# Patient Record
Sex: MALE | Employment: UNEMPLOYED | URBAN - METROPOLITAN AREA
[De-identification: names, ages, dates, MRNs, and addresses within clinical notes are randomized per-mention and may not be internally consistent; named-entity substitution may affect disease eponyms.]

---

## 2017-07-27 ENCOUNTER — APPOINTMENT (OUTPATIENT)
Dept: GENERAL RADIOLOGY | Age: 57
DRG: 004 | End: 2017-07-27
Attending: EMERGENCY MEDICINE
Payer: MEDICARE

## 2017-07-27 ENCOUNTER — HOSPITAL ENCOUNTER (INPATIENT)
Age: 57
LOS: 57 days | Discharge: NURSING, REHAB OR LONG-TERM CARE | DRG: 004 | End: 2017-09-22
Attending: EMERGENCY MEDICINE | Admitting: INTERNAL MEDICINE
Payer: MEDICARE

## 2017-07-27 ENCOUNTER — APPOINTMENT (OUTPATIENT)
Dept: CT IMAGING | Age: 57
DRG: 004 | End: 2017-07-27
Attending: EMERGENCY MEDICINE
Payer: MEDICARE

## 2017-07-27 DIAGNOSIS — R79.89 ELEVATED LFTS: ICD-10-CM

## 2017-07-27 DIAGNOSIS — E87.20 ACIDOSIS: ICD-10-CM

## 2017-07-27 DIAGNOSIS — D64.9 ANEMIA, UNSPECIFIED TYPE: ICD-10-CM

## 2017-07-27 DIAGNOSIS — I46.9 CARDIAC ARREST (HCC): Primary | ICD-10-CM

## 2017-07-27 DIAGNOSIS — N18.6 ESRD NEEDING DIALYSIS (HCC): ICD-10-CM

## 2017-07-27 DIAGNOSIS — R77.8 ELEVATED TROPONIN: ICD-10-CM

## 2017-07-27 DIAGNOSIS — Z99.2 ESRD NEEDING DIALYSIS (HCC): ICD-10-CM

## 2017-07-27 DIAGNOSIS — K52.9 COLITIS: ICD-10-CM

## 2017-07-27 PROBLEM — I95.9 HYPOTENSION: Status: ACTIVE | Noted: 2017-07-27

## 2017-07-27 PROBLEM — G93.1 ANOXIC BRAIN DAMAGE (HCC): Status: ACTIVE | Noted: 2017-07-27

## 2017-07-27 PROBLEM — A41.9 SEPSIS (HCC): Status: ACTIVE | Noted: 2017-07-27

## 2017-07-27 PROBLEM — K92.2 ACUTE GI BLEEDING: Status: ACTIVE | Noted: 2017-07-27

## 2017-07-27 PROBLEM — J96.90 RESPIRATORY FAILURE (HCC): Status: ACTIVE | Noted: 2017-07-27

## 2017-07-27 LAB
ALBUMIN SERPL BCP-MCNC: 2.3 G/DL (ref 3.4–5)
ALBUMIN SERPL BCP-MCNC: 2.4 G/DL (ref 3.4–5)
ALBUMIN/GLOB SERPL: 0.7 {RATIO} (ref 0.8–1.7)
ALBUMIN/GLOB SERPL: 0.7 {RATIO} (ref 0.8–1.7)
ALP SERPL-CCNC: 132 U/L (ref 45–117)
ALP SERPL-CCNC: 146 U/L (ref 45–117)
ALT SERPL-CCNC: 214 U/L (ref 16–61)
ALT SERPL-CCNC: 234 U/L (ref 16–61)
AMORPH CRY URNS QL MICRO: ABNORMAL
AMPHET UR QL SCN: NEGATIVE
ANION GAP BLD CALC-SCNC: 13 MMOL/L (ref 3–18)
ANION GAP BLD CALC-SCNC: 17 MMOL/L (ref 10–20)
ANION GAP BLD CALC-SCNC: 18 MMOL/L (ref 3–18)
APPEARANCE UR: ABNORMAL
APTT PPP: 43.3 SEC (ref 23–36.4)
ARTERIAL PATENCY WRIST A: ABNORMAL
ARTERIAL PATENCY WRIST A: YES
ARTERIAL PATENCY WRIST A: YES
AST SERPL W P-5'-P-CCNC: 297 U/L (ref 15–37)
AST SERPL W P-5'-P-CCNC: 405 U/L (ref 15–37)
BACTERIA SPEC CULT: NORMAL
BACTERIA URNS QL MICRO: ABNORMAL /HPF
BARBITURATES UR QL SCN: NEGATIVE
BASE DEFICIT BLD-SCNC: 12 MMOL/L
BASE DEFICIT BLD-SCNC: 5 MMOL/L
BASE DEFICIT BLD-SCNC: 5 MMOL/L
BASOPHILS # BLD AUTO: 0 K/UL (ref 0–0.06)
BASOPHILS # BLD AUTO: 0 K/UL (ref 0–0.06)
BASOPHILS # BLD: 0 % (ref 0–3)
BASOPHILS # BLD: 0 % (ref 0–3)
BDY SITE: ABNORMAL
BENZODIAZ UR QL: NEGATIVE
BILIRUB SERPL-MCNC: 3.9 MG/DL (ref 0.2–1)
BILIRUB SERPL-MCNC: 4.1 MG/DL (ref 0.2–1)
BILIRUB UR QL: ABNORMAL
BODY TEMPERATURE: 97.6
BUN BLD-MCNC: 40 MG/DL (ref 7–18)
BUN SERPL-MCNC: 42 MG/DL (ref 7–18)
BUN SERPL-MCNC: 45 MG/DL (ref 7–18)
BUN/CREAT SERPL: 7 (ref 12–20)
BUN/CREAT SERPL: 7 (ref 12–20)
CA-I BLD-MCNC: 0.86 MMOL/L (ref 1.12–1.32)
CALCIUM SERPL-MCNC: 6.2 MG/DL (ref 8.5–10.1)
CALCIUM SERPL-MCNC: 7 MG/DL (ref 8.5–10.1)
CANNABINOIDS UR QL SCN: NEGATIVE
CHLORIDE BLD-SCNC: 108 MMOL/L (ref 100–108)
CHLORIDE SERPL-SCNC: 106 MMOL/L (ref 100–108)
CHLORIDE SERPL-SCNC: 109 MMOL/L (ref 100–108)
CK MB CFR SERPL CALC: 1 % (ref 0–4)
CK MB CFR SERPL CALC: 1.1 % (ref 0–4)
CK MB SERPL-MCNC: 15.3 NG/ML (ref 5–25)
CK MB SERPL-MCNC: 48.7 NG/ML (ref 5–25)
CK SERPL-CCNC: 1421 U/L (ref 39–308)
CK SERPL-CCNC: 4740 U/L (ref 39–308)
CO2 BLD-SCNC: 15 MMOL/L (ref 19–24)
CO2 SERPL-SCNC: 15 MMOL/L (ref 21–32)
CO2 SERPL-SCNC: 19 MMOL/L (ref 21–32)
COCAINE UR QL SCN: NEGATIVE
COLOR UR: ABNORMAL
CREAT SERPL-MCNC: 5.72 MG/DL (ref 0.6–1.3)
CREAT SERPL-MCNC: 6.2 MG/DL (ref 0.6–1.3)
CREAT UR-MCNC: 5.6 MG/DL (ref 0.6–1.3)
DIFFERENTIAL METHOD BLD: ABNORMAL
DIFFERENTIAL METHOD BLD: ABNORMAL
EOSINOPHIL # BLD: 0 K/UL (ref 0–0.4)
EOSINOPHIL # BLD: 0 K/UL (ref 0–0.4)
EOSINOPHIL NFR BLD: 0 % (ref 0–5)
EOSINOPHIL NFR BLD: 0 % (ref 0–5)
EPITH CASTS URNS QL MICRO: ABNORMAL /LPF (ref 0–5)
ERYTHROCYTE [DISTWIDTH] IN BLOOD BY AUTOMATED COUNT: 14.9 % (ref 11.6–14.5)
ERYTHROCYTE [DISTWIDTH] IN BLOOD BY AUTOMATED COUNT: 15.4 % (ref 11.6–14.5)
GAS FLOW.O2 O2 DELIVERY SYS: ABNORMAL L/MIN
GAS FLOW.O2 SETTING OXYMISER: 12 BPM
GAS FLOW.O2 SETTING OXYMISER: 12 BPM
GAS FLOW.O2 SETTING OXYMISER: 20 L/M
GLOBULIN SER CALC-MCNC: 3.5 G/DL (ref 2–4)
GLOBULIN SER CALC-MCNC: 3.5 G/DL (ref 2–4)
GLUCOSE BLD STRIP.AUTO-MCNC: 159 MG/DL (ref 74–106)
GLUCOSE BLD STRIP.AUTO-MCNC: 251 MG/DL (ref 70–110)
GLUCOSE SERPL-MCNC: 163 MG/DL (ref 74–99)
GLUCOSE SERPL-MCNC: 189 MG/DL (ref 74–99)
GLUCOSE UR STRIP.AUTO-MCNC: 100 MG/DL
HCO3 BLD-SCNC: 15.5 MMOL/L (ref 22–26)
HCO3 BLD-SCNC: 17.7 MMOL/L (ref 22–26)
HCO3 BLD-SCNC: 21 MMOL/L (ref 22–26)
HCT VFR BLD AUTO: 23.6 % (ref 36–48)
HCT VFR BLD AUTO: 26.9 % (ref 36–48)
HCT VFR BLD CALC: 18 % (ref 36–49)
HDSCOM,HDSCOM: ABNORMAL
HGB BLD-MCNC: 6.1 G/DL (ref 12–16)
HGB BLD-MCNC: 8.1 G/DL (ref 13–16)
HGB BLD-MCNC: 9.4 G/DL (ref 13–16)
HGB UR QL STRIP: ABNORMAL
INR PPP: 2 (ref 0.8–1.2)
INSPIRATION.DURATION SETTING TIME VENT: 1 SEC
INSPIRATION.DURATION SETTING TIME VENT: 1 SEC
KETONES UR QL STRIP.AUTO: ABNORMAL MG/DL
LACTATE BLD-SCNC: 11.3 MMOL/L (ref 0.4–2)
LACTATE BLD-SCNC: 3.7 MMOL/L (ref 0.4–2)
LEUKOCYTE ESTERASE UR QL STRIP.AUTO: ABNORMAL
LYMPHOCYTES # BLD AUTO: 13 % (ref 20–51)
LYMPHOCYTES # BLD AUTO: 4 % (ref 20–51)
LYMPHOCYTES # BLD: 0.4 K/UL (ref 0.8–3.5)
LYMPHOCYTES # BLD: 1.5 K/UL (ref 0.8–3.5)
MAGNESIUM SERPL-MCNC: 2.1 MG/DL (ref 1.6–2.6)
MAGNESIUM SERPL-MCNC: 2.2 MG/DL (ref 1.6–2.6)
MCH RBC QN AUTO: 30.2 PG (ref 24–34)
MCH RBC QN AUTO: 30.2 PG (ref 24–34)
MCHC RBC AUTO-ENTMCNC: 34.3 G/DL (ref 31–37)
MCHC RBC AUTO-ENTMCNC: 34.9 G/DL (ref 31–37)
MCV RBC AUTO: 86.5 FL (ref 74–97)
MCV RBC AUTO: 88.1 FL (ref 74–97)
METHADONE UR QL: NEGATIVE
MONOCYTES # BLD: 0.5 K/UL (ref 0–1)
MONOCYTES # BLD: 0.9 K/UL (ref 0–1)
MONOCYTES NFR BLD AUTO: 4 % (ref 2–9)
MONOCYTES NFR BLD AUTO: 8 % (ref 2–9)
NEUTS BAND NFR BLD MANUAL: 20 % (ref 0–5)
NEUTS BAND NFR BLD MANUAL: 26 % (ref 0–5)
NEUTS SEG # BLD: 9.5 K/UL (ref 1.8–8)
NEUTS SEG # BLD: 9.7 K/UL (ref 1.8–8)
NEUTS SEG NFR BLD AUTO: 62 % (ref 42–75)
NEUTS SEG NFR BLD AUTO: 63 % (ref 42–75)
NITRITE UR QL STRIP.AUTO: NEGATIVE
O2/TOTAL GAS SETTING VFR VENT: 100 %
OPIATES UR QL: POSITIVE
PCO2 BLD: 24.4 MMHG (ref 35–45)
PCO2 BLD: 38 MMHG (ref 35–45)
PCO2 BLD: 43.9 MMHG (ref 35–45)
PCP UR QL: NEGATIVE
PEEP RESPIRATORY: 5 CMH2O
PEEP RESPIRATORY: 5 CMH2O
PH BLD: 7.16 [PH] (ref 7.35–7.45)
PH BLD: 7.35 [PH] (ref 7.35–7.45)
PH BLD: 7.47 [PH] (ref 7.35–7.45)
PH UR STRIP: 5.5 [PH] (ref 5–8)
PLATELET # BLD AUTO: 199 K/UL (ref 135–420)
PLATELET # BLD AUTO: 244 K/UL (ref 135–420)
PLATELET COMMENTS,PCOM: ABNORMAL
PLATELET COMMENTS,PCOM: ABNORMAL
PMV BLD AUTO: 9.4 FL (ref 9.2–11.8)
PMV BLD AUTO: 9.5 FL (ref 9.2–11.8)
PO2 BLD: 169 MMHG (ref 80–100)
PO2 BLD: 404 MMHG (ref 80–100)
PO2 BLD: 415 MMHG (ref 80–100)
POTASSIUM BLD-SCNC: 2.7 MMOL/L (ref 3.5–5.5)
POTASSIUM SERPL-SCNC: 3 MMOL/L (ref 3.5–5.5)
POTASSIUM SERPL-SCNC: 3.9 MMOL/L (ref 3.5–5.5)
PROT SERPL-MCNC: 5.8 G/DL (ref 6.4–8.2)
PROT SERPL-MCNC: 5.9 G/DL (ref 6.4–8.2)
PROT UR STRIP-MCNC: 300 MG/DL
PROTHROMBIN TIME: 21.4 SEC (ref 11.5–15.2)
RBC # BLD AUTO: 2.68 M/UL (ref 4.7–5.5)
RBC # BLD AUTO: 3.11 M/UL (ref 4.7–5.5)
RBC #/AREA URNS HPF: ABNORMAL /HPF (ref 0–5)
RBC MORPH BLD: ABNORMAL
SAO2 % BLD: 100 % (ref 92–97)
SAO2 % BLD: 100 % (ref 92–97)
SAO2 % BLD: 99 % (ref 92–97)
SERVICE CMNT-IMP: ABNORMAL
SERVICE CMNT-IMP: NORMAL
SODIUM BLD-SCNC: 135 MMOL/L (ref 136–145)
SODIUM SERPL-SCNC: 139 MMOL/L (ref 136–145)
SODIUM SERPL-SCNC: 141 MMOL/L (ref 136–145)
SP GR UR REFRACTOMETRY: >1.03 (ref 1–1.03)
SPECIMEN TYPE: ABNORMAL
SPERM URNS QL MICRO: ABNORMAL
TOTAL RESP. RATE, ITRR: 12
TOTAL RESP. RATE, ITRR: 25
TOTAL RESP. RATE, ITRR: 33
TROPONIN I BLD-MCNC: 0.04 NG/ML (ref 0–0.08)
TROPONIN I SERPL-MCNC: 0.08 NG/ML (ref 0–0.04)
TROPONIN I SERPL-MCNC: 1.07 NG/ML (ref 0–0.04)
UROBILINOGEN UR QL STRIP.AUTO: 1 EU/DL (ref 0.2–1)
VENTILATION MODE VENT: ABNORMAL
VENTILATION MODE VENT: ABNORMAL
VOLUME CONTROL PLUS IVLCP: YES
VOLUME CONTROL PLUS IVLCP: YES
VT SETTING VENT: 400 ML
VT SETTING VENT: 400 ML
WBC # BLD AUTO: 11 K/UL (ref 4.6–13.2)
WBC # BLD AUTO: 11.5 K/UL (ref 4.6–13.2)
WBC URNS QL MICRO: ABNORMAL /HPF (ref 0–4)

## 2017-07-27 PROCEDURE — 36430 TRANSFUSION BLD/BLD COMPNT: CPT

## 2017-07-27 PROCEDURE — 84484 ASSAY OF TROPONIN QUANT: CPT | Performed by: EMERGENCY MEDICINE

## 2017-07-27 PROCEDURE — 86920 COMPATIBILITY TEST SPIN: CPT | Performed by: EMERGENCY MEDICINE

## 2017-07-27 PROCEDURE — 82803 BLOOD GASES ANY COMBINATION: CPT

## 2017-07-27 PROCEDURE — 87186 SC STD MICRODIL/AGAR DIL: CPT | Performed by: EMERGENCY MEDICINE

## 2017-07-27 PROCEDURE — 77030016570 HC BLNKT BAIR HGGR 3M -B

## 2017-07-27 PROCEDURE — 87077 CULTURE AEROBIC IDENTIFY: CPT | Performed by: EMERGENCY MEDICINE

## 2017-07-27 PROCEDURE — 77030013169 SET IV BLD ICUM -A

## 2017-07-27 PROCEDURE — 5A1955Z RESPIRATORY VENTILATION, GREATER THAN 96 CONSECUTIVE HOURS: ICD-10-PCS | Performed by: EMERGENCY MEDICINE

## 2017-07-27 PROCEDURE — 77030011943

## 2017-07-27 PROCEDURE — 87045 FECES CULTURE AEROBIC BACT: CPT | Performed by: EMERGENCY MEDICINE

## 2017-07-27 PROCEDURE — 70450 CT HEAD/BRAIN W/O DYE: CPT

## 2017-07-27 PROCEDURE — C1751 CATH, INF, PER/CENT/MIDLINE: HCPCS

## 2017-07-27 PROCEDURE — 83735 ASSAY OF MAGNESIUM: CPT | Performed by: EMERGENCY MEDICINE

## 2017-07-27 PROCEDURE — 96366 THER/PROPH/DIAG IV INF ADDON: CPT

## 2017-07-27 PROCEDURE — 96361 HYDRATE IV INFUSION ADD-ON: CPT

## 2017-07-27 PROCEDURE — 77030021678 HC GLIDESCP STAT DISP VERT -B

## 2017-07-27 PROCEDURE — 77030033263 HC DRSG MEPILEX 16-48IN BORD MOLN -B

## 2017-07-27 PROCEDURE — 74011250636 HC RX REV CODE- 250/636: Performed by: INTERNAL MEDICINE

## 2017-07-27 PROCEDURE — 75810000137 HC PLCMT CENT VENOUS CATH

## 2017-07-27 PROCEDURE — 87493 C DIFF AMPLIFIED PROBE: CPT | Performed by: EMERGENCY MEDICINE

## 2017-07-27 PROCEDURE — 80053 COMPREHEN METABOLIC PANEL: CPT | Performed by: EMERGENCY MEDICINE

## 2017-07-27 PROCEDURE — 93005 ELECTROCARDIOGRAM TRACING: CPT

## 2017-07-27 PROCEDURE — 77030008768 HC TU NG VYGC -A

## 2017-07-27 PROCEDURE — 96367 TX/PROPH/DG ADDL SEQ IV INF: CPT

## 2017-07-27 PROCEDURE — 81001 URINALYSIS AUTO W/SCOPE: CPT | Performed by: EMERGENCY MEDICINE

## 2017-07-27 PROCEDURE — 85730 THROMBOPLASTIN TIME PARTIAL: CPT | Performed by: EMERGENCY MEDICINE

## 2017-07-27 PROCEDURE — 74011000250 HC RX REV CODE- 250: Performed by: INTERNAL MEDICINE

## 2017-07-27 PROCEDURE — P9016 RBC LEUKOCYTES REDUCED: HCPCS | Performed by: EMERGENCY MEDICINE

## 2017-07-27 PROCEDURE — 99285 EMERGENCY DEPT VISIT HI MDM: CPT

## 2017-07-27 PROCEDURE — 65610000006 HC RM INTENSIVE CARE

## 2017-07-27 PROCEDURE — 87040 BLOOD CULTURE FOR BACTERIA: CPT | Performed by: EMERGENCY MEDICINE

## 2017-07-27 PROCEDURE — 95816 EEG AWAKE AND DROWSY: CPT | Performed by: INTERNAL MEDICINE

## 2017-07-27 PROCEDURE — 77030020177 HC ELECTRD DEFIB PD PHIL -B

## 2017-07-27 PROCEDURE — 31500 INSERT EMERGENCY AIRWAY: CPT

## 2017-07-27 PROCEDURE — 85025 COMPLETE CBC W/AUTO DIFF WBC: CPT | Performed by: EMERGENCY MEDICINE

## 2017-07-27 PROCEDURE — 74011250636 HC RX REV CODE- 250/636: Performed by: EMERGENCY MEDICINE

## 2017-07-27 PROCEDURE — 02HV33Z INSERTION OF INFUSION DEVICE INTO SUPERIOR VENA CAVA, PERCUTANEOUS APPROACH: ICD-10-PCS | Performed by: EMERGENCY MEDICINE

## 2017-07-27 PROCEDURE — 74176 CT ABD & PELVIS W/O CONTRAST: CPT

## 2017-07-27 PROCEDURE — 77030020454 HC TU ET CUF HI/LO COVD -B

## 2017-07-27 PROCEDURE — 80047 BASIC METABLC PNL IONIZED CA: CPT

## 2017-07-27 PROCEDURE — 94762 N-INVAS EAR/PLS OXIMTRY CONT: CPT

## 2017-07-27 PROCEDURE — 77030018719 HC DRSG PTCH ANTIMIC J&J -A

## 2017-07-27 PROCEDURE — 82550 ASSAY OF CK (CPK): CPT | Performed by: EMERGENCY MEDICINE

## 2017-07-27 PROCEDURE — 83605 ASSAY OF LACTIC ACID: CPT

## 2017-07-27 PROCEDURE — 96365 THER/PROPH/DIAG IV INF INIT: CPT

## 2017-07-27 PROCEDURE — 82962 GLUCOSE BLOOD TEST: CPT

## 2017-07-27 PROCEDURE — 86900 BLOOD TYPING SEROLOGIC ABO: CPT | Performed by: EMERGENCY MEDICINE

## 2017-07-27 PROCEDURE — 71010 XR CHEST PORT: CPT

## 2017-07-27 PROCEDURE — 0BH17EZ INSERTION OF ENDOTRACHEAL AIRWAY INTO TRACHEA, VIA NATURAL OR ARTIFICIAL OPENING: ICD-10-PCS | Performed by: EMERGENCY MEDICINE

## 2017-07-27 PROCEDURE — 36600 WITHDRAWAL OF ARTERIAL BLOOD: CPT

## 2017-07-27 PROCEDURE — 94002 VENT MGMT INPAT INIT DAY: CPT

## 2017-07-27 PROCEDURE — 85610 PROTHROMBIN TIME: CPT | Performed by: EMERGENCY MEDICINE

## 2017-07-27 PROCEDURE — 80307 DRUG TEST PRSMV CHEM ANLYZR: CPT | Performed by: EMERGENCY MEDICINE

## 2017-07-27 PROCEDURE — 74011000250 HC RX REV CODE- 250: Performed by: EMERGENCY MEDICINE

## 2017-07-27 PROCEDURE — 74011000258 HC RX REV CODE- 258: Performed by: EMERGENCY MEDICINE

## 2017-07-27 PROCEDURE — 96375 TX/PRO/DX INJ NEW DRUG ADDON: CPT

## 2017-07-27 RX ORDER — SODIUM BICARBONATE 1 MEQ/ML
50 SYRINGE (ML) INTRAVENOUS
Status: COMPLETED | OUTPATIENT
Start: 2017-07-27 | End: 2017-07-27

## 2017-07-27 RX ORDER — ROCURONIUM BROMIDE 10 MG/ML
0.6 INJECTION, SOLUTION INTRAVENOUS
Status: COMPLETED | OUTPATIENT
Start: 2017-07-27 | End: 2017-07-27

## 2017-07-27 RX ORDER — METRONIDAZOLE 500 MG/100ML
500 INJECTION, SOLUTION INTRAVENOUS EVERY 8 HOURS
Status: DISCONTINUED | OUTPATIENT
Start: 2017-07-27 | End: 2017-07-28

## 2017-07-27 RX ORDER — ETOMIDATE 2 MG/ML
20 INJECTION INTRAVENOUS
Status: COMPLETED | OUTPATIENT
Start: 2017-07-27 | End: 2017-07-27

## 2017-07-27 RX ORDER — FENTANYL CITRATE 50 UG/ML
50 INJECTION, SOLUTION INTRAMUSCULAR; INTRAVENOUS
Status: DISCONTINUED | OUTPATIENT
Start: 2017-07-27 | End: 2017-08-06

## 2017-07-27 RX ORDER — NALOXONE HYDROCHLORIDE 0.4 MG/ML
0.4 INJECTION, SOLUTION INTRAMUSCULAR; INTRAVENOUS; SUBCUTANEOUS AS NEEDED
Status: DISCONTINUED | OUTPATIENT
Start: 2017-07-27 | End: 2017-09-22 | Stop reason: HOSPADM

## 2017-07-27 RX ORDER — HEPARIN SODIUM 5000 [USP'U]/ML
5000 INJECTION, SOLUTION INTRAVENOUS; SUBCUTANEOUS EVERY 8 HOURS
Status: DISCONTINUED | OUTPATIENT
Start: 2017-07-27 | End: 2017-07-28

## 2017-07-27 RX ORDER — NOREPINEPHRINE BITARTRATE/D5W 8 MG/250ML
2-30 PLASTIC BAG, INJECTION (ML) INTRAVENOUS
Status: DISCONTINUED | OUTPATIENT
Start: 2017-07-27 | End: 2017-07-30

## 2017-07-27 RX ORDER — NALOXONE HYDROCHLORIDE 0.4 MG/ML
0.4 INJECTION, SOLUTION INTRAMUSCULAR; INTRAVENOUS; SUBCUTANEOUS ONCE
Status: COMPLETED | OUTPATIENT
Start: 2017-07-27 | End: 2017-07-27

## 2017-07-27 RX ORDER — LORAZEPAM 2 MG/ML
1 INJECTION INTRAMUSCULAR
Status: DISCONTINUED | OUTPATIENT
Start: 2017-07-27 | End: 2017-08-06

## 2017-07-27 RX ORDER — MAGNESIUM SULFATE HEPTAHYDRATE 40 MG/ML
2 INJECTION, SOLUTION INTRAVENOUS
Status: COMPLETED | OUTPATIENT
Start: 2017-07-27 | End: 2017-07-27

## 2017-07-27 RX ORDER — HYDROCORTISONE SODIUM SUCCINATE 100 MG/2ML
100 INJECTION, POWDER, FOR SOLUTION INTRAMUSCULAR; INTRAVENOUS ONCE
Status: COMPLETED | OUTPATIENT
Start: 2017-07-27 | End: 2017-07-27

## 2017-07-27 RX ORDER — SODIUM CHLORIDE 9 MG/ML
250 INJECTION, SOLUTION INTRAVENOUS AS NEEDED
Status: DISCONTINUED | OUTPATIENT
Start: 2017-07-27 | End: 2017-07-31

## 2017-07-27 RX ORDER — LEVOFLOXACIN 5 MG/ML
750 INJECTION, SOLUTION INTRAVENOUS EVERY 24 HOURS
Status: DISCONTINUED | OUTPATIENT
Start: 2017-07-27 | End: 2017-07-27

## 2017-07-27 RX ORDER — DEXTROSE 50 % IN WATER (D50W) INTRAVENOUS SYRINGE
25
Status: COMPLETED | OUTPATIENT
Start: 2017-07-27 | End: 2017-07-27

## 2017-07-27 RX ORDER — NOREPINEPHRINE BITARTRATE 1 MG/ML
INJECTION, SOLUTION INTRAVENOUS
Status: DISPENSED
Start: 2017-07-27 | End: 2017-07-28

## 2017-07-27 RX ORDER — POTASSIUM CHLORIDE 7.45 MG/ML
10 INJECTION INTRAVENOUS
Status: COMPLETED | OUTPATIENT
Start: 2017-07-27 | End: 2017-08-04

## 2017-07-27 RX ORDER — LEVOFLOXACIN 5 MG/ML
500 INJECTION, SOLUTION INTRAVENOUS
Status: DISCONTINUED | OUTPATIENT
Start: 2017-07-29 | End: 2017-07-28

## 2017-07-27 RX ADMIN — HEPARIN SODIUM 5000 UNITS: 5000 INJECTION, SOLUTION INTRAVENOUS; SUBCUTANEOUS at 22:41

## 2017-07-27 RX ADMIN — SODIUM BICARBONATE 50 MEQ: 84 INJECTION INTRAVENOUS at 16:45

## 2017-07-27 RX ADMIN — Medication 13 MCG/MIN: at 22:43

## 2017-07-27 RX ADMIN — POTASSIUM CHLORIDE 10 MEQ: 10 INJECTION, SOLUTION INTRAVENOUS at 21:18

## 2017-07-27 RX ADMIN — POTASSIUM CHLORIDE 10 MEQ: 10 INJECTION, SOLUTION INTRAVENOUS at 23:40

## 2017-07-27 RX ADMIN — Medication 1 ML/KG/HR: at 22:46

## 2017-07-27 RX ADMIN — POTASSIUM CHLORIDE 10 MEQ: 10 INJECTION, SOLUTION INTRAVENOUS at 22:43

## 2017-07-27 RX ADMIN — LEVOFLOXACIN 750 MG: 5 INJECTION, SOLUTION INTRAVENOUS at 18:00

## 2017-07-27 RX ADMIN — MAGNESIUM SULFATE HEPTAHYDRATE 2 G: 40 INJECTION, SOLUTION INTRAVENOUS at 17:25

## 2017-07-27 RX ADMIN — NALOXONE HYDROCHLORIDE 0.4 MG: 0.4 INJECTION, SOLUTION INTRAMUSCULAR; INTRAVENOUS; SUBCUTANEOUS at 16:27

## 2017-07-27 RX ADMIN — ROCURONIUM BROMIDE 38.4 MG: 10 INJECTION INTRAVENOUS at 18:52

## 2017-07-27 RX ADMIN — SODIUM CHLORIDE 1000 ML: 900 INJECTION, SOLUTION INTRAVENOUS at 16:45

## 2017-07-27 RX ADMIN — ETOMIDATE 20 MG: 40 INJECTION, SOLUTION INTRAVENOUS at 18:52

## 2017-07-27 RX ADMIN — PIPERACILLIN SODIUM,TAZOBACTAM SODIUM 4.5 G: 4; .5 INJECTION, POWDER, FOR SOLUTION INTRAVENOUS at 18:00

## 2017-07-27 RX ADMIN — METRONIDAZOLE 500 MG: 500 INJECTION, SOLUTION INTRAVENOUS at 23:41

## 2017-07-27 RX ADMIN — DEXTROSE MONOHYDRATE 25 G: 25 INJECTION, SOLUTION INTRAVENOUS at 16:24

## 2017-07-27 RX ADMIN — SODIUM CHLORIDE 1000 ML: 900 INJECTION, SOLUTION INTRAVENOUS at 16:32

## 2017-07-27 RX ADMIN — Medication 6 MCG/MIN: at 19:01

## 2017-07-27 RX ADMIN — SODIUM BICARBONATE 50 MEQ: 84 INJECTION INTRAVENOUS at 20:45

## 2017-07-27 RX ADMIN — NALOXONE HYDROCHLORIDE 0.4 MG: 0.4 INJECTION, SOLUTION INTRAMUSCULAR; INTRAVENOUS; SUBCUTANEOUS at 16:24

## 2017-07-27 RX ADMIN — HYDROCORTISONE SODIUM SUCCINATE 100 MG: 100 INJECTION, POWDER, FOR SOLUTION INTRAMUSCULAR; INTRAVENOUS at 17:25

## 2017-07-27 NOTE — ED TRIAGE NOTES
[facility] for post arrest, call for sick person, upon loading in to amb, pt started agonal respiration, lost pulse, cpr started in amb, 2 amps d 50, 1 epi, bg in low 30s, arrives with IO left tibia, [facility].  ROSC at 1547

## 2017-07-27 NOTE — ED NOTES
Dr. Jefferson Osler at bedside for intubation, 7.0 ET tube, positive color change, 25 at the gumline, breath sounds bilaterally confirmed by Dr. Jefferson Osler

## 2017-07-27 NOTE — ED NOTES
Family brought to bedside, niece and daughter, updated on plan of care, verbalized understanding, assisted daughter back to quiet room, niece remains at bedside at this time, safety intact, will continue to monitor

## 2017-07-27 NOTE — CONSULTS
Consult Note  Consult requested by: Dr. Isaura Mercado is a 55 y.o. male [address] who is being seen on consult for ESRD, s/p cardiac arrest   Chief Complaint   Patient presents with    Cardiac arrest     Impression & Plan:   IMPRESSION:   ESRD, not sure where goes for chronic HD, base on chart his last HD was yesterday in ER   Access left arm AVG  Severe metabolic acidosis, elevated lactate  Anemia, received BT yesterday   S/p cardiac arrest  Altered mental status, doubt he can protect his airway   Hypotension  Sespsis, suspect GI pathology , colitis vs. Gall bladder pathology   PLAN:   Need to stabilize hemodynamics, and will need intubation. No urgent indication for dialysis, appears on dry side, agree with iv hydration per sepsis protocol. Okay to give IV contrast if needed as will plan to dialysed within nex 24hrs if his hemodynamic acceptable. Antibiotics per primary. Adjust all meds per ESRD status. Thank you very much for allowing me to participate in the care of this patient. We will continue to monitor with you and make recommendations as needed.     Discussed with Dr. Khadra Holt, Dr. Mika Becerra      Admission diagnosis: cardiac arrest   Patient has poor mental status, unable to provide any history , history was obtained by chart review and discussion with primary team    HPI:  50y M with PMH DM, HTN, ESRD, was brought to ER by EMS. Patient was found down at home with no pulse. He is hypotensive, non responisve in ER, his lab shows elevated lacate, severe metabolic acidosis . Nephrology service consulted for ESRD management. I briefly reviewed chart, he was in [place] [address] ER, for abdominal pain and anemia. He received BT, HD and discharged to home. His CT abdomen in ER shows, diffuse colitis and gall stone. During my evaluation in ER, he is non repsonsive, eyes are icteric, hypotensive, has very foul smelling liquid stool.      Past Medical History:   Diagnosis Date    Anemia     Arthritis     Chronic pain     Back     Diabetes mellitus type II     Hypertension     IBS (irritable bowel syndrome)     Lactose intolerance     TB (tuberculosis)     TB test positive      Past Surgical History:   Procedure Laterality Date    ENDOSCOPY, COLON, DIAGNOSTIC      HX AMPUTATION      Toe due to injury       Social History     Social History    Marital status: SINGLE     Spouse name: N/A    Number of children: N/A    Years of education: N/A     Occupational History    Not on file. Social History Main Topics    Smoking status: Current Every Day Smoker    Smokeless tobacco: Not on file    Alcohol use Yes    Drug use: Not on file    Sexual activity: Not on file     Other Topics Concern    Not on file     Social History Narrative    No narrative on file       No family history on file. Allergies no known allergies     Home Medications:     None       Current Facility-Administered Medications   Medication Dose Route Frequency    piperacillin-tazobactam (ZOSYN) 4.5 g in 0.9% sodium chloride (MBP/ADV) 100 mL MBP  4.5 g IntraVENous Q6H    levoFLOXacin (LEVAQUIN) 750 mg in D5W IVPB  750 mg IntraVENous Q24H    magnesium sulfate 2 g/50 ml IVPB (premix or compounded)  2 g IntraVENous NOW    vancomycin (VANCOCIN) 1,250 mg in 0.9% sodium chloride 250 mL IVPB  1,250 mg IntraVENous ONCE    VANCOMYCIN INFORMATION NOTE   Other CONTINUOUS     No current outpatient prescriptions on file.        Review of Systems:     Not able to provide   Data Review:    Labs: Results:       Chemistry Recent Labs      07/27/17   1626   GLU  163*   NA  139   K  3.9   CL  106   CO2  15*   BUN  45*   CREA  6.20*   CA  7.0*   AGAP  18   BUCR  7*   AP  146*   TP  5.9*   ALB  2.4*   GLOB  3.5   AGRAT  0.7*      CBC w/Diff Recent Labs      07/27/17   1626   WBC  11.5   RBC  2.68*   HGB  8.1*   HCT  23.6*   PLT  244   GRANS  63   LYMPH  13*   EOS  0      Coagulation Recent Labs      07/27/17   1626   PTP 21.4*   INR  2.0*   APTT  43.3*       Iron/Ferritin No results for input(s): IRON in the last 72 hours. No lab exists for component: TIBCCALC   BNP No results for input(s): BNPP in the last 72 hours. Cardiac Enzymes Recent Labs      07/27/17   1626   CPK  1421*   CKND1  1.1      Liver Enzymes Recent Labs      07/27/17   1626   TP  5.9*   ALB  2.4*   AP  146*   SGOT  297*      Thyroid Studies No results found for: T4, T3U, TSH, TSHEXT      EKG: sinus .     Physical Assessment:     Visit Vitals    BP (!) 85/38    Pulse 91    Temp 96.5 °F (35.8 °C)    Resp 24    Wt 64 kg (141 lb)    SpO2 99%     Weight change:   No intake or output data in the 24 hours ending 07/27/17 1819  Physical Exam:   General: appears dry    HEENT sclera icteric,   CVS: S1S2 heard,  no rub  RS: + air entry b/l, poor inspiratory effort   Abd: Soft, Non tender,  Neuro: poor response to verbal commend,   Extrm:no  edema, no cyanosis, clubbing   Skin: no visible  Rash  Musculoskeletal: No gross joints or bone deformities   Access: left arm AVG   Procedures/imaging: see electronic medical records for all procedures, Xrays and details which were not copied into this note but were reviewed prior to creation of Shane Smyth MD  July 27, 2017  [facility]  Office [phone]

## 2017-07-27 NOTE — Clinical Note
Status[de-identified] Inpatient [101] Type of Bed: Intensive Care [6] Inpatient Hospitalization Certified Necessary for the Following Reasons: 3. Patient receiving treatment that can only be provided in an inpatient setting (further clarification in H&P documentation) Admitting Diagnosis: Cardiac arrest ([facility] [address] [place]culus.Bidding. 5. ICD-9-CM] Admitting Diagnosis: Acidosis [276. 2. ICD-9-CM] Admitting Diagnosis: Respiratory failure ([facility] [address] [id number] Admitting Diagnosis: Anemia [053625] Admitting Diagnosis: ESRD needing dialysis [facility] [[id number]] Admitting Physician: Ivonne Masterson [de-identified] Attending Physician: Ivonne Masterson [de-identified] Estimated Length of Stay: 2 Midnights Discharge Plan[de-identified] Home with Office Follow-up

## 2017-07-27 NOTE — ED PROVIDER NOTES
HPI Comments: 4:18 PM Whitney Mayes is a 55 y.o. male with a history of ESRD on HD, HTN, diabetes, anemia, and IBS presents to ED via [facility] for the evaluation of cardiac arrest. PTA, EMS received a phone call for a \"sick person\". Upon EMS arrival pt was lying face down. In route to ED EMS started CPR secondary to an absent pulse. Pt received one epi, 2 amps d 50 in ambulance. Pt arrived with an IO left tibia, melody airway in place. EMS states that he is a dialysis patient and they are unsure of when his last treatment was. No other history able to be obtained. The history is provided by the EMS personnel. The history is limited by the condition of the patient. Past Medical History:   Diagnosis Date    Anemia     Arthritis     Chronic pain     Back     Diabetes mellitus type II     Hypertension     IBS (irritable bowel syndrome)     Lactose intolerance     TB (tuberculosis)     TB test positive       Past Surgical History:   Procedure Laterality Date    ENDOSCOPY, COLON, DIAGNOSTIC      HX AMPUTATION      Toe due to injury         No family history on file. Social History     Social History    Marital status: SINGLE     Spouse name: N/A    Number of children: N/A    Years of education: N/A     Occupational History    Not on file. Social History Main Topics    Smoking status: Current Every Day Smoker    Smokeless tobacco: Not on file    Alcohol use Yes    Drug use: Not on file    Sexual activity: Not on file     Other Topics Concern    Not on file     Social History Narrative    No narrative on file         ALLERGIES: Review of patient's allergies indicates no known allergies.     Review of Systems   Unable to perform ROS: Patient unresponsive       Vitals:    07/27/17 1925 07/27/17 1930 07/27/17 1935 07/27/17 1940   BP: 90/56 97/55 111/60 108/57   Pulse: 87 89 90 93   Resp: (!) 0 12 11 26   Temp:       SpO2: 100% 100% 100%    Weight:                Physical Exam   Eyes: Conjunctivae are normal.   Pupils 1mm and nonreactive bilaterally   Neck: Neck supple. No JVD present. No tracheal deviation present. No thyromegaly present. Cardiovascular:   tachycardic   Pulmonary/Chest:   Tachypneic. Breath sounds clear bilaterally   Abdominal: Soft. Bowel sounds are normal. He exhibits no distension. Musculoskeletal: He exhibits no edema or deformity. Lymphadenopathy:     He has no cervical adenopathy. Neurological: GCS eye subscore is 4. GCS verbal subscore is 1. GCS motor subscore is 4. Skin: No rash noted. Nursing note and vitals reviewed. MDM  Number of Diagnoses or Management Options  Acidosis:   Cardiac arrest [facility]):   Colitis:   Elevated LFTs:   Elevated troponin:   ESRD needing dialysis [facility]):   Diagnosis management comments: Debby Barksdale is a 55 y.o. male presenting post cardiac arrest. ROSC obtained PTA. Pt with melody airway, protecting airway on arrival, melody removed and placed on NC. Pt hypothermic and tachycardic, sepsis protocol started as was work up for cardiac etiology. Family updated on arrival. No other history able to be obtained at this time. Iv fluids and abx ordered. Bear hugger placed. CT A/P wo contrast obtained at [facility] yesterday:   Diffuse colonic wall thickening suggesting colitis either infectious or inflammatory  Gastroesophageal reflux  Small amount of ascites and anasarca  Gallbladder wall thickening with a 5 mm stone in the neck of the gallbladder. If there is clinical concern for acute cholecystitis, then I would recommend a right upper quadrant ultrasound.   Mild urinary bladder wall thickening suggesting underdistention however correlate with urinalysis    Critical Care  Total time providing critical care:  minutes    ED Course       Intubation  Date/Time: 7/27/2017 8:01 PM  Performed by: Alanna Marcano  Authorized by: Kandy MTZ     Consent:     Consent obtained:  Emergent situation  Pre-procedure details:     Patient status: Altered mental status  Procedure details:     Preoxygenation:  Nonrebreather mask    CPR in progress: no      Intubation method:  Oral    Oral intubation technique:  Video-assisted    Laryngoscope blade: Mac 3    Tube size (mm):  7.0    Tube type:  Cuffed    Number of attempts:  1    Cricoid pressure: yes      Tube visualized through cords: yes    Placement assessment:     Tube secured with:  ETT cardenas    Breath sounds:  Equal and absent over the epigastrium    Placement verification: chest rise, CXR verification, equal breath sounds and ETCO2 detector    Post-procedure details:     Patient tolerance of procedure: Tolerated well, no immediate complications  Central Line  Date/Time: 7/27/2017 8:02 PM  Performed by: Page Woodard  Authorized by: Page Woodard     Consent:     Consent obtained:  Emergent situation  Pre-procedure details:     Hand hygiene: Hand hygiene performed prior to insertion      Sterile barrier technique: All elements of maximal sterile technique followed      Skin preparation:  2% chlorhexidine    Skin preparation agent: Skin preparation agent completely dried prior to procedure    Anesthesia (see MAR for exact dosages): Anesthesia method:  None  Procedure details:     Location:  R internal jugular    Patient position:  Trendelenburg    Procedural supplies:  Triple lumen    Landmarks identified: yes      Ultrasound guidance: yes      Sterile ultrasound techniques: Sterile gel and sterile probe covers were used      Number of attempts:  1    Successful placement: yes    Post-procedure details:     Post-procedure:  Dressing applied and line sutured    Assessment:  Blood return through all ports, no pneumothorax on x-ray and free fluid flow    Patient tolerance of procedure:   Tolerated well, no immediate complications        Vitals:  Patient Vitals for the past 12 hrs:   Temp Pulse Resp BP SpO2   07/27/17 1940 - 93 26 108/57 -   07/27/17 1935 - 90 11 111/60 100 %   07/27/17 1930 - 89 12 97/55 100 %   07/27/17 1925 - 87 (!) 0 90/56 100 %   07/27/17 1920 - 83 (!) 6 (!) 78/52 100 %   07/27/17 1915 - 75 12 (!) 60/41 -   07/27/17 1910 - 61 (!) 7 (!) 48/33 100 %   07/27/17 1909 - (!) 59 12 - 100 %   07/27/17 1900 - 72 18 (!) 50/36 -   07/27/17 1856 - 76 20 (!) 54/34 -   07/27/17 1835 - 91 (!) 31 100/57 100 %   07/27/17 1830 - 90 28 98/50 100 %   07/27/17 1825 - 88 (!) 31 (!) 87/74 100 %   07/27/17 1820 - 92 23 93/55 100 %   07/27/17 1715 - 91 24 (!) 85/38 99 %   07/27/17 1712 - 89 (!) 33 (!) 81/54 98 %   07/27/17 1645 - 92 26 92/52 100 %   07/27/17 1643 96.5 °F (35.8 °C) - - - -   07/27/17 1638 - - - - 99 %   07/27/17 1631 - (!) 102 23 (!) 87/49 96 %   07/27/17 1630 - 100 26 (!) 88/45 97 %   07/27/17 1628 - (!) 103 23 (!) 87/49 97 %   07/27/17 1627 - (!) 103 22 - 99 %   07/27/17 1624 - (!) 102 25 - 96 %   Patient is 96% O2 on RA, indicating adequate oxygenation. EKG interpretation by ED Physician:    16:25- Sinus tachycardia, 103 bpm, lateral t-wave inversion, no STEMI   17:21- NSR, 92 bpm, QTc 722, no STEMI     X-Ray, CT or other radiology findings or impressions:  Ct Head Wo Cont    Result Date: 7/27/2017    CT scan of head: without contrast: HISTORY:[ Acute mental status change. History of diabetes, hypertension. TECHNIQUE: Contiguous 5 mm thick axial sections of brain have been obtained without intravenous contrast. [2-D coronal and sagittal images reformatted.] The study has been performed utilizing appropriate radiation dose reduction technique according to specification of the scanner, with modification of MAA/KV and appropriate collimation adjusted to patient's body habitus. COMPARISON STUDY: [None] ------------------------------------- FINDINGS: Intracranial hemorrhage :[None.] Intracranial mass lesion:[ None.] Midline shift: [None.] Cerebral cortical atrophy:[ None].  Cerebral central atrophy:[ None.] Chronic microvascular ischemic changes/aging changes in white matter:[Mild chronic microvascular ischemic changes in periventricular white matter. Acute cortical infarction:[ None.] Old cerebral infarction: [No definable infarction in cerebral cortex in either side. Basal ganglia structures of both sides:[ In right thalamus there are 3 lacunar infarctions. In left thalamus there are 2 lacunar infarctions. The lacunar infarctions are hypodense and most likely to be old or at least subacute. Brainstem: In the left side of upper manny there is ill-defined hypodensity, indicating lacunar infarctions of undetermined age. Acute lacunar infarction in brainstem not excluded. In the right side of mid manny there is hypodense old appearing lacunar infarction. Cerebellum: Mild atrophy changes in cerebellum. Calvarium and base of skull:[ No fracture or focal lesion]. In visualized portions of both orbits:[ No diagnostic finding.] In visualized portions of paranasal sinuses:[ No diagnostic finding.] In visualized base of l skull:[ No diagnostic finding.] IMPRESSION:    [No evidence of intracranial hemorrhages. Multiple lacunar infarctions in bilateral thalami, likely to be old or subacute. In left side of upper manny of brainstem there is lacunar infarction of undetermined age. Acute lacunar infarction in this area is not excluded. Follow-up evaluation with MRI of brain suggested. Xr Chest Port    Result Date: 7/27/2017  Portable Chest 1928 hours CPT CODE:41211 HISTORY:  Witnessed cardiac arrest during EMS transport,   intubation, central line. COMPARISON: Chest x-ray July 27, 2017 at 1648 hours. FINDINGS: Endotracheal tube has been positioned with the tip 6 cm proximal to the bree. Right IJ approach central catheter terminates at the midsuperior vena cava. Interval development of marked opacity from the right hilum to the hemidiaphragm with obscuration of the hemidiaphragm. The left lung is clear. The left costophrenic angle is sharp. Pulmonary vascularity is normal. There is no pneumothorax. . IMPRESSION: Support tubes in expected position. No pneumothorax. Marked interval progression of opacity in the right lower lobe likely due to segmental atelectasis    Xr Chest Port    Result Date: 7/27/2017  Portable chest x-ray, supine AP view, time 1648 hours on 7/27/2017: INDICATION: Cardiac arrest. COMPARISON STUDY: None. FINDINGS: There is moderate cardiomegaly. Pulmonary vascularity is not cephalized. Right hemidiaphragm is mild to moderately asymmetrically elevated. Probable minimal streaky atelectasis at right lung base. Rest of lungs is are clear. Visualized bony thorax appear to be grossly intact. There is no obvious pneumothorax. But small anterior pneumothorax may not be visualized on supine view IMPRESSION: Moderate cardiomegaly without CHF. Probable minimal streaky atelectasis at right lung base. Otherwise clear lungs bilaterally. Progress notes, Consult notes or additional Procedure notes:   Pt persistently hypotensive, repeat POC labs show dec in Hgb of 2 in few hrs been in ED, otherwise unchanged so concern this is true drop, did require transfusion last night so emergent blood ordered. 2LNS bolus and BP still low, abdominal exam unchanged, soft, not distended, some mucus per rectum but no melena or gross blood. Repeat ct ordered. cxr post intubation with some congestion worse in right base, will continue pulmonary toilet and monitor closely, will need dialysis in the next 12-24 hrs. Will consult nephrology and proceed with admission. I have spoken with Dr Myesha Mcgraw, ICU about patient. He has evalauted pt at bedside. Agrees with ICU admission. I have spoken with Dr Anton Cheema, nephrology about patient. Agrees to follow along in consult  I have spoken with Dr Rosemary Phipps , hospitalist, who agrees with admission. I have spoken with YASMEEN Miguel for [facility] about patient. Agrees to see pt in consult. Disposition:   Admitted    Diagnosis:   1. Cardiac arrest ([facility] [address]    2. Acidosis    3.  ESRD needing dialysis (HCC)    4. Elevated troponin    5. Elevated LFTs    6. Colitis    7. Anemia, unspecified type            Scribe Attestation      Anna Marie Gonzalez) Shoshana acting as a scribe for and in the presence of Harriet Espinoza MD      July 27, 2017 at 8:22 PM       Provider Attestation:      I personally performed the services described in the documentation, reviewed the documentation, as recorded by the scribe in my presence, and it accurately and completely records my words and actions.      Janel Kessler      Signed by: Genoveva Blake, [address] [place] [address], July 27, 2017 at 8:22 PM

## 2017-07-28 ENCOUNTER — APPOINTMENT (OUTPATIENT)
Dept: ULTRASOUND IMAGING | Age: 57
DRG: 004 | End: 2017-07-28
Attending: INTERNAL MEDICINE
Payer: MEDICARE

## 2017-07-28 ENCOUNTER — APPOINTMENT (OUTPATIENT)
Dept: GENERAL RADIOLOGY | Age: 57
DRG: 004 | End: 2017-07-28
Attending: PHYSICIAN ASSISTANT
Payer: MEDICARE

## 2017-07-28 ENCOUNTER — APPOINTMENT (OUTPATIENT)
Dept: GENERAL RADIOLOGY | Age: 57
DRG: 004 | End: 2017-07-28
Attending: INTERNAL MEDICINE
Payer: MEDICARE

## 2017-07-28 LAB
ABO + RH BLD: NORMAL
ALBUMIN SERPL BCP-MCNC: 2.3 G/DL (ref 3.4–5)
ALBUMIN/GLOB SERPL: 0.7 {RATIO} (ref 0.8–1.7)
ALP SERPL-CCNC: 121 U/L (ref 45–117)
ALT SERPL-CCNC: 262 U/L (ref 16–61)
ANION GAP BLD CALC-SCNC: 14 MMOL/L (ref 3–18)
APTT PPP: 138.6 SEC (ref 23–36.4)
APTT PPP: 53.9 SEC (ref 23–36.4)
ARTERIAL PATENCY WRIST A: ABNORMAL
ARTERIAL PATENCY WRIST A: YES
ARTERIAL PATENCY WRIST A: YES
AST SERPL W P-5'-P-CCNC: 501 U/L (ref 15–37)
ATRIAL RATE: 103 BPM
ATRIAL RATE: 92 BPM
BASE DEFICIT BLD-SCNC: 5 MMOL/L
BASE DEFICIT BLD-SCNC: 6 MMOL/L
BASE EXCESS BLD CALC-SCNC: 14 MMOL/L
BASOPHILS # BLD AUTO: 0 K/UL (ref 0–0.06)
BASOPHILS # BLD AUTO: 0 K/UL (ref 0–0.06)
BASOPHILS # BLD: 0 % (ref 0–3)
BASOPHILS # BLD: 0 % (ref 0–3)
BDY SITE: ABNORMAL
BILIRUB SERPL-MCNC: 4.9 MG/DL (ref 0.2–1)
BLD PROD TYP BPU: NORMAL
BLD PROD TYP BPU: NORMAL
BLOOD GROUP ANTIBODIES SERPL: NORMAL
BODY TEMPERATURE: 98.4
BPU ID: NORMAL
BPU ID: NORMAL
BUN SERPL-MCNC: 44 MG/DL (ref 7–18)
BUN/CREAT SERPL: 8 (ref 12–20)
CA-I SERPL-SCNC: 0.81 MMOL/L (ref 1.12–1.32)
CALCIUM SERPL-MCNC: 6.3 MG/DL (ref 8.5–10.1)
CALCIUM SERPL-MCNC: 6.8 MG/DL (ref 8.5–10.1)
CALCULATED P AXIS, ECG09: 54 DEGREES
CALCULATED P AXIS, ECG09: 58 DEGREES
CALCULATED R AXIS, ECG10: 60 DEGREES
CALCULATED R AXIS, ECG10: 64 DEGREES
CALCULATED T AXIS, ECG11: 78 DEGREES
CALCULATED T AXIS, ECG11: 95 DEGREES
CHLORIDE SERPL-SCNC: 108 MMOL/L (ref 100–108)
CK MB CFR SERPL CALC: 0.8 % (ref 0–4)
CK MB CFR SERPL CALC: 0.8 % (ref 0–4)
CK MB CFR SERPL CALC: 0.9 % (ref 0–4)
CK MB SERPL-MCNC: 61.4 NG/ML (ref 5–25)
CK MB SERPL-MCNC: 71.3 NG/ML (ref 5–25)
CK MB SERPL-MCNC: 80.9 NG/ML (ref 5–25)
CK SERPL-CCNC: 7413 U/L (ref 39–308)
CK SERPL-CCNC: 8892 U/L (ref 39–308)
CK SERPL-CCNC: 9426 U/L (ref 39–308)
CO2 SERPL-SCNC: 17 MMOL/L (ref 21–32)
CREAT SERPL-MCNC: 5.71 MG/DL (ref 0.6–1.3)
CROSSMATCH RESULT,%XM: NORMAL
CROSSMATCH RESULT,%XM: NORMAL
DIAGNOSIS, 93000: NORMAL
DIAGNOSIS, 93000: NORMAL
DIFFERENTIAL METHOD BLD: ABNORMAL
DIFFERENTIAL METHOD BLD: ABNORMAL
EOSINOPHIL # BLD: 0 K/UL (ref 0–0.4)
EOSINOPHIL # BLD: 0 K/UL (ref 0–0.4)
EOSINOPHIL NFR BLD: 0 % (ref 0–5)
EOSINOPHIL NFR BLD: 0 % (ref 0–5)
ERYTHROCYTE [DISTWIDTH] IN BLOOD BY AUTOMATED COUNT: 15.2 % (ref 11.6–14.5)
ERYTHROCYTE [DISTWIDTH] IN BLOOD BY AUTOMATED COUNT: 15.5 % (ref 11.6–14.5)
FERRITIN SERPL-MCNC: 4933 NG/ML (ref 8–388)
GAS FLOW.O2 O2 DELIVERY SYS: ABNORMAL L/MIN
GAS FLOW.O2 SETTING OXYMISER: 12 BPM
GAS FLOW.O2 SETTING OXYMISER: 12 BPM
GLOBULIN SER CALC-MCNC: 3.5 G/DL (ref 2–4)
GLUCOSE BLD STRIP.AUTO-MCNC: 67 MG/DL (ref 70–110)
GLUCOSE BLD STRIP.AUTO-MCNC: 70 MG/DL (ref 70–110)
GLUCOSE BLD STRIP.AUTO-MCNC: 83 MG/DL (ref 70–110)
GLUCOSE SERPL-MCNC: 135 MG/DL (ref 74–99)
HCO3 BLD-SCNC: 17.5 MMOL/L (ref 22–26)
HCO3 BLD-SCNC: 18.8 MMOL/L (ref 22–26)
HCO3 BLD-SCNC: 9.9 MMOL/L (ref 22–26)
HCT VFR BLD AUTO: 27 % (ref 36–48)
HCT VFR BLD AUTO: 27.1 % (ref 36–48)
HGB BLD-MCNC: 9.6 G/DL (ref 13–16)
HGB BLD-MCNC: 9.8 G/DL (ref 13–16)
INSPIRATION.DURATION SETTING TIME VENT: 1 SEC
INSPIRATION.DURATION SETTING TIME VENT: 1 SEC
IRON SATN MFR SERPL: 15 %
IRON SERPL-MCNC: 20 UG/DL (ref 50–175)
LACTATE SERPL-SCNC: 3 MMOL/L (ref 0.4–2)
LACTATE SERPL-SCNC: 3.1 MMOL/L (ref 0.4–2)
LACTATE SERPL-SCNC: 3.4 MMOL/L (ref 0.4–2)
LYMPHOCYTES # BLD AUTO: 4 % (ref 20–51)
LYMPHOCYTES # BLD AUTO: 7 % (ref 20–51)
LYMPHOCYTES # BLD: 0.7 K/UL (ref 0.8–3.5)
LYMPHOCYTES # BLD: 1 K/UL (ref 0.8–3.5)
MAGNESIUM SERPL-MCNC: 2.1 MG/DL (ref 1.6–2.6)
MCH RBC QN AUTO: 29.8 PG (ref 24–34)
MCH RBC QN AUTO: 30.3 PG (ref 24–34)
MCHC RBC AUTO-ENTMCNC: 35.4 G/DL (ref 31–37)
MCHC RBC AUTO-ENTMCNC: 36.3 G/DL (ref 31–37)
MCV RBC AUTO: 83.6 FL (ref 74–97)
MCV RBC AUTO: 84.2 FL (ref 74–97)
METAMYELOCYTES NFR BLD MANUAL: 1 %
MONOCYTES # BLD: 0.3 K/UL (ref 0–1)
MONOCYTES # BLD: 0.7 K/UL (ref 0–1)
MONOCYTES NFR BLD AUTO: 2 % (ref 2–9)
MONOCYTES NFR BLD AUTO: 4 % (ref 2–9)
NEUTS BAND NFR BLD MANUAL: 13 % (ref 0–5)
NEUTS BAND NFR BLD MANUAL: 20 % (ref 0–5)
NEUTS SEG # BLD: 13.3 K/UL (ref 1.8–8)
NEUTS SEG # BLD: 15.4 K/UL (ref 1.8–8)
NEUTS SEG NFR BLD AUTO: 71 % (ref 42–75)
NEUTS SEG NFR BLD AUTO: 78 % (ref 42–75)
O2/TOTAL GAS SETTING VFR VENT: 30 %
O2/TOTAL GAS SETTING VFR VENT: 50 %
P-R INTERVAL, ECG05: 156 MS
P-R INTERVAL, ECG05: 208 MS
PCO2 BLD: 17.3 MMHG (ref 35–45)
PCO2 BLD: 22.6 MMHG (ref 35–45)
PCO2 BLD: 25.7 MMHG (ref 35–45)
PEEP RESPIRATORY: 5 CMH2O
PEEP RESPIRATORY: 5 CMH2O
PH BLD: 7.37 [PH] (ref 7.35–7.45)
PH BLD: 7.47 [PH] (ref 7.35–7.45)
PH BLD: 7.5 [PH] (ref 7.35–7.45)
PHOSPHATE SERPL-MCNC: 3.7 MG/DL (ref 2.5–4.9)
PLATELET # BLD AUTO: 136 K/UL (ref 135–420)
PLATELET # BLD AUTO: 175 K/UL (ref 135–420)
PLATELET COMMENTS,PCOM: ABNORMAL
PLATELET COMMENTS,PCOM: ABNORMAL
PMV BLD AUTO: 9.1 FL (ref 9.2–11.8)
PMV BLD AUTO: 9.9 FL (ref 9.2–11.8)
PO2 BLD: 113 MMHG (ref 80–100)
PO2 BLD: 176 MMHG (ref 80–100)
PO2 BLD: 74 MMHG (ref 80–100)
POTASSIUM SERPL-SCNC: 3.3 MMOL/L (ref 3.5–5.5)
PROT SERPL-MCNC: 5.8 G/DL (ref 6.4–8.2)
PTH-INTACT SERPL-MCNC: 1285.9 PG/ML (ref 14–72)
Q-T INTERVAL, ECG07: 408 MS
Q-T INTERVAL, ECG07: 584 MS
QRS DURATION, ECG06: 92 MS
QRS DURATION, ECG06: 94 MS
QTC CALCULATION (BEZET), ECG08: 534 MS
QTC CALCULATION (BEZET), ECG08: 722 MS
RBC # BLD AUTO: 3.22 M/UL (ref 4.7–5.5)
RBC # BLD AUTO: 3.23 M/UL (ref 4.7–5.5)
RBC MORPH BLD: ABNORMAL
RBC MORPH BLD: ABNORMAL
SAO2 % BLD: 100 % (ref 92–97)
SAO2 % BLD: 95 % (ref 92–97)
SAO2 % BLD: 99 % (ref 92–97)
SERVICE CMNT-IMP: ABNORMAL
SODIUM SERPL-SCNC: 139 MMOL/L (ref 136–145)
SPECIMEN EXP DATE BLD: NORMAL
SPECIMEN TYPE: ABNORMAL
STATUS OF UNIT,%ST: NORMAL
STATUS OF UNIT,%ST: NORMAL
TIBC SERPL-MCNC: 131 UG/DL (ref 250–450)
TOTAL RESP. RATE, ITRR: 24
TOTAL RESP. RATE, ITRR: 27
TROPONIN I SERPL-MCNC: 115 NG/ML (ref 0–0.04)
TROPONIN I SERPL-MCNC: 2.52 NG/ML (ref 0–0.04)
TROPONIN I SERPL-MCNC: 30.8 NG/ML (ref 0–0.04)
UNIT DIVISION, %UDIV: 0
UNIT DIVISION, %UDIV: 0
VANCOMYCIN SERPL-MCNC: 21.9 UG/ML (ref 5–40)
VENTILATION MODE VENT: ABNORMAL
VENTILATION MODE VENT: ABNORMAL
VENTRICULAR RATE, ECG03: 103 BPM
VENTRICULAR RATE, ECG03: 92 BPM
VOLUME CONTROL PLUS IVLCP: YES
VOLUME CONTROL PLUS IVLCP: YES
VT SETTING VENT: 400 ML
VT SETTING VENT: 400 ML
WBC # BLD AUTO: 14.6 K/UL (ref 4.6–13.2)
WBC # BLD AUTO: 16.9 K/UL (ref 4.6–13.2)
WBC MORPH BLD: ABNORMAL

## 2017-07-28 PROCEDURE — 83970 ASSAY OF PARATHORMONE: CPT | Performed by: INTERNAL MEDICINE

## 2017-07-28 PROCEDURE — 82803 BLOOD GASES ANY COMBINATION: CPT

## 2017-07-28 PROCEDURE — 93306 TTE W/DOPPLER COMPLETE: CPT

## 2017-07-28 PROCEDURE — 84100 ASSAY OF PHOSPHORUS: CPT | Performed by: INTERNAL MEDICINE

## 2017-07-28 PROCEDURE — 80202 ASSAY OF VANCOMYCIN: CPT | Performed by: EMERGENCY MEDICINE

## 2017-07-28 PROCEDURE — 82550 ASSAY OF CK (CPK): CPT | Performed by: INTERNAL MEDICINE

## 2017-07-28 PROCEDURE — 76705 ECHO EXAM OF ABDOMEN: CPT

## 2017-07-28 PROCEDURE — 74011000250 HC RX REV CODE- 250: Performed by: EMERGENCY MEDICINE

## 2017-07-28 PROCEDURE — 36600 WITHDRAWAL OF ARTERIAL BLOOD: CPT

## 2017-07-28 PROCEDURE — 82330 ASSAY OF CALCIUM: CPT | Performed by: PHYSICIAN ASSISTANT

## 2017-07-28 PROCEDURE — 74011250636 HC RX REV CODE- 250/636: Performed by: INTERNAL MEDICINE

## 2017-07-28 PROCEDURE — 83605 ASSAY OF LACTIC ACID: CPT | Performed by: INTERNAL MEDICINE

## 2017-07-28 PROCEDURE — 94003 VENT MGMT INPAT SUBQ DAY: CPT

## 2017-07-28 PROCEDURE — 74011000250 HC RX REV CODE- 250: Performed by: PHYSICIAN ASSISTANT

## 2017-07-28 PROCEDURE — 83540 ASSAY OF IRON: CPT | Performed by: INTERNAL MEDICINE

## 2017-07-28 PROCEDURE — 77030020186 HC BOOT HL PROTCT SAGE -B

## 2017-07-28 PROCEDURE — C9113 INJ PANTOPRAZOLE SODIUM, VIA: HCPCS | Performed by: PHYSICIAN ASSISTANT

## 2017-07-28 PROCEDURE — 74011250637 HC RX REV CODE- 250/637: Performed by: PHYSICIAN ASSISTANT

## 2017-07-28 PROCEDURE — 86706 HEP B SURFACE ANTIBODY: CPT | Performed by: INTERNAL MEDICINE

## 2017-07-28 PROCEDURE — 87340 HEPATITIS B SURFACE AG IA: CPT | Performed by: INTERNAL MEDICINE

## 2017-07-28 PROCEDURE — 80053 COMPREHEN METABOLIC PANEL: CPT | Performed by: INTERNAL MEDICINE

## 2017-07-28 PROCEDURE — 74011250636 HC RX REV CODE- 250/636: Performed by: PHYSICIAN ASSISTANT

## 2017-07-28 PROCEDURE — 85730 THROMBOPLASTIN TIME PARTIAL: CPT | Performed by: INTERNAL MEDICINE

## 2017-07-28 PROCEDURE — 85025 COMPLETE CBC W/AUTO DIFF WBC: CPT | Performed by: INTERNAL MEDICINE

## 2017-07-28 PROCEDURE — 30233N1 TRANSFUSION OF NONAUTOLOGOUS RED BLOOD CELLS INTO PERIPHERAL VEIN, PERCUTANEOUS APPROACH: ICD-10-PCS | Performed by: INTERNAL MEDICINE

## 2017-07-28 PROCEDURE — 82728 ASSAY OF FERRITIN: CPT | Performed by: INTERNAL MEDICINE

## 2017-07-28 PROCEDURE — 83605 ASSAY OF LACTIC ACID: CPT | Performed by: PHYSICIAN ASSISTANT

## 2017-07-28 PROCEDURE — 83735 ASSAY OF MAGNESIUM: CPT | Performed by: INTERNAL MEDICINE

## 2017-07-28 PROCEDURE — 74011250636 HC RX REV CODE- 250/636: Performed by: EMERGENCY MEDICINE

## 2017-07-28 PROCEDURE — 82962 GLUCOSE BLOOD TEST: CPT

## 2017-07-28 PROCEDURE — 36592 COLLECT BLOOD FROM PICC: CPT

## 2017-07-28 PROCEDURE — 65610000006 HC RM INTENSIVE CARE

## 2017-07-28 PROCEDURE — 71010 XR CHEST PORT: CPT

## 2017-07-28 PROCEDURE — 74011000258 HC RX REV CODE- 258: Performed by: PHYSICIAN ASSISTANT

## 2017-07-28 PROCEDURE — 93005 ELECTROCARDIOGRAM TRACING: CPT

## 2017-07-28 PROCEDURE — 74011000250 HC RX REV CODE- 250: Performed by: INTERNAL MEDICINE

## 2017-07-28 PROCEDURE — 85730 THROMBOPLASTIN TIME PARTIAL: CPT | Performed by: PHYSICIAN ASSISTANT

## 2017-07-28 PROCEDURE — 74011000258 HC RX REV CODE- 258: Performed by: EMERGENCY MEDICINE

## 2017-07-28 RX ORDER — INSULIN LISPRO 100 [IU]/ML
INJECTION, SOLUTION INTRAVENOUS; SUBCUTANEOUS EVERY 6 HOURS
Status: DISCONTINUED | OUTPATIENT
Start: 2017-07-28 | End: 2017-08-02

## 2017-07-28 RX ORDER — DEXTROSE 50 % IN WATER (D50W) INTRAVENOUS SYRINGE
25-50 AS NEEDED
Status: DISCONTINUED | OUTPATIENT
Start: 2017-07-28 | End: 2017-09-22 | Stop reason: HOSPADM

## 2017-07-28 RX ORDER — POTASSIUM CHLORIDE 7.45 MG/ML
10 INJECTION INTRAVENOUS
Status: COMPLETED | OUTPATIENT
Start: 2017-07-28 | End: 2017-08-04

## 2017-07-28 RX ORDER — DEXTROSE MONOHYDRATE 50 MG/ML
30 INJECTION, SOLUTION INTRAVENOUS CONTINUOUS
Status: DISCONTINUED | OUTPATIENT
Start: 2017-07-28 | End: 2017-07-31

## 2017-07-28 RX ORDER — ALBUMIN HUMAN 250 G/1000ML
12.5 SOLUTION INTRAVENOUS
Status: COMPLETED | OUTPATIENT
Start: 2017-07-28 | End: 2017-08-04

## 2017-07-28 RX ORDER — HEPARIN SODIUM 10000 [USP'U]/100ML
12-25 INJECTION, SOLUTION INTRAVENOUS
Status: DISCONTINUED | OUTPATIENT
Start: 2017-07-28 | End: 2017-07-30

## 2017-07-28 RX ORDER — MAGNESIUM SULFATE 100 %
4 CRYSTALS MISCELLANEOUS AS NEEDED
Status: DISCONTINUED | OUTPATIENT
Start: 2017-07-28 | End: 2017-09-22 | Stop reason: HOSPADM

## 2017-07-28 RX ORDER — FAMOTIDINE 10 MG/ML
20 INJECTION INTRAVENOUS EVERY 24 HOURS
Status: DISCONTINUED | OUTPATIENT
Start: 2017-07-29 | End: 2017-08-01

## 2017-07-28 RX ORDER — MIDAZOLAM IN 0.9 % SOD.CHLORID 1 MG/ML
4 PLASTIC BAG, INJECTION (ML) INTRAVENOUS
Status: DISCONTINUED | OUTPATIENT
Start: 2017-07-29 | End: 2017-07-30

## 2017-07-28 RX ORDER — SODIUM CHLORIDE 9 MG/ML
100 INJECTION, SOLUTION INTRAVENOUS
Status: DISCONTINUED | OUTPATIENT
Start: 2017-07-28 | End: 2017-09-22 | Stop reason: HOSPADM

## 2017-07-28 RX ORDER — FAMOTIDINE 10 MG/ML
20 INJECTION INTRAVENOUS EVERY 24 HOURS
Status: DISCONTINUED | OUTPATIENT
Start: 2017-07-28 | End: 2017-07-28

## 2017-07-28 RX ORDER — CHLORHEXIDINE GLUCONATE 1.2 MG/ML
10 RINSE ORAL ONCE
Status: COMPLETED | OUTPATIENT
Start: 2017-07-28 | End: 2017-07-28

## 2017-07-28 RX ORDER — CHLORHEXIDINE GLUCONATE 1.2 MG/ML
10 RINSE ORAL EVERY 12 HOURS
Status: DISCONTINUED | OUTPATIENT
Start: 2017-07-28 | End: 2017-08-06

## 2017-07-28 RX ADMIN — HEPARIN SODIUM AND DEXTROSE 10 UNITS/KG/HR: 10000; 5 INJECTION INTRAVENOUS at 23:00

## 2017-07-28 RX ADMIN — DEXTROSE MONOHYDRATE 25 G: 25 INJECTION, SOLUTION INTRAVENOUS at 17:46

## 2017-07-28 RX ADMIN — METRONIDAZOLE 500 MG: 500 INJECTION, SOLUTION INTRAVENOUS at 06:27

## 2017-07-28 RX ADMIN — CHLORHEXIDINE GLUCONATE 10 ML: 1.2 RINSE ORAL at 08:06

## 2017-07-28 RX ADMIN — SODIUM CHLORIDE 40 MG: 9 INJECTION INTRAMUSCULAR; INTRAVENOUS; SUBCUTANEOUS at 08:07

## 2017-07-28 RX ADMIN — PIPERACILLIN SODIUM AND TAZOBACTAM SODIUM 2.25 G: 2; .25 INJECTION, POWDER, LYOPHILIZED, FOR SOLUTION INTRAVENOUS at 07:43

## 2017-07-28 RX ADMIN — HEPARIN SODIUM 5000 UNITS: 5000 INJECTION, SOLUTION INTRAVENOUS; SUBCUTANEOUS at 05:27

## 2017-07-28 RX ADMIN — Medication 4 MCG/MIN: at 05:31

## 2017-07-28 RX ADMIN — DEXTROSE MONOHYDRATE 30 ML/HR: 5 INJECTION, SOLUTION INTRAVENOUS at 13:00

## 2017-07-28 RX ADMIN — Medication 4 MCG/MIN: at 09:50

## 2017-07-28 RX ADMIN — CALCIUM GLUCONATE 2 G: 94 INJECTION, SOLUTION INTRAVENOUS at 15:20

## 2017-07-28 RX ADMIN — Medication 4 MCG/MIN: at 20:03

## 2017-07-28 RX ADMIN — Medication 4 MG/HR: at 23:51

## 2017-07-28 RX ADMIN — POTASSIUM CHLORIDE 10 MEQ: 10 INJECTION, SOLUTION INTRAVENOUS at 06:27

## 2017-07-28 RX ADMIN — POTASSIUM CHLORIDE 10 MEQ: 10 INJECTION, SOLUTION INTRAVENOUS at 00:48

## 2017-07-28 RX ADMIN — HEPARIN SODIUM AND DEXTROSE 12 UNITS/KG/HR: 10000; 5 INJECTION INTRAVENOUS at 15:57

## 2017-07-28 RX ADMIN — PIPERACILLIN SODIUM AND TAZOBACTAM SODIUM 2.25 G: 2; .25 INJECTION, POWDER, LYOPHILIZED, FOR SOLUTION INTRAVENOUS at 20:03

## 2017-07-28 RX ADMIN — DEXTROSE MONOHYDRATE 25 G: 25 INJECTION, SOLUTION INTRAVENOUS at 11:59

## 2017-07-28 RX ADMIN — CHLORHEXIDINE GLUCONATE 10 ML: 1.2 RINSE ORAL at 21:39

## 2017-07-28 RX ADMIN — POTASSIUM CHLORIDE 10 MEQ: 10 INJECTION, SOLUTION INTRAVENOUS at 05:27

## 2017-07-28 NOTE — CONSULTS
[email or web address]  [phone]    Impression:   1. Anemia- H/H 9.6/27.2 s/p 2 units PRBC  2. Colitis- stool for cdiff pending  3. Poss acute cholecystitis  4. Abn LFTS-    5. Elevated bili- 4.9      Plan:     1. Cont NPO      Chief Complaint: anemia abn LFTS      HPI:  Debby Barksdale is a 55 y.o. male who is being seen on consult for anemia and elevated LFTs. He has ESRD and is noncompliant with dialysis. Pt to ER at [facility] yesterday with c/o abdominal pain. Pt was found anemic with fluid overload. He had dialysis and was transfused 2 units of PRBCs. He was later d/c and was found at home unresponsive by family. EMS performed CPR and was intubated in the ER. He is presently still intubated on levophed. PMH:   Past Medical History:   Diagnosis Date    Anemia     Arthritis     Chronic pain     Back     Diabetes mellitus type II     Hypertension     IBS (irritable bowel syndrome)     Lactose intolerance     TB (tuberculosis)     TB test positive       PSH:   Past Surgical History:   Procedure Laterality Date    ENDOSCOPY, COLON, DIAGNOSTIC      HX AMPUTATION      Toe due to injury       Social HX:   Social History     Social History    Marital status: SINGLE     Spouse name: N/A    Number of children: N/A    Years of education: N/A     Occupational History    Not on file. Social History Main Topics    Smoking status: Current Every Day Smoker    Smokeless tobacco: Not on file    Alcohol use Yes    Drug use: Not on file    Sexual activity: Not on file     Other Topics Concern    Not on file     Social History Narrative       FHX:   History reviewed. No pertinent family history. Allergy:   Allergies no known allergies    Home Medications:     No prescriptions prior to admission. Review of Systems:     A fourteen point review of systems was obtained, and was negative except per HPI.     Visit Vitals    BP 91/58    Pulse 97    Temp 99.2 °F (37.3 °C)    Resp 24    Ht 6' 3\" (1.905 m)  Comment: per chart history    Wt 71 kg (156 lb 8.4 oz)    SpO2 100%    BMI 19.56 kg/m2       Physical Assessment:     constitutional: appearance: well developed, well nourished, in no acute distress. skin: no rashes, jaundice  eyes: inspection: normal conjunctivae and lids; no scleral icterus pupils: equal, round, reactive to light; extraocular movements intact  ENMT: mouth: mucous membranes moist, no thrush  neck:  no masses or tenderness; normal range of motion  respiratory: breath sounds clear, no wheeze/rales/rhonchi  cardiovascular: normal rate, regular rhythm without murmur  abdominal: soft, bowel sounds present, non-tender to palpation without rebound or guarding; no appreciable mass; no appreciable hepatosplenomegaly; no appreciable fluid wave  extremities: no clubbing, cyanosis, edema  neurologic:  Cranial nerves II-XII grossly intact; no asterixis   psychiatric:  oriented to time, space and person. Basic Metabolic Profile   Recent Labs      07/28/17   0410   NA  139   K  3.3*   CL  108   CO2  17*   BUN  44*   GLU  135*   CA  6.3*   MG  2.1   PHOS  3.7         CBC w/Diff    Recent Labs      07/28/17 0410   WBC  14.6*   RBC  3.22*   HGB  9.6*   HCT  27.1*   MCV  84.2   MCH  29.8   MCHC  35.4   RDW  15.2*   PLT  175    Recent Labs      07/28/17   0410   GRANS  78*   LYMPH  7*   EOS  0        Hepatic Function   Recent Labs      07/28/17 0410   ALB  2.3*   TP  5.8*   TBILI  4.9*   SGOT  501*   AP  121*          Humza Duran, ANP-C  [facility]  [email or web address]

## 2017-07-28 NOTE — CDMP QUERY
\"Anemia\" documented in [address], ED and H&P. Please specify type(s):    =>Anemia of chronic disease  =>Anemia of CKD  =>Acute blood loss anemia  =>Other Explanation of clinical findings  =>Unable to Determine (no explanation of clinical findings)    The medical record reflects the following:  Risk:  --PMH ESRD  --admitted after cardiac arrest with sepsis, respiratory failure, anoxic brain damage, pneumonia, UTI, acidosis, acute cholecystitis, colotis, GI Bleed    Clinical Indicators:  --7/27/2017 16:26: HGB: 8.1 (L)  --7/27/2017 20:24: HGB: 9.4 (L)  --7/28/2017 04:10: HGB: 9.6 (L)    Treatment:   --receiving serial CBC  --H&P: was at [facility] yesterday with anemia recived 2 units of Prbc     Please clarify and document your clinical opinion in the progress notes and discharge summary including the definitive and/or presumptive diagnosis, (suspected or probable), related to the above clinical findings. Please include clinical findings supporting your diagnosis. If you DECLINE this query or would like to communicate with [facility], please utilize the \"Floqq message box\" at the TOP of the Progress Note on the right.       Thank you,  Grace Moon [facility], [address] [place] [address]

## 2017-07-28 NOTE — CONSULTS
[facility]  Pulmonary, Critical Care, and Sleep Medicine    Name: Ashok Momin MRN: [id number]  : 1960 Hospital: [facility]  Date: 2017       PCCM Initial Patient Consult                                              Consult requesting physician: Juan Guillaume MD    Reason for Consult: S/p cardiac arrest    I have reviewed the flowsheet and notes. Events, vitals, medications and notes from last 24 hours reviewed. Care plan discussed with staff    Subjective:  2017     IMPRESSION:  VDRF - Repeat ABG  S/p Cardiac arrest - F/u 3 sets of cardiac enzymes  Colitis - Stool C Diff sent. Start Flagyl  Poss Acute cholecystitis - Repeat CT result is not diagnostic On emp abx  Abn LFT's - Monitor  Lactic Acidosis - Pt is on Bicarb drip, monitor  Poss Pneumonia - Cont Zosyn, Levaquin, Vanco. F/u cultures  Poss UTI - Cont Zosyn  ESRD - HD per Renal  Anemia - S/p PRBC transfusion. Trend Hb  Hypotension - on Levophed. Titrate to keep MAP >65mmHg    Code status: Full    OTHER:  Glycemic Control. Glucose stabilizer per ICU protocol when on insulin drip. Maintain blood glucose 140-180. Replace electrolytes per ICU electrolyte replacement protocol  Ventilator bundle & Sedation protocol followed. Daily morning sedation holiday, assessment for readiness for SBT & weaning from ventilator; and then re-titrate if required. Aim to keep peak plateau pressure 43-36YT H2O in ARDS patient. Celena tube to suction at 20-30 cm H2O, Maintain Bogue tube with 5-10ml air every 4 hours. Chlorhexidine mouth washes and routine oral care every 4 hours. Stress ulcer and DVT prophylaxis. HOB >=30 degree elevation all the time. HOB >=30 degree elevation all the time. Aggressive pulmonary toileting. Incentive spirometry when appropriate. Aspiration precautions. Sepsis bundle and protocol followed. Follow serial lactic acid when >2, frequent BMP check, fluid resuscitation. Draw cultures before antibiotic. Follow cultures. Antibiotic choice. Administer antibiotic within 1 hr ICU admission and 3 hours of ED arrival. Deescalate antibiotic when appropriate. Vasopressor when appropriate with MAP goal >65 mmHg. Central Line bundle followed, remove when not needed. Large bore IV line or CVP when appropriate. Skin & Wound care. PT/OT eval and treat. OOB/IS when appropriate. Further recommendations will be based on the patient's response to recommended treatment and results of the investigation ordered. Quality Care: Stress ulcer prophylaxis, DVT prophylaxis, HOB elevated, Infection control all reviewed and addressed. Events and notes from last 24 hours reviewed. Care plan discussed with nursing. See my orders for detail. CC TIME: >35 min   Further management depending on test results and work up as outlined above. History of Present Illness:    Maria D Hebert has been seen and evaluated in ICU/ER. Patient is a 55 y.o. male with PMHx significant for ESRD on HD, HTN, diabetes, anemia, and IBS presents to ED via [facility] for the evaluation of cardiac arrest. PTA, EMS received a phone call for a \"sick person\". Upon EMS arrival pt was lying face down. In route to ED EMS started CPR secondary to an absent pulse. Pt received one epi, 2 amps d 50 in ambulance. Pt arrived with an IO left tibia, melody airway in place. On my assessment, pt is not answering any questions.     The patient is critically ill and can not provide additional history    Medication Reviewed    Allergies no known allergies   Past Medical History:   Diagnosis Date    Anemia     Arthritis     Chronic pain     Back     Diabetes mellitus type II     Hypertension     IBS (irritable bowel syndrome)     Lactose intolerance     TB (tuberculosis)     TB test positive      Past Surgical History:   Procedure Laterality Date    ENDOSCOPY, COLON, DIAGNOSTIC      HX AMPUTATION      Toe due to injury      Social History   Substance Use Topics  Smoking status: Current Every Day Smoker    Smokeless tobacco: Not on file    Alcohol use Yes      History reviewed. No pertinent family history.    Prior to Admission medications    Not on File     Current Facility-Administered Medications   Medication Dose Route Frequency    VANCOMYCIN INFORMATION NOTE   Other CONTINUOUS    sodium chloride 0.9 % bolus infusion 1,000 mL  1,000 mL IntraVENous ONCE    vancomycin (VANCOCIN) 1,500 mg in 0.9% sodium chloride 500 mL IVPB  1,500 mg IntraVENous ONCE    [START ON 7/29/2017] levoFLOXacin (LEVAQUIN) 500 mg in D5W IVPB  500 mg IntraVENous Q48H    [START ON 7/28/2017] piperacillin-tazobactam (ZOSYN) 2.25 g in 0.9% sodium chloride (MBP/ADV) 50 mL MBP  2.25 g IntraVENous Q12H    Piperacillin-tazobactam (ZOSYN) 0.75 gm Supplemental Dosing by Pharmacy   Other Rx Dosing/Monitoring    [START ON 7/28/2017] Vancomycin random level due 7/28/17 at 0400  1 Each Other ONCE    NOREPINephrine (LEVOPHED) 8,000 mcg in dextrose 5% 250 mL infusion  2-30 mcg/min IntraVENous TITRATE    NOREPINephrine (LEVOPHED) 1 mg/mL injection        sodium bicarbonate (8.4%) 150 mEq in dextrose 5% 1,000 mL infusion  0.5-1.5 mL/kg/hr IntraVENous CONTINUOUS    heparin (porcine) injection 5,000 Units  5,000 Units SubCUTAneous Q8H    potassium chloride 10 mEq in 100 ml IVPB  10 mEq IntraVENous Q1H     Central Venous Catheter:  Triple Lumen 07/27/17 Right Internal jugular (Active)     Venous Access Device:  Venous Access Device 07/27/17 Upper chest (subclavicular area, right (Active)     Peripheral Intravenous Line:  Peripheral IV 07/27/17 Right Other(comment) (Active)       Peripheral IV 07/27/17 Right Antecubital (Active)   Site Assessment Clean, dry, & intact 7/27/2017  4:26 PM   Phlebitis Assessment 0 7/27/2017  4:26 PM   Infiltration Assessment 0 7/27/2017  4:26 PM   Dressing Status Clean, dry, & intact 7/27/2017  4:26 PM       Peripheral IV 07/27/17 Right Antecubital (Active) Drain(s):  Nasogastric Tube 17 (Active)     Airway:  Airway - Continuous Aspiration of Subglottic Secretions (MISBAH) Tube 17 Oral (Active)   Insertion Depth (cm) 25 cm 2017  7:09 PM   Line Trung Lips 2017  7:09 PM   Side Secured Right 2017  7:09 PM       Objective:  Vital Signs:    Visit Vitals    /69    Pulse (!) 102    Temp 96.5 °F (35.8 °C)    Resp 24    Wt 64 kg (141 lb)    SpO2 100%      O2 Device: Nasal airway, Nasal cannula  O2 Flow Rate (L/min): 10 l/min  Temp (24hrs), Av.5 °F (35.8 °C), Min:96.5 °F (35.8 °C), Max:96.5 °F (35.8 °C)      Intake/Output:   Last shift:       1901 -  0700  In: 250   Out: -       Intake/Output Summary (Last 24 hours) at 174  Last data filed at 17 1916   Gross per 24 hour   Intake              250 ml   Output                0 ml   Net              250 ml       Ventilator Settings:  Mode Rate Tidal Volume Pressure FiO2 PEEP  Assist control, VC+   400 ml    100 % 5 cm H20    Peak airway pressure: 21 cm H2O   Plateau pressure:    Tidal volume:   Minute ventilation: 4.29 l/min  SPO2     ARDS network Guidelines: Lung protective strategy and Plateau pressure goals less than or equal to 30      Physical Exam:   General/Neurology: Sedated  Head:   Normocephalic, without obvious abnormality  Eye:   no scleral icterus, no pallor  Oral:   Mucus membranes moist  Neck:   Supple, No lymphadenopathy  Lung:   B/l decreased air entry. Basal rales present  Heart:   S1 S2 present. Abdomen/: Soft.      Extremities:  pedal edema +nt    Data:      Recent Results (from the past 24 hour(s))   CBC WITH AUTOMATED DIFF    Collection Time: 17  4:26 PM   Result Value Ref Range    WBC 11.5 4.6 - 13.2 K/uL    RBC 2.68 (L) 4.70 - 5.50 M/uL    HGB 8.1 (L) 13.0 - 16.0 g/dL    HCT 23.6 (L) 36.0 - 48.0 %    MCV 88.1 74.0 - 97.0 FL    MCH 30.2 24.0 - 34.0 PG    MCHC 34.3 31.0 - 37.0 g/dL    RDW 15.4 (H) 11.6 - 14.5 %    PLATELET 419 313 - 420 K/uL    MPV 9.5 9.2 - 11.8 FL    NEUTROPHILS 63 42 - 75 %    BAND NEUTROPHILS 20 (H) 0 - 5 %    LYMPHOCYTES 13 (L) 20 - 51 %    MONOCYTES 4 2 - 9 %    EOSINOPHILS 0 0 - 5 %    BASOPHILS 0 0 - 3 %    ABS. NEUTROPHILS 9.5 (H) 1.8 - 8.0 K/UL    ABS. LYMPHOCYTES 1.5 0.8 - 3.5 K/UL    ABS. MONOCYTES 0.5 0 - 1.0 K/UL    ABS. EOSINOPHILS 0.0 0.0 - 0.4 K/UL    ABS. BASOPHILS 0.0 0.0 - 0.06 K/UL    DF MANUAL      PLATELET COMMENTS ADEQUATE PLATELETS      RBC COMMENTS ANISOCYTOSIS  1+       METABOLIC PANEL, COMPREHENSIVE    Collection Time: 07/27/17  4:26 PM   Result Value Ref Range    Sodium 139 136 - 145 mmol/L    Potassium 3.9 3.5 - 5.5 mmol/L    Chloride 106 100 - 108 mmol/L    CO2 15 (L) 21 - 32 mmol/L    Anion gap 18 3.0 - 18 mmol/L    Glucose 163 (H) 74 - 99 mg/dL    BUN 45 (H) 7.0 - 18 MG/DL    Creatinine 6.20 (H) 0.6 - 1.3 MG/DL    BUN/Creatinine ratio 7 (L) 12 - 20      GFR est AA 11 (L) >60 ml/min/1.73m2    GFR est non-AA 9 (L) >60 ml/min/1.73m2    Calcium 7.0 (L) 8.5 - 10.1 MG/DL    Bilirubin, total 3.9 (H) 0.2 - 1.0 MG/DL    ALT (SGPT) 214 (H) 16 - 61 U/L    AST (SGOT) 297 (H) 15 - 37 U/L    Alk.  phosphatase 146 (H) 45 - 117 U/L    Protein, total 5.9 (L) 6.4 - 8.2 g/dL    Albumin 2.4 (L) 3.4 - 5.0 g/dL    Globulin 3.5 2.0 - 4.0 g/dL    A-G Ratio 0.7 (L) 0.8 - 1.7     CARDIAC PANEL,(CK, CKMB & TROPONIN)    Collection Time: 07/27/17  4:26 PM   Result Value Ref Range    CK 1421 (H) 39 - 308 U/L    CK - MB 15.3 (H) <3.6 ng/ml    CK-MB Index 1.1 0.0 - 4.0 %    Troponin-I, Qt. 0.08 (H) 0.0 - 0.045 NG/ML   MAGNESIUM    Collection Time: 07/27/17  4:26 PM   Result Value Ref Range    Magnesium 2.1 1.6 - 2.6 mg/dL   PROTHROMBIN TIME + INR    Collection Time: 07/27/17  4:26 PM   Result Value Ref Range    Prothrombin time 21.4 (H) 11.5 - 15.2 sec    INR 2.0 (H) 0.8 - 1.2     PTT    Collection Time: 07/27/17  4:26 PM   Result Value Ref Range    aPTT 43.3 (H) 23.0 - 36.4 SEC   POC TROPONIN-I    Collection Time: 07/27/17 4:26 PM   Result Value Ref Range    Troponin-I (POC) 0.04 0.00 - 0.08 ng/mL   POC LACTIC ACID    Collection Time: 07/27/17  4:32 PM   Result Value Ref Range    Lactic Acid (POC) 11.3 (HH) 0.4 - 2.0 mmol/L   DRUG SCREEN, URINE    Collection Time: 07/27/17  4:35 PM   Result Value Ref Range    BENZODIAZEPINE NEGATIVE  NEG      BARBITURATES NEGATIVE  NEG      THC (TH-CANNABINOL) NEGATIVE  NEG      OPIATES POSITIVE (A) NEG      PCP(PHENCYCLIDINE) NEGATIVE  NEG      COCAINE NEGATIVE  NEG      AMPHETAMINES NEGATIVE  NEG      METHADONE NEGATIVE  NEG      HDSCOM (NOTE)    URINALYSIS W/ RFLX MICROSCOPIC    Collection Time: 07/27/17  4:35 PM   Result Value Ref Range    Color DARK YELLOW      Appearance TURBID      Specific gravity >1.030 (H) 1.005 - 1.030    pH (UA) 5.5 5.0 - 8.0      Protein 300 (A) NEG mg/dL    Glucose 100 (A) NEG mg/dL    Ketone TRACE (A) NEG mg/dL    Bilirubin MODERATE (A) NEG      Blood TRACE (A) NEG      Urobilinogen 1.0 0.2 - 1.0 EU/dL    Nitrites NEGATIVE  NEG      Leukocyte Esterase TRACE (A) NEG     URINE MICROSCOPIC ONLY    Collection Time: 07/27/17  4:35 PM   Result Value Ref Range    WBC 4 to 10 0 - 4 /hpf    RBC 4 to 10 0 - 5 /hpf    Epithelial cells 2+ 0 - 5 /lpf    Bacteria 1+ (A) NEG /hpf    Amorphous Crystals 4+ (A) NEG    Spermatozoa 3+    GLUCOSE, POC    Collection Time: 07/27/17  4:50 PM   Result Value Ref Range    Glucose (POC) 251 (H) 70 - 110 mg/dL   EMERGENT RELEASE OF UNCROSSMATCHED RED CELLS    Collection Time: 07/27/17  5:15 PM   Result Value Ref Range    Crossmatch Expiration 07/30/2017     ABO/Rh(D) A POSITIVE     Antibody screen NEG     Unit number [id number]     Blood component type RC LR AS1     Unit division 00     Status of unit ISSUED     Crossmatch result Compatible     Unit number [id number]     Blood component type RC LR AS1     Unit division 00     Status of unit ISSUED     Crossmatch result Compatible    EKG, 12 LEAD, SUBSEQUENT    Collection Time: 07/27/17  5:19 PM   Result Value Ref Range    Ventricular Rate 92 BPM    Atrial Rate 92 BPM    P-R Interval 208 ms    QRS Duration 92 ms    Q-T Interval 584 ms    QTC Calculation (Bezet) 722 ms    Calculated P Axis 54 degrees    Calculated R Axis 60 degrees    Calculated T Axis 78 degrees    Diagnosis       Normal sinus rhythm  Nonspecific T wave abnormality  Prolonged QT  Abnormal ECG  No previous ECGs available     POC CHEM8    Collection Time: 07/27/17  6:16 PM   Result Value Ref Range    CO2, POC 15 (L) 19 - 24 MMOL/L    Glucose,  (H) 74 - 106 MG/DL    BUN, POC 40 (H) 7 - 18 MG/DL    Creatinine, POC 5.6 (H) 0.6 - 1.3 MG/DL    GFRAA, POC 13 (L) >60 ml/min/1.73m2    GFRNA, POC 11 (L) >60 ml/min/1.73m2    Sodium,  (L) 136 - 145 MMOL/L    Potassium, POC 2.7 (LL) 3.5 - 5.5 MMOL/L    Calcium, ionized (POC) 0.86 (L) 1.12 - 1.32 MMOL/L    Chloride,  100 - 108 MMOL/L    Anion gap, POC 17 10 - 20      Hematocrit, POC 18 (L) 36 - 49 %    Hemoglobin, POC 6.1 (L) 12 - 16 G/DL   POC G3    Collection Time: 07/27/17  6:21 PM   Result Value Ref Range    Device: Non rebreather      Flow rate (POC) 20 L/M    FIO2 (POC) 100 %    pH (POC) 7.468 (H) 7.35 - 7.45      pCO2 (POC) 24.4 (L) 35.0 - 45.0 MMHG    pO2 (POC) 404 (H) 80 - 100 MMHG    HCO3 (POC) 17.7 (L) 22 - 26 MMOL/L    sO2 (POC) 100 (H) 92 - 97 %    Base deficit (POC) 5 mmol/L    Allens test (POC) YES      Total resp. rate 33      Site RIGHT RADIAL      Specimen type (POC) ARTERIAL      Performed by Jamil Sawyer DIFFICILE/EPI PCR    Collection Time: 07/27/17  6:30 PM   Result Value Ref Range    Special Requests: NO SPECIAL REQUESTS      Culture result:        Toxigenic C. difficile NEGATIVE                         C. difficile target DNA sequences are not detected.    POC G3    Collection Time: 07/27/17  7:30 PM   Result Value Ref Range    Device: VENT      FIO2 (POC) 100 %    pH (POC) 7.155 (LL) 7.35 - 7.45      pCO2 (POC) 43.9 35.0 - 45.0 MMHG    pO2 (POC) 169 (H) 80 - 100 MMHG    HCO3 (POC) 15.5 (L) 22 - 26 MMOL/L    sO2 (POC) 99 (H) 92 - 97 %    Base deficit (POC) 12 mmol/L    Mode ASSIST CONTROL      Tidal volume 400 ml    Set Rate 12 bpm    PEEP/CPAP (POC) 5.0 cmH2O    Allens test (POC) YES      Inspiratory Time 1.0 sec    Total resp. rate 12      Site RIGHT RADIAL      Specimen type (POC) ARTERIAL      Performed by Ryan Gutting     Volume control plus YES     CARDIAC PANEL,(CK, CKMB & TROPONIN)    Collection Time: 07/27/17  8:24 PM   Result Value Ref Range    CK 4740 (H) 39 - 308 U/L    CK - MB 48.7 (H) <3.6 ng/ml    CK-MB Index 1.0 0.0 - 4.0 %    Troponin-I, Qt. 1.07 (H) 0.0 - 0.045 NG/ML   CBC WITH AUTOMATED DIFF    Collection Time: 07/27/17  8:24 PM   Result Value Ref Range    WBC 11.0 4.6 - 13.2 K/uL    RBC 3.11 (L) 4.70 - 5.50 M/uL    HGB 9.4 (L) 13.0 - 16.0 g/dL    HCT 26.9 (L) 36.0 - 48.0 %    MCV 86.5 74.0 - 97.0 FL    MCH 30.2 24.0 - 34.0 PG    MCHC 34.9 31.0 - 37.0 g/dL    RDW 14.9 (H) 11.6 - 14.5 %    PLATELET 037 104 - 485 K/uL    MPV 9.4 9.2 - 11.8 FL    NEUTROPHILS 62 42 - 75 %    BAND NEUTROPHILS 26 (H) 0 - 5 %    LYMPHOCYTES 4 (L) 20 - 51 %    MONOCYTES 8 2 - 9 %    EOSINOPHILS 0 0 - 5 %    BASOPHILS 0 0 - 3 %    ABS. NEUTROPHILS 9.7 (H) 1.8 - 8.0 K/UL    ABS. LYMPHOCYTES 0.4 (L) 0.8 - 3.5 K/UL    ABS. MONOCYTES 0.9 0 - 1.0 K/UL    ABS. EOSINOPHILS 0.0 0.0 - 0.4 K/UL    ABS.  BASOPHILS 0.0 0.0 - 0.06 K/UL    DF MANUAL      PLATELET COMMENTS ADEQUATE PLATELETS      RBC COMMENTS ANISOCYTOSIS  1+        RBC COMMENTS SOFÍA CELLS  1+        RBC COMMENTS POLYCHROMASIA  1+       METABOLIC PANEL, COMPREHENSIVE    Collection Time: 07/27/17  8:24 PM   Result Value Ref Range    Sodium 141 136 - 145 mmol/L    Potassium 3.0 (L) 3.5 - 5.5 mmol/L    Chloride 109 (H) 100 - 108 mmol/L    CO2 19 (L) 21 - 32 mmol/L    Anion gap 13 3.0 - 18 mmol/L    Glucose 189 (H) 74 - 99 mg/dL    BUN 42 (H) 7.0 - 18 MG/DL    Creatinine 5.72 (H) 0.6 - 1.3 MG/DL    BUN/Creatinine ratio 7 (L) 12 - 20      GFR est AA 13 (L) >60 ml/min/1.73m2    GFR est non-AA 10 (L) >60 ml/min/1.73m2    Calcium 6.2 (L) 8.5 - 10.1 MG/DL    Bilirubin, total 4.1 (H) 0.2 - 1.0 MG/DL    ALT (SGPT) 234 (H) 16 - 61 U/L    AST (SGOT) 405 (H) 15 - 37 U/L    Alk. phosphatase 132 (H) 45 - 117 U/L    Protein, total 5.8 (L) 6.4 - 8.2 g/dL    Albumin 2.3 (L) 3.4 - 5.0 g/dL    Globulin 3.5 2.0 - 4.0 g/dL    A-G Ratio 0.7 (L) 0.8 - 1.7     MAGNESIUM    Collection Time: 07/27/17  8:24 PM   Result Value Ref Range    Magnesium 2.2 1.6 - 2.6 mg/dL   POC LACTIC ACID    Collection Time: 07/27/17  9:10 PM   Result Value Ref Range    Lactic Acid (POC) 3.7 (HH) 0.4 - 2.0 mmol/L         Chemistry Recent Labs      07/27/17 2024 07/27/17   1626   GLU  189*  163*   NA  141  139   K  3.0*  3.9   CL  109*  106   CO2  19*  15*   BUN  42*  45*   CREA  5.72*  6.20*   CA  6.2*  7.0*   MG  2.2  2.1   AGAP  13  18   BUCR  7*  7*   AP  132*  146*   TP  5.8*  5.9*   ALB  2.3*  2.4*   GLOB  3.5  3.5   AGRAT  0.7*  0.7*       Lactic Acid No results found for: LAC  No results for input(s): LAC in the last 72 hours. Liver Enzymes Protein, total   Date Value Ref Range Status   07/27/2017 5.8 (L) 6.4 - 8.2 g/dL Final     Albumin   Date Value Ref Range Status   07/27/2017 2.3 (L) 3.4 - 5.0 g/dL Final     Globulin   Date Value Ref Range Status   07/27/2017 3.5 2.0 - 4.0 g/dL Final     A-G Ratio   Date Value Ref Range Status   07/27/2017 0.7 (L) 0.8 - 1.7   Final     AST (SGOT)   Date Value Ref Range Status   07/27/2017 405 (H) 15 - 37 U/L Final     Alk.  phosphatase   Date Value Ref Range Status   07/27/2017 132 (H) 45 - 117 U/L Final     Recent Labs      07/27/17 2024 07/27/17   1626   TP  5.8*  5.9*   ALB  2.3*  2.4*   GLOB  3.5  3.5   AGRAT  0.7*  0.7*   SGOT  405*  297*   AP  132*  146*       CBC w/Diff Recent Labs      07/27/17 2024 07/27/17   1626   WBC  11.0  11.5   RBC  3.11*  2.68*   HGB  9.4*  8.1*   HCT  26.9*  23.6*   PLT  199  244   GRANS 62  63   LYMPH  4*  13*   EOS  0  0       Cardiac Enzymes Lab Results   Component Value Date/Time    CPK 4740 (H) 07/27/2017 08:24 PM    CPK 1421 (H) 07/27/2017 04:26 PM    CKMB 48.7 (H) 07/27/2017 08:24 PM    CKMB 15.3 (H) 07/27/2017 04:26 PM    CKND1 1.0 07/27/2017 08:24 PM    CKND1 1.1 07/27/2017 04:26 PM    TROIQ 1.07 (H) 07/27/2017 08:24 PM    TROIQ 0.08 (H) 07/27/2017 04:26 PM    TNIPOC 0.04 07/27/2017 04:26 PM       BNP No results found for: BNP, BNPP, XBNPT    Coagulation Recent Labs      07/27/17   1626   PTP  21.4*   INR  2.0*   APTT  43.3*        ABG  Recent Labs      07/27/17   1930  07/27/17   1821   PHI  7.155*  7.468*   PCO2I  43.9  24.4*   PO2I  169*  404*   HCO3I  15.5*  17.7*   FIO2I  100  100       Urinalysis  Lab Results   Component Value Date/Time    Color DARK YELLOW 07/27/2017 04:35 PM    Appearance TURBID 07/27/2017 04:35 PM    Specific gravity >1.030 07/27/2017 04:35 PM    pH (UA) 5.5 07/27/2017 04:35 PM    Protein 300 07/27/2017 04:35 PM    Glucose 100 07/27/2017 04:35 PM    Ketone TRACE 07/27/2017 04:35 PM    Bilirubin MODERATE 07/27/2017 04:35 PM    Urobilinogen 1.0 07/27/2017 04:35 PM    Nitrites NEGATIVE  07/27/2017 04:35 PM    Leukocyte Esterase TRACE 07/27/2017 04:35 PM    Epithelial cells 2+ 07/27/2017 04:35 PM    Bacteria 1+ 07/27/2017 04:35 PM    WBC 4 to 10 07/27/2017 04:35 PM    RBC 4 to 10 07/27/2017 04:35 PM       Micro     Recent Labs      07/27/17   1830   CULT  Toxigenic C. difficile NEGATIVE                         C. difficile target DNA sequences are not detected. Recent Labs      07/27/17 1830   CULT  Toxigenic C. difficile NEGATIVE                         C. difficile target DNA sequences are not detected.      CT (Most Recent)    Results from Hospital Encounter encounter on 07/27/17   CT ABD PELV WO CONT   Narrative CT ABDOMEN AND PELVIS WITHOUT CONTRAST    CPT CODE: 89630    INDICATION: Patient found unresponsive, PEA, return of spontaneous circulation  after resuscitation, dialysis patient, ams, hypotension. COMPARISON: None. TECHNIQUE: No intravenous contrast was utilized for this helical thin section CT  of the abdomen and pelvis. All CT scans at this facility are performed using dose optimization technique as  appropriate to a performed exam, to include automated exposure control,  adjustment of the mA and/or kV according to patient's size (including  appropriate matching for site-specific examinations), or use of iterative  reconstruction technique. Coronal and sagittal reformations obtained. Evaluation  of the solid organs, bowel wall, and vascular structures are therefore limited. CT ABDOMEN FINDINGS:     Moderate to large dependent right pleural effusion. Trace left pleural effusion. Cardiomegaly. The included portions of the chest wall and the abdominal wall demonstrate mild  haziness suggestive of mild anasarca. .    The liver and spleen are normal in size and density. The biliary tree is not  dilated. The gallbladder is not overly distended. There is circumferential thickening of  the wall of the gallbladder at about 4 mm. There is surrounding fluid. There is possibly a tiny amount of fluid in the paracolic gutters. .  The kidneys appear to be normal in size and morphology. Tiny amount contrast is  seen within the intrarenal collecting structures. No calculi are identified in the kidney or ureter. Adrenals and Pancreas  are of normal CT appearance. The large and small bowel seem unremarkable. And esophagogastric tube terminates  in a fluid filled stomach. CT PELVIS FINDINGS:     Possible tiny amount of free fluid  The rectum is distended with fluid  The pelvic viscera are unremarkable. Stanton catheter present within the urinary bladder. Tiny amount of contrast  within the collapsed urinary bladder. Review of osseous structures throughout on bone window settings shows no  significant osseous pathology.     IMPRESSION:     This exam is markedly limited by the lack of enteric and IV contrast.  Small to moderate right pleural effusion. Tiny left pleural effusion. Small amount of ascites. Mild anasarca. Gallbladder wall thickening in the setting of anasarca and ascites can be  problematic to differentiate between cholecystitis and due to ascites. If  clinically indicated ultrasound of the right upper quadrant or hepatobiliary  scanning could be obtained. Report provided to the emergency department at 2220 hrs. XR (Most Recent). CXR reviewed by me and compared with previous CXR   Results from Hospital Encounter encounter on 07/27/17   XR CHEST PORT   Narrative Portable Chest 1928 hours    CPT CODE:69193    HISTORY:  Witnessed cardiac arrest during EMS transport,   intubation, central line. COMPARISON: Chest x-ray July 27, 2017 at 1648 hours. FINDINGS:     Endotracheal tube has been positioned with the tip 6 cm proximal to the bree. Right IJ approach central catheter terminates at the midsuperior vena cava. Interval development of marked opacity from the right hilum to the hemidiaphragm  with obscuration of the hemidiaphragm. The left lung is clear. The left  costophrenic angle is sharp. Pulmonary vascularity is normal. There is no  pneumothorax. .    IMPRESSION:    Support tubes in expected position. No pneumothorax. Marked interval progression of opacity in the right lower lobe likely due to  segmental atelectasis          High complexity decision making was performed during the evaluation of this patient at high risk for decompensation with multiple organ involvement     Above mentioned total time spent on reviewing the case/medical record/data/notes/EMR/patient examination/documentation/coordinating care with nurse/consultants, exclusive of procedures with complex decision making performed and > 50% time spent in face to face evaluation.     Brenda Russo MD  7/27/2017

## 2017-07-28 NOTE — PROGRESS NOTES
NUTRITION    Nursing Referral: [facility]     RECOMMENDATIONS / PLAN:     - Once medically appropriate, recommend starting tube feeding of Nepro at 20 mL/hr and advance as tolerated by 10 mL q 6 hours to goal rate of 55 mL/hr with 50 mL q 6 hour water flushes.   - Continue RD inpatient monitoring and evaluation. Goal Regimen: Nepro at 55 mL/hr + 50 mL q 6 hour water flushes to provide: 2376 kcal, 107 gm protein, 127 gm fat, 220 gm CHO, 21 gm fiber, 957 mL free water, 1157 mL total water, 100% RDIs     NUTRITION INTERVENTIONS & DIAGNOSIS:     [] Enteral nutrition support: recommended   [x] Vitamin/mineral supplementation: KCl   [x] Coordination of care: interdisciplinary rounds     Nutrition Diagnosis: Inadequate oral intake related to inability to tolerate po as evidenced by pt NPO. ASSESSMENT:     S/p cardiac arrest and intubated 7/27, NGT clamped. Abdominal ultrasound today to rule out cholecystitis. Will wait to feed. Average po intake adequate to meet patients estimated nutritional needs:   [] Yes     [x] No   [] Unable to determine at this time    Diet: DIET NPO      Food Allergies: NKFA  Current Appetite:   [] Good     [] Fair     [] Poor     [x] Other: NPO  Appetite/meal intake prior to admission:   [] Good     [] Fair     [] Poor     [x] Other: unknown   Feeding Limitations:  [] Swallowing difficulty    [] Chewing difficulty    [x] Other: intubated   Current Meal Intake: No data found.     Anuric   BM: 7/27   Skin Integrity: WDL  Edema: none   Pertinent Medications: Reviewed: levophed     Recent Labs      07/28/17   0410  07/27/17   2024  07/27/17   1626   NA  139  141  139   K  3.3*  3.0*  3.9   CL  108  109*  106   CO2  17*  19*  15*   GLU  135*  189*  163*   BUN  44*  42*  45*   CREA  5.71*  5.72*  6.20*   CA  6.3*  6.2*  7.0*   MG  2.1  2.2  2.1   PHOS  3.7   --    --    ALB  2.3*  2.3*  2.4*   SGOT  501*  405*  297*   ALT  262*  234*  214*       Intake/Output Summary (Last 24 hours) at 07/28/17 9709  Last data filed at 07/28/17 0700   Gross per 24 hour   Intake           1668.7 ml   Output                5 ml   Net           1663.7 ml       Anthropometrics:  Ht Readings from Last 1 Encounters:   07/28/17 6' 3\" (1.905 m)     Last 3 Recorded Weights in this Encounter    07/27/17 1630 07/27/17 2230   Weight: 64 kg (141 lb) 71 kg (156 lb 8.4 oz)     Body mass index is 19.56 kg/(m^2). Borderline Underweight     Weight History:   Weight Metrics 7/27/2017   Weight 156 lb 8.4 oz   BMI 19.56 kg/m2        Admitting Diagnosis: Cardiac arrest (HCC)  Acidosis  Respiratory failure (HCC)  Anemia  ESRD needing dialysis ([facility]  Cardiac arrest ([facility]  Acute GI bleeding  Sepsis ([facility]  Hypotension  Anoxic brain damage (HCC)  ESRD (end stage renal disease) ([facility]  Colitis  Pertinent PMHx: DM, HTN, IBS, ESRD    Education Needs:        [x] None identified  [] Identified - Not appropriate at this time  []  Identified and addressed - refer to education log  Learning Limitations:   [] None identified  [x] Identified: intubated     Cultural, Anglican & ethnic food preferences:  [x] None identified    [] Identified and addressed     ESTIMATED NUTRITION NEEDS:     Calories: 4823-4181 kcal ([id number]) based on  [x] Actual BW 71 kg     [] IBW   Protein:  gm (1.2-2 gm/kg) based on  [x] Actual BW      [] IBW   Fluid: 1000 mL + UOP     MONITORING & EVALUATION:     Nutrition Goal(s):   1. Nutritional needs will be met through adequate oral intake or nutrition support within the next 7 days.   Outcome:  [] Met/Ongoing    []  Not Met    [x] New/Initial Goal     Monitoring:   [] Diet tolerance   [] Meal intake   [] Supplement intake   [x] GI symptoms/ability to tolerate po diet   [x] Respiratory status   [x] Plan of care      Previous Recommendations (for follow-up assessments only):     []   Implemented       []   Not Implemented (RD to address)     [] No Recommendation Made     Discharge Planning: pending ability to tolerate po and plan of care  [x] Participated in care planning, discharge planning, & interdisciplinary rounds as appropriate      Uday Shoulder, [address] [place] [address], [address] [place]    Pager: [phone]

## 2017-07-28 NOTE — PROGRESS NOTES
[facility]  ICU Progress Note      Name: Jada Napier   : 1960   MRN: [id number]   Date: 2017 11:56 AM     [x]I have reviewed the flowsheet and previous days notes. Events overnight reviewed and discussed with nursing staff. Vital signs and records reviewed. Subjective:  Remains on low dose levophed. Afebrile. Remains on vent- not following commands, but will track with voice. (+) cough, gag, responds readily to pain. Scant tracheal secretions. Anuric, HD M, W, F.     [x]The patient is unable to give any meaningful history or review of systems because the patient is:  [x]Intubated []Sedated   [x]Unresponsive      [x]The patient is critically ill on      [x]Mechanical ventilation []Pressors   []BiPAP []            ROS:Review of systems not obtained due to patient factors.     Medication Review:  · Pressors - levophed  · Sedation - none  · Antibiotics - Zosyn  · Pain - PRN Fentanyl   · GI/ DVT - Pepcid/Heparin gtt  · Others (other gtts)    Safety Bundles: VAP Bundle/ CAUTI/ Severe Sepsis Protocol/ Electrolyte Replacement Protocol    Vital Signs:    Visit Vitals    BP 91/58    Pulse (!) 102    Temp 99.2 °F (37.3 °C)    Resp 27    Ht 6' 3\" (1.905 m)  Comment: per chart history    Wt 71 kg (156 lb 8.4 oz)    SpO2 100%    BMI 19.56 kg/m2       O2 Device: Ventilator   O2 Flow Rate (L/min): 10 l/min   Temp (24hrs), Av.3 °F (36.8 °C), Min:96.5 °F (35.8 °C), Max:99.6 °F (37.6 °C)       Intake/Output:   Last shift:         Last 3 shifts:  1901 -  0700  In: 1668.7 [I.V.:1418.7]  Out: 5 [Urine:5]    Intake/Output Summary (Last 24 hours) at 17 1156  Last data filed at 17 0800   Gross per 24 hour   Intake           1668.7 ml   Output                5 ml   Net           1663.7 ml       Ventilator Settings:  Ventilator  Mode: Assist control, VC+  Respiratory Rate  Back-Up Rate: 12  Insp Time (sec): 1 sec  I:E Ratio: 1:1.0  Ventilator Volumes  Vt Set (ml): 400 ml  Vt Exhaled (Machine Breath) (ml): 486 ml  Vt Spont (ml): 100 ml  Ve Observed (l/min): 11.5 l/min  Ventilator Pressures  PIP Observed (cm H2O): 11 cm H2O  Plateau Pressure (cm H2O): 10 cm H2O  MAP (cm H2O): 8  PEEP/VENT (cm H2O): 5 cm H20  Auto PEEP Observed (cm H2O): 0 cm H2O    Physical Exam:    General: Intubated/sedated; Pt will track with voice, withdraws to pain. Does not follow commands. Opens eyes spontaneously    HEENT:  Pupils reactive, but sluggish. Anicteric sclerae; pink palpebral conjunctivae; mucosa moist  Resp:  Symmetrical chest expansion on vent with coarse breath sounds; No wheezing, rales, or rhonchi noted. CV:  S1, S2 present; regular rate and rhythm  GI:  Abdomen soft, grimaces when palpated; (+) active bowel sounds  Extremities:  +2 pulses on all extremities; no edema/ cyanosis/ clubbing noted, left fistula with strong thrill   Skin:  Warm; no rashes/ lesions noted  Neurologic:  Non-focal  Devices:  Right IJ CVL, NGT, PIV, ETT.        DATA:     Current Facility-Administered Medications   Medication Dose Route Frequency    chlorhexidine (PERIDEX) 0.12 % mouthwash 10 mL  10 mL Oral Q12H    famotidine (PF) (PEPCID) injection 20 mg  20 mg IntraVENous Q24H    insulin lispro (HUMALOG) injection   SubCUTAneous Q6H    glucose chewable tablet 16 g  4 Tab Oral PRN    glucagon (GLUCAGEN) injection 1 mg  1 mg IntraMUSCular PRN    dextrose (D50W) injection syrg 12.5-25 g  25-50 mL IntraVENous PRN    VANCOMYCIN INFORMATION NOTE   Other CONTINUOUS    0.9% sodium chloride infusion 250 mL  250 mL IntraVENous PRN    piperacillin-tazobactam (ZOSYN) 2.25 g in 0.9% sodium chloride (MBP/ADV) 50 mL MBP  2.25 g IntraVENous Q12H    Piperacillin-tazobactam (ZOSYN) 0.75 gm Supplemental Dosing by Pharmacy   Other Rx Dosing/Monitoring    NOREPINephrine (LEVOPHED) 8,000 mcg in dextrose 5% 250 mL infusion  2-30 mcg/min IntraVENous TITRATE    naloxone (NARCAN) injection 0.4 mg  0.4 mg IntraVENous PRN    heparin (porcine) injection 5,000 Units  5,000 Units SubCUTAneous Q8H    LORazepam (ATIVAN) injection 1 mg  1 mg IntraVENous Q4H PRN    fentaNYL citrate (PF) injection 50 mcg  50 mcg IntraVENous Q2H PRN         Labs: Results:       Chemistry Recent Labs      07/28/17   0410  07/27/17 2024 07/27/17   1626   GLU  135*  189*  163*   NA  139  141  139   K  3.3*  3.0*  3.9   CL  108  109*  106   CO2  17*  19*  15*   BUN  44*  42*  45*   CREA  5.71*  5.72*  6.20*   CA  6.3*  6.2*  7.0*   AGAP  14  13  18   BUCR  8*  7*  7*   AP  121*  132*  146*   TP  5.8*  5.8*  5.9*   ALB  2.3*  2.3*  2.4*   GLOB  3.5  3.5  3.5   AGRAT  0.7*  0.7*  0.7*      CBC w/Diff Recent Labs      07/28/17 0410  07/27/17 2024 07/27/17   1626   WBC  14.6*  11.0  11.5   RBC  3.22*  3.11*  2.68*   HGB  9.6*  9.4*  8.1*   HCT  27.1*  26.9*  23.6*   PLT  175  199  244   GRANS  78*  62  63   LYMPH  7*  4*  13*   EOS  0  0  0      Coagulation Recent Labs      07/27/17   1626   PTP  21.4*   INR  2.0*   APTT  43.3*       Liver Enzymes Recent Labs      07/28/17 0410   TP  5.8*   ALB  2.3*   AP  121*   SGOT  501*      ABG Lab Results   Component Value Date/Time    PHI 7.472 (H) 07/28/2017 10:16 AM    PCO2I 25.7 (L) 07/28/2017 10:16 AM    PO2I 113 (H) 07/28/2017 10:16 AM    HCO3I 18.8 (L) 07/28/2017 10:16 AM    FIO2I 30 07/28/2017 10:16 AM      Microbiology Recent Labs      07/27/17   1830  07/27/17   1648  07/27/17   1633   CULT  NO AEROMONAS,SALMONELLA,SHIGELLA,E. COLI 0:157 OR CAMPYLOBACTER ISOLATED TO DATE  Toxigenic C. difficile NEGATIVE                         C. difficile target DNA sequences are not detected.   CULTURE IN PROGRESS,FURTHER UPDATES TO FOLLOW  CULTURE IN PROGRESS,FURTHER UPDATES TO FOLLOW          Telemetry: [x]Sinus []A-flutter []Paced    []A-fib []Multiple PVCs                  Imaging:  [x]I have personally reviewed the patients radiographs  []Radiographs reviewed with radiologist   []No change from prior, tubes and lines in adequate position  []Improved   [x]Worsening     CT ABD/PELVIS 07/27/17: This exam is markedly limited by the lack of enteric and IV contrast.  Small to moderate right pleural effusion. Tiny left pleural effusion. Small amount of ascites. Mild anasarca. Gallbladder wall thickening in the setting of anasarca and ascites can be  problematic to differentiate between cholecystitis and due to ascites. CT HEAD 07/27/17: No evidence of intracranial hemorrhages.   Multiple lacunar infarctions in bilateral thalami, likely to be old or subacute.   In left side of upper manny of brainstem there is lacunar infarction of  undetermined age. Acute lacunar infarction in this area is not excluded. CXR 07/28/17: Right lower lobe consolidation probably represents a combination of pleural  effusion, atelectasis and/or pneumonia, slightly improved since yesterday's  Exam. Lines and tubes, as above. Stable cardiomegaly. IMPRESSION:   · S/p PEA Cardiac Arrest on unknown etiology- one round epi unclear when ROSC was achieved- pt found down at home agonal breathing, pulseless in ambulance- MI vs. ARF leading to CA   · Acute Respiratory Failure- secondary to above vs. PNA. · Acute Encephalopathy- metabolic vs. anoxic vs. CVA vs. sepsis   · Hypotension- dehydration/fluid deficit vs. Septic shock   · Septic Shock- leukocytosis, hypotension, tachycardia- PNA (RLL consolidation on CXR) vs. UTI vs. abdominal origin (colitis vs. Acute cholecystitis- on CT ABD)   · NSTEMI- elevated troponin with evidence   · Elevated CK- found down, cardiac vs. rhabdomyolysis   · Anemia of Chronic Disease   · ESRD- HD M,W,F  · Colitis- stool for cdiff pending  · Poss acute cholecystitis  · Elevated LFTs- shock liver vs. Infection  · Tobacco Abuse       PLAN:   · Resp - Vent support. VAP Bundle. Daily ABG and CXR. Aspiration precautions. HOB > 30 degrees. · ID - afebrile, leukocytosis. Continue Zosyn for empiric treatment. BC (+) GNR.  Follow-up culture results and abdominal imaging. Repeat BC in 24 hrs. · CVS - Wean Levophed as tolerated to maintain MAP > 65. Continue heparin gtt for NSTEMI. Echo-pending results. Avoid QT prolonging medications. Repeat EKG-pending. Cardiology Following. · Heme/onc -  H/H stable s/p 1 unit PRBCs. Daily CBC. · Metabolic - Monitor and replace lytes as needed. D/C bicarb gtt. Daily BMP. · Renal - HD recommendations per Nephrology-scheduled for tomorrow. Vit D and HPTH- pending results. · Endocrine -  SSI per protocol. BS q 6 while NPO. · Neuro/ Pain/ Sedation - PRN sedation with JOSE ALEJANDRO goal 1. Avoid sedation is possible to assess mental status. EEG-pending results. · GI - NPO. Trend LFTs. U/S Abd-pending. GI Following.    · Prophylaxis - DVT(SCDs), GI(Pepcid)  · Discussed in interdisciplinary rounds        The patient is: [] acutely ill Risk of deterioration: [] moderate    [x] critically ill  [] high     [x]See my orders for details    My assessment/plan was discussed with:  [x]nursing []PT/OT    []respiratory therapy [x]Dr. Maeve Roque   []family []     Jose Cordero PA-C

## 2017-07-28 NOTE — CONSULTS
[facility] - Consult Note    Consultation request by Javi Leach MD for advice/opinion related to evaluating Cardiac arrest ([facility]  Acidosis  Respiratory failure ([facility]  Anemia  ESRD needing dialysis [facility])  Cardiac arrest ([facility]  Acute GI bleeding  Sepsis ([facility]  Hypotension  Anoxic brain damage ([facility]  ESRD (end stage renal disease) ([facility]  Colitis    Date of  Admission: 7/27/2017  4:23 PM   Primary Care Physician:  Deny Garcia MD     Assessment:     Patient Active Problem List   Diagnosis Code    Anemia D64.9    Acidosis E87.2    Cardiac arrest ([facility] I46.9    Respiratory failure ([facility] J96.90    ESRD needing dialysis ([facility] N18.6, Z99.2    Anoxic brain damage ([facility] G93.1    Colitis K52.9    Hypotension I95.9    Acute GI bleeding K92.2    ESRD (end stage renal disease) ([facility] N18.6    Sepsis ([facility] A41.9     -PEA arrest, s/p CPR with one round of epi, 2 amps of d50. Unclear how long until ROSC but pt had ROSC prior to arrival.. Found down at home, became agonal and pulseless in ambulance.  -Anemia, s/p 2 units PRBCs at [facility] 7/26/17  -ESRD, noncompliant with dialysis, dialyzed at [facility] 7/26/17  -Sepsis picture, started on IV abx  -Hypotension, on levophed  -Hypokalemia  -Abd pain initial complaint at [place] [id number], CT abdomen consistent with fluid overload from missed dialysis. -Elevated LFTs  -Tobacco use disorder     Plan:     -Stat echo ordered. Will review  -Troponin rising along with CPKs, given down time and CPR, may be elevated 2/2 to such but will start heparin gtt without bolus and order stat EKG and echocardiogram. CT head yesterday negative for acute hemorrhage.   -Continue to wean levo as hemodynamics tolerate.  -Further recs pending results.   -Continue to replete electrolytes. History of Present Illness:      This is a 55 y.o. male admitted for Cardiac arrest ([facility]  Acidosis  Respiratory failure ([facility]  Anemia  ESRD needing dialysis ([facility]  Cardiac arrest ([facility]  Acute GI bleeding  Sepsis ([facility]  Hypotension  Anoxic brain damage (HCC)  ESRD (end stage renal disease) ([facility]  Colitis. Patient is a 52yo AA male with ESRD on HD, known to be non-compliant with dialysis. Seen at [facility] 7/26/17, presented with c/o abdominal pains, found to be anemic and volume overloaded as he had missed dialysis. He was dialyzed and transfused 2 units of PRBCs. Pt was discharged home. Family found patient down and unresponsive. EMS was called. Pt became agonal and pulseless in ambulance. CPR was started. Pt was given 1 epi, 2 amps of D50, had IO to left tibia place and melody airway in place. Pt had ROSC prior to arrival. He was intubated in the ED. Pt is currently still intubated. Not on any sedation. On levophed for BP support. Cardiac risk factors: S/p cardiac arrest        Review of Symptoms:  Unable to assess d/t current mental status - intubated. Past Medical History:     Past Medical History:   Diagnosis Date    Anemia     Arthritis     Chronic pain     Back     Diabetes mellitus type II     Hypertension     IBS (irritable bowel syndrome)     Lactose intolerance     TB (tuberculosis)     TB test positive         Social History:     Social History     Social History    Marital status: SINGLE     Spouse name: N/A    Number of children: N/A    Years of education: N/A     Social History Main Topics    Smoking status: Current Every Day Smoker    Smokeless tobacco: None    Alcohol use Yes    Drug use: None    Sexual activity: Not Asked     Other Topics Concern    None     Social History Narrative        Family History:   History reviewed. No pertinent family history. Medications:    Allergies no known allergies     Current Facility-Administered Medications   Medication Dose Route Frequency    chlorhexidine (PERIDEX) 0.12 % mouthwash 10 mL  10 mL Oral Q12H    famotidine (PF) (PEPCID) injection 20 mg  20 mg IntraVENous Q24H    insulin lispro (HUMALOG) injection   SubCUTAneous Q6H    glucose chewable tablet 16 g  4 Tab Oral PRN    glucagon (GLUCAGEN) injection 1 mg  1 mg IntraMUSCular PRN    dextrose (D50W) injection syrg 12.5-25 g  25-50 mL IntraVENous PRN    VANCOMYCIN INFORMATION NOTE   Other CONTINUOUS    0.9% sodium chloride infusion 250 mL  250 mL IntraVENous PRN    piperacillin-tazobactam (ZOSYN) 2.25 g in 0.9% sodium chloride (MBP/ADV) 50 mL MBP  2.25 g IntraVENous Q12H    Piperacillin-tazobactam (ZOSYN) 0.75 gm Supplemental Dosing by Pharmacy   Other Rx Dosing/Monitoring    NOREPINephrine (LEVOPHED) 8,000 mcg in dextrose 5% 250 mL infusion  2-30 mcg/min IntraVENous TITRATE    naloxone (NARCAN) injection 0.4 mg  0.4 mg IntraVENous PRN    heparin (porcine) injection 5,000 Units  5,000 Units SubCUTAneous Q8H    LORazepam (ATIVAN) injection 1 mg  1 mg IntraVENous Q4H PRN    fentaNYL citrate (PF) injection 50 mcg  50 mcg IntraVENous Q2H PRN         Physical Exam:     Visit Vitals    /52 (BP 1 Location: Right arm, BP Patient Position: Supine;Lying right side)    Pulse (!) 105    Temp 99.2 °F (37.3 °C)    Resp 21    Ht 6' 3\" (1.905 m)    Wt 71 kg (156 lb 8.4 oz)    SpO2 100%    BMI 19.56 kg/m2     BP Readings from Last 3 Encounters:   07/28/17 102/52     Pulse Readings from Last 3 Encounters:   07/28/17 (!) 105     Wt Readings from Last 3 Encounters:   07/27/17 71 kg (156 lb 8.4 oz)       General: awake, intubated, not following commands  Neck:  Supple, no jvd, et tube in place  Lungs:  Clear to auscultation bilaterally on ventilator  Heart:  Tachy, reg rhythm  Abdomen: soft  Extremities: no edema, atraumatic  Skin: warm and dry  Neuro: unable to assess  Psych: unable to assess     Data Review:     Recent Labs      07/28/17 0410  07/27/17 2024 07/27/17   1626   WBC  14.6*  11.0  11.5   HGB  9.6*  9.4*  8.1*   HCT  27.1*  26.9*  23.6*   PLT  175  199  244     Recent Labs      07/28/17   0410  07/27/17 2024 07/27/17   1626   NA  139  141  139   K  3.3* 3. 0*  3.9   CL  108  109*  106   CO2  17*  19*  15*   GLU  135*  189*  163*   BUN  44*  42*  45*   CREA  5.71*  5.72*  6.20*   CA  6.3*  6.2*  7.0*   MG  2.1  2.2  2.1   PHOS  3.7   --    --    ALB  2.3*  2.3*  2.4*   SGOT  501*  405*  297*   ALT  262*  234*  214*   INR   --    --   2.0*       Results for orders placed or performed during the hospital encounter of 07/27/17   EKG, 12 LEAD, INITIAL   Result Value Ref Range    Ventricular Rate 103 BPM    Atrial Rate 103 BPM    P-R Interval 156 ms    QRS Duration 94 ms    Q-T Interval 408 ms    QTC Calculation (Bezet) 534 ms    Calculated P Axis 58 degrees    Calculated R Axis 64 degrees    Calculated T Axis 95 degrees    Diagnosis       Sinus tachycardia  T wave abnormality, consider lateral ischemia  Prolonged QT  Abnormal ECG  No previous ECGs available  Confirmed by Mariana Washington MD ([id number]),  Tarah Thompson ([id number]) on   7/28/2017 9:40:25 AM         All Cardiac Markers in the last 24 hours:    Lab Results   Component Value Date/Time    CPK 8892 (H) 07/28/2017 04:10 AM    CPK 4740 (H) 07/27/2017 08:24 PM    CPK 1421 (H) 07/27/2017 04:26 PM    CKMB 71.3 (H) 07/28/2017 04:10 AM    CKMB 48.7 (H) 07/27/2017 08:24 PM    CKMB 15.3 (H) 07/27/2017 04:26 PM    CKND1 0.8 07/28/2017 04:10 AM    CKND1 1.0 07/27/2017 08:24 PM    CKND1 1.1 07/27/2017 04:26 PM    TROIQ 2.52 (HH) 07/28/2017 04:10 AM    TROIQ 1.07 (H) 07/27/2017 08:24 PM    TROIQ 0.08 (H) 07/27/2017 04:26 PM    TNIPOC 0.04 07/27/2017 04:26 PM       Last Lipid:  No results found for: CHOL, CHOLX, CHLST, CHOLV, HDL, LDL, LDLC, DLDLP, TGLX, TRIGL, TRIGP, CHHD, CHHDX    Signed By: YASMEEN Lamas     July 28, 2017

## 2017-07-28 NOTE — PROGRESS NOTES
[facility] Hospitalist Group  Progress Note    Patient: Lucas Zaman Age: 55 y.o. : 1960 MR#: [id number] SSN: [id number]  Date/Time: 2017     Subjective:     Can not be taken due to patient's factors - orally intubated     Assessment/Plan:   Found unresponsive , brought to ED via EMS , went in to [facility] , CPR/ACLS protocol with ROSC     1. Acute CP arrest   - On Pressor and vent supported   - Vent management per pulmonary   - Try to wean off pressors     2 ESRD   - S/b Renal , HD planned     3 Acute GI bleed - history   - monitor H/h   - patient on Heparin for high Trop /CPK - S/p Cardiac arrest and CPR     4 Anemia - as above   - GI following     5 GNR bacteremia /Sepsis - hypotension  , - on Zosyn     6 hypokalemia and Hypocalcemia - replace lytes - defer to ICU , Renal     7 Colitis - follow C diff       8  Elevated LFT - ?  Shock liver           Case discussed with:  []Patient  []Family  []Nursing  []Case Management  DVT Prophylaxis:  []Lovenox  []Hep SQ  []SCDs  []Coumadin   []On Heparin gtt    Patient Active Problem List   Diagnosis Code    Anemia D64.9    Acidosis E87.2    Cardiac arrest ([facility] [address] I46.9    Respiratory failure ([address] J96.90    ESRD needing dialysis ([facility] [address] N18.6, Z99.2    Anoxic brain damage (HCC) G93.1    Colitis K52.9    Hypotension I95.9    Acute GI bleeding K92.2    ESRD (end stage renal disease) ([facility] [address] N18.6    Sepsis ([facility] [address] A41.9       Objective:   VS:   Visit Vitals    BP 91/58    Pulse (!) 103    Temp 99.2 °F (37.3 °C)    Resp 22    Ht 6' 3\" (1.905 m)  Comment: per chart history    Wt 71 kg (156 lb 8.4 oz)    SpO2 100%    BMI 19.56 kg/m2      Tmax/24hrs: Temp (24hrs), Av.7 °F (37.1 °C), Min:97.6 °F (36.4 °C), Max:99.6 °F (37.6 °C)  IOBRIEF  Intake/Output Summary (Last 24 hours) at 17 1754  Last data filed at 17 0800   Gross per 24 hour   Intake           1668.7 ml   Output                5 ml   Net           1663.7 ml General: orally intubated , opens eyes ? Following commands   HEENT:  NC, Atraumatic. PERRLA, anicteric sclerae. Pulmonary:  CTA Bilaterally. No Wheezing/Rhonchi/Rales. Cardiovascular: Regular rate and Rhythm. GI:  Soft, Non distended, Non tender. + Bowel sounds. Extremities:  No edema, cyanosis, clubbing. No calf tenderness. Psych:  not examined .   Neurologic: full exam difficult at this stage , Pupils - RRR ,         Medications:   Current Facility-Administered Medications   Medication Dose Route Frequency    chlorhexidine (PERIDEX) 0.12 % mouthwash 10 mL  10 mL Oral Q12H    insulin lispro (HUMALOG) injection   SubCUTAneous Q6H    glucose chewable tablet 16 g  4 Tab Oral PRN    glucagon (GLUCAGEN) injection 1 mg  1 mg IntraMUSCular PRN    dextrose (D50W) injection syrg 12.5-25 g  25-50 mL IntraVENous PRN    dextrose 5% infusion  30 mL/hr IntraVENous CONTINUOUS    heparin 25,000 units in D5W 250 ml infusion  12-25 Units/kg/hr IntraVENous TITRATE    [START ON 7/29/2017] famotidine (PF) (PEPCID) injection 20 mg  20 mg IntraVENous Q24H    0.9% sodium chloride infusion  100 mL/hr IntraVENous DIALYSIS PRN    albumin human 25% (BUMINATE) solution 12.5 g  12.5 g IntraVENous DIALYSIS PRN    VANCOMYCIN INFORMATION NOTE   Other CONTINUOUS    0.9% sodium chloride infusion 250 mL  250 mL IntraVENous PRN    piperacillin-tazobactam (ZOSYN) 2.25 g in 0.9% sodium chloride (MBP/ADV) 50 mL MBP  2.25 g IntraVENous Q12H    Piperacillin-tazobactam (ZOSYN) 0.75 gm Supplemental Dosing by Pharmacy   Other Rx Dosing/Monitoring    NOREPINephrine (LEVOPHED) 8,000 mcg in dextrose 5% 250 mL infusion  2-30 mcg/min IntraVENous TITRATE    naloxone (NARCAN) injection 0.4 mg  0.4 mg IntraVENous PRN    LORazepam (ATIVAN) injection 1 mg  1 mg IntraVENous Q4H PRN    fentaNYL citrate (PF) injection 50 mcg  50 mcg IntraVENous Q2H PRN       Labs:    Recent Results (from the past 24 hour(s))   POC CHEM8    Collection Time: 07/27/17  6:16 PM   Result Value Ref Range    CO2, POC 15 (L) 19 - 24 MMOL/L    Glucose,  (H) 74 - 106 MG/DL    BUN, POC 40 (H) 7 - 18 MG/DL    Creatinine, POC 5.6 (H) 0.6 - 1.3 MG/DL    GFRAA, POC 13 (L) >60 ml/min/1.73m2    GFRNA, POC 11 (L) >60 ml/min/1.73m2    Sodium,  (L) 136 - 145 MMOL/L    Potassium, POC 2.7 (LL) 3.5 - 5.5 MMOL/L    Calcium, ionized (POC) 0.86 (L) 1.12 - 1.32 MMOL/L    Chloride,  100 - 108 MMOL/L    Anion gap, POC 17 10 - 20      Hematocrit, POC 18 (L) 36 - 49 %    Hemoglobin, POC 6.1 (L) 12 - 16 G/DL   POC G3    Collection Time: 07/27/17  6:21 PM   Result Value Ref Range    Device: Non rebreather      Flow rate (POC) 20 L/M    FIO2 (POC) 100 %    pH (POC) 7.468 (H) 7.35 - 7.45      pCO2 (POC) 24.4 (L) 35.0 - 45.0 MMHG    pO2 (POC) 404 (H) 80 - 100 MMHG    HCO3 (POC) 17.7 (L) 22 - 26 MMOL/L    sO2 (POC) 100 (H) 92 - 97 %    Base deficit (POC) 5 mmol/L    Allens test (POC) YES      Total resp. rate 33      Site RIGHT RADIAL      Specimen type (POC) ARTERIAL      Performed by Toshia Denise    CULTURE, STOOL    Collection Time: 07/27/17  6:30 PM   Result Value Ref Range    Special Requests: NO SPECIAL REQUESTS      Culture result:        NO AEROMONAS,SALMONELLA,SHIGELLA,E. COLI 0:157 OR CAMPYLOBACTER ISOLATED TO DATE   C. DIFFICILE/EPI PCR    Collection Time: 07/27/17  6:30 PM   Result Value Ref Range    Special Requests: NO SPECIAL REQUESTS      Culture result:        Toxigenic C. difficile NEGATIVE                         C. difficile target DNA sequences are not detected.    POC G3    Collection Time: 07/27/17  7:30 PM   Result Value Ref Range    Device: VENT      FIO2 (POC) 100 %    pH (POC) 7.155 (LL) 7.35 - 7.45      pCO2 (POC) 43.9 35.0 - 45.0 MMHG    pO2 (POC) 169 (H) 80 - 100 MMHG    HCO3 (POC) 15.5 (L) 22 - 26 MMOL/L    sO2 (POC) 99 (H) 92 - 97 %    Base deficit (POC) 12 mmol/L    Mode ASSIST CONTROL      Tidal volume 400 ml    Set Rate 12 bpm    PEEP/CPAP (POC) 5.0 cmH2O    Allens test (POC) YES      Inspiratory Time 1.0 sec    Total resp. rate 12      Site RIGHT RADIAL      Specimen type (POC) ARTERIAL      Performed by Tedra Kid     Volume control plus YES     CARDIAC PANEL,(CK, CKMB & TROPONIN)    Collection Time: 07/27/17  8:24 PM   Result Value Ref Range    CK 4740 (H) 39 - 308 U/L    CK - MB 48.7 (H) <3.6 ng/ml    CK-MB Index 1.0 0.0 - 4.0 %    Troponin-I, Qt. 1.07 (H) 0.0 - 0.045 NG/ML   CBC WITH AUTOMATED DIFF    Collection Time: 07/27/17  8:24 PM   Result Value Ref Range    WBC 11.0 4.6 - 13.2 K/uL    RBC 3.11 (L) 4.70 - 5.50 M/uL    HGB 9.4 (L) 13.0 - 16.0 g/dL    HCT 26.9 (L) 36.0 - 48.0 %    MCV 86.5 74.0 - 97.0 FL    MCH 30.2 24.0 - 34.0 PG    MCHC 34.9 31.0 - 37.0 g/dL    RDW 14.9 (H) 11.6 - 14.5 %    PLATELET 390 361 - 094 K/uL    MPV 9.4 9.2 - 11.8 FL    NEUTROPHILS 62 42 - 75 %    BAND NEUTROPHILS 26 (H) 0 - 5 %    LYMPHOCYTES 4 (L) 20 - 51 %    MONOCYTES 8 2 - 9 %    EOSINOPHILS 0 0 - 5 %    BASOPHILS 0 0 - 3 %    ABS. NEUTROPHILS 9.7 (H) 1.8 - 8.0 K/UL    ABS. LYMPHOCYTES 0.4 (L) 0.8 - 3.5 K/UL    ABS. MONOCYTES 0.9 0 - 1.0 K/UL    ABS. EOSINOPHILS 0.0 0.0 - 0.4 K/UL    ABS.  BASOPHILS 0.0 0.0 - 0.06 K/UL    DF MANUAL      PLATELET COMMENTS ADEQUATE PLATELETS      RBC COMMENTS ANISOCYTOSIS  1+        RBC COMMENTS SOFÍA CELLS  1+        RBC COMMENTS POLYCHROMASIA  1+       METABOLIC PANEL, COMPREHENSIVE    Collection Time: 07/27/17  8:24 PM   Result Value Ref Range    Sodium 141 136 - 145 mmol/L    Potassium 3.0 (L) 3.5 - 5.5 mmol/L    Chloride 109 (H) 100 - 108 mmol/L    CO2 19 (L) 21 - 32 mmol/L    Anion gap 13 3.0 - 18 mmol/L    Glucose 189 (H) 74 - 99 mg/dL    BUN 42 (H) 7.0 - 18 MG/DL    Creatinine 5.72 (H) 0.6 - 1.3 MG/DL    BUN/Creatinine ratio 7 (L) 12 - 20      GFR est AA 13 (L) >60 ml/min/1.73m2    GFR est non-AA 10 (L) >60 ml/min/1.73m2    Calcium 6.2 (L) 8.5 - 10.1 MG/DL    Bilirubin, total 4.1 (H) 0.2 - 1.0 MG/DL    ALT (SGPT) 234 (H) 16 - 61 U/L    AST (SGOT) 405 (H) 15 - 37 U/L    Alk. phosphatase 132 (H) 45 - 117 U/L    Protein, total 5.8 (L) 6.4 - 8.2 g/dL    Albumin 2.3 (L) 3.4 - 5.0 g/dL    Globulin 3.5 2.0 - 4.0 g/dL    A-G Ratio 0.7 (L) 0.8 - 1.7     MAGNESIUM    Collection Time: 07/27/17  8:24 PM   Result Value Ref Range    Magnesium 2.2 1.6 - 2.6 mg/dL   POC LACTIC ACID    Collection Time: 07/27/17  9:10 PM   Result Value Ref Range    Lactic Acid (POC) 3.7 (HH) 0.4 - 2.0 mmol/L   POC G3    Collection Time: 07/27/17 11:38 PM   Result Value Ref Range    Device: VENT      FIO2 (POC) 100 %    pH (POC) 7.348 (L) 7.35 - 7.45      pCO2 (POC) 38.0 35.0 - 45.0 MMHG    pO2 (POC) 415 (H) 80 - 100 MMHG    HCO3 (POC) 21.0 (L) 22 - 26 MMOL/L    sO2 (POC) 100 (H) 92 - 97 %    Base deficit (POC) 5 mmol/L    Mode ASSIST CONTROL      Tidal volume 400 ml    Set Rate 12 bpm    PEEP/CPAP (POC) 5 cmH2O    Allens test (POC) N/A      Inspiratory Time 1 sec    Total resp. rate 25      Site RIGHT BRACHIAL      Patient temp. 97.6      Specimen type (POC) ARTERIAL      Performed by Lizz Carter     Volume control plus YES     VANCOMYCIN, RANDOM    Collection Time: 07/28/17  4:10 AM   Result Value Ref Range    Vancomycin, random 21.9 5.0 - 40.0 UG/ML   CBC WITH AUTOMATED DIFF    Collection Time: 07/28/17  4:10 AM   Result Value Ref Range    WBC 14.6 (H) 4.6 - 13.2 K/uL    RBC 3.22 (L) 4.70 - 5.50 M/uL    HGB 9.6 (L) 13.0 - 16.0 g/dL    HCT 27.1 (L) 36.0 - 48.0 %    MCV 84.2 74.0 - 97.0 FL    MCH 29.8 24.0 - 34.0 PG    MCHC 35.4 31.0 - 37.0 g/dL    RDW 15.2 (H) 11.6 - 14.5 %    PLATELET 039 070 - 151 K/uL    MPV 9.1 (L) 9.2 - 11.8 FL    NEUTROPHILS 78 (H) 42 - 75 %    BAND NEUTROPHILS 13 (H) 0 - 5 %    LYMPHOCYTES 7 (L) 20 - 51 %    MONOCYTES 2 2 - 9 %    EOSINOPHILS 0 0 - 5 %    BASOPHILS 0 0 - 3 %    ABS. NEUTROPHILS 13.3 (H) 1.8 - 8.0 K/UL    ABS. LYMPHOCYTES 1.0 0.8 - 3.5 K/UL    ABS. MONOCYTES 0.3 0 - 1.0 K/UL    ABS. EOSINOPHILS 0.0 0.0 - 0.4 K/UL    ABS.  BASOPHILS 0.0 0.0 - 0.06 K/UL    DF MANUAL      PLATELET COMMENTS ADEQUATE PLATELETS      RBC COMMENTS ANISOCYTOSIS  1+        WBC COMMENTS LARGE PLATELETS     METABOLIC PANEL, COMPREHENSIVE    Collection Time: 07/28/17  4:10 AM   Result Value Ref Range    Sodium 139 136 - 145 mmol/L    Potassium 3.3 (L) 3.5 - 5.5 mmol/L    Chloride 108 100 - 108 mmol/L    CO2 17 (L) 21 - 32 mmol/L    Anion gap 14 3.0 - 18 mmol/L    Glucose 135 (H) 74 - 99 mg/dL    BUN 44 (H) 7.0 - 18 MG/DL    Creatinine 5.71 (H) 0.6 - 1.3 MG/DL    BUN/Creatinine ratio 8 (L) 12 - 20      GFR est AA 13 (L) >60 ml/min/1.73m2    GFR est non-AA 10 (L) >60 ml/min/1.73m2    Calcium 6.3 (L) 8.5 - 10.1 MG/DL    Bilirubin, total 4.9 (H) 0.2 - 1.0 MG/DL    ALT (SGPT) 262 (H) 16 - 61 U/L    AST (SGOT) 501 (H) 15 - 37 U/L    Alk.  phosphatase 121 (H) 45 - 117 U/L    Protein, total 5.8 (L) 6.4 - 8.2 g/dL    Albumin 2.3 (L) 3.4 - 5.0 g/dL    Globulin 3.5 2.0 - 4.0 g/dL    A-G Ratio 0.7 (L) 0.8 - 1.7     MAGNESIUM    Collection Time: 07/28/17  4:10 AM   Result Value Ref Range    Magnesium 2.1 1.6 - 2.6 mg/dL   PHOSPHORUS    Collection Time: 07/28/17  4:10 AM   Result Value Ref Range    Phosphorus 3.7 2.5 - 4.9 MG/DL   LACTIC ACID    Collection Time: 07/28/17  4:10 AM   Result Value Ref Range    Lactic acid 3.4 (HH) 0.4 - 2.0 MMOL/L   CARDIAC PANEL,(CK, CKMB & TROPONIN)    Collection Time: 07/28/17  4:10 AM   Result Value Ref Range    CK 8892 (H) 39 - 308 U/L    CK - MB 71.3 (H) <3.6 ng/ml    CK-MB Index 0.8 0.0 - 4.0 %    Troponin-I, Qt. 2.52 (HH) 0.0 - 0.045 NG/ML   POC G3    Collection Time: 07/28/17  4:48 AM   Result Value Ref Range    Device: VENT      FIO2 (POC) 50 %    pH (POC) 7.497 (H) 7.35 - 7.45      pCO2 (POC) 22.6 (L) 35.0 - 45.0 MMHG    pO2 (POC) 176 (H) 80 - 100 MMHG    HCO3 (POC) 17.5 (L) 22 - 26 MMOL/L    sO2 (POC) 100 (H) 92 - 97 %    Base deficit (POC) 6 mmol/L    Mode ASSIST CONTROL      Tidal volume 400 ml    Set Rate 12 bpm    PEEP/CPAP (POC) 5 cmH2O Allens test (POC) N/A      Inspiratory Time 1 sec    Total resp. rate 27      Site RIGHT BRACHIAL      Patient temp. 98.4      Specimen type (POC) ARTERIAL      Performed by Lizz Carter     Volume control plus YES     CALCIUM, IONIZED    Collection Time: 07/28/17  9:05 AM   Result Value Ref Range    Ionized Calcium 0.81 (L) 1.12 - 1.32 MMOL/L   POC G3    Collection Time: 07/28/17 10:16 AM   Result Value Ref Range    Device: VENT      FIO2 (POC) 30 %    pH (POC) 7.472 (H) 7.35 - 7.45      pCO2 (POC) 25.7 (L) 35.0 - 45.0 MMHG    pO2 (POC) 113 (H) 80 - 100 MMHG    HCO3 (POC) 18.8 (L) 22 - 26 MMOL/L    sO2 (POC) 99 (H) 92 - 97 %    Base deficit (POC) 5 mmol/L    Mode ASSIST CONTROL      Tidal volume 400 ml    Set Rate 12 bpm    PEEP/CPAP (POC) 5 cmH2O    Allens test (POC) YES      Inspiratory Time 1 sec    Total resp.  rate 24      Site RIGHT RADIAL      Specimen type (POC) ARTERIAL      Performed by Jaida Vicente     Volume control plus YES     CARDIAC PANEL,(CK, CKMB & TROPONIN)    Collection Time: 07/28/17 10:40 AM   Result Value Ref Range    CK 9426 (H) 39 - 308 U/L    CK - MB 80.9 (H) <3.6 ng/ml    CK-MB Index 0.9 0.0 - 4.0 %    Troponin-I, Qt. 30.80 (HH) 0.0 - 0.045 NG/ML   LACTIC ACID    Collection Time: 07/28/17 10:40 AM   Result Value Ref Range    Lactic acid 3.1 (HH) 0.4 - 2.0 MMOL/L   GLUCOSE, POC    Collection Time: 07/28/17 11:53 AM   Result Value Ref Range    Glucose (POC) 67 (L) 70 - 110 mg/dL   CBC WITH AUTOMATED DIFF    Collection Time: 07/28/17  1:50 PM   Result Value Ref Range    WBC 16.9 (H) 4.6 - 13.2 K/uL    RBC 3.23 (L) 4.70 - 5.50 M/uL    HGB 9.8 (L) 13.0 - 16.0 g/dL    HCT 27.0 (L) 36.0 - 48.0 %    MCV 83.6 74.0 - 97.0 FL    MCH 30.3 24.0 - 34.0 PG    MCHC 36.3 31.0 - 37.0 g/dL    RDW 15.5 (H) 11.6 - 14.5 %    PLATELET 420 083 - 664 K/uL    MPV 9.9 9.2 - 11.8 FL    NEUTROPHILS 71 42 - 75 %    BAND NEUTROPHILS 20 (H) 0 - 5 %    LYMPHOCYTES 4 (L) 20 - 51 %    MONOCYTES 4 2 - 9 %    EOSINOPHILS 0 0 - 5 %    BASOPHILS 0 0 - 3 %    METAMYELOCYTES 1 (H) 0 %    ABS. NEUTROPHILS 15.4 (H) 1.8 - 8.0 K/UL    ABS. LYMPHOCYTES 0.7 (L) 0.8 - 3.5 K/UL    ABS. MONOCYTES 0.7 0 - 1.0 K/UL    ABS. EOSINOPHILS 0.0 0.0 - 0.4 K/UL    ABS.  BASOPHILS 0.0 0.0 - 0.06 K/UL    DF MANUAL      PLATELET COMMENTS ADEQUATE PLATELETS      RBC COMMENTS ANISOCYTOSIS  1+       PTT    Collection Time: 07/28/17  1:50 PM   Result Value Ref Range    aPTT 53.9 (H) 23.0 - 36.4 SEC   GLUCOSE, POC    Collection Time: 07/28/17  3:19 PM   Result Value Ref Range    Glucose (POC) 83 70 - 110 mg/dL   GLUCOSE, POC    Collection Time: 07/28/17  5:26 PM   Result Value Ref Range    Glucose (POC) 70 70 - 110 mg/dL       Signed By: Kevan Mac MD     July 28, 2017

## 2017-07-28 NOTE — H&P
History & Physical    Patient: Jose Stewart MRN: [id number]  CSN: [id number]    YOB: 1960  Age: 55 y.o. Sex: male      DOA: 2017    Chief Complaint:   Chief Complaint   Patient presents with    Cardiac arrest          HPI:     Jose Stewart is a 55 y.o.  male who presents to ER via EMS found unresponsive via EMS went into [facility] CPR/ACLS protocol ROSC   Dialysis patient apparently was at [facility] yesterday with anemia recived 2 units of Prbc sent home for dialysis found unresponsive by family and called in currently at time of evaluation on Levophen at 20 mcg and intubated with minimal responsse on no sedation     Past Medical History:   Diagnosis Date    Anemia     Arthritis     Chronic pain     Back     Diabetes mellitus type II     Hypertension     IBS (irritable bowel syndrome)     Lactose intolerance     TB (tuberculosis)     TB test positive       Past Surgical History:   Procedure Laterality Date    ENDOSCOPY, COLON, DIAGNOSTIC      HX AMPUTATION      Toe due to injury       History reviewed. No pertinent family history.     Social History     Social History    Marital status: SINGLE     Spouse name: N/A    Number of children: N/A    Years of education: N/A     Social History Main Topics    Smoking status: Current Every Day Smoker    Smokeless tobacco: None    Alcohol use Yes    Drug use: None    Sexual activity: Not Asked     Other Topics Concern    None     Social History Narrative       Prior to Admission medications    Not on File       Allergies no known allergies      Review of Systems 12 point ROS unattainable to to toxic encephalopahty       Physical Exam:     Physical Exam:  Visit Vitals    /64    Pulse 100    Temp 96.5 °F (35.8 °C)    Resp 25    Wt 64 kg (141 lb)    SpO2 100%    O2 Flow Rate (L/min): 10 l/min O2 Device: Nasal airway, Nasal cannula    Temp (24hrs), Av.5 °F (35.8 °C), Min:96.5 °F (35.8 °C), Max:96.5 °F (35.8 °C) 07/27 1901 - 07/28 0700  In: 250   Out: -         General:   intubated not responsive to painful stimuli on no sedation no tracking noted               Head: Normocephalic, without obvious abnormality, atraumatic. Eyes:  Conjunctivae/corneas clear. PERRL,no tracking    Nose: Nares normal. No drainage or sinus tenderness. Neck: Supple, symmetrical, trachea midline, no adenopathy, thyroid: no enlargement, no carotid bruit and no JVD. Lungs:   Clear to auscultation bilaterally. Course rhonchi    Heart:  Regular rate and rhythm, S1, S2 normal.tachycardic soft dayne 2/6     Abdomen: Soft, non-tender. Bowel sounds normal.    Extremities: Extremities normal, atraumatic, no cyanosis or edema. Pulses: 2+ and symmetric all extremities.    Skin:  No rashes or lesions   Neurologic:  non responsive to painful stimili  Positive babinski  No tracking   Negative grp/not following any commands        Labs Reviewed:    BMP:   Lab Results   Component Value Date/Time     07/27/2017 08:24 PM    K 3.0 (L) 07/27/2017 08:24 PM     (H) 07/27/2017 08:24 PM    CO2 19 (L) 07/27/2017 08:24 PM    AGAP 13 07/27/2017 08:24 PM     (H) 07/27/2017 08:24 PM    BUN 42 (H) 07/27/2017 08:24 PM    CREA 5.72 (H) 07/27/2017 08:24 PM    GFRAA 13 (L) 07/27/2017 08:24 PM    GFRNA 10 (L) 07/27/2017 08:24 PM     CMP:   Lab Results   Component Value Date/Time     07/27/2017 08:24 PM    K 3.0 (L) 07/27/2017 08:24 PM     (H) 07/27/2017 08:24 PM    CO2 19 (L) 07/27/2017 08:24 PM    AGAP 13 07/27/2017 08:24 PM     (H) 07/27/2017 08:24 PM    BUN 42 (H) 07/27/2017 08:24 PM    CREA 5.72 (H) 07/27/2017 08:24 PM    GFRAA 13 (L) 07/27/2017 08:24 PM    GFRNA 10 (L) 07/27/2017 08:24 PM    CA 6.2 (L) 07/27/2017 08:24 PM    MG 2.1 07/27/2017 04:26 PM    ALB 2.3 (L) 07/27/2017 08:24 PM    TP 5.8 (L) 07/27/2017 08:24 PM    GLOB 3.5 07/27/2017 08:24 PM    AGRAT 0.7 (L) 07/27/2017 08:24 PM    SGOT 405 (H) 07/27/2017 08:24 PM     (H) 07/27/2017 08:24 PM     CBC:   Lab Results   Component Value Date/Time    WBC 11.0 07/27/2017 08:24 PM    HGB 9.4 (L) 07/27/2017 08:24 PM    HCT 26.9 (L) 07/27/2017 08:24 PM     07/27/2017 08:24 PM     All Cardiac Markers in the last 24 hours:   Lab Results   Component Value Date/Time    CPK PENDING 07/27/2017 08:24 PM    CPK 1421 (H) 07/27/2017 04:26 PM    CKMB 48.7 (H) 07/27/2017 08:24 PM    CKMB 15.3 (H) 07/27/2017 04:26 PM    CKND1 PENDING 07/27/2017 08:24 PM    CKND1 1.1 07/27/2017 04:26 PM    TROIQ 1.07 (H) 07/27/2017 08:24 PM    TROIQ 0.08 (H) 07/27/2017 04:26 PM    TNIPOC 0.04 07/27/2017 04:26 PM     Recent Glucose Results:   Lab Results   Component Value Date/Time     (H) 07/27/2017 08:24 PM     (H) 07/27/2017 04:26 PM     CXR, labs  and EKG    Procedures/imaging: see electronic medical records for all procedures/Xrays and details which were not copied into this note but were reviewed prior to creation of Plan      Assessment/Plan     Principal Problem:    Sepsis ([facility] [address] (7/27/2017) protocol     Active Problems:    Anemia ()  recived  1unit of RBC in ER   Pending repeat     Acidosis (7/27/2017)  Start BIpap drip       Cardiac arrest ([address] (7/27/2017)  Troponin elevation cardiology on Team       Respiratory failure ([address] (7/27/2017)  Critical care     ESRD needing dialysis ([address] (7/27/2017)      Anoxic brain damage ([address] (7/27/2017)  Ordered EEG       Colitis (7/27/2017)      Hypotension (7/27/2017)      Acute GI bleeding (7/27/2017)        ESRD (end stage renal disease) ([address] (7/27/2017)  Potassium replacement   Dialysis needed          DVT/GI Prophylaxis: Hep SQ    Discussed with daughter t at bedside about hospital admission and my plan care, who understood and agree with my plan care.     Elzbieta Price MD  7/27/2017 9:16 PM

## 2017-07-28 NOTE — INTERDISCIPLINARY ROUNDS
CRITICAL CARE INTERDISCIPLINARY ROUNDS      Patient Information:    Name:   Price Joe    Age:   55 y.o.     Admission Date:   7/27/2017    Readmit Risk Assessment Information:      Readmit Risk Tool Support Systems: Family member(s), Relationship with Primary Physician Group: Seen at least one time within past 12 months    Surgery Date:      Day of Stay:     Expected Discharge Date:        Attending Provider:   Ivy Morales MD    Surgeon:        Consultant:       Primary Care Provider:   Zayra Tubbs MD    Problem List:     Patient Active Problem List   Diagnosis Code    Anemia D64.9    Acidosis E87.2    Cardiac arrest ([address] I46.9    Respiratory failure ([address] J96.90    ESRD needing dialysis ([facility] [address] N18.6, Z99.2    Anoxic brain damage (HCC) G93.1    Colitis K52.9    Hypotension I95.9    Acute GI bleeding K92.2    ESRD (end stage renal disease) ([facility] [address] N18.6    Sepsis ([address] A41.9       Principal Problem:  Sepsis ([address]    Procedure:       During rounds the following quality care indicators and evidence based practices were addressed :     DVT Prophylaxis, Pressure Injury Prevention, Pain Management, Sepsis resuscitation and management, Nutritional Status, Critical Care Interventions Airways, Drains and Lines and IHI Bundles: Central Line Bundle Followed , Stanton Bundle Followed and Vent Bundle Followed, Vent Day 1           Acute MI/PCI:   Not applicable    Heart Failure:    Central Line Bundle Followed , Stanton Bundle Followed and Vent Bundle Followed, Vent Day 1    Cardiac Surgery:  Not applicable    SCIP Measures for other Surgeries:   Not applicable    Pneumonia:    Appropriate Antibiotic Selection (ICU versus Non-ICU)    Stroke:  Patient's Personal Risk Factors for Stroke are:   hypertension, family history, hyperlipidemia or diabetes mellitus    NIH Stroke Score  Total: 41 (07/27/17 1700)    Transfer Level of Care:  Not Ready for Transfer    The patient will require the following interventions based on the Readmission Risk Assessment:  Pharmacy evaluation and teaching, Care Management involvement for home health follow up for:  mobility and assistance with ADL's and Spiritual Care evaluation      Discharge Management:  Home and Palliative Care    Anticipated Discharge Date:  3      Interdisciplinary team rounds were held  with the following team membersCare Management, Diabetes Treatment Specialist, Nursing, Nutrition, Pastoral Care, Pharmacy, Physician and Clinical Coordinator and the  patient and healthcare POA (documentation required). Plan of care discussed. See clinical pathway and/or care plan for interventions and desired outcomes. Ultrasound of abdomen rule out cholecystitis. Trend troponin, wean ventilator as tolerated. Repeat ABG. Discontinue mancini.

## 2017-07-28 NOTE — PROGRESS NOTES
BS 70, 1 amp D50 given IVP as ordered. D5W at 30ml/hr infusing. Remains NPO on vent. Will continue to monitor.

## 2017-07-28 NOTE — ED NOTES
Bedside report received from [facility], pt intubated at this time, no family at bedside.

## 2017-07-28 NOTE — PROGRESS NOTES
Bedside EEG completed, patient tolerated it well. NG tube to low intermittant suction draining small amt of brownish-green fluid. Hypoactive BS noted. Patient to have abdominal US.

## 2017-07-28 NOTE — PROGRESS NOTES
Heparin gtt initiated at 12 unit/kg/hr. Baseline PTT drawn before initiation of gtt (see labs flowsheet) as ordered. Next PTT at 2200. Will continue to monitor.

## 2017-07-28 NOTE — PROGRESS NOTES
NG tube to low intermittant suction as ordered. Serial Lactic Acid and Troponin levels ordered q6hrs. Will continue to monitor.

## 2017-07-28 NOTE — ROUTINE PROCESS
7/27/17 2210 Pt arrived via stretcher. Transferred into bed, monitor applied, pt assessed, bath done. Levophed actively being weaned down. Pt unresponsive on vent.

## 2017-07-28 NOTE — DIABETES MGMT
Glycemic Control: pt discussed in interdisciplinary rounds. RN reports pt was hypoglycemic upon admission. Pt is currently NPO. Glucose monitoring ordered every 6 hours. Continue inpatient monitoring and intervention.      Travis Leon RD, CDE

## 2017-07-28 NOTE — PROGRESS NOTES
BS 67, 1 amp D50 given as ordered. Troponin level 30.8, MELISSA Belcher on unit notified, orders given to get CBC, PTT, PT/INR then start a Heparin gtt per protocol. Eunice ARANA updated on BS and Troponin levels, orders given to start D5% at 30ml/hr. Stat EKG done as ordered.

## 2017-07-28 NOTE — PROGRESS NOTES
attended the interdisciplinary rounds for [facility], who is a 55 y. o.,male. Patients Primary Language is: [place]. According to the patients EMR Restoration Affiliation is: [place].     The reason the Patient came to the hospital is:   Patient Active Problem List    Diagnosis Date Noted    Acidosis 07/27/2017    Cardiac arrest ([facility] 07/27/2017    Respiratory failure ([facility] 07/27/2017    ESRD needing dialysis ([facility] 07/27/2017    Anoxic brain damage ([facility] 07/27/2017    Colitis 07/27/2017    Hypotension 07/27/2017    Acute GI bleeding 07/27/2017    ESRD (end stage renal disease) ([facility] 07/27/2017    Sepsis ([facility] 07/27/2017    Anemia         Plan:  Chaplains will continue to follow and will provide pastoral care on an as needed/requested basis.  recommends bedside caregivers page  on duty if patient shows signs of acute spiritual or emotional distress.     [facility] Certified [address] [place] [address]   [phone]

## 2017-07-28 NOTE — PROGRESS NOTES
Patient opens eyes to voice, no follow commands but localizes to pain, no sedation infusing. On vent, no SBT today. Levophed gtt infusing, titrating to keep SBP > 65, dc'd Bicarb gtt as ordered. Stanton patent, patient anuric. NGT clamped. Will continue to monitor.

## 2017-07-28 NOTE — PROGRESS NOTES
RENAL DAILY PROGRESS NOTE    Subjective:   Admitted postcode, seen for ESRD and HD    Complaint: remains intubated on vasopressors.      Current Facility-Administered Medications   Medication Dose Route Frequency    chlorhexidine (PERIDEX) 0.12 % mouthwash 10 mL  10 mL Oral Q12H    insulin lispro (HUMALOG) injection   SubCUTAneous Q6H    glucose chewable tablet 16 g  4 Tab Oral PRN    glucagon (GLUCAGEN) injection 1 mg  1 mg IntraMUSCular PRN    dextrose (D50W) injection syrg 12.5-25 g  25-50 mL IntraVENous PRN    dextrose 5% infusion  30 mL/hr IntraVENous CONTINUOUS    heparin 25,000 units in D5W 250 ml infusion  12-25 Units/kg/hr IntraVENous TITRATE    [START ON 7/29/2017] famotidine (PF) (PEPCID) injection 20 mg  20 mg IntraVENous Q24H    VANCOMYCIN INFORMATION NOTE   Other CONTINUOUS    0.9% sodium chloride infusion 250 mL  250 mL IntraVENous PRN    piperacillin-tazobactam (ZOSYN) 2.25 g in 0.9% sodium chloride (MBP/ADV) 50 mL MBP  2.25 g IntraVENous Q12H    Piperacillin-tazobactam (ZOSYN) 0.75 gm Supplemental Dosing by Pharmacy   Other Rx Dosing/Monitoring    NOREPINephrine (LEVOPHED) 8,000 mcg in dextrose 5% 250 mL infusion  2-30 mcg/min IntraVENous TITRATE    naloxone (NARCAN) injection 0.4 mg  0.4 mg IntraVENous PRN    LORazepam (ATIVAN) injection 1 mg  1 mg IntraVENous Q4H PRN    fentaNYL citrate (PF) injection 50 mcg  50 mcg IntraVENous Q2H PRN           Objective:   Patient Vitals for the past 24 hrs:   Temp Pulse Resp BP SpO2   07/28/17 1216 - 97 24 - 100 %   07/28/17 1130 - (!) 102 27 91/58 100 %   07/28/17 1115 - (!) 105 28 107/60 100 %   07/28/17 1100 99.2 °F (37.3 °C) (!) 105 21 102/52 100 %   07/28/17 1045 - (!) 106 26 117/62 100 %   07/28/17 1030 - 100 22 105/61 100 %   07/28/17 1015 - 100 24 92/55 100 %   07/28/17 1000 - (!) 101 22 (!) 89/55 100 %   07/28/17 0945 - (!) 103 27 115/57 100 %   07/28/17 0930 - (!) 102 27 113/58 100 %   07/28/17 0915 - (!) 101 28 111/58 100 % 07/28/17 0900 - (!) 102 27 105/56 100 %   07/28/17 0845 - (!) 102 26 108/56 100 %   07/28/17 0830 - 100 26 105/53 100 %   07/28/17 0815 - (!) 102 25 108/58 100 %   07/28/17 0814 - (!) 102 28 - 100 %   07/28/17 0813 - (!) 102 26 - 100 %   07/28/17 0812 - (!) 104 26 - 100 %   07/28/17 0811 - (!) 103 27 - 100 %   07/28/17 0810 - (!) 102 27 - 100 %   07/28/17 0800 99.6 °F (37.6 °C) (!) 104 26 113/59 99 %   07/28/17 0745 - (!) 102 26 105/61 100 %   07/28/17 0730 - (!) 104 28 112/62 100 %   07/28/17 0715 - (!) 102 29 111/61 100 %   07/28/17 0700 - (!) 104 30 118/59 100 %   07/28/17 0645 - (!) 101 29 107/63 100 %   07/28/17 0630 - (!) 103 28 116/59 100 %   07/28/17 0615 - (!) 103 29 110/63 100 %   07/28/17 0600 - (!) 103 28 112/64 100 %   07/28/17 0545 - (!) 102 28 107/66 100 %   07/28/17 0515 - (!) 102 28 110/63 100 %   07/28/17 0500 - (!) 102 27 117/68 100 %   07/28/17 0445 - (!) 103 28 135/71 94 %   07/28/17 0430 - (!) 101 28 117/70 -   07/28/17 0425 - (!) 103 28 - 100 %   07/28/17 0415 - 99 29 107/63 100 %   07/28/17 0400 98.4 °F (36.9 °C) 99 28 113/68 100 %   07/28/17 0345 - 98 27 108/62 -   07/28/17 0330 - 93 25 (!) 85/57 -   07/28/17 0315 - 100 28 96/69 100 %   07/28/17 0300 - 94 30 90/62 -   07/28/17 0245 - 94 29 97/66 100 %   07/28/17 0230 - 93 30 92/64 100 %   07/28/17 0215 - 92 29 95/67 100 %   07/28/17 0200 - 93 29 96/63 100 %   07/28/17 0145 - 94 (!) 31 102/65 100 %   07/28/17 0130 - 91 28 91/64 100 %   07/28/17 0115 - 92 29 100/68 100 %   07/28/17 0100 - 94 30 116/75 -   07/28/17 0045 - 93 28 111/71 -   07/28/17 0030 - 92 28 108/69 -   07/28/17 0015 - 93 27 109/71 -   07/28/17 0000 - 92 27 108/72 -   07/27/17 2345 - 94 26 116/69 -   07/27/17 2330 - 97 23 140/82 -   07/27/17 2315 - 95 23 135/76 -   07/27/17 2310 - 97 24 - 100 %   07/27/17 2300 - 96 24 127/73 -   07/27/17 2245 - 96 23 122/70 -   07/27/17 2230 97.6 °F (36.4 °C) (!) 101 23 156/74 -   07/27/17 2215 - 97 24 - -   07/27/17 2130 - (!) 102 24 142/69 -   07/27/17 2115 - (!) 103 23 149/70 -   07/27/17 2100 - 100 25 142/64 -   07/27/17 2050 - 100 24 138/64 -   07/27/17 2045 - 98 24 130/68 -   07/27/17 2040 - 96 24 108/57 -   07/27/17 2030 - 95 29 100/55 -   07/27/17 2015 - 97 24 118/55 -   07/27/17 2005 - 99 24 139/64 100 %   07/27/17 1940 - 93 26 108/57 -   07/27/17 1935 - 90 11 111/60 100 %   07/27/17 1930 - 89 12 97/55 100 %   07/27/17 1925 - 87 (!) 0 90/56 100 %   07/27/17 1920 - 83 (!) 6 (!) 78/52 100 %   07/27/17 1915 - 75 12 (!) 60/41 -   07/27/17 1910 - 61 (!) 7 (!) 48/33 100 %   07/27/17 1909 - (!) 59 12 - 100 %   07/27/17 1900 - 72 18 (!) 50/36 -   07/27/17 1856 - 76 20 (!) 54/34 -   07/27/17 1835 - 91 (!) 31 100/57 100 %   07/27/17 1830 - 90 28 98/50 100 %   07/27/17 1825 - 88 (!) 31 (!) 87/74 100 %   07/27/17 1820 - 92 23 93/55 100 %   07/27/17 1715 - 91 24 (!) 85/38 99 %   07/27/17 1712 - 89 (!) 33 (!) 81/54 98 %   07/27/17 1645 - 92 26 92/52 100 %   07/27/17 1643 96.5 °F (35.8 °C) - - - -   07/27/17 1638 - - - - 99 %   07/27/17 1631 - (!) 102 23 (!) 87/49 96 %   07/27/17 1630 - 100 26 (!) 88/45 97 %   07/27/17 1628 - (!) 103 23 (!) 87/49 97 %   07/27/17 1627 - (!) 103 22 - 99 %   07/27/17 1624 - (!) 102 25 - 96 %        Weight change:      07/26 1901 - 07/28 0700  In: 1668.7 [I.V.:1418.7]  Out: 5 [Urine:5]    Intake/Output Summary (Last 24 hours) at 07/28/17 1350  Last data filed at 07/28/17 0800   Gross per 24 hour   Intake           1668.7 ml   Output                5 ml   Net           1663.7 ml     Physical Exam: intubated, afebrile    HEENT: non icteric, ET/NGT in place  Neck: no JVD  Cardiovascular: regular no rub  C/L: equal vent breath sounds  Abdomen: soft no BS  Ext: no edema Left arm AVF + bruit    Data Review:     LABS:   Hematology: Recent Labs      07/28/17   0410  07/27/17 2024 07/27/17   1626   WBC  14.6*  11.0  11.5   HGB  9.6*  9.4*  8.1*   HCT  27.1*  26.9*  23.6*   pl 175K  Chemistry: Recent Labs      07/28/17 0410 07/27/17 2024 07/27/17   1626   BUN  44*  42*  45*   CREA  5.71*  5.72*  6.20*   CA  6.3*  6.2*  7.0*   ALB  2.3*  2.3*  2.4*   K  3.3*  3.0*  3.9   NA  139  141  139   CL  108  109*  106   CO2  17*  19*  15*   PHOS  3.7   --    --    GLU  135*  189*  163*    Vanco 21.9  Mag 2.1  Ion yeison 0.81  IMAGES: CXR right lower lobe consolidation    CULTURE: Blood C/S GNR      IMPRESSION AND PLAN:   ESRD sched HD tomorrow. Spoke with daughter Darlene Hughes, pt was missing HD for about 2 months was d/c from the [facility]. Last HD on Wed at [facility]  Anemia from CKD check fe stores, ALONDRA with HD  Hypotension post code/sepsis cont with supportive care defer to ICU team.  GNR bacteremia on Zosyn  Hypokalemia replacement done.   Hypocalcemia eval for Vit D def and 2nd HPTH, use yeison 3 Randal Us MD  7/28/2017

## 2017-07-28 NOTE — CDMP QUERY
H&P states \"Respiratory failure\". Critical care consult states \"VDRF\"  Please specify:    =>Acute hypoxic respiratory failure  or  =>Acute hypercapnic respiratory failure   =>Other Explanation of clinical findings  =>Unable to Determine (no explanation of clinical findings)    The medical record reflects the following:  Risk:  --admitted after cardiac arrest with sepsis, respiratory failure, anoxic brain damage, pneumonia, UTI, acidosis, acute cholecystitis, colotis, GI Bleed    Clinical Indicators:  --7/27/2017 18:21  pH (POC): 7.468 (H)  pCO2 (POC): 24.4 (L)  pO2 (POC): 404 (H)  HCO3 (POC): 17.7 (L)  sO2 (POC): 100 (H)  Base deficit (POC): 5  FIO2 (POC): 100  Specimen type (POC): ARTERIAL  Flow rate (POC): 20  Site: RIGHT RADIAL  Device[de-identified] Non rebreather  Total resp. rate: 33    --7/27/2017 19:30  pH (POC): 7.155 (LL)  pCO2 (POC): 43.9  pO2 (POC): 169 (H)  HCO3 (POC): 15.5 (L)  sO2 (POC): 99 (H)  Base deficit (POC): 12  FIO2 (POC): 100  Specimen type (POC): ARTERIAL  Set Rate: 12  Site: RIGHT RADIAL  Device[de-identified] VENT  Total resp. rate: 12  Mode: ASSIST CONTROL  Tidal volume: 400  Volume control plus: YES  PEEP/CPAP (POC): 5.0    --7/27/2017 23:38  pH (POC): 7.348 (L)  pCO2 (POC): 38.0  pO2 (POC): 415 (H)  HCO3 (POC): 21.0 (L)  sO2 (POC): 100 (H)  Base deficit (POC): 5  FIO2 (POC): 100  Patient temp.: 97.6  Specimen type (POC): ARTERIAL  Set Rate: 12  Site: RIGHT BRACHIAL  Device[de-identified] VENT  Total resp. rate: 25  Mode: ASSIST CONTROL  Tidal volume: 400  Volume control plus: YES  PEEP/CPAP (POC): 5    --7/28/2017 04:48  pH (POC): 7.497 (H)  pCO2 (POC): 22.6 (L)  pO2 (POC): 176 (H)  HCO3 (POC): 17.5 (L)  sO2 (POC): 100 (H)  Base deficit (POC): 6  FIO2 (POC): 50  Patient temp.: 98.4  Specimen type (POC): ARTERIAL  Set Rate: 12  Site: RIGHT BRACHIAL  Device[de-identified] VENT  Total resp.  rate: 27  Mode: ASSIST CONTROL  Tidal volume: 400  Volume control plus: YES  PEEP/CPAP (POC): 5    --7/28/2017 10:16  pH (POC): 7.472 (H)  pCO2 (POC): 25.7 (L)  pO2 (POC): 113 (H)  HCO3 (POC): 18.8 (L)  sO2 (POC): 99 (H)  Base deficit (POC): 5  FIO2 (POC): 30  Specimen type (POC): ARTERIAL  Set Rate: 12  Site: RIGHT RADIAL  Device[de-identified] VENT  Total resp. rate: 24  Mode: ASSIST CONTROL  Tidal volume: 400  Volume control plus: YES  PEEP/CPAP (POC): 5    Treatment:   --intubated with vent support    Please clarify and document your clinical opinion in the progress notes and discharge summary including the definitive and/or presumptive diagnosis, (suspected or probable), related to the above clinical findings. Please include clinical findings supporting your diagnosis. If you DECLINE this query or would like to communicate with [employer], please utilize the \"[employer] message box\" at the TOP of the Progress Note on the right.       Thank you,  Elizabeth [facility], [address] [place] [address]

## 2017-07-28 NOTE — PROGRESS NOTES
Patient intubated on vasopressors at the time of SW visit. No family at bedside. Per chart, patient was seen 7/26 at [facility] where he received emergent HD. Patient was discharged home and found unresponsive. Patient evaluated for cardiac arrest.  Currently no family at bedside. Will follow up at a later time to confirm outpatient care and discuss disposition as his needs indicate.

## 2017-07-28 NOTE — CDMP QUERY
\"Hypotension\" documented in H&P. Please clarify if this patient is being treated/managed for:    =>Septic shock  =>Cardiogenic shock  =>Other Explanation of clinical findings  =>Unable to Determine (no explanation of clinical findings)    The medical record reflects the following:  Risk:  --admitted after cardiac arrest with sepsis, respiratory failure, anoxic brain damage, pneumonia, UTI, acidosis, acute cholecystitis, colotis, GI Bleed    Clinical Indicators:  --    Treatment:   --receiving Levophed gtt, Zosyn IV    Please clarify and document your clinical opinion in the progress notes and discharge summary including the definitive and/or presumptive diagnosis, (suspected or probable), related to the above clinical findings. Please include clinical findings supporting your diagnosis. If you DECLINE this query or would like to communicate with [employer], please utilize the \"[employer] message box\" at the TOP of the Progress Note on the right.       Thank you,  Sharrell Halsted [facility], [address] [place] [address]

## 2017-07-28 NOTE — ED NOTES
Pt's daughter Ramon Saunders [phone], pt's niece Phylicia Bustos [phone], pt's  Kalina Arboleda. [phone].

## 2017-07-29 ENCOUNTER — APPOINTMENT (OUTPATIENT)
Dept: GENERAL RADIOLOGY | Age: 57
DRG: 004 | End: 2017-07-29
Attending: PHYSICIAN ASSISTANT
Payer: MEDICARE

## 2017-07-29 LAB
ALBUMIN SERPL BCP-MCNC: 2 G/DL (ref 3.4–5)
ALBUMIN/GLOB SERPL: 0.6 {RATIO} (ref 0.8–1.7)
ALP SERPL-CCNC: 114 U/L (ref 45–117)
ALT SERPL-CCNC: 241 U/L (ref 16–61)
ANION GAP BLD CALC-SCNC: 14 MMOL/L (ref 3–18)
APTT PPP: 63.1 SEC (ref 23–36.4)
APTT PPP: 72.2 SEC (ref 23–36.4)
APTT PPP: 92.5 SEC (ref 23–36.4)
ARTERIAL PATENCY WRIST A: YES
AST SERPL W P-5'-P-CCNC: 401 U/L (ref 15–37)
ATRIAL RATE: 100 BPM
BACTERIA SPEC CULT: NORMAL
BASE DEFICIT BLD-SCNC: 5 MMOL/L
BASOPHILS # BLD AUTO: 0 K/UL (ref 0–0.06)
BASOPHILS # BLD: 0 % (ref 0–3)
BDY SITE: ABNORMAL
BILIRUB SERPL-MCNC: 4.7 MG/DL (ref 0.2–1)
BODY TEMPERATURE: 37
BUN SERPL-MCNC: 56 MG/DL (ref 7–18)
BUN/CREAT SERPL: 8 (ref 12–20)
CA-I SERPL-SCNC: 0.86 MMOL/L (ref 1.12–1.32)
CALCIUM SERPL-MCNC: 6.4 MG/DL (ref 8.5–10.1)
CALCULATED P AXIS, ECG09: 50 DEGREES
CALCULATED R AXIS, ECG10: 55 DEGREES
CALCULATED T AXIS, ECG11: 74 DEGREES
CHLORIDE SERPL-SCNC: 107 MMOL/L (ref 100–108)
CO2 SERPL-SCNC: 19 MMOL/L (ref 21–32)
CREAT SERPL-MCNC: 7.1 MG/DL (ref 0.6–1.3)
DIAGNOSIS, 93000: NORMAL
DIFFERENTIAL METHOD BLD: ABNORMAL
EOSINOPHIL # BLD: 0 K/UL (ref 0–0.4)
EOSINOPHIL NFR BLD: 0 % (ref 0–5)
ERYTHROCYTE [DISTWIDTH] IN BLOOD BY AUTOMATED COUNT: 15 % (ref 11.6–14.5)
EST. AVERAGE GLUCOSE BLD GHB EST-MCNC: 108 MG/DL
GAS FLOW.O2 O2 DELIVERY SYS: ABNORMAL L/MIN
GAS FLOW.O2 SETTING OXYMISER: 12 BPM
GLOBULIN SER CALC-MCNC: 3.5 G/DL (ref 2–4)
GLUCOSE BLD STRIP.AUTO-MCNC: 150 MG/DL (ref 70–110)
GLUCOSE BLD STRIP.AUTO-MCNC: 67 MG/DL (ref 70–110)
GLUCOSE BLD STRIP.AUTO-MCNC: 92 MG/DL (ref 70–110)
GLUCOSE BLD STRIP.AUTO-MCNC: 95 MG/DL (ref 70–110)
GLUCOSE SERPL-MCNC: 121 MG/DL (ref 74–99)
HBA1C MFR BLD: 5.4 % (ref 4.2–5.6)
HBV SURFACE AG SER QL: <0.1 INDEX
HBV SURFACE AG SER QL: NEGATIVE
HCO3 BLD-SCNC: 18.9 MMOL/L (ref 22–26)
HCT VFR BLD AUTO: 24.1 % (ref 36–48)
HGB BLD-MCNC: 8.7 G/DL (ref 13–16)
LACTATE SERPL-SCNC: 1.7 MMOL/L (ref 0.4–2)
LACTATE SERPL-SCNC: 2.1 MMOL/L (ref 0.4–2)
LACTATE SERPL-SCNC: 2.5 MMOL/L (ref 0.4–2)
LYMPHOCYTES # BLD AUTO: 3 % (ref 20–51)
LYMPHOCYTES # BLD: 0.5 K/UL (ref 0.8–3.5)
MCH RBC QN AUTO: 29.7 PG (ref 24–34)
MCHC RBC AUTO-ENTMCNC: 36.1 G/DL (ref 31–37)
MCV RBC AUTO: 82.3 FL (ref 74–97)
METAMYELOCYTES NFR BLD MANUAL: 1 %
MONOCYTES # BLD: 0.5 K/UL (ref 0–1)
MONOCYTES NFR BLD AUTO: 3 % (ref 2–9)
NEUTS BAND NFR BLD MANUAL: 17 % (ref 0–5)
NEUTS SEG # BLD: 14.3 K/UL (ref 1.8–8)
NEUTS SEG NFR BLD AUTO: 76 % (ref 42–75)
NRBC BLD-RTO: 1 PER 100 WBC
O2/TOTAL GAS SETTING VFR VENT: 25 %
P-R INTERVAL, ECG05: 184 MS
PCO2 BLD: 27.6 MMHG (ref 35–45)
PEEP RESPIRATORY: 5 CMH2O
PH BLD: 7.45 [PH] (ref 7.35–7.45)
PLATELET # BLD AUTO: 80 K/UL (ref 135–420)
PLATELET COMMENTS,PCOM: ABNORMAL
PMV BLD AUTO: 9.8 FL (ref 9.2–11.8)
PO2 BLD: 103 MMHG (ref 80–100)
POTASSIUM SERPL-SCNC: 3.6 MMOL/L (ref 3.5–5.5)
PROT SERPL-MCNC: 5.5 G/DL (ref 6.4–8.2)
Q-T INTERVAL, ECG07: 406 MS
QRS DURATION, ECG06: 86 MS
QTC CALCULATION (BEZET), ECG08: 523 MS
RBC # BLD AUTO: 2.93 M/UL (ref 4.7–5.5)
RBC MORPH BLD: ABNORMAL
SAO2 % BLD: 98 % (ref 92–97)
SERVICE CMNT-IMP: ABNORMAL
SERVICE CMNT-IMP: NORMAL
SODIUM SERPL-SCNC: 140 MMOL/L (ref 136–145)
SPECIMEN TYPE: ABNORMAL
TOTAL RESP. RATE, ITRR: 22
VANCOMYCIN SERPL-MCNC: 18.1 UG/ML (ref 5–40)
VENTILATION MODE VENT: ABNORMAL
VENTRICULAR RATE, ECG03: 100 BPM
VOLUME CONTROL PLUS IVLCP: YES
VT SETTING VENT: 400 ML
WBC # BLD AUTO: 15.4 K/UL (ref 4.6–13.2)

## 2017-07-29 PROCEDURE — 83605 ASSAY OF LACTIC ACID: CPT | Performed by: PHYSICIAN ASSISTANT

## 2017-07-29 PROCEDURE — 74011000258 HC RX REV CODE- 258: Performed by: EMERGENCY MEDICINE

## 2017-07-29 PROCEDURE — 94003 VENT MGMT INPAT SUBQ DAY: CPT

## 2017-07-29 PROCEDURE — 80053 COMPREHEN METABOLIC PANEL: CPT | Performed by: PHYSICIAN ASSISTANT

## 2017-07-29 PROCEDURE — 74011000258 HC RX REV CODE- 258: Performed by: INTERNAL MEDICINE

## 2017-07-29 PROCEDURE — 83036 HEMOGLOBIN GLYCOSYLATED A1C: CPT | Performed by: PHYSICIAN ASSISTANT

## 2017-07-29 PROCEDURE — 65610000006 HC RM INTENSIVE CARE

## 2017-07-29 PROCEDURE — 82330 ASSAY OF CALCIUM: CPT | Performed by: PHYSICIAN ASSISTANT

## 2017-07-29 PROCEDURE — 74011250636 HC RX REV CODE- 250/636: Performed by: INTERNAL MEDICINE

## 2017-07-29 PROCEDURE — 85730 THROMBOPLASTIN TIME PARTIAL: CPT | Performed by: PHYSICIAN ASSISTANT

## 2017-07-29 PROCEDURE — 36600 WITHDRAWAL OF ARTERIAL BLOOD: CPT

## 2017-07-29 PROCEDURE — 74011000250 HC RX REV CODE- 250: Performed by: EMERGENCY MEDICINE

## 2017-07-29 PROCEDURE — 77030027138 HC INCENT SPIROMETER -A

## 2017-07-29 PROCEDURE — 5A1D60Z PERFORMANCE OF URINARY FILTRATION, MULTIPLE: ICD-10-PCS | Performed by: INTERNAL MEDICINE

## 2017-07-29 PROCEDURE — 74011250636 HC RX REV CODE- 250/636: Performed by: EMERGENCY MEDICINE

## 2017-07-29 PROCEDURE — 85025 COMPLETE CBC W/AUTO DIFF WBC: CPT | Performed by: PHYSICIAN ASSISTANT

## 2017-07-29 PROCEDURE — 36592 COLLECT BLOOD FROM PICC: CPT

## 2017-07-29 PROCEDURE — 71010 XR CHEST PORT: CPT

## 2017-07-29 PROCEDURE — 74011250637 HC RX REV CODE- 250/637: Performed by: PHYSICIAN ASSISTANT

## 2017-07-29 PROCEDURE — 90935 HEMODIALYSIS ONE EVALUATION: CPT

## 2017-07-29 PROCEDURE — 74011250636 HC RX REV CODE- 250/636: Performed by: PHYSICIAN ASSISTANT

## 2017-07-29 PROCEDURE — 74011000250 HC RX REV CODE- 250: Performed by: PHYSICIAN ASSISTANT

## 2017-07-29 PROCEDURE — 77030011256 HC DRSG MEPILEX <16IN NO BORD MOLN -A

## 2017-07-29 PROCEDURE — 82803 BLOOD GASES ANY COMBINATION: CPT

## 2017-07-29 PROCEDURE — 80202 ASSAY OF VANCOMYCIN: CPT | Performed by: INTERNAL MEDICINE

## 2017-07-29 RX ORDER — DOXERCALCIFEROL 4 UG/2ML
2 INJECTION INTRAVENOUS
Status: DISCONTINUED | OUTPATIENT
Start: 2017-07-29 | End: 2017-07-30

## 2017-07-29 RX ORDER — ALBUMIN HUMAN 250 G/1000ML
SOLUTION INTRAVENOUS
Status: DISCONTINUED
Start: 2017-07-29 | End: 2017-07-29

## 2017-07-29 RX ORDER — HEPARIN SODIUM 1000 [USP'U]/ML
3000 INJECTION, SOLUTION INTRAVENOUS; SUBCUTANEOUS ONCE
Status: COMPLETED | OUTPATIENT
Start: 2017-07-29 | End: 2017-07-29

## 2017-07-29 RX ADMIN — CHLORHEXIDINE GLUCONATE 10 ML: 1.2 RINSE ORAL at 08:51

## 2017-07-29 RX ADMIN — PIPERACILLIN AND TAZOBACTAM 0.75 G: 2; .25 INJECTION, POWDER, FOR SOLUTION INTRAVENOUS at 15:30

## 2017-07-29 RX ADMIN — FAMOTIDINE 20 MG: 10 INJECTION, SOLUTION INTRAVENOUS at 08:51

## 2017-07-29 RX ADMIN — PIPERACILLIN SODIUM AND TAZOBACTAM SODIUM 2.25 G: 2; .25 INJECTION, POWDER, LYOPHILIZED, FOR SOLUTION INTRAVENOUS at 19:00

## 2017-07-29 RX ADMIN — Medication 4 MG/HR: at 18:19

## 2017-07-29 RX ADMIN — HEPARIN SODIUM 3000 UNITS: 1000 INJECTION, SOLUTION INTRAVENOUS; SUBCUTANEOUS at 12:02

## 2017-07-29 RX ADMIN — HEPARIN SODIUM AND DEXTROSE 910 UNITS/HR: 10000; 5 INJECTION INTRAVENOUS at 18:20

## 2017-07-29 RX ADMIN — PIPERACILLIN SODIUM AND TAZOBACTAM SODIUM 2.25 G: 2; .25 INJECTION, POWDER, LYOPHILIZED, FOR SOLUTION INTRAVENOUS at 06:18

## 2017-07-29 RX ADMIN — DOXERCALCIFEROL 2 MCG: 4 INJECTION, SOLUTION INTRAVENOUS at 11:38

## 2017-07-29 RX ADMIN — CHLORHEXIDINE GLUCONATE 10 ML: 1.2 RINSE ORAL at 21:00

## 2017-07-29 RX ADMIN — ERYTHROPOIETIN 10000 UNITS: 10000 INJECTION, SOLUTION INTRAVENOUS; SUBCUTANEOUS at 12:22

## 2017-07-29 RX ADMIN — DEXTROSE MONOHYDRATE 25 G: 25 INJECTION, SOLUTION INTRAVENOUS at 00:01

## 2017-07-29 RX ADMIN — Medication 3 MCG/MIN: at 04:00

## 2017-07-29 RX ADMIN — VANCOMYCIN HYDROCHLORIDE 750 MG: 10 INJECTION, POWDER, LYOPHILIZED, FOR SOLUTION INTRAVENOUS at 13:22

## 2017-07-29 NOTE — PROGRESS NOTES
[place]  [facility], [address]     [facility], [facility]  [email or web address]         Impression/Plan:  1. ESRD  GI bleed  Seizure   Abnormal LFT- suspect ischemic hepatitis; Should improve if overall CV status improves  Respiratory failure  Plan:  Agree w/ supportive Rx only at this time. Having large gastric drainage, non bloody. Will consider NGT feedings tomorrow. Chief Complaint:   unresponsive    Subjective:    Pt had multiple seizures last nite. Remains on the vent, unresponsive.   No gross bleeding , Hgb stable       Eyes: conjunctiva normal, EOM normal   Neck: ROM normal, supple and trachea normal   Cardiovascular: heart normal, intact distal pulses, normal rate and regular rhythm   Pulmonary/Chest Wall: breath sounds normal and effort normal   Abdominal: appearance normal, bowel sounds normal and soft, non-acute, non-tender                Visit Vitals    /67    Pulse 78    Temp 98.3 °F (36.8 °C) (Oral)    Resp 24    Ht 6' 3\" (1.905 m)  Comment: per chart history    Wt 72.6 kg (160 lb)    SpO2 100%    BMI 20 kg/m2           Intake/Output Summary (Last 24 hours) at 07/29/17 1152  Last data filed at 07/29/17 0700   Gross per 24 hour   Intake           999.08 ml   Output             1150 ml   Net          -150.92 ml       CBC w/Diff    Lab Results   Component Value Date/Time    WBC 15.4 (H) 07/29/2017 05:10 AM    RBC 2.93 (L) 07/29/2017 05:10 AM    HGB 8.7 (L) 07/29/2017 05:10 AM    HCT 24.1 (L) 07/29/2017 05:10 AM    MCV 82.3 07/29/2017 05:10 AM    MCH 29.7 07/29/2017 05:10 AM    MCHC 36.1 07/29/2017 05:10 AM    RDW 15.0 (H) 07/29/2017 05:10 AM    PLT 80 (L) 07/29/2017 05:10 AM    Lab Results   Component Value Date/Time    GRANS 76 (H) 07/29/2017 05:10 AM    LYMPH 3 (L) 07/29/2017 05:10 AM    EOS 0 07/29/2017 05:10 AM    BANDS 17 (H) 07/29/2017 05:10 AM    BASOS 0 07/29/2017 05:10 AM    METAS 1 (H) 07/29/2017 05:10 AM      Basic Metabolic Profile   Recent Labs      07/29/17   0510   07/28/17   0410   NA  140   --   139   K  3.6   --   3.3*   CL  107   --   108   CO2  19*   --   17*   BUN  56*   --   44*   CA  6.4*   < >  6.3*   MG   --    --   2.1   PHOS   --    --   3.7    < > = values in this interval not displayed. Hepatic Function    Lab Results   Component Value Date/Time    ALB 2.0 (L) 07/29/2017 05:10 AM    TP 5.5 (L) 07/29/2017 05:10 AM     07/29/2017 05:10 AM    Lab Results   Component Value Date/Time    SGOT 401 (H) 07/29/2017 05:10 AM                    Trinity Daily MD, MD  [facility] 1946, [address] [facility]  [email or web address]

## 2017-07-29 NOTE — PROGRESS NOTES
Found patient having a seizure, generalized, rhythmic jerking and fluttering of eyes, and eye deviation to the right downward, for 30 seconds. Dr. Morin Peoa notified and wants to be called if it happens again. Will order a gtt at that time. Will continue to monitor.

## 2017-07-29 NOTE — PROGRESS NOTES
Hemodialysis Rounding Note      Patient: Aquilino Young               Sex: male          DOA: 7/27/2017  4:23 PM        YOB: 1960      Age:  55 y.o.        LOS:  LOS: 2 days     Subjective:     Aquilino Young is a 55 y.o.  who presents with Cardiac arrest ([facility] [address]  Acidosis  Respiratory failure ([address]  Anemia  ESRD needing dialysis ([facility] [address]  Cardiac arrest ([facility] [address]  Acute GI bleeding  Sepsis ([facility] [address]  Hypotension  Anoxic brain damage (HCC)  ESRD (end stage renal disease) ([facility] [address]  Colitis. The patient is dialyzing utilizing the following method:Intermittent Hemodialysis        Complaints remains intubated. Events of last 24 H noted.     Current Facility-Administered Medications   Medication Dose Route Frequency    vancomycin (VANCOCIN) 750 mg in 0.9% sodium chloride 250 mL IVPB  750 mg IntraVENous ONCE    albumin human 25% (BUMINATE) 25 % solution        chlorhexidine (PERIDEX) 0.12 % mouthwash 10 mL  10 mL Oral Q12H    insulin lispro (HUMALOG) injection   SubCUTAneous Q6H    glucose chewable tablet 16 g  4 Tab Oral PRN    glucagon (GLUCAGEN) injection 1 mg  1 mg IntraMUSCular PRN    dextrose (D50W) injection syrg 12.5-25 g  25-50 mL IntraVENous PRN    dextrose 5% infusion  30 mL/hr IntraVENous CONTINUOUS    heparin 25,000 units in D5W 250 ml infusion  12-25 Units/kg/hr IntraVENous TITRATE    famotidine (PF) (PEPCID) injection 20 mg  20 mg IntraVENous Q24H    0.9% sodium chloride infusion  100 mL/hr IntraVENous DIALYSIS PRN    albumin human 25% (BUMINATE) solution 12.5 g  12.5 g IntraVENous DIALYSIS PRN    midazolam in normal saline (VERSED) 1 mg/mL infusion  4 mg/hr IntraVENous TITRATE    VANCOMYCIN INFORMATION NOTE   Other CONTINUOUS    0.9% sodium chloride infusion 250 mL  250 mL IntraVENous PRN    piperacillin-tazobactam (ZOSYN) 2.25 g in 0.9% sodium chloride (MBP/ADV) 50 mL MBP  2.25 g IntraVENous Q12H    Piperacillin-tazobactam (ZOSYN) 0.75 gm Supplemental Dosing by Pharmacy Other Rx Dosing/Monitoring    NOREPINephrine (LEVOPHED) 8,000 mcg in dextrose 5% 250 mL infusion  2-30 mcg/min IntraVENous TITRATE    naloxone (NARCAN) injection 0.4 mg  0.4 mg IntraVENous PRN    LORazepam (ATIVAN) injection 1 mg  1 mg IntraVENous Q4H PRN    fentaNYL citrate (PF) injection 50 mcg  50 mcg IntraVENous Q2H PRN           1901 -  0700  In: 2667.8 [I.V.:2417.8]  Out: 1155 [Urine:5]      Objective:     Blood pressure 119/65, pulse 83, temperature 98.3 °F (36.8 °C), temperature source Oral, resp. rate 22, height 6' 3\" (1.905 m), weight 72.6 kg (160 lb), SpO2 100 %.   Temp (24hrs), Av.1 °F (37.3 °C), Min:98 °F (36.7 °C), Max:99.7 °F (37.6 °C)      Blood Pressure: BP: 119/65  Pulse: Pulse (Heart Rate): 83  Temp:  Temp: 98.3 °F (36.8 °C)    Artificial Kidney Dialyzer/Set Up Inspection: Revaclear   hours     Heparin Bolus    Blood flow rate Blood Flow Rate (ml/min): 30 ml/min   Dialysate rate     Arterial Access Pressure Arterial Access Pressure (mmHg): -80  Venous Return Pressure Venous Return Pressure (mmHg): 160  Ultrafiltration Rate Goal/Amount of Fluid to Remove (mL): 0 mL  Fluid Removal Fluid Removed (mL): 76  Net Fluid Removal        PHYSICAL EXAM:  Intubated, sedated  HEENT:  Non icteric, ET/NGT in place  NECK:  No JVD  CHEST AND LUNGS:  Equal vent bs  CVS:  Regular no rub  ABDOMEN:  Soft , hypoactive bs  EXT:  No edema, Left arm avf + bruit    DATA REVIEW:    Labs: Results:       Chemistry Recent Labs      17   0510  17   1045  17   0410  17   GLU  121*   --   135*  189*   NA  140   --   139  141   K  3.6   --   3.3*  3.0*   CL  107   --   108  109*   CO2  19*   --   17*  19*   BUN  56*   --   44*  42*   CREA  7.10*   --   5.71*  5.72*   CA  6.4*  6.8*  6.3*  6.2*   AGAP  14   --   14  13   BUCR  8*   --   8*  7*   AP  114   --   121*  132*   TP  5.5*   --   5.8*  5.8*   ALB  2.0*   --   2.3*  2.3*   GLOB  3.5   --   3.5  3.5   AGRAT  0.6*   --   0.7* 0.7*      CBC w/Diff Recent Labs      07/29/17   0510  07/28/17   1350  07/28/17   0410   WBC  15.4*  16.9*  14.6*   RBC  2.93*  3.23*  3.22*   HGB  8.7*  9.8*  9.6*   HCT  24.1*  27.0*  27.1*   PLT  80*  136  175   GRANS  76*  71  78*   LYMPH  3*  4*  7*   EOS  0  0  0      Coagulation Recent Labs      07/29/17   0510  07/28/17   2145   07/27/17   1626   PTP   --    --    --   21.4*   INR   --    --    --   2.0*   APTT  92.5*  138.6*   < >  43.3*    < > = values in this interval not displayed. Iron/Ferritin Recent Labs      07/28/17   1045   IRON  20*      BNP No results for input(s): BNPP in the last 72 hours. Cardiac Enzymes Recent Labs      07/28/17 2145 07/28/17   1040   CPK  7413*  9426*   CKND1  0.8  0.9      Liver Enzymes Recent Labs      07/29/17   0510   TP  5.5*   ALB  2.0*   AP  114   SGOT  401*      Thyroid Studies No results found for: T4, T3U, TSH, TSHEXT     IPTH 1285  Vanco 18  Images:  XR Results (maximum last 3): Results from [place] encounter on 07/27/17   XR CHEST PORT   Narrative AP portable chest.    CPT code: 59855. Indications: Intubated. Single frontal view at 0601 hours has several recent prior comparison. Findings:    ET tube 4 cm above bree. NG tube is present, coursing as expected; the tip is  not included on this study. Right IJ central line unremarkable. Cardiomegaly as  before. Moderate decrease in right base atelectasis with minimal residual. No  pleural fluid or pneumothorax. No acute bony findings. IMPRESSION[de-identified]    1.  ET tube satisfactory. Improved aeration, as above. XR CHEST PORT   Narrative Portable chest    History: Ventilator    Comparison: 7/27/2017    Findings:      -Endotracheal tube tip is 5.5 cm from the bree.  -Enteric tube is subdiaphragmatic with sidehole just past the gastroesophageal  junction, tip in the proximal body of the stomach. -Right internal jugular CVL tip is at the mid SVC.     The cardiomediastinal silhouette is enlarged, which is unchanged. The pulmonary  vasculature is unremarkable. Calcified atherosclerotic plaque is present in the  aorta. Hazy opacity is present at the right lung base with slightly better  aeration than 7/27/2017. The left lung is clear. No pneumothorax. No acute  osseous abnormality. Impression:    Right lower lobe consolidation probably represents a combination of pleural  effusion, atelectasis and/or pneumonia, slightly improved since yesterday's  exam.  Lines and tubes, as above. Stable cardiomegaly. Thank you for this referral.      XR CHEST PORT   Narrative Portable Chest 1928 hours    CPT CODE:16426    HISTORY:  Witnessed cardiac arrest during EMS transport,   intubation, central line. COMPARISON: Chest x-ray July 27, 2017 at 1648 hours. FINDINGS:     Endotracheal tube has been positioned with the tip 6 cm proximal to the bree. Right IJ approach central catheter terminates at the midsuperior vena cava. Interval development of marked opacity from the right hilum to the hemidiaphragm  with obscuration of the hemidiaphragm. The left lung is clear. The left  costophrenic angle is sharp. Pulmonary vascularity is normal. There is no  pneumothorax. .    IMPRESSION:    Support tubes in expected position. No pneumothorax. Marked interval progression of opacity in the right lower lobe likely due to  segmental atelectasis        CT Results (maximum last 3): Results from [place] encounter on 07/27/17   CT ABD PELV WO CONT   Narrative CT ABDOMEN AND PELVIS WITHOUT CONTRAST    CPT CODE: 04726    INDICATION: Patient found unresponsive, PEA, return of spontaneous circulation  after resuscitation, dialysis patient, ams, hypotension. COMPARISON: None. TECHNIQUE: No intravenous contrast was utilized for this helical thin section CT  of the abdomen and pelvis.      All CT scans at this facility are performed using dose optimization technique as  appropriate to a performed exam, to include automated exposure control,  adjustment of the mA and/or kV according to patient's size (including  appropriate matching for site-specific examinations), or use of iterative  reconstruction technique. Coronal and sagittal reformations obtained. Evaluation  of the solid organs, bowel wall, and vascular structures are therefore limited. CT ABDOMEN FINDINGS:     Moderate to large dependent right pleural effusion. Trace left pleural effusion. Cardiomegaly. The included portions of the chest wall and the abdominal wall demonstrate mild  haziness suggestive of mild anasarca. .    The liver and spleen are normal in size and density. The biliary tree is not  dilated. The gallbladder is not overly distended. There is circumferential thickening of  the wall of the gallbladder at about 4 mm. There is surrounding fluid. There is possibly a tiny amount of fluid in the paracolic gutters. .  The kidneys appear to be normal in size and morphology. Tiny amount contrast is  seen within the intrarenal collecting structures. No calculi are identified in the kidney or ureter. Adrenals and Pancreas  are of normal CT appearance. The large and small bowel seem unremarkable. And esophagogastric tube terminates  in a fluid filled stomach. CT PELVIS FINDINGS:     Possible tiny amount of free fluid  The rectum is distended with fluid  The pelvic viscera are unremarkable. Stanton catheter present within the urinary bladder. Tiny amount of contrast  within the collapsed urinary bladder. Review of osseous structures throughout on bone window settings shows no  significant osseous pathology. IMPRESSION:     This exam is markedly limited by the lack of enteric and IV contrast.  Small to moderate right pleural effusion. Tiny left pleural effusion. Small amount of ascites. Mild anasarca.   Gallbladder wall thickening in the setting of anasarca and ascites can be  problematic to differentiate between cholecystitis and due to ascites. If  clinically indicated ultrasound of the right upper quadrant or hepatobiliary  scanning could be obtained. Report provided to the emergency department at 2220 hrs. CT HEAD WO CONT   Narrative  CT scan of head: without contrast:        HISTORY:[    Acute mental status change. History of diabetes, hypertension. TECHNIQUE:    Contiguous 5 mm thick axial sections of brain have been obtained without  intravenous contrast.      [2-D coronal and sagittal images reformatted.]     The study has been performed utilizing appropriate radiation dose reduction  technique according to specification of the scanner, with modification of MAA/KV  and appropriate collimation adjusted to patient's body habitus. COMPARISON STUDY: [None]      -------------------------------------      FINDINGS:        Intracranial hemorrhage :[None.]    Intracranial mass lesion:[ None.]    Midline shift: [None.]    Cerebral cortical atrophy:[ None]. Cerebral central atrophy:[ None.]    Chronic microvascular ischemic changes/aging changes in white matter:[Mild  chronic microvascular ischemic changes in periventricular white matter. Acute cortical infarction:[ None.]    Old cerebral infarction: [No definable infarction in cerebral cortex in either  side. Basal ganglia structures of both sides:[    In right thalamus there are 3 lacunar infarctions. In left thalamus there are 2  lacunar infarctions. The lacunar infarctions are hypodense and most likely to be  old or at least subacute. Brainstem: In the left side of upper manny there is ill-defined hypodensity,  indicating lacunar infarctions of undetermined age. Acute lacunar infarction in  brainstem not excluded. In the right side of mid manny there is hypodense old appearing lacunar  infarction. Cerebellum: Mild atrophy changes in cerebellum. Calvarium and base of skull:[ No  fracture or focal lesion].     In visualized portions of both orbits:[ No diagnostic finding.]    In visualized portions of paranasal sinuses:[ No diagnostic finding.]    In visualized base of l skull:[ No diagnostic finding.]      IMPRESSION:      [No evidence of intracranial hemorrhages. Multiple lacunar infarctions in bilateral thalami, likely to be old or subacute. In left side of upper manny of brainstem there is lacunar infarction of  undetermined age. Acute lacunar infarction in this area is not excluded. Follow-up evaluation with MRI of brain suggested. CULTURE:   )  Recent Labs      07/27/17   1830  07/27/17   1648  07/27/17   1633   CULT  NO AEROMONAS,SALMONELLA,SHIGELLA,E. COLI 0:157 OR CAMPYLOBACTER ISOLATED TO DATE  Toxigenic C. difficile NEGATIVE                         C. difficile target DNA sequences are not detected. AEROBIC AND ANAEROBIC BOTTLES GRAM NEGATIVE RODS*  AEROBIC AND ANAEROBIC BOTTLES GRAM NEGATIVE RODS*     Recent Labs      07/27/17   1830  07/27/17   1648  07/27/17   1633   CULT  NO AEROMONAS,SALMONELLA,SHIGELLA,E. COLI 0:157 OR CAMPYLOBACTER ISOLATED TO DATE  Toxigenic C. difficile NEGATIVE                         C. difficile target DNA sequences are not detected. AEROBIC AND ANAEROBIC BOTTLES GRAM NEGATIVE RODS*  AEROBIC AND ANAEROBIC BOTTLES GRAM NEGATIVE RODS*       Assessment/Plan:     End Stage Renal Disease:  Patient is tolerating dialysis treatment well. .  Additionally the patient has experienced normal dialysis treatment during dialysis. Dry weight   same. At 10:36 AM on 7/29/2017, I saw and examined patient during hemodialysis treatment. The patient was receiving hemodialysis for treatment of end stage renal disease. I have also reviewed vital signs, intake and output, lab results and recent events, and agreed with today's dialysis order.       Anemia:  Start Epo    Renal Metabolic Bone Disease:  Start hectorol                      Hypotension: BP better wean off vasopressors    Access: Fistula adequate monitoring/no changes     Bacteremia antibiotics per ICU team trend vanco levels    SZ doubt uremic, defer management to ICU, cont HD 3x a week      Alex Bran MD  7/29/2017

## 2017-07-29 NOTE — DIALYSIS
ACUTE HEMODIALYSIS FLOW SHEET    HEMODIALYSIS ORDERS: Physician: Kevin Ascencio   Dialyzer:  Revaclear    Duration:  3 hr  BFR: 300 DFR: 600   Dialysate:  Temp   K+ 3   Ca+ 3   Na 140 Bicarb 35   Weight:   78.4kg.    Bed Scale [x]     Unable to Obtain []        Dry weight/UF Goal:  0 mL   Access  AVG  Needle Gauge 15 N/A   Heparin []  Bolus      Units    [] Hourly       Units    [x]None  He is currently on a heparin drip   Catheter locking solution Heparin   Pre BP:  113/66 Pulse:  83 Temperature:  98.3  Respirations:  22 Tx: NS   250    ml/Bolus  Other        [x] N/A   Labs: Pre       Post:        [x] N/A    Additional Orders(medications, blood products, hypotension management):       [x] N/A     [x] Time Out/Safety Check  [x] [facility] Consent Verified (telephone consent giver by brother, Kesha Sutton, primary nurse is second verification, BR)     CATHETER ACCESS: [x]N/A   []Right   []Left   []IJ     []Fem   [] First use X-ray verified     []Tunnel                [] Non Tunneled   []No S/S infection  []Redness  []Drainage []Cultured []Swelling []Pain   []Medical Aseptic Prep Utilized   []Dressing Changed  [] Biopatch  Date:    []Clotted   []Patent   Flows: []Good  []Poor  []Reversed   If access problem,  notified: []Yes    [x]N/A  Date:           GRAFT/FISTULA ACCESS:  []N/A     []Right     [x]Left     [x]UE     []LE   [x]AVG   []AVF        []Buttonhole    [x]Medical Aseptic Prep Utilized   [x]No S/S infection  []Redness  []Drainage []Cultured []Swelling []Pain    Bruit:   [x] Strong    [] Weak       Thrill :   [x] Strong    [] Weak       Needle Gauge: 15   Length: 1   If access problem,  notified: []Yes     [x]N/A  Date:        Please describe access if present and not used:       GENERAL ASSESSMENT:    LUNGS:  Rate  20 SaO2 100%   [] N/A    [] Clear  [] Coarse  [] Crackles  [] Wheezing        [x] Diminished     Location : []RLL   []LLL    [x]RUL  [x]   Cough: []Productive  []Dry  [x]N/A   Respirations: [x]Easy  []Labored   Therapy:  []RA  []NC  l/min    Mask: []NRB []Venti       O2%                  [x]Ventilator  [x]Intubated  [] Trach  [] BiPaP   CARDIAC: [x]Regular      [] Irregular   [] Pericardial Rub  [] JVD        []  Monitored  [] Bedside  [x] Remotely monitored [] N/A  Rhythm:    EDEMA: [] None  [x]Generalized  [] Pitting [] 1    [] 2    [] 3    [] 4                 [] Facial  [] Pedal  []  UE  [] LE   SKIN:   [x] Warm  [] Hot     [x] Cold Hands   [x] Dry     [] Pale   [] Diaphoretic                  [] Flushed  [] Jaundiced  [] Cyanotic  [] Rash  [] Weeping   LOC:    [] Alert      []Oriented:    [] Person     [] Place  []Time               [] Confused  [] Lethargic  [] Medicated  [x] Non-responsive     GI / ABDOMEN:  [x] Flat    [] Distended    [x] Soft    [] Firm   []  Obese                             [] Diarrhea  [x] Bowel Sounds  [] Nausea  [] Vomiting       / URINE ASSESSMENT:[] Voiding   [] Oliguria  [] Anuria   []  Stanton     [x] Incontinent (towel between legs)   []  Incontinent Brief      []  Bathroom Privileges     PAIN: [x] 0 []1  []2   []3   []4   []5   []6   []7   []8   []9   []10            Scale 0-10  Action/Follow Up:    MOBILITY:  [] Amb    [] Amb/Assist    [x] Bed    [] Wheelchair  [] Stretcher      All Vitals and Treatment Details on Attached [address]:   [address]   [] 1st Time Acute  [] Stat  [x] Routine  [] Urgent     [] Acute Room  []  Bedside  [x] ICU/CCU  [] ER   Isolation Precautions:  [x] Dialysis   [] Airborne   [] Contact    [] Reverse   Special Considerations:         [] Blood Consent Verified [x]N/A     ALLERGIES:  nka   Code Status:  [x] Full Code  [] DNR  [] Other           HBsAg ONLY: Date Drawn 07/28/17       [x]Negative []Positive []Unknown   HBsAb: 07/28/17   [x] Susceptible   [] [id number] []Not Drawn  [] Drawn     Current Labs:    Date of Labs: Today [x]     Results for Suki Benavidez (MRN [id number]) as of 7/29/2017 10:30   Ref. Range 7/29/2017 05:10   WBC Latest Ref Range: 4.6 - 13.2 K/uL 15.4 (H)   NRBC Latest Ref Range: 0  WBC 1.0 (H)   RBC Latest Ref Range: 4.70 - 5.50 M/uL 2.93 (L)   HGB Latest Ref Range: 13.0 - 16.0 g/dL 8.7 (L)   HCT Latest Ref Range: 36.0 - 48.0 % 24.1 (L)   MCV Latest Ref Range: 74.0 - 97.0 FL 82.3   MCH Latest Ref Range: 24.0 - 34.0 PG 29.7   MCHC Latest Ref Range: 31.0 - 37.0 g/dL 36.1   RDW Latest Ref Range: 11.6 - 14.5 % 15.0 (H)   PLATELET Latest Ref Range: 135 - 420 K/uL 80 (L)   MPV Latest Ref Range: 9.2 - 11.8 FL 9.8   NEUTROPHILS Latest Ref Range: 42 - 75 % 76 (H)   BAND NEUTROPHILS Latest Ref Range: 0 - 5 % 17 (H)   LYMPHOCYTES Latest Ref Range: 20 - 51 % 3 (L)   MONOCYTES Latest Ref Range: 2 - 9 % 3   EOSINOPHILS Latest Ref Range: 0 - 5 % 0   BASOPHILS Latest Ref Range: 0 - 3 % 0   METAMYELOCYTES Latest Ref Range: 0 % 1 (H)   DF Latest Units:   MANUAL   ABS. NEUTROPHILS Latest Ref Range: 1.8 - 8.0 K/UL 14.3 (H)   ABS. LYMPHOCYTES Latest Ref Range: 0.8 - 3.5 K/UL 0.5 (L)   ABS. MONOCYTES Latest Ref Range: 0 - 1.0 K/UL 0.5   ABS. EOSINOPHILS Latest Ref Range: 0.0 - 0.4 K/UL 0.0   ABS.  BASOPHILS Latest Ref Range: 0.0 - 0.06 K/UL 0.0                                                                                                                                 DIET:  [] Renal    [] Other     [x] NPO     []  Diabetic      PRIMARY NURSE REPORT: First initial/Last name/Title   Luis Baez RN    Pre Dialysis:   6583     EDUCATION:    [x] Patient [] Other         Knowledge Basis: []None []Minimal [x] Substantial   Barriers to learning  []N/A   [] Access Care     [] S&S of infection     [x] Fluid Management     []K+     [x]Procedural    []Albumin     [] Medications     [] Tx Options     [] Transplant     [] Diet     [] Other   Teaching Tools:  [x] Explain  [] Demo  [] Handouts [] Video  Patient response:   [] Verbalized understanding  [] Teach back  [] Return demonstration [] Requires follow up Inappropriate due to            [address] Before each treatment:     Machine Number:                   [facility]                                  [] Unit Machine # 4  with centralized RO                                  [] Portable Machine #1/RO serial # [id number]                                  [x] Portable Machine #2/RO serial # [id number]                                  [] Portable Machine #3/RO serial # [id number]                                                                                                       [facility]                                  [] Portable Machine #11/RO serial # [id number]                                   [] Portable Machine #12/RO serial # [id number]                                  [] Portable Machine #13/RO serial #  [id number]      Alarm Test:  Pass time 2905   Other:         [x] RO/Machine Log Complete      Temp  36.5   [x]Extracorporeal Circuit Tested for integrity   Dialysate: Conductivity: Meter 14 HD Machine    14.2             TCD: 14.0 Dialyzer Lot #   [id number]      Blood Tubing Lot #     [id number]  Saline Lot # [id number] [id number]     CHLORINE TESTING-Before each treatment and every 4 hours    Total Chlorine: [x] less than 0.1 ppm  Time:  4 Hr/2nd Check Time:    (if greater than 0.1 ppm from Primary then every 30 minutes from Secondary)     TREATMENT INITIATION  with Dialysis Precautions:   [x] All Connections Secured                 [x] Saline Line Double Clamped   [x] Venous Parameters Set                  [x] Arterial Parameters Set    [x] Prime Given 250  ml                       [x]Air Foam Detector Engaged      Treatment Initiation Note:  [id number], got report from primary nurse at bedside. @6783 Castanares at bedside; I am in the bathroom tightening the connection to the faucet. The faucet continues to leak and I tighten it more and clean up the water on the floor.   @9456 machine is done rinsing, testing, and the faucet is still leaking--maintenance paged. @1005, maintenance arrives and said that it is much better after he re-tightens it. @1013--HD treatment started in left AVG UE. @ 1055 Dr. Melburn Cooks at bedside. Medication Dose Volume Route Initials Dialyzer Cleared: [x] Good [] Fair  [] Poor    Blood processed: 53.0  L  UF Removed 0 mL Post Wt: 78.2   kg   POst BP: 137/70   Pulse:  85 Respirations: 22Temperature: 98.3      NaCl 0.9%      250 mL prim  250 mL rinse    500 mL    IV          Post Tx Vascular Access: AVF/AVG:   N/A  Minutes pressure held to site:  Art:  10  Oscar:  10   Hectorol 2 mcg 2 mL        Catheter: Locking solution: Heparin 1:1000 Art. mL  Oscar.  mL     Epogen 51426 Units 1 mL        Post Assessment:                                    Skin:  [x] Warm  [x] Dry [] Diaphoretic    [] Flushed  [] Pale [] Cyanotic   [facility] Signatures Title Initials  Time Lungs: [] Clear    [] Course  [x] Crackles  [] Wheezing [] Diminished        Cardiac: [] Regular   [] Irregular   [x] Monitor  [] N/A  Rhythm:           Edema:  [] None    [x] General     [] Facial   [] Pedal    [] UE    [] LE       Pain: [x]0  []1  []2   []3  []4   []5   []6   []7   []8   []9   []10         Post Treatment Note: HD treatment complete. Patient is still unresponsive and VS are stable.      POST TREATMENT PRIMARY NURSE HANDOFF REPORT:     First initial/Last name/Title     Laura Velazco RN  Post Dialysis:Time: 1774     Abbreviations: AVG-arterial venous graft, AVF-arterial venous fistula, IJ-Internal Jugular, Subcl-Subclavian, Fem-Femoral, Tx-treatment, AP/HR-apical heart rate, DFR-dialysate flow rate, BFR-blood flow rate, AP-arterial pressure, -venous pressure, UF-ultrafiltrate, TMP-transmembrane pressure, Oscar-Venous, Art-Arterial, RO-Reverse Osmosis

## 2017-07-29 NOTE — PROGRESS NOTES
Dialysis completed. Hemofiltration done, no fluid pulled off. Patient tolerated it well. Vitals remained stable.

## 2017-07-29 NOTE — PROGRESS NOTES
[facility]  -  Progress Note      Patient: Ashok Momin MRN: [id number]  SSN: [id number]    YOB: 1960  Age: 55 y.o. Sex: male      Admit Date: 7/27/2017    Assessment:     -- PEA arrest, s/p CPR with one round of epi, 2 amps of d50. Unclear how long until ROSC but pt had ROSC prior to arrival.. Found down at home, became agonal and pulseless in ambulance. -- acute MI. Troponin level > 100, suspect has significant CAD, no heparin infusion  -- Ischemic cardiomyopathy. EF 35-40% on echo with RWMAs  -Anemia, s/p 2 units PRBCs at [facility] 7/26/17  -ESRD, noncompliant with dialysis, dialyzed at [facility] 7/26/17  -Sepsis picture, started on IV abx  -Hypotension, on levophed  -Hypokalemia  -Abd pain initial complaint at [facility], CT abdomen consistent with fluid overload from missed dialysis. -Elevated LFTs and now thrombocytopnenia  -Tobacco use disorder    Plan:     -- continue supportive care  -- continue heparin infusion unless HIT suspected  -- no ASA with declining plt  -- no statin with elevated LFTs    Would consider cardiac cath in future in patient makes significant clinical improvement    Subjective:      Intubated and sedated    Objective:      Patient Vitals for the past 8 hrs:   Temp Pulse Resp BP SpO2   07/29/17 1311 - 85 22 137/70 99 %   07/29/17 1300 - 83 21 136/70 99 %   07/29/17 1254 98.3 °F (36.8 °C) - - - -   07/29/17 1245 - 81 22 127/69 100 %   07/29/17 1230 - 81 23 125/70 100 %   07/29/17 1215 - 82 24 127/68 100 %   07/29/17 1200 - 81 23 126/67 99 %   07/29/17 1145 - 80 24 124/67 100 %   07/29/17 1130 - 78 24 119/67 100 %   07/29/17 1115 - 83 23 122/67 100 %   07/29/17 1113 - 84 23 - 100 %   07/29/17 1100 - 85 23 123/69 100 %   07/29/17 1045 - 86 23 120/63 99 %   07/29/17 1030 - 83 22 114/67 100 %   07/29/17 1015 - 83 23 119/65 100 %   07/29/17 1010 98.3 °F (36.8 °C) 84 20 116/64 100 %   07/29/17 1000 - 85 23 117/68 100 %   07/29/17 0845 - 82 21 103/60 100 %   07/29/17 0830 - 85 20 113/61 100 %   07/29/17 0815 - 85 22 111/63 100 %   07/29/17 0804 - 84 22 - 93 %   07/29/17 0800 98 °F (36.7 °C) 84 20 101/59 94 %   07/29/17 0745 - 81 21 107/61 99 %   07/29/17 0730 - 80 22 105/61 99 %   07/29/17 0715 - 84 23 117/68 99 %   07/29/17 0700 - 82 24 101/58 98 %   07/29/17 0645 - 82 21 100/57 99 %   07/29/17 0630 - 80 23 100/58 99 %   07/29/17 0615 - 76 23 96/57 99 %         Patient Vitals for the past 96 hrs:   Weight   07/29/17 0615 72.6 kg (160 lb)   07/27/17 2230 71 kg (156 lb 8.4 oz)   07/27/17 1630 64 kg (141 lb)         Intake/Output Summary (Last 24 hours) at 07/29/17 1405  Last data filed at 07/29/17 1300   Gross per 24 hour   Intake           999.08 ml   Output             1650 ml   Net          -650.92 ml       Physical Exam:  General:  Intubated, sedated  Neck:  no JVD  Lungs:  diminished breath sounds R base, L base  Heart:  regular rate and rhythm  Abdomen:  abdomen is soft without significant tenderness, masses, organomegaly or guarding  Extremities:  extremities normal, atraumatic, no cyanosis or edema    Data Review:     Labs: Results:       Chemistry Recent Labs      07/29/17   0510  07/28/17   1045  07/28/17   0410  07/27/17 2024  07/27/17   1626   GLU  121*   --   135*  189*  163*   NA  140   --   139  141  139   K  3.6   --   3.3*  3.0*  3.9   CL  107   --   108  109*  106   CO2  19*   --   17*  19*  15*   BUN  56*   --   44*  42*  45*   CREA  7.10*   --   5.71*  5.72*  6.20*   CA  6.4*  6.8*  6.3*  6.2*  7.0*   MG   --    --   2.1  2.2  2.1   PHOS   --    --   3.7   --    --    AGAP  14   --   14  13  18   BUCR  8*   --   8*  7*  7*   AP  114   --   121*  132*  146*   TP  5.5*   --   5.8*  5.8*  5.9*   ALB  2.0*   --   2.3*  2.3*  2.4*   GLOB  3.5   --   3.5  3.5  3.5   AGRAT  0.6*   --   0.7*  0.7*  0.7*      CBC w/Diff Recent Labs      07/29/17   0510  07/28/17   1350  07/28/17   0410   WBC  15.4*  16.9*  14.6*   RBC  2.93*  3.23*  3.22*   HGB  8.7*  9.8*  9.6* HCT  24.1*  27.0*  27.1*   PLT  80*  136  175   GRANS  76*  71  78*   LYMPH  3*  4*  7*   EOS  0  0  0      Cardiac Enzymes Lab Results   Component Value Date/Time    CPK 7413 (H) 07/28/2017 09:45 PM    CKMB 61.4 (H) 07/28/2017 09:45 PM    CKND1 0.8 07/28/2017 09:45 PM    TROIQ 115.00 (HH) 07/28/2017 09:45 PM      Coagulation Recent Labs      07/29/17   1030  07/29/17   0510   07/27/17   1626   PTP   --    --    --   21.4*   INR   --    --    --   2.0*   APTT  63.1*  92.5*   < >  43.3*    < > = values in this interval not displayed.        Lipid Panel No results found for: CHOL, CHOLPOCT, CHOLX, CHLST, CHOLV, 518989, HDL, LDL, LDLC, DLDLP, 451890, VLDLC, VLDL, TGLX, TRIGL, TRIGP, TGLPOCT, CHHD, CHHDX   BNP No results found for: BNP, BNPP, XBNPT   Liver Enzymes Recent Labs      07/29/17   0510   TP  5.5*   ALB  2.0*   AP  114   SGOT  401*      Digoxin    Thyroid Studies No results found for: T4, T3U, TSH, TSHEXT

## 2017-07-29 NOTE — PROGRESS NOTES
7/28/17 2330 Pt with seizure lasting approx 30 sec. Noted to have generalized jerking movements with eyes deviating to right. MD notified and versed gtt initiated per order. 7/29/17 0000 Blood sugar 67, 25ml dextrose given per protocol, recheck after 15 min was 150.

## 2017-07-29 NOTE — PROGRESS NOTES
Subjective:  Symptoms:  Stable. Diet:  NPO. Patient intubated and sedated on vent  He had 2 episodes of seizure-like activity last night for 20 min each  He was started on versed drip with no further seizures noted thus far  He had multiple lab abnormalities noted as well      Temp:  [96.5 °F (35.8 °C)-99.7 °F (37.6 °C)]   Pulse (Heart Rate):  []   BP: ()/(33-82)   Resp Rate:  [0-33]   O2 Sat (%):  [78 %-100 %]   Weight:  [64 kg (141 lb)-72.6 kg (160 lb)]      07/27 1901 - 07/29 0700  In: 2667.8 [I.V.:2417.8]  Out: 1155 [Urine:5]      Objective:  General Appearance:  Ill-appearing and in no acute distress. Vital signs: (most recent): Blood pressure 120/63, pulse 86, temperature 98.3 °F (36.8 °C), temperature source Oral, resp. rate 23, height 6' 3\" (1.905 m), weight 72.6 kg (160 lb), SpO2 99 %. Output: No urine output. Lungs:  Normal respiratory rate and normal effort. Breath sounds clear to auscultation. No wheezes or rhonchi. Heart: Normal rate. Regular rhythm. (Distant HS)  Neurological: (Sedated on vent). Skin:  Dry and cool. Abdomen: Abdomen is soft. Hypoactive bowel sounds. There is no abdominal tenderness.        Recent Results (from the past 24 hour(s))   GLUCOSE, POC    Collection Time: 07/28/17 11:53 AM   Result Value Ref Range    Glucose (POC) 67 (L) 70 - 110 mg/dL   EKG, 12 LEAD, SUBSEQUENT    Collection Time: 07/28/17 12:24 PM   Result Value Ref Range    Ventricular Rate 100 BPM    Atrial Rate 100 BPM    P-R Interval 184 ms    QRS Duration 86 ms    Q-T Interval 406 ms    QTC Calculation (Bezet) 523 ms    Calculated P Axis 50 degrees    Calculated R Axis 55 degrees    Calculated T Axis 74 degrees    Diagnosis       Normal sinus rhythm  Nonspecific T wave abnormality  Prolonged QT  Abnormal ECG  When compared with ECG of 27-JUL-2017 17:19,  Non-specific change in ST segment in Anterior leads  Nonspecific T wave abnormality now evident in Anterior leads  QT has shortened     CBC WITH AUTOMATED DIFF    Collection Time: 07/28/17  1:50 PM   Result Value Ref Range    WBC 16.9 (H) 4.6 - 13.2 K/uL    RBC 3.23 (L) 4.70 - 5.50 M/uL    HGB 9.8 (L) 13.0 - 16.0 g/dL    HCT 27.0 (L) 36.0 - 48.0 %    MCV 83.6 74.0 - 97.0 FL    MCH 30.3 24.0 - 34.0 PG    MCHC 36.3 31.0 - 37.0 g/dL    RDW 15.5 (H) 11.6 - 14.5 %    PLATELET 454 996 - 184 K/uL    MPV 9.9 9.2 - 11.8 FL    NEUTROPHILS 71 42 - 75 %    BAND NEUTROPHILS 20 (H) 0 - 5 %    LYMPHOCYTES 4 (L) 20 - 51 %    MONOCYTES 4 2 - 9 %    EOSINOPHILS 0 0 - 5 %    BASOPHILS 0 0 - 3 %    METAMYELOCYTES 1 (H) 0 %    ABS. NEUTROPHILS 15.4 (H) 1.8 - 8.0 K/UL    ABS. LYMPHOCYTES 0.7 (L) 0.8 - 3.5 K/UL    ABS. MONOCYTES 0.7 0 - 1.0 K/UL    ABS. EOSINOPHILS 0.0 0.0 - 0.4 K/UL    ABS.  BASOPHILS 0.0 0.0 - 0.06 K/UL    DF MANUAL      PLATELET COMMENTS ADEQUATE PLATELETS      RBC COMMENTS ANISOCYTOSIS  1+       PTT    Collection Time: 07/28/17  1:50 PM   Result Value Ref Range    aPTT 53.9 (H) 23.0 - 36.4 SEC   GLUCOSE, POC    Collection Time: 07/28/17  3:19 PM   Result Value Ref Range    Glucose (POC) 83 70 - 110 mg/dL   GLUCOSE, POC    Collection Time: 07/28/17  5:26 PM   Result Value Ref Range    Glucose (POC) 70 70 - 110 mg/dL   PTT    Collection Time: 07/28/17  9:45 PM   Result Value Ref Range    aPTT 138.6 (H) 23.0 - 36.4 SEC   LACTIC ACID    Collection Time: 07/28/17  9:45 PM   Result Value Ref Range    Lactic acid 3.0 (HH) 0.4 - 2.0 MMOL/L   CARDIAC PANEL,(CK, CKMB & TROPONIN)    Collection Time: 07/28/17  9:45 PM   Result Value Ref Range    CK 7413 (H) 39 - 308 U/L    CK - MB 61.4 (H) <3.6 ng/ml    CK-MB Index 0.8 0.0 - 4.0 %    Troponin-I, Qt. 115.00 (HH) 0.0 - 0.045 NG/ML   GLUCOSE, POC    Collection Time: 07/28/17 11:58 PM   Result Value Ref Range    Glucose (POC) 67 (L) 70 - 110 mg/dL   GLUCOSE, POC    Collection Time: 07/29/17 12:15 AM   Result Value Ref Range    Glucose (POC) 150 (H) 70 - 110 mg/dL   POC G3    Collection Time: 07/29/17 4:35 AM   Result Value Ref Range    Device: VENT      FIO2 (POC) 25 %    pH (POC) 7.445 7.35 - 7.45      pCO2 (POC) 27.6 (L) 35.0 - 45.0 MMHG    pO2 (POC) 103 (H) 80 - 100 MMHG    HCO3 (POC) 18.9 (L) 22 - 26 MMOL/L    sO2 (POC) 98 (H) 92 - 97 %    Base deficit (POC) 5 mmol/L    Mode ASSIST CONTROL      Tidal volume 400 ml    Set Rate 12 bpm    PEEP/CPAP (POC) 5 cmH2O    Allens test (POC) YES      Total resp. rate 22      Site RIGHT RADIAL      Patient temp. 37.0      Specimen type (POC) ARTERIAL      Performed by [employer]     Volume control plus YES     LACTIC ACID    Collection Time: 07/29/17  5:10 AM   Result Value Ref Range    Lactic acid 2.5 (HH) 0.4 - 2.0 MMOL/L   CALCIUM, IONIZED    Collection Time: 07/29/17  5:10 AM   Result Value Ref Range    Ionized Calcium 0.86 (L) 1.12 - 1.32 MMOL/L   CBC WITH AUTOMATED DIFF    Collection Time: 07/29/17  5:10 AM   Result Value Ref Range    WBC 15.4 (H) 4.6 - 13.2 K/uL    RBC 2.93 (L) 4.70 - 5.50 M/uL    HGB 8.7 (L) 13.0 - 16.0 g/dL    HCT 24.1 (L) 36.0 - 48.0 %    MCV 82.3 74.0 - 97.0 FL    MCH 29.7 24.0 - 34.0 PG    MCHC 36.1 31.0 - 37.0 g/dL    RDW 15.0 (H) 11.6 - 14.5 %    PLATELET 80 (L) 748 - 420 K/uL    MPV 9.8 9.2 - 11.8 FL    NEUTROPHILS 76 (H) 42 - 75 %    BAND NEUTROPHILS 17 (H) 0 - 5 %    LYMPHOCYTES 3 (L) 20 - 51 %    MONOCYTES 3 2 - 9 %    EOSINOPHILS 0 0 - 5 %    BASOPHILS 0 0 - 3 %    METAMYELOCYTES 1 (H) 0 %    NRBC 1.0 (H) 0  WBC    ABS. NEUTROPHILS 14.3 (H) 1.8 - 8.0 K/UL    ABS. LYMPHOCYTES 0.5 (L) 0.8 - 3.5 K/UL    ABS. MONOCYTES 0.5 0 - 1.0 K/UL    ABS. EOSINOPHILS 0.0 0.0 - 0.4 K/UL    ABS.  BASOPHILS 0.0 0.0 - 0.06 K/UL    DF MANUAL      PLATELET COMMENTS DECREASED PLATELETS      RBC COMMENTS ANISOCYTOSIS  1+       METABOLIC PANEL, COMPREHENSIVE    Collection Time: 07/29/17  5:10 AM   Result Value Ref Range    Sodium 140 136 - 145 mmol/L    Potassium 3.6 3.5 - 5.5 mmol/L    Chloride 107 100 - 108 mmol/L    CO2 19 (L) 21 - 32 mmol/L Anion gap 14 3.0 - 18 mmol/L    Glucose 121 (H) 74 - 99 mg/dL    BUN 56 (H) 7.0 - 18 MG/DL    Creatinine 7.10 (H) 0.6 - 1.3 MG/DL    BUN/Creatinine ratio 8 (L) 12 - 20      GFR est AA 10 (L) >60 ml/min/1.73m2    GFR est non-AA 8 (L) >60 ml/min/1.73m2    Calcium 6.4 (L) 8.5 - 10.1 MG/DL    Bilirubin, total 4.7 (H) 0.2 - 1.0 MG/DL    ALT (SGPT) 241 (H) 16 - 61 U/L    AST (SGOT) 401 (H) 15 - 37 U/L    Alk. phosphatase 114 45 - 117 U/L    Protein, total 5.5 (L) 6.4 - 8.2 g/dL    Albumin 2.0 (L) 3.4 - 5.0 g/dL    Globulin 3.5 2.0 - 4.0 g/dL    A-G Ratio 0.6 (L) 0.8 - 1.7     HEMOGLOBIN A1C WITH EAG    Collection Time: 07/29/17  5:10 AM   Result Value Ref Range    Hemoglobin A1c 5.4 4.2 - 5.6 %    Est. average glucose 108 mg/dL   VANCOMYCIN, RANDOM    Collection Time: 07/29/17  5:10 AM   Result Value Ref Range    Vancomycin, random 18.1 5.0 - 40.0 UG/ML   PTT    Collection Time: 07/29/17  5:10 AM   Result Value Ref Range    aPTT 92.5 (H) 23.0 - 36.4 SEC   PTT    Collection Time: 07/29/17 10:30 AM   Result Value Ref Range    aPTT 63.1 (H) 23.0 - 36.4 SEC         Principal Problem:    Sepsis ([facility] (7/27/2017)    Active Problems:    Anemia ()      Acidosis (7/27/2017)      Cardiac arrest ([facility] (7/27/2017)      Respiratory failure ([facility] (7/27/2017)      ESRD needing dialysis ([facility] (7/27/2017)      Anoxic brain damage (HCC) (7/27/2017)      Colitis (7/27/2017)      Hypotension (7/27/2017)      Acute GI bleeding (7/27/2017)      ESRD (end stage renal disease) ([facility] (7/27/2017)          Assessment:    Condition: In critical condition. Unchanged. Patient on vent after cardiac arrest but hemodynamics improving at this time  new seizures of unclear etiology -?due to anoxic brain injury? Possible uremia (but seems doubtful)? ?Possible reaction to medication? ESRD but tolerating HD    1. Pulm - stable on vent with good gas exchange and minimal o2 needs   -no vent weaning at this time    2.  CV - stable hemodynamics as noted after arrest as; elevated troponins as expected   -monitor HR and BP - use pressors if warranted    3. Renal - ESRD on HD and tolerating session this am   -defer to nephrology    4. GI - NPO at this time; question of possible acute cholecystitis though data not overwhelming indicative;    LFT's elevated likely due to shock liver but decreasing slowly   -GI service following   -follow labs    5. ID - on abx's for possible GI source infection; though no clear foci ID'ed, blood cx's are positive for GNR which would support probable GI source   -follow cx's   -adjust abx's depending on cx results   -consider further studies if status worsens on current therapies    6.  Neuro - seizures of unclear etiology   -follow clinical status closely   -broaden seizure coverage if another recurs (add keppra)   -ir repeat seizure recurs, then neurology consult    Total ICU care time: 40 minutes

## 2017-07-30 ENCOUNTER — APPOINTMENT (OUTPATIENT)
Dept: GENERAL RADIOLOGY | Age: 57
DRG: 004 | End: 2017-07-30
Attending: PHYSICIAN ASSISTANT
Payer: MEDICARE

## 2017-07-30 LAB
ALBUMIN SERPL BCP-MCNC: 1.9 G/DL (ref 3.4–5)
ALBUMIN/GLOB SERPL: 0.5 {RATIO} (ref 0.8–1.7)
ALP SERPL-CCNC: 136 U/L (ref 45–117)
ALT SERPL-CCNC: 193 U/L (ref 16–61)
ANION GAP BLD CALC-SCNC: 11 MMOL/L (ref 3–18)
APTT PPP: 113.7 SEC (ref 23–36.4)
APTT PPP: 52.2 SEC (ref 23–36.4)
ARTERIAL PATENCY WRIST A: YES
AST SERPL W P-5'-P-CCNC: 237 U/L (ref 15–37)
BASE EXCESS BLD CALC-SCNC: 1 MMOL/L
BASOPHILS # BLD AUTO: 0 K/UL (ref 0–0.06)
BASOPHILS # BLD: 0 % (ref 0–3)
BDY SITE: ABNORMAL
BILIRUB SERPL-MCNC: 4.6 MG/DL (ref 0.2–1)
BODY TEMPERATURE: 37
BUN SERPL-MCNC: 32 MG/DL (ref 7–18)
BUN/CREAT SERPL: 7 (ref 12–20)
CA-I SERPL-SCNC: 1.03 MMOL/L (ref 1.12–1.32)
CALCIUM SERPL-MCNC: 7.6 MG/DL (ref 8.5–10.1)
CHLORIDE SERPL-SCNC: 104 MMOL/L (ref 100–108)
CO2 SERPL-SCNC: 25 MMOL/L (ref 21–32)
CREAT SERPL-MCNC: 4.61 MG/DL (ref 0.6–1.3)
DIFFERENTIAL METHOD BLD: ABNORMAL
EOSINOPHIL # BLD: 0 K/UL (ref 0–0.4)
EOSINOPHIL NFR BLD: 0 % (ref 0–5)
ERYTHROCYTE [DISTWIDTH] IN BLOOD BY AUTOMATED COUNT: 15.4 % (ref 11.6–14.5)
GAS FLOW.O2 O2 DELIVERY SYS: ABNORMAL L/MIN
GAS FLOW.O2 SETTING OXYMISER: 12 BPM
GLOBULIN SER CALC-MCNC: 3.5 G/DL (ref 2–4)
GLUCOSE BLD STRIP.AUTO-MCNC: 101 MG/DL (ref 70–110)
GLUCOSE BLD STRIP.AUTO-MCNC: 88 MG/DL (ref 70–110)
GLUCOSE BLD STRIP.AUTO-MCNC: 91 MG/DL (ref 70–110)
GLUCOSE BLD STRIP.AUTO-MCNC: 97 MG/DL (ref 70–110)
GLUCOSE SERPL-MCNC: 77 MG/DL (ref 74–99)
HCO3 BLD-SCNC: 25.5 MMOL/L (ref 22–26)
HCT VFR BLD AUTO: 24.3 % (ref 36–48)
HGB BLD-MCNC: 8.8 G/DL (ref 13–16)
LACTATE SERPL-SCNC: 1.9 MMOL/L (ref 0.4–2)
LYMPHOCYTES # BLD AUTO: 4 % (ref 20–51)
LYMPHOCYTES # BLD: 0.7 K/UL (ref 0.8–3.5)
MCH RBC QN AUTO: 30.6 PG (ref 24–34)
MCHC RBC AUTO-ENTMCNC: 36.2 G/DL (ref 31–37)
MCV RBC AUTO: 84.4 FL (ref 74–97)
METAMYELOCYTES NFR BLD MANUAL: 1 %
MONOCYTES # BLD: 0.7 K/UL (ref 0–1)
MONOCYTES NFR BLD AUTO: 4 % (ref 2–9)
MYELOCYTES NFR BLD MANUAL: 1 %
NEUTS BAND NFR BLD MANUAL: 22 % (ref 0–5)
NEUTS SEG # BLD: 16.7 K/UL (ref 1.8–8)
NEUTS SEG NFR BLD AUTO: 68 % (ref 42–75)
O2/TOTAL GAS SETTING VFR VENT: 25 %
PCO2 BLD: 37.6 MMHG (ref 35–45)
PEEP RESPIRATORY: 5 CMH2O
PH BLD: 7.44 [PH] (ref 7.35–7.45)
PLATELET # BLD AUTO: 61 K/UL (ref 135–420)
PLATELET COMMENTS,PCOM: ABNORMAL
PMV BLD AUTO: 12 FL (ref 9.2–11.8)
PO2 BLD: 111 MMHG (ref 80–100)
POTASSIUM SERPL-SCNC: 3.2 MMOL/L (ref 3.5–5.5)
PROT SERPL-MCNC: 5.4 G/DL (ref 6.4–8.2)
RBC # BLD AUTO: 2.88 M/UL (ref 4.7–5.5)
RBC MORPH BLD: ABNORMAL
SAO2 % BLD: 99 % (ref 92–97)
SERVICE CMNT-IMP: ABNORMAL
SODIUM SERPL-SCNC: 140 MMOL/L (ref 136–145)
SPECIMEN TYPE: ABNORMAL
TOTAL RESP. RATE, ITRR: 16
VENTILATION MODE VENT: ABNORMAL
VOLUME CONTROL PLUS IVLCP: YES
VT SETTING VENT: 400 ML
WBC # BLD AUTO: 18.5 K/UL (ref 4.6–13.2)

## 2017-07-30 PROCEDURE — 85730 THROMBOPLASTIN TIME PARTIAL: CPT | Performed by: PHYSICIAN ASSISTANT

## 2017-07-30 PROCEDURE — 74011250636 HC RX REV CODE- 250/636: Performed by: PSYCHIATRY & NEUROLOGY

## 2017-07-30 PROCEDURE — 74011250637 HC RX REV CODE- 250/637: Performed by: PHYSICIAN ASSISTANT

## 2017-07-30 PROCEDURE — 80053 COMPREHEN METABOLIC PANEL: CPT | Performed by: PHYSICIAN ASSISTANT

## 2017-07-30 PROCEDURE — 74011250636 HC RX REV CODE- 250/636: Performed by: INTERNAL MEDICINE

## 2017-07-30 PROCEDURE — 83605 ASSAY OF LACTIC ACID: CPT | Performed by: PHYSICIAN ASSISTANT

## 2017-07-30 PROCEDURE — 36600 WITHDRAWAL OF ARTERIAL BLOOD: CPT

## 2017-07-30 PROCEDURE — 36592 COLLECT BLOOD FROM PICC: CPT

## 2017-07-30 PROCEDURE — 82330 ASSAY OF CALCIUM: CPT | Performed by: PHYSICIAN ASSISTANT

## 2017-07-30 PROCEDURE — 82803 BLOOD GASES ANY COMBINATION: CPT

## 2017-07-30 PROCEDURE — 74011250636 HC RX REV CODE- 250/636: Performed by: EMERGENCY MEDICINE

## 2017-07-30 PROCEDURE — 82962 GLUCOSE BLOOD TEST: CPT

## 2017-07-30 PROCEDURE — 74011000258 HC RX REV CODE- 258: Performed by: INTERNAL MEDICINE

## 2017-07-30 PROCEDURE — 74011000250 HC RX REV CODE- 250: Performed by: PHYSICIAN ASSISTANT

## 2017-07-30 PROCEDURE — 85025 COMPLETE CBC W/AUTO DIFF WBC: CPT | Performed by: PHYSICIAN ASSISTANT

## 2017-07-30 PROCEDURE — 94003 VENT MGMT INPAT SUBQ DAY: CPT

## 2017-07-30 PROCEDURE — 65610000006 HC RM INTENSIVE CARE

## 2017-07-30 PROCEDURE — 71010 XR CHEST PORT: CPT

## 2017-07-30 PROCEDURE — 74011000250 HC RX REV CODE- 250: Performed by: INTERNAL MEDICINE

## 2017-07-30 PROCEDURE — 74011000258 HC RX REV CODE- 258: Performed by: EMERGENCY MEDICINE

## 2017-07-30 PROCEDURE — 74011250636 HC RX REV CODE- 250/636: Performed by: PHYSICIAN ASSISTANT

## 2017-07-30 RX ORDER — HYDROCORTISONE SODIUM SUCCINATE 100 MG/2ML
50 INJECTION, POWDER, FOR SOLUTION INTRAMUSCULAR; INTRAVENOUS EVERY 8 HOURS
Status: DISCONTINUED | OUTPATIENT
Start: 2017-07-30 | End: 2017-08-02

## 2017-07-30 RX ORDER — LEVETIRACETAM 5 MG/ML
500 INJECTION INTRAVASCULAR EVERY 12 HOURS
Status: DISCONTINUED | OUTPATIENT
Start: 2017-07-30 | End: 2017-08-08

## 2017-07-30 RX ORDER — DOXERCALCIFEROL 4 UG/2ML
2 INJECTION INTRAVENOUS
Status: DISCONTINUED | OUTPATIENT
Start: 2017-07-31 | End: 2017-08-03

## 2017-07-30 RX ADMIN — FAMOTIDINE 20 MG: 10 INJECTION, SOLUTION INTRAVENOUS at 09:46

## 2017-07-30 RX ADMIN — DEXTROSE MONOHYDRATE 30 ML/HR: 5 INJECTION, SOLUTION INTRAVENOUS at 01:00

## 2017-07-30 RX ADMIN — HYDROCORTISONE SODIUM SUCCINATE 50 MG: 100 INJECTION, POWDER, FOR SOLUTION INTRAMUSCULAR; INTRAVENOUS at 15:03

## 2017-07-30 RX ADMIN — LEVETIRACETAM 500 MG: 5 INJECTION INTRAVENOUS at 11:53

## 2017-07-30 RX ADMIN — LIDOCAINE HYDROCHLORIDE: 10 INJECTION, SOLUTION EPIDURAL; INFILTRATION; INTRACAUDAL; PERINEURAL at 08:00

## 2017-07-30 RX ADMIN — CHLORHEXIDINE GLUCONATE 10 ML: 1.2 RINSE ORAL at 20:44

## 2017-07-30 RX ADMIN — CHLORHEXIDINE GLUCONATE 10 ML: 1.2 RINSE ORAL at 09:46

## 2017-07-30 RX ADMIN — PIPERACILLIN SODIUM AND TAZOBACTAM SODIUM 2.25 G: 2; .25 INJECTION, POWDER, LYOPHILIZED, FOR SOLUTION INTRAVENOUS at 06:57

## 2017-07-30 RX ADMIN — HYDROCORTISONE SODIUM SUCCINATE 50 MG: 100 INJECTION, POWDER, FOR SOLUTION INTRAMUSCULAR; INTRAVENOUS at 22:23

## 2017-07-30 RX ADMIN — PIPERACILLIN SODIUM AND TAZOBACTAM SODIUM 2.25 G: 2; .25 INJECTION, POWDER, LYOPHILIZED, FOR SOLUTION INTRAVENOUS at 20:43

## 2017-07-30 RX ADMIN — LEVETIRACETAM 500 MG: 5 INJECTION INTRAVENOUS at 22:23

## 2017-07-30 RX ADMIN — LIDOCAINE HYDROCHLORIDE: 10 INJECTION, SOLUTION EPIDURAL; INFILTRATION; INTRACAUDAL; PERINEURAL at 06:58

## 2017-07-30 NOTE — PROGRESS NOTES
NUTRITION    Nursing Referral: [facility]     RECOMMENDATIONS / PLAN:     - Plan to modify tube feeding order: Start tube feeding of Nepro at 20 mL/hr and advance as tolerated by 10 mL q 6 hours to goal rate of 55 mL/hr with 100 mL q 6 hour water flushes.   - Continue RD inpatient monitoring and evaluation. Goal Regimen: Nepro at 55 mL/hr + 100 mL q 6 hour water flushes to provide: 2376 kcal, 107 gm protein, 127 gm fat, 220 gm CHO, 21 gm fiber, 957 mL free water, 1357 mL total water, 100% RDIs     NUTRITION INTERVENTIONS & DIAGNOSIS:     [x] Enteral nutrition support: plan to start   [x] Vitamin/mineral supplementation: KCl   [x] IV fluids: D5 at 30 mL/hr (36 gm dextrose, 122 kcal)  [x] Coordination of care: tube feeding discussed with Dr Yazmin Campos. Discussed with [facility] [place] regarding tube feed order modified; plans to pass report to RN     Nutrition Diagnosis: Inadequate oral intake related to inability to tolerate po as evidenced by pt NPO. ASSESSMENT:     730: Pt remain intubated. GI following; plan to start tube feeds today. Noted order placed; discussed modifying tube feed order and add water flushes with Dr Yazmin Campos. RN unavailable; discussed with [facility] [place] regarding modifying tube feed order    7/28: S/p cardiac arrest and intubated 7/27, NGT clamped. Abdominal ultrasound today to rule out cholecystitis. Will wait to feed.      Average po intake adequate to meet patients estimated nutritional needs:   [] Yes     [x] No   [] Unable to determine at this time  EN infusion adequate to meet patients estimated nutritional needs:   [] Yes     [] No   [x] Unable to determine at this time    Tube Feeding: plan to start  Water Flushes: plan to start   Residuals:  Not applicable    Diet: DIET NPO  DIET TUBE FEEDING      Food Allergies: NKFA  Current Appetite:   [] Good     [] Fair     [] Poor     [x] Other: NPO  Appetite/meal intake prior to admission:   [] Good     [] Fair     [] Poor     [x] Other: unknown Feeding Limitations:  [] Swallowing difficulty    [] Chewing difficulty    [x] Other: intubated   Current Meal Intake: No data found. Anuric   BM: 7/30   Skin Integrity: WDL  Edema: none   Pertinent Medications: Reviewed: levophed     Recent Labs      07/30/17   0040  07/29/17   0510  07/28/17   1045  07/28/17   0410  07/27/17   2024  07/27/17   1626   NA  140  140   --   139  141  139   K  3.2*  3.6   --   3.3*  3.0*  3.9   CL  104  107   --   108  109*  106   CO2  25  19*   --   17*  19*  15*   GLU  77  121*   --   135*  189*  163*   BUN  32*  56*   --   44*  42*  45*   CREA  4.61*  7.10*   --   5.71*  5.72*  6.20*   CA  7.6*  6.4*  6.8*  6.3*  6.2*  7.0*   MG   --    --    --   2.1  2.2  2.1   PHOS   --    --    --   3.7   --    --    ALB  1.9*  2.0*   --   2.3*  2.3*  2.4*   SGOT  237*  401*   --   501*  405*  297*   ALT  193*  241*   --   262*  234*  214*       Intake/Output Summary (Last 24 hours) at 07/30/17 1054  Last data filed at 07/30/17 0700   Gross per 24 hour   Intake          1130.05 ml   Output              550 ml   Net           580.05 ml       Anthropometrics:  Ht Readings from Last 1 Encounters:   07/28/17 6' 3\" (1.905 m)     Last 3 Recorded Weights in this Encounter    07/27/17 2230 07/29/17 0615 07/30/17 0558   Weight: 71 kg (156 lb 8.4 oz) 72.6 kg (160 lb) 75.6 kg (166 lb 10.7 oz)     Body mass index is 20.83 kg/(m^2).       Borderline Underweight     Weight History:   Weight Metrics 7/30/2017   Weight 166 lb 10.7 oz   BMI 20.83 kg/m2        Admitting Diagnosis: Cardiac arrest (HCC)  Acidosis  Respiratory failure (HCC)  Anemia  ESRD needing dialysis ([facility]  Cardiac arrest ([address]  Acute GI bleeding  Sepsis ([facility]  Hypotension  Anoxic brain damage (HCC)  ESRD (end stage renal disease) ([facility]  Colitis  Pertinent PMHx: DM, HTN, IBS, ESRD    Education Needs:        [x] None identified  [] Identified - Not appropriate at this time  []  Identified and addressed - refer to education log  Learning Limitations:   [] None identified  [x] Identified: intubated     Cultural, Anglican & ethnic food preferences:  [x] None identified    [] Identified and addressed     ESTIMATED NUTRITION NEEDS:     Calories: 8375-0635 kcal ([id number]) based on  [x] Actual BW 71 kg     [] IBW   Protein:  gm (1.2-2 gm/kg) based on  [x] Actual BW      [] IBW   Fluid: 1000 mL + UOP     MONITORING & EVALUATION:     Nutrition Goal(s):   1. Nutritional needs will be met through adequate oral intake or nutrition support within the next 7 days.   Outcome:  [] Met/Ongoing    [x]  Not Met    [] New/Initial Goal     Monitoring:   [x] EN tolerance    [x] EN infusion   [] Diet tolerance   [] Meal intake   [] Supplement intake   [x] GI symptoms/ability to tolerate po diet   [x] Respiratory status   [x] Plan of care      Previous Recommendations (for follow-up assessments only):     []   Implemented       []   Not Implemented (RD to address)     [] No Recommendation Made     Discharge Planning: pending ability to tolerate po and plan of care  [x] Participated in care planning, discharge planning, & interdisciplinary rounds as appropriate      Charli Muller, [address] [place] [address]  Pager: [phone]

## 2017-07-30 NOTE — PROGRESS NOTES
[facility]  ICU Progress Note      Name: Lee Ann Ricketts   : 1960   MRN: [id number]   Date: 2017      [x]I have reviewed the flowsheet and previous days notes. Events overnight reviewed and discussed with nursing staff. Vital signs and records reviewed. Subjective:   Remains poorly responsive on Versed at 4 mg /hr  Off pressors  Afebrile. Remains on vent- A/c 16, FiO2- 25% not following commands, but will track with voice. (+) cough, gag, responds readily to pain per nursing when sedation lightened. Scant tracheal secretions. Anuric, HD M, W, F.   Blood cultures with gram negative rods- E. Coli  WBC increased , platelets decreased. [x]The patient is unable to give any meaningful history or review of systems because the patient is:  [x]Intubated []Sedated   [x]Unresponsive      [x]The patient is critically ill on      [x]Mechanical ventilation []Pressors   []BiPAP []            ROS:Review of systems not obtained due to patient factors.     Medication Review:  · Pressors - levophed  · Sedation - none  · Antibiotics - Zosyn  · Pain - PRN Fentanyl   · GI/ DVT - Pepcid/Heparin gtt  · Others (other gtts)    Safety Bundles: VAP Bundle/ CAUTI/ Severe Sepsis Protocol/ Electrolyte Replacement Protocol    Vital Signs:    Visit Vitals    /69 (BP 1 Location: Right arm)    Pulse 72    Temp 97.7 °F (36.5 °C)    Resp 12    Ht 6' 3\" (1.905 m)  Comment: per chart history    Wt 75.6 kg (166 lb 10.7 oz)    SpO2 99%    BMI 20.83 kg/m2       O2 Device: Ventilator   O2 Flow Rate (L/min): 10 l/min   Temp (24hrs), Av.9 °F (36.6 °C), Min:97.4 °F (36.3 °C), Max:98.8 °F (37.1 °C)       Intake/Output:   Last shift:         Last 3 shifts: 1901 -  07  In: 1836.5 [I.V.:1836.5]  Out: 600     Intake/Output Summary (Last 24 hours) at 17 1023  Last data filed at 17 0700   Gross per 24 hour   Intake          1130.05 ml   Output              550 ml   Net 580.05 ml       Ventilator Settings:  Ventilator  Mode: Assist control  Respiratory Rate  Back-Up Rate: 12  Insp Time (sec): 1 sec  I:E Ratio: 1:1.0  Ventilator Volumes  Vt Set (ml): 400 ml  Vt Exhaled (Machine Breath) (ml): 383 ml  Vt Spont (ml): 100 ml  Ve Observed (l/min): 7.06 l/min  Ventilator Pressures  PIP Observed (cm H2O): 17 cm H2O  Plateau Pressure (cm H2O): 14 cm H2O  MAP (cm H2O): 8.5  PEEP/VENT (cm H2O): 5 cm H20  Auto PEEP Observed (cm H2O): 0 cm H2O    Physical Exam:    General: Intubated/sedated; not responsive   HEENT:  Pupils reactive, but sluggish. Anicteric sclerae; pink palpebral conjunctivae; mucosa moist  Resp:  Symmetrical chest expansion on vent with coarse breath sounds; No wheezing, rales, or rhonchi noted. CV:  S1, S2 present; regular rate and rhythm  GI:  Abdomen soft, grimaces when palpated; (+) active bowel sounds  Extremities:  +2 pulses on all extremities; no edema/ cyanosis/ clubbing noted, left fistula with strong thrill   Skin:  Warm; no rashes/ lesions noted  Neurologic:  Non-focal  Devices:  Right IJ CVL, NGT, PIV, ETT.        DATA:     Current Facility-Administered Medications   Medication Dose Route Frequency    midazolam (VERSED) 100 mg in 0.9% sodium chloride 100 mL infusion  1-3 mg/hr IntraVENous TITRATE    hydrocortisone Sod Succ (PF) (SOLU-CORTEF) injection 50 mg  50 mg IntraVENous Q8H    doxercalciferol (HECTOROL) 4 mcg/2 mL injection 2 mcg  2 mcg IntraVENous DIALYSIS TUE, THU & SAT    epoetin benjamin (EPOGEN;PROCRIT) injection 10,000 Units  10,000 Units IntraVENous DIALYSIS TUE, THU & SAT    chlorhexidine (PERIDEX) 0.12 % mouthwash 10 mL  10 mL Oral Q12H    insulin lispro (HUMALOG) injection   SubCUTAneous Q6H    glucose chewable tablet 16 g  4 Tab Oral PRN    glucagon (GLUCAGEN) injection 1 mg  1 mg IntraMUSCular PRN    dextrose (D50W) injection syrg 12.5-25 g  25-50 mL IntraVENous PRN    dextrose 5% infusion  30 mL/hr IntraVENous CONTINUOUS    heparin 25,000 units in D5W 250 ml infusion  12-25 Units/kg/hr IntraVENous TITRATE    famotidine (PF) (PEPCID) injection 20 mg  20 mg IntraVENous Q24H    0.9% sodium chloride infusion  100 mL/hr IntraVENous DIALYSIS PRN    albumin human 25% (BUMINATE) solution 12.5 g  12.5 g IntraVENous DIALYSIS PRN    VANCOMYCIN INFORMATION NOTE   Other CONTINUOUS    0.9% sodium chloride infusion 250 mL  250 mL IntraVENous PRN    piperacillin-tazobactam (ZOSYN) 2.25 g in 0.9% sodium chloride (MBP/ADV) 50 mL MBP  2.25 g IntraVENous Q12H    Piperacillin-tazobactam (ZOSYN) 0.75 gm Supplemental Dosing by Pharmacy   Other Rx Dosing/Monitoring    naloxone (NARCAN) injection 0.4 mg  0.4 mg IntraVENous PRN    LORazepam (ATIVAN) injection 1 mg  1 mg IntraVENous Q4H PRN    fentaNYL citrate (PF) injection 50 mcg  50 mcg IntraVENous Q2H PRN         Labs: Results:       Chemistry Recent Labs      07/30/17   0040  07/29/17   0510  07/28/17   1045  07/28/17   0410   GLU  77  121*   --   135*   NA  140  140   --   139   K  3.2*  3.6   --   3.3*   CL  104  107   --   108   CO2  25  19*   --   17*   BUN  32*  56*   --   44*   CREA  4.61*  7.10*   --   5.71*   CA  7.6*  6.4*  6.8*  6.3*   AGAP  11  14   --   14   BUCR  7*  8*   --   8*   AP  136*  114   --   121*   TP  5.4*  5.5*   --   5.8*   ALB  1.9*  2.0*   --   2.3*   GLOB  3.5  3.5   --   3.5   AGRAT  0.5*  0.6*   --   0.7*      CBC w/Diff Recent Labs      07/30/17   0040  07/29/17   0510  07/28/17   1350   WBC  18.5*  15.4*  16.9*   RBC  2.88*  2.93*  3.23*   HGB  8.8*  8.7*  9.8*   HCT  24.3*  24.1*  27.0*   PLT  61*  80*  136   GRANS  68  76*  71   LYMPH  4*  3*  4*   EOS  0  0  0      Coagulation Recent Labs      07/30/17   0830  07/30/17   0040   07/27/17   1626   PTP   --    --    --   21.4*   INR   --    --    --   2.0*   APTT  52.2*  113.7*   < >  43.3*    < > = values in this interval not displayed.        Liver Enzymes Recent Labs      07/30/17   0040   TP  5.4*   ALB  1.9*   AP  136* SGOT  237*      ABG Lab Results   Component Value Date/Time    PHI 7.440 07/30/2017 04:51 AM    PCO2I 37.6 07/30/2017 04:51 AM    PO2I 111 (H) 07/30/2017 04:51 AM    HCO3I 25.5 07/30/2017 04:51 AM    FIO2I 25 07/30/2017 04:51 AM      Microbiology Recent Labs      07/27/17   1830  07/27/17   1648  07/27/17   1633   CULT  NO AEROMONAS,SALMONELLA,SHIGELLA,E. COLI 0:157 OR CAMPYLOBACTER ISOLATED  Toxigenic C. difficile NEGATIVE                         C. difficile target DNA sequences are not detected. AEROBIC AND ANAEROBIC BOTTLES GRAM NEGATIVE RODS*  AEROBIC AND ANAEROBIC BOTTLES ESCHERICHIA COLI*  CULTURE IN [address] [facility] UPDATES TO FOLLOW          Telemetry: [x]Sinus []A-flutter []Paced    []A-fib []Multiple PVCs                  Imaging:  [x]I have personally reviewed the patients radiographs  CXR Results  (Last 48 hours)               07/30/17 0555  XR CHEST PORT Final result    Narrative:  Portable chest x-ray, semierect AP view, time 0513 hours on 7/13/2017:               INDICATION:       Respiratory failure. The patient has been on ventilation. Right basilar   infiltrates or atelectasis. History of hypertension, diabetes, and anemia. COMPARISON STUDY: Chest x-ray on 7/20/2017, 7/29/2017. FINDINGS:       The lower end of ET tube is 4.8 cm above bree. NG tube extends into mid stomach or beyond. Stable position of right IJ central venous catheter. No evidence of pneumothorax or pneumomediastinum. In right lower lung field there are increased mild to moderate heterogeneous   opacities, indicating diffuse pneumonic infiltrates, and central atelectatic   changes, as compared to study on 7/29/2017. Right hemidiaphragm is mildly asymmetrically elevated. Right upper lung and enter left lung remains clear. There is mild cardiomegaly. No evidence of cardiac decompensation.                IMPRESSION:       In right lower lung phase there are increased and now mild to moderate   heterogeneous infiltrates, and/or atelectatic changes. Lower end of ET tube is 4.8 cm above bree.       07/29/17 0635  XR CHEST PORT Final result    Narrative:  AP portable chest.       CPT code: 98199. Indications: Intubated. Single frontal view at 0601 hours has several recent prior comparison. Findings:       ET tube 4 cm above bree. NG tube is present, coursing as expected; the tip is   not included on this study. Right IJ central line unremarkable. Cardiomegaly as   before. Moderate decrease in right base atelectasis with minimal residual. No   pleural fluid or pneumothorax. No acute bony findings. IMPRESSION[de-identified]       1.  ET tube satisfactory. Improved aeration, as above. CT ABD/PELVIS 07/27/17: This exam is markedly limited by the lack of enteric and IV contrast.  Small to moderate right pleural effusion. Tiny left pleural effusion. Small amount of ascites. Mild anasarca. Gallbladder wall thickening in the setting of anasarca and ascites can be  problematic to differentiate between cholecystitis and due to ascites. CT HEAD 07/27/17: No evidence of intracranial hemorrhages.   Multiple lacunar infarctions in bilateral thalami, likely to be old or subacute.   In left side of upper manny of brainstem there is lacunar infarction of  undetermined age. Acute lacunar infarction in this area is not excluded. CXR 07/28/17: Right lower lobe consolidation probably represents a combination of pleural  effusion, atelectasis and/or pneumonia, slightly improved since yesterday's  Exam. Lines and tubes, as above. Stable cardiomegaly.     IMPRESSION:   · S/p PEA Cardiac Arrest on unknown etiology- one round epi unclear when ROSC was achieved- pt found down at home agonal breathing, pulseless in ambulance- MI vs. ARF leading to CA   · Acute Respiratory Failure- secondary to above  · Pleural effusion, anasarca, ascites secondary to fluid retention- ERSD, non compliant to dialysis  · Acute Encephalopathy- metabolic vs. anoxic ? postictal  · Hypotension- dehydration/fluid deficit vs. Septic shock Adrenal insufficiency  · Septic Shock- leukocytosis, hypotension, tachycardia-Acute cholecystitis- on CT ABD) and blood cultures with E. Coli  · Poss acute cholecystitis with E.Coli bacteremia  · Elevated LFTs- shock liver vs. Infection  · NSTEMI- elevated troponin with evidence   · Elevated CK- found down, cardiac vs. rhabdomyolysis   · Anemia of Chronic Disease   · ESRD- HD M,W,F  · Tobacco Abuse       PLAN:   · Resp - Vent support. VAP Bundle. Daily ABG and CXR. Aspiration precautions. HOB > 30 degrees. · ID - afebrile, leukocytosis. Continue Zosyn for  treatment. ofBC (+) GNR. E. Coli   · CVS - maintain MAP > 65. Continue heparin gtt for NSTEMI. Avoid QT prolonging medications. Cardiology Following. · Heme/onc -  H/H stable s/p 1 unit- monitor on heparin  · Metabolic - Monitor and replace lytes as needed. · Renal - HD recommendations per Nephrology  · Endocrine -  SSI per protocol. BS q 6 while NPO and on steroids. . Will resume hydrocortisone for adrenal insufficiency ( was on daily hydrocortisone as out pt)   · Neuro/ Pain/ Sedation - PRN sedation with RASS goal -2. Avoid sedation is possible to assess mental status. EEG-pending results. Will consult neurology. ? Need for antiepileptics, assess for anoxic insult post arrest  · GI -will start NephroGI Following. · Prophylaxis - DVT(SCDs), GI(Pepcid)  · Discussed with brother at bedside        The patient is: [] acutely ill Risk of deterioration: [] moderate    [x] critically ill  [] high     [x]See my orders for details    My assessment/plan was discussed with:  [x]nursing []PT/OT    [x]respiratory therapy    [x]family- face to face -35 minutes;  Updates on condition, discussion on goals of care, prognosis.    []     Total of 40 min critical care time spent at bedside during the course of care providing evaluation,management and care decisions and ordering appropriate treatment related to critical care problems exclusive of procedures. The reason for providing this level of medical care for this critically ill patient was due a critical illness that impaired one or more vital organ systems such that there was a high probability of imminent or life threatening deterioration in the patients condition. This care involved high complexity decision making to assess, manipulate, and support vital system functions, to treat this degree vital organ system failure and to prevent further life threatening deterioration of the patients condition.       Jasmin Parker MD

## 2017-07-30 NOTE — ROUTINE PROCESS
Bedside shift change report given to Irish Hood (oncoming nurse) by Delmi Velarde (offgoing nurse). Report included the following information SBAR.

## 2017-07-30 NOTE — PROGRESS NOTES
[facility] DAILY PROGRESS NOTE    Subjective:   Admitted postcode, seen for ESRD and HD    Complaint: remains intubated off vasopressors.  Brother at bedside tolerated HD yesterday Feeding to be started today    Current Facility-Administered Medications   Medication Dose Route Frequency    midazolam (VERSED) 100 mg in 0.9% sodium chloride 100 mL infusion  1-3 mg/hr IntraVENous TITRATE    hydrocortisone Sod Succ (PF) (SOLU-CORTEF) injection 50 mg  50 mg IntraVENous Q8H    doxercalciferol (HECTOROL) 4 mcg/2 mL injection 2 mcg  2 mcg IntraVENous DIALYSIS TUE, THU & SAT    epoetin benjamin (EPOGEN;PROCRIT) injection 10,000 Units  10,000 Units IntraVENous DIALYSIS TUE, THU & SAT    chlorhexidine (PERIDEX) 0.12 % mouthwash 10 mL  10 mL Oral Q12H    insulin lispro (HUMALOG) injection   SubCUTAneous Q6H    glucose chewable tablet 16 g  4 Tab Oral PRN    glucagon (GLUCAGEN) injection 1 mg  1 mg IntraMUSCular PRN    dextrose (D50W) injection syrg 12.5-25 g  25-50 mL IntraVENous PRN    dextrose 5% infusion  30 mL/hr IntraVENous CONTINUOUS    heparin 25,000 units in D5W 250 ml infusion  12-25 Units/kg/hr IntraVENous TITRATE    famotidine (PF) (PEPCID) injection 20 mg  20 mg IntraVENous Q24H    0.9% sodium chloride infusion  100 mL/hr IntraVENous DIALYSIS PRN    albumin human 25% (BUMINATE) solution 12.5 g  12.5 g IntraVENous DIALYSIS PRN    VANCOMYCIN INFORMATION NOTE   Other CONTINUOUS    0.9% sodium chloride infusion 250 mL  250 mL IntraVENous PRN    piperacillin-tazobactam (ZOSYN) 2.25 g in 0.9% sodium chloride (MBP/ADV) 50 mL MBP  2.25 g IntraVENous Q12H    Piperacillin-tazobactam (ZOSYN) 0.75 gm Supplemental Dosing by Pharmacy   Other Rx Dosing/Monitoring    naloxone (NARCAN) injection 0.4 mg  0.4 mg IntraVENous PRN    LORazepam (ATIVAN) injection 1 mg  1 mg IntraVENous Q4H PRN    fentaNYL citrate (PF) injection 50 mcg  50 mcg IntraVENous Q2H PRN           Objective:     Patient Vitals for the past 24 hrs: Temp Pulse Resp BP SpO2   07/30/17 0803 - 72 12 - 99 %   07/30/17 0800 97.7 °F (36.5 °C) 74 17 108/69 99 %   07/30/17 0730 - 72 15 111/71 98 %   07/30/17 0700 - 71 17 105/64 94 %   07/30/17 0600 - 73 17 110/63 96 %   07/30/17 0500 - 73 17 108/65 99 %   07/30/17 0432 - 72 17 - 98 %   07/30/17 0400 97.5 °F (36.4 °C) 74 17 106/65 99 %   07/30/17 0300 - 74 17 110/64 99 %   07/30/17 0200 - 75 17 106/68 99 %   07/30/17 0133 - 75 18 - 98 %   07/30/17 0100 - 77 15 117/72 99 %   07/30/17 0000 97.4 °F (36.3 °C) 76 17 122/73 98 %   07/29/17 2300 - 82 18 113/78 96 %   07/29/17 2200 - 83 19 111/62 99 %   07/29/17 2100 - 83 19 110/56 98 %   07/29/17 2050 - 81 20 - 98 %   07/29/17 2000 97.8 °F (36.6 °C) 86 20 121/67 99 %   07/29/17 1900 - 84 22 118/66 97 %   07/29/17 1845 - 83 22 119/67 100 %   07/29/17 1830 - 79 20 111/63 100 %   07/29/17 1815 - 82 24 108/59 96 %   07/29/17 1800 - 83 23 126/68 100 %   07/29/17 1745 - 85 22 121/69 100 %   07/29/17 1730 - 85 21 126/69 100 %   07/29/17 1715 - 83 23 118/70 100 %   07/29/17 1700 - 83 22 110/64 99 %   07/29/17 1645 - 86 22 123/67 94 %   07/29/17 1630 - 81 22 119/73 98 %   07/29/17 1615 - 80 21 123/69 98 %   07/29/17 1610 - 84 20 - 98 %   07/29/17 1600 - 82 21 123/72 98 %   07/29/17 1545 - 84 21 127/68 98 %   07/29/17 1530 - 83 21 126/69 98 %   07/29/17 1515 - 82 20 132/69 91 %   07/29/17 1500 98.8 °F (37.1 °C) 79 20 107/67 98 %   07/29/17 1445 - 83 21 124/72 99 %   07/29/17 1430 - 82 21 126/70 99 %   07/29/17 1415 - 81 20 121/67 99 %   07/29/17 1400 - 83 22 123/63 100 %   07/29/17 1345 - 81 22 121/63 100 %   07/29/17 1330 - 81 23 127/67 99 %   07/29/17 1315 - 83 23 135/79 100 %   07/29/17 1311 - 85 22 137/70 99 %   07/29/17 1300 - 83 21 136/70 99 %   07/29/17 1254 98.3 °F (36.8 °C) - - - -   07/29/17 1245 - 81 22 127/69 100 %   07/29/17 1230 - 81 23 125/70 100 %   07/29/17 1215 - 82 24 127/68 100 %   07/29/17 1200 - 81 23 126/67 99 %   07/29/17 1145 - 80 24 124/67 100 %   07/29/17 1130 - 78 24 119/67 100 %   07/29/17 1115 - 83 23 122/67 100 %   07/29/17 1113 - 84 23 - 100 %   07/29/17 1100 - 85 23 123/69 100 %   07/29/17 1045 - 86 23 120/63 99 %   07/29/17 1030 - 83 22 114/67 100 %        Weight change: 3.024 kg (6 lb 10.7 oz)     07/28 1901 - 07/30 0700  In: 1836.5 [I.V.:1836.5]  Out: 600     Intake/Output Summary (Last 24 hours) at 07/30/17 1016  Last data filed at 07/30/17 0700   Gross per 24 hour   Intake          1130.05 ml   Output              550 ml   Net           580.05 ml     Physical Exam: intubated, afebrile    HEENT: jaundiced, ET/NGT in place  Neck: no JVD  Cardiovascular: regular no rub  C/L: equal vent breath sounds  Abdomen: soft, + BS  Ext: + 1-2 edema Left arm AVF + bruit    Data Review:     LABS:   Hematology:   Recent Labs      07/30/17   0040  07/29/17   0510  07/28/17   1350  07/28/17   0410  07/27/17 2024 07/27/17   1626   WBC  18.5*  15.4*  16.9*  14.6*  11.0  11.5   HGB  8.8*  8.7*  9.8*  9.6*  9.4*  8.1*   HCT  24.3*  24.1*  27.0*  27.1*  26.9*  23.6*   pl 61K  Chemistry:   Recent Labs      07/30/17   0040  07/29/17   0510  07/28/17   1045  07/28/17   0410  07/27/17 2024 07/27/17   1626   BUN  32*  56*   --   44*  42*  45*   CREA  4.61*  7.10*   --   5.71*  5.72*  6.20*   CA  7.6*  6.4*  6.8*  6.3*  6.2*  7.0*   ALB  1.9*  2.0*   --   2.3*  2.3*  2.4*   K  3.2*  3.6   --   3.3*  3.0*  3.9   NA  140  140   --   139  141  139   CL  104  107   --   108  109*  106   CO2  25  19*   --   17*  19*  15*   PHOS   --    --    --   3.7   --    --    GLU  77  121*   --   135*  189*  163*    Vanco 18.1  Mag 2.1  Ion yeison 0.81  Fe 20, sat 15% maurilio 4993  Lactic 1.9  IPTH 1285    IMAGES: CXR right lower lobe consolidation    CULTURE: Blood C/S GNR      IMPRESSION AND PLAN:   ESRD sched HD tomorrow. Discussed with brother.  Will attempt some UF if BP allows  Anemia from CKD low fe stores, ALONDRA with HD  Hypotension post code/sepsis cont with supportive care defer to ICU team.  Olivia Mccall bacteremia on Zosyn/Vanco  Hypokalemia replace IV today  Hypocalcemia from 2nd HPTH cont hectorol and yeison 3 bath with HD           Chad Potter MD  7/30/2017

## 2017-07-30 NOTE — PROGRESS NOTES
[place]  [address] [facility], [address]     [facility]  [email or web address]         Impression/Plan:  1.  GI bleeding- stable  Respiratory failure  ESRD  Non compliance   Plan:  Start enteral alimentation      Chief Complaint: non verbal      Subjective:    Remains on vent. No active bleeding.  Now has bowel sounds, and no gastric residual  Hgb is stable       Eyes: conjunctiva normal, EOM normal   Neck: ROM normal, supple and trachea normal   Cardiovascular: heart normal, intact distal pulses, normal rate and regular rhythm   Pulmonary/Chest Wall: breath sounds normal and effort normal   Abdominal: appearance normal, bowel sounds normal and soft, non-acute, non-tender                Visit Vitals    /69 (BP 1 Location: Right arm)    Pulse 72    Temp 97.7 °F (36.5 °C)    Resp 12    Ht 6' 3\" (1.905 m)  Comment: per chart history    Wt 75.6 kg (166 lb 10.7 oz)    SpO2 99%    BMI 20.83 kg/m2           Intake/Output Summary (Last 24 hours) at 07/30/17 1004  Last data filed at 07/30/17 0700   Gross per 24 hour   Intake          1130.05 ml   Output              550 ml   Net           580.05 ml       CBC w/Diff    Lab Results   Component Value Date/Time    WBC 18.5 (H) 07/30/2017 12:40 AM    RBC 2.88 (L) 07/30/2017 12:40 AM    HGB 8.8 (L) 07/30/2017 12:40 AM    HCT 24.3 (L) 07/30/2017 12:40 AM    MCV 84.4 07/30/2017 12:40 AM    MCH 30.6 07/30/2017 12:40 AM    MCHC 36.2 07/30/2017 12:40 AM    RDW 15.4 (H) 07/30/2017 12:40 AM    PLT 61 (L) 07/30/2017 12:40 AM    Lab Results   Component Value Date/Time    GRANS 68 07/30/2017 12:40 AM    LYMPH 4 (L) 07/30/2017 12:40 AM    EOS 0 07/30/2017 12:40 AM    BANDS 22 (H) 07/30/2017 12:40 AM    BASOS 0 07/30/2017 12:40 AM    MYELO 1 (H) 07/30/2017 12:40 AM    METAS 1 (H) 07/30/2017 12:40 AM      Basic Metabolic Profile   Recent Labs      07/30/17   0040   07/28/17 0410   NA  140   < >  139   K  3.2*   < >  3.3*   CL  104   < >  108   CO2  25   < >  17*   BUN  32*   < >  44*   CA  7.6*   < >  6.3*   MG   --    --   2.1   PHOS   --    --   3.7    < > = values in this interval not displayed. Hepatic Function    Lab Results   Component Value Date/Time    ALB 1.9 (L) 07/30/2017 12:40 AM    TP 5.4 (L) 07/30/2017 12:40 AM     (H) 07/30/2017 12:40 AM    Lab Results   Component Value Date/Time    SGOT 237 (H) 07/30/2017 12:40 AM                    Tammy Menezes MD, MD  [facility], [address] [place] [address]  [email or web address]

## 2017-07-30 NOTE — PROGRESS NOTES
Patient currently intubated with 7.0 tube marked 25 at the lip. Tolerating settings without complications. Pulled 24 CCs of secretions from MISBAH, suctioned large yellow thick secretions from ET tube. Patient had cough with suction. Will continue to monitor.

## 2017-07-30 NOTE — PROGRESS NOTES
[facility]  -  Progress Note      Patient: Flores Ingram MRN: [id number]  SSN: [id number]    YOB: 1960  Age: 55 y.o. Sex: male      Admit Date: 7/27/2017    Assessment:     -- PEA arrest, s/p CPR with one round of epi, 2 amps of d50. Unclear how long until ROSC but pt had ROSC prior to arrival.. Found down at home, became agonal and pulseless in ambulance. Probable anoxic brain injury  -- acute MI. Troponin level > 100, suspect has significant CAD, no heparin infusion  -- Ischemic cardiomyopathy. EF 35-40% on echo with RWMAs  -Anemia, s/p 2 units PRBCs at \[facility]\"" 7/26/17  -ESRD, noncompliant with dialysis, dialyzed at \[facility]\"" 7/26/17  -Sepsis. Improving, now off vasopressors  -Elevated LFTs and now with worsening thrombocytopnenia  -Tobacco use disorder    Plan:     -- consider stopping heparin due to worsening thrombocytopenia, will defer to critical care  -- due to lack of significant neurological improvement, no plans for cardiac cath at this time    Will be available if his condition improves to assess his coronaries. Subjective:      Intubated, unresponsive    Objective:      Patient Vitals for the past 8 hrs:   Temp Pulse Resp BP SpO2   07/30/17 0803 - 72 12 - 99 %   07/30/17 0800 97.7 °F (36.5 °C) 74 17 108/69 99 %   07/30/17 0730 - 72 15 111/71 98 %   07/30/17 0700 - 71 17 105/64 94 %   07/30/17 0600 - 73 17 110/63 96 %   07/30/17 0500 - 73 17 108/65 99 %   07/30/17 0432 - 72 17 - 98 %   07/30/17 0400 97.5 °F (36.4 °C) 74 17 106/65 99 %         Patient Vitals for the past 96 hrs:   Weight   07/30/17 0558 75.6 kg (166 lb 10.7 oz)   07/29/17 0615 72.6 kg (160 lb)   07/27/17 2230 71 kg (156 lb 8.4 oz)   07/27/17 1630 64 kg (141 lb)         Intake/Output Summary (Last 24 hours) at 07/30/17 1105  Last data filed at 07/30/17 0700   Gross per 24 hour   Intake          1130.05 ml   Output              550 ml   Net           580.05 ml       Physical Exam:  General: intubated  Neck:  no carotid bruit, no JVD  Lungs:  diminished breath sounds R base otherwise cta  Heart:  regular rate and rhythm, S1, S2 normal, no murmur, click, rub or gallop  Abdomen:  abdomen is soft without significant tenderness, masses, organomegaly or guarding  Extremities:  extremities normal, atraumatic, no cyanosis or edema    Data Review:     Labs: Results:       Chemistry Recent Labs      07/30/17   0040  07/29/17   0510  07/28/17   1045  07/28/17   0410  07/27/17 2024  07/27/17   1626   GLU  77  121*   --   135*  189*  163*   NA  140  140   --   139  141  139   K  3.2*  3.6   --   3.3*  3.0*  3.9   CL  104  107   --   108  109*  106   CO2  25  19*   --   17*  19*  15*   BUN  32*  56*   --   44*  42*  45*   CREA  4.61*  7.10*   --   5.71*  5.72*  6.20*   CA  7.6*  6.4*  6.8*  6.3*  6.2*  7.0*   MG   --    --    --   2.1  2.2  2.1   PHOS   --    --    --   3.7   --    --    AGAP  11  14   --   14  13  18   BUCR  7*  8*   --   8*  7*  7*   AP  136*  114   --   121*  132*  146*   TP  5.4*  5.5*   --   5.8*  5.8*  5.9*   ALB  1.9*  2.0*   --   2.3*  2.3*  2.4*   GLOB  3.5  3.5   --   3.5  3.5  3.5   AGRAT  0.5*  0.6*   --   0.7*  0.7*  0.7*      CBC w/Diff Recent Labs      07/30/17   0040  07/29/17   0510  07/28/17   1350   WBC  18.5*  15.4*  16.9*   RBC  2.88*  2.93*  3.23*   HGB  8.8*  8.7*  9.8*   HCT  24.3*  24.1*  27.0*   PLT  61*  80*  136   GRANS  68  76*  71   LYMPH  4*  3*  4*   EOS  0  0  0      Cardiac Enzymes No results found for: CPK, CKMMB, CKMB, RCK3, CKMBT, CKNDX, CKND1, WILFRIDO, TROPT, TROIQ, JOELLEN, TROPT, TNIPOC, BNP, BNPP   Coagulation Recent Labs      07/30/17   0830  07/30/17   0040   07/27/17   1626   PTP   --    --    --   21.4*   INR   --    --    --   2.0*   APTT  52.2*  113.7*   < >  43.3*    < > = values in this interval not displayed.        Lipid Panel No results found for: CHOL, CHOLPOCT, CHOLX, CHLST, CHOLV, 550990, HDL, LDL, LDLC, DLDLP, 018656, VLDLC, VLDL, TGLX, TRIGL, TRIGP, TGLPOCT, CHHD, CHHDX   BNP No results found for: BNP, BNPP, XBNPT   Liver Enzymes Recent Labs      07/30/17   0040   TP  5.4*   ALB  1.9*   AP  136*   SGOT  237*      Digoxin    Thyroid Studies No results found for: T4, T3U, TSH, TSHEXT

## 2017-07-30 NOTE — PROGRESS NOTES
[facility]  Progress Note    Patient: Marlen Graver Age: 55 y.o. : 1960 MR#: [id number] SSN: [id number]  Date/Time: 2017     Subjective:     Can not be obtained due to patient's factors     Assessment/Plan:   Found unresponsive , brought to ED via EMS , went in to PEA , CPR/ACLS protocol with ROSC     1. Acute CP arrest   - On Vent support   -  On Peridex sedation   - off heparin drip   - Cardiology note reviewed - no plan for cath     2 ESRD   - S/b Renal , HD planned for to [facility]     3 Acute GI bleed - history   -- no clear evidence of bleeding     4 Anemia - as above   - continue to monitor H/H   - Low stable H/h so far     5 GNR bacteremia /Sepsis - hypotension  , - on Zosyn / vanco     6 hypokalemia and Hypocalcemia - replace lytes - defer to ICU , Renal     7 Colitis -   - Culture stool negative so far , C diff negative       8  Elevated LFT - ?  Shock liver   - mild improved LFT         Case discussed with:  []Patient  []Family  [x]Nursing  []Case Management  DVT Prophylaxis:  []Lovenox  []Hep SQ  []SCDs  []Coumadin   []On Heparin gtt    Patient Active Problem List   Diagnosis Code    Anemia D64.9    Acidosis E87.2    Cardiac arrest ([facility] I46.9    Respiratory failure ([address] J96.90    ESRD needing dialysis ([facility] N18.6, Z99.2    Anoxic brain damage (HCC) G93.1    Colitis K52.9    Hypotension I95.9    Acute GI bleeding K92.2    ESRD (end stage renal disease) ([facility] N18.6    Sepsis ([facility] A41.9       Objective:   VS:   Visit Vitals    /59 (BP 1 Location: Right arm)    Pulse 69    Temp 97.6 °F (36.4 °C)    Resp 15    Ht 6' 3\" (1.905 m)  Comment: per chart history    Wt 75.6 kg (166 lb 10.7 oz)    SpO2 95%    BMI 20.83 kg/m2      Tmax/24hrs: Temp (24hrs), Av.9 °F (36.6 °C), Min:97.4 °F (36.3 °C), Max:98.8 °F (37.1 °C)  IOBRIEF    Intake/Output Summary (Last 24 hours) at 17 1204  Last data filed at 17 0700   Gross per 24 hour Intake          1130.05 ml   Output              550 ml   Net           580.05 ml       General:  Orally intubated / on vent   HEENT:  NC, Atraumatic. PERRLA, anicteric sclerae. Pulmonary:  CTA Bilaterally. No Wheezing/Rhonchi/Rales. Cardiovascular: Regular rate and Rhythm. GI:  Soft, Non distended, Non tender. + Bowel sounds. Extremities:  No edema, cyanosis, clubbing. No calf tenderness. Psych: Not examined   Neurologic: Grossly - Motor and Sensory functions are intact .          Medications:   Current Facility-Administered Medications   Medication Dose Route Frequency    midazolam (VERSED) 100 mg in 0.9% sodium chloride 100 mL infusion  1-3 mg/hr IntraVENous TITRATE    hydrocortisone Sod Succ (PF) (SOLU-CORTEF) injection 50 mg  50 mg IntraVENous Q8H    [START ON 7/31/2017] doxercalciferol (HECTOROL) 4 mcg/2 mL injection 2 mcg  2 mcg IntraVENous Q MON, WED & FRI    [START ON 7/31/2017] epoetin benjamin (EPOGEN;PROCRIT) injection 10,000 Units  10,000 Units IntraVENous Q MON, WED & FRI    levETIRAcetam (KEPPRA) 500 mg in 100 ml IVPB  500 mg IntraVENous Q12H    chlorhexidine (PERIDEX) 0.12 % mouthwash 10 mL  10 mL Oral Q12H    insulin lispro (HUMALOG) injection   SubCUTAneous Q6H    glucose chewable tablet 16 g  4 Tab Oral PRN    glucagon (GLUCAGEN) injection 1 mg  1 mg IntraMUSCular PRN    dextrose (D50W) injection syrg 12.5-25 g  25-50 mL IntraVENous PRN    dextrose 5% infusion  30 mL/hr IntraVENous CONTINUOUS    famotidine (PF) (PEPCID) injection 20 mg  20 mg IntraVENous Q24H    0.9% sodium chloride infusion  100 mL/hr IntraVENous DIALYSIS PRN    albumin human 25% (BUMINATE) solution 12.5 g  12.5 g IntraVENous DIALYSIS PRN    VANCOMYCIN INFORMATION NOTE   Other CONTINUOUS    0.9% sodium chloride infusion 250 mL  250 mL IntraVENous PRN    piperacillin-tazobactam (ZOSYN) 2.25 g in 0.9% sodium chloride (MBP/ADV) 50 mL MBP  2.25 g IntraVENous Q12H    Piperacillin-tazobactam (ZOSYN) 0.75 gm Supplemental Dosing by Pharmacy   Other Rx Dosing/Monitoring    naloxone [facility]) injection 0.4 mg  0.4 mg IntraVENous PRN    LORazepam (ATIVAN) injection 1 mg  1 mg IntraVENous Q4H PRN    fentaNYL citrate (PF) injection 50 mcg  50 mcg IntraVENous Q2H PRN       Labs:    Recent Results (from the past 24 hour(s))   LACTIC ACID    Collection Time: 07/29/17  5:00 PM   Result Value Ref Range    Lactic acid 2.1 (HH) 0.4 - 2.0 MMOL/L   PTT    Collection Time: 07/29/17  5:00 PM   Result Value Ref Range    aPTT 72.2 (H) 23.0 - 36.4 SEC   GLUCOSE, POC    Collection Time: 07/29/17  5:04 PM   Result Value Ref Range    Glucose (POC) 92 70 - 110 mg/dL   CALCIUM, IONIZED    Collection Time: 07/30/17 12:40 AM   Result Value Ref Range    Ionized Calcium 1.03 (L) 1.12 - 1.32 MMOL/L   CBC WITH AUTOMATED DIFF    Collection Time: 07/30/17 12:40 AM   Result Value Ref Range    WBC 18.5 (H) 4.6 - 13.2 K/uL    RBC 2.88 (L) 4.70 - 5.50 M/uL    HGB 8.8 (L) 13.0 - 16.0 g/dL    HCT 24.3 (L) 36.0 - 48.0 %    MCV 84.4 74.0 - 97.0 FL    MCH 30.6 24.0 - 34.0 PG    MCHC 36.2 31.0 - 37.0 g/dL    RDW 15.4 (H) 11.6 - 14.5 %    PLATELET 61 (L) 813 - 420 K/uL    MPV 12.0 (H) 9.2 - 11.8 FL    NEUTROPHILS 68 42 - 75 %    BAND NEUTROPHILS 22 (H) 0 - 5 %    LYMPHOCYTES 4 (L) 20 - 51 %    MONOCYTES 4 2 - 9 %    EOSINOPHILS 0 0 - 5 %    BASOPHILS 0 0 - 3 %    METAMYELOCYTES 1 (H) 0 %    MYELOCYTES 1 (H) 0 %    ABS. NEUTROPHILS 16.7 (H) 1.8 - 8.0 K/UL    ABS. LYMPHOCYTES 0.7 (L) 0.8 - 3.5 K/UL    ABS. MONOCYTES 0.7 0 - 1.0 K/UL    ABS. EOSINOPHILS 0.0 0.0 - 0.4 K/UL    ABS.  BASOPHILS 0.0 0.0 - 0.06 K/UL    DF MANUAL      PLATELET COMMENTS DECREASED PLATELETS      RBC COMMENTS ANISOCYTOSIS  1+        RBC COMMENTS POLYCHROMASIA  1+        RBC COMMENTS TARGET CELLS  1+       METABOLIC PANEL, COMPREHENSIVE    Collection Time: 07/30/17 12:40 AM   Result Value Ref Range    Sodium 140 136 - 145 mmol/L    Potassium 3.2 (L) 3.5 - 5.5 mmol/L    Chloride 104 100 - 108 mmol/L    CO2 25 21 - 32 mmol/L    Anion gap 11 3.0 - 18 mmol/L    Glucose 77 74 - 99 mg/dL    BUN 32 (H) 7.0 - 18 MG/DL    Creatinine 4.61 (H) 0.6 - 1.3 MG/DL    BUN/Creatinine ratio 7 (L) 12 - 20      GFR est AA 16 (L) >60 ml/min/1.73m2    GFR est non-AA 13 (L) >60 ml/min/1.73m2    Calcium 7.6 (L) 8.5 - 10.1 MG/DL    Bilirubin, total 4.6 (H) 0.2 - 1.0 MG/DL    ALT (SGPT) 193 (H) 16 - 61 U/L    AST (SGOT) 237 (H) 15 - 37 U/L    Alk. phosphatase 136 (H) 45 - 117 U/L    Protein, total 5.4 (L) 6.4 - 8.2 g/dL    Albumin 1.9 (L) 3.4 - 5.0 g/dL    Globulin 3.5 2.0 - 4.0 g/dL    A-G Ratio 0.5 (L) 0.8 - 1.7     PTT    Collection Time: 07/30/17 12:40 AM   Result Value Ref Range    aPTT 113.7 (H) 23.0 - 36.4 SEC   GLUCOSE, POC    Collection Time: 07/30/17  1:00 AM   Result Value Ref Range    Glucose (POC) 88 70 - 110 mg/dL   LACTIC ACID    Collection Time: 07/30/17  1:35 AM   Result Value Ref Range    Lactic acid 1.9 0.4 - 2.0 MMOL/L   POC G3    Collection Time: 07/30/17  4:51 AM   Result Value Ref Range    Device: VENT      FIO2 (POC) 25 %    pH (POC) 7.440 7.35 - 7.45      pCO2 (POC) 37.6 35.0 - 45.0 MMHG    pO2 (POC) 111 (H) 80 - 100 MMHG    HCO3 (POC) 25.5 22 - 26 MMOL/L    sO2 (POC) 99 (H) 92 - 97 %    Base excess (POC) 1 mmol/L    Mode ASSIST CONTROL      Tidal volume 400 ml    Set Rate 12 bpm    PEEP/CPAP (POC) 5 cmH2O    Allens test (POC) YES      Total resp. rate 16      Site RIGHT RADIAL      Patient temp.  37.0      Specimen type (POC) ARTERIAL      Performed by Rufina Reddy     Volume control plus YES     GLUCOSE, POC    Collection Time: 07/30/17  7:13 AM   Result Value Ref Range    Glucose (POC) 97 70 - 110 mg/dL   PTT    Collection Time: 07/30/17  8:30 AM   Result Value Ref Range    aPTT 52.2 (H) 23.0 - 36.4 SEC   GLUCOSE, POC    Collection Time: 07/30/17 12:00 PM   Result Value Ref Range    Glucose (POC) 91 70 - 110 mg/dL       Signed By: Shantanu Stephens MD     July 30, 2017

## 2017-07-31 ENCOUNTER — APPOINTMENT (OUTPATIENT)
Dept: GENERAL RADIOLOGY | Age: 57
DRG: 004 | End: 2017-07-31
Attending: PHYSICIAN ASSISTANT
Payer: MEDICARE

## 2017-07-31 LAB
ALBUMIN SERPL BCP-MCNC: 1.7 G/DL (ref 3.4–5)
ALBUMIN/GLOB SERPL: 0.4 {RATIO} (ref 0.8–1.7)
ALP SERPL-CCNC: 161 U/L (ref 45–117)
ALT SERPL-CCNC: 136 U/L (ref 16–61)
ANION GAP BLD CALC-SCNC: 14 MMOL/L (ref 3–18)
APTT PPP: 35.4 SEC (ref 23–36.4)
ARTERIAL PATENCY WRIST A: YES
AST SERPL W P-5'-P-CCNC: 88 U/L (ref 15–37)
BACTERIA SPEC CULT: ABNORMAL
BASE DEFICIT BLD-SCNC: 4 MMOL/L
BASOPHILS # BLD AUTO: 0 K/UL (ref 0–0.06)
BASOPHILS # BLD: 0 % (ref 0–3)
BDY SITE: ABNORMAL
BILIRUB SERPL-MCNC: 3.4 MG/DL (ref 0.2–1)
BODY TEMPERATURE: 37
BUN SERPL-MCNC: 50 MG/DL (ref 7–18)
BUN/CREAT SERPL: 9 (ref 12–20)
CA-I SERPL-SCNC: 1.04 MMOL/L (ref 1.12–1.32)
CALCIUM SERPL-MCNC: 7.8 MG/DL (ref 8.5–10.1)
CHLORIDE SERPL-SCNC: 102 MMOL/L (ref 100–108)
CK SERPL-CCNC: 291 U/L (ref 39–308)
CO2 SERPL-SCNC: 21 MMOL/L (ref 21–32)
CREAT SERPL-MCNC: 5.81 MG/DL (ref 0.6–1.3)
DIFFERENTIAL METHOD BLD: ABNORMAL
EOSINOPHIL # BLD: 0 K/UL (ref 0–0.4)
EOSINOPHIL NFR BLD: 0 % (ref 0–5)
ERYTHROCYTE [DISTWIDTH] IN BLOOD BY AUTOMATED COUNT: 15.4 % (ref 11.6–14.5)
GAS FLOW.O2 O2 DELIVERY SYS: ABNORMAL L/MIN
GAS FLOW.O2 SETTING OXYMISER: 12 BPM
GLOBULIN SER CALC-MCNC: 4 G/DL (ref 2–4)
GLUCOSE BLD STRIP.AUTO-MCNC: 167 MG/DL (ref 70–110)
GLUCOSE BLD STRIP.AUTO-MCNC: 171 MG/DL (ref 70–110)
GLUCOSE BLD STRIP.AUTO-MCNC: 176 MG/DL (ref 70–110)
GLUCOSE BLD STRIP.AUTO-MCNC: 237 MG/DL (ref 70–110)
GLUCOSE BLD STRIP.AUTO-MCNC: 322 MG/DL (ref 70–110)
GLUCOSE SERPL-MCNC: 165 MG/DL (ref 74–99)
GRAM STN SPEC: ABNORMAL
HBV SURFACE AB SER QL IA: POSITIVE
HBV SURFACE AB SER QL IA: POSITIVE
HBV SURFACE AB SERPL IA-ACNC: 857.64 MIU/ML
HBV SURFACE AB SERPL IA-ACNC: 873.26 MIU/ML
HCO3 BLD-SCNC: 21.3 MMOL/L (ref 22–26)
HCT VFR BLD AUTO: 30.7 % (ref 36–48)
HEP BS AB COMMENT,HBSAC: NORMAL
HEP BS AB COMMENT,HBSAC: NORMAL
HGB BLD-MCNC: 11 G/DL (ref 13–16)
LACTATE SERPL-SCNC: 1.2 MMOL/L (ref 0.4–2)
LYMPHOCYTES # BLD AUTO: 2 % (ref 20–51)
LYMPHOCYTES # BLD: 0.5 K/UL (ref 0.8–3.5)
MCH RBC QN AUTO: 30.4 PG (ref 24–34)
MCHC RBC AUTO-ENTMCNC: 35.8 G/DL (ref 31–37)
MCV RBC AUTO: 84.8 FL (ref 74–97)
MONOCYTES # BLD: 1.4 K/UL (ref 0–1)
MONOCYTES NFR BLD AUTO: 6 % (ref 2–9)
NEUTS BAND NFR BLD MANUAL: 11 % (ref 0–5)
NEUTS SEG # BLD: 21 K/UL (ref 1.8–8)
NEUTS SEG NFR BLD AUTO: 81 % (ref 42–75)
NRBC BLD-RTO: 1 PER 100 WBC
O2/TOTAL GAS SETTING VFR VENT: 25 %
PCO2 BLD: 37.9 MMHG (ref 35–45)
PEEP RESPIRATORY: 5 CMH2O
PH BLD: 7.36 [PH] (ref 7.35–7.45)
PHOSPHATE SERPL-MCNC: 4.6 MG/DL (ref 2.5–4.9)
PLATELET # BLD AUTO: 46 K/UL (ref 135–420)
PLATELET COMMENTS,PCOM: ABNORMAL
PO2 BLD: 101 MMHG (ref 80–100)
POTASSIUM SERPL-SCNC: 3.1 MMOL/L (ref 3.5–5.5)
PROT SERPL-MCNC: 5.7 G/DL (ref 6.4–8.2)
RBC # BLD AUTO: 3.62 M/UL (ref 4.7–5.5)
RBC MORPH BLD: ABNORMAL
SAO2 % BLD: 98 % (ref 92–97)
SERVICE CMNT-IMP: ABNORMAL
SODIUM SERPL-SCNC: 137 MMOL/L (ref 136–145)
SPECIMEN TYPE: ABNORMAL
TOTAL RESP. RATE, ITRR: 15
VANCOMYCIN SERPL-MCNC: 16.4 UG/ML (ref 5–40)
VENTILATION MODE VENT: ABNORMAL
VOLUME CONTROL PLUS IVLCP: YES
VT SETTING VENT: 400 ML
WBC # BLD AUTO: 22.9 K/UL (ref 4.6–13.2)

## 2017-07-31 PROCEDURE — 36600 WITHDRAWAL OF ARTERIAL BLOOD: CPT

## 2017-07-31 PROCEDURE — 65610000006 HC RM INTENSIVE CARE

## 2017-07-31 PROCEDURE — 86706 HEP B SURFACE ANTIBODY: CPT | Performed by: INTERNAL MEDICINE

## 2017-07-31 PROCEDURE — 71010 XR CHEST PORT: CPT

## 2017-07-31 PROCEDURE — 74011250636 HC RX REV CODE- 250/636: Performed by: INTERNAL MEDICINE

## 2017-07-31 PROCEDURE — 74011636637 HC RX REV CODE- 636/637: Performed by: PHYSICIAN ASSISTANT

## 2017-07-31 PROCEDURE — 74011250637 HC RX REV CODE- 250/637: Performed by: PHYSICIAN ASSISTANT

## 2017-07-31 PROCEDURE — 74011250636 HC RX REV CODE- 250/636: Performed by: PSYCHIATRY & NEUROLOGY

## 2017-07-31 PROCEDURE — 85730 THROMBOPLASTIN TIME PARTIAL: CPT | Performed by: PHYSICIAN ASSISTANT

## 2017-07-31 PROCEDURE — 90935 HEMODIALYSIS ONE EVALUATION: CPT

## 2017-07-31 PROCEDURE — 74011000250 HC RX REV CODE- 250: Performed by: PHYSICIAN ASSISTANT

## 2017-07-31 PROCEDURE — 86022 PLATELET ANTIBODIES: CPT | Performed by: INTERNAL MEDICINE

## 2017-07-31 PROCEDURE — P9047 ALBUMIN (HUMAN), 25%, 50ML: HCPCS | Performed by: INTERNAL MEDICINE

## 2017-07-31 PROCEDURE — 85025 COMPLETE CBC W/AUTO DIFF WBC: CPT | Performed by: PHYSICIAN ASSISTANT

## 2017-07-31 PROCEDURE — 94003 VENT MGMT INPAT SUBQ DAY: CPT

## 2017-07-31 PROCEDURE — 80202 ASSAY OF VANCOMYCIN: CPT | Performed by: INTERNAL MEDICINE

## 2017-07-31 PROCEDURE — 84100 ASSAY OF PHOSPHORUS: CPT | Performed by: INTERNAL MEDICINE

## 2017-07-31 PROCEDURE — 82962 GLUCOSE BLOOD TEST: CPT

## 2017-07-31 PROCEDURE — 74011000258 HC RX REV CODE- 258: Performed by: EMERGENCY MEDICINE

## 2017-07-31 PROCEDURE — 82542 COL CHROMOTOGRAPHY QUAL/QUAN: CPT | Performed by: INTERNAL MEDICINE

## 2017-07-31 PROCEDURE — 82803 BLOOD GASES ANY COMBINATION: CPT

## 2017-07-31 PROCEDURE — 74011250636 HC RX REV CODE- 250/636: Performed by: EMERGENCY MEDICINE

## 2017-07-31 PROCEDURE — 36592 COLLECT BLOOD FROM PICC: CPT

## 2017-07-31 PROCEDURE — 82330 ASSAY OF CALCIUM: CPT | Performed by: PHYSICIAN ASSISTANT

## 2017-07-31 PROCEDURE — 82550 ASSAY OF CK (CPK): CPT | Performed by: INTERNAL MEDICINE

## 2017-07-31 PROCEDURE — 74011000258 HC RX REV CODE- 258: Performed by: INTERNAL MEDICINE

## 2017-07-31 PROCEDURE — 95822 EEG COMA OR SLEEP ONLY: CPT | Performed by: PSYCHIATRY & NEUROLOGY

## 2017-07-31 PROCEDURE — 80053 COMPREHEN METABOLIC PANEL: CPT | Performed by: PHYSICIAN ASSISTANT

## 2017-07-31 PROCEDURE — 83605 ASSAY OF LACTIC ACID: CPT | Performed by: PHYSICIAN ASSISTANT

## 2017-07-31 RX ADMIN — ERYTHROPOIETIN 10000 UNITS: 10000 INJECTION, SOLUTION INTRAVENOUS; SUBCUTANEOUS at 12:23

## 2017-07-31 RX ADMIN — CHLORHEXIDINE GLUCONATE 10 ML: 1.2 RINSE ORAL at 20:57

## 2017-07-31 RX ADMIN — LEVETIRACETAM 500 MG: 5 INJECTION INTRAVENOUS at 22:44

## 2017-07-31 RX ADMIN — ALBUMIN (HUMAN) 12.5 G: 0.25 INJECTION, SOLUTION INTRAVENOUS at 11:27

## 2017-07-31 RX ADMIN — INSULIN LISPRO 2 UNITS: 100 INJECTION, SOLUTION INTRAVENOUS; SUBCUTANEOUS at 06:00

## 2017-07-31 RX ADMIN — VANCOMYCIN HYDROCHLORIDE 650 MG: 10 INJECTION, POWDER, LYOPHILIZED, FOR SOLUTION INTRAVENOUS at 16:30

## 2017-07-31 RX ADMIN — PIPERACILLIN SODIUM AND TAZOBACTAM SODIUM 2.25 G: 2; .25 INJECTION, POWDER, LYOPHILIZED, FOR SOLUTION INTRAVENOUS at 06:15

## 2017-07-31 RX ADMIN — HYDROCORTISONE SODIUM SUCCINATE 50 MG: 100 INJECTION, POWDER, FOR SOLUTION INTRAMUSCULAR; INTRAVENOUS at 06:15

## 2017-07-31 RX ADMIN — INSULIN LISPRO 2 UNITS: 100 INJECTION, SOLUTION INTRAVENOUS; SUBCUTANEOUS at 12:00

## 2017-07-31 RX ADMIN — FENTANYL CITRATE 50 MCG: 50 INJECTION INTRAMUSCULAR; INTRAVENOUS at 15:35

## 2017-07-31 RX ADMIN — INSULIN LISPRO 4 UNITS: 100 INJECTION, SOLUTION INTRAVENOUS; SUBCUTANEOUS at 18:06

## 2017-07-31 RX ADMIN — INSULIN LISPRO 8 UNITS: 100 INJECTION, SOLUTION INTRAVENOUS; SUBCUTANEOUS at 23:14

## 2017-07-31 RX ADMIN — HYDROCORTISONE SODIUM SUCCINATE 50 MG: 100 INJECTION, POWDER, FOR SOLUTION INTRAMUSCULAR; INTRAVENOUS at 21:44

## 2017-07-31 RX ADMIN — HYDROCORTISONE SODIUM SUCCINATE 50 MG: 100 INJECTION, POWDER, FOR SOLUTION INTRAMUSCULAR; INTRAVENOUS at 14:40

## 2017-07-31 RX ADMIN — CHLORHEXIDINE GLUCONATE 10 ML: 1.2 RINSE ORAL at 09:07

## 2017-07-31 RX ADMIN — LEVETIRACETAM 500 MG: 5 INJECTION INTRAVENOUS at 11:59

## 2017-07-31 RX ADMIN — INSULIN LISPRO 2 UNITS: 100 INJECTION, SOLUTION INTRAVENOUS; SUBCUTANEOUS at 00:46

## 2017-07-31 RX ADMIN — FAMOTIDINE 20 MG: 10 INJECTION, SOLUTION INTRAVENOUS at 09:07

## 2017-07-31 RX ADMIN — MEROPENEM 500 MG: 500 INJECTION, POWDER, FOR SOLUTION INTRAVENOUS at 18:06

## 2017-07-31 RX ADMIN — DOXERCALCIFEROL 2 MCG: 2 INJECTION, SOLUTION INTRAVENOUS at 12:23

## 2017-07-31 NOTE — ROUTINE PROCESS
Bedside and Verbal shift change report given to Graham Ocampo RN (oncoming nurse) by Giuliana Mesa RN  (offgoing nurse). Report included the following information SBAR, Kardex, Accordion, Recent Results and Med Rec Status.

## 2017-07-31 NOTE — PROGRESS NOTES
NUTRITION    Nursing Referral: [facility]     RECOMMENDATIONS / PLAN:     - Continue to advance tube feeding of Nepro as tolerated by 10 mL q 6 hours to goal rate of 55 mL/hr with 100 mL q 6 hour water flushes.   - Discontinue IV fluid per MD.   - Continue RD inpatient monitoring and evaluation. Goal Regimen: Nepro at 55 mL/hr + 100 mL q 6 hour water flushes to provide: 2376 kcal, 107 gm protein, 127 gm fat, 220 gm CHO, 21 gm fiber, 957 mL free water, 1357 mL total water, 100% RDIs     NUTRITION INTERVENTIONS & DIAGNOSIS:     [x] Enteral nutrition support: advance   [x] IV fluids: D5 at 30 mL/hr (36 gm dextrose, 122 kcal per day)- MD to discontinue   [x] Coordination of care: interdisciplinary rounds     Nutrition Diagnosis: Inadequate oral intake related to inability to tolerate po as evidenced by pt NPO. ASSESSMENT:     7/31: Tolerating advancement of tube feeding. HD today. BG trending up, MD to stop D5.   7/30: Pt remain intubated. GI following; plan to start tube feeds today. Noted order placed; discussed modifying tube feed order and add water flushes with Dr Jonas Jacobo. RN unavailable; discussed with Nursing Prue regarding modifying tube feed order  7/28: S/p cardiac arrest and intubated 7/27, NGT clamped. Abdominal ultrasound today to rule out cholecystitis. Will wait to feed. EN infusion adequate to meet patients estimated nutritional needs:   [] Yes     [x] No   [] Unable to determine at this time    Tube Feeding: Nepro at 30 mL/hr via NGT  Water Flushes: 100 mL q 6 hours  Residuals: 0 mL    Diet: DIET NPO  DIET TUBE FEEDING      Food Allergies: NKFA  Current Appetite:   [] Good     [] Fair     [] Poor     [x] Other: NPO  Appetite/meal intake prior to admission:   [] Good     [] Fair     [] Poor     [x] Other: unknown   Feeding Limitations:  [] Swallowing difficulty    [] Chewing difficulty    [x] Other: intubated   Current Meal Intake: No data found.     Anuric   BM: 7/30   Skin Integrity: WDL  Edema: none   Pertinent Medications: Reviewed    Recent Labs      07/31/17   0408  07/30/17   0040  07/29/17   0510   NA  137  140  140   K  3.1*  3.2*  3.6   CL  102  104  107   CO2  21  25  19*   GLU  165*  77  121*   BUN  50*  32*  56*   CREA  5.81*  4.61*  7.10*   CA  7.8*  7.6*  6.4*   ALB  1.7*  1.9*  2.0*   SGOT  88*  237*  401*   ALT  136*  193*  241*       Intake/Output Summary (Last 24 hours) at 07/31/17 0951  Last data filed at 07/31/17 0809   Gross per 24 hour   Intake           1673.3 ml   Output                0 ml   Net           1673.3 ml       Anthropometrics:  Ht Readings from Last 1 Encounters:   07/28/17 6' 3\" (1.905 m)     Last 3 Recorded Weights in this Encounter    07/29/17 0615 07/30/17 0558 07/31/17 0814   Weight: 72.6 kg (160 lb) 75.6 kg (166 lb 10.7 oz) 75.6 kg (166 lb 10.7 oz)     Body mass index is 20.83 kg/(m^2). Borderline Underweight     Weight History:   Weight Metrics 7/31/2017   Weight 166 lb 10.7 oz   BMI 20.83 kg/m2        Admitting Diagnosis: Cardiac arrest (HCC)  Acidosis  Respiratory failure (HCC)  Anemia  ESRD needing dialysis ([facility]  Cardiac arrest ([facility] [address] [facility]  Acute GI bleeding  Sepsis ([facility] [address]  Hypotension  Anoxic brain damage (HCC)  ESRD (end stage renal disease) ([facility] [address]  Colitis  Pertinent PMHx: DM, HTN, IBS, ESRD    Education Needs:        [x] None identified  [] Identified - Not appropriate at this time  []  Identified and addressed - refer to education log  Learning Limitations:   [] None identified  [x] Identified: intubated     Cultural, Baptism & ethnic food preferences:  [x] None identified    [] Identified and addressed     ESTIMATED NUTRITION NEEDS:     Calories: 5870-5465 kcal ([id number]) based on  [x] Actual BW 71 kg     [] IBW   Protein:  gm (1.2-2 gm/kg) based on  [x] Actual BW      [] IBW   Fluid: 1000 mL + UOP     MONITORING & EVALUATION:     Nutrition Goal(s):   1.  Nutritional needs will be met through adequate oral intake or nutrition support within the next 7 days.   Outcome:  [] Met/Ongoing    [x]  Not Met/Progressing    [] New/Initial Goal     Monitoring:   [x] EN tolerance    [x] EN infusion   [] Diet tolerance   [] Meal intake   [] Supplement intake   [x] GI symptoms/ability to tolerate po diet   [x] Respiratory status   [x] Plan of care      Previous Recommendations (for follow-up assessments only):     [x]   Implemented       []   Not Implemented (RD to address)     [] No Recommendation Made     Discharge Planning: pending ability to tolerate po and plan of care  [x] Participated in care planning, discharge planning, & interdisciplinary rounds as appropriate       Dheeraj Peguero, [address]   Pager: [phone]

## 2017-07-31 NOTE — PROGRESS NOTES
Hemodialysis Rounding Note      Patient: Meka Leung               Sex: male          DOA: 7/27/2017  4:23 PM        YOB: 1960      Age:  55 y.o.        LOS:  LOS: 4 days     Subjective:     Meka Leung is a 55 y.o.  who presents with Cardiac arrest ([facility] [address]  Acidosis  Respiratory failure ([address]  Anemia  ESRD needing dialysis ([facility] [address]  Cardiac arrest ([facility] [address]  Acute GI bleeding  Sepsis ([facility] [address]  Hypotension  Anoxic brain damage (HCC)  ESRD (end stage renal disease) ([facility] [address]  Colitis. The patient is dialyzing utilizing the following method:Intermittent Hemodialysis        Complaints: remains intubated.  Tolerating NGT feeding    Current Facility-Administered Medications   Medication Dose Route Frequency    meropenem (MERREM) 500 mg in 0.9% sodium chloride (MBP/ADV) 50 mL MBP  0.5 g IntraVENous Q24H    vancomycin (VANCOCIN) 800 mg in 0.9% sodium chloride 250 mL IVPB  800 mg IntraVENous DIALYSIS ONCE    midazolam (VERSED) 100 mg in 0.9% sodium chloride 100 mL infusion  1-3 mg/hr IntraVENous TITRATE    hydrocortisone Sod Succ (PF) (SOLU-CORTEF) injection 50 mg  50 mg IntraVENous Q8H    doxercalciferol (HECTOROL) 4 mcg/2 mL injection 2 mcg  2 mcg IntraVENous Q MON, WED & FRI    epoetin benjamin (EPOGEN;PROCRIT) injection 10,000 Units  10,000 Units IntraVENous Q MON, WED & FRI    levETIRAcetam (KEPPRA) 500 mg in 100 ml IVPB  500 mg IntraVENous Q12H    chlorhexidine (PERIDEX) 0.12 % mouthwash 10 mL  10 mL Oral Q12H    insulin lispro (HUMALOG) injection   SubCUTAneous Q6H    glucose chewable tablet 16 g  4 Tab Oral PRN    glucagon (GLUCAGEN) injection 1 mg  1 mg IntraMUSCular PRN    dextrose (D50W) injection syrg 12.5-25 g  25-50 mL IntraVENous PRN    dextrose 5% infusion  30 mL/hr IntraVENous CONTINUOUS    famotidine (PF) (PEPCID) injection 20 mg  20 mg IntraVENous Q24H    0.9% sodium chloride infusion  100 mL/hr IntraVENous DIALYSIS PRN    VANCOMYCIN INFORMATION NOTE   Other CONTINUOUS    naloxone [facility]) injection 0.4 mg  0.4 mg IntraVENous PRN    LORazepam (ATIVAN) injection 1 mg  1 mg IntraVENous Q4H PRN    fentaNYL citrate (PF) injection 50 mcg  50 mcg IntraVENous Q2H PRN       701 - 1900  In: 134.5 [I.V.:64.5]  Out: -    190 -  0700  In: 2194.4 [I.V.:1704.4]  Out: 50       Objective:     Blood pressure 103/69, pulse 80, temperature 97.5 °F (36.4 °C), resp. rate 20, height 6' 3\" (1.905 m), weight 75.6 kg (166 lb 10.7 oz), SpO2 98 %.   Temp (24hrs), Av.5 °F (36.4 °C), Min:97.3 °F (36.3 °C), Max:97.8 °F (36.6 °C)      Blood Pressure: BP: 103/69  Pulse: Pulse (Heart Rate): 80  Temp:  Temp: 97.5 °F (36.4 °C)    Artificial Kidney Dialyzer/Set Up Inspection: Revaclear   hours Duration of Treatment (hours): 3 hours   Heparin Bolus    Blood flow rate Blood Flow Rate (ml/min): 300 ml/min   Dialysate rate     Arterial Access Pressure Arterial Access Pressure (mmHg): -120  Venous Return Pressure Venous Return Pressure (mmHg): 170  Ultrafiltration Rate Goal/Amount of Fluid to Remove (mL): 1000 mL  Fluid Removal Fluid Removed (mL): 492  Net Fluid Removal NET Fluid Removed (mL): 500 ml      PHYSICAL EXAM:  Intubated, sedated  HEENT:  Non icteric, ET/NGT in place  NECK:  No JVD  CHEST AND LUNGS:  Equal vent bs  CVS:  Regular no rub  ABDOMEN:  Soft , hypoactive bs  EXT:  + 1 LE edema, Left arm avf + bruit    DATA REVIEW:    Labs: Results:       Chemistry Recent Labs      17   0408  17   0040  17   0510   GLU  165*  77  121*   NA  137  140  140   K  3.1*  3.2*  3.6   CL  102  104  107   CO2  21  25  19*   BUN  50*  32*  56*   CREA  5.81*  4.61*  7.10*   CA  7.8*  7.6*  6.4*   AGAP  14  11  14   BUCR  9*  7*  8*   AP  161*  136*  114   TP  5.7*  5.4*  5.5*   ALB  1.7*  1.9*  2.0*   GLOB  4.0  3.5  3.5   AGRAT  0.4*  0.5*  0.6*      CBC w/Diff Recent Labs      17   0408  17   0040  17   0510   WBC  22.9*  18.5*  15.4*   RBC  3.62*  2.88*  2.93*   HGB 11.0*  8.8*  8.7*   HCT  30.7*  24.3*  24.1*   PLT  46*  61*  80*   GRANS  81*  68  76*   LYMPH  2*  4*  3*   EOS  0  0  0      Coagulation Recent Labs      07/31/17   0408  07/30/17   0830   APTT  35.4  52.2*       Iron/Ferritin No results for input(s): IRON in the last 72 hours. No lab exists for component: TIBCCALC   BNP No results for input(s): BNPP in the last 72 hours. Cardiac Enzymes Recent Labs      07/31/17   1000  07/28/17   2145   CPK  291  7413*   CKND1   --   0.8      Liver Enzymes Recent Labs      07/31/17   0408   TP  5.7*   ALB  1.7*   AP  161*   SGOT  88*      Thyroid Studies No results found for: T4, T3U, TSH, TSHEXT, TSHEXT     IPTH 1285  Vanco 16.4          CULTURE:   )  No results for input(s): SDES, CULT in the last 72 hours. No results for input(s): CULT in the last 72 hours. Assessment/Plan:     End Stage Renal Disease:  Patient is tolerating dialysis treatment well. .  Additionally the patient has experienced normal dialysis treatment during dialysis. Dry weight   attempt 1 kg    At 1:00 PM on 7/31/2017, I saw and examined patient during hemodialysis treatment. The patient was receiving hemodialysis for treatment of end stage renal disease. I have also reviewed vital signs, intake and output, lab results and recent events, and agreed with today's dialysis order.       Anemia:  Cont Epo with HD    Renal Metabolic Bone Disease:  Cont hectorol                      Hypotension: BP better wean off vasopressors    Access: Fistula adequate monitoring/no changes     Bacteremia antibiotics per ICU team trend vanco levels    SZ doubt uremic, defer management to ICU, cont HD 3x a week      Rah Alfredo MD  7/31/2017

## 2017-07-31 NOTE — INTERDISCIPLINARY ROUNDS
CRITICAL CARE INTERDISCIPLINARY ROUNDS      Patient Information:    Name:   Ruel Caba    Age:   55 y.o.     Admission Date:   7/27/2017    Readmit Risk Assessment Information:      Readmit Risk Tool Support Systems: Family member(s), Relationship with Primary Physician Group: Seen at least one time within past 12 months    Surgery Date:      Day of Stay:     Expected Discharge Date:        Attending Provider:   Keiko Kingston MD    Surgeon:        Consultant:       Primary Care Provider:   Lauren Pierce MD    Problem List:     Patient Active Problem List   Diagnosis Code    Anemia D64.9    Acidosis E87.2    Cardiac arrest ([address] I46.9    Respiratory failure ([address] J96.90    ESRD needing dialysis ([facility] N18.6, Z99.2    Anoxic brain damage (HCC) G93.1    Colitis K52.9    Hypotension I95.9    Acute GI bleeding K92.2    ESRD (end stage renal disease) ([facility] N18.6    Sepsis ([address] A41.9       Principal Problem:  Sepsis ([address]    Procedure:       During rounds the following quality care indicators and evidence based practices were addressed :     DVT Prophylaxis, Pressure Injury Prevention, Pain Management, Sepsis resuscitation and management, Nutritional Status, Critical Care Interventions Airways, Drains and Lines and IHI Bundles: Central Line Bundle Followed  and Vent Bundle Followed, Vent Day 4           Acute MI/PCI:   Not applicable    Heart Failure:    Central Line Bundle Followed  and Vent Bundle Followed, Vent Day 4    Cardiac Surgery:  Not applicable    SCIP Measures for other Surgeries:   Not applicable    Pneumonia:    Appropriate Antibiotic Selection (ICU versus Non-ICU)    Stroke:  Patient's Personal Risk Factors for Stroke are:   hypertension, family history, hyperlipidemia or diabetes mellitus    NIH Stroke Score  Total: 41 (07/27/17 1700)    Transfer Level of Care:  Not Ready for Transfer    The patient will require the following interventions based on the Readmission Risk Assessment: Pharmacy evaluation and teaching, Care Management involvement for home health follow up for:  mobility and assistance with ADL's and Spiritual Care evaluation      Discharge Management:  Palliative Care    Anticipated Discharge Date:  3      Interdisciplinary team rounds were held  with the following team membersCare Management, Diabetes Treatment Specialist, Nursing, Nutrition, Pastoral Care, Pharmacy, Physician and Clinical Coordinator and the  patient, child(rebecca) and healthcare POA (documentation required). Plan of care discussed. See clinical pathway and/or care plan for interventions and desired outcomes. D5 discontinued and central line discontinued. EEG and neuro today.

## 2017-07-31 NOTE — PROGRESS NOTES
[facility]  ICU Progress Note      Name: Ryann Quintanilla   : 1960   MRN: [id number]   Date: 2017      [x]I have reviewed the flowsheet and previous days notes. Events overnight reviewed and discussed with nursing staff. Vital signs and records reviewed. Subjective:   Remains poorly responsive on Versed at 4 mg /hr  Off pressors  Afebrile. Remains on vent- A/c 16, FiO2- 25% not following commands, but will track with voice. (+) cough, gag, responds readily to pain per nursing when sedation lightened. Scant tracheal secretions. Anuric, HD M, W, F.   Blood cultures with gram negative rods- E. Coli  WBC increased , platelets decreased. [x]The patient is unable to give any meaningful history or review of systems because the patient is:  [x]Intubated []Sedated   [x]Unresponsive      [x]The patient is critically ill on      [x]Mechanical ventilation []Pressors   []BiPAP []            ROS:Review of systems not obtained due to patient factors.   Safety Bundles: VAP Bundle/ CAUTI/ Severe Sepsis Protocol/ Electrolyte Replacement Protocol    Vital Signs:    Visit Vitals    /69    Pulse 80    Temp 97.5 °F (36.4 °C)    Resp 20    Ht 6' 3\" (1.905 m)  Comment: per chart history    Wt 75.6 kg (166 lb 10.7 oz)    SpO2 98%    BMI 20.83 kg/m2       O2 Device: Endotracheal tube   O2 Flow Rate (L/min): 10 l/min   Temp (24hrs), Av.5 °F (36.4 °C), Min:97.3 °F (36.3 °C), Max:97.8 °F (36.6 °C)       Intake/Output:   Last shift:      701 - 1900  In: 134.5 [I.V.:64.5]  Out: -   Last 3 shifts: 1901 - 700  In: 2194.4 [I.V.:1704.4]  Out: 50     Intake/Output Summary (Last 24 hours) at 17 1332  Last data filed at 17 0900   Gross per 24 hour   Intake           1773.3 ml   Output                0 ml   Net           1773.3 ml       Ventilator Settings:  Ventilator  Mode: Assist control  Respiratory Rate  Resp Rate Observed: 17  Back-Up Rate: 12  Insp Time (sec): 1 sec  I:E Ratio: 1:1.0  Ventilator Volumes  Vt Set (ml): 400 ml  Vt Exhaled (Machine Breath) (ml): 390 ml  Vt Spont (ml): 412 ml  Ve Observed (l/min): 6.5 l/min  Ventilator Pressures  Pressure Support (cm H2O): 7 cm H2O  PIP Observed (cm H2O): 12 cm H2O  Plateau Pressure (cm H2O): 14 cm H2O  MAP (cm H2O): 8.3  PEEP/VENT (cm H2O): 5 cm H20  Auto PEEP Observed (cm H2O): 0 cm H2O    Physical Exam:    General: Intubateed; not responsive   HEENT:  Pupils reactive, but sluggish 3mm b/l. Anicteric sclerae;   Resp:  Symmetrical chest expansion on vent with coarse breath sounds; No wheezing, rales, or rhonchi noted. CV:  S1, S2 present; regular rate and rhythm  GI:  Abdomen soft, grimaces when palpated; (+) active bowel sounds  Extremities:  +2 pulses on all extremities; no edema/ cyanosis/ clubbing noted, left fistula with strong thrill   Skin:  Warm; no rashes/ lesions noted  Neurologic:  W/d to  pain b/l upper, + corneal, -cough/gag, no response to loud voice. Does not fc  Devices:  Right IJ CVL, NGT, PIV, ETT.        DATA:     Current Facility-Administered Medications   Medication Dose Route Frequency    meropenem (MERREM) 500 mg in 0.9% sodium chloride (MBP/ADV) 50 mL MBP  0.5 g IntraVENous Q24H    vancomycin (VANCOCIN) 800 mg in 0.9% sodium chloride 250 mL IVPB  800 mg IntraVENous DIALYSIS ONCE    midazolam (VERSED) 100 mg in 0.9% sodium chloride 100 mL infusion  1-3 mg/hr IntraVENous TITRATE    hydrocortisone Sod Succ (PF) (SOLU-CORTEF) injection 50 mg  50 mg IntraVENous Q8H    doxercalciferol (HECTOROL) 4 mcg/2 mL injection 2 mcg  2 mcg IntraVENous Q MON, WED & FRI    epoetin benjamin (EPOGEN;PROCRIT) injection 10,000 Units  10,000 Units IntraVENous Q MON, WED & FRI    levETIRAcetam (KEPPRA) 500 mg in 100 ml IVPB  500 mg IntraVENous Q12H    chlorhexidine (PERIDEX) 0.12 % mouthwash 10 mL  10 mL Oral Q12H    insulin lispro (HUMALOG) injection   SubCUTAneous Q6H    glucose chewable tablet 16 g  4 Tab Oral PRN    glucagon (GLUCAGEN) injection 1 mg  1 mg IntraMUSCular PRN    dextrose (D50W) injection syrg 12.5-25 g  25-50 mL IntraVENous PRN    dextrose 5% infusion  30 mL/hr IntraVENous CONTINUOUS    famotidine (PF) (PEPCID) injection 20 mg  20 mg IntraVENous Q24H    0.9% sodium chloride infusion  100 mL/hr IntraVENous DIALYSIS PRN    VANCOMYCIN INFORMATION NOTE   Other CONTINUOUS    naloxone (NARCAN) injection 0.4 mg  0.4 mg IntraVENous PRN    LORazepam (ATIVAN) injection 1 mg  1 mg IntraVENous Q4H PRN    fentaNYL citrate (PF) injection 50 mcg  50 mcg IntraVENous Q2H PRN         Labs: Results:       Chemistry Recent Labs      07/31/17   0408 07/30/17   0040  07/29/17   0510   GLU  165*  77  121*   NA  137  140  140   K  3.1*  3.2*  3.6   CL  102  104  107   CO2  21  25  19*   BUN  50*  32*  56*   CREA  5.81*  4.61*  7.10*   CA  7.8*  7.6*  6.4*   AGAP  14  11  14   BUCR  9*  7*  8*   AP  161*  136*  114   TP  5.7*  5.4*  5.5*   ALB  1.7*  1.9*  2.0*   GLOB  4.0  3.5  3.5   AGRAT  0.4*  0.5*  0.6*      CBC w/Diff Recent Labs      07/31/17 0408 07/30/17   0040  07/29/17   0510   WBC  22.9*  18.5*  15.4*   RBC  3.62*  2.88*  2.93*   HGB  11.0*  8.8*  8.7*   HCT  30.7*  24.3*  24.1*   PLT  46*  61*  80*   GRANS  81*  68  76*   LYMPH  2*  4*  3*   EOS  0  0  0      Coagulation Recent Labs      07/31/17 0408  07/30/17   0830   APTT  35.4  52.2*       Liver Enzymes Recent Labs      07/31/17 0408   TP  5.7*   ALB  1.7*   AP  161*   SGOT  88*      ABG Lab Results   Component Value Date/Time    PHI 7.358 07/31/2017 05:01 AM    PCO2I 37.9 07/31/2017 05:01 AM    PO2I 101 (H) 07/31/2017 05:01 AM    HCO3I 21.3 (L) 07/31/2017 05:01 AM    FIO2I 25 07/31/2017 05:01 AM      Microbiology No results for input(s): CULT in the last 72 hours.        Telemetry: [x]Sinus []A-flutter []Paced    []A-fib []Multiple PVCs                  Imaging:  [x]I have personally reviewed the patients radiographs  CXR Results  (Last 48 hours)               07/31/17 0551  XR CHEST PORT Final result    Narrative:  Portable chest x-ray, semierect AP view, time 0455 hours on 7/31/2017:               INDICATION:       Respiratory failure. The patient has been on ventilation. Right basilar   pneumonia/atelectasis. History of hypertension and diabetes. COMPARISON STUDY: Portable chest x-ray on 7/30/2017, 7/29/2017. FINDINGS:               The lower end of ET tube is 4.1 cm above bree. NG tube extends into upper   stomach at the junction of fundus and body of stomach. Stable position of right   IJ central venous catheter. In right lower lung there is increased opacity with volume loss, indicating   dense pneumonia and atelectatic changes. Left lung is clear. There is mild to   moderate cardiomegaly. Pulmonary vascularity is minimally engorged but not   clearly cephalized. No evidence of pneumothorax. IMPRESSION:       Lower end of ET tube is 4.1 cm above bree. Increased dense opacity in right lower lung field, indicating pneumonia, and   central atelectatic changes in right middle lobe and possibly lower lobe. Right   basilar pleural effusion not excluded. Redemonstration of mild-to-moderate cardiomegaly. Only minimal pulmonary   vascular prominence. No evidence of pulmonary edema. 07/30/17 0555  XR CHEST PORT Final result    Narrative:  Portable chest x-ray, semierect AP view, time 0513 hours on 7/13/2017:               INDICATION:       Respiratory failure. The patient has been on ventilation. Right basilar   infiltrates or atelectasis. History of hypertension, diabetes, and anemia. COMPARISON STUDY: Chest x-ray on 7/20/2017, 7/29/2017. FINDINGS:       The lower end of ET tube is 4.8 cm above bree. NG tube extends into mid stomach or beyond. Stable position of right IJ central venous catheter.        No evidence of pneumothorax or pneumomediastinum. In right lower lung field there are increased mild to moderate heterogeneous   opacities, indicating diffuse pneumonic infiltrates, and central atelectatic   changes, as compared to study on 7/29/2017. Right hemidiaphragm is mildly asymmetrically elevated. Right upper lung and enter left lung remains clear. There is mild cardiomegaly. No evidence of cardiac decompensation. IMPRESSION:       In right lower lung phase there are increased and now mild to moderate   heterogeneous infiltrates, and/or atelectatic changes. Lower end of ET tube is 4.8 cm above bree. CT ABD/PELVIS 07/27/17: This exam is markedly limited by the lack of enteric and IV contrast.  Small to moderate right pleural effusion. Tiny left pleural effusion. Small amount of ascites. Mild anasarca. Gallbladder wall thickening in the setting of anasarca and ascites can be  problematic to differentiate between cholecystitis and due to ascites. CT HEAD 07/27/17: No evidence of intracranial hemorrhages.   Multiple lacunar infarctions in bilateral thalami, likely to be old or subacute.   In left side of upper manny of brainstem there is lacunar infarction of  undetermined age. Acute lacunar infarction in this area is not excluded. CXR 07/28/17: Right lower lobe consolidation probably represents a combination of pleural  effusion, atelectasis and/or pneumonia, slightly improved since yesterday's  Exam. Lines and tubes, as above. Stable cardiomegaly.     IMPRESSION:   · S/p PEA Cardiac Arrest on unknown etiology- one round epi unclear when ROSC was achieved- pt found down at home agonal breathing, pulseless in ambulance- MI vs. ARF leading to CA   · Acute Respiratory Failure- secondary to above  · Pleural effusion, anasarca, ascites secondary to fluid retention- ERSD, non compliant to dialysis  · Acute Encephalopathy- metabolic vs. anoxic ? postictal  · Hypotension- dehydration/fluid deficit vs. Septic shock Adrenal insufficiency  · Septic Shock- leukocytosis, hypotension, tachycardia-Acute cholecystitis- on CT ABD) and blood cultures with E. Coli  · Poss acute cholecystitis with E.Coli bacteremia  · Elevated LFTs- shock liver vs. Infection  · NSTEMI- elevated troponin with evidence   · Elevated CK- found down, cardiac vs. rhabdomyolysis   · Anemia of Chronic Disease   · ESRD- HD M,W,F  · Tobacco Abuse       PLAN:   · Resp - Vent support. VAP Bundle. Daily ABG and CXR. Aspiration precautions. HOB > 30 degrees. · ID - afebrile, leukocytosis. Changed Zosyn to Meropenem given thrombocytopenia for  Treatment of BC (+) GNR. E. Coli   · CVS - maintain MAP > 65. D/shelton  heparin gtt2/2 thr.penia . . for NSTEMI. Avoid QT prolonging medications. Cardiology Following. · Heme/onc -  H/H stable s/p 1 unit- monitor off heparin. HIT and SUZETTE pending  · Metabolic - Monitor and replace lytes as needed. · Renal - HD recommendations per Nephrology. D/c D5W  · Endocrine -  SSI per protocol. BS q 6 while NPO and on steroids. . Will resume hydrocortisone for adrenal insufficiency ( was on daily hydrocortisone as out pt)   · Neuro/ Pain/ Sedation - Off sedation on 7/31 Am  Avoid sedation to assess mental status. ·   EEG- negative  Consulted neurology: \"encephalopathy which is likely multifactorial and secondary to some degree of hypoxic-ischemic encephalopathy and some degree of parainfectious and toxic/metabolic encephalopathy. \"  Cont Keppra    · GI -will start NephroGI Following. · Prophylaxis - DVT(SCDs), GI(Pepcid)  · Discussed with brother at bedside        The patient is: [] acutely ill Risk of deterioration: [] moderate    [x] critically ill  [x] high     [x]See my orders for details    My assessment/plan was discussed with:  [x]nursing []PT/OT    [x]respiratory therapy    [x]family- face to face -35 minutes;  Updates on condition, discussion on goals of care, prognosis.    []     Total of 65  min critical care time spent at bedside during the course of care providing evaluation,management and care decisions and ordering appropriate treatment related to critical care problems exclusive of procedures. The reason for providing this level of medical care for this critically ill patient was due a critical illness that impaired one or more vital organ systems such that there was a high probability of imminent or life threatening deterioration in the patients condition. This care involved high complexity decision making to assess, manipulate, and support vital system functions, to treat this degree vital organ system failure and to prevent further life threatening deterioration of the patients condition.       Markel Goldberg MD, [facility]  Critical Care Medicine  ICU Attending Physician   1:32 PM      Richa Childers MD

## 2017-07-31 NOTE — PROGRESS NOTES
[facility] Hospitalist Group  Progress Note    Patient: Zen Arias Age: 55 y.o. : 1960 MR#: [id number] SSN: [id number]  Date/Time: 2017     Subjective:     Can not be obtained due to patient's factors     Assessment/Plan:   Found unresponsive , brought to ED via EMS , went in to PEA , CPR/ACLS protocol with ROSC     1.  CP arrest   - on Vent   - management per ICU   - Continue other management     2 ESRD   - S/b Renal , HD planned for to rudy     3 Acute GI bleed - history   -- no clear evidence of bleeding     4 Anemia - as above   - continue to monitor H/H   - Low stable H/h so far     5 GNR bacteremia /Sepsis - hypotension  , - on Zosyn / vanco     6 hypokalemia and Hypocalcemia - replace lytes - defer to ICU , Renal     7 Colitis -   - Culture stool negative so far , C diff negative       8  Elevated LFT - ?  Shock liver   - mild improved LFT         Case discussed with:  []Patient  []Family  [x]Nursing  []Case Management  DVT Prophylaxis:  []Lovenox  []Hep SQ  []SCDs  []Coumadin   []On Heparin gtt    Patient Active Problem List   Diagnosis Code    Anemia D64.9    Acidosis E87.2    Cardiac arrest ([address] I46.9    Respiratory failure ([address] J96.90    ESRD needing dialysis ([facility] N18.6, Z99.2    Anoxic brain damage (HCC) G93.1    Colitis K52.9    Hypotension I95.9    Acute GI bleeding K92.2    ESRD (end stage renal disease) ([facility] N18.6    Sepsis ([facility] A41.9       Objective:   VS:   Visit Vitals    /69    Pulse 80    Temp 97.5 °F (36.4 °C)    Resp 20    Ht 6' 3\" (1.905 m)  Comment: per chart history    Wt 75.6 kg (166 lb 10.7 oz)    SpO2 98%    BMI 20.83 kg/m2      Tmax/24hrs: Temp (24hrs), Av.5 °F (36.4 °C), Min:97.3 °F (36.3 °C), Max:97.8 °F (36.6 °C)  IOBRIEF    Intake/Output Summary (Last 24 hours) at 17 1345  Last data filed at 17 0900   Gross per 24 hour   Intake           1773.3 ml   Output                0 ml   Net           1773.3 ml       General:  Orally intubated / on vent   HEENT:  NC, Atraumatic. PERRLA, anicteric sclerae. Pulmonary:  CTA Bilaterally. No Wheezing/Rhonchi/Rales. Cardiovascular: Regular rate and Rhythm. GI:  Soft, Non distended, Non tender. + Bowel sounds. Extremities:  No edema, cyanosis, clubbing. No calf tenderness. Psych: Not examined   Neurologic: Grossly - Motor and Sensory functions are intact .          Medications:   Current Facility-Administered Medications   Medication Dose Route Frequency    meropenem (MERREM) 500 mg in 0.9% sodium chloride (MBP/ADV) 50 mL MBP  0.5 g IntraVENous Q24H    vancomycin (VANCOCIN) 800 mg in 0.9% sodium chloride 250 mL IVPB  800 mg IntraVENous DIALYSIS ONCE    midazolam (VERSED) 100 mg in 0.9% sodium chloride 100 mL infusion  1-3 mg/hr IntraVENous TITRATE    hydrocortisone Sod Succ (PF) (SOLU-CORTEF) injection 50 mg  50 mg IntraVENous Q8H    doxercalciferol (HECTOROL) 4 mcg/2 mL injection 2 mcg  2 mcg IntraVENous Q MON, WED & FRI    epoetin benjamin (EPOGEN;PROCRIT) injection 10,000 Units  10,000 Units IntraVENous Q MON, WED & FRI    levETIRAcetam (KEPPRA) 500 mg in 100 ml IVPB  500 mg IntraVENous Q12H    chlorhexidine (PERIDEX) 0.12 % mouthwash 10 mL  10 mL Oral Q12H    insulin lispro (HUMALOG) injection   SubCUTAneous Q6H    glucose chewable tablet 16 g  4 Tab Oral PRN    glucagon (GLUCAGEN) injection 1 mg  1 mg IntraMUSCular PRN    dextrose (D50W) injection syrg 12.5-25 g  25-50 mL IntraVENous PRN    dextrose 5% infusion  30 mL/hr IntraVENous CONTINUOUS    famotidine (PF) (PEPCID) injection 20 mg  20 mg IntraVENous Q24H    0.9% sodium chloride infusion  100 mL/hr IntraVENous DIALYSIS PRN    VANCOMYCIN INFORMATION NOTE   Other CONTINUOUS    naloxone (NARCAN) injection 0.4 mg  0.4 mg IntraVENous PRN    LORazepam (ATIVAN) injection 1 mg  1 mg IntraVENous Q4H PRN    fentaNYL citrate (PF) injection 50 mcg  50 mcg IntraVENous Q2H PRN       Labs:    Recent Results (from the past 24 hour(s))   GLUCOSE, POC    Collection Time: 07/30/17  5:12 PM   Result Value Ref Range    Glucose (POC) 101 70 - 110 mg/dL   GLUCOSE, POC    Collection Time: 07/31/17 12:45 AM   Result Value Ref Range    Glucose (POC) 167 (H) 70 - 110 mg/dL   CALCIUM, IONIZED    Collection Time: 07/31/17  4:08 AM   Result Value Ref Range    Ionized Calcium 1.04 (L) 1.12 - 1.32 MMOL/L   CBC WITH AUTOMATED DIFF    Collection Time: 07/31/17  4:08 AM   Result Value Ref Range    WBC 22.9 (H) 4.6 - 13.2 K/uL    RBC 3.62 (L) 4.70 - 5.50 M/uL    HGB 11.0 (L) 13.0 - 16.0 g/dL    HCT 30.7 (L) 36.0 - 48.0 %    MCV 84.8 74.0 - 97.0 FL    MCH 30.4 24.0 - 34.0 PG    MCHC 35.8 31.0 - 37.0 g/dL    RDW 15.4 (H) 11.6 - 14.5 %    PLATELET 46 (L) 354 - 420 K/uL    NEUTROPHILS 81 (H) 42 - 75 %    BAND NEUTROPHILS 11 (H) 0 - 5 %    LYMPHOCYTES 2 (L) 20 - 51 %    MONOCYTES 6 2 - 9 %    EOSINOPHILS 0 0 - 5 %    BASOPHILS 0 0 - 3 %    NRBC 1.0 (H) 0  WBC    ABS. NEUTROPHILS 21.0 (H) 1.8 - 8.0 K/UL    ABS. LYMPHOCYTES 0.5 (L) 0.8 - 3.5 K/UL    ABS. MONOCYTES 1.4 (H) 0 - 1.0 K/UL    ABS. EOSINOPHILS 0.0 0.0 - 0.4 K/UL    ABS. BASOPHILS 0.0 0.0 - 0.06 K/UL    DF MANUAL      PLATELET COMMENTS DECREASED PLATELETS      RBC COMMENTS ANISOCYTOSIS  1+       METABOLIC PANEL, COMPREHENSIVE    Collection Time: 07/31/17  4:08 AM   Result Value Ref Range    Sodium 137 136 - 145 mmol/L    Potassium 3.1 (L) 3.5 - 5.5 mmol/L    Chloride 102 100 - 108 mmol/L    CO2 21 21 - 32 mmol/L    Anion gap 14 3.0 - 18 mmol/L    Glucose 165 (H) 74 - 99 mg/dL    BUN 50 (H) 7.0 - 18 MG/DL    Creatinine 5.81 (H) 0.6 - 1.3 MG/DL    BUN/Creatinine ratio 9 (L) 12 - 20      GFR est AA 12 (L) >60 ml/min/1.73m2    GFR est non-AA 10 (L) >60 ml/min/1.73m2    Calcium 7.8 (L) 8.5 - 10.1 MG/DL    Bilirubin, total 3.4 (H) 0.2 - 1.0 MG/DL    ALT (SGPT) 136 (H) 16 - 61 U/L    AST (SGOT) 88 (H) 15 - 37 U/L    Alk.  phosphatase 161 (H) 45 - 117 U/L    Protein, total 5.7 (L) 6.4 - 8.2 g/dL    Albumin 1.7 (L) 3.4 - 5.0 g/dL    Globulin 4.0 2.0 - 4.0 g/dL    A-G Ratio 0.4 (L) 0.8 - 1.7     LACTIC ACID    Collection Time: 07/31/17  4:08 AM   Result Value Ref Range    Lactic acid 1.2 0.4 - 2.0 MMOL/L   PTT    Collection Time: 07/31/17  4:08 AM   Result Value Ref Range    aPTT 35.4 23.0 - 36.4 SEC   POC G3    Collection Time: 07/31/17  5:01 AM   Result Value Ref Range    Device: VENT      FIO2 (POC) 25 %    pH (POC) 7.358 7.35 - 7.45      pCO2 (POC) 37.9 35.0 - 45.0 MMHG    pO2 (POC) 101 (H) 80 - 100 MMHG    HCO3 (POC) 21.3 (L) 22 - 26 MMOL/L    sO2 (POC) 98 (H) 92 - 97 %    Base deficit (POC) 4 mmol/L    Mode ASSIST CONTROL      Tidal volume 400 ml    Set Rate 12 bpm    PEEP/CPAP (POC) 5 cmH2O    Allens test (POC) YES      Total resp. rate 15      Site RIGHT RADIAL      Patient temp. 37.0      Specimen type (POC) ARTERIAL      Performed by Rufina Reddy     Volume control plus YES     GLUCOSE, POC    Collection Time: 07/31/17  6:00 AM   Result Value Ref Range    Glucose (POC) 176 (H) 70 - 110 mg/dL   VANCOMYCIN, RANDOM    Collection Time: 07/31/17  9:20 AM   Result Value Ref Range    Vancomycin, random 16.4 5.0 - 40.0 UG/ML   CK    Collection Time: 07/31/17 10:00 AM   Result Value Ref Range     39 - 308 U/L   PHOSPHORUS    Collection Time: 07/31/17 10:00 AM   Result Value Ref Range    Phosphorus 4.6 2.5 - 4.9 MG/DL   HEP B SURFACE AB    Collection Time: 07/31/17 10:00 AM   Result Value Ref Range    Hepatitis B surface Ab 857.64 >10.0 mIU/mL    Hep B surface Ab Interp. POSITIVE POS      Hep B surface Ab comment        Samples with a  value of 10 mIU/mL or greater are considered positive (protective immunity) in accordance with the CDC guidelines.    GLUCOSE, POC    Collection Time: 07/31/17 11:56 AM   Result Value Ref Range    Glucose (POC) 171 (H) 70 - 110 mg/dL       Signed By: Shantanu Stephens MD     July 31, 2017

## 2017-07-31 NOTE — ROUTINE PROCESS
Bedside shift change report given to Aram Pritchett RN (oncoming nurse) by Lorenza Monahan RN (offgoing nurse). Report included the following information SBAR, Kardex, Procedure Summary, Intake/Output, MAR, Recent Results, Med Rec Status and Cardiac Rhythm NSR.

## 2017-07-31 NOTE — PROGRESS NOTES
Neurology Progress Note    Patient ID:  Holger Butler  [id number]  55 y.o.  1960    Subjective:      Patient with continued depressed mental status. He also is now probably septic. Versed was turned off about 2 hours ago. EEG is running now. No reported seizures over night.      Current Facility-Administered Medications   Medication Dose Route Frequency    midazolam (VERSED) 100 mg in 0.9% sodium chloride 100 mL infusion  1-3 mg/hr IntraVENous TITRATE    hydrocortisone Sod Succ (PF) (SOLU-CORTEF) injection 50 mg  50 mg IntraVENous Q8H    doxercalciferol (HECTOROL) 4 mcg/2 mL injection 2 mcg  2 mcg IntraVENous Q MON, WED & FRI    epoetin benjamin (EPOGEN;PROCRIT) injection 10,000 Units  10,000 Units IntraVENous Q MON, WED & FRI    levETIRAcetam (KEPPRA) 500 mg in 100 ml IVPB  500 mg IntraVENous Q12H    chlorhexidine (PERIDEX) 0.12 % mouthwash 10 mL  10 mL Oral Q12H    insulin lispro (HUMALOG) injection   SubCUTAneous Q6H    glucose chewable tablet 16 g  4 Tab Oral PRN    glucagon (GLUCAGEN) injection 1 mg  1 mg IntraMUSCular PRN    dextrose (D50W) injection syrg 12.5-25 g  25-50 mL IntraVENous PRN    dextrose 5% infusion  30 mL/hr IntraVENous CONTINUOUS    famotidine (PF) (PEPCID) injection 20 mg  20 mg IntraVENous Q24H    0.9% sodium chloride infusion  100 mL/hr IntraVENous DIALYSIS PRN    albumin human 25% (BUMINATE) solution 12.5 g  12.5 g IntraVENous DIALYSIS PRN    VANCOMYCIN INFORMATION NOTE   Other CONTINUOUS    0.9% sodium chloride infusion 250 mL  250 mL IntraVENous PRN    piperacillin-tazobactam (ZOSYN) 2.25 g in 0.9% sodium chloride (MBP/ADV) 50 mL MBP  2.25 g IntraVENous Q12H    Piperacillin-tazobactam (ZOSYN) 0.75 gm Supplemental Dosing by Pharmacy   Other Rx Dosing/Monitoring    naloxone (NARCAN) injection 0.4 mg  0.4 mg IntraVENous PRN    LORazepam (ATIVAN) injection 1 mg  1 mg IntraVENous Q4H PRN    fentaNYL citrate (PF) injection 50 mcg  50 mcg IntraVENous Q2H PRN Objective: Active hospital medications were reviewed    Lab results and neuroradiology studies from the last 24 hours were reviewed. Prior to Admission medications    Not on File     Patient Vitals for the past 8 hrs:   BP Temp Pulse Resp SpO2 Weight   07/31/17 0814 - - - - - 75.6 kg (166 lb 10.7 oz)   07/31/17 0809 - - 78 17 95 % -   07/31/17 0800 104/67 97.4 °F (36.3 °C) 76 17 95 % -   07/31/17 0730 104/68 - 70 19 100 % -   07/31/17 0700 98/63 - 71 18 99 % -   07/31/17 0630 102/64 - 70 19 - -   07/31/17 0600 102/64 - 74 19 100 % -   07/31/17 0530 98/59 - 73 16 100 % -   07/31/17 0500 100/60 - 74 16 99 % -   07/31/17 0448 - - 75 16 98 % -   07/31/17 0430 91/59 - 73 17 100 % -   07/31/17 0400 111/65 97.6 °F (36.4 °C) 75 17 100 % -   07/31/17 0330 107/68 - 71 18 100 % -   07/31/17 0300 109/68 - 70 18 100 % -   07/31/17 0230 111/66 - 72 18 100 % -   07/31/17 0200 111/70 - 71 16 100 % -   07/31/17 0130 108/68 - 71 18 100 % -   [id number] 1901 - 07/31 0700  In: 2164.4 [I.V.:1674.4]  Out: 50   07/29 1901 - 07/31 0700  In: 2164.4 [I.V.:1674.4]  Out: 50 RESULTRCNT(24h)Principal Problem:    Sepsis ([facility] (7/27/2017)    Active Problems:    Anemia ()      Acidosis (7/27/2017)      Cardiac arrest ([facility] (7/27/2017)      Respiratory failure ([facility] (7/27/2017)      ESRD needing dialysis ([facility] (7/27/2017)      Anoxic brain damage ([facility] (7/27/2017)      Colitis (7/27/2017)      Hypotension (7/27/2017)      Acute GI bleeding (7/27/2017)      ESRD (end stage renal disease) ([facility] (7/27/2017)        Additional comments:I reviewed the patient's new clinical lab test results. General Exam  No acute distress, mucous membranes normal color and hydration status        Neurologic Exam    Mental status:  Unresponsive to voice. Occasional spontaneous movements noted. Some grimacing to digital pain  No abnormal movements  Cranial nerves: PERRL, Eyes conjugate    Motor: moving upper extremities purposefully.      Results for Jovanni Gaytan (MRN [id number]) as of 7/31/2017 09:24   Ref. Range 7/31/2017 04:08   WBC Latest Ref Range: 4.6 - 13.2 K/uL 22.9 (H)   NRBC Latest Ref Range: 0  WBC 1.0 (H)   RBC Latest Ref Range: 4.70 - 5.50 M/uL 3.62 (L)   HGB Latest Ref Range: 13.0 - 16.0 g/dL 11.0 (L)   HCT Latest Ref Range: 36.0 - 48.0 % 30.7 (L)   MCV Latest Ref Range: 74.0 - 97.0 FL 84.8   MCH Latest Ref Range: 24.0 - 34.0 PG 30.4   MCHC Latest Ref Range: 31.0 - 37.0 g/dL 35.8   RDW Latest Ref Range: 11.6 - 14.5 % 15.4 (H)     Results for Jovanni Gaytan (MRN [id number]) as of 7/31/2017 09:24   Ref. Range 7/31/2017 04:08   Potassium Latest Ref Range: 3.5 - 5.5 mmol/L 3.1 (L)   Chloride Latest Ref Range: 100 - 108 mmol/L 102   CO2 Latest Ref Range: 21 - 32 mmol/L 21   Anion gap Latest Ref Range: 3.0 - 18 mmol/L 14   Glucose Latest Ref Range: 74 - 99 mg/dL 165 (H)   BUN Latest Ref Range: 7.0 - 18 MG/DL 50 (H)   Creatinine Latest Ref Range: 0.6 - 1.3 MG/DL 5.81 (H)   BUN/Creatinine ratio Latest Ref Range: 12 - 20   9 (L)   Calcium Latest Ref Range: 8.5 - 10.1 MG/DL 7.8 (L)   GFR est non-AA Latest Ref Range: >60 ml/min/1.73m2 10 (L)   GFR est AA Latest Ref Range: >60 ml/min/1.73m2 12 (L)   Bilirubin, total Latest Ref Range: 0.2 - 1.0 MG/DL 3.4 (H)   Protein, total Latest Ref Range: 6.4 - 8.2 g/dL 5.7 (L)   Albumin Latest Ref Range: 3.4 - 5.0 g/dL 1.7 (L)   Globulin Latest Ref Range: 2.0 - 4.0 g/dL 4.0         Assessment:     Jonel Lees is a 55 y.o. who was admitted with unresponsiveness and out of hospital cardiac arrest with unknown pulseless time. Current exam is consistent with encephalopathy which is likely multifactorial and secondary to some degree of hypoxic-ischemic encephalopathy and some degree of parainfectious and toxic/metabolic encephalopathy. At present it is very difficult to prognosticate neurologic recovery with current sepsis. No seizures noted on EEG        Plan:   1. Continue Keppra as currently dosed  2.  Continue supportive care. Jaimie Curtis M.D.   Clinical Neurophysiology  Neuromuscular Specialist    Signed:  7/31/2017  9:20 AM  9:20 AM

## 2017-07-31 NOTE — DIALYSIS
ACUTE HEMODIALYSIS FLOW SHEET    HEMODIALYSIS ORDERS: Physician: Yvan Billy     Dialyzer: Revaclear         Duration: 3 hr  BFR: 300   DFR: 600   Dialysate:  K+   3    Ca+  3   Weight:    kg    Bed Scale []     Unable to Obtain []      UF Goal:  1000       Heparin []  Bolus      Units    [] Hourly       Units    [x]None   Pre BP:   110/67    Pulse:     87        Temperature:   97.4  Respirations: 19  Tx: NS           ml/Bolus  Other        [x] N/A   Labs: Pre        Post:        [x] N/A   Additional Orders:           [x] Time Out/Safety Check  [x] [facility] Consent Verified     CATHETER ACCESS: [x]N/A   []Right   []Left   []IJ     []Fem   [] First use X-ray verified     []Tunnel                [] Non Tunneled   []No S/S infection  []Redness  []Drainage []Cultured []Swelling []Pain   []Medical Aseptic Prep Utilized   []Dressing Changed  [] Biopatch  Date:       []Clotted   []Patent   Flows: []Good  []Poor  []Reversed   If access problem,  notified: []Yes    []N/A  Date:           GRAFT/FISTULA ACCESS:  []N/A     []Right     [x]Left     [x]UE     []LE   [x]AVG   []AVF        []Buttonhole    [x]Medical Aseptic Prep Utilized   [x]No S/S infection  []Redness  []Drainage []Cultured []Swelling []Pain    Bruit:   [x] Strong    [] Weak       Thrill :   [x] Strong    [] Weak       Needle Gauge: 15   Length: 1     If access problem,  notified: []Yes     [x]N/A  Date:        Please describe access if present and not used:       GENERAL ASSESSMENT:    LUNGS: Sat%           [] Clear  [x] Coarse  [] Crackles  [] Wheezing        [] Diminished     Location : []RLL   []LLL    [x]RUL  [x]   Cough: []Productive  []Dry  [x]N/A   Respirations:  [x]Easy  []Labored   Therapy:  []RA  []NC         l/min    Mask: []NRB []Venti       O2%                  [x]Ventilator  []Intubated  []Trach   CARDIAC: [x]Regular      [] Irregular   [] Pericardial Rub  [] JVD        [x]  Monitored  [] N/A  Rhythm:         EDEMA: [x] None []Generalized  [] Pitting [] 1    [] 2    [] 3    [] 4                 [] Facial  [] Pedal  []  UE  [] LE   SKIN:   [x] Warm  [] Hot     [] Cold   [x] Dry     [] Pale   [] Diaphoretic                  [] Flushed  [] Jaundiced  [] Cyanotic  [] Rash  [] Weeping   LOC:    [] Alert      []Oriented:    [] Person     [] Place  []Time               [] Confused  [x] Lethargic  [] Medicated  [] Non-responsive     GI / ABDOMEN:  [] Flat    [] Distended    [x] Soft    [] Firm   []  Obese                             [] Diarrhea  [x] Bowel Sounds  [] Nausea  [] Vomiting       / URINE ASSESSMENT:[] Voiding   [] Oliguria  [x] Anuria   []  Stanton     [] Incontinent    []  Incontinent Brief      []  Bathroom Privileges     PAIN: [x] 0 []1  []2   []3   []4   []5   []6   []7   []8   []9   []10            Scale 0-10  Action/Follow Up:         MOBILITY:  [] Amb    [] Amb/Assist    [x] Bed    [] Wheelchair  [] Stretcher      All Vitals and Treatment Details on Attached [facility] [place]          Room # 308     [] 1st Time Acute  [] Stat  [x] Routine  [] Urgent     [] Acute Room  []  Bedside  [x] ICU/CCU  [] ER   Isolation Precautions:  [x] Dialysis   [] Airborne   [] Contact    [] Reverse   Special Considerations:         [] Blood Consent Verified [x]N/A     ALLERGIES:   [x] NKA          Code Status:  [x] Full Code  [] DNR  [] Other           HBsAg ONLY: Date Drawn 7/28/17               [x]Negative []Positive []Unknown   HBsAb: Date    7/28/17        [] Susceptible   [x] [id number] []Not Drawn      Current Labs:    Date of Labs: Today [x]     Results for Bobyb Chris (MRN [id number]) as of 7/31/2017 10:47   Ref.  Range 7/31/2017 04:08   WBC Latest Ref Range: 4.6 - 13.2 K/uL 22.9 (H)   NRBC Latest Ref Range: 0  WBC 1.0 (H)   RBC Latest Ref Range: 4.70 - 5.50 M/uL 3.62 (L)   HGB Latest Ref Range: 13.0 - 16.0 g/dL 11.0 (L)   HCT Latest Ref Range: 36.0 - 48.0 % 30.7 (L)   MCV Latest Ref Range: 74.0 - 97.0 FL 84.8   MCH Latest Ref Range: 24.0 - 34.0 PG 30.4   MCHC Latest Ref Range: 31.0 - 37.0 g/dL 35.8   RDW Latest Ref Range: 11.6 - 14.5 % 15.4 (H)   PLATELET Latest Ref Range: 135 - 420 K/uL 46 (L)   NEUTROPHILS Latest Ref Range: 42 - 75 % 81 (H)   BAND NEUTROPHILS Latest Ref Range: 0 - 5 % 11 (H)   LYMPHOCYTES Latest Ref Range: 20 - 51 % 2 (L)   MONOCYTES Latest Ref Range: 2 - 9 % 6   EOSINOPHILS Latest Ref Range: 0 - 5 % 0   BASOPHILS Latest Ref Range: 0 - 3 % 0   DF Latest Units:   MANUAL   ABS. NEUTROPHILS Latest Ref Range: 1.8 - 8.0 K/UL 21.0 (H)   ABS. LYMPHOCYTES Latest Ref Range: 0.8 - 3.5 K/UL 0.5 (L)   ABS. MONOCYTES Latest Ref Range: 0 - 1.0 K/UL 1.4 (H)   ABS. EOSINOPHILS Latest Ref Range: 0.0 - 0.4 K/UL 0.0   ABS. BASOPHILS Latest Ref Range: 0.0 - 0.06 K/UL 0.0     Results for Orest Marker (MRN [id number]) as of 7/31/2017 10:47   Ref. Range 7/31/2017 04:08   Sodium Latest Ref Range: 136 - 145 mmol/L 137   Potassium Latest Ref Range: 3.5 - 5.5 mmol/L 3.1 (L)   Chloride Latest Ref Range: 100 - 108 mmol/L 102   CO2 Latest Ref Range: 21 - 32 mmol/L 21   Anion gap Latest Ref Range: 3.0 - 18 mmol/L 14   Glucose Latest Ref Range: 74 - 99 mg/dL 165 (H)   BUN Latest Ref Range: 7.0 - 18 MG/DL 50 (H)   Creatinine Latest Ref Range: 0.6 - 1.3 MG/DL 5.81 (H)   BUN/Creatinine ratio Latest Ref Range: 12 - 20   9 (L)   Calcium Latest Ref Range: 8.5 - 10.1 MG/DL 7.8 (L)   GFR est non-AA Latest Ref Range: >60 ml/min/1.73m2 10 (L)   GFR est AA Latest Ref Range: >60 ml/min/1.73m2 12 (L)   Bilirubin, total Latest Ref Range: 0.2 - 1.0 MG/DL 3.4 (H)   Protein, total Latest Ref Range: 6.4 - 8.2 g/dL 5.7 (L)   Albumin Latest Ref Range: 3.4 - 5.0 g/dL 1.7 (L)   Globulin Latest Ref Range: 2.0 - 4.0 g/dL 4.0   A-G Ratio Latest Ref Range: 0.8 - 1.7   0.4 (L)   ALT (SGPT) Latest Ref Range: 16 - 61 U/L 136 (H)   AST Latest Ref Range: 15 - 37 U/L 88 (H)   Alk.  phosphatase Latest Ref Range: 45 - 117 U/L 161 (H)   Lactic acid Latest Ref Range: 0.4 - 2.0 MMOL/L 1.2                                                                                                                                   DIET:  [] Renal    [x] Other     [x] NPO     []  Diabetic      PRIMARY NURSE REPORT: First initial/Last name/Title      Pre Le Malave RN     Time: 1000       EDUCATION:    [x] Patient [] Other         Knowledge Basis: [x]None []Minimal []Substantial   [] Access Care     [] S&S of infection     [] Fluid Management     []K+     [x]Procedural    []Albumin     [] Medications     [] Tx Options     [] Transplant     [] Diet     [] Other   Teaching Tools:  [] Explain  [] Demo  [] Handouts [] Video   Inappropriate due to    Pt minimally responsive, verbalized procedures as performed        RO/HEMODIALYSIS MACHINE SAFETY CHECKS  Before each treatment:     Machine Number:                   [facility]                                  [] Unit Machine #  with centralized RO                                  [] Portable Machine #1/RO serial # [id number]                                  [x] Portable Machine #2/RO serial # [id number]                                  [] Portable Machine #3/RO serial # [id number]                                                                                                       [address] [place] [address]                                  [] Portable Machine #11/RO serial # [id number]                                   [] Portable Machine #12/RO serial # [id number]                                  [] Portable Machine #13/RO serial #  [id number]      Alarm Test: [x]Pass           Other:         [x] RO/Machine Log Complete      Temp    36            [x]Extracorporeal Circuit Tested for integrity   Dialysate: pH  7.4 Conductivity: Meter   14.2     HD Machine   14.2                  TCD: 13.9  Dialyzer Lot # [id number]                             Blood Tubing Lot # [id number]                Saline Lot #  [id number]     CHLORINE TESTING-Before each treatment and every 4 hours    Total Chlorine: [x] less than 0.1 ppm  Time: 1000 4 Hr Check Time:    (if greater than 0.1 ppm from Primary then every 30 minutes from Secondary)     TREATMENT INITIATION  with Dialysis Precautions:   [x] All Connections Secured                 [x] Saline Line Double Clamped   [x] Venous Parameters Set                  [x] Arterial Parameters Set    [x] Prime Given  ml  250             [x]Air Foam Detector Engaged      Treatment Initiation Note: Arrived to unit with pt lethargic and minimally responsive, ventilated, in soft wrist restraints x2, on tube feeds. VSS and in no acute distress. Treatment initiated via LUE AVG without complications. Will continue to monitor throughout treatment. Medication Dose Volume Route Initials Dialyzer Cleared: [x] Good [] Fair  [] Poor    Blood processed: 54   L  UF Removed 1000    Ml    Post Wt:     kg  POst BP:  102/64        Pulse: 81      Respirations: 18  Temperature: 97.5     Albumin 12.5g  50ml IV SN Post Tx Vascular Access: AVF/AVG: Bleeding stopped Art    5  min. Oscar.    5  Min   N/A     Epogen 51697 units 1 ml IV SN Catheter: Locking solution: Heparin 1:1000 Art. Oscar. N/A   Hectorol 2 mcg 1 ml  IV SN Post Assessment:                                    Skin:  [x] Warm  [x] Dry [] Diaphoretic     [] Flushed  [] Pale [] Cyanotic   [facility] Signatures Title Initials  Time Lungs: [] Clear    [x] Course  [] Crackles  [] Wheezing   Manuelita Luis Manuel RN SN  Cardiac: [x] Regular   [] Irregular   [] Monitor  [] N/A  Rhythm:           Edema:  [x] None    [] General     [] Facial   [] Pedal    [] UE    [] LE       Pain: [x]0  []1  []2   []3  []4   []5   []6   []7   []8   []9   []10         Post Treatment Note: Pt completed treatment with intermittent episodes of hypotension. Albumin administered and UF rate varied. Dr. Felicia Alex aware. Otherwise no complications. All blood returned via AVG. Hemostasis achieved appropriately.  VSS and in no acute distress at handoff.          POST TREATMENT PRIMARY NURSE HANDOFF REPORT:     First initial/Last name/Title         Post Dialysis:  John Ledbetter RN     Time:  4387         Abbreviations: AVG-arterial venous graft, AVF-arterial venous fistula, IJ-Internal Jugular, Subcl-Subclavian, Fem-Femoral, Tx-treatment, AP/HR-apical heart rate, DFR-dialysate flow rate, BFR-blood flow rate, AP-arterial pressure, -venous pressure, UF-ultrafiltrate, TMP-transmembrane pressure, Oscar-Venous, Art-Arterial, RO-Reverse Osmosis

## 2017-07-31 NOTE — PROGRESS NOTES
[email or web address]  [phone]       Impression  1. Multiorgan failure  2.anemia  3. Elevated LFTs  4.GI bleed  5. ESRD-noncompliant outpatient     Plan:  1.cont tube feeding  2.moniter H/H transfuse as indicated  3. Elevated LFTS probably suspect ischemic hepatitis  4. Stool for cdiff negative  5. Cont supportive care      Chief Complaint: anemia, elevated LFTS, GI bleed    Subjective: Pt unresponsive, uneventful overnight.  No hematochezia, melena noted         Eyes: conjunctiva normal,    ENT: Mucous membranes moist, no lesion or thrush   Cardiovascular:  normal rate and regular rhythm, no murmur   Pulmonary/Chest Wall: breath sounds normal. On ventilator   Abdominal:  soft, non-acute, non-tender, no appreciable mass or hepatosplenomegaly, no appreciable ascites       Visit Vitals    /66    Pulse 79    Temp 97.4 °F (36.3 °C)    Resp 19    Ht 6' 3\" (1.905 m)  Comment: per chart history    Wt 75.6 kg (166 lb 10.7 oz)    SpO2 98%    BMI 20.83 kg/m2           Intake/Output Summary (Last 24 hours) at 07/31/17 0944  Last data filed at 07/31/17 0809   Gross per 24 hour   Intake           1673.3 ml   Output                0 ml   Net           1673.3 ml       CBC w/Diff    Lab Results   Component Value Date/Time    WBC 22.9 (H) 07/31/2017 04:08 AM    RBC 3.62 (L) 07/31/2017 04:08 AM    HGB 11.0 (L) 07/31/2017 04:08 AM    HCT 30.7 (L) 07/31/2017 04:08 AM    MCV 84.8 07/31/2017 04:08 AM    MCH 30.4 07/31/2017 04:08 AM    MCHC 35.8 07/31/2017 04:08 AM    RDW 15.4 (H) 07/31/2017 04:08 AM    PLT 46 (L) 07/31/2017 04:08 AM    Lab Results   Component Value Date/Time    GRANS 81 (H) 07/31/2017 04:08 AM    LYMPH 2 (L) 07/31/2017 04:08 AM    EOS 0 07/31/2017 04:08 AM    BANDS 11 (H) 07/31/2017 04:08 AM    BASOS 0 07/31/2017 04:08 AM    MYELO 1 (H) 07/30/2017 12:40 AM    METAS 1 (H) 07/30/2017 12:40 AM      Basic Metabolic Profile   Recent Labs      07/31/17   0408   NA  137   K  3.1*   CL  102   CO2  21   BUN  50* CA  7.8*        Hepatic Function    Lab Results   Component Value Date/Time    ALB 1.7 (L) 07/31/2017 04:08 AM    TP 5.7 (L) 07/31/2017 04:08 AM     (H) 07/31/2017 04:08 AM    Lab Results   Component Value Date/Time    SGOT 88 (H) 07/31/2017 04:08 AM          KARLY LagosC  [facility], [facility]  [email or web address]

## 2017-07-31 NOTE — PROCEDURES
[place]    Name:  Donte Barlow  MR#:  [id number]  :  1960  Account #:  [de-identified]  Date of Adm:  2017  Date of Service:  2017      REFERRING PHYSICIAN: Dr. Rosalind Weiner. READING PHYSICIAN: Ricco Garnica. Fan Santiago MD.    INDICATIONS: This is a 60-year-old male with history of cardiac arrest  status post intubation with reported seizure activity on 2017,  lasting 30 seconds. The patient had EEG several hours after  Sedation stopped. Medications not otherwise listed. Patient was noted to  be intubated, grimacing to painful stimuli. He was not reported to be  following commands during the recording. EEG examination was performed as an inpatient utilizing both  referential and differential montages, as well as [employer] 10/20  electrode placement system on an 18-channel EEG instrument. The  patient demonstrates a low voltage mixed frequency background  activity. At times theta activity can be seen in the frontal central  regions, which is secondary to a drug effect. Other times up to a 5-6 Hz  theta activity can be seen posteriorly. Underlying 2-3 Hz delta activity  is also noted, there are no other focal lateralized or abnormal  paroxysmal discharges noted on single channel EKG monitoring,  Occasional ectopy is noted. Photic stimulation not performed. ELECTROENCEPHALOGRAM INTERPRETATION: This is an  abnormal recording due to the marked suppression background  activity. Findings are consistent with encephalopathy. There is no  evidence of epileptiform, epileptogenic activity.         MD CEDRIC Grant / John.Shalini  D:  2017   13:31  T:  2017   15:37  Job #:  [id number]

## 2017-07-31 NOTE — DIABETES MGMT
Glycemic Control: Pt discussed in interdisciplinary rounds. No history of diabetes noted, pt was hypoglycemic upon admission. Blood glucose now trending up. Pt is receiving Solu-cortef, 50 mg every 8 hours. Tube feeds started. D5W at 30 mL/hr discontinued today. Continue inpatient monitoring and intervention.      Binta Diallo RD, CDE

## 2017-07-31 NOTE — PROGRESS NOTES
07/31/17 0809   Weaning Parameters   Spontaneous Breathing Trial Complete (SBT started.)   Resp Rate Observed 17   Ve 5.5      RSBI 56

## 2017-08-01 ENCOUNTER — APPOINTMENT (OUTPATIENT)
Dept: GENERAL RADIOLOGY | Age: 57
DRG: 004 | End: 2017-08-01
Attending: PHYSICIAN ASSISTANT
Payer: MEDICARE

## 2017-08-01 LAB
ALBUMIN SERPL BCP-MCNC: 1.9 G/DL (ref 3.4–5)
ALBUMIN/GLOB SERPL: 0.5 {RATIO} (ref 0.8–1.7)
ALP SERPL-CCNC: 186 U/L (ref 45–117)
ALT SERPL-CCNC: 101 U/L (ref 16–61)
ANION GAP BLD CALC-SCNC: 14 MMOL/L (ref 3–18)
APTT PPP: 21.4 SEC (ref 23–36.4)
APTT PPP: 32 SEC (ref 23–36.4)
ARTERIAL PATENCY WRIST A: YES
AST SERPL W P-5'-P-CCNC: 44 U/L (ref 15–37)
BASE EXCESS BLD CALC-SCNC: 0 MMOL/L
BASOPHILS # BLD AUTO: 0 K/UL (ref 0–0.06)
BASOPHILS # BLD: 0 % (ref 0–3)
BDY SITE: ABNORMAL
BILIRUB SERPL-MCNC: 2 MG/DL (ref 0.2–1)
BUN SERPL-MCNC: 49 MG/DL (ref 7–18)
BUN/CREAT SERPL: 10 (ref 12–20)
CA-I SERPL-SCNC: 1.04 MMOL/L (ref 1.12–1.32)
CALCIUM SERPL-MCNC: 8.2 MG/DL (ref 8.5–10.1)
CHLORIDE SERPL-SCNC: 102 MMOL/L (ref 100–108)
CO2 SERPL-SCNC: 20 MMOL/L (ref 21–32)
CREAT SERPL-MCNC: 4.98 MG/DL (ref 0.6–1.3)
DIFFERENTIAL METHOD BLD: ABNORMAL
EOSINOPHIL # BLD: 0 K/UL (ref 0–0.4)
EOSINOPHIL NFR BLD: 0 % (ref 0–5)
ERYTHROCYTE [DISTWIDTH] IN BLOOD BY AUTOMATED COUNT: 15.4 % (ref 11.6–14.5)
GAS FLOW.O2 O2 DELIVERY SYS: ABNORMAL L/MIN
GAS FLOW.O2 SETTING OXYMISER: 12 BPM
GLOBULIN SER CALC-MCNC: 4.1 G/DL (ref 2–4)
GLUCOSE BLD STRIP.AUTO-MCNC: 263 MG/DL (ref 70–110)
GLUCOSE BLD STRIP.AUTO-MCNC: 326 MG/DL (ref 70–110)
GLUCOSE BLD STRIP.AUTO-MCNC: 332 MG/DL (ref 70–110)
GLUCOSE BLD STRIP.AUTO-MCNC: 359 MG/DL (ref 70–110)
GLUCOSE SERPL-MCNC: 335 MG/DL (ref 74–99)
HCO3 BLD-SCNC: 24 MMOL/L (ref 22–26)
HCT VFR BLD AUTO: 31 % (ref 36–48)
HGB BLD-MCNC: 11.1 G/DL (ref 13–16)
INSPIRATION.DURATION SETTING TIME VENT: 0.9 SEC
LACTATE SERPL-SCNC: 1.1 MMOL/L (ref 0.4–2)
LACTATE SERPL-SCNC: 2.1 MMOL/L (ref 0.4–2)
LACTATE SERPL-SCNC: 2.2 MMOL/L (ref 0.4–2)
LYMPHOCYTES # BLD AUTO: 3 % (ref 20–51)
LYMPHOCYTES # BLD: 0.9 K/UL (ref 0.8–3.5)
MAGNESIUM SERPL-MCNC: 2.4 MG/DL (ref 1.6–2.6)
MCH RBC QN AUTO: 30.4 PG (ref 24–34)
MCHC RBC AUTO-ENTMCNC: 35.8 G/DL (ref 31–37)
MCV RBC AUTO: 84.9 FL (ref 74–97)
METAMYELOCYTES NFR BLD MANUAL: 1 %
MONOCYTES # BLD: 2.9 K/UL (ref 0–1)
MONOCYTES NFR BLD AUTO: 10 % (ref 2–9)
NEUTS BAND NFR BLD MANUAL: 6 % (ref 0–5)
NEUTS SEG # BLD: 24.7 K/UL (ref 1.8–8)
NEUTS SEG NFR BLD AUTO: 80 % (ref 42–75)
NRBC BLD-RTO: 2 PER 100 WBC
O2/TOTAL GAS SETTING VFR VENT: 0.25 %
PCO2 BLD: 34.3 MMHG (ref 35–45)
PEEP RESPIRATORY: 5 CMH2O
PH BLD: 7.45 [PH] (ref 7.35–7.45)
PLATELET # BLD AUTO: 57 K/UL (ref 135–420)
PLATELET COMMENTS,PCOM: ABNORMAL
PO2 BLD: 68 MMHG (ref 80–100)
POTASSIUM SERPL-SCNC: 3.3 MMOL/L (ref 3.5–5.5)
PROT SERPL-MCNC: 6 G/DL (ref 6.4–8.2)
RBC # BLD AUTO: 3.65 M/UL (ref 4.7–5.5)
RBC MORPH BLD: ABNORMAL
SAO2 % BLD: 94 % (ref 92–97)
SERVICE CMNT-IMP: ABNORMAL
SODIUM SERPL-SCNC: 136 MMOL/L (ref 136–145)
SPECIMEN TYPE: ABNORMAL
TOTAL RESP. RATE, ITRR: 23
VENTILATION MODE VENT: ABNORMAL
VOLUME CONTROL PLUS IVLCP: YES
VT SETTING VENT: 400 ML
WBC # BLD AUTO: 28.7 K/UL (ref 4.6–13.2)

## 2017-08-01 PROCEDURE — 83735 ASSAY OF MAGNESIUM: CPT | Performed by: INTERNAL MEDICINE

## 2017-08-01 PROCEDURE — 83605 ASSAY OF LACTIC ACID: CPT | Performed by: PHYSICIAN ASSISTANT

## 2017-08-01 PROCEDURE — 85025 COMPLETE CBC W/AUTO DIFF WBC: CPT | Performed by: PHYSICIAN ASSISTANT

## 2017-08-01 PROCEDURE — 65610000006 HC RM INTENSIVE CARE

## 2017-08-01 PROCEDURE — 71010 XR CHEST PORT: CPT

## 2017-08-01 PROCEDURE — 74011000258 HC RX REV CODE- 258: Performed by: INTERNAL MEDICINE

## 2017-08-01 PROCEDURE — 85730 THROMBOPLASTIN TIME PARTIAL: CPT | Performed by: PHYSICIAN ASSISTANT

## 2017-08-01 PROCEDURE — 36600 WITHDRAWAL OF ARTERIAL BLOOD: CPT

## 2017-08-01 PROCEDURE — 74011250636 HC RX REV CODE- 250/636: Performed by: PSYCHIATRY & NEUROLOGY

## 2017-08-01 PROCEDURE — 94003 VENT MGMT INPAT SUBQ DAY: CPT

## 2017-08-01 PROCEDURE — 82803 BLOOD GASES ANY COMBINATION: CPT

## 2017-08-01 PROCEDURE — 82962 GLUCOSE BLOOD TEST: CPT

## 2017-08-01 PROCEDURE — 87040 BLOOD CULTURE FOR BACTERIA: CPT | Performed by: INTERNAL MEDICINE

## 2017-08-01 PROCEDURE — 74011636637 HC RX REV CODE- 636/637: Performed by: INTERNAL MEDICINE

## 2017-08-01 PROCEDURE — 36415 COLL VENOUS BLD VENIPUNCTURE: CPT | Performed by: PHYSICIAN ASSISTANT

## 2017-08-01 PROCEDURE — 80053 COMPREHEN METABOLIC PANEL: CPT | Performed by: PHYSICIAN ASSISTANT

## 2017-08-01 PROCEDURE — 74011250636 HC RX REV CODE- 250/636: Performed by: INTERNAL MEDICINE

## 2017-08-01 PROCEDURE — 74011250637 HC RX REV CODE- 250/637: Performed by: INTERNAL MEDICINE

## 2017-08-01 PROCEDURE — 87070 CULTURE OTHR SPECIMN AEROBIC: CPT | Performed by: INTERNAL MEDICINE

## 2017-08-01 PROCEDURE — 82330 ASSAY OF CALCIUM: CPT | Performed by: PHYSICIAN ASSISTANT

## 2017-08-01 PROCEDURE — 74011000250 HC RX REV CODE- 250: Performed by: PHYSICIAN ASSISTANT

## 2017-08-01 PROCEDURE — 74011250637 HC RX REV CODE- 250/637: Performed by: PHYSICIAN ASSISTANT

## 2017-08-01 RX ORDER — POTASSIUM CHLORIDE 1.5 G/1.77G
40 POWDER, FOR SOLUTION ORAL
Status: COMPLETED | OUTPATIENT
Start: 2017-08-01 | End: 2017-08-01

## 2017-08-01 RX ORDER — PANTOPRAZOLE SODIUM 40 MG/10ML
40 INJECTION, POWDER, LYOPHILIZED, FOR SOLUTION INTRAVENOUS DAILY
Status: DISCONTINUED | OUTPATIENT
Start: 2017-08-02 | End: 2017-08-08

## 2017-08-01 RX ORDER — PANTOPRAZOLE SODIUM 40 MG/10ML
40 INJECTION, POWDER, LYOPHILIZED, FOR SOLUTION INTRAVENOUS DAILY
Status: DISCONTINUED | OUTPATIENT
Start: 2017-08-01 | End: 2017-08-01

## 2017-08-01 RX ORDER — INSULIN GLARGINE 100 [IU]/ML
10 INJECTION, SOLUTION SUBCUTANEOUS DAILY
Status: DISCONTINUED | OUTPATIENT
Start: 2017-08-01 | End: 2017-08-02

## 2017-08-01 RX ADMIN — POTASSIUM CHLORIDE 40 MEQ: 1.5 POWDER, FOR SOLUTION ORAL at 14:27

## 2017-08-01 RX ADMIN — HYDROCORTISONE SODIUM SUCCINATE 50 MG: 100 INJECTION, POWDER, FOR SOLUTION INTRAMUSCULAR; INTRAVENOUS at 22:24

## 2017-08-01 RX ADMIN — INSULIN LISPRO 12 UNITS: 100 INJECTION, SOLUTION INTRAVENOUS; SUBCUTANEOUS at 17:43

## 2017-08-01 RX ADMIN — INSULIN LISPRO 12 UNITS: 100 INJECTION, SOLUTION INTRAVENOUS; SUBCUTANEOUS at 05:37

## 2017-08-01 RX ADMIN — HYDROCORTISONE SODIUM SUCCINATE 50 MG: 100 INJECTION, POWDER, FOR SOLUTION INTRAMUSCULAR; INTRAVENOUS at 14:27

## 2017-08-01 RX ADMIN — MEROPENEM 500 MG: 500 INJECTION, POWDER, FOR SOLUTION INTRAVENOUS at 17:40

## 2017-08-01 RX ADMIN — LEVETIRACETAM 500 MG: 5 INJECTION INTRAVENOUS at 22:25

## 2017-08-01 RX ADMIN — CHLORHEXIDINE GLUCONATE 10 ML: 1.2 RINSE ORAL at 09:37

## 2017-08-01 RX ADMIN — LEVETIRACETAM 500 MG: 5 INJECTION INTRAVENOUS at 11:51

## 2017-08-01 RX ADMIN — INSULIN LISPRO 15 UNITS: 100 INJECTION, SOLUTION INTRAVENOUS; SUBCUTANEOUS at 11:50

## 2017-08-01 RX ADMIN — HYDROCORTISONE SODIUM SUCCINATE 50 MG: 100 INJECTION, POWDER, FOR SOLUTION INTRAMUSCULAR; INTRAVENOUS at 05:15

## 2017-08-01 RX ADMIN — CHLORHEXIDINE GLUCONATE 10 ML: 1.2 RINSE ORAL at 22:24

## 2017-08-01 RX ADMIN — FAMOTIDINE 20 MG: 10 INJECTION, SOLUTION INTRAVENOUS at 09:37

## 2017-08-01 RX ADMIN — INSULIN GLARGINE 10 UNITS: 100 INJECTION, SOLUTION SUBCUTANEOUS at 11:50

## 2017-08-01 NOTE — PROGRESS NOTES
[facility]  ICU Progress Note      Name: Marlen Olvera   : 1960   MRN: [id number]   Date: 2017      [x]I have reviewed the flowsheet and previous days notes. Events overnight reviewed and discussed with nursing staff. Vital signs and records reviewed. Subjective:   56 y/o male with hx significant for ESRD on HD, HTN, diabetes, anemia, and IBS, presented to the ED with cardiac arrest.     Pt remains on ventilatory support; intermittent gag; some secretions thru ETT. Withdraws to painful stimulation to b/l UEs; no response on b/l LEs. No sedation. Hemodynamically stable. Episode of fever early this morning with recorded temp of 100.9. Dialyzed yesterday, . Tolerating enteral nutrition thru NGT. E coli and Clostridium perfringens bacteremia. WBC trending upwards. [x]The patient is unable to give any meaningful history or review of systems because the patient is:  [x]Intubated []Sedated   [x]Unresponsive      [x]The patient is critically ill on      [x]Mechanical ventilation []Pressors   []BiPAP []            ROS:Review of systems not obtained due to patient factors.        Safety Bundles: VAP Bundle/ CAUTI/ Severe Sepsis Protocol    Vital Signs:    Visit Vitals    /63    Pulse 86    Temp 99.2 °F (37.3 °C)    Resp 22    Ht 6' 3\" (1.905 m)  Comment: per chart history    Wt 73.3 kg (161 lb 9.6 oz)    SpO2 99%    BMI 20.2 kg/m2       O2 Device: Endotracheal tube, Ventilator   O2 Flow Rate (L/min): 10 l/min   Temp (24hrs), Av.2 °F (37.3 °C), Min:98.3 °F (36.8 °C), Max:100.9 °F (38.3 °C)       Intake/Output:   Last shift:      701 - 1900  In: 615 [I.V.:100]  Out: -   Last 3 shifts: 1901 -  0700  In: 3838.8 [I.V.:1578.8]  Out: 2200 [Drains:1200]    Intake/Output Summary (Last 24 hours) at 17 1529  Last data filed at 17 1400   Gross per 24 hour   Intake             2195 ml   Output             1200 ml   Net 995 ml       Ventilator Settings:  Ventilator  Mode: Assist control, VC+  Respiratory Rate  Resp Rate Observed: 17  Back-Up Rate: 12  Insp Time (sec): 1 sec  I:E Ratio: 1:1.0  Ventilator Volumes  Vt Set (ml): 400 ml  Vt Exhaled (Machine Breath) (ml): 443 ml  Vt Spont (ml): 412 ml  Ve Observed (l/min): 7.23 l/min  Ventilator Pressures  Pressure Support (cm H2O): 7 cm H2O  PIP Observed (cm H2O): 17 cm H2O  Plateau Pressure (cm H2O): 15 cm H2O  MAP (cm H2O): 9.6  PEEP/VENT (cm H2O): 5 cm H20  Auto PEEP Observed (cm H2O): 0 cm H2O      Physical Exam:    General: Intubated; withdraws to painful stimulation  HEENT:  Pupils reactive, but sluggish 3mm b/l. Anicteric sclerae; neck supple  Resp:  Symmetrical chest expansion, no accessory muscle use; no wheezing, rales, or rhonchi   CV:  S1, S2 present; regular rate and rhythm  GI:  Abdomen soft, (+) active bowel sounds  Extremities:  +2 pulses on all extremities; no edema/ cyanosis/ clubbing noted, left arm fistula with strong thrill   Skin:  Warm; no rashes/ lesions noted  Neurologic:  W/d to Pain on b/l upper extremities, + corneal, intermittent gag.    Devices:    7/27 - NGT, ETT with michele tube      DATA:     Current Facility-Administered Medications   Medication Dose Route Frequency    [START ON 8/2/2017] Vancomycin random level  1 Each Other Rx Dosing/Monitoring    insulin glargine (LANTUS) injection 10 Units  10 Units SubCUTAneous DAILY    [START ON 8/2/2017] pantoprazole (PROTONIX) injection 40 mg  40 mg IntraVENous DAILY    meropenem (MERREM) 500 mg in 0.9% sodium chloride (MBP/ADV) 50 mL MBP  0.5 g IntraVENous Q24H    midazolam (VERSED) 100 mg in 0.9% sodium chloride 100 mL infusion  1-3 mg/hr IntraVENous TITRATE    hydrocortisone Sod Succ (PF) (SOLU-CORTEF) injection 50 mg  50 mg IntraVENous Q8H    doxercalciferol (HECTOROL) 4 mcg/2 mL injection 2 mcg  2 mcg IntraVENous Q MON, WED & FRI    epoetin benjamin (EPOGEN;PROCRIT) injection 10,000 Units  10,000 Units IntraVENous Q MON, WED & FRI    levETIRAcetam (KEPPRA) 500 mg in 100 ml IVPB  500 mg IntraVENous Q12H    chlorhexidine (PERIDEX) 0.12 % mouthwash 10 mL  10 mL Oral Q12H    insulin lispro (HUMALOG) injection   SubCUTAneous Q6H    glucose chewable tablet 16 g  4 Tab Oral PRN    glucagon (GLUCAGEN) injection 1 mg  1 mg IntraMUSCular PRN    dextrose (D50W) injection syrg 12.5-25 g  25-50 mL IntraVENous PRN    0.9% sodium chloride infusion  100 mL/hr IntraVENous DIALYSIS PRN    VANCOMYCIN INFORMATION NOTE   Other CONTINUOUS    naloxone (NARCAN) injection 0.4 mg  0.4 mg IntraVENous PRN    LORazepam (ATIVAN) injection 1 mg  1 mg IntraVENous Q4H PRN    fentaNYL citrate (PF) injection 50 mcg  50 mcg IntraVENous Q2H PRN         Labs: Results:       Chemistry Recent Labs      08/01/17 0424 07/31/17   0408  07/30/17   0040   GLU  335*  165*  77   NA  136  137  140   K  3.3*  3.1*  3.2*   CL  102  102  104   CO2  20*  21  25   BUN  49*  50*  32*   CREA  4.98*  5.81*  4.61*   CA  8.2*  7.8*  7.6*   AGAP  14  14  11   BUCR  10*  9*  7*   AP  186*  161*  136*   TP  6.0*  5.7*  5.4*   ALB  1.9*  1.7*  1.9*   GLOB  4.1*  4.0  3.5   AGRAT  0.5*  0.4*  0.5*      CBC w/Diff Recent Labs      08/01/17 0424 07/31/17   0408  07/30/17   0040   WBC  28.7*  22.9*  18.5*   RBC  3.65*  3.62*  2.88*   HGB  11.1*  11.0*  8.8*   HCT  31.0*  30.7*  24.3*   PLT  57*  46*  61*   GRANS  80*  81*  68   LYMPH  3*  2*  4*   EOS  0  0  0      Coagulation Recent Labs      08/01/17   1328  08/01/17 0424   APTT  21.4*  32.0       Liver Enzymes Recent Labs      08/01/17 0424   TP  6.0*   ALB  1.9*   AP  186*   SGOT  44*      ABG Lab Results   Component Value Date/Time    PHI 7.453 (H) 08/01/2017 04:11 AM    PCO2I 34.3 (L) 08/01/2017 04:11 AM    PO2I 68 (L) 08/01/2017 04:11 AM    HCO3I 24.0 08/01/2017 04:11 AM    FIO2I 0.25 08/01/2017 04:11 AM      Microbiology No results for input(s): CULT in the last 72 hours.        Telemetry: [x]Sinus []A-flutter []Paced    []A-fib []Multiple PVCs                  Imaging:  [x]I have personally reviewed the patients radiographs  CXR Results  (Last 48 hours)               08/01/17 0618  XR CHEST PORT Final result    Narrative:  Portable Chest 0454 hours       CPT CODE:96345       HISTORY:    End-stage renal disease, status post cardiopulmonary arrest, intubated   gram-negative kecia bacteremia, colitis,   eval ETT. COMPARISON: Chest x-ray July 31 through July 27, 2017. FINDINGS:        Endotracheal tube terminates 4.5 cm proximal to the bree. The esophagogastric   tube terminates in the stomach. The left lung is clear with a sharp costophrenic   angle. Persistent airspace disease in the right lower lobe not significantly   changed from the day prior. Pulmonary vascularity is normal. The heart is normal   in size. Heddie Laurel IMPRESSION:       Persistent right lower lobe airspace disease of pneumonia or segmental   atelectasis. 07/31/17 0551  XR CHEST PORT Final result    Narrative:  Portable chest x-ray, semierect AP view, time 0455 hours on 7/31/2017:               INDICATION:       Respiratory failure. The patient has been on ventilation. Right basilar   pneumonia/atelectasis. History of hypertension and diabetes. COMPARISON STUDY: Portable chest x-ray on 7/30/2017, 7/29/2017. FINDINGS:               The lower end of ET tube is 4.1 cm above bree. NG tube extends into upper   stomach at the junction of fundus and body of stomach. Stable position of right   IJ central venous catheter. In right lower lung there is increased opacity with volume loss, indicating   dense pneumonia and atelectatic changes. Left lung is clear. There is mild to   moderate cardiomegaly. Pulmonary vascularity is minimally engorged but not   clearly cephalized. No evidence of pneumothorax. IMPRESSION:       Lower end of ET tube is 4.1 cm above bree.        Increased dense opacity in right lower lung field, indicating pneumonia, and   central atelectatic changes in right middle lobe and possibly lower lobe. Right   basilar pleural effusion not excluded. Redemonstration of mild-to-moderate cardiomegaly. Only minimal pulmonary   vascular prominence. No evidence of pulmonary edema. CT ABD/PELVIS 07/27/17: This exam is markedly limited by the lack of enteric and IV contrast.  Small to moderate right pleural effusion. Tiny left pleural effusion. Small amount of ascites. Mild anasarca. Gallbladder wall thickening in the setting of anasarca and ascites can be  problematic to differentiate between cholecystitis and due to ascites. CT HEAD 07/27/17: No evidence of intracranial hemorrhages.   Multiple lacunar infarctions in bilateral thalami, likely to be old or subacute.   In left side of upper manny of brainstem there is lacunar infarction of  undetermined age. Acute lacunar infarction in this area is not excluded. IMPRESSION:   · Recurrent fever, leukocytosis with E coli bacteremia  · S/p PEA Cardiac Arrest on unknown etiology- one round epi unclear as to downtime; MI vs. ARF leading to arrest  · Acute hypoxic respiratory failure, RLL airspace disease, ? atelectasis vs pneumonia  · Small right pleural effusion, anasarca, ascites secondary to fluid retention- ERSD, non compliant to dialysis  · Acute Encephalopathy- metabolic vs. Anoxic in setting of underlying CVA  · Septic Shock - resolved   · Suspicion for acute cholecystitis by Ct abd  · Anemia of Chronic Disease   · ESRD- HD M,W,F  · Tobacco Abuse   · Thrombocytopenia       PLAN:   · Resp -VAP Bundle. Daily ABG and CXR. Aspiration precautions. HOB > 30 degrees. Continue ventilatory support, titrate for FiO2 > 90%    · ID -trend WBC, temperature curve - repeat BC today x 2. E coli bacteremia - sensitive to meropenem (was previously on zosyn however changed to merrem due to thrombocytopenia).  ID consult · CVS - hemodynamically stable, off pressors  · Heme/onc -  Follow HIT panel. Monitor for active bleeding  · Metabolic - trend electrolytes and replace per nephrology   · Renal - HD recommendations per Nephrology  · Endocrine - frequent glycemic checks. Wean stress dose steroid with goal to place back on PO steroid   · Neuro/ Pain/ Sedation - prn fentanyl and ativan as needed. Minimize sedative medications. EEG showing marked suppression background activity - continue keppra  · GI - continue enteral nutrition thru NGT.  Monitor for residuals  · Prophylaxis - DVT(SCDs), GI(Pepcid)  · Discussed in interdisciplinary rounds            Jose Parrish PA-C

## 2017-08-01 NOTE — PROGRESS NOTES
[email or web address]  [phone]       Impression  1. Multiorgan failure  2. Abnormal LFTs- suspect ischemic hepatits/shock liver (trending down)  3. Anemia  4. GI bleed  5. ESRD  6. S/p Code  4. NGT feeding    Plan:  1. Cont to trend for LFTs if worsens can do additional workup. (HIDA, MRCP, in addition to other causes of hepatitis)  2. Cont monitor H/H transfuse as indicated  3. Continue supportive care, may need peg later on        Chief Complaint: anemia, elevated LFTs, GI bleed    Subjective:  Pt unresponsive, nonverbal, intubated.  No hematochezia, melena noted        Eyes: conjunctiva normal, EOM normal   ENT: Mucous membranes moist, no lesion or thrush   Cardiovascular:  normal rate and regular rhythm, no murmur   Pulmonary/Chest Wall: breath sounds normal and effort normal   Abdominal:  soft, non-acute, non-tender, no appreciable mass or hepatosplenomegaly, no appreciable ascites       Visit Vitals    /54    Pulse 88    Temp 100 °F (37.8 °C)    Resp 20    Ht 6' 3\" (1.905 m)  Comment: per chart history    Wt 73.3 kg (161 lb 9.6 oz)    SpO2 98%    BMI 20.2 kg/m2           Intake/Output Summary (Last 24 hours) at 08/01/17 1025  Last data filed at 08/01/17 0600   Gross per 24 hour   Intake             2025 ml   Output             2200 ml   Net             -175 ml       CBC w/Diff    Lab Results   Component Value Date/Time    WBC 28.7 (H) 08/01/2017 04:24 AM    RBC 3.65 (L) 08/01/2017 04:24 AM    HGB 11.1 (L) 08/01/2017 04:24 AM    HCT 31.0 (L) 08/01/2017 04:24 AM    MCV 84.9 08/01/2017 04:24 AM    MCH 30.4 08/01/2017 04:24 AM    MCHC 35.8 08/01/2017 04:24 AM    RDW 15.4 (H) 08/01/2017 04:24 AM    PLT 57 (L) 08/01/2017 04:24 AM    Lab Results   Component Value Date/Time    GRANS 80 (H) 08/01/2017 04:24 AM    LYMPH 3 (L) 08/01/2017 04:24 AM    EOS 0 08/01/2017 04:24 AM    BANDS 6 (H) 08/01/2017 04:24 AM    BASOS 0 08/01/2017 04:24 AM    MYELO 1 (H) 07/30/2017 12:40 AM    METAS 1 (H) 08/01/2017 04:24 AM      Basic Metabolic Profile   Recent Labs      08/01/17   0424  07/31/17   1000   NA  136   --    K  3.3*   --    CL  102   --    CO2  20*   --    BUN  49*   --    CA  8.2*   --    PHOS   --   4.6        Hepatic Function    Lab Results   Component Value Date/Time    ALB 1.9 (L) 08/01/2017 04:24 AM    TP 6.0 (L) 08/01/2017 04:24 AM     (H) 08/01/2017 04:24 AM    Lab Results   Component Value Date/Time    SGOT 44 (H) 08/01/2017 04:24 AM          Anna Burton, KARLYC  [facility], [address]  [email or web address] [employer]        [email or web address]  [phone]       Impression  1. Multiorgan failure  2.anemia  3. Elevated LFTs  4.GI bleed  5. ESRD-noncompliant outpatient     Plan:  1.cont tube feeding  2.moniter H/H transfuse as indicated  3. Elevated LFTS probably suspect ischemic hepatitis  4. Stool for cdiff negative  5. Cont supportive care      Chief Complaint: anemia, elevated LFTS, GI bleed    Subjective: Pt unresponsive, uneventful overnight.  No hematochezia, melena noted         Eyes: conjunctiva normal,    ENT: Mucous membranes moist, no lesion or thrush   Cardiovascular:  normal rate and regular rhythm, no murmur   Pulmonary/Chest Wall: breath sounds normal. On ventilator   Abdominal:  soft, non-acute, non-tender, no appreciable mass or hepatosplenomegaly, no appreciable ascites       Visit Vitals    /54    Pulse 88    Temp 100 °F (37.8 °C)    Resp 20    Ht 6' 3\" (1.905 m)  Comment: per chart history    Wt 73.3 kg (161 lb 9.6 oz)    SpO2 98%    BMI 20.2 kg/m2           Intake/Output Summary (Last 24 hours) at 08/01/17 1026  Last data filed at 08/01/17 0600   Gross per 24 hour   Intake             2025 ml   Output             2200 ml   Net             -175 ml       CBC w/Diff    Lab Results   Component Value Date/Time    WBC 28.7 (H) 08/01/2017 04:24 AM    RBC 3.65 (L) 08/01/2017 04:24 AM    HGB 11.1 (L) 08/01/2017 04:24 AM    HCT 31.0 (L) 08/01/2017 04:24 AM    MCV 84.9 08/01/2017 04:24 AM    MCH 30.4 08/01/2017 04:24 AM    MCHC 35.8 08/01/2017 04:24 AM    RDW 15.4 (H) 08/01/2017 04:24 AM    PLT 57 (L) 08/01/2017 04:24 AM    Lab Results   Component Value Date/Time    GRANS 80 (H) 08/01/2017 04:24 AM    LYMPH 3 (L) 08/01/2017 04:24 AM    EOS 0 08/01/2017 04:24 AM    BANDS 6 (H) 08/01/2017 04:24 AM    BASOS 0 08/01/2017 04:24 AM    MYELO 1 (H) 07/30/2017 12:40 AM    METAS 1 (H) 08/01/2017 04:24 AM      Basic Metabolic Profile   Recent Labs      08/01/17   0424  07/31/17   1000   NA  136   --    K  3.3*   --    CL  102   --    CO2  20*   --    BUN  49*   --    CA  8.2*   --    PHOS   --   4.6        Hepatic Function    Lab Results   Component Value Date/Time    ALB 1.9 (L) 08/01/2017 04:24 AM    TP 6.0 (L) 08/01/2017 04:24 AM     (H) 08/01/2017 04:24 AM    Lab Results   Component Value Date/Time    SGOT 44 (H) 08/01/2017 04:24 AM          KARLY MooreC  [facility] [address], [facility]  [email or web address]

## 2017-08-01 NOTE — PROGRESS NOTES
Care Management Interventions  PCP Verified by CM: Yes (Dr. Clayton Peng)  Transition of Care Consult (CM Consult): Discharge Planning, Other (TBD course of hospitalization. Will need outpatient dialysis)  Current Support Network: Other, Family Lives Nearby (Lives with a roommate. Has family nearby who can assist as needed)  Plan discussed with Pt/Family/Caregiver: Yes    Patient remains intubated. There is no family at bedside at this time. Per Provider Note, patient's brother has been in the room at times and spoken with MD.  JEANNETTE placed contact number on white board in patient's room. Per chart: Patient has a daughter Liz Garcias [phone], niece Mica Burgess [phone], son Shruthi Angeles [phone]. Patient has a wife \"Carley\" listed on white board in the room. Per Ricardo, that is her mother and she and the patient are . Patient without MPOA, however patient's daughter is acting as decision maker. She is also NOK with her brother, Shruthi Angeles. Per patient's daughter, the patient's physical address is [address] Daughter states he lives with a roommate, but roommate is not a caregiver. Patient's brother, kids, and niece live nearby and can assist as needed. Daughter stated the patient was going to outpatient dialysis at [facility], [address], [address]. However, \"they stopped picking him up a few months ago because he was so non-compliant\". Patient is not registered with an outpatient clinic at this time.

## 2017-08-01 NOTE — PROGRESS NOTES
RENAL DAILY PROGRESS NOTE    Subjective:   Admitted postcode, seen for ESRD and HD    Complaint: remains intubated off vasopressors.  Tolerating feeding, still with diarrhea    Current Facility-Administered Medications   Medication Dose Route Frequency    [START ON 8/2/2017] Vancomycin random level  1 Each Other Rx Dosing/Monitoring    insulin glargine (LANTUS) injection 10 Units  10 Units SubCUTAneous DAILY    [START ON 8/2/2017] pantoprazole (PROTONIX) injection 40 mg  40 mg IntraVENous DAILY    meropenem (MERREM) 500 mg in 0.9% sodium chloride (MBP/ADV) 50 mL MBP  0.5 g IntraVENous Q24H    midazolam (VERSED) 100 mg in 0.9% sodium chloride 100 mL infusion  1-3 mg/hr IntraVENous TITRATE    hydrocortisone Sod Succ (PF) (SOLU-CORTEF) injection 50 mg  50 mg IntraVENous Q8H    doxercalciferol (HECTOROL) 4 mcg/2 mL injection 2 mcg  2 mcg IntraVENous Q MON, WED & FRI    epoetin benjamin (EPOGEN;PROCRIT) injection 10,000 Units  10,000 Units IntraVENous Q MON, WED & FRI    levETIRAcetam (KEPPRA) 500 mg in 100 ml IVPB  500 mg IntraVENous Q12H    chlorhexidine (PERIDEX) 0.12 % mouthwash 10 mL  10 mL Oral Q12H    insulin lispro (HUMALOG) injection   SubCUTAneous Q6H    glucose chewable tablet 16 g  4 Tab Oral PRN    glucagon (GLUCAGEN) injection 1 mg  1 mg IntraMUSCular PRN    dextrose (D50W) injection syrg 12.5-25 g  25-50 mL IntraVENous PRN    0.9% sodium chloride infusion  100 mL/hr IntraVENous DIALYSIS PRN    VANCOMYCIN INFORMATION NOTE   Other CONTINUOUS    naloxone (NARCAN) injection 0.4 mg  0.4 mg IntraVENous PRN    LORazepam (ATIVAN) injection 1 mg  1 mg IntraVENous Q4H PRN    fentaNYL citrate (PF) injection 50 mcg  50 mcg IntraVENous Q2H PRN           Objective:     Patient Vitals for the past 24 hrs:   Temp Pulse Resp BP SpO2   08/01/17 1245 - 78 18 - 100 %   08/01/17 1200 99.2 °F (37.3 °C) 84 25 159/67 100 %   08/01/17 1100 - 87 21 111/56 98 %   08/01/17 1000 - 91 20 112/55 95 %   08/01/17 0900 - 88 20 120/54 98 %   08/01/17 0826 - 94 13 - 100 %   08/01/17 0800 100 °F (37.8 °C) 86 19 104/52 98 %   08/01/17 0700 - 90 18 109/53 99 %   08/01/17 0600 - 88 17 104/51 97 %   08/01/17 0500 - 97 22 125/65 98 %   08/01/17 0430 - 96 22 - 97 %   08/01/17 0402 - 96 18 - 94 %   08/01/17 0400 (!) 100.9 °F (38.3 °C) 94 18 112/58 95 %   08/01/17 0330 - 94 22 - 91 %   08/01/17 0300 - 87 22 109/59 97 %   08/01/17 0230 - 90 21 - 94 %   08/01/17 0200 - 90 22 109/56 93 %   08/01/17 0130 - 89 21 - 98 %   08/01/17 0100 - 87 21 109/58 96 %   08/01/17 0030 - 91 21 - 91 %   08/01/17 0018 - 89 20 - 99 %   08/01/17 0000 - 91 18 98/56 99 %   07/31/17 2340 98.5 °F (36.9 °C) - - - -   07/31/17 2330 - 89 19 - 98 %   07/31/17 2300 - 90 18 111/60 99 %   07/31/17 2200 - 83 18 103/57 94 %   07/31/17 2100 - 89 18 114/60 100 %   07/31/17 2011 - 88 28 - 99 %   07/31/17 2000 - 88 19 101/59 99 %   07/31/17 1917 98.5 °F (36.9 °C) - - - -   07/31/17 1900 - 87 17 100/56 97 %   07/31/17 1800 - 81 17 101/56 99 %   07/31/17 1700 - 76 18 100/58 99 %   07/31/17 1609 - 75 17 - 100 %   07/31/17 1600 98.3 °F (36.8 °C) 76 16 99/61 100 %   07/31/17 1500 - 83 18 115/72 99 %   07/31/17 1400 - 80 17 111/67 96 %   07/31/17 1345 - 81 18 102/64 95 %        Weight change:      07/30 1901 - 08/01 0700  In: 3783.8 [I.V.:1578.8]  Out: 2200 [Drains:1200]    Intake/Output Summary (Last 24 hours) at 08/01/17 1342  Last data filed at 08/01/17 1200   Gross per 24 hour   Intake             1845 ml   Output             1200 ml   Net              645 ml     Physical Exam: intubated, afebrile    HEENT: jaundiced, ET/NGT in place  Neck: no JVD  Cardiovascular: regular no rub  C/L: equal vent breath sounds  Abdomen: soft, + BS  Ext: + 1-2 edema Left arm AVF + bruit    Data Review:     LABS:   Hematology:   Recent Labs      08/01/17   0424  07/31/17   0408  07/30/17   0040   WBC  28.7*  22.9*  18.5*   HGB  11.1*  11.0*  8.8*   HCT  31.0*  30.7*  24.3*   pl 57K  Chemistry:   Recent Labs      08/01/17   0424  07/31/17   1000  07/31/17   0408  07/30/17   0040   BUN  49*   --   50*  32*   CREA  4.98*   --   5.81*  4.61*   CA  8.2*   --   7.8*  7.6*   ALB  1.9*   --   1.7*  1.9*   K  3.3*   --   3.1*  3.2*   NA  136   --   137  140   CL  102   --   102  104   CO2  20*   --   21  25   PHOS   --   4.6   --    --    GLU  335*   --   165*  77    Vanco 16.4  Mag 2.1  Ion yeison 0.81  Fe 20, sat 15% maurilio 4993  Lactic 1.9  IPTH 1285    IMAGES: CXR right lower lobe consolidation    CULTURE: Blood C/S + Ecoli and Clostridium perfringes      IMPRESSION AND PLAN:   ESRD sched HD tomorrow.   Will attempt some UF if BP allows  Anemia from CKD low fe stores, ALONDRA with HD  Hypotension post code/sepsis cont with supportive care defer to ICU team.  E. coli bacteremia on Zosyn/Vanco defer to ID, vinay Vanco  Hypokalemia replace po K3 bath  Hypocalcemia from 2nd HPTH cont hectorol and yeison 3 bath with HD           Jazlyn Cheatham MD  8/1/2017

## 2017-08-01 NOTE — PROGRESS NOTES
Neurology Progress Note    Patient ID:  Henry Aguirre  [id number]  55 y.o.  1960    Subjective:      Patient with continued depressed mental status. He has been off sedation. No reported seizures. He also is now probably septic. Current Facility-Administered Medications   Medication Dose Route Frequency    [START ON 8/2/2017] Vancomycin random level  1 Each Other Rx Dosing/Monitoring    meropenem (MERREM) 500 mg in 0.9% sodium chloride (MBP/ADV) 50 mL MBP  0.5 g IntraVENous Q24H    midazolam (VERSED) 100 mg in 0.9% sodium chloride 100 mL infusion  1-3 mg/hr IntraVENous TITRATE    hydrocortisone Sod Succ (PF) (SOLU-CORTEF) injection 50 mg  50 mg IntraVENous Q8H    doxercalciferol (HECTOROL) 4 mcg/2 mL injection 2 mcg  2 mcg IntraVENous Q MON, WED & FRI    epoetin benjamin (EPOGEN;PROCRIT) injection 10,000 Units  10,000 Units IntraVENous Q MON, WED & FRI    levETIRAcetam (KEPPRA) 500 mg in 100 ml IVPB  500 mg IntraVENous Q12H    chlorhexidine (PERIDEX) 0.12 % mouthwash 10 mL  10 mL Oral Q12H    insulin lispro (HUMALOG) injection   SubCUTAneous Q6H    glucose chewable tablet 16 g  4 Tab Oral PRN    glucagon (GLUCAGEN) injection 1 mg  1 mg IntraMUSCular PRN    dextrose (D50W) injection syrg 12.5-25 g  25-50 mL IntraVENous PRN    famotidine (PF) (PEPCID) injection 20 mg  20 mg IntraVENous Q24H    0.9% sodium chloride infusion  100 mL/hr IntraVENous DIALYSIS PRN    VANCOMYCIN INFORMATION NOTE   Other CONTINUOUS    naloxone (NARCAN) injection 0.4 mg  0.4 mg IntraVENous PRN    LORazepam (ATIVAN) injection 1 mg  1 mg IntraVENous Q4H PRN    fentaNYL citrate (PF) injection 50 mcg  50 mcg IntraVENous Q2H PRN          Objective: Active hospital medications were reviewed    Lab results and neuroradiology studies from the last 24 hours were reviewed.       Prior to Admission medications    Not on File     Patient Vitals for the past 8 hrs:   BP Temp Pulse Resp SpO2   08/01/17 0826 - - 94 13 100 %   08/01/17 0800 104/52 - 86 19 98 %   08/01/17 0700 109/53 - 90 18 99 %   08/01/17 0600 104/51 - 88 17 97 %   08/01/17 0500 125/65 - 97 22 98 %   08/01/17 0430 - - 96 22 97 %   08/01/17 0402 - - 96 18 94 %   08/01/17 0400 112/58 (!) 100.9 °F (38.3 °C) 94 18 95 %   08/01/17 0330 - - 94 22 91 %   08/01/17 0300 109/59 - 87 22 97 %   08/01/17 0230 - - 90 21 94 %   08/01/17 0200 109/56 - 90 22 93 %   08/01/17 0130 - - 89 21 98 %   CNUWHZWRRXUS89/30 1901 - 08/01 0700  In: 3783.8 [I.V.:1578.8]  Out: 2200 [Drains:1200]  07/30 1901 - 08/01 0700  In: 3783.8 [I.V.:1578.8]  Out: 2200 [Drains:1200]RESULTRCNT(24h)Principal Problem:    Sepsis ([facility] (7/27/2017)    Active Problems:    Anemia ()      Acidosis (7/27/2017)      Cardiac arrest ([facility] [address] (7/27/2017)      Respiratory failure ([facility] [address] (7/27/2017)      ESRD needing dialysis ([facility] [address] (7/27/2017)      Anoxic brain damage (HCC) (7/27/2017)      Colitis (7/27/2017)      Hypotension (7/27/2017)      Acute GI bleeding (7/27/2017)      ESRD (end stage renal disease) ([facility] [address] (7/27/2017)        Additional comments:I reviewed the patient's new clinical lab test results. General Exam  No acute distress, mucous membranes normal color and hydration status        Neurologic Exam    Mental status:  Unresponsive to voice. Occasional spontaneous movements noted. Some coughing with central pain,   Some grimacing to digital pain  No abnormal movements  Cranial nerves: OD: 3mm-min reactive, OS: 2mm-min reactive, Eyes conjugate    Motor: moving upper extremities purposefully. DTRs: 1 throughout    Results for Orest Marker (MRN [id number]) as of 7/31/2017 09:24   Ref.  Range 7/31/2017 04:08   WBC Latest Ref Range: 4.6 - 13.2 K/uL 22.9 (H)   NRBC Latest Ref Range: 0  WBC 1.0 (H)   RBC Latest Ref Range: 4.70 - 5.50 M/uL 3.62 (L)   HGB Latest Ref Range: 13.0 - 16.0 g/dL 11.0 (L)   HCT Latest Ref Range: 36.0 - 48.0 % 30.7 (L)   MCV Latest Ref Range: 74.0 - 97.0 FL 84.8   MCH Latest Ref Range: 24.0 - 34.0 PG 30.4   MCHC Latest Ref Range: 31.0 - 37.0 g/dL 35.8   RDW Latest Ref Range: 11.6 - 14.5 % 15.4 (H)     Results for Giacomo Ugalde (MRN [id number]) as of 7/31/2017 09:24   Ref. Range 7/31/2017 04:08   Potassium Latest Ref Range: 3.5 - 5.5 mmol/L 3.1 (L)   Chloride Latest Ref Range: 100 - 108 mmol/L 102   CO2 Latest Ref Range: 21 - 32 mmol/L 21   Anion gap Latest Ref Range: 3.0 - 18 mmol/L 14   Glucose Latest Ref Range: 74 - 99 mg/dL 165 (H)   BUN Latest Ref Range: 7.0 - 18 MG/DL 50 (H)   Creatinine Latest Ref Range: 0.6 - 1.3 MG/DL 5.81 (H)   BUN/Creatinine ratio Latest Ref Range: 12 - 20   9 (L)   Calcium Latest Ref Range: 8.5 - 10.1 MG/DL 7.8 (L)   GFR est non-AA Latest Ref Range: >60 ml/min/1.73m2 10 (L)   GFR est AA Latest Ref Range: >60 ml/min/1.73m2 12 (L)   Bilirubin, total Latest Ref Range: 0.2 - 1.0 MG/DL 3.4 (H)   Protein, total Latest Ref Range: 6.4 - 8.2 g/dL 5.7 (L)   Albumin Latest Ref Range: 3.4 - 5.0 g/dL 1.7 (L)   Globulin Latest Ref Range: 2.0 - 4.0 g/dL 4.0         Assessment:     Lee Ann Ricketts is a 55 y.o. who was admitted with unresponsiveness and out of hospital cardiac arrest with unknown pulseless time. Current exam is consistent with encephalopathy which is likely multifactorial and secondary to some degree of hypoxic-ischemic encephalopathy and some degree of parainfectious and toxic/metabolic encephalopathy. At present it is very difficult to prognosticate neurologic recovery with current sepsis. No seizures noted on EEG from yesterday        Plan:   1. Continue Keppra as currently dosed  2. Continue supportive care. Mago Mata. Laura Coronel M.D.   Clinical Neurophysiology  Neuromuscular Specialist    Signed:  8/1/2017  9:20 AM  9:20 AM

## 2017-08-01 NOTE — PROCEDURES
[place]    Name:  Amanda Duke  MR#:  [id number]  :  1960  Account #:  [de-identified]  Date of Adm:  2017  Date of Service:  2017      REFERRING PHYSICIAN: Maggie Antunez MD    EEG #Delaney Topete     CLINICAL: This is an apparently comatose EEG on this 59-year-old  man who presented to the emergency room after being found  unresponsive. He had a pulseless arrest.    MEDICATIONS: None were indicated. ELECTROENCEPHALOGRAM REPORT: The predominant  background consists of 30 to 60 microvolts, 5 to 6 Hz waves which  spread significantly into the central, temporal and somewhat into the  frontal head regions. Bifrontal 2 to 3 microvolts, 16 to 20 Hz activity  appears. No change in recording is identified with external stimuli. Step-flash photic stimulation causes no change in the record. IMPRESSION: Abnormal electroencephalogram due to the presence  of what might be an alpha  coma. No epileptic activity was  appreciated.         Giacomo Gaspar MD    MG / JAMES  D:  2017   17:05  T:  2017   21:36  Job #:  [id number]

## 2017-08-01 NOTE — PROGRESS NOTES
[facility] Hospitalist Group  Progress Note    Patient: Christen Dakin Age: 55 y.o. : 1960 MR#: [id number] SSN: [id number]  Date/Time: 2017     Subjective:     Can not be obtained due to patient's factors     Assessment/Plan:   Found unresponsive , brought to ED via EMS , went in to [facility] , CPR/ACLS protocol with ROSC     1.  CP arrest   - continued decreased mentation   - S/P EEG - Abnormal electroencephalogram due to the presence  of what might be an alpha  coma. No epileptic activity was  Appreciated. - continue supportive measures   - on keppra for seizure     2 ESRD   - for HD   - follow with renal       3 Acute GI bleed - history   -- no clear evidence of bleeding     4 Anemia - as above   - continue to monitor H/H   - Low stable H/h so far     5 GNR bacteremia /Sepsis - leucocytosis   - on Merrem , Off Zosyn due to thrombocytopenia . Continue vanco   - rpt blood cultures   - Ecoli and CLOSTRIDIUM PERFRINGENS    6 hypokalemia and Hypocalcemia - replace lytes - defer to ICU , Renal     7 Colitis -   - Culture stool negative so far , C diff negative       8  Elevated LFT - ?  Shock liver   - some  improved LFT         Case discussed with:  []Patient  []Family  [x]Nursing  []Case Management  DVT Prophylaxis:  []Lovenox  []Hep SQ  []SCDs  []Coumadin   []On Heparin gtt    Patient Active Problem List   Diagnosis Code    Anemia D64.9    Acidosis E87.2    Cardiac arrest ([address] I46.9    Respiratory failure ([address] J96.90    ESRD needing dialysis ([address] N18.6, Z99.2    Anoxic brain damage (HCC) G93.1    Colitis K52.9    Hypotension I95.9    Acute GI bleeding K92.2    ESRD (end stage renal disease) ([address] N18.6    Sepsis ([address] A41.9       Objective:   VS:   Visit Vitals    /67    Pulse 84    Temp 99.2 °F (37.3 °C)    Resp 25    Ht 6' 3\" (1.905 m)  Comment: per chart history    Wt 73.3 kg (161 lb 9.6 oz)    SpO2 100%    BMI 20.2 kg/m2      Tmax/24hrs: Temp (24hrs), Av °F (37.2 °C), Min:97.5 °F (36.4 °C), Max:100.9 °F (38.3 °C)  IOBRIEF    Intake/Output Summary (Last 24 hours) at 17 1248  Last data filed at 17 1200   Gross per 24 hour   Intake             1885 ml   Output             2200 ml   Net             -315 ml       General:  Orally intubated / on vent   HEENT:  NC, Atraumatic. PERRLA, anicteric sclerae. Pulmonary:  CTA Bilaterally. No Wheezing/Rhonchi/Rales. Cardiovascular: Regular rate and Rhythm. GI:  Soft, Non distended, Non tender. + Bowel sounds. Extremities:  No edema, cyanosis, clubbing. No calf tenderness. Psych: Not examined   Neurologic: Grossly - Motor and Sensory functions are intact .          Medications:   Current Facility-Administered Medications   Medication Dose Route Frequency    [START ON 2017] Vancomycin random level  1 Each Other Rx Dosing/Monitoring    insulin glargine (LANTUS) injection 10 Units  10 Units SubCUTAneous DAILY    [START ON 2017] pantoprazole (PROTONIX) injection 40 mg  40 mg IntraVENous DAILY    meropenem (MERREM) 500 mg in 0.9% sodium chloride (MBP/ADV) 50 mL MBP  0.5 g IntraVENous Q24H    midazolam (VERSED) 100 mg in 0.9% sodium chloride 100 mL infusion  1-3 mg/hr IntraVENous TITRATE    hydrocortisone Sod Succ (PF) (SOLU-CORTEF) injection 50 mg  50 mg IntraVENous Q8H    doxercalciferol (HECTOROL) 4 mcg/2 mL injection 2 mcg  2 mcg IntraVENous Q MON, WED & FRI    epoetin benjamin (EPOGEN;PROCRIT) injection 10,000 Units  10,000 Units IntraVENous Q MON, WED & FRI    levETIRAcetam (KEPPRA) 500 mg in 100 ml IVPB  500 mg IntraVENous Q12H    chlorhexidine (PERIDEX) 0.12 % mouthwash 10 mL  10 mL Oral Q12H    insulin lispro (HUMALOG) injection   SubCUTAneous Q6H    glucose chewable tablet 16 g  4 Tab Oral PRN    glucagon (GLUCAGEN) injection 1 mg  1 mg IntraMUSCular PRN    dextrose (D50W) injection syrg 12.5-25 g  25-50 mL IntraVENous PRN    0.9% sodium chloride infusion  100 mL/hr IntraVENous DIALYSIS PRN    VANCOMYCIN INFORMATION NOTE   Other CONTINUOUS    naloxone (NARCAN) injection 0.4 mg  0.4 mg IntraVENous PRN    LORazepam (ATIVAN) injection 1 mg  1 mg IntraVENous Q4H PRN    fentaNYL citrate (PF) injection 50 mcg  50 mcg IntraVENous Q2H PRN       Labs:    Recent Results (from the past 24 hour(s))   GLUCOSE, POC    Collection Time: 07/31/17  6:02 PM   Result Value Ref Range    Glucose (POC) 237 (H) 70 - 110 mg/dL   GLUCOSE, POC    Collection Time: 07/31/17 11:13 PM   Result Value Ref Range    Glucose (POC) 322 (H) 70 - 110 mg/dL   POC G3    Collection Time: 08/01/17  4:11 AM   Result Value Ref Range    Device: VENT      FIO2 (POC) 0.25 %    pH (POC) 7.453 (H) 7.35 - 7.45      pCO2 (POC) 34.3 (L) 35.0 - 45.0 MMHG    pO2 (POC) 68 (L) 80 - 100 MMHG    HCO3 (POC) 24.0 22 - 26 MMOL/L    sO2 (POC) 94 92 - 97 %    Base excess (POC) 0 mmol/L    Mode ASSIST CONTROL      Tidal volume 400 ml    Set Rate 12 bpm    PEEP/CPAP (POC) 5.0 cmH2O    Allens test (POC) YES      Inspiratory Time 0.9 sec    Total resp. rate 23      Site RIGHT RADIAL      Specimen type (POC) ARTERIAL      Performed by Sultana Cunha     Volume control plus YES     CALCIUM, IONIZED    Collection Time: 08/01/17  4:24 AM   Result Value Ref Range    Ionized Calcium 1.04 (L) 1.12 - 1.32 MMOL/L   CBC WITH AUTOMATED DIFF    Collection Time: 08/01/17  4:24 AM   Result Value Ref Range    WBC 28.7 (H) 4.6 - 13.2 K/uL    RBC 3.65 (L) 4.70 - 5.50 M/uL    HGB 11.1 (L) 13.0 - 16.0 g/dL    HCT 31.0 (L) 36.0 - 48.0 %    MCV 84.9 74.0 - 97.0 FL    MCH 30.4 24.0 - 34.0 PG    MCHC 35.8 31.0 - 37.0 g/dL    RDW 15.4 (H) 11.6 - 14.5 %    PLATELET 57 (L) 228 - 420 K/uL    NEUTROPHILS 80 (H) 42 - 75 %    BAND NEUTROPHILS 6 (H) 0 - 5 %    LYMPHOCYTES 3 (L) 20 - 51 %    MONOCYTES 10 (H) 2 - 9 %    EOSINOPHILS 0 0 - 5 %    BASOPHILS 0 0 - 3 %    METAMYELOCYTES 1 (H) 0 %    NRBC 2.0 (H) 0  WBC    ABS. NEUTROPHILS 24.7 (H) 1.8 - 8.0 K/UL    ABS.  LYMPHOCYTES 0.9 0.8 - 3.5 K/UL    ABS. MONOCYTES 2.9 (H) 0 - 1.0 K/UL    ABS. EOSINOPHILS 0.0 0.0 - 0.4 K/UL    ABS. BASOPHILS 0.0 0.0 - 0.06 K/UL    DF MANUAL      PLATELET COMMENTS DECREASED PLATELETS      RBC COMMENTS ANISOCYTOSIS  1+        RBC COMMENTS TARGET CELLS  1+        RBC COMMENTS OVALOCYTES  1+        RBC COMMENTS POLYCHROMASIA  1+       METABOLIC PANEL, COMPREHENSIVE    Collection Time: 08/01/17  4:24 AM   Result Value Ref Range    Sodium 136 136 - 145 mmol/L    Potassium 3.3 (L) 3.5 - 5.5 mmol/L    Chloride 102 100 - 108 mmol/L    CO2 20 (L) 21 - 32 mmol/L    Anion gap 14 3.0 - 18 mmol/L    Glucose 335 (H) 74 - 99 mg/dL    BUN 49 (H) 7.0 - 18 MG/DL    Creatinine 4.98 (H) 0.6 - 1.3 MG/DL    BUN/Creatinine ratio 10 (L) 12 - 20      GFR est AA 15 (L) >60 ml/min/1.73m2    GFR est non-AA 12 (L) >60 ml/min/1.73m2    Calcium 8.2 (L) 8.5 - 10.1 MG/DL    Bilirubin, total 2.0 (H) 0.2 - 1.0 MG/DL    ALT (SGPT) 101 (H) 16 - 61 U/L    AST (SGOT) 44 (H) 15 - 37 U/L    Alk.  phosphatase 186 (H) 45 - 117 U/L    Protein, total 6.0 (L) 6.4 - 8.2 g/dL    Albumin 1.9 (L) 3.4 - 5.0 g/dL    Globulin 4.1 (H) 2.0 - 4.0 g/dL    A-G Ratio 0.5 (L) 0.8 - 1.7     LACTIC ACID    Collection Time: 08/01/17  4:24 AM   Result Value Ref Range    Lactic acid 1.1 0.4 - 2.0 MMOL/L   PTT    Collection Time: 08/01/17  4:24 AM   Result Value Ref Range    aPTT 32.0 23.0 - 36.4 SEC   GLUCOSE, POC    Collection Time: 08/01/17  5:22 AM   Result Value Ref Range    Glucose (POC) 332 (H) 70 - 110 mg/dL   GLUCOSE, POC    Collection Time: 08/01/17 11:50 AM   Result Value Ref Range    Glucose (POC) 359 (H) 70 - 110 mg/dL       Signed By: Skye Young MD     August 1, 2017

## 2017-08-01 NOTE — PROGRESS NOTES
attended the interdisciplinary rounds for [facility], who is a 55 y. o.,male. Patients Primary Language is: [place]. According to the patients EMR Latter day Affiliation is: Karena Blair.     The reason the Patient came to the hospital is:   Patient Active Problem List    Diagnosis Date Noted    Acidosis 07/27/2017    Cardiac arrest ([facility] [address] 07/27/2017    Respiratory failure ([facility] [address] 07/27/2017    ESRD needing dialysis ([facility] [address] 07/27/2017    Anoxic brain damage ([facility] [address] 07/27/2017    Colitis 07/27/2017    Hypotension 07/27/2017    Acute GI bleeding 07/27/2017    ESRD (end stage renal disease) ([facility] 07/27/2017    Sepsis ([facility] [address] 07/27/2017    Anemia         Plan:  Chaplains will continue to follow and will provide pastoral care on an as needed/requested basis.  recommends bedside caregivers page  on duty if patient shows signs of acute spiritual or emotional distress.     [address]  Board Certified [facility]   [phone]

## 2017-08-01 NOTE — DIABETES MGMT
GLYCEMIC CONTROL PLAN OF CARE    Assessment/Recommendations:  Pt discussed in interdisciplinary rounds. Blood glucose has trended up, likely related to steroids. Lantus insulin added today, monitor need to adjust if as steroids tapered or discontinued   Pt is tolerating tube feeds  Continue inpatient monitoring and intervention      Most recent blood glucose values:  7/31/2017 06:00 7/31/2017 11:56 7/31/2017 18:02 7/31/2017 23:13 8/1/2017 05:22   176 (H) 171 (H) 237 (H) 322 (H) 332 (H)     Current A1C of 5.4% is equivalent to average blood glucose of 108 mg/dl over the past 2-3 months.     Current hospital diabetes medications:   Correctional Lispro insulin every 6 hours (very resistant scale)  Lantus insulin 10 units daily (first dose today)    Previous day's insulin requirements:   18 units of Lispro insulin     Home diabetes medications: none    Diet:  NPO  Tube Feeding: Nepro at 55 mL/hr via NGT    Judi Carson RD, CDE

## 2017-08-01 NOTE — INTERDISCIPLINARY ROUNDS
CRITICAL CARE INTERDISCIPLINARY ROUNDS      Patient Information:    Name:   Shirlene Winkler    Age:   55 y.o.     Admission Date:   7/27/2017    Readmit Risk Assessment Information:      Readmit Risk Tool Support Systems: Family member(s), Relationship with Primary Physician Group: Seen at least one time within past 12 months    Surgery Date:      Day of Stay:     Expected Discharge Date:        Attending Provider:   Anh Neely MD    Surgeon:        Consultant:       Primary Care Provider:   Jessica Sanchez MD    Problem List:     Patient Active Problem List   Diagnosis Code    Anemia D64.9    Acidosis E87.2    Cardiac arrest ([address] I46.9    Respiratory failure ([address] J96.90    ESRD needing dialysis ([address] N18.6, Z99.2    Anoxic brain damage ([address] G93.1    Colitis K52.9    Hypotension I95.9    Acute GI bleeding K92.2    ESRD (end stage renal disease) ([address] N18.6    Sepsis ([address] A41.9       Principal Problem:  Sepsis ([address]    Procedure:       During rounds the following quality care indicators and evidence based practices were addressed :     DVT Prophylaxis, Pressure Injury Prevention, Pain Management, Sepsis resuscitation and management, Nutritional Status, Critical Care Interventions Airways, Drains and Lines and IHI Bundles: Vent Bundle Followed, Vent Day 5           Acute MI/PCI:   Not applicable    Heart Failure:    Not applicable    Cardiac Surgery:  Not applicable    SCIP Measures for other Surgeries:   Not applicable    Pneumonia:    Appropriate Antibiotic Selection (ICU versus Non-ICU)    Stroke:  Patient's Personal Risk Factors for Stroke are:   hypertension, family history, hyperlipidemia or diabetes mellitus    NIH Stroke Score  Total: 41 (07/27/17 1700)    Transfer Level of Care:  Progressing to Transfer    The patient will require the following interventions based on the Readmission Risk Assessment:  Pharmacy evaluation and teaching, Care Management involvement for home health follow up for:  mobility and assistance with ADL's and Spiritual Care evaluation      Discharge Management:  Home and Palliative Care    Anticipated Discharge Date:  3      Interdisciplinary team rounds were held  with the following team membersCare Management, Diabetes Treatment Specialist, Nursing, Nutrition, Pastoral Care, Pharmacy, Physician and Clinical Coordinator and the  patient. Plan of care discussed. See clinical pathway and/or care plan for interventions and desired outcomes. Doctor to speak with family. Restraints discontinued. Tolerating tube feeds stop pepcid. WBC elevated.

## 2017-08-01 NOTE — CONSULTS
Infectious Disease Consultation Note    Requested by: Dr. Diamond Phan    Reason: sepsis, e.coli/clostridium pefringens bacteremia    Current abx Prior abx   Meropenem since 7/31, vancomycin since 7/27 Levofloxacin 7/27  Pip/tazo 7/27-7/31     Lines:       Assessment :  55year old man with h/o type 2 DM (hgb a1c 5.4 on 7/29/17) ,ESRD admitted to [facility] on 7/27/17 s/p cardiorespiratory arrest, hypotension, bacteremia, elevated LFTs. Highly complex clinical picture. Presentation c/w sepsis (POA) due to e.coli, clostridium perfringens bloodstream infection. Most likely source of bacteremia is intra abdominal infection/inflammation. Elevated LFTs, thickened gallbladder - concerning for undiagnosed cholecystitis    Patient could have some other undiagnosed abdominal inflammation such as ischemic colitis which could have contributed to polymicrobial bacteremia    Recommendations:    1. Continue meropenem, vancomycin for now - will de escalate if repeat blood cultures negative  2. Recommend HIDA scan if aggressive management warranted - cholecystostomy tube if hida positive  3. Need to address goals of care with family to determine extent of work up    Advance Care planning: full code, patient wasnt able to name a surrogate decision maker or provide an advance care plan    Thank you for consultation request. Above plan was discussed in details with ICU team. Please call me if any further questions or concerns. Will continue to participate in the care of this patient. HPI:    55year old man with h/o type 2 DM (hgb a1c 5.4 on 7/29/17) ,ESRD admitted to [facility] on 7/27/17. Pt was initially seent in ER at [facility] on 7/27/17 with c/o abdominal pain. Pt was found anemic with fluid overload. He had dialysis and was transfused 2 units of PRBCs. He was later d/shelton and was found at home unresponsive by family. EMS performed CPR and was intubated in the ER. He is presently still intubated on levophed.  Patient was started on broad spectrum antibiotics. His blood cultures on admission revealed e.coli, clostridium perfringens. LFTS were elevated. GI was consulted. It was felt that patient has ischemic hepatopathy. Of note, ct scan abdomen and usg revealed thickened gall bladder with sludge. no biliary ductal dilatation. Patient also had seizures - neurology was consulted. Patient has been off pressors now. He had temp of 100.9 overnight. I have been consulted for further recommendations. Patient remains intubated. Detailed ros not feasible.            Past Medical History:   Diagnosis Date    Anemia     Arthritis     Chronic pain     Back     Diabetes mellitus type II     Hypertension     IBS (irritable bowel syndrome)     Lactose intolerance     TB (tuberculosis)     TB test positive       Past Surgical History:   Procedure Laterality Date    ENDOSCOPY, COLON, DIAGNOSTIC      HX AMPUTATION      Toe due to injury       There are no discharge medications for this patient.       Current Facility-Administered Medications   Medication Dose Route Frequency    [START ON 8/2/2017] Vancomycin random level  1 Each Other Rx Dosing/Monitoring    insulin glargine (LANTUS) injection 10 Units  10 Units SubCUTAneous DAILY    [START ON 8/2/2017] pantoprazole (PROTONIX) injection 40 mg  40 mg IntraVENous DAILY    meropenem (MERREM) 500 mg in 0.9% sodium chloride (MBP/ADV) 50 mL MBP  0.5 g IntraVENous Q24H    midazolam (VERSED) 100 mg in 0.9% sodium chloride 100 mL infusion  1-3 mg/hr IntraVENous TITRATE    hydrocortisone Sod Succ (PF) (SOLU-CORTEF) injection 50 mg  50 mg IntraVENous Q8H    doxercalciferol (HECTOROL) 4 mcg/2 mL injection 2 mcg  2 mcg IntraVENous Q MON, WED & FRI    epoetin benjamin (EPOGEN;PROCRIT) injection 10,000 Units  10,000 Units IntraVENous Q MON, WED & FRI    levETIRAcetam (KEPPRA) 500 mg in 100 ml IVPB  500 mg IntraVENous Q12H    chlorhexidine (PERIDEX) 0.12 % mouthwash 10 mL  10 mL Oral Q12H    insulin lispro (HUMALOG) injection   SubCUTAneous Q6H    glucose chewable tablet 16 g  4 Tab Oral PRN    glucagon (GLUCAGEN) injection 1 mg  1 mg IntraMUSCular PRN    dextrose (D50W) injection syrg 12.5-25 g  25-50 mL IntraVENous PRN    0.9% sodium chloride infusion  100 mL/hr IntraVENous DIALYSIS PRN    VANCOMYCIN INFORMATION NOTE   Other CONTINUOUS    naloxone (NARCAN) injection 0.4 mg  0.4 mg IntraVENous PRN    LORazepam (ATIVAN) injection 1 mg  1 mg IntraVENous Q4H PRN    fentaNYL citrate (PF) injection 50 mcg  50 mcg IntraVENous Q2H PRN       Allergies: Review of patient's allergies indicates no known allergies. History reviewed. No pertinent family history. Social History     Social History    Marital status: SINGLE     Spouse name: N/A    Number of children: N/A    Years of education: N/A     Occupational History    Not on file. Social History Main Topics    Smoking status: Current Every Day Smoker    Smokeless tobacco: Not on file    Alcohol use Yes    Drug use: Not on file    Sexual activity: Not on file     Other Topics Concern    Not on file     Social History Narrative     History   Smoking Status    Current Every Day Smoker   Smokeless Tobacco    Not on file        Temp (24hrs), Av.4 °F (37.4 °C), Min:98.5 °F (36.9 °C), Max:100.9 °F (38.3 °C)    Visit Vitals    /59    Pulse 85    Temp 99.2 °F (37.3 °C)    Resp 20    Ht 6' 3\" (1.905 m)  Comment: per chart history    Wt 73.3 kg (161 lb 9.6 oz)    SpO2 97%    BMI 20.2 kg/m2       ROS: Patient remains intubated. Detailed ros not feasible. Physical Exam:    General: Well developed, well nourished male laying on the bed, intubated, in no acute distress. General:   awake alert and oriented   HEENT:  Normocephalic, atraumatic, PERRL, EOMI,present scleral icterus no conjunctival hemmohage;  nasal and oral mucous are moist and without evidence of lesions. Intubated. Neck supple, no bruits.    Lymph Nodes:   no cervical, axillary or inguinal adenopathy   Lungs:   non-labored, bilaterally clear to auscultation- no crackles wheezes rales or rhonchi   Heart:  RRR, s1 and s2; no  rubs or gallops, no edema, + pedal pulses   Abdomen:  soft, non-distended, active bowel sounds, no hepatomegaly, no splenomegaly. Non-tender   Genitourinary:  deferred   Extremities:   no clubbing, cyanosis; no joint effusions or swelling; muscle mass appropriate for age   Neurologic:  No gross focal sensory abnormalities; 5/5 muscle strength to upper and lower extremities. Speech appropriate. Cranial nerves intact                        Skin:  No rash or ulcers   Back:  no spinal or paraspinal muscle tenderness or rigidity, no CVA tenderness     Psychiatric:  No suicidal or homicidal ideations, appropriate mood and affect         Labs: Results:   Chemistry Recent Labs      08/01/17 0424 07/31/17   0408  07/30/17   0040   GLU  335*  165*  77   NA  136  137  140   K  3.3*  3.1*  3.2*   CL  102  102  104   CO2  20*  21  25   BUN  49*  50*  32*   CREA  4.98*  5.81*  4.61*   CA  8.2*  7.8*  7.6*   AGAP  14  14  11   BUCR  10*  9*  7*   AP  186*  161*  136*   TP  6.0*  5.7*  5.4*   ALB  1.9*  1.7*  1.9*   GLOB  4.1*  4.0  3.5   AGRAT  0.5*  0.4*  0.5*      CBC w/Diff Recent Labs      08/01/17 0424 07/31/17 0408 07/30/17   0040   WBC  28.7*  22.9*  18.5*   RBC  3.65*  3.62*  2.88*   HGB  11.1*  11.0*  8.8*   HCT  31.0*  30.7*  24.3*   PLT  57*  46*  61*   GRANS  80*  81*  68   LYMPH  3*  2*  4*   EOS  0  0  0      Microbiology No results for input(s): CULT in the last 72 hours.        RADIOLOGY:    All available imaging studies/reports in [facility] for this admission were reviewed    Dr. Jaren Harrell, Infectious Disease Specialist  [phone]  August 1, 2017  4:28 PM

## 2017-08-01 NOTE — PROGRESS NOTES
NUTRITION    Nursing Referral: [facility]     RECOMMENDATIONS / PLAN:     - Continue tube feeding of Nepro at goal rate of 55 mL/hr with 100 mL q 6 hour water flushes.   - Continue RD inpatient monitoring and evaluation. Goal Regimen: Nepro at 55 mL/hr + 100 mL q 6 hour water flushes to provide: 2376 kcal, 107 gm protein, 127 gm fat, 220 gm CHO, 21 gm fiber, 957 mL free water, 1357 mL total water, 100% RDIs     NUTRITION INTERVENTIONS & DIAGNOSIS:     [x] Enteral nutrition support: continue   [x] Coordination of care: interdisciplinary rounds     Nutrition Diagnosis: Inadequate oral intake related to inability to tolerate po as evidenced by pt NPO. ASSESSMENT:     8/1: Tolerating feeding at goal. 1 L removed during HD 7/31. Hyperglycemia noted, advance to very insulin resistant SSI and basal insulin started. 7/31: Tolerating advancement of tube feeding. HD today. BG trending up, MD to stop D5.    7/30: Pt remain intubated. GI following; plan to start tube feeds today. Noted order placed; discussed modifying tube feed order and add water flushes with Dr Melvin Skiff. RN unavailable; discussed with Nursing Dougherty regarding modifying tube feed order  7/28: S/p cardiac arrest and intubated 7/27, NGT clamped. Abdominal ultrasound today to rule out cholecystitis. Will wait to feed. EN infusion adequate to meet patients estimated nutritional needs:   [x] Yes     [] No   [] Unable to determine at this time    Tube Feeding: Nepro at 55 mL/hr via NGT  Water Flushes: 100 mL q 6 hours  Residuals: 0 mL    Diet: DIET NPO  DIET TUBE FEEDING      Food Allergies: NKFA  Current Appetite:   [] Good     [] Fair     [] Poor     [x] Other: NPO  Appetite/meal intake prior to admission:   [] Good     [] Fair     [] Poor     [x] Other: unknown   Feeding Limitations:  [] Swallowing difficulty    [] Chewing difficulty    [x] Other: intubated   Current Meal Intake: No data found.     Anuric   BM: 7/31; FMS  Skin Integrity: WDL  Edema: 1+ non-pitting UEs  Pertinent Medications: Reviewed: steroid     Recent Labs      08/01/17   0424  07/31/17   1000  07/31/17   0408  07/30/17   0040   NA  136   --   137  140   K  3.3*   --   3.1*  3.2*   CL  102   --   102  104   CO2  20*   --   21  25   GLU  335*   --   165*  77   BUN  49*   --   50*  32*   CREA  4.98*   --   5.81*  4.61*   CA  8.2*   --   7.8*  7.6*   PHOS   --   4.6   --    --    ALB  1.9*   --   1.7*  1.9*   SGOT  44*   --   88*  237*   ALT  101*   --   136*  193*       Intake/Output Summary (Last 24 hours) at 08/01/17 1023  Last data filed at 08/01/17 0600   Gross per 24 hour   Intake             2025 ml   Output             2200 ml   Net             -175 ml       Anthropometrics:  Ht Readings from Last 1 Encounters:   07/28/17 6' 3\" (1.905 m)     Last 3 Recorded Weights in this Encounter    07/30/17 0558 07/31/17 0814 07/31/17 2144   Weight: 75.6 kg (166 lb 10.7 oz) 75.6 kg (166 lb 10.7 oz) 73.3 kg (161 lb 9.6 oz)     Body mass index is 20.2 kg/(m^2).       Borderline Underweight     Weight History:   Weight Metrics 7/31/2017   Weight 161 lb 9.6 oz   BMI 20.2 kg/m2        Admitting Diagnosis: Cardiac arrest ([facility]  Acidosis  Respiratory failure (HCC)  Anemia  ESRD needing dialysis ([facility]  Cardiac arrest ([facility]  Acute GI bleeding  Sepsis ([facility]  Hypotension  Anoxic brain damage (HCC)  ESRD (end stage renal disease) ([facility]  Colitis  Pertinent PMHx: DM, HTN, IBS, ESRD    Education Needs:        [x] None identified  [] Identified - Not appropriate at this time  []  Identified and addressed - refer to education log  Learning Limitations:   [] None identified  [x] Identified: intubated     Cultural, Jainism & ethnic food preferences:  [x] None identified    [] Identified and addressed     ESTIMATED NUTRITION NEEDS:     Calories: 9517-7052 kcal ([id number]) based on  [x] Actual BW 71 kg     [] IBW   Protein:  gm (1.2-2 gm/kg) based on  [x] Actual BW      [] IBW   Fluid: 1000 mL + UOP MONITORING & EVALUATION:     Nutrition Goal(s):   1. Nutritional needs will be met through adequate oral intake or nutrition support within the next 7 days.   Outcome:  [x] Met/Ongoing    []  Not Met/Progressing    [] New/Initial Goal     Monitoring:   [x] EN tolerance    [x] EN infusion   [] Diet tolerance   [] Meal intake   [] Supplement intake   [x] GI symptoms/ability to tolerate po diet   [x] Respiratory status   [x] Plan of care      Previous Recommendations (for follow-up assessments only):     [x]   Implemented       []   Not Implemented (RD to address)     [] No Recommendation Made     Discharge Planning: pending ability to tolerate po and plan of care  [x] Participated in care planning, discharge planning, & interdisciplinary rounds as appropriate       [facility], [address], [address] [place] [address]   Pager: [phone]

## 2017-08-01 NOTE — ROUTINE PROCESS
Bedside and Verbal shift change report given to Cyndra Lombard, RN (oncoming nurse) by Bernardo Newton RN   (offgoing nurse). Report included the following information SBAR, Kardex, Intake/Output, MAR, Accordion, Recent Results and Med Rec Status.

## 2017-08-01 NOTE — ROUTINE PROCESS
Bedside and Verbal shift change report given to Alberta Price RN (oncoming nurse) by Isela Kaur RN (offgoing nurse). Report included the following information SBAR, Kardex, MAR and Recent Results. SITUATION:    Code Status: Full Code   Reason for Admission: Cardiac arrest ([facility]   Acidosis   Respiratory failure (HCC)   Anemia   ESRD needing dialysis ([facility]   Cardiac arrest ([facility]   Acute GI bleeding   [facility]   Hypotension   Anoxic brain damage (HCC)   ESRD (end stage renal disease) ([facility]  [address] day: 5   Problem List:       Hospital Problems  Date Reviewed: 7/27/2017          Codes Class Noted POA    Acidosis ICD-10-CM: E87.2  ICD-9-CM: 276.2  7/27/2017 Unknown        Cardiac arrest ([facility] ICD-10-CM: I46.9  ICD-9-CM: 427.5  7/27/2017 Unknown        Respiratory failure ([facility] ICD-10-CM: J96.90  ICD-9-CM: 518.81  7/27/2017 Unknown        ESRD needing dialysis ([facility] ICD-10-CM: N18.6, Z99.2  ICD-9-CM: 585.6  7/27/2017 Unknown        Anoxic brain damage ([facility] ICD-10-CM: G93.1  ICD-9-CM: 348.1  7/27/2017 Unknown        Colitis ICD-10-CM: K52.9  ICD-9-CM: 558.9  7/27/2017 Unknown        Hypotension ICD-10-CM: I95.9  ICD-9-CM: 458.9  7/27/2017 Unknown        Acute GI bleeding ICD-10-CM: K92.2  ICD-9-CM: 578.9  7/27/2017 Unknown        ESRD (end stage renal disease) ([facility] ICD-10-CM: N18.6  ICD-9-CM: 585.6  7/27/2017 Unknown        * (Principal)Sepsis ([facility] ICD-10-CM: A41.9  ICD-9-CM: 038.9, 995.91  7/27/2017 Unknown        Anemia ICD-10-CM: D64.9  ICD-9-CM: 600. 9  Unknown Unknown              BACKGROUND:    Past Medical History:   Past Medical History:   Diagnosis Date    Anemia     Arthritis     Chronic pain     Back     Diabetes mellitus type II     Hypertension     IBS (irritable bowel syndrome)     Lactose intolerance     TB (tuberculosis)     TB test positive         Patient taking anticoagulants: No     ASSESSMENT:    Changes in Assessment Throughout Shift: ID consulted. Blood cultures x 2 and sputum culture obtained. Lantus 10 units SQ daily ordered for persistent hyperglycemia. Potassium repletion given per nephrologist's orders. Plan is to repeat hemodialysis treatment tomorrow. 500 ml stool drained via FMS.      Patient has Central Line: No     Patient has Stanton Cath: No      Last Vitals:     Vitals:    08/01/17 1633 08/01/17 1700 08/01/17 1800 08/01/17 1900   BP:  105/56 123/66 120/58   Pulse: 84 80 87 82   Resp: 20 19 19 21   Temp:       TempSrc:       SpO2: 97% 97% 98% 99%   Weight:       Height:            IV and DRAINS (will only show if present)   Peripheral IV 08/01/17 Right Hand-Site Assessment: Clean, dry, & intact  Airway - Continuous Aspiration of Subglottic Secretions (MISBAH) Tube 07/27/17 Oral-Site Assessment: Clean, dry, & intact  Nasogastric Tube 07/27/17-Site Assessment: Clean, dry, & intact  [REMOVED] Venous Access Device 07/27/17 Upper chest (subclavicular area, right-Site Assessment: Clean, dry, & intact  [REMOVED] Peripheral IV 07/27/17 Right Antecubital-Site Assessment: Clean, dry, & intact  [REMOVED] Triple Lumen 07/27/17 Right Internal jugular-Site Assessment: Clean, dry, & intact  Peripheral IV 07/27/17 Right Other(comment)-Site Assessment: Clean, dry, & intact  [REMOVED] Peripheral IV 07/27/17 Right Antecubital-Site Assessment: Swelling     WOUND (if present)   Wound Type:  Scattered skin tears on RUE   Dressing present Dressing Present : Yes, Intact, not due to be changed   Wound Concerns/Notes:  none     PAIN    Pain Assessment    Pain Intensity 1: 0 (08/01/17 0400)              Patient Stated Pain Goal: Unable to verbalize/indicatate pain  o Interventions for Pain:  none  o Intervention effective: N/A  o Time of last intervention: N/A  o Reassessment Completed: yes      Last 3 Weights:  Last 3 Recorded Weights in this Encounter    07/30/17 0558 07/31/17 0814 07/31/17 4058   Weight: 75.6 kg (166 lb 10.7 oz) 75.6 kg (166 lb 10.7 oz) 73.3 kg (161 lb 9.6 oz)     Weight change:      INTAKE/OUPUT    Current Shift:      Last three shifts: 07/31 0701 - 08/01 1900  In: 0578 [I.V.:580]  Out: 2700 [Drains:1700]     LAB RESULTS     Recent Labs      08/01/17   0424  07/31/17   0408  07/30/17   0040   WBC  28.7*  22.9*  18.5*   HGB  11.1*  11.0*  8.8*   HCT  31.0*  30.7*  24.3*   PLT  57*  46*  61*        Recent Labs      08/01/17   1400  08/01/17   0424  07/31/17   0408  07/30/17   0040   NA   --   136  137  140   K   --   3.3*  3.1*  3.2*   GLU   --   335*  165*  77   BUN   --   49*  50*  32*   CREA   --   4.98*  5.81*  4.61*   CA   --   8.2*  7.8*  7.6*   MG  2.4   --    --    --        RECOMMENDATIONS AND DISCHARGE PLANNING     1. Pending tests/procedures/ Plan of Care or Other Needs: Repeat hemodialysis treatment tomorrow, 08-02-17; Monitor neuro status; Aspiration precautions     2. Discharge plan for patient and Needs/Barriers: TBD    3. Estimated Discharge Date: TBD; Posted on Whiteboard in Patients Room: Yes      4. The patient's care plan was reviewed with the oncoming nurse. \"HEALS\" SAFETY CHECK      Fall Risk    Total Score: 2    Safety Measures: Safety Measures: Bed/Chair-Wheels locked, Bed in low position, Call light within reach, Emergency bedside equipment, Fall prevention (comment), Nurse at bedside, Side rails X 3    A safety check occurred in the patient's room between off going nurse and oncoming nurse listed above.     The safety check included the below items  Area Items   H  High Alert Medications - Verify all high alert medication drips (heparin, PCA, etc.)   E  Equipment - Suction is set up for ALL patients (with yanker)  - Red plugs utilized for all equipment (IV pumps, etc.)  - WOWs wiped down at end of shift.  - Room stocked with oxygen, suction, and other unit-specific supplies   A  Alarms - Bed alarm is set for fall risk patients  - Ensure chair alarm is in place and activated if patient is up in a chair   L  Lines - Check IV for any infiltration  - Stanton bag is empty if patient has a Stanton   - Tubing and IV bags are labeled   S  Safety   - Room is clean, patient is clean, and equipment is clean. - Hallways are clear from equipment besides carts. - Fall bracelet on for fall risk patients  - Ensure room is clear and free of clutter  - Suction is set up for ALL patients (with yanker)  - Hallways are clear from equipment besides carts.    - Isolation precautions followed, supplies available outside room, sign posted     Krista Suazo RN

## 2017-08-02 ENCOUNTER — APPOINTMENT (OUTPATIENT)
Dept: NUCLEAR MEDICINE | Age: 57
DRG: 004 | End: 2017-08-02
Attending: PHYSICIAN ASSISTANT
Payer: MEDICARE

## 2017-08-02 ENCOUNTER — APPOINTMENT (OUTPATIENT)
Dept: GENERAL RADIOLOGY | Age: 57
DRG: 004 | End: 2017-08-02
Attending: PHYSICIAN ASSISTANT
Payer: MEDICARE

## 2017-08-02 LAB
25(OH)D3 SERPL-MCNC: 7.5 NG/ML (ref 30–100)
ALBUMIN SERPL BCP-MCNC: 1.8 G/DL (ref 3.4–5)
ALBUMIN/GLOB SERPL: 0.5 {RATIO} (ref 0.8–1.7)
ALP SERPL-CCNC: 158 U/L (ref 45–117)
ALT SERPL-CCNC: 67 U/L (ref 16–61)
ANION GAP BLD CALC-SCNC: 12 MMOL/L (ref 3–18)
ARTERIAL PATENCY WRIST A: YES
AST SERPL W P-5'-P-CCNC: 15 U/L (ref 15–37)
BASE EXCESS BLD CALC-SCNC: 0 MMOL/L
BASOPHILS # BLD AUTO: 0 K/UL (ref 0–0.06)
BASOPHILS # BLD: 0 % (ref 0–3)
BDY SITE: ABNORMAL
BILIRUB SERPL-MCNC: 1.2 MG/DL (ref 0.2–1)
BODY TEMPERATURE: 98.6
BUN SERPL-MCNC: 77 MG/DL (ref 7–18)
BUN/CREAT SERPL: 12 (ref 12–20)
CA-I SERPL-SCNC: 1.11 MMOL/L (ref 1.12–1.32)
CALCIUM SERPL-MCNC: 7.8 MG/DL (ref 8.5–10.1)
CHLORIDE SERPL-SCNC: 104 MMOL/L (ref 100–108)
CO2 SERPL-SCNC: 24 MMOL/L (ref 21–32)
CREAT SERPL-MCNC: 6.36 MG/DL (ref 0.6–1.3)
DIFFERENTIAL METHOD BLD: ABNORMAL
EOSINOPHIL # BLD: 0 K/UL (ref 0–0.4)
EOSINOPHIL NFR BLD: 0 % (ref 0–5)
ERYTHROCYTE [DISTWIDTH] IN BLOOD BY AUTOMATED COUNT: 15.8 % (ref 11.6–14.5)
GAS FLOW.O2 O2 DELIVERY SYS: ABNORMAL L/MIN
GAS FLOW.O2 SETTING OXYMISER: 12 BPM
GLOBULIN SER CALC-MCNC: 3.6 G/DL (ref 2–4)
GLUCOSE BLD STRIP.AUTO-MCNC: 205 MG/DL (ref 70–110)
GLUCOSE BLD STRIP.AUTO-MCNC: 243 MG/DL (ref 70–110)
GLUCOSE BLD STRIP.AUTO-MCNC: 305 MG/DL (ref 70–110)
GLUCOSE BLD STRIP.AUTO-MCNC: 316 MG/DL (ref 70–110)
GLUCOSE SERPL-MCNC: 341 MG/DL (ref 74–99)
HCO3 BLD-SCNC: 24.6 MMOL/L (ref 22–26)
HCT VFR BLD AUTO: 29.2 % (ref 36–48)
HGB BLD-MCNC: 10.3 G/DL (ref 13–16)
INSPIRATION.DURATION SETTING TIME VENT: 1 SEC
LACTATE SERPL-SCNC: 0.8 MMOL/L (ref 0.4–2)
LACTATE SERPL-SCNC: 1.9 MMOL/L (ref 0.4–2)
LYMPHOCYTES # BLD AUTO: 6 % (ref 20–51)
LYMPHOCYTES # BLD: 2.2 K/UL (ref 0.8–3.5)
MCH RBC QN AUTO: 30.4 PG (ref 24–34)
MCHC RBC AUTO-ENTMCNC: 35.3 G/DL (ref 31–37)
MCV RBC AUTO: 86.1 FL (ref 74–97)
METAMYELOCYTES NFR BLD MANUAL: 1 %
MONOCYTES # BLD: 1.8 K/UL (ref 0–1)
MONOCYTES NFR BLD AUTO: 5 % (ref 2–9)
NEUTS BAND NFR BLD MANUAL: 8 % (ref 0–5)
NEUTS SEG # BLD: 31.7 K/UL (ref 1.8–8)
NEUTS SEG NFR BLD AUTO: 80 % (ref 42–75)
NRBC BLD-RTO: 1 PER 100 WBC
O2/TOTAL GAS SETTING VFR VENT: 28 %
PCO2 BLD: 38 MMHG (ref 35–45)
PEEP RESPIRATORY: 5 CMH2O
PH BLD: 7.42 [PH] (ref 7.35–7.45)
PLATELET # BLD AUTO: 54 K/UL (ref 135–420)
PLATELET COMMENTS,PCOM: ABNORMAL
PO2 BLD: 102 MMHG (ref 80–100)
POTASSIUM SERPL-SCNC: 2.8 MMOL/L (ref 3.5–5.5)
PROT SERPL-MCNC: 5.4 G/DL (ref 6.4–8.2)
RBC # BLD AUTO: 3.39 M/UL (ref 4.7–5.5)
RBC MORPH BLD: ABNORMAL
SAO2 % BLD: 98 % (ref 92–97)
SERVICE CMNT-IMP: ABNORMAL
SODIUM SERPL-SCNC: 140 MMOL/L (ref 136–145)
SPECIMEN TYPE: ABNORMAL
SRA 100IU/ML UFH SER-ACNC: 1 % (ref 0–20)
SRA UFH SER-IMP: NORMAL
TOTAL RESP. RATE, ITRR: 19
UNFRAC HEPARIN LOW DOSE: 1 % (ref 0–20)
VANCOMYCIN SERPL-MCNC: 19.8 UG/ML (ref 5–40)
VENTILATION MODE VENT: ABNORMAL
VOLUME CONTROL PLUS IVLCP: YES
VT SETTING VENT: 400 ML
WBC # BLD AUTO: 36 K/UL (ref 4.6–13.2)

## 2017-08-02 PROCEDURE — 74011250636 HC RX REV CODE- 250/636: Performed by: INTERNAL MEDICINE

## 2017-08-02 PROCEDURE — 74011000258 HC RX REV CODE- 258: Performed by: INTERNAL MEDICINE

## 2017-08-02 PROCEDURE — 82306 VITAMIN D 25 HYDROXY: CPT | Performed by: INTERNAL MEDICINE

## 2017-08-02 PROCEDURE — 74011250636 HC RX REV CODE- 250/636: Performed by: PSYCHIATRY & NEUROLOGY

## 2017-08-02 PROCEDURE — 90935 HEMODIALYSIS ONE EVALUATION: CPT

## 2017-08-02 PROCEDURE — 65610000006 HC RM INTENSIVE CARE

## 2017-08-02 PROCEDURE — 36600 WITHDRAWAL OF ARTERIAL BLOOD: CPT

## 2017-08-02 PROCEDURE — 83605 ASSAY OF LACTIC ACID: CPT | Performed by: PHYSICIAN ASSISTANT

## 2017-08-02 PROCEDURE — 94003 VENT MGMT INPAT SUBQ DAY: CPT

## 2017-08-02 PROCEDURE — 80053 COMPREHEN METABOLIC PANEL: CPT | Performed by: PHYSICIAN ASSISTANT

## 2017-08-02 PROCEDURE — 71010 XR CHEST PORT: CPT

## 2017-08-02 PROCEDURE — 82803 BLOOD GASES ANY COMBINATION: CPT

## 2017-08-02 PROCEDURE — C9113 INJ PANTOPRAZOLE SODIUM, VIA: HCPCS | Performed by: INTERNAL MEDICINE

## 2017-08-02 PROCEDURE — 74011250637 HC RX REV CODE- 250/637: Performed by: PHYSICIAN ASSISTANT

## 2017-08-02 PROCEDURE — 74011250637 HC RX REV CODE- 250/637: Performed by: INTERNAL MEDICINE

## 2017-08-02 PROCEDURE — 82330 ASSAY OF CALCIUM: CPT | Performed by: PHYSICIAN ASSISTANT

## 2017-08-02 PROCEDURE — 85025 COMPLETE CBC W/AUTO DIFF WBC: CPT | Performed by: PHYSICIAN ASSISTANT

## 2017-08-02 PROCEDURE — 74011636637 HC RX REV CODE- 636/637: Performed by: PHYSICIAN ASSISTANT

## 2017-08-02 PROCEDURE — 80202 ASSAY OF VANCOMYCIN: CPT | Performed by: INTERNAL MEDICINE

## 2017-08-02 PROCEDURE — 74011636637 HC RX REV CODE- 636/637: Performed by: INTERNAL MEDICINE

## 2017-08-02 PROCEDURE — A9537 TC99M MEBROFENIN: HCPCS

## 2017-08-02 PROCEDURE — 36415 COLL VENOUS BLD VENIPUNCTURE: CPT | Performed by: PHYSICIAN ASSISTANT

## 2017-08-02 PROCEDURE — 74011250636 HC RX REV CODE- 250/636: Performed by: PHYSICIAN ASSISTANT

## 2017-08-02 PROCEDURE — 82962 GLUCOSE BLOOD TEST: CPT

## 2017-08-02 RX ORDER — HYDROCORTISONE SODIUM SUCCINATE 100 MG/2ML
25 INJECTION, POWDER, FOR SOLUTION INTRAMUSCULAR; INTRAVENOUS EVERY 12 HOURS
Status: DISCONTINUED | OUTPATIENT
Start: 2017-08-02 | End: 2017-08-03

## 2017-08-02 RX ORDER — INSULIN GLARGINE 100 [IU]/ML
10 INJECTION, SOLUTION SUBCUTANEOUS
Status: DISCONTINUED | OUTPATIENT
Start: 2017-08-02 | End: 2017-08-03

## 2017-08-02 RX ORDER — SODIUM CHLORIDE 9 MG/ML
50 INJECTION, SOLUTION INTRAVENOUS CONTINUOUS
Status: DISCONTINUED | OUTPATIENT
Start: 2017-08-02 | End: 2017-08-06

## 2017-08-02 RX ORDER — INSULIN GLARGINE 100 [IU]/ML
15 INJECTION, SOLUTION SUBCUTANEOUS DAILY
Status: DISCONTINUED | OUTPATIENT
Start: 2017-08-02 | End: 2017-08-06

## 2017-08-02 RX ORDER — POTASSIUM CHLORIDE 1.5 G/1.77G
20 POWDER, FOR SOLUTION ORAL 2 TIMES DAILY
Status: DISCONTINUED | OUTPATIENT
Start: 2017-08-02 | End: 2017-08-04

## 2017-08-02 RX ADMIN — POTASSIUM CHLORIDE 20 MEQ: 1.5 POWDER, FOR SOLUTION ORAL at 11:17

## 2017-08-02 RX ADMIN — LEVETIRACETAM 500 MG: 5 INJECTION INTRAVENOUS at 11:17

## 2017-08-02 RX ADMIN — SODIUM CHLORIDE 100 ML/HR: 900 INJECTION, SOLUTION INTRAVENOUS at 19:25

## 2017-08-02 RX ADMIN — SODIUM CHLORIDE 50 ML/HR: 900 INJECTION, SOLUTION INTRAVENOUS at 19:26

## 2017-08-02 RX ADMIN — INSULIN LISPRO 12 UNITS: 100 INJECTION, SOLUTION INTRAVENOUS; SUBCUTANEOUS at 17:23

## 2017-08-02 RX ADMIN — DOXERCALCIFEROL 2 MCG: 2 INJECTION, SOLUTION INTRAVENOUS at 08:09

## 2017-08-02 RX ADMIN — PANTOPRAZOLE SODIUM 40 MG: 40 INJECTION, POWDER, FOR SOLUTION INTRAVENOUS at 09:00

## 2017-08-02 RX ADMIN — PIPERACILLIN SODIUM AND TAZOBACTAM SODIUM 2.25 G: 2; .25 INJECTION, POWDER, LYOPHILIZED, FOR SOLUTION INTRAVENOUS at 14:06

## 2017-08-02 RX ADMIN — INSULIN LISPRO 9 UNITS: 100 INJECTION, SOLUTION INTRAVENOUS; SUBCUTANEOUS at 00:47

## 2017-08-02 RX ADMIN — HYDROCORTISONE SODIUM SUCCINATE 25 MG: 100 INJECTION, POWDER, FOR SOLUTION INTRAMUSCULAR; INTRAVENOUS at 21:00

## 2017-08-02 RX ADMIN — INSULIN LISPRO 12 UNITS: 100 INJECTION, SOLUTION INTRAVENOUS; SUBCUTANEOUS at 06:13

## 2017-08-02 RX ADMIN — PIPERACILLIN SODIUM AND TAZOBACTAM SODIUM 2.25 G: 2; .25 INJECTION, POWDER, LYOPHILIZED, FOR SOLUTION INTRAVENOUS at 21:00

## 2017-08-02 RX ADMIN — INSULIN GLARGINE 15 UNITS: 100 INJECTION, SOLUTION SUBCUTANEOUS at 09:20

## 2017-08-02 RX ADMIN — HYDROCORTISONE SODIUM SUCCINATE 50 MG: 100 INJECTION, POWDER, FOR SOLUTION INTRAMUSCULAR; INTRAVENOUS at 09:18

## 2017-08-02 RX ADMIN — FENTANYL CITRATE 50 MCG: 50 INJECTION INTRAMUSCULAR; INTRAVENOUS at 13:25

## 2017-08-02 RX ADMIN — ERYTHROPOIETIN 10000 UNITS: 10000 INJECTION, SOLUTION INTRAVENOUS; SUBCUTANEOUS at 08:10

## 2017-08-02 RX ADMIN — FENTANYL CITRATE 50 MCG: 50 INJECTION INTRAMUSCULAR; INTRAVENOUS at 21:00

## 2017-08-02 RX ADMIN — VANCOMYCIN HYDROCHLORIDE 600 MG: 10 INJECTION, POWDER, LYOPHILIZED, FOR SOLUTION INTRAVENOUS at 08:17

## 2017-08-02 RX ADMIN — SINCALIDE 1.47 MCG: 5 INJECTION, POWDER, LYOPHILIZED, FOR SOLUTION INTRAVENOUS at 19:24

## 2017-08-02 RX ADMIN — INSULIN LISPRO 6 UNITS: 100 INJECTION, SOLUTION INTRAVENOUS; SUBCUTANEOUS at 11:17

## 2017-08-02 RX ADMIN — CHLORHEXIDINE GLUCONATE 10 ML: 1.2 RINSE ORAL at 21:00

## 2017-08-02 RX ADMIN — CHLORHEXIDINE GLUCONATE 10 ML: 1.2 RINSE ORAL at 09:13

## 2017-08-02 NOTE — DIABETES MGMT
GLYCEMIC CONTROL PLAN OF CARE    Assessment/Recommendations:  Blood glucose remains elevated, Lantus insulin increased to 15 units daily  Pt continue to receive Solu-cortef, 50 mg every 8 hours likely contributing to elevated glucose  Continue to adjust insulin as needed      Most recent blood glucose values:  8/1/2017 17:42 8/1/2017 23:49 8/2/2017 06:06 8/2/2017 11:13   326 (H) 263 (H) 305 (H) 243 (H)     Current A1C of 5.4% is equivalent to average blood glucose of 108 mg/dl over the past 2-3 months.     Current hospital diabetes medications:   Correctional Lispro insulin every 6 hours (very resistant scale)  Lantus insulin 15 units daily (first dose today)     Previous day's insulin requirements:   10 units of Lantus insulin   39 units of Lispro insulin      Home diabetes medications: none     Diet:  NPO  Tube Feeding: Nepro at 55 mL/hr via NGT    Education:  ____Refer to Diabetes Education Record             __x__Education not indicated at this time      Anh Jenkins RD, CDE

## 2017-08-02 NOTE — PROGRESS NOTES
PCCM Follow up    Spoke to daughter on phone Margarette Sykes - [phone]) to discuss current condition, prognosis, treatment plans and goals of care. Patient not able to participate due to mentation/critical condition. Participants: provider - myself                        Family members - daughter Nannette Fagan                          Discussion encompassing acute change in condition and need for intervention   Therapeutic options, response discussed. Answered all questions and concerns to the best of my ability. Discussed code status, comfort measure options. Following recommendations:  · Per daughter, pt has never discussed goals of care with her before. In event pt goes into cardiac arrest, daughter wishes for full resuscitation. Code status confirmed - FULL CODE. · Updated daughter regarding mild improvement in neuro status and current interventions including further testing for possible acute cholecystitis. · Continue current management      Family updated on care plans. Orders placed. >50% of time in counseling / coordination?  Yes

## 2017-08-02 NOTE — PROGRESS NOTES
Infectious Disease Progress Note    Requested by: Dr. Camacho Gallardo    Reason: sepsis, e.coli/clostridium pefringens bacteremia    Current abx Prior abx   Meropenem since 7/31, vancomycin since 7/27 Levofloxacin 7/27  Pip/tazo 7/27-7/31     Lines:       Assessment :  55year old man with h/o type 2 DM (hgb a1c 5.4 on 7/29/17) ,ESRD admitted to [facility] on 7/27/17 s/p cardiorespiratory arrest, hypotension, bacteremia, elevated LFTs. Highly complex clinical picture. Presentation c/w sepsis (POA) due to e.coli, clostridium perfringens bloodstream infection (positive blood culture 7/27, negative blood culture 8/1) . Most likely source of bacteremia is intra abdominal infection/inflammation. Elevated LFTs, thickened gallbladder - concerning for undiagnosed cholecystitis    Patient could have some other undiagnosed abdominal inflammation such as ischemic colitis which could have contributed to polymicrobial bacteremia    Increasing wbc - likely steroids related    Clinically better. Off pressors. More alert    Recommendations:    1. discontinue meropenem, vancomycin. Start pip/tazo  2. Recommend HIDA scan  - cholecystostomy tube if hida positive  3. Need to address goals of care with family to determine extent of work up    Advance Care planning: full code, patient wasnt able to name a surrogate decision maker or provide an advance care plan    Above plan was discussed in details with ICU team. Please call me if any further questions or concerns. Will continue to participate in the care of this patient. subjective:      Patient remains intubated.  Detailed ros not feasible.        Home medications:   list reviewed        Current Facility-Administered Medications   Medication Dose Route Frequency    insulin glargine (LANTUS) injection 15 Units  15 Units SubCUTAneous DAILY    [START ON 8/4/2017] Vancomycin random level  1 Each Other Rx Dosing/Monitoring    pantoprazole (PROTONIX) injection 40 mg  40 mg IntraVENous DAILY    meropenem (MERREM) 500 mg in 0.9% sodium chloride (MBP/ADV) 50 mL MBP  0.5 g IntraVENous Q24H    midazolam (VERSED) 100 mg in 0.9% sodium chloride 100 mL infusion  1-3 mg/hr IntraVENous TITRATE    hydrocortisone Sod Succ (PF) (SOLU-CORTEF) injection 50 mg  50 mg IntraVENous Q8H    doxercalciferol (HECTOROL) 4 mcg/2 mL injection 2 mcg  2 mcg IntraVENous Q MON, WED & FRI    epoetin benjamin (EPOGEN;PROCRIT) injection 10,000 Units  10,000 Units IntraVENous Q MON, WED & FRI    levETIRAcetam (KEPPRA) 500 mg in 100 ml IVPB  500 mg IntraVENous Q12H    chlorhexidine (PERIDEX) 0.12 % mouthwash 10 mL  10 mL Oral Q12H    insulin lispro (HUMALOG) injection   SubCUTAneous Q6H    glucose chewable tablet 16 g  4 Tab Oral PRN    glucagon (GLUCAGEN) injection 1 mg  1 mg IntraMUSCular PRN    dextrose (D50W) injection syrg 12.5-25 g  25-50 mL IntraVENous PRN    0.9% sodium chloride infusion  100 mL/hr IntraVENous DIALYSIS PRN    VANCOMYCIN INFORMATION NOTE   Other CONTINUOUS    naloxone (NARCAN) injection 0.4 mg  0.4 mg IntraVENous PRN    LORazepam (ATIVAN) injection 1 mg  1 mg IntraVENous Q4H PRN    fentaNYL citrate (PF) injection 50 mcg  50 mcg IntraVENous Q2H PRN       Allergies: Review of patient's allergies indicates no known allergies. Temp (24hrs), Av.9 °F (37.2 °C), Min:98.3 °F (36.8 °C), Max:99.5 °F (37.5 °C)    Visit Vitals    /59    Pulse 75    Temp 98.7 °F (37.1 °C)    Resp 22    Ht 6' 3\" (1.905 m)  Comment: per chart history    Wt 73.3 kg (161 lb 9.6 oz)    SpO2 100%    BMI 20.2 kg/m2       ROS: Patient remains intubated. Detailed ros not feasible. Physical Exam:    General: Well developed, well nourished male laying on the bed, intubated, in no acute distress. General:   awake alert and oriented   HEENT:  Normocephalic, atraumatic, PERRL, EOMI,present scleral icterus no conjunctival hemmohage;  nasal and oral mucous are moist and without evidence of lesions. Intubated.  Neck supple, no bruits. Lymph Nodes:   no cervical, axillary or inguinal adenopathy   Lungs:   non-labored, bilaterally clear to auscultation- no crackles wheezes rales or rhonchi   Heart:  RRR, s1 and s2; no  rubs or gallops, no edema, + pedal pulses   Abdomen:  soft, non-distended, active bowel sounds, no hepatomegaly, no splenomegaly. Non-tender   Genitourinary:  deferred   Extremities:   no clubbing, cyanosis; no joint effusions or swelling; muscle mass appropriate for age   Neurologic:  No gross focal sensory abnormalities; 5/5 muscle strength to upper and lower extremities. Speech appropriate.  Cranial nerves intact                        Skin:  No rash or ulcers   Back:  no spinal or paraspinal muscle tenderness or rigidity, no CVA tenderness     Psychiatric:  No suicidal or homicidal ideations, appropriate mood and affect         Labs: Results:   Chemistry Recent Labs      08/02/17   0610  08/01/17   0424  07/31/17   0408   GLU  341*  335*  165*   NA  140  136  137   K  2.8*  3.3*  3.1*   CL  104  102  102   CO2  24  20*  21   BUN  77*  49*  50*   CREA  6.36*  4.98*  5.81*   CA  7.8*  8.2*  7.8*   AGAP  12  14  14   BUCR  12  10*  9*   AP  158*  186*  161*   TP  5.4*  6.0*  5.7*   ALB  1.8*  1.9*  1.7*   GLOB  3.6  4.1*  4.0   AGRAT  0.5*  0.5*  0.4*      CBC w/Diff Recent Labs      08/02/17   0610  08/01/17   0424  07/31/17   0408   WBC  36.0*  28.7*  22.9*   RBC  3.39*  3.65*  3.62*   HGB  10.3*  11.1*  11.0*   HCT  29.2*  31.0*  30.7*   PLT  54*  57*  46*   GRANS  80*  80*  81*   LYMPH  6*  3*  2*   EOS  0  0  0      Microbiology Recent Labs      08/01/17   1400  08/01/17   1328  08/01/17   1215   CULT  NO GROWTH AFTER 18 HOURS  NO GROWTH AFTER 18 HOURS  PENDING          RADIOLOGY:    All available imaging studies/reports in [facility] care for this admission were reviewed    Dr. Leia Smith, Infectious Disease Specialist  [phone]  August 2, 2017  4:28 PM

## 2017-08-02 NOTE — PROGRESS NOTES
[facility]  ICU Progress Note      Name: Shirlene Winkler   : 1960   MRN: [id number]   Date: 2017      [x]I have reviewed the flowsheet and previous days notes. Events overnight reviewed and discussed with nursing staff. Vital signs and records reviewed. Subjective:   56 y/o male with hx significant for ESRD on HD, HTN, diabetes, anemia, and IBS, presented to the ED with cardiac arrest.     Pt remains on ventilatory support; intermittent gag; few thin secretions thru ETT. Pt is more awake today on no sedation. Able to open eyes and track on verbal stimulation; able to follow simple commands such as squeezing with hands and moving toes. S/p dialysis this am with 1L fluid taken off. Remains hemodyamically stable. Afebrile overnight however WBC still trending upwards. E coli and Clostridium perfringens bacteremia. [x]The patient is unable to give any meaningful history or review of systems because the patient is:  [x]Intubated []Sedated   [x]Unresponsive      [x]The patient is critically ill on      [x]Mechanical ventilation []Pressors   []BiPAP []            ROS:Review of systems not obtained due to patient factors.        Safety Bundles: VAP Bundle/ CAUTI/ Severe Sepsis Protocol    Vital Signs:    Visit Vitals    /59    Pulse 90    Temp 98.5 °F (36.9 °C)    Resp 13    Ht 6' 3\" (1.905 m)  Comment: per chart history    Wt 73.3 kg (161 lb 9.6 oz)    SpO2 100%    BMI 20.2 kg/m2       O2 Device: Endotracheal tube, Ventilator   O2 Flow Rate (L/min): 10 l/min   Temp (24hrs), Av.7 °F (37.1 °C), Min:98.3 °F (36.8 °C), Max:99.5 °F (37.5 °C)       Intake/Output:   Last shift:       07 -  1900  In: 265 [I.V.:100]  Out: 1000   Last 3 shifts: 1901 -  0700  In: 6982 [I.V.:250]  Out: 1300 [Drains:1300]    Intake/Output Summary (Last 24 hours) at 17 1221  Last data filed at 17 1200   Gross per 24 hour   Intake             1735 ml Output             2100 ml   Net             -365 ml       Ventilator Settings:  Ventilator  Mode: Assist control, VC+  Respiratory Rate  Resp Rate Observed: 17  Back-Up Rate: 12  Insp Time (sec): 1 sec  I:E Ratio: 1:1.8  Ventilator Volumes  Vt Set (ml): 400 ml  Vt Exhaled (Machine Breath) (ml): 397 ml  Vt Spont (ml): 412 ml  Ve Observed (l/min): 9.6 l/min  Ventilator Pressures  Pressure Support (cm H2O): 7 cm H2O  PIP Observed (cm H2O): 14 cm H2O  Plateau Pressure (cm H2O): 13 cm H2O  MAP (cm H2O): 9.6  PEEP/VENT (cm H2O): 5 cm H20  Auto PEEP Observed (cm H2O): 0 cm H2O      Physical Exam:    General: Intubated; awakens to verbal stimulation  HEENT:  Pupils reactive, Anicteric sclerae; neck supple; no lymphadenopathy  Resp:  Symmetrical chest expansion, no accessory muscle use; no wheezing, rales; intermittent rhonchi b/l  CV:  S1, S2 present; regular rate and rhythm  GI:  Abdomen soft, (+) active bowel sounds  Extremities:  +2 pulses on all extremities; no edema/ cyanosis/ clubbing noted, left arm fistula with strong thrill   Skin:  Warm; no rashes/ lesions noted  Neurologic:  Awakens to name calling, able to track; able to follow simple commands  Devices:    7/27 - NGT, ETT with michele tube      DATA:     Current Facility-Administered Medications   Medication Dose Route Frequency    insulin glargine (LANTUS) injection 15 Units  15 Units SubCUTAneous DAILY    [START ON 8/4/2017] Vancomycin random level  1 Each Other Rx Dosing/Monitoring    potassium chloride (KLOR-CON) packet 20 mEq  20 mEq Per NG tube BID    pantoprazole (PROTONIX) injection 40 mg  40 mg IntraVENous DAILY    meropenem (MERREM) 500 mg in 0.9% sodium chloride (MBP/ADV) 50 mL MBP  0.5 g IntraVENous Q24H    midazolam (VERSED) 100 mg in 0.9% sodium chloride 100 mL infusion  1-3 mg/hr IntraVENous TITRATE    hydrocortisone Sod Succ (PF) (SOLU-CORTEF) injection 50 mg  50 mg IntraVENous Q8H    doxercalciferol (HECTOROL) 4 mcg/2 mL injection 2 mcg 2 mcg IntraVENous Q MON, WED & FRI    epoetin benjamin (EPOGEN;PROCRIT) injection 10,000 Units  10,000 Units IntraVENous Q MON, WED & FRI    levETIRAcetam (KEPPRA) 500 mg in 100 ml IVPB  500 mg IntraVENous Q12H    chlorhexidine (PERIDEX) 0.12 % mouthwash 10 mL  10 mL Oral Q12H    insulin lispro (HUMALOG) injection   SubCUTAneous Q6H    glucose chewable tablet 16 g  4 Tab Oral PRN    glucagon (GLUCAGEN) injection 1 mg  1 mg IntraMUSCular PRN    dextrose (D50W) injection syrg 12.5-25 g  25-50 mL IntraVENous PRN    0.9% sodium chloride infusion  100 mL/hr IntraVENous DIALYSIS PRN    VANCOMYCIN INFORMATION NOTE   Other CONTINUOUS    naloxone (NARCAN) injection 0.4 mg  0.4 mg IntraVENous PRN    LORazepam (ATIVAN) injection 1 mg  1 mg IntraVENous Q4H PRN    fentaNYL citrate (PF) injection 50 mcg  50 mcg IntraVENous Q2H PRN         Labs: Results:       Chemistry Recent Labs      08/02/17   0610  08/01/17   0424 07/31/17   0408   GLU  341*  335*  165*   NA  140  136  137   K  2.8*  3.3*  3.1*   CL  104  102  102   CO2  24  20*  21   BUN  77*  49*  50*   CREA  6.36*  4.98*  5.81*   CA  7.8*  8.2*  7.8*   AGAP  12  14  14   BUCR  12  10*  9*   AP  158*  186*  161*   TP  5.4*  6.0*  5.7*   ALB  1.8*  1.9*  1.7*   GLOB  3.6  4.1*  4.0   AGRAT  0.5*  0.5*  0.4*      CBC w/Diff Recent Labs      08/02/17   0610  08/01/17   0424 07/31/17   0408   WBC  36.0*  28.7*  22.9*   RBC  3.39*  3.65*  3.62*   HGB  10.3*  11.1*  11.0*   HCT  29.2*  31.0*  30.7*   PLT  54*  57*  46*   GRANS  80*  80*  81*   LYMPH  6*  3*  2*   EOS  0  0  0      Coagulation Recent Labs      08/01/17   1328  08/01/17 0424   APTT  21.4*  32.0       Liver Enzymes Recent Labs      08/02/17   0610   TP  5.4*   ALB  1.8*   AP  158*   SGOT  15      ABG Lab Results   Component Value Date/Time    PHI 7.419 08/02/2017 04:55 AM    PCO2I 38.0 08/02/2017 04:55 AM    PO2I 102 (H) 08/02/2017 04:55 AM    HCO3I 24.6 08/02/2017 04:55 AM    FIO2I 28 08/02/2017 04:55 AM      Microbiology Recent Labs      08/01/17   1400  08/01/17   1328  08/01/17   1215   CULT  NO GROWTH AFTER 18 HOURS  NO GROWTH AFTER 18 HOURS  NO GROWTH AFTER 19 HOURS          Telemetry: [x]Sinus []A-flutter []Paced    []A-fib []Multiple PVCs                  Imaging:  [x]I have personally reviewed the patients radiographs  CXR Results  (Last 48 hours)               08/02/17 0608  XR CHEST PORT Final result    Narrative:  Portable chest x-ray, semierect AP view, time 0450 hours on 8/2/2017:               INDICATION:       Respiratory failure. The patient has been on ventilation. Atelectatic changes/infiltrates at right lower lung field. COMPARISON STUDY: Chest x-ray on 8/1/2017, 7/31/2017. FINDINGS:       The lower end of ET tube is 4.3 cm above bree. NG tube extends into upper stomach as before. Cardiac silhouette normal. Pulmonary vascularity is not engorged. Left lung is clear. Previously demonstrated infiltrates, and/or atelectatic changes in right lower   lung field appears to be further decreased. Currently there are residual mild   DGN mottled opacity in right lower lung field. Right CP angle is minimally   blunted. No evidence of pneumothorax or pleural effusion. IMPRESSION:       Lower end of ET tube is 4.3 cm above bree. Further decrease of infiltrates, and central atelectasis in right lower lung   field mild interstitial opacity at this time as described. 08/01/17 0618  XR CHEST PORT Final result    Narrative:  Portable Chest 0454 hours       CPT CODE:27163       HISTORY:    End-stage renal disease, status post cardiopulmonary arrest, intubated   gram-negative kecia bacteremia, colitis,   eval ETT. COMPARISON: Chest x-ray July 31 through July 27, 2017. FINDINGS:        Endotracheal tube terminates 4.5 cm proximal to the bree. The esophagogastric   tube terminates in the stomach.  The left lung is clear with a sharp costophrenic   angle. Persistent airspace disease in the right lower lobe not significantly   changed from the day prior. Pulmonary vascularity is normal. The heart is normal   in size. Shobha Gutierrez IMPRESSION:       Persistent right lower lobe airspace disease of pneumonia or segmental   atelectasis. CT ABD/PELVIS 07/27/17: This exam is markedly limited by the lack of enteric and IV contrast.  Small to moderate right pleural effusion. Tiny left pleural effusion. Small amount of ascites. Mild anasarca. Gallbladder wall thickening in the setting of anasarca and ascites can be  problematic to differentiate between cholecystitis and due to ascites. CT HEAD 07/27/17: No evidence of intracranial hemorrhages.   Multiple lacunar infarctions in bilateral thalami, likely to be old or subacute.   In left side of upper manny of brainstem there is lacunar infarction of  undetermined age. Acute lacunar infarction in this area is not excluded. IMPRESSION:   · Recurrent fever, leukocytosis with E coli bacteremia  · S/p PEA Cardiac Arrest on unknown etiology- one round epi unclear as to downtime; MI vs. ARF leading to arrest  · Acute hypoxic respiratory failure, RLL airspace disease, ? atelectasis vs pneumonia  · Small right pleural effusion, anasarca, ascites secondary to fluid retention- ERSD, non compliant to dialysis  · Acute Encephalopathy- metabolic vs. Anoxic in setting of underlying CVA  · Septic Shock - resolved   · Suspicion for acute cholecystitis by Ct abd  · Anemia of Chronic Disease   · ESRD- HD M,W,F  · Tobacco Abuse   · Thrombocytopenia       PLAN:   · Resp -VAP Bundle. Daily ABG and CXR. Aspiration precautions. HOB > 30 degrees. Continue ventilatory support, titrate for FiO2 > 90%    · ID - will obtain HIDA scan today and if positive, will consult IR for possible percutaneous cholecystostomy tube. Continue trend of WBC, temperature curve- follow blood c/s).  Continue vancomycin, meropenem per ID recommendations    · CVS - hemodynamically stable, off pressors  · Heme/onc -  Follow HIT panel. Monitor for active bleeding  · Metabolic - trend electrolytes and replace per nephrology   · Renal - HD recommendations per Nephrology  · Endocrine - frequent glycemic checks. Wean stress dose steroid with goal to place back on PO steroid   · Neuro/ Pain/ Sedation - prn fentanyl and ativan as needed. Minimize sedative medications. EEG showing marked suppression background activity - continue keppra  · GI - continue enteral nutrition thru NGT.  Monitor for residuals  · Prophylaxis - DVT(SCDs), GI(Pepcid)  · Discussed in interdisciplinary rounds          Mallika Galvez PA-C

## 2017-08-02 NOTE — PROGRESS NOTES
RENAL DAILY PROGRESS NOTE    Subjective:   Admitted postcode, seen for ESRD and HD    Complaint: tolerated HD earlier removed 1 Kg UF opens eyes when called tolerating feeding    Current Facility-Administered Medications   Medication Dose Route Frequency    insulin glargine (LANTUS) injection 15 Units  15 Units SubCUTAneous DAILY    [START ON 8/4/2017] Vancomycin random level  1 Each Other Rx Dosing/Monitoring    pantoprazole (PROTONIX) injection 40 mg  40 mg IntraVENous DAILY    meropenem (MERREM) 500 mg in 0.9% sodium chloride (MBP/ADV) 50 mL MBP  0.5 g IntraVENous Q24H    midazolam (VERSED) 100 mg in 0.9% sodium chloride 100 mL infusion  1-3 mg/hr IntraVENous TITRATE    hydrocortisone Sod Succ (PF) (SOLU-CORTEF) injection 50 mg  50 mg IntraVENous Q8H    doxercalciferol (HECTOROL) 4 mcg/2 mL injection 2 mcg  2 mcg IntraVENous Q MON, WED & FRI    epoetin benjamin (EPOGEN;PROCRIT) injection 10,000 Units  10,000 Units IntraVENous Q MON, WED & FRI    levETIRAcetam (KEPPRA) 500 mg in 100 ml IVPB  500 mg IntraVENous Q12H    chlorhexidine (PERIDEX) 0.12 % mouthwash 10 mL  10 mL Oral Q12H    insulin lispro (HUMALOG) injection   SubCUTAneous Q6H    glucose chewable tablet 16 g  4 Tab Oral PRN    glucagon (GLUCAGEN) injection 1 mg  1 mg IntraMUSCular PRN    dextrose (D50W) injection syrg 12.5-25 g  25-50 mL IntraVENous PRN    0.9% sodium chloride infusion  100 mL/hr IntraVENous DIALYSIS PRN    VANCOMYCIN INFORMATION NOTE   Other CONTINUOUS    naloxone (NARCAN) injection 0.4 mg  0.4 mg IntraVENous PRN    LORazepam (ATIVAN) injection 1 mg  1 mg IntraVENous Q4H PRN    fentaNYL citrate (PF) injection 50 mcg  50 mcg IntraVENous Q2H PRN           Objective:     Patient Vitals for the past 24 hrs:   Temp Pulse Resp BP SpO2   08/02/17 0900 - 82 20 120/53 98 %   08/02/17 0845 - 75 22 124/59 100 %   08/02/17 0830 - 75 22 110/58 100 %   08/02/17 0816 - 80 23 - 100 %   08/02/17 0815 - 79 23 121/59 100 %   08/02/17 0800 - 80 21 112/55 100 %   08/02/17 0745 - 81 20 109/56 100 %   08/02/17 0730 - 82 21 103/59 100 %   08/02/17 0715 - 84 20 106/53 98 %   08/02/17 0700 - 85 20 92/56 99 %   08/02/17 0645 - 82 22 100/52 100 %   08/02/17 0630 - 80 21 112/63 100 %   08/02/17 0615 - 83 22 131/62 100 %   08/02/17 0600 - 87 23 145/65 100 %   08/02/17 0545 98.7 °F (37.1 °C) 80 19 120/60 100 %   08/02/17 0500 - 88 21 140/59 100 %   08/02/17 0400 98.7 °F (37.1 °C) 84 19 125/55 100 %   08/02/17 0352 - 76 20 - 100 %   08/02/17 0300 - 77 19 106/54 100 %   08/02/17 0230 - 76 18 - 99 %   08/02/17 0200 - 83 17 123/54 100 %   08/02/17 0130 - 79 18 - 99 %   08/02/17 0100 - 82 21 130/61 99 %   08/02/17 0030 - 80 21 - 100 %   08/02/17 0000 98.3 °F (36.8 °C) 80 20 121/58 99 %   08/01/17 2340 - 80 19 - 99 %   08/01/17 2330 - 79 19 - 99 %   08/01/17 2300 - 80 19 119/56 99 %   08/01/17 2200 - 79 19 117/61 99 %   08/01/17 2100 - 81 22 124/56 100 %   08/01/17 2007 98.8 °F (37.1 °C) - - - -   08/01/17 2000 - 77 20 119/60 100 %   08/01/17 1940 - 76 19 - 99 %   08/01/17 1900 - 82 21 120/58 99 %   08/01/17 1800 - 87 19 123/66 98 %   08/01/17 1700 - 80 19 105/56 97 %   08/01/17 1633 - 84 20 - 97 %   08/01/17 1600 99.5 °F (37.5 °C) 85 20 122/59 97 %   08/01/17 1500 - 86 22 122/63 99 %   08/01/17 1400 - 83 22 116/52 92 %   08/01/17 1300 - 83 23 97/50 98 %   08/01/17 1245 - 78 18 - 100 %   08/01/17 1200 99.2 °F (37.3 °C) 84 25 159/67 100 %   08/01/17 1100 - 87 21 111/56 98 %   08/01/17 1000 - 91 20 112/55 95 %        Weight change: -2.3 kg (-5 lb 1.1 oz)     07/31 1901 - 08/02 0700  In: 2835 [I.V.:250]  Out: 1300 [Drains:1300]    Intake/Output Summary (Last 24 hours) at 08/02/17 0930  Last data filed at 08/02/17 0845   Gross per 24 hour   Intake             1835 ml   Output             2100 ml   Net             -265 ml     Physical Exam: intubated, afebrile    HEENT:  ET/NGT in place  Neck: no JVD  Cardiovascular: regular no rub  C/L: equal vent breath sounds  Abdomen: soft, + BS  Ext: + 1 edema Left arm AVF + bruit    Data Review:     LABS:   Hematology:   Recent Labs      08/02/17   0610  08/01/17   0424  07/31/17   0408   WBC  36.0*  28.7*  22.9*   HGB  10.3*  11.1*  11.0*   HCT  29.2*  31.0*  30.7*   pl 57K  Chemistry:   Recent Labs      08/02/17   0610  08/01/17   0424  07/31/17   1000  07/31/17   0408   BUN  77*  49*   --   50*   CREA  6.36*  4.98*   --   5.81*   CA  7.8*  8.2*   --   7.8*   ALB  1.8*  1.9*   --   1.7*   K  2.8*  3.3*   --   3.1*   NA  140  136   --   137   CL  104  102   --   102   CO2  24  20*   --   21   PHOS   --    --   4.6   --    GLU  341*  335*   --   165*    Vanco 19.8  Mag 2.1  Ion yeison 0.81  Fe 20, sat 15% maurilio 4993  Lactic 0.8  IPTH 1285    IMAGES: CXR right lower lobe consolidation    CULTURE: Blood C/S + Ecoli and Clostridium perfringes      IMPRESSION AND PLAN:   ESRD sched HD MWF   Anemia from CKD low fe stores but high ferritin,Hgb stable cont ALONDRA with HD  Hypotension resolved  E. coli bacteremia on Zosyn/Vanco defer to ID, trend Vanco  Hypokalemia replace per NGT cont with K3 bath on HD  Hypocalcemia from 2nd HPTH cont hectorol and yeison 3 bath with HD, check Vit D 25 OH and Marva Silvestre MD  8/2/2017

## 2017-08-02 NOTE — ROUTINE PROCESS
Bedside and Verbal shift change report given to Laz Gallardo RN (oncoming nurse) by Kael Hathaway RN (offgoing nurse). Report included the following information SBAR, Kardex, ED Summary, Procedure Summary, Intake/Output, MAR, Accordion, Recent Results, Med Rec Status and Cardiac Rhythm NSR.

## 2017-08-02 NOTE — PROGRESS NOTES
Neurology Progress Note    Patient ID:  Karlos Grimaldo  [id number]  55 y.o.  1960    Subjective:      Patient is more awake today and following commands. He is still intubated and unable to verbalize    Current Facility-Administered Medications   Medication Dose Route Frequency    insulin glargine (LANTUS) injection 15 Units  15 Units SubCUTAneous DAILY    [START ON 8/4/2017] Vancomycin random level  1 Each Other Rx Dosing/Monitoring    potassium chloride (KLOR-CON) packet 20 mEq  20 mEq Per NG tube BID    pantoprazole (PROTONIX) injection 40 mg  40 mg IntraVENous DAILY    meropenem (MERREM) 500 mg in 0.9% sodium chloride (MBP/ADV) 50 mL MBP  0.5 g IntraVENous Q24H    midazolam (VERSED) 100 mg in 0.9% sodium chloride 100 mL infusion  1-3 mg/hr IntraVENous TITRATE    hydrocortisone Sod Succ (PF) (SOLU-CORTEF) injection 50 mg  50 mg IntraVENous Q8H    doxercalciferol (HECTOROL) 4 mcg/2 mL injection 2 mcg  2 mcg IntraVENous Q MON, WED & FRI    epoetin benjamin (EPOGEN;PROCRIT) injection 10,000 Units  10,000 Units IntraVENous Q MON, WED & FRI    levETIRAcetam (KEPPRA) 500 mg in 100 ml IVPB  500 mg IntraVENous Q12H    chlorhexidine (PERIDEX) 0.12 % mouthwash 10 mL  10 mL Oral Q12H    insulin lispro (HUMALOG) injection   SubCUTAneous Q6H    glucose chewable tablet 16 g  4 Tab Oral PRN    glucagon (GLUCAGEN) injection 1 mg  1 mg IntraMUSCular PRN    dextrose (D50W) injection syrg 12.5-25 g  25-50 mL IntraVENous PRN    0.9% sodium chloride infusion  100 mL/hr IntraVENous DIALYSIS PRN    VANCOMYCIN INFORMATION NOTE   Other CONTINUOUS    naloxone (NARCAN) injection 0.4 mg  0.4 mg IntraVENous PRN    LORazepam (ATIVAN) injection 1 mg  1 mg IntraVENous Q4H PRN    fentaNYL citrate (PF) injection 50 mcg  50 mcg IntraVENous Q2H PRN          Objective: Active hospital medications were reviewed    Lab results and neuroradiology studies from the last 24 hours were reviewed.       Prior to Admission medications Not on File     Patient Vitals for the past 8 hrs:   BP Temp Pulse Resp SpO2 Weight   08/02/17 1000 119/67 - 81 18 99 % -   08/02/17 0915 118/50 98.4 °F (36.9 °C) 80 18 97 % -   08/02/17 0900 120/53 - 82 20 98 % -   08/02/17 0851 120/56 98.8 °F (37.1 °C) 74 21 100 % -   08/02/17 0845 124/59 - 75 22 100 % -   08/02/17 0830 110/58 - 75 22 100 % -   08/02/17 0816 - - 80 23 100 % -   08/02/17 0815 121/59 - 79 23 100 % -   08/02/17 0800 112/55 - 80 21 100 % -   08/02/17 0745 109/56 - 81 20 100 % -   08/02/17 0730 103/59 - 82 21 100 % -   08/02/17 0715 106/53 - 84 20 98 % -   08/02/17 0700 92/56 - 85 20 99 % -   08/02/17 0645 100/52 - 82 22 100 % -   08/02/17 0630 112/63 - 80 21 100 % -   08/02/17 0615 131/62 - 83 22 100 % -   08/02/17 0600 145/65 - 87 23 100 % 73.3 kg (161 lb 9.6 oz)   08/02/17 0545 120/60 98.7 °F (37.1 °C) 80 19 100 % -   08/02/17 0500 140/59 - 88 21 100 % -   08/02/17 0400 125/55 98.7 °F (37.1 °C) 84 19 100 % -   08/02/17 0352 - - 76 20 100 % -   08/02/17 0300 106/54 - 77 19 100 % -   FPQFLUSEGEVP55/31 1901 - 08/02 0700  In: 2835 [I.V.:250]  Out: 1300 [Drains:1300]  07/31 1901 - 08/02 0700  In: 5164 [I.V.:250]  Out: 1300 [Drains:1300]RESULTRCNT(24h)Principal Problem:    Sepsis ([address] (7/27/2017)    Active Problems:    Anemia ()      Acidosis (7/27/2017)      Cardiac arrest ([facility] [address] (7/27/2017)      Respiratory failure ([facility] [address] (7/27/2017)      ESRD needing dialysis ([facility] [address] (7/27/2017)      Anoxic brain damage ([facility]) (7/27/2017)      Colitis (7/27/2017)      Hypotension (7/27/2017)      Acute GI bleeding (7/27/2017)      [facility] (7/27/2017)        Additional comments:I reviewed the patient's new clinical lab test results.      General Exam  No acute distress, mucous membranes normal color and hydration status        Neurologic Exam    Mental status:  Eyes are open and he is able to make eye contact  Was able to consistently follow commands: \"blink eyes three times\", give thumbs up on right, move left toes etc.    Cranial nerves: OD: 3mm-min reactive, OS: 2mm-min reactive, Eyes conjugate    Motor: moving upper extremities purposefully. Results for Gypsy Ricardo (MRN [id number]) as of 7/31/2017 09:24   Ref. Range 7/31/2017 04:08   WBC Latest Ref Range: 4.6 - 13.2 K/uL 22.9 (H)   NRBC Latest Ref Range: 0  WBC 1.0 (H)   RBC Latest Ref Range: 4.70 - 5.50 M/uL 3.62 (L)   HGB Latest Ref Range: 13.0 - 16.0 g/dL 11.0 (L)   HCT Latest Ref Range: 36.0 - 48.0 % 30.7 (L)   MCV Latest Ref Range: 74.0 - 97.0 FL 84.8   MCH Latest Ref Range: 24.0 - 34.0 PG 30.4   MCHC Latest Ref Range: 31.0 - 37.0 g/dL 35.8   RDW Latest Ref Range: 11.6 - 14.5 % 15.4 (H)     Results for Gypsy Ricardo (MRN [id number]) as of 7/31/2017 09:24   Ref. Range 7/31/2017 04:08   Potassium Latest Ref Range: 3.5 - 5.5 mmol/L 3.1 (L)   Chloride Latest Ref Range: 100 - 108 mmol/L 102   CO2 Latest Ref Range: 21 - 32 mmol/L 21   Anion gap Latest Ref Range: 3.0 - 18 mmol/L 14   Glucose Latest Ref Range: 74 - 99 mg/dL 165 (H)   BUN Latest Ref Range: 7.0 - 18 MG/DL 50 (H)   Creatinine Latest Ref Range: 0.6 - 1.3 MG/DL 5.81 (H)   BUN/Creatinine ratio Latest Ref Range: 12 - 20   9 (L)   Calcium Latest Ref Range: 8.5 - 10.1 MG/DL 7.8 (L)   GFR est non-AA Latest Ref Range: >60 ml/min/1.73m2 10 (L)   GFR est AA Latest Ref Range: >60 ml/min/1.73m2 12 (L)   Bilirubin, total Latest Ref Range: 0.2 - 1.0 MG/DL 3.4 (H)   Protein, total Latest Ref Range: 6.4 - 8.2 g/dL 5.7 (L)   Albumin Latest Ref Range: 3.4 - 5.0 g/dL 1.7 (L)   Globulin Latest Ref Range: 2.0 - 4.0 g/dL 4.0         Assessment:     Justice Hopkins is a 55 y.o. who was admitted with unresponsiveness and out of hospital cardiac arrest with unknown pulseless time. He is clearly improved today. Encepahlopathy from uremia and septic process appears to be improving. Plan:   1. Continue Keppra as currently dosed  2. Continue supportive care. Yolanda Cortes. Tona Pool M.D.   Clinical Neurophysiology  Neuromuscular Specialist    Signed:  8/2/2017  9:20 AM  9:20 AM

## 2017-08-02 NOTE — PROGRESS NOTES
[facility] Hospitalist Group  Progress Note    Patient: Ryann Quintanilla Age: 55 y.o. : 1960 MR#: [id number] SSN: [id number]  Date/Time: 2017     Subjective:     Can not be obtained due to patient's factors     Assessment/Plan:   Found unresponsive , brought to ED via EMS , went in to [facility] , CPR/ACLS protocol with ROSC     1.  CP arrest   - Pulmonary note reviewed   - patient continues to be full code   - continued decreased mentation   - S/P EEG - Abnormal electroencephalogram due to the presence  of what might be an alpha  coma. No epileptic activity was  Appreciated. - continue supportive measures   - on keppra for seizure     2 ESRD   - for HD   - follow with renal       3 Acute GI bleed - history   -- no clear evidence of bleeding     4 Anemia - as above   - continue to monitor H/H   - Low stable H/h so far     5 GNR bacteremia /Sepsis - leucocytosis   - on Merrem , Off Zosyn due to thrombocytopenia . Continue vanco   - rpt blood cultures   - Ecoli and CLOSTRIDIUM PERFRINGENS    6 hypokalemia and Hypocalcemia - replace lytes - defer to ICU , Renal     7 Colitis -   - Culture stool negative so far , C diff negative       8  Elevated LFT - ?  Shock liver   - some  improved LFT         Case discussed with:  []Patient  []Family  [x]Nursing  []Case Management  DVT Prophylaxis:  []Lovenox  []Hep SQ  []SCDs  []Coumadin   []On Heparin gtt    Patient Active Problem List   Diagnosis Code    Anemia D64.9    Acidosis E87.2    Cardiac arrest ([address] I46.9    Respiratory failure ([address] J96.90    ESRD needing dialysis ([address] N18.6, Z99.2    Anoxic brain damage ([address] G93.1    Colitis K52.9    Hypotension I95.9    Acute GI bleeding K92.2    ESRD (end stage renal disease) ([address] N18.6    Sepsis ([address] A41.9       Objective:   VS:   Visit Vitals    /62 (BP 1 Location: Right arm, BP Patient Position: At rest)    Pulse 85    Temp 98.8 °F (37.1 °C)    Resp 18    Ht 6' 3\" (1.905 m)  Comment: per chart history    Wt 73.3 kg (161 lb 9.6 oz)    SpO2 98%    BMI 20.2 kg/m2      Tmax/24hrs: Temp (24hrs), Av.6 °F (37 °C), Min:98.3 °F (36.8 °C), Max:98.8 °F (37.1 °C)  IOBRIEF    Intake/Output Summary (Last 24 hours) at 17 1704  Last data filed at 17 1406   Gross per 24 hour   Intake             1480 ml   Output             2100 ml   Net             -620 ml       General:  Orally intubated / on vent   HEENT:  NC, Atraumatic. PERRLA, anicteric sclerae. Pulmonary:  CTA Bilaterally. No Wheezing/Rhonchi/Rales. Cardiovascular: Regular rate and Rhythm. GI:  Soft, Non distended, Non tender. + Bowel sounds. Extremities:  No edema, cyanosis, clubbing. No calf tenderness. Psych: Not examined   Neurologic: Grossly - Motor and Sensory functions are intact .          Medications:   Current Facility-Administered Medications   Medication Dose Route Frequency    insulin glargine (LANTUS) injection 15 Units  15 Units SubCUTAneous DAILY    potassium chloride (KLOR-CON) packet 20 mEq  20 mEq Per NG tube BID    piperacillin-tazobactam (ZOSYN) 2.25 g in 0.9% sodium chloride (MBP/ADV) 50 mL MBP  2.25 g IntraVENous Q8H    hydrocortisone Sod Succ (PF) (SOLU-CORTEF) injection 25 mg  25 mg IntraVENous Q12H    pantoprazole (PROTONIX) injection 40 mg  40 mg IntraVENous DAILY    midazolam (VERSED) 100 mg in 0.9% sodium chloride 100 mL infusion  1-3 mg/hr IntraVENous TITRATE    doxercalciferol (HECTOROL) 4 mcg/2 mL injection 2 mcg  2 mcg IntraVENous Q MON, WED & FRI    epoetin benjamin (EPOGEN;PROCRIT) injection 10,000 Units  10,000 Units IntraVENous Q MON, WED & FRI    levETIRAcetam (KEPPRA) 500 mg in 100 ml IVPB  500 mg IntraVENous Q12H    chlorhexidine (PERIDEX) 0.12 % mouthwash 10 mL  10 mL Oral Q12H    insulin lispro (HUMALOG) injection   SubCUTAneous Q6H    glucose chewable tablet 16 g  4 Tab Oral PRN    glucagon (GLUCAGEN) injection 1 mg  1 mg IntraMUSCular PRN    dextrose (D50W) injection syrg 12.5-25 g  25-50 mL IntraVENous PRN    0.9% sodium chloride infusion  100 mL/hr IntraVENous DIALYSIS PRN    naloxone (NARCAN) injection 0.4 mg  0.4 mg IntraVENous PRN    LORazepam (ATIVAN) injection 1 mg  1 mg IntraVENous Q4H PRN    fentaNYL citrate (PF) injection 50 mcg  50 mcg IntraVENous Q2H PRN       Labs:    Recent Results (from the past 24 hour(s))   GLUCOSE, POC    Collection Time: 08/01/17  5:42 PM   Result Value Ref Range    Glucose (POC) 326 (H) 70 - 110 mg/dL   LACTIC ACID    Collection Time: 08/01/17  7:10 PM   Result Value Ref Range    Lactic acid 2.2 (HH) 0.4 - 2.0 MMOL/L   GLUCOSE, POC    Collection Time: 08/01/17 11:49 PM   Result Value Ref Range    Glucose (POC) 263 (H) 70 - 110 mg/dL   POC G3    Collection Time: 08/02/17  4:55 AM   Result Value Ref Range    Device: VENT      FIO2 (POC) 28 %    pH (POC) 7.419 7.35 - 7.45      pCO2 (POC) 38.0 35.0 - 45.0 MMHG    pO2 (POC) 102 (H) 80 - 100 MMHG    HCO3 (POC) 24.6 22 - 26 MMOL/L    sO2 (POC) 98 (H) 92 - 97 %    Base excess (POC) 0 mmol/L    Mode ASSIST CONTROL      Tidal volume 400 ml    Set Rate 12 bpm    PEEP/CPAP (POC) 5 cmH2O    Allens test (POC) YES      Inspiratory Time 1 sec    Total resp. rate 19      Site LEFT RADIAL      Patient temp.  98.6      Specimen type (POC) ARTERIAL      Performed by Daniel eLos     Volume control plus YES     GLUCOSE, POC    Collection Time: 08/02/17  6:06 AM   Result Value Ref Range    Glucose (POC) 305 (H) 70 - 110 mg/dL   CALCIUM, IONIZED    Collection Time: 08/02/17  6:10 AM   Result Value Ref Range    Ionized Calcium 1.11 (L) 1.12 - 1.32 MMOL/L   CBC WITH AUTOMATED DIFF    Collection Time: 08/02/17  6:10 AM   Result Value Ref Range    WBC 36.0 (H) 4.6 - 13.2 K/uL    RBC 3.39 (L) 4.70 - 5.50 M/uL    HGB 10.3 (L) 13.0 - 16.0 g/dL    HCT 29.2 (L) 36.0 - 48.0 %    MCV 86.1 74.0 - 97.0 FL    MCH 30.4 24.0 - 34.0 PG    MCHC 35.3 31.0 - 37.0 g/dL    RDW 15.8 (H) 11.6 - 14.5 %    PLATELET 54 (L) 135 - 420 K/uL    NEUTROPHILS 80 (H) 42 - 75 %    BAND NEUTROPHILS 8 (H) 0 - 5 %    LYMPHOCYTES 6 (L) 20 - 51 %    MONOCYTES 5 2 - 9 %    EOSINOPHILS 0 0 - 5 %    BASOPHILS 0 0 - 3 %    METAMYELOCYTES 1 (H) 0 %    NRBC 1.0 (H) 0  WBC    ABS. NEUTROPHILS 31.7 (H) 1.8 - 8.0 K/UL    ABS. LYMPHOCYTES 2.2 0.8 - 3.5 K/UL    ABS. MONOCYTES 1.8 (H) 0 - 1.0 K/UL    ABS. EOSINOPHILS 0.0 0.0 - 0.4 K/UL    ABS. BASOPHILS 0.0 0.0 - 0.06 K/UL    DF MANUAL      PLATELET COMMENTS DECREASED PLATELETS      RBC COMMENTS ANISOCYTOSIS  1+        RBC COMMENTS TARGET CELLS  1+        RBC COMMENTS FEW OVALOCYTES    METABOLIC PANEL, COMPREHENSIVE    Collection Time: 08/02/17  6:10 AM   Result Value Ref Range    Sodium 140 136 - 145 mmol/L    Potassium 2.8 (LL) 3.5 - 5.5 mmol/L    Chloride 104 100 - 108 mmol/L    CO2 24 21 - 32 mmol/L    Anion gap 12 3.0 - 18 mmol/L    Glucose 341 (H) 74 - 99 mg/dL    BUN 77 (H) 7.0 - 18 MG/DL    Creatinine 6.36 (H) 0.6 - 1.3 MG/DL    BUN/Creatinine ratio 12 12 - 20      GFR est AA 11 (L) >60 ml/min/1.73m2    GFR est non-AA 9 (L) >60 ml/min/1.73m2    Calcium 7.8 (L) 8.5 - 10.1 MG/DL    Bilirubin, total 1.2 (H) 0.2 - 1.0 MG/DL    ALT (SGPT) 67 (H) 16 - 61 U/L    AST (SGOT) 15 15 - 37 U/L    Alk.  phosphatase 158 (H) 45 - 117 U/L    Protein, total 5.4 (L) 6.4 - 8.2 g/dL    Albumin 1.8 (L) 3.4 - 5.0 g/dL    Globulin 3.6 2.0 - 4.0 g/dL    A-G Ratio 0.5 (L) 0.8 - 1.7     LACTIC ACID    Collection Time: 08/02/17  6:10 AM   Result Value Ref Range    Lactic acid 0.8 0.4 - 2.0 MMOL/L   VANCOMYCIN, RANDOM    Collection Time: 08/02/17  6:10 AM   Result Value Ref Range    Vancomycin, random 19.8 5.0 - 40.0 UG/ML   VITAMIN D, 25 HYDROXY    Collection Time: 08/02/17  6:10 AM   Result Value Ref Range    Vitamin D 25-Hydroxy 7.5 (L) 30 - 100 ng/mL   GLUCOSE, POC    Collection Time: 08/02/17 11:13 AM   Result Value Ref Range    Glucose (POC) 243 (H) 70 - 110 mg/dL       Signed By: Aries Diamond MD     August 2, 2017

## 2017-08-02 NOTE — PROGRESS NOTES
0800 Assumed care of patient. Pt currently on dialysis. 0900 Pt finished dialysis. Bedside report received from [facility] dialysis nurse. 1L fluid removed. 0920 Bedside shift change report given to [address] [place] [address] (oncoming nurse) by Carley Womack RN   (offgoing nurse). Report included the following information SBAR, Kardex and MAR.

## 2017-08-02 NOTE — DIALYSIS
ACUTE HEMODIALYSIS FLOW SHEET    HEMODIALYSIS ORDERS: Physician: Viki Christian.      Dialyzer:  Revaclear        Duration: 3 hr  BFR: 300   DFR: 600   Dialysate:  Temp 36-37 K+   3    Ca+  3.0 Na 140 Bicarb 35   Weight:  73.3 kg    Bed Scale []     Unable to Obtain []      Dry weight/UF Goal: 1000ml Access AVF  Needle Gauge 15    Heparin []  Bolus      Units    [] Hourly       Units    [x]None     Catheter locking solution NA   Pre BP:   140/59    Pulse:     88     Temperature:   98.7  Respirations: 21  Tx: NS       ml/Bolus  Other        [x] N/A   Labs: Pre        Post:        [x] N/A   Additional Orders(medications, blood products, hypotension management):       [x] N/A     [x] Time Out/Safety Check  [x] [facility] Consent Verified     CATHETER ACCESS: [x]N/A   []Right   []Left   []IJ     []Fem   [] First use X-ray verified     []Tunnel                [] Non Tunneled   []No S/S infection  []Redness  []Drainage []Cultured []Swelling []Pain   []Medical Aseptic Prep Utilized   []Dressing Changed  [] Biopatch  Date:       []Clotted   []Patent   Flows: []Good  []Poor  []Reversed   If access problem,  notified: []Yes    []N/A  Date:           GRAFT/FISTULA ACCESS:  []N/A     []Right     [x]Left     [x]UE     []LE   []AVG   [x]AVF        []Buttonhole    [x]Medical Aseptic Prep Utilized   [x]No S/S infection  []Redness  []Drainage []Cultured []Swelling []Pain    Bruit:   [x] Strong    [] Weak       Thrill :   [x] Strong    [] Weak       Needle Gauge: 15   Length: 1in   If access problem,  notified: []Yes     [x]N/A  Date:        Please describe access if present and not used:       GENERAL ASSESSMENT:    LUNGS:  Rate 21 SaO2%        [] N/A    [] Clear  [x] Coarse  [] Crackles  [] Wheezing        [] Diminished     Location : []RLL   []LLL    []RUL  []   Cough: []Productive  []Dry  [x]N/A   Respirations:  [x]Easy  []Labored   Therapy:  []RA  []NC  l/min    Mask: []NRB []Venti       O2% [x]Ventilator  [x]Intubated  [] Trach  [] BiPaP   CARDIAC: [x]Regular      [] Irregular   [] Pericardial Rub  [] JVD        [x]  Monitored  [x] Bedside  [] Remotely monitored [] N/A  Rhythm:    EDEMA: [] None  [x]Generalized  [] Pitting [] 1    [] 2    [] 3    [] 4                 [] Facial  [] Pedal  []  UE  [] LE   SKIN:   [x] Warm  [] Hot     [] Cold   [] Dry     [] Pale   [] Diaphoretic                  [] Flushed  [] Jaundiced  [] Cyanotic  [] Rash  [] Weeping   LOC:    [] Alert      []Oriented:    [] Person     [] Place  []Time               [] Confused  [] Lethargic  [] Medicated  [] Non-responsive     GI / ABDOMEN:  [] Flat    [] Distended    [x] Soft    [] Firm   []  Obese                             [] Diarrhea  [x] Bowel Sounds  [] Nausea  [] Vomiting       / URINE ASSESSMENT:[] Voiding   [] Oliguria  [x] Anuria   []  Stanotn     [] Incontinent    []  Incontinent Brief      []  Bathroom Privileges     PAIN: [x] 0 []1  []2   []3   []4   []5   []6   []7   []8   []9   []10            Scale 0-10  Action/Follow Up: Non-verbal indicators of pain are absent   MOBILITY:  [] Amb    [] Amb/Assist    [x] Bed    [] Wheelchair  [] Stretcher      All Vitals and Treatment Details on Attached [address]: [facility]          Room # 308     [] 1st Time Acute  [] Stat  [x] Routine  [] Urgent     [] Acute Room  []  Bedside  [x] ICU/CCU  [] ER   Isolation Precautions:  [x] Dialysis   [] Airborne   [] Contact    [] Reverse   Special Considerations:         [] Blood Consent Verified [x]N/A     ALLERGIES:   [x] NKA          Code Status:  [x] Full Code  [] DNR  [] Other           HBsAg ONLY: Date Drawn 07/28/2017         [x]Negative []Positive []Unknown   HBsAb: Date     [] Susceptible   [x] [id number] []Not Drawn  [] Drawn     Current Labs:    Date of Labs: Today [x]        Cut and paste current labs here. Results for Jordan Beauchamp (MRN [id number]) as of 8/2/2017 07:19   Ref.  Range 8/2/2017 06:10   Ionized Calcium Latest Ref Range: 1.12 - 1.32 MMOL/L 1.11 (L)   WBC Latest Ref Range: 4.6 - 13.2 K/uL 36.0 (H)   RBC Latest Ref Range: 4.70 - 5.50 M/uL 3.39 (L)   HGB Latest Ref Range: 13.0 - 16.0 g/dL 10.3 (L)   HCT Latest Ref Range: 36.0 - 48.0 % 29.2 (L)   MCV Latest Ref Range: 74.0 - 97.0 FL 86.1   MCH Latest Ref Range: 24.0 - 34.0 PG 30.4   MCHC Latest Ref Range: 31.0 - 37.0 g/dL 35.3   RDW Latest Ref Range: 11.6 - 14.5 % 15.8 (H)   PLATELET Latest Ref Range: 135 - 420 K/uL 54 (L)   NEUTROPHILS Latest Units: % PENDING   LYMPHOCYTES Latest Units: % PENDING   MONOCYTES Latest Units: % PENDING   EOSINOPHILS Latest Units: % PENDING   BASOPHILS Latest Units: % PENDING   DF Unknown PENDING   ABS. NEUTROPHILS Latest Units: K/UL PENDING   ABS. LYMPHOCYTES Latest Units: K/UL PENDING   ABS. MONOCYTES Latest Units: K/UL PENDING   ABS. EOSINOPHILS Latest Units: K/UL PENDING   ABS. BASOPHILS Latest Units: K/UL PENDING   Sodium Latest Ref Range: 136 - 145 mmol/L 140   Potassium Latest Ref Range: 3.5 - 5.5 mmol/L 2.8 (LL)   Chloride Latest Ref Range: 100 - 108 mmol/L 104   CO2 Latest Ref Range: 21 - 32 mmol/L 24   Anion gap Latest Ref Range: 3.0 - 18 mmol/L 12   Glucose Latest Ref Range: 74 - 99 mg/dL 341 (H)   BUN Latest Ref Range: 7.0 - 18 MG/DL 77 (H)   Creatinine Latest Ref Range: 0.6 - 1.3 MG/DL 6.36 (H)   BUN/Creatinine ratio Latest Ref Range: 12 - 20   12   Calcium Latest Ref Range: 8.5 - 10.1 MG/DL 7.8 (L)   GFR est non-AA Latest Ref Range: >60 ml/min/1.73m2 9 (L)   GFR est AA Latest Ref Range: >60 ml/min/1.73m2 11 (L)   Bilirubin, total Latest Ref Range: 0.2 - 1.0 MG/DL 1.2 (H)   Protein, total Latest Ref Range: 6.4 - 8.2 g/dL 5.4 (L)   Albumin Latest Ref Range: 3.4 - 5.0 g/dL 1.8 (L)   Globulin Latest Ref Range: 2.0 - 4.0 g/dL 3.6   A-G Ratio Latest Ref Range: 0.8 - 1.7   0.5 (L)   ALT (SGPT) Latest Ref Range: 16 - 61 U/L 67 (H)   AST Latest Ref Range: 15 - 37 U/L 15   Alk.  phosphatase Latest Ref Range: 45 - 117 U/L 158 (H) Lactic acid Latest Ref Range: 0.4 - 2.0 MMOL/L 0.8   Vancomycin, random Latest Ref Range: 5.0 - 40.0 UG/ML 19.8                                                                                                                                   DIET:  [] Renal    [] Other     [x] NPO     []  Diabetic      PRIMARY NURSE REPORT: First initial/Last name/Title      Pre Dialysis: JIM Durham RN    Time: 0530      EDUCATION:    [] Patient [] Other         Knowledge Basis: []None []Minimal [] Substantial   Barriers to learning  []N/A   [] Access Care     [] S&S of infection     [] Fluid Management     []K+     []Procedural    []Albumin     [] Medications     [] Tx Options     [] Transplant     [] Diet     [] Other   Teaching Tools:  [] Explain  [] Demo  [] Handouts [] Video  Patient response:   [] Verbalized understanding  [] Teach back  [] Return demonstration [] Requires follow up   Inappropriate due to    Pt sedated and on ventilator        RO/HEMODIALYSIS MACHINE SAFETY CHECKS  Before each treatment:     Machine Number:                   [facility]                                   [] Unit Machine #  with centralized RO                                  [] Portable Machine #1/RO serial # [id number]                                  [x] Portable Machine #2/RO serial # [id number]                                  [] Portable Machine #3/RO serial # [id number]                                                                                                       [facility]                                  [] Portable Machine #11/RO serial # [id number]                                   [] Portable Machine #12/RO serial # [id number]                                  [] Portable Machine #13/RO serial #  [id number]      Alarm Test:  Pass time 0536         Other:         [x] RO/Machine Log Complete      Temp    36.0            [x]Extracorporeal Circuit Tested for integrity   Dialysate: pH  7.4 Conductivity: Meter   14.0     HD Machine   14.3                  TCD: 14.0  Dialyzer Lot # [id number]            Blood Tubing Lot # [id number]          Saline Lot #  [id number]     CHLORINE TESTING-Before each treatment and every 4 hours    Total Chlorine: [x] less than 0.1 ppm  Time: 0540 4 Hr/2nd Check Time: NA   (if greater than 0.1 ppm from Primary then every 30 minutes from Secondary)     TREATMENT INITIATION  with Dialysis Precautions:   [x] All Connections Secured                 [x] Saline Line Double Clamped   [x] Venous Parameters Set                  [x] Arterial Parameters Set    [x] Prime Given  250 ml                        [x]Air Foam Detector Engaged      Treatment Initiation Note: Arrived to pts room in ICU. Pt sedated and on ventilator,  non-verbal indicators of pain are absent. Nurse JIM Winkler RN at bedside and received report on pt. Pt has LUE AVF with +bruit/thrill, cannulated x2 without complication. Tx initiated and pt tolerating well.  0714 Potassium came back at 2.8. Dr. Leti Robison notified and stated for pt to remain on 3k bath. Medication Dose Volume Route Initials Dialyzer Cleared: [] Good [x] Fair  [] Poor    Blood processed:  48.5 L  UF Removed  1000 Ml    Post Wt: 72.3    kg  POst BP:   120/56       Pulse: 74      Respirations: 21  Temperature: 98.8     Hectorol 2mcg 1ml IV AP Post Tx Vascular Access: AVF/AVG: Bleeding stopped Art 5 min. Oscar. 5 Min        Epogen 45879g 1ml IV AP Catheter: Locking solution: Heparin 1:1000 Art. Oscar.    N/A                                 Post Assessment:                                    Skin:  [x] Warm  [] Dry [] Diaphoretic    [] Flushed  [] Pale [] Cyanotic   [facility] Signatures Title Initials  Time Lungs: [] Clear    [x] Course  [] Crackles  [] Wheezing [] Diminished   Jeff Corbett RN AP  Cardiac: [x] Regular   [] Irregular   [x] Monitor  [] N/A  Rhythm:           Edema:  [] None    [x] General     [] Facial   [] Pedal    [] UE    [] LE       Pain: [x]0  []1  []2   []3  []4 []5   []6   []7   []8   []9   []10         Post Treatment Note: HD tx complete, patient tolerated procedure well, 1000ml net UF removed     POST TREATMENT PRIMARY NURSE HANDOFF REPORT:     First initial/Last name/Title         Post Dialysis: JESIKA Dee RN Time:  8320     Abbreviations: AVG-arterial venous graft, AVF-arterial venous fistula, IJ-Internal Jugular, Subcl-Subclavian, Fem-Femoral, Tx-treatment, AP/HR-apical heart rate, DFR-dialysate flow rate, BFR-blood flow rate, AP-arterial pressure, -venous pressure, UF-ultrafiltrate, TMP-transmembrane pressure, Oscar-Venous, Art-Arterial, RO-Reverse Osmosis

## 2017-08-02 NOTE — PROGRESS NOTES
On dialysis this morning. Does not follow commands. No SBT done. 08/02/17 0816   Weaning Parameters   Spontaneous Breathing Trial Complete No (Comments)   Will continue to monitor and wean per protocol.

## 2017-08-03 ENCOUNTER — APPOINTMENT (OUTPATIENT)
Dept: GENERAL RADIOLOGY | Age: 57
DRG: 004 | End: 2017-08-03
Attending: PHYSICIAN ASSISTANT
Payer: MEDICARE

## 2017-08-03 LAB
ADMINISTERED INITIALS, ADMINIT: NORMAL
ALBUMIN SERPL BCP-MCNC: 1.6 G/DL (ref 3.4–5)
ALBUMIN/GLOB SERPL: 0.5 {RATIO} (ref 0.8–1.7)
ALP SERPL-CCNC: 144 U/L (ref 45–117)
ALT SERPL-CCNC: 55 U/L (ref 16–61)
ANION GAP BLD CALC-SCNC: 8 MMOL/L (ref 3–18)
ARTERIAL PATENCY WRIST A: YES
AST SERPL W P-5'-P-CCNC: 17 U/L (ref 15–37)
BACTERIA SPEC CULT: NORMAL
BACTERIA SPEC CULT: NORMAL
BASE EXCESS BLD CALC-SCNC: 1 MMOL/L
BASOPHILS # BLD AUTO: 0 K/UL (ref 0–0.06)
BASOPHILS # BLD: 0 % (ref 0–3)
BDY SITE: NORMAL
BILIRUB SERPL-MCNC: 1.2 MG/DL (ref 0.2–1)
BODY TEMPERATURE: 98.6
BUN SERPL-MCNC: 54 MG/DL (ref 7–18)
BUN/CREAT SERPL: 12 (ref 12–20)
CA-I SERPL-SCNC: 0.98 MMOL/L (ref 1.12–1.32)
CALCIUM SERPL-MCNC: 6.7 MG/DL (ref 8.5–10.1)
CHLORIDE SERPL-SCNC: 111 MMOL/L (ref 100–108)
CO2 SERPL-SCNC: 24 MMOL/L (ref 21–32)
CREAT SERPL-MCNC: 4.67 MG/DL (ref 0.6–1.3)
D50 ADMINISTERED, D50ADM: 0 ML
D50 ORDER, D50ORD: 0 ML
DIFFERENTIAL METHOD BLD: ABNORMAL
EOSINOPHIL # BLD: 0 K/UL (ref 0–0.4)
EOSINOPHIL NFR BLD: 0 % (ref 0–5)
ERYTHROCYTE [DISTWIDTH] IN BLOOD BY AUTOMATED COUNT: 15.8 % (ref 11.6–14.5)
GAS FLOW.O2 O2 DELIVERY SYS: NORMAL L/MIN
GAS FLOW.O2 SETTING OXYMISER: 12 BPM
GLOBULIN SER CALC-MCNC: 3.4 G/DL (ref 2–4)
GLUCOSE BLD STRIP.AUTO-MCNC: 116 MG/DL (ref 70–110)
GLUCOSE BLD STRIP.AUTO-MCNC: 126 MG/DL (ref 70–110)
GLUCOSE BLD STRIP.AUTO-MCNC: 136 MG/DL (ref 70–110)
GLUCOSE BLD STRIP.AUTO-MCNC: 143 MG/DL (ref 70–110)
GLUCOSE BLD STRIP.AUTO-MCNC: 143 MG/DL (ref 70–110)
GLUCOSE BLD STRIP.AUTO-MCNC: 183 MG/DL (ref 70–110)
GLUCOSE BLD STRIP.AUTO-MCNC: 187 MG/DL (ref 70–110)
GLUCOSE BLD STRIP.AUTO-MCNC: 187 MG/DL (ref 70–110)
GLUCOSE BLD STRIP.AUTO-MCNC: 201 MG/DL (ref 70–110)
GLUCOSE BLD STRIP.AUTO-MCNC: 214 MG/DL (ref 70–110)
GLUCOSE BLD STRIP.AUTO-MCNC: 217 MG/DL (ref 70–110)
GLUCOSE BLD STRIP.AUTO-MCNC: 219 MG/DL (ref 70–110)
GLUCOSE SERPL-MCNC: 203 MG/DL (ref 74–99)
GLUCOSE, GLC: 136 MG/DL
GLUCOSE, GLC: 143 MG/DL
GLUCOSE, GLC: 143 MG/DL
GLUCOSE, GLC: 183 MG/DL
GLUCOSE, GLC: 187 MG/DL
GLUCOSE, GLC: 205 MG/DL
GLUCOSE, GLC: 214 MG/DL
GLUCOSE, GLC: 217 MG/DL
GLUCOSE, GLC: 219 MG/DL
GRAM STN SPEC: NORMAL
GRAM STN SPEC: NORMAL
HCO3 BLD-SCNC: 25.8 MMOL/L (ref 22–26)
HCT VFR BLD AUTO: 29 % (ref 36–48)
HEPARIN AGGREGATION,XHEAGT: NEGATIVE
HGB BLD-MCNC: 9.6 G/DL (ref 13–16)
HIGH TARGET, HITG: 180 MG/DL
INSPIRATION.DURATION SETTING TIME VENT: 1 SEC
INSULIN ADMINSTERED, INSADM: 2.9 UNITS/HOUR
INSULIN ADMINSTERED, INSADM: 4.3 UNITS/HOUR
INSULIN ADMINSTERED, INSADM: 4.6 UNITS/HOUR
INSULIN ADMINSTERED, INSADM: 4.7 UNITS/HOUR
INSULIN ADMINSTERED, INSADM: 5.8 UNITS/HOUR
INSULIN ADMINSTERED, INSADM: 6.4 UNITS/HOUR
INSULIN ADMINSTERED, INSADM: 7.6 UNITS/HOUR
INSULIN ADMINSTERED, INSADM: 7.7 UNITS/HOUR
INSULIN ADMINSTERED, INSADM: 8.6 UNITS/HOUR
INSULIN ORDER, INSORD: 2.9 UNITS/HOUR
INSULIN ORDER, INSORD: 4.3 UNITS/HOUR
INSULIN ORDER, INSORD: 4.6 UNITS/HOUR
INSULIN ORDER, INSORD: 4.7 UNITS/HOUR
INSULIN ORDER, INSORD: 5.8 UNITS/HOUR
INSULIN ORDER, INSORD: 6.4 UNITS/HOUR
INSULIN ORDER, INSORD: 7.6 UNITS/HOUR
INSULIN ORDER, INSORD: 7.7 UNITS/HOUR
INSULIN ORDER, INSORD: 8.6 UNITS/HOUR
LOW TARGET, LOT: 140 MG/DL
LYMPHOCYTES # BLD AUTO: 14 % (ref 20–51)
LYMPHOCYTES # BLD: 4.9 K/UL (ref 0.8–3.5)
MCH RBC QN AUTO: 30.1 PG (ref 24–34)
MCHC RBC AUTO-ENTMCNC: 33.1 G/DL (ref 31–37)
MCV RBC AUTO: 90.9 FL (ref 74–97)
MINUTES UNTIL NEXT BG, NBG: 60 MIN
MONOCYTES # BLD: 2.4 K/UL (ref 0–1)
MONOCYTES NFR BLD AUTO: 7 % (ref 2–9)
MULTIPLIER, MUL: 0.02
MULTIPLIER, MUL: 0.03
MULTIPLIER, MUL: 0.04
MULTIPLIER, MUL: 0.05
MULTIPLIER, MUL: 0.06
MULTIPLIER, MUL: 0.07
MULTIPLIER, MUL: 0.07
NEUTS SEG # BLD: 27.6 K/UL (ref 1.8–8)
NEUTS SEG NFR BLD AUTO: 79 % (ref 42–75)
O2/TOTAL GAS SETTING VFR VENT: 28 %
ORDER INITIALS, ORDINIT: NORMAL
PCO2 BLD: 39.5 MMHG (ref 35–45)
PEEP RESPIRATORY: 5 CMH2O
PH BLD: 7.42 [PH] (ref 7.35–7.45)
PHOSPHATE SERPL-MCNC: 2.2 MG/DL (ref 2.5–4.9)
PLATELET # BLD AUTO: 62 K/UL (ref 135–420)
PLATELET COMMENTS,PCOM: ABNORMAL
PLATELET FACTOR 4,XPF4T: NEGATIVE
PO2 BLD: 90 MMHG (ref 80–100)
POTASSIUM SERPL-SCNC: 2.8 MMOL/L (ref 3.5–5.5)
PROT SERPL-MCNC: 5 G/DL (ref 6.4–8.2)
RBC # BLD AUTO: 3.19 M/UL (ref 4.7–5.5)
RBC MORPH BLD: ABNORMAL
SAO2 % BLD: 97 % (ref 92–97)
SERVICE CMNT-IMP: NORMAL
SODIUM SERPL-SCNC: 143 MMOL/L (ref 136–145)
SPECIMEN TYPE: NORMAL
TOTAL RESP. RATE, ITRR: 20
TSH SERPL DL<=0.05 MIU/L-ACNC: 7.52 UIU/ML (ref 0.36–3.74)
VENTILATION MODE VENT: NORMAL
VOLUME CONTROL PLUS IVLCP: YES
VT SETTING VENT: 400 ML
WBC # BLD AUTO: 34.9 K/UL (ref 4.6–13.2)

## 2017-08-03 PROCEDURE — 74011250636 HC RX REV CODE- 250/636: Performed by: PSYCHIATRY & NEUROLOGY

## 2017-08-03 PROCEDURE — 71010 XR CHEST PORT: CPT

## 2017-08-03 PROCEDURE — 74011636637 HC RX REV CODE- 636/637: Performed by: PHYSICIAN ASSISTANT

## 2017-08-03 PROCEDURE — 74011250636 HC RX REV CODE- 250/636: Performed by: INTERNAL MEDICINE

## 2017-08-03 PROCEDURE — 84100 ASSAY OF PHOSPHORUS: CPT | Performed by: PHYSICIAN ASSISTANT

## 2017-08-03 PROCEDURE — 87641 MR-STAPH DNA AMP PROBE: CPT | Performed by: INTERNAL MEDICINE

## 2017-08-03 PROCEDURE — 74011000258 HC RX REV CODE- 258: Performed by: PHYSICIAN ASSISTANT

## 2017-08-03 PROCEDURE — 82330 ASSAY OF CALCIUM: CPT | Performed by: PHYSICIAN ASSISTANT

## 2017-08-03 PROCEDURE — 94003 VENT MGMT INPAT SUBQ DAY: CPT

## 2017-08-03 PROCEDURE — 77030020291 HC FLEXSEAL FMS BMS -C

## 2017-08-03 PROCEDURE — 80053 COMPREHEN METABOLIC PANEL: CPT | Performed by: PHYSICIAN ASSISTANT

## 2017-08-03 PROCEDURE — 74011000258 HC RX REV CODE- 258: Performed by: INTERNAL MEDICINE

## 2017-08-03 PROCEDURE — 65610000006 HC RM INTENSIVE CARE

## 2017-08-03 PROCEDURE — 74011250637 HC RX REV CODE- 250/637: Performed by: PHYSICIAN ASSISTANT

## 2017-08-03 PROCEDURE — 82962 GLUCOSE BLOOD TEST: CPT

## 2017-08-03 PROCEDURE — 36600 WITHDRAWAL OF ARTERIAL BLOOD: CPT

## 2017-08-03 PROCEDURE — 74011250636 HC RX REV CODE- 250/636: Performed by: PHYSICIAN ASSISTANT

## 2017-08-03 PROCEDURE — C9113 INJ PANTOPRAZOLE SODIUM, VIA: HCPCS | Performed by: INTERNAL MEDICINE

## 2017-08-03 PROCEDURE — 74011250637 HC RX REV CODE- 250/637: Performed by: INTERNAL MEDICINE

## 2017-08-03 PROCEDURE — 36415 COLL VENOUS BLD VENIPUNCTURE: CPT | Performed by: PHYSICIAN ASSISTANT

## 2017-08-03 PROCEDURE — 84443 ASSAY THYROID STIM HORMONE: CPT | Performed by: PHYSICIAN ASSISTANT

## 2017-08-03 PROCEDURE — 82803 BLOOD GASES ANY COMBINATION: CPT

## 2017-08-03 PROCEDURE — 85025 COMPLETE CBC W/AUTO DIFF WBC: CPT | Performed by: PHYSICIAN ASSISTANT

## 2017-08-03 RX ORDER — INSULIN LISPRO 100 [IU]/ML
INJECTION, SOLUTION INTRAVENOUS; SUBCUTANEOUS EVERY 6 HOURS
Status: DISCONTINUED | OUTPATIENT
Start: 2017-08-03 | End: 2017-08-07

## 2017-08-03 RX ORDER — HYDROCORTISONE 10 MG/1
10 TABLET ORAL 2 TIMES DAILY WITH MEALS
Status: DISCONTINUED | OUTPATIENT
Start: 2017-08-03 | End: 2017-08-18

## 2017-08-03 RX ORDER — DOXERCALCIFEROL 4 UG/2ML
3 INJECTION INTRAVENOUS
Status: DISCONTINUED | OUTPATIENT
Start: 2017-08-04 | End: 2017-08-04

## 2017-08-03 RX ORDER — POTASSIUM CHLORIDE 1.5 G/1.77G
40 POWDER, FOR SOLUTION ORAL
Status: DISCONTINUED | OUTPATIENT
Start: 2017-08-03 | End: 2017-08-03

## 2017-08-03 RX ORDER — POTASSIUM CHLORIDE 1.5 G/1.77G
40 POWDER, FOR SOLUTION ORAL
Status: COMPLETED | OUTPATIENT
Start: 2017-08-03 | End: 2017-08-03

## 2017-08-03 RX ADMIN — CHLORHEXIDINE GLUCONATE 10 ML: 1.2 RINSE ORAL at 22:56

## 2017-08-03 RX ADMIN — HYDROCORTISONE SODIUM SUCCINATE 25 MG: 100 INJECTION, POWDER, FOR SOLUTION INTRAMUSCULAR; INTRAVENOUS at 08:27

## 2017-08-03 RX ADMIN — LEVETIRACETAM 500 MG: 5 INJECTION INTRAVENOUS at 11:19

## 2017-08-03 RX ADMIN — DIBASIC SODIUM PHOSPHATE, MONOBASIC POTASSIUM PHOSPHATE AND MONOBASIC SODIUM PHOSPHATE 1 TABLET: 852; 155; 130 TABLET ORAL at 18:02

## 2017-08-03 RX ADMIN — HYDROCORTISONE 10 MG: 10 TABLET ORAL at 18:02

## 2017-08-03 RX ADMIN — SODIUM CHLORIDE 2.9 UNITS/HR: 900 INJECTION, SOLUTION INTRAVENOUS at 00:00

## 2017-08-03 RX ADMIN — PIPERACILLIN SODIUM AND TAZOBACTAM SODIUM 2.25 G: 2; .25 INJECTION, POWDER, LYOPHILIZED, FOR SOLUTION INTRAVENOUS at 05:00

## 2017-08-03 RX ADMIN — FENTANYL CITRATE 50 MCG: 50 INJECTION INTRAMUSCULAR; INTRAVENOUS at 00:30

## 2017-08-03 RX ADMIN — INSULIN LISPRO 3 UNITS: 100 INJECTION, SOLUTION INTRAVENOUS; SUBCUTANEOUS at 18:02

## 2017-08-03 RX ADMIN — INSULIN GLARGINE 10 UNITS: 100 INJECTION, SOLUTION SUBCUTANEOUS at 00:00

## 2017-08-03 RX ADMIN — PIPERACILLIN AND TAZOBACTAM 2.25 G: 2; .25 INJECTION, POWDER, LYOPHILIZED, FOR SOLUTION INTRAVENOUS; PARENTERAL at 18:01

## 2017-08-03 RX ADMIN — PANTOPRAZOLE SODIUM 40 MG: 40 INJECTION, POWDER, FOR SOLUTION INTRAVENOUS at 08:27

## 2017-08-03 RX ADMIN — LEVETIRACETAM 500 MG: 5 INJECTION INTRAVENOUS at 22:43

## 2017-08-03 RX ADMIN — CHLORHEXIDINE GLUCONATE 10 ML: 1.2 RINSE ORAL at 08:27

## 2017-08-03 RX ADMIN — INSULIN GLARGINE 15 UNITS: 100 INJECTION, SOLUTION SUBCUTANEOUS at 08:27

## 2017-08-03 RX ADMIN — DIBASIC SODIUM PHOSPHATE, MONOBASIC POTASSIUM PHOSPHATE AND MONOBASIC SODIUM PHOSPHATE 1 TABLET: 852; 155; 130 TABLET ORAL at 12:05

## 2017-08-03 RX ADMIN — POTASSIUM CHLORIDE 40 MEQ: 1.5 POWDER, FOR SOLUTION ORAL at 08:26

## 2017-08-03 RX ADMIN — POTASSIUM CHLORIDE 20 MEQ: 1.5 POWDER, FOR SOLUTION ORAL at 18:02

## 2017-08-03 RX ADMIN — LEVETIRACETAM 500 MG: 5 INJECTION INTRAVENOUS at 00:00

## 2017-08-03 NOTE — PROGRESS NOTES
[facility] Hospitalist Group  Progress Note    Patient: Wilmar Ordaz Age: 55 y.o. : 1960 MR#: [id number] SSN: [id number]  Date/Time: 8/3/2017     Subjective:     Can not be obtained due to patient's factors     Assessment/Plan:   Found unresponsive , brought to ED via EMS , went in to [facility] , CPR/ACLS protocol with ROSC     1.  CP arrest   - Pulmonary note reviewed   - patient continues to be full code   - continued decreased mentation   - S/P EEG - Abnormal electroencephalogram due to the presence  of what might be an alpha  coma. No epileptic activity was  Appreciated. - continue supportive measures   - on keppra for seizure     2 ESRD   - for HD   - follow with renal       3 Acute GI bleed - history   -- no clear evidence of bleeding     4 Anemia - as above   - continue to monitor H/H   - Low stable H/h so far     5 GNR bacteremia /Sepsis - leucocytosis   - suspected cholecystitis , but CT does not show , per GI to do conservative treatments , LFT improving   - on Merrem , Off Zosyn due to thrombocytopenia . Continue vanco   - rpt blood cultures   - Ecoli and CLOSTRIDIUM PERFRINGENS    6 hypokalemia and Hypocalcemia - replace lytes - defer to ICU , Renal     7 Colitis -   - Culture stool negative so far , C diff negative       8  Elevated LFT - ?  Shock liver   - some  improved LFT         Case discussed with:  []Patient  []Family  [x]Nursing  []Case Management  DVT Prophylaxis:  []Lovenox  []Hep SQ  []SCDs  []Coumadin   []On Heparin gtt    Patient Active Problem List   Diagnosis Code    Anemia D64.9    Acidosis E87.2    Cardiac arrest ([address] I46.9    Respiratory failure ([address] J96.90    ESRD needing dialysis ([address] N18.6, Z99.2    Anoxic brain damage ([address] G93.1    Colitis K52.9    Hypotension I95.9    Acute GI bleeding K92.2    ESRD (end stage renal disease) ([address] N18.6    Sepsis ([address] A41.9       Objective:   VS:   Visit Vitals    /62    Pulse 79    Temp 98.8 °F (37.1 °C)    Resp 14    Ht 6' 3\" (1.905 m)  Comment: per chart history    Wt 73.3 kg (161 lb 9.6 oz)    SpO2 100%    BMI 20.2 kg/m2      Tmax/24hrs: Temp (24hrs), Av.4 °F (36.9 °C), Min:97.7 °F (36.5 °C), Max:98.8 °F (37.1 °C)  IOBRIEF    Intake/Output Summary (Last 24 hours) at 17 1623  Last data filed at 17 1200   Gross per 24 hour   Intake          1769.29 ml   Output              450 ml   Net          1319.29 ml       General:  Orally intubated / on vent   HEENT:  NC, Atraumatic. PERRLA, anicteric sclerae. Pulmonary:  CTA Bilaterally. No Wheezing/Rhonchi/Rales. Cardiovascular: Regular rate and Rhythm. GI:  Soft, Non distended, Non tender. + Bowel sounds. Extremities:  No edema, cyanosis, clubbing. No calf tenderness. Psych: Not examined   Neurologic: Grossly - Motor and Sensory functions are intact .          Medications:   Current Facility-Administered Medications   Medication Dose Route Frequency    piperacillin-tazobactam (ZOSYN) 2.25 g in 0.9% sodium chloride (MBP/ADV) 50 mL MBP  2.25 g IntraVENous Q12H    Piperacillin-tazobactam (ZOSYN) 0.75 gm Supplemental Dosing by Pharmacy   Other Rx Dosing/Monitoring    phosphorus (K PHOS NEUTRAL) 250 mg tablet 1 Tab  1 Tab Oral BID    [START ON 2017] doxercalciferol (HECTOROL) 4 mcg/2 mL injection 3 mcg  3 mcg IntraVENous Q MON, WED & FRI    hydrocortisone (CORTEF) tablet 10 mg  10 mg Oral BID WITH MEALS    insulin lispro (HUMALOG) injection   SubCUTAneous Q6H    insulin glargine (LANTUS) injection 15 Units  15 Units SubCUTAneous DAILY    potassium chloride (KLOR-CON) packet 20 mEq  20 mEq Per NG tube BID    0.9% sodium chloride infusion  50 mL/hr IntraVENous CONTINUOUS    pantoprazole (PROTONIX) injection 40 mg  40 mg IntraVENous DAILY    epoetin benjamin (EPOGEN;PROCRIT) injection 10,000 Units  10,000 Units IntraVENous Q MON, WED & FRI    levETIRAcetam (KEPPRA) 500 mg in 100 ml IVPB  500 mg IntraVENous Q12H    chlorhexidine (PERIDEX) 0.12 % mouthwash 10 mL  10 mL Oral Q12H    glucose chewable tablet 16 g  4 Tab Oral PRN    glucagon (GLUCAGEN) injection 1 mg  1 mg IntraMUSCular PRN    dextrose (D50W) injection syrg 12.5-25 g  25-50 mL IntraVENous PRN    0.9% sodium chloride infusion  100 mL/hr IntraVENous DIALYSIS PRN    naloxone (NARCAN) injection 0.4 mg  0.4 mg IntraVENous PRN    LORazepam (ATIVAN) injection 1 mg  1 mg IntraVENous Q4H PRN    fentaNYL citrate (PF) injection 50 mcg  50 mcg IntraVENous Q2H PRN       Labs:    Recent Results (from the past 24 hour(s))   LACTIC ACID    Collection Time: 08/02/17  4:56 PM   Result Value Ref Range    Lactic acid 1.9 0.4 - 2.0 MMOL/L   GLUCOSE, POC    Collection Time: 08/02/17  5:22 PM   Result Value Ref Range    Glucose (POC) 316 (H) 70 - 110 mg/dL   GLUCOSE, POC    Collection Time: 08/02/17 11:49 PM   Result Value Ref Range    Glucose (POC) 205 (H) 70 - 110 mg/dL   GLUCOSTABILIZER    Collection Time: 08/03/17 12:03 AM   Result Value Ref Range    Glucose 205 mg/dL    Insulin order 2.9 units/hour    Insulin adminstered 2.9 units/hour    Multiplier 0.020     Low target 140 mg/dL    High target 180 mg/dL    D50 order 0.0 ml    D50 administered 0.00 ml    Minutes until next BG 60 min    Order initials cf     Administered initials cf    GLUCOSE, POC    Collection Time: 08/03/17 12:58 AM   Result Value Ref Range    Glucose (POC) 217 (H) 70 - 110 mg/dL   GLUCOSTABILIZER    Collection Time: 08/03/17 12:59 AM   Result Value Ref Range    Glucose 217 mg/dL    Insulin order 4.7 units/hour    Insulin adminstered 4.7 units/hour    Multiplier 0.030     Low target 140 mg/dL    High target 180 mg/dL    D50 order 0.0 ml    D50 administered 0.00 ml    Minutes until next BG 60 min    Order initials cf     Administered initials cf    CALCIUM, IONIZED    Collection Time: 08/03/17  1:30 AM   Result Value Ref Range    Ionized Calcium 0.98 (L) 1.12 - 1.32 MMOL/L   CBC WITH AUTOMATED DIFF    Collection Time: 08/03/17  1:30 AM   Result Value Ref Range    WBC 34.9 (H) 4.6 - 13.2 K/uL    RBC 3.19 (L) 4.70 - 5.50 M/uL    HGB 9.6 (L) 13.0 - 16.0 g/dL    HCT 29.0 (L) 36.0 - 48.0 %    MCV 90.9 74.0 - 97.0 FL    MCH 30.1 24.0 - 34.0 PG    MCHC 33.1 31.0 - 37.0 g/dL    RDW 15.8 (H) 11.6 - 14.5 %    PLATELET 62 (L) 002 - 420 K/uL    NEUTROPHILS 79 (H) 42 - 75 %    LYMPHOCYTES 14 (L) 20 - 51 %    MONOCYTES 7 2 - 9 %    EOSINOPHILS 0 0 - 5 %    BASOPHILS 0 0 - 3 %    ABS. NEUTROPHILS 27.6 (H) 1.8 - 8.0 K/UL    ABS. LYMPHOCYTES 4.9 (H) 0.8 - 3.5 K/UL    ABS. MONOCYTES 2.4 (H) 0 - 1.0 K/UL    ABS. EOSINOPHILS 0.0 0.0 - 0.4 K/UL    ABS. BASOPHILS 0.0 0.0 - 0.06 K/UL    DF AUTOMATED      PLATELET COMMENTS DECREASED PLATELETS      RBC COMMENTS ANISOCYTOSIS  2+       METABOLIC PANEL, COMPREHENSIVE    Collection Time: 08/03/17  1:30 AM   Result Value Ref Range    Sodium 143 136 - 145 mmol/L    Potassium 2.8 (LL) 3.5 - 5.5 mmol/L    Chloride 111 (H) 100 - 108 mmol/L    CO2 24 21 - 32 mmol/L    Anion gap 8 3.0 - 18 mmol/L    Glucose 203 (H) 74 - 99 mg/dL    BUN 54 (H) 7.0 - 18 MG/DL    Creatinine 4.67 (H) 0.6 - 1.3 MG/DL    BUN/Creatinine ratio 12 12 - 20      GFR est AA 16 (L) >60 ml/min/1.73m2    GFR est non-AA 13 (L) >60 ml/min/1.73m2    Calcium 6.7 (L) 8.5 - 10.1 MG/DL    Bilirubin, total 1.2 (H) 0.2 - 1.0 MG/DL    ALT (SGPT) 55 16 - 61 U/L    AST (SGOT) 17 15 - 37 U/L    Alk.  phosphatase 144 (H) 45 - 117 U/L    Protein, total 5.0 (L) 6.4 - 8.2 g/dL    Albumin 1.6 (L) 3.4 - 5.0 g/dL    Globulin 3.4 2.0 - 4.0 g/dL    A-G Ratio 0.5 (L) 0.8 - 1.7     PHOSPHORUS    Collection Time: 08/03/17  1:30 AM   Result Value Ref Range    Phosphorus 2.2 (L) 2.5 - 4.9 MG/DL   GLUCOSE, POC    Collection Time: 08/03/17  1:55 AM   Result Value Ref Range    Glucose (POC) 219 (H) 70 - 110 mg/dL   GLUCOSTABILIZER    Collection Time: 08/03/17  1:57 AM   Result Value Ref Range    Glucose 219 mg/dL    Insulin order 6.4 units/hour    Insulin adminstered 6.4 units/hour    Multiplier 0.040     Low target 140 mg/dL    High target 180 mg/dL    D50 order 0.0 ml    D50 administered 0.00 ml    Minutes until next BG 60 min    Order initials cf     Administered initials cf    GLUCOSE, POC    Collection Time: 08/03/17  2:47 AM   Result Value Ref Range    Glucose (POC) 214 (H) 70 - 110 mg/dL   GLUCOSTABILIZER    Collection Time: 08/03/17  2:56 AM   Result Value Ref Range    Glucose 214 mg/dL    Insulin order 7.7 units/hour    Insulin adminstered 7.7 units/hour    Multiplier 0.050     Low target 140 mg/dL    High target 180 mg/dL    D50 order 0.0 ml    D50 administered 0.00 ml    Minutes until next BG 60 min    Order initials cf     Administered initials cf    GLUCOSE, POC    Collection Time: 08/03/17  3:58 AM   Result Value Ref Range    Glucose (POC) 187 (H) 70 - 110 mg/dL   GLUCOSTABILIZER    Collection Time: 08/03/17  3:59 AM   Result Value Ref Range    Glucose 187 mg/dL    Insulin order 7.6 units/hour    Insulin adminstered 7.6 units/hour    Multiplier 0.060     Low target 140 mg/dL    High target 180 mg/dL    D50 order 0.0 ml    D50 administered 0.00 ml    Minutes until next BG 60 min    Order initials cf     Administered initials cf    POC G3    Collection Time: 08/03/17  4:03 AM   Result Value Ref Range    Device: VENT      FIO2 (POC) 28 %    pH (POC) 7.423 7.35 - 7.45      pCO2 (POC) 39.5 35.0 - 45.0 MMHG    pO2 (POC) 90 80 - 100 MMHG    HCO3 (POC) 25.8 22 - 26 MMOL/L    sO2 (POC) 97 92 - 97 %    Base excess (POC) 1 mmol/L    Mode ASSIST CONTROL      Tidal volume 400 ml    Set Rate 12 bpm    PEEP/CPAP (POC) 5 cmH2O    Allens test (POC) YES      Inspiratory Time 1 sec    Total resp. rate 20      Site RIGHT RADIAL      Patient temp.  98.6      Specimen type (POC) ARTERIAL      Performed by Flory Levine     Volume control plus YES     GLUCOSE, POC    Collection Time: 08/03/17  4:32 AM   Result Value Ref Range    Glucose (POC) 201 (H) 70 - 110 mg/dL   GLUCOSE, POC Collection Time: 08/03/17  4:57 AM   Result Value Ref Range    Glucose (POC) 183 (H) 70 - 110 mg/dL   GLUCOSTABILIZER    Collection Time: 08/03/17  4:58 AM   Result Value Ref Range    Glucose 183 mg/dL    Insulin order 8.6 units/hour    Insulin adminstered 8.6 units/hour    Multiplier 0.070     Low target 140 mg/dL    High target 180 mg/dL    D50 order 0.0 ml    D50 administered 0.00 ml    Minutes until next BG 60 min    Order initials cf     Administered initials cf    GLUCOSE, POC    Collection Time: 08/03/17  5:55 AM   Result Value Ref Range    Glucose (POC) 143 (H) 70 - 110 mg/dL   GLUCOSTABILIZER    Collection Time: 08/03/17  5:55 AM   Result Value Ref Range    Glucose 143 mg/dL    Insulin order 5.8 units/hour    Insulin adminstered 5.8 units/hour    Multiplier 0.070     Low target 140 mg/dL    High target 180 mg/dL    D50 order 0.0 ml    D50 administered 0.00 ml    Minutes until next BG 60 min    Order initials cf     Administered initials cf    GLUCOSE, POC    Collection Time: 08/03/17  6:56 AM   Result Value Ref Range    Glucose (POC) 136 (H) 70 - 110 mg/dL   GLUCOSTABILIZER    Collection Time: 08/03/17  6:57 AM   Result Value Ref Range    Glucose 136 mg/dL    Insulin order 4.3 units/hour    Insulin adminstered 4.3 units/hour    Multiplier 0.056     Low target 140 mg/dL    High target 180 mg/dL    D50 order 0.0 ml    D50 administered 0.00 ml    Minutes until next BG 60 min    Order initials cf     Administered initials cf    GLUCOSE, POC    Collection Time: 08/03/17  8:10 AM   Result Value Ref Range    Glucose (POC) 143 (H) 70 - 110 mg/dL   GLUCOSTABILIZER    Collection Time: 08/03/17  8:11 AM   Result Value Ref Range    Glucose 143 mg/dL    Insulin order 4.6 units/hour    Insulin adminstered 4.6 units/hour    Multiplier 0.056     Low target 140 mg/dL    High target 180 mg/dL    D50 order 0.0 ml    D50 administered 0.00 ml    Minutes until next BG 60 min    Order initials bb     Administered initials bb GLUCOSE, POC    Collection Time: 08/03/17 11:11 AM   Result Value Ref Range    Glucose (POC) 116 (H) 70 - 110 mg/dL   TSH 3RD GENERATION    Collection Time: 08/03/17  1:01 PM   Result Value Ref Range    TSH 7.52 (H) 0.36 - 3.74 uIU/mL   MRSA SCREEN - PCR (NASAL)    Collection Time: 08/03/17  1:35 PM   Result Value Ref Range    Special Requests: NO SPECIAL REQUESTS      Culture result:        MRSA target DNA is not detected (presumptive not colonized with MRSA)       Signed By: Kevan Mac MD     August 3, 2017

## 2017-08-03 NOTE — PROGRESS NOTES
[facility]  ICU Attending Physician  Note      Name: Clif Bettencourt   : 1960   MRN: [id number]   Date: 2017 10:45 PM       I personally examined and evaluated the patient at 9pm       Subjective:      [x]The patient is critically ill on      [x]Mechanical ventilation []Pressors   []BiPAP []                   Safety Bundles: VAP Bundle/ CAUTI/ Severe Sepsis Protocol/ Electrolyte Replacement Protocol    Intake/Output:     Intake/Output Summary (Last 24 hours) at 17 2245  Last data filed at 17 2200   Gross per 24 hour   Intake             1330 ml   Output             2000 ml   Net             -670 ml       Ventilator Settings:  Ventilator  Mode: Assist control, VC+  Respiratory Rate  Resp Rate Observed: 17  Back-Up Rate: 12  Insp Time (sec): 1 sec  I:E Ratio: 1:4  Ventilator Volumes  Vt Set (ml): 400 ml  Vt Exhaled (Machine Breath) (ml): 439 ml  Vt Spont (ml): 412 ml  Ve Observed (l/min): 6.72 l/min  Ventilator Pressures  Pressure Support (cm H2O): 7 cm H2O  PIP Observed (cm H2O): 17 cm H2O  Plateau Pressure (cm H2O): 12 cm H2O  MAP (cm H2O): 8.6  PEEP/VENT (cm H2O): 5 cm H20  Auto PEEP Observed (cm H2O): 0 cm H2O          IMPRESSION:   · Recurrent fever, leukocytosis with E coli bacteremia  · S/p PEA Cardiac Arrest on unknown etiology- one round epi unclear as to downtime; MI vs. ARF leading to arrest  · Acute hypoxic respiratory failure, RLL airspace disease, ? atelectasis vs pneumonia  · Small right pleural effusion, anasarca, ascites secondary to fluid retention- ERSD, non compliant to dialysis  · Acute Encephalopathy- metabolic vs. Anoxic in setting of underlying CVA  · Septic Shock - resolved   · Suspicion for acute cholecystitis by Ct abd  · Anemia of Chronic Disease   · ESRD- HD M,W,F  · Tobacco Abuse   · Thrombocytopenia         PLAN:   · Resp -VAP Bundle. Aspiration precautions. HOB > 30 degrees.  Continue ventilatory support, titrate for FiO2 > 90%      · ID  - E coli bacteremia - sensitive to meropenem (was previously on zosyn however changed to merrem due to thrombocytopenia). ID consult      · CVS - hemodynamically stable    · Heme/onc -  Follow HIT panel.      · Renal - HD recommendations per Nephrology  · Endocrine - Uncontrolled Bl Glucose level- add Lantus PM 10 Units and start titrating Insulin gtt   · Neuro/ Pain/ Sedation - prn fentanyl and ativan as needed. Minimize sedative medications.  EEG showing marked suppression background activity - continue keppra  · GI -HIDA scan negative   · Prophylaxis - DVT(SCDs), GI(Pepcid)                   The patient is: [] acutely ill Risk of deterioration: [] moderate    [x] critically ill  [x] high     []See my orders for details    My assessment/plan was discussed with:  []nursing []PT/OT    []respiratory therapy []Dr. Raul Robert []     [x]Total critical care time exclusive of procedures  and other provider 42   minutes    Mandy Walker MD, [address]  ICU Attending Physician   Pager: [phone]

## 2017-08-03 NOTE — PROGRESS NOTES
[facility]  ICU Progress Note      Name: Ruel Caba   : 1960   MRN: [id number]   Date: 8/3/2017      [x]I have reviewed the flowsheet and previous days notes. Events overnight reviewed and discussed with nursing staff. Vital signs and records reviewed. Subjective:   56 y/o male with hx significant for ESRD on HD, HTN, diabetes, anemia, and IBS, presented to the ED with cardiac arrest.     Pt remains on ventilatory support; minimal secretions thru ETT; + gag/ cough  Pt easily awakened on verbal stimulation; able to follow simple commands. Hemodialysis MWF. Remains hemodyamically stable. Afebrile overnight; WBC gradually decreasing. E coli and Clostridium perfringens bacteremia. HIDA scan normal.       [x]The patient is unable to give any meaningful history or review of systems because the patient is:  [x]Intubated []Sedated   []Unresponsive      [x]The patient is critically ill on      [x]Mechanical ventilation []Pressors   []BiPAP []            ROS:Review of systems not obtained due to patient factors.        Safety Bundles: VAP Bundle/ CAUTI/ Severe Sepsis Protocol    Vital Signs:    Visit Vitals    /55    Pulse 85    Temp 98.5 °F (36.9 °C)    Resp 21    Ht 6' 3\" (1.905 m)  Comment: per chart history    Wt 73.3 kg (161 lb 9.6 oz)    SpO2 97%    BMI 20.2 kg/m2       O2 Device: Endotracheal tube, Ventilator   O2 Flow Rate (L/min): 10 l/min   Temp (24hrs), Av.4 °F (36.9 °C), Min:97.7 °F (36.5 °C), Max:98.8 °F (37.1 °C)       Intake/Output:   Last shift:      701 - 1900  In: 155   Out: -   Last 3 shifts: 1901 -  07  In: 2528.7 [I.V.:493.7]  Out:  [Drains:1050]    Intake/Output Summary (Last 24 hours) at 17 1155  Last data filed at 17 0800   Gross per 24 hour   Intake           1513.7 ml   Output              450 ml   Net           1063.7 ml       Ventilator Settings:  Ventilator  Mode: Assist control, VC+  Respiratory Rate  Resp Rate Observed: 24  Back-Up Rate: 12  Insp Time (sec): 1 sec  I:E Ratio: 1;4  Ventilator Volumes  Vt Set (ml): 400 ml  Vt Exhaled (Machine Breath) (ml): 375 ml  Vt Spont (ml): 412 ml  Ve Observed (l/min): 8.79 l/min  Ventilator Pressures  Pressure Support (cm H2O): 7 cm H2O  PIP Observed (cm H2O): 20 cm H2O  Plateau Pressure (cm H2O): 17 cm H2O  MAP (cm H2O): 10  PEEP/VENT (cm H2O): 5 cm H20  Auto PEEP Observed (cm H2O): 0 cm H2O      Physical Exam:    General: Intubated; awakens to verbal stimulation  HEENT:  Pupils reactive, Anicteric sclerae; neck supple; no lymphadenopathy  Resp:  Symmetrical chest expansion, no accessory muscle use; no wheezing; intermittent rhonchi b/l  CV:  S1, S2 present; regular rate and rhythm  GI:  Abdomen soft, (+) active bowel sounds  Extremities:  +2 pulses on all extremities; no edema/ cyanosis/ clubbing noted, left arm fistula with strong thrill   Skin:  Warm; no rashes/ lesions noted  Neurologic:  Awakens to name calling, able to track; able to follow simple commands  Devices:    7/27 - NGT, ETT with michele tube      DATA:     Current Facility-Administered Medications   Medication Dose Route Frequency    piperacillin-tazobactam (ZOSYN) 2.25 g in 0.9% sodium chloride (MBP/ADV) 50 mL MBP  2.25 g IntraVENous Q12H    Piperacillin-tazobactam (ZOSYN) 0.75 gm Supplemental Dosing by Pharmacy   Other Rx Dosing/Monitoring    phosphorus (K PHOS NEUTRAL) 250 mg tablet 1 Tab  1 Tab Oral BID    [START ON 8/4/2017] doxercalciferol (HECTOROL) 4 mcg/2 mL injection 3 mcg  3 mcg IntraVENous Q MON, WED & FRI    hydrocortisone (CORTEF) tablet 10 mg  10 mg Oral BID WITH MEALS    insulin glargine (LANTUS) injection 15 Units  15 Units SubCUTAneous DAILY    potassium chloride (KLOR-CON) packet 20 mEq  20 mEq Per NG tube BID    0.9% sodium chloride infusion  50 mL/hr IntraVENous CONTINUOUS    insulin glargine (LANTUS) injection 10 Units  10 Units SubCUTAneous QHS    pantoprazole (PROTONIX) injection 40 mg  40 mg IntraVENous DAILY    epoetin benjamin (EPOGEN;PROCRIT) injection 10,000 Units  10,000 Units IntraVENous Q MON, WED & FRI    levETIRAcetam (KEPPRA) 500 mg in 100 ml IVPB  500 mg IntraVENous Q12H    chlorhexidine (PERIDEX) 0.12 % mouthwash 10 mL  10 mL Oral Q12H    glucose chewable tablet 16 g  4 Tab Oral PRN    glucagon (GLUCAGEN) injection 1 mg  1 mg IntraMUSCular PRN    dextrose (D50W) injection syrg 12.5-25 g  25-50 mL IntraVENous PRN    0.9% sodium chloride infusion  100 mL/hr IntraVENous DIALYSIS PRN    naloxone (NARCAN) injection 0.4 mg  0.4 mg IntraVENous PRN    LORazepam (ATIVAN) injection 1 mg  1 mg IntraVENous Q4H PRN    fentaNYL citrate (PF) injection 50 mcg  50 mcg IntraVENous Q2H PRN         Labs: Results:       Chemistry Recent Labs      08/03/17   0130 08/02/17   0610  08/01/17   0424   GLU  203*  341*  335*   NA  143  140  136   K  2.8*  2.8*  3.3*   CL  111*  104  102   CO2  24  24  20*   BUN  54*  77*  49*   CREA  4.67*  6.36*  4.98*   CA  6.7*  7.8*  8.2*   AGAP  8  12  14   BUCR  12  12  10*   AP  144*  158*  186*   TP  5.0*  5.4*  6.0*   ALB  1.6*  1.8*  1.9*   GLOB  3.4  3.6  4.1*   AGRAT  0.5*  0.5*  0.5*      CBC w/Diff Recent Labs      08/03/17   0130 08/02/17   0610  08/01/17   0424   WBC  34.9*  36.0*  28.7*   RBC  3.19*  3.39*  3.65*   HGB  9.6*  10.3*  11.1*   HCT  29.0*  29.2*  31.0*   PLT  62*  54*  57*   GRANS  79*  80*  80*   LYMPH  14*  6*  3*   EOS  0  0  0      Coagulation Recent Labs      08/01/17   1328  08/01/17   0424   APTT  21.4*  32.0       Liver Enzymes Recent Labs      08/03/17   0130   TP  5.0*   ALB  1.6*   AP  144*   SGOT  17      ABG Lab Results   Component Value Date/Time    PHI 7.423 08/03/2017 04:03 AM    PCO2I 39.5 08/03/2017 04:03 AM    PO2I 90 08/03/2017 04:03 AM    HCO3I 25.8 08/03/2017 04:03 AM    FIO2I 28 08/03/2017 04:03 AM      Microbiology Recent Labs      08/01/17   1400  08/01/17   1328  08/01/17 [address] 2 DAYS  NO GROWTH 2 DAYS  NO GROWTH 2 DAYS          Telemetry: [x]Sinus []A-flutter []Paced    []A-fib []Multiple PVCs                  Imaging:  [x]I have personally reviewed the patients radiographs  CXR Results  (Last 48 hours)               08/03/17 0612  XR CHEST PORT Final result    Narrative:  Description:  Portable semierect chest radiograph, single view       Clinical Indication:  Evaluate ET tube position       Comparison: August 2, 2017. Findings:  ET tube tip projects 4.5 cm above the bree. No focal airspace   opacity or effusion. Cardiac and mediastinal silhouette within normal limits. No   pneumothorax. No subdiaphragmatic free air. Impression:  ET tube appears well-positioned. No acute radiographic   cardiopulmonary findings. 08/02/17 0608  XR CHEST PORT Final result    Narrative:  Portable chest x-ray, semierect AP view, time 0450 hours on 8/2/2017:               INDICATION:       Respiratory failure. The patient has been on ventilation. Atelectatic changes/infiltrates at right lower lung field. COMPARISON STUDY: Chest x-ray on 8/1/2017, 7/31/2017. FINDINGS:       The lower end of ET tube is 4.3 cm above bree. NG tube extends into upper stomach as before. Cardiac silhouette normal. Pulmonary vascularity is not engorged. Left lung is clear. Previously demonstrated infiltrates, and/or atelectatic changes in right lower   lung field appears to be further decreased. Currently there are residual mild   DGN mottled opacity in right lower lung field. Right CP angle is minimally   blunted. No evidence of pneumothorax or pleural effusion. IMPRESSION:       Lower end of ET tube is 4.3 cm above bree. Further decrease of infiltrates, and central atelectasis in right lower lung   field mild interstitial opacity at this time as described.            CT ABD/PELVIS 07/27/17: This exam is markedly limited by the lack of enteric and IV contrast. Small to moderate right pleural effusion. Tiny left pleural effusion. Small amount of ascites. Mild anasarca. Gallbladder wall thickening in the setting of anasarca and ascites can be problematic to differentiate between cholecystitis and due to ascites. CT HEAD 07/27/17: No evidence of intracranial hemorrhages.  Multiple lacunar infarctions in bilateral thalami, likely to be old or subacute.  In left side of upper manny of brainstem there is lacunar infarction of undetermined age. Acute lacunar infarction in this area is not excluded. IMPRESSION:   · Fever-resolved, leukocytosis-improving, elevation may be related to stress dose steroid  · E coli bacteremia - on zosyn  · S/p PEA Cardiac Arrest on unknown etiology- one round epi unclear as to downtime; MI vs. ARF leading to arrest  · Acute hypoxic respiratory failure, RLL airspace disease, ? atelectasis vs pneumonia - serial CXR with improvement, no active infiltrates today  · Acute Encephalopathy- metabolic vs. Anoxic in setting of underlying CVA - mildly improving  · Septic Shock - resolved   · Suspicion for acute cholecystitis by Ct abd - HIDA scan normal  · Anemia of Chronic Disease   · ESRD- HD M,W,F  · Tobacco Abuse   · Thrombocytopenia   · Hyperglycemia in setting of steroid use (latest Hgb A1c 5.4)      PLAN:   · Resp -VAP Bundle. Daily ABG and CXR. Aspiration precautions. HOB > 30 degrees. Tolerating SBT - will keep on PS for now and will reassess for extubation   · ID - HIDA normal. Leukocytosis improving. Abx per ID - meropenem changed to zosyn   · CVS - hemodynamically stable. Follow closely  · Heme- HIT panel negative; no active bleeding from oropharyngeal area. Monitor for active bleeding  · Metabolic - trend electrolytes and replace per nephrology   · Renal - HD recommendations per Nephrology  · Endocrine - frequent glycemic checks.  D/c IV steroids - switch to cortef 10 mg PO BID (previous dose from outpatient). D/c insulin gtt - glycemic control with sliding scale and long-acting insulin. Monitor closely for hypoglycemia   · Neuro/ Pain/ Sedation - prn fentanyl and ativan as needed. Minimize sedative medications. EEG showing marked suppression background activity - continue keppra  · GI - NPO while on SBT. If not extubated, resume enteral nutrition thru NGT.  Monitor for residuals  · Prophylaxis - DVT(SCDs), GI(Pepcid)  · Discussed in interdisciplinary rounds          Justina Guerra PA-C

## 2017-08-03 NOTE — PROGRESS NOTES
Neurology Progress Note    Patient ID:  Shirlene Winkler  [id number]  55 y.o.  1960    Subjective:      Patient is more awake today and following commands. He is still intubated and unable to verbalize but he undergoing a weaning trial.    Current Facility-Administered Medications   Medication Dose Route Frequency    piperacillin-tazobactam (ZOSYN) 2.25 g in 0.9% sodium chloride (MBP/ADV) 50 mL MBP  2.25 g IntraVENous Q12H    Piperacillin-tazobactam (ZOSYN) 0.75 gm Supplemental Dosing by Pharmacy   Other Rx Dosing/Monitoring    insulin glargine (LANTUS) injection 15 Units  15 Units SubCUTAneous DAILY    potassium chloride (KLOR-CON) packet 20 mEq  20 mEq Per NG tube BID    hydrocortisone Sod Succ (PF) (SOLU-CORTEF) injection 25 mg  25 mg IntraVENous Q12H    0.9% sodium chloride infusion  50 mL/hr IntraVENous CONTINUOUS    insulin glargine (LANTUS) injection 10 Units  10 Units SubCUTAneous QHS    pantoprazole (PROTONIX) injection 40 mg  40 mg IntraVENous DAILY    doxercalciferol (HECTOROL) 4 mcg/2 mL injection 2 mcg  2 mcg IntraVENous Q MON, WED & FRI    epoetin benjamin (EPOGEN;PROCRIT) injection 10,000 Units  10,000 Units IntraVENous Q MON, WED & FRI    levETIRAcetam (KEPPRA) 500 mg in 100 ml IVPB  500 mg IntraVENous Q12H    chlorhexidine (PERIDEX) 0.12 % mouthwash 10 mL  10 mL Oral Q12H    glucose chewable tablet 16 g  4 Tab Oral PRN    glucagon (GLUCAGEN) injection 1 mg  1 mg IntraMUSCular PRN    dextrose (D50W) injection syrg 12.5-25 g  25-50 mL IntraVENous PRN    0.9% sodium chloride infusion  100 mL/hr IntraVENous DIALYSIS PRN    naloxone (NARCAN) injection 0.4 mg  0.4 mg IntraVENous PRN    LORazepam (ATIVAN) injection 1 mg  1 mg IntraVENous Q4H PRN    fentaNYL citrate (PF) injection 50 mcg  50 mcg IntraVENous Q2H PRN          Objective: Active hospital medications were reviewed    Lab results and neuroradiology studies from the last 24 hours were reviewed.       Prior to Admission medications Not on File     Patient Vitals for the past 8 hrs:   BP Temp Pulse Resp SpO2   08/03/17 0813 - - 78 25 99 %   08/03/17 0800 112/56 98.5 °F (36.9 °C) 77 25 91 %   08/03/17 0700 110/56 - 75 19 -   08/03/17 0600 117/59 - 76 20 -   08/03/17 0500 113/59 - 81 20 -   08/03/17 0400 116/56 98.4 °F (36.9 °C) 85 19 -   08/03/17 0343 - - 79 18 100 %   08/03/17 0300 118/63 - 78 20 98 %   08/03/17 0200 117/62 - 81 9 93 %   YRRCADCQOMMK06/01 1901 - 08/03 0700  In: 2522.9 [I.V.:487.9]  Out: 2050 [Drains:1050]  08/01 1901 - 08/03 0700  In: 2522.9 [I.V.:487.9]  Out: 2050 [Drains:1050]RESULTRCNT(24h)Principal Problem:    Sepsis ([facility] [address] (7/27/2017)    Active Problems:    Anemia ()      Acidosis (7/27/2017)      Cardiac arrest ([address] (7/27/2017)      Respiratory failure ([address] (7/27/2017)      ESRD needing dialysis ([facility] [address] (7/27/2017)      Anoxic brain damage ([facility]) (7/27/2017)      Colitis (7/27/2017)      Hypotension (7/27/2017)      Acute GI bleeding (7/27/2017)      ESRD (end stage renal disease) ([facility] [address] (7/27/2017)        Additional comments:I reviewed the patient's new clinical lab test results. General Exam  No acute distress, mucous membranes normal color and hydration status        Neurologic Exam    Mental status:  Eyes are open and he is able to make eye contact  Was able to intermittently follow commands: \"blink eyes three times\",but would not give thumbs up on right or left. Cranial nerves: OD: 3mm-min reactive, OS: 2mm-min reactive, Eyes conjugate    Motor: moving upper extremities purposefully according to nursing on left, less on right. Did not move on command for me today  Uppers in wrist restraints. Results for Issa Dumont (MRN [id number]) as of 7/31/2017 09:24   Ref.  Range 7/31/2017 04:08   WBC Latest Ref Range: 4.6 - 13.2 K/uL 22.9 (H)   NRBC Latest Ref Range: 0  WBC 1.0 (H)   RBC Latest Ref Range: 4.70 - 5.50 M/uL 3.62 (L)   HGB Latest Ref Range: 13.0 - 16.0 g/dL 11.0 (L)   HCT Latest Ref Range: 36.0 - 48.0 % 30.7 (L)   MCV Latest Ref Range: 74.0 - 97.0 FL 84.8   MCH Latest Ref Range: 24.0 - 34.0 PG 30.4   MCHC Latest Ref Range: 31.0 - 37.0 g/dL 35.8   RDW Latest Ref Range: 11.6 - 14.5 % 15.4 (H)     Results for Brandon Rincon (MRN [id number]) as of 7/31/2017 09:24   Ref. Range 7/31/2017 04:08   Potassium Latest Ref Range: 3.5 - 5.5 mmol/L 3.1 (L)   Chloride Latest Ref Range: 100 - 108 mmol/L 102   CO2 Latest Ref Range: 21 - 32 mmol/L 21   Anion gap Latest Ref Range: 3.0 - 18 mmol/L 14   Glucose Latest Ref Range: 74 - 99 mg/dL 165 (H)   BUN Latest Ref Range: 7.0 - 18 MG/DL 50 (H)   Creatinine Latest Ref Range: 0.6 - 1.3 MG/DL 5.81 (H)   BUN/Creatinine ratio Latest Ref Range: 12 - 20   9 (L)   Calcium Latest Ref Range: 8.5 - 10.1 MG/DL 7.8 (L)   GFR est non-AA Latest Ref Range: >60 ml/min/1.73m2 10 (L)   GFR est AA Latest Ref Range: >60 ml/min/1.73m2 12 (L)   Bilirubin, total Latest Ref Range: 0.2 - 1.0 MG/DL 3.4 (H)   Protein, total Latest Ref Range: 6.4 - 8.2 g/dL 5.7 (L)   Albumin Latest Ref Range: 3.4 - 5.0 g/dL 1.7 (L)   Globulin Latest Ref Range: 2.0 - 4.0 g/dL 4.0         Assessment:     Sharee Clark is a 55 y.o. who was admitted with unresponsiveness and out of hospital cardiac arrest with unknown pulseless time. He is clearly improved today. Encepahlopathy from uremia and septic process appears to be improving. He is undergoing breathing trial. No seizures on a modest dose of Keppra. Plan:   1. Continue Keppra as currently dosed  2. Continue supportive care. Sylvia Pinon. Kevin Cramer M.D.   Clinical Neurophysiology  Neuromuscular Specialist    Signed:  8/3/2017  9:20 AM  9:20 AM

## 2017-08-03 NOTE — PROGRESS NOTES
SBT initiated. PS-7, FIO2-28%, PEEP-5.     08/03/17 0813   Weaning Parameters   Spontaneous Breathing Trial Complete Yes   Resp Rate Observed 24   Ve 10.3      RSBI 59   WIll continue to monitor respiratory status.

## 2017-08-03 NOTE — ROUTINE PROCESS
Bedside shift change report given to Chandler Houston RN (oncoming nurse) by Archie Wei RN (offgoing nurse). Report included the following information SBAR, Kardex, ED Summary, Procedure Summary, Intake/Output, MAR, Recent Results, Med Rec Status and Cardiac Rhythm NSR.

## 2017-08-03 NOTE — INTERDISCIPLINARY ROUNDS
CRITICAL CARE INTERDISCIPLINARY ROUNDS      Patient Information:    Name:   Marjorie Alves    Age:   55 y.o.     Admission Date:   7/27/2017    Readmit Risk Assessment Information:      Readmit Risk Tool Support Systems: Family member(s), Relationship with Primary Physician Group: Seen at least one time within past 12 months    Surgery Date:      Day of Stay:     Expected Discharge Date:        Attending Provider:   Juanita Moore MD    Surgeon:        Consultant:       Primary Care Provider:   Shade Kocher, MD    Problem List:     Patient Active Problem List   Diagnosis Code    Anemia D64.9    Acidosis E87.2    Cardiac arrest ([address] I46.9    Respiratory failure ([address] J96.90    ESRD needing dialysis ([facility] [address] N18.6, Z99.2    Anoxic brain damage ([address] G93.1    Colitis K52.9    Hypotension I95.9    Acute GI bleeding K92.2    ESRD (end stage renal disease) ([facility] [address] N18.6    Sepsis ([address] A41.9       Principal Problem:  Sepsis ([facility] [address]    Procedure:       During rounds the following quality care indicators and evidence based practices were addressed :     DVT Prophylaxis, Pressure Injury Prevention, Pain Management, Sepsis resuscitation and management, Nutritional Status, Critical Care Interventions Airways, Drains and Lines and IHI Bundles: Vent Bundle Followed, Vent Day 6           Acute MI/PCI:   Not applicable    Heart Failure:    Not applicable    Cardiac Surgery:  Not applicable    SCIP Measures for other Surgeries:   Not applicable    Pneumonia:    Appropriate Antibiotic Selection (ICU versus Non-ICU)    Stroke:  Patient's Personal Risk Factors for Stroke are:   hypertension, family history or hyperlipidemia    NIH Stroke Score  Total: 41 (07/27/17 1700)    Transfer Level of Care:  Not Ready for Transfer    The patient will require the following interventions based on the Readmission Risk Assessment:  Pharmacy evaluation and teaching, Care Management involvement for home health follow up for:  mobility and assistance with ADL's and Spiritual Care evaluation      Discharge Management:  Home    Anticipated Discharge Date:  3      Interdisciplinary team rounds were held  with the following team membersCare Management, Diabetes Treatment Specialist, Nursing, Nutrition, Pastoral Care, Pharmacy, Physician and Clinical Coordinator and the  patient and healthcare POA (documentation required). Plan of care discussed. See clinical pathway and/or care plan for interventions and desired outcomes. Insulin drip discontinued and placed on breathing trial, possible extubation today. Replace electrolytes.

## 2017-08-03 NOTE — PROGRESS NOTES
[email or web address]  [phone]       Impression  1. Multiorgan failure  2. Abnormal LFTs- suspect ischemic hepatits/shock liver (trending down)  3. Anemia  4. GI bleed  5. ESRD  6. S/p Code  4. NGT feeding    Plan:  1. Cont to trend for LFTs. Hepatobiliary scan with gallbladder ejection fraction calculation done 8/2/17. No cystic duct obstructionor findings of cholecystitis. Normal gallbladder ejection fraction  2. Cont monitor H/H transfuse as indicated  3. Continue supportive care, may need peg later on        Chief Complaint: anemia, elevated LFTs, GI bleed    Subjective:  Pt unresponsive, nonverbal, intubated.  No hematochezia, melena noted        Eyes: conjunctiva normal, EOM normal   ENT: Mucous membranes moist, no lesion or thrush   Cardiovascular:  normal rate and regular rhythm, no murmur   Pulmonary/Chest Wall: breath sounds normal and effort normal   Abdominal:  soft, non-acute, non-tender, no appreciable mass or hepatosplenomegaly, no appreciable ascites       Visit Vitals    /55    Pulse 85    Temp 98.5 °F (36.9 °C)    Resp 21    Ht 6' 3\" (1.905 m)  Comment: per chart history    Wt 73.3 kg (161 lb 9.6 oz)    SpO2 97%    BMI 20.2 kg/m2           Intake/Output Summary (Last 24 hours) at 08/03/17 1200  Last data filed at 08/03/17 0800   Gross per 24 hour   Intake           1513.7 ml   Output              450 ml   Net           1063.7 ml       CBC w/Diff    Lab Results   Component Value Date/Time    WBC 34.9 (H) 08/03/2017 01:30 AM    RBC 3.19 (L) 08/03/2017 01:30 AM    HGB 9.6 (L) 08/03/2017 01:30 AM    HCT 29.0 (L) 08/03/2017 01:30 AM    MCV 90.9 08/03/2017 01:30 AM    MCH 30.1 08/03/2017 01:30 AM    MCHC 33.1 08/03/2017 01:30 AM    RDW 15.8 (H) 08/03/2017 01:30 AM    PLT 62 (L) 08/03/2017 01:30 AM    Lab Results   Component Value Date/Time    GRANS 79 (H) 08/03/2017 01:30 AM    LYMPH 14 (L) 08/03/2017 01:30 AM    EOS 0 08/03/2017 01:30 AM    BANDS 8 (H) 08/02/2017 06:10 AM    BASOS 0 08/03/2017 01:30 AM    MYELO 1 (H) 07/30/2017 12:40 AM    METAS 1 (H) 08/02/2017 06:10 AM      Basic Metabolic Profile   Recent Labs      08/03/17   0130   08/01/17   1400   NA  143   < >   --    K  2.8*   < >   --    CL  111*   < >   --    CO2  24   < >   --    BUN  54*   < >   --    CA  6.7*   < >   --    MG   --    --   2.4   PHOS  2.2*   --    --     < > = values in this interval not displayed. Hepatic Function    Lab Results   Component Value Date/Time    ALB 1.6 (L) 08/03/2017 01:30 AM    TP 5.0 (L) 08/03/2017 01:30 AM     (H) 08/03/2017 01:30 AM    Lab Results   Component Value Date/Time    SGOT 17 08/03/2017 01:30 AM          Lucas Downs, ANP-C  [facility] Alexlisa Bell 1946, [facility]  [email or web address]        [email or web address]  [phone]       Impression  1. Multiorgan failure  2.anemia  3. Elevated LFTs  4.GI bleed  5. ESRD-noncompliant outpatient     Plan:  1.cont tube feeding  2.moniter H/H transfuse as indicated  3. Elevated LFTS probably suspect ischemic hepatitis  4. Stool for cdiff negative  5. Cont supportive care      Chief Complaint: anemia, elevated LFTS, GI bleed    Subjective: Pt unresponsive, uneventful overnight.  No hematochezia, melena noted         Eyes: conjunctiva normal,    ENT: Mucous membranes moist, no lesion or thrush   Cardiovascular:  normal rate and regular rhythm, no murmur   Pulmonary/Chest Wall: breath sounds normal. On ventilator   Abdominal:  soft, non-acute, non-tender, no appreciable mass or hepatosplenomegaly, no appreciable ascites       Visit Vitals    /55    Pulse 85    Temp 98.5 °F (36.9 °C)    Resp 21    Ht 6' 3\" (1.905 m)  Comment: per chart history    Wt 73.3 kg (161 lb 9.6 oz)    SpO2 97%    BMI 20.2 kg/m2           Intake/Output Summary (Last 24 hours) at 08/03/17 1200  Last data filed at 08/03/17 0800   Gross per 24 hour   Intake           1513.7 ml   Output              450 ml   Net           1063.7 ml CBC w/Diff    Lab Results   Component Value Date/Time    WBC 34.9 (H) 08/03/2017 01:30 AM    RBC 3.19 (L) 08/03/2017 01:30 AM    HGB 9.6 (L) 08/03/2017 01:30 AM    HCT 29.0 (L) 08/03/2017 01:30 AM    MCV 90.9 08/03/2017 01:30 AM    MCH 30.1 08/03/2017 01:30 AM    MCHC 33.1 08/03/2017 01:30 AM    RDW 15.8 (H) 08/03/2017 01:30 AM    PLT 62 (L) 08/03/2017 01:30 AM    Lab Results   Component Value Date/Time    GRANS 79 (H) 08/03/2017 01:30 AM    LYMPH 14 (L) 08/03/2017 01:30 AM    EOS 0 08/03/2017 01:30 AM    BANDS 8 (H) 08/02/2017 06:10 AM    BASOS 0 08/03/2017 01:30 AM    MYELO 1 (H) 07/30/2017 12:40 AM    METAS 1 (H) 08/02/2017 06:10 AM      Basic Metabolic Profile   Recent Labs      08/03/17   0130   08/01/17   1400   NA  143   < >   --    K  2.8*   < >   --    CL  111*   < >   --    CO2  24   < >   --    BUN  54*   < >   --    CA  6.7*   < >   --    MG   --    --   2.4   PHOS  2.2*   --    --     < > = values in this interval not displayed. Hepatic Function    Lab Results   Component Value Date/Time    ALB 1.6 (L) 08/03/2017 01:30 AM    TP 5.0 (L) 08/03/2017 01:30 AM     (H) 08/03/2017 01:30 AM    Lab Results   Component Value Date/Time    SGOT 17 08/03/2017 01:30 AM          Aron Nicolas ANP-C  [facility]  [email or web address]

## 2017-08-03 NOTE — DIABETES MGMT
GLYCEMIC CONTROL PLAN OF CARE    Assessment/Recommendations:  Pt discussed in interdisciplinary rounds. Pt placed on insulin drip overnight due to elevated glucose. Insulin drip discontinued this morning. Blood glucose currently within ICU targets. Pt has Lantus insulin ordered twice daily. Noted steroids being tapered and possibly discontinued. Monitor glucose and need to discontinue evening Lantus dose if blood glucose trends down.      Most recent blood glucose values:  8/3/2017 04:32 8/3/2017 04:57 8/3/2017 05:55 8/3/2017 06:56 8/3/2017 08:10   201 (H) 183 (H) 143 (H) 136 (H) 143 (H)     Current A1C of 5.4% is equivalent to average blood glucose of 108 mg/dl over the past 2-3 months.      Current hospital diabetes medications:   Correctional Lispro insulin every 6 hours (very resistant scale)  Lantus insulin 15 units daily and 10 units at night      Previous day's insulin requirements:   25 units of Lantus insulin   39 units of Lispro insulin   48 units of Regular insulin via insulin drip (overnight)      Home diabetes medications: none      Diet:  NPO  Tube Feeding: Nepro at 55 mL/hr via NGT    Education:  ____Refer to Diabetes Education Record             _x__Education not indicated at this time      Atul Leal RD, CDE

## 2017-08-03 NOTE — PROGRESS NOTES
RENAL DAILY PROGRESS NOTE    Subjective:   Admitted postcode, seen for ESRD and HD    Complaint: SBT in progress, planning for extubation, pt opens eyes, diarrhea persist    Current Facility-Administered Medications   Medication Dose Route Frequency    piperacillin-tazobactam (ZOSYN) 2.25 g in 0.9% sodium chloride (MBP/ADV) 50 mL MBP  2.25 g IntraVENous Q12H    Piperacillin-tazobactam (ZOSYN) 0.75 gm Supplemental Dosing by Pharmacy   Other Rx Dosing/Monitoring    insulin glargine (LANTUS) injection 15 Units  15 Units SubCUTAneous DAILY    potassium chloride (KLOR-CON) packet 20 mEq  20 mEq Per NG tube BID    hydrocortisone Sod Succ (PF) (SOLU-CORTEF) injection 25 mg  25 mg IntraVENous Q12H    0.9% sodium chloride infusion  50 mL/hr IntraVENous CONTINUOUS    insulin glargine (LANTUS) injection 10 Units  10 Units SubCUTAneous QHS    pantoprazole (PROTONIX) injection 40 mg  40 mg IntraVENous DAILY    doxercalciferol (HECTOROL) 4 mcg/2 mL injection 2 mcg  2 mcg IntraVENous Q MON, WED & FRI    epoetin benjamin (EPOGEN;PROCRIT) injection 10,000 Units  10,000 Units IntraVENous Q MON, WED & FRI    levETIRAcetam (KEPPRA) 500 mg in 100 ml IVPB  500 mg IntraVENous Q12H    chlorhexidine (PERIDEX) 0.12 % mouthwash 10 mL  10 mL Oral Q12H    glucose chewable tablet 16 g  4 Tab Oral PRN    glucagon (GLUCAGEN) injection 1 mg  1 mg IntraMUSCular PRN    dextrose (D50W) injection syrg 12.5-25 g  25-50 mL IntraVENous PRN    0.9% sodium chloride infusion  100 mL/hr IntraVENous DIALYSIS PRN    naloxone (NARCAN) injection 0.4 mg  0.4 mg IntraVENous PRN    LORazepam (ATIVAN) injection 1 mg  1 mg IntraVENous Q4H PRN    fentaNYL citrate (PF) injection 50 mcg  50 mcg IntraVENous Q2H PRN           Objective:     Patient Vitals for the past 24 hrs:   Temp Pulse Resp BP SpO2   08/03/17 1106 - 85 21 103/55 -   08/03/17 1100 - 83 20 94/50 97 %   08/03/17 1000 - 75 20 117/60 90 %   08/03/17 0813 - 78 25 - 99 %   08/03/17 0800 98.5 °F (36.9 °C) 77 25 112/56 91 %   08/03/17 0700 - 75 19 110/56 -   08/03/17 0600 - 76 20 117/59 -   08/03/17 0500 - 81 20 113/59 -   08/03/17 0400 98.4 °F (36.9 °C) 85 19 116/56 -   08/03/17 0343 - 79 18 - 100 %   08/03/17 0300 - 78 20 118/63 98 %   08/03/17 0200 - 81 9 117/62 93 %   08/03/17 0100 - 77 17 99/55 100 %   08/03/17 0000 98.6 °F (37 °C) 76 19 117/64 100 %   08/02/17 2335 - 75 19 - 100 %   08/02/17 2300 - 72 17 108/58 100 %   08/02/17 2200 - 74 17 109/59 100 %   08/02/17 2100 - 74 14 102/57 99 %   08/02/17 2036 - 80 18 - 99 %   08/02/17 2000 97.7 °F (36.5 °C) 77 18 110/58 100 %   08/02/17 1700 - 82 17 108/58 99 %   08/02/17 1600 98.8 °F (37.1 °C) 85 18 110/62 98 %   08/02/17 1523 - - 12 - -   08/02/17 1500 - 88 24 129/60 91 %   08/02/17 1400 - 86 20 125/60 100 %   08/02/17 1300 - 86 21 115/58 99 %   08/02/17 1200 98.5 °F (36.9 °C) 91 26 121/58 97 %   08/02/17 1135 - 90 13 - 100 %        Weight change:      08/01 1901 - 08/03 0700  In: 2528.7 [I.V.:493.7]  Out: 2050 [Drains:1050]    Intake/Output Summary (Last 24 hours) at 08/03/17 1119  Last data filed at 08/03/17 0800   Gross per 24 hour   Intake           1513.7 ml   Output              450 ml   Net           1063.7 ml     Physical Exam: intubated, afebrile opens eyes    HEENT:  ET/NGT in place  Neck: no JVD  Cardiovascular: regular no rub  C/L: equal vent breath sounds  Abdomen: soft, + BS  Ext: trace edema, Left arm AVF + bruit    Data Review:     LABS:   Hematology:   Recent Labs      08/03/17 0130 08/02/17 0610 08/01/17 0424   WBC  34.9*  36.0*  28.7*   HGB  9.6*  10.3*  11.1*   HCT  29.0*  29.2*  31.0*   pl 62K  Chemistry:   Recent Labs      08/03/17 0130 08/02/17 0610 08/01/17 0424   BUN  54*  77*  49*   CREA  4.67*  6.36*  4.98*   CA  6.7*  7.8*  8.2*   ALB  1.6*  1.8*  1.9*   K  2.8*  2.8*  3.3*   NA  143  140  136   CL  111*  104  102   CO2  24  24  20*   PHOS  2.2*   --    --    GLU  203*  341*  335*    Vit D 25 OH 7.5  Vanco 19.8  Mag 2.1  Ion yeison 0.81  Fe 20, sat 15% maurilio 4993  Lactic 0.8  IPTH 1285    IMAGES: CXR right lower lobe consolidation    CULTURE: Blood C/S + Ecoli and Clostridium perfringes      IMPRESSION AND PLAN:   ESRD sched HD MWF   Anemia from CKD low fe stores but high ferritin,Hgb stable cont ALONDRA with HD  E. coli bacteremia on Zosyn/Vanco defer to ID, trend Vanco  Hypokalemia replace per NGT cont with K3 bath on HD  Hypocalcemia from 2nd HPTH and low Vit D, inc hectorol and yeison 3 bath with HD, low phos, replace po           Bairon Allen MD  8/3/2017

## 2017-08-03 NOTE — PROGRESS NOTES
Infectious Disease Progress Note    Requested by: Dr. Camacho Gallardo    Reason: sepsis, e.coli/clostridium pefringens bacteremia    Current abx Prior abx   Pip/tazo since 8/2 Levofloxacin 7/27  Pip/tazo 7/27-7/31  Meropenem  7/31-8/2, vancomycin  7/27-8/2     Lines:       Assessment :  55year old man with h/o type 2 DM (hgb a1c 5.4 on 7/29/17) ,ESRD admitted to [facility] on 7/27/17 s/p cardiorespiratory arrest, hypotension, bacteremia, elevated LFTs. Highly complex clinical picture. Presentation c/w sepsis (POA) due to e.coli, clostridium perfringens bloodstream infection (positive blood culture 7/27, negative blood culture 8/1) . Most likely source of bacteremia is intra abdominal infection/inflammation. Elevated LFTs, thickened gallbladder - no evidence of cholecystitis on HIDA scan 8/2/17    Patient could have some other undiagnosed abdominal inflammation such as ischemic colitis which could have contributed to polymicrobial bacteremia    Increasing wbc - likely steroids related    Clinically better. Off pressors. More alert    Recommendations:    1. cont pip/tazo  2. F/u clinically    Advance Care planning: full code, patient wasnt able to name a surrogate decision maker or provide an advance care plan    Please call me if any further questions or concerns. Will continue to participate in the care of this patient. subjective:      Patient remains intubated.  Detailed ros not feasible.        Home medications:   list reviewed        Current Facility-Administered Medications   Medication Dose Route Frequency    piperacillin-tazobactam (ZOSYN) 2.25 g in 0.9% sodium chloride (MBP/ADV) 50 mL MBP  2.25 g IntraVENous Q12H    insulin glargine (LANTUS) injection 15 Units  15 Units SubCUTAneous DAILY    potassium chloride (KLOR-CON) packet 20 mEq  20 mEq Per NG tube BID    hydrocortisone Sod Succ (PF) (SOLU-CORTEF) injection 25 mg  25 mg IntraVENous Q12H    0.9% sodium chloride infusion  50 mL/hr IntraVENous CONTINUOUS  insulin glargine (LANTUS) injection 10 Units  10 Units SubCUTAneous QHS    pantoprazole (PROTONIX) injection 40 mg  40 mg IntraVENous DAILY    doxercalciferol (HECTOROL) 4 mcg/2 mL injection 2 mcg  2 mcg IntraVENous Q MON, WED & FRI    epoetin benjamni (EPOGEN;PROCRIT) injection 10,000 Units  10,000 Units IntraVENous Q MON, WED & FRI    levETIRAcetam (KEPPRA) 500 mg in 100 ml IVPB  500 mg IntraVENous Q12H    chlorhexidine (PERIDEX) 0.12 % mouthwash 10 mL  10 mL Oral Q12H    glucose chewable tablet 16 g  4 Tab Oral PRN    glucagon (GLUCAGEN) injection 1 mg  1 mg IntraMUSCular PRN    dextrose (D50W) injection syrg 12.5-25 g  25-50 mL IntraVENous PRN    0.9% sodium chloride infusion  100 mL/hr IntraVENous DIALYSIS PRN    naloxone (NARCAN) injection 0.4 mg  0.4 mg IntraVENous PRN    LORazepam (ATIVAN) injection 1 mg  1 mg IntraVENous Q4H PRN    fentaNYL citrate (PF) injection 50 mcg  50 mcg IntraVENous Q2H PRN       Allergies: Review of patient's allergies indicates no known allergies. Temp (24hrs), Av.4 °F (36.9 °C), Min:97.7 °F (36.5 °C), Max:98.8 °F (37.1 °C)    Visit Vitals    /56    Pulse 78    Temp 98.5 °F (36.9 °C)    Resp 25    Ht 6' 3\" (1.905 m)  Comment: per chart history    Wt 73.3 kg (161 lb 9.6 oz)    SpO2 99%    BMI 20.2 kg/m2       ROS: Patient remains intubated. Detailed ros not feasible. Physical Exam:    General: Well developed, well nourished male laying on the bed, intubated, in no acute distress. General:   awake alert and oriented   HEENT:  Normocephalic, atraumatic, PERRL, EOMI,present scleral icterus no conjunctival hemmohage;  nasal and oral mucous are moist and without evidence of lesions. Intubated. Neck supple, no bruits.    Lymph Nodes:   no cervical, axillary or inguinal adenopathy   Lungs:   non-labored, bilaterally clear to auscultation- no crackles wheezes rales or rhonchi   Heart:  RRR, s1 and s2; no  rubs or gallops, no edema, + pedal pulses Abdomen:  soft, non-distended, active bowel sounds, no hepatomegaly, no splenomegaly. Non-tender   Genitourinary:  deferred   Extremities:   no clubbing, cyanosis; no joint effusions or swelling; muscle mass appropriate for age   Neurologic:  No gross focal sensory abnormalities; 5/5 muscle strength to upper and lower extremities. Speech appropriate.  Cranial nerves intact                        Skin:  No rash or ulcers   Back:  no spinal or paraspinal muscle tenderness or rigidity, no CVA tenderness     Psychiatric:  No suicidal or homicidal ideations, appropriate mood and affect         Labs: Results:   Chemistry Recent Labs      08/03/17   0130 08/02/17   0610  08/01/17   0424   GLU  203*  341*  335*   NA  143  140  136   K  2.8*  2.8*  3.3*   CL  111*  104  102   CO2  24  24  20*   BUN  54*  77*  49*   CREA  4.67*  6.36*  4.98*   CA  6.7*  7.8*  8.2*   AGAP  8  12  14   BUCR  12  12  10*   AP  144*  158*  186*   TP  5.0*  5.4*  6.0*   ALB  1.6*  1.8*  1.9*   GLOB  3.4  3.6  4.1*   AGRAT  0.5*  0.5*  0.5*      CBC w/Diff Recent Labs      08/03/17 0130 08/02/17   0610  08/01/17   0424   WBC  34.9*  36.0*  28.7*   RBC  3.19*  3.39*  3.65*   HGB  9.6*  10.3*  11.1*   HCT  29.0*  29.2*  31.0*   PLT  62*  54*  57*   GRANS  79*  80*  80*   LYMPH  14*  6*  3*   EOS  0  0  0      Microbiology Recent Labs      08/01/17   1400  08/01/17   1328  08/01/17   1215   CULT  NO GROWTH 2 DAYS  NO GROWTH 2 DAYS  NO GROWTH AFTER 19 HOURS          RADIOLOGY:    All available imaging studies/reports in [facility] for this admission were reviewed    Dr. Janice Quarles, Infectious Disease Specialist  [phone]  August 3, 2017  4:28 PM

## 2017-08-03 NOTE — PROGRESS NOTES
NUTRITION    [facility]     RECOMMENDATIONS / PLAN:     - Modify tube feeding regimen due to persistent hypokalemia, will change to formula higher in potassium (1399 mg increased to 3931 mg K per day). - Change tube feeding to Glucerna 1.5 at goal rate of 65 mL/hr and continue 100 mL q 6 hour water flushes.   - Continue RD inpatient monitoring and evaluation. Goal Regimen: Glucerna 1.5 at 65 mL/hr + 100 mL q 6 hour water flushes to provide: 2340 kcal, 129 gm protein, 117 gm fat, 208 gm CHO, 25 gm fiber, 1184 mL free water, 1584 mL total water, 100% RDIs     NUTRITION INTERVENTIONS & DIAGNOSIS:     [x] Enteral nutrition support: revise regimen   [x] Vitamin/mineral supplementation: K Phos Neutral, 20 mEq KCl x 2, 40 mEq KCl  [x] Coordination of care: interdisciplinary rounds, discussed with PA     Nutrition Diagnosis: Inadequate oral intake related to inability to tolerate po as evidenced by pt NPO. Altered nutrition related labs related to renal failure on dialysis, inadequate enteral provision of potassium as evidenced by hypokalemia, potassium 2.8 mmol/L. ASSESSMENT:     8/3: K remains low despite continued replacement. Will change to higher potassium formula per discussion with PA.   8/1: Tolerating feeding at goal. 1 L removed during HD 7/31. Hyperglycemia noted, advance to very insulin resistant SSI and basal insulin started. 7/31: Tolerating advancement of tube feeding. HD today. BG trending up, MD to stop D5.    7/30: Pt remain intubated. GI following; plan to start tube feeds today. Noted order placed; discussed modifying tube feed order and add water flushes with Dr Kevin Barbour. RN unavailable; discussed with Nursing [place] regarding modifying tube feed order  7/28: S/p cardiac arrest and intubated 7/27, NGT clamped. Abdominal ultrasound today to rule out cholecystitis. Will wait to feed.      EN infusion adequate to meet patients estimated nutritional needs:   [x] Yes     [] No   [] Unable to determine at this time    Tube Feeding: Nepro at 55 mL/hr via NGT (modify regimen)   Water Flushes: 100 mL q 6 hours  Residuals: 0 mL    Diet: DIET NPO  DIET TUBE FEEDING      Food Allergies: NKFA  Current Appetite:   [] Good     [] Fair     [] Poor     [x] Other: NPO  Appetite/meal intake prior to admission:   [] Good     [] Fair     [] Poor     [x] Other: unknown   Feeding Limitations:  [] Swallowing difficulty    [] Chewing difficulty    [x] Other: intubated   Current Meal Intake: No data found. Anuric   BM: 8/3; FMS  Skin Integrity: WDL  Edema: 1+ non-pitting UEs  Pertinent Medications: Reviewed: steroid     Recent Labs      08/03/17   0130  08/02/17   0610  08/01/17   1400  08/01/17   0424   NA  143  140   --   136   K  2.8*  2.8*   --   3.3*   CL  111*  104   --   102   CO2  24  24   --   20*   GLU  203*  341*   --   335*   BUN  54*  77*   --   49*   CREA  4.67*  6.36*   --   4.98*   CA  6.7*  7.8*   --   8.2*   MG   --    --   2.4   --    PHOS  2.2*   --    --    --    ALB  1.6*  1.8*   --   1.9*   SGOT  17  15   --   44*   ALT  55  67*   --   101*       Intake/Output Summary (Last 24 hours) at 08/03/17 1449  Last data filed at 08/03/17 1200   Gross per 24 hour   Intake          1769.29 ml   Output              450 ml   Net          1319.29 ml       Anthropometrics:  Ht Readings from Last 1 Encounters:   07/28/17 6' 3\" (1.905 m)     Last 3 Recorded Weights in this Encounter    07/31/17 0814 07/31/17 2144 08/02/17 0600   Weight: 75.6 kg (166 lb 10.7 oz) 73.3 kg (161 lb 9.6 oz) 73.3 kg (161 lb 9.6 oz)     Body mass index is 20.2 kg/(m^2).       Borderline Underweight     Weight History:   Weight Metrics 8/2/2017   Weight 161 lb 9.6 oz   BMI 20.2 kg/m2        Admitting Diagnosis: Cardiac arrest ([facility] [address]  Acidosis  Respiratory failure (HCC)  Anemia  ESRD needing dialysis ([facility] [address]  Cardiac arrest ([facility] [address]  Acute GI bleeding  Sepsis ([facility] [address]  Hypotension  Anoxic brain damage (HCC)  ESRD (end stage renal disease) ([facility] [address]  Colitis  Pertinent PMHx: DM, HTN, IBS, ESRD    Education Needs:        [x] None identified  [] Identified - Not appropriate at this time  []  Identified and addressed - refer to education log  Learning Limitations:   [] None identified  [x] Identified: intubated     Cultural, Muslim & ethnic food preferences:  [x] None identified    [] Identified and addressed     ESTIMATED NUTRITION NEEDS:     Calories: 5657-2089 kcal ([id number]) based on  [x] Actual BW 71 kg     [] IBW   Protein:  gm (1.2-2 gm/kg) based on  [x] Actual BW      [] IBW   Fluid: 1000 mL + UOP     MONITORING & EVALUATION:     Nutrition Goal(s):   1. Nutritional needs will be met through adequate oral intake or nutrition support within the next 7 days.   Outcome:  [x] Met/Ongoing    []  Not Met/Progressing    [] New/Initial Goal     Monitoring:   [x] EN tolerance    [x] EN infusion   [] Diet tolerance   [] Meal intake   [] Supplement intake   [x] GI symptoms/ability to tolerate po diet   [x] Respiratory status   [x] Plan of care      Previous Recommendations (for follow-up assessments only):     [x]   Implemented       []   Not Implemented (RD to address)     [] No Recommendation Made     Discharge Planning: pending ability to tolerate po and plan of care  [x] Participated in care planning, discharge planning, & interdisciplinary rounds as appropriate       Angela Nolen, [address], [address] [place] [address]   Pager: [phone]

## 2017-08-04 ENCOUNTER — APPOINTMENT (OUTPATIENT)
Dept: GENERAL RADIOLOGY | Age: 57
DRG: 004 | End: 2017-08-04
Attending: PHYSICIAN ASSISTANT
Payer: MEDICARE

## 2017-08-04 LAB
ALBUMIN SERPL BCP-MCNC: 2 G/DL (ref 3.4–5)
ALBUMIN/GLOB SERPL: 0.5 {RATIO} (ref 0.8–1.7)
ALP SERPL-CCNC: 158 U/L (ref 45–117)
ALT SERPL-CCNC: 51 U/L (ref 16–61)
ANION GAP BLD CALC-SCNC: 14 MMOL/L (ref 3–18)
ARTERIAL PATENCY WRIST A: ABNORMAL
ARTERIAL PATENCY WRIST A: YES
AST SERPL W P-5'-P-CCNC: 13 U/L (ref 15–37)
BASE DEFICIT BLD-SCNC: 2 MMOL/L
BASE EXCESS BLD CALC-SCNC: 1 MMOL/L
BASOPHILS # BLD AUTO: 0 K/UL (ref 0–0.06)
BASOPHILS # BLD: 0 % (ref 0–3)
BDY SITE: ABNORMAL
BDY SITE: ABNORMAL
BILIRUB SERPL-MCNC: 1 MG/DL (ref 0.2–1)
BUN SERPL-MCNC: 82 MG/DL (ref 7–18)
BUN/CREAT SERPL: 12 (ref 12–20)
CA-I SERPL-SCNC: 1.11 MMOL/L (ref 1.12–1.32)
CALCIUM SERPL-MCNC: 8.2 MG/DL (ref 8.5–10.1)
CHLORIDE SERPL-SCNC: 107 MMOL/L (ref 100–108)
CO2 SERPL-SCNC: 24 MMOL/L (ref 21–32)
CREAT SERPL-MCNC: 7.05 MG/DL (ref 0.6–1.3)
DIFFERENTIAL METHOD BLD: ABNORMAL
EOSINOPHIL # BLD: 0 K/UL (ref 0–0.4)
EOSINOPHIL NFR BLD: 0 % (ref 0–5)
ERYTHROCYTE [DISTWIDTH] IN BLOOD BY AUTOMATED COUNT: 16 % (ref 11.6–14.5)
GAS FLOW.O2 O2 DELIVERY SYS: ABNORMAL L/MIN
GAS FLOW.O2 O2 DELIVERY SYS: ABNORMAL L/MIN
GAS FLOW.O2 SETTING OXYMISER: 12 BPM
GLOBULIN SER CALC-MCNC: 4.1 G/DL (ref 2–4)
GLUCOSE BLD STRIP.AUTO-MCNC: 124 MG/DL (ref 70–110)
GLUCOSE BLD STRIP.AUTO-MCNC: 135 MG/DL (ref 70–110)
GLUCOSE BLD STRIP.AUTO-MCNC: 196 MG/DL (ref 70–110)
GLUCOSE BLD STRIP.AUTO-MCNC: 220 MG/DL (ref 70–110)
GLUCOSE SERPL-MCNC: 166 MG/DL (ref 74–99)
HCO3 BLD-SCNC: 22.4 MMOL/L (ref 22–26)
HCO3 BLD-SCNC: 24.7 MMOL/L (ref 22–26)
HCT VFR BLD AUTO: 33.1 % (ref 36–48)
HGB BLD-MCNC: 11.2 G/DL (ref 13–16)
LYMPHOCYTES # BLD AUTO: 10 % (ref 20–51)
LYMPHOCYTES # BLD: 3.1 K/UL (ref 0.8–3.5)
MCH RBC QN AUTO: 30.2 PG (ref 24–34)
MCHC RBC AUTO-ENTMCNC: 33.8 G/DL (ref 31–37)
MCV RBC AUTO: 89.2 FL (ref 74–97)
MONOCYTES # BLD: 2.2 K/UL (ref 0–1)
MONOCYTES NFR BLD AUTO: 7 % (ref 2–9)
NEUTS BAND NFR BLD MANUAL: 13 % (ref 0–5)
NEUTS SEG # BLD: 25.9 K/UL (ref 1.8–8)
NEUTS SEG NFR BLD AUTO: 70 % (ref 42–75)
NRBC BLD-RTO: 2 PER 100 WBC
O2/TOTAL GAS SETTING VFR VENT: 28 %
O2/TOTAL GAS SETTING VFR VENT: 28 %
PCO2 BLD: 33.7 MMHG (ref 35–45)
PCO2 BLD: 35.5 MMHG (ref 35–45)
PEEP RESPIRATORY: 5 CMH2O
PEEP RESPIRATORY: 5 CMH2O
PH BLD: 7.43 [PH] (ref 7.35–7.45)
PH BLD: 7.45 [PH] (ref 7.35–7.45)
PLATELET # BLD AUTO: 120 K/UL (ref 135–420)
PLATELET COMMENTS,PCOM: ABNORMAL
PMV BLD AUTO: 13.1 FL (ref 9.2–11.8)
PO2 BLD: 104 MMHG (ref 80–100)
PO2 BLD: 70 MMHG (ref 80–100)
POTASSIUM SERPL-SCNC: 3 MMOL/L (ref 3.5–5.5)
PRESSURE SUPPORT SETTING VENT: 7 CMH2O
PROT SERPL-MCNC: 6.1 G/DL (ref 6.4–8.2)
RBC # BLD AUTO: 3.71 M/UL (ref 4.7–5.5)
RBC MORPH BLD: ABNORMAL
SAO2 % BLD: 94 % (ref 92–97)
SAO2 % BLD: 98 % (ref 92–97)
SERVICE CMNT-IMP: ABNORMAL
SERVICE CMNT-IMP: ABNORMAL
SODIUM SERPL-SCNC: 145 MMOL/L (ref 136–145)
SPECIMEN TYPE: ABNORMAL
SPECIMEN TYPE: ABNORMAL
T4 FREE SERPL-MCNC: 0.8 NG/DL (ref 0.7–1.5)
TOTAL RESP. RATE, ITRR: 15
VENTILATION MODE VENT: ABNORMAL
VENTILATION MODE VENT: ABNORMAL
VOLUME CONTROL PLUS IVLCP: YES
VT SETTING VENT: 400 ML
WBC # BLD AUTO: 31.2 K/UL (ref 4.6–13.2)

## 2017-08-04 PROCEDURE — 74011250637 HC RX REV CODE- 250/637: Performed by: INTERNAL MEDICINE

## 2017-08-04 PROCEDURE — 74011250636 HC RX REV CODE- 250/636: Performed by: PSYCHIATRY & NEUROLOGY

## 2017-08-04 PROCEDURE — 84439 ASSAY OF FREE THYROXINE: CPT | Performed by: PHYSICIAN ASSISTANT

## 2017-08-04 PROCEDURE — 74011250636 HC RX REV CODE- 250/636: Performed by: INTERNAL MEDICINE

## 2017-08-04 PROCEDURE — 94003 VENT MGMT INPAT SUBQ DAY: CPT

## 2017-08-04 PROCEDURE — 85025 COMPLETE CBC W/AUTO DIFF WBC: CPT | Performed by: PHYSICIAN ASSISTANT

## 2017-08-04 PROCEDURE — 74011636637 HC RX REV CODE- 636/637: Performed by: PHYSICIAN ASSISTANT

## 2017-08-04 PROCEDURE — 94669 MECHANICAL CHEST WALL OSCILL: CPT

## 2017-08-04 PROCEDURE — C9113 INJ PANTOPRAZOLE SODIUM, VIA: HCPCS | Performed by: INTERNAL MEDICINE

## 2017-08-04 PROCEDURE — 82962 GLUCOSE BLOOD TEST: CPT

## 2017-08-04 PROCEDURE — 74011250636 HC RX REV CODE- 250/636

## 2017-08-04 PROCEDURE — 71010 XR CHEST PORT: CPT

## 2017-08-04 PROCEDURE — 74011000258 HC RX REV CODE- 258: Performed by: INTERNAL MEDICINE

## 2017-08-04 PROCEDURE — 80053 COMPREHEN METABOLIC PANEL: CPT | Performed by: PHYSICIAN ASSISTANT

## 2017-08-04 PROCEDURE — 82803 BLOOD GASES ANY COMBINATION: CPT

## 2017-08-04 PROCEDURE — 74011000250 HC RX REV CODE- 250: Performed by: PHYSICIAN ASSISTANT

## 2017-08-04 PROCEDURE — 74011250636 HC RX REV CODE- 250/636: Performed by: PHYSICIAN ASSISTANT

## 2017-08-04 PROCEDURE — 82330 ASSAY OF CALCIUM: CPT | Performed by: PHYSICIAN ASSISTANT

## 2017-08-04 PROCEDURE — 74011250637 HC RX REV CODE- 250/637: Performed by: PHYSICIAN ASSISTANT

## 2017-08-04 PROCEDURE — 77030027138 HC INCENT SPIROMETER -A

## 2017-08-04 PROCEDURE — 90935 HEMODIALYSIS ONE EVALUATION: CPT

## 2017-08-04 PROCEDURE — 36415 COLL VENOUS BLD VENIPUNCTURE: CPT | Performed by: PHYSICIAN ASSISTANT

## 2017-08-04 PROCEDURE — 36600 WITHDRAWAL OF ARTERIAL BLOOD: CPT

## 2017-08-04 PROCEDURE — 65610000006 HC RM INTENSIVE CARE

## 2017-08-04 PROCEDURE — 74011000258 HC RX REV CODE- 258: Performed by: PHYSICIAN ASSISTANT

## 2017-08-04 PROCEDURE — P9047 ALBUMIN (HUMAN), 25%, 50ML: HCPCS

## 2017-08-04 PROCEDURE — 77030033263 HC DRSG MEPILEX 16-48IN BORD MOLN -B

## 2017-08-04 RX ORDER — DOXERCALCIFEROL 4 UG/2ML
3 INJECTION INTRAVENOUS
Status: DISCONTINUED | OUTPATIENT
Start: 2017-08-07 | End: 2017-08-07

## 2017-08-04 RX ORDER — ALBUMIN HUMAN 250 G/1000ML
SOLUTION INTRAVENOUS
Status: COMPLETED
Start: 2017-08-04 | End: 2017-08-04

## 2017-08-04 RX ADMIN — POTASSIUM CHLORIDE: 2 INJECTION, SOLUTION, CONCENTRATE INTRAVENOUS at 21:56

## 2017-08-04 RX ADMIN — ALBUMIN (HUMAN) 12.5 G: 0.25 INJECTION, SOLUTION INTRAVENOUS at 16:34

## 2017-08-04 RX ADMIN — INSULIN LISPRO 3 UNITS: 100 INJECTION, SOLUTION INTRAVENOUS; SUBCUTANEOUS at 06:37

## 2017-08-04 RX ADMIN — LEVETIRACETAM 500 MG: 5 INJECTION INTRAVENOUS at 23:16

## 2017-08-04 RX ADMIN — INSULIN LISPRO 6 UNITS: 100 INJECTION, SOLUTION INTRAVENOUS; SUBCUTANEOUS at 11:49

## 2017-08-04 RX ADMIN — PANTOPRAZOLE SODIUM 40 MG: 40 INJECTION, POWDER, FOR SOLUTION INTRAVENOUS at 09:37

## 2017-08-04 RX ADMIN — HYDROCORTISONE 10 MG: 10 TABLET ORAL at 09:38

## 2017-08-04 RX ADMIN — PIPERACILLIN AND TAZOBACTAM 2.25 G: 2; .25 INJECTION, POWDER, LYOPHILIZED, FOR SOLUTION INTRAVENOUS; PARENTERAL at 18:42

## 2017-08-04 RX ADMIN — POTASSIUM CHLORIDE 20 MEQ: 1.5 POWDER, FOR SOLUTION ORAL at 09:37

## 2017-08-04 RX ADMIN — PIPERACILLIN AND TAZOBACTAM 2.25 G: 2; .25 INJECTION, POWDER, LYOPHILIZED, FOR SOLUTION INTRAVENOUS; PARENTERAL at 04:20

## 2017-08-04 RX ADMIN — INSULIN GLARGINE 15 UNITS: 100 INJECTION, SOLUTION SUBCUTANEOUS at 09:37

## 2017-08-04 RX ADMIN — CHLORHEXIDINE GLUCONATE 10 ML: 1.2 RINSE ORAL at 21:56

## 2017-08-04 RX ADMIN — LEVETIRACETAM 500 MG: 5 INJECTION INTRAVENOUS at 11:42

## 2017-08-04 RX ADMIN — DIBASIC SODIUM PHOSPHATE, MONOBASIC POTASSIUM PHOSPHATE AND MONOBASIC SODIUM PHOSPHATE 1 TABLET: 852; 155; 130 TABLET ORAL at 09:37

## 2017-08-04 RX ADMIN — CHLORHEXIDINE GLUCONATE 10 ML: 1.2 RINSE ORAL at 09:38

## 2017-08-04 RX ADMIN — POTASSIUM CHLORIDE: 2 INJECTION, SOLUTION, CONCENTRATE INTRAVENOUS at 23:11

## 2017-08-04 NOTE — PROGRESS NOTES
[email or web address]  [phone]       Impression  1. Multiorgan failure  2. Abnormal LFTs- suspect ischemic hepatits/shock liver (trending down)  3. Anemia  4. GI bleed  5. ESRD  6. S/p Code  4. NGT feeding    Plan:  1. Cont to trend for LFTs. Hepatobiliary scan with gallbladder ejection fraction calculation done 8/2/17. No cystic duct obstructionor findings of cholecystitis. Normal gallbladder ejection fraction  2. Cont monitor H/H transfuse as indicated  3. Continue supportive care, may need peg later on if unable to be extubated        Chief Complaint: anemia, elevated LFTs, GI bleed    Subjective:  Pt more responsive able to follow commands, nonverbal, intubated. No hematochezia, melena noted.  Pt denies abdominal pain        Eyes: conjunctiva normal, EOM normal   ENT: Mucous membranes moist, no lesion or thrush   Cardiovascular:  normal rate and regular rhythm, no murmur   Pulmonary/Chest Wall: breath sounds normal and effort normal   Abdominal:  soft, non-acute, non-tender, no appreciable mass or hepatosplenomegaly, no appreciable ascites       Visit Vitals    /59    Pulse 81    Temp 99.2 °F (37.3 °C)    Resp 15    Ht 6' 3\" (1.905 m)  Comment: per chart history    Wt 77.5 kg (170 lb 12.8 oz)    SpO2 100%    BMI 21.35 kg/m2           Intake/Output Summary (Last 24 hours) at 08/04/17 0852  Last data filed at 08/04/17 0600   Gross per 24 hour   Intake           920.59 ml   Output             1650 ml   Net          -729.41 ml       CBC w/Diff    Lab Results   Component Value Date/Time    WBC 31.2 (H) 08/04/2017 02:05 AM    RBC 3.71 (L) 08/04/2017 02:05 AM    HGB 11.2 (L) 08/04/2017 02:05 AM    HCT 33.1 (L) 08/04/2017 02:05 AM    MCV 89.2 08/04/2017 02:05 AM    MCH 30.2 08/04/2017 02:05 AM    MCHC 33.8 08/04/2017 02:05 AM    RDW 16.0 (H) 08/04/2017 02:05 AM     (L) 08/04/2017 02:05 AM    Lab Results   Component Value Date/Time    GRANS 70 08/04/2017 02:05 AM    LYMPH 10 (L) 08/04/2017 02:05 AM EOS 0 08/04/2017 02:05 AM    BANDS 13 (H) 08/04/2017 02:05 AM    BASOS 0 08/04/2017 02:05 AM    MYELO 1 (H) 07/30/2017 12:40 AM    METAS 1 (H) 08/02/2017 06:10 AM      Basic Metabolic Profile   Recent Labs      08/04/17   0205  08/03/17   0130   08/01/17   1400   NA  145  143   < >   --    K  3.0*  2.8*   < >   --    CL  107  111*   < >   --    CO2  24  24   < >   --    BUN  82*  54*   < >   --    CA  8.2*  6.7*   < >   --    MG   --    --    --   2.4   PHOS   --   2.2*   --    --     < > = values in this interval not displayed. Hepatic Function    Lab Results   Component Value Date/Time    ALB 2.0 (L) 08/04/2017 02:05 AM    TP 6.1 (L) 08/04/2017 02:05 AM     (H) 08/04/2017 02:05 AM    Lab Results   Component Value Date/Time    SGOT 13 (L) 08/04/2017 02:05 AM          Mg Torres, ANP-C  [facility] 1946, [facility]  [email or web address]        [email or web address]  [phone]       Impression  1. Multiorgan failure  2.anemia  3. Elevated LFTs  4.GI bleed  5. ESRD-noncompliant outpatient     Plan:  1.cont tube feeding  2.moniter H/H transfuse as indicated  3. Elevated LFTS probably suspect ischemic hepatitis  4. Stool for cdiff negative  5. Cont supportive care      Chief Complaint: anemia, elevated LFTS, GI bleed    Subjective: Pt unresponsive, uneventful overnight.  No hematochezia, melena noted         Eyes: conjunctiva normal,    ENT: Mucous membranes moist, no lesion or thrush   Cardiovascular:  normal rate and regular rhythm, no murmur   Pulmonary/Chest Wall: breath sounds normal. On ventilator   Abdominal:  soft, non-acute, non-tender, no appreciable mass or hepatosplenomegaly, no appreciable ascites       Visit Vitals    /59    Pulse 81    Temp 99.2 °F (37.3 °C)    Resp 15    Ht 6' 3\" (1.905 m)  Comment: per chart history    Wt 77.5 kg (170 lb 12.8 oz)    SpO2 100%    BMI 21.35 kg/m2           Intake/Output Summary (Last 24 hours) at 08/04/17 3492  Last data filed at 08/04/17 0600   Gross per 24 hour   Intake           920.59 ml   Output             1650 ml   Net          -729.41 ml       CBC w/Diff    Lab Results   Component Value Date/Time    WBC 31.2 (H) 08/04/2017 02:05 AM    RBC 3.71 (L) 08/04/2017 02:05 AM    HGB 11.2 (L) 08/04/2017 02:05 AM    HCT 33.1 (L) 08/04/2017 02:05 AM    MCV 89.2 08/04/2017 02:05 AM    MCH 30.2 08/04/2017 02:05 AM    MCHC 33.8 08/04/2017 02:05 AM    RDW 16.0 (H) 08/04/2017 02:05 AM     (L) 08/04/2017 02:05 AM    Lab Results   Component Value Date/Time    GRANS 70 08/04/2017 02:05 AM    LYMPH 10 (L) 08/04/2017 02:05 AM    EOS 0 08/04/2017 02:05 AM    BANDS 13 (H) 08/04/2017 02:05 AM    BASOS 0 08/04/2017 02:05 AM    MYELO 1 (H) 07/30/2017 12:40 AM    METAS 1 (H) 08/02/2017 06:10 AM      Basic Metabolic Profile   Recent Labs      08/04/17   0205  08/03/17   0130   08/01/17   1400   NA  145  143   < >   --    K  3.0*  2.8*   < >   --    CL  107  111*   < >   --    CO2  24  24   < >   --    BUN  82*  54*   < >   --    CA  8.2*  6.7*   < >   --    MG   --    --    --   2.4   PHOS   --   2.2*   --    --     < > = values in this interval not displayed. Hepatic Function    Lab Results   Component Value Date/Time    ALB 2.0 (L) 08/04/2017 02:05 AM    TP 6.1 (L) 08/04/2017 02:05 AM     (H) 08/04/2017 02:05 AM    Lab Results   Component Value Date/Time    SGOT 13 (L) 08/04/2017 02:05 AM          Dedrick Adam, ANP-C  [facility], [address]  [email or web address]

## 2017-08-04 NOTE — DIALYSIS
ACUTE HEMODIALYSIS FLOW SHEET    HEMODIALYSIS ORDERS: Physician: Bárbara Police:  Donnaar    Duration:   hr  BFR: 300  DFR: 600   Dialysate:  Temp 36.5  K+  3  Ca+ 3  Na 140 Bicarb 35   Weight:      Bed Scale [x]     Unable to Obtain []        Dry weight/UF Goal:  1000 mL   Access AVG  Needle Gauge N/A   Heparin []  Bolus      Units    [] Hourly       Units    []None     Catheter locking solution Heparin   Pre BP:  132/58 Pulse:  Temperature:    Respirations:  Tx: NS   250    ml/Bolus  Other        [] N/A   Labs: Pre       Post:        [x] N/A    Additional Orders(medications, blood products, hypotension management):       [x] N/A     [x] Time Out/Safety Check  [x] [facility] Consent Verified     CATHETER ACCESS: [x]N/A   []Right   []Left   []IJ     []Fem   [] First use X-ray verified     []Tunnel                [] Non Tunneled   []No S/S infection  []Redness  []Drainage []Cultured []Swelling []Pain   []Medical Aseptic Prep Utilized   []Dressing Changed  [] Biopatch  Date:    []Clotted   []Patent   Flows: []Good  []Poor  []Reversed   If access problem,  notified: []Yes    [x]N/A  Date:           GRAFT/FISTULA ACCESS:  []N/A     []Right     [x]Left     [x]UE     []LE   [x]AVG   []AVF        []Buttonhole    []Medical Aseptic Prep Utilized   [x]No S/S infection  []Redness  []Drainage []Cultured []Swelling []Pain    Bruit:   [x] Strong    [] Weak       Thrill :   [x] Strong    [] Weak       Needle Gauge:  15 Length: 1   If access problem,  notified: [x]Yes     []N/A  Date:        Please describe access if present and not used:       GENERAL ASSESSMENT:    LUNGS:  Rate  SaO2 %   [] N/A    [] Clear  [x] Coarse  [x] Crackles  [] Wheezing        [] Diminished     Location : []RLL   []LLL    [x]RUL  [x]   Cough: [x]Productive  []Dry  []N/A   Respirations:  []Easy  []Labored   Therapy:  []RA  [x]NC 4 l/min    Mask: []NRB []Venti       O2%                  []Ventilator  []Intubated  [] Trach  [] BiPaP   CARDIAC: []Regular      [] Irregular   [] Pericardial Rub  [] JVD        [x]  Monitored  [] Bedside  [] Remotely monitored [] N/A  Rhythm:    EDEMA: [] None  [x]Generalized  [] Pitting [] 1    [] 2    [] 3    [] 4                 [] Facial  [] Pedal  [x]  UE  [] LE   SKIN:   [] Warm  [] Hot     [x] Cold   [x] Dry     [] Pale   [] Diaphoretic                  [] Flushed  [] Jaundiced  [] Cyanotic  [] Rash  [] Weeping   LOC:    [x] Alert      []Oriented:    [] Person     [] Place  []Time               [] Confused  [] Lethargic  [] Medicated  [] Non-responsive     GI / ABDOMEN:  [x] Flat    [] Distended    [] Soft    [x] Firm   []  Obese                             [] Diarrhea  [x] Bowel Sounds  [] Nausea  [] Vomiting       / URINE ASSESSMENT:[] Voiding   [] Oliguria  [x] Anuria   []  Stanton     [] Incontinent    []  Incontinent Brief      []  Bathroom Privileges     PAIN: [x] 0 []1  []2   []3   []4   []5   []6   []7   []8   []9   []10            Scale 0-10  Action/Follow Up:    MOBILITY:  [] Amb    [] Amb/Assist    [x] Bed    [] Wheelchair  [] Stretcher      All Vitals and Treatment Details on Attached [address]:  [address]   [] 1st Time Acute  [] Stat  [x] Routine  [] Urgent     [] Acute Room  []  Bedside  [x] ICU/CCU  [] ER   Isolation Precautions:  [x] Dialysis   [] Airborne   [] Contact    [] Reverse   Special Considerations:         [] Blood Consent Verified []N/A     ALLERGIES:  NKA   Code Status:  [x] Full Code  [] DNR  [] Other           HBsAg ONLY: Date Drawn  07/31/17      [x]Negative []Positive []Unknown   HBsAb:    [] Susceptible   [x] Zlhcby50 []Not Drawn  [] Drawn     Current Labs:    Date of Labs: Today [x]     Results for Amanda Duke (MRN [id number]) as of 8/4/2017 18:10   Ref.  Range 8/4/2017 02:05   WBC Latest Ref Range: 4.6 - 13.2 K/uL 31.2 (H)   NRBC Latest Ref Range: 0  WBC 2.0 (H)   RBC Latest Ref Range: 4.70 - 5.50 M/uL 3.71 (L)   HGB Latest Ref Range: 13.0 - 16.0 g/dL 11.2 (L)   HCT Latest Ref Range: 36.0 - 48.0 % 33.1 (L)   MCV Latest Ref Range: 74.0 - 97.0 FL 89.2   MCH Latest Ref Range: 24.0 - 34.0 PG 30.2   MCHC Latest Ref Range: 31.0 - 37.0 g/dL 33.8   RDW Latest Ref Range: 11.6 - 14.5 % 16.0 (H)   PLATELET Latest Ref Range: 135 - 420 K/uL 120 (L)   MPV Latest Ref Range: 9.2 - 11.8 FL 13.1 (H)   NEUTROPHILS Latest Ref Range: 42 - 75 % 70   BAND NEUTROPHILS Latest Ref Range: 0 - 5 % 13 (H)   LYMPHOCYTES Latest Ref Range: 20 - 51 % 10 (L)   MONOCYTES Latest Ref Range: 2 - 9 % 7   EOSINOPHILS Latest Ref Range: 0 - 5 % 0   BASOPHILS Latest Ref Range: 0 - 3 % 0   DF Latest Units:   AUTOMATED   ABS. NEUTROPHILS Latest Ref Range: 1.8 - 8.0 K/UL 25.9 (H)   ABS. LYMPHOCYTES Latest Ref Range: 0.8 - 3.5 K/UL 3.1   ABS. MONOCYTES Latest Ref Range: 0 - 1.0 K/UL 2.2 (H)   ABS. EOSINOPHILS Latest Ref Range: 0.0 - 0.4 K/UL 0.0   ABS.  BASOPHILS Latest Ref Range: 0.0 - 0.06 K/UL 0.0                                                                                                                                 DIET:  [] Renal    [] Other     [x] NPO     []  Diabetic      PRIMARY NURSE REPORT: First initial/Last name/Title      Pre Dialysis:   Taina Hameed RN @ 9513     EDUCATION:    [x] Patient [] Other         Knowledge Basis: []None []Minimal [] Substantial   Barriers to learning  []N/A   [] Access Care     [] S&S of infection     [] Fluid Management     []K+     [x]Procedural    [x]Albumin     [] Medications     [] Tx Options     [] Transplant     [] Diet     [] Other   Teaching Tools:  [x] Explain  [] Demo  [] Handouts [] Video  Patient response:   [x] Verbalized understanding  [] Teach back  [] Return demonstration [] Requires follow up   Inappropriate due to            [address] Before each treatment:     Machine Number:                   [facility]                                  [] Unit Machine # 4  with centralized RO [] Portable Machine #1/RO serial # [id number]                                  [x] Portable Machine #2/RO serial # [id number]                                  [] Portable Machine #3/RO serial # [id number]                                                                                                       [facility]                                  [] Portable Machine #11/RO serial # [id number]                                   [] Portable Machine #12/RO serial # [id number]                                  [] Portable Machine #13/RO serial #  [id number]      Alarm Test:  Pass time  3850 Other:         [x] RO/Machine Log Complete      Temp     [x]Extracorporeal Circuit Tested for integrity   Dialysate: Conductivity: Meter 14 HD Machine   14.1              TCD:  14.0  Dialyzer Lot #  [id number]       Blood Tubing Lot #     [id number]  Saline Lot # [id number]     CHLORINE TESTING-Before each treatment and every 4 hours    Total Chlorine: [x] less than 0.1 ppm  Time:  4 Hr/2nd Check Time:    (if greater than 0.1 ppm from Primary then every 30 minutes from Secondary)     TREATMENT INITIATION  with Dialysis Precautions:   [x] All Connections Secured                 [x] Saline Line Double Clamped   [x] Venous Parameters Set                  [x] Arterial Parameters Set    [x] Prime Given 250  ml                       [x]Air Foam Detector Engaged      Treatment Initiation Note: HD treatment started in left AVG. Arterial pressures are increasing. lines have clotted. Medication Dose Volume Route Initials Dialyzer Cleared: [x] Good [] Fair  [] Poor    Blood processed:  L  UF Removed 262 mL Post Wt:    kg   POst BP:  138/64    Pulse:  74 Respirations: 16 Temperature: 98.3    NaCl 0.9%      250 mL prim  250 mL rinse 500    mL    IV          Post Tx Vascular Access: AVF/AVG:   N/A  Minutes pressure held to site:  Art:  10  Oscar:  10             Catheter: Locking solution: Heparin 1:1000 Art.  mL  Oscar.  mL Post Assessment:                                    Skin:  [] Warm  [x] Dry [] Diaphoretic    [] Flushed  [] Pale [] Cyanotic   [facility] Signatures Title Initials  Time Lungs: [] Clear    [x] Course  [x] Crackles  [] Wheezing [] Diminished        Cardiac: [] Regular   [] Irregular   [x] Monitor  [] N/A  Rhythm:           Edema:  [] None    [x] General     [x] Facial   [] Pedal    [x] UE    [] LE       Pain: [x]0  []1  []2   []3  []4   []5   []6   []7   []8   []9   []10         Post Treatment Note: HD treatment discontinued per JESIKA Salmeron's orders, \"let patient rest and schedule for tomorrow. \"     POST TREATMENT PRIMARY NURSE HANDOFF REPORT:     First initial/Last name/Title         Post Dialysis:Time: Corrie Larsen, RN  7695     Abbreviations: AVG-arterial venous graft, AVF-arterial venous fistula, IJ-Internal Jugular, Subcl-Subclavian, Fem-Femoral, Tx-treatment, AP/HR-apical heart rate, DFR-dialysate flow rate, BFR-blood flow rate, AP-arterial pressure, -venous pressure, UF-ultrafiltrate, TMP-transmembrane pressure, Oscar-Venous, Art-Arterial, RO-Reverse Osmosis

## 2017-08-04 NOTE — PROGRESS NOTES
HOLD EPOGEN dose today for hemoglobin value = 11.2 g/dL -- per [facility] Epogen Dosing Guidelines.      Signed: Ayde Herrera [facility]  Date: 8/4/2017  Time: 7:22 AM

## 2017-08-04 NOTE — PROGRESS NOTES
attended the interdisciplinary rounds for [facility], who is a 55 y. o.,male. Patients Primary Language is: [place]. According to the patients EMR [facility] Affiliation is: [facility].     The reason the Patient came to the hospital is:   Patient Active Problem List    Diagnosis Date Noted    Acidosis 07/27/2017    Cardiac arrest ([facility] 07/27/2017    Respiratory failure ([facility] 07/27/2017    ESRD needing dialysis ([facility] 07/27/2017    Anoxic brain damage ([facility] 07/27/2017    Colitis 07/27/2017    Hypotension 07/27/2017    Acute GI bleeding 07/27/2017    ESRD (end stage renal disease) ([facility] 07/27/2017    Sepsis ([facility] 07/27/2017    Anemia       Plan:  Chaplains will continue to follow and will provide pastoral care on an as needed/requested basis.  recommends bedside caregivers page  on duty if patient shows signs of acute spiritual or emotional distress.     [address]  Board Certified [address] [place] [address]   [phone]

## 2017-08-04 NOTE — PROGRESS NOTES
[facility] Hospitalist Group  Progress Note    Patient: Marlen Graver Age: 55 y.o. : 1960 MR#: [id number] SSN: [id number]  Date/Time: 2017     Subjective:     Can not be obtained due to patient's factors     Assessment/Plan:   Found unresponsive , brought to ED via EMS , went in to [facility] , CPR/ACLS protocol with ROSC   Encephalopathy ? Toxic , / sepsis related , improving , more awake , remains intubated ,     1.  CP arrest   - possible extubation to day   - patient continues to be full code   - continued decreased mentation   - S/P EEG - Abnormal electroencephalogram due to the presence  of what might be an alpha  coma. No epileptic activity was  Appreciated. - continue supportive measures   - on keppra for seizure     2 ESRD   - for HD   - follow with renal       3 Acute GI bleed - history   -- no clear evidence of bleeding     4 Anemia - as above   - continue to monitor H/H   - Low stable H/h so far     5 GNR bacteremia /Sepsis - leucocytosis   - suspected cholecystitis , but CT does not show , per GI to do conservative treatments , LFT improving     6 hypokalemia and Hypocalcemia - replace lytes - defer to ICU , Renal     7 Colitis -   - Culture stool negative so far , C diff negative       8  Elevated LFT - ?  Shock liver   - some  improved LFT         Case discussed with:  []Patient  []Family  [x]Nursing  []Case Management  DVT Prophylaxis:  []Lovenox  []Hep SQ  []SCDs  []Coumadin   []On Heparin gtt    Patient Active Problem List   Diagnosis Code    Anemia D64.9    Acidosis E87.2    Cardiac arrest ([address] I46.9    Respiratory failure ([address] J96.90    ESRD needing dialysis ([address] N18.6, Z99.2    Anoxic brain damage (HCC) G93.1    Colitis K52.9    Hypotension I95.9    Acute GI bleeding K92.2    ESRD (end stage renal disease) ([address] N18.6    Sepsis ([address] A41.9       Objective:   VS:   Visit Vitals    /60    Pulse 75    Temp 99.2 °F (37.3 °C)    Resp 17    Ht 6' 3\" (1.905 m)  Comment: per chart history    Wt 77.5 kg (170 lb 12.8 oz)    SpO2 100%    BMI 21.35 kg/m2      Tmax/24hrs: Temp (24hrs), Av.5 °F (36.9 °C), Min:97.6 °F (36.4 °C), Max:99.2 °F (37.3 °C)  IOBRIEF    Intake/Output Summary (Last 24 hours) at 17 1153  Last data filed at 17 0600   Gross per 24 hour   Intake           820.59 ml   Output             1650 ml   Net          -829.41 ml       General:  Orally intubated / on vent   HEENT:  NC, Atraumatic. PERRLA, anicteric sclerae. Pulmonary:  CTA Bilaterally. No Wheezing/Rhonchi/Rales. Cardiovascular: Regular rate and Rhythm. GI:  Soft, Non distended, Non tender. + Bowel sounds. Extremities:  No edema, cyanosis, clubbing. No calf tenderness. Psych: Not examined   Neurologic: Grossly - Motor and Sensory functions are intact .          Medications:   Current Facility-Administered Medications   Medication Dose Route Frequency    piperacillin-tazobactam (ZOSYN) 2.25 g in 0.9% sodium chloride (MBP/ADV) 50 mL MBP  2.25 g IntraVENous Q12H    Piperacillin-tazobactam (ZOSYN) 0.75 gm Supplemental Dosing by Pharmacy   Other Rx Dosing/Monitoring    phosphorus (K PHOS NEUTRAL) 250 mg tablet 1 Tab  1 Tab Oral BID    doxercalciferol (HECTOROL) 4 mcg/2 mL injection 3 mcg  3 mcg IntraVENous Q MON, WED & FRI    hydrocortisone (CORTEF) tablet 10 mg  10 mg Oral BID WITH MEALS    insulin lispro (HUMALOG) injection   SubCUTAneous Q6H    insulin glargine (LANTUS) injection 15 Units  15 Units SubCUTAneous DAILY    potassium chloride (KLOR-CON) packet 20 mEq  20 mEq Per NG tube BID    0.9% sodium chloride infusion  50 mL/hr IntraVENous CONTINUOUS    pantoprazole (PROTONIX) injection 40 mg  40 mg IntraVENous DAILY    epoetin benjamin (EPOGEN;PROCRIT) injection 10,000 Units  10,000 Units IntraVENous Q MON, WED & FRI    levETIRAcetam (KEPPRA) 500 mg in 100 ml IVPB  500 mg IntraVENous Q12H    chlorhexidine (PERIDEX) 0.12 % mouthwash 10 mL  10 mL Oral Q12H    glucose chewable tablet 16 g  4 Tab Oral PRN    glucagon (GLUCAGEN) injection 1 mg  1 mg IntraMUSCular PRN    dextrose (D50W) injection syrg 12.5-25 g  25-50 mL IntraVENous PRN    0.9% sodium chloride infusion  100 mL/hr IntraVENous DIALYSIS PRN    naloxone (NARCAN) injection 0.4 mg  0.4 mg IntraVENous PRN    LORazepam (ATIVAN) injection 1 mg  1 mg IntraVENous Q4H PRN    fentaNYL citrate (PF) injection 50 mcg  50 mcg IntraVENous Q2H PRN       Labs:    Recent Results (from the past 24 hour(s))   TSH 3RD GENERATION    Collection Time: 08/03/17  1:01 PM   Result Value Ref Range    TSH 7.52 (H) 0.36 - 3.74 uIU/mL   MRSA SCREEN - PCR (NASAL)    Collection Time: 08/03/17  1:35 PM   Result Value Ref Range    Special Requests: NO SPECIAL REQUESTS      Culture result:        MRSA target DNA is not detected (presumptive not colonized with MRSA)   GLUCOSE, POC    Collection Time: 08/03/17  5:31 PM   Result Value Ref Range    Glucose (POC) 187 (H) 70 - 110 mg/dL   GLUCOSE, POC    Collection Time: 08/03/17 11:06 PM   Result Value Ref Range    Glucose (POC) 126 (H) 70 - 110 mg/dL   CALCIUM, IONIZED    Collection Time: 08/04/17  2:05 AM   Result Value Ref Range    Ionized Calcium 1.11 (L) 1.12 - 1.32 MMOL/L   CBC WITH AUTOMATED DIFF    Collection Time: 08/04/17  2:05 AM   Result Value Ref Range    WBC 31.2 (H) 4.6 - 13.2 K/uL    RBC 3.71 (L) 4.70 - 5.50 M/uL    HGB 11.2 (L) 13.0 - 16.0 g/dL    HCT 33.1 (L) 36.0 - 48.0 %    MCV 89.2 74.0 - 97.0 FL    MCH 30.2 24.0 - 34.0 PG    MCHC 33.8 31.0 - 37.0 g/dL    RDW 16.0 (H) 11.6 - 14.5 %    PLATELET 046 (L) 558 - 420 K/uL    MPV 13.1 (H) 9.2 - 11.8 FL    NEUTROPHILS 70 42 - 75 %    BAND NEUTROPHILS 13 (H) 0 - 5 %    LYMPHOCYTES 10 (L) 20 - 51 %    MONOCYTES 7 2 - 9 %    EOSINOPHILS 0 0 - 5 %    BASOPHILS 0 0 - 3 %    NRBC 2.0 (H) 0  WBC    ABS. NEUTROPHILS 25.9 (H) 1.8 - 8.0 K/UL    ABS. LYMPHOCYTES 3.1 0.8 - 3.5 K/UL    ABS. MONOCYTES 2.2 (H) 0 - 1.0 K/UL    ABS. EOSINOPHILS 0.0 0.0 - 0.4 K/UL    ABS. BASOPHILS 0.0 0.0 - 0.06 K/UL    DF AUTOMATED      PLATELET COMMENTS Platelet Estimate, Decreased      RBC COMMENTS ANISOCYTOSIS  1+       METABOLIC PANEL, COMPREHENSIVE    Collection Time: 08/04/17  2:05 AM   Result Value Ref Range    Sodium 145 136 - 145 mmol/L    Potassium 3.0 (L) 3.5 - 5.5 mmol/L    Chloride 107 100 - 108 mmol/L    CO2 24 21 - 32 mmol/L    Anion gap 14 3.0 - 18 mmol/L    Glucose 166 (H) 74 - 99 mg/dL    BUN 82 (H) 7.0 - 18 MG/DL    Creatinine 7.05 (H) 0.6 - 1.3 MG/DL    BUN/Creatinine ratio 12 12 - 20      GFR est AA 10 (L) >60 ml/min/1.73m2    GFR est non-AA 8 (L) >60 ml/min/1.73m2    Calcium 8.2 (L) 8.5 - 10.1 MG/DL    Bilirubin, total 1.0 0.2 - 1.0 MG/DL    ALT (SGPT) 51 16 - 61 U/L    AST (SGOT) 13 (L) 15 - 37 U/L    Alk. phosphatase 158 (H) 45 - 117 U/L    Protein, total 6.1 (L) 6.4 - 8.2 g/dL    Albumin 2.0 (L) 3.4 - 5.0 g/dL    Globulin 4.1 (H) 2.0 - 4.0 g/dL    A-G Ratio 0.5 (L) 0.8 - 1.7     T4, FREE    Collection Time: 08/04/17  2:05 AM   Result Value Ref Range    T4, Free 0.8 0.7 - 1.5 NG/DL   POC G3    Collection Time: 08/04/17  5:27 AM   Result Value Ref Range    Device: VENT      FIO2 (POC) 28 %    pH (POC) 7.451 (H) 7.35 - 7.45      pCO2 (POC) 35.5 35.0 - 45.0 MMHG    pO2 (POC) 104 (H) 80 - 100 MMHG    HCO3 (POC) 24.7 22 - 26 MMOL/L    sO2 (POC) 98 (H) 92 - 97 %    Base excess (POC) 1 mmol/L    Mode ASSIST CONTROL      Tidal volume 400 ml    Set Rate 12 bpm    PEEP/CPAP (POC) 5 cmH2O    Allens test (POC) N/A      Total resp.  rate 15      Site RIGHT RADIAL      Specimen type (POC) ARTERIAL      Performed by Keiko Player     Volume control plus YES     GLUCOSE, POC    Collection Time: 08/04/17  5:57 AM   Result Value Ref Range    Glucose (POC) 196 (H) 70 - 110 mg/dL   POC G3    Collection Time: 08/04/17 10:42 AM   Result Value Ref Range    Device: VENT      FIO2 (POC) 28 %    pH (POC) 7.430 7.35 - 7.45      pCO2 (POC) 33.7 (L) 35.0 - 45.0 MMHG pO2 (POC) 70 (L) 80 - 100 MMHG    HCO3 (POC) 22.4 22 - 26 MMOL/L    sO2 (POC) 94 92 - 97 %    Base deficit (POC) 2 mmol/L    Mode CPAP/SPON      PEEP/CPAP (POC) 5 cmH2O    Pressure support 7 cmH2O    Allens test (POC) YES      Site RIGHT RADIAL      Specimen type (POC) ARTERIAL      Performed by MELLISSA Siddiqi    Collection Time: 08/04/17 11:47 AM   Result Value Ref Range    Glucose (POC) 220 (H) 70 - 110 mg/dL       Signed By: Cheyenne Ruelas MD     August 4, 2017

## 2017-08-04 NOTE — PROGRESS NOTES
Pt was extubated this morning. Once patient is able to participate in assessment, arrangements will need to be made for outpatient dialysis. Patient was with [facility], but stopped going per daughter back in June. Patient may benefit from a treatment options meetings with [facility] treatment coordinator. Can be arranged as needed.

## 2017-08-04 NOTE — INTERDISCIPLINARY ROUNDS
CRITICAL CARE INTERDISCIPLINARY ROUNDS      Patient Information:    Name:   Clif Bettencourt    Age:   55 y.o.     Admission Date:   7/27/2017    Readmit Risk Assessment Information:      Readmit Risk Tool Support Systems: Family member(s), Relationship with Primary Physician Group: Seen at least one time within past 12 months    Surgery Date:      Day of Stay:     Expected Discharge Date:        Attending Provider:   Beata Diaz MD    Surgeon:        Consultant:       Primary Care Provider:   Desiree Coley MD    Problem List:     Patient Active Problem List   Diagnosis Code    Anemia D64.9    Acidosis E87.2    Cardiac arrest ([address] I46.9    Respiratory failure ([address] J96.90    ESRD needing dialysis ([address] N18.6, Z99.2    Anoxic brain damage ([address] G93.1    Colitis K52.9    Hypotension I95.9    Acute GI bleeding K92.2    ESRD (end stage renal disease) ([address] N18.6    Sepsis ([address] A41.9       Principal Problem:  Sepsis ([address]    Procedure:       During rounds the following quality care indicators and evidence based practices were addressed :     DVT Prophylaxis, Pain Management, Sepsis resuscitation and management, Nutritional Status, Critical Care Interventions Airways, Drains and Pressors and IHI Bundles: Vent Bundle Followed, Vent Day 8           Acute MI/PCI:   Not applicable    Heart Failure:    Not applicable    Cardiac Surgery:  Not applicable    SCIP Measures for other Surgeries:   Not applicable    Pneumonia:    Appropriate Antibiotic Selection (ICU versus Non-ICU)    Stroke:  Patient's Personal Risk Factors for Stroke are:   hypertension, family history, hyperlipidemia or diabetes mellitus    NIH Stroke Score  Total: 41 (07/27/17 1700)    Transfer Level of Care:  Not Ready for Transfer    The patient will require the following interventions based on the Readmission Risk Assessment:  Pharmacy evaluation and teaching, Care Management involvement for home health follow up for:  mobility and assistance with ADL's and Spiritual Care evaluation      Discharge Management:  Home    Anticipated Discharge Date:  3      Interdisciplinary team rounds were held  with the following team membersCare Management, Diabetes Treatment Specialist, Nursing, Nutrition, Pastoral Care, Pharmacy, Physician and Clinical Coordinator and the  patient and healthcare POA (documentation required). Plan of care discussed. See clinical pathway and/or care plan for interventions and desired outcomes. Hiatal scan negative. Dialysis today and breathing trial today.

## 2017-08-04 NOTE — DIABETES MGMT
GLYCEMIC CONTROL PLAN OF CARE    Assessment/Recommendations:  Pt discussed in interdisciplinary rounds. Pt has been extubated. Blood glucose slightly elevated, continue basal and correctional insulin coverage  Steroids being tapered. Continue inpatient monitoring and intervention     Most recent blood glucose values  8/3/2017 11:11 8/3/2017 17:31 8/3/2017 23:06 8/4/2017 05:57 8/4/2017 11:47   116 (H) 187 (H) 126 (H) 196 (H) 220 (H)     Current A1C of 5.4% is equivalent to average blood glucose of 108 mg/dl over the past 2-3 months.     Current hospital diabetes medications:   Correctional Lispro insulin every 6 hours (very resistant scale)  Lantus insulin 15 units daily    Previous day's insulin requirements:   15 units of Lantus insulin   3 units of Lispro insulin     Home diabetes medications:  None    Diet:  NPO    Education:  ____Refer to Diabetes Education Record             __x__Education not indicated at this time      Srikanth Deshpande RD, CDE

## 2017-08-04 NOTE — PROGRESS NOTES
Infectious Disease Progress Note    Requested by: Dr. Heber Kayser    Reason: sepsis, e.coli/clostridium pefringens bacteremia    Current abx Prior abx   Pip/tazo since 8/2 Levofloxacin 7/27  Pip/tazo 7/27-7/31  Meropenem  7/31-8/2, vancomycin  7/27-8/2     Lines:       Assessment :  55year old man with h/o type 2 DM (hgb a1c 5.4 on 7/29/17) ,ESRD admitted to [facility] on 7/27/17 s/p cardiorespiratory arrest, hypotension, bacteremia, elevated LFTs. Highly complex clinical picture. Presentation c/w sepsis (POA) due to e.coli, clostridium perfringens bloodstream infection (positive blood culture 7/27, negative blood culture 8/1) . Most likely source of bacteremia is intra abdominal infection/inflammation. Elevated LFTs, thickened gallbladder - no evidence of cholecystitis on HIDA scan 8/2/17    Patient could have some other undiagnosed abdominal inflammation such as ischemic colitis which could have contributed to polymicrobial bacteremia    Increasing wbc - likely steroids related    Clinically better. Off pressors. More alert    Recommendations:    1. cont pip/tazo  2. Switch to po ciprofloxacin and metronidazole till 8/8 when ready to take po meds    Advance Care planning: full code, patient wasnt able to name a surrogate decision maker or provide an advance care plan    Please call me if any further questions or concerns. Will continue to participate in the care of this patient.     subjective:    Denies abdominal pain, nausea, vomiting, cp,sob, chills.         Home medications:   list reviewed        Current Facility-Administered Medications   Medication Dose Route Frequency    piperacillin-tazobactam (ZOSYN) 2.25 g in 0.9% sodium chloride (MBP/ADV) 50 mL MBP  2.25 g IntraVENous Q12H    Piperacillin-tazobactam (ZOSYN) 0.75 gm Supplemental Dosing by Pharmacy   Other Rx Dosing/Monitoring    phosphorus (K PHOS NEUTRAL) 250 mg tablet 1 Tab  1 Tab Oral BID    doxercalciferol (HECTOROL) 4 mcg/2 mL injection 3 mcg  3 mcg IntraVENous Q MON, WED & FRI    hydrocortisone (CORTEF) tablet 10 mg  10 mg Oral BID WITH MEALS    insulin lispro (HUMALOG) injection   SubCUTAneous Q6H    insulin glargine (LANTUS) injection 15 Units  15 Units SubCUTAneous DAILY    potassium chloride (KLOR-CON) packet 20 mEq  20 mEq Per NG tube BID    0.9% sodium chloride infusion  50 mL/hr IntraVENous CONTINUOUS    pantoprazole (PROTONIX) injection 40 mg  40 mg IntraVENous DAILY    epoetin benjamin (EPOGEN;PROCRIT) injection 10,000 Units  10,000 Units IntraVENous Q MON, WED & FRI    levETIRAcetam (KEPPRA) 500 mg in 100 ml IVPB  500 mg IntraVENous Q12H    chlorhexidine (PERIDEX) 0.12 % mouthwash 10 mL  10 mL Oral Q12H    glucose chewable tablet 16 g  4 Tab Oral PRN    glucagon (GLUCAGEN) injection 1 mg  1 mg IntraMUSCular PRN    dextrose (D50W) injection syrg 12.5-25 g  25-50 mL IntraVENous PRN    0.9% sodium chloride infusion  100 mL/hr IntraVENous DIALYSIS PRN    naloxone (NARCAN) injection 0.4 mg  0.4 mg IntraVENous PRN    LORazepam (ATIVAN) injection 1 mg  1 mg IntraVENous Q4H PRN    fentaNYL citrate (PF) injection 50 mcg  50 mcg IntraVENous Q2H PRN       Allergies: Review of patient's allergies indicates no known allergies. Temp (24hrs), Av.3 °F (36.8 °C), Min:97.6 °F (36.4 °C), Max:99.2 °F (37.3 °C)    Visit Vitals    /60    Pulse 75    Temp 97.9 °F (36.6 °C)    Resp 17    Ht 6' 3\" (1.905 m)  Comment: per chart history    Wt 77.5 kg (170 lb 12.8 oz)    SpO2 100%    BMI 21.35 kg/m2       ROS: limited ROS obtained since patient not very communicative. Physical Exam:    General: Well developed, well nourished male laying on the bed,opens eyes, in no acute distress. General:   awake alert and oriented   HEENT:  Normocephalic, atraumatic, PERRL, EOMI,present scleral icterus no conjunctival hemmohage;  nasal and oral mucous are moist and without evidence of lesions. Neck supple, no bruits.    Lymph Nodes:   no cervical, axillary or inguinal adenopathy   Lungs:   non-labored, bilaterally clear to auscultation- no crackles wheezes rales or rhonchi   Heart:  RRR, s1 and s2; no  rubs or gallops, no edema, + pedal pulses   Abdomen:  soft, non-distended, active bowel sounds, no hepatomegaly, no splenomegaly. Non-tender   Genitourinary:  deferred   Extremities:   no clubbing, cyanosis; no joint effusions or swelling; muscle mass appropriate for age   Neurologic:  No gross focal sensory abnormalities; 5/5 muscle strength to upper and lower extremities. Speech appropriate.  Cranial nerves intact                        Skin:  No rash or ulcers   Back:  no spinal or paraspinal muscle tenderness or rigidity, no CVA tenderness     Psychiatric:  No suicidal or homicidal ideations, appropriate mood and affect         Labs: Results:   Chemistry Recent Labs      08/04/17   0205  08/03/17   0130  08/02/17   0610   GLU  166*  203*  341*   NA  145  143  140   K  3.0*  2.8*  2.8*   CL  107  111*  104   CO2  24  24  24   BUN  82*  54*  77*   CREA  7.05*  4.67*  6.36*   CA  8.2*  6.7*  7.8*   AGAP  14  8  12   BUCR  12  12  12   AP  158*  144*  158*   TP  6.1*  5.0*  5.4*   ALB  2.0*  1.6*  1.8*   GLOB  4.1*  3.4  3.6   AGRAT  0.5*  0.5*  0.5*      CBC w/Diff Recent Labs      08/04/17   0205  08/03/17   0130  08/02/17   0610   WBC  31.2*  34.9*  36.0*   RBC  3.71*  3.19*  3.39*   HGB  11.2*  9.6*  10.3*   HCT  33.1*  29.0*  29.2*   PLT  120*  62*  54*   GRANS  70  79*  80*   LYMPH  10*  14*  6*   EOS  0  0  0      Microbiology Recent Labs      08/03/17   1335  08/01/17   1400  08/01/17   1328   CULT  MRSA target DNA is not detected (presumptive not colonized with MRSA)  NO GROWTH 3 DAYS  NO GROWTH 3 DAYS          RADIOLOGY:    All available imaging studies/reports in [facility] care for this admission were reviewed    Dr. Han French, Infectious Disease Specialist  [phone]  August 4, 2017  4:28 PM

## 2017-08-04 NOTE — PROGRESS NOTES
Neurology Progress Note    Patient ID:  Silva Breen  [id number]  55 y.o.  1960    Subjective:      Patient is more awake today and following commands well. He is still intubated and unable to verbalize but he undergoing a weaning trial.    Current Facility-Administered Medications   Medication Dose Route Frequency    piperacillin-tazobactam (ZOSYN) 2.25 g in 0.9% sodium chloride (MBP/ADV) 50 mL MBP  2.25 g IntraVENous Q12H    Piperacillin-tazobactam (ZOSYN) 0.75 gm Supplemental Dosing by Pharmacy   Other Rx Dosing/Monitoring    phosphorus (K PHOS NEUTRAL) 250 mg tablet 1 Tab  1 Tab Oral BID    doxercalciferol (HECTOROL) 4 mcg/2 mL injection 3 mcg  3 mcg IntraVENous Q MON, WED & FRI    hydrocortisone (CORTEF) tablet 10 mg  10 mg Oral BID WITH MEALS    insulin lispro (HUMALOG) injection   SubCUTAneous Q6H    insulin glargine (LANTUS) injection 15 Units  15 Units SubCUTAneous DAILY    potassium chloride (KLOR-CON) packet 20 mEq  20 mEq Per NG tube BID    0.9% sodium chloride infusion  50 mL/hr IntraVENous CONTINUOUS    pantoprazole (PROTONIX) injection 40 mg  40 mg IntraVENous DAILY    epoetin benjamin (EPOGEN;PROCRIT) injection 10,000 Units  10,000 Units IntraVENous Q MON, WED & FRI    levETIRAcetam (KEPPRA) 500 mg in 100 ml IVPB  500 mg IntraVENous Q12H    chlorhexidine (PERIDEX) 0.12 % mouthwash 10 mL  10 mL Oral Q12H    glucose chewable tablet 16 g  4 Tab Oral PRN    glucagon (GLUCAGEN) injection 1 mg  1 mg IntraMUSCular PRN    dextrose (D50W) injection syrg 12.5-25 g  25-50 mL IntraVENous PRN    0.9% sodium chloride infusion  100 mL/hr IntraVENous DIALYSIS PRN    naloxone (NARCAN) injection 0.4 mg  0.4 mg IntraVENous PRN    LORazepam (ATIVAN) injection 1 mg  1 mg IntraVENous Q4H PRN    fentaNYL citrate (PF) injection 50 mcg  50 mcg IntraVENous Q2H PRN          Objective:      Active hospital medications were reviewed    Lab results and neuroradiology studies from the last 24 hours were reviewed. Prior to Admission medications    Not on File     Patient Vitals for the past 8 hrs:   BP Temp Pulse Resp SpO2   08/04/17 0920 - - 75 17 100 %   08/04/17 0842 - - 81 15 100 %   08/04/17 0800 119/60 - 75 18 100 %   08/04/17 0700 99/50 - 74 16 100 %   08/04/17 0600 108/59 - 80 16 96 %   08/04/17 0500 - - 91 22 98 %   08/04/17 0430 - - 93 23 96 %   08/04/17 0400 108/67 99.2 °F (37.3 °C) 92 18 99 %   08/04/17 0300 119/72 - 91 22 99 %   08/04/17 0200 112/67 - 91 21 98 %   [id number] 1901 - 08/04 0700  In: 2384.3 [I.V.:399.3]  Out: 1700 [Drains:1700]  08/02 1901 - 08/04 0700  In: 2384.3 [I.V.:399.3]  Out: 1700 [Drains:1700]RESULTRCNT(24h)Principal Problem:    Sepsis ([facility] (7/27/2017)    Active Problems:    Anemia ()      Acidosis (7/27/2017)      Cardiac arrest ([facility] [address] (7/27/2017)      Respiratory failure ([address] (7/27/2017)      ESRD needing dialysis ([facility] (7/27/2017)      Anoxic brain damage (HCC) (7/27/2017)      Colitis (7/27/2017)      Hypotension (7/27/2017)      Acute GI bleeding (7/27/2017)      ESRD (end stage renal disease) ([facility] (7/27/2017)        Additional comments:I reviewed the patient's new clinical lab test results. General Exam  No acute distress, mucous membranes normal color and hydration status        Neurologic Exam    Mental status:  Eyes are open and he is able to make eye contact  Was able to consistently follow commands: \"blink eyes three times\",  Eyes conjugate  Motor:  He is diffusely weak at about 4/5 throughout          Assessment:     Sharee Clark is a 55 y.o. who was admitted with unresponsiveness and out of hospital cardiac arrest with unknown pulseless time. He is clearly improved today. Encepahlopathy from uremia and septic process appears to be improving. He is still requiring hemodialysis. He is undergoing breathing trial. No seizures on a modest dose of Keppra. Hopefully he can be extubated today. Plan:   1. Continue Keppra as currently dosed  2.  Continue supportive care. Jaimie Curtis M.D.   Clinical Neurophysiology  Neuromuscular Specialist    Signed:  8/4/2017  9:20 AM  9:20 AM

## 2017-08-04 NOTE — PROGRESS NOTES
RENAL DAILY PROGRESS NOTE      IMPRESSION:   ESRD, due for HD today   Anemia of CKD, on epo during HD  Access left arm graft   Respiratory failure, intubated  Hypokalemia  Hypocalcemia    PLAN:    Plan for HD today with 3K , 3 calcium bath, UF 1L as tolerated  Adjust all meds per ESRD status. At 4:57 PM on 2017, I saw and examined patient during hemodialysis treatment. The patient was receiving hemodialysis for treatment of  renal failure. I have also reviewed vital signs, intake and output, lab results and recent events, and agreed with today's dialysis order. BP running low, discussed with ICU team, no central line at present. Decrease UF target, albumin prn. HD rounding    Blood pressure 98/46, pulse 73, temperature 98.3 °F (36.8 °C), temperature source Oral, resp. rate 22, height 6' 3\" (1.905 m), weight 77.5 kg (170 lb 12.8 oz), SpO2 94 %. Temp (24hrs), Av.3 °F (36.8 °C), Min:97.6 °F (36.4 °C), Max:99.2 °F (37.3 °C)      Blood Pressure: BP: 98/46  Pulse: Pulse (Heart Rate): 73  Temp:  Temp: 98.3 °F (36.8 °C)    Artificial Kidney Dialyzer/Set Up Inspection: Revaclear   hours Duration of Treatment (hours): 3 hours   Heparin Bolus    Blood flow rate Blood Flow Rate (ml/min): 275 ml/min (Dr. Neal Lanier at bedside--order to try UF removal of 1000ml)   Dialysate rate     Arterial Access Pressure Arterial Access Pressure (mmHg): -210  Venous Return Pressure Venous Return Pressure (mmHg): 130  Ultrafiltration Rate Goal/Amount of Fluid to Remove (mL): 1000 mL  Fluid Removal Fluid Removed (mL): 262  Net Fluid Removal NET Fluid Removed (mL): 1000 ml              Subjective:     50y M with ESRD, s/p cardiac arrest   Complaint:    Overnight events noted  Remain intubated, stable hemodynamics.       Current Facility-Administered Medications   Medication Dose Route Frequency    albumin human 25% (BUMINATE) 25 % solution        piperacillin-tazobactam (ZOSYN) 2.25 g in 0.9% sodium chloride (MBP/ADV) 50 mL MBP  2.25 g IntraVENous Q12H    Piperacillin-tazobactam (ZOSYN) 0.75 gm Supplemental Dosing by Pharmacy   Other Rx Dosing/Monitoring    phosphorus (K PHOS NEUTRAL) 250 mg tablet 1 Tab  1 Tab Oral BID    doxercalciferol (HECTOROL) 4 mcg/2 mL injection 3 mcg  3 mcg IntraVENous Q MON, WED & FRI    hydrocortisone (CORTEF) tablet 10 mg  10 mg Oral BID WITH MEALS    insulin lispro (HUMALOG) injection   SubCUTAneous Q6H    insulin glargine (LANTUS) injection 15 Units  15 Units SubCUTAneous DAILY    potassium chloride (KLOR-CON) packet 20 mEq  20 mEq Per NG tube BID    0.9% sodium chloride infusion  50 mL/hr IntraVENous CONTINUOUS    pantoprazole (PROTONIX) injection 40 mg  40 mg IntraVENous DAILY    epoetin benjamin (EPOGEN;PROCRIT) injection 10,000 Units  10,000 Units IntraVENous Q MON, WED & FRI    levETIRAcetam (KEPPRA) 500 mg in 100 ml IVPB  500 mg IntraVENous Q12H    chlorhexidine (PERIDEX) 0.12 % mouthwash 10 mL  10 mL Oral Q12H    glucose chewable tablet 16 g  4 Tab Oral PRN    glucagon (GLUCAGEN) injection 1 mg  1 mg IntraMUSCular PRN    dextrose (D50W) injection syrg 12.5-25 g  25-50 mL IntraVENous PRN    0.9% sodium chloride infusion  100 mL/hr IntraVENous DIALYSIS PRN    naloxone (NARCAN) injection 0.4 mg  0.4 mg IntraVENous PRN    LORazepam (ATIVAN) injection 1 mg  1 mg IntraVENous Q4H PRN    fentaNYL citrate (PF) injection 50 mcg  50 mcg IntraVENous Q2H PRN       Review of Symptoms: intubated   Objective:   Patient Vitals for the past 24 hrs:   Temp Pulse Resp BP SpO2   08/04/17 1400 - 72 16 139/70 95 %   08/04/17 1300 - 72 17 132/70 99 %   08/04/17 1200 - 74 19 118/58 99 %   08/04/17 1100 - 75 17 119/58 98 %   08/04/17 1000 - 78 18 117/63 97 %   08/04/17 0920 - 75 17 - 100 %   08/04/17 0900 - 78 (!) 0 117/59 100 %   08/04/17 0842 - 81 15 - 100 %   08/04/17 0800 97.9 °F (36.6 °C) 75 18 119/60 100 %   08/04/17 0700 - 74 16 99/50 100 %   08/04/17 0600 - 80 16 108/59 96 %   08/04/17 0500 - 91 22 - 98 %   08/04/17 0430 - 93 23 - 96 %   08/04/17 0400 99.2 °F (37.3 °C) 92 18 108/67 99 %   08/04/17 0300 - 91 22 119/72 99 %   08/04/17 0200 - 91 21 112/67 98 %   08/04/17 0100 - 91 21 108/59 -   08/04/17 0017 - 93 16 - 100 %   08/04/17 0000 98.2 °F (36.8 °C) 95 20 116/62 95 %   08/03/17 2300 - 92 21 107/61 93 %   08/03/17 2200 - 87 18 107/57 96 %   08/03/17 2100 - 78 17 125/62 99 %   08/03/17 2016 - 75 17 - 99 %   08/03/17 2000 97.6 °F (36.4 °C) 76 17 119/61 99 %   08/03/17 1900 - 78 18 112/58 100 %   08/03/17 1800 - 81 19 114/61 97 %   08/03/17 1700 - 84 20 106/62 100 %   08/03/17 1600 98.7 °F (37.1 °C) 79 22 107/53 100 %   08/03/17 1552 - 79 14 - 100 %        Weight change:      08/02 1901 - 08/04 0700  In: 2384.3 [I.V.:399.3]  Out: 1700 [Drains:1700]    Intake/Output Summary (Last 24 hours) at 08/04/17 1509  Last data filed at 08/04/17 1130   Gross per 24 hour   Intake              615 ml   Output             1670 ml   Net            -1055 ml     Physical Exam:   General: intubated   HEENT sclera anicteric,  CVS: S1S2 heard,  no rub  RS: + air entry b/l,   Abd: Soft, Non tender  Neuro intubated   Extrm: no edema, no cyanosis, clubbing   Access; left arm graft         Data Review:     LABS:   Hematology: Recent Labs      08/04/17   0205  08/03/17   0130  08/02/17   0610   WBC  31.2*  34.9*  36.0*   HGB  11.2*  9.6*  10.3*   HCT  33.1*  29.0*  29.2*     Chemistry: Recent Labs      08/04/17   0205  08/03/17   0130  08/02/17   0610   BUN  82*  54*  77*   CREA  7.05*  4.67*  6.36*   CA  8.2*  6.7*  7.8*   ALB  2.0*  1.6*  1.8*   K  3.0*  2.8*  2.8*   NA  145  143  140   CL  107  111*  104   CO2  24  24  24   PHOS   --   2.2*   --    GLU  166*  203*  341*              Procedures/imaging: see electronic medical records for all procedures, Xrays and details which were not copied into this note but were reviewed prior to creation of Plan          Assessment & Plan:     As above           Emma December, MD  8/4/2017  3:09 PM

## 2017-08-05 ENCOUNTER — APPOINTMENT (OUTPATIENT)
Dept: GENERAL RADIOLOGY | Age: 57
DRG: 004 | End: 2017-08-05
Attending: PHYSICIAN ASSISTANT
Payer: MEDICARE

## 2017-08-05 LAB
ANION GAP BLD CALC-SCNC: 12 MMOL/L (ref 3–18)
BASOPHILS # BLD AUTO: 0 K/UL (ref 0–0.06)
BASOPHILS # BLD: 0 % (ref 0–3)
BUN SERPL-MCNC: 79 MG/DL (ref 7–18)
BUN/CREAT SERPL: 11 (ref 12–20)
CA-I SERPL-SCNC: 1.09 MMOL/L (ref 1.12–1.32)
CALCIUM SERPL-MCNC: 7.7 MG/DL (ref 8.5–10.1)
CHLORIDE SERPL-SCNC: 109 MMOL/L (ref 100–108)
CO2 SERPL-SCNC: 24 MMOL/L (ref 21–32)
CREAT SERPL-MCNC: 7.06 MG/DL (ref 0.6–1.3)
DIFFERENTIAL METHOD BLD: ABNORMAL
EOSINOPHIL # BLD: 0.3 K/UL (ref 0–0.4)
EOSINOPHIL NFR BLD: 1 % (ref 0–5)
ERYTHROCYTE [DISTWIDTH] IN BLOOD BY AUTOMATED COUNT: 16.2 % (ref 11.6–14.5)
GLUCOSE BLD STRIP.AUTO-MCNC: 124 MG/DL (ref 70–110)
GLUCOSE BLD STRIP.AUTO-MCNC: 50 MG/DL (ref 70–110)
GLUCOSE BLD STRIP.AUTO-MCNC: 63 MG/DL (ref 70–110)
GLUCOSE BLD STRIP.AUTO-MCNC: 80 MG/DL (ref 70–110)
GLUCOSE BLD STRIP.AUTO-MCNC: 83 MG/DL (ref 70–110)
GLUCOSE BLD STRIP.AUTO-MCNC: 83 MG/DL (ref 70–110)
GLUCOSE SERPL-MCNC: 114 MG/DL (ref 74–99)
HCT VFR BLD AUTO: 30.8 % (ref 36–48)
HGB BLD-MCNC: 10.3 G/DL (ref 13–16)
LYMPHOCYTES # BLD AUTO: 8 % (ref 20–51)
LYMPHOCYTES # BLD: 2 K/UL (ref 0.8–3.5)
MAGNESIUM SERPL-MCNC: 2.7 MG/DL (ref 1.6–2.6)
MCH RBC QN AUTO: 30.1 PG (ref 24–34)
MCHC RBC AUTO-ENTMCNC: 33.4 G/DL (ref 31–37)
MCV RBC AUTO: 90.1 FL (ref 74–97)
MONOCYTES # BLD: 1.8 K/UL (ref 0–1)
MONOCYTES NFR BLD AUTO: 7 % (ref 2–9)
NEUTS BAND NFR BLD MANUAL: 1 % (ref 0–5)
NEUTS SEG # BLD: 21 K/UL (ref 1.8–8)
NEUTS SEG NFR BLD AUTO: 83 % (ref 42–75)
NRBC BLD-RTO: 2 PER 100 WBC
PHOSPHATE SERPL-MCNC: 4.1 MG/DL (ref 2.5–4.9)
PLATELET # BLD AUTO: 134 K/UL (ref 135–420)
PLATELET COMMENTS,PCOM: ABNORMAL
PMV BLD AUTO: 12.3 FL (ref 9.2–11.8)
POTASSIUM SERPL-SCNC: 3.6 MMOL/L (ref 3.5–5.5)
RBC # BLD AUTO: 3.42 M/UL (ref 4.7–5.5)
RBC MORPH BLD: ABNORMAL
RBC MORPH BLD: ABNORMAL
SODIUM SERPL-SCNC: 145 MMOL/L (ref 136–145)
WBC # BLD AUTO: 25.1 K/UL (ref 4.6–13.2)

## 2017-08-05 PROCEDURE — 77030011256 HC DRSG MEPILEX <16IN NO BORD MOLN -A

## 2017-08-05 PROCEDURE — 84100 ASSAY OF PHOSPHORUS: CPT | Performed by: INTERNAL MEDICINE

## 2017-08-05 PROCEDURE — 74011000258 HC RX REV CODE- 258: Performed by: INTERNAL MEDICINE

## 2017-08-05 PROCEDURE — 74011250636 HC RX REV CODE- 250/636: Performed by: INTERNAL MEDICINE

## 2017-08-05 PROCEDURE — 92610 EVALUATE SWALLOWING FUNCTION: CPT

## 2017-08-05 PROCEDURE — 94669 MECHANICAL CHEST WALL OSCILL: CPT

## 2017-08-05 PROCEDURE — 74011636637 HC RX REV CODE- 636/637: Performed by: PHYSICIAN ASSISTANT

## 2017-08-05 PROCEDURE — 65610000006 HC RM INTENSIVE CARE

## 2017-08-05 PROCEDURE — 82330 ASSAY OF CALCIUM: CPT | Performed by: PHYSICIAN ASSISTANT

## 2017-08-05 PROCEDURE — 83735 ASSAY OF MAGNESIUM: CPT | Performed by: INTERNAL MEDICINE

## 2017-08-05 PROCEDURE — 90935 HEMODIALYSIS ONE EVALUATION: CPT

## 2017-08-05 PROCEDURE — 74011250637 HC RX REV CODE- 250/637: Performed by: PHYSICIAN ASSISTANT

## 2017-08-05 PROCEDURE — C9113 INJ PANTOPRAZOLE SODIUM, VIA: HCPCS | Performed by: INTERNAL MEDICINE

## 2017-08-05 PROCEDURE — 36415 COLL VENOUS BLD VENIPUNCTURE: CPT | Performed by: PHYSICIAN ASSISTANT

## 2017-08-05 PROCEDURE — 74011000250 HC RX REV CODE- 250: Performed by: PHYSICIAN ASSISTANT

## 2017-08-05 PROCEDURE — 82962 GLUCOSE BLOOD TEST: CPT

## 2017-08-05 PROCEDURE — 74011250636 HC RX REV CODE- 250/636: Performed by: PSYCHIATRY & NEUROLOGY

## 2017-08-05 PROCEDURE — 85025 COMPLETE CBC W/AUTO DIFF WBC: CPT | Performed by: PHYSICIAN ASSISTANT

## 2017-08-05 PROCEDURE — 80048 BASIC METABOLIC PNL TOTAL CA: CPT | Performed by: INTERNAL MEDICINE

## 2017-08-05 RX ORDER — DEXTROSE MONOHYDRATE 50 MG/ML
30 INJECTION, SOLUTION INTRAVENOUS CONTINUOUS
Status: DISCONTINUED | OUTPATIENT
Start: 2017-08-05 | End: 2017-08-06

## 2017-08-05 RX ORDER — DOXERCALCIFEROL 4 UG/2ML
3 INJECTION INTRAVENOUS ONCE
Status: COMPLETED | OUTPATIENT
Start: 2017-08-05 | End: 2017-08-05

## 2017-08-05 RX ADMIN — PANTOPRAZOLE SODIUM 40 MG: 40 INJECTION, POWDER, FOR SOLUTION INTRAVENOUS at 10:11

## 2017-08-05 RX ADMIN — LEVETIRACETAM 500 MG: 5 INJECTION INTRAVENOUS at 10:11

## 2017-08-05 RX ADMIN — LEVETIRACETAM 500 MG: 5 INJECTION INTRAVENOUS at 22:47

## 2017-08-05 RX ADMIN — PIPERACILLIN AND TAZOBACTAM 2.25 G: 2; .25 INJECTION, POWDER, FOR SOLUTION INTRAVENOUS at 21:30

## 2017-08-05 RX ADMIN — DEXTROSE MONOHYDRATE 25 G: 25 INJECTION, SOLUTION INTRAVENOUS at 17:42

## 2017-08-05 RX ADMIN — INSULIN GLARGINE 15 UNITS: 100 INJECTION, SOLUTION SUBCUTANEOUS at 10:25

## 2017-08-05 RX ADMIN — ERYTHROPOIETIN 10000 UNITS: 10000 INJECTION, SOLUTION INTRAVENOUS; SUBCUTANEOUS at 17:55

## 2017-08-05 RX ADMIN — PIPERACILLIN AND TAZOBACTAM 2.25 G: 2; .25 INJECTION, POWDER, FOR SOLUTION INTRAVENOUS at 14:16

## 2017-08-05 RX ADMIN — DEXTROSE MONOHYDRATE 30 ML/HR: 5 INJECTION, SOLUTION INTRAVENOUS at 21:32

## 2017-08-05 RX ADMIN — CHLORHEXIDINE GLUCONATE 10 ML: 1.2 RINSE ORAL at 10:11

## 2017-08-05 RX ADMIN — DOXERCALCIFEROL 3 MCG: 2 INJECTION, SOLUTION INTRAVENOUS at 17:55

## 2017-08-05 RX ADMIN — CHLORHEXIDINE GLUCONATE 10 ML: 1.2 RINSE ORAL at 21:30

## 2017-08-05 RX ADMIN — PIPERACILLIN AND TAZOBACTAM 2.25 G: 2; .25 INJECTION, POWDER, LYOPHILIZED, FOR SOLUTION INTRAVENOUS; PARENTERAL at 04:54

## 2017-08-05 NOTE — PROGRESS NOTES
RENAL DAILY PROGRESS NOTE      IMPRESSION:   ESRD, did not finished HD yesterday because of low blood pressure and line clot,   Anemia of CKD, on epo during HD  Access left arm graft   Respiratory failure, intubated  Hypokalemia  Hypocalcemia    PLAN:    Plan for HD today as did not received full HD yesterday   His blood pressure is better hopefully , will tolerate better today , plan for UF 1L. Adjust all meds per ESRD status. Subjective:     50y M with ESRD, s/p cardiac arrest   Complaint:    Overnight events noted  Extubated yesterday   Did not finished full treatment as BP was low and access clotted later treatment.    Awake this morning, responds to simple commend  Denies for any chest pain, short of breath     Current Facility-Administered Medications   Medication Dose Route Frequency    piperacillin-tazobactam (ZOSYN) 2.25 g in 0.9% sodium chloride (MBP/ADV) 50 mL MBP  2.25 g IntraVENous Q8H    [START ON 8/7/2017] doxercalciferol (HECTOROL) 4 mcg/2 mL injection 3 mcg  3 mcg IntraVENous Q MON, WED & FRI    [START ON 8/7/2017] epoetin benjamin (EPOGEN;PROCRIT) injection 10,000 Units  10,000 Units IntraVENous Q MON, WED & FRI    Piperacillin-tazobactam (ZOSYN) 0.75 gm Supplemental Dosing by Pharmacy   Other Rx Dosing/Monitoring    hydrocortisone (CORTEF) tablet 10 mg  10 mg Oral BID WITH MEALS    insulin lispro (HUMALOG) injection   SubCUTAneous Q6H    insulin glargine (LANTUS) injection 15 Units  15 Units SubCUTAneous DAILY    0.9% sodium chloride infusion  50 mL/hr IntraVENous CONTINUOUS    pantoprazole (PROTONIX) injection 40 mg  40 mg IntraVENous DAILY    levETIRAcetam (KEPPRA) 500 mg in 100 ml IVPB  500 mg IntraVENous Q12H    chlorhexidine (PERIDEX) 0.12 % mouthwash 10 mL  10 mL Oral Q12H    glucose chewable tablet 16 g  4 Tab Oral PRN    glucagon (GLUCAGEN) injection 1 mg  1 mg IntraMUSCular PRN    dextrose (D50W) injection syrg 12.5-25 g  25-50 mL IntraVENous PRN    0.9% sodium chloride infusion  100 mL/hr IntraVENous DIALYSIS PRN    naloxone (NARCAN) injection 0.4 mg  0.4 mg IntraVENous PRN    LORazepam (ATIVAN) injection 1 mg  1 mg IntraVENous Q4H PRN    fentaNYL citrate (PF) injection 50 mcg  50 mcg IntraVENous Q2H PRN       Review of Symptoms: as above   Objective:     Patient Vitals for the past 24 hrs:   Temp Pulse Resp BP SpO2   08/05/17 0815 - - - - 100 %   08/05/17 0800 97.5 °F (36.4 °C) - - - -   08/05/17 0700 - 69 12 143/73 100 %   08/05/17 0600 97.6 °F (36.4 °C) 64 14 134/65 100 %   08/05/17 0500 - 66 14 144/67 100 %   08/05/17 0400 97.2 °F (36.2 °C) 63 16 123/61 100 %   08/05/17 0300 - 64 17 133/62 100 %   08/05/17 0200 97.7 °F (36.5 °C) 66 13 124/70 100 %   08/05/17 0100 - 67 13 - 100 %   08/05/17 0000 97.4 °F (36.3 °C) 70 15 142/71 99 %   08/04/17 2300 - 67 17 123/66 99 %   08/04/17 2200 97.6 °F (36.4 °C) 71 17 141/69 99 %   08/04/17 2100 - 71 17 119/61 99 %   08/04/17 2000 98.2 °F (36.8 °C) 77 15 125/70 96 %   08/04/17 1900 - 77 18 124/70 99 %   08/04/17 1800 - 75 17 123/60 94 %   08/04/17 1730 - 74 16 131/62 99 %   08/04/17 1715 98.3 °F (36.8 °C) 74 16 138/64 98 %   08/04/17 1700 - 76 18 138/54 98 %   08/04/17 1645 - 72 18 123/53 97 %   08/04/17 1634 - 73 22 98/46 94 %   08/04/17 1630 - 74 18 90/44 90 %   08/04/17 1615 - 74 19 113/47 91 %   08/04/17 1600 98.3 °F (36.8 °C) 72 19 140/63 94 %   08/04/17 1500 - 75 17 129/63 94 %   08/04/17 1400 - 72 16 139/70 95 %   08/04/17 1300 - 72 17 132/70 99 %   08/04/17 1200 98.3 °F (36.8 °C) 74 19 118/58 99 %   08/04/17 1100 - 75 17 119/58 98 %        Weight change: -14.874 kg (-32 lb 12.7 oz)     08/03 1901 - 08/05 0700  In: 8409.9 [I.V.:8174.9]  Out: 2132 [Drains:2100]    Intake/Output Summary (Last 24 hours) at 08/05/17 1032  Last data filed at 08/05/17 0454   Gross per 24 hour   Intake          8174.86 ml   Output             1232 ml   Net          6942.86 ml     Physical Exam:   General:    HEENT sclera anicteric,  CVS: S1S2 heard,  no rub  RS: + air entry b/l,   Abd: Soft, Non tender  Neuro awake   Extrm: no edema, no cyanosis, clubbing   Access; left arm graft         Data Review:     LABS:   Hematology:   Recent Labs      08/05/17 0104 08/04/17 0205 08/03/17 0130   WBC  25.1*  31.2*  34.9*   HGB  10.3*  11.2*  9.6*   HCT  30.8*  33.1*  29.0*     Chemistry:   Recent Labs      08/05/17 0104 08/04/17 0205 08/03/17 0130   BUN  79*  82*  54*   CREA  7.06*  7.05*  4.67*   CA  7.7*  8.2*  6.7*   ALB   --   2.0*  1.6*   K  3.6  3.0*  2.8*   NA  145  145  143   CL  109*  107  111*   CO2  24  24  24   PHOS  4.1   --   2.2*   GLU  114*  166*  203*              Procedures/imaging: see electronic medical records for all procedures, Xrays and details which were not copied into this note but were reviewed prior to creation of Plan          Assessment & Plan:     As above           Jimmy Spurling, MD  8/5/2017  3:09 PM

## 2017-08-05 NOTE — PROGRESS NOTES
Respiratory Care Assessment for Bronchial hygiene or Lung Expansion Therapy  Patient  Edmar Gallegos     55 y.o.   male     8/4/2017  8:22 PM  Patient Active Problem List   Diagnosis Code    Anemia D64.9    Acidosis E87.2    Cardiac arrest ([facility] I46.9    Respiratory failure ([facility] J96.90    ESRD needing dialysis ([facility] N18.6, Z99.2    Anoxic brain damage ([facility] G93.1    Colitis K52.9    Hypotension I95.9    Acute GI bleeding K92.2    ESRD (end stage renal disease) ([facility] N18.6    Sepsis ([facility] A41.9       ABG:  Date:8/4/2017  Lab Results   Component Value Date/Time    PHI 7.430 08/04/2017 10:42 AM    PCO2I 33.7 (L) 08/04/2017 10:42 AM    PO2I 70 (L) 08/04/2017 10:42 AM    HCO3I 22.4 08/04/2017 10:42 AM    FIO2I 28 08/04/2017 10:42 AM       Chest X-ray:  Date:8/4/2017    Results from Hospital Encounter encounter on 07/27/17   XR CHEST PORT   Narrative Chest One View portable 0550 hours    CPT CODE: 94140    HISTORY: Multiorgan failure, abnormal liver function tests, GI bleed, end-stage  renal disease, status post code/cardiopulmonary arrest.    COMPARISON: Chest x-ray August 3 digit July 31, 2017. FINDINGS:     One view obtained. Endotracheal tube terminates 6 cm proximal to the bree. The  esophagogastric tube terminates in the proximal stomach. The cardiac and  mediastinal silhouette is normal. The lungs are clear. Pulmonary vascularity is  normal. The costophrenic angles are sharply defined. No bony abnormalities are  seen. Impression IMPRESSION:    Support tubes in expected position.   .        Lab Test:  Date:8/4/2017  WBC:   Lab Results   Component Value Date/Time    WBC 31.2 08/04/2017 02:05 AM   HGB: Lab Results   Component Value Date/Time    HGB 11.2 08/04/2017 02:05 AM    PLTS: Lab Results   Component Value Date/Time    PLATELET 348 70/33/2051 02:05 AM       SaO2%/flow:   SpO2 Readings from Last 1 Encounters:   08/04/17 99%       Vital Signs:   Patient Vitals for the past 8 hrs:   Temp Pulse Resp BP SpO2   08/04/17 1900 - 77 18 124/70 99 %   08/04/17 1800 - 75 17 123/60 94 %   08/04/17 1730 - 74 16 131/62 99 %   08/04/17 1715 98.3 °F (36.8 °C) 74 16 138/64 98 %   08/04/17 1700 - 76 18 138/54 98 %   08/04/17 1645 - 72 18 123/53 97 %   08/04/17 1634 - 73 22 98/46 94 %   08/04/17 1630 - 74 18 90/44 90 %   08/04/17 1615 - 74 19 113/47 91 %   08/04/17 1600 98.3 °F (36.8 °C) 72 19 140/63 94 %   08/04/17 1500 - 75 17 129/63 94 %   08/04/17 1400 - 72 16 139/70 95 %   08/04/17 1300 - 72 17 132/70 99 %         RA/O2 flow/device:N/C 2LPM       First Inital Assesment:     [x]Yes []No   Reevaluation/Reassessment:    []Yes []No     CHART REVIEW   Points 0 X 1 X 2 X 3 X 4 X Points   Pulmonary History Smoking History (1) none  Recent Smoking History <1 PPD  Recent Smoking History >1 PPD  Pulmonary Disease or Impairment  Severe Pulmonary Disease  2   Surgical History No Surgery  General Surgery  Lower Abdominal  Thoracic or Upper Abdominal  Thoracic & Pulmonary Disease  0   CXR Clear or not indicated  Chronic changes or CXR Pending  Infiltrate, atelectasis or pleural effusions  Infiltrates in more than 1lobe  Infiltrates +atelectasis  +/or pleural effusions  2     PATIENT ASSESSMENT    0 X 1 X 2 X 3 X 4 X Points   Respiratory Pattern Regular pattern RR 12-20  Increased RR 21-27   Mild Dyspnea at rest, irregular pattern RR 28-32  Moderate Dyspnea at rest, Use of accessory muscles, RR 33-36  Severe Dyspnea, Use of accessory muscles RR >36  0   Mental Status Alert Oriented cooperative  Confused, Follows commands  Lethargic, Does not follow commands  Obtunded  Unresponsive  0   Breath Sounds Clear  Decreased Unilaterally  Decreased Bilaterally  Mild Scattered wheezing or Crackles in bases  Severe Wheezing or rhonchi  2   Cough Strong dry NPC  Strong Productive  Weak NPC  Weak productive or weak with rhonchi  No cough or may require suctioning  3   Level of Activity Ambulatory  Ambulatory with assistance  Temporarily Non-ambulatory  Non-Ambulatory, able to position self  Unable to position self, confined to bed  4   Total Points/Score:   13     Specific Intervention Chart(BHP)    Bronchial Hygiene/Secretion Clearance:    []EZPAP []Rotation bed with vibration    []CPT with percussor [x]CPT via vest   []Oscillastiang positive pressure expiratory device      Lung Expansion:    []Incentive Spirometer w/RT visits []Incentive Spirometer w/nursing    []EZPAP      *Suctioning:    [x]Nasal Tracheal []Tracheal     *suctioning will be ordered and done PRN with an associated frequency such as QID/PRN based on score       Other:    Care Plan   Level # Score Modality Frequency Comment   Level 1 >17        Level 2 14-17 CPT with vest QID   Suction PRN   Level 3 10-13          Level 4 1-9              BRONCHIAL HYGIENE SCORING AND FREQUENCY GUIDELINES   Frequency Indications/Findings Level #   Q4 ATC Copious secretions, SOB, unable to sleep 1   QID & PRN at night Moderate amounts of secretions 2   TID or Q6 wa Small amounts of secretions and poor cough: recent history of secretions 3   BID or Q8 wa Unable to deep breathe and cough effectively 4        Comments:  CPT with Vest QID and suction prn    Respiratory Therapist: Rilla Severs, RT

## 2017-08-05 NOTE — PROGRESS NOTES
Problem: Dysphagia (Adult)  Goal: *Acute Goals and Plan of Care (Insert Text)  Dysphagia Present: yes    Recommendations:  Diet: NPO, consider short term alternate means nutrition/ hydration  Meds: NPO  Aspiration Precautions  Other: MBS Monday, August 7th    Patient will:  1. Tolerate PO trials with 0 s/s overt distress in 4/5 trials  2. Utilize compensatory swallow strategies/maneuvers (decrease bite/sip, size/rate, alt. liq/sol) with min cues in 4/5 trials  3. Perform oral-motor/laryngeal exercises to increase oropharyngeal swallow function with min cues  4. Complete an objective swallow study (i.e., MBSS) to assess swallow integrity, r/o aspiration, and determine of safest LRD, min A   Outcome: Progressing Towards Goal  SPEECH LANGUAGE PATHOLOGY BEDSIDE SWALLOW EVALUATION     Patient: Jose Winn55 y.o. male)  Date: 8/5/2017  Primary Diagnosis: Cardiac arrest ([address]  Acidosis  Respiratory failure ([address]  Anemia  ESRD needing dialysis ([address]  Cardiac arrest ([address]  Acute GI bleeding  Sepsis ([address]  Hypotension  Anoxic brain damage ([facility])  ESRD (end stage renal disease) ([address]  Colitis        Precautions: ASPIRATION, NPO         ASSESSMENT :  Based on the objective data described below, the patient presents with moderate oral, severe pharyngeal dysphagia s/p extubation, recent PEA. Pt demo right labial droop with decreased ROM/ coordination/ strength, able to stick out tongue midline, decreased ROM/ strength. Pt demo mildly delayed initiation of oral manipulation, slow A-P transit, suspect some degree of premature spillage into pharynx, mildly delayed swallow response with weak laryngeal elevation, suspect pharyngeal residue, and overt s/sx aspiration following all trials. Pt demo immediate cough following thin via tsp, delayed cough following nectar via tsp and puree via half tsp. At current, pt not safe for initiation of po feeds. MBS ordered for Monday. ST will continue to follow.      Patient will benefit from skilled intervention to address the above impairments. Patients rehabilitation potential is considered to be Fair  Factors which may influence rehabilitation potential include:   [ ]            None noted  [X]            Mental ability/status  [X]            Medical condition  [ ]            Home/family situation and support systems  [X]            Safety awareness  [ ]            Pain tolerance/management  [ ]            Other:        PLAN :  Recommendations and Planned Interventions:  NPO, may want to consider short term alternate means nutrition/ hydration  Frequency/Duration: Patient will be followed by speech-language pathology 1-2 times per day/4-7 days per week to address goals. Discharge Recommendations: [address] and To Be Determined       SUBJECTIVE:   Patient stated I am augustus hungry. So what am I going to eat tonight?       OBJECTIVE:       Past Medical History:   Diagnosis Date    Anemia      Arthritis      Chronic pain       Back     Diabetes mellitus type II      Hypertension      IBS (irritable bowel syndrome)      Lactose intolerance      TB (tuberculosis)       TB test positive     Past Surgical History:   Procedure Laterality Date    ENDOSCOPY, COLON, DIAGNOSTIC        HX AMPUTATION         Toe due to injury     Prior Level of Function/Home Situation: unknown  Home Situation  Home Environment: Independent living  # Steps to Enter: 2  One/Two Story Residence: One story  Living Alone: Yes  Support Systems: Family member(s)  Patient Expects to be Discharged to[de-identified] Private residence  Current DME Used/Available at Home: Other (comment) ([facility])  Diet prior to admission: unknown  Current Diet:  NPO   Cognitive and Communication Status:  Neurologic State: Alert  Orientation Level: Oriented to person  Cognition: Follows commands  Perception: Appears intact  Perseveration: No perseveration noted  Safety/Judgement: Decreased insight into deficits  Oral Assessment:  Oral Assessment  Labial: Right droop; Decreased rate  Dentition: Edentulous  Oral Hygiene: thick stingy scretions  Lingual: Decreased rate;Decreased strength  Velum: No impairment  Mandible: No impairment  Gag Reflex: Absent  P.O. Trials:  Patient Position: HOB 50*  Vocal quality prior to P.O.: Low volume;Hoarse  Consistency Presented: Thin liquid;Puree;Nectar thick liquid; Ice chips  How Presented: Self-fed/presented;Spoon     Bolus Acceptance: No impairment  Bolus Formation/Control: Impaired  Type of Impairment: Incomplete;Piecemeal;Mastication;Lip closure;Delayed;Spillage  Propulsion: Delayed (# of seconds); Discoordination  Oral Residue: 10-50% of bolus; Lingual  Initiation of Swallow: Delayed (# of seconds)  Laryngeal Elevation: Decreased;Weak  Aspiration Signs/Symptoms: Change vocal quality;Weak cough;Delayed cough/throat clear  Pharyngeal Phase Characteristics: Altered vocal quality; Audible swallow;Easily fatigued ; Suspected pharyngeal residue;Poor endurance;Multiple swallows  Effective Modifications: Alternate liquids/solids; Spoon;Small sips and bites  Cues for Modifications: Moderate        Oral Phase Severity: Moderate  Pharyngeal Phase Severity : Severe     GCODESwallowing:  Swallow Current Status CN= 100%   Swallow Goal Status CJ= 20-39%   Swallow D/C Status CJ= 20-39%     The severity rating is based on the following outcomes:  SIOBHAN Noms Swallow Level 1              Clinical Judgement     PAIN:  Start of Eval: 0  End of Eval: 0      After treatment:   [ ]            Patient left in no apparent distress sitting up in chair  [X]            Patient left in no apparent distress in bed  [X]            Call bell left within reach  [X]            Nursing notified  [ ]            Family present  [ ]            Caregiver present  [ ]            Bed alarm activated      COMMUNICATION/EDUCATION:   [X]            Aspiration precautions; swallow safety; compensatory techniques.   [X]            Patient/family have participated as able in goal setting and plan of care. [ ]            Patient/family agree to work toward stated goals and plan of care. [ ]            Patient understands intent and goals of therapy; neutral about participation. [ ]            Patient unable to participate in goal setting/plan of care; educ ongoing with interdisciplinary staff  [ ]             Posted safety precautions in patient's room.      Thank you for this referral.  Raine Phelps MS, CCC/SLP        Time Calculation: 28 mins

## 2017-08-05 NOTE — PROGRESS NOTES
Speech Pathology Note      Noted new order for swallow eval, chart reviewed. Pt extubated yesterday morning, currently NPO with tube feeds. On-call SLP will be in later this date to complete b/s swallow eval.  Thank you.     Nadege Barrientos MS, CCC/SLP  Speech- Language Pathologist

## 2017-08-05 NOTE — DIALYSIS
ACUTE HEMODIALYSIS FLOW SHEET    HEMODIALYSIS ORDERS: Physician: Cecily Stanton     Dialyzer: Revaclear         Duration: 3 hr  BFR: 300   DFR: 600   Dialysate:  K+   3    Ca+  3   Weight:    kg    Bed Scale []     Unable to Obtain []      UF Goal:  1000       Heparin [x]  Bolus   2000   Units    [] Hourly       Units    []None   Pre BP:   123/66    Pulse:     77        Temperature:   98  Respirations: 17  Tx: NS           ml/Bolus  Other        [x] N/A   Labs: Pre        Post:        [x] N/A   Additional Orders:           [x] Time Out/Safety Check  [x] [facility] Consent Verified     CATHETER ACCESS: [x]N/A   []Right   []Left   []IJ     []Fem   [] First use X-ray verified     []Tunnel                [] Non Tunneled   []No S/S infection  []Redness  []Drainage []Cultured []Swelling []Pain   []Medical Aseptic Prep Utilized   []Dressing Changed  [] Biopatch  Date:       []Clotted   []Patent   Flows: []Good  []Poor  []Reversed   If access problem,  notified: []Yes    []N/A  Date:           GRAFT/FISTULA ACCESS:  []N/A     []Right     [x]Left     [x]UE     []LE   [x]AVG   []AVF        []Buttonhole    [x]Medical Aseptic Prep Utilized   []No S/S infection  []Redness  []Drainage []Cultured []Swelling []Pain    Bruit:   [x] Strong    [] Weak       Thrill :   [x] Strong    [] Weak       Needle Gauge:15    Length: 1     If access problem,  notified: []Yes     [x]N/A  Date:        Please describe access if present and not used:       GENERAL ASSESSMENT:    LUNGS: Sat%           [] Clear  [x] Coarse  [] Crackles  [] Wheezing        [] Diminished     Location : [x]RLL   [x]LLL    []RUL  []   Cough: [x]Productive  []Dry  []N/A   Respirations:  [x]Easy  []Labored   Therapy:  []RA  [x]NC    2     l/min    Mask: []NRB []Venti       O2%                  []Ventilator  []Intubated  []Trach   CARDIAC: [x]Regular      [] Irregular   [] Pericardial Rub  [] JVD        [x]  Monitored  [] N/A  Rhythm:         EDEMA: [x] None []Generalized  [] Pitting [] 1    [] 2    [] 3    [] 4                 [] Facial  [] Pedal  []  UE  [] LE   SKIN:   [x] Warm  [] Hot     [] Cold   [x] Dry     [] Pale   [] Diaphoretic                  [] Flushed  [] Jaundiced  [] Cyanotic  [] Rash  [] Weeping   LOC:    [x] Alert      [x]Oriented:    [x] Person     [] Place  []Time               [x] Confused  [] Lethargic  [] Medicated  [] Non-responsive     GI / ABDOMEN:  [x] Flat    [] Distended    [x] Soft    [] Firm   []  Obese                             [x] Diarrhea  [x] Bowel Sounds  [] Nausea  [] Vomiting       / URINE ASSESSMENT:[] Voiding   [] Oliguria  [x] Anuria   []  Stanton     [] Incontinent    []  Incontinent Brief      []  Bathroom Privileges     PAIN: [x] 0 []1  []2   []3   []4   []5   []6   []7   []8   []9   []10            Scale 0-10  Action/Follow Up:         MOBILITY:  [] Amb    [] Amb/Assist    [x] Bed    [] Wheelchair  [] Stretcher      All Vitals and Treatment Details on Attached [address]: [facility]          Room # 308     [] 1st Time Acute  [] Stat  [x] Routine  [] Urgent     [] Acute Room  []  Bedside  [x] ICU/CCU  [] ER   Isolation Precautions:  [x] Dialysis   [] Airborne   [] Contact    [] Reverse   Special Considerations:         [] Blood Consent Verified [x]N/A     ALLERGIES:   [x] NKA          Code Status:  [x] Full Code  [] DNR  [] Other           HBsAg ONLY: Date Drawn 7/28/17               [x]Negative []Positive []Unknown   HBsAb: Date 7/28/17           [] Susceptible   [x] [id number] []Not Drawn      Current Labs:    Date of Labs: Today [x]     Results for Glen Patterson (MRN [id number]) as of 8/5/2017 16:30   Ref.  Range 8/5/2017 01:04   WBC Latest Ref Range: 4.6 - 13.2 K/uL 25.1 (H)   NRBC Latest Ref Range: 0  WBC 2.0 (H)   RBC Latest Ref Range: 4.70 - 5.50 M/uL 3.42 (L)   HGB Latest Ref Range: 13.0 - 16.0 g/dL 10.3 (L)   HCT Latest Ref Range: 36.0 - 48.0 % 30.8 (L)   MCV Latest Ref Range: 74.0 - 97.0 FL 90.1   MCH Latest Ref Range: 24.0 - 34.0 PG 30.1   MCHC Latest Ref Range: 31.0 - 37.0 g/dL 33.4   RDW Latest Ref Range: 11.6 - 14.5 % 16.2 (H)   PLATELET Latest Ref Range: 135 - 420 K/uL 134 (L)   MPV Latest Ref Range: 9.2 - 11.8 FL 12.3 (H)   NEUTROPHILS Latest Ref Range: 42 - 75 % 83 (H)   BAND NEUTROPHILS Latest Ref Range: 0 - 5 % 1   LYMPHOCYTES Latest Ref Range: 20 - 51 % 8 (L)   MONOCYTES Latest Ref Range: 2 - 9 % 7   EOSINOPHILS Latest Ref Range: 0 - 5 % 1   BASOPHILS Latest Ref Range: 0 - 3 % 0   DF Latest Units:   AUTOMATED   ABS. NEUTROPHILS Latest Ref Range: 1.8 - 8.0 K/UL 21.0 (H)   ABS. LYMPHOCYTES Latest Ref Range: 0.8 - 3.5 K/UL 2.0   ABS. MONOCYTES Latest Ref Range: 0 - 1.0 K/UL 1.8 (H)   ABS. EOSINOPHILS Latest Ref Range: 0.0 - 0.4 K/UL 0.3   ABS. BASOPHILS Latest Ref Range: 0.0 - 0.06 K/UL 0.0        Results for Amanda Duke (MRN [id number]) as of 8/5/2017 16:30   Ref.  Range 8/5/2017 01:04   Sodium Latest Ref Range: 136 - 145 mmol/L 145   Potassium Latest Ref Range: 3.5 - 5.5 mmol/L 3.6   Chloride Latest Ref Range: 100 - 108 mmol/L 109 (H)   CO2 Latest Ref Range: 21 - 32 mmol/L 24   Anion gap Latest Ref Range: 3.0 - 18 mmol/L 12   Glucose Latest Ref Range: 74 - 99 mg/dL 114 (H)   BUN Latest Ref Range: 7.0 - 18 MG/DL 79 (H)   Creatinine Latest Ref Range: 0.6 - 1.3 MG/DL 7.06 (H)   BUN/Creatinine ratio Latest Ref Range: 12 - 20   11 (L)   Calcium Latest Ref Range: 8.5 - 10.1 MG/DL 7.7 (L)   Phosphorus Latest Ref Range: 2.5 - 4.9 MG/DL 4.1   Magnesium Latest Ref Range: 1.6 - 2.6 mg/dL 2.7 (H)   GFR est non-AA Latest Ref Range: >60 ml/min/1.73m2 8 (L)   GFR est AA Latest Ref Range: >60 ml/min/1.73m2 10 (L)                                                                                                                                DIET:  [] Renal    [] Other     [x] NPO     []  Diabetic      PRIMARY NURSE REPORT: First initial/Last name/Title      Pre Dialysis:Carmen Mayen RN Time: 4497       EDUCATION:    [x] Patient [] Other         Knowledge Basis: []None [x]Minimal []Substantial   [] Access Care     [] S&S of infection     [] Fluid Management     []K+     [x]Procedural    []Albumin     [] Medications     [] Tx Options     [] Transplant     [] Diet     [] Other   Teaching Tools:  [x] Explain  [] Demo  [] Handouts [] Video   Inappropriate due to            [address] Before each treatment:     Machine Number:                   [facility]                                   [] Unit Machine #  with centralized RO                                  [] Portable Machine #1/RO serial # [id number]                                  [x] Portable Machine #2/RO serial # [id number]                                  [] Portable Machine #3/RO serial # [id number]                                                                                                       [address] [facility]                                  [] Portable Machine #11/RO serial # [id number]                                   [] Portable Machine #12/RO serial # [id number]                                  [] Portable Machine #13/RO serial #  [id number]      Alarm Test: [x]Pass           Other:         [x] RO/Machine Log Complete      Temp    36.5            [x]Extracorporeal Circuit Tested for integrity   Dialysate: pH  7.4 Conductivity: Meter   14.2     HD Machine   14.2                  TCD: 13.9  Dialyzer Lot # [id number]                             Blood Tubing Lot #[id number]                Saline Lot #  [id number]     CHLORINE TESTING-Before each treatment and every 4 hours    Total Chlorine: [x] less than 0.1 ppm  Time: 1555 4 Hr Check Time:    (if greater than 0.1 ppm from Primary then every 30 minutes from Secondary)     TREATMENT INITIATION  with Dialysis Precautions:   [x] All Connections Secured                 [x] Saline Line Double Clamped   [x] Venous Parameters Set                  [x] Arterial Parameters Set    [x] Prime Given  ml  250             [x]Air Foam Detector Engaged      Treatment Initiation Note: Pt sitting in bed, alert and oriented to self; confused on time, place, and situation. On 2L O2 via NC, rectal tube in place with watery stools, VSS, in no acute distress. Denies pain. Pt clotted lines last treatment, Called Dr. Susan Vidal and received verbal order for 2000 units heparin loading dose if ICU MD agreed. ICU MD Shakira verbalized agreement that heparin was appropriate. Also clarified with Dr. Susan Vidal whether or not pt would need epogen and hectorol this treatment; Dr. Susan Vidal ordered these meds for this treatment. Treatment initiated via LUE AVG without complication. Will monitor throughout treatment. 17:24- Pt's glucose 63. Pt asymptomatic. Primary team addressing. 17:43- Pt given D50 for hypoglycemia. 18:04- Blood sugar up to 124. Medication Dose Volume Route Initials Dialyzer Cleared: [x] Good [] Fair  [] Poor    Blood processed: 54   L  UF Removed 1000    Ml    Post Wt:     kg  POst BP:   147/74       Pulse: 72      Respirations: 17  Temperature: 98     epogen  44816 units 1 ml IV SN Post Tx Vascular Access: AVF/AVG: Bleeding stopped Art 5     min. Oscar. 5     Min   N/A     hectorol 3 mcg 1.5 ml IV SN Catheter: Locking solution: Heparin 1:1000 Art. Oscar. N/A                                 Post Assessment:                                    Skin:  [x] Warm  [x] Dry [] Diaphoretic     [] Flushed  [] Pale [] Cyanotic   [facility] Signatures Title Initials  Time Lungs: [] Clear    [x] Course  [] Crackles  [] Wheezing   Rufus Conde RN SN  Cardiac: [] Regular   [x] Irregular   [] Monitor  [] N/A  Rhythm:           Edema:  [x] None    [] General     [] Facial   [] Pedal    [] UE    [] LE       Pain: []0  []1  []2   []3  []4   []5   []6   []7   []8   []9   []10         Post Treatment Note: Pt tolerated treatment well. All blood returned via AVG.  VSS and in no acute distress at handoff.           POST TREATMENT PRIMARY NURSE HANDOFF REPORT:     First initial/Last name/Title         Post Dialysis:  Barron Escobar RN     Time: 7775          Abbreviations: AVG-arterial venous graft, AVF-arterial venous fistula, IJ-Internal Jugular, Subcl-Subclavian, Fem-Femoral, Tx-treatment, AP/HR-apical heart rate, DFR-dialysate flow rate, BFR-blood flow rate, AP-arterial pressure, -venous pressure, UF-ultrafiltrate, TMP-transmembrane pressure, Oscar-Venous, Art-Arterial, RO-Reverse Osmosis

## 2017-08-05 NOTE — ROUTINE PROCESS
Report given to on coming shift, with review of Kardex, MAR and events over night. Pt stable at time of transfer.

## 2017-08-05 NOTE — PROGRESS NOTES
Neurology Progress Note    Patient ID:  Flores Ingram  [id number]  55 y.o.  1960    Subjective:      Extubated yesterday and did well overnight. He is difficult to understand due to dysarthria. Current Facility-Administered Medications   Medication Dose Route Frequency    [START ON 8/7/2017] doxercalciferol (HECTOROL) 4 mcg/2 mL injection 3 mcg  3 mcg IntraVENous Q MON, WED & FRI    [START ON 8/7/2017] epoetin benjamin (EPOGEN;PROCRIT) injection 10,000 Units  10,000 Units IntraVENous Q MON, WED & FRI    piperacillin-tazobactam (ZOSYN) 2.25 g in 0.9% sodium chloride (MBP/ADV) 50 mL MBP  2.25 g IntraVENous Q12H    Piperacillin-tazobactam (ZOSYN) 0.75 gm Supplemental Dosing by Pharmacy   Other Rx Dosing/Monitoring    hydrocortisone (CORTEF) tablet 10 mg  10 mg Oral BID WITH MEALS    insulin lispro (HUMALOG) injection   SubCUTAneous Q6H    insulin glargine (LANTUS) injection 15 Units  15 Units SubCUTAneous DAILY    0.9% sodium chloride infusion  50 mL/hr IntraVENous CONTINUOUS    pantoprazole (PROTONIX) injection 40 mg  40 mg IntraVENous DAILY    levETIRAcetam (KEPPRA) 500 mg in 100 ml IVPB  500 mg IntraVENous Q12H    chlorhexidine (PERIDEX) 0.12 % mouthwash 10 mL  10 mL Oral Q12H    glucose chewable tablet 16 g  4 Tab Oral PRN    glucagon (GLUCAGEN) injection 1 mg  1 mg IntraMUSCular PRN    dextrose (D50W) injection syrg 12.5-25 g  25-50 mL IntraVENous PRN    0.9% sodium chloride infusion  100 mL/hr IntraVENous DIALYSIS PRN    naloxone (NARCAN) injection 0.4 mg  0.4 mg IntraVENous PRN    LORazepam (ATIVAN) injection 1 mg  1 mg IntraVENous Q4H PRN    fentaNYL citrate (PF) injection 50 mcg  50 mcg IntraVENous Q2H PRN          Objective: Active hospital medications were reviewed    Lab results and neuroradiology studies from the last 24 hours were reviewed.       Prior to Admission medications    Not on File     Patient Vitals for the past 8 hrs:   BP Temp Pulse Resp SpO2   08/05/17 0815 - - - - 100 %   08/05/17 0800 - 97.5 °F (36.4 °C) - - -   08/05/17 0700 143/73 - 69 12 100 %   08/05/17 0600 134/65 97.6 °F (36.4 °C) 64 14 100 %   08/05/17 0500 144/67 - 66 14 100 %   08/05/17 0400 123/61 97.2 °F (36.2 °C) 63 16 100 %   08/05/17 0300 133/62 - 64 17 100 %   08/05/17 0200 124/70 97.7 °F (36.5 °C) 66 13 100 %   [id number] 1901 - 08/05 0700  In: 8409.9 [I.V.:8174.9]  Out: 2132 [Drains:2100]  08/03 1901 - 08/05 0700  In: 8409.9 [I.V.:8174.9]  Out: 2132 [Drains:2100]RESULTRCNT(24h)Principal Problem:    Sepsis ([address] (7/27/2017)    Active Problems:    Anemia ()      Acidosis (7/27/2017)      Cardiac arrest ([address] (7/27/2017)      Respiratory failure ([address] (7/27/2017)      ESRD needing dialysis ([address] (7/27/2017)      Anoxic brain damage ([address] (7/27/2017)      Colitis (7/27/2017)      Hypotension (7/27/2017)      Acute GI bleeding (7/27/2017)      ESRD (end stage renal disease) ([address] (7/27/2017)        Additional comments:I reviewed the patient's new clinical lab test results. General Exam  No acute distress, mucous membranes normal color and hydration status        Neurologic Exam    Mental status:  Awake and follows commands. He some spontaneous speech and was able to extend his right arm when I introduced myself  He is able to speak in 3-4 word phrases but has some significant dysarthria  Eyes conjugate  Motor:  Right lower facial weakness noted  Neck flexors: 4+/5  Deltoids: 4+/5, biceps: 4/5, triceps: 4-/5, He has bilateral wrist drop and hand intrinsic weakness  Lower extremities are 4-/5 distally    DTRs: 3 in upper extremities    Assessment:     Sharee Clark is a 55 y.o. who was admitted with unresponsiveness and out of hospital cardiac arrest with unknown pulseless time. He is now extubated and following commands well but has some significant motor deficits which appear central in etiology. I suspect he has sustained some cortical injury/ischemic strokes from his cardiac arrest.       Plan:   1. Continue Keppra as currently dosed  2. Continue supportive care and aggressive PT      Catrachita Del Cid. Verona Borjas M.D.   Clinical Neurophysiology  Neuromuscular Specialist    Signed:  8/5/2017  9:20 AM  9:20 AM

## 2017-08-05 NOTE — PROGRESS NOTES
[facility] Hospitalist Group  Progress Note    Patient: Jada Napier Age: 55 y.o. : 1960 MR#: [id number] SSN: [id number]  Date/Time: 2017     Subjective:     Pt wants coffee. Assessment/Plan:   Found unresponsive , brought to ED via EMS , went in to PEA , CPR/ACLS protocol with ROSC   Encephalopathy ? Toxic , / sepsis related , improving , more awake extubated. -  S/p PEA arrest. Cardiology was on case with plans to cath once stable from his other issues, currently are no longer on case and want to be re consulted if his condition improves. Heparin infusion was dc'd b/c of development of ?HIT, see below.    -Respiratory arrest in setting of above. was intubated, extubated yesterday. - patient continues to be full code     -Acute metabolic/anoxic encephalopathy:continued decreased mentation, per neuro notes today, suspected cortical injury/ischemic stroke from cardiac arrest.   S/P EEG - Abnormal electroencephalogram due to the presence  of what might be an alpha  coma. No epileptic activity was  Appreciated. - continue supportive measures    - on keppra for seizure       -ESRD    - for HD    - follow with renal       -  Sepsis - leucocytosis / () bottles grew E.coli and C.perferinges. Initially thought to be secondary to cholecystitis but w/u thus far including HIDA scan negative. Per ID possibly due to abdominal source. Repeat cultures negative. Per ID recs on pip/tazo to be switched to po cipro and metro until  when able to take PO meds. -Hypokalemia  - replace lytes -and follow. - Elevated LFT - ? Shock liver. LFT now wnl.     -Thrombocytopenia: ? HIT vs secondary to sepsis. Serotonin release assay negative. Count trending up. Cont to monitor. Avoid heparin products.         Case discussed with:  [x]Patient  []Family  []Nursing  []Case Management  DVT Prophylaxis:  []Lovenox  []Hep SQ  [x]SCDs  []Coumadin   []On Heparin gtt    Patient Active Problem List   Diagnosis Code    Anemia D64.9    Acidosis E87.2    Cardiac arrest ([facility] I46.9    Respiratory failure ([facility] J96.90    ESRD needing dialysis ([facility] N18.6, Z99.2    Anoxic brain damage (HCC) G93.1    Colitis K52.9    Hypotension I95.9    Acute GI bleeding K92.2    ESRD (end stage renal disease) ([facility]) N18.6    Sepsis ([facility]) A41.9       Objective:   VS:   Visit Vitals    /84    Pulse 70    Temp 97.5 °F (36.4 °C)    Resp 14    Ht 6' 3\" (1.905 m)  Comment: per chart history    Wt 62.6 kg (138 lb 0.1 oz)    SpO2 100%    BMI 17.25 kg/m2      Tmax/24hrs: Temp (24hrs), Av.7 °F (36.5 °C), Min:97.2 °F (36.2 °C), Max:98.3 °F (36.8 °C)  IOBRIEF    Intake/Output Summary (Last 24 hours) at 17 1412  Last data filed at 17 0454   Gross per 24 hour   Intake          8174.86 ml   Output             1212 ml   Net          6962.86 ml       General:  Alert awake in nad  HEENT:  NC, Atraumatic.anicteric sclerae. Pulmonary:  CTA Bilaterally. No Wheezing/Rhonchi/Rales. Cardiovascular: Regular rate and Rhythm. GI:  Soft, Non distended, Non tender. + Bowel sounds. Extremities:  No edema, cyanosis, clubbing.    Psych: Not examined            Medications:   Current Facility-Administered Medications   Medication Dose Route Frequency    piperacillin-tazobactam (ZOSYN) 2.25 g in 0.9% sodium chloride (MBP/ADV) 50 mL MBP  2.25 g IntraVENous Q8H    [START ON 2017] doxercalciferol (HECTOROL) 4 mcg/2 mL injection 3 mcg  3 mcg IntraVENous Q MON, WED & FRI    [START ON 2017] epoetin benjamin (EPOGEN;PROCRIT) injection 10,000 Units  10,000 Units IntraVENous Q MON, WED & FRI    Piperacillin-tazobactam (ZOSYN) 0.75 gm Supplemental Dosing by Pharmacy   Other Rx Dosing/Monitoring    hydrocortisone (CORTEF) tablet 10 mg  10 mg Oral BID WITH MEALS    insulin lispro (HUMALOG) injection   SubCUTAneous Q6H    insulin glargine (LANTUS) injection 15 Units  15 Units SubCUTAneous DAILY    0.9% sodium chloride infusion  50 mL/hr IntraVENous CONTINUOUS    pantoprazole (PROTONIX) injection 40 mg  40 mg IntraVENous DAILY    levETIRAcetam (KEPPRA) 500 mg in 100 ml IVPB  500 mg IntraVENous Q12H    chlorhexidine (PERIDEX) 0.12 % mouthwash 10 mL  10 mL Oral Q12H    glucose chewable tablet 16 g  4 Tab Oral PRN    glucagon (GLUCAGEN) injection 1 mg  1 mg IntraMUSCular PRN    dextrose (D50W) injection syrg 12.5-25 g  25-50 mL IntraVENous PRN    0.9% sodium chloride infusion  100 mL/hr IntraVENous DIALYSIS PRN    naloxone (NARCAN) injection 0.4 mg  0.4 mg IntraVENous PRN    LORazepam (ATIVAN) injection 1 mg  1 mg IntraVENous Q4H PRN    fentaNYL citrate (PF) injection 50 mcg  50 mcg IntraVENous Q2H PRN       Labs:    Recent Results (from the past 24 hour(s))   GLUCOSE, POC    Collection Time: 08/04/17  6:38 PM   Result Value Ref Range    Glucose (POC) 135 (H) 70 - 110 mg/dL   GLUCOSE, POC    Collection Time: 08/04/17 11:41 PM   Result Value Ref Range    Glucose (POC) 124 (H) 70 - 110 mg/dL   CALCIUM, IONIZED    Collection Time: 08/05/17  1:04 AM   Result Value Ref Range    Ionized Calcium 1.09 (L) 1.12 - 1.32 MMOL/L   CBC WITH AUTOMATED DIFF    Collection Time: 08/05/17  1:04 AM   Result Value Ref Range    WBC 25.1 (H) 4.6 - 13.2 K/uL    RBC 3.42 (L) 4.70 - 5.50 M/uL    HGB 10.3 (L) 13.0 - 16.0 g/dL    HCT 30.8 (L) 36.0 - 48.0 %    MCV 90.1 74.0 - 97.0 FL    MCH 30.1 24.0 - 34.0 PG    MCHC 33.4 31.0 - 37.0 g/dL    RDW 16.2 (H) 11.6 - 14.5 %    PLATELET 901 (L) 457 - 420 K/uL    MPV 12.3 (H) 9.2 - 11.8 FL    NEUTROPHILS 83 (H) 42 - 75 %    BAND NEUTROPHILS 1 0 - 5 %    LYMPHOCYTES 8 (L) 20 - 51 %    MONOCYTES 7 2 - 9 %    EOSINOPHILS 1 0 - 5 %    BASOPHILS 0 0 - 3 %    NRBC 2.0 (H) 0  WBC    ABS. NEUTROPHILS 21.0 (H) 1.8 - 8.0 K/UL    ABS. LYMPHOCYTES 2.0 0.8 - 3.5 K/UL    ABS. MONOCYTES 1.8 (H) 0 - 1.0 K/UL    ABS. EOSINOPHILS 0.3 0.0 - 0.4 K/UL    ABS.  BASOPHILS 0.0 0.0 - 0.06 K/UL    DF AUTOMATED PLATELET COMMENTS ADEQUATE PLATELETS      RBC COMMENTS ANISOCYTOSIS  1+        RBC COMMENTS POLYCHROMASIA  1+       METABOLIC PANEL, BASIC    Collection Time: 08/05/17  1:04 AM   Result Value Ref Range    Sodium 145 136 - 145 mmol/L    Potassium 3.6 3.5 - 5.5 mmol/L    Chloride 109 (H) 100 - 108 mmol/L    CO2 24 21 - 32 mmol/L    Anion gap 12 3.0 - 18 mmol/L    Glucose 114 (H) 74 - 99 mg/dL    BUN 79 (H) 7.0 - 18 MG/DL    Creatinine 7.06 (H) 0.6 - 1.3 MG/DL    BUN/Creatinine ratio 11 (L) 12 - 20      GFR est AA 10 (L) >60 ml/min/1.73m2    GFR est non-AA 8 (L) >60 ml/min/1.73m2    Calcium 7.7 (L) 8.5 - 10.1 MG/DL   PHOSPHORUS    Collection Time: 08/05/17  1:04 AM   Result Value Ref Range    Phosphorus 4.1 2.5 - 4.9 MG/DL   MAGNESIUM    Collection Time: 08/05/17  1:04 AM   Result Value Ref Range    Magnesium 2.7 (H) 1.6 - 2.6 mg/dL   GLUCOSE, POC    Collection Time: 08/05/17  6:43 AM   Result Value Ref Range    Glucose (POC) 83 70 - 110 mg/dL   GLUCOSE, POC    Collection Time: 08/05/17 11:22 AM   Result Value Ref Range    Glucose (POC) 83 70 - 110 mg/dL       Signed By: Carolina Goncalves MD     August 5, 2017

## 2017-08-06 ENCOUNTER — APPOINTMENT (OUTPATIENT)
Dept: GENERAL RADIOLOGY | Age: 57
DRG: 004 | End: 2017-08-06
Attending: PHYSICIAN ASSISTANT
Payer: MEDICARE

## 2017-08-06 LAB
ANION GAP BLD CALC-SCNC: 7 MMOL/L (ref 3–18)
BASOPHILS # BLD AUTO: 0 K/UL (ref 0–0.1)
BASOPHILS # BLD: 0 % (ref 0–2)
BUN SERPL-MCNC: 42 MG/DL (ref 7–18)
BUN/CREAT SERPL: 8 (ref 12–20)
CA-I SERPL-SCNC: 1.06 MMOL/L (ref 1.12–1.32)
CALCIUM SERPL-MCNC: 7.4 MG/DL (ref 8.5–10.1)
CHLORIDE SERPL-SCNC: 106 MMOL/L (ref 100–108)
CO2 SERPL-SCNC: 29 MMOL/L (ref 21–32)
CREAT SERPL-MCNC: 5 MG/DL (ref 0.6–1.3)
DIFFERENTIAL METHOD BLD: ABNORMAL
EOSINOPHIL # BLD: 0.1 K/UL (ref 0–0.4)
EOSINOPHIL NFR BLD: 1 % (ref 0–5)
ERYTHROCYTE [DISTWIDTH] IN BLOOD BY AUTOMATED COUNT: 16.1 % (ref 11.6–14.5)
GLUCOSE BLD STRIP.AUTO-MCNC: 100 MG/DL (ref 70–110)
GLUCOSE BLD STRIP.AUTO-MCNC: 102 MG/DL (ref 70–110)
GLUCOSE BLD STRIP.AUTO-MCNC: 104 MG/DL (ref 70–110)
GLUCOSE BLD STRIP.AUTO-MCNC: 106 MG/DL (ref 70–110)
GLUCOSE BLD STRIP.AUTO-MCNC: 115 MG/DL (ref 70–110)
GLUCOSE BLD STRIP.AUTO-MCNC: 136 MG/DL (ref 70–110)
GLUCOSE BLD STRIP.AUTO-MCNC: 221 MG/DL (ref 70–110)
GLUCOSE BLD STRIP.AUTO-MCNC: 268 MG/DL (ref 70–110)
GLUCOSE SERPL-MCNC: 106 MG/DL (ref 74–99)
HCT VFR BLD AUTO: 28.3 % (ref 36–48)
HGB BLD-MCNC: 9.6 G/DL (ref 13–16)
LYMPHOCYTES # BLD AUTO: 12 % (ref 21–52)
LYMPHOCYTES # BLD: 1.9 K/UL (ref 0.9–3.6)
MAGNESIUM SERPL-MCNC: 2.1 MG/DL (ref 1.6–2.6)
MCH RBC QN AUTO: 30.6 PG (ref 24–34)
MCHC RBC AUTO-ENTMCNC: 33.9 G/DL (ref 31–37)
MCV RBC AUTO: 90.1 FL (ref 74–97)
MONOCYTES # BLD: 1.2 K/UL (ref 0.05–1.2)
MONOCYTES NFR BLD AUTO: 7 % (ref 3–10)
NEUTS SEG # BLD: 12.7 K/UL (ref 1.8–8)
NEUTS SEG NFR BLD AUTO: 80 % (ref 40–73)
PHOSPHATE SERPL-MCNC: 2.9 MG/DL (ref 2.5–4.9)
PLATELET # BLD AUTO: 144 K/UL (ref 135–420)
PMV BLD AUTO: 11.6 FL (ref 9.2–11.8)
POTASSIUM SERPL-SCNC: 3.1 MMOL/L (ref 3.5–5.5)
RBC # BLD AUTO: 3.14 M/UL (ref 4.7–5.5)
SODIUM SERPL-SCNC: 142 MMOL/L (ref 136–145)
WBC # BLD AUTO: 15.9 K/UL (ref 4.6–13.2)

## 2017-08-06 PROCEDURE — 74011250636 HC RX REV CODE- 250/636: Performed by: PSYCHIATRY & NEUROLOGY

## 2017-08-06 PROCEDURE — 94669 MECHANICAL CHEST WALL OSCILL: CPT

## 2017-08-06 PROCEDURE — 85025 COMPLETE CBC W/AUTO DIFF WBC: CPT | Performed by: PHYSICIAN ASSISTANT

## 2017-08-06 PROCEDURE — 65270000029 HC RM PRIVATE

## 2017-08-06 PROCEDURE — 74011000250 HC RX REV CODE- 250

## 2017-08-06 PROCEDURE — 74011636637 HC RX REV CODE- 636/637: Performed by: PHYSICIAN ASSISTANT

## 2017-08-06 PROCEDURE — 80048 BASIC METABOLIC PNL TOTAL CA: CPT | Performed by: PHYSICIAN ASSISTANT

## 2017-08-06 PROCEDURE — C9113 INJ PANTOPRAZOLE SODIUM, VIA: HCPCS | Performed by: INTERNAL MEDICINE

## 2017-08-06 PROCEDURE — 36415 COLL VENOUS BLD VENIPUNCTURE: CPT | Performed by: PHYSICIAN ASSISTANT

## 2017-08-06 PROCEDURE — 74011000258 HC RX REV CODE- 258: Performed by: INTERNAL MEDICINE

## 2017-08-06 PROCEDURE — 74011250637 HC RX REV CODE- 250/637: Performed by: PHYSICIAN ASSISTANT

## 2017-08-06 PROCEDURE — 82330 ASSAY OF CALCIUM: CPT | Performed by: PHYSICIAN ASSISTANT

## 2017-08-06 PROCEDURE — 77030011256 HC DRSG MEPILEX <16IN NO BORD MOLN -A

## 2017-08-06 PROCEDURE — 82962 GLUCOSE BLOOD TEST: CPT

## 2017-08-06 PROCEDURE — 74011250637 HC RX REV CODE- 250/637: Performed by: INTERNAL MEDICINE

## 2017-08-06 PROCEDURE — 84100 ASSAY OF PHOSPHORUS: CPT | Performed by: PHYSICIAN ASSISTANT

## 2017-08-06 PROCEDURE — 74011250636 HC RX REV CODE- 250/636: Performed by: INTERNAL MEDICINE

## 2017-08-06 PROCEDURE — 83735 ASSAY OF MAGNESIUM: CPT | Performed by: PHYSICIAN ASSISTANT

## 2017-08-06 RX ORDER — POTASSIUM CHLORIDE 20 MEQ/1
20 TABLET, EXTENDED RELEASE ORAL
Status: COMPLETED | OUTPATIENT
Start: 2017-08-06 | End: 2017-08-06

## 2017-08-06 RX ORDER — DEXTROSE 50 % IN WATER (D50W) INTRAVENOUS SYRINGE
Status: COMPLETED
Start: 2017-08-06 | End: 2017-08-06

## 2017-08-06 RX ORDER — DEXTROSE MONOHYDRATE AND SODIUM CHLORIDE 5; .45 G/100ML; G/100ML
100 INJECTION, SOLUTION INTRAVENOUS CONTINUOUS
Status: DISCONTINUED | OUTPATIENT
Start: 2017-08-06 | End: 2017-08-07

## 2017-08-06 RX ADMIN — POTASSIUM CHLORIDE 20 MEQ: 1500 TABLET, EXTENDED RELEASE ORAL at 12:37

## 2017-08-06 RX ADMIN — HYDROCORTISONE 10 MG: 10 TABLET ORAL at 09:26

## 2017-08-06 RX ADMIN — INSULIN LISPRO 9 UNITS: 100 INJECTION, SOLUTION INTRAVENOUS; SUBCUTANEOUS at 17:15

## 2017-08-06 RX ADMIN — DEXTROSE MONOHYDRATE AND SODIUM CHLORIDE 100 ML/HR: 5; .45 INJECTION, SOLUTION INTRAVENOUS at 09:21

## 2017-08-06 RX ADMIN — DEXTROSE MONOHYDRATE 25 G: 25 INJECTION, SOLUTION INTRAVENOUS at 00:05

## 2017-08-06 RX ADMIN — DEXTROSE MONOHYDRATE AND SODIUM CHLORIDE 100 ML/HR: 5; .45 INJECTION, SOLUTION INTRAVENOUS at 18:22

## 2017-08-06 RX ADMIN — HYDROCORTISONE 10 MG: 10 TABLET ORAL at 17:15

## 2017-08-06 RX ADMIN — PANTOPRAZOLE SODIUM 40 MG: 40 INJECTION, POWDER, FOR SOLUTION INTRAVENOUS at 09:26

## 2017-08-06 RX ADMIN — PIPERACILLIN AND TAZOBACTAM 2.25 G: 2; .25 INJECTION, POWDER, FOR SOLUTION INTRAVENOUS at 12:37

## 2017-08-06 RX ADMIN — LEVETIRACETAM 500 MG: 5 INJECTION INTRAVENOUS at 22:32

## 2017-08-06 RX ADMIN — LEVETIRACETAM 500 MG: 5 INJECTION INTRAVENOUS at 10:14

## 2017-08-06 RX ADMIN — PIPERACILLIN AND TAZOBACTAM 2.25 G: 2; .25 INJECTION, POWDER, FOR SOLUTION INTRAVENOUS at 22:32

## 2017-08-06 RX ADMIN — PIPERACILLIN AND TAZOBACTAM 2.25 G: 2; .25 INJECTION, POWDER, FOR SOLUTION INTRAVENOUS at 05:39

## 2017-08-06 NOTE — PROGRESS NOTES
[facility]  ICU Progress Note      Name: Whitney Mayes   : 1960   MRN: [id number]   Date: 2017      [x]I have reviewed the flowsheet and previous days notes. Events overnight reviewed and discussed with nursing staff. Vital signs and records reviewed. Subjective:   54 y/o male with hx significant for ESRD on HD, HTN, diabetes, anemia, and IBS, presented to the ED with cardiac arrest.       [x]The patient is unable to give any meaningful history or review of systems because the patient is:  []Intubated []Sedated   []Unresponsive      []The patient is critically ill on      []Mechanical ventilation []Pressors   []BiPAP []            ROS:Review of systems not obtained due to patient factors.        Safety Bundles: VAP Bundle/ CAUTI/ Severe Sepsis Protocol    Vital Signs:    Visit Vitals    /76    Pulse 74    Temp 97.2 °F (36.2 °C)    Resp 15    Ht 6' 3\" (1.905 m)  Comment: per chart history    Wt 62.6 kg (138 lb 0.1 oz)    SpO2 100%    BMI 17.25 kg/m2       O2 Device: Nasal cannula   O2 Flow Rate (L/min): 1 l/min   Temp (24hrs), Av.6 °F (36.4 °C), Min:97.2 °F (36.2 °C), Max:98 °F (36.7 °C)       Intake/Output:   Last shift:         Last 3 shifts:  0701 -  1900  In: 8174.9 [I.V.:8174.9]  Out: 2232 [Drains:1200]    Intake/Output Summary (Last 24 hours) at 17 2141  Last data filed at 17 1900   Gross per 24 hour   Intake              150 ml   Output             1200 ml   Net            -1050 ml       Ventilator Settings:  Ventilator  Mode: Spontaneous  Respiratory Rate  Resp Rate Observed: 15  Back-Up Rate: 12  Insp Time (sec): 1 sec  Insp Flow (l/min): 44 l/min  I:E Ratio: 1;4  Ventilator Volumes  Vt Set (ml): 400 ml  Vt Exhaled (Machine Breath) (ml): 398 ml  Vt Spont (ml): 395 ml  Ve Observed (l/min): 6.44 l/min  Ventilator Pressures  Pressure Support (cm H2O): 7 cm H2O  PIP Observed (cm H2O): 19 cm H2O  Plateau Pressure (cm H2O): 14 cm H2O  MAP (cm H2O): 8.2  PEEP/VENT (cm H2O): 5 cm H20  Auto PEEP Observed (cm H2O): 0 cm H2O      Physical Exam:      HEENT:  Pupils reactive, Anicteric sclerae; neck supple; no lymphadenopathy  Resp:  Symmetrical chest expansion, no accessory muscle use;  intermittent rhonchi b/l  CV:  S1, S2 present; regular rate and rhythm  GI:  Abdomen soft, (+) active bowel sounds  Extremities:  +2 pulses on all extremities; no edema/ cyanosis/ clubbing noted, left arm fistula with strong thrill   Skin:  Warm; no rashes/ lesions noted  Neurologic:  Awake, oriented to self, not to place.  Follows commands but with lag time Devices:    7/27 - NGT, ETT with michele tube      DATA:     Current Facility-Administered Medications   Medication Dose Route Frequency    piperacillin-tazobactam (ZOSYN) 2.25 g in 0.9% sodium chloride (MBP/ADV) 50 mL MBP  2.25 g IntraVENous Q8H    piperacillin-tazobactam (ZOSYN) 0.75 g in 0.9% sodium chloride 50 mL IVPB  0.75 g IntraVENous ONCE    dextrose 5% infusion  30 mL/hr IntraVENous CONTINUOUS    [START ON 8/7/2017] doxercalciferol (HECTOROL) 4 mcg/2 mL injection 3 mcg  3 mcg IntraVENous Q MON, WED & FRI    [START ON 8/7/2017] epoetin benjamin (EPOGEN;PROCRIT) injection 10,000 Units  10,000 Units IntraVENous Q MON, WED & FRI    Piperacillin-tazobactam (ZOSYN) 0.75 gm Supplemental Dosing by Pharmacy   Other Rx Dosing/Monitoring    hydrocortisone (CORTEF) tablet 10 mg  10 mg Oral BID WITH MEALS    insulin lispro (HUMALOG) injection   SubCUTAneous Q6H    insulin glargine (LANTUS) injection 15 Units  15 Units SubCUTAneous DAILY    0.9% sodium chloride infusion  50 mL/hr IntraVENous CONTINUOUS    pantoprazole (PROTONIX) injection 40 mg  40 mg IntraVENous DAILY    levETIRAcetam (KEPPRA) 500 mg in 100 ml IVPB  500 mg IntraVENous Q12H    chlorhexidine (PERIDEX) 0.12 % mouthwash 10 mL  10 mL Oral Q12H    glucose chewable tablet 16 g  4 Tab Oral PRN    glucagon (GLUCAGEN) injection 1 mg  1 mg IntraMUSCular PRN    dextrose (D50W) injection syrg 12.5-25 g  25-50 mL IntraVENous PRN    0.9% sodium chloride infusion  100 mL/hr IntraVENous DIALYSIS PRN    naloxone (NARCAN) injection 0.4 mg  0.4 mg IntraVENous PRN    LORazepam (ATIVAN) injection 1 mg  1 mg IntraVENous Q4H PRN    fentaNYL citrate (PF) injection 50 mcg  50 mcg IntraVENous Q2H PRN         Labs: Results:       Chemistry Recent Labs      08/05/17 0104 08/04/17 0205 08/03/17   0130   GLU  114*  166*  203*   NA  145  145  143   K  3.6  3.0*  2.8*   CL  109*  107  111*   CO2  24  24  24   BUN  79*  82*  54*   CREA  7.06*  7.05*  4.67*   CA  7.7*  8.2*  6.7*   AGAP  12  14  8   BUCR  11*  12  12   AP   --   158*  144*   TP   --   6.1*  5.0*   ALB   --   2.0*  1.6*   GLOB   --   4.1*  3.4   AGRAT   --   0.5*  0.5*      CBC w/Diff Recent Labs      08/05/17 0104 08/04/17 0205 08/03/17   0130   WBC  25.1*  31.2*  34.9*   RBC  3.42*  3.71*  3.19*   HGB  10.3*  11.2*  9.6*   HCT  30.8*  33.1*  29.0*   PLT  134*  120*  62*   GRANS  83*  70  79*   LYMPH  8*  10*  14*   EOS  1  0  0      Coagulation No results for input(s): PTP, INR, APTT in the last 72 hours. No lab exists for component: INREXT, INREXT    Liver Enzymes Recent Labs      08/04/17   0205   TP  6.1*   ALB  2.0*   AP  158*   SGOT  13*      ABG No results found for: PH, PHI, PCO2, PCO2I, PO2, PO2I, HCO3, HCO3I, FIO2, FIO2I   Microbiology Recent Labs      08/03/17   1335   CULT  MRSA target DNA is not detected (presumptive not colonized with MRSA)          Telemetry: [x]Sinus []A-flutter []Paced    []A-fib []Multiple PVCs                  Imaging:  [x]I have personally reviewed the patients radiographs  CXR Results  (Last 48 hours)               08/04/17 0615  XR CHEST PORT Final result    Impression:  IMPRESSION:       Support tubes in expected position.    .       Narrative:  Chest One View portable 0550 hours       CPT CODE: 31510       HISTORY: Multiorgan failure, abnormal liver function tests, GI bleed, end-stage   renal disease, status post code/cardiopulmonary arrest.       COMPARISON: Chest x-ray August 3 digit July 31, 2017. FINDINGS:        One view obtained. Endotracheal tube terminates 6 cm proximal to the bree. The   esophagogastric tube terminates in the proximal stomach. The cardiac and   mediastinal silhouette is normal. The lungs are clear. Pulmonary vascularity is   normal. The costophrenic angles are sharply defined. No bony abnormalities are   seen. CT ABD/PELVIS 07/27/17: This exam is markedly limited by the lack of enteric and IV contrast. Small to moderate right pleural effusion. Tiny left pleural effusion. Small amount of ascites. Mild anasarca. Gallbladder wall thickening in the setting of anasarca and ascites can be problematic to differentiate between cholecystitis and due to ascites. CT HEAD 07/27/17: No evidence of intracranial hemorrhages.  Multiple lacunar infarctions in bilateral thalami, likely to be old or subacute.  In left side of upper manny of brainstem there is lacunar infarction of undetermined age. Acute lacunar infarction in this area is not excluded. IMPRESSION:   · Fever-resolved, leukocytosis-improving, elevation may be related to stress dose steroid  · E coli bacteremia - on zosyn  · S/p PEA Cardiac Arrest on unknown etiology- one round epi unclear as to downtime; MI vs. ARF leading to arrest  · Acute hypoxic respiratory failure, RLL airspace disease, ? atelectasis vs pneumonia - serial CXR with improvement, no active infiltrates today  · Acute Encephalopathy- metabolic vs. Anoxic in setting of underlying CVA - mildly improving  · Septic Shock - resolved   · Suspicion for acute cholecystitis by Ct abd - HIDA scan normal  · Anemia of Chronic Disease   · ESRD- HD M,W,F  · Tobacco Abuse   · Thrombocytopenia   · Hyperglycemia in setting of steroid use (latest Hgb A1c 5.4)      PLAN:   · Resp - Extubated today after SBT on 8/4  ·  Aspiration precautions. HOB > 30 degrees. · Bronchial hygiene protocol    · ID - HIDA normal. Leukocytosis improving. Abx per ID - zosyn     · CVS - Hemodynamically stable. Follow closely    · Heme- HIT panel negative; no active bleeding from oropharyngeal area. Monitor for active bleeding    · Renal - HD recommendations per Nephrology    · Endocrine - frequent glycemic checks. · Cont cortef 10 mg PO BID (previous dose from outpatient). · Neuro/ Pain/ Sedation - Encephalopathic likely 2/2 Anoxic brain injury. Will need f/u    ·   GI - NPO while on SBT. If not extubated, resume enteral nutrition thru NGT.  Monitor for residuals    · Prophylaxis - DVT(SCDs), GI(Pepcid)  · Discussed in interdisciplinary rounds            Kris Malagon MD

## 2017-08-06 NOTE — PROGRESS NOTES
[facility]  ICU Progress Note      Name: Zen Arias   : 1960   MRN: [id number]   Date: 2017      [x]I have reviewed the flowsheet and previous days notes. Events overnight reviewed and discussed with nursing staff. Vital signs and records reviewed. Subjective:   54 y/o male with hx significant for ESRD on HD, HTN, diabetes, anemia, and IBS, presented to the ED with cardiac arrest.       Patient doing well this am and requesting a meal.  Failed swallow study so will remain NPO pending barium swallow today. Hypoglycemic overnight following lantus yesterday (now d/[place])    ROS:Review of systems not obtained due to patient factors.        Safety Bundles: VAP Bundle/ CAUTI/ Severe Sepsis Protocol    Vital Signs:    Visit Vitals    /73 (BP 1 Location: Right arm, BP Patient Position: Head of bed elevated (Comment degrees))    Pulse 73    Temp 97.8 °F (36.6 °C)    Resp 16    Ht 6' 3\" (1.905 m)  Comment: per chart history    Wt 62 kg (136 lb 11 oz)    SpO2 98%    BMI 17.08 kg/m2       O2 Device: Room air   O2 Flow Rate (L/min): 1 l/min   Temp (24hrs), Av.7 °F (36.5 °C), Min:97.2 °F (36.2 °C), Max:98.2 °F (36.8 °C)       Intake/Output:   Last shift:         Last 3 shifts:  1901 -  0700  In: 8748.9 [I.V.:8748.9]  Out: 1200 [Drains:200]    Intake/Output Summary (Last 24 hours) at 17 0908  Last data filed at 17 0539   Gross per 24 hour   Intake              574 ml   Output             1000 ml   Net             -426 ml       Ventilator Settings:  Ventilator  Mode: Spontaneous  Respiratory Rate  Resp Rate Observed: 15  Back-Up Rate: 12  Insp Time (sec): 1 sec  Insp Flow (l/min): 44 l/min  I:E Ratio: 1;4  Ventilator Volumes  Vt Set (ml): 400 ml  Vt Exhaled (Machine Breath) (ml): 398 ml  Vt Spont (ml): 395 ml  Ve Observed (l/min): 6.44 l/min  Ventilator Pressures  Pressure Support (cm H2O): 7 cm H2O  PIP Observed (cm H2O): 19 cm H2O  Plateau Pressure (cm H2O): 14 cm H2O  MAP (cm H2O): 8.2  PEEP/VENT (cm H2O): 5 cm H20  Auto PEEP Observed (cm H2O): 0 cm H2O      Physical Exam:      HEENT:  Pupils reactive, Anicteric sclerae; neck supple; no lymphadenopathy  Resp:  Symmetrical chest expansion, no accessory muscle use;  intermittent rhonchi b/l  CV:  S1, S2 present; regular rate and rhythm  GI:  Abdomen soft, (+) active bowel sounds  Extremities:  +2 pulses on all extremities; no edema/ cyanosis/ clubbing noted, left arm fistula with strong thrill   Skin:  Warm; no rashes/ lesions noted  Neurologic:  Awake, oriented to self, not to place.  Follows commands but with lag time Devices:    7/27 - NGT, ETT with michele tube      DATA:     Current Facility-Administered Medications   Medication Dose Route Frequency    piperacillin-tazobactam (ZOSYN) 2.25 g in 0.9% sodium chloride (MBP/ADV) 50 mL MBP  2.25 g IntraVENous Q8H    dextrose 5% infusion  30 mL/hr IntraVENous CONTINUOUS    [START ON 8/7/2017] doxercalciferol (HECTOROL) 4 mcg/2 mL injection 3 mcg  3 mcg IntraVENous Q MON, WED & FRI    [START ON 8/7/2017] epoetin benjamin (EPOGEN;PROCRIT) injection 10,000 Units  10,000 Units IntraVENous Q MON, WED & FRI    Piperacillin-tazobactam (ZOSYN) 0.75 gm Supplemental Dosing by Pharmacy   Other Rx Dosing/Monitoring    hydrocortisone (CORTEF) tablet 10 mg  10 mg Oral BID WITH MEALS    insulin lispro (HUMALOG) injection   SubCUTAneous Q6H    0.9% sodium chloride infusion  50 mL/hr IntraVENous CONTINUOUS    pantoprazole (PROTONIX) injection 40 mg  40 mg IntraVENous DAILY    levETIRAcetam (KEPPRA) 500 mg in 100 ml IVPB  500 mg IntraVENous Q12H    chlorhexidine (PERIDEX) 0.12 % mouthwash 10 mL  10 mL Oral Q12H    glucose chewable tablet 16 g  4 Tab Oral PRN    glucagon (GLUCAGEN) injection 1 mg  1 mg IntraMUSCular PRN    dextrose (D50W) injection syrg 12.5-25 g  25-50 mL IntraVENous PRN    0.9% sodium chloride infusion  100 mL/hr IntraVENous DIALYSIS PRN    naloxone [facility]) injection 0.4 mg  0.4 mg IntraVENous PRN    LORazepam (ATIVAN) injection 1 mg  1 mg IntraVENous Q4H PRN    fentaNYL citrate (PF) injection 50 mcg  50 mcg IntraVENous Q2H PRN         Labs: Results:       Chemistry Recent Labs      08/06/17 0152 08/05/17   0104 08/04/17   0205   GLU  106*  114*  166*   NA  142  145  145   K  3.1*  3.6  3.0*   CL  106  109*  107   CO2  29  24  24   BUN  42*  79*  82*   CREA  5.00*  7.06*  7.05*   CA  7.4*  7.7*  8.2*   AGAP  7  12  14   BUCR  8*  11*  12   AP   --    --   158*   TP   --    --   6.1*   ALB   --    --   2.0*   GLOB   --    --   4.1*   AGRAT   --    --   0.5*      CBC w/Diff Recent Labs      08/06/17 0152 08/05/17 0104 08/04/17   0205   WBC  15.9*  25.1*  31.2*   RBC  3.14*  3.42*  3.71*   HGB  9.6*  10.3*  11.2*   HCT  28.3*  30.8*  33.1*   PLT  144  134*  120*   GRANS  80*  83*  70   LYMPH  12*  8*  10*   EOS  1  1  0      Coagulation No results for input(s): PTP, INR, APTT in the last 72 hours. No lab exists for component: INREXT, INREXT    Liver Enzymes Recent Labs      08/04/17   0205   TP  6.1*   ALB  2.0*   AP  158*   SGOT  13*      ABG No results found for: PH, PHI, PCO2, PCO2I, PO2, PO2I, HCO3, HCO3I, FIO2, FIO2I   Microbiology Recent Labs      08/03/17   1335   CULT  MRSA target DNA is not detected (presumptive not colonized with MRSA)          Telemetry: [x]Sinus []A-flutter []Paced    []A-fib []Multiple PVCs                  Imaging:  [x]I have personally reviewed the patients radiographs  CXR Results  (Last 48 hours)    None        CT ABD/PELVIS 07/27/17: This exam is markedly limited by the lack of enteric and IV contrast. Small to moderate right pleural effusion. Tiny left pleural effusion. Small amount of ascites. Mild anasarca. Gallbladder wall thickening in the setting of anasarca and ascites can be problematic to differentiate between cholecystitis and due to ascites.      CT HEAD 07/27/17: No evidence of intracranial hemorrhages.  Multiple lacunar infarctions in bilateral thalami, likely to be old or subacute.  In left side of upper manny of brainstem there is lacunar infarction of undetermined age. Acute lacunar infarction in this area is not excluded. IMPRESSION:   · Fever-resolved, leukocytosis-improving, elevation may be related to stress dose steroid  · E coli bacteremia - on zosyn  · S/p PEA Cardiac Arrest on unknown etiology- one round epi unclear as to downtime; MI vs. ARF leading to arrest  · Acute hypoxic respiratory failure, RLL airspace disease, ? atelectasis vs pneumonia - serial CXR with improvement, no active infiltrates today  · Acute Encephalopathy- metabolic vs. Anoxic in setting of underlying CVA - mildly improving  · Septic Shock - resolved   · Suspicion for acute cholecystitis by Ct abd - HIDA scan normal  · Anemia of Chronic Disease   · ESRD- HD M,W,F  · Tobacco Abuse   · Thrombocytopenia   · Hyperglycemia in setting of steroid use (latest Hgb A1c 5.4)      PLAN:   · Resp - Extubated today after SBT on 8/4  ·  Aspiration precautions. HOB > 30 degrees. · Bronchial hygiene protocol    · ID - HIDA normal. Leukocytosis improving. Abx per ID - zosyn     · CVS - Hemodynamically stable. Follow closely    · Heme- HIT panel negative; no active bleeding from oropharyngeal area. Monitor for active bleeding    · Renal - HD recommendations per Nephrology    · Endocrine - frequent glycemic checks. Stop lantus and change maintenance to d5 1/2 NS at 100 ml/hr  · Cont cortef 10 mg PO BID (previous dose from outpatient). · Neuro/ Pain/ Sedation - Encephalopathic likely 2/2 Anoxic brain injury. Will need f/u    ·   GI - NPO while on SBT. If not extubated, resume enteral nutrition thru NGT. Monitor for residuals    · Prophylaxis - DVT(SCDs), GI(Pepcid)  · Stable for transfer to floor.             Chet Walker MD

## 2017-08-06 NOTE — PROGRESS NOTES
RENAL DAILY PROGRESS NOTE      IMPRESSION:   ESRD, MWF schedule  Anemia of CKD, on epo during HD  Access left arm graft   Respiratory failure, s/p extubation, improving   Hypokalemia  Hypocalcemia    PLAN:    Tolerated HD well , gave heparin during HD yesterday   Ordered potassium supplement today   No indication for HD , plan for HD tomorrow per his routine schedule  Adjust all meds per ESRD status. Subjective:     50y M with ESRD, s/p cardiac arrest   Complaint:    Overnight events noted  Remain in ICU,   Hemodynamic stable.    Denies for any chest pain, short of breath     Current Facility-Administered Medications   Medication Dose Route Frequency    dextrose 5 % - 0.45% NaCl infusion  100 mL/hr IntraVENous CONTINUOUS    piperacillin-tazobactam (ZOSYN) 2.25 g in 0.9% sodium chloride (MBP/ADV) 50 mL MBP  2.25 g IntraVENous Q8H    [START ON 8/7/2017] doxercalciferol (HECTOROL) 4 mcg/2 mL injection 3 mcg  3 mcg IntraVENous Q MON, WED & FRI    [START ON 8/7/2017] epoetin benjamin (EPOGEN;PROCRIT) injection 10,000 Units  10,000 Units IntraVENous Q MON, WED & FRI    Piperacillin-tazobactam (ZOSYN) 0.75 gm Supplemental Dosing by Pharmacy   Other Rx Dosing/Monitoring    hydrocortisone (CORTEF) tablet 10 mg  10 mg Oral BID WITH MEALS    insulin lispro (HUMALOG) injection   SubCUTAneous Q6H    pantoprazole (PROTONIX) injection 40 mg  40 mg IntraVENous DAILY    levETIRAcetam (KEPPRA) 500 mg in 100 ml IVPB  500 mg IntraVENous Q12H    glucose chewable tablet 16 g  4 Tab Oral PRN    glucagon (GLUCAGEN) injection 1 mg  1 mg IntraMUSCular PRN    dextrose (D50W) injection syrg 12.5-25 g  25-50 mL IntraVENous PRN    0.9% sodium chloride infusion  100 mL/hr IntraVENous DIALYSIS PRN    naloxone (NARCAN) injection 0.4 mg  0.4 mg IntraVENous PRN       Review of Symptoms: as above   Objective:     Patient Vitals for the past 24 hrs:   Temp Pulse Resp BP SpO2   08/06/17 1100 - 75 14 133/62 99 % 08/06/17 1000 - 77 18 141/72 99 %   08/06/17 0900 - 74 12 142/84 98 %   08/06/17 0819 - - - - 98 %   08/06/17 0800 97.9 °F (36.6 °C) 76 14 157/71 100 %   08/06/17 0700 - 73 16 151/73 99 %   08/06/17 0600 97.8 °F (36.6 °C) 74 18 148/68 99 %   08/06/17 0500 - 73 15 - 100 %   08/06/17 0400 98 °F (36.7 °C) 74 14 143/67 100 %   08/06/17 0300 - 77 17 147/70 100 %   08/06/17 0200 97.2 °F (36.2 °C) 79 15 150/70 100 %   08/06/17 0100 - 80 16 147/62 100 %   08/06/17 0000 98.2 °F (36.8 °C) 80 17 161/76 100 %   08/05/17 2300 - 82 16 156/69 98 %   08/05/17 2200 97.8 °F (36.6 °C) 77 16 154/71 99 %   08/05/17 2100 - 74 15 145/76 100 %   08/05/17 2000 97.2 °F (36.2 °C) 72 16 143/65 99 %   08/05/17 1915 - 73 18 (!) 137/118 100 %   08/05/17 1900 98 °F (36.7 °C) 72 17 147/74 100 %   08/05/17 1845 - 71 14 146/76 100 %   08/05/17 1830 - 72 15 143/77 100 %   08/05/17 1815 - 74 15 139/72 100 %   08/05/17 1800 - 73 16 133/72 100 %   08/05/17 1745 - 73 15 144/66 100 %   08/05/17 1730 - 75 15 128/72 100 %   08/05/17 1715 - 75 17 146/62 100 %   08/05/17 1700 - 76 15 102/55 98 %   08/05/17 1645 - 77 17 114/64 100 %   08/05/17 1630 - 76 16 105/61 100 %   08/05/17 1615 - 74 17 112/61 100 %   08/05/17 1600 98 °F (36.7 °C) 71 18 134/70 100 %   08/05/17 1530 - 75 16 123/66 100 %   08/05/17 1500 - 71 16 132/64 100 %   08/05/17 1400 - 70 15 129/81 99 %   08/05/17 1300 - 70 14 144/84 100 %   08/05/17 1200 97.5 °F (36.4 °C) 69 12 133/69 -   08/05/17 1150 97.5 °F (36.4 °C) - - - -        Weight change: -0.6 kg (-1 lb 5.2 oz)     08/04 1901 - 08/06 0700  In: 19984.5 [I.V.:15336.5]  Out: 1200 [Drains:200]    Intake/Output Summary (Last 24 hours) at 08/06/17 1133  Last data filed at 08/06/17 1100   Gross per 24 hour   Intake          2246.17 ml   Output             1000 ml   Net          1246.17 ml     Physical Exam:   General:    HEENT sclera anicteric,  CVS: S1S2 heard,  no rub  RS: + air entry b/l,   Abd: Soft, Non tender  Neuro awake   Extrm: no edema, no cyanosis, clubbing   Access; left arm graft         Data Review:     LABS:   Hematology:   Recent Labs      08/06/17   0152  08/05/17   0104  08/04/17   0205   WBC  15.9*  25.1*  31.2*   HGB  9.6*  10.3*  11.2*   HCT  28.3*  30.8*  33.1*     Chemistry:   Recent Labs      08/06/17 0152 08/05/17   0104 08/04/17   0205   BUN  42*  79*  82*   CREA  5.00*  7.06*  7.05*   CA  7.4*  7.7*  8.2*   ALB   --    --   2.0*   K  3.1*  3.6  3.0*   NA  142  145  145   CL  106  109*  107   CO2  29  24  24   PHOS  2.9  4.1   --    GLU  106*  114*  166*              Procedures/imaging: see electronic medical records for all procedures, Xrays and details which were not copied into this note but were reviewed prior to creation of Plan          Assessment & Plan:     As above           Amor Sharma MD  8/6/2017  3:09 PM

## 2017-08-06 NOTE — PROGRESS NOTES
[facility]  ICU Progress Note      Name: Whitney Mayes   : 1960   MRN: [id number]   Date: 2017      [x]I have reviewed the flowsheet and previous days notes. Events overnight reviewed and discussed with nursing staff. Vital signs and records reviewed. Subjective:   56 y/o male with hx significant for ESRD on HD, HTN, diabetes, anemia, and IBS, presented to the ED with cardiac arrest.     Pt remains on ventilatory support; minimal secretions thru ETT; + gag/ cough  Pt easily awakened on verbal stimulation; able to follow simple commands. Hemodialysis MWF. Remains hemodyamically stable. Afebrile overnight; WBC gradually decreasing. E coli and Clostridium perfringens bacteremia. HIDA scan normal.       [x]The patient is unable to give any meaningful history or review of systems because the patient is:  [x]Intubated []Sedated   []Unresponsive      [x]The patient is critically ill on      [x]Mechanical ventilation []Pressors   []BiPAP []            ROS:Review of systems not obtained due to patient factors.        Safety Bundles: VAP Bundle/ CAUTI/ Severe Sepsis Protocol    Vital Signs:    Visit Vitals    /76    Pulse 74    Temp 97.2 °F (36.2 °C)    Resp 15    Ht 6' 3\" (1.905 m)  Comment: per chart history    Wt 62.6 kg (138 lb 0.1 oz)    SpO2 100%    BMI 17.25 kg/m2       O2 Device: Nasal cannula   O2 Flow Rate (L/min): 1 l/min   Temp (24hrs), Av.6 °F (36.4 °C), Min:97.2 °F (36.2 °C), Max:98 °F (36.7 °C)       Intake/Output:   Last shift:         Last 3 shifts:  0701 -  1900  In: 8174.9 [I.V.:8174.9]  Out: 9320 [Drains:1200]    Intake/Output Summary (Last 24 hours) at 17  Last data filed at 17 190   Gross per 24 hour   Intake              150 ml   Output             1200 ml   Net            -1050 ml       Ventilator Settings:  Ventilator  Mode: Spontaneous  Respiratory Rate  Resp Rate Observed: 15  Back-Up Rate: 12  Insp Time (sec): 1 sec  Insp Flow (l/min): 44 l/min  I:E Ratio: 1;4  Ventilator Volumes  Vt Set (ml): 400 ml  Vt Exhaled (Machine Breath) (ml): 398 ml  Vt Spont (ml): 395 ml  Ve Observed (l/min): 6.44 l/min  Ventilator Pressures  Pressure Support (cm H2O): 7 cm H2O  PIP Observed (cm H2O): 19 cm H2O  Plateau Pressure (cm H2O): 14 cm H2O  MAP (cm H2O): 8.2  PEEP/VENT (cm H2O): 5 cm H20  Auto PEEP Observed (cm H2O): 0 cm H2O      Physical Exam:    General: Intubated; awakens to verbal stimulation  HEENT:  Pupils reactive, Anicteric sclerae; neck supple; no lymphadenopathy  Resp:  Symmetrical chest expansion, no accessory muscle use; no wheezing; intermittent rhonchi b/l  CV:  S1, S2 present; regular rate and rhythm  GI:  Abdomen soft, (+) active bowel sounds  Extremities:  +2 pulses on all extremities; no edema/ cyanosis/ clubbing noted, left arm fistula with strong thrill   Skin:  Warm; no rashes/ lesions noted  Neurologic:  Awakens to name calling, able to track; able to follow simple commands  Devices:    7/27 - NGT, ETT with michele tube      DATA:     Current Facility-Administered Medications   Medication Dose Route Frequency    piperacillin-tazobactam (ZOSYN) 2.25 g in 0.9% sodium chloride (MBP/ADV) 50 mL MBP  2.25 g IntraVENous Q8H    piperacillin-tazobactam (ZOSYN) 0.75 g in 0.9% sodium chloride 50 mL IVPB  0.75 g IntraVENous ONCE    dextrose 5% infusion  30 mL/hr IntraVENous CONTINUOUS    [START ON 8/7/2017] doxercalciferol (HECTOROL) 4 mcg/2 mL injection 3 mcg  3 mcg IntraVENous Q MON, WED & FRI    [START ON 8/7/2017] epoetin benjamin (EPOGEN;PROCRIT) injection 10,000 Units  10,000 Units IntraVENous Q MON, WED & FRI    Piperacillin-tazobactam (ZOSYN) 0.75 gm Supplemental Dosing by Pharmacy   Other Rx Dosing/Monitoring    hydrocortisone (CORTEF) tablet 10 mg  10 mg Oral BID WITH MEALS    insulin lispro (HUMALOG) injection   SubCUTAneous Q6H    insulin glargine (LANTUS) injection 15 Units  15 Units SubCUTAneous DAILY  0.9% sodium chloride infusion  50 mL/hr IntraVENous CONTINUOUS    pantoprazole (PROTONIX) injection 40 mg  40 mg IntraVENous DAILY    levETIRAcetam (KEPPRA) 500 mg in 100 ml IVPB  500 mg IntraVENous Q12H    chlorhexidine (PERIDEX) 0.12 % mouthwash 10 mL  10 mL Oral Q12H    glucose chewable tablet 16 g  4 Tab Oral PRN    glucagon (GLUCAGEN) injection 1 mg  1 mg IntraMUSCular PRN    dextrose (D50W) injection syrg 12.5-25 g  25-50 mL IntraVENous PRN    0.9% sodium chloride infusion  100 mL/hr IntraVENous DIALYSIS PRN    naloxone (NARCAN) injection 0.4 mg  0.4 mg IntraVENous PRN    LORazepam (ATIVAN) injection 1 mg  1 mg IntraVENous Q4H PRN    fentaNYL citrate (PF) injection 50 mcg  50 mcg IntraVENous Q2H PRN         Labs: Results:       Chemistry Recent Labs      08/05/17 0104 08/04/17 0205 08/03/17   0130   GLU  114*  166*  203*   NA  145  145  143   K  3.6  3.0*  2.8*   CL  109*  107  111*   CO2  24  24  24   BUN  79*  82*  54*   CREA  7.06*  7.05*  4.67*   CA  7.7*  8.2*  6.7*   AGAP  12  14  8   BUCR  11*  12  12   AP   --   158*  144*   TP   --   6.1*  5.0*   ALB   --   2.0*  1.6*   GLOB   --   4.1*  3.4   AGRAT   --   0.5*  0.5*      CBC w/Diff Recent Labs      08/05/17 0104 08/04/17 0205 08/03/17   0130   WBC  25.1*  31.2*  34.9*   RBC  3.42*  3.71*  3.19*   HGB  10.3*  11.2*  9.6*   HCT  30.8*  33.1*  29.0*   PLT  134*  120*  62*   GRANS  83*  70  79*   LYMPH  8*  10*  14*   EOS  1  0  0      Coagulation No results for input(s): PTP, INR, APTT in the last 72 hours.     No lab exists for component: INREXT, INREXT    Liver Enzymes Recent Labs      08/04/17 0205   TP  6.1*   ALB  2.0*   AP  158*   SGOT  13*      ABG No results found for: PH, PHI, PCO2, PCO2I, PO2, PO2I, HCO3, HCO3I, FIO2, FIO2I   Microbiology Recent Labs      08/03/17   1335   CULT  MRSA target DNA is not detected (presumptive not colonized with MRSA)          Telemetry: [x]Sinus []A-flutter []Paced    []A-fib []Multiple PVCs                  Imaging:  [x]I have personally reviewed the patients radiographs  CXR Results  (Last 48 hours)               08/04/17 0615  XR CHEST PORT Final result    Impression:  IMPRESSION:       Support tubes in expected position. .       Narrative:  Chest One View portable 0550 hours       CPT CODE: 96337       HISTORY: Multiorgan failure, abnormal liver function tests, GI bleed, end-stage   renal disease, status post code/cardiopulmonary arrest.       COMPARISON: Chest x-ray August 3 digit July 31, 2017. FINDINGS:        One view obtained. Endotracheal tube terminates 6 cm proximal to the bree. The   esophagogastric tube terminates in the proximal stomach. The cardiac and   mediastinal silhouette is normal. The lungs are clear. Pulmonary vascularity is   normal. The costophrenic angles are sharply defined. No bony abnormalities are   seen. CT ABD/PELVIS 07/27/17: This exam is markedly limited by the lack of enteric and IV contrast. Small to moderate right pleural effusion. Tiny left pleural effusion. Small amount of ascites. Mild anasarca. Gallbladder wall thickening in the setting of anasarca and ascites can be problematic to differentiate between cholecystitis and due to ascites. CT HEAD 07/27/17: No evidence of intracranial hemorrhages.  Multiple lacunar infarctions in bilateral thalami, likely to be old or subacute.  In left side of upper manny of brainstem there is lacunar infarction of undetermined age. Acute lacunar infarction in this area is not excluded. IMPRESSION:   · Fever-resolved, leukocytosis-improving, elevation may be related to stress dose steroid  · E coli bacteremia - on zosyn  · S/p PEA Cardiac Arrest on unknown etiology- one round epi unclear as to downtime; MI vs. ARF leading to arrest  · Acute hypoxic respiratory failure, RLL airspace disease, ? atelectasis vs pneumonia - serial CXR with improvement, no active infiltrates today  · Acute Encephalopathy- metabolic vs. Anoxic in setting of underlying CVA - mildly improving  · Septic Shock - resolved   · Suspicion for acute cholecystitis by Ct abd - HIDA scan normal  · Anemia of Chronic Disease   · ESRD- HD M,W,F  · Tobacco Abuse   · Thrombocytopenia   · Hyperglycemia in setting of steroid use (latest Hgb A1c 5.4)      PLAN:   · Resp - Extubated today after SBT  ·  Aspiration precautions. HOB > 30 degrees. · ID - HIDA normal. Leukocytosis improving. Abx per ID - zosyn     · CVS - hemodynamically stable. Follow closely    · Heme- HIT panel negative; no active bleeding from oropharyngeal area. Monitor for active bleeding    · Renal - HD recommendations per Nephrology    · Endocrine - frequent glycemic checks. D/c IV steroids - switch to cortef 10 mg PO BID (previous dose from outpatient). D/c insulin gtt - glycemic control with sliding scale and long-acting insulin. Monitor closely for hypoglycemia     · Neuro/ Pain/ Sedation - prn fentanyl and ativan as needed. Minimize sedative medications. EEG showing marked suppression background activity - continue keppra    · GI - NPO while on SBT. If not extubated, resume enteral nutrition thru NGT.  Monitor for residuals    · Prophylaxis - DVT(SCDs), GI(Pepcid)  · Discussed in interdisciplinary rounds          Evelyn Quiñones MD

## 2017-08-06 NOTE — PROGRESS NOTES
[facility]  Progress Note    Patient: Frank Marie Age: 55 y.o. : 1960 MR#: [id number] SSN: [id number]  Date/Time: 2017     Subjective:     Pt reports he's cold. Not oriented to place or time. Assessment/Plan:   Found unresponsive , brought to ED via EMS , went in to PEA , CPR/ACLS protocol with ROSC   Encephalopathy ? Toxic , / sepsis related , improving , more awake extubated. -  S/p PEA arrest. Cardiology was on case with plans to cath once stable from his other issues, currently are no longer on case and want to be re consulted if his condition improves. Heparin infusion was dc'd b/c of development of ?HIT, see below. Pt was just extubated yesterday, aim to call cardiolgy back on case for re-evaluation of cause for PEA once pt's stability ensured.    -Respiratory arrest in setting of above. was intubated, extubated . - patient continues to be full code     -Acute metabolic/anoxic encephalopathy:continued decreased mentation, per neuro notes, suspected cortical injury/ischemic stroke from cardiac arrest.   S/P EEG - Abnormal electroencephalogram due to the presence  of what might be an alpha  coma. No epileptic activity was  Appreciated. - continue supportive measures    - on keppra for seizure       -ESRD    - HD    - follow with renal       -  Sepsis - leucocytosis / () bottles grew E.coli and C.perferinges. Initially thought to be secondary to cholecystitis but w/u thus far including HIDA scan negative. Per ID possibly due to abdominal source. Repeat cultures negative. Per ID recs on pip/tazo to be switched to po cipro and metro until  when able to take PO meds. -Hypokalemia  - replace lytes -and follow. - Elevated LFT - ? Shock liver. LFT now wnl.     -Thrombocytopenia: ? HIT vs secondary to sepsis. Serotonin release assay negative. Count trending up. Cont to monitor. Avoid heparin products.         Case discussed with:  [x]Patient  []Family  []Nursing  []Case Management  DVT Prophylaxis:  []Lovenox  []Hep SQ  [x]SCDs  []Coumadin   []On Heparin gtt    Patient Active Problem List   Diagnosis Code    Anemia D64.9    Acidosis E87.2    Cardiac arrest ([facility] I46.9    Respiratory failure ([facility] J96.90    ESRD needing dialysis ([facility] N18.6, Z99.2    Anoxic brain damage ([facility] G93.1    Colitis K52.9    Hypotension I95.9    Acute GI bleeding K92.2    ESRD (end stage renal disease) ([facility] N18.6    Sepsis ([facility] A41.9       Objective:   VS:   Visit Vitals    /65    Pulse 74    Temp 98 °F (36.7 °C)    Resp 12    Ht 6' 3\" (1.905 m)  Comment: per chart history    Wt 62 kg (136 lb 11 oz)    SpO2 99%    BMI 17.08 kg/m2      Tmax/24hrs: Temp (24hrs), Av.8 °F (36.6 °C), Min:97.2 °F (36.2 °C), Max:98.2 °F (36.8 °C)  IOBRIEF    Intake/Output Summary (Last 24 hours) at 17 1557  Last data filed at 17 1100   Gross per 24 hour   Intake          2246.17 ml   Output             1000 ml   Net          1246.17 ml       General:  Alert awake in nad  HEENT:  NC, Atraumatic.anicteric sclerae. Pulmonary:  CTA Bilaterally. No Wheezing/Rhonchi/Rales. Cardiovascular: Regular rate and Rhythm. GI:  Soft, Non distended, Non tender. + Bowel sounds. Extremities:  No edema, cyanosis, clubbing.    Psych: Not examined            Medications:   Current Facility-Administered Medications   Medication Dose Route Frequency    dextrose 5 % - 0.45% NaCl infusion  100 mL/hr IntraVENous CONTINUOUS    piperacillin-tazobactam (ZOSYN) 2.25 g in 0.9% sodium chloride (MBP/ADV) 50 mL MBP  2.25 g IntraVENous Q8H    [START ON 2017] doxercalciferol (HECTOROL) 4 mcg/2 mL injection 3 mcg  3 mcg IntraVENous Q MON, WED & FRI    [START ON 2017] epoetin benjamin (EPOGEN;PROCRIT) injection 10,000 Units  10,000 Units IntraVENous Q MON, WED & FRI    Piperacillin-tazobactam (ZOSYN) 0.75 gm Supplemental Dosing by Pharmacy   Other Rx Dosing/Monitoring  hydrocortisone (CORTEF) tablet 10 mg  10 mg Oral BID WITH MEALS    insulin lispro (HUMALOG) injection   SubCUTAneous Q6H    pantoprazole (PROTONIX) injection 40 mg  40 mg IntraVENous DAILY    levETIRAcetam (KEPPRA) 500 mg in 100 ml IVPB  500 mg IntraVENous Q12H    glucose chewable tablet 16 g  4 Tab Oral PRN    glucagon (GLUCAGEN) injection 1 mg  1 mg IntraMUSCular PRN    dextrose (D50W) injection syrg 12.5-25 g  25-50 mL IntraVENous PRN    0.9% sodium chloride infusion  100 mL/hr IntraVENous DIALYSIS PRN    naloxone (NARCAN) injection 0.4 mg  0.4 mg IntraVENous PRN       Labs:    Recent Results (from the past 24 hour(s))   GLUCOSE, POC    Collection Time: 08/05/17  5:23 PM   Result Value Ref Range    Glucose (POC) 63 (L) 70 - 110 mg/dL   GLUCOSE, POC    Collection Time: 08/05/17  6:04 PM   Result Value Ref Range    Glucose (POC) 124 (H) 70 - 110 mg/dL   GLUCOSE, POC    Collection Time: 08/05/17  7:17 PM   Result Value Ref Range    Glucose (POC) 102 70 - 110 mg/dL   GLUCOSE, POC    Collection Time: 08/05/17  7:20 PM   Result Value Ref Range    Glucose (POC) 80 70 - 110 mg/dL   GLUCOSE, POC    Collection Time: 08/05/17 11:45 PM   Result Value Ref Range    Glucose (POC) 50 (LL) 70 - 110 mg/dL   GLUCOSE, POC    Collection Time: 08/06/17 12:23 AM   Result Value Ref Range    Glucose (POC) 136 (H) 70 - 110 mg/dL   CALCIUM, IONIZED    Collection Time: 08/06/17  1:52 AM   Result Value Ref Range    Ionized Calcium 1.06 (L) 1.12 - 1.32 MMOL/L   CBC WITH AUTOMATED DIFF    Collection Time: 08/06/17  1:52 AM   Result Value Ref Range    WBC 15.9 (H) 4.6 - 13.2 K/uL    RBC 3.14 (L) 4.70 - 5.50 M/uL    HGB 9.6 (L) 13.0 - 16.0 g/dL    HCT 28.3 (L) 36.0 - 48.0 %    MCV 90.1 74.0 - 97.0 FL    MCH 30.6 24.0 - 34.0 PG    MCHC 33.9 31.0 - 37.0 g/dL    RDW 16.1 (H) 11.6 - 14.5 %    PLATELET 534 985 - 288 K/uL    MPV 11.6 9.2 - 11.8 FL    NEUTROPHILS 80 (H) 40 - 73 %    LYMPHOCYTES 12 (L) 21 - 52 %    MONOCYTES 7 3 - 10 % EOSINOPHILS 1 0 - 5 %    BASOPHILS 0 0 - 2 %    ABS. NEUTROPHILS 12.7 (H) 1.8 - 8.0 K/UL    ABS. LYMPHOCYTES 1.9 0.9 - 3.6 K/UL    ABS. MONOCYTES 1.2 0.05 - 1.2 K/UL    ABS. EOSINOPHILS 0.1 0.0 - 0.4 K/UL    ABS.  BASOPHILS 0.0 0.0 - 0.1 K/UL    DF AUTOMATED     GLUCOSE, POC    Collection Time: 08/06/17  1:52 AM   Result Value Ref Range    Glucose (POC) 104 70 - 647 mg/dL   METABOLIC PANEL, BASIC    Collection Time: 08/06/17  1:52 AM   Result Value Ref Range    Sodium 142 136 - 145 mmol/L    Potassium 3.1 (L) 3.5 - 5.5 mmol/L    Chloride 106 100 - 108 mmol/L    CO2 29 21 - 32 mmol/L    Anion gap 7 3.0 - 18 mmol/L    Glucose 106 (H) 74 - 99 mg/dL    BUN 42 (H) 7.0 - 18 MG/DL    Creatinine 5.00 (H) 0.6 - 1.3 MG/DL    BUN/Creatinine ratio 8 (L) 12 - 20      GFR est AA 15 (L) >60 ml/min/1.73m2    GFR est non-AA 12 (L) >60 ml/min/1.73m2    Calcium 7.4 (L) 8.5 - 10.1 MG/DL   MAGNESIUM    Collection Time: 08/06/17  1:52 AM   Result Value Ref Range    Magnesium 2.1 1.6 - 2.6 mg/dL   PHOSPHORUS    Collection Time: 08/06/17  1:52 AM   Result Value Ref Range    Phosphorus 2.9 2.5 - 4.9 MG/DL   GLUCOSE, POC    Collection Time: 08/06/17  4:34 AM   Result Value Ref Range    Glucose (POC) 106 70 - 110 mg/dL   GLUCOSE, POC    Collection Time: 08/06/17  6:38 AM   Result Value Ref Range    Glucose (POC) 100 70 - 110 mg/dL   GLUCOSE, POC    Collection Time: 08/06/17 10:22 AM   Result Value Ref Range    Glucose (POC) 115 (H) 70 - 110 mg/dL       Signed By: Lis Caballero MD     August 6, 2017

## 2017-08-06 NOTE — ROUTINE PROCESS
HEMODIALYSIS IN PROGRESS. TOLERATING WELL. ACCUCHECK 63, AROUSES EASILY. GIVEN D50%, 1 AMP IV.   1804 ACCUCHECK RECHECKED AND .  1917 ACCUCHECK RECHECKED AND  BUT THE SPECIMEN LOOKED LIKE JUST SERUM RATHER THAN A GOOD BLOOD SAMPLE THEREFORE IT WAS REPEATED AND IS [de-identified].  DR OWENS /HOSPITALIST IN UNIT. SPOKE TO HIM REGARDING THE BLOOD SUGARS THIS DOUGLAS, LANTUS INSULIN DOSE,  FAILED SWALLOW EVAL, NO FEEDING TUBE AND THE POSSIBILITY OF AND IVF FOR THE NIGHT. HE STATED TO  STOP THE LANTUS  INSULIN AND  CALL THE RENAL DOC FOR FLUIDS.

## 2017-08-06 NOTE — ROUTINE PROCESS
Fingerstick shows glucose = 50, confirmed x 2,   1 amp D50 given per Intensivist and Lantus discontinues, will recheck glucose in 10 minutes

## 2017-08-06 NOTE — ROUTINE PROCESS
CHANGE OF SHIFT REPORT GIVEN TO Kita Pineda RN BY [facility] KEVIN MUELLER USING SBKATHIA HAM KARDEX.  ALSO, INFORMED OF NEED TO CALL DR KRUPA REARDON, NEPHROLOGY FOR IV FLUIDS

## 2017-08-06 NOTE — ROUTINE PROCESS
Report given to on coming shift, with visual inspection of patient, review of MAR, Kardex and events over past 12 hours including hypoglycemic episode and follow up. Pt stable at time of transfer.

## 2017-08-07 ENCOUNTER — APPOINTMENT (OUTPATIENT)
Dept: GENERAL RADIOLOGY | Age: 57
DRG: 004 | End: 2017-08-07
Attending: INTERNAL MEDICINE
Payer: MEDICARE

## 2017-08-07 LAB
ANION GAP BLD CALC-SCNC: 10 MMOL/L (ref 3–18)
BACTERIA SPEC CULT: NORMAL
BACTERIA SPEC CULT: NORMAL
BUN SERPL-MCNC: 52 MG/DL (ref 7–18)
BUN/CREAT SERPL: 8 (ref 12–20)
CALCIUM SERPL-MCNC: 7.1 MG/DL (ref 8.5–10.1)
CHLORIDE SERPL-SCNC: 105 MMOL/L (ref 100–108)
CO2 SERPL-SCNC: 26 MMOL/L (ref 21–32)
CREAT SERPL-MCNC: 6.61 MG/DL (ref 0.6–1.3)
GLUCOSE BLD STRIP.AUTO-MCNC: 191 MG/DL (ref 70–110)
GLUCOSE BLD STRIP.AUTO-MCNC: 207 MG/DL (ref 70–110)
GLUCOSE BLD STRIP.AUTO-MCNC: 253 MG/DL (ref 70–110)
GLUCOSE SERPL-MCNC: 148 MG/DL (ref 74–99)
POTASSIUM SERPL-SCNC: 3.3 MMOL/L (ref 3.5–5.5)
SERVICE CMNT-IMP: NORMAL
SERVICE CMNT-IMP: NORMAL
SODIUM SERPL-SCNC: 141 MMOL/L (ref 136–145)

## 2017-08-07 PROCEDURE — 92611 MOTION FLUOROSCOPY/SWALLOW: CPT

## 2017-08-07 PROCEDURE — 82962 GLUCOSE BLOOD TEST: CPT

## 2017-08-07 PROCEDURE — 74011250636 HC RX REV CODE- 250/636: Performed by: PSYCHIATRY & NEUROLOGY

## 2017-08-07 PROCEDURE — 74011000258 HC RX REV CODE- 258: Performed by: INTERNAL MEDICINE

## 2017-08-07 PROCEDURE — 77030011256 HC DRSG MEPILEX <16IN NO BORD MOLN -A

## 2017-08-07 PROCEDURE — 74011636637 HC RX REV CODE- 636/637: Performed by: HOSPITALIST

## 2017-08-07 PROCEDURE — 74011250637 HC RX REV CODE- 250/637: Performed by: PHYSICIAN ASSISTANT

## 2017-08-07 PROCEDURE — 74011250636 HC RX REV CODE- 250/636: Performed by: INTERNAL MEDICINE

## 2017-08-07 PROCEDURE — 74230 X-RAY XM SWLNG FUNCJ C+: CPT

## 2017-08-07 PROCEDURE — 80048 BASIC METABOLIC PNL TOTAL CA: CPT | Performed by: INTERNAL MEDICINE

## 2017-08-07 PROCEDURE — 90935 HEMODIALYSIS ONE EVALUATION: CPT

## 2017-08-07 PROCEDURE — 74011636637 HC RX REV CODE- 636/637: Performed by: PHYSICIAN ASSISTANT

## 2017-08-07 PROCEDURE — 74011000255 HC RX REV CODE- 255: Performed by: INTERNAL MEDICINE

## 2017-08-07 PROCEDURE — 65270000029 HC RM PRIVATE

## 2017-08-07 PROCEDURE — C9113 INJ PANTOPRAZOLE SODIUM, VIA: HCPCS | Performed by: INTERNAL MEDICINE

## 2017-08-07 PROCEDURE — 93308 TTE F-UP OR LMTD: CPT

## 2017-08-07 RX ORDER — DOXERCALCIFEROL 4 UG/2ML
3 INJECTION INTRAVENOUS
Status: DISCONTINUED | OUTPATIENT
Start: 2017-08-07 | End: 2017-08-30

## 2017-08-07 RX ORDER — GUAIFENESIN 100 MG/5ML
81 LIQUID (ML) ORAL DAILY
Status: DISCONTINUED | OUTPATIENT
Start: 2017-08-08 | End: 2017-09-22 | Stop reason: HOSPADM

## 2017-08-07 RX ORDER — INSULIN LISPRO 100 [IU]/ML
INJECTION, SOLUTION INTRAVENOUS; SUBCUTANEOUS
Status: DISCONTINUED | OUTPATIENT
Start: 2017-08-07 | End: 2017-08-19

## 2017-08-07 RX ADMIN — DEXTROSE MONOHYDRATE AND SODIUM CHLORIDE 100 ML/HR: 5; .45 INJECTION, SOLUTION INTRAVENOUS at 05:20

## 2017-08-07 RX ADMIN — INSULIN LISPRO 3 UNITS: 100 INJECTION, SOLUTION INTRAVENOUS; SUBCUTANEOUS at 22:29

## 2017-08-07 RX ADMIN — INSULIN LISPRO 6 UNITS: 100 INJECTION, SOLUTION INTRAVENOUS; SUBCUTANEOUS at 17:57

## 2017-08-07 RX ADMIN — PANTOPRAZOLE SODIUM 40 MG: 40 INJECTION, POWDER, FOR SOLUTION INTRAVENOUS at 08:38

## 2017-08-07 RX ADMIN — HYDROCORTISONE 10 MG: 10 TABLET ORAL at 08:38

## 2017-08-07 RX ADMIN — BARIUM SULFATE 30 ML: 400 PASTE ORAL at 14:00

## 2017-08-07 RX ADMIN — BARIUM SULFATE 700 MG: 700 TABLET ORAL at 14:00

## 2017-08-07 RX ADMIN — PIPERACILLIN AND TAZOBACTAM 0.75 G: 2; .25 INJECTION, POWDER, FOR SOLUTION INTRAVENOUS at 14:31

## 2017-08-07 RX ADMIN — DOXERCALCIFEROL 3 MCG: 4 INJECTION, SOLUTION INTRAVENOUS at 10:56

## 2017-08-07 RX ADMIN — INSULIN LISPRO 9 UNITS: 100 INJECTION, SOLUTION INTRAVENOUS; SUBCUTANEOUS at 00:33

## 2017-08-07 RX ADMIN — BARIUM SULFATE 45 ML: 400 SUSPENSION ORAL at 14:00

## 2017-08-07 RX ADMIN — PIPERACILLIN AND TAZOBACTAM 2.25 G: 2; .25 INJECTION, POWDER, FOR SOLUTION INTRAVENOUS at 05:21

## 2017-08-07 RX ADMIN — PIPERACILLIN AND TAZOBACTAM 2.25 G: 2; .25 INJECTION, POWDER, FOR SOLUTION INTRAVENOUS at 17:54

## 2017-08-07 RX ADMIN — HYDROCORTISONE 10 MG: 10 TABLET ORAL at 17:53

## 2017-08-07 RX ADMIN — ERYTHROPOIETIN 10000 UNITS: 10000 INJECTION, SOLUTION INTRAVENOUS; SUBCUTANEOUS at 10:56

## 2017-08-07 RX ADMIN — BARIUM SULFATE 30 G: 960 POWDER, FOR SUSPENSION ORAL at 14:00

## 2017-08-07 RX ADMIN — LEVETIRACETAM 500 MG: 5 INJECTION INTRAVENOUS at 17:53

## 2017-08-07 RX ADMIN — INSULIN LISPRO 6 UNITS: 100 INJECTION, SOLUTION INTRAVENOUS; SUBCUTANEOUS at 05:25

## 2017-08-07 NOTE — DIABETES MGMT
GLYCEMIC CONTROL PLAN OF CARE    POC BG range on 08/06/2017: 100-268 mg/dL. POC BG report on 08/07/2017 at time of review: 253, 207 mg/dL. Echo 08/07/2017 and plan for diagnostic cardiac cath 08/08/2017. Recommendation(s):  1.) Change frequency of correctional lispro insulin to ACHS since diet changed from NPO to renal soft solids 1 nectar.  2.) Assess for need to add basal insulin given elevated blood glucose readings. Assessment:  Patient is 55year old with past medical history including type 2 diabetes,  mellitus, htypertension, irritable bowel syndrome, tubercolosis, chronic back pain, ESRD on hemodialysis, tobacco use, and arthritis - was admitted on 07/27/2017 via EMS found unresponsive. Noted:  Cardiac arrest.  Acute respiratory failure. Status post extubation 08/04/2017. Septic shock. NSTEMI. Encephalopathy, improving cognitive function. ESRD on dialysis. Type 2 diabetes mellitus with A1C of 5.4% (07/29/2017). Most recent blood glucose values    Results for Giacomo Ugalde (MRN [id number]) as of 8/7/2017 16:08   Ref. Range 8/6/2017 00:23 8/6/2017 01:52 8/6/2017 04:34 8/6/2017 06:38 8/6/2017 10:22 8/6/2017 17:12 8/6/2017 23:39   GLUCOSE,FAST - POC Latest Ref Range: 70 - 110 mg/dL 136 (H) 104 106 100 115 (H) 268 (H) 221 (H)     Results for Giacomo Ugalde (MRN [id number]) as of 8/7/2017 16:08   Ref. Range 8/7/2017 00:32 8/7/2017 05:24   GLUCOSE,FAST - POC Latest Ref Range: 70 - 110 mg/dL 253 (H) 207 (H)     Current A1C of 5.4% is equivalent to average blood glucose of 108 mg/dl over the past 2-3 months. Current hospital diabetes medications:   Correctional Lispro insulin ACHS. Very resistant dose. Previous day's insulin requirements:  08/06/2017  Lispro: 9 units    Home diabetes medications: None. Diet:  Renal soft solids; 1 nectar ordered 08/07/2017. Goal: Patient's blood glucose will be within target range of  mg/dL by 08/10/2017.     Education:  ____Refer to Diabetes Education Record             __x__Education not indicated at this time      Emelia Mary RN

## 2017-08-07 NOTE — PROGRESS NOTES
RENAL DAILY PROGRESS NOTE      IMPRESSION:   ESRD, MWF schedule  Anemia of CKD, on epo during HD  Access left arm graft   Respiratory failure, s/p extubation, improving   Hypokalemia  Hypocalcemia    PLAN:   At 12:29 PM on 2017, I saw and examined patient during hemodialysis treatment. The patient was receiving hemodialysis for treatment of  renal failure. I have also reviewed vital signs, intake and output, lab results and recent events, and agreed with today's dialysis order. Adjust all meds per ESRD status. HD rounding    Blood pressure 142/80, pulse 80, temperature 98.1 °F (36.7 °C), temperature source Axillary, resp. rate 16, height 6' 3\" (1.905 m), weight 62 kg (136 lb 11 oz), SpO2 95 %. Temp (24hrs), Av.9 °F (36.6 °C), Min:96.3 °F (35.7 °C), Max:99.1 °F (37.3 °C)      Blood Pressure: BP: 142/80  Pulse: Pulse (Heart Rate): 80  Temp:  Temp: 98.1 °F (36.7 °C)    Artificial Kidney Dialyzer/Set Up Inspection: Revaclear   hours Duration of Treatment (hours): 3 hours   Heparin Bolus Heparin Bolus (units): 2000 units (verbal order per Dr. Mally Akhtar by ICU MD Rosenberg)  Blood flow rate Blood Flow Rate (ml/min): 200 ml/min   Dialysate rate     Arterial Access Pressure Arterial Access Pressure (mmHg): -70  Venous Return Pressure Venous Return Pressure (mmHg): 50  Ultrafiltration Rate Goal/Amount of Fluid to Remove (mL): 1000 mL  Fluid Removal Fluid Removed (mL): 1500  Net Fluid Removal NET Fluid Removed (mL): 1000 ml                Subjective:     56y M with ESRD, s/p cardiac arrest   Complaint:    Overnight events noted  Remain in ICU,   Hemodynamic stable.    Denies for any chest pain, short of breath     Current Facility-Administered Medications   Medication Dose Route Frequency    doxercalciferol (HECTOROL) 4 mcg/2 mL injection 3 mcg  3 mcg IntraVENous DIALYSIS MON, WED & FRI    epoetin benjamin (EPOGEN;PROCRIT) injection 10,000 Units  10,000 Units IntraVENous DIALYSIS MON, WED & FRI  piperacillin-tazobactam (ZOSYN) 0.75 g in 0.9% sodium chloride 50 mL IVPB  0.75 g IntraVENous ONCE    dextrose 5 % - 0.45% NaCl infusion  100 mL/hr IntraVENous CONTINUOUS    piperacillin-tazobactam (ZOSYN) 2.25 g in 0.9% sodium chloride (MBP/ADV) 50 mL MBP  2.25 g IntraVENous Q8H    Piperacillin-tazobactam (ZOSYN) 0.75 gm Supplemental Dosing by Pharmacy   Other Rx Dosing/Monitoring    hydrocortisone (CORTEF) tablet 10 mg  10 mg Oral BID WITH MEALS    insulin lispro (HUMALOG) injection   SubCUTAneous Q6H    pantoprazole (PROTONIX) injection 40 mg  40 mg IntraVENous DAILY    levETIRAcetam (KEPPRA) 500 mg in 100 ml IVPB  500 mg IntraVENous Q12H    glucose chewable tablet 16 g  4 Tab Oral PRN    glucagon (GLUCAGEN) injection 1 mg  1 mg IntraMUSCular PRN    dextrose (D50W) injection syrg 12.5-25 g  25-50 mL IntraVENous PRN    0.9% sodium chloride infusion  100 mL/hr IntraVENous DIALYSIS PRN    naloxone (NARCAN) injection 0.4 mg  0.4 mg IntraVENous PRN       Review of Symptoms: as above   Objective:     Patient Vitals for the past 24 hrs:   Temp Pulse Resp BP SpO2   08/07/17 1219 98.1 °F (36.7 °C) 80 16 142/80 -   08/07/17 1200 - 79 16 127/79 -   08/07/17 1130 - 70 16 146/84 -   08/07/17 1100 - 76 16 130/68 -   08/07/17 1030 - 75 16 140/81 -   08/07/17 1000 - 77 16 129/76 -   08/07/17 0930 - 79 16 111/66 -   08/07/17 0900 97.3 °F (36.3 °C) 88 16 155/88 -   08/07/17 0747 96.3 °F (35.7 °C) 72 18 139/72 95 %   08/07/17 0415 97.3 °F (36.3 °C) 76 16 132/66 100 %   08/07/17 0124 97.8 °F (36.6 °C) 86 16 117/57 100 %   08/07/17 0000 98.7 °F (37.1 °C) 81 15 122/58 100 %   08/06/17 2300 - 81 14 - 100 %   08/06/17 2200 - 83 17 129/60 99 %   08/06/17 2115 - - - - 98 %   08/06/17 2100 - 81 16 132/67 98 %   08/06/17 2000 98.6 °F (37 °C) 80 14 121/60 99 %   08/06/17 1900 - 85 15 124/70 99 %   08/06/17 1800 - 81 15 125/77 97 %   08/06/17 1700 - 80 14 122/63 98 %   08/06/17 1600 99.1 °F (37.3 °C) 82 15 118/60 99 % 08/06/17 1500 - 79 14 136/66 99 %        Weight change:      08/05 1901 - 08/07 0700  In: 4444.5 [I.V.:4444.5]  Out: 500 [Drains:500]    Intake/Output Summary (Last 24 hours) at 08/07/17 1428  Last data filed at 08/07/17 1219   Gross per 24 hour   Intake          1848.33 ml   Output             1500 ml   Net           348.33 ml     Physical Exam:   General:    HEENT sclera anicteric,  CVS: S1S2 heard,  no rub  RS: + air entry b/l,   Abd: Soft, Non tender  Neuro awake   Extrm: no edema, no cyanosis, clubbing   Access; left arm graft         Data Review:     LABS:   Hematology:   Recent Labs      08/06/17   0152  08/05/17   0104   WBC  15.9*  25.1*   HGB  9.6*  10.3*   HCT  28.3*  30.8*     Chemistry:   Recent Labs      08/07/17   0845  08/06/17   0152  08/05/17   0104   BUN  52*  42*  79*   CREA  6.61*  5.00*  7.06*   CA  7.1*  7.4*  7.7*   K  3.3*  3.1*  3.6   NA  141  142  145   CL  105  106  109*   CO2  26  29  24   PHOS   --   2.9  4.1   GLU  148*  106*  114*              Procedures/imaging: see electronic medical records for all procedures, Xrays and details which were not copied into this note but were reviewed prior to creation of Plan          Assessment & Plan:     As above           Radha Iraheta MD  8/7/2017  3:09 PM

## 2017-08-07 NOTE — PROGRESS NOTES
MBS completed with recs of soft solid diet and nectar-thick liquids, meds as tolerated. Full report to follow.      Thank you for this referral.    Juventino Downs M.S. CCC-SLP/L  Speech-Language Pathologist

## 2017-08-07 NOTE — PROGRESS NOTES
[facility]  -  Progress Note      Patient: Karlos Grimaldo MRN: [id number]  SSN: [id number]    YOB: 1960  Age: 55 y.o. Sex: male      Admit Date: 7/27/2017    Assessment:     Hospital Problems  Date Reviewed: 7/27/2017          Codes Class Noted POA    Acidosis ICD-10-CM: E87.2  ICD-9-CM: 276.2  7/27/2017 Unknown        Cardiac arrest [facility]) ICD-10-CM: I46.9  ICD-9-CM: 427.5  7/27/2017 Unknown        Respiratory failure ([facility] ICD-10-CM: J96.90  ICD-9-CM: 518.81  7/27/2017 Unknown        ESRD needing dialysis ([facility] ICD-10-CM: N18.6, Z99.2  ICD-9-CM: 585.6  7/27/2017 Unknown        Anoxic brain damage ([place] [address].) ICD-10-CM: G93.1  ICD-9-CM: 348.1  7/27/2017 Unknown        Colitis ICD-10-CM: K52.9  ICD-9-CM: 558.9  7/27/2017 Unknown        Hypotension ICD-10-CM: I95.9  ICD-9-CM: 458.9  7/27/2017 Unknown        Acute GI bleeding ICD-10-CM: K92.2  ICD-9-CM: 578.9  7/27/2017 Unknown        ESRD (end stage renal disease) ([place] [address].) ICD-10-CM: N18.6  ICD-9-CM: 585.6  7/27/2017 Unknown        * (Principal)Sepsis ([facility] ICD-10-CM: A41.9  ICD-9-CM: 038.9, 995.91  7/27/2017 Unknown        Anemia ICD-10-CM: D64.9  ICD-9-CM: 617. 9  Unknown Unknown            -- PEA arrest, s/p CPR with one round of epi, 2 amps of d50. Unclear how long until ROSC but pt had ROSC prior to arrival.. Found down at home, became agonal and pulseless in ambulance. Probable anoxic brain injury  -- acute MI.  Troponin level > 100, suspect has significant CAD, no heparin infusion  -- Ischemic cardiomyopathy.  EF 35-40% on echo with RWMAs  -Anemia, s/p 2 units PRBCs at [facility] 7/26/17  -ESRD, noncompliant with dialysis, dialyzed at [facility] 7/26/17  -Sepsis. Improving, now off vasopressors  -Elevated LFTs and now with worsening thrombocytopnenia  -Tobacco use disorder    Plan:     Patient has appeared to survive the worse part of this illness and is recovering and seems to have good cognitive function and answers questions well.   We will get a limited echo in the am and consider a diagnostic cath tomorrow with more recommendations to follow. Continue current plan and medical regimen    Subjective:     No new complaints.      Objective:      Patient Vitals for the past 8 hrs:   Temp Pulse Resp BP SpO2   08/07/17 1219 98.1 °F (36.7 °C) 80 16 142/80 -   08/07/17 1200 - 79 16 127/79 -   08/07/17 1130 - 70 16 146/84 -   08/07/17 1100 - 76 16 130/68 -   08/07/17 1030 - 75 16 140/81 -   08/07/17 1000 - 77 16 129/76 -   08/07/17 0930 - 79 16 111/66 -   08/07/17 0900 97.3 °F (36.3 °C) 88 16 155/88 -   08/07/17 0747 96.3 °F (35.7 °C) 72 18 139/72 95 %         Patient Vitals for the past 96 hrs:   Weight   08/06/17 0207 62 kg (136 lb 11 oz)   08/05/17 0001 62.6 kg (138 lb 0.1 oz)   08/03/17 2241 77.5 kg (170 lb 12.8 oz)         Intake/Output Summary (Last 24 hours) at 08/07/17 1313  Last data filed at 08/07/17 1219   Gross per 24 hour   Intake          1948.33 ml   Output             1500 ml   Net           448.33 ml       Physical Exam:  General:  alert, cooperative, no distress, appears stated age  Neck:  no JVD  Lungs:  mild rhonchi without wheeze   Heart:  regular rate and rhythm, S1, S2 normal, no murmur, click, rub or gallop  Extremities:  extremities normal, atraumatic, no cyanosis or edema    Data Review:     Labs: Results:       Chemistry Recent Labs      08/07/17   0845  08/06/17   0152  08/05/17   0104   GLU  148*  106*  114*   NA  141  142  145   K  3.3*  3.1*  3.6   CL  105  106  109*   CO2  26  29  24   BUN  52*  42*  79*   CREA  6.61*  5.00*  7.06*   CA  7.1*  7.4*  7.7*   MG   --   2.1  2.7*   PHOS   --   2.9  4.1   AGAP  10  7  12   BUCR  8*  8*  11*      CBC w/Diff Recent Labs      08/06/17   0152  08/05/17   0104   WBC  15.9*  25.1*   RBC  3.14*  3.42*   HGB  9.6*  10.3*   HCT  28.3*  30.8*   PLT  144  134*   GRANS  80*  83*   LYMPH  12*  8*   EOS  1  1      Cardiac Enzymes No results found for: CPK, CKMMB, CKMB, RCK3, CKMBT, CKNDX, CKND1, WILFRIDO, TROPT, TROIQ, JOELLEN, TROPT, TNIPOC, BNP, BNPP   Coagulation No results for input(s): PTP, INR, APTT in the last 72 hours. No lab exists for component: INREXT    Lipid Panel No results found for: CHOL, CHOLPOCT, CHOLX, CHLST, CHOLV, 510722, HDL, LDL, LDLC, DLDLP, 618016, VLDLC, VLDL, TGLX, TRIGL, TRIGP, TGLPOCT, CHHD, CHHDX   BNP No results found for: BNP, BNPP, XBNPT   Liver Enzymes No results for input(s): TP, ALB, TBIL, AP, SGOT, GPT in the last 72 hours.     No lab exists for component: DBIL   Digoxin    Thyroid Studies Lab Results   Component Value Date/Time    TSH 7.52 08/03/2017 01:01 PM

## 2017-08-07 NOTE — PROGRESS NOTES
Infectious Disease Progress Note    Requested by: Dr. Melany Hameed    Reason: sepsis, e.coli/clostridium pefringens bacteremia    Current abx Prior abx   Pip/tazo since 8/2 Levofloxacin 7/27  Pip/tazo 7/27-7/31  Meropenem  7/31-8/2, vancomycin  7/27-8/2     Lines:       Assessment :  55year old man with h/o type 2 DM (hgb a1c 5.4 on 7/29/17) ,ESRD admitted to [facility] on 7/27/17 s/p cardiorespiratory arrest, hypotension, bacteremia, elevated LFTs. Highly complex clinical picture. Presentation c/w sepsis (POA) due to e.coli, clostridium perfringens bloodstream infection (positive blood culture 7/27, negative blood culture 8/1) . Most likely source of bacteremia is intra abdominal infection/inflammation. Elevated LFTs, thickened gallbladder - no evidence of cholecystitis on HIDA scan 8/2/17    Patient could have some other undiagnosed abdominal inflammation such as ischemic colitis which could have contributed to polymicrobial bacteremia    Increasing wbc - likely steroids related    Clinically better. Off pressors. More alert    Recommendations:    1. cont pip/tazo  2. Switch to po ciprofloxacin and metronidazole till 8/8 when ready to take po meds    Advance Care planning: full code, patient wasnt able to name a surrogate decision maker or provide an advance care plan    Please call me if any further questions or concerns. Will continue to participate in the care of this patient.     subjective:    Denies abdominal pain, nausea, vomiting, cp,sob, chills.         Home medications:   list reviewed        Current Facility-Administered Medications   Medication Dose Route Frequency    doxercalciferol (HECTOROL) 4 mcg/2 mL injection 3 mcg  3 mcg IntraVENous DIALYSIS MON, WED & FRI    epoetin benjamin (EPOGEN;PROCRIT) injection 10,000 Units  10,000 Units IntraVENous DIALYSIS MON, WED & FRI    piperacillin-tazobactam (ZOSYN) 0.75 g in 0.9% sodium chloride 50 mL IVPB  0.75 g IntraVENous ONCE    dextrose 5 % - 0.45% NaCl infusion 100 mL/hr IntraVENous CONTINUOUS    piperacillin-tazobactam (ZOSYN) 2.25 g in 0.9% sodium chloride (MBP/ADV) 50 mL MBP  2.25 g IntraVENous Q8H    Piperacillin-tazobactam (ZOSYN) 0.75 gm Supplemental Dosing by Pharmacy   Other Rx Dosing/Monitoring    hydrocortisone (CORTEF) tablet 10 mg  10 mg Oral BID WITH MEALS    insulin lispro (HUMALOG) injection   SubCUTAneous Q6H    pantoprazole (PROTONIX) injection 40 mg  40 mg IntraVENous DAILY    levETIRAcetam (KEPPRA) 500 mg in 100 ml IVPB  500 mg IntraVENous Q12H    glucose chewable tablet 16 g  4 Tab Oral PRN    glucagon (GLUCAGEN) injection 1 mg  1 mg IntraMUSCular PRN    dextrose (D50W) injection syrg 12.5-25 g  25-50 mL IntraVENous PRN    0.9% sodium chloride infusion  100 mL/hr IntraVENous DIALYSIS PRN    naloxone (NARCAN) injection 0.4 mg  0.4 mg IntraVENous PRN       Allergies: Review of patient's allergies indicates no known allergies. Temp (24hrs), Av °F (36.7 °C), Min:96.3 °F (35.7 °C), Max:99.1 °F (37.3 °C)    Visit Vitals    /81    Pulse 75    Temp 96.3 °F (35.7 °C)    Resp 16    Ht 6' 3\" (1.905 m)  Comment: per chart history    Wt 62 kg (136 lb 11 oz)    SpO2 95%    BMI 17.08 kg/m2       ROS: limited ROS obtained since patient not very communicative. Physical Exam:    General: Well developed, well nourished male laying on the bed,opens eyes, in no acute distress. General:   awake alert and oriented   HEENT:  Normocephalic, atraumatic, PERRL, EOMI,present scleral icterus no conjunctival hemmohage;  nasal and oral mucous are moist and without evidence of lesions. Neck supple, no bruits. Lymph Nodes:   no cervical, axillary or inguinal adenopathy   Lungs:   non-labored, bilaterally clear to auscultation- no crackles wheezes rales or rhonchi   Heart:  RRR, s1 and s2; no  rubs or gallops, no edema, + pedal pulses   Abdomen:  soft, non-distended, active bowel sounds, no hepatomegaly, no splenomegaly.   Non-tender Genitourinary:  deferred   Extremities:   no clubbing, cyanosis; no joint effusions or swelling; muscle mass appropriate for age   Neurologic:  No gross focal sensory abnormalities; 5/5 muscle strength to upper and lower extremities. Speech appropriate. Cranial nerves intact                        Skin:  No rash or ulcers   Back:  no spinal or paraspinal muscle tenderness or rigidity, no CVA tenderness     Psychiatric:  No suicidal or homicidal ideations, appropriate mood and affect         Labs: Results:   Chemistry Recent Labs      08/06/17 0152  08/05/17   0104   GLU  106*  114*   NA  142  145   K  3.1*  3.6   CL  106  109*   CO2  29  24   BUN  42*  79*   CREA  5.00*  7.06*   CA  7.4*  7.7*   AGAP  7  12   BUCR  8*  11*      CBC w/Diff Recent Labs      08/06/17 0152  08/05/17   0104   WBC  15.9*  25.1*   RBC  3.14*  3.42*   HGB  9.6*  10.3*   HCT  28.3*  30.8*   PLT  144  134*   GRANS  80*  83*   LYMPH  12*  8*   EOS  1  1      Microbiology No results for input(s): CULT in the last 72 hours.        RADIOLOGY:    All available imaging studies/reports in [facility] for this admission were reviewed    Dr. Leia Smith, Infectious Disease Specialist  [phone]  August 7, 2017  4:28 PM

## 2017-08-07 NOTE — PROGRESS NOTES
Progress Note    Patient: Ayse Charles MRN: [id number]  SSN: [id number]    YOB: 1960  Age: 55 y.o.   Sex: male      Admit Date: 7/27/2017    LOS: 11 days     Subjective:     Patient presents with altered mental status following arrest now improving         Current Facility-Administered Medications   Medication Dose Route Frequency    doxercalciferol (HECTOROL) 4 mcg/2 mL injection 3 mcg  3 mcg IntraVENous DIALYSIS MON, WED & FRI    epoetin benjamin (EPOGEN;PROCRIT) injection 10,000 Units  10,000 Units IntraVENous DIALYSIS MON, WED & FRI    piperacillin-tazobactam (ZOSYN) 2.25 g in 0.9% sodium chloride (MBP/ADV) 50 mL MBP  2.25 g IntraVENous Q8H    Piperacillin-tazobactam (ZOSYN) 0.75 gm Supplemental Dosing by Pharmacy   Other Rx Dosing/Monitoring    hydrocortisone (CORTEF) tablet 10 mg  10 mg Oral BID WITH MEALS    insulin lispro (HUMALOG) injection   SubCUTAneous Q6H    pantoprazole (PROTONIX) injection 40 mg  40 mg IntraVENous DAILY    levETIRAcetam (KEPPRA) 500 mg in 100 ml IVPB  500 mg IntraVENous Q12H    glucose chewable tablet 16 g  4 Tab Oral PRN    glucagon (GLUCAGEN) injection 1 mg  1 mg IntraMUSCular PRN    dextrose (D50W) injection syrg 12.5-25 g  25-50 mL IntraVENous PRN    0.9% sodium chloride infusion  100 mL/hr IntraVENous DIALYSIS PRN    naloxone (NARCAN) injection 0.4 mg  0.4 mg IntraVENous PRN          Objective:     Vitals:    08/07/17 1130 08/07/17 1200 08/07/17 1219 08/07/17 1505   BP: 146/84 127/79 142/80 135/72   Pulse: 70 79 80 92   Resp: 16 16 16 18   Temp:   98.1 °F (36.7 °C) 97.9 °F (36.6 °C)   TempSrc:   Axillary    SpO2:       Weight:       Height:            Intake and Output:  Current Shift: 08/07 0701 - 08/07 1900  In: 120 [P.O.:120]  Out: 1000   Last three shifts: 08/05 1901 - 08/07 0700  In: 4444.5 [I.V.:4444.5]  Out: 500 [Drains:500]    Physical Exam:   GENERAL: alert, cooperative, no distress, appears stated age  NEUROLOGIC: negative findings: alert, oriented x3  speech normal in context and clarity  memory intact grossly  cranial nerves II-XII intact  no involuntary movements - tremors, positive findings: muscular weakness bilaterally    Lab/Data Review:  Recent Results (from the past 24 hour(s))   GLUCOSE, POC    Collection Time: 08/06/17  5:12 PM   Result Value Ref Range    Glucose (POC) 268 (H) 70 - 110 mg/dL   GLUCOSE, POC    Collection Time: 08/06/17 11:39 PM   Result Value Ref Range    Glucose (POC) 221 (H) 70 - 110 mg/dL   GLUCOSE, POC    Collection Time: 08/07/17 12:32 AM   Result Value Ref Range    Glucose (POC) 253 (H) 70 - 110 mg/dL   GLUCOSE, POC    Collection Time: 08/07/17  5:24 AM   Result Value Ref Range    Glucose (POC) 207 (H) 70 - 577 mg/dL   METABOLIC PANEL, BASIC    Collection Time: 08/07/17  8:45 AM   Result Value Ref Range    Sodium 141 136 - 145 mmol/L    Potassium 3.3 (L) 3.5 - 5.5 mmol/L    Chloride 105 100 - 108 mmol/L    CO2 26 21 - 32 mmol/L    Anion gap 10 3.0 - 18 mmol/L    Glucose 148 (H) 74 - 99 mg/dL    BUN 52 (H) 7.0 - 18 MG/DL    Creatinine 6.61 (H) 0.6 - 1.3 MG/DL    BUN/Creatinine ratio 8 (L) 12 - 20      GFR est AA 11 (L) >60 ml/min/1.73m2    GFR est non-AA 9 (L) >60 ml/min/1.73m2    Calcium 7.1 (L) 8.5 - 10.1 MG/DL             Assessment:   Encephalopathy improving    Principal Problem:    Sepsis ([facility] (7/27/2017)    Active Problems:    Anemia ()      Acidosis (7/27/2017)      Cardiac arrest ([facility] (7/27/2017)      Respiratory failure ([facility] (7/27/2017)      ESRD needing dialysis ([facility] (7/27/2017)      Anoxic brain damage ([facility]) (7/27/2017)      Colitis (7/27/2017)      Hypotension (7/27/2017)      Acute GI bleeding (7/27/2017)      ESRD (end stage renal disease) ([facility] (7/27/2017)        Plan:     No new recommendations Patient examined chart reviewed    Signed By: Ginette Acevedo MD     August 7, 2017

## 2017-08-07 NOTE — PROGRESS NOTES
Assumed care of patient from ICU no s/s of distress or discomfort, oriented to unit all bell within reach. 5:57 AM  Resting quietly, refused am lab work [facility] [place] [facility] completed with repositioning Wound found near anal opening cleansed with zinc oxide applied and charted. Fecal management irrigated and repositioned. Status unchanged call bell within reach. Uneventful night VSS remains on telemetry.   Patient Vitals for the past 12 hrs:   Temp Pulse Resp BP SpO2   08/07/17 0415 97.3 °F (36.3 °C) 76 16 132/66 100 %   08/07/17 0124 97.8 °F (36.6 °C) 86 16 117/57 100 %   08/07/17 0000 98.7 °F (37.1 °C) 81 15 122/58 100 %   08/06/17 2300 - 81 14 - 100 %   08/06/17 2200 - 83 17 129/60 99 %   08/06/17 2115 - - - - 98 %   08/06/17 2100 - 81 16 132/67 98 %   08/06/17 2000 98.6 °F (37 °C) 80 14 121/60 99 %   08/06/17 1900 - 85 15 124/70 99 %

## 2017-08-07 NOTE — PROGRESS NOTES
NUTRITION    Nursing Referral: MST, Pressure Ulcer     RECOMMENDATIONS / PLAN:     - Modify diet; change to cardiac diet  - Add supplement: Nepro BID  - Continue RD inpatient monitoring and evaluation. NUTRITION INTERVENTIONS & DIAGNOSIS:     [x] Meals/snacks: modified diet  [x] Medical food supplementation    Nutrition Diagnosis: increased protein needs related to promotion of wound healing and increased expenditure as evidenced by pressure ulcer wound and ESRD, pt on dialysis 3x per week  Altered nutrition related labs related to renal failure on dialysis, inadequate enteral provision of potassium as evidenced by hypokalemia, potassium 3.3 mmol/L. ASSESSMENT:     8/7: K remains low despite previous tube feeding changed to higher potassium formula. Pt extubated 8/5. Tube feeds discontinued 8/6. SLP following; pt s/p MBS today and started on po diet. Noted poor po intake lunch meal per chart. 8/3: K remains low despite continued replacement. Will change to higher potassium formula per discussion with PA.   8/1: Tolerating feeding at goal. 1 L removed during HD 7/31. Hyperglycemia noted, advance to very insulin resistant SSI and basal insulin started. 7/31: Tolerating advancement of tube feeding. HD today. BG trending up, MD to stop D5.    7/30: Pt remain intubated. GI following; plan to start tube feeds today. Noted order placed; discussed modifying tube feed order and add water flushes with Dr Hien Cherry. RN unavailable; discussed with Nursing Talpa regarding modifying tube feed order  7/28: S/p cardiac arrest and intubated 7/27, NGT clamped. Abdominal ultrasound today to rule out cholecystitis. Will wait to feed.      Average po intake adequate to meet patients estimated nutritional needs:   [] Yes     [] No   [x] Unable to determine at this time    Tube Feeding:  Discontinued 8/6    Diet: DIET RENAL Soft Solids; 1 NECTAR; AHA-LOW-CHOL FAT      Food Allergies: NKFA  Current Appetite:   [] Good     [] Fair     [] Poor     [x] Other: unknown   Appetite/meal intake prior to admission:   [] Good     [] Fair     [] Poor     [x] Other: unknown   Feeding Limitations:  [x] Swallowing difficulty: SLP following    [] Chewing difficulty    [] Other:    Current Meal Intake:   Patient Vitals for the past 100 hrs:   % Diet Eaten   08/07/17 1505 25 %   08/07/17 0901 0 %     BM: 8/6; FMS  Skin Integrity:   Stage II ressure ulcer anus  Edema: 1+ non-pitting UEs  Pertinent Medications: Reviewed: steroid     Recent Labs      08/07/17   0845  08/06/17   0152  08/05/17   0104   NA  141  142  145   K  3.3*  3.1*  3.6   CL  105  106  109*   CO2  26  29  24   GLU  148*  106*  114*   BUN  52*  42*  79*   CREA  6.61*  5.00*  7.06*   CA  7.1*  7.4*  7.7*   MG   --   2.1  2.7*   PHOS   --   2.9  4.1       Intake/Output Summary (Last 24 hours) at 08/07/17 1615  Last data filed at 08/07/17 1605   Gross per 24 hour   Intake          1988.33 ml   Output             1500 ml   Net           488.33 ml       Anthropometrics:  Ht Readings from Last 1 Encounters:   07/28/17 6' 3\" (1.905 m)     Last 3 Recorded Weights in this Encounter    08/03/17 2241 08/05/17 0001 08/06/17 0207   Weight: 77.5 kg (170 lb 12.8 oz) 62.6 kg (138 lb 0.1 oz) 62 kg (136 lb 11 oz)     Body mass index is 17.08 kg/(m^2).      Underweight     Weight History:   Weight Metrics 8/6/2017   Weight 136 lb 11 oz   BMI 17.08 kg/m2        Admitting Diagnosis: Cardiac arrest (HCC)  Acidosis  Respiratory failure (HCC)  Anemia  ESRD needing dialysis ([facility] [address]  Cardiac arrest ([facility] [address]  Acute GI bleeding  Sepsis ([facility] [address]  Hypotension  Anoxic brain damage (HCC)  ESRD (end stage renal disease) ([facility] [address]  Colitis  Pertinent PMHx: DM, HTN, IBS, ESRD    Education Needs:        [x] None identified  [] Identified - Not appropriate at this time  []  Identified and addressed - refer to education log  Learning Limitations:   [x] None identified  [] Identified:      Cultural, Samaritan & ethnic food preferences: [x] None identified    [] Identified and addressed     ESTIMATED NUTRITION NEEDS:     Calories: 0399-7893 kcal (30-35 kcal/kg) based on  [x] Actual BW 71 kg     [x] SBW:  77 kg   Protein:  gm (1.2-1.5 gm/kg) based on  [] Actual BW      [] SBW   Fluid: 1000 mL + UOP     MONITORING & EVALUATION:     Nutrition Goal(s):   1. Nutritional needs will be met through adequate oral intake or nutrition support within the next 7 days.   Outcome:  [x] Met/Ongoing    []  Not Met/Progressing    [] New/Initial Goal     Monitoring:   [] EN tolerance    [] EN infusion   [x] Diet tolerance   [x] Meal intake   [x] Supplement intake   [] GI symptoms/ability to tolerate po diet   [] Respiratory status   [x] Plan of care      Previous Recommendations (for follow-up assessments only):     [x]   Implemented       []   Not Implemented (RD to address)     [] No Recommendation Made     Discharge Planning: renal diet; consistency per SLP  [x] Participated in care planning, discharge planning, & interdisciplinary rounds as appropriate       Marina Joy, [address] [place] [address]  Pager: [phone]

## 2017-08-07 NOTE — WOUND CARE
Physical Exam   Room 468: pt currently off the unit.   Jesus LIRAN, RN, [facility] [place], [address]

## 2017-08-07 NOTE — DIALYSIS
ACUTE HEMODIALYSIS FLOW SHEET    HEMODIALYSIS ORDERS: Physician: Evaristo Escobedo      Dialyzer: revaclear         Duration: 3 hr  BFR: 300   DFR: 600   Dialysate:  Temp *37 K+   3    Ca+  3 Na 140 Bicarb 35   Weight:  62 kg    Bed Scale []     Unable to Obtain []      Dry weight/UF Goal: 1000 Access AVF  Needle Gauge     Heparin []  Bolus      Units    [] Hourly       Units    [x]None     Catheter locking solution    Pre BP:   159/84    Pulse:     60     Temperature:   97.6  Respirations: 16  Tx: NS       ml/Bolus  Other        [x] N/A   Labs: Pre        Post:        [x] N/A   Additional Orders(medications, blood products, hypotension management):       [x] N/A     [x] Time Out/Safety Check  [x] [facility] Consent Verified     CATHETER ACCESS: [x]N/A   []Right   []Left   []IJ     []Fem   [] First use X-ray verified     []Tunnel                [] Non Tunneled   []No S/S infection  []Redness  []Drainage []Cultured []Swelling []Pain   []Medical Aseptic Prep Utilized   []Dressing Changed  [] Biopatch  Date:       []Clotted   []Patent   Flows: []Good  []Poor  []Reversed   If access problem,  notified: []Yes    []N/A  Date:           GRAFT/FISTULA ACCESS:  []N/A     []Right     [x]Left     [x]UE     []LE   [x]AVG   []AVF        []Buttonhole    [x]Medical Aseptic Prep Utilized   [x]No S/S infection  []Redness  []Drainage []Cultured []Swelling []Pain    Bruit:   [x] Strong    [] Weak       Thrill :   [x] Strong    [] Weak       Needle Gauge: 15   Length: 1   If access problem,  notified: []Yes     [x]N/A  Date:        Please describe access if present and not used:       GENERAL ASSESSMENT:    LUNGS:  Rate  SaO2%        [x] N/A    [x] Clear  [] Coarse  [] Crackles  [] Wheezing        [] Diminished     Location : []RLL   []LLL    []RUL  []   Cough: []Productive  []Dry  [x]N/A   Respirations:  [x]Easy  []Labored   Therapy:  [x]RA  []NC  l/min    Mask: []NRB []Venti       O2%                  []Ventilator  []Intubated  [] Trach  [] BiPaP   CARDIAC: [x]Regular      [] Irregular   [] Pericardial Rub  [] JVD        []  Monitored  [] Bedside  [] Remotely monitored [] N/A  Rhythm:    EDEMA: [] None  [x]Generalized  [] Pitting [] 1    [] 2    [] 3    [] 4                 [] Facial  [] Pedal  []  UE  [] LE   SKIN:   [x] Warm  [] Hot     [] Cold   [x] Dry     [] Pale   [] Diaphoretic                  [] Flushed  [] Jaundiced  [] Cyanotic  [] Rash  [] Weeping   LOC:    [x] Alert      [x]Oriented:    [x] Person     [x] Place  [x]Time               [] Confused  [] Lethargic  [] Medicated  [] Non-responsive     GI / ABDOMEN:  [] Flat    [] Distended    [x] Soft    [] Firm   []  Obese                             [] Diarrhea  [x] Bowel Sounds  [] Nausea  [] Vomiting       / URINE ASSESSMENT:[] Voiding   [x] Oliguria  [] Anuria   []  Stanton     [] Incontinent    []  Incontinent Brief      []  Bathroom Privileges     PAIN: [x] 0 []1  []2   []3   []4   []5   []6   []7   []8   []9   []10            Scale 0-10  Action/Follow Up:    MOBILITY:  [] Amb    [] Amb/Assist    [x] Bed    [] Wheelchair  [] Stretcher      All Vitals and Treatment Details on Attached [facility] [place]          Room # 468     [] 1st Time Acute  [] Stat  [x] Routine  [] Urgent     [x] Acute Room  []  Bedside  [] ICU/CCU  [] ER   Isolation Precautions:  [x] Dialysis   [] Airborne   [] Contact    [] Reverse   Special Considerations:         [] Blood Consent Verified [x]N/A     ALLERGIES:   [x] NKA          Code Status:  [x] Full Code  [] DNR  [] Other           HBsAg ONLY: Date Drawn 07/28/17         [x]Negative []Positive []Unknown   HBsAb: Date 07/28/17    [] Susceptible   [x] [place] []Not Drawn  [] Drawn     Current Labs:    Date of Labs: Today [x]        Cut and paste current labs here.                                                                                                                                   DIET:  [] Renal    [] Other     [x] NPO []  Diabetic      PRIMARY NURSE REPORT: First initial/Last name/Title      Pre Dialysis: Bimal Manley RN    Time: 36      EDUCATION:    [x] Patient [] Other         Knowledge Basis: []None [x]Minimal [] Substantial   Barriers to learning  [x]N/A   [] Access Care     [] S&S of infection     [] Fluid Management     []K+     [x]Procedural    []Albumin     [] Medications     [] Tx Options     [] Transplant     [] Diet     [] Other   Teaching Tools:  [x] Explain  [] Demo  [] Handouts [] Video  Patient response:   [x] Verbalized understanding  [] Teach back  [] Return demonstration [] Requires follow up   Inappropriate due to            [address] Before each treatment:     Machine Number:                   [facility]                                   [x] Unit Machine # 7 with centralized RO                                  [] Portable Machine #1/RO serial # [id number]                                  [] Portable Machine #2/RO serial # [id number]                                  [] Portable Machine #3/RO serial # [id number]                                                                                                       [facility]                                  [] Portable Machine #11/RO serial # [id number]                                   [] Portable Machine #12/RO serial # [id number]                                  [] Portable Machine #13/RO serial #  [id number]      Alarm Test:  Pass time 0815         Other:         [x] RO/Machine Log Complete      Temp    37            [x]Extracorporeal Circuit Tested for integrity   Dialysate: pH  7.4 Conductivity: Meter   14     HD Machine   14                  TCD: 14  Dialyzer Lot # [id number]         Blood Tubing Lot # [id number]      Saline Lot #       CHLORINE TESTING-Before each treatment and every 4 hours    Total Chlorine: [x] less than 0.1 ppm  Time: 0800 4 Hr/2nd Check Time: 1100   (if greater than 0.1 ppm from Primary then every 30 minutes from Secondary)     TREATMENT INITIATION  with Dialysis Precautions:   [x] All Connections Secured                 [x] Saline Line Double Clamped   [x] Venous Parameters Set                  [x] Arterial Parameters Set    [x] Prime Given  250 ml               [x]Air Foam Detector Engaged      Treatment Initiation Note: pt arrived in stable condition via bed AVG accessed and treatment initiated without difficulty. Dr Micheline Poole at bedside     Medication Dose Volume Route Initials Dialyzer Cleared: [] Good [x] Fair  [] Poor    Blood processed:  54.5 L  UF Removed  1000 Ml    Post Wt: 61    kg  POst BP:   142/80       Pulse: 80      Respirations: 16  Temperature: 98.1          epogen 22486l           1ml             Post Tx Vascular Access: AVF/AVG: Bleeding stopped Art 10 min. Oscar. 10 Min   N/A          hectorol   3mcg    1.5ml                  Catheter: Locking solution: Heparin 1:1000 Art. Oscar. N/A                                 Post Assessment:                                    Skin:  [x] Warm  [x] Dry [] Diaphoretic    [] Flushed  [] Pale [] Cyanotic   [facility] Signatures Title Initials  Time Lungs: [x] Clear    [] Course  [] Crackles  [] Wheezing [] Diminished   Delmar Lopez RN    Cardiac: [x] Regular   [] Irregular   [] Monitor  [] N/A  Rhythm:           Edema:  [x] None    [] General     [] Facial   [] Pedal    [] UE    [] LE       Pain: [x]0  []1  []2   []3  []4   []5   []6   []7   []8   []9   []10         Post Treatment Note: HD well tolerated. 1L UF removed. No acute distress noted during or post HD treatment.      POST TREATMENT PRIMARY NURSE HANDOFF REPORT:     First initial/Last name/Title         Post Dialysis: OSCAR Foreman RN Time:  9204     Abbreviations: AVG-arterial venous graft, AVF-arterial venous fistula, IJ-Internal Jugular, Subcl-Subclavian, Fem-Femoral, Tx-treatment, AP/HR-apical heart rate, DFR-dialysate flow rate, BFR-blood flow rate, AP-arterial pressure, -venous pressure, UF-ultrafiltrate, TMP-transmembrane pressure, Oscar-Venous, Art-Arterial, RO-Reverse Osmosis

## 2017-08-07 NOTE — ROUTINE PROCESS
edside and Verbal shift change report given to Deninse Cowart RN (oncoming nurse) by Warren Escobedo RN   (offgoing nurse). Report included the following information SBAR, Kardex, MAR and Recent Results.

## 2017-08-07 NOTE — PROGRESS NOTES
[facility] Hospitalist Group  Progress Note    Patient: Ryann Quintanilla Age: 55 y.o. : 1960 MR#: [id number] SSN: [id number]  Date/Time: 2017     Subjective:     Pt seen in HD, feels better. AAOx2-3. Assessment/Plan:   1.acuet met encephalopathy, better. 2. S/p PEA arrest. ? Cardiac cause with elevated trop PTA. D/w cardio for work up. 3. Acute resp failure s/p extubation  4. ESRD on HD   5. Sepsis - leucocytosis / () bottles grew E.coli and C.perferinges. Initially thought to be secondary to cholecystitis but w/u thus far including HIDA scan negative. Per ID possibly due to abdominal source. Repeat cultures negative. Per ID recs on pip/tazo to be switched to po cipro and metro until  when able to take PO meds. 6. Hypokalemia  - replace lytes -and follow. 7. Elevated LFT - ? Shock liver. LFT now wnl. 8. hrombocytopenia: ? HIT vs secondary to sepsis. Serotonin release assay negative. Count trending up. Cont to monitor. 9. Dysphagia: better.  S/p SLP eval  10. ? Sz: on keppra per neurology  Full code         Case discussed with:  [x]Patient  []Family  []Nursing  []Case Management  DVT Prophylaxis:  []Lovenox  []Hep SQ  [x]SCDs  []Coumadin   []On Heparin gtt    Patient Active Problem List   Diagnosis Code    Anemia D64.9    Acidosis E87.2    Cardiac arrest ([address] I46.9    Respiratory failure ([address] J96.90    ESRD needing dialysis ([address] N18.6, Z99.2    Anoxic brain damage (HCC) G93.1    Colitis K52.9    Hypotension I95.9    Acute GI bleeding K92.2    ESRD (end stage renal disease) ([address] N18.6    Sepsis ([address] A41.9       Objective:   VS:   Visit Vitals    /80    Pulse 80    Temp 98.1 °F (36.7 °C) (Axillary)    Resp 16    Ht 6' 3\" (1.905 m)  Comment: per chart history    Wt 62 kg (136 lb 11 oz)    SpO2 95%    BMI 17.08 kg/m2      Tmax/24hrs: Temp (24hrs), Av.9 °F (36.6 °C), Min:96.3 °F (35.7 °C), Max:99.1 °F (37.3 °C)  IOBRIEF    Intake/Output Summary (Last 24 hours) at 08/07/17 1256  Last data filed at 08/07/17 1219   Gross per 24 hour   Intake          2048. 33 ml   Output             1500 ml   Net           548.33 ml       General:  Alert awake in nad  Pulmonary:  CTA Bilaterally. No Wheezing/Rhonchi/Rales. Cardiovascular: Regular rate and Rhythm. GI:  Soft, Non distended, Non tender. + Bowel sounds. Extremities:  No edema, cyanosis, clubbing. Neuro: AAOx2-3.  Moves all ext           Medications:   Current Facility-Administered Medications   Medication Dose Route Frequency    doxercalciferol (HECTOROL) 4 mcg/2 mL injection 3 mcg  3 mcg IntraVENous DIALYSIS MON, WED & FRI    epoetin benjamin (EPOGEN;PROCRIT) injection 10,000 Units  10,000 Units IntraVENous DIALYSIS MON, WED & FRI    piperacillin-tazobactam (ZOSYN) 0.75 g in 0.9% sodium chloride 50 mL IVPB  0.75 g IntraVENous ONCE    dextrose 5 % - 0.45% NaCl infusion  100 mL/hr IntraVENous CONTINUOUS    piperacillin-tazobactam (ZOSYN) 2.25 g in 0.9% sodium chloride (MBP/ADV) 50 mL MBP  2.25 g IntraVENous Q8H    Piperacillin-tazobactam (ZOSYN) 0.75 gm Supplemental Dosing by Pharmacy   Other Rx Dosing/Monitoring    hydrocortisone (CORTEF) tablet 10 mg  10 mg Oral BID WITH MEALS    insulin lispro (HUMALOG) injection   SubCUTAneous Q6H    pantoprazole (PROTONIX) injection 40 mg  40 mg IntraVENous DAILY    levETIRAcetam (KEPPRA) 500 mg in 100 ml IVPB  500 mg IntraVENous Q12H    glucose chewable tablet 16 g  4 Tab Oral PRN    glucagon (GLUCAGEN) injection 1 mg  1 mg IntraMUSCular PRN    dextrose (D50W) injection syrg 12.5-25 g  25-50 mL IntraVENous PRN    0.9% sodium chloride infusion  100 mL/hr IntraVENous DIALYSIS PRN    naloxone (NARCAN) injection 0.4 mg  0.4 mg IntraVENous PRN       Labs:    Recent Results (from the past 24 hour(s))   GLUCOSE, POC    Collection Time: 08/06/17  5:12 PM   Result Value Ref Range    Glucose (POC) 268 (H) 70 - 110 mg/dL   GLUCOSE, POC    Collection Time: 08/06/17 11:39 PM   Result Value Ref Range    Glucose (POC) 221 (H) 70 - 110 mg/dL   GLUCOSE, POC    Collection Time: 08/07/17 12:32 AM   Result Value Ref Range    Glucose (POC) 253 (H) 70 - 110 mg/dL   GLUCOSE, POC    Collection Time: 08/07/17  5:24 AM   Result Value Ref Range    Glucose (POC) 207 (H) 70 - 110 mg/dL       Signed By: Suman Rios MD     August 7, 2017

## 2017-08-07 NOTE — ROUTINE PROCESS
Report called to floor with review of admission events, stay in ICU and review of systems.  Pt stable at time of transfer

## 2017-08-07 NOTE — ROUTINE PROCESS
Bedside and Verbal shift change report given to Rufino Pat RN (oncoming nurse) by Shereen Ogden RN (offgoing nurse). Report included the following information SBAR, Kardex, MAR and Recent Results. SITUATION:    Code Status: Full Code   Reason for Admission: Cardiac arrest ([facility]   Acidosis   Respiratory failure (HCC)   Anemia   ESRD needing dialysis ([facility]   Cardiac arrest ([facility]   Acute GI bleeding   Sepsis ([facility]   Hypotension   Anoxic brain damage (HCC)   ESRD (end stage renal disease) ([facility]  [address] day: 10   Problem List:       Hospital Problems  Date Reviewed: 7/27/2017          Codes Class Noted POA    Acidosis ICD-10-CM: E87.2  ICD-9-CM: 276.2  7/27/2017 Unknown        Cardiac arrest ([facility] ICD-10-CM: I46.9  ICD-9-CM: 427.5  7/27/2017 Unknown        Respiratory failure ([facility] ICD-10-CM: J96.90  ICD-9-CM: 518.81  7/27/2017 Unknown        ESRD needing dialysis ([facility] ICD-10-CM: N18.6, Z99.2  ICD-9-CM: 585.6  7/27/2017 Unknown        Anoxic brain damage ([facility] ICD-10-CM: G93.1  ICD-9-CM: 348.1  7/27/2017 Unknown        Colitis ICD-10-CM: K52.9  ICD-9-CM: 558.9  7/27/2017 Unknown        Hypotension ICD-10-CM: I95.9  ICD-9-CM: 458.9  7/27/2017 Unknown        Acute GI bleeding ICD-10-CM: K92.2  ICD-9-CM: 578.9  7/27/2017 Unknown        ESRD (end stage renal disease) ([facility] ICD-10-CM: N18.6  ICD-9-CM: 585.6  7/27/2017 Unknown        * (Principal)Sepsis ([facility] ICD-10-CM: A41.9  ICD-9-CM: 038.9, 995.91  7/27/2017 Unknown        Anemia ICD-10-CM: D64.9  ICD-9-CM: 740. 9  Unknown Unknown              BACKGROUND:    Past Medical History:   Past Medical History:   Diagnosis Date    Anemia     Arthritis     Chronic pain     Back     Diabetes mellitus type II     Hypertension     IBS (irritable bowel syndrome)     Lactose intolerance     TB (tuberculosis)     TB test positive         Patient taking anticoagulants: No     ASSESSMENT:    Changes in Assessment Throughout Shift: Downgraded to medical status. Awaiting transfer to medical room. Potassium repletion given per order. Hemodialysis and barium swallow study tomorrow.      Patient has Central Line: No     Patient has Stanton Cath: No      Last Vitals:     Vitals:    08/06/17 1700 08/06/17 1800 08/06/17 1900 08/06/17 2000   BP: 122/63 125/77 124/70    Pulse: 80 81 85    Resp: 14 15 15    Temp:    98.6 °F (37 °C)   TempSrc:       SpO2: 98% 97% 99%    Weight:       Height:            IV and DRAINS (will only show if present)   Peripheral IV 08/01/17 Right Hand-Site Assessment: Clean, dry, & intact  Peripheral IV 08/05/17 Right Arm-Site Assessment: Clean, dry, & intact  [REMOVED] Airway - Continuous Aspiration of Subglottic Secretions (MISBAH) Tube 07/27/17 Oral-Site Assessment: Clean, dry, & intact  [REMOVED] Nasogastric Tube 07/27/17-Site Assessment: Clean, dry, & intact  [REMOVED] Venous Access Device 07/27/17 Upper chest (subclavicular area, right-Site Assessment: Clean, dry, & intact  [REMOVED] Peripheral IV 07/27/17 Right Antecubital-Site Assessment: Clean, dry, & intact  [REMOVED] Triple Lumen 07/27/17 Right Internal jugular-Site Assessment: Clean, dry, & intact  [REMOVED] Peripheral IV 07/27/17 Right Other(comment)-Site Assessment: Clean, dry, & intact  [REMOVED] Peripheral IV 07/27/17 Right Antecubital-Site Assessment: Swelling     WOUND (if present)   Wound Type:  Scrotal & eri-anal excoriation   Dressing present Dressing Present : Yes, Intact, not due to be changed   Wound Concerns/Notes:  EPC cream applied     PAIN    Pain Assessment    Pain Intensity 1: 0 (08/06/17 1600)              Patient Stated Pain Goal: 0  o Interventions for Pain:  none  o Intervention effective: N/A  o Time of last intervention: N/A   o Reassessment Completed: yes      Last 3 Weights:  Last 3 Recorded Weights in this Encounter    08/03/17 2241 08/05/17 0001 08/06/17 0207   Weight: 77.5 kg (170 lb 12.8 oz) 62.6 kg (138 lb 0.1 oz) 62 kg (136 lb 11 oz)     Weight change: -0.6 kg (-1 lb 5.2 oz)     INTAKE/OUPUT    Current Shift:      Last three shifts: 08/05 0701 - 08/06 1900  In: 3096.2 [I.V.:3096.2]  Out: 1500 [Drains:500]     LAB RESULTS     Recent Labs      08/06/17 0152 08/05/17   0104  08/04/17   0205   WBC  15.9*  25.1*  31.2*   HGB  9.6*  10.3*  11.2*   HCT  28.3*  30.8*  33.1*   PLT  144  134*  120*        Recent Labs      08/06/17 0152 08/05/17 0104 08/04/17   0205   NA  142  145  145   K  3.1*  3.6  3.0*   GLU  106*  114*  166*   BUN  42*  79*  82*   CREA  5.00*  7.06*  7.05*   CA  7.4*  7.7*  8.2*   MG  2.1  2.7*   --        RECOMMENDATIONS AND DISCHARGE PLANNING     1. Pending tests/procedures/ Plan of Care or Other Needs: Hemodialysis tomorrow; Barium swallow evaluation tomorrow; Transfer to medical floor     2. Discharge plan for patient and Needs/Barriers: TBD    3. Estimated Discharge Date: TBD; Posted on Whiteboard in Patients Room: yes      4. The patient's care plan was reviewed with the oncoming nurse. \"HEALS\" SAFETY CHECK      Fall Risk    Total Score: 3    Safety Measures: Safety Measures: Bed/Chair-Wheels locked, Bed in low position, Call light within reach, Emergency bedside equipment, Fall prevention (comment), Nurse at bedside, Side rails X 3    A safety check occurred in the patient's room between off going nurse and oncoming nurse listed above.     The safety check included the below items  Area Items   H  High Alert Medications - Verify all high alert medication drips (heparin, PCA, etc.)   E  Equipment - Suction is set up for ALL patients (with yanker)  - Red plugs utilized for all equipment (IV pumps, etc.)  - WOWs wiped down at end of shift.  - Room stocked with oxygen, suction, and other unit-specific supplies   A  Alarms - Bed alarm is set for fall risk patients  - Ensure chair alarm is in place and activated if patient is up in a chair   L  Lines - Check IV for any infiltration  - Stanton bag is empty if patient has a Stanton   - Tubing and IV bags are labeled   S  Safety   - Room is clean, patient is clean, and equipment is clean. - Hallways are clear from equipment besides carts. - Fall bracelet on for fall risk patients  - Ensure room is clear and free of clutter  - Suction is set up for ALL patients (with yanker)  - Hallways are clear from equipment besides carts.    - Isolation precautions followed, supplies available outside room, sign posted     Krista Rinaldi RN

## 2017-08-07 NOTE — ROUTINE PROCESS
TRANSFER - IN REPORT:    Verbal report received from [facility]) on Ladi López  being received from ICU(unit) for routine progression of care      Report consisted of patients Situation, Background, Assessment and   Recommendations(SBAR). Information from the following report(s) SBAR, Intake/Output and Recent Results was reviewed with the receiving nurse. Opportunity for questions and clarification was provided. Assessment  will be completed upon patients arrival to unit and care assumed.

## 2017-08-07 NOTE — PROGRESS NOTES
Problem: Dysphagia (Adult)  Goal: *Acute Goals and Plan of Care (Insert Text)  Dysphagia Present: yes    Recommendations:  Diet: soft solid with nectar-thick liquids  Meds: as tolerated  Aspiration Precautions    Patient will:  1. Tolerate PO trials with 0 s/s overt distress in 4/5 trials  2. Utilize compensatory swallow strategies/maneuvers (decrease bite/sip, size/rate, alt. liq/sol) with min cues in 4/5 trials  3. Perform oral-motor/laryngeal exercises to increase oropharyngeal swallow function with min cues  4. Complete an objective swallow study (i.e., MBSS) to assess swallow integrity, r/o aspiration, and determine of safest LRD, min A - MET 8/7/17       Outcome: Progressing Towards Goal  SPEECH PATHOLOGY MODIFIED BARIUM SWALLOW STUDY     Patient: Rudy Fink Bbpnvo59 y.o. male)  Date: 8/7/2017  Primary Diagnosis: Cardiac arrest ([address]  Acidosis  Respiratory failure ([address]  Anemia  ESRD needing dialysis ([address]  Cardiac arrest ([address]  Acute GI bleeding  Sepsis ([address]  Hypotension  Anoxic brain damage (HCC)  ESRD (end stage renal disease) ([address]  Colitis        Precautions: aspiration         ASSESSMENT :  Based on the objective data described below, the patient presents with mild-mod oral and mod pharyngeal dysphagia c/b mod penetration with thin liquids. Pt tolerated reg solid, puree, honey-thick, nectar-thick, and 13 mm Ba pill with nectar-thick wash without any aspiration/penetration events. Pt with increased mastication and a-p transit of reg solids secondary to edentulous status. He presented with premature spillage with all trials. Deficits include decreased tongue base retraction, impaired epiglottic inversion, and decreased pharyngeal motility/sensation. Rec soft solid diet with nectar-thick liquids, aspiration precautions, oral care TID, and meds as tolerated. ST will continue to follow. Patient will benefit from skilled intervention to address the above impairments.   Patients rehabilitation potential is considered to be Fair  Factors which may influence rehabilitation potential include:   [X]              Mental ability/status  [X]              Medical condition       PLAN :  Recommendations and Planned Interventions: See above  Frequency/Duration: Patient will be followed by speech-language pathology 1-2 times per day/4-7 days per week to address goals. Discharge Recommendations: [address] and To Be Determined       SUBJECTIVE:   Patient stated Thank you. OBJECTIVE:       Past Medical History:   Diagnosis Date    Anemia      Arthritis      Chronic pain       Back     Diabetes mellitus type II      Hypertension      IBS (irritable bowel syndrome)      Lactose intolerance      TB (tuberculosis)       TB test positive     Past Surgical History:   Procedure Laterality Date    ENDOSCOPY, COLON, DIAGNOSTIC        HX AMPUTATION         Toe due to injury     Prior Level of Function/Home Situation: independent living  Home Situation  Home Environment: Independent living  # Steps to Enter: 2  One/Two Story Residence: One story  Living Alone: Yes  Support Systems: Family member(s)  Patient Expects to be Discharged to[de-identified] Private residence  Current DME Used/Available at Home: Other (comment) ([facility])  Diet prior to admission: unknown  Current Diet:  Soft solid with nectar-thick liquids   Radiologist:    Film Views: Fluoro;Lateral  Patient Position: 90 in fluoro chair      Trial 1: Trial 2:   Consistency Presented: Thin liquid Consistency Presented: Nectar thick liquid;Honey thick liquid;Pudding; Solid;Pill/Tablet   How Presented: SLP-fed/presented;Cup/sip How Presented: Self-fed/presented;SLP-fed/presented;Cup/sip;Spoon;Straw         Bolus Acceptance: No impairment Bolus Acceptance: No impairment   Bolus Formation/Control: Impaired: Premature spillage Bolus Formation/Control: Impaired: Delayed;Mastication;Premature spillage   Propulsion: Delayed (# of seconds) Propulsion: Delayed (# of seconds)   Oral Residue: None Oral Residue: None   Initiation of Swallow: Triggered at vallecula Initiation of Swallow: Triggered at base of tongue   Timing: No impairment Timing: No impairment   Penetration: Before swallow; To laryngeal vestibule Penetration: None   Aspiration/Timing: No evidence of aspiration Aspiration/Timing: No evidence of aspiration   Pharyngeal Clearance: No residue; Less than 10% Pharyngeal Clearance: Vallecular residue; Less than 10%   Attempted Modifications: Double swallow Attempted Modifications: Double swallow   Effective Modifications: None Effective Modifications: None   Cues for Modifications: Moderate Cues for Modifications: Minimal         Decreased Tongue Base Retraction?: Yes  Laryngeal Elevation: Minimal movement of larynx/epiglottis  Aspiration/Penetration Score: 3 (Penetration/Visible residue-Contrast remains above the folds/cords, but is not cleared)  Pharyngeal Symmetry: Not assessed  Pharyngeal-Esophageal Segment: No impairment  Pharyngeal Dysfunction: Decreased strength;Decreased elevation/closure;Decreased tongue base retraction     Oral Phase Severity: Mild-moderate  Pharyngeal Phase Severity: Moderate     GCODESwallowing:  Swallow Current Status CK= 40-59%   Swallow Goal Status CJ= 20-39%     The severity rating is based on the following outcomes:             8-point Penetration-Aspiration Scale: Score 3  Professional Judgment     PAIN:  Pt reports 0/10 pain or discomfort prior to MBS. Pt reports 0/10 pain or discomfort post MBS. COMMUNICATION/EDUCATION:   [X]  Patient educated regarding MBS results and diet recommendations. [X]  Patient understands intent and goals of therapy, but is neutral about his/her participation.      Thank you for this referral.     Karla Wallis M.S. CCC-SLP/L  Speech-Language Pathologist

## 2017-08-08 ENCOUNTER — APPOINTMENT (OUTPATIENT)
Dept: CT IMAGING | Age: 57
DRG: 004 | End: 2017-08-08
Attending: HOSPITALIST
Payer: MEDICARE

## 2017-08-08 LAB
ANION GAP BLD CALC-SCNC: 10 MMOL/L (ref 3–18)
BASOPHILS # BLD AUTO: 0 K/UL (ref 0–0.06)
BASOPHILS # BLD: 0 % (ref 0–2)
BUN SERPL-MCNC: 27 MG/DL (ref 7–18)
BUN/CREAT SERPL: 6 (ref 12–20)
CA-I SERPL-SCNC: 1.07 MMOL/L (ref 1.12–1.32)
CALCIUM SERPL-MCNC: 7.9 MG/DL (ref 8.5–10.1)
CHLORIDE SERPL-SCNC: 106 MMOL/L (ref 100–108)
CO2 SERPL-SCNC: 24 MMOL/L (ref 21–32)
CREAT SERPL-MCNC: 4.89 MG/DL (ref 0.6–1.3)
DIFFERENTIAL METHOD BLD: ABNORMAL
EOSINOPHIL # BLD: 0.1 K/UL (ref 0–0.4)
EOSINOPHIL NFR BLD: 1 % (ref 0–5)
ERYTHROCYTE [DISTWIDTH] IN BLOOD BY AUTOMATED COUNT: 17.6 % (ref 11.6–14.5)
GLUCOSE BLD STRIP.AUTO-MCNC: 115 MG/DL (ref 70–110)
GLUCOSE BLD STRIP.AUTO-MCNC: 117 MG/DL (ref 70–110)
GLUCOSE BLD STRIP.AUTO-MCNC: 171 MG/DL (ref 70–110)
GLUCOSE BLD STRIP.AUTO-MCNC: 198 MG/DL (ref 70–110)
GLUCOSE BLD STRIP.AUTO-MCNC: 248 MG/DL (ref 70–110)
GLUCOSE SERPL-MCNC: 200 MG/DL (ref 74–99)
HCT VFR BLD AUTO: 31.7 % (ref 36–48)
HGB BLD-MCNC: 10.4 G/DL (ref 13–16)
LYMPHOCYTES # BLD AUTO: 15 % (ref 21–52)
LYMPHOCYTES # BLD: 1.8 K/UL (ref 0.9–3.6)
MCH RBC QN AUTO: 30.1 PG (ref 24–34)
MCHC RBC AUTO-ENTMCNC: 32.8 G/DL (ref 31–37)
MCV RBC AUTO: 91.9 FL (ref 74–97)
MONOCYTES # BLD: 0.8 K/UL (ref 0.05–1.2)
MONOCYTES NFR BLD AUTO: 7 % (ref 3–10)
NEUTS SEG # BLD: 9.4 K/UL (ref 1.8–8)
NEUTS SEG NFR BLD AUTO: 77 % (ref 40–73)
PLATELET # BLD AUTO: 243 K/UL (ref 135–420)
PMV BLD AUTO: 10.8 FL (ref 9.2–11.8)
POTASSIUM SERPL-SCNC: 3.2 MMOL/L (ref 3.5–5.5)
RBC # BLD AUTO: 3.45 M/UL (ref 4.7–5.5)
SODIUM SERPL-SCNC: 140 MMOL/L (ref 136–145)
WBC # BLD AUTO: 12.1 K/UL (ref 4.6–13.2)

## 2017-08-08 PROCEDURE — 80048 BASIC METABOLIC PNL TOTAL CA: CPT | Performed by: PHYSICIAN ASSISTANT

## 2017-08-08 PROCEDURE — 65270000029 HC RM PRIVATE

## 2017-08-08 PROCEDURE — 74011250637 HC RX REV CODE- 250/637: Performed by: PHYSICIAN ASSISTANT

## 2017-08-08 PROCEDURE — 74011250637 HC RX REV CODE- 250/637: Performed by: INTERNAL MEDICINE

## 2017-08-08 PROCEDURE — 74011250636 HC RX REV CODE- 250/636: Performed by: PSYCHIATRY & NEUROLOGY

## 2017-08-08 PROCEDURE — 82962 GLUCOSE BLOOD TEST: CPT

## 2017-08-08 PROCEDURE — 74011250636 HC RX REV CODE- 250/636: Performed by: INTERNAL MEDICINE

## 2017-08-08 PROCEDURE — 74011000258 HC RX REV CODE- 258: Performed by: INTERNAL MEDICINE

## 2017-08-08 PROCEDURE — 36415 COLL VENOUS BLD VENIPUNCTURE: CPT | Performed by: PHYSICIAN ASSISTANT

## 2017-08-08 PROCEDURE — 70450 CT HEAD/BRAIN W/O DYE: CPT

## 2017-08-08 PROCEDURE — C9113 INJ PANTOPRAZOLE SODIUM, VIA: HCPCS | Performed by: INTERNAL MEDICINE

## 2017-08-08 PROCEDURE — 74011636637 HC RX REV CODE- 636/637: Performed by: HOSPITALIST

## 2017-08-08 PROCEDURE — 82330 ASSAY OF CALCIUM: CPT | Performed by: PHYSICIAN ASSISTANT

## 2017-08-08 PROCEDURE — 74011250637 HC RX REV CODE- 250/637: Performed by: HOSPITALIST

## 2017-08-08 PROCEDURE — 85025 COMPLETE CBC W/AUTO DIFF WBC: CPT | Performed by: PHYSICIAN ASSISTANT

## 2017-08-08 RX ORDER — INFANT FORMULA WITH IRON
POWDER (GRAM) ORAL 3 TIMES DAILY
Status: DISCONTINUED | OUTPATIENT
Start: 2017-08-08 | End: 2017-08-16

## 2017-08-08 RX ORDER — METOPROLOL TARTRATE 25 MG/1
12.5 TABLET, FILM COATED ORAL 2 TIMES DAILY
Status: DISCONTINUED | OUTPATIENT
Start: 2017-08-08 | End: 2017-08-10

## 2017-08-08 RX ORDER — SIMVASTATIN 10 MG/1
20 TABLET, FILM COATED ORAL
Status: DISCONTINUED | OUTPATIENT
Start: 2017-08-08 | End: 2017-09-22 | Stop reason: HOSPADM

## 2017-08-08 RX ORDER — FAMOTIDINE 20 MG/1
20 TABLET, FILM COATED ORAL DAILY
Status: DISCONTINUED | OUTPATIENT
Start: 2017-08-08 | End: 2017-08-18

## 2017-08-08 RX ORDER — LEVETIRACETAM 500 MG/1
500 TABLET ORAL 2 TIMES DAILY
Status: DISCONTINUED | OUTPATIENT
Start: 2017-08-08 | End: 2017-08-14

## 2017-08-08 RX ADMIN — INSULIN LISPRO 3 UNITS: 100 INJECTION, SOLUTION INTRAVENOUS; SUBCUTANEOUS at 09:17

## 2017-08-08 RX ADMIN — PIPERACILLIN AND TAZOBACTAM 2.25 G: 2; .25 INJECTION, POWDER, FOR SOLUTION INTRAVENOUS at 01:15

## 2017-08-08 RX ADMIN — INSULIN LISPRO 3 UNITS: 100 INJECTION, SOLUTION INTRAVENOUS; SUBCUTANEOUS at 16:48

## 2017-08-08 RX ADMIN — METOPROLOL TARTRATE 12.5 MG: 25 TABLET ORAL at 17:36

## 2017-08-08 RX ADMIN — SIMVASTATIN 20 MG: 10 TABLET, FILM COATED ORAL at 22:04

## 2017-08-08 RX ADMIN — FAMOTIDINE 20 MG: 20 TABLET ORAL at 12:00

## 2017-08-08 RX ADMIN — PIPERACILLIN AND TAZOBACTAM 2.25 G: 2; .25 INJECTION, POWDER, FOR SOLUTION INTRAVENOUS at 16:48

## 2017-08-08 RX ADMIN — PIPERACILLIN AND TAZOBACTAM 2.25 G: 2; .25 INJECTION, POWDER, FOR SOLUTION INTRAVENOUS at 08:53

## 2017-08-08 RX ADMIN — LEVETIRACETAM 500 MG: 5 INJECTION INTRAVENOUS at 05:02

## 2017-08-08 RX ADMIN — VITAMIN A AND D: 30.8 OINTMENT TOPICAL at 16:49

## 2017-08-08 RX ADMIN — PANTOPRAZOLE SODIUM 40 MG: 40 INJECTION, POWDER, FOR SOLUTION INTRAVENOUS at 08:53

## 2017-08-08 RX ADMIN — ASPIRIN 81 MG 81 MG: 81 TABLET ORAL at 08:53

## 2017-08-08 RX ADMIN — INSULIN LISPRO 6 UNITS: 100 INJECTION, SOLUTION INTRAVENOUS; SUBCUTANEOUS at 22:03

## 2017-08-08 RX ADMIN — HYDROCORTISONE 10 MG: 10 TABLET ORAL at 16:48

## 2017-08-08 RX ADMIN — VITAMIN A AND D: 30.8 OINTMENT TOPICAL at 22:04

## 2017-08-08 RX ADMIN — HYDROCORTISONE 10 MG: 10 TABLET ORAL at 08:53

## 2017-08-08 RX ADMIN — LEVETIRACETAM 500 MG: 500 TABLET ORAL at 17:36

## 2017-08-08 NOTE — PROGRESS NOTES
Infectious Disease Progress Note    Requested by: Dr. Edwin Vaz    Reason: sepsis, e.coli/clostridium pefringens bacteremia    Current abx Prior abx   Pip/tazo since 8/2 Levofloxacin 7/27  Pip/tazo 7/27-7/31  Meropenem  7/31-8/2, vancomycin  7/27-8/2     Lines:       Assessment :  55year old man with h/o type 2 DM (hgb a1c 5.4 on 7/29/17) ,ESRD admitted to [facility] on 7/27/17 s/p cardiorespiratory arrest, hypotension, bacteremia, elevated LFTs. Presentation c/w sepsis (POA) due to e.coli, clostridium perfringens bloodstream infection (positive blood culture 7/27, negative blood culture 8/1) . Most likely source of bacteremia is intra abdominal infection/inflammation. Elevated LFTs, thickened gallbladder - no evidence of cholecystitis on HIDA scan 8/2/17    Patient could have some other undiagnosed abdominal inflammation such as ischemic colitis which could have contributed to polymicrobial bacteremia    Increasing wbc - likely steroids related- improved after steroid taper    Clinically better. Recommendations:    1. D/c pip/tazo in am  2. F/u cbc, clinically    Advance Care planning: full code, patient wasnt able to name a surrogate decision maker or provide an advance care plan    Please call me if any further questions or concerns. Will continue to participate in the care of this patient.     subjective:    Denies abdominal pain, nausea, vomiting, cp,sob, chills.         Home medications:   list reviewed        Current Facility-Administered Medications   Medication Dose Route Frequency    vits A and D-white pet-lanolin (A&D) ointment   Topical TID    levETIRAcetam (KEPPRA) tablet 500 mg  500 mg Oral BID    famotidine (PEPCID) tablet 20 mg  20 mg Oral DAILY    simvastatin (ZOCOR) tablet 20 mg  20 mg Oral QHS    doxercalciferol (HECTOROL) 4 mcg/2 mL injection 3 mcg  3 mcg IntraVENous DIALYSIS MON, WED & FRI    epoetin benjamin (EPOGEN;PROCRIT) injection 10,000 Units  10,000 Units IntraVENous DIALYSIS MON, WED & FRI    insulin lispro (HUMALOG) injection   SubCUTAneous AC&HS    aspirin chewable tablet 81 mg  81 mg Oral DAILY    piperacillin-tazobactam (ZOSYN) 2.25 g in 0.9% sodium chloride (MBP/ADV) 50 mL MBP  2.25 g IntraVENous Q8H    Piperacillin-tazobactam (ZOSYN) 0.75 gm Supplemental Dosing by Pharmacy   Other Rx Dosing/Monitoring    hydrocortisone (CORTEF) tablet 10 mg  10 mg Oral BID WITH MEALS    glucose chewable tablet 16 g  4 Tab Oral PRN    glucagon (GLUCAGEN) injection 1 mg  1 mg IntraMUSCular PRN    dextrose (D50W) injection syrg 12.5-25 g  25-50 mL IntraVENous PRN    0.9% sodium chloride infusion  100 mL/hr IntraVENous DIALYSIS PRN    naloxone (NARCAN) injection 0.4 mg  0.4 mg IntraVENous PRN       Allergies: Review of patient's allergies indicates no known allergies. Temp (24hrs), Av.1 °F (36.7 °C), Min:97.8 °F (36.6 °C), Max:98.7 °F (37.1 °C)    Visit Vitals    /88 (BP 1 Location: Right arm, BP Patient Position: Head of bed elevated (Comment degrees))    Pulse 92    Temp 97.8 °F (36.6 °C)    Resp 18    Ht 6' 3\" (1.905 m)  Comment: per chart history    Wt 69 kg (152 lb 3.2 oz)    SpO2 98%    BMI 19.02 kg/m2       ROS: limited ROS obtained since patient not very communicative. Physical Exam:    General: Well developed, well nourished male laying on the bed,opens eyes, in no acute distress. General:   awake alert and oriented   HEENT:  Normocephalic, atraumatic, PERRL, EOMI,present scleral icterus no conjunctival hemmohage;  nasal and oral mucous are moist and without evidence of lesions. Neck supple, no bruits. Lymph Nodes:   no cervical, axillary or inguinal adenopathy   Lungs:   non-labored, bilaterally clear to auscultation- no crackles wheezes rales or rhonchi   Heart:  RRR, s1 and s2; no  rubs or gallops, no edema, + pedal pulses   Abdomen:  soft, non-distended, active bowel sounds, no hepatomegaly, no splenomegaly.   Non-tender   Genitourinary:  deferred Extremities:   no clubbing, cyanosis; no joint effusions or swelling; muscle mass appropriate for age   Neurologic:  No gross focal sensory abnormalities; 5/5 muscle strength to upper and lower extremities. Speech appropriate. Cranial nerves intact                        Skin:  No rash or ulcers   Back:  no spinal or paraspinal muscle tenderness or rigidity, no CVA tenderness     Psychiatric:  No suicidal or homicidal ideations, appropriate mood and affect         Labs: Results:   Chemistry Recent Labs      08/08/17   0339  08/07/17   0845  08/06/17   0152   GLU  200*  148*  106*   NA  140  141  142   K  3.2*  3.3*  3.1*   CL  106  105  106   CO2  24  26  29   BUN  27*  52*  42*   CREA  4.89*  6.61*  5.00*   CA  7.9*  7.1*  7.4*   AGAP  10  10  7   BUCR  6*  8*  8*      CBC w/Diff Recent Labs      08/08/17   0339  08/06/17   0152   WBC  12.1  15.9*   RBC  3.45*  3.14*   HGB  10.4*  9.6*   HCT  31.7*  28.3*   PLT  243  144   GRANS  77*  80*   LYMPH  15*  12*   EOS  1  1      Microbiology No results for input(s): CULT in the last 72 hours.        RADIOLOGY:    All available imaging studies/reports in [facility] care for this admission were reviewed    Dr. Betsy Recinos, Infectious Disease Specialist  [phone]  August 8, 2017  4:28 PM

## 2017-08-08 NOTE — DIABETES MGMT
GLYCEMIC CONTROL PLAN OF CARE    POC BG range on 08/07/2017: 191-253 mg/dL. POC BG report on 08/08/2017 at time of review: 198, 117, 115 mg/dL. Seen patient eating late lunch, noted that he was weak and I assisted to feed him. Patient has no teeth/denture and had difficulty chewing food. Recommendation(s):  1.) Changed diet to mechanical soft. 2.) Assess for need to add basal insulin if BG trends up again. Assessment:  Patient is 55year old with past medical history including type 2 diabetes,  mellitus, htypertension, irritable bowel syndrome, tubercolosis, chronic back pain, ESRD on hemodialysis, tobacco use, and arthritis - was admitted on 07/27/2017 via EMS found unresponsive. Noted:  Cardiac arrest.  Acute respiratory failure. Status post extubation 08/04/2017. Septic shock. NSTEMI. Encephalopathy, improving cognitive function. ESRD on dialysis. Type 2 diabetes mellitus with A1C of 5.4% (07/29/2017). Most recent blood glucose values    Results for Gautam Brown (MRN [id number]) as of 8/8/2017 15:02   Ref. Range 8/7/2017 00:32 8/7/2017 05:24 8/7/2017 22:00   GLUCOSE,FAST - POC Latest Ref Range: 70 - 110 mg/dL 253 (H) 207 (H) 191 (H)     Results for Gautam Brown (MRN [id number]) as of 8/8/2017 15:02   Ref. Range 8/8/2017 09:15 8/8/2017 11:58 8/8/2017 12:31   GLUCOSE,FAST - POC Latest Ref Range: 70 - 110 mg/dL 198 (H) 117 (H) 115 (H)     Current A1C of 5.4% is equivalent to average blood glucose of 108 mg/dl over the past 2-3 months. Current hospital diabetes medications:   Correctional Lispro insulin ACHS. Very resistant dose. Previous day's insulin requirements:  08/07/2017  Lispro: 24 units    Home diabetes medications: None. Diet:  Cardiac mechanical soft solids; 1 nectar. Goal: Patient's blood glucose will be within target range of  mg/dL by 08/11/2017.     Education:  ____Refer to Diabetes Education Record             __x__Education not indicated at this time      Kristian Leopoldo Arzate

## 2017-08-08 NOTE — ROUTINE PROCESS
Bedside shift change report given to Mary MUELLER (oncoming nurse) by Marlene Son RN   (offgoing nurse). Report included the following information SBAR, Kardex, Intake/Output, MAR, Accordion and Recent Results.

## 2017-08-08 NOTE — CDMP QUERY
Do you concur with the findings documented by wound nurse? Please clarify if this patient is being treated/managed for:    =>Wounds:    unstageable pressure ulcer to sacrum/coccyx not present on admit    moisture associated skin damage to anus not present on admit    moisture associated skin damage to buttocks not present on admit  =>Other Explanation of clinical findings  =>Unable to Determine (no explanation of clinical findings)    The medical record reflects the following:  Risk:  --was admitted on 7/27 after cardiac arrest on ventilator until 8/4    Clinical Indicators:  --8/8 wound care nurse:   Rabia unstageable pressure ulcer to sacrum/coccyx not present on admit    moisture associated skin damage to anus not present on admit    moisture associated skin damage to buttocks not present on admit    Treatment:   --wound care nurse: Pt on envision mattress on versacare frame, lifts heels on own well, heels intact. Pt agreeable to continue current & recommended treatment. Wound care education provided to pt at this time. Will turn over care to nursing staff at this time    Please clarify and document your clinical opinion in the progress notes and discharge summary including the definitive and/or presumptive diagnosis, (suspected or probable), related to the above clinical findings. Please include clinical findings supporting your diagnosis. If you DECLINE this query or would like to communicate with [facility], please utilize the \"[facility] message box\" at the TOP of the Progress Note on the right.       Thank you,  Usman Grider [address] [facility], [address] [place] [address]

## 2017-08-08 NOTE — PROGRESS NOTES
[facility] - Progress Note  Admit Date: 7/27/2017    Assessment:     Hospital Problems  Date Reviewed: 7/27/2017          Codes Class Noted POA    Acidosis ICD-10-CM: E87.2  ICD-9-CM: 276.2  7/27/2017 Unknown        Cardiac arrest [facility]) ICD-10-CM: I46.9  ICD-9-CM: 427.5  7/27/2017 Unknown        Respiratory failure ([facility] ICD-10-CM: J96.90  ICD-9-CM: 518.81  7/27/2017 Unknown        ESRD needing dialysis ([facility] ICD-10-CM: N18.6, Z99.2  ICD-9-CM: 585.6  7/27/2017 Unknown        Anoxic brain damage ([facility] ICD-10-CM: G93.1  ICD-9-CM: 348.1  7/27/2017 Unknown        Colitis ICD-10-CM: K52.9  ICD-9-CM: 558.9  7/27/2017 Unknown        Hypotension ICD-10-CM: I95.9  ICD-9-CM: 458.9  7/27/2017 Unknown        Acute GI bleeding ICD-10-CM: K92.2  ICD-9-CM: 578.9  7/27/2017 Unknown        ESRD (end stage renal disease) ([facility] ICD-10-CM: N18.6  ICD-9-CM: 585.6  7/27/2017 Unknown        * (Principal)Sepsis ([facility] ICD-10-CM: A41.9  ICD-9-CM: 038.9, 995.91  7/27/2017 Unknown        Anemia ICD-10-CM: D64.9  ICD-9-CM: 107. 9  Unknown Unknown              -- PEA arrest, s/p CPR with one round of epi, 2 amps of d50. Unclear how long until ROSC but pt had ROSC prior to arrival.. Found down at home, became agonal and pulseless in ambulance. Probable anoxic brain injury  -- acute MI.  Troponin level > 100, suspect has significant CAD, no heparin infusion  -- Ischemic cardiomyopathy.  EF 35-40% on echo with RWMAs earlier this admission. EF now normal 55-60%. -Anemia, s/p 2 units PRBCs at [facility] 35 7/26/17  -ESRD, noncompliant with dialysis, dialyzed at [facility] 35 7/26/17  -Sepsis. Improving, now off vasopressors  -Elevated LFTs and now with worsening thrombocytopnenia  -Tobacco use disorder    Plan:     Patient alert and oriented to place but not time. Have discussed cardiac catheterization, patient wishes to discuss with his children and brother. Will keep NPO after MN.   EF improved on follow up echocardiogram.  Continue ASA and statin. BP somewhat labile, will start lopressor and trend. Needs electrolyte replacement, will deer to nephrology team.    Subjective:     No CP. No SOB. Objective:      Patient Vitals for the past 8 hrs:   Temp Pulse Resp BP SpO2   08/08/17 0800 97.8 °F (36.6 °C) 92 18 175/88 98 %         Patient Vitals for the past 96 hrs:   Weight   08/08/17 0800 69 kg (152 lb 3.2 oz)   08/08/17 0400 71 kg (156 lb 8 oz)   08/07/17 1941 70.3 kg (155 lb)   08/06/17 0207 62 kg (136 lb 11 oz)   08/05/17 0001 62.6 kg (138 lb 0.1 oz)                    Intake/Output Summary (Last 24 hours) at 08/08/17 1332  Last data filed at 08/08/17 0536   Gross per 24 hour   Intake             1020 ml   Output                0 ml   Net             1020 ml       Physical Exam:  General:  alert, cooperative, no distress, appears stated age  Neck:  no JVD  Lungs:  clear to auscultation bilaterally  Heart:  regular rate and rhythm  Abdomen:  abdomen is soft without significant tenderness, masses, organomegaly or guarding  Extremities:  extremities normal, atraumatic, no cyanosis or edema    Data Review:     Labs: Results:       Chemistry Recent Labs      08/08/17 0339 08/07/17   0845  08/06/17   0152   GLU  200*  148*  106*   NA  140  141  142   K  3.2*  3.3*  3.1*   CL  106  105  106   CO2  24  26  29   BUN  27*  52*  42*   CREA  4.89*  6.61*  5.00*   CA  7.9*  7.1*  7.4*   MG   --    --   2.1   PHOS   --    --   2.9   AGAP  10  10  7   BUCR  6*  8*  8*      CBC w/Diff Recent Labs      08/08/17 0339 08/06/17   0152   WBC  12.1  15.9*   RBC  3.45*  3.14*   HGB  10.4*  9.6*   HCT  31.7*  28.3*   PLT  243  144   GRANS  77*  80*   LYMPH  15*  12*   EOS  1  1      Cardiac Enzymes No results found for: CPK, CKMMB, CKMB, RCK3, CKMBT, CKNDX, CKND1, WILFRIDO, TROPT, TROIQ, JOELLEN, TROPT, TNIPOC, BNP, BNPP   Coagulation No results for input(s): PTP, INR, APTT in the last 72 hours.     No lab exists for component: INREXT    Lipid Panel No results found for: CHOL, CHOLPOCT, CHOLX, CHLST, CHOLV, 422368, HDL, LDL, LDLC, DLDLP, 465511, VLDLC, VLDL, TGLX, TRIGL, TRIGP, TGLPOCT, CHHD, CHHDX   BNP No results found for: BNP, BNPP, XBNPT   Liver Enzymes No results for input(s): TP, ALB, TBIL, AP, SGOT, GPT in the last 72 hours.     No lab exists for component: DBIL   Digoxin    Thyroid Studies Lab Results   Component Value Date/Time    TSH 7.52 08/03/2017 01:01 PM          Signed By: YASMEEN Brady     August 8, 2017

## 2017-08-08 NOTE — WOUND CARE
Physical Exam   Room 468: wound assessment  Wound Anus Posterior (Active) Moisture associated skin damage   DRESSING TYPE Open to air 8/8/2017  8:56 AM   Non-Pressure Injury Partial thickness (epider/derm) 8/8/2017  8:56 AM   Condition of Base [place] 8/8/2017  8:56 AM   Condition of Edges Closed 8/8/2017  8:56 AM   Epithelialization (%) 0 8/8/2017  8:56 AM   Tissue Type Pink 8/8/2017  8:56 AM   Tissue Type Percent Pink 100 8/8/2017  8:56 AM   Drainage Amount  Scant 8/8/2017  8:56 AM   Drainage Color Serosanguinous 8/8/2017  8:56 AM   Wound Odor None 8/8/2017  8:56 AM   Periwound Skin Condition Rash 8/8/2017  8:56 AM   Dressing Type Applied Open to air 8/8/2017  8:56 AM   Procedure Tolerated Fair 8/8/2017  8:56 AM   Number of days:1       Wound Sacral/coccyx Posterior (Active)   DRESSING STATUS Intact 8/8/2017  8:56 AM   DRESSING TYPE Silicone 6/0/6131  8:05 AM   Pressure Injury Deep Tissue Injury 8/8/2017  8:56 AM   Wound Length (cm) 5 cm 8/8/2017  8:56 AM   Wound Width (cm) 9 cm 8/8/2017  8:56 AM   Wound Depth (cm) 0 8/8/2017  8:56 AM   Wound Surface area (cm^3) 0 cm^2 8/8/2017  8:56 AM   Condition of Base Purple 8/8/2017  8:56 AM   Condition of Edges Closed 8/8/2017  8:56 AM   Epithelialization (%) 0 8/8/2017  8:56 AM   Tissue Type Maroon/purple 8/8/2017  8:56 AM   Tissue Type Percent Maroon/Purple 100 % 8/8/2017  8:56 AM   Drainage Amount  None 8/8/2017  8:56 AM   Wound Odor None 8/8/2017  8:56 AM   Periwound Skin Condition Intact 8/8/2017  8:56 AM   Dressing Type Applied Silicone 5/9/1763  6:90 AM   Procedure Tolerated Fair 8/8/2017  8:56 AM   Number of days:0       Wound Buttocks Posterior (Active) Moisture associated skin damage   DRESSING TYPE Open to air 8/8/2017  8:56 AM   Non-Pressure Injury Partial thickness (epider/derm) 8/8/2017  8:56 AM   Condition of Base [place] 8/8/2017  8:56 AM   Condition of Edges Closed 8/8/2017  8:56 AM   Epithelialization (%) 0 8/8/2017  8:56 AM   Tissue Type Pink 8/8/2017  8:56 AM Tissue Type Percent Pink 100 8/8/2017  8:56 AM   Drainage Amount  Scant 8/8/2017  8:56 AM   Drainage Color Serosanguinous 8/8/2017  8:56 AM   Wound Odor None 8/8/2017  8:56 AM   Periwound Skin Condition Rash 8/8/2017  8:56 AM   Dressing Type Applied Open to air 8/8/2017  8:56 AM   Procedure Tolerated Fair 8/8/2017  8:56 AM   Number of days:0       Pt on envision mattress on versacare frame, lifts heels on own well, heels intact. Pt agreeable to continue current & recommended treatment. Wound care education provided to pt at this time. Will turn over care to nursing staff at this time.   Elroy LIRAN, RN, [facility], [address]

## 2017-08-08 NOTE — PROGRESS NOTES
[facility] Hospitalist Group  Progress Note    Patient: Christen Dakin Age: 55 y.o. : 1960 MR#: [id number] SSN: [id number]  Date/Time: 2017     Subjective:     Pt feels better. AAOx2-3. Denies any complaints   Tolerating PO well     Assessment/Plan:   1. Acute met encephalopathy: better. 2. S/p PEA arrest. ? Cardiac cause with elevated trop PTA. Echo improving EF, possible cath on wed per cardio. 3. Acute resp failure s/p extubation, off O2  4. ESRD on HD. M/W/F   5. Sepsis - leucocytosis  () bottles grew E.coli and C.perferinges. Initially thought to be secondary to cholecystitis but w/u thus far including HIDA scan negative. Per ID possibly due to abdominal source. Repeat cultures negative. Per ID recs on pip/tazo to be switched to po cipro and metro until  when able to take PO meds. 6. Hypokalemia  - replace lytes -and follow. 7. Elevated LFT - ? Shock liver. LFT now wnl. 8. hrombocytopenia: ? HIT vs secondary to sepsis. Serotonin release assay negative. Count trending up. Cont to monitor. 9. Dysphagia: better. S/p SLP eval  10. ? Sz: on keppra per neurology  11. R side weakness: ? CVA vs decondition: will get CT head. Pt and family says he had fall PTA and hurt R shoulder and since then he was not able to lift arm above shoulder. 12. =>Wounds: wound care    unstageable pressure ulcer to sacrum/coccyx not present on admit    moisture associated skin damage to anus not present  on admit    moisture associated skin damage to buttocks not present on admit  Full code   D/w daughter Alana Tian [phone] in detail, explained above plan care. Eder Kurtz. [phone]      Addendum:  CT head neg. Cont current Rx.      Case discussed with:  [x]Patient  [x]Family  []Nursing  []Case Management  DVT Prophylaxis:  []Lovenox  []Hep SQ  [x]SCDs  []Coumadin   []On Heparin gtt    Patient Active Problem List   Diagnosis Code    Anemia D64.9    Acidosis E87.2    Cardiac arrest (HCC) I46.9    Respiratory failure (HCC) J96.90    ESRD needing dialysis ([facility] N18.6, Z99.2    Anoxic brain damage (HCC) G93.1    Colitis K52.9    Hypotension I95.9    Acute GI bleeding K92.2    ESRD (end stage renal disease) (HCC) N18.6    Sepsis (HCC) A41.9       Objective:   VS:   Visit Vitals    /88 (BP 1 Location: Right arm, BP Patient Position: Head of bed elevated (Comment degrees))    Pulse 92    Temp 97.8 °F (36.6 °C)    Resp 18    Ht 6' 3\" (1.905 m)  Comment: per chart history    Wt 69 kg (152 lb 3.2 oz)    SpO2 98%    BMI 19.02 kg/m2      Tmax/24hrs: Temp (24hrs), Av.1 °F (36.7 °C), Min:97.8 °F (36.6 °C), Max:98.7 °F (37.1 °C)  IOBRIEF    Intake/Output Summary (Last 24 hours) at 17 1050  Last data filed at 17 0536   Gross per 24 hour   Intake             1020 ml   Output             1000 ml   Net               20 ml       General:  Alert awake in nad  Pulmonary:  CTA Bilaterally. No Wheezing/Rhonchi/Rales. Cardiovascular: Regular rate and Rhythm. GI:  Soft, Non distended, Non tender. + Bowel sounds. Extremities:  No edema, cyanosis, clubbing. Neuro: AAOx2-3. R side UE and LE 3/5. No facial weakness.             Medications:   Current Facility-Administered Medications   Medication Dose Route Frequency    vits A and D-white pet-lanolin (A&D) ointment   Topical TID    doxercalciferol (HECTOROL) 4 mcg/2 mL injection 3 mcg  3 mcg IntraVENous DIALYSIS MON, WED & FRI    epoetin benjamin (EPOGEN;PROCRIT) injection 10,000 Units  10,000 Units IntraVENous DIALYSIS MON, WED & FRI    insulin lispro (HUMALOG) injection   SubCUTAneous AC&HS    aspirin chewable tablet 81 mg  81 mg Oral DAILY    piperacillin-tazobactam (ZOSYN) 2.25 g in 0.9% sodium chloride (MBP/ADV) 50 mL MBP  2.25 g IntraVENous Q8H    Piperacillin-tazobactam (ZOSYN) 0.75 gm Supplemental Dosing by Pharmacy   Other Rx Dosing/Monitoring    hydrocortisone (CORTEF) tablet 10 mg 10 mg Oral BID WITH MEALS    pantoprazole (PROTONIX) injection 40 mg  40 mg IntraVENous DAILY    levETIRAcetam (KEPPRA) 500 mg in 100 ml IVPB  500 mg IntraVENous Q12H    glucose chewable tablet 16 g  4 Tab Oral PRN    glucagon (GLUCAGEN) injection 1 mg  1 mg IntraMUSCular PRN    dextrose (D50W) injection syrg 12.5-25 g  25-50 mL IntraVENous PRN    0.9% sodium chloride infusion  100 mL/hr IntraVENous DIALYSIS PRN    naloxone (NARCAN) injection 0.4 mg  0.4 mg IntraVENous PRN       Labs:    Recent Results (from the past 24 hour(s))   GLUCOSE, POC    Collection Time: 08/07/17 10:00 PM   Result Value Ref Range    Glucose (POC) 191 (H) 70 - 110 mg/dL   CALCIUM, IONIZED    Collection Time: 08/08/17  3:39 AM   Result Value Ref Range    Ionized Calcium 1.07 (L) 1.12 - 1.32 MMOL/L   CBC WITH AUTOMATED DIFF    Collection Time: 08/08/17  3:39 AM   Result Value Ref Range    WBC 12.1 4.6 - 13.2 K/uL    RBC 3.45 (L) 4.70 - 5.50 M/uL    HGB 10.4 (L) 13.0 - 16.0 g/dL    HCT 31.7 (L) 36.0 - 48.0 %    MCV 91.9 74.0 - 97.0 FL    MCH 30.1 24.0 - 34.0 PG    MCHC 32.8 31.0 - 37.0 g/dL    RDW 17.6 (H) 11.6 - 14.5 %    PLATELET 620 963 - 834 K/uL    MPV 10.8 9.2 - 11.8 FL    NEUTROPHILS 77 (H) 40 - 73 %    LYMPHOCYTES 15 (L) 21 - 52 %    MONOCYTES 7 3 - 10 %    EOSINOPHILS 1 0 - 5 %    BASOPHILS 0 0 - 2 %    ABS. NEUTROPHILS 9.4 (H) 1.8 - 8.0 K/UL    ABS. LYMPHOCYTES 1.8 0.9 - 3.6 K/UL    ABS. MONOCYTES 0.8 0.05 - 1.2 K/UL    ABS. EOSINOPHILS 0.1 0.0 - 0.4 K/UL    ABS.  BASOPHILS 0.0 0.0 - 0.06 K/UL    DF AUTOMATED     METABOLIC PANEL, BASIC    Collection Time: 08/08/17  3:39 AM   Result Value Ref Range    Sodium 140 136 - 145 mmol/L    Potassium 3.2 (L) 3.5 - 5.5 mmol/L    Chloride 106 100 - 108 mmol/L    CO2 24 21 - 32 mmol/L    Anion gap 10 3.0 - 18 mmol/L    Glucose 200 (H) 74 - 99 mg/dL    BUN 27 (H) 7.0 - 18 MG/DL    Creatinine 4.89 (H) 0.6 - 1.3 MG/DL    BUN/Creatinine ratio 6 (L) 12 - 20      GFR est AA 15 (L) >60 ml/min/1.73m2    GFR est non-AA 12 (L) >60 ml/min/1.73m2    Calcium 7.9 (L) 8.5 - 10.1 MG/DL   GLUCOSE, POC    Collection Time: 08/08/17  9:15 AM   Result Value Ref Range    Glucose (POC) 198 (H) 70 - 110 mg/dL       Signed By: Jose Martin Alva MD     August 8, 2017

## 2017-08-08 NOTE — ROUTINE PROCESS
Received patient lying in bed awake alert and orient x 4. Patient denies pain and discomfort at this time. Respiration even and unlabored. Patient have a rectal tube in place draining yellowish loose stool. Instructed to call for assistance.  Call light in reach

## 2017-08-08 NOTE — INTERDISCIPLINARY ROUNDS
IDR/SLIDR Summary          Patient: Holger Butler MRN: [id number]    Age: 55 y.o. YOB: 1960 Room/Bed: [facility]   Admit Diagnosis: Cardiac arrest (HCC)  Acidosis  Respiratory failure (HCC)  Anemia  ESRD needing dialysis ([facility]  Cardiac arrest ([facility]  Acute GI bleeding  Sepsis ([facility]  Hypotension  Anoxic brain damage (HCC)  ESRD (end stage renal disease) (HCC)  Colitis  Principal [facility]: [facility]   Goals: no infection  Readmission: NO  Quality Measure: Not applicable  VTE Prophylaxis: Chemical  Influenza Vaccine screening completed? YES  Pneumococcal Vaccine screening completed? YES  Mobility needs: Yes   Nutrition plan:Yes  Consults:P.T, O.T. and Case Management    Financial concerns:No  Escalated to CM? NO  RRAT Score: 17   Interventions:Home Health  Testing due for pt today?  NO  LOS: 12 days Expected length of stay 15 days  Discharge plan: snf   PCP: Duke Loya MD  Transportation needs: Yes    Days before discharge:discharge pending  Discharge disposition: SNF    Signed:     Bethany Parnell  8/8/2017  9:03 AM

## 2017-08-08 NOTE — INTERDISCIPLINARY ROUNDS
IDR/SLIDR Summary          Patient: Ayse Charles MRN: [id number]    Age: 55 y.o. YOB: 1960 Room/Bed: [facility]   Admit Diagnosis: Cardiac arrest (HCC)  Acidosis  Respiratory failure (HCC)  Anemia  ESRD needing dialysis ([facility]  Cardiac arrest ([facility]  Acute GI bleeding  Sepsis ([facility]  Hypotension  Anoxic brain damage (HCC)  ESRD (end stage renal disease) (HCC)  Colitis  Principal Diagnosis: Sepsis ([facility]   Goals: Hemodynamic stability, Possible cardiac cath, monitor thrombocytopenia, SLP eval, No seizures, CT to r/o CVA vs deconditioning. Readmission: NO  Quality Measure: Not applicable  VTE Prophylaxis: Chemical  Influenza Vaccine screening completed? YES  Pneumococcal Vaccine screening completed? YES  Mobility needs: Yes   Nutrition plan:Yes  Consults:Speech    Financial concerns:No  Escalated to CM? NO  RRAT Score: 17   Interventions:Home Health  Testing due for pt today?  YES  LOS: 12 days Expected length of stay 15 days  Discharge plan: SNF   PCP: Katelynn Pillai MD  Transportation needs: Yes    Days before discharge:two or more days before discharge   Discharge disposition: SNF    Signed:     Nichelle Tellez RN  8/8/2017  11:20 AM

## 2017-08-09 ENCOUNTER — APPOINTMENT (OUTPATIENT)
Dept: CARDIAC CATH/INVASIVE PROCEDURES | Age: 57
DRG: 004 | End: 2017-08-09
Payer: MEDICARE

## 2017-08-09 LAB
ATRIAL RATE: 73 BPM
BASOPHILS # BLD AUTO: 0 K/UL (ref 0–0.06)
BASOPHILS # BLD: 0 % (ref 0–2)
CA-I SERPL-SCNC: 1.1 MMOL/L (ref 1.12–1.32)
CALCULATED P AXIS, ECG09: 59 DEGREES
CALCULATED R AXIS, ECG10: 46 DEGREES
CALCULATED T AXIS, ECG11: 62 DEGREES
DIAGNOSIS, 93000: NORMAL
DIFFERENTIAL METHOD BLD: ABNORMAL
EOSINOPHIL # BLD: 0.2 K/UL (ref 0–0.4)
EOSINOPHIL NFR BLD: 1 % (ref 0–5)
ERYTHROCYTE [DISTWIDTH] IN BLOOD BY AUTOMATED COUNT: 17.8 % (ref 11.6–14.5)
GLUCOSE BLD STRIP.AUTO-MCNC: 192 MG/DL (ref 70–110)
GLUCOSE BLD STRIP.AUTO-MCNC: 391 MG/DL (ref 70–110)
HCT VFR BLD AUTO: 31.1 % (ref 36–48)
HGB BLD-MCNC: 10.2 G/DL (ref 13–16)
LYMPHOCYTES # BLD AUTO: 11 % (ref 21–52)
LYMPHOCYTES # BLD: 1.5 K/UL (ref 0.9–3.6)
MCH RBC QN AUTO: 30.7 PG (ref 24–34)
MCHC RBC AUTO-ENTMCNC: 32.8 G/DL (ref 31–37)
MCV RBC AUTO: 93.7 FL (ref 74–97)
MONOCYTES # BLD: 1 K/UL (ref 0.05–1.2)
MONOCYTES NFR BLD AUTO: 7 % (ref 3–10)
NEUTS SEG # BLD: 10.8 K/UL (ref 1.8–8)
NEUTS SEG NFR BLD AUTO: 81 % (ref 40–73)
P-R INTERVAL, ECG05: 204 MS
PLATELET # BLD AUTO: 301 K/UL (ref 135–420)
PMV BLD AUTO: 10.8 FL (ref 9.2–11.8)
Q-T INTERVAL, ECG07: 482 MS
QRS DURATION, ECG06: 84 MS
QTC CALCULATION (BEZET), ECG08: 531 MS
RBC # BLD AUTO: 3.32 M/UL (ref 4.7–5.5)
TROPONIN I SERPL-MCNC: 0.09 NG/ML (ref 0–0.04)
VENTRICULAR RATE, ECG03: 73 BPM
WBC # BLD AUTO: 13.4 K/UL (ref 4.6–13.2)

## 2017-08-09 PROCEDURE — 74011000250 HC RX REV CODE- 250: Performed by: INTERNAL MEDICINE

## 2017-08-09 PROCEDURE — 74011250636 HC RX REV CODE- 250/636: Performed by: INTERNAL MEDICINE

## 2017-08-09 PROCEDURE — P9047 ALBUMIN (HUMAN), 25%, 50ML: HCPCS

## 2017-08-09 PROCEDURE — 74011250637 HC RX REV CODE- 250/637: Performed by: PHYSICIAN ASSISTANT

## 2017-08-09 PROCEDURE — 77030011256 HC DRSG MEPILEX <16IN NO BORD MOLN -A

## 2017-08-09 PROCEDURE — 99152 MOD SED SAME PHYS/QHP 5/>YRS: CPT

## 2017-08-09 PROCEDURE — 84484 ASSAY OF TROPONIN QUANT: CPT | Performed by: PHYSICIAN ASSISTANT

## 2017-08-09 PROCEDURE — B2151ZZ FLUOROSCOPY OF LEFT HEART USING LOW OSMOLAR CONTRAST: ICD-10-PCS | Performed by: INTERNAL MEDICINE

## 2017-08-09 PROCEDURE — 74011250636 HC RX REV CODE- 250/636

## 2017-08-09 PROCEDURE — 82962 GLUCOSE BLOOD TEST: CPT

## 2017-08-09 PROCEDURE — 93458 L HRT ARTERY/VENTRICLE ANGIO: CPT

## 2017-08-09 PROCEDURE — 36415 COLL VENOUS BLD VENIPUNCTURE: CPT | Performed by: PHYSICIAN ASSISTANT

## 2017-08-09 PROCEDURE — B2111ZZ FLUOROSCOPY OF MULTIPLE CORONARY ARTERIES USING LOW OSMOLAR CONTRAST: ICD-10-PCS | Performed by: INTERNAL MEDICINE

## 2017-08-09 PROCEDURE — 4A023N7 MEASUREMENT OF CARDIAC SAMPLING AND PRESSURE, LEFT HEART, PERCUTANEOUS APPROACH: ICD-10-PCS | Performed by: INTERNAL MEDICINE

## 2017-08-09 PROCEDURE — 93005 ELECTROCARDIOGRAM TRACING: CPT

## 2017-08-09 PROCEDURE — 74011250637 HC RX REV CODE- 250/637: Performed by: INTERNAL MEDICINE

## 2017-08-09 PROCEDURE — 74011636637 HC RX REV CODE- 636/637: Performed by: HOSPITALIST

## 2017-08-09 PROCEDURE — 74011000258 HC RX REV CODE- 258: Performed by: INTERNAL MEDICINE

## 2017-08-09 PROCEDURE — 85025 COMPLETE CBC W/AUTO DIFF WBC: CPT | Performed by: PHYSICIAN ASSISTANT

## 2017-08-09 PROCEDURE — 90935 HEMODIALYSIS ONE EVALUATION: CPT

## 2017-08-09 PROCEDURE — 99153 MOD SED SAME PHYS/QHP EA: CPT

## 2017-08-09 PROCEDURE — 82330 ASSAY OF CALCIUM: CPT | Performed by: PHYSICIAN ASSISTANT

## 2017-08-09 PROCEDURE — 74011250637 HC RX REV CODE- 250/637: Performed by: HOSPITALIST

## 2017-08-09 PROCEDURE — 77030004558 HC CATH ANGI DX SUPR TORQ CARD -A

## 2017-08-09 PROCEDURE — 77030013797 HC KT TRNSDUC PRSSR EDWD -A

## 2017-08-09 PROCEDURE — C1894 INTRO/SHEATH, NON-LASER: HCPCS

## 2017-08-09 PROCEDURE — 74011636320 HC RX REV CODE- 636/320: Performed by: INTERNAL MEDICINE

## 2017-08-09 PROCEDURE — 65270000029 HC RM PRIVATE

## 2017-08-09 RX ORDER — HEPARIN SODIUM 1000 [USP'U]/ML
1000-10000 INJECTION, SOLUTION INTRAVENOUS; SUBCUTANEOUS
Status: DISCONTINUED | OUTPATIENT
Start: 2017-08-09 | End: 2017-08-09 | Stop reason: HOSPADM

## 2017-08-09 RX ORDER — FENTANYL CITRATE 50 UG/ML
12.5-1 INJECTION, SOLUTION INTRAMUSCULAR; INTRAVENOUS
Status: DISCONTINUED | OUTPATIENT
Start: 2017-08-09 | End: 2017-08-09 | Stop reason: HOSPADM

## 2017-08-09 RX ORDER — HEPARIN SODIUM 1000 [USP'U]/ML
2000 INJECTION, SOLUTION INTRAVENOUS; SUBCUTANEOUS
Status: DISCONTINUED | OUTPATIENT
Start: 2017-08-09 | End: 2017-09-22 | Stop reason: HOSPADM

## 2017-08-09 RX ORDER — HEPARIN SODIUM 200 [USP'U]/100ML
500 INJECTION, SOLUTION INTRAVENOUS ONCE
Status: COMPLETED | OUTPATIENT
Start: 2017-08-09 | End: 2017-08-09

## 2017-08-09 RX ORDER — LIDOCAINE HYDROCHLORIDE 10 MG/ML
1-30 INJECTION, SOLUTION EPIDURAL; INFILTRATION; INTRACAUDAL; PERINEURAL
Status: DISCONTINUED | OUTPATIENT
Start: 2017-08-09 | End: 2017-08-09 | Stop reason: HOSPADM

## 2017-08-09 RX ORDER — BIVALIRUDIN 250 MG/5ML
0.75 INJECTION, POWDER, LYOPHILIZED, FOR SOLUTION INTRAVENOUS ONCE
Status: DISCONTINUED | OUTPATIENT
Start: 2017-08-09 | End: 2017-08-09 | Stop reason: HOSPADM

## 2017-08-09 RX ORDER — HEPARIN SODIUM 1000 [USP'U]/ML
2000 INJECTION, SOLUTION INTRAVENOUS; SUBCUTANEOUS ONCE
Status: DISCONTINUED | OUTPATIENT
Start: 2017-08-09 | End: 2017-08-09

## 2017-08-09 RX ORDER — ALBUMIN HUMAN 250 G/1000ML
SOLUTION INTRAVENOUS
Status: COMPLETED
Start: 2017-08-09 | End: 2017-08-19

## 2017-08-09 RX ORDER — VERAPAMIL HYDROCHLORIDE 2.5 MG/ML
5 INJECTION, SOLUTION INTRAVENOUS ONCE
Status: DISCONTINUED | OUTPATIENT
Start: 2017-08-09 | End: 2017-08-09 | Stop reason: HOSPADM

## 2017-08-09 RX ORDER — MIDAZOLAM HYDROCHLORIDE 1 MG/ML
.5-2 INJECTION, SOLUTION INTRAMUSCULAR; INTRAVENOUS
Status: DISCONTINUED | OUTPATIENT
Start: 2017-08-09 | End: 2017-08-09 | Stop reason: HOSPADM

## 2017-08-09 RX ORDER — POTASSIUM CHLORIDE 20 MEQ/1
40 TABLET, EXTENDED RELEASE ORAL
Status: COMPLETED | OUTPATIENT
Start: 2017-08-09 | End: 2017-08-09

## 2017-08-09 RX ADMIN — PIPERACILLIN AND TAZOBACTAM 2.25 G: 2; .25 INJECTION, POWDER, FOR SOLUTION INTRAVENOUS at 19:50

## 2017-08-09 RX ADMIN — LEVETIRACETAM 500 MG: 500 TABLET ORAL at 19:51

## 2017-08-09 RX ADMIN — INSULIN LISPRO 3 UNITS: 100 INJECTION, SOLUTION INTRAVENOUS; SUBCUTANEOUS at 23:08

## 2017-08-09 RX ADMIN — HYDROCORTISONE 10 MG: 10 TABLET ORAL at 09:38

## 2017-08-09 RX ADMIN — ERYTHROPOIETIN 10000 UNITS: 10000 INJECTION, SOLUTION INTRAVENOUS; SUBCUTANEOUS at 18:21

## 2017-08-09 RX ADMIN — ALBUMIN (HUMAN) 12.5 G: 0.25 INJECTION, SOLUTION INTRAVENOUS at 15:47

## 2017-08-09 RX ADMIN — SIMVASTATIN 20 MG: 10 TABLET, FILM COATED ORAL at 23:08

## 2017-08-09 RX ADMIN — HYDROCORTISONE 10 MG: 10 TABLET ORAL at 20:03

## 2017-08-09 RX ADMIN — VITAMIN A AND D: 30.8 OINTMENT TOPICAL at 20:00

## 2017-08-09 RX ADMIN — HEPARIN SODIUM 1000 UNITS: 200 INJECTION, SOLUTION INTRAVENOUS at 12:38

## 2017-08-09 RX ADMIN — PIPERACILLIN AND TAZOBACTAM 2.25 G: 2; .25 INJECTION, POWDER, FOR SOLUTION INTRAVENOUS at 00:39

## 2017-08-09 RX ADMIN — FAMOTIDINE 20 MG: 20 TABLET ORAL at 09:38

## 2017-08-09 RX ADMIN — ASPIRIN 81 MG 81 MG: 81 TABLET ORAL at 09:38

## 2017-08-09 RX ADMIN — FENTANYL CITRATE 50 MCG: 50 INJECTION INTRAMUSCULAR; INTRAVENOUS at 12:40

## 2017-08-09 RX ADMIN — VITAMIN A AND D: 30.8 OINTMENT TOPICAL at 09:38

## 2017-08-09 RX ADMIN — LEVETIRACETAM 500 MG: 500 TABLET ORAL at 09:38

## 2017-08-09 RX ADMIN — MIDAZOLAM HYDROCHLORIDE 2 MG: 1 INJECTION, SOLUTION INTRAMUSCULAR; INTRAVENOUS at 12:40

## 2017-08-09 RX ADMIN — IOPAMIDOL 60 ML: 612 INJECTION, SOLUTION INTRAVENOUS at 12:52

## 2017-08-09 RX ADMIN — METOPROLOL TARTRATE 12.5 MG: 25 TABLET ORAL at 09:38

## 2017-08-09 RX ADMIN — PIPERACILLIN AND TAZOBACTAM 2.25 G: 2; .25 INJECTION, POWDER, FOR SOLUTION INTRAVENOUS at 09:36

## 2017-08-09 RX ADMIN — LIDOCAINE HYDROCHLORIDE 30 ML: 10 INJECTION, SOLUTION EPIDURAL; INFILTRATION; INTRACAUDAL; PERINEURAL at 12:39

## 2017-08-09 RX ADMIN — INSULIN LISPRO 15 UNITS: 100 INJECTION, SOLUTION INTRAVENOUS; SUBCUTANEOUS at 09:27

## 2017-08-09 RX ADMIN — POTASSIUM CHLORIDE 40 MEQ: 1500 TABLET, EXTENDED RELEASE ORAL at 12:12

## 2017-08-09 RX ADMIN — PIPERACILLIN AND TAZOBACTAM 0.75 G: 2; .25 INJECTION, POWDER, FOR SOLUTION INTRAVENOUS at 21:05

## 2017-08-09 RX ADMIN — DOXERCALCIFEROL 3 MCG: 4 INJECTION, SOLUTION INTRAVENOUS at 18:21

## 2017-08-09 RX ADMIN — HEPARIN SODIUM 1000 UNITS: 200 INJECTION, SOLUTION INTRAVENOUS at 12:39

## 2017-08-09 RX ADMIN — VITAMIN A AND D: 30.8 OINTMENT TOPICAL at 23:09

## 2017-08-09 RX ADMIN — METOPROLOL TARTRATE 12.5 MG: 25 TABLET ORAL at 19:51

## 2017-08-09 NOTE — PROCEDURES
CARDIAC CATHETERIZATION LABORATORY PROCEDURE REPORT    Shirlene Winkler is a 55 y.o. male    Medical Record Number: [id number]    Primary Care Provider: Jessica Sanchez MD      Referral Provider: No ref. provider found    PROCEDURE DATE: 8/9/2017    INDICATIONS:   Cardiac arrest.    MD PERFORMING PROCEDURE: [id number] Yudi Evans MD    PROCEDURE:  Left heart catheterization, coronary angiography and left ventriculography. ANESTHESIA: Moderate sedation and local anesthesia. EBL: 10-50 ml  CONTRAST USE: Approximately 60 ml  RADIATION: 210 mGy    Risks, benefits, and alternatives to the procedure were explained to the patient prior to the procedure. Questions were answered in detail. The patient appeared to understand and appropriate informed consent was obtained. The patient was brought to the cardiac catheterization  laboratory in a post-absorptive state and he was prepped and draped in the usual sterile manner. Moderate sedation was achieved with a combination of Versed (midazolam) and Fentanyl. Lidocaine was used to secure local anesthesia. The right femoral artery was cannulated using a modified Seldinger technique and a 6 Sinhala sheath was inserted. 6 Anabella and JR4 catheters were used to obtain selective bilateral coronary angiograms, under fluoroscopic guidance,  in multiple projections. LV angiography was performed in the LEVIN projection using a 6 Sinhala pigtail catheter and a hand injection. Pressures were recorded in the LV and during pull back across the aortic valve. The following were observed. FLUOROSCOPY: There was no significant calcification. HEMODYNAMIC / ANGIOGRAPHIC DATA  · Left ventricle: End diastolic pressure was 1 mmHg. Left ventriculography: The EF was estimated to be around 55 %. There was no diagnostic segmental wall motion abnormality. · Aorta: There was no significant systolic pressure gradient across the aortic valve. · Mitral valve:  There was no significant mitral regurgitation. · Coronary Angiography:  · The coronary circulation was right dominant. · Left main: Angiographically normal.   · Left anterior descending: Angiographically normal.  · Diagonal Branches: Angiographically normal.  · Circumflex: Angiographically normal.  · Obtuse Marginal Branches: Angiographically normal.  · Right coronary: Angiographically normal.    The patient was transferred to the observation area with stable vital signs and in an asymptomatic state. The sheath will be pulled and hemostasis will be secured with a closure device or application of pressure. There was no apparent immediate complication. SUMMARY: No CAD. Cardiac arrest not related to coronary ischemia or injury. VF arrest in setting of end-stage renal disease on hemodialysis. Moderate sedation was utilized for 21 minutes using Versed and fentanyl under my supervision. RECOMMENDATIONS:  Post-procedure care will focus on aggressive risk factor modification.      Electronically signed: Chapin Pemberton MD, [facility]

## 2017-08-09 NOTE — PROGRESS NOTES
RENAL DAILY PROGRESS NOTE      IMPRESSION:   ESRD, MWF schedule  Anemia of CKD, on epo during HD  Access left arm graft   Respiratory failure, s/p extubation, improving   Hypokalemia  Hypocalcemia    PLAN:   At 3.58 PM on 2017, I saw and examined patient during hemodialysis treatment. The patient was receiving hemodialysis for treatment of  renal failure. I have also reviewed vital signs, intake and output, lab results and recent events, and agreed with today's dialysis order. BP running low , decrease UF target  Mr. Jose Guadalupe Phipps has history of blood clot during HD if his Blood pressure drop low , Ordered heparin 2000 unit during HD. Adjust all meds per ESRD status. Patient goes to [facility], near [facility] on MWF schedule. HD rounding    Blood pressure 97/47, pulse 68, temperature 96.3 °F (35.7 °C), temperature source Oral, resp. rate 20, height 6' 3\" (1.905 m), weight 69 kg (152 lb 3.2 oz), SpO2 100 %.   Temp (24hrs), Av.4 °F (36.3 °C), Min:96.3 °F (35.7 °C), Max:98 °F (36.7 °C)      Blood Pressure: BP: 97/47  Pulse: Pulse (Heart Rate): 68  Temp:  Temp: 96.3 °F (35.7 °C)    Artificial Kidney Dialyzer/Set Up Inspection: Revaclear   hours Duration of Treatment (hours): 3 hours   Heparin Bolus Heparin Bolus (units): 2000 units (verbal order per Dr. Nikole Archuleta by ICU MD Rosenberg)  Blood flow rate Blood Flow Rate (ml/min): 300 ml/min   Dialysate rate     Arterial Access Pressure Arterial Access Pressure (mmHg): -90  Venous Return Pressure Venous Return Pressure (mmHg): 140  Ultrafiltration Rate Goal/Amount of Fluid to Remove (mL): 1000 mL  Fluid Removal Fluid Removed (mL): 1500  Net Fluid Removal NET Fluid Removed (mL): 1000 ml                Subjective:     56y M with ESRD, s/p cardiac arrest   Complaint:    Overnight events noted  S/p cardiac cath , aggressive medical therapy suggested  Denies for any chest pain, short of breath     Current Facility-Administered Medications   Medication Dose Route Frequency    piperacillin-tazobactam (ZOSYN) 0.75 g in 0.9% sodium chloride 50 mL IVPB  0.75 g IntraVENous ONCE    vits A and D-white pet-lanolin (A&D) ointment   Topical TID    levETIRAcetam (KEPPRA) tablet 500 mg  500 mg Oral BID    famotidine (PEPCID) tablet 20 mg  20 mg Oral DAILY    simvastatin (ZOCOR) tablet 20 mg  20 mg Oral QHS    metoprolol tartrate (LOPRESSOR) tablet 12.5 mg  12.5 mg Oral BID    doxercalciferol (HECTOROL) 4 mcg/2 mL injection 3 mcg  3 mcg IntraVENous DIALYSIS MON, WED & FRI    epoetin benjamin (EPOGEN;PROCRIT) injection 10,000 Units  10,000 Units IntraVENous DIALYSIS MON, WED & FRI    insulin lispro (HUMALOG) injection   SubCUTAneous AC&HS    aspirin chewable tablet 81 mg  81 mg Oral DAILY    piperacillin-tazobactam (ZOSYN) 2.25 g in 0.9% sodium chloride (MBP/ADV) 50 mL MBP  2.25 g IntraVENous Q8H    Piperacillin-tazobactam (ZOSYN) 0.75 gm Supplemental Dosing by Pharmacy   Other Rx Dosing/Monitoring    hydrocortisone (CORTEF) tablet 10 mg  10 mg Oral BID WITH MEALS    glucose chewable tablet 16 g  4 Tab Oral PRN    glucagon (GLUCAGEN) injection 1 mg  1 mg IntraMUSCular PRN    dextrose (D50W) injection syrg 12.5-25 g  25-50 mL IntraVENous PRN    0.9% sodium chloride infusion  100 mL/hr IntraVENous DIALYSIS PRN    naloxone (NARCAN) injection 0.4 mg  0.4 mg IntraVENous PRN       Review of Symptoms: as above   Objective:     Patient Vitals for the past 24 hrs:   Temp Pulse Resp BP SpO2   08/09/17 1550 - 68 - 97/47 -   08/09/17 1528 96.3 °F (35.7 °C) 67 20 117/63 100 %   08/09/17 1358 - 66 13 119/61 98 %   08/09/17 1343 - 64 11 112/56 97 %   08/09/17 1338 - 64 12 110/63 97 %   08/09/17 1333 - 64 11 108/57 97 %   08/09/17 1328 - 64 12 118/54 97 %   08/09/17 1323 - 66 12 117/57 96 %   08/09/17 1318 - 64 - 109/59 98 %   08/09/17 1313 - 66 - 110/56 97 %   08/09/17 1259 - 70 - - 98 %   08/09/17 1258 - 70 18 111/57 98 %   08/09/17 1126 - 72 - 140/72 97 %   08/09/17 0830 97.9 °F (36.6 °C) 79 16 158/82 100 %   08/09/17 0400 98 °F (36.7 °C) 74 18 126/68 94 %   08/09/17 0020 97.7 °F (36.5 °C) 76 18 127/71 97 %   08/08/17 2000 97.5 °F (36.4 °C) 74 18 166/80 99 %   08/08/17 1639 97.2 °F (36.2 °C) 85 18 131/67 100 %        Weight change: -1.27 kg (-2 lb 12.8 oz)     08/07 1901 - 08/09 0700  In: 440 [P.O.:240;  I.V.:200]  Out: -     Intake/Output Summary (Last 24 hours) at 08/09/17 1558  Last data filed at 08/09/17 1302   Gross per 24 hour   Intake              100 ml   Output                0 ml   Net              100 ml     Physical Exam:   General:    HEENT sclera anicteric,  CVS: S1S2 heard,  no rub  RS: + air entry b/l,   Abd: Soft, Non tender  Neuro awake   Extrm: no edema, no cyanosis, clubbing   Access; left arm graft         Data Review:     LABS:   Hematology:   Recent Labs      08/09/17   0450  08/08/17   0339   WBC  13.4*  12.1   HGB  10.2*  10.4*   HCT  31.1*  31.7*     Chemistry:   Recent Labs      08/08/17   0339  08/07/17   0845   BUN  27*  52*   CREA  4.89*  6.61*   CA  7.9*  7.1*   K  3.2*  3.3*   NA  140  141   CL  106  105   CO2  24  26   GLU  200*  148*              Procedures/imaging: see electronic medical records for all procedures, Xrays and details which were not copied into this note but were reviewed prior to creation of Plan          Assessment & Plan:     As above           Radha Iraheta MD  8/9/2017  3:09 PM

## 2017-08-09 NOTE — PROGRESS NOTES
[facility] - Progress Note  Admit Date: 7/27/2017    Assessment:     Hospital Problems  Date Reviewed: 7/27/2017          Codes Class Noted POA    Acidosis ICD-10-CM: E87.2  ICD-9-CM: 276.2  7/27/2017 Unknown        Cardiac arrest [facility]) ICD-10-CM: I46.9  ICD-9-CM: 427.5  7/27/2017 Unknown        Respiratory failure ([facility] ICD-10-CM: J96.90  ICD-9-CM: 518.81  7/27/2017 Unknown        ESRD needing dialysis ([facility] ICD-10-CM: N18.6, Z99.2  ICD-9-CM: 585.6  7/27/2017 Unknown        Anoxic brain damage ([facility] ICD-10-CM: G93.1  ICD-9-CM: 348.1  7/27/2017 Unknown        Colitis ICD-10-CM: K52.9  ICD-9-CM: 558.9  7/27/2017 Unknown        Hypotension ICD-10-CM: I95.9  ICD-9-CM: 458.9  7/27/2017 Unknown        Acute GI bleeding ICD-10-CM: K92.2  ICD-9-CM: 578.9  7/27/2017 Unknown        ESRD (end stage renal disease) ([facility] ICD-10-CM: N18.6  ICD-9-CM: 585.6  7/27/2017 Unknown        * (Principal)Sepsis ([facility] ICD-10-CM: A41.9  ICD-9-CM: 038.9, 995.91  7/27/2017 Unknown        Anemia ICD-10-CM: D64.9  ICD-9-CM: 942. 9  Unknown Unknown              -- PEA arrest, s/p CPR with one round of epi, 2 amps of d50. Unclear how long until ROSC but pt had ROSC prior to arrival.. Found down at home, became agonal and pulseless in ambulance. Probable anoxic brain injury  -- acute MI.  Troponin level > 100, suspect has significant CAD, no heparin infusion  -- Ischemic cardiomyopathy.  EF 35-40% on echo with RWMAs earlier this admission. EF now normal 55-60%. -Anemia, s/p 2 units PRBCs at [facility] 7/26/17  -ESRD, noncompliant with dialysis, dialyzed at [facility] 7/26/17  -Sepsis. Improving, now off vasopressors  -Elevated LFTs and now with worsening thrombocytopnenia  -Tobacco use disorder    Plan:     Discussed cardiac cath with patient again today. He discussed with his family. All agreeable with cardiac cath. Will proceed today. HD to follow. Subjective:     No cp. No sob.     Objective:      Patient Vitals for the past 8 hrs: Temp Pulse Resp BP SpO2   08/09/17 0830 97.9 °F (36.6 °C) 79 16 158/82 100 %   08/09/17 0400 98 °F (36.7 °C) 74 18 126/68 94 %         Patient Vitals for the past 96 hrs:   Weight   08/08/17 0800 69 kg (152 lb 3.2 oz)   08/08/17 0400 71 kg (156 lb 8 oz)   08/07/17 1941 70.3 kg (155 lb)   08/06/17 0207 62 kg (136 lb 11 oz)                  No intake or output data in the 24 hours ending 08/09/17 1052    Physical Exam:  General:  alert, cooperative, no distress, appears stated age  Neck:  no JVD  Lungs:  clear to auscultation bilaterally  Heart:  regular rate and rhythm  Abdomen:  abdomen is soft without significant tenderness, masses, organomegaly or guarding  Extremities:  extremities normal, atraumatic, no cyanosis or edema    Data Review:     Labs: Results:       Chemistry Recent Labs      08/08/17   0339  08/07/17   0845   GLU  200*  148*   NA  140  141   K  3.2*  3.3*   CL  106  105   CO2  24  26   BUN  27*  52*   CREA  4.89*  6.61*   CA  7.9*  7.1*   AGAP  10  10   BUCR  6*  8*      CBC w/Diff Recent Labs      08/09/17   0450  08/08/17   0339   WBC  13.4*  12.1   RBC  3.32*  3.45*   HGB  10.2*  10.4*   HCT  31.1*  31.7*   PLT  301  243   GRANS  81*  77*   LYMPH  11*  15*   EOS  1  1      Cardiac Enzymes Lab Results   Component Value Date/Time    TROIQ 0.09 (H) 08/09/2017 04:50 AM      Coagulation No results for input(s): PTP, INR, APTT in the last 72 hours. No lab exists for component: INREXT    Lipid Panel No results found for: CHOL, CHOLPOCT, CHOLX, CHLST, CHOLV, 616186, HDL, LDL, LDLC, DLDLP, 068684, VLDLC, VLDL, TGLX, TRIGL, TRIGP, TGLPOCT, CHHD, CHHDX   BNP No results found for: BNP, BNPP, XBNPT   Liver Enzymes No results for input(s): TP, ALB, TBIL, AP, SGOT, GPT in the last 72 hours.     No lab exists for component: DBIL   Digoxin    Thyroid Studies Lab Results   Component Value Date/Time    TSH 7.52 08/03/2017 01:01 PM          Signed By: YASMEEN Rosen     August 9, 2017

## 2017-08-09 NOTE — PROGRESS NOTES
Phone report called to Cherry Davies, [address] post procedure [facility]

## 2017-08-09 NOTE — DIABETES MGMT
GLYCEMIC CONTROL PLAN OF CARE    BG above target range:  POC BG range on 08/08/2017: 115-248 mg/dL. POC BG report on 08/09/2017 at time of review: 391 mg/dL. Patient was on mechanical soft diet. Recommendation(s):  1.) Discussed elevated BG readings with Dr. Maura Yanes. No basal insulin order obtained but received order to change diet to diabetic no concentrated sweets. 2.) Continue to monitor and consider adding basal lantus insulin if BG remains elevated after changing the diet. Assessment:  Patient is 55year old with past medical history including type 2 diabetes,  mellitus, htypertension, irritable bowel syndrome, tubercolosis, chronic back pain, ESRD on hemodialysis, tobacco use, and arthritis - was admitted on 07/27/2017 via EMS found unresponsive. Noted:  Cardiac arrest.  Acute respiratory failure. Status post extubation 08/04/2017. Septic shock. NSTEMI. Status post cardiac catheterization 08/09/2017. Encephalopathy, improving cognitive function. ESRD on dialysis. Type 2 diabetes mellitus with A1C of 5.4% (07/29/2017). Most recent blood glucose values    Results for Gypsy Ricardo (MRN [id number]) as of 8/9/2017 14:05   Ref. Range 8/8/2017 09:15 8/8/2017 11:58 8/8/2017 12:31 8/8/2017 16:12 8/8/2017 22:00   GLUCOSE,FAST - POC Latest Ref Range: 70 - 110 mg/dL 198 (H) 117 (H) 115 (H) 171 (H) 248 (H)     Results for Gypsy Ricardo (MRN [id number]) as of 8/9/2017 14:05   Ref. Range 8/9/2017 08:57   GLUCOSE,FAST - POC Latest Ref Range: 70 - 110 mg/dL 391 (H)     Current A1C of 5.4% is equivalent to average blood glucose of 108 mg/dl over the past 2-3 months. Current hospital diabetes medications:   Correctional Lispro insulin ACHS. Very resistant dose. Previous day's insulin requirements:  08/08/2017  Lispro: 15 units    Home diabetes medications: None. Diet:  Diabetic consistent carb; mechanical soft solids; 1 nectar.     Goal: Patient's blood glucose will be within target range of  mg/dL by 08/12/2017.     Education:  ____Refer to Diabetes Education Record             __x__Education not indicated at this time      Carmela Khan RN

## 2017-08-09 NOTE — INTERDISCIPLINARY ROUNDS
IDR/SLIDR Summary          Patient: Price Joe MRN: [id number]    Age: 55 y.o. YOB: 1960 Room/Bed: [facility]   Admit Diagnosis: Cardiac arrest (HCC)  Acidosis  Respiratory failure (HCC)  Anemia  ESRD needing dialysis ([facility]  Cardiac arrest ([facility]  Acute GI bleeding  Sepsis ([facility]  Hypotension  Anoxic brain damage (HCC)  ESRD (end stage renal disease) (HCC)  Colitis  Principal Diagnosis: Sepsis ([facility]   Goals: Hemodynamic stability, Possible cardiac cath, monitor thrombocytopenia, SLP eval, No seizures, CT to r/o CVA vs deconditioning. Readmission: NO  Quality Measure: Not applicable  VTE Prophylaxis: Chemical  Influenza Vaccine screening completed? Not flu season  Pneumococcal Vaccine screening completed? YES  Mobility needs: Yes   Nutrition plan:Yes  Consults:Speech    Financial concerns:No  Escalated to CM? yes  RRAT Score: 17   Interventions:Home Health  Testing due for pt today?  YES  LOS: 13 days Expected length of stay 15 days  Discharge plan: SNF   PCP: Zayra Tubbs MD  Transportation needs: Yes    Days before discharge:two or more days before discharge   Discharge disposition: SNF    Signed:     Beryle Ishikawa, RN  8/9/2017  11:20 AM

## 2017-08-09 NOTE — ROUTINE PROCESS
Bedside and Verbal shift change report given to Pete Kirkpatrick RN (oncoming nurse) by Geremias Apple RN (offgoing nurse). Report included the following information SBAR, Kardex, MAR and Recent Results. SITUATION:  Code Status: Full Code  Reason for Admission: Cardiac arrest ([facility]  Acidosis  Respiratory failure ([facility]  Anemia  ESRD needing dialysis ([facility]  Cardiac arrest ([facility]  Acute GI bleeding  Sepsis ([facility]  Hypotension  Anoxic brain damage ([facility]  ESRD (end stage renal disease) ([facility]  Colitis  Hospital day: 13  Problem List:       Hospital Problems  Date Reviewed: 7/27/2017          Codes Class Noted POA    Acidosis ICD-10-CM: E87.2  ICD-9-CM: 276.2  7/27/2017 Unknown        Cardiac arrest ([facility] ICD-10-CM: I46.9  ICD-9-CM: 427.5  7/27/2017 Unknown        Respiratory failure ([facility] ICD-10-CM: J96.90  ICD-9-CM: 518.81  7/27/2017 Unknown        ESRD needing dialysis ([facility] ICD-10-CM: N18.6, Z99.2  ICD-9-CM: 585.6  7/27/2017 Unknown        Anoxic brain damage ([facility] ICD-10-CM: G93.1  ICD-9-CM: 348.1  7/27/2017 Unknown        Colitis ICD-10-CM: K52.9  ICD-9-CM: 558.9  7/27/2017 Unknown        Hypotension ICD-10-CM: I95.9  ICD-9-CM: 458.9  7/27/2017 Unknown        Acute GI bleeding ICD-10-CM: K92.2  ICD-9-CM: 578.9  7/27/2017 Unknown        ESRD (end stage renal disease) ([facility] ICD-10-CM: N18.6  ICD-9-CM: 585.6  7/27/2017 Unknown        * (Principal)Sepsis ([facility] ICD-10-CM: A41.9  ICD-9-CM: 038.9, 995.91  7/27/2017 Unknown        Anemia ICD-10-CM: D64.9  ICD-9-CM: 953. 9  Unknown Unknown              BACKGROUND:   Past Medical History:   Past Medical History:   Diagnosis Date    Anemia     Arthritis     Chronic pain     Back     Diabetes mellitus type II     Hypertension     IBS (irritable bowel syndrome)     Lactose intolerance     TB (tuberculosis)     TB test positive      Patient taking anticoagulants yes    Patient has a defibrillator: no    History of shots YES for example, flu, pneumonia, tetanus   Isolation History NO for example, MRSA, CDiff    ASSESSMENT:  Changes in Assessment Throughout Shift: none  Significant Changes in 24 hours (for example, RR/code, fall)  Patient has Central Line: no Reasons if yes:   Patient has Stanton Cath: no Reasons if yes: Mobility Issues  PT  IV Patency  OR Checklist  Pending Tests    Last Vitals:  Vitals w/ MEWS Score (last day)     Date/Time MEWS Score Pulse Resp Temp BP Level of Consciousness SpO2    08/09/17 0400 1 74 18 98 °F (36.7 °C) 126/68 Alert 94 %    08/09/17 0020 1 76 18 97.7 °F (36.5 °C) 127/71 Alert 97 %    08/08/17 2000 1 74 18 97.5 °F (36.4 °C) 166/80 Alert 99 %    08/08/17 1639 1 85 18 97.2 °F (36.2 °C) 131/67 Alert 100 %    08/08/17 0800 1 92 18 97.8 °F (36.6 °C) 175/88 Alert 98 %    08/08/17 0400 1 88 19 97.8 °F (36.6 °C) 132/72 Alert 99 %    08/08/17 0000 2 75 18 98.4 °F (36.9 °C) 97/59 Alert 99 %            PAIN    Pain Assessment    Pain Intensity 1: 0 (08/09/17 0400)              Patient Stated Pain Goal: 0  Intervention effective: yes  Time of last intervention: Pt stated no pain Reassessment Completed: yes   Other actions taken for pain: distraction    Last 3 Weights:  Last 3 Recorded Weights in this Encounter    08/07/17 1941 08/08/17 0400 08/08/17 0800   Weight: 70.3 kg (155 lb) 71 kg (156 lb 8 oz) 69 kg (152 lb 3.2 oz)   Weight change: -1.27 kg (-2 lb 12.8 oz)    INTAKE/OUPUT    Current Shift:      Last three shifts: 08/07 1901 - 08/09 0700  In: 440 [P.O.:240; I.V.:200]  Out: -     RECOMMENDATIONS AND DISCHARGE PLANNING  Patient needs and requests: comfort    Pending tests/procedures: yes , Cardiac cath. Discharge plan for patient: tbd    Discharge planning Needs or Barriers: none    Estimated Discharge Date: TBD Posted on Whiteboard in Patients Room: yes       \"HEALS\" SAFETY CHECK  A safety check occurred in the patient's room between off going nurse and oncoming nurse listed above.     The safety check included the below items:    H  High Alert Medications Verify all high alert medication drips (heparin, PCA, etc.)  E  Equipment Suction is set up for ALL patients (with cary)  Red plugs utilized for all equipment (IV pumps, etc.)  WOWs wiped down at end of shift. Room stocked with oxygen, suction, and other unit-specific supplies  A  Alarms Bed alarm is set for fall risk patients  Ensure chair alarm is in place and activated if patient is up in a chair  L  Lines Check IV for any infiltration  Stanton bag is empty if patient has a Stanton   Tubing and IV bags are labeled  S  Safety  Room is clean, patient is clean, and equipment is clean. Hallways are clear from equipment besides carts. Fall bracelet on for fall risk patients  Ensure room is clear and free of clutter  Suction is set up for ALL patients (with cary)  Hallways are clear from equipment besides carts.    Isolation precautions followed, supplies available outside room, sign posted    Guanako Stewart RN

## 2017-08-09 NOTE — DIALYSIS
ACUTE HEMODIALYSIS FLOW SHEET    HEMODIALYSIS ORDERS: Physician: Arvin Denver   Dialyzer:  revaclear    Duration:  3 hr  BFR: 300  DFR: 600   Dialysate:  Temp  36.5 K+   3 Ca+  3 Na 140 Bicarb  35   Weight:   58.4 kg. Bed Scale [x]     Unable to Obtain []        Dry weight/UF Goal:  1000 mL (Dr. Arvin Denver discontinued UF removal).   Access  AVG  Needle Gauge 15    Heparin [x]  Bolus      Units    [] Hourly       Units    []None     Catheter locking solution Heparin   Pre BP:   117/63 Pulse:  67 Temperature: 96.3 (Oral)   Respirations: 20 Tx: NS  250     ml/Bolus  Other        [x] N/A   Labs: Pre       Post:        [x] N/A    Additional Orders(medications, blood products, hypotension management):       [x] N/A     [x] Time Out/Safety Check  [x] [facility] Consent Verified     CATHETER ACCESS: [x]N/A   []Right   []Left   []IJ     []Fem   [] First use X-ray verified     []Tunnel                [] Non Tunneled   []No S/S infection  []Redness  []Drainage []Cultured []Swelling []Pain   []Medical Aseptic Prep Utilized   []Dressing Changed  [] Biopatch  Date:    []Clotted   []Patent   Flows: []Good  []Poor  []Reversed   If access problem,  notified: []Yes    [x]N/A  Date:           GRAFT/FISTULA ACCESS:  []N/A     []Right     [x]Left     [x]UE     []LE   [x]AVG   []AVF        []Buttonhole    []Medical Aseptic Prep Utilized   [x]No S/S infection  []Redness  []Drainage []Cultured []Swelling []Pain    Bruit:   [x] Strong    [] Weak       Thrill :   [x] Strong    [] Weak       Needle Gauge: 15   Length: 1   If access problem,  notified: []Yes     [x]N/A  Date:        Please describe access if present and not used:       GENERAL ASSESSMENT:    LUNGS:  Rate  SaO2 %   [] N/A    [] Clear  [x] Coarse  [] Crackles  [] Wheezing        [] Diminished     Location : []RLL   []LLL    [x]RUL  [x]   Cough: []Productive  [x]Dry  []N/A   Respirations:  [x]Easy  []Labored   Therapy:  [x]RA  []NC  l/min    Mask: []NRB []Venti       O2% []Ventilator  []Intubated  [] Trach  [] BiPaP   CARDIAC: []Regular      [] Irregular   [] Pericardial Rub  [] JVD        []  Monitored  [] Bedside  [x] Remotely monitored [] N/A  Rhythm:    EDEMA: [x] None  []Generalized  [] Pitting [] 1    [] 2    [] 3    [] 4                 [] Facial  [] Pedal  []  UE  [] LE   SKIN:   [x] Warm  [] Hot     [] Cold   [x] Dry     [] Pale   [] Diaphoretic                  [] Flushed  [] Jaundiced  [] Cyanotic  [] Rash  [] Weeping   LOC:    [x] Alert      [x]Oriented:    [] Person     [] Place  []Time               [] Confused  [] Lethargic  [] Medicated  [] Non-responsive     GI / ABDOMEN:  [x] Flat    [] Distended    [x] Soft    [] Firm   []  Obese                             [] Diarrhea  [x] Bowel Sounds  [] Nausea  [] Vomiting       / URINE ASSESSMENT:[] Voiding   [] Oliguria  [] Anuria   []  Stanton     [] Incontinent    []  Incontinent Brief      []  Bathroom Privileges     PAIN: [x] 0 []1  []2   []3   []4   []5   []6   []7   []8   []9   []10            Scale 0-10  Action/Follow Up:    MOBILITY:  [] Amb    [] Amb/Assist    [] Bed    [] Wheelchair  [] Stretcher      All Vitals and Treatment Details on Attached [address]:  [facility]  Room # 468   [] 1st Time Acute  [] Stat  [x] Routine  [] Urgent     [x] Acute Room  []  Bedside  [] ICU/CCU  [] ER   Isolation Precautions:  [x] Dialysis   [] Airborne   [] Contact    [] Reverse   Special Considerations:         [] Blood Consent Verified [x]N/A     ALLERGIES:  NKA   Code Status:  [x] Full Code  [] DNR  [] Other           HBsAg ONLY: Date Drawn  07/28/17      [x]Negative []Positive []Unknown   HBsAb:  07/28/17  [] Susceptible   [x] [id number] []Not Drawn  [] Drawn     Current Labs:    Date of Labs: Today [x]     Results for Fab Jain (MRN [id number]) as of 8/9/2017 16:17   Ref.  Range 8/9/2017 04:50   Ionized Calcium Latest Ref Range: 1.12 - 1.32 MMOL/L 1.10 (L)   WBC Latest Ref Range: 4.6 - 13.2 K/uL 13.4 (H)   RBC Latest Ref Range: 4.70 - 5.50 M/uL 3.32 (L)   HGB Latest Ref Range: 13.0 - 16.0 g/dL 10.2 (L)   HCT Latest Ref Range: 36.0 - 48.0 % 31.1 (L)   MCV Latest Ref Range: 74.0 - 97.0 FL 93.7   MCH Latest Ref Range: 24.0 - 34.0 PG 30.7   MCHC Latest Ref Range: 31.0 - 37.0 g/dL 32.8   RDW Latest Ref Range: 11.6 - 14.5 % 17.8 (H)   PLATELET Latest Ref Range: 135 - 420 K/uL 301   MPV Latest Ref Range: 9.2 - 11.8 FL 10.8   NEUTROPHILS Latest Ref Range: 40 - 73 % 81 (H)   LYMPHOCYTES Latest Ref Range: 21 - 52 % 11 (L)   MONOCYTES Latest Ref Range: 3 - 10 % 7   EOSINOPHILS Latest Ref Range: 0 - 5 % 1   BASOPHILS Latest Ref Range: 0 - 2 % 0   DF Latest Units:   AUTOMATED   ABS. NEUTROPHILS Latest Ref Range: 1.8 - 8.0 K/UL 10.8 (H)   ABS. LYMPHOCYTES Latest Ref Range: 0.9 - 3.6 K/UL 1.5   ABS. MONOCYTES Latest Ref Range: 0.05 - 1.2 K/UL 1.0   ABS. EOSINOPHILS Latest Ref Range: 0.0 - 0.4 K/UL 0.2   ABS. BASOPHILS Latest Ref Range: 0.0 - 0.06 K/UL 0.0   Troponin-I, Qt.  Latest Ref Range: 0.0 - 0.045 NG/ML 0.09 (H)                                                                                                                                 DIET:  [] Renal    [] Other     [] NPO     [x]  Diabetic      PRIMARY NURSE REPORT: First initial/Last name/Title      Pre Dialysis:   Hector Pham RN @ 3205     EDUCATION:    [x] Patient [] Other         Knowledge Basis: []None []Minimal [] Substantial   Barriers to learning  []N/A   [] Access Care     [] S&S of infection     [] Fluid Management     []K+     [x]Procedural    [x]Albumin     [x] Medications     [] Tx Options     [] Transplant     [] Diet     [] Other   Teaching Tools:  [x] Explain  [] Demo  [] Handouts [] Video  Patient response:   [x] Verbalized understanding  [] Teach back  [] Return demonstration [] Requires follow up   Inappropriate due to            [address] Before each treatment:     Machine Number:                   [facility]                                  [] Unit Machine # 4  with centralized RO                                  [] Portable Machine #1/RO serial # [id number]                                  [] Portable Machine #2/RO serial # [id number]                                  [] Portable Machine #3/RO serial # [id number]                                                                                                       [address] [facility]                                  [] Portable Machine #11/RO serial # [id number]                                   [] Portable Machine #12/RO serial # [id number]                                  [] Portable Machine #13/RO serial #  [id number]      Alarm Test:  Pass [id number]  Other:         [x] RO/Machine Log Complete      Temp 36.5     [x]Extracorporeal Circuit Tested for integrity   Dialysate: Conductivity: Meter 13.8 HD Machine      14.3           TCD: 13.9  Dialyzer Lot #    [id number]     Blood Tubing Lot #     [id number] Lot #      CHLORINE TESTING-Before each treatment and every 4 hours    Total Chlorine: [x] less than 0.1 ppm  Time:  4 Hr/2nd Check Time:    (if greater than 0.1 ppm from Primary then every 30 minutes from Secondary)     TREATMENT INITIATION  with Dialysis Precautions:   [x] All Connections Secured                 [x] Saline Line Double Clamped   [x] Venous Parameters Set                  [x] Arterial Parameters Set    [x] Prime Given 250  ml                       [x]Air Foam Detector Engaged      Treatment Initiation Note: HD treatment started in left AVG UE.  SBP decreased, MD aware and is at bedside--orders for Albumin and no UF. Betsey Erickson is taking over care at 1615     Medication Dose Volume Route Initials Dialyzer Cleared: [] Good [] Fair  [] Poor    Blood processed:  L  UF Removed mL Post Wt:    kg   POst BP:    Pulse:   Respirations:Temperature:       NaCl 0.9%      250 mL prim  250 mL rinse  500   mL    IV          Post Tx Vascular Access: AVF/AVG: N/A  Minutes pressure held to site:  Art:  Oscar:   Heaprin  2000  Units 2mL        Catheter: Locking solution: Heparin 1:1000 Art. mL  Oscar.   mL     Albumin 12.5G 50 mL        Post Assessment:                                    Skin:  [] Warm  [] Dry [] Diaphoretic    [] Flushed  [] Pale [] Cyanotic   [facility] Signatures Title Initials  Time Lungs: [] Clear    [] Course  [] Crackles  [] Wheezing [] Diminished        Cardiac: [] Regular   [] Irregular   [] Monitor  [] N/A  Rhythm:           Edema:  [] None    [] General     [] Facial   [] Pedal    [] UE    [] LE       Pain: []0  []1  []2   []3  []4   []5   []6   []7   []8   []9   []10         Post Treatment Note:      POST TREATMENT PRIMARY NURSE HANDOFF REPORT:     First initial/Last name/Title         Post Dialysis:Time:      Abbreviations: AVG-arterial venous graft, AVF-arterial venous fistula, IJ-Internal Jugular, Subcl-Subclavian, Fem-Femoral, Tx-treatment, AP/HR-apical heart rate, DFR-dialysate flow rate, BFR-blood flow rate, AP-arterial pressure, -venous pressure, UF-ultrafiltrate, TMP-transmembrane pressure, Oscar-Venous, Art-Arterial, RO-Reverse Osmosis

## 2017-08-09 NOTE — PROGRESS NOTES
Pt brought to [facility] from [address] [facility]. Pt currently alert and oriented times 4. Pt denies pain. New #20 gauge PIV placed to right AC without complication. No hematoma or bleeding noted at this time. Groin prepped. Consent was obtained prior to pt arrival to department.

## 2017-08-09 NOTE — PROGRESS NOTES
Received report from dialysis nurse and patient came back to floor at 1905, patient set up to eat dinner after being NPO all day. Right groin cath site soft not signs of hematoma, dressing  CDI. Patient says he feel fine, no c/o pain. Bedside SBAR report given to Mary MUELLER.

## 2017-08-09 NOTE — ROUTINE PROCESS
TRANSFER - OUT REPORT:    Verbal report given to Sherice Houston RN (name) on [place]  being transferred to [facility] ([facility]) for routine progression of care       Report consisted of patients Situation, Background, Assessment and   Recommendations(SBAR). Information from the following report(s) SBAR, Procedure Summary and MAR was reviewed with the receiving nurse. Lines:   Peripheral IV 08/05/17 Right Arm (Active)   Site Assessment Clean, dry, & intact 8/8/2017 11:16 PM   Phlebitis Assessment 0 8/8/2017 11:16 PM   Infiltration Assessment 0 8/8/2017 11:16 PM   Dressing Status Clean, dry, & intact 8/8/2017 11:16 PM   Dressing Type Tape;Transparent 8/8/2017 11:16 PM   Hub Color/Line Status Blue;Flushed 8/8/2017 11:16 PM   Alcohol Cap Used No 8/7/2017  8:00 PM       Peripheral IV 08/09/17 Right Hand (Active)   Site Assessment Clean, dry, & intact 8/9/2017 11:42 AM   Phlebitis Assessment 0 8/9/2017 11:42 AM   Infiltration Assessment 0 8/9/2017 11:42 AM   Dressing Status Clean, dry, & intact 8/9/2017 11:42 AM   Dressing Type Transparent 8/9/2017 11:42 AM   Hub Color/Line Status Blue;Flushed;Patent 8/9/2017 11:42 AM       Peripheral IV 08/09/17 Right Antecubital (Active)   Site Assessment Clean, dry, & intact 8/9/2017 11:42 AM   Phlebitis Assessment 0 8/9/2017 11:42 AM   Infiltration Assessment 0 8/9/2017 11:42 AM   Dressing Status Clean, dry, & intact 8/9/2017 11:42 AM   Dressing Type Transparent 8/9/2017 11:42 AM        Opportunity for questions and clarification was provided.       Patient transported with:   [employer]

## 2017-08-09 NOTE — PROGRESS NOTES
6fr right femoral artery sheath removed with sterile technique, manual handheld pressure for 20 minutes without difficulty, sterile 4x4 dressing and large tegaderm placed; oriented to call bell; vss, no pain

## 2017-08-09 NOTE — PROGRESS NOTES
Attempted dysphagia f/u; however, pt currently NPO for possible procedure. Will f/u at a later time/date or as medical condition permits.      Thank you for this referral.    Jacque Duarte M.S. CCC-SLP/L  Speech-Language Pathologist

## 2017-08-09 NOTE — PROGRESS NOTES
[facility] Hospitalist Group  Progress Note    Patient: Henry Aguirre Age: 55 y.o. : 1960 MR#: [id number] SSN: [id number]  Date/Time: 2017     Subjective:     Pt feels better. AAOx3. Denies any complaints   seenin HD, post cath    Assessment/Plan:   1. Acute met encephalopathy: better. 2. S/p PEA arrest. ? Cardiac cause with elevated trop PTA. Echo improving EF, s/p cath, no CAD. 3. Acute resp failure s/p extubation, off O2  4. ESRD on HD. M/W/F   5. Sepsis - leucocytosis / () bottles grew E.coli and C.perferinges. Initially thought to be secondary to cholecystitis but w/u thus far including HIDA scan negative. Per ID possibly due to abdominal source. Repeat cultures negative. Per ID recs on pip/tazo to be switched to po cipro and metro until  when able to take PO meds. 6. Hypokalemia  - replace lytes -and follow. 7. Elevated LFT - ? Shock liver. LFT now wnl. 8. hrombocytopenia: ? HIT vs secondary to sepsis. Serotonin release assay negative. Count trending up. Cont to monitor. 9. Dysphagia: better. S/p SLP eval  10. ? Sz: on keppra per neurology  11. R side weakness: ? CVA vs decondition: CT neg, no CVA, PT eval.   12. =>Wounds: wound care    unstageable pressure ulcer to sacrum/coccyx not present on admit    moisture associated skin damage to anus not present  on admit    moisture associated skin damage to buttocks not present on admit  Full code   D/w daughter Sebastian Mata [phone] in detail, explained cath results.    Eder Kurtz. [phone]    Discharge plan  PT eval  Pt prefers going home with [place]    Case discussed with:  [x]Patient  [x]Family  []Nursing  [x]Case Management  DVT Prophylaxis:  []Lovenox  []Hep SQ  [x]SCDs  []Coumadin   []On Heparin gtt    Patient Active Problem List   Diagnosis Code    Anemia D64.9    Acidosis E87.2    Cardiac arrest ([facility] I46.9    Respiratory failure ([facility] [address] J96.90    [facility] N18.6, Z99.2    Anoxic brain damage (HCC) G93.1    Colitis K52.9    Hypotension I95.9    Acute GI bleeding K92.2    ESRD (end stage renal disease) (HCC) N18.6    Sepsis (HCC) A41.9       Objective:   VS:   Visit Vitals    BP 97/47    Pulse 68    Temp 96.3 °F (35.7 °C) (Oral)    Resp 20    Ht 6' 3\" (1.905 m)  Comment: per chart history    Wt 69 kg (152 lb 3.2 oz)    SpO2 100%    BMI 19.02 kg/m2      Tmax/24hrs: Temp (24hrs), Av.5 °F (36.4 °C), Min:96.3 °F (35.7 °C), Max:98 °F (36.7 °C)  IOBRIEF    Intake/Output Summary (Last 24 hours) at 17 1815  Last data filed at 17 1302   Gross per 24 hour   Intake              100 ml   Output                0 ml   Net              100 ml       General:  Alert awake in nad  Pulmonary:  CTA Bilaterally. No Wheezing/Rhonchi/Rales. Cardiovascular: Regular rate and Rhythm. GI:  Soft, Non distended, Non tender. + Bowel sounds. Extremities:  No edema, cyanosis, clubbing. Neuro: AAOx2-3. R side UE and LE 3/5. No facial weakness.             Medications:   Current Facility-Administered Medications   Medication Dose Route Frequency    piperacillin-tazobactam (ZOSYN) 0.75 g in 0.9% sodium chloride 50 mL IVPB  0.75 g IntraVENous ONCE    heparin (porcine) 1,000 unit/mL injection 2,000 Units  2,000 Units IntraVENous DIALYSIS ONCE    vits A and D-white pet-lanolin (A&D) ointment   Topical TID    levETIRAcetam (KEPPRA) tablet 500 mg  500 mg Oral BID    famotidine (PEPCID) tablet 20 mg  20 mg Oral DAILY    simvastatin (ZOCOR) tablet 20 mg  20 mg Oral QHS    metoprolol tartrate (LOPRESSOR) tablet 12.5 mg  12.5 mg Oral BID    doxercalciferol (HECTOROL) 4 mcg/2 mL injection 3 mcg  3 mcg IntraVENous DIALYSIS MON, WED & FRI    epoetin benjamin (EPOGEN;PROCRIT) injection 10,000 Units  10,000 Units IntraVENous DIALYSIS MON, WED & FRI    insulin lispro (HUMALOG) injection   SubCUTAneous AC&HS    aspirin chewable tablet 81 mg  81 mg Oral DAILY    piperacillin-tazobactam (ZOSYN) 2.25 g in 0.9% sodium chloride (MBP/ADV) 50 mL MBP  2.25 g IntraVENous Q8H    Piperacillin-tazobactam (ZOSYN) 0.75 gm Supplemental Dosing by Pharmacy   Other Rx Dosing/Monitoring    hydrocortisone (CORTEF) tablet 10 mg  10 mg Oral BID WITH MEALS    glucose chewable tablet 16 g  4 Tab Oral PRN    glucagon (GLUCAGEN) injection 1 mg  1 mg IntraMUSCular PRN    dextrose (D50W) injection syrg 12.5-25 g  25-50 mL IntraVENous PRN    0.9% sodium chloride infusion  100 mL/hr IntraVENous DIALYSIS PRN    naloxone (NARCAN) injection 0.4 mg  0.4 mg IntraVENous PRN       Labs:    Recent Results (from the past 24 hour(s))   GLUCOSE, POC    Collection Time: 08/08/17 10:00 PM   Result Value Ref Range    Glucose (POC) 248 (H) 70 - 110 mg/dL   CALCIUM, IONIZED    Collection Time: 08/09/17  4:50 AM   Result Value Ref Range    Ionized Calcium 1.10 (L) 1.12 - 1.32 MMOL/L   CBC WITH AUTOMATED DIFF    Collection Time: 08/09/17  4:50 AM   Result Value Ref Range    WBC 13.4 (H) 4.6 - 13.2 K/uL    RBC 3.32 (L) 4.70 - 5.50 M/uL    HGB 10.2 (L) 13.0 - 16.0 g/dL    HCT 31.1 (L) 36.0 - 48.0 %    MCV 93.7 74.0 - 97.0 FL    MCH 30.7 24.0 - 34.0 PG    MCHC 32.8 31.0 - 37.0 g/dL    RDW 17.8 (H) 11.6 - 14.5 %    PLATELET 491 897 - 236 K/uL    MPV 10.8 9.2 - 11.8 FL    NEUTROPHILS 81 (H) 40 - 73 %    LYMPHOCYTES 11 (L) 21 - 52 %    MONOCYTES 7 3 - 10 %    EOSINOPHILS 1 0 - 5 %    BASOPHILS 0 0 - 2 %    ABS. NEUTROPHILS 10.8 (H) 1.8 - 8.0 K/UL    ABS. LYMPHOCYTES 1.5 0.9 - 3.6 K/UL    ABS. MONOCYTES 1.0 0.05 - 1.2 K/UL    ABS. EOSINOPHILS 0.2 0.0 - 0.4 K/UL    ABS.  BASOPHILS 0.0 0.0 - 0.06 K/UL    DF AUTOMATED     TROPONIN I    Collection Time: 08/09/17  4:50 AM   Result Value Ref Range    Troponin-I, Qt. 0.09 (H) 0.0 - 0.045 NG/ML   EKG, 12 LEAD, SUBSEQUENT    Collection Time: 08/09/17  6:57 AM   Result Value Ref Range    Ventricular Rate 73 BPM    Atrial Rate 73 BPM    P-R Interval 204 ms    QRS Duration 84 ms Q-T Interval 482 ms    QTC Calculation (Bezet) 531 ms    Calculated P Axis 59 degrees    Calculated R Axis 46 degrees    Calculated T Axis 62 degrees    Diagnosis       Normal sinus rhythm  Low voltage QRS  Cannot rule out Anterior infarct , age undetermined  T wave abnormality, consider anterolateral ischemia  Prolonged QT  Abnormal ECG  When compared with ECG of 28-JUL-2017 12:24,  T wave inversion now evident in Anterior leads  Confirmed by Chun De La O MD, Max Garcia ([id number]) on 8/9/2017 3:35:34 PM     GLUCOSE, POC    Collection Time: 08/09/17  8:57 AM   Result Value Ref Range    Glucose (POC) 391 (H) 70 - 110 mg/dL       Signed By: Justine Wilder MD     August 9, 2017

## 2017-08-09 NOTE — PROGRESS NOTES
0800 Assumed care of patient. Pt lying in bed. No distress noted. 0830 Assessment completed and charted. 0900 Call received from cath lab about patient. Requesting additional IV site and groin to be shaved. No clippers available on unit. Cath lab notified. New IV started to right hand. 1115 Pt transported to cath lab with cath team.     1415 Telephone report received from NYLA VILLA in cath holding. Pt with Right femoral heart cath. Pt on bed rest until 1930. HOB no greater than 30 degrees until 1930.     1420 Telephone report given to [facility] for patient going to dialysis. Jenny notified of HOB/bed rest restrictions. 1444 Received patient from cath lab back to room 468. Groin soft, no R/S/E.     1455 Pt taken to dialysis with transport. 1505 Verbal shift change report given to Lupis Maxwell (oncoming nurse) by Shamika Frost RN   (offgoing nurse). Report included the following information SBAR, Kardex and MAR.

## 2017-08-10 LAB
BASOPHILS # BLD AUTO: 0 K/UL (ref 0–0.1)
BASOPHILS # BLD: 0 % (ref 0–2)
CA-I SERPL-SCNC: 1.05 MMOL/L (ref 1.12–1.32)
DIFFERENTIAL METHOD BLD: ABNORMAL
EOSINOPHIL # BLD: 0.2 K/UL (ref 0–0.4)
EOSINOPHIL NFR BLD: 2 % (ref 0–5)
ERYTHROCYTE [DISTWIDTH] IN BLOOD BY AUTOMATED COUNT: 18.4 % (ref 11.6–14.5)
GLUCOSE BLD STRIP.AUTO-MCNC: 179 MG/DL (ref 70–110)
GLUCOSE BLD STRIP.AUTO-MCNC: 187 MG/DL (ref 70–110)
GLUCOSE BLD STRIP.AUTO-MCNC: 208 MG/DL (ref 70–110)
GLUCOSE BLD STRIP.AUTO-MCNC: 266 MG/DL (ref 70–110)
HCT VFR BLD AUTO: 31.1 % (ref 36–48)
HGB BLD-MCNC: 10.2 G/DL (ref 13–16)
LYMPHOCYTES # BLD AUTO: 10 % (ref 21–52)
LYMPHOCYTES # BLD: 1.3 K/UL (ref 0.9–3.6)
MCH RBC QN AUTO: 30.5 PG (ref 24–34)
MCHC RBC AUTO-ENTMCNC: 32.8 G/DL (ref 31–37)
MCV RBC AUTO: 93.1 FL (ref 74–97)
MONOCYTES # BLD: 1.2 K/UL (ref 0.05–1.2)
MONOCYTES NFR BLD AUTO: 9 % (ref 3–10)
NEUTS SEG # BLD: 10.1 K/UL (ref 1.8–8)
NEUTS SEG NFR BLD AUTO: 79 % (ref 40–73)
PLATELET # BLD AUTO: 367 K/UL (ref 135–420)
PMV BLD AUTO: 10.9 FL (ref 9.2–11.8)
RBC # BLD AUTO: 3.34 M/UL (ref 4.7–5.5)
WBC # BLD AUTO: 12.8 K/UL (ref 4.6–13.2)

## 2017-08-10 PROCEDURE — 82962 GLUCOSE BLOOD TEST: CPT

## 2017-08-10 PROCEDURE — 97162 PT EVAL MOD COMPLEX 30 MIN: CPT

## 2017-08-10 PROCEDURE — 82330 ASSAY OF CALCIUM: CPT | Performed by: PHYSICIAN ASSISTANT

## 2017-08-10 PROCEDURE — 74011250637 HC RX REV CODE- 250/637: Performed by: PHYSICIAN ASSISTANT

## 2017-08-10 PROCEDURE — 85025 COMPLETE CBC W/AUTO DIFF WBC: CPT | Performed by: PHYSICIAN ASSISTANT

## 2017-08-10 PROCEDURE — 36415 COLL VENOUS BLD VENIPUNCTURE: CPT | Performed by: PHYSICIAN ASSISTANT

## 2017-08-10 PROCEDURE — 97530 THERAPEUTIC ACTIVITIES: CPT

## 2017-08-10 PROCEDURE — 77030018836 HC SOL IRR NACL ICUM -A

## 2017-08-10 PROCEDURE — 77030033263 HC DRSG MEPILEX 16-48IN BORD MOLN -B

## 2017-08-10 PROCEDURE — 92526 ORAL FUNCTION THERAPY: CPT

## 2017-08-10 PROCEDURE — 74011250637 HC RX REV CODE- 250/637: Performed by: HOSPITALIST

## 2017-08-10 PROCEDURE — 65270000029 HC RM PRIVATE

## 2017-08-10 PROCEDURE — 74011636637 HC RX REV CODE- 636/637: Performed by: HOSPITALIST

## 2017-08-10 PROCEDURE — 74011250637 HC RX REV CODE- 250/637: Performed by: INTERNAL MEDICINE

## 2017-08-10 PROCEDURE — 74011000258 HC RX REV CODE- 258: Performed by: INTERNAL MEDICINE

## 2017-08-10 PROCEDURE — 74011250636 HC RX REV CODE- 250/636: Performed by: INTERNAL MEDICINE

## 2017-08-10 PROCEDURE — 74011250636 HC RX REV CODE- 250/636: Performed by: HOSPITALIST

## 2017-08-10 RX ORDER — METOPROLOL TARTRATE 25 MG/1
25 TABLET, FILM COATED ORAL EVERY 12 HOURS
Status: DISCONTINUED | OUTPATIENT
Start: 2017-08-10 | End: 2017-08-11

## 2017-08-10 RX ORDER — LOPERAMIDE HYDROCHLORIDE 2 MG/1
2 CAPSULE ORAL
Status: DISCONTINUED | OUTPATIENT
Start: 2017-08-10 | End: 2017-09-19

## 2017-08-10 RX ORDER — HYDROCODONE BITARTRATE AND ACETAMINOPHEN 5; 325 MG/1; MG/1
1-2 TABLET ORAL
Status: DISCONTINUED | OUTPATIENT
Start: 2017-08-10 | End: 2017-08-13

## 2017-08-10 RX ORDER — METOPROLOL TARTRATE 25 MG/1
25 TABLET, FILM COATED ORAL 2 TIMES DAILY
Status: DISCONTINUED | OUTPATIENT
Start: 2017-08-10 | End: 2017-08-10

## 2017-08-10 RX ORDER — HEPARIN SODIUM 5000 [USP'U]/ML
5000 INJECTION, SOLUTION INTRAVENOUS; SUBCUTANEOUS EVERY 8 HOURS
Status: DISCONTINUED | OUTPATIENT
Start: 2017-08-10 | End: 2017-09-22 | Stop reason: HOSPADM

## 2017-08-10 RX ORDER — METOPROLOL TARTRATE 25 MG/1
12.5 TABLET, FILM COATED ORAL ONCE
Status: COMPLETED | OUTPATIENT
Start: 2017-08-10 | End: 2017-08-10

## 2017-08-10 RX ADMIN — VITAMIN A AND D: 30.8 OINTMENT TOPICAL at 10:23

## 2017-08-10 RX ADMIN — VITAMIN A AND D: 30.8 OINTMENT TOPICAL at 18:29

## 2017-08-10 RX ADMIN — HYDROCODONE BITARTRATE AND ACETAMINOPHEN 1 TABLET: 5; 325 TABLET ORAL at 16:18

## 2017-08-10 RX ADMIN — INSULIN LISPRO 9 UNITS: 100 INJECTION, SOLUTION INTRAVENOUS; SUBCUTANEOUS at 22:01

## 2017-08-10 RX ADMIN — FAMOTIDINE 20 MG: 20 TABLET ORAL at 10:17

## 2017-08-10 RX ADMIN — METOPROLOL TARTRATE 12.5 MG: 25 TABLET ORAL at 10:17

## 2017-08-10 RX ADMIN — ASPIRIN 81 MG 81 MG: 81 TABLET ORAL at 10:19

## 2017-08-10 RX ADMIN — METOPROLOL TARTRATE 25 MG: 25 TABLET ORAL at 21:42

## 2017-08-10 RX ADMIN — INSULIN LISPRO 3 UNITS: 100 INJECTION, SOLUTION INTRAVENOUS; SUBCUTANEOUS at 12:59

## 2017-08-10 RX ADMIN — HEPARIN SODIUM 5000 UNITS: 5000 INJECTION, SOLUTION INTRAVENOUS; SUBCUTANEOUS at 17:49

## 2017-08-10 RX ADMIN — PIPERACILLIN AND TAZOBACTAM 2.25 G: 2; .25 INJECTION, POWDER, FOR SOLUTION INTRAVENOUS at 01:31

## 2017-08-10 RX ADMIN — VITAMIN A AND D: 30.8 OINTMENT TOPICAL at 21:43

## 2017-08-10 RX ADMIN — INSULIN LISPRO 6 UNITS: 100 INJECTION, SOLUTION INTRAVENOUS; SUBCUTANEOUS at 17:50

## 2017-08-10 RX ADMIN — LEVETIRACETAM 500 MG: 500 TABLET ORAL at 17:50

## 2017-08-10 RX ADMIN — INSULIN LISPRO 3 UNITS: 100 INJECTION, SOLUTION INTRAVENOUS; SUBCUTANEOUS at 10:18

## 2017-08-10 RX ADMIN — LEVETIRACETAM 500 MG: 500 TABLET ORAL at 10:17

## 2017-08-10 RX ADMIN — HYDROCORTISONE 10 MG: 10 TABLET ORAL at 16:18

## 2017-08-10 RX ADMIN — HYDROCORTISONE 10 MG: 10 TABLET ORAL at 10:17

## 2017-08-10 RX ADMIN — SIMVASTATIN 20 MG: 10 TABLET, FILM COATED ORAL at 21:42

## 2017-08-10 RX ADMIN — LOPERAMIDE HYDROCHLORIDE 2 MG: 2 CAPSULE ORAL at 16:18

## 2017-08-10 RX ADMIN — PIPERACILLIN AND TAZOBACTAM 2.25 G: 2; .25 INJECTION, POWDER, FOR SOLUTION INTRAVENOUS at 10:18

## 2017-08-10 NOTE — DIABETES MGMT
GLYCEMIC CONTROL PLAN OF CARE    BG range improved after modifying diet by including no concentrated sweets. POC BG range on 08/09/2017: 192-381 mg/dL. POC BG report on 08/10/2017 at time of review: 179, 187 mg/dL. Seen patient earlier today while eating lunch. Current Meal Intake:  Patient Vitals for the past 100 hrs:   % Diet Eaten   08/10/17 1022 60 %   08/07/17 1827 50 %   08/07/17 1505 25 %   08/07/17 0901 0 %       Recommendation(s):  1.) Continue BG monitoring and correctional insulin. Assessment:  Patient is 55year old with past medical history including type 2 diabetes,  mellitus, htypertension, irritable bowel syndrome, tubercolosis, chronic back pain, ESRD on hemodialysis, tobacco use, and arthritis - was admitted on 07/27/2017 via EMS found unresponsive. Noted:  Cardiac arrest.  Acute respiratory failure. Status post extubation 08/04/2017. Septic shock. NSTEMI. Status post cardiac catheterization 08/09/2017. Encephalopathy, improving cognitive function. ESRD on dialysis. Type 2 diabetes mellitus with A1C of 5.4% (07/29/2017). Most recent blood glucose values    Results for Jackson Palomo (MRN [id number]) as of 8/10/2017 15:05   Ref. Range 8/9/2017 08:57 8/9/2017 21:31   GLUCOSE,FAST - POC Latest Ref Range: 70 - 110 mg/dL 391 (H) 192 (H)     Results for Jackson Palomo (MRN [id number]) as of 8/10/2017 15:05   Ref. Range 8/10/2017 07:47 8/10/2017 12:54   GLUCOSE,FAST - POC Latest Ref Range: 70 - 110 mg/dL 179 (H) 187 (H)     Current A1C of 5.4% is equivalent to average blood glucose of 108 mg/dl over the past 2-3 months. Current hospital diabetes medications:   Correctional Lispro insulin ACHS. Very resistant dose. Previous day's insulin requirements:  08/09/2017  Lispro: 18 units    Home diabetes medications: None. Diet:  Diabetic consistent carb; mechanical soft solids; 1 nectar; nutr suppl: ensure pudding breakfast and lunch, nepro lunch and dinner.     Goal: Patient's blood glucose will be within target range of  mg/dL by 08/13/2017.     Education:  ____Refer to Diabetes Education Record             __x__Education not indicated at this time      Jaspreet Bettencourt RN

## 2017-08-10 NOTE — ROUTINE PROCESS
Bedside and Verbal shift change report given to FRANK Amador (oncoming nurse) by Fercho Cruz RN (offgoing nurse). Report included the following information SBAR, Kardex, MAR and Recent Results. SITUATION:  Code Status: Full Code  Reason for Admission: Cardiac arrest ([facility]  Acidosis  Respiratory failure ([facility]  Anemia  ESRD needing dialysis ([facility]  Cardiac arrest ([facility]  Acute GI bleeding  Sepsis ([facility]  Hypotension  Anoxic brain damage ([facility]  ESRD (end stage renal disease) ([facility]  Colitis  Hospital day: 14  Problem List:       Hospital Problems  Date Reviewed: 7/27/2017          Codes Class Noted POA    Acidosis ICD-10-CM: E87.2  ICD-9-CM: 276.2  7/27/2017 Unknown        Cardiac arrest ([facility] ICD-10-CM: I46.9  ICD-9-CM: 427.5  7/27/2017 Unknown        Respiratory failure ([facility] ICD-10-CM: J96.90  ICD-9-CM: 518.81  7/27/2017 Unknown        ESRD needing dialysis ([facility] ICD-10-CM: N18.6, Z99.2  ICD-9-CM: 585.6  7/27/2017 Unknown        Anoxic brain damage ([facility] ICD-10-CM: G93.1  ICD-9-CM: 348.1  7/27/2017 Unknown        Colitis ICD-10-CM: K52.9  ICD-9-CM: 558.9  7/27/2017 Unknown        Hypotension ICD-10-CM: I95.9  ICD-9-CM: 458.9  7/27/2017 Unknown        Acute GI bleeding ICD-10-CM: K92.2  ICD-9-CM: 578.9  7/27/2017 Unknown        ESRD (end stage renal disease) ([facility] ICD-10-CM: N18.6  ICD-9-CM: 585.6  7/27/2017 Unknown        * (Principal)Sepsis ([facility] ICD-10-CM: A41.9  ICD-9-CM: 038.9, 995.91  7/27/2017 Unknown        Anemia ICD-10-CM: D64.9  ICD-9-CM: 633. 9  Unknown Unknown              BACKGROUND:   Past Medical History:   Past Medical History:   Diagnosis Date    Anemia     Arthritis     Chronic pain     Back     Diabetes mellitus type II     Hypertension     IBS (irritable bowel syndrome)     Lactose intolerance     TB (tuberculosis)     TB test positive      Patient taking anticoagulants yes    Patient has a defibrillator: no    History of shots YES for example, flu, pneumonia, tetanus   Isolation History NO for example, MRSA, CDiff    ASSESSMENT:  Changes in Assessment Throughout Shift: none  Significant Changes in 24 hours (for example, RR/code, fall)  Patient has Central Line: no Reasons if yes:   Patient has Stanton Cath: no Reasons if yes:     Mobility Issues  PT  IV Patency  OR Checklist  Pending Tests    Last Vitals:  Vitals w/ MEWS Score (last day)     Date/Time MEWS Score Pulse Resp Temp BP Level of Consciousness SpO2    08/10/17 0739 1 86 18 98.3 °F (36.8 °C) 154/82 Alert 100 %    08/10/17 0400 1 80 18 98.8 °F (37.1 °C) 164/76 Alert 100 %    08/10/17 0013 1 86 19 98 °F (36.7 °C) 139/73 Alert 100 %    08/09/17 1949 1 82 18 98.3 °F (36.8 °C) 140/69 Alert 100 %    08/09/17 1828 -- 78 18 97 °F (36.1 °C) 142/72 -- --    08/09/17 1800 -- 74 18 -- 127/69 -- --    08/09/17 1730 -- 76 18 -- 133/76 -- --    08/09/17 1700 -- 74 18 -- 122/69 -- --    08/09/17 1630 -- 75 18 -- 124/67 -- --    08/09/17 1600 -- 72 18 -- 127/70 -- --    08/09/17 1550 -- 68 -- -- 97/47 -- --    08/09/17 1528 -- 67 20 96.3 °F (35.7 °C) 117/63 -- 100 %    08/09/17 1358 -- 66 13 -- 119/61 -- 98 %    08/09/17 1343 -- 64 11 -- 112/56 -- 97 %    08/09/17 1338 -- 64 12 -- 110/63 -- 97 %    08/09/17 1333 -- 64 11 -- 108/57 -- 97 %    08/09/17 1328 -- 64 12 -- 118/54 -- 97 %    08/09/17 1323 -- 66 12 -- 117/57 -- 96 %    08/09/17 1318 -- 64 -- -- 109/59 -- 98 %    08/09/17 1313 -- 66 -- -- 110/56 -- 97 %    08/09/17 1259 -- 70 -- -- -- -- 98 %    08/09/17 1258 -- 70 18 -- 111/57 Alert 98 %    08/09/17 1126 -- 72 -- -- 140/72 -- 97 %    08/09/17 0830 1 79 16 97.9 °F (36.6 °C) 158/82 Alert 100 %    08/09/17 0400 1 74 18 98 °F (36.7 °C) 126/68 Alert 94 %    08/09/17 0020 1 76 18 97.7 °F (36.5 °C) 127/71 Alert 97 %            PAIN    Pain Assessment    Pain Intensity 1: 0 (08/10/17 0400)              Patient Stated Pain Goal: 0  Intervention effective: yes  Time of last intervention: Pt stated no pain Reassessment Completed: yes   Other actions taken for pain: distraction    Last 3 Weights:  Last 3 Recorded Weights in this Encounter    08/08/17 0400 08/08/17 0800 08/09/17 1917   Weight: 71 kg (156 lb 8 oz) 69 kg (152 lb 3.2 oz) 69.9 kg (154 lb 1.6 oz)   Weight change: 0.862 kg (1 lb 14.4 oz)    INTAKE/OUPUT    Current Shift:      Last three shifts: 08/08 1901 - 08/10 0700  In: 100 [I.V.:100]  Out: 500     RECOMMENDATIONS AND DISCHARGE PLANNING  Patient needs and requests: comfort    Pending tests/procedures: yes , Cardiac cath. Discharge plan for patient: tbd    Discharge planning Needs or Barriers: none    Estimated Discharge Date: TBD Posted on Whiteboard in Patients Room: yes       \"HEALS\" SAFETY CHECK  A safety check occurred in the patient's room between off going nurse and oncoming nurse listed above. The safety check included the below items:    H  High Alert Medications Verify all high alert medication drips (heparin, PCA, etc.)  E  Equipment Suction is set up for ALL patients (with cary)  Red plugs utilized for all equipment (IV pumps, etc.)  WOWs wiped down at end of shift. Room stocked with oxygen, suction, and other unit-specific supplies  A  Alarms Bed alarm is set for fall risk patients  Ensure chair alarm is in place and activated if patient is up in a chair  L  Lines Check IV for any infiltration  Stanton bag is empty if patient has a Stanton   Tubing and IV bags are labeled  S  Safety  Room is clean, patient is clean, and equipment is clean. Hallways are clear from equipment besides carts. Fall bracelet on for fall risk patients  Ensure room is clear and free of clutter  Suction is set up for ALL patients (with cary)  Hallways are clear from equipment besides carts.    Isolation precautions followed, supplies available outside room, sign posted    Elisa Ovalle RN

## 2017-08-10 NOTE — PROGRESS NOTES
[facility] - Progress Note  Admit Date: 7/27/2017    Assessment:     Hospital Problems  Date Reviewed: 7/27/2017          Codes Class Noted POA    Acidosis ICD-10-CM: E87.2  ICD-9-CM: 276.2  7/27/2017 Unknown        Cardiac arrest [facility]) ICD-10-CM: I46.9  ICD-9-CM: 427.5  7/27/2017 Unknown        Respiratory failure ([facility] ICD-10-CM: J96.90  ICD-9-CM: 518.81  7/27/2017 Unknown        ESRD needing dialysis ([facility] ICD-10-CM: N18.6, Z99.2  ICD-9-CM: 585.6  7/27/2017 Unknown        Anoxic brain damage ([facility] ICD-10-CM: G93.1  ICD-9-CM: 348.1  7/27/2017 Unknown        Colitis ICD-10-CM: K52.9  ICD-9-CM: 558.9  7/27/2017 Unknown        Hypotension ICD-10-CM: I95.9  ICD-9-CM: 458.9  7/27/2017 Unknown        Acute GI bleeding ICD-10-CM: K92.2  ICD-9-CM: 578.9  7/27/2017 Unknown        ESRD (end stage renal disease) ([facility] ICD-10-CM: N18.6  ICD-9-CM: 585.6  7/27/2017 Unknown        * (Principal)Sepsis ([facility] ICD-10-CM: A41.9  ICD-9-CM: 038.9, 995.91  7/27/2017 Unknown        Anemia ICD-10-CM: D64.9  ICD-9-CM: 066. 9  Unknown Unknown                -- PEA arrest, s/p CPR with one round of epi, 2 amps of d50. Unclear how long until ROSC but pt had ROSC prior to arrival.. Found down at home, became agonal and pulseless in ambulance. Probable anoxic brain injury  -- acute MI.  Troponin level > 100, suspect has significant CAD, no heparin infusion  -- Ischemic cardiomyopathy.  EF 35-40% on echo with RWMAs earlier this admission. EF now normal 55-60%. Cath this admission shows no CAD and EF 55%. -Anemia, s/p 2 units PRBCs at [facility] 7/26/17  -ESRD, noncompliant with dialysis, dialyzed at [facility] 7/26/17  -Sepsis. Improving, now off vasopressors  -Elevated LFTs and now with worsening thrombocytopnenia  -Tobacco use disorder    Plan:       - Today AOx3, improvement from previous two days. - Discussed importance of not missing HD in future. - Discharge planning in progress.   - Monitor BP, consider increase in lopressor if BP cont. To stay high. Not much more for cardiology to do at this point other than monitor his progress. Subjective:     No new complaints. Feels good. No CP or SOB. Objective:      Patient Vitals for the past 8 hrs:   Temp Pulse Resp BP SpO2   08/10/17 0739 98.3 °F (36.8 °C) 86 18 154/82 100 %   08/10/17 0400 98.8 °F (37.1 °C) 80 18 164/76 100 %         Patient Vitals for the past 96 hrs:   Weight   08/09/17 1917 69.9 kg (154 lb 1.6 oz)   08/08/17 0800 69 kg (152 lb 3.2 oz)   08/08/17 0400 71 kg (156 lb 8 oz)   08/07/17 1941 70.3 kg (155 lb)                    Intake/Output Summary (Last 24 hours) at 08/10/17 2853  Last data filed at 08/09/17 1828   Gross per 24 hour   Intake              100 ml   Output              500 ml   Net             -400 ml       Physical Exam:  General:  alert, cooperative, no distress, appears older than stated age  Neck:  nontender, no carotid bruit, no JVD  Lungs:  clear to auscultation bilaterally  Heart:  Regular rate & rhythm  Abdomen:  abdomen is soft without significant tenderness, masses, organomegaly or guarding  Extremities:  extremities normal, atraumatic, no cyanosis or edema    Data Review:     Labs: Results:       Chemistry Recent Labs      08/08/17   0339  08/07/17   0845   GLU  200*  148*   NA  140  141   K  3.2*  3.3*   CL  106  105   CO2  24  26   BUN  27*  52*   CREA  4.89*  6.61*   CA  7.9*  7.1*   AGAP  10  10   BUCR  6*  8*      CBC w/Diff Recent Labs      08/10/17   0359  08/09/17   0450  08/08/17   0339   WBC  12.8  13.4*  12.1   RBC  3.34*  3.32*  3.45*   HGB  10.2*  10.2*  10.4*   HCT  31.1*  31.1*  31.7*   PLT  367  301  243   GRANS  79*  81*  77*   LYMPH  10*  11*  15*   EOS  2  1  1      Cardiac Enzymes No results found for: CPK, CKMMB, CKMB, RCK3, CKMBT, CKNDX, CKND1, WILFRIDO, TROPT, TROIQ, JOELLEN, TROPT, TNIPOC, BNP, BNPP   Coagulation No results for input(s): PTP, INR, APTT in the last 72 hours.     No lab exists for component: INREXT    Lipid Panel No results found for: CHOL, CHOLPOCT, CHOLX, CHLST, CHOLV, 396597, HDL, LDL, LDLC, DLDLP, 090664, VLDLC, VLDL, TGLX, TRIGL, TRIGP, TGLPOCT, CHHD, CHHDX   BNP No results found for: BNP, BNPP, XBNPT   Liver Enzymes No results for input(s): TP, ALB, TBIL, AP, SGOT, GPT in the last 72 hours.     No lab exists for component: DBIL   Digoxin    Thyroid Studies Lab Results   Component Value Date/Time    TSH 7.52 08/03/2017 01:01 PM          Signed By: Katie Singh     August 10, 2017

## 2017-08-10 NOTE — PROGRESS NOTES
Problem: Dysphagia (Adult)  Goal: *Acute Goals and Plan of Care (Insert Text)  Dysphagia Present: yes    Recommendations:  Diet: soft solid with nectar-thick liquids  Meds: as tolerated  Aspiration Precautions    Patient will:  1. Tolerate PO trials with 0 s/s overt distress in 4/5 trials  2. Utilize compensatory swallow strategies/maneuvers (decrease bite/sip, size/rate, alt. liq/sol) with min cues in 4/5 trials  3. Perform oral-motor/laryngeal exercises to increase oropharyngeal swallow function with min cues  4. Complete an objective swallow study (i.e., MBSS) to assess swallow integrity, r/o aspiration, and determine of safest LRD, min A - MET 8/7/17       Outcome: Progressing Towards Goal  SPEECH LANGUAGE PATHOLOGY DYSPHAGIA TREATMENT     Patient: Christen Dakin [de-identified]55 y.o. male)  Date: 8/10/2017  Diagnosis: Cardiac arrest ([address]  Acidosis  Respiratory failure ([address]  Anemia  ESRD needing dialysis ([address]  Cardiac arrest ([address]  Acute GI bleeding  Sepsis ([address]  Hypotension  Anoxic brain damage (HCC)  ESRD (end stage renal disease) ([address]  Colitis Sepsis ([address]  Precautions: Aspiration        ASSESSMENT:  Pt seen for follow up dysphagia tx with soft solid, NTL breakfast meal. Pt continuing to tolerate NTL by cup and straw with no overt s/sx aspiration. Pt able to masticate and clear soft solid items with positive oral clearance and would continue to benefit from continued soft solid consistency related to edentulous status. Pt offered thin liquid trials by cup, demo strong wet cough on 5/5 trials. Recommend continue current diet with use of the above mentioned compensatory strategies/aspiration precautions. Further recommend continued ST upon discharge to address dysphagia and address cognitive deficits. Provided handout with thickening agent information and swallowing precautions. Pt able to verbalize understanding. Will continue to follow.        Progression toward goals:  [ ]         Improving appropriately and progressing toward goals  [X]         Improving slowly and progressing toward goals  [ ]         Not making progress toward goals and plan of care will be adjusted       PLAN:  Recommendations and Planned Interventions:  Patient continues to benefit from skilled intervention to address the above impairments. Continue treatment per established plan of care. Discharge Recommendations:  Home Health or [address]:   Patient stated I love this thick stuff. OBJECTIVE:   Cognitive and Communication Status:  Neurologic State: Alert, Eyes open spontaneously  Orientation Level: Oriented to person, Oriented to place, Disoriented to time, Disoriented to situation  Cognition: Follows commands  Perception: Appears intact  Perseveration: No perseveration noted  Safety/Judgement: Decreased insight into deficits  Dysphagia Treatment:  Oral Assessment:  Oral Assessment  Labial: Decreased rate  Dentition: Edentulous  Oral Hygiene: WFL  Lingual: Decreased rate, Decreased strength  Velum: No impairment  Mandible: No impairment  Gag Reflex: Absent  P.O. Trials:              Patient Position: HOB 50*              Vocal quality prior to P.O.: Low volume, Hoarse              Consistency Presented: thin, NTL, soft solid              How Presented: Self-fed/presented, Spoon, Cup, Straw               Bolus Acceptance: No impairment              Bolus Formation/Control: Impaired              Type of Impairment:  Mastication               Propulsion: Delayed (# of seconds), Discoordination              Initiation of Swallow: Delayed (# of seconds)              Laryngeal Elevation: Decreased, Weak              Aspiration Signs/Symptoms: Strong, wet cough with thin liquids               Effective Modifications: Alternate liquids/solids, Spoon, Small sips and bites              Cues for Modifications:  Moderate              Oral Phase Severity:  Mild               Pharyngeal Phase Severity: Moderate PAIN:  Pt reports 0/10 pain or discomfort prior to tx. Pt reports 0/10 pain or discomfort post tx. After treatment:   [ ]              Patient left in no apparent distress sitting up in chair  [X]              Patient left in no apparent distress in bed  [X]              Call bell left within reach  [X]              Nursing notified  [ ]              Caregiver present  [ ]              Bed alarm activated         COMMUNICATION/EDUCATION:   [X]              SLP educated pt with regard to results of MBS, thickening agent information, compensatory swallow strategies and                   aspiration/reflux precautions including: small bites/sips,                   alternate liquids/solids, decrease feeding rate, HOB > 45 with all po, and                   upright in bed at 30 degrees after po for at least 45                   minutes.       Wero Ferrer M.S., [address] [facility]  Speech-Language Pathologist

## 2017-08-10 NOTE — PROGRESS NOTES
Problem: Mobility Impaired (Adult and Pediatric)  Goal: *Acute Goals and Plan of Care (Insert Text)  STGs to be addressed within 3 days:  1. Bed mobility: Rolling L to R to L min/CGA with use of HR for positioning. 2. Activity Tolerance: Tolerate EOB sitting 5-10 minutes for change of position. 3. Transfer: Supine to Sit min/CGA with HR for meals. LTGs to be addressed within 7 days:  1. Transfer: Sit to stand max/mod A with RW/SW in prep for transfers. 2. Ambulation: Ambulate 5-25 ft. max/mod A with RW/SW for home mobility. Outcome: Progressing Towards Goal  PHYSICAL THERAPY EVALUATION     Patient: Debby Abreu55 y.o. male)  Date: 8/10/2017  Primary Diagnosis: Cardiac arrest ([facility]  Acidosis  Respiratory failure ([address]  Anemia  ESRD needing dialysis ([facility]  Cardiac arrest ([facility]  Acute GI bleeding  Sepsis ([facility]  Hypotension  Anoxic brain damage (HCC)  ESRD (end stage renal disease) ([facility]  Colitis  Precautions:   Fall, Skin, Aspiration (FMS)      ASSESSMENT :  Based on the objective data described below, the patient presents to PT with decreased functional mobility with regard to bed mobility, transfers, gait, and overall tolerance for activity. Patient was found unresponsive PTA, went into PEA, required resuscitation and intubation for airway protection. Patient has since been extubated, noted acute metabolic encephalopathy. Patient reports that PTA, he was independent with ADL's and ambulated without assistive device. Patient does endorse h/o falls PTA. Today, patient received in bed, FMS in place. Patient required max A for bed mobility, very poor static sitting balance, required constant verbal/tactile/manual assistance for maintaining safe sitting. Unable to initiate standing due to profound generalized weakness and poor sitting balance. Patient was assisted back to bed, positioned for comfort. Patient would benefit from PT to address above impairments and assist with discharge planning.      Patient will benefit from skilled intervention to address the above impairments. Patients rehabilitation potential is considered to be Good  Factors which may influence rehabilitation potential include:   [ ]         None noted  [ ]         Mental ability/status  [X]         Medical condition  [ ]         Home/family situation and support systems  [ ]         Safety awareness  [ ]         Pain tolerance/management  [ ]         Other:        PLAN :  Recommendations and Planned Interventions:  [X]           Bed Mobility Training             [X]    Neuromuscular Re-Education  [X]           Transfer Training                   [ ]    Orthotic/Prosthetic Training  [X]           Gait Training                          [ ]    Modalities  [X]           Therapeutic Exercises          [ ]    Edema Management/Control  [X]           Therapeutic Activities            [X]    Patient and Family Training/Education  [ ]           Other (comment):     Frequency/Duration: Patient will be followed by physical therapy daily x 4-7 x week to address goals. Discharge Recommendations: [facility]  Further Equipment Recommendations for Discharge: To be determined       SUBJECTIVE:   Patient stated I didn't know that I had gotten this weak.       OBJECTIVE DATA SUMMARY:       Past Medical History:   Diagnosis Date    Anemia      Arthritis      Chronic pain       Back     Diabetes mellitus type II      Hypertension      IBS (irritable bowel syndrome)      Lactose intolerance      TB (tuberculosis)       TB test positive     Past Surgical History:   Procedure Laterality Date    CARDIAC CATHETERIZATION   8/9/2017          CORONARY ARTERY ANGIOGRAM   8/9/2017          ENDOSCOPY, COLON, DIAGNOSTIC        HX AMPUTATION         Toe due to injury    LV ANGIOGRAPHY   8/9/2017          MODERATE SEDATION   8/9/2017           Barriers to Learning/Limitations: None  Compensate with: N/A  Prior Level of Function/Home Situation:   Home Situation  Home Environment: Private residence  # Steps to Enter: 3  Rails to Enter: Yes  Hand Rails : Bilateral  One/Two Story Residence: One story  Living Alone: No  Support Systems: Family member(s)  Patient Expects to be Discharged to[de-identified] Private residence  Current DME Used/Available at Home: None  Tub or Shower Type: Tub/Shower combination  Critical Behavior:  Neurologic State: Alert  Psychosocial  Patient Behaviors: Calm; Cooperative  Strength:    Strength: Generally decreased, functional (B UE/LE 2/5)  Tone & Sensation:   Tone: Normal (B UE/LE)  Sensation: Intact (B UE/LE)   Range Of Motion:  AROM: Generally decreased, functional (B UE/LE)  Functional Mobility:  Bed Mobility:  Rolling: Maximum assistance; Additional time  Supine to Sit: Maximum assistance; Additional time  Sit to Supine: Maximum assistance; Additional time  Scooting: Maximum assistance; Additional time  Transfers:  Sit to Stand:  (N/A-due to poor sitting balance)  Balance:   Sitting: Impaired; With support  Sitting - Static: Poor (constant support)  Sitting - Dynamic: Poor (constant support)  Standing:  (N/A)  Ambulation/Gait Training:  Ambulation - Level of Assistance:  (N/A-pt with poor sitting balance)  Pain:  Pain Scale 1: Numeric (0 - 10)  Pain Intensity 1: 0  Activity Tolerance:   Fair/good  Please refer to the flowsheet for vital signs taken during this treatment. After treatment:   [ ] Patient left in no apparent distress sitting up in chair  [ ] Patient left sitting on EOB  [X] Patient left in no apparent distress in bed  [ ] Patient declined to be OOB at this time due to   [X] Call bell left within reach  [ ] Nursing notified  [ ] Caregiver present  [ ] Bed alarm activated      COMMUNICATION/EDUCATION:   [X]         Fall prevention education was provided and the patient/caregiver indicated understanding. [X]         Patient/family have participated as able in goal setting and plan of care.   [X]         Patient/family agree to work toward stated goals and plan of care. [ ]         Patient understands intent and goals of therapy, but is neutral about his/her participation. [ ]         Patient is unable to participate in goal setting and plan of care. Thank you for this referral.  Janet Deras, PT   Time Calculation: 23 mins      G-codes:  Mobility  Current  CM= 80-99%   Goal  CJ= 20-39%. The severity rating is based on the Level of Assistance required for Functional Mobility and ADLs.      Eval Complexity: History: MEDIUM  Complexity : 1-2 comorbidities / personal factors will impact the outcome/ POC Exam:HIGH Complexity : 4+ Standardized tests and measures addressing body structure, function, activity limitation and / or participation in recreation  Presentation: MEDIUM Complexity : Evolving with changing characteristics  Overall Complexity:MEDIUM

## 2017-08-10 NOTE — PROGRESS NOTES
NUTRITION    Nursing Referral: MST, Pressure Ulcer     RECOMMENDATIONS / PLAN:     - Add supplement: Ensure Pudding BID  - Continue RD inpatient monitoring and evaluation. NUTRITION INTERVENTIONS & DIAGNOSIS:     [x] Meals/snacks: modified diet  [x] Medical food supplementation: Nepro BID    Nutrition Diagnosis: increased protein needs related to promotion of wound healing and increased expenditure as evidenced by pressure ulcer wound and ESRD, pt on dialysis 3x per week  Altered nutrition related labs related to renal failure on dialysis, inadequate enteral provision of potassium as evidenced by hypokalemia, potassium 3.3 mmol/L. ASSESSMENT:     8/10: Pt reported good appetite and \"eating enough\" from his meals. Noted fair meal intake per chart. Consuming supplements. Discussed increasing Nepro frequency; pt declined. Discussed adding Ensure Pudding; pt agreed with plan. Po intake of meals/supplements encouraged. Has been receiving K replacement. 8/7: K remains low despite previous tube feeding changed to higher potassium formula. Pt extubated 8/5. Tube feeds discontinued 8/6. SLP following; pt s/p MBS today and started on po diet. Noted poor po intake lunch meal per chart. 8/3: K remains low despite continued replacement. Will change to higher potassium formula per discussion with PA.   8/1: Tolerating feeding at goal. 1 L removed during HD 7/31. Hyperglycemia noted, advance to very insulin resistant SSI and basal insulin started. 7/31: Tolerating advancement of tube feeding. HD today. BG trending up, MD to stop D5.    7/30: Pt remain intubated. GI following; plan to start tube feeds today. Noted order placed; discussed modifying tube feed order and add water flushes with Dr Antonette Resendez. RN unavailable; discussed with Nursing [place] regarding modifying tube feed order  7/28: S/p cardiac arrest and intubated 7/27, NGT clamped. Abdominal ultrasound today to rule out cholecystitis. Will wait to feed. Average po intake adequate to meet patients estimated nutritional needs:   [] Yes     [x] No   [] Unable to determine at this time    Tube Feeding:  Discontinued 8/6    Diet: DIET NUTRITIONAL SUPPLEMENTS Lunch, Dinner; NEPRO  DIET DIABETIC CONSISTENT CARB Mechanical Soft; 1 NECTAR; No Conc. Sweets      Food Allergies: NKFA  Current Appetite:   [x] Good     [] Fair     [] Poor     [] Other: unknown   Appetite/meal intake prior to admission:   [] Good     [] Fair     [] Poor     [x] Other: unknown   Feeding Limitations:  [x] Swallowing difficulty: SLP following    [x] Chewing difficulty: pt downgraded to mechanical soft diet on 8/8 per Glycemic Control    [] Other:    Current Meal Intake:   Patient Vitals for the past 100 hrs:   % Diet Eaten   08/10/17 1022 60 %   08/07/17 1827 50 %   08/07/17 1505 25 %   08/07/17 0901 0 %     BM: 8/9; FMS  Skin Integrity:   Stage II ressure ulcer anus; DTI sacrum; moisture associated skin damage posterior buttocks  Edema: none   Pertinent Medications: Reviewed: steroid     Recent Labs      08/08/17   0339   NA  140   K  3.2*   CL  106   CO2  24   GLU  200*   BUN  27*   CREA  4.89*   CA  7.9*       Intake/Output Summary (Last 24 hours) at 08/10/17 1418  Last data filed at 08/10/17 1022   Gross per 24 hour   Intake              240 ml   Output              500 ml   Net             -260 ml       Anthropometrics:  Ht Readings from Last 1 Encounters:   07/28/17 6' 3\" (1.905 m)     Last 3 Recorded Weights in this Encounter    08/08/17 0400 08/08/17 0800 08/09/17 1917   Weight: 71 kg (156 lb 8 oz) 69 kg (152 lb 3.2 oz) 69.9 kg (154 lb 1.6 oz)     Body mass index is 19.26 kg/(m^2).      Underweight     Weight History:   Weight Metrics 8/9/2017   Weight 154 lb 1.6 oz   BMI 19.26 kg/m2        Admitting Diagnosis: Cardiac arrest (HCC)  Acidosis  Respiratory failure (HCC)  Anemia  ESRD needing dialysis ([facility]  Cardiac arrest ([facility] [facility]  Acute GI bleeding  Sepsis (HCC)  Hypotension  Anoxic brain damage (HCC)  ESRD (end stage renal disease) ([facility]  Colitis  Pertinent PMHx: DM, HTN, IBS, ESRD    Education Needs:        [x] None identified  [] Identified - Not appropriate at this time  []  Identified and addressed - refer to education log  Learning Limitations:   [x] None identified  [] Identified:      Cultural, Orthodoxy & ethnic food preferences:  [x] None identified    [] Identified and addressed     ESTIMATED NUTRITION NEEDS:     Calories: 1763-0453 kcal (30-35 kcal/kg) based on  [x] Actual BW 71 kg     [x] SBW:  77 kg   Protein:  gm (1.2-1.5 gm/kg) based on  [] Actual BW      [] SBW   Fluid: 1000 mL + UOP     MONITORING & EVALUATION:     Nutrition Goal(s):   1. Nutritional needs will be met through adequate oral intake or nutrition support within the next 7 days.   Outcome:  [] Met/Ongoing    [x]  Not Met/Progressing    [] New/Initial Goal     Monitoring:   [] EN tolerance    [] EN infusion   [x] Diet tolerance   [x] Meal intake   [x] Supplement intake   [] GI symptoms/ability to tolerate po diet   [] Respiratory status   [x] Plan of care      Previous Recommendations (for follow-up assessments only):     [x]   Implemented       []   Not Implemented (RD to address)     [] No Recommendation Made     Discharge Planning: renal diet; consistency per SLP  [x] Participated in care planning, discharge planning, & interdisciplinary rounds as appropriate       Kristine Bell, [address] [place] [address]  Pager: [phone]

## 2017-08-10 NOTE — INTERDISCIPLINARY ROUNDS
IDR/SLIDR Summary          Patient: Javier Mackenzie MRN: [id number]    Age: 55 y.o. YOB: 1960 Room/Bed: [facility]   Admit Diagnosis: Cardiac arrest (HCC)  Acidosis  Respiratory failure (HCC)  Anemia  ESRD needing dialysis ([facility] [address]  Cardiac arrest ([facility] [address]  Acute GI bleeding  Sepsis ([facility] [address]  Hypotension  Anoxic brain damage (HCC)  ESRD (end stage renal disease) (HCC)  Colitis  Principal Diagnosis: Sepsis ([facility]   Goals: Hemodynamic stability, Possible cardiac cath, monitor thrombocytopenia, SLP eval, No seizures, CT to r/o CVA vs deconditioning. Readmission: NO  Quality Measure: Not applicable  VTE Prophylaxis: Chemical  Influenza Vaccine screening completed? Not flu season  Pneumococcal Vaccine screening completed? YES  Mobility needs: Yes   Nutrition plan:Yes  Consults:Speech    Financial concerns:No  Escalated to CM? yes  RRAT Score: 17   Interventions:Home Health  Testing due for pt today?  YES  LOS: 14 days Expected length of stay 15 days  Discharge plan: SNF   PCP: Laurie Pritchett MD  Transportation needs: Yes    Days before discharge:two or more days before discharge   Discharge disposition: SNF    Signed:     Emilee Gonzalez RN  8/10/2017  11:20 AM

## 2017-08-10 NOTE — INTERDISCIPLINARY ROUNDS
IDR/SLIDR Summary          Patient: Ayse Charles MRN: [id number]    Age: 55 y.o. YOB: 1960 Room/Bed: [facility]   Admit Diagnosis: Cardiac arrest (HCC)  Acidosis  Respiratory failure (HCC)  Anemia  ESRD needing dialysis ([facility] [address]  Cardiac arrest ([facility] [address]  Acute GI bleeding  Sepsis ([facility] [address]  Hypotension  Anoxic brain damage (HCC)  ESRD (end stage renal disease) (HCC)  Colitis  Principal Diagnosis: Sepsis ([facility]   Goals: Hemodynamic stability, Possible cardiac cath, monitor thrombocytopenia, SLP eval, No seizures, CT to r/o CVA vs deconditioning. Readmission: NO  Quality Measure: Not applicable  VTE Prophylaxis: Chemical  Influenza Vaccine screening completed? Not flu season  Pneumococcal Vaccine screening completed? YES  Mobility needs: Yes   Nutrition plan:Yes  Consults:Speech    Financial concerns:No  Escalated to CM? yes  RRAT Score: 17   Interventions:Home Health  Testing due for pt today?  YES  LOS: 14 days Expected length of stay 15 days  Discharge plan: SNF   PCP: Gianluca Mims MD  Transportation needs: Yes    Days before discharge:two or more days before discharge   Discharge disposition: SNF    Signed:     Keturah Delgado RN  8/10/2017  11:20 AM

## 2017-08-10 NOTE — ROUTINE PROCESS
.Bedside and Verbal shift change report given to Saira Hess RN(oncoming nurse) by FRANK Amador (offgoing nurse). Report included the following information SBAR, Kardex, Procedure Summary, Intake/Output, MAR and Recent Results.

## 2017-08-10 NOTE — PROGRESS NOTES
Infectious Disease Progress Note    Requested by: Dr. Ama Swenson    Reason: sepsis, e.coli/clostridium pefringens bacteremia    Current abx Prior abx   Pip/tazo since 8/2 Levofloxacin 7/27  Pip/tazo 7/27-7/31  Meropenem  7/31-8/2, vancomycin  7/27-8/2     Lines:       Assessment :  55year old man with h/o type 2 DM (hgb a1c 5.4 on 7/29/17) ,ESRD admitted to [facility] on 7/27/17 s/p cardiorespiratory arrest, hypotension, bacteremia, elevated LFTs. Presentation c/w sepsis (POA) due to e.coli, clostridium perfringens bloodstream infection (positive blood culture 7/27, negative blood culture 8/1) . Most likely source of bacteremia is intra abdominal infection/inflammation. Elevated LFTs, thickened gallbladder - no evidence of cholecystitis on HIDA scan 8/2/17    Patient could have some other undiagnosed abdominal inflammation such as ischemic colitis which could have contributed to polymicrobial bacteremia    Increasing wbc - likely steroids related- improved after steroid taper    Clinically better. Diarrhea: likely abx associated. Recommendations:    1. D/c pip/tazo   2. Start probiotics  3. D/c planning per primary team    Advance Care planning: full code, patient wasnt able to name a surrogate decision maker or provide an advance care plan    Will sign off. F/u prn. thanks    Please call me if any further questions or concerns. Will continue to participate in the care of this patient.     subjective:    Denies abdominal pain, nausea, vomiting, cp,sob, chills.         Home medications:   list reviewed        Current Facility-Administered Medications   Medication Dose Route Frequency    metoprolol tartrate (LOPRESSOR) tablet 25 mg  25 mg Oral Q12H    heparin (porcine) 1,000 unit/mL injection 2,000 Units  2,000 Units IntraVENous DIALYSIS ONCE    vits A and D-white pet-lanolin (A&D) ointment   Topical TID    levETIRAcetam (KEPPRA) tablet 500 mg  500 mg Oral BID    famotidine (PEPCID) tablet 20 mg  20 mg Oral DAILY    simvastatin (ZOCOR) tablet 20 mg  20 mg Oral QHS    doxercalciferol (HECTOROL) 4 mcg/2 mL injection 3 mcg  3 mcg IntraVENous DIALYSIS MON, WED & FRI    epoetin benjamin (EPOGEN;PROCRIT) injection 10,000 Units  10,000 Units IntraVENous DIALYSIS MON, WED & FRI    insulin lispro (HUMALOG) injection   SubCUTAneous AC&HS    aspirin chewable tablet 81 mg  81 mg Oral DAILY    piperacillin-tazobactam (ZOSYN) 2.25 g in 0.9% sodium chloride (MBP/ADV) 50 mL MBP  2.25 g IntraVENous Q8H    Piperacillin-tazobactam (ZOSYN) 0.75 gm Supplemental Dosing by Pharmacy   Other Rx Dosing/Monitoring    hydrocortisone (CORTEF) tablet 10 mg  10 mg Oral BID WITH MEALS    glucose chewable tablet 16 g  4 Tab Oral PRN    glucagon (GLUCAGEN) injection 1 mg  1 mg IntraMUSCular PRN    dextrose (D50W) injection syrg 12.5-25 g  25-50 mL IntraVENous PRN    0.9% sodium chloride infusion  100 mL/hr IntraVENous DIALYSIS PRN    naloxone (NARCAN) injection 0.4 mg  0.4 mg IntraVENous PRN       Allergies: Review of patient's allergies indicates no known allergies. Temp (24hrs), Av.8 °F (36.6 °C), Min:96.3 °F (35.7 °C), Max:98.8 °F (37.1 °C)    Visit Vitals    /82 (BP 1 Location: Right arm, BP Patient Position: At rest)    Pulse 86    Temp 98.3 °F (36.8 °C)    Resp 18    Ht 6' 3\" (1.905 m)  Comment: per chart history    Wt 69.9 kg (154 lb 1.6 oz)    SpO2 100%    BMI 19.26 kg/m2       ROS: limited ROS obtained since patient not very communicative. Physical Exam:    General: Well developed, well nourished male laying on the bed,opens eyes, in no acute distress. General:   awake alert and oriented   HEENT:  Normocephalic, atraumatic, PERRL, EOMI,present scleral icterus no conjunctival hemmohage;  nasal and oral mucous are moist and without evidence of lesions. Neck supple, no bruits.    Lymph Nodes:   no cervical, axillary or inguinal adenopathy   Lungs:   non-labored, bilaterally clear to auscultation- no crackles wheezes rales or rhonchi   Heart:  RRR, s1 and s2; no  rubs or gallops, no edema, + pedal pulses   Abdomen:  soft, non-distended, active bowel sounds, no hepatomegaly, no splenomegaly. Non-tender   Genitourinary:  deferred   Extremities:   no clubbing, cyanosis; no joint effusions or swelling; muscle mass appropriate for age   Neurologic:  No gross focal sensory abnormalities; 5/5 muscle strength to upper and lower extremities. Speech appropriate. Cranial nerves intact                        Skin:  No rash or ulcers   Back:  no spinal or paraspinal muscle tenderness or rigidity, no CVA tenderness     Psychiatric:  No suicidal or homicidal ideations, appropriate mood and affect         Labs: Results:   Chemistry Recent Labs      08/08/17   0339   GLU  200*   NA  140   K  3.2*   CL  106   CO2  24   BUN  27*   CREA  4.89*   CA  7.9*   AGAP  10   BUCR  6*      CBC w/Diff Recent Labs      08/10/17   0359  08/09/17   0450  08/08/17   0339   WBC  12.8  13.4*  12.1   RBC  3.34*  3.32*  3.45*   HGB  10.2*  10.2*  10.4*   HCT  31.1*  31.1*  31.7*   PLT  367  301  243   GRANS  79*  81*  77*   LYMPH  10*  11*  15*   EOS  2  1  1      Microbiology No results for input(s): CULT in the last 72 hours.        RADIOLOGY:    All available imaging studies/reports in [facility] for this admission were reviewed    Dr. Dang Rahman, Infectious Disease Specialist  [phone]  August 10, 2017  4:28 PM

## 2017-08-10 NOTE — PROGRESS NOTES
[facility] Hospitalist Group  Progress Note    Patient: Wilmar Ordaz Age: 55 y.o. : 1960 MR#: [id number] SSN: [id number]  Date/Time: 8/10/2017     Subjective:     Pt feels better. AAOx3. Having diarrhea, no abd pain     Assessment/Plan:   1. Acute met encephalopathy: better. 2. S/p PEA arrest. ? Cardiac cause with elevated trop PTA. Echo improving EF, s/p cath, no CAD. 3. Acute resp failure s/p extubation, off O2  4. ESRD on HD. M/W/F   5. Sepsis - leucocytosis  () bottles grew E.coli and C.perferinges. Initially thought to be secondary to cholecystitis but w/u thus far including HIDA scan negative. Per ID possibly due to abdominal source. Repeat cultures negative. Per ID recs on pip/tazo to be switched to po cipro and metro until  when able to take PO meds. 6. Hypokalemia  - replace lytes -and follow. 7. Elevated LFT - ? Shock liver. LFT now wnl.   8. Hrombocytopenia: secondary to sepsis. better   9. Dysphagia: better. S/p SLP eval  10. ? Sz: on keppra per neurology  11. R side weakness: ? CVA vs decondition: CT neg, no CVA, PT eval.   12. =>Wounds: wound care    unstageable pressure ulcer to sacrum/coccyx not present on admit    moisture associated skin damage to anus not present  on admit    moisture associated skin damage to buttocks not present on admit  13. Diarrhea due to Abx, d/w ID, start probiotics and imodium as needed   Full code   Daughter Elmira Parisi [phone] in detail, explained cath results.    Son Jerardo. [phone]    Discharge plan, need SNF once diarrhea better   Cont PT eval    Case discussed with:  [x]Patient  []Family  []Nursing  [x]Case Management  DVT Prophylaxis:  []Lovenox  []Hep SQ  [x]SCDs  []Coumadin   []On Heparin gtt    Patient Active Problem List   Diagnosis Code    Anemia D64.9    Acidosis E87.2    Cardiac arrest ([facility] [address] I46.9    [facility] J96.90    ESRD needing dialysis ([facility] N18.6, Z99.2  Anoxic brain damage (HCC) G93.1    Colitis K52.9    Hypotension I95.9    Acute GI bleeding K92.2    ESRD (end stage renal disease) ([facility]) N18.6    Sepsis ([facility]) A41.9       Objective:   VS:   Visit Vitals    /67 (BP 1 Location: Right arm, BP Patient Position: At rest)    Pulse 79    Temp 97.9 °F (36.6 °C)    Resp 18    Ht 6' 3\" (1.905 m)  Comment: per chart history    Wt 69.9 kg (154 lb 1.6 oz)    SpO2 100%    BMI 19.26 kg/m2      Tmax/24hrs: Temp (24hrs), Av.8 °F (36.6 °C), Min:96.3 °F (35.7 °C), Max:98.8 °F (37.1 °C)  IOBRIEF    Intake/Output Summary (Last 24 hours) at 08/10/17 1438  Last data filed at 08/10/17 1022   Gross per 24 hour   Intake              240 ml   Output              500 ml   Net             -260 ml       General:  Alert awake in nad  Pulmonary:  CTA Bilaterally. Cardiovascular: Regular rate and Rhythm. GI:  Soft, Non distended, Non tender. + Bowel sounds. Extremities:  No edema, cyanosis, clubbing. Neuro: AAOx3.  Moves all ext            Medications:   Current Facility-Administered Medications   Medication Dose Route Frequency    metoprolol tartrate (LOPRESSOR) tablet 25 mg  25 mg Oral Q12H    [START ON 2017] lactobacillus sp. 50 billion cpu (BIO-K PLUS) capsule 1 Cap  1 Cap Oral DAILY    loperamide (IMODIUM) capsule 2 mg  2 mg Oral Q6H PRN    heparin (porcine) 1,000 unit/mL injection 2,000 Units  2,000 Units IntraVENous DIALYSIS ONCE    vits A and D-white pet-lanolin (A&D) ointment   Topical TID    levETIRAcetam (KEPPRA) tablet 500 mg  500 mg Oral BID    famotidine (PEPCID) tablet 20 mg  20 mg Oral DAILY    simvastatin (ZOCOR) tablet 20 mg  20 mg Oral QHS    doxercalciferol (HECTOROL) 4 mcg/2 mL injection 3 mcg  3 mcg IntraVENous DIALYSIS MON, WED & FRI    epoetin benjamin (EPOGEN;PROCRIT) injection 10,000 Units  10,000 Units IntraVENous DIALYSIS MON, WED & FRI    insulin lispro (HUMALOG) injection   SubCUTAneous AC&HS    aspirin chewable tablet 81 mg 81 mg Oral DAILY    hydrocortisone (CORTEF) tablet 10 mg  10 mg Oral BID WITH MEALS    glucose chewable tablet 16 g  4 Tab Oral PRN    glucagon (GLUCAGEN) injection 1 mg  1 mg IntraMUSCular PRN    dextrose (D50W) injection syrg 12.5-25 g  25-50 mL IntraVENous PRN    0.9% sodium chloride infusion  100 mL/hr IntraVENous DIALYSIS PRN    naloxone (NARCAN) injection 0.4 mg  0.4 mg IntraVENous PRN       Labs:    Recent Results (from the past 24 hour(s))   GLUCOSE, POC    Collection Time: 08/09/17  9:31 PM   Result Value Ref Range    Glucose (POC) 192 (H) 70 - 110 mg/dL   CALCIUM, IONIZED    Collection Time: 08/10/17  3:59 AM   Result Value Ref Range    Ionized Calcium 1.05 (L) 1.12 - 1.32 MMOL/L   CBC WITH AUTOMATED DIFF    Collection Time: 08/10/17  3:59 AM   Result Value Ref Range    WBC 12.8 4.6 - 13.2 K/uL    RBC 3.34 (L) 4.70 - 5.50 M/uL    HGB 10.2 (L) 13.0 - 16.0 g/dL    HCT 31.1 (L) 36.0 - 48.0 %    MCV 93.1 74.0 - 97.0 FL    MCH 30.5 24.0 - 34.0 PG    MCHC 32.8 31.0 - 37.0 g/dL    RDW 18.4 (H) 11.6 - 14.5 %    PLATELET 874 388 - 723 K/uL    MPV 10.9 9.2 - 11.8 FL    NEUTROPHILS 79 (H) 40 - 73 %    LYMPHOCYTES 10 (L) 21 - 52 %    MONOCYTES 9 3 - 10 %    EOSINOPHILS 2 0 - 5 %    BASOPHILS 0 0 - 2 %    ABS. NEUTROPHILS 10.1 (H) 1.8 - 8.0 K/UL    ABS. LYMPHOCYTES 1.3 0.9 - 3.6 K/UL    ABS. MONOCYTES 1.2 0.05 - 1.2 K/UL    ABS. EOSINOPHILS 0.2 0.0 - 0.4 K/UL    ABS.  BASOPHILS 0.0 0.0 - 0.1 K/UL    DF AUTOMATED     GLUCOSE, POC    Collection Time: 08/10/17  7:47 AM   Result Value Ref Range    Glucose (POC) 179 (H) 70 - 110 mg/dL   GLUCOSE, POC    Collection Time: 08/10/17 12:54 PM   Result Value Ref Range    Glucose (POC) 187 (H) 70 - 110 mg/dL       Signed By: Milena Hernandes MD     August 10, 2017

## 2017-08-11 LAB
BASOPHILS # BLD AUTO: 0.1 K/UL (ref 0–0.1)
BASOPHILS # BLD: 0 % (ref 0–2)
CA-I SERPL-SCNC: 1.1 MMOL/L (ref 1.12–1.32)
DIFFERENTIAL METHOD BLD: ABNORMAL
EOSINOPHIL # BLD: 0.3 K/UL (ref 0–0.4)
EOSINOPHIL NFR BLD: 2 % (ref 0–5)
ERYTHROCYTE [DISTWIDTH] IN BLOOD BY AUTOMATED COUNT: 18.2 % (ref 11.6–14.5)
GLUCOSE BLD STRIP.AUTO-MCNC: 126 MG/DL (ref 70–110)
GLUCOSE BLD STRIP.AUTO-MCNC: 218 MG/DL (ref 70–110)
GLUCOSE BLD STRIP.AUTO-MCNC: 221 MG/DL (ref 70–110)
GLUCOSE BLD STRIP.AUTO-MCNC: 282 MG/DL (ref 70–110)
HCT VFR BLD AUTO: 30.4 % (ref 36–48)
HGB BLD-MCNC: 10.1 G/DL (ref 13–16)
LYMPHOCYTES # BLD AUTO: 14 % (ref 21–52)
LYMPHOCYTES # BLD: 1.8 K/UL (ref 0.9–3.6)
MCH RBC QN AUTO: 30.9 PG (ref 24–34)
MCHC RBC AUTO-ENTMCNC: 33.2 G/DL (ref 31–37)
MCV RBC AUTO: 93 FL (ref 74–97)
MONOCYTES # BLD: 1.2 K/UL (ref 0.05–1.2)
MONOCYTES NFR BLD AUTO: 9 % (ref 3–10)
NEUTS SEG # BLD: 10.2 K/UL (ref 1.8–8)
NEUTS SEG NFR BLD AUTO: 75 % (ref 40–73)
PLATELET # BLD AUTO: 411 K/UL (ref 135–420)
PMV BLD AUTO: 10.4 FL (ref 9.2–11.8)
RBC # BLD AUTO: 3.27 M/UL (ref 4.7–5.5)
WBC # BLD AUTO: 13.5 K/UL (ref 4.6–13.2)

## 2017-08-11 PROCEDURE — 82962 GLUCOSE BLOOD TEST: CPT

## 2017-08-11 PROCEDURE — 74011250637 HC RX REV CODE- 250/637: Performed by: INTERNAL MEDICINE

## 2017-08-11 PROCEDURE — 85025 COMPLETE CBC W/AUTO DIFF WBC: CPT | Performed by: PHYSICIAN ASSISTANT

## 2017-08-11 PROCEDURE — 74011250637 HC RX REV CODE- 250/637: Performed by: HOSPITALIST

## 2017-08-11 PROCEDURE — 74011250637 HC RX REV CODE- 250/637: Performed by: PHYSICIAN ASSISTANT

## 2017-08-11 PROCEDURE — 74011636637 HC RX REV CODE- 636/637: Performed by: HOSPITALIST

## 2017-08-11 PROCEDURE — 74011250636 HC RX REV CODE- 250/636: Performed by: HOSPITALIST

## 2017-08-11 PROCEDURE — 36415 COLL VENOUS BLD VENIPUNCTURE: CPT | Performed by: PHYSICIAN ASSISTANT

## 2017-08-11 PROCEDURE — 82330 ASSAY OF CALCIUM: CPT | Performed by: PHYSICIAN ASSISTANT

## 2017-08-11 PROCEDURE — 90935 HEMODIALYSIS ONE EVALUATION: CPT

## 2017-08-11 PROCEDURE — 97110 THERAPEUTIC EXERCISES: CPT

## 2017-08-11 PROCEDURE — 65270000029 HC RM PRIVATE

## 2017-08-11 RX ADMIN — LOPERAMIDE HYDROCHLORIDE 2 MG: 2 CAPSULE ORAL at 02:36

## 2017-08-11 RX ADMIN — INSULIN LISPRO 6 UNITS: 100 INJECTION, SOLUTION INTRAVENOUS; SUBCUTANEOUS at 17:06

## 2017-08-11 RX ADMIN — VITAMIN A AND D: 30.8 OINTMENT TOPICAL at 08:35

## 2017-08-11 RX ADMIN — LEVETIRACETAM 500 MG: 500 TABLET ORAL at 17:06

## 2017-08-11 RX ADMIN — FAMOTIDINE 20 MG: 20 TABLET ORAL at 08:34

## 2017-08-11 RX ADMIN — METOPROLOL TARTRATE 25 MG: 25 TABLET ORAL at 08:33

## 2017-08-11 RX ADMIN — INSULIN LISPRO 9 UNITS: 100 INJECTION, SOLUTION INTRAVENOUS; SUBCUTANEOUS at 22:54

## 2017-08-11 RX ADMIN — ASPIRIN 81 MG 81 MG: 81 TABLET ORAL at 08:34

## 2017-08-11 RX ADMIN — VITAMIN A AND D: 30.8 OINTMENT TOPICAL at 22:56

## 2017-08-11 RX ADMIN — SIMVASTATIN 20 MG: 10 TABLET, FILM COATED ORAL at 22:53

## 2017-08-11 RX ADMIN — VITAMIN A AND D: 30.8 OINTMENT TOPICAL at 17:07

## 2017-08-11 RX ADMIN — HYDROCORTISONE 10 MG: 10 TABLET ORAL at 08:34

## 2017-08-11 RX ADMIN — INSULIN LISPRO 6 UNITS: 100 INJECTION, SOLUTION INTRAVENOUS; SUBCUTANEOUS at 08:32

## 2017-08-11 RX ADMIN — HEPARIN SODIUM 5000 UNITS: 5000 INJECTION, SOLUTION INTRAVENOUS; SUBCUTANEOUS at 17:07

## 2017-08-11 RX ADMIN — LOPERAMIDE HYDROCHLORIDE 2 MG: 2 CAPSULE ORAL at 17:05

## 2017-08-11 RX ADMIN — LEVETIRACETAM 500 MG: 500 TABLET ORAL at 08:34

## 2017-08-11 RX ADMIN — HYDROCORTISONE 10 MG: 10 TABLET ORAL at 17:06

## 2017-08-11 RX ADMIN — Medication 1 CAPSULE: at 08:34

## 2017-08-11 RX ADMIN — HEPARIN SODIUM 5000 UNITS: 5000 INJECTION, SOLUTION INTRAVENOUS; SUBCUTANEOUS at 02:35

## 2017-08-11 NOTE — INTERDISCIPLINARY ROUNDS
IDR/SLIDR Summary          Patient: Agustin Mcleod MRN: [id number]    Age: 55 y.o. YOB: 1960 Room/Bed: [facility]   Admit Diagnosis: Cardiac arrest (HCC)  Acidosis  Respiratory failure (HCC)  Anemia  ESRD needing dialysis ([facility] [address] [facility]  Cardiac arrest ([facility] [address]  Acute GI bleeding  Sepsis ([facility]  Hypotension  Anoxic brain damage (HCC)  ESRD (end stage renal disease) (HCC)  Colitis  Principal Diagnosis: Sepsis ([facility] [address] [facility]   Goals: Hemodynamic stability, Possible cardiac cath, monitor thrombocytopenia, SLP eval, No seizures, CT to r/o CVA vs deconditioning. Readmission: NO  Quality Measure: Not applicable  VTE Prophylaxis: Chemical  Influenza Vaccine screening completed? Not flu season  Pneumococcal Vaccine screening completed? YES  Mobility needs: Yes   Nutrition plan:Yes  Consults:Speech    Financial concerns:No  Escalated to CM? yes  RRAT Score: 17   Interventions:Home Health  Testing due for pt today?  YES  LOS: 15 days Expected length of stay 15 days  Discharge plan: SNF   PCP: Efrem Frost MD  Transportation needs: Yes    Days before discharge:two or more days before discharge   Discharge disposition: SNF    Signed:     Evaristo Melo  8/11/2017  11:20 AM

## 2017-08-11 NOTE — PROGRESS NOTES
PT treatment attempt X 1: patient currently off unit to hemodialysis. Will re-attempt as patient's schedule permits. Thank you, Mary COLVINT.

## 2017-08-11 NOTE — PROGRESS NOTES
Problem: Mobility Impaired (Adult and Pediatric)  Goal: *Acute Goals and Plan of Care (Insert Text)  STGs to be addressed within 3 days:  1. Bed mobility: Rolling L to R to L min/CGA with use of HR for positioning. 2. Activity Tolerance: Tolerate EOB sitting 5-10 minutes for change of position. 3. Transfer: Supine to Sit min/CGA with HR for meals. LTGs to be addressed within 7 days:  1. Transfer: Sit to stand max/mod A with RW/SW in prep for transfers. 2. Ambulation: Ambulate 5-25 ft. max/mod A with RW/SW for home mobility. Outcome: Progressing Towards Goal  PHYSICAL THERAPY TREATMENT     Patient: Wilmar Ordaz [de-identified]55 y.o. male)  Date: 8/11/2017  Diagnosis: Cardiac arrest ([address]  Acidosis  Respiratory failure ([address]  Anemia  ESRD needing dialysis ([address]  Cardiac arrest ([address]  Acute GI bleeding  Sepsis ([address]  Hypotension  Anoxic brain damage (HCC)  ESRD (end stage renal disease) ([address]  Colitis Sepsis ([address]  Precautions: Fall, Skin, Aspiration (FMS)   Chart, physical therapy assessment, plan of care and goals were reviewed. ASSESSMENT:  Patient presents today sitting up in bed with HOB elevated, drowsy following hemodialysis and finishing lunch, but agreeable to PT services. Patient trained in supine therex (see below) with AAROM on R> L LE. Patient demonstrates good motivation to participate in therapy and progress towards personal functional goals, receptive to PT strategies and training. Patient left supine in bed with HOB elevated, lunch tray and call bell in reach. Progression toward goals:  [ ]      Improving appropriately and progressing toward goals  [X]      Improving slowly and progressing toward goals  [ ]      Not making progress toward goals and plan of care will be adjusted       PLAN:  Patient continues to benefit from skilled intervention to address the above impairments. Continue treatment per established plan of care.   Discharge Recommendations:  [address] Recommendations for Discharge: To be determined       SUBJECTIVE:   Patient stated I'm going to walk again.       OBJECTIVE DATA SUMMARY:   Critical Behavior:  Neurologic State: Alert, Confused  Orientation Level: Oriented to person, Oriented to place  Cognition: Follows commands  Safety/Judgement: Fall prevention  Functional Mobility Training:  Bed Mobility:  Scooting: Moderate assistance  Therapeutic Exercises:   Supine ankle pumps, heel slides, hip ABD/ADD X10 reps each  Supine quad sets, glute sets with verbal, tactile, and visual cues for technique X10 reps with 5\" holds  Pain:  Pain Scale 1: Numeric (0 - 10)  Pain Intensity 1: 5  Activity Tolerance:   Fair  Please refer to the flowsheet for vital signs taken during this treatment.   After treatment:   [ ] Patient left in no apparent distress sitting up in chair  [X] Patient left in no apparent distress in bed  [X] Call bell left within reach  [ ] Nursing notified  [ ] Caregiver present  [ ] Bed alarm activated      Aliyah Costa   Time Calculation: 14 mins

## 2017-08-11 NOTE — DIABETES MGMT
GLYCEMIC CONTROL PLAN OF CARE    POC BG range on 08/10/2017: 179-266 mg/dL. POC BG report on 08/11/2017 at time of review: 221, 126 mg/dL. Patient is currently on very resistant dose of correctional lispro insulin and received a total of 21 units on 08/10/2017. He is on diabetic diet with no concentrated sweets. Patient stated that he is eating meals without any problem. Current Meal Intake:  Patient Vitals for the past 100 hrs:   % Diet Eaten   08/10/17 1849 75 %   08/10/17 1300 40 %   08/10/17 1022 60 %   08/07/17 1827 50 %   08/07/17 1505 25 %     Recommendation(s):  1.) Consider adding low dose lantus insulin 5 units daily. Assessment:  Patient is 55year old with past medical history including type 2 diabetes,  mellitus, htypertension, irritable bowel syndrome, tubercolosis, chronic back pain, ESRD on hemodialysis, tobacco use, and arthritis - was admitted on 07/27/2017 via EMS found unresponsive. Noted:  Cardiac arrest.  Acute respiratory failure. Status post extubation 08/04/2017. Septic shock. NSTEMI. Status post cardiac catheterization 08/09/2017. Encephalopathy, improving cognitive function. ESRD on dialysis. Type 2 diabetes mellitus with A1C of 5.4% (07/29/2017). Most recent blood glucose values    Results for Pa Hughes (MRN [id number]) as of 8/11/2017 13:14   Ref. Range 8/10/2017 07:47 8/10/2017 12:54 8/10/2017 15:51 8/10/2017 22:00   GLUCOSE,FAST - POC Latest Ref Range: 70 - 110 mg/dL 179 (H) 187 (H) 208 (H) 266 (H)     Results for Pa Hughes (MRN [id number]) as of 8/11/2017 13:14   Ref. Range 8/11/2017 08:17 8/11/2017 12:17   GLUCOSE,FAST - POC Latest Ref Range: 70 - 110 mg/dL 221 (H) 126 (H)     Current A1C of 5.4% is equivalent to average blood glucose of 108 mg/dl over the past 2-3 months. Current hospital diabetes medications:   Correctional Lispro insulin ACHS. Very resistant dose.     Previous day's insulin requirements:  08/10/2017  Lispro: 21 units    Home diabetes medications: None. Diet:  Diabetic consistent carb; mechanical soft solids; 1 nectar; nutr suppl: ensure pudding breakfast and lunch, nepro lunch and dinner. Goal: Patient's blood glucose will be within target range of  mg/dL by 08/14/2017.     Education:  ____Refer to Diabetes Education Record             __x__Education not indicated at this time      Tay Barry RN

## 2017-08-11 NOTE — PROGRESS NOTES
[facility] Hospitalist Group  Progress Note    Patient: Debby Barksdale Age: 55 y.o. : 1960 MR#: [id number] SSN: [id number]  Date/Time: 2017     Subjective:     Pt feels better. AAOx3. Still Having diarrhea, slight better, no abd pain  Couldn't finish HD due to graft problem      Assessment/Plan:   1. Acute met encephalopathy: better. 2. S/p PEA arrest. ? Cardiac cause with elevated trop PTA. Echo improving EF, s/p cath, no CAD. 3. Acute resp failure s/p extubation, off O2  4. ESRD on HD. M/W/F   5. Sepsis - leucocytosis  () bottles grew E.coli and C.perferinges. Initially thought to be secondary to cholecystitis but w/u thus far including HIDA scan negative. Per ID possibly due to abdominal source. Repeat cultures negative. Per ID recs on pip/tazo to be switched to po cipro and metro until  when able to take PO meds. 6. Hypokalemia  - replace lytes -and follow. 7. Elevated LFT - ? Shock liver. LFT now wnl.   8. thrombocytopenia: secondary to sepsis. better   9. Dysphagia: better. S/p SLP eval  10. ? Sz: on keppra per neurology  11. R side weakness: ? CVA vs decondition: CT neg, no CVA, PT eval.   12. =>Wounds: wound care    unstageable pressure ulcer to sacrum/coccyx not present on admit    moisture associated skin damage to anus not present  on admit    moisture associated skin damage to buttocks not present on admit  13. Diarrhea due to Abx, cont probiotics and imodium as needed   Full code   Daughter Lin Monge [phone] in detail, explained cath results.    Eder Kurtz. [phone]    Discharge plan, need SNF once diarrhea better   Cont PT eval    Case discussed with:  [x]Patient  []Family  []Nursing  [x]Case Management  DVT Prophylaxis:  []Lovenox  []Hep SQ  [x]SCDs  []Coumadin   []On Heparin gtt    Patient Active Problem List   Diagnosis Code    Anemia D64.9    Acidosis E87.2    Cardiac arrest ([facility] I46.9    Respiratory failure ([facility] J96.90    ESRD needing dialysis ([facility] N18.6, Z99.2    Anoxic brain damage (HCC) G93.1    Colitis K52.9    Hypotension I95.9    Acute GI bleeding K92.2    ESRD (end stage renal disease) ([facility] N18.6    Sepsis (HCC) A41.9       Objective:   VS:   Visit Vitals    /62 (BP 1 Location: Right arm, BP Patient Position: At rest)    Pulse 67    Temp 98.5 °F (36.9 °C)    Resp 16    Ht 6' 3\" (1.905 m)  Comment: per chart history    Wt 65.3 kg (144 lb)    SpO2 99%    BMI 18 kg/m2      Tmax/24hrs: Temp (24hrs), Av.4 °F (36.3 °C), Min:96.5 °F (35.8 °C), Max:98.6 °F (37 °C)  IOBRIEF    Intake/Output Summary (Last 24 hours) at 17 1545  Last data filed at 17 1130   Gross per 24 hour   Intake              300 ml   Output              405 ml   Net             -105 ml       General:  Alert awake in nad  Pulmonary:  CTA Bilaterally. Cardiovascular: Regular rate and Rhythm. GI:  Soft, Non distended, Non tender. + Bowel sounds. Extremities:  No edema, cyanosis, clubbing. Neuro: AAOx3.  Moves all ext            Medications:   Current Facility-Administered Medications   Medication Dose Route Frequency    lactobacillus sp. 50 billion cpu (BIO-K PLUS) capsule 1 Cap  1 Cap Oral DAILY    loperamide (IMODIUM) capsule 2 mg  2 mg Oral Q6H PRN    HYDROcodone-acetaminophen (NORCO) 5-325 mg per tablet 1-2 Tab  1-2 Tab Oral Q6H PRN    heparin (porcine) injection 5,000 Units  5,000 Units SubCUTAneous Q8H    heparin (porcine) 1,000 unit/mL injection 2,000 Units  2,000 Units IntraVENous DIALYSIS ONCE    vits A and D-white pet-lanolin (A&D) ointment   Topical TID    levETIRAcetam (KEPPRA) tablet 500 mg  500 mg Oral BID    famotidine (PEPCID) tablet 20 mg  20 mg Oral DAILY    simvastatin (ZOCOR) tablet 20 mg  20 mg Oral QHS    doxercalciferol (HECTOROL) 4 mcg/2 mL injection 3 mcg  3 mcg IntraVENous DIALYSIS MON, WED & FRI    epoetin benjamin (EPOGEN;PROCRIT) injection 10,000 Units  10,000 Units IntraVENous DIALYSIS MON, WED & FRI    insulin lispro (HUMALOG) injection   SubCUTAneous AC&HS    aspirin chewable tablet 81 mg  81 mg Oral DAILY    hydrocortisone (CORTEF) tablet 10 mg  10 mg Oral BID WITH MEALS    glucose chewable tablet 16 g  4 Tab Oral PRN    glucagon (GLUCAGEN) injection 1 mg  1 mg IntraMUSCular PRN    dextrose (D50W) injection syrg 12.5-25 g  25-50 mL IntraVENous PRN    0.9% sodium chloride infusion  100 mL/hr IntraVENous DIALYSIS PRN    naloxone (NARCAN) injection 0.4 mg  0.4 mg IntraVENous PRN       Labs:    Recent Results (from the past 24 hour(s))   GLUCOSE, POC    Collection Time: 08/10/17  3:51 PM   Result Value Ref Range    Glucose (POC) 208 (H) 70 - 110 mg/dL   GLUCOSE, POC    Collection Time: 08/10/17 10:00 PM   Result Value Ref Range    Glucose (POC) 266 (H) 70 - 110 mg/dL   CALCIUM, IONIZED    Collection Time: 08/11/17  3:52 AM   Result Value Ref Range    Ionized Calcium 1.10 (L) 1.12 - 1.32 MMOL/L   CBC WITH AUTOMATED DIFF    Collection Time: 08/11/17  3:52 AM   Result Value Ref Range    WBC 13.5 (H) 4.6 - 13.2 K/uL    RBC 3.27 (L) 4.70 - 5.50 M/uL    HGB 10.1 (L) 13.0 - 16.0 g/dL    HCT 30.4 (L) 36.0 - 48.0 %    MCV 93.0 74.0 - 97.0 FL    MCH 30.9 24.0 - 34.0 PG    MCHC 33.2 31.0 - 37.0 g/dL    RDW 18.2 (H) 11.6 - 14.5 %    PLATELET 620 463 - 403 K/uL    MPV 10.4 9.2 - 11.8 FL    NEUTROPHILS 75 (H) 40 - 73 %    LYMPHOCYTES 14 (L) 21 - 52 %    MONOCYTES 9 3 - 10 %    EOSINOPHILS 2 0 - 5 %    BASOPHILS 0 0 - 2 %    ABS. NEUTROPHILS 10.2 (H) 1.8 - 8.0 K/UL    ABS. LYMPHOCYTES 1.8 0.9 - 3.6 K/UL    ABS. MONOCYTES 1.2 0.05 - 1.2 K/UL    ABS. EOSINOPHILS 0.3 0.0 - 0.4 K/UL    ABS.  BASOPHILS 0.1 0.0 - 0.1 K/UL    DF AUTOMATED     GLUCOSE, POC    Collection Time: 08/11/17  8:17 AM   Result Value Ref Range    Glucose (POC) 221 (H) 70 - 110 mg/dL   GLUCOSE, POC    Collection Time: 08/11/17 12:17 PM   Result Value Ref Range    Glucose (POC) 126 (H) 70 - 110 mg/dL       Signed By: Giles Clark MD     August 11, 2017

## 2017-08-11 NOTE — PROGRESS NOTES
Hemodialysis Rounding Note      Patient: Javier Mackenzie               Sex: male          DOA: 7/27/2017  4:23 PM        YOB: 1960      Age:  55 y.o.        LOS:  LOS: 15 days     Subjective:     Javier Mackenzie is a 55 y.o.  who presents with Cardiac arrest ([facility]  Acidosis  Respiratory failure ([facility]  Anemia  ESRD needing dialysis ([facility]  Cardiac arrest ([facility]  Acute GI bleeding  Sepsis ([facility]  Hypotension  Anoxic brain damage (HCC)  ESRD (end stage renal disease) ([facility]  Colitis.   The patient is dialyzing utilizing the following method:Intermittent Hemodialysis        Complaints: awake, has been on about 1.5 H,  infiltration noted in the venous needle from pt moving his arm  + diarrhea  BP low from the start    Current Facility-Administered Medications   Medication Dose Route Frequency    metoprolol tartrate (LOPRESSOR) tablet 25 mg  25 mg Oral Q12H    lactobacillus sp. 50 billion cpu (BIO-K PLUS) capsule 1 Cap  1 Cap Oral DAILY    loperamide (IMODIUM) capsule 2 mg  2 mg Oral Q6H PRN    HYDROcodone-acetaminophen (NORCO) 5-325 mg per tablet 1-2 Tab  1-2 Tab Oral Q6H PRN    heparin (porcine) injection 5,000 Units  5,000 Units SubCUTAneous Q8H    heparin (porcine) 1,000 unit/mL injection 2,000 Units  2,000 Units IntraVENous DIALYSIS ONCE    vits A and D-white pet-lanolin (A&D) ointment   Topical TID    levETIRAcetam (KEPPRA) tablet 500 mg  500 mg Oral BID    famotidine (PEPCID) tablet 20 mg  20 mg Oral DAILY    simvastatin (ZOCOR) tablet 20 mg  20 mg Oral QHS    doxercalciferol (HECTOROL) 4 mcg/2 mL injection 3 mcg  3 mcg IntraVENous DIALYSIS MON, WED & FRI    epoetin benjamin (EPOGEN;PROCRIT) injection 10,000 Units  10,000 Units IntraVENous DIALYSIS MON, WED & FRI    insulin lispro (HUMALOG) injection   SubCUTAneous AC&HS    aspirin chewable tablet 81 mg  81 mg Oral DAILY    hydrocortisone (CORTEF) tablet 10 mg  10 mg Oral BID WITH MEALS    glucose chewable tablet 16 g  4 Tab Oral PRN    glucagon (GLUCAGEN) injection 1 mg  1 mg IntraMUSCular PRN    dextrose (D50W) injection syrg 12.5-25 g  25-50 mL IntraVENous PRN    0.9% sodium chloride infusion  100 mL/hr IntraVENous DIALYSIS PRN    naloxone (NARCAN) injection 0.4 mg  0.4 mg IntraVENous PRN           1901 -  0700  In: 690 [P.O.:640; I.V.:50]  Out: 900 [Drains:900]      Objective:     Blood pressure (!) 89/58, pulse 68, temperature 96.8 °F (36 °C), temperature source Oral, resp. rate 16, height 6' 3\" (1.905 m), weight 65.3 kg (144 lb), SpO2 100 %. Temp (24hrs), Av.6 °F (36.4 °C), Min:96.8 °F (36 °C), Max:98.6 °F (37 °C)      Blood Pressure: BP: (!) 89/58  Pulse: Pulse (Heart Rate): 68  Temp:  Temp: 96.8 °F (36 °C)    Artificial Kidney Dialyzer/Set Up Inspection: Revaclear   hours Duration of Treatment (hours): 3 hours   Heparin Bolus Heparin Bolus (units): 2000 units (verbal order per Dr. Ten Camacho by ICU MD Rosenberg)  Blood flow rate Blood Flow Rate (ml/min): 400 ml/min   Dialysate rate     Arterial Access Pressure Arterial Access Pressure (mmHg): -210  Venous Return Pressure Venous Return Pressure (mmHg): 190  Ultrafiltration Rate Goal/Amount of Fluid to Remove (mL): 1000 mL  Fluid Removal Fluid Removed (mL): 319  Net Fluid Removal NET Fluid Removed (mL): 500 ml      PHYSICAL EXAM:  Awake not in any resp distress  HEENT: non icteric  NECK:  No JVD  CHEST AND LUNGS:  Equal vent bs  CVS:  Regular no rub  ABDOMEN:  Soft , hypoactive bs  EXT:  + 1 LE edema, Left arm avf + bruit. dime size infiltration noted    DATA REVIEW:    Labs: Results:       Chemistry No results for input(s): GLU, NA, K, CL, CO2, BUN, CREA, CA, AGAP, BUCR, TBIL, GPT, AP, TP, ALB, GLOB, AGRAT in the last 72 hours.    CBC w/Diff Recent Labs      17   0352  08/10/17   0359  17   0450   WBC  13.5*  12.8  13.4*   RBC  3.27*  3.34*  3.32*   HGB  10.1*  10.2*  10.2*   HCT  30.4*  31.1*  31.1*   PLT  411  367  301   GRANS  75*  79*  81*   LYMPH 14*  10*  11*   EOS  2  2  1      Coagulation No results for input(s): PTP, INR, APTT in the last 72 hours. No lab exists for component: INREXT, INREXT    Iron/Ferritin No results for input(s): IRON in the last 72 hours. No lab exists for component: TIBCCALC   BNP No results for input(s): BNPP in the last 72 hours. Cardiac Enzymes No results for input(s): CPK, CKND1, WILFRIDO in the last 72 hours. No lab exists for component: CKRMB, TROIP   Liver Enzymes No results for input(s): TP, ALB, TBIL, AP, SGOT, GPT in the last 72 hours. No lab exists for component: DBIL   Thyroid Studies Lab Results   Component Value Date/Time    TSH 7.52 08/03/2017 01:01 PM              CULTURE:   )  No results for input(s): SDES, CULT in the last 72 hours. No results for input(s): CULT in the last 72 hours. Assessment/Plan:     End Stage Renal Disease: completed  Half of HD , d/c due to low BP and infiltration. Will send back to floor. Check labs in am and if needed can resched HD tomorrow. At 11:23 AM on 8/11/2017, I saw and examined patient during hemodialysis treatment. The patient was receiving hemodialysis for treatment of end stage renal disease. I have also reviewed vital signs, intake and output, lab results and recent events, and agreed with today's dialysis order. Anemia:  Cont Epo with HD    Renal Metabolic Bone Disease:  Cont hectorol                      Hypotension: BP low during HD, No UF today    Access: Fistula adequate monitoring/no changes. Small infiltration noted, rest arm for the weekend.        Donna Washington MD  8/11/2017

## 2017-08-11 NOTE — ROUTINE PROCESS
Bedside and Verbal shift change report given to Iris Hinton RN (oncoming nurse) by Selma Florez RN (offgoing nurse). Report included the following information SBAR, Kardex, MAR and Recent Results. SITUATION:  Code Status: Full Code  Reason for Admission: Cardiac arrest ([facility]  Acidosis  Respiratory failure ([facility]  Anemia  ESRD needing dialysis ([facility]  Cardiac arrest ([facility]  Acute GI bleeding  Sepsis ([facility]  Hypotension  Anoxic brain damage ([facility]  ESRD (end stage renal disease) ([facility]  Colitis  Hospital day: 15  Problem List:       Hospital Problems  Date Reviewed: 7/27/2017          Codes Class Noted POA    Acidosis ICD-10-CM: E87.2  ICD-9-CM: 276.2  7/27/2017 Unknown        Cardiac arrest ([facility] ICD-10-CM: I46.9  ICD-9-CM: 427.5  7/27/2017 Unknown        Respiratory failure ([facility] ICD-10-CM: J96.90  ICD-9-CM: 518.81  7/27/2017 Unknown        ESRD needing dialysis ([facility] ICD-10-CM: N18.6, Z99.2  ICD-9-CM: 585.6  7/27/2017 Unknown        Anoxic brain damage ([facility] ICD-10-CM: G93.1  ICD-9-CM: 348.1  7/27/2017 Unknown        Colitis ICD-10-CM: K52.9  ICD-9-CM: 558.9  7/27/2017 Unknown        Hypotension ICD-10-CM: I95.9  ICD-9-CM: 458.9  7/27/2017 Unknown        Acute GI bleeding ICD-10-CM: K92.2  ICD-9-CM: 578.9  7/27/2017 Unknown        ESRD (end stage renal disease) ([facility] ICD-10-CM: N18.6  ICD-9-CM: 585.6  7/27/2017 Unknown        * (Principal)Sepsis ([facility] ICD-10-CM: A41.9  ICD-9-CM: 038.9, 995.91  7/27/2017 Unknown        Anemia ICD-10-CM: D64.9  ICD-9-CM: 147. 9  Unknown Unknown              BACKGROUND:   Past Medical History:   Past Medical History:   Diagnosis Date    Anemia     Arthritis     Chronic pain     Back     Diabetes mellitus type II     Hypertension     IBS (irritable bowel syndrome)     Lactose intolerance     TB (tuberculosis)     TB test positive      Patient taking anticoagulants yes    Patient has a defibrillator: no    History of shots YES for example, flu, pneumonia, tetanus   Isolation History NO for example, MRSA, CDiff    ASSESSMENT:  Changes in Assessment Throughout Shift: none  Significant Changes in 24 hours (for example, RR/code, fall)  Patient has Central Line: no Reasons if yes:   Patient has Stanton Cath: no Reasons if yes:     Mobility Issues  PT  IV Patency  OR Checklist  Pending Tests    Last Vitals:  Vitals w/ MEWS Score (last day)     Date/Time MEWS Score Pulse Resp Temp BP Level of Consciousness SpO2    08/11/17 1221 -- 67 -- 98.5 °F (36.9 °C) 131/62 -- 99 %    08/11/17 1133 -- 68 16 96.5 °F (35.8 °C) 96/50 -- --    08/11/17 1130 -- 68 16 96.5 °F (35.8 °C) (!)  89/48 -- --    08/11/17 1100 -- 68 16 -- (!)  89/58 -- --    08/11/17 1030 -- 67 16 -- (!)  80/49 -- --    08/11/17 1000 -- 70 16 96.8 °F (36 °C) 105/60 -- --    08/11/17 0953 -- 68 16 96.8 °F (36 °C) 116/63 -- --    08/11/17 0732 -- 66 16 96.9 °F (36.1 °C) 127/51 -- 100 %    08/11/17 0417 -- (!)  184 20 98.6 °F (37 °C) 138/71 -- 92 %    08/11/17 0044 -- 69 20 98.4 °F (36.9 °C) 121/66 -- 100 %    08/10/17 2213 -- -- 20 97.6 °F (36.4 °C) 127/68 -- 93 %    08/10/17 1545 1 76 18 98 °F (36.7 °C) 117/64 Alert 100 %    08/10/17 1249 1 79 18 97.9 °F (36.6 °C) 120/67 Alert 100 %    08/10/17 0739 1 86 18 98.3 °F (36.8 °C) 154/82 Alert 100 %    08/10/17 0400 1 80 18 98.8 °F (37.1 °C) 164/76 Alert 100 %    08/10/17 0013 1 86 19 98 °F (36.7 °C) 139/73 Alert 100 %            PAIN    Pain Assessment    Pain Intensity 1: 5 (08/11/17 1600)    Pain Location 1: Eye    Pain Intervention(s) 1: Medication (see MAR)    Patient Stated Pain Goal: 0  Intervention effective: yes  Time of last intervention: Pt stated no pain Reassessment Completed: yes   Other actions taken for pain: distraction    Last 3 Weights:  Last 3 Recorded Weights in this Encounter    08/08/17 0800 08/09/17 1917 08/10/17 2141   Weight: 69 kg (152 lb 3.2 oz) 69.9 kg (154 lb 1.6 oz) 65.3 kg (144 lb)   Weight change: -4.581 kg (-10 lb 1.6 oz)    INTAKE/OUPUT    Current Shift: Last three shifts: 08/10 0701 - 08/11 1900  In: 690 [P.O.:640; I.V.:50]  Out: 1305 [Drains:900]    RECOMMENDATIONS AND DISCHARGE PLANNING  Patient needs and requests: comfort    Pending tests/procedures: yes , Cardiac cath. Discharge plan for patient: tbd    Discharge planning Needs or Barriers: none    Estimated Discharge Date: TBD Posted on Whiteboard in Patients Room: yes       \"HEALS\" SAFETY CHECK  A safety check occurred in the patient's room between off going nurse and oncoming nurse listed above. The safety check included the below items:    H  High Alert Medications Verify all high alert medication drips (heparin, PCA, etc.)  E  Equipment Suction is set up for ALL patients (with cary)  Red plugs utilized for all equipment (IV pumps, etc.)  WOWs wiped down at end of shift. Room stocked with oxygen, suction, and other unit-specific supplies  A  Alarms Bed alarm is set for fall risk patients  Ensure chair alarm is in place and activated if patient is up in a chair  L  Lines Check IV for any infiltration  Stanton bag is empty if patient has a Stanton   Tubing and IV bags are labeled  S  Safety  Room is clean, patient is clean, and equipment is clean. Hallways are clear from equipment besides carts. Fall bracelet on for fall risk patients  Ensure room is clear and free of clutter  Suction is set up for ALL patients (with cary)  Hallways are clear from equipment besides carts.    Isolation precautions followed, supplies available outside room, sign posted    Osmin Vitale RN

## 2017-08-11 NOTE — DIALYSIS
ACUTE HEMODIALYSIS FLOW SHEET    HEMODIALYSIS ORDERS: Physician: Dr. Elisha Astudillo    Pt has initiated, monitored and terminated by PCT Joaquin Hirsch.       Dialyzer: Revaclear         Duration: 3 hr  BFR: 300   DFR: 600   Dialysate:  K+   3.0    Ca+  3.0   Weight:    kg    Bed Scale []     Unable to Obtain []      UF Goal:  1000       Heparin [x]  Bolus   2000   Units    [] Hourly       Units    []None   Pre BP:   116/63    Pulse: 68      Temperature: 96.5  Respirations: 18    Tx: NS           ml/Bolus  Other        [x] N/A   Labs: Pre        Post:        [x] N/A   Additional Orders:           [x] Time Out/Safety Check  [x] [facility] Consent Verified     CATHETER ACCESS: [x]N/A   []Right   []Left   []IJ     []Fem   [] First use X-ray verified     []Tunnel                [] Non Tunneled   []No S/S infection  []Redness  []Drainage []Cultured []Swelling []Pain   []Medical Aseptic Prep Utilized   []Dressing Changed  [] Biopatch  Date:       []Clotted   []Patent   Flows: []Good  []Poor  []Reversed   If access problem,  notified: []Yes    []N/A  Date:           GRAFT/FISTULA ACCESS:  []N/A     []Right     [x]Left     [x]UE     []LE   [x]AVG   []AVF        []Buttonhole    [x]Medical Aseptic Prep Utilized   []No S/S infection  []Redness  []Drainage []Cultured []Swelling []Pain    Bruit:   [x] Strong    [] Weak       Thrill :   [x] Strong    [] Weak       Needle Gauge:15    Length: 1     If access problem,  notified: []Yes     [x]N/A  Date:        Please describe access if present and not used:       GENERAL ASSESSMENT:    LUNGS: Sat%    98       [x] Clear  [] Coarse  [] Crackles  [] Wheezing        [] Diminished     Location : [x]RLL   [x]LLL    [x]RUL  [x]   Cough: [x]Productive  []Dry  []N/A   Respirations:  [x]Easy  []Labored   Therapy:  [x]RA  []NC         l/min    Mask: []NRB []Venti       O2%                  []Ventilator  []Intubated  []Trach   CARDIAC: [x]Regular      [] Irregular   [] Pericardial Rub [] JVD        [x]  Monitored  [] N/A  Rhythm:         EDEMA: [x] None  []Generalized  [] Pitting [] 1    [] 2    [] 3    [] 4                 [] Facial  [] Pedal  []  UE  [] LE   SKIN:   [x] Warm  [] Hot     [] Cold   [x] Dry     [] Pale   [] Diaphoretic                  [] Flushed  [] Jaundiced  [] Cyanotic  [] Rash  [] Weeping   LOC:    [x] Alert      [x]Oriented:    [x] Person     [] Place  []Time               [x] Confused  [] Lethargic  [] Medicated  [] Non-responsive     GI / ABDOMEN:  [] Flat    [] Distended    [x] Soft    [] Firm   []  Obese                             [x] Diarrhea  [x] Bowel Sounds  [] Nausea  [] Vomiting    FMS - draining        / URINE ASSESSMENT:[] Voiding   [] Oliguria  [x] Anuria   []  Stanton     [] Incontinent    []  Incontinent Brief      []  Bathroom Privileges     PAIN: [x] 0 []1  []2   []3   []4   []5   []6   []7   []8   []9   []10            Scale 0-10  Action/Follow Up:         MOBILITY:  [] Amb    [] Amb/Assist    [x] Bed    [] Wheelchair  [] Stretcher      All Vitals and Treatment Details on Attached [facility] SYSTEM [place]: [facility]        Room # 468     [] 1st Time Acute  [] Stat  [x] Routine  [] Urgent     [x] Acute Room  []  Bedside  [] ICU/CCU  [] ER   Isolation Precautions:  [x] Dialysis   [] Airborne   [] Contact    [] Reverse   Special Considerations:         [] Blood Consent Verified [x]N/A     ALLERGIES:   [x] NKA          Code Status:  [x] Full Code  [] DNR  [] Other           HBsAg ONLY: Date Drawn 7/28/17               [x]Negative []Positive []Unknown   HBsAb: Date 7/28/17           [] Susceptible   [x] [id number] []Not Drawn      Current Labs:    Date of Labs:                 Today [x]                                                                                                                              DIET:  [x] Renal    [] Other     [] NPO     []  Diabetic      PRIMARY NURSE REPORT: First initial/Last name/Title      Pre Dialysis: Gilda Leslie RN     Time: 0845       EDUCATION:    [x] Patient [] Other         Knowledge Basis: []None [x]Minimal []Substantial   [] Access Care     [] S&S of infection     [] Fluid Management     []K+     [x]Procedural    []Albumin     [] Medications     [] Tx Options     [] Transplant     [] Diet     [] Other   Teaching Tools:  [x] Explain  [] Demo  [] Handouts [] Video   Inappropriate due to            [address] Before each treatment:     Machine Number:                   [facility]                                  [x] Unit Machine #  6 with centralized RO                                  [] Portable Machine #1/RO serial # [id number]                                  [] Portable Machine #2/RO serial # [id number]                                  [] Portable Machine #3/RO serial # [id number]                                                                                                       [facility]                                  [] Portable Machine #11/RO serial # [id number]                                   [] Portable Machine #12/RO serial # [id number]                                  [] Portable Machine #13/RO serial #  [id number]      Alarm Test: [x]Pass           Other:         [x] RO/Machine Log Complete   Temp 36.8            [x]Extracorporeal Circuit Tested for integrity   Dialysate: pH  7.4 Conductivity: Meter   14.2     HD Machine   14.2                  TCD: 13.9  Dialyzer Lot # [id number]                  Blood Tubing Lot # [id number]         Saline Lot #  [id number]     CHLORINE TESTING-Before each treatment and every 4 hours    Total Chlorine: [x] less than 0.1 ppm  Time: 1555 4 Hr Check Time:    (if greater than 0.1 ppm from Primary then every 30 minutes from Secondary)     TREATMENT INITIATION  with Dialysis Precautions:   [x] All Connections Secured                 [x] Saline Line Double Clamped   [x] Venous Parameters Set                  [x] Arterial Parameters Set    [x] Prime Given  ml  250             [x]Air Foam Detector Engaged      Treatment Initiation Note: Pt started HD tx via left AVG. Medication Dose Volume Route Initials Dialyzer Cleared: [] Good [x] Fair  [] Poor    Blood processed: 35   L  UF Removed 400    Ml    Post Wt:     kg  POst BP: 96/56     Pulse: 65      Respirations: 16  Temperature: 96.5          Post Tx Vascular Access: AVF/AVG: Bleeding stopped Art 5     min. Oscar. 5     Min   N/A          Catheter: Locking solution: Heparin 1:1000 Art. Oscar. N/A                                 Post Assessment:                                    Skin:  [x] Warm  [x] Dry [] Diaphoretic     [] Flushed  [] Pale [] Cyanotic   [facility] Signatures Title Initials  Time Lungs: [] Clear    [x] Course  [] Crackles  [] Wheezing   Ashlyn Nearing RN NP  Cardiac: [] Regular   [x] Irregular   [] Monitor  [] N/A  Rhythm:           Edema:  [x] None    [] General     [] Facial   [] Pedal    [] UE    [] LE       Pain: [x]0  []1  []2   []3  []4   []5   []6   []7   []8   []9   []10         Post Treatment Note: Pt has infiltrate thru venous access. Had 1.5 hr HD tx. Net fluid remove 400 ml.       POST TREATMENT PRIMARY NURSE HANDOFF REPORT:     First initial/Last name/Title         Post Dialysis:  Aliza Damon RN     Time: 1150          Abbreviations: AVG-arterial venous graft, AVF-arterial venous fistula, IJ-Internal Jugular, Subcl-Subclavian, Fem-Femoral, Tx-treatment, AP/HR-apical heart rate, DFR-dialysate flow rate, BFR-blood flow rate, AP-arterial pressure, -venous pressure, UF-ultrafiltrate, TMP-transmembrane pressure, Oscar-Venous, Art-Arterial, RO-Reverse Osmosis

## 2017-08-12 LAB
ANION GAP BLD CALC-SCNC: 8 MMOL/L (ref 3–18)
BASOPHILS # BLD AUTO: 0.1 K/UL (ref 0–0.1)
BASOPHILS # BLD: 1 % (ref 0–2)
BUN SERPL-MCNC: 22 MG/DL (ref 7–18)
BUN/CREAT SERPL: 4 (ref 12–20)
CA-I SERPL-SCNC: 1.07 MMOL/L (ref 1.12–1.32)
CALCIUM SERPL-MCNC: 7.6 MG/DL (ref 8.5–10.1)
CHLORIDE SERPL-SCNC: 106 MMOL/L (ref 100–108)
CO2 SERPL-SCNC: 24 MMOL/L (ref 21–32)
CREAT SERPL-MCNC: 5.71 MG/DL (ref 0.6–1.3)
DIFFERENTIAL METHOD BLD: ABNORMAL
EOSINOPHIL # BLD: 0.2 K/UL (ref 0–0.4)
EOSINOPHIL NFR BLD: 2 % (ref 0–5)
ERYTHROCYTE [DISTWIDTH] IN BLOOD BY AUTOMATED COUNT: 18.4 % (ref 11.6–14.5)
GLUCOSE BLD STRIP.AUTO-MCNC: 159 MG/DL (ref 70–110)
GLUCOSE BLD STRIP.AUTO-MCNC: 171 MG/DL (ref 70–110)
GLUCOSE BLD STRIP.AUTO-MCNC: 219 MG/DL (ref 70–110)
GLUCOSE BLD STRIP.AUTO-MCNC: 317 MG/DL (ref 70–110)
GLUCOSE SERPL-MCNC: 238 MG/DL (ref 74–99)
HCT VFR BLD AUTO: 29.6 % (ref 36–48)
HGB BLD-MCNC: 9.7 G/DL (ref 13–16)
LYMPHOCYTES # BLD AUTO: 13 % (ref 21–52)
LYMPHOCYTES # BLD: 1.8 K/UL (ref 0.9–3.6)
MCH RBC QN AUTO: 30.7 PG (ref 24–34)
MCHC RBC AUTO-ENTMCNC: 32.8 G/DL (ref 31–37)
MCV RBC AUTO: 93.7 FL (ref 74–97)
MONOCYTES # BLD: 1 K/UL (ref 0.05–1.2)
MONOCYTES NFR BLD AUTO: 7 % (ref 3–10)
NEUTS SEG # BLD: 11.1 K/UL (ref 1.8–8)
NEUTS SEG NFR BLD AUTO: 77 % (ref 40–73)
PHOSPHATE SERPL-MCNC: 4 MG/DL (ref 2.5–4.9)
PLATELET # BLD AUTO: 464 K/UL (ref 135–420)
PMV BLD AUTO: 10.7 FL (ref 9.2–11.8)
POTASSIUM SERPL-SCNC: 3.4 MMOL/L (ref 3.5–5.5)
RBC # BLD AUTO: 3.16 M/UL (ref 4.7–5.5)
SODIUM SERPL-SCNC: 138 MMOL/L (ref 136–145)
WBC # BLD AUTO: 14.1 K/UL (ref 4.6–13.2)

## 2017-08-12 PROCEDURE — 74011250637 HC RX REV CODE- 250/637: Performed by: INTERNAL MEDICINE

## 2017-08-12 PROCEDURE — 82330 ASSAY OF CALCIUM: CPT | Performed by: PHYSICIAN ASSISTANT

## 2017-08-12 PROCEDURE — 77030011256 HC DRSG MEPILEX <16IN NO BORD MOLN -A

## 2017-08-12 PROCEDURE — 82962 GLUCOSE BLOOD TEST: CPT

## 2017-08-12 PROCEDURE — 74011250636 HC RX REV CODE- 250/636: Performed by: HOSPITALIST

## 2017-08-12 PROCEDURE — 84100 ASSAY OF PHOSPHORUS: CPT | Performed by: INTERNAL MEDICINE

## 2017-08-12 PROCEDURE — 85025 COMPLETE CBC W/AUTO DIFF WBC: CPT | Performed by: PHYSICIAN ASSISTANT

## 2017-08-12 PROCEDURE — 74011636637 HC RX REV CODE- 636/637: Performed by: HOSPITALIST

## 2017-08-12 PROCEDURE — 80048 BASIC METABOLIC PNL TOTAL CA: CPT | Performed by: PHYSICIAN ASSISTANT

## 2017-08-12 PROCEDURE — 74011250637 HC RX REV CODE- 250/637: Performed by: HOSPITALIST

## 2017-08-12 PROCEDURE — 74011250637 HC RX REV CODE- 250/637: Performed by: PHYSICIAN ASSISTANT

## 2017-08-12 PROCEDURE — 36415 COLL VENOUS BLD VENIPUNCTURE: CPT | Performed by: PHYSICIAN ASSISTANT

## 2017-08-12 PROCEDURE — 65270000029 HC RM PRIVATE

## 2017-08-12 RX ORDER — POTASSIUM CHLORIDE 1.5 G/1.77G
20 POWDER, FOR SOLUTION ORAL
Status: COMPLETED | OUTPATIENT
Start: 2017-08-12 | End: 2017-08-12

## 2017-08-12 RX ORDER — METRONIDAZOLE 500 MG/1
500 TABLET ORAL 3 TIMES DAILY
Status: DISCONTINUED | OUTPATIENT
Start: 2017-08-12 | End: 2017-08-18

## 2017-08-12 RX ADMIN — HYDROCORTISONE 10 MG: 10 TABLET ORAL at 17:00

## 2017-08-12 RX ADMIN — Medication 1 CAPSULE: at 09:00

## 2017-08-12 RX ADMIN — HEPARIN SODIUM 5000 UNITS: 5000 INJECTION, SOLUTION INTRAVENOUS; SUBCUTANEOUS at 11:01

## 2017-08-12 RX ADMIN — HEPARIN SODIUM 5000 UNITS: 5000 INJECTION, SOLUTION INTRAVENOUS; SUBCUTANEOUS at 18:00

## 2017-08-12 RX ADMIN — LOPERAMIDE HYDROCHLORIDE 2 MG: 2 CAPSULE ORAL at 21:24

## 2017-08-12 RX ADMIN — METRONIDAZOLE 500 MG: 500 TABLET ORAL at 16:00

## 2017-08-12 RX ADMIN — LEVETIRACETAM 500 MG: 500 TABLET ORAL at 18:00

## 2017-08-12 RX ADMIN — SIMVASTATIN 20 MG: 10 TABLET, FILM COATED ORAL at 21:24

## 2017-08-12 RX ADMIN — ASPIRIN 81 MG 81 MG: 81 TABLET ORAL at 08:37

## 2017-08-12 RX ADMIN — VITAMIN A AND D: 30.8 OINTMENT TOPICAL at 21:24

## 2017-08-12 RX ADMIN — VITAMIN A AND D: 30.8 OINTMENT TOPICAL at 09:00

## 2017-08-12 RX ADMIN — FAMOTIDINE 20 MG: 20 TABLET ORAL at 08:37

## 2017-08-12 RX ADMIN — INSULIN LISPRO 12 UNITS: 100 INJECTION, SOLUTION INTRAVENOUS; SUBCUTANEOUS at 16:11

## 2017-08-12 RX ADMIN — METRONIDAZOLE 500 MG: 500 TABLET ORAL at 21:24

## 2017-08-12 RX ADMIN — INSULIN LISPRO 6 UNITS: 100 INJECTION, SOLUTION INTRAVENOUS; SUBCUTANEOUS at 08:38

## 2017-08-12 RX ADMIN — INSULIN LISPRO 3 UNITS: 100 INJECTION, SOLUTION INTRAVENOUS; SUBCUTANEOUS at 21:33

## 2017-08-12 RX ADMIN — LOPERAMIDE HYDROCHLORIDE 2 MG: 2 CAPSULE ORAL at 13:52

## 2017-08-12 RX ADMIN — HYDROCORTISONE 10 MG: 10 TABLET ORAL at 08:37

## 2017-08-12 RX ADMIN — HEPARIN SODIUM 5000 UNITS: 5000 INJECTION, SOLUTION INTRAVENOUS; SUBCUTANEOUS at 01:48

## 2017-08-12 RX ADMIN — INSULIN LISPRO 3 UNITS: 100 INJECTION, SOLUTION INTRAVENOUS; SUBCUTANEOUS at 13:07

## 2017-08-12 RX ADMIN — LEVETIRACETAM 500 MG: 500 TABLET ORAL at 08:37

## 2017-08-12 RX ADMIN — POTASSIUM CHLORIDE 20 MEQ: 1.5 POWDER, FOR SOLUTION ORAL at 14:51

## 2017-08-12 RX ADMIN — VITAMIN A AND D: 30.8 OINTMENT TOPICAL at 16:00

## 2017-08-12 NOTE — PROGRESS NOTES
Bedside and Verbal shift change report given to Ko Szymanski RN (oncoming nurse) by Jammie Hartley RN (offgoing nurse). Report included the following information SBAR, Kardex, MAR and Recent Results.     SITUATION:  Code Status: Full Code  Reason for Admission: Cardiac arrest ([facility]  Acidosis  Respiratory failure ([facility]  Anemia  ESRD needing dialysis ([facility]  Cardiac arrest ([facility]  Acute GI bleeding  Sepsis ([facility]  Hypotension  Anoxic brain damage ([facility]  ESRD (end stage renal disease) ([facility]  Colitis  Hospital day: 16  Problem List:   [facility] Problems  Date Reviewed: 7/27/2017             Codes Class Noted POA     Acidosis ICD-10-CM: E87.2  ICD-9-CM: 276.2   7/27/2017 Unknown           Cardiac arrest ([facility] ICD-10-CM: I46.9  ICD-9-CM: 390. 5   7/27/2017 Unknown           Respiratory failure ([facility] ICD-10-CM: J96.90  ICD-9-CM: 518.81   7/27/2017 Unknown           ESRD needing dialysis ([facility] ICD-10-CM: N18.6, Z99.2  ICD-9-CM: 457. 6   7/27/2017 Unknown           Anoxic brain damage (HCC) ICD-10-CM: G93.1  ICD-9-CM: 348. 1   7/27/2017 Unknown           Colitis ICD-10-CM: K52.9  ICD-9-CM: 247. 9   7/27/2017 Unknown           Hypotension ICD-10-CM: I95.9  ICD-9-CM: 647. 9   7/27/2017 Unknown           Acute GI bleeding ICD-10-CM: K92.2  ICD-9-CM: 632. 9   7/27/2017 Unknown           ESRD (end stage renal disease) ([facility] ICD-10-CM: N18.6  ICD-9-CM: 104. 6   7/27/2017 Unknown           * (Principal)Sepsis ([facility] ICD-10-CM: A41.9  ICD-9-CM: 038.9, 995.91   7/27/2017 Unknown           Anemia ICD-10-CM: D64.9  ICD-9-CM: 871. 9   Unknown Unknown                  BACKGROUND:                        Past Medical History:        Past Medical History:   Diagnosis Date    Anemia      Arthritis      Chronic pain       Back     Diabetes mellitus type II      Hypertension      IBS (irritable bowel syndrome)      Lactose intolerance      TB (tuberculosis)       TB test positive                            Patient taking anticoagulants yes Patient has a defibrillator: no                         History of shots NO for example, flu, pneumonia, tetanus                        Isolation History NO for example, MRSA, CDiff     ASSESSMENT:  Changes in Assessment Throughout Shift: NONE  Significant Changes in 24 hours (for example, RR/code, fall)  Patient has Central Line: yes (A-V fistula) Reasons if yes: Hemodialysis  Patient has Stanton Cath: no   Mobility Issues  PT  IV Patency  OR Checklist  Pending Tests     Last Vitals:  Vitals w/ MEWS Score (last day)     Date/Time MEWS Score Pulse Resp Temp BP Level of Consciousness SpO2     08/12/17 0842 1 78 19 98.6 °F (37 °C) 151/70 Alert 100 %     08/12/17 0746 1 78 19 98.6 °F (37 °C) 151/70 Alert 100 %     08/12/17 0456 1 85 18 98.2 °F (36.8 °C) 130/64 Alert 100 %     08/12/17 0148 1 78 18 99.9 °F (37.7 °C) 119/60 Alert 99 %     08/11/17 2036 1 85 18 98.6 °F (37 °C) 127/60 Alert 99 %     08/11/17 1221 -- 67 -- 98.5 °F (36.9 °C) 131/62 -- 99 %     08/11/17 1133 -- 68 16 96.5 °F (35.8 °C) 96/50 -- --     08/11/17 1130 -- 68 16 96.5 °F (35.8 °C) (!)  89/48 -- --     08/11/17 1100 -- 68 16 -- (!)  89/58 -- --     08/11/17 1030 -- 67 16 -- (!)  80/49 -- --     08/11/17 1000 -- 70 16 96.8 °F (36 °C) 105/60 -- --     08/11/17 0953 -- 68 16 96.8 °F (36 °C) 116/63 -- --     08/11/17 0732 -- 66 16 96.9 °F (36.1 °C) 127/51 -- 100 %     08/11/17 0417 -- (!)  184 20 98.6 °F (37 °C) 138/71 -- 92 %     08/11/17 0044 -- 69 20 98.4 °F (36.9 °C) 121/66 -- 100 %               PAIN                                              Pain Assessment                                              Pain Intensity 1: 0 (08/12/17 0456)                                              Pain Location 1: Eye                                              Pain Intervention(s) 1: Medication (see MAR)                                              Patient Stated Pain Goal: 0  Intervention effective: yes  Time of last intervention: Denies pain Reassessment Completed: yes   Other actions taken for pain: Distraction     Last 3 Weights:        Last 3 Recorded Weights in this Encounter     08/09/17 1917 08/10/17 2141 08/12/17 0657   Weight: 69.9 kg (154 lb 1.6 oz) 65.3 kg (144 lb) 63.5 kg (140 lb 1.6 oz)   Weight change: -1.769 kg (-3 lb 14.4 oz)     INTAKE/OUPUT                                              Current Shift:                                                Last three shifts: 08/10 1901 - 08/12 0700  In: 210 [P.O.:210]  Out: 705 [Drains:300]     RECOMMENDATIONS AND DISCHARGE PLANNING  Patient needs and requests: Fecal Management     Pending tests/procedures: labs      Discharge plan for patient: Home     Discharge planning Needs or Barriers: none     Estimated Discharge Date: TBD Posted on Whiteboard in Patients Room: yes        \"HEALS\" SAFETY CHECK  A safety check occurred in the patient's room between off going nurse and oncoming nurse listed above.     The safety check included the below items:     H  High Alert Medications                      Verify all high alert medication drips (heparin, PCA, etc.)  E  Equipment                 Suction is set up for ALL patients (with cary)  Red plugs utilized for all equipment (IV pumps, etc.)  WOWs wiped down at end of shift. Room stocked with oxygen, suction, and other unit-specific supplies  A  Alarms           Bed alarm is set for fall risk patients  Ensure chair alarm is in place and activated if patient is up in a chair  L  Lines              Check IV for any infiltration  Stanton bag is empty if patient has a Stanton   Tubing and IV bags are labeled  S  Safety  Room is clean, patient is clean, and equipment is clean. Hallways are clear from equipment besides carts. Fall bracelet on for fall risk patients  Ensure room is clear and free of clutter  Suction is set up for ALL patients (with cary)  Hallways are clear from equipment besides carts.    Isolation precautions followed, supplies available outside room, sign posted

## 2017-08-12 NOTE — PROGRESS NOTES
RENAL DAILY PROGRESS NOTE    Subjective:   Admitted postcode, seen for ESRD and HD    Complaint: eating, still with diarrhea    Current Facility-Administered Medications   Medication Dose Route Frequency    lactobacillus sp. 50 billion cpu (BIO-K PLUS) capsule 1 Cap  1 Cap Oral DAILY    loperamide (IMODIUM) capsule 2 mg  2 mg Oral Q6H PRN    HYDROcodone-acetaminophen (NORCO) 5-325 mg per tablet 1-2 Tab  1-2 Tab Oral Q6H PRN    heparin (porcine) injection 5,000 Units  5,000 Units SubCUTAneous Q8H    heparin (porcine) 1,000 unit/mL injection 2,000 Units  2,000 Units IntraVENous DIALYSIS ONCE    vits A and D-white pet-lanolin (A&D) ointment   Topical TID    levETIRAcetam (KEPPRA) tablet 500 mg  500 mg Oral BID    famotidine (PEPCID) tablet 20 mg  20 mg Oral DAILY    simvastatin (ZOCOR) tablet 20 mg  20 mg Oral QHS    doxercalciferol (HECTOROL) 4 mcg/2 mL injection 3 mcg  3 mcg IntraVENous DIALYSIS MON, WED & FRI    epoetin benjamin (EPOGEN;PROCRIT) injection 10,000 Units  10,000 Units IntraVENous DIALYSIS MON, WED & FRI    insulin lispro (HUMALOG) injection   SubCUTAneous AC&HS    aspirin chewable tablet 81 mg  81 mg Oral DAILY    hydrocortisone (CORTEF) tablet 10 mg  10 mg Oral BID WITH MEALS    glucose chewable tablet 16 g  4 Tab Oral PRN    glucagon (GLUCAGEN) injection 1 mg  1 mg IntraMUSCular PRN    dextrose (D50W) injection syrg 12.5-25 g  25-50 mL IntraVENous PRN    0.9% sodium chloride infusion  100 mL/hr IntraVENous DIALYSIS PRN    naloxone (NARCAN) injection 0.4 mg  0.4 mg IntraVENous PRN           Objective:     Patient Vitals for the past 24 hrs:   Temp Pulse Resp BP SpO2   08/12/17 1107 97.2 °F (36.2 °C) 82 18 104/59 96 %   08/12/17 0842 98.6 °F (37 °C) 78 19 151/70 100 %   08/12/17 0746 98.6 °F (37 °C) 78 19 151/70 100 %   08/12/17 0456 98.2 °F (36.8 °C) 85 18 130/64 100 %   08/12/17 0148 99.9 °F (37.7 °C) 78 18 119/60 99 %   08/11/17 2036 98.6 °F (37 °C) 85 18 127/60 99 %        Weight change: -1.769 kg (-3 lb 14.4 oz)     08/10 1901 - 08/12 0700  In: 210 [P.O.:210]  Out: 705 [Drains:300]    Intake/Output Summary (Last 24 hours) at 08/12/17 1412  Last data filed at 08/12/17 0456   Gross per 24 hour   Intake              210 ml   Output              300 ml   Net              -90 ml     Physical Exam: sitting up eating lunch    HEENT: non icteric  Neck: no JVD  Cardiovascular: regular no rub  C/L: clear breath sounds  Abdomen: soft, + BS  Ext:  Left arm AVF + bruit    Data Review:     LABS:   Hematology:   Recent Labs      08/12/17   0351  08/11/17   0352  08/10/17   0359   WBC  14.1*  13.5*  12.8   HGB  9.7*  10.1*  10.2*   HCT  29.6*  30.4*  31.1*   pl 464K  Chemistry:   Recent Labs      08/12/17   0351   BUN  22*   CREA  5.71*   CA  7.6*   K  3.4*   NA  138   CL  106   CO2  24   GLU  238*            IMPRESSION AND PLAN:   ESRD sched HD MWF   Anemia from CKD low fe stores but high ferritin,Hgb stable cont ALONDRA with HD  E.  Coli bacteremia remains on zosyn  Hypokalemia replace po, cont with K3 bath on HD  Hypocalcemia from 2nd HPT cont Hectorol with HD check repeat Marva Silvestre MD  8/12/2017

## 2017-08-12 NOTE — PROGRESS NOTES
[facility]ist [facility]  Progress Note    Patient: [facility] Age: 55 y.o. : 1960 MR#: [id number] SSN: [id number]  Date/Time: 2017     Subjective:     Pt feels better. AAOx3. Still Having lot of diarrhea, no abd pain. Assessment/Plan:   1. Acute met encephalopathy: better. 2. S/p PEA arrest. ? Cardiac cause with elevated trop PTA. Echo improving EF, s/p cath, no CAD. 3. Acute resp failure s/p extubation, off O2  4. ESRD on HD. M/W/F   5. Sepsis - leucocytosis  () bottles grew E.coli and C.perferinges. Initially thought to be secondary to cholecystitis but w/u thus far including HIDA scan negative. Per ID possibly due to abdominal source. Repeat cultures negative. Per ID recs on pip/tazo to be switched to po cipro and metro until  when able to take PO meds. 6. Hypokalemia  - replace lytes -and follow. 7. Elevated LFT - ? Shock liver. LFT now wnl.   8. thrombocytopenia: secondary to sepsis. better   9. Dysphagia: better. S/p SLP eval  10. ? Sz: on keppra per neurology  11. R side weakness: ? CVA vs decondition: CT neg, no CVA, PT eval.   12. =>Wounds: wound care    unstageable pressure ulcer to sacrum/coccyx not present on admit    moisture associated skin damage to anus not present  on admit    moisture associated skin damage to buttocks not present on admit  13. Diarrhea due to Abx, cont probiotics and imodium as needed, will add flagyl    Full code   Called and left message to Daughter Brandy Leos [phone].    Son Jerardo. [phone]    Discharge plan, need SNF once diarrhea better   Cont PT eval    Case discussed with:  [x]Patient  []Family  [x]Nursing  [x]Case Management  DVT Prophylaxis:  []Lovenox  []Hep SQ  [x]SCDs  []Coumadin   []On Heparin gtt    Patient Active Problem List   Diagnosis Code    Anemia D64.9    Acidosis E87.2    Cardiac arrest ([facility] I46.9    Respiratory failure ([facility] J96.90    [facility] N18.6, Z99.2    Anoxic brain damage (HCC) G93.1    Colitis K52.9    Hypotension I95.9    Acute GI bleeding K92.2    ESRD (end stage renal disease) (HCC) N18.6    Sepsis (HCC) A41.9       Objective:   VS:   Visit Vitals    /59    Pulse 82    Temp 97.2 °F (36.2 °C)    Resp 18    Ht 6' 3\" (1.905 m)  Comment: per chart history    Wt 63.5 kg (140 lb 1.6 oz)    SpO2 96%    BMI 17.51 kg/m2      Tmax/24hrs: Temp (24hrs), Av.5 °F (36.9 °C), Min:97.2 °F (36.2 °C), Max:99.9 °F (37.7 °C)  IOBRIEF    Intake/Output Summary (Last 24 hours) at 17 1346  Last data filed at 17 0456   Gross per 24 hour   Intake              210 ml   Output              300 ml   Net              -90 ml       General:  Alert awake in nad  Pulmonary:  CTA Bilaterally. Cardiovascular: Regular rate and Rhythm. GI:  Soft, Non distended, Non tender. + Bowel sounds. Extremities:  No edema, cyanosis, clubbing. Neuro: AAOx3.  Moves all ext   Diffuse excoriation in perianal area          Medications:   Current Facility-Administered Medications   Medication Dose Route Frequency    lactobacillus sp. 50 billion cpu (BIO-K PLUS) capsule 1 Cap  1 Cap Oral DAILY    loperamide (IMODIUM) capsule 2 mg  2 mg Oral Q6H PRN    HYDROcodone-acetaminophen (NORCO) 5-325 mg per tablet 1-2 Tab  1-2 Tab Oral Q6H PRN    heparin (porcine) injection 5,000 Units  5,000 Units SubCUTAneous Q8H    heparin (porcine) 1,000 unit/mL injection 2,000 Units  2,000 Units IntraVENous DIALYSIS ONCE    vits A and D-white pet-lanolin (A&D) ointment   Topical TID    levETIRAcetam (KEPPRA) tablet 500 mg  500 mg Oral BID    famotidine (PEPCID) tablet 20 mg  20 mg Oral DAILY    simvastatin (ZOCOR) tablet 20 mg  20 mg Oral QHS    doxercalciferol (HECTOROL) 4 mcg/2 mL injection 3 mcg  3 mcg IntraVENous DIALYSIS MON, WED & FRI    epoetin benjamin (EPOGEN;PROCRIT) injection 10,000 Units  10,000 Units IntraVENous DIALYSIS MON, WED & FRI    insulin lispro (HUMALOG) injection   SubCUTAneous AC&HS    aspirin chewable tablet 81 mg  81 mg Oral DAILY    hydrocortisone (CORTEF) tablet 10 mg  10 mg Oral BID WITH MEALS    glucose chewable tablet 16 g  4 Tab Oral PRN    glucagon (GLUCAGEN) injection 1 mg  1 mg IntraMUSCular PRN    dextrose (D50W) injection syrg 12.5-25 g  25-50 mL IntraVENous PRN    0.9% sodium chloride infusion  100 mL/hr IntraVENous DIALYSIS PRN    naloxone (NARCAN) injection 0.4 mg  0.4 mg IntraVENous PRN       Labs:    Recent Results (from the past 24 hour(s))   GLUCOSE, POC    Collection Time: 08/11/17  4:27 PM   Result Value Ref Range    Glucose (POC) 218 (H) 70 - 110 mg/dL   GLUCOSE, POC    Collection Time: 08/11/17 10:53 PM   Result Value Ref Range    Glucose (POC) 282 (H) 70 - 110 mg/dL   CALCIUM, IONIZED    Collection Time: 08/12/17  3:51 AM   Result Value Ref Range    Ionized Calcium 1.07 (L) 1.12 - 1.32 MMOL/L   CBC WITH AUTOMATED DIFF    Collection Time: 08/12/17  3:51 AM   Result Value Ref Range    WBC 14.1 (H) 4.6 - 13.2 K/uL    RBC 3.16 (L) 4.70 - 5.50 M/uL    HGB 9.7 (L) 13.0 - 16.0 g/dL    HCT 29.6 (L) 36.0 - 48.0 %    MCV 93.7 74.0 - 97.0 FL    MCH 30.7 24.0 - 34.0 PG    MCHC 32.8 31.0 - 37.0 g/dL    RDW 18.4 (H) 11.6 - 14.5 %    PLATELET 110 (H) 253 - 420 K/uL    MPV 10.7 9.2 - 11.8 FL    NEUTROPHILS 77 (H) 40 - 73 %    LYMPHOCYTES 13 (L) 21 - 52 %    MONOCYTES 7 3 - 10 %    EOSINOPHILS 2 0 - 5 %    BASOPHILS 1 0 - 2 %    ABS. NEUTROPHILS 11.1 (H) 1.8 - 8.0 K/UL    ABS. LYMPHOCYTES 1.8 0.9 - 3.6 K/UL    ABS. MONOCYTES 1.0 0.05 - 1.2 K/UL    ABS. EOSINOPHILS 0.2 0.0 - 0.4 K/UL    ABS.  BASOPHILS 0.1 0.0 - 0.1 K/UL    DF AUTOMATED     METABOLIC PANEL, BASIC    Collection Time: 08/12/17  3:51 AM   Result Value Ref Range    Sodium 138 136 - 145 mmol/L    Potassium 3.4 (L) 3.5 - 5.5 mmol/L    Chloride 106 100 - 108 mmol/L    CO2 24 21 - 32 mmol/L    Anion gap 8 3.0 - 18 mmol/L    Glucose 238 (H) 74 - 99 mg/dL    BUN 22 (H) 7.0 - 18 MG/DL Creatinine 5.71 (H) 0.6 - 1.3 MG/DL    BUN/Creatinine ratio 4 (L) 12 - 20      GFR est AA 13 (L) >60 ml/min/1.73m2    GFR est non-AA 10 (L) >60 ml/min/1.73m2    Calcium 7.6 (L) 8.5 - 10.1 MG/DL   GLUCOSE, POC    Collection Time: 08/12/17  7:41 AM   Result Value Ref Range    Glucose (POC) 219 (H) 70 - 110 mg/dL   GLUCOSE, POC    Collection Time: 08/12/17 11:08 AM   Result Value Ref Range    Glucose (POC) 171 (H) 70 - 110 mg/dL       Signed By: Annelise Nickerson MD     August 12, 2017

## 2017-08-12 NOTE — ROUTINE PROCESS
Bedside and Verbal shift change report given to Noni Lancaster RN (oncoming nurse) by Marjorie Mccallum RN (offgoing nurse). Report included the following information SBAR, Kardex, MAR and Recent Results. SITUATION:  Code Status: Full Code  Reason for Admission: Cardiac arrest ([facility]  Acidosis  Respiratory failure ([facility]  Anemia  ESRD needing dialysis ([facility]  Cardiac arrest ([facility]  Acute GI bleeding  Sepsis ([facility]  Hypotension  Anoxic brain damage ([facility]  ESRD (end stage renal disease) ([facility]  Colitis  Hospital day: 16  Problem List:       Hospital Problems  Date Reviewed: 7/27/2017          Codes Class Noted POA    Acidosis ICD-10-CM: E87.2  ICD-9-CM: 276.2  7/27/2017 Unknown        Cardiac arrest ([facility] ICD-10-CM: I46.9  ICD-9-CM: 427.5  7/27/2017 Unknown        Respiratory failure ([facility] ICD-10-CM: J96.90  ICD-9-CM: 518.81  7/27/2017 Unknown        ESRD needing dialysis ([facility] ICD-10-CM: N18.6, Z99.2  ICD-9-CM: 585.6  7/27/2017 Unknown        Anoxic brain damage ([facility] ICD-10-CM: G93.1  ICD-9-CM: 348.1  7/27/2017 Unknown        Colitis ICD-10-CM: K52.9  ICD-9-CM: 558.9  7/27/2017 Unknown        Hypotension ICD-10-CM: I95.9  ICD-9-CM: 458.9  7/27/2017 Unknown        Acute GI bleeding ICD-10-CM: K92.2  ICD-9-CM: 578.9  7/27/2017 Unknown        ESRD (end stage renal disease) ([facility] ICD-10-CM: N18.6  ICD-9-CM: 585.6  7/27/2017 Unknown        * (Principal)Sepsis ([facility] ICD-10-CM: A41.9  ICD-9-CM: 038.9, 995.91  7/27/2017 Unknown        Anemia ICD-10-CM: D64.9  ICD-9-CM: 991. 9  Unknown Unknown              BACKGROUND:   Past Medical History:   Past Medical History:   Diagnosis Date    Anemia     Arthritis     Chronic pain     Back     Diabetes mellitus type II     Hypertension     IBS (irritable bowel syndrome)     Lactose intolerance     TB (tuberculosis)     TB test positive      Patient taking anticoagulants yes    Patient has a defibrillator: no    History of shots NO for example, flu, pneumonia, tetanus   Isolation History NO for example, MRSA, CDiff    ASSESSMENT:  Changes in Assessment Throughout Shift: NONE  Significant Changes in 24 hours (for example, RR/code, fall)  Patient has Central Line: yes (A-V fistula) Reasons if yes: Hemodialysis  Patient has Stanton Cath: no   Mobility Issues  PT  IV Patency  OR Checklist  Pending Tests    Last Vitals:  Vitals w/ MEWS Score (last day)     Date/Time MEWS Score Pulse Resp Temp BP Level of Consciousness SpO2    08/12/17 0842 1 78 19 98.6 °F (37 °C) 151/70 Alert 100 %    08/12/17 0746 1 78 19 98.6 °F (37 °C) 151/70 Alert 100 %    08/12/17 0456 1 85 18 98.2 °F (36.8 °C) 130/64 Alert 100 %    08/12/17 0148 1 78 18 99.9 °F (37.7 °C) 119/60 Alert 99 %    08/11/17 2036 1 85 18 98.6 °F (37 °C) 127/60 Alert 99 %    08/11/17 1221 -- 67 -- 98.5 °F (36.9 °C) 131/62 -- 99 %    08/11/17 1133 -- 68 16 96.5 °F (35.8 °C) 96/50 -- --    08/11/17 1130 -- 68 16 96.5 °F (35.8 °C) (!)  89/48 -- --    08/11/17 1100 -- 68 16 -- (!)  89/58 -- --    08/11/17 1030 -- 67 16 -- (!)  80/49 -- --    08/11/17 1000 -- 70 16 96.8 °F (36 °C) 105/60 -- --    08/11/17 0953 -- 68 16 96.8 °F (36 °C) 116/63 -- --    08/11/17 0732 -- 66 16 96.9 °F (36.1 °C) 127/51 -- 100 %    08/11/17 0417 -- (!)  184 20 98.6 °F (37 °C) 138/71 -- 92 %    08/11/17 0044 -- 69 20 98.4 °F (36.9 °C) 121/66 -- 100 %            PAIN    Pain Assessment    Pain Intensity 1: 0 (08/12/17 0456)    Pain Location 1: Eye    Pain Intervention(s) 1: Medication (see MAR)    Patient Stated Pain Goal: 0  Intervention effective: yes  Time of last intervention: Denies pain Reassessment Completed: yes   Other actions taken for pain: Distraction    Last 3 Weights:  Last 3 Recorded Weights in this Encounter    08/09/17 1917 08/10/17 2141 08/12/17 0657   Weight: 69.9 kg (154 lb 1.6 oz) 65.3 kg (144 lb) 63.5 kg (140 lb 1.6 oz)   Weight change: -1.769 kg (-3 lb 14.4 oz)    INTAKE/OUPUT    Current Shift:      Last three shifts: 08/10 1901 - 08/12 0700  In: 210 [P.O.:210]  Out: 705 [Drains:300]    RECOMMENDATIONS AND DISCHARGE PLANNING  Patient needs and requests: Fecal Management    Pending tests/procedures: labs     Discharge plan for patient: Home    Discharge planning Needs or Barriers: none    Estimated Discharge Date: TBD Posted on Whiteboard in Patients Room: yes       \"HEALS\" SAFETY CHECK  A safety check occurred in the patient's room between off going nurse and oncoming nurse listed above. The safety check included the below items:    H  High Alert Medications Verify all high alert medication drips (heparin, PCA, etc.)  E  Equipment Suction is set up for ALL patients (with cary)  Red plugs utilized for all equipment (IV pumps, etc.)  WOWs wiped down at end of shift. Room stocked with oxygen, suction, and other unit-specific supplies  A  Alarms Bed alarm is set for fall risk patients  Ensure chair alarm is in place and activated if patient is up in a chair  L  Lines Check IV for any infiltration  Stanton bag is empty if patient has a Stanton   Tubing and IV bags are labeled  S  Safety  Room is clean, patient is clean, and equipment is clean. Hallways are clear from equipment besides carts. Fall bracelet on for fall risk patients  Ensure room is clear and free of clutter  Suction is set up for ALL patients (with cary)  Hallways are clear from equipment besides carts.    Isolation precautions followed, supplies available outside room, sign posted    Moira Cabezas RN

## 2017-08-13 ENCOUNTER — APPOINTMENT (OUTPATIENT)
Dept: GENERAL RADIOLOGY | Age: 57
DRG: 004 | End: 2017-08-13
Attending: INTERNAL MEDICINE
Payer: MEDICARE

## 2017-08-13 ENCOUNTER — APPOINTMENT (OUTPATIENT)
Dept: CT IMAGING | Age: 57
DRG: 004 | End: 2017-08-13
Attending: FAMILY MEDICINE
Payer: MEDICARE

## 2017-08-13 ENCOUNTER — APPOINTMENT (OUTPATIENT)
Dept: MRI IMAGING | Age: 57
DRG: 004 | End: 2017-08-13
Attending: HOSPITALIST
Payer: MEDICARE

## 2017-08-13 LAB
ANION GAP BLD CALC-SCNC: 11 MMOL/L (ref 3–18)
BASOPHILS # BLD AUTO: 0.1 K/UL (ref 0–0.1)
BASOPHILS # BLD: 0 % (ref 0–2)
BUN SERPL-MCNC: 27 MG/DL (ref 7–18)
BUN/CREAT SERPL: 4 (ref 12–20)
CA-I SERPL-SCNC: 1.03 MMOL/L (ref 1.12–1.32)
CALCIUM SERPL-MCNC: 8 MG/DL (ref 8.5–10.1)
CHLORIDE SERPL-SCNC: 110 MMOL/L (ref 100–108)
CO2 SERPL-SCNC: 20 MMOL/L (ref 21–32)
CREAT SERPL-MCNC: 7.29 MG/DL (ref 0.6–1.3)
DIFFERENTIAL METHOD BLD: ABNORMAL
EOSINOPHIL # BLD: 0.3 K/UL (ref 0–0.4)
EOSINOPHIL NFR BLD: 2 % (ref 0–5)
ERYTHROCYTE [DISTWIDTH] IN BLOOD BY AUTOMATED COUNT: 18.3 % (ref 11.6–14.5)
GLUCOSE BLD STRIP.AUTO-MCNC: 117 MG/DL (ref 70–110)
GLUCOSE BLD STRIP.AUTO-MCNC: 145 MG/DL (ref 70–110)
GLUCOSE BLD STRIP.AUTO-MCNC: 165 MG/DL (ref 70–110)
GLUCOSE BLD STRIP.AUTO-MCNC: 285 MG/DL (ref 70–110)
GLUCOSE SERPL-MCNC: 150 MG/DL (ref 74–99)
HCT VFR BLD AUTO: 29.5 % (ref 36–48)
HGB BLD-MCNC: 9.6 G/DL (ref 13–16)
LYMPHOCYTES # BLD AUTO: 17 % (ref 21–52)
LYMPHOCYTES # BLD: 2.4 K/UL (ref 0.9–3.6)
MCH RBC QN AUTO: 30.5 PG (ref 24–34)
MCHC RBC AUTO-ENTMCNC: 32.5 G/DL (ref 31–37)
MCV RBC AUTO: 93.7 FL (ref 74–97)
MONOCYTES # BLD: 1 K/UL (ref 0.05–1.2)
MONOCYTES NFR BLD AUTO: 7 % (ref 3–10)
NEUTS SEG # BLD: 10.6 K/UL (ref 1.8–8)
NEUTS SEG NFR BLD AUTO: 74 % (ref 40–73)
PLATELET # BLD AUTO: 436 K/UL (ref 135–420)
PMV BLD AUTO: 10.1 FL (ref 9.2–11.8)
POTASSIUM SERPL-SCNC: 4.1 MMOL/L (ref 3.5–5.5)
RBC # BLD AUTO: 3.15 M/UL (ref 4.7–5.5)
SODIUM SERPL-SCNC: 141 MMOL/L (ref 136–145)
WBC # BLD AUTO: 14.2 K/UL (ref 4.6–13.2)

## 2017-08-13 PROCEDURE — 74011250636 HC RX REV CODE- 250/636: Performed by: INTERNAL MEDICINE

## 2017-08-13 PROCEDURE — 82962 GLUCOSE BLOOD TEST: CPT

## 2017-08-13 PROCEDURE — 74011250637 HC RX REV CODE- 250/637: Performed by: HOSPITALIST

## 2017-08-13 PROCEDURE — 92526 ORAL FUNCTION THERAPY: CPT

## 2017-08-13 PROCEDURE — 74011250637 HC RX REV CODE- 250/637: Performed by: INTERNAL MEDICINE

## 2017-08-13 PROCEDURE — 82330 ASSAY OF CALCIUM: CPT | Performed by: PHYSICIAN ASSISTANT

## 2017-08-13 PROCEDURE — 70551 MRI BRAIN STEM W/O DYE: CPT

## 2017-08-13 PROCEDURE — 74011250637 HC RX REV CODE- 250/637: Performed by: PHYSICIAN ASSISTANT

## 2017-08-13 PROCEDURE — 70450 CT HEAD/BRAIN W/O DYE: CPT

## 2017-08-13 PROCEDURE — 74000 XR ABD (KUB): CPT

## 2017-08-13 PROCEDURE — 74011250636 HC RX REV CODE- 250/636: Performed by: HOSPITALIST

## 2017-08-13 PROCEDURE — 74011000258 HC RX REV CODE- 258: Performed by: HOSPITALIST

## 2017-08-13 PROCEDURE — 70030 X-RAY EYE FOR FOREIGN BODY: CPT

## 2017-08-13 PROCEDURE — 36415 COLL VENOUS BLD VENIPUNCTURE: CPT | Performed by: PHYSICIAN ASSISTANT

## 2017-08-13 PROCEDURE — 65660000000 HC RM CCU STEPDOWN

## 2017-08-13 PROCEDURE — 80048 BASIC METABOLIC PNL TOTAL CA: CPT | Performed by: PHYSICIAN ASSISTANT

## 2017-08-13 PROCEDURE — 85025 COMPLETE CBC W/AUTO DIFF WBC: CPT | Performed by: PHYSICIAN ASSISTANT

## 2017-08-13 PROCEDURE — 74011636637 HC RX REV CODE- 636/637: Performed by: HOSPITALIST

## 2017-08-13 PROCEDURE — 71020 XR CHEST PA LAT: CPT

## 2017-08-13 RX ORDER — ACETAMINOPHEN 325 MG/1
650 TABLET ORAL
Status: DISCONTINUED | OUTPATIENT
Start: 2017-08-13 | End: 2017-09-22 | Stop reason: HOSPADM

## 2017-08-13 RX ORDER — SODIUM CHLORIDE 9 MG/ML
40 INJECTION, SOLUTION INTRAVENOUS CONTINUOUS
Status: DISCONTINUED | OUTPATIENT
Start: 2017-08-13 | End: 2017-08-13

## 2017-08-13 RX ORDER — LORAZEPAM 2 MG/ML
1 INJECTION INTRAMUSCULAR ONCE
Status: COMPLETED | OUTPATIENT
Start: 2017-08-13 | End: 2017-08-13

## 2017-08-13 RX ORDER — DEXTROSE MONOHYDRATE AND SODIUM CHLORIDE 5; .9 G/100ML; G/100ML
40 INJECTION, SOLUTION INTRAVENOUS CONTINUOUS
Status: DISCONTINUED | OUTPATIENT
Start: 2017-08-13 | End: 2017-08-17

## 2017-08-13 RX ADMIN — INSULIN LISPRO 9 UNITS: 100 INJECTION, SOLUTION INTRAVENOUS; SUBCUTANEOUS at 09:55

## 2017-08-13 RX ADMIN — LORAZEPAM 1 MG: 2 INJECTION, SOLUTION INTRAMUSCULAR; INTRAVENOUS at 22:33

## 2017-08-13 RX ADMIN — HEPARIN SODIUM 5000 UNITS: 5000 INJECTION, SOLUTION INTRAVENOUS; SUBCUTANEOUS at 09:54

## 2017-08-13 RX ADMIN — VITAMIN A AND D: 30.8 OINTMENT TOPICAL at 10:01

## 2017-08-13 RX ADMIN — LEVETIRACETAM 500 MG: 500 TABLET ORAL at 09:53

## 2017-08-13 RX ADMIN — DEXTROSE MONOHYDRATE AND SODIUM CHLORIDE 40 ML/HR: 5; .9 INJECTION, SOLUTION INTRAVENOUS at 12:56

## 2017-08-13 RX ADMIN — FAMOTIDINE 20 MG: 20 TABLET ORAL at 10:00

## 2017-08-13 RX ADMIN — Medication 1 CAPSULE: at 10:42

## 2017-08-13 RX ADMIN — ASPIRIN 81 MG 81 MG: 81 TABLET ORAL at 09:54

## 2017-08-13 RX ADMIN — METRONIDAZOLE 500 MG: 500 TABLET ORAL at 10:00

## 2017-08-13 RX ADMIN — HYDROCORTISONE 10 MG: 10 TABLET ORAL at 10:00

## 2017-08-13 RX ADMIN — HYDROCODONE BITARTRATE AND ACETAMINOPHEN 1 TABLET: 5; 325 TABLET ORAL at 10:42

## 2017-08-13 RX ADMIN — HEPARIN SODIUM 5000 UNITS: 5000 INJECTION, SOLUTION INTRAVENOUS; SUBCUTANEOUS at 02:18

## 2017-08-13 NOTE — PROGRESS NOTES
Progress Note    Patient: Lee Ann Ricketts MRN: [id number]  SSN: [id number]    YOB: 1960  Age: 55 y.o.   Sex: male      Admit Date: 7/27/2017    LOS: 17 days     Subjective:     Patient presents with altered mental status following arrest  Improving until today noted to be slower to respond and have more difficulty swallowing         Current Facility-Administered Medications   Medication Dose Route Frequency    acetaminophen (TYLENOL) tablet 650 mg  650 mg Oral Q4H PRN    dextrose 5% and 0.9% NaCl infusion  40 mL/hr IntraVENous CONTINUOUS    metroNIDAZOLE (FLAGYL) tablet 500 mg  500 mg Oral TID    lactobacillus sp. 50 billion cpu (BIO-K PLUS) capsule 1 Cap  1 Cap Oral DAILY    loperamide (IMODIUM) capsule 2 mg  2 mg Oral Q6H PRN    heparin (porcine) injection 5,000 Units  5,000 Units SubCUTAneous Q8H    heparin (porcine) 1,000 unit/mL injection 2,000 Units  2,000 Units IntraVENous DIALYSIS ONCE    vits A and D-white pet-lanolin (A&D) ointment   Topical TID    levETIRAcetam (KEPPRA) tablet 500 mg  500 mg Oral BID    famotidine (PEPCID) tablet 20 mg  20 mg Oral DAILY    simvastatin (ZOCOR) tablet 20 mg  20 mg Oral QHS    doxercalciferol (HECTOROL) 4 mcg/2 mL injection 3 mcg  3 mcg IntraVENous DIALYSIS MON, WED & FRI    epoetin benjamin (EPOGEN;PROCRIT) injection 10,000 Units  10,000 Units IntraVENous DIALYSIS MON, WED & FRI    insulin lispro (HUMALOG) injection   SubCUTAneous AC&HS    aspirin chewable tablet 81 mg  81 mg Oral DAILY    hydrocortisone (CORTEF) tablet 10 mg  10 mg Oral BID WITH MEALS    glucose chewable tablet 16 g  4 Tab Oral PRN    glucagon (GLUCAGEN) injection 1 mg  1 mg IntraMUSCular PRN    dextrose (D50W) injection syrg 12.5-25 g  25-50 mL IntraVENous PRN    0.9% sodium chloride infusion  100 mL/hr IntraVENous DIALYSIS PRN    naloxone (NARCAN) injection 0.4 mg  0.4 mg IntraVENous PRN          Objective:     Vitals:    08/13/17 0405 08/13/17 0734 08/13/17 0820 08/13/17 1223   BP: 104/66  131/64 93/51   Pulse: 74  76 84   Resp: 16  18 18   Temp: 98.1 °F (36.7 °C)  97.1 °F (36.2 °C) 98.2 °F (36.8 °C)   TempSrc:       SpO2: 98%  99% 99%   Weight:  63.2 kg (139 lb 6.4 oz)     Height:            Intake and Output:  Current Shift:    Last three shifts: 08/11 1901 - 08/13 0700  In: 330 [P.O.:330]  Out: 1300 [Drains:500]    Physical Exam:   GENERAL: alert, cooperative, no distress, appears stated age  NEUROLOGIC: negative findings: alert, oriented x2 not time  speech normal in context and clarity    cranial nerves II-XII intact  no involuntary movements - tremors, positive findings: muscular weakness bilaterally unchanged     Lab/Data Review:  Recent Results (from the past 24 hour(s))   GLUCOSE, POC    Collection Time: 08/12/17  3:52 PM   Result Value Ref Range    Glucose (POC) 317 (H) 70 - 110 mg/dL   GLUCOSE, POC    Collection Time: 08/12/17  9:30 PM   Result Value Ref Range    Glucose (POC) 159 (H) 70 - 110 mg/dL   CALCIUM, IONIZED    Collection Time: 08/13/17  3:59 AM   Result Value Ref Range    Ionized Calcium 1.03 (L) 1.12 - 1.32 MMOL/L   CBC WITH AUTOMATED DIFF    Collection Time: 08/13/17  3:59 AM   Result Value Ref Range    WBC 14.2 (H) 4.6 - 13.2 K/uL    RBC 3.15 (L) 4.70 - 5.50 M/uL    HGB 9.6 (L) 13.0 - 16.0 g/dL    HCT 29.5 (L) 36.0 - 48.0 %    MCV 93.7 74.0 - 97.0 FL    MCH 30.5 24.0 - 34.0 PG    MCHC 32.5 31.0 - 37.0 g/dL    RDW 18.3 (H) 11.6 - 14.5 %    PLATELET 987 (H) 823 - 420 K/uL    MPV 10.1 9.2 - 11.8 FL    NEUTROPHILS 74 (H) 40 - 73 %    LYMPHOCYTES 17 (L) 21 - 52 %    MONOCYTES 7 3 - 10 %    EOSINOPHILS 2 0 - 5 %    BASOPHILS 0 0 - 2 %    ABS. NEUTROPHILS 10.6 (H) 1.8 - 8.0 K/UL    ABS. LYMPHOCYTES 2.4 0.9 - 3.6 K/UL    ABS. MONOCYTES 1.0 0.05 - 1.2 K/UL    ABS. EOSINOPHILS 0.3 0.0 - 0.4 K/UL    ABS.  BASOPHILS 0.1 0.0 - 0.1 K/UL    DF AUTOMATED     METABOLIC PANEL, BASIC    Collection Time: 08/13/17  3:59 AM   Result Value Ref Range    Sodium 141 136 - 145 mmol/L Potassium 4.1 3.5 - 5.5 mmol/L    Chloride 110 (H) 100 - 108 mmol/L    CO2 20 (L) 21 - 32 mmol/L    Anion gap 11 3.0 - 18 mmol/L    Glucose 150 (H) 74 - 99 mg/dL    BUN 27 (H) 7.0 - 18 MG/DL    Creatinine 7.29 (H) 0.6 - 1.3 MG/DL    BUN/Creatinine ratio 4 (L) 12 - 20      GFR est AA 10 (L) >60 ml/min/1.73m2    GFR est non-AA 8 (L) >60 ml/min/1.73m2    Calcium 8.0 (L) 8.5 - 10.1 MG/DL   GLUCOSE, POC    Collection Time: 08/13/17  8:26 AM   Result Value Ref Range    Glucose (POC) 285 (H) 70 - 110 mg/dL   GLUCOSE, POC    Collection Time: 08/13/17 11:16 AM   Result Value Ref Range    Glucose (POC) 117 (H) 70 - 110 mg/dL   GLUCOSE, POC    Collection Time: 08/13/17  3:17 PM   Result Value Ref Range    Glucose (POC) 145 (H) 70 - 110 mg/dL             Assessment:   Encephalopathy recurrent ? Toxic metabolic  Principal Problem:    Sepsis ([address] (7/27/2017)    Active Problems:    Anemia ()      Acidosis (7/27/2017)      Cardiac arrest ([address] (7/27/2017)      Respiratory failure ([address] (7/27/2017)      ESRD needing dialysis ([address] (7/27/2017)      Anoxic brain damage ([address] (7/27/2017)      Colitis (7/27/2017)      Hypotension (7/27/2017)      Acute GI bleeding (7/27/2017)      ESRD (end stage renal disease) ([address] (7/27/2017)        Plan:    patient may benefit from mri exam  As discussed with Dr Camelia Rao also need to consider metabolic etiologies? aspiration    Signed By: Dashawn Odonnell MD     August 13, 2017

## 2017-08-13 NOTE — PROGRESS NOTES
Rapid Response Note  [facility]    Patient: Edmar Gallegos 55 y.o. male  [id number]  1960      Admit Date: 7/27/2017   Admission Diagnosis: Cardiac arrest ([address]  Acidosis  Respiratory failure ([address]  Anemia  ESRD needing dialysis ([address]  Cardiac arrest ([address]  Acute GI bleeding  Sepsis ([address]  Hypotension  Anoxic brain damage (HCC)  ESRD (end stage renal disease) ([address]  Colitis    RAPID RESPONSE     Rapid response called for new onset facial droop. Pt had been slower to respond and has had swallowing difficulties since being evaluated by neuro this AM.     Medications Reviewed    ROS     OBJECTIVE     Visit Vitals    BP 94/49    Pulse 77    Temp 99.3 °F (37.4 °C)    Resp 17    Ht 6' 3\" (1.905 m)  Comment: per chart history    Wt 63.2 kg (139 lb 6.4 oz)    SpO2 99%    BMI 17.42 kg/m2       Physical Exam   Neurological: He is alert. No cranial nerve deficit. No tongue deviation, EOMI, Peerla, follows commands easily, decreased  strength on R hand, RUE strength 5/5, LUE 5/5, LLE 2/5, RLE 1/5, sensation intact bilaterally, dysarthria       Medications administered: none    EKG: none    Labs: none    ASSESSMENT, GARY & Lazarus Fabiano is a 55y.o. year old male admitted for Cardiac arrest ([address]  Acidosis  Respiratory failure ([address]  Anemia  ESRD needing dialysis ([address]  Cardiac arrest ([address]  Acute GI bleeding  Sepsis ([address]  Hypotension  Anoxic brain damage ([address]  ESRD (end stage renal disease) ([address]  Colitis. Rapid response called for new onset L facial droop. Patient condition currently: stable. Stat CT negative. Tele neuro Dr. Raphael Steward said facial droop likely d/t chronic ischemic changes. Recommended MRI/MRA & aspirin. Stat MRI ordered. Disposition: Stable, transferring to [facility] tele w/ neuro checks    Attending Dr. Hermes Vaughn notified of rapid response. In agreement with plan. Primary team resuming care.        Jessica Dickerson MD, PGY-1  Deshawn Adler [address] [facility]    08/13/17 4:57 PM

## 2017-08-13 NOTE — ROUTINE PROCESS
1555: Pt resting in bed. Following assessment findings (see below) indicated possible stroke. 08/13/17 1555   Neuro   Neurologic State Confused; Lethargic   Orientation Level Disoriented to situation;Oriented to person;Oriented to place   Cognition Follows commands   Speech Incomprehensible words;Slurred   Assessment L Pupil Brisk;Round   Size L Pupil (mm) 1   Assessment R Pupil Round;Brisk   Size R Pupil (mm) 1   LUE Motor Response Weak   LLE Motor Response Weak   RUE Motor Response Weak   RLE Motor Response Weak   Facial Droop       Left      08/13/17 1555   Cranial Nerves   Eye Assessment Does not track with eyes   Eye Opening Spontaneously   Cranial Nerve Assessments Dysphagia; Facial weakness left; Tongue deviates left;Weak cough      08/13/17 1555   Nelly Coma Scale   Eye Opening 4   Best Verbal Response 4   Best Motor Response 6   [place] Coma Scale Score 14      08/13/17 1555   Musculoskeletal   Musculoskeletal (WDL) X   LUE Limited movement;Weakness   LLE Limited movement;Weakness   RUE Limited movement;Weakness; slightly weaker than LUE   RLE Limited movement;Weakness       1600:  RRT & Code S called by Primary Nurse Boris Vivas.

## 2017-08-13 NOTE — PROGRESS NOTES
08/13/17 0830   Respiratory   Respiratory (WDL) X   Respiratory Pattern Regular   Upper Airway Sounds Coarse   Chest/Tracheal Assessment Chest expansion, symmetrical   Breath Sounds Bilateral Coarse;Crackles ; Expiratory wheezing; Rhonchi;Wet   Cough Productive     Pt not able to clear secretion from oral cavity and throat. Pt requiring oral suctioning to remove thick clear secretions. Pt unable to swallow puree food and nectar liquids w/o coughing and chocking. Hold food and drink for now and will call dr for consult to speech pathology for swallow re-evaluation.

## 2017-08-13 NOTE — ROUTINE PROCESS
Bedside and Verbal shift change report given to Dary Fontanez RN (oncoming nurse) by Ilir Jacob RN (offgoing nurse). Report included the following information SBAR, Kardex, MAR and Recent Results. SITUATION:  Code Status: Full Code  Reason for Admission: Cardiac arrest ([facility]  Acidosis  Respiratory failure ([facility]  Anemia  ESRD needing dialysis ([facility]  Cardiac arrest ([facility]  Acute GI bleeding  Sepsis ([facility]  Hypotension  Anoxic brain damage ([facility]  ESRD (end stage renal disease) ([facility]  Colitis  Hospital day: 17  Problem List:       Hospital Problems  Date Reviewed: 7/27/2017          Codes Class Noted POA    Acidosis ICD-10-CM: E87.2  ICD-9-CM: 276.2  7/27/2017 Unknown        [facility] ICD-10-CM: I46.9  ICD-9-CM: 427.5  7/27/2017 Unknown        Respiratory failure ([facility] ICD-10-CM: J96.90  ICD-9-CM: 518.81  7/27/2017 Unknown        ESRD needing dialysis ([facility] ICD-10-CM: N18.6, Z99.2  ICD-9-CM: 585.6  7/27/2017 Unknown        Anoxic brain damage ([facility] ICD-10-CM: G93.1  ICD-9-CM: 348.1  7/27/2017 Unknown        Colitis ICD-10-CM: K52.9  ICD-9-CM: 558.9  7/27/2017 Unknown        Hypotension ICD-10-CM: I95.9  ICD-9-CM: 458.9  7/27/2017 Unknown        Acute GI bleeding ICD-10-CM: K92.2  ICD-9-CM: 578.9  7/27/2017 Unknown        ESRD (end stage renal disease) ([facility] ICD-10-CM: N18.6  ICD-9-CM: 585.6  7/27/2017 Unknown        * (Principal)Sepsis ([facility] ICD-10-CM: A41.9  ICD-9-CM: 038.9, 995.91  7/27/2017 Unknown        Anemia ICD-10-CM: D64.9  ICD-9-CM: 197. 9  Unknown Unknown              BACKGROUND:   Past Medical History:   Past Medical History:   Diagnosis Date    Anemia     Arthritis     Chronic pain     Back     Diabetes mellitus type II     Hypertension     IBS (irritable bowel syndrome)     Lactose intolerance     TB (tuberculosis)     TB test positive      Patient taking anticoagulants yes    Patient has a defibrillator: no    History of shots NO for example, flu, pneumonia, tetanus   Isolation History NO for example, MRSA, CDiff    ASSESSMENT:  Changes in Assessment Throughout Shift: NONE  Significant Changes in 24 hours (for example, RR/code, fall)  Patient has Central Line: yes (A-V fistula) Reasons if yes: Hemodialysis  Patient has Stanton Cath: no   Mobility Issues  PT  IV Patency  OR Checklist  Pending Tests    Last Vitals:  Vitals w/ MEWS Score (last day)     Date/Time MEWS Score Pulse Resp Temp BP Level of Consciousness SpO2    08/13/17 0820 1 76 18 97.1 °F (36.2 °C) 131/64 Alert 99 %    08/13/17 0405 1 74 16 98.1 °F (36.7 °C) 104/66 Alert 98 %    08/12/17 2325 1 73 18 99.2 °F (37.3 °C) 125/61 Alert 100 %    08/12/17 2000 1 83 18 98.8 °F (37.1 °C) 131/59 Alert 98 %    08/12/17 1643 -- 87 (!)  96 98.2 °F (36.8 °C) -- Alert --    08/12/17 1107 1 82 18 97.2 °F (36.2 °C) 104/59 Alert 96 %    08/12/17 0842 1 78 19 98.6 °F (37 °C) 151/70 Alert 100 %    08/12/17 0746 1 78 19 98.6 °F (37 °C) 151/70 Alert 100 %    08/12/17 0456 1 85 18 98.2 °F (36.8 °C) 130/64 Alert 100 %    08/12/17 0148 1 78 18 99.9 °F (37.7 °C) 119/60 Alert 99 %            PAIN    Pain Assessment    Pain Intensity 1: 0 (08/13/17 0345)    Pain Location 1: Eye    Pain Intervention(s) 1: Medication (see MAR)    Patient Stated Pain Goal: 0  Intervention effective: yes  Time of last intervention: Denies pain Reassessment Completed: yes   Other actions taken for pain: Distraction    Last 3 Weights:  Last 3 Recorded Weights in this Encounter    08/10/17 2141 08/12/17 0657 08/13/17 0734   Weight: 65.3 kg (144 lb) 63.5 kg (140 lb 1.6 oz) 63.2 kg (139 lb 6.4 oz)   Weight change:     INTAKE/OUPUT    Current Shift:      Last three shifts: 08/11 1901 - 08/13 0700  In: 330 [P.O.:330]  Out: 1300 [Drains:500]    RECOMMENDATIONS AND DISCHARGE PLANNING  Patient needs and requests: Fecal Management    Pending tests/procedures: labs     Discharge plan for patient: Home    Discharge planning Needs or Barriers: none    Estimated Discharge Date: TBD Posted on Whiteboard in Patients Room: yes       \"HEALS\" SAFETY CHECK  A safety check occurred in the patient's room between off going nurse and oncoming nurse listed above. The safety check included the below items:    H  High Alert Medications Verify all high alert medication drips (heparin, PCA, etc.)  E  Equipment Suction is set up for ALL patients (with cary)  Red plugs utilized for all equipment (IV pumps, etc.)  WOWs wiped down at end of shift. Room stocked with oxygen, suction, and other unit-specific supplies  A  Alarms Bed alarm is set for fall risk patients  Ensure chair alarm is in place and activated if patient is up in a chair  L  Lines Check IV for any infiltration  Stanton bag is empty if patient has a Stanton   Tubing and IV bags are labeled  S  Safety  Room is clean, patient is clean, and equipment is clean. Hallways are clear from equipment besides carts. Fall bracelet on for fall risk patients  Ensure room is clear and free of clutter  Suction is set up for ALL patients (with cary)  Hallways are clear from equipment besides carts.    Isolation precautions followed, supplies available outside room, sign posted    Tk Jovel RN

## 2017-08-13 NOTE — PROGRESS NOTES
[facility] Hospitalist Group  Progress Note    Patient: Paulo Acosta Age: 55 y.o. : 1960 MR#: [id number] SSN: [id number]  Date/Time: 2017     Subjective:     Pt AAOx1-2, slow to respond. Cont to be weak on R side. Per RN chocking and coughing with PO intake. D/w SLP, recommend NPO. D/w neuro and MRI was ordered. Later on in day, RRT for ? Facial weakness. CT head neg. Tele neuro recommended MRI. Assessment/Plan:   1. Acute met encephalopathy: slightly worse today. 2. S/p PEA arrest. ? Cardiac cause with elevated trop PTA. Echo improving EF, s/p cath, no CAD. 3. Acute resp failure s/p extubation, off O2  4. ESRD on HD. M/W/F   5. Sepsis - leucocytosis  () bottles grew E.coli and C.perferinges. Initially thought to be secondary to cholecystitis but w/u thus far including HIDA scan negative. Per ID possibly due to abdominal source. Repeat cultures negative. S/p Rx  6. HTN: BP running low, will cont hold BB, start IVF. 7. Elevated LFT - ? Shock liver. LFT now wnl.   8. Thrombocytopenia: secondary to sepsis. better   9. Dysphagia: S/p SLP eval. NPO status now  10. ? Sz: on keppra per neurology  11. R side weakness: ? CVA vs decondition: CT neg, MRI ordered. 12. =>Wounds: wound care    unstageable pressure ulcer to sacrum/coccyx not present on admit    moisture associated skin damage to anus not present  on admit    moisture associated skin damage to buttocks not present on admit  13. Diarrhea due to Abx, cont probiotics and imodium as needed, will cont flagyl    Full code   Called and left message to Daughter Aaron Rodriguez [phone].    Son Mami [phone]    Will transfer to  for neuro monitoring   D/w neurology and RRT team    Case discussed with:  [x]Patient  []Family  [x]Nursing  [x]Case Management  DVT Prophylaxis:  []Lovenox  []Hep SQ  [x]SCDs  []Coumadin   []On Heparin gtt    Patient Active Problem List   Diagnosis Code    Anemia D64.9    Acidosis E87.2    Cardiac arrest (HCC) I46.9    Respiratory failure (HCC) J96.90    ESRD needing dialysis ([facility] [address] N18.6, Z99.2    Anoxic brain damage (HCC) G93.1    Colitis K52.9    Hypotension I95.9    Acute GI bleeding K92.2    ESRD (end stage renal disease) (HCC) N18.6    Sepsis (HCC) A41.9       Objective:   VS:   Visit Vitals    BP 93/51 (BP 1 Location: Left arm, BP Patient Position: At rest)    Pulse 84    Temp 98.2 °F (36.8 °C)    Resp 18    Ht 6' 3\" (1.905 m)  Comment: per chart history    Wt 63.2 kg (139 lb 6.4 oz)    SpO2 99%    BMI 17.42 kg/m2      Tmax/24hrs: Temp (24hrs), Av.3 °F (36.8 °C), Min:97.1 °F (36.2 °C), Max:99.2 °F (37.3 °C)  IOBRIEF    Intake/Output Summary (Last 24 hours) at 17 1230  Last data filed at 17 0648   Gross per 24 hour   Intake              120 ml   Output             1000 ml   Net             -880 ml       General:  Alert awake in nad  Pulmonary:  CTA Bilaterally. Cardiovascular: Regular rate and Rhythm. GI:  Soft, Non distended, Non tender. + Bowel sounds. Extremities:  No edema, cyanosis, clubbing. Neuro: AAOx1-2. S;low to respond. R side weakness.     Diffuse excoriation in perianal area          Medications:   Current Facility-Administered Medications   Medication Dose Route Frequency    metroNIDAZOLE (FLAGYL) tablet 500 mg  500 mg Oral TID    lactobacillus sp. 50 billion cpu (BIO-K PLUS) capsule 1 Cap  1 Cap Oral DAILY    loperamide (IMODIUM) capsule 2 mg  2 mg Oral Q6H PRN    HYDROcodone-acetaminophen (NORCO) 5-325 mg per tablet 1-2 Tab  1-2 Tab Oral Q6H PRN    heparin (porcine) injection 5,000 Units  5,000 Units SubCUTAneous Q8H    heparin (porcine) 1,000 unit/mL injection 2,000 Units  2,000 Units IntraVENous DIALYSIS ONCE    vits A and D-white pet-lanolin (A&D) ointment   Topical TID    levETIRAcetam (KEPPRA) tablet 500 mg  500 mg Oral BID    famotidine (PEPCID) tablet 20 mg  20 mg Oral DAILY    simvastatin (ZOCOR) tablet 20 mg  20 mg Oral QHS    doxercalciferol (HECTOROL) 4 mcg/2 mL injection 3 mcg  3 mcg IntraVENous DIALYSIS MON, WED & FRI    epoetin benjamin (EPOGEN;PROCRIT) injection 10,000 Units  10,000 Units IntraVENous DIALYSIS MON, WED & FRI    insulin lispro (HUMALOG) injection   SubCUTAneous AC&HS    aspirin chewable tablet 81 mg  81 mg Oral DAILY    hydrocortisone (CORTEF) tablet 10 mg  10 mg Oral BID WITH MEALS    glucose chewable tablet 16 g  4 Tab Oral PRN    glucagon (GLUCAGEN) injection 1 mg  1 mg IntraMUSCular PRN    dextrose (D50W) injection syrg 12.5-25 g  25-50 mL IntraVENous PRN    0.9% sodium chloride infusion  100 mL/hr IntraVENous DIALYSIS PRN    naloxone (NARCAN) injection 0.4 mg  0.4 mg IntraVENous PRN       Labs:    Recent Results (from the past 24 hour(s))   GLUCOSE, POC    Collection Time: 08/12/17  3:52 PM   Result Value Ref Range    Glucose (POC) 317 (H) 70 - 110 mg/dL   GLUCOSE, POC    Collection Time: 08/12/17  9:30 PM   Result Value Ref Range    Glucose (POC) 159 (H) 70 - 110 mg/dL   CALCIUM, IONIZED    Collection Time: 08/13/17  3:59 AM   Result Value Ref Range    Ionized Calcium 1.03 (L) 1.12 - 1.32 MMOL/L   CBC WITH AUTOMATED DIFF    Collection Time: 08/13/17  3:59 AM   Result Value Ref Range    WBC 14.2 (H) 4.6 - 13.2 K/uL    RBC 3.15 (L) 4.70 - 5.50 M/uL    HGB 9.6 (L) 13.0 - 16.0 g/dL    HCT 29.5 (L) 36.0 - 48.0 %    MCV 93.7 74.0 - 97.0 FL    MCH 30.5 24.0 - 34.0 PG    MCHC 32.5 31.0 - 37.0 g/dL    RDW 18.3 (H) 11.6 - 14.5 %    PLATELET 945 (H) 659 - 420 K/uL    MPV 10.1 9.2 - 11.8 FL    NEUTROPHILS 74 (H) 40 - 73 %    LYMPHOCYTES 17 (L) 21 - 52 %    MONOCYTES 7 3 - 10 %    EOSINOPHILS 2 0 - 5 %    BASOPHILS 0 0 - 2 %    ABS. NEUTROPHILS 10.6 (H) 1.8 - 8.0 K/UL    ABS. LYMPHOCYTES 2.4 0.9 - 3.6 K/UL    ABS. MONOCYTES 1.0 0.05 - 1.2 K/UL    ABS. EOSINOPHILS 0.3 0.0 - 0.4 K/UL    ABS.  BASOPHILS 0.1 0.0 - 0.1 K/UL    DF AUTOMATED     METABOLIC PANEL, BASIC    Collection Time: 08/13/17  3:59 AM   Result Value Ref Range    Sodium 141 136 - 145 mmol/L    Potassium 4.1 3.5 - 5.5 mmol/L    Chloride 110 (H) 100 - 108 mmol/L    CO2 20 (L) 21 - 32 mmol/L    Anion gap 11 3.0 - 18 mmol/L    Glucose 150 (H) 74 - 99 mg/dL    BUN 27 (H) 7.0 - 18 MG/DL    Creatinine 7.29 (H) 0.6 - 1.3 MG/DL    BUN/Creatinine ratio 4 (L) 12 - 20      GFR est AA 10 (L) >60 ml/min/1.73m2    GFR est non-AA 8 (L) >60 ml/min/1.73m2    Calcium 8.0 (L) 8.5 - 10.1 MG/DL   GLUCOSE, POC    Collection Time: 08/13/17  8:26 AM   Result Value Ref Range    Glucose (POC) 285 (H) 70 - 110 mg/dL   GLUCOSE, POC    Collection Time: 08/13/17 11:16 AM   Result Value Ref Range    Glucose (POC) 117 (H) 70 - 110 mg/dL       Signed By: Kassie Gomez MD     August 13, 2017

## 2017-08-13 NOTE — PROGRESS NOTES
RENAL DAILY PROGRESS NOTE    Subjective:   Admitted postcode, seen for ESRD and HD    Complaint: eating, still with diarrhea    Current Facility-Administered Medications   Medication Dose Route Frequency    acetaminophen (TYLENOL) tablet 650 mg  650 mg Oral Q4H PRN    dextrose 5% and 0.9% NaCl infusion  40 mL/hr IntraVENous CONTINUOUS    metroNIDAZOLE (FLAGYL) tablet 500 mg  500 mg Oral TID    lactobacillus sp. 50 billion cpu (BIO-K PLUS) capsule 1 Cap  1 Cap Oral DAILY    loperamide (IMODIUM) capsule 2 mg  2 mg Oral Q6H PRN    heparin (porcine) injection 5,000 Units  5,000 Units SubCUTAneous Q8H    heparin (porcine) 1,000 unit/mL injection 2,000 Units  2,000 Units IntraVENous DIALYSIS ONCE    vits A and D-white pet-lanolin (A&D) ointment   Topical TID    levETIRAcetam (KEPPRA) tablet 500 mg  500 mg Oral BID    famotidine (PEPCID) tablet 20 mg  20 mg Oral DAILY    simvastatin (ZOCOR) tablet 20 mg  20 mg Oral QHS    doxercalciferol (HECTOROL) 4 mcg/2 mL injection 3 mcg  3 mcg IntraVENous DIALYSIS MON, WED & FRI    epoetin benjamin (EPOGEN;PROCRIT) injection 10,000 Units  10,000 Units IntraVENous DIALYSIS MON, WED & FRI    insulin lispro (HUMALOG) injection   SubCUTAneous AC&HS    aspirin chewable tablet 81 mg  81 mg Oral DAILY    hydrocortisone (CORTEF) tablet 10 mg  10 mg Oral BID WITH MEALS    glucose chewable tablet 16 g  4 Tab Oral PRN    glucagon (GLUCAGEN) injection 1 mg  1 mg IntraMUSCular PRN    dextrose (D50W) injection syrg 12.5-25 g  25-50 mL IntraVENous PRN    0.9% sodium chloride infusion  100 mL/hr IntraVENous DIALYSIS PRN    naloxone (NARCAN) injection 0.4 mg  0.4 mg IntraVENous PRN           Objective:     Patient Vitals for the past 24 hrs:   Temp Pulse Resp BP SpO2   08/13/17 1223 98.2 °F (36.8 °C) 84 18 93/51 99 %   08/13/17 0820 97.1 °F (36.2 °C) 76 18 131/64 99 %   08/13/17 0405 98.1 °F (36.7 °C) 74 16 104/66 98 %   08/12/17 2325 99.2 °F (37.3 °C) 73 18 125/61 100 % 08/12/17 2000 98.8 °F (37.1 °C) 83 18 131/59 98 %   08/12/17 1643 98.2 °F (36.8 °C) 87 (!) 96 - -        Weight change:      08/11 1901 - 08/13 0700  In: 330 [P.O.:330]  Out: 1300 [Drains:500]    Intake/Output Summary (Last 24 hours) at 08/13/17 1429  Last data filed at 08/13/17 0648   Gross per 24 hour   Intake              120 ml   Output             1000 ml   Net             -880 ml     Physical Exam: drowsy not in any resp distress    HEENT: non icteric  Neck: no JVD  Cardiovascular: regular no rub  C/L: clear breath sounds  Abdomen: soft, + BS  Ext:  Left arm AVF + bruit    Data Review:     LABS:   Hematology:   Recent Labs      08/13/17   0359  08/12/17   0351  08/11/17   0352   WBC  14.2*  14.1*  13.5*   HGB  9.6*  9.7*  10.1*   HCT  29.5*  29.6*  30.4*   pl 464K  Chemistry:   Recent Labs      08/13/17   0359  08/12/17   0351   BUN  27*  22*   CREA  7.29*  5.71*   CA  8.0*  7.6*   K  4.1  3.4*   NA  141  138   CL  110*  106   CO2  20*  24   PHOS   --   4.0   GLU  150*  238*            IMPRESSION AND PLAN:   ESRD sched HD MWF no UF  Anemia from CKD low fe stores but high ferritin,Hgb stable cont ALONDRA with HD  E.  Coli bacteremia remains on zosyn  Hypokalemia resolved, cont with K3 bath on HD  Hypocalcemia from 2nd HPT cont Hectorol with HD repeat phos normal no binders  Hypotension from diarrhea better with IVF            Simin Anders MD  8/13/2017

## 2017-08-13 NOTE — PROGRESS NOTES
SLP follow up completed; full report to follow. Recommend change diet to NPO. May need to consider alternative means of nutrition. D/w student RN. Will continue to follow for further dysphagia management.       Thank you for this referral,   Rosalind Mcmanus M.S., [id number] [facility]  Speech-Language Pathologist

## 2017-08-13 NOTE — PROGRESS NOTES
responded to Rapid Response for  [facility], who is a 55 y. o.,male,     The  provided the following Interventions:  Provided crisis spiritual care and support. Offered prayers on behalf for the patient. Chart reviewed. The following outcomes were achieved:      Assessment:  There are no further spiritual or Sikhism issues which require intervention at this time. Plan:  Chaplains will continue to follow and will provide spiritual care as needed.  recommends bedside caregivers page  on duty if patient or family shows signs of acute spiritual or emotional distress.        Verona Vargas, [address] [facility]  [phone]

## 2017-08-13 NOTE — PROGRESS NOTES
Problem: Dysphagia (Adult)  Goal: *Acute Goals and Plan of Care (Insert Text)  Recommendations:  NPO; may need to consider alternative means of nutrition  Strict aspiration precautions (HOB >30 degrees at all times, oral care TID)    Patient will:  1. Tolerate PO trials with 0 s/s overt distress in 4/5 trials  2. Utilize compensatory swallow strategies/maneuvers (decrease bite/sip, size/rate, alt. liq/sol) with min cues in 4/5 trials  3. Perform oral-motor/laryngeal exercises to increase oropharyngeal swallow function with min cues  4. Complete an objective swallow study (i.e., MBSS) to assess swallow integrity, r/o aspiration, and determine of safest LRD, min A - MET 8/7/17       Outcome: Not Progressing Towards Goal  SPEECH LANGUAGE PATHOLOGY DYSPHAGIA TREATMENT     Patient: Rudy Sher [phone] y.o. male)  Date: 8/13/2017  Diagnosis: Cardiac arrest ([address]  Acidosis  Respiratory failure ([address]  Anemia  ESRD needing dialysis ([address]  Cardiac arrest ([address]  Acute GI bleeding  Sepsis ([address]  Hypotension  Anoxic brain damage (HCC)  ESRD (end stage renal disease) ([address]  Colitis Sepsis ([address]  Precautions: Fall, Skin, Aspiration (FMS)      ASSESSMENT:  Pt seen for follow up dysphagia tx with student RN present at b/s performing oral suctioning via [address] upon SLP arrival.  Per RN, pt unable to tolerate puree, NTL breakfast tray with noted coughing with all intake and reduced oral clearance. Pt offered HTL trials by tsp, demo delayed a-p transfer, swallow delay and immediate weak/wet cough. Pt with ~10% lingual spread post swallow that was cleared by student RN with oral suctioning. Pt with poor endurance and lethargic; therefore, po attempts limited due to high aspiration risk. Recommend change diet to NPO; may need to consider alternative means of nutrition. Further recommend strict aspiration precautions including oral care TID and HOB >30 degrees at all times. D/w pt and student RN.   ST will continue to follow for further dysphagia management. Progression toward goals:  [ ]         Improving appropriately and progressing toward goals  [ ]         Improving slowly and progressing toward goals  [X]         Not making progress toward goals and plan of care will be adjusted       PLAN:  Recommendations and Planned Interventions:  Patient continues to benefit from skilled intervention to address the above impairments. Continue treatment per established plan of care. Discharge Recommendations:  Skilled Nursing Facility       SUBJECTIVE:   Patient stated Elo Bolds. OBJECTIVE:   Cognitive and Communication Status:  Neurologic State: Lethargic  Orientation Level: Disoriented to situation, Oriented to person, Oriented to place, Oriented to time  Cognition: Appropriate decision making, Appropriate for age attention/concentration, Appropriate safety awareness, Follows commands  Perception: Appears intact  Perseveration: No perseveration noted  Safety/Judgement: Fall prevention  Dysphagia Treatment:  Oral Assessment:  Oral Assessment  Labial: Decreased rate  Dentition: Edentulous  Oral Hygiene: WFL  Lingual: Decreased rate, Decreased strength  Velum: No impairment  Mandible: No impairment  Gag Reflex: Absent  P.O.  Trials:              Patient Position: HOB 50*              Vocal quality prior to P.O.: Low volume, Hoarse              Consistency Presented: Honey thick liquid              How Presented: SLP-fed/presented, Spoon              Bolus Acceptance: No impairment              Bolus Formation/Control: Impaired              Type of Impairment: Delayed              Propulsion: Delayed (# of seconds), Discoordination              Oral Residue: Less than 10% of bolus, Lingual              Initiation of Swallow: Delayed (# of seconds)              Laryngeal Elevation: Decreased, Weak              Aspiration Signs/Symptoms: Weak cough, Clear throat              Pharyngeal Phase Characteristics: Altered vocal quality, Effortful swallow, Easily fatigued , Poor endurance              Effective Modifications: None              Cues for Modifications: Moderate              Oral Phase Severity: Moderate-severe              Pharyngeal Phase Severity : Severe                PAIN:  No pain reported prior to or following treatment      After treatment:   [ ]              Patient left in no apparent distress sitting up in chair  [X]              Patient left in no apparent distress in bed  [X]              Call bell left within reach  [X]              Nursing notified  [ ]              Caregiver present  [ ]              Bed alarm activated         COMMUNICATION/EDUCATION:   [X]              SLP educated pt with regard to diet recs, aspiration risk, oral care and positioning.        Anitra Eden M.S., [id number] [facility]  Speech-Language Pathologist

## 2017-08-14 ENCOUNTER — APPOINTMENT (OUTPATIENT)
Dept: GENERAL RADIOLOGY | Age: 57
DRG: 004 | End: 2017-08-14
Attending: HOSPITALIST
Payer: MEDICARE

## 2017-08-14 LAB
ANION GAP BLD CALC-SCNC: 12 MMOL/L (ref 3–18)
BASOPHILS # BLD AUTO: 0.1 K/UL (ref 0–0.1)
BASOPHILS # BLD: 1 % (ref 0–2)
BUN SERPL-MCNC: 35 MG/DL (ref 7–18)
BUN/CREAT SERPL: 4 (ref 12–20)
CALCIUM SERPL-MCNC: 7.4 MG/DL (ref 8.5–10.1)
CHLORIDE SERPL-SCNC: 108 MMOL/L (ref 100–108)
CO2 SERPL-SCNC: 22 MMOL/L (ref 21–32)
CREAT SERPL-MCNC: 8.73 MG/DL (ref 0.6–1.3)
DIFFERENTIAL METHOD BLD: ABNORMAL
EOSINOPHIL # BLD: 0.3 K/UL (ref 0–0.4)
EOSINOPHIL NFR BLD: 3 % (ref 0–5)
ERYTHROCYTE [DISTWIDTH] IN BLOOD BY AUTOMATED COUNT: 18.1 % (ref 11.6–14.5)
GLUCOSE BLD STRIP.AUTO-MCNC: 106 MG/DL (ref 70–110)
GLUCOSE BLD STRIP.AUTO-MCNC: 128 MG/DL (ref 70–110)
GLUCOSE BLD STRIP.AUTO-MCNC: 81 MG/DL (ref 70–110)
GLUCOSE SERPL-MCNC: 124 MG/DL (ref 74–99)
HCT VFR BLD AUTO: 28.7 % (ref 36–48)
HGB BLD-MCNC: 9.3 G/DL (ref 13–16)
LYMPHOCYTES # BLD AUTO: 17 % (ref 21–52)
LYMPHOCYTES # BLD: 1.9 K/UL (ref 0.9–3.6)
MCH RBC QN AUTO: 30.4 PG (ref 24–34)
MCHC RBC AUTO-ENTMCNC: 32.4 G/DL (ref 31–37)
MCV RBC AUTO: 93.8 FL (ref 74–97)
MONOCYTES # BLD: 1.2 K/UL (ref 0.05–1.2)
MONOCYTES NFR BLD AUTO: 10 % (ref 3–10)
NEUTS SEG # BLD: 8.1 K/UL (ref 1.8–8)
NEUTS SEG NFR BLD AUTO: 69 % (ref 40–73)
PLATELET # BLD AUTO: 468 K/UL (ref 135–420)
PMV BLD AUTO: 10.3 FL (ref 9.2–11.8)
POTASSIUM SERPL-SCNC: 4.4 MMOL/L (ref 3.5–5.5)
RBC # BLD AUTO: 3.06 M/UL (ref 4.7–5.5)
SODIUM SERPL-SCNC: 142 MMOL/L (ref 136–145)
WBC # BLD AUTO: 11.6 K/UL (ref 4.6–13.2)

## 2017-08-14 PROCEDURE — 36415 COLL VENOUS BLD VENIPUNCTURE: CPT | Performed by: PHYSICIAN ASSISTANT

## 2017-08-14 PROCEDURE — 97110 THERAPEUTIC EXERCISES: CPT

## 2017-08-14 PROCEDURE — 74000 XR ABD (KUB): CPT

## 2017-08-14 PROCEDURE — 90935 HEMODIALYSIS ONE EVALUATION: CPT

## 2017-08-14 PROCEDURE — 92526 ORAL FUNCTION THERAPY: CPT

## 2017-08-14 PROCEDURE — 74011250636 HC RX REV CODE- 250/636: Performed by: INTERNAL MEDICINE

## 2017-08-14 PROCEDURE — 77030018836 HC SOL IRR NACL ICUM -A

## 2017-08-14 PROCEDURE — 77030004950 HC CATH ENTRL NG COVD -A

## 2017-08-14 PROCEDURE — 80048 BASIC METABOLIC PNL TOTAL CA: CPT | Performed by: PHYSICIAN ASSISTANT

## 2017-08-14 PROCEDURE — 74011250636 HC RX REV CODE- 250/636: Performed by: HOSPITALIST

## 2017-08-14 PROCEDURE — 77030008771 HC TU NG SALEM SUMP -A

## 2017-08-14 PROCEDURE — 85025 COMPLETE CBC W/AUTO DIFF WBC: CPT | Performed by: PHYSICIAN ASSISTANT

## 2017-08-14 PROCEDURE — 65660000000 HC RM CCU STEPDOWN

## 2017-08-14 PROCEDURE — 82962 GLUCOSE BLOOD TEST: CPT

## 2017-08-14 RX ORDER — LEVETIRACETAM 5 MG/ML
500 INJECTION INTRAVASCULAR EVERY 12 HOURS
Status: DISCONTINUED | OUTPATIENT
Start: 2017-08-14 | End: 2017-08-21

## 2017-08-14 RX ADMIN — VITAMIN A AND D: 30.8 OINTMENT TOPICAL at 22:35

## 2017-08-14 RX ADMIN — DOXERCALCIFEROL 3 MCG: 4 INJECTION, SOLUTION INTRAVENOUS at 12:19

## 2017-08-14 RX ADMIN — VITAMIN A AND D: 30.8 OINTMENT TOPICAL at 09:00

## 2017-08-14 RX ADMIN — ERYTHROPOIETIN 10000 UNITS: 10000 INJECTION, SOLUTION INTRAVENOUS; SUBCUTANEOUS at 12:19

## 2017-08-14 RX ADMIN — HEPARIN SODIUM 5000 UNITS: 5000 INJECTION, SOLUTION INTRAVENOUS; SUBCUTANEOUS at 02:22

## 2017-08-14 RX ADMIN — VITAMIN A AND D: 30.8 OINTMENT TOPICAL at 19:00

## 2017-08-14 RX ADMIN — LEVETIRACETAM 500 MG: 5 INJECTION INTRAVENOUS at 19:25

## 2017-08-14 NOTE — PROGRESS NOTES
Problem: Dysphagia (Adult)  Goal: *Acute Goals and Plan of Care (Insert Text)  Recommendations:  NPO; may need to consider alternative means of nutrition  Strict aspiration precautions (HOB >30 degrees at all times, oral care TID)    Patient will:  1. Tolerate PO trials with 0 s/s overt distress in 4/5 trials  2. Utilize compensatory swallow strategies/maneuvers (decrease bite/sip, size/rate, alt. liq/sol) with min cues in 4/5 trials  3. Perform oral-motor/laryngeal exercises to increase oropharyngeal swallow function with min cues  4. Complete an objective swallow study (i.e., MBSS) to assess swallow integrity, r/o aspiration, and determine of safest LRD, min A - MET 8/7/17       Outcome: Not Progressing Towards Goal  SPEECH LANGUAGE PATHOLOGY DYSPHAGIA TREATMENT     Patient: Debby Barksdale [de-identified]55 y.o. male)  Date: 8/14/2017  Diagnosis: Cardiac arrest [address] [address]  Acidosis  Respiratory failure (HCC)  Anemia  ESRD needing dialysis ([facility] [address]  Cardiac arrest ([address]  Acute GI bleeding  Sepsis ([address]  Hypotension  Anoxic brain damage (HCC)  ESRD (end stage renal disease) ([address]  Colitis Sepsis ([address]       Precautions:  Fall, Skin, Aspiration (FMS)      ASSESSMENT:  Pt was seen at bedside for f/u dysphagia management. Pt with very congested cough and wet vocal quality upon SLP arrival. Pt with immediate overt congested cough and clearing of throat with 1/1 ice chip trial which required suction from yankauer by SLP post swallow. Watering eyes, increase in RR, and vocal stridor also noted. No further PO trials were given at this time secondary to high risk of aspiration. Laryngeal elevation appeared severely delayed and weak to palpation. Rec NPO with consideration of an alternate means of nutrition/hydration vs comfort feeds, aspiration precautions, and oral care TID. ST will continue to follow.       Progression toward goals:  [ ]         Improving appropriately and progressing toward goals  [ ]         Improving slowly and progressing toward goals  [X]         Not making progress toward goals and plan of care will be adjusted       PLAN:  Recommendations and Planned Interventions: See above  Patient continues to benefit from skilled intervention to address the above impairments. Continue treatment per established plan of care. Discharge Recommendations:  [facility] Bradley and To Be Determined       SUBJECTIVE:   Patient stated I'm so dry. OBJECTIVE:   Cognitive and Communication Status:  Neurologic State: Alert, Eyes open spontaneously  Orientation Level: Disoriented to situation, Disoriented to time, Oriented to person, Oriented to place  Cognition: Follows commands  Perception: Appears intact  Perseveration: No perseveration noted  Safety/Judgement: Fall prevention  Dysphagia Treatment:  Oral Assessment:  Oral Assessment  Labial: Decreased rate  Dentition: Edentulous  Oral Hygiene: WFL  Lingual: Decreased rate, Decreased strength  Velum: No impairment  Mandible: No impairment  Gag Reflex: Absent  P.O. Trials:              Patient Position: HOB 55*              Vocal quality prior to P.O.: Low volume, Hoarse              Consistency Presented: ice chip              How Presented: SLP-fed/presented, Spoon              Bolus Acceptance: No impairment              Bolus Formation/Control: Impaired              Type of Impairment: Delayed              Propulsion: Delayed (# of seconds), Discoordination              Oral Residue: Less than 10% of bolus, Lingual              Initiation of Swallow: Delayed (# of seconds)              Laryngeal Elevation: Decreased, Weak              Aspiration Signs/Symptoms: Weak cough, Clear throat              Pharyngeal Phase Characteristics: Altered vocal quality, Effortful swallow, Easily fatigued , Poor endurance              Effective Modifications: None              Cues for Modifications:  Moderate                             Oral Phase Severity: Moderate-severe              Pharyngeal Phase Severity : Severe   PAIN:  Pt reports 0/10 pain or discomfort prior to tx. Pt reports 0/10 pain or discomfort post tx. After treatment:   [ ]              Patient left in no apparent distress sitting up in chair  [X]              Patient left in no apparent distress in bed  [X]              Call bell left within reach  [X]              Nursing notified  [ ]              Caregiver present  [ ]              Bed alarm activated         COMMUNICATION/EDUCATION:   [X]              SLP educated pt on aspiration symptoms, risk of aspiration with PO, and role of SLP with head nod for understanding.       Thank you for this referral.     Joaquin Motley M.S. CCC-SLP/L  Speech-Language Pathologist

## 2017-08-14 NOTE — DIALYSIS
ACUTE HEMODIALYSIS FLOW SHEET    HEMODIALYSIS ORDERS: Physician: Ruslan Pedro     Dialyzer: aclear         Duration: 3 hr  BFR: 300   DFR: 600   Dialysate:  K+   3    Ca+  3   Weight:    kg    Bed Scale []     Unable to Obtain []      UF Goal:  0       Heparin []  Bolus      Units    [] Hourly       Units    [x]None   Pre BP:   136/67    Pulse:     83        Temperature:   97.6  Respirations: 18  Tx: NS           ml/Bolus  Other        [x] N/A   Labs: Pre        Post:        [x] N/A   Additional Orders:           [x] Time Out/Safety Check  [x] [facility] Consent Verified     CATHETER ACCESS: [x]N/A   []Right   []Left   []IJ     []Fem   [] First use X-ray verified     []Tunnel                [] Non Tunneled   []No S/S infection  []Redness  []Drainage []Cultured []Swelling []Pain   []Medical Aseptic Prep Utilized   []Dressing Changed  [] Biopatch  Date:       []Clotted   []Patent   Flows: []Good  []Poor  []Reversed   If access problem,  notified: []Yes    []N/A  Date:           GRAFT/FISTULA ACCESS:  []N/A     []Right     [x]Left     [x]UE     []LE   [x]AVG   []AVF        []Buttonhole    [x]Medical Aseptic Prep Utilized   [x]No S/S infection  []Redness  []Drainage []Cultured []Swelling []Pain    Bruit:   [x] Strong    [] Weak       Thrill :   [x] Strong    [] Weak       Needle Gauge: 15   Length: 1     If access problem,  notified: []Yes     [x]N/A  Date:        Please describe access if present and not used:       GENERAL ASSESSMENT:    LUNGS: Sat%           [] Clear  [x] Coarse  [] Crackles  [] Wheezing        [] Diminished     Location : []RLL   []LLL    [x]RUL  [x]   Cough: [x]Productive  []Dry  []N/A   Respirations:  [x]Easy  []Labored   Therapy:  [x]RA  []NC         l/min    Mask: []NRB []Venti       O2%                  []Ventilator  []Intubated  []Trach   CARDIAC: [x]Regular      [] Irregular   [] Pericardial Rub  [] JVD        []  Monitored  [] N/A  Rhythm:         EDEMA: [x] None  []Generalized [] Pitting [] 1    [] 2    [] 3    [] 4                 [] Facial  [] Pedal  []  UE  [] LE   SKIN:   [x] Warm  [] Hot     [] Cold   [x] Dry     [] Pale   [] Diaphoretic                  [] Flushed  [] Jaundiced  [] Cyanotic  [] Rash  [] Weeping   LOC:    [x] Alert      [x]Oriented:    [x] Person     [] Place  []Time               [x] Confused  [] Lethargic  [] Medicated  [] Non-responsive     GI / ABDOMEN:  [x] Flat    [] Distended    [x] Soft    [] Firm   []  Obese                             [] Diarrhea  [x] Bowel Sounds  [] Nausea  [] Vomiting       / URINE ASSESSMENT:[] Voiding   [x] Oliguria  [] Anuria   []  Stanton     [] Incontinent    []  Incontinent Brief      []  Bathroom Privileges     PAIN: [x] 0 []1  []2   []3   []4   []5   []6   []7   []8   []9   []10            Scale 0-10  Action/Follow Up:         MOBILITY:  [] Amb    [] Amb/Assist    [x] Bed    [] Wheelchair  [] Stretcher      All Vitals and Treatment Details on Attached [facility]           Room # 405     [] 1st Time Acute  [] Stat  [x] Routine  [] Urgent     [x] Acute Room  []  Bedside  [] ICU/CCU  [] ER   Isolation Precautions:  [x] Dialysis   [] Airborne   [] Contact    [] Reverse   Special Considerations:         [] Blood Consent Verified [x]N/A     ALLERGIES:   [x] NKA          Code Status:  [x] Full Code  [] DNR  [] Other           HBsAg ONLY: Date Drawn 7/28/17               [x]Negative []Positive []Unknown   HBsAb: Date 7/28/17           [] Susceptible   [x] [id number] []Not Drawn      Current Labs:    Date of Labs: Today [x]     Results for Glen Patterson (MRN [id number]) as of 8/14/2017 10:19   Ref.  Range 8/14/2017 02:43   WBC Latest Ref Range: 4.6 - 13.2 K/uL 11.6   RBC Latest Ref Range: 4.70 - 5.50 M/uL 3.06 (L)   HGB Latest Ref Range: 13.0 - 16.0 g/dL 9.3 (L)   HCT Latest Ref Range: 36.0 - 48.0 % 28.7 (L)   MCV Latest Ref Range: 74.0 - 97.0 FL 93.8   MCH Latest Ref Range: 24.0 - 34.0 PG 30.4   MCHC Latest Ref Range: 31.0 - 37.0 g/dL 32.4   RDW Latest Ref Range: 11.6 - 14.5 % 18.1 (H)   PLATELET Latest Ref Range: 135 - 420 K/uL 468 (H)   MPV Latest Ref Range: 9.2 - 11.8 FL 10.3   NEUTROPHILS Latest Ref Range: 40 - 73 % 69   LYMPHOCYTES Latest Ref Range: 21 - 52 % 17 (L)   MONOCYTES Latest Ref Range: 3 - 10 % 10   EOSINOPHILS Latest Ref Range: 0 - 5 % 3   BASOPHILS Latest Ref Range: 0 - 2 % 1   DF Latest Units:   AUTOMATED   ABS. NEUTROPHILS Latest Ref Range: 1.8 - 8.0 K/UL 8.1 (H)   ABS. LYMPHOCYTES Latest Ref Range: 0.9 - 3.6 K/UL 1.9   ABS. MONOCYTES Latest Ref Range: 0.05 - 1.2 K/UL 1.2   ABS. EOSINOPHILS Latest Ref Range: 0.0 - 0.4 K/UL 0.3   ABS. BASOPHILS Latest Ref Range: 0.0 - 0.1 K/UL 0.1   Results for Claudette Chapman (MRN [id number]) as of 8/14/2017 10:19   Ref. Range 8/14/2017 02:43   Sodium Latest Ref Range: 136 - 145 mmol/L 142   Potassium Latest Ref Range: 3.5 - 5.5 mmol/L 4.4   Chloride Latest Ref Range: 100 - 108 mmol/L 108   CO2 Latest Ref Range: 21 - 32 mmol/L 22   Anion gap Latest Ref Range: 3.0 - 18 mmol/L 12   Glucose Latest Ref Range: 74 - 99 mg/dL 124 (H)   BUN Latest Ref Range: 7.0 - 18 MG/DL 35 (H)   Creatinine Latest Ref Range: 0.6 - 1.3 MG/DL 8.73 (H)   BUN/Creatinine ratio Latest Ref Range: 12 - 20   4 (L)   Calcium Latest Ref Range: 8.5 - 10.1 MG/DL 7.4 (L)   GFR est non-AA Latest Ref Range: >60 ml/min/1.73m2 6 (L)   GFR est AA Latest Ref Range: >60 ml/min/1.73m2 8 (L)                                                                                                                                   DIET:  [] Renal    [] Other     [x] NPO     []  Diabetic      PRIMARY NURSE REPORT: First initial/Last name/Title      Pre Dialysis:EDUARD Otto RN     Time: 0900       EDUCATION:    [x] Patient [] Other         Knowledge Basis: []None [x]Minimal []Substantial   [] Access Care     [] S&S of infection     [] Fluid Management     []K+     [x]Procedural    []Albumin     [] Medications [] Tx Options     [] Transplant     [] Diet     [] Other   Teaching Tools:  [x] Explain  [] Demo  [] Handouts [] Video   Inappropriate due to            [address] Before each treatment:     Machine Number:                   [facility]                                  [x] Unit Machine # 5 with centralized RO                                  [] Portable Machine #1/RO serial # [id number]                                  [] Portable Machine #2/RO serial # [id number]                                  [] Portable Machine #3/RO serial # [id number]                                                                                                       [facility]                                  [] Portable Machine #11/RO serial # [id number]                                   [] Portable Machine #12/RO serial # [id number]                                  [] Portable Machine #13/RO serial #  [id number]      Alarm Test: [x]Pass           Other:         [x] RO/Machine Log Complete      Temp    36.5            [x]Extracorporeal Circuit Tested for integrity   Dialysate: pH  7.4 Conductivity: Meter   14.4     HD Machine   14.3                  TCD: 14.0  Dialyzer Lot # [id number]                             Blood Tubing Lot #[id number]                Saline Lot #  [id number]     CHLORINE TESTING-Before each treatment and every 4 hours    Total Chlorine: [x] less than 0.1 ppm  Time: 0930 4 Hr Check Time:    (if greater than 0.1 ppm from Primary then every 30 minutes from Secondary)     TREATMENT INITIATION  with Dialysis Precautions:   [x] All Connections Secured                 [x] Saline Line Double Clamped   [x] Venous Parameters Set                  [x] Arterial Parameters Set    [x] Prime Given  ml  250             [x]Air Foam Detector Engaged      Treatment Initiation Note: Pt arrived to unit, alert and oriented only to self. VSS and in no acute distress. Consent verified.  Dr. Iván Urena at bedside. Treatment initiated via LUE AVG without complications. Will continue to monitor throughout treatment. Medication Dose Volume Route Initials Dialyzer Cleared: [] Good [x] Fair  [] Poor    Blood processed: 51.8   L  UF Removed 0    Ml    Post Wt:     kg  POst BP:   162/87       Pulse: 83      Respirations: 18  Temperature: 97.6     hectorol 3 mcg 1.5 ml IV SN Post Tx Vascular Access: AVF/AVG: Bleeding stopped Art   3   min. Oscar. 3   Min   N/A     epogen 28705 units 1ml IV SN Catheter: Locking solution: Heparin 1:1000 Art. Oscar. N/A                                 Post Assessment:                                    Skin:  [x] Warm  [x] Dry [] Diaphoretic     [] Flushed  [] Pale [] Cyanotic   [facility] Signatures Title Initials  Time Lungs: [] Clear    [x] Course  [] Crackles  [] Wheezing   Iliana North Java RN SN  Cardiac: [x] Regular   [] Irregular   [] Monitor  [] N/A  Rhythm:           Edema:  [x] None    [] General     [] Facial   [] Pedal    [] UE    [] LE       Pain: []0  []1  []2   []3  []4   [x]5   []6   []7   []8   []9   []10         Post Treatment Note: Pt completed treatment without complciations. All blood returned via AVG. VSS, no acute distress. Pt is c/o headache 5/10 on pain scale. Relayed to primary RN for continuity of care.           POST TREATMENT PRIMARY NURSE HANDOFF REPORT:     First initial/Last name/Title         Post Dialysis:  Arcelia Resendez, RN    Time: 1300          Abbreviations: AVG-arterial venous graft, AVF-arterial venous fistula, IJ-Internal Jugular, Subcl-Subclavian, Fem-Femoral, Tx-treatment, AP/HR-apical heart rate, DFR-dialysate flow rate, BFR-blood flow rate, AP-arterial pressure, -venous pressure, UF-ultrafiltrate, TMP-transmembrane pressure, Oscar-Venous, Art-Arterial, RO-Reverse Osmosis

## 2017-08-14 NOTE — PROGRESS NOTES
Received call from MRI. Patient claustrophobic and needs med to be able to get test completed. MD notified, order received for 1 mg IV Ativan. Met in MRI and medication given    2310:  Patient on floor from MRI.

## 2017-08-14 NOTE — PROGRESS NOTES
NUTRITION    Nursing Referral: MST, Pressure Ulcer  Nutrition Consult: Management of Tube Feeding     RECOMMENDATIONS / PLAN:     - Start tube feeds of Nepro at 20 mL/hr and advance as pt tolerates by 10 mL q 4 hours to goal rate of 55 mL/hr with water flush of 150 mL q 4 hours  - Discontinue nutrition supplements given pt NPO per SLP  - Continue RD inpatient monitoring and evaluation. Goal Regimen: Nepro at 55 mL/hr + 175 mL q 4 hour water flushes to provide: 2376 kcal, 107 gm protein, 127 gm fat, 220 gm CHO, 21 gm fiber, 957 mL free water, 1857 mL total water, 100% RDIs     NUTRITION INTERVENTIONS & DIAGNOSIS:     [x] Enteral nutrition: plan to start  [x] Medical food supplementation: Nepro BID, Ensure Pudding BID  [x] IV fluids: D5-NS at 40 mL/hr (48 gm dextrose, 163 kcal)    Nutrition Diagnosis: increased protein needs related to promotion of wound healing and increased expenditure as evidenced by pressure ulcer wound and ESRD, pt on dialysis 3x per week  Altered nutrition related labs related to renal failure on dialysis, inadequate enteral provision of potassium as evidenced by hypokalemia, potassium 3.3 mmol/L. ASSESSMENT:     8/14: SLP following; recommend NPO. Noted plan for NGT placement and start tube feeds. Potassium WNL. 8/10: Pt reported good appetite and \"eating enough\" from his meals. Noted fair meal intake per chart. Consuming supplements. Discussed increasing Nepro frequency; pt declined. Discussed adding Ensure Pudding; pt agreed with plan. Po intake of meals/supplements encouraged. Has been receiving K replacement. 8/7: K remains low despite previous tube feeding changed to higher potassium formula. Pt extubated 8/5. Tube feeds discontinued 8/6. SLP following; pt s/p MBS today and started on po diet. Noted poor po intake lunch meal per chart. 8/3: K remains low despite continued replacement.  Will change to higher potassium formula per discussion with PA.   8/1: Tolerating feeding at goal. 1 L removed during HD 7/31. Hyperglycemia noted, advance to very insulin resistant SSI and basal insulin started. 7/31: Tolerating advancement of tube feeding. HD today. BG trending up, MD to stop D5.    7/30: Pt remain intubated. GI following; plan to start tube feeds today. Noted order placed; discussed modifying tube feed order and add water flushes with Dr Chester Carlin. RN unavailable; discussed with Nursing [place] regarding modifying tube feed order  7/28: S/p cardiac arrest and intubated 7/27, NGT clamped. Abdominal ultrasound today to rule out cholecystitis. Will wait to feed.      Average po intake adequate to meet patients estimated nutritional needs:   [] Yes     [x] No   [] Unable to determine at this time  EN infusion adequate to meet patients estimated nutritional needs:   [] Yes     [] No   [x] Unable to determine at this time    Tube Feeding: plan to start  Water Flushes: plan to start  Residuals: not applicable    Diet: DIET NUTRITIONAL SUPPLEMENTS Lunch, Dinner; [employer]  DIET NUTRITIONAL SUPPLEMENTS Breakfast, Lunch; ENSURE PUDDING  DIET NPO      Food Allergies: NKFA  Current Appetite:   [] Good     [] Fair     [] Poor     [x] Other: NPO   Appetite/meal intake prior to admission:   [] Good     [] Fair     [] Poor     [x] Other: unknown   Feeding Limitations:  [x] Swallowing difficulty: SLP following    [x] Chewing difficulty: pt downgraded to mechanical soft diet on 8/8 per Glycemic Control    [] Other:    Current Meal Intake:   Patient Vitals for the past 100 hrs:   % Diet Eaten   08/10/17 1849 75 %     BM: 8/13; FMS  Skin Integrity:   Stage II ressure ulcer anus; DTI sacrum; moisture associated skin damage posterior buttocks  Edema: 1+ UEs  Pertinent Medications: Reviewed: steroid     Recent Labs      08/14/17   0243  08/13/17   0359  08/12/17   0351   NA  142  141  138   K  4.4  4.1  3.4*   CL  108  110*  106   CO2  22  20*  24   GLU  124*  150*  238*   BUN  35*  27*  22*   CREA  8.73* 7.29*  5.71*   CA  7.4*  8.0*  7.6*   PHOS   --    --   4.0       Intake/Output Summary (Last 24 hours) at 08/14/17 1715  Last data filed at 08/14/17 1245   Gross per 24 hour   Intake                0 ml   Output                0 ml   Net                0 ml       Anthropometrics:  Ht Readings from Last 1 Encounters:   08/14/17 6' 2\" (1.88 m)     Last 3 Recorded Weights in this Encounter    08/12/17 0657 08/13/17 0734 08/14/17 0830   Weight: 63.5 kg (140 lb 1.6 oz) 63.2 kg (139 lb 6.4 oz) 59.1 kg (130 lb 4.7 oz)     Body mass index is 16.73 kg/(m^2). Underweight     Weight History:   Weight Metrics 8/14/2017   Weight 130 lb 4.7 oz   BMI 16.73 kg/m2        Admitting Diagnosis: Cardiac arrest (HCC)  Acidosis  Respiratory failure (HCC)  Anemia  ESRD needing dialysis ([facility]  Cardiac arrest ([facility]  Acute GI bleeding  Sepsis ([facility]  Hypotension  Anoxic brain damage (HCC)  ESRD (end stage renal disease) ([facility] [address]  Colitis  Pertinent PMHx: DM, HTN, IBS, ESRD    Education Needs:        [x] None identified  [] Identified - Not appropriate at this time  []  Identified and addressed - refer to education log  Learning Limitations:   [x] None identified  [] Identified:      Cultural, Scientologist & ethnic food preferences:  [x] None identified    [] Identified and addressed     ESTIMATED NUTRITION NEEDS:     Calories: 1672-8201 kcal (30-35 kcal/kg) based on  [x] Actual BW      [x] SBW:  77 kg   Protein:  gm (1.2-1.5 gm/kg) based on  [] Actual BW      [] SBW   Fluid: 1000 mL + UOP     MONITORING & EVALUATION:     Nutrition Goal(s):  Goals modified  1. Nutritional needs will be met through adequate oral intake or nutrition support within the next 7 days. Outcome:  [] Met/Ongoing    [x]  Not Met/Progressing    [] New/Initial Goal   2. Patient will tolerate enteral nutrition formula and regimen without difficulty within the next 7 days. Outcome:  [] Met/Ongoing    []  Not Met    [x] New/Initial Goal   3.  Patient will meet their estimated nutritional needs through adequate enteral nutrition within the next 7 days.  Outcome:  [] Met/Ongoing    []  Not Met    [x] New/Initial Goal     Monitoring:   [x] EN tolerance    [x] EN infusion   [] Diet tolerance   [] Meal intake   [] Supplement intake   [] GI symptoms/ability to tolerate po diet   [] Respiratory status   [x] Plan of care      Previous Recommendations (for follow-up assessments only):     [x]   Implemented       []   Not Implemented (RD to address)     [] No Recommendation Made     Discharge Planning:  Pending plan of care; possible tube feeds following discharge  [x] Participated in care planning, discharge planning, & interdisciplinary rounds as appropriate       Kehinde Jean-Baptiste, [address] [place] [address]  Pager: [phone]

## 2017-08-14 NOTE — ROUTINE PROCESS
1 - SBAR provided to both AMI Rg on [address] [facility] on [address] [facility] for shift change. 2020 -  MRI  Tech Urvashi Rose contacted to confirm STAT orders for KUB, Chest and orbits xray are placed.

## 2017-08-14 NOTE — PROGRESS NOTES
HEMODIALYSIS ROUNDING NOTE      Patient: Silva Breen               Sex: male          DOA: 7/27/2017  4:23 PM        YOB: 1960      Age:  55 y.o.        LOS:  LOS: 18 days     Subjective:     Silva Breen is a 55 y.o.  who presents with Cardiac arrest ([facility] [address]  Acidosis  Respiratory failure ([facility] [address]  Anemia  ESRD needing dialysis ([facility] [address]  Cardiac arrest ([facility] [address]  Acute GI bleeding  Sepsis ([facility] [address]  Hypotension  Anoxic brain damage (HCC)  ESRD (end stage renal disease) ([facility] [address]  Colitis.   The patient is dialyzing utilizing the following method:Intermittent Hemodialysis    Chief Complains: Patient was seen on dialysis,   - Reviewed last 24 hrs events     Current Facility-Administered Medications   Medication Dose Route Frequency    acetaminophen (TYLENOL) tablet 650 mg  650 mg Oral Q4H PRN    dextrose 5% and 0.9% NaCl infusion  40 mL/hr IntraVENous CONTINUOUS    metroNIDAZOLE (FLAGYL) tablet 500 mg  500 mg Oral TID    lactobacillus sp. 50 billion cpu (BIO-K PLUS) capsule 1 Cap  1 Cap Oral DAILY    loperamide (IMODIUM) capsule 2 mg  2 mg Oral Q6H PRN    heparin (porcine) injection 5,000 Units  5,000 Units SubCUTAneous Q8H    heparin (porcine) 1,000 unit/mL injection 2,000 Units  2,000 Units IntraVENous DIALYSIS ONCE    vits A and D-white pet-lanolin (A&D) ointment   Topical TID    levETIRAcetam (KEPPRA) tablet 500 mg  500 mg Oral BID    famotidine (PEPCID) tablet 20 mg  20 mg Oral DAILY    simvastatin (ZOCOR) tablet 20 mg  20 mg Oral QHS    doxercalciferol (HECTOROL) 4 mcg/2 mL injection 3 mcg  3 mcg IntraVENous DIALYSIS MON, WED & FRI    epoetin benjamin (EPOGEN;PROCRIT) injection 10,000 Units  10,000 Units IntraVENous DIALYSIS MON, WED & FRI    insulin lispro (HUMALOG) injection   SubCUTAneous AC&HS    aspirin chewable tablet 81 mg  81 mg Oral DAILY    hydrocortisone (CORTEF) tablet 10 mg  10 mg Oral BID WITH MEALS    glucose chewable tablet 16 g  4 Tab Oral PRN    glucagon (GLUCAGEN) injection 1 mg  1 mg IntraMUSCular PRN    dextrose (D50W) injection syrg 12.5-25 g  25-50 mL IntraVENous PRN    0.9% sodium chloride infusion  100 mL/hr IntraVENous DIALYSIS PRN    naloxone (NARCAN) injection 0.4 mg  0.4 mg IntraVENous PRN       Objective:     Visit Vitals    /74 (BP 1 Location: Right arm, BP Patient Position: At rest)    Pulse 82    Temp 97.9 °F (36.6 °C)    Resp 20    Ht 6' 3\" (1.905 m)    Wt 63.2 kg (139 lb 6.4 oz)    SpO2 98%    BMI 17.42 kg/m2     No intake or output data in the 24 hours ending 08/14/17 1012    Physical Examination:      RS: Chest is bilateral equal, no wheezing / rales / crackles  CVS: S1-S2 heard, RRR  Abdomen: Soft, Non tender, Not distended, Positive bowel sounds  Extremities: No edema, no cyanosis, skin is warm on touch  CNS: poorly responsive  HEENT: Head is atraumatic, PERRLA, conjunctiva pink & non icteric. No JVD or carotid bruit      Data Review:      Labs:     Hematology: Recent Labs      08/14/17   0243  08/13/17   0359  08/12/17   0351   WBC  11.6  14.2*  14.1*   HGB  9.3*  9.6*  9.7*   HCT  28.7*  29.5*  29.6*     Chemistry: Recent Labs      08/14/17   0243  08/13/17   0359  08/12/17   0351   BUN  35*  27*  22*   CREA  8.73*  7.29*  5.71*   CA  7.4*  8.0*  7.6*   K  4.4  4.1  3.4*   NA  142  141  138   CL  108  110*  106   CO2  22  20*  24   PHOS   --    --   4.0   GLU  124*  150*  238*        Images:     XR (Most Recent). CXR reviewed by me and compared with previous CXR   Results from Hospital Encounter encounter on 07/27/17   XR ORBIT BI FOREIGN BODY   Narrative Description:  Orbits, single view    Clinical Indication:  MRI clearance    Comparison: None. Findings:  No metallic foreign bodies are seen. No focal bone lesions. The  paranasal sinuses are clear.          Impression Impression:  No contraindications to MRI.             CT (Most Recent)   Results from Hospital Encounter encounter on 07/27/17 CT HEAD WO CONT   Narrative Description:  CT head without contrast    TECHNIQUE: Axial CT imaging of the head without IV contrast was performed. Coronal and sagittal reformations generated and reviewed. All CT scans at this facility are performed using dose optimization technique as  appropriate to a performed exam, to include automated exposure control,  adjustment of the mA and/or kV according to patient size (including appropriate  matching for site-specific examinations), or use of iterative reconstruction  technique. Clinical Indication:  Facial muscle weakness and altered mental status . CODE S. Comparison: August 8, 2017. Findings:  Cortical sulci, basilar cisterns and ventricles appear within normal  limits in size and configuration. No hemorrhage, mass effect or edema. Small  lacunar type infarcts of the thalami bilaterally noted. Mild to moderate  low-attenuation change within cerebral white matter. Probable small chronic  infarct of the left manny. No extra-axial fluid collections. No mass or mass  effect. Paranasal sinuses and mastoid air cells are clear. Impression Impression:      No acute intracranial findings. Specifically no evidence of hemorrhage. Small lacunar type infarcts of the thalami bilaterally and of the manny, stable. CODE S results discussed with Dr. Hansel Yan. Dayanna Hart, August 13, 2017 at 4:41 PM.  Confirmed. Plan / Recommendation:         End Stage Renal Disease:  Plan HD today    At 10:12 AM on 8/14/2017, I saw and examined patient during hemodialysis treatment. The patient was receiving hemodialysis for treatment of end stage renal disease. I have also reviewed vital signs, intake and output, lab results and recent events, and agreed with today's dialysis order.     Access: No issue    Anemia:  epo    Jeniffer Drake MD  Nephrology  8/14/2017

## 2017-08-14 NOTE — ROUTINE PROCESS
TRANSFER - IN REPORT:    Verbal report received from Leona(name) on Jolanta Dunlap  being received from [address] [place] [address](unit) for change in patient condition(Code S)      Report consisted of patients Situation, Background, Assessment and   Recommendations(SBAR). Information from the following report(s) SBAR, Intake/Output, MAR and Recent Results was reviewed with the receiving nurse. Opportunity for questions and clarification was provided. Assessment completed upon patients arrival to unit and care assumed.

## 2017-08-14 NOTE — PROGRESS NOTES
1555: Pt resting in bed. Following assessment findings (see below) indicated possible stroke. 08/13/17 1555   Neuro   Neurologic State Confused; Lethargic   Orientation Level Disoriented to situation;Oriented to person;Oriented to place   Cognition Follows commands   Speech Incomprehensible words;Slurred   Assessment L Pupil Brisk;Round   Size L Pupil (mm) 1   Assessment R Pupil Round;Brisk   Size R Pupil (mm) 1   LUE Motor Response Weak   LLE Motor Response Weak   RUE Motor Response Weak   RLE Motor Response Weak   Facial Droop       Left      08/13/17 1555   Cranial Nerves   Eye Assessment Does not track with eyes   Eye Opening Spontaneously   Cranial Nerve Assessments Dysphagia; Facial weakness left; Tongue deviates left;Weak cough      08/13/17 1555   Nelly Coma Scale   Eye Opening 4   Best Verbal Response 4   Best Motor Response 6   [place] Coma Scale Score 14      08/13/17 1555   Musculoskeletal   Musculoskeletal (WDL) X   LUE Limited movement;Weakness   LLE Limited movement;Weakness   RUE Limited movement;Weakness; slightly weaker than LUE   RLE Limited movement;Weakness       1600:  RRT & Code S called by Primary Nurse Tim Weiss. 1720: RRT ended: Pt stable w/ slight improved of facial symmetry. Pt still unable to clean secretions and speak is still slurred compared to am assessment. 1730 - Head CT was negative. Dr. Shlomo Wiley instructing pt be moved to [address] [place] [address] neuro-tele unit and STAT MRI of the head be performed. Attempted to contact nurse administration about bed placement, but unable to reach them. MRI tech paged. 1800 - [facility] w/ on-call MRI tech Yoav Ramos. MRI tech state she will come to the hospital for scan  as soon as MRI check list performed and consent signed. Nurse Administration contacted about neuro-telemetry transfer to [address]. Orders placed for transfer. Pt speak increasingly slurred compare to end of RRT and L facial droop more apparent.  Pt still having issues clearing secretions. Since pt is only AOx2/3 and speak is very difficult to understand, pt's listed \"decision maker\" under provider message in 1800 [address] Kylah StephanieMakeda contacted by phone. Felicia Silva provided most of the information for MRI check list. Pt was also interviewed w/ mostly nodding to yes or no questions, and chart also consulted. While pt agreed to MRI (verbally and nonverbally), additional consent was obtained via phone from daughter Tiffanie Brand by RN [facility] and RN [address] [place] [address]. MRI checklist faxed to MRI and MRI tech Vasile Srivastava contacted by phone. MRI tech Vasile Srivastava now concerned about cardiac catheterization shents placed on 8/9/2017 causing a potential issue and asked nurse to find out \"what type of stents were placed? \".    1930 -  Change of shift SBAR provided to both [facility] on [address] [facility] on [address] [facility] for shift change. Pt to be transferred to [address] [place] [address] after MRI. 19:50-20:20 - Unable to find information list in the notes about shents, contacted hospitalist on-call Dr. Amber Drummond to request chest xray to rule out metal in cardiac stents. Contacted MRI tech Vasile Srivastava by phone, she requested that a KUB and orbital skull xray be included to rule out any metal in body. Called back Dr. Amber Drummond to get order for additional xrays. STAT Orders for xrays placed and MRI tech Vasile Srivastava informed of orders. MRI tech calling for transport to radiology now.

## 2017-08-14 NOTE — PROGRESS NOTES
Problem: Mobility Impaired (Adult and Pediatric)  Goal: *Acute Goals and Plan of Care (Insert Text)  STGs to be addressed within 3 days:  1. Bed mobility: Rolling L to R to L min/CGA with use of HR for positioning. 2. Activity Tolerance: Tolerate EOB sitting 5-10 minutes for change of position. 3. Transfer: Supine to Sit min/CGA with HR for meals. LTGs to be addressed within 7 days:  1. Transfer: Sit to stand max/mod A with RW/SW in prep for transfers. 2. Ambulation: Ambulate 5-25 ft. max/mod A with RW/SW for home mobility. PHYSICAL THERAPY TREATMENT     Patient: Ayse Charles [de-identified]55 y.o. male)  Date: 8/14/2017  Diagnosis: Cardiac arrest ([facility]  Acidosis  Respiratory failure ([address]  Anemia  ESRD needing dialysis ([facility]  Cardiac arrest ([address]  Acute GI bleeding  Sepsis ([facility]  Hypotension  Anoxic brain damage (HCC)  ESRD (end stage renal disease) ([address]  Colitis Sepsis ([address]       Precautions: Fall, Skin, Aspiration (FMS)   Chart, physical therapy assessment, plan of care and goals were reviewed. ASSESSMENT:  Pt presented semi-reclined in bed. Pt presented with dysphagia therefore unable to communicate very well. Pt cures come from physical movements and nodes to multiple choice answers. Pt very limited with UE movement but able to perform LE exercise. Pt able to perform all ex with supervision with Left LE but required min-a for Right LE. Pt was moderately challenged and showed physical signs of fatigue, requiring occasional rest breaks. Pt requested assistance with suction to remove build up in throat. Pt able to clear fleam with suction tube. Pt will continue to benefit from therapy to improve overall functionality.    Progression toward goals:  [ ]      Improving appropriately and progressing toward goals  [ ]      Improving slowly and progressing toward goals  [ ]      Not making progress toward goals and plan of care will be adjusted       PLAN:  Patient continues to benefit from skilled intervention to address the above impairments. Continue treatment per established plan of care. Discharge Recommendations: To Be Determined  Further Equipment Recommendations for Discharge:  N/A       SUBJECTIVE:   Patient stated: When asked if pt wanted to exercise, pt responded with head node. OBJECTIVE DATA SUMMARY:   Critical Behavior:  Neurologic State: Alert, Eyes open to stimulus  Orientation Level: Oriented to person  Cognition: Follows commands  Safety/Judgement: Fall prevention  Functional Mobility Training:  Bed Mobility:  Rolling: Total assistance               Therapeutic Exercises:   Supine/semi-reclined: Ankle pumps, heel slides, quad sets, SLR, abd manual resist, add squeezes  Sets: 1  Reps: 20  Pain:  Pain Scale 1: Adult Nonverbal Pain Scale  Pain Intensity 1: 0   Activity Tolerance:   fair  Please refer to the flowsheet for vital signs taken during this treatment.   After treatment:   [ ] Patient left in no apparent distress sitting up in chair  [X] Patient left in no apparent distress in bed  [X] Call bell left within reach  [ ] Nursing notified  [X] Caregiver present  [ ] Bed alarm activated      Amberly Bright   Time Calculation: 26 mins

## 2017-08-14 NOTE — PROGRESS NOTES
1130 8/14/17    Pt not in room. Addressed nursing, pt is out for testing. Will F/U as pt becomes available.     Flaquita HARDING

## 2017-08-14 NOTE — ROUTINE PROCESS
Bedside shift change report given to Dany Schumacher (oncoming nurse) by Marigene Closs (offgoing nurse). Report included the following information SBAR, Intake/Output, MAR, Recent Results and Cardiac Rhythm . Apollo Leonardo

## 2017-08-14 NOTE — PROGRESS NOTES
[facility] Hospitalist Group  Progress Note    Patient: Paulo Acosta Age: 55 y.o. : 1960 MR#: [id number] SSN: [id number]  Date/Time: 2017     Subjective:     Pt AA, dysarthric. Assessment/Plan:   1. Acute met encephalopathy: due to # 2.    2. Acute pontine lacunar CVA: cont asa and statin. Neuro on case. Recent echo noted    3. S/p PEA arrest. ? Cardiac cause with elevated trop PTA. Echo improving EF, s/p cath, no CAD. 3. Acute resp failure s/p extubation, off O2  4. ESRD on HD. M/W/F   5. Sepsis - leucocytosis  () bottles grew E.coli and C.perferinges. S/p Rx  6. HTN: BP was lower side but increasing now, permissive. Will d/c IVF once TF started. 7. Elevated LFT - ? Shock liver. LFT now wnl.   8. Thrombocytopenia: secondary to sepsis. better   9. Dysphagia: S/p SLP eval. NPO status now. D/w pt and family agree for NGT feeding. 10. Sz: on keppra per neurology  11. Wounds: wound care    unstageable pressure ulcer to sacrum/coccyx not present on admit    moisture associated skin damage to anus not present  on admit    moisture associated skin damage to buttocks not present on admit  12. Diarrhea due to Abx, cont probiotics and imodium as needed, will cont flagyl    Full code   D/w Daughter Aaron Rodriguez [phone] in detail, explained new CVA and dysphagia, she agrees with NGT and feeding.    Son Jerardo. [phone]    D/w neurology Dr. Michelle Kong    Case discussed with:  [x]Patient  [x]Family  [x]Nursing  [x]Case Management  DVT Prophylaxis:  []Lovenox  []Hep SQ  [x]SCDs  []Coumadin   []On Heparin gtt    Patient Active Problem List   Diagnosis Code    Anemia D64.9    Acidosis E87.2    Cardiac arrest ([address] I46.9    Respiratory failure ([address] J96.90    ESRD needing dialysis ([address] N18.6, Z99.2    Anoxic brain damage ([address] G93.1    Colitis K52.9    Hypotension I95.9    Acute GI bleeding K92.2    ESRD (end stage renal disease) ([facility] N18.6    Sepsis ([facility] A41.9       Objective:   VS:   Visit Vitals    /81 (BP 1 Location: Right arm, BP Patient Position: At rest)    Pulse 88    Temp 98.5 °F (36.9 °C)    Resp 18    Ht 6' 2\" (1.88 m)    Wt 59.1 kg (130 lb 4.7 oz)    SpO2 97%    BMI 16.73 kg/m2      Tmax/24hrs: Temp (24hrs), Av.1 °F (36.7 °C), Min:97.6 °F (36.4 °C), Max:98.5 °F (36.9 °C)  IOBRIEF    Intake/Output Summary (Last 24 hours) at 17 1628  Last data filed at 17 1245   Gross per 24 hour   Intake                0 ml   Output                0 ml   Net                0 ml       General:  Alert awake in nad  Pulmonary:  CTA Bilaterally. Cardiovascular: Regular rate and Rhythm. GI:  Soft, Non distended, Non tender. + Bowel sounds. Extremities:  No edema, cyanosis, clubbing. Neuro: AAOx1-2. S;low to respond. R side weakness.     Diffuse excoriation in perianal area          Medications:   Current Facility-Administered Medications   Medication Dose Route Frequency    acetaminophen (TYLENOL) tablet 650 mg  650 mg Oral Q4H PRN    dextrose 5% and 0.9% NaCl infusion  40 mL/hr IntraVENous CONTINUOUS    metroNIDAZOLE (FLAGYL) tablet 500 mg  500 mg Oral TID    lactobacillus sp. 50 billion cpu (BIO-K PLUS) capsule 1 Cap  1 Cap Oral DAILY    loperamide (IMODIUM) capsule 2 mg  2 mg Oral Q6H PRN    heparin (porcine) injection 5,000 Units  5,000 Units SubCUTAneous Q8H    heparin (porcine) 1,000 unit/mL injection 2,000 Units  2,000 Units IntraVENous DIALYSIS ONCE    vits A and D-white pet-lanolin (A&D) ointment   Topical TID    levETIRAcetam (KEPPRA) tablet 500 mg  500 mg Oral BID    famotidine (PEPCID) tablet 20 mg  20 mg Oral DAILY    simvastatin (ZOCOR) tablet 20 mg  20 mg Oral QHS    doxercalciferol (HECTOROL) 4 mcg/2 mL injection 3 mcg  3 mcg IntraVENous DIALYSIS MON, WED & FRI    epoetin benjamin (EPOGEN;PROCRIT) injection 10,000 Units  10,000 Units IntraVENous DIALYSIS MON, WED & FRI    insulin lispro (HUMALOG) injection SubCUTAneous AC&HS    aspirin chewable tablet 81 mg  81 mg Oral DAILY    hydrocortisone (CORTEF) tablet 10 mg  10 mg Oral BID WITH MEALS    glucose chewable tablet 16 g  4 Tab Oral PRN    glucagon (GLUCAGEN) injection 1 mg  1 mg IntraMUSCular PRN    dextrose (D50W) injection syrg 12.5-25 g  25-50 mL IntraVENous PRN    0.9% sodium chloride infusion  100 mL/hr IntraVENous DIALYSIS PRN    naloxone (NARCAN) injection 0.4 mg  0.4 mg IntraVENous PRN       Labs:    Recent Results (from the past 24 hour(s))   GLUCOSE, POC    Collection Time: 08/14/17 12:19 AM   Result Value Ref Range    Glucose (POC) 128 (H) 70 - 110 mg/dL   CBC WITH AUTOMATED DIFF    Collection Time: 08/14/17  2:43 AM   Result Value Ref Range    WBC 11.6 4.6 - 13.2 K/uL    RBC 3.06 (L) 4.70 - 5.50 M/uL    HGB 9.3 (L) 13.0 - 16.0 g/dL    HCT 28.7 (L) 36.0 - 48.0 %    MCV 93.8 74.0 - 97.0 FL    MCH 30.4 24.0 - 34.0 PG    MCHC 32.4 31.0 - 37.0 g/dL    RDW 18.1 (H) 11.6 - 14.5 %    PLATELET 838 (H) 051 - 420 K/uL    MPV 10.3 9.2 - 11.8 FL    NEUTROPHILS 69 40 - 73 %    LYMPHOCYTES 17 (L) 21 - 52 %    MONOCYTES 10 3 - 10 %    EOSINOPHILS 3 0 - 5 %    BASOPHILS 1 0 - 2 %    ABS. NEUTROPHILS 8.1 (H) 1.8 - 8.0 K/UL    ABS. LYMPHOCYTES 1.9 0.9 - 3.6 K/UL    ABS. MONOCYTES 1.2 0.05 - 1.2 K/UL    ABS. EOSINOPHILS 0.3 0.0 - 0.4 K/UL    ABS.  BASOPHILS 0.1 0.0 - 0.1 K/UL    DF AUTOMATED     METABOLIC PANEL, BASIC    Collection Time: 08/14/17  2:43 AM   Result Value Ref Range    Sodium 142 136 - 145 mmol/L    Potassium 4.4 3.5 - 5.5 mmol/L    Chloride 108 100 - 108 mmol/L    CO2 22 21 - 32 mmol/L    Anion gap 12 3.0 - 18 mmol/L    Glucose 124 (H) 74 - 99 mg/dL    BUN 35 (H) 7.0 - 18 MG/DL    Creatinine 8.73 (H) 0.6 - 1.3 MG/DL    BUN/Creatinine ratio 4 (L) 12 - 20      GFR est AA 8 (L) >60 ml/min/1.73m2    GFR est non-AA 6 (L) >60 ml/min/1.73m2    Calcium 7.4 (L) 8.5 - 10.1 MG/DL   GLUCOSE, POC    Collection Time: 08/14/17  7:19 AM   Result Value Ref Range Glucose (POC) 106 70 - 110 mg/dL       Signed By: Annelise Nickerson MD     August 14, 2017

## 2017-08-14 NOTE — PROGRESS NOTES
Discussed with hospitalist  Reviewed MRI  New stroke  SLP reeval pending  Monitor a fib  ASA for now ok  Will follow

## 2017-08-14 NOTE — PROGRESS NOTES
Occupational Therapy Note:  Orders received, chart reviewed and OT evaluation attempted. This patient is currently off the floor. Will f/u as appropriate for this patient.  Farzaneh Ventura, OTR/L

## 2017-08-15 ENCOUNTER — APPOINTMENT (OUTPATIENT)
Dept: GENERAL RADIOLOGY | Age: 57
DRG: 004 | End: 2017-08-15
Attending: HOSPITALIST
Payer: MEDICARE

## 2017-08-15 LAB
BASOPHILS # BLD AUTO: 0.1 K/UL (ref 0–0.1)
BASOPHILS # BLD: 1 % (ref 0–2)
DIFFERENTIAL METHOD BLD: ABNORMAL
EOSINOPHIL # BLD: 0.2 K/UL (ref 0–0.4)
EOSINOPHIL NFR BLD: 3 % (ref 0–5)
ERYTHROCYTE [DISTWIDTH] IN BLOOD BY AUTOMATED COUNT: 18 % (ref 11.6–14.5)
GLUCOSE BLD STRIP.AUTO-MCNC: 106 MG/DL (ref 70–110)
GLUCOSE BLD STRIP.AUTO-MCNC: 114 MG/DL (ref 70–110)
GLUCOSE BLD STRIP.AUTO-MCNC: 138 MG/DL (ref 70–110)
GLUCOSE BLD STRIP.AUTO-MCNC: 201 MG/DL (ref 70–110)
GLUCOSE BLD STRIP.AUTO-MCNC: 92 MG/DL (ref 70–110)
HCT VFR BLD AUTO: 28.8 % (ref 36–48)
HGB BLD-MCNC: 9.2 G/DL (ref 13–16)
LYMPHOCYTES # BLD AUTO: 17 % (ref 21–52)
LYMPHOCYTES # BLD: 1.4 K/UL (ref 0.9–3.6)
MCH RBC QN AUTO: 30.1 PG (ref 24–34)
MCHC RBC AUTO-ENTMCNC: 31.9 G/DL (ref 31–37)
MCV RBC AUTO: 94.1 FL (ref 74–97)
MONOCYTES # BLD: 1 K/UL (ref 0.05–1.2)
MONOCYTES NFR BLD AUTO: 11 % (ref 3–10)
NEUTS SEG # BLD: 5.9 K/UL (ref 1.8–8)
NEUTS SEG NFR BLD AUTO: 68 % (ref 40–73)
PLATELET # BLD AUTO: 382 K/UL (ref 135–420)
PMV BLD AUTO: 9.9 FL (ref 9.2–11.8)
RBC # BLD AUTO: 3.06 M/UL (ref 4.7–5.5)
WBC # BLD AUTO: 8.6 K/UL (ref 4.6–13.2)

## 2017-08-15 PROCEDURE — 97165 OT EVAL LOW COMPLEX 30 MIN: CPT

## 2017-08-15 PROCEDURE — 74011250637 HC RX REV CODE- 250/637: Performed by: INTERNAL MEDICINE

## 2017-08-15 PROCEDURE — 74011250636 HC RX REV CODE- 250/636: Performed by: HOSPITALIST

## 2017-08-15 PROCEDURE — 65660000000 HC RM CCU STEPDOWN

## 2017-08-15 PROCEDURE — 82962 GLUCOSE BLOOD TEST: CPT

## 2017-08-15 PROCEDURE — 97530 THERAPEUTIC ACTIVITIES: CPT

## 2017-08-15 PROCEDURE — 36415 COLL VENOUS BLD VENIPUNCTURE: CPT | Performed by: PHYSICIAN ASSISTANT

## 2017-08-15 PROCEDURE — 74011000258 HC RX REV CODE- 258: Performed by: HOSPITALIST

## 2017-08-15 PROCEDURE — 74000 XR ABD (KUB): CPT

## 2017-08-15 PROCEDURE — 74011250637 HC RX REV CODE- 250/637: Performed by: HOSPITALIST

## 2017-08-15 PROCEDURE — 74011250637 HC RX REV CODE- 250/637: Performed by: PHYSICIAN ASSISTANT

## 2017-08-15 PROCEDURE — 74011636637 HC RX REV CODE- 636/637: Performed by: HOSPITALIST

## 2017-08-15 PROCEDURE — 97110 THERAPEUTIC EXERCISES: CPT

## 2017-08-15 PROCEDURE — 77030033263 HC DRSG MEPILEX 16-48IN BORD MOLN -B

## 2017-08-15 PROCEDURE — 85025 COMPLETE CBC W/AUTO DIFF WBC: CPT | Performed by: PHYSICIAN ASSISTANT

## 2017-08-15 PROCEDURE — 92526 ORAL FUNCTION THERAPY: CPT

## 2017-08-15 RX ORDER — METRONIDAZOLE 500 MG/100ML
500 INJECTION, SOLUTION INTRAVENOUS ONCE
Status: COMPLETED | OUTPATIENT
Start: 2017-08-15 | End: 2017-08-17

## 2017-08-15 RX ADMIN — HYDROCORTISONE 10 MG: 10 TABLET ORAL at 09:13

## 2017-08-15 RX ADMIN — METRONIDAZOLE 500 MG: 500 TABLET ORAL at 09:13

## 2017-08-15 RX ADMIN — Medication 1 CAPSULE: at 09:00

## 2017-08-15 RX ADMIN — LEVETIRACETAM 500 MG: 5 INJECTION INTRAVENOUS at 09:11

## 2017-08-15 RX ADMIN — VITAMIN A AND D: 30.8 OINTMENT TOPICAL at 09:00

## 2017-08-15 RX ADMIN — LEVETIRACETAM 500 MG: 5 INJECTION INTRAVENOUS at 19:17

## 2017-08-15 RX ADMIN — VITAMIN A AND D: 30.8 OINTMENT TOPICAL at 16:00

## 2017-08-15 RX ADMIN — HEPARIN SODIUM 5000 UNITS: 5000 INJECTION, SOLUTION INTRAVENOUS; SUBCUTANEOUS at 12:26

## 2017-08-15 RX ADMIN — INSULIN LISPRO 6 UNITS: 100 INJECTION, SOLUTION INTRAVENOUS; SUBCUTANEOUS at 22:50

## 2017-08-15 RX ADMIN — HEPARIN SODIUM 5000 UNITS: 5000 INJECTION, SOLUTION INTRAVENOUS; SUBCUTANEOUS at 02:36

## 2017-08-15 RX ADMIN — HYDROCORTISONE 10 MG: 10 TABLET ORAL at 17:58

## 2017-08-15 RX ADMIN — ASPIRIN 81 MG 81 MG: 81 TABLET ORAL at 09:13

## 2017-08-15 RX ADMIN — DEXTROSE MONOHYDRATE AND SODIUM CHLORIDE 40 ML/HR: 5; .9 INJECTION, SOLUTION INTRAVENOUS at 21:00

## 2017-08-15 RX ADMIN — HEPARIN SODIUM 5000 UNITS: 5000 INJECTION, SOLUTION INTRAVENOUS; SUBCUTANEOUS at 17:59

## 2017-08-15 RX ADMIN — FAMOTIDINE 20 MG: 20 TABLET ORAL at 09:12

## 2017-08-15 RX ADMIN — METRONIDAZOLE 500 MG: 500 TABLET ORAL at 17:58

## 2017-08-15 NOTE — PROGRESS NOTES
[facility] Hospitalist Group  Progress Note    Patient: Jolanta Dunlap Age: 55 y.o. : 1960 MR#: [id number] SSN: [id number]  Date/Time: 8/15/2017     Subjective:     Pt AA, dysarthric. Calm and cooperative       Assessment/Plan:   1. Acute met encephalopathy: due to # 2.    2. Acute pontine lacunar CVA: cont asa and statin. Neuro on case. Recent echo noted    3. S/p PEA arrest. ? Cardiac cause with elevated trop PTA. Echo improving EF, s/p cath, no CAD. 4. Acute resp failure s/p extubation, off O2  5. Dysphagia: S/p SLP eval. NPO status now. D/w pt and family agree for NGT feeding. 6. ESRD on HD. M/W/F   7. Sepsis - leucocytosis  () bottles grew E.coli and C.perferinges. S/p Rx  8. HTN: BP was lower side but increasing now, permissive. Will d/c IVF once TF started. 9. Elevated LFT - ? Shock liver. better. 10. Thrombocytopenia: secondary to sepsis. better   11. Sz: on keppra per neurology  12. Wounds: wound care    unstageable pressure ulcer to sacrum/coccyx not present on admit    moisture associated skin damage to anus not present  on admit    moisture associated skin damage to buttocks not present on admit  13. Diarrhea due to Abx, cont probiotics and imodium as needed, will cont flagyl    Full code   D/w Daughter Liz Garcias [phone] in detail, explained cont NGT feeding for now, if dysphagia doesn't improve in next few days then will need PEG tube.    Son Jerardo. [phone]    D/w RN    Case discussed with:  [x]Patient  [x]Family  [x]Nursing  [x]Case Management  DVT Prophylaxis:  []Lovenox  []Hep SQ  [x]SCDs  []Coumadin   []On Heparin gtt    Patient Active Problem List   Diagnosis Code    Anemia D64.9    Acidosis E87.2    Cardiac arrest ([address] I46.9    Respiratory failure ([address] J96.90    ESRD needing dialysis ([address] N18.6, Z99.2    Anoxic brain damage ([facility] G93.1    Colitis K52.9    Hypotension I95.9    Acute GI bleeding K92.2    ESRD (end stage renal disease) (HCC) N18.6    Sepsis ([facility] A41.9       Objective:   VS:   Visit Vitals    BP 97/55 (BP 1 Location: Right arm, BP Patient Position: Sitting)    Pulse 93    Temp 99.1 °F (37.3 °C)    Resp 18    Ht 6' 2\" (1.88 m)    Wt 57.2 kg (126 lb 3.2 oz)    SpO2 91%    BMI 16.2 kg/m2      Tmax/24hrs: Temp (24hrs), Av.7 °F (37.1 °C), Min:97.7 °F (36.5 °C), Max:99.6 °F (37.6 °C)  IOBRIEF    Intake/Output Summary (Last 24 hours) at 08/15/17 1230  Last data filed at 08/15/17 1000   Gross per 24 hour   Intake              300 ml   Output                0 ml   Net              300 ml       General:  Alert awake in nad  Pulmonary:  CTA Bilaterally. Cardiovascular: Regular rate and Rhythm. GI:  Soft, Non distended, Non tender. + Bowel sounds. Extremities:  No edema, cyanosis, clubbing. Neuro: AA, dysarthria. Follows commands. R side weakness.     Diffuse excoriation in perianal area          Medications:   Current Facility-Administered Medications   Medication Dose Route Frequency    levETIRAcetam (KEPPRA) 500 mg in 100 ml IVPB  500 mg IntraVENous Q12H    acetaminophen (TYLENOL) tablet 650 mg  650 mg Oral Q4H PRN    dextrose 5% and 0.9% NaCl infusion  40 mL/hr IntraVENous CONTINUOUS    metroNIDAZOLE (FLAGYL) tablet 500 mg  500 mg Oral TID    lactobacillus sp. 50 billion cpu (BIO-K PLUS) capsule 1 Cap  1 Cap Oral DAILY    loperamide (IMODIUM) capsule 2 mg  2 mg Oral Q6H PRN    heparin (porcine) injection 5,000 Units  5,000 Units SubCUTAneous Q8H    heparin (porcine) 1,000 unit/mL injection 2,000 Units  2,000 Units IntraVENous DIALYSIS ONCE    vits A and D-white pet-lanolin (A&D) ointment   Topical TID    famotidine (PEPCID) tablet 20 mg  20 mg Oral DAILY    simvastatin (ZOCOR) tablet 20 mg  20 mg Oral QHS    doxercalciferol (HECTOROL) 4 mcg/2 mL injection 3 mcg  3 mcg IntraVENous DIALYSIS MON, WED & FRI    epoetin benjamin (EPOGEN;PROCRIT) injection 10,000 Units  10,000 Units IntraVENous DIALYSIS MON, WED & FRI    insulin lispro (HUMALOG) injection   SubCUTAneous AC&HS    aspirin chewable tablet 81 mg  81 mg Oral DAILY    hydrocortisone (CORTEF) tablet 10 mg  10 mg Oral BID WITH MEALS    glucose chewable tablet 16 g  4 Tab Oral PRN    glucagon (GLUCAGEN) injection 1 mg  1 mg IntraMUSCular PRN    dextrose (D50W) injection syrg 12.5-25 g  25-50 mL IntraVENous PRN    0.9% sodium chloride infusion  100 mL/hr IntraVENous DIALYSIS PRN    naloxone (NARCAN) injection 0.4 mg  0.4 mg IntraVENous PRN       Labs:    Recent Results (from the past 24 hour(s))   GLUCOSE, POC    Collection Time: 08/14/17  5:25 PM   Result Value Ref Range    Glucose (POC) 81 70 - 110 mg/dL   GLUCOSE, POC    Collection Time: 08/15/17 12:15 AM   Result Value Ref Range    Glucose (POC) 92 70 - 110 mg/dL   CBC WITH AUTOMATED DIFF    Collection Time: 08/15/17  3:04 AM   Result Value Ref Range    WBC 8.6 4.6 - 13.2 K/uL    RBC 3.06 (L) 4.70 - 5.50 M/uL    HGB 9.2 (L) 13.0 - 16.0 g/dL    HCT 28.8 (L) 36.0 - 48.0 %    MCV 94.1 74.0 - 97.0 FL    MCH 30.1 24.0 - 34.0 PG    MCHC 31.9 31.0 - 37.0 g/dL    RDW 18.0 (H) 11.6 - 14.5 %    PLATELET 902 955 - 803 K/uL    MPV 9.9 9.2 - 11.8 FL    NEUTROPHILS 68 40 - 73 %    LYMPHOCYTES 17 (L) 21 - 52 %    MONOCYTES 11 (H) 3 - 10 %    EOSINOPHILS 3 0 - 5 %    BASOPHILS 1 0 - 2 %    ABS. NEUTROPHILS 5.9 1.8 - 8.0 K/UL    ABS. LYMPHOCYTES 1.4 0.9 - 3.6 K/UL    ABS. MONOCYTES 1.0 0.05 - 1.2 K/UL    ABS. EOSINOPHILS 0.2 0.0 - 0.4 K/UL    ABS.  BASOPHILS 0.1 0.0 - 0.1 K/UL    DF AUTOMATED     GLUCOSE, POC    Collection Time: 08/15/17  9:10 AM   Result Value Ref Range    Glucose (POC) 106 70 - 110 mg/dL   GLUCOSE, POC    Collection Time: 08/15/17 11:48 AM   Result Value Ref Range    Glucose (POC) 114 (H) 70 - 110 mg/dL       Signed By: Sean Calix MD     August 15, 2017

## 2017-08-15 NOTE — ROUTINE PROCESS
Bedside, Verbal and Written shift change report given to EDUARD Paez RN (oncoming nurse) by Bean MUELLER(offgoing nurse). Report included the following information SBAR, Kardex, and MAR. Hourly rounding and  chart checks completed.

## 2017-08-15 NOTE — ROUTINE PROCESS
NG tube placement verified by KUB with wet read. Tube feeding of Nepro started at 0500. Currently running at 20 ml/hr to be advanced as tolerated by 10 ml/hr every 4 hours.

## 2017-08-15 NOTE — PROGRESS NOTES
NUTRITION    Nursing Referral: MST, Pressure Ulcer  Nutrition Consult: Management of Tube Feeding     RECOMMENDATIONS / PLAN:     - Continue tube feeds of Nepro at 30 mL/hr and advance as pt tolerates by 10 mL q 4 hours to goal rate of 55 mL/hr with water flush of 150 mL q 4 hours  - Continue RD inpatient monitoring and evaluation. Goal Regimen: Nepro at 55 mL/hr + 175 mL q 4 hour water flushes to provide: 2376 kcal, 107 gm protein, 127 gm fat, 220 gm CHO, 21 gm fiber, 957 mL free water, 1857 mL total water, 100% RDIs     NUTRITION INTERVENTIONS & DIAGNOSIS:     [x] Enteral nutrition: plan to start  [x] Medical food supplementation: Nepro BID, Ensure Pudding BID  [x] IV fluids: D5-NS at 40 mL/hr (48 gm dextrose, 163 kcal)    Nutrition Diagnosis: increased protein needs related to promotion of wound healing and increased expenditure as evidenced by pressure ulcer wound and ESRD, pt on dialysis 3x per week  Altered nutrition related labs related to renal failure on dialysis, inadequate enteral provision of potassium as evidenced by hypokalemia, potassium 3.3 mmol/L. ASSESSMENT:     8/15: Tube feeds started this morning. Pt tolerating at rate of 30 mL/hr    8/14: SLP following; recommend NPO. Noted plan for NGT placement and start tube feeds. Potassium WNL. 8/10: Pt reported good appetite and \"eating enough\" from his meals. Noted fair meal intake per chart. Consuming supplements. Discussed increasing Nepro frequency; pt declined. Discussed adding Ensure Pudding; pt agreed with plan. Po intake of meals/supplements encouraged. Has been receiving K replacement. 8/7: K remains low despite previous tube feeding changed to higher potassium formula. Pt extubated 8/5. Tube feeds discontinued 8/6. SLP following; pt s/p MBS today and started on po diet. Noted poor po intake lunch meal per chart. 8/3: K remains low despite continued replacement.  Will change to higher potassium formula per discussion with PA.   8/1: Tolerating feeding at goal. 1 L removed during HD 7/31. Hyperglycemia noted, advance to very insulin resistant SSI and basal insulin started. 7/31: Tolerating advancement of tube feeding. HD today. BG trending up, MD to stop D5.    7/30: Pt remain intubated. GI following; plan to start tube feeds today. Noted order placed; discussed modifying tube feed order and add water flushes with Dr Amelia Hardy. RN unavailable; discussed with Nursing Grandy regarding modifying tube feed order  7/28: S/p cardiac arrest and intubated 7/27, NGT clamped. Abdominal ultrasound today to rule out cholecystitis. Will wait to feed. Average po intake adequate to meet patients estimated nutritional needs:   [] Yes     [x] No   [] Unable to determine at this time  EN infusion adequate to meet patients estimated nutritional needs:   [] Yes     [x] No   [] Unable to determine at this time    Tube Feeding: Nepro at 30 mL via NGT  Water Flushes: 150 mL q 4 hour  Residuals: 0 mL    Diet: DIET NPO  DIET TUBE FEEDING      Food Allergies: NKFA  Current Appetite:   [] Good     [] Fair     [] Poor     [x] Other: NPO   Appetite/meal intake prior to admission:   [] Good     [] Fair     [] Poor     [x] Other: unknown   Feeding Limitations:  [x] Swallowing difficulty: SLP following; recommended NPO on 8/14    [x] Chewing difficulty: pt downgraded to mechanical soft diet on 8/8 per Glycemic Control    [] Other:    Current Meal Intake:   No data found.     BM: 8/14; FMS  Skin Integrity:   Stage II ressure ulcer anus; DTI sacrum; moisture associated skin damage posterior buttocks  Edema: 1+ UEs  Pertinent Medications: Reviewed: steroid     Recent Labs      08/14/17   0243  08/13/17   0359   NA  142  141   K  4.4  4.1   CL  108  110*   CO2  22  20*   GLU  124*  150*   BUN  35*  27*   CREA  8.73*  7.29*   CA  7.4*  8.0*       Intake/Output Summary (Last 24 hours) at 08/15/17 1217  Last data filed at 08/15/17 1000   Gross per 24 hour   Intake 300 ml   Output                0 ml   Net              300 ml       Anthropometrics:  Ht Readings from Last 1 Encounters:   08/15/17 6' 2\" (1.88 m)     Last 3 Recorded Weights in this Encounter    08/13/17 0734 08/14/17 0830 08/15/17 0615   Weight: 63.2 kg (139 lb 6.4 oz) 59.1 kg (130 lb 4.7 oz) 57.2 kg (126 lb 3.2 oz)     Body mass index is 16.2 kg/(m^2). Underweight     Weight History:   Weight Metrics 8/15/2017   Weight 126 lb 3.2 oz   BMI 16.2 kg/m2        Admitting Diagnosis: Cardiac arrest (HCC)  Acidosis  Respiratory failure (HCC)  Anemia  ESRD needing dialysis ([facility]  Cardiac arrest ([facility]  Acute GI bleeding  Sepsis ([facility]  Hypotension  Anoxic brain damage (HCC)  ESRD (end stage renal disease) ([facility]  Colitis  Pertinent PMHx: DM, HTN, IBS, ESRD    Education Needs:        [x] None identified  [] Identified - Not appropriate at this time  []  Identified and addressed - refer to education log  Learning Limitations:   [x] None identified  [] Identified:      Cultural, Yazidi & ethnic food preferences:  [x] None identified    [] Identified and addressed     ESTIMATED NUTRITION NEEDS:     Calories: 5265-1468 kcal (30-35 kcal/kg) based on  [x] Actual BW      [x] SBW:  77 kg   Protein:  gm (1.2-1.5 gm/kg) based on  [] Actual BW      [] SBW   Fluid: 1000 mL + UOP     MONITORING & EVALUATION:     Nutrition Goal(s):  Goals modified  1. Nutritional needs will be met through adequate oral intake or nutrition support within the next 7 days. Outcome:  [] Met/Ongoing    [x]  Not Met/Progressing    [] New/Initial Goal   2. Patient will tolerate enteral nutrition formula and regimen without difficulty within the next 7 days. Outcome:  [x] Met/Ongoing    []  Not Met    [] New/Initial Goal   3. Patient will meet their estimated nutritional needs through adequate enteral nutrition within the next 7 days.  Outcome:  [] Met/Ongoing    [x]  Not Met/Progressing    [] New/Initial Goal     Monitoring:   [x] EN tolerance [x] EN infusion   [] Diet tolerance   [] Meal intake   [] Supplement intake   [] GI symptoms/ability to tolerate po diet   [] Respiratory status   [x] Plan of care      Previous Recommendations (for follow-up assessments only):     [x]   Implemented       []   Not Implemented (RD to address)     [] No Recommendation Made     Discharge Planning:  Pending plan of care; possible tube feeds following discharge  [x] Participated in care planning, discharge planning, & interdisciplinary rounds as appropriate       Josie Damon, [address] [place] [address]  Pager: [phone]

## 2017-08-15 NOTE — PROGRESS NOTES
Problem: Mobility Impaired (Adult and Pediatric)  Goal: *Acute Goals and Plan of Care (Insert Text)  STGs to be addressed within 3 days:  1. Bed mobility: Rolling L to R to L min/CGA with use of HR for positioning. 2. Activity Tolerance: Tolerate EOB sitting 5-10 minutes for change of position. 3. Transfer: Supine to Sit min/CGA with HR for meals. LTGs to be addressed within 7 days:  1. Transfer: Sit to stand max/mod A with RW/SW in prep for transfers. 2. Ambulation: Ambulate 5-25 ft. max/mod A with RW/SW for home mobility. Outcome: Progressing Towards Goal  PHYSICAL THERAPY TREATMENT     Patient: Wilmar Ordaz [de-identified]55 y.o. male)  Date: 8/15/2017  Diagnosis: Cardiac arrest ([address]  Acidosis  Respiratory failure ([address]  Anemia  ESRD needing dialysis ([address]  Cardiac arrest ([address]  Acute GI bleeding  Sepsis ([address]  Hypotension  Anoxic brain damage (HCC)  ESRD (end stage renal disease) ([address]  Colitis Sepsis (HCC)g        Precautions: Fall, Skin, Aspiration (FMS)   Chart, physical therapy assessment, plan of care and goals were reviewed. ASSESSMENT:  Pt found supine in bed willing to work with PT. Nursing in room to perform eri care. Pt rolled from side to side with max A. Pt is profoundly weak, all movement seems to fatigue pt quickly and pt does not present with enough control when rolling to stop himself from rolling onto his face. Pt requires assistance to stay on his side when eri care performed. Pt scooted up in bed and performed UE and LE therex. Pt left with needs in reach. Pt needs suction several times throughout tx presenting with productive cough throughout. Pt is difficult to understand, but with time it is determined that pt is currently oriented to self and place. Pt asking for a drink, educated pt on feeding tube and aspiration precautions. Pt attempted to write his requests down, but does not have the needed hand strength / fine motor control to do so.      Education: bed mobility, therex. Progression toward goals:  [ ]      Improving appropriately and progressing toward goals  [X]      Improving slowly and progressing toward goals  [ ]      Not making progress toward goals and plan of care will be adjusted       PLAN:  Patient continues to benefit from skilled intervention to address the above impairments. Continue treatment per established plan of care. Discharge Recommendations:  SNF  Further Equipment Recommendations for Discharge:  rolling walker / TBD       SUBJECTIVE:   Patient stated My son too.  Pt relaying that his son is named Brandi Bradley as well. OBJECTIVE DATA SUMMARY:   Critical Behavior:  Neurologic State: Alert, Eyes open spontaneously  Cognition: Follows commands  Safety/Judgement: Fall prevention  Functional Mobility Training:  Bed Mobility:  Rolling: Maximum assistance  Scooting: Total assistance;Assist x2  Therapeutic Exercises: Ankle pumps, heel slide, bicep curls X 10 bilaterally. Pain:  Pre tx pain:  0  Post tx pain: 0  Pain Scale 1: Numeric (0 - 10)  Pain Intensity 1: 0  Activity Tolerance:   Fair  Please refer to the flowsheet for vital signs taken during this treatment. After treatment:   [ ] Patient left in no apparent distress sitting up in chair  [X] Patient left in no apparent distress in bed  [X] Call bell left within reach  [ ] Nursing notified  [ ] Caregiver present  [ ] Bed alarm activated      Lv Navarrete PTA   Time Calculation: 28 mins     Mobility [id number] Current  CM= 80-99%. The severity rating is based on the Level of Assistance required for Functional Mobility and ADLs. Mobility   Goal  CJ= 20-39%. The severity rating is based on the Level of Assistance required for Functional Mobility and ADLs.

## 2017-08-15 NOTE — PROGRESS NOTES
Problem: Dysphagia (Adult)  Goal: *Acute Goals and Plan of Care (Insert Text)  Recommendations:  NPO with NG feeds; may need to consider long term alternative means of nutrition or initiation of comfort diet   Strict aspiration precautions (HOB >30 degrees at all times, oral care TID)    Patient will:  1. Tolerate PO trials with 0 s/s overt distress in 4/5 trials  2. Utilize compensatory swallow strategies/maneuvers (decrease bite/sip, size/rate, alt. liq/sol) with min cues in 4/5 trials  3. Perform oral-motor/laryngeal exercises to increase oropharyngeal swallow function with min cues  4. Complete an objective swallow study (i.e., MBSS) to assess swallow integrity, r/o aspiration, and determine of safest LRD, min A - MET 8/7/17       Outcome: Not Progressing Towards Goal  SPEECH LANGUAGE PATHOLOGY DYSPHAGIA TREATMENT     Patient: Ladi López [facility]55 y.o. male)  Date: 8/15/2017  Diagnosis: Cardiac arrest ([address]  Acidosis  Respiratory failure ([address]  Anemia  ESRD needing dialysis ([address]  Cardiac arrest ([address]  Acute GI bleeding  Sepsis ([address]  Hypotension  Anoxic brain damage (HCC)  ESRD (end stage renal disease) ([address]  Colitis Sepsis (HCC)  Precautions: Fall, Skin, Aspiration (FMS)      ASSESSMENT:  Pt seen for follow up dysphagia tx, drowsy with eyes opening to voice. Pt with ng tube in place with wet, congested cough prior to po intake. Oral care performed via toothette with pt tolerating with no deficits. Pt with poor bolus manipulation/control and immediate wet/weak cough with 2/2 trials of ice chips. No further po offered due to high aspiration risk. Recommend consider long term alternative means of nutrition or comfort feeds. D/w pt who verbalized understanding; however, suspect limited carryover. Will continue to follow for further dysphagia management as indicated.    Progression toward goals:  [ ]         Improving appropriately and progressing toward goals  [ ]         Improving slowly and progressing toward goals  [X]         Not making progress toward goals and plan of care will be adjusted       PLAN:  Recommendations and Planned Interventions:  Patient continues to benefit from skilled intervention to address the above impairments. Continue treatment per established plan of care. Discharge Recommendations: To Be Determined       SUBJECTIVE:   Patient stated alright. OBJECTIVE:   Cognitive and Communication Status:  Neurologic State: Alert, Eyes open spontaneously  Orientation Level: Oriented to person  Cognition: Follows commands  Perception: Appears intact  Perseveration: No perseveration noted  Safety/Judgement: Fall prevention  Dysphagia Treatment:  Oral Assessment:  Oral Assessment  Labial: Decreased rate  Dentition: Edentulous  Oral Hygiene: WFL  Lingual: Decreased rate, Decreased strength  Velum: No impairment  Mandible: No impairment  Gag Reflex: Absent  P.O. Trials:              Patient Position: HOB 50*              Vocal quality prior to P.O.: Low volume, Hoarse              Consistency presented:  Ice chips               How Presented: SLP-fed/presented, Spoon              Bolus Acceptance: No impairment              Bolus Formation/Control: Impaired              Type of Impairment: Delayed              Propulsion: Delayed (# of seconds), Discoordination              Oral Residue: Less than 10% of bolus, Lingual              Initiation of Swallow: Delayed (# of seconds)              Laryngeal Elevation: Decreased, Weak              Aspiration Signs/Symptoms: Weak cough, Clear throat              Pharyngeal Phase Characteristics: Altered vocal quality, Effortful swallow, Easily fatigued , Poor endurance              Effective Modifications: None              Cues for Modifications:  Moderate              Oral Phase Severity: Moderate-severe              Pharyngeal Phase Severity : Severe                PAIN:  No pain reported prior to or following treatment      After treatment:   [ ] Patient left in no apparent distress sitting up in chair  [X]              Patient left in no apparent distress in bed  [X]              Call bell left within reach  [X]              Nursing notified  [ ]              Caregiver present  [ ]              Bed alarm activated         COMMUNICATION/EDUCATION:   [X]              SLP educated pt with regard to high risk of aspiration, diet recs, oral care and positioning.      Zackary Colon M.S., [address] [facility]  Speech-Language Pathologist

## 2017-08-15 NOTE — PROGRESS NOTES
Problem: Self Care Deficits Care Plan (Adult)  Goal: *Acute Goals and Plan of Care (Insert Text)  Occupational Therapy Goals  Initiated 8/15/2017 within 7 day(s). 1. Patient will perform grooming with modified independence (using his RUE) progressing to the edge of the bed as he is able  2. Patient will perform UB bathing with modified independence at bed level  3. Patient will demonstrate RUE strength 4-/5 in preparation for his efficient functional use during his self care routine    Will address self feeding should he be placed on a diet  OCCUPATIONAL THERAPY EVALUATION     Patient: Agustin Mcleod [de-identified]55 y.o. male)  Date: 8/15/2017  Primary Diagnosis: Cardiac arrest ([facility]  Acidosis  Respiratory failure (HCC)  Anemia  ESRD needing dialysis ([facility]  Cardiac arrest ([facility]  Acute GI bleeding  Sepsis ([facility]  Hypotension  Anoxic brain damage (HCC)  ESRD (end stage renal disease) ([facility]  Colitis  Precautions: Fall, Skin, Aspiration (FMS)      ASSESSMENT :  Based on the objective data described below, the patient presents with decreased RUE strength and function, slowed response time, his verbalizations are slow and efforted; requiring encouragement and time for his optimal interaction, decreased generalized strength and decreased functional mobility that limits his independence at this time. Patient will benefit from skilled intervention to address the above impairments.   Patients rehabilitation potential is considered to be Good  Factors which may influence rehabilitation potential include:   [ ]             None noted  [ ]             Mental ability/status  [X]             Medical condition  [ ]             Home/family situation and support systems  [ ]             Safety awareness  [ ]             Pain tolerance/management  [ ]             Other:        PLAN :  Recommendations and Planned Interventions:  [X]               Self Care Training                  [X]        Therapeutic Activities  [ ]               Functional Mobility Training    [ ]        Cognitive Retraining  [X]               Therapeutic Exercises           [ ]        Endurance Activities  [ ]               Balance Training                   [ ]        Neuromuscular Re-Education  [X]               Visual/Perceptual Training     [X]   Home Safety Training  [X]               Patient Education                 [X]        Family Training/Education  [X]               Other (comment): therapeutic use of self     Frequency/Duration: Patient will be followed by occupational therapy 1-2 times per day/4-7 days per week to address goals. Discharge Recommendations: Rehab  Further Equipment Recommendations for Discharge: N/A       PATIENT COMPLEXITY      Eval Complexity: History: LOW Complexity : Brief history review ; Examination: LOW Complexity : 1-3 performance deficits relating to physical, cognitive , or psychosocial skils that result in activity limitations and / or participation restrictions ; Decision Making:LOW Complexity : No comorbidities that affect functional and no verbal or physical assistance needed to complete eval tasks  Assessment: LOW Complexity       G-CODES:      Self Care  Current  CM= 80-99%   Goal  CK= 40-59%. The severity rating is based on the Level of Assistance required for Functional Mobility and ADLs. SUBJECTIVE:   Patient stated I want to take care of myself again.       OBJECTIVE DATA SUMMARY:       Past Medical History:   Diagnosis Date    Anemia      Arthritis      Chronic pain       Back     Diabetes mellitus type II      Hypertension      IBS (irritable bowel syndrome)      Lactose intolerance      TB (tuberculosis)       TB test positive     Past Surgical History:   Procedure Laterality Date    CARDIAC CATHETERIZATION   8/9/2017          CORONARY ARTERY ANGIOGRAM   8/9/2017          ENDOSCOPY, COLON, DIAGNOSTIC        HX AMPUTATION         Toe due to injury    LV ANGIOGRAPHY   8/9/2017          MODERATE SEDATION 8/9/2017           Prior Level of Function/Home Situation:   Home Situation  Home Environment: Private residence  # Steps to Enter: 3  Rails to Enter: Yes  Hand Rails : Bilateral  One/Two Story Residence: One story  Living Alone: No  Support Systems: Family member(s)  Patient Expects to be Discharged to[de-identified] Private residence  Current DME Used/Available at Home: None  Tub or Shower Type: Tub/Shower combination  [X]  Right hand dominant          [ ]  Left hand dominant  Cognitive/Behavioral Status:   he is alert, slow to respond secondary to language issues, oriented X 4   Skin: no skin integrity issues noted during OT evaluation  Edema: no extremity edema noted  Vision/Perceptual:      tracking is WFL    Coordination:   BUE's WFL   Balance:   total assist to roll  Strength:  RUE: proximal strength is 1/5, distal is 3 to 3+/5   LUE: 3+/5   Tone & Sensation:  BUE's WFL   Range of Motion:  RUE: PROM is WFL; AROM WFL with the exception of shoulder flexion no AROM against gravity   LUE: AROM WFL   Functional Mobility and Transfers for ADLs:  Bed Mobility:   total assist   ADL Assessment:   he requires total assistance for all self care  Therapeutic Exercise:  A scapular strengthening program was initiated. Following training, he is independent return demonstration (following repetition)  Pain:  0/10 pain prior to OT session  0/10 pain following OT session  Activity Tolerance:   No SOB noted  Please refer to the flowsheet for vital signs taken during this treatment. After treatment:   [ ] Patient left in no apparent distress sitting up in chair  [ ] Patient left in no apparent distress in bed  [X] Call bell left within reach  [ ] Nursing notified  [ ] Caregiver present  [ ] Bed alarm activated      COMMUNICATION/EDUCATION:   [ ] Home safety education was provided and the patient/caregiver indicated understanding. [ ] Patient/family have participated as able in goal setting and plan of care.   [X] Patient/family agree to work toward stated goals and plan of care. [ ] Patient understands intent and goals of therapy, but is neutral about his/her participation. [ ] Patient is unable to participate in goal setting and plan of care.      Thank you for this referral.  Arleen Ruiz, OTR/L  Time Calculation: 15 mins

## 2017-08-16 ENCOUNTER — APPOINTMENT (OUTPATIENT)
Dept: GENERAL RADIOLOGY | Age: 57
DRG: 004 | End: 2017-08-16
Attending: HOSPITALIST
Payer: MEDICARE

## 2017-08-16 LAB
ANION GAP BLD CALC-SCNC: 11 MMOL/L (ref 3–18)
BASOPHILS # BLD: 0 K/UL (ref 0–0.06)
BASOPHILS NFR BLD: 0 % (ref 0–3)
BUN SERPL-MCNC: 24 MG/DL (ref 7–18)
BUN/CREAT SERPL: 3 (ref 12–20)
CALCIUM SERPL-MCNC: 8.1 MG/DL (ref 8.5–10.1)
CHLORIDE SERPL-SCNC: 110 MMOL/L (ref 100–108)
CO2 SERPL-SCNC: 23 MMOL/L (ref 21–32)
CREAT SERPL-MCNC: 7.59 MG/DL (ref 0.6–1.3)
DIFFERENTIAL METHOD BLD: ABNORMAL
EOSINOPHIL # BLD: 0.5 K/UL (ref 0–0.4)
EOSINOPHIL NFR BLD: 6 % (ref 0–5)
ERYTHROCYTE [DISTWIDTH] IN BLOOD BY AUTOMATED COUNT: 17.8 % (ref 11.6–14.5)
GLUCOSE BLD STRIP.AUTO-MCNC: 133 MG/DL (ref 70–110)
GLUCOSE BLD STRIP.AUTO-MCNC: 162 MG/DL (ref 70–110)
GLUCOSE BLD STRIP.AUTO-MCNC: 84 MG/DL (ref 70–110)
GLUCOSE SERPL-MCNC: 151 MG/DL (ref 74–99)
HCT VFR BLD AUTO: 29.6 % (ref 36–48)
HGB BLD-MCNC: 9.6 G/DL (ref 13–16)
LYMPHOCYTES # BLD: 1.4 K/UL (ref 0.8–3.5)
LYMPHOCYTES NFR BLD: 16 % (ref 20–51)
MCH RBC QN AUTO: 30.8 PG (ref 24–34)
MCHC RBC AUTO-ENTMCNC: 32.4 G/DL (ref 31–37)
MCV RBC AUTO: 94.9 FL (ref 74–97)
MONOCYTES # BLD: 0.5 K/UL (ref 0–1)
MONOCYTES NFR BLD: 6 % (ref 2–9)
NEUTS BAND NFR BLD MANUAL: 3 % (ref 0–5)
NEUTS SEG # BLD: 6.6 K/UL (ref 1.8–8)
NEUTS SEG NFR BLD: 69 % (ref 42–75)
PLATELET # BLD AUTO: 375 K/UL (ref 135–420)
PLATELET COMMENTS,PCOM: ABNORMAL
PMV BLD AUTO: 10.2 FL (ref 9.2–11.8)
POTASSIUM SERPL-SCNC: 3.6 MMOL/L (ref 3.5–5.5)
RBC # BLD AUTO: 3.12 M/UL (ref 4.7–5.5)
RBC MORPH BLD: ABNORMAL
SODIUM SERPL-SCNC: 144 MMOL/L (ref 136–145)
WBC # BLD AUTO: 9 K/UL (ref 4.6–13.2)

## 2017-08-16 PROCEDURE — 80048 BASIC METABOLIC PNL TOTAL CA: CPT | Performed by: PHYSICIAN ASSISTANT

## 2017-08-16 PROCEDURE — 74011250637 HC RX REV CODE- 250/637: Performed by: PHYSICIAN ASSISTANT

## 2017-08-16 PROCEDURE — 74011250637 HC RX REV CODE- 250/637: Performed by: HOSPITALIST

## 2017-08-16 PROCEDURE — 74011250636 HC RX REV CODE- 250/636: Performed by: INTERNAL MEDICINE

## 2017-08-16 PROCEDURE — 97530 THERAPEUTIC ACTIVITIES: CPT

## 2017-08-16 PROCEDURE — 92526 ORAL FUNCTION THERAPY: CPT

## 2017-08-16 PROCEDURE — 85025 COMPLETE CBC W/AUTO DIFF WBC: CPT | Performed by: PHYSICIAN ASSISTANT

## 2017-08-16 PROCEDURE — 90935 HEMODIALYSIS ONE EVALUATION: CPT

## 2017-08-16 PROCEDURE — 74011636637 HC RX REV CODE- 636/637: Performed by: HOSPITALIST

## 2017-08-16 PROCEDURE — 74011250637 HC RX REV CODE- 250/637: Performed by: INTERNAL MEDICINE

## 2017-08-16 PROCEDURE — 65660000000 HC RM CCU STEPDOWN

## 2017-08-16 PROCEDURE — 74011250636 HC RX REV CODE- 250/636: Performed by: HOSPITALIST

## 2017-08-16 PROCEDURE — 82962 GLUCOSE BLOOD TEST: CPT

## 2017-08-16 PROCEDURE — 74011000250 HC RX REV CODE- 250: Performed by: INTERNAL MEDICINE

## 2017-08-16 PROCEDURE — 74000 XR ABD PORT  1 V: CPT

## 2017-08-16 PROCEDURE — 97110 THERAPEUTIC EXERCISES: CPT

## 2017-08-16 PROCEDURE — 36415 COLL VENOUS BLD VENIPUNCTURE: CPT | Performed by: PHYSICIAN ASSISTANT

## 2017-08-16 PROCEDURE — 77030008771 HC TU NG SALEM SUMP -A

## 2017-08-16 RX ORDER — INFANT FORMULA WITH IRON
POWDER (GRAM) ORAL
Status: DISCONTINUED | OUTPATIENT
Start: 2017-08-16 | End: 2017-09-11

## 2017-08-16 RX ADMIN — LEVETIRACETAM 500 MG: 5 INJECTION INTRAVENOUS at 19:52

## 2017-08-16 RX ADMIN — VITAMIN A AND D: 30.8 OINTMENT TOPICAL at 18:58

## 2017-08-16 RX ADMIN — HEPARIN SODIUM 5000 UNITS: 5000 INJECTION, SOLUTION INTRAVENOUS; SUBCUTANEOUS at 01:53

## 2017-08-16 RX ADMIN — METRONIDAZOLE 500 MG: 500 TABLET ORAL at 16:00

## 2017-08-16 RX ADMIN — SIMVASTATIN 20 MG: 10 TABLET, FILM COATED ORAL at 22:18

## 2017-08-16 RX ADMIN — VITAMIN A AND D: 30.8 OINTMENT TOPICAL at 08:30

## 2017-08-16 RX ADMIN — LEVETIRACETAM 500 MG: 5 INJECTION INTRAVENOUS at 14:03

## 2017-08-16 RX ADMIN — METRONIDAZOLE 500 MG: 500 TABLET ORAL at 22:18

## 2017-08-16 RX ADMIN — ERYTHROPOIETIN 10000 UNITS: 10000 INJECTION, SOLUTION INTRAVENOUS; SUBCUTANEOUS at 10:21

## 2017-08-16 RX ADMIN — VITAMIN A AND D: 30.8 OINTMENT TOPICAL at 23:18

## 2017-08-16 RX ADMIN — METRONIDAZOLE 500 MG: 500 INJECTION, SOLUTION INTRAVENOUS at 00:03

## 2017-08-16 RX ADMIN — HYDROCORTISONE 10 MG: 10 TABLET ORAL at 18:53

## 2017-08-16 RX ADMIN — HEPARIN SODIUM 5000 UNITS: 5000 INJECTION, SOLUTION INTRAVENOUS; SUBCUTANEOUS at 14:11

## 2017-08-16 RX ADMIN — DOXERCALCIFEROL 3 MCG: 4 INJECTION, SOLUTION INTRAVENOUS at 10:21

## 2017-08-16 RX ADMIN — COLLAGENASE SANTYL: 250 OINTMENT TOPICAL at 18:58

## 2017-08-16 RX ADMIN — INSULIN LISPRO 3 UNITS: 100 INJECTION, SOLUTION INTRAVENOUS; SUBCUTANEOUS at 23:16

## 2017-08-16 NOTE — PROGRESS NOTES
Patient scooted himself down in bed even with bilateral wrist restraints and rolled to the side which pulled his NGT out.    2340:  Spoke with MD, patient currently refusing to allow RN to replace NGT. Received order for 500 mg Flagyl once to replace the PO order that was missed. 0050:  Patient refusing to allow RN to place NGT.    0238:  Patient continues to refuse NGT.    0400:  Patient not allowing RN to place NGT.

## 2017-08-16 NOTE — PROGRESS NOTES
completed follow up visit with patient and his daughter Corinne Arshad and offered Pastoral care, see flow sheets for interventions. Daughter said patient wanted to assign her the ability to make his medical decisions. It was difficult for  to understand patient and to know if he understood the Advance Medical Directive. Daughter said she has a younger brother and she is not in touch with him on a regular basis.  spoke with nurse who agreed it is difficult to know how much patient understands and can discuss who he wants his [address] to be. Nurse said she would call the physician and discuss the [address].  explained the dilemma with the daughter and provided a blank Advance Medical Directive. Daughter was friendly and agreeable to the delay. Chaplains will continue to follow and will provide pastoral care  as needed or requested.    Johansen Bettye, [address]  Board Certified [address] [place] [address]  Office [phone]

## 2017-08-16 NOTE — PROGRESS NOTES
Problem: Mobility Impaired (Adult and Pediatric)  Goal: *Acute Goals and Plan of Care (Insert Text)  STGs to be addressed within 3 days:  1. Bed mobility: Rolling L to R to L min/CGA with use of HR for positioning. 2. Activity Tolerance: Tolerate EOB sitting 5-10 minutes for change of position. 3. Transfer: Supine to Sit min/CGA with HR for meals. LTGs to be addressed within 7 days:  1. Transfer: Sit to stand max/mod A with RW/SW in prep for transfers. 2. Ambulation: Ambulate 5-25 ft. max/mod A with RW/SW for home mobility. PHYSICAL THERAPY TREATMENT     Patient: Zen Arias [de-identified]55 y.o. male)  Date: 8/16/2017  Diagnosis: Cardiac arrest ([facility] [address]  Acidosis  Respiratory failure ([address]  Anemia  ESRD needing dialysis ([facility] [address]  Cardiac arrest ([address]  Acute GI bleeding  Sepsis ([facility] [address]  Hypotension  Anoxic brain damage (HCC)  ESRD (end stage renal disease) ([facility] [address]  Colitis Sepsis ([facility] [address]       Precautions: Fall, Skin, Aspiration (FMS)  Chart, physical therapy assessment, plan of care and goals were reviewed. ASSESSMENT:  Pt demo's substantial progress with bed mobility as indicated by completion of rolling goal.  Pt will benefit from further proficiency training for bed mobility with progression to sitting/standing at EOB. Pt presents as severely malnurished and demo's significantly decreased activity tolerance. Progression toward goals:  [ ]      Improving appropriately and progressing toward goals  [X]      Improving slowly and progressing toward goals  [ ]      Not making progress toward goals and plan of care will be adjusted       PLAN:  Patient continues to benefit from skilled intervention to address the above impairments. Continue treatment per established plan of care. Discharge Recommendations:  Skilled Nursing Facility  Further Equipment Recommendations for Discharge:  N/A       SUBJECTIVE:   Patient stated I don's know.   :Pt appears depressed and disengaged.         OBJECTIVE DATA SUMMARY:   Critical Behavior:  Neurologic State: Alert  Orientation Level: Oriented to person, Oriented to place, Disoriented to situation, Disoriented to place  Cognition: Decreased command following, Poor safety awareness  Safety/Judgement: Fall prevention  Functional Mobility Training:  Bed Mobility:  Rolling: Minimum assistance; Moderate assistance (x's several trials to each side)  Scooting: Moderate assistance (pt able to position in hook lying and attempt bridge)   Soft wrist restraints removed for training. Pt req's moderate verbal cues for technique and sequencing with decreased frequency of cues req'd for latter trials. Pt positioned and bolstered on R side with pillow between legs to maximize skin integrity. Transfers:  Sit to Stand:  (unable to tolerate at this time)  Ambulation/Gait Training:  Gait Description (WDL):  (unable to test at this time 2/2 poor tolerance)  Therapeutic Exercises:   Supine: heel slides,bridging,AP's  Sets: 1  Reps: 10  Assist level: supervision-minimal assist  Pain:  Pt reports 0/10 pain or discomfort prior to treatment. Pt reports 0/10 pain or discomfort post treatment. Activity Tolerance:   Poor: pt req's substantial recovery time for minimal exertion. Please refer to the flowsheet for vital signs taken during this treatment.   After treatment:   [ ] Patient left in no apparent distress sitting up in chair  [X] Patient left in no apparent distress in bed  [X] Call bell left within reach  [ ] Nursing notified  [ ] Caregiver present  [ ] Bed alarm activated      Wendy Steel PTA   Time Calculation: 28 mins

## 2017-08-16 NOTE — PROGRESS NOTES
Problem: Mobility Impaired (Adult and Pediatric)  Goal: *Acute Goals and Plan of Care (Insert Text)  STGs to be addressed within 3 days:  1. Bed mobility: Rolling L to R to L min/CGA with use of HR for positioning. 2. Activity Tolerance: Tolerate EOB sitting 5-10 minutes for change of position. 3. Transfer: Supine to Sit min/CGA with HR for meals. LTGs to be addressed within 7 days:  1. Transfer: Sit to stand max/mod A with RW/SW in prep for transfers. 2. Ambulation: Ambulate 5-25 ft. max/mod A with RW/SW for home mobility. Time: 0660                 Pt's needs currently being tended to by nursing assistant. Will follow up shortly as pt availability permits.      Lalo Isaacs PTA  8/16/2017

## 2017-08-16 NOTE — PROGRESS NOTES
HEMODIALYSIS ROUNDING NOTE      Patient: Ryann Quintanilla               Sex: male          DOA: 7/27/2017  4:23 PM        YOB: 1960      Age:  55 y.o.        LOS:  LOS: 20 days     Subjective:     Ryann Quintanilla is a 55 y.o.  who presents with Cardiac arrest ([facility] [address]  Acidosis  Respiratory failure ([facility] [address]  Anemia  ESRD needing dialysis ([facility] [address]  Cardiac arrest ([facility] [address]  Acute GI bleeding  Sepsis ([facility] [address]  Hypotension  Anoxic brain damage (HCC)  ESRD (end stage renal disease) ([facility] [address]  Colitis.   The patient is dialyzing utilizing the following method:Intermittent Hemodialysis    Chief Complains: Patient was seen on dialysis,   - Reviewed last 24 hrs events     Current Facility-Administered Medications   Medication Dose Route Frequency    levETIRAcetam (KEPPRA) 500 mg in 100 ml IVPB  500 mg IntraVENous Q12H    acetaminophen (TYLENOL) tablet 650 mg  650 mg Oral Q4H PRN    dextrose 5% and 0.9% NaCl infusion  40 mL/hr IntraVENous CONTINUOUS    metroNIDAZOLE (FLAGYL) tablet 500 mg  500 mg Oral TID    lactobacillus sp. 50 billion cpu (BIO-K PLUS) capsule 1 Cap  1 Cap Oral DAILY    loperamide (IMODIUM) capsule 2 mg  2 mg Oral Q6H PRN    heparin (porcine) injection 5,000 Units  5,000 Units SubCUTAneous Q8H    heparin (porcine) 1,000 unit/mL injection 2,000 Units  2,000 Units IntraVENous DIALYSIS ONCE    vits A and D-white pet-lanolin (A&D) ointment   Topical TID    famotidine (PEPCID) tablet 20 mg  20 mg Oral DAILY    simvastatin (ZOCOR) tablet 20 mg  20 mg Oral QHS    doxercalciferol (HECTOROL) 4 mcg/2 mL injection 3 mcg  3 mcg IntraVENous DIALYSIS MON, WED & FRI    epoetin benjamin (EPOGEN;PROCRIT) injection 10,000 Units  10,000 Units IntraVENous DIALYSIS MON, WED & FRI    insulin lispro (HUMALOG) injection   SubCUTAneous AC&HS    aspirin chewable tablet 81 mg  81 mg Oral DAILY    hydrocortisone (CORTEF) tablet 10 mg  10 mg Oral BID WITH MEALS    glucose chewable tablet 16 g  4 Tab Oral PRN    glucagon (GLUCAGEN) injection 1 mg  1 mg IntraMUSCular PRN    dextrose (D50W) injection syrg 12.5-25 g  25-50 mL IntraVENous PRN    0.9% sodium chloride infusion  100 mL/hr IntraVENous DIALYSIS PRN    naloxone (NARCAN) injection 0.4 mg  0.4 mg IntraVENous PRN       Objective:     Visit Vitals    /59    Pulse 79    Temp 97.8 °F (36.6 °C)    Resp 16    Ht 6' 2\" (1.88 m)    Wt 59 kg (130 lb 1.6 oz)    SpO2 98%    BMI 16.7 kg/m2       Intake/Output Summary (Last 24 hours) at 08/16/17 1010  Last data filed at 08/16/17 0700   Gross per 24 hour   Intake              880 ml   Output             2000 ml   Net            -1120 ml       Physical Examination:      RS: Chest is bilateral equal, no wheezing / rales / crackles  CVS: S1-S2 heard, RRR  Abdomen: Soft, Non tender, Not distended, Positive bowel sounds  Extremities: No edema, no cyanosis, skin is warm on touch  CNS: poorly responsive  HEENT: Head is atraumatic, PERRLA, conjunctiva pink & non icteric. No JVD or carotid bruit      Data Review:      Labs:     Hematology:   Recent Labs      08/16/17   0307  08/15/17   0304  08/14/17   0243   WBC  9.0  8.6  11.6   HGB  9.6*  9.2*  9.3*   HCT  29.6*  28.8*  28.7*     Chemistry:   Recent Labs      08/16/17   0307  08/14/17   0243   BUN  24*  35*   CREA  7.59*  8.73*   CA  8.1*  7.4*   K  3.6  4.4   NA  144  142   CL  110*  108   CO2  23  22   GLU  151*  124*        Images:     XR (Most Recent). CXR reviewed by me and compared with previous CXR   Results from Hospital Encounter encounter on 07/27/17   XR ABD (KUB)   Narrative EXAMINATION: Abdomen portable    INDICATION: Gastric tube placement    COMPARISON: Same date, 0031 hours    FINDINGS: Frontal view of abdomen obtained. Interval advancement of feeding  tube, tip at level of fundus, pointing upward. Nonobstructive bowel gas pattern. No obvious free air. Impression IMPRESSION:      1. Feeding tube as above. CT (Most Recent)   Results from Hospital Encounter encounter on 07/27/17   CT HEAD WO CONT   Narrative Description:  CT head without contrast    TECHNIQUE: Axial CT imaging of the head without IV contrast was performed. Coronal and sagittal reformations generated and reviewed. All CT scans at this facility are performed using dose optimization technique as  appropriate to a performed exam, to include automated exposure control,  adjustment of the mA and/or kV according to patient size (including appropriate  matching for site-specific examinations), or use of iterative reconstruction  technique. Clinical Indication:  Facial muscle weakness and altered mental status . CODE S. Comparison: August 8, 2017. Findings:  Cortical sulci, basilar cisterns and ventricles appear within normal  limits in size and configuration. No hemorrhage, mass effect or edema. Small  lacunar type infarcts of the thalami bilaterally noted. Mild to moderate  low-attenuation change within cerebral white matter. Probable small chronic  infarct of the left manny. No extra-axial fluid collections. No mass or mass  effect. Paranasal sinuses and mastoid air cells are clear. Impression Impression:      No acute intracranial findings. Specifically no evidence of hemorrhage. Small lacunar type infarcts of the thalami bilaterally and of the manny, stable. CODE S results discussed with Dr. Puma Flores. Purnima Carolyn, August 13, 2017 at 4:41 PM.  Confirmed. Plan / Recommendation:         End Stage Renal Disease:  Plan HD today    At 10:10AM on 8/16/2017, I saw and examined patient during hemodialysis treatment. The patient was receiving hemodialysis for treatment of end stage renal disease. I have also reviewed vital signs, intake and output, lab results and recent events, and agreed with today's dialysis order.     Access: No issue    Anemia:  epo    Aaron Roldan MD  Nephrology  8/16/2017

## 2017-08-16 NOTE — PROGRESS NOTES
Problem: Dysphagia (Adult)  Goal: *Acute Goals and Plan of Care (Insert Text)  Recommendations:  NPO; may need to consider alternative means of nutrition  Strict aspiration precautions (HOB >30 degrees at all times, oral care TID)    Patient will:  1. Tolerate PO trials with 0 s/s overt distress in 4/5 trials  2. Utilize compensatory swallow strategies/maneuvers (decrease bite/sip, size/rate, alt. liq/sol) with min cues in 4/5 trials  3. Perform oral-motor/laryngeal exercises to increase oropharyngeal swallow function with min cues  4. Complete an objective swallow study (i.e., MBSS) to assess swallow integrity, r/o aspiration, and determine of safest LRD, min A - MET 8/7/17       Outcome: Progressing Towards Goal  SPEECH LANGUAGE PATHOLOGY DYSPHAGIA TREATMENT     Patient: Paulo Acosta [de-identified]55 y.o. male)  Date: 8/16/2017  Diagnosis: Cardiac arrest ([address]  Acidosis  Respiratory failure (HCC)  Anemia  ESRD needing dialysis ([address]  Cardiac arrest ([address]  Acute GI bleeding  Sepsis ([address]  Hypotension  Anoxic brain damage (HCC)  ESRD (end stage renal disease) ([address]  Colitis Sepsis ([address]       Precautions:  Fall, Skin, Aspiration (FMS)      ASSESSMENT:  Pt was seen at bedside for f/u dysphagia management. Pt with delayed congested cough on 3/3 ice chip and 1/1 puree trial. No suctioning required. Laryngeal elevation appeared slightly improved strength to laryngeal palpation. Pt continues to demo inadequate sensory/motor awareness/strength for safe and adequate nutritional intake. Continue to rec NPO, consideration of a more permanent means of nutrition/hydration, oral care TID, and meds via IV. ST will continue to follow.       Progression toward goals:  [ ]         Improving appropriately and progressing toward goals  [X]         Improving slowly and progressing toward goals  [ ]         Not making progress toward goals and plan of care will be adjusted       PLAN:  Recommendations and Planned Interventions: See above  Patient continues to benefit from skilled intervention to address the above impairments. Continue treatment per established plan of care. Discharge Recommendations:  Inpatient Rehab, [place] and To Be Determined       SUBJECTIVE:   Patient stated I'm hanging in there. OBJECTIVE:   Cognitive and Communication Status:  Neurologic State: Alert  Orientation Level: Oriented to person, Oriented to place, Disoriented to situation, Disoriented to place  Cognition: Decreased command following, Poor safety awareness  Perception: Appears intact  Perseveration: No perseveration noted  Safety/Judgement: Fall prevention  Dysphagia Treatment:  Oral Assessment:  Oral Assessment  Labial: Decreased rate  Dentition: Edentulous  Oral Hygiene: WFL  Lingual: Decreased rate, Decreased strength  Velum: No impairment  Mandible: No impairment  Gag Reflex: Absent  P.O. Trials:              Patient Position: [facility] 50*              Vocal quality prior to P.O.: Low volume, Hoarse              Consistency Presented: ice chips, puree              How Presented: SLP-fed/presented, Spoon              Bolus Acceptance: No impairment              Bolus Formation/Control: Impaired              Type of Impairment: Delayed              Propulsion: Delayed (# of seconds), Discoordination              Oral Residue: Less than 10% of bolus, Lingual              Initiation of Swallow: Delayed (# of seconds)              Laryngeal Elevation: Decreased, Weak              Aspiration Signs/Symptoms: Weak cough, Clear throat              Pharyngeal Phase Characteristics: Altered vocal quality, Effortful swallow, Easily fatigued , Poor endurance              Effective Modifications: None              Cues for Modifications: Moderate                             Oral Phase Severity: Moderate-severe              Pharyngeal Phase Severity : Severe   PAIN:  Pt reports 0/10 pain or discomfort prior to tx. Pt reports 0/10 pain or discomfort post tx. After treatment:   [ ]              Patient left in no apparent distress sitting up in chair  [X]              Patient left in no apparent distress in bed  [X]              Call bell left within reach  [X]              Nursing notified  [ ]              Caregiver present  [ ]              Bed alarm activated         COMMUNICATION/EDUCATION:   [X]              See above      Thank you for this referral.     Yevgeniy Nguyen M.S. CCC-SLP/L  Speech-Language Pathologist

## 2017-08-16 NOTE — DIALYSIS
ACUTE HEMODIALYSIS FLOW SHEET    HEMODIALYSIS ORDERS: Physician: Dr. Mark Marquez     Dialyzer: Revaclear         Duration: 3 hr  BFR: 300   DFR: 600   Dialysate:  K+   3.0    Ca+  3.0   Weight:    kg    Bed Scale []     Unable to Obtain []      UF Goal:  0ml   Heparin []  Bolus      Units    [] Hourly       Units    []None   Pre BP:  124/67   Pulse: 78      Temperature: 97.5  Respirations: 18    Tx: NS           ml/Bolus  Other        [x] N/A   Labs: Pre        Post:        [x] N/A   Additional Orders:           [x] Time Out/Safety Check  [x] [facility] Consent Verified     CATHETER ACCESS: [x]N/A   []Right   []Left   []IJ     []Fem   [] First use X-ray verified     []Tunnel                [] Non Tunneled   []No S/S infection  []Redness  []Drainage []Cultured []Swelling []Pain   []Medical Aseptic Prep Utilized   []Dressing Changed  [] Biopatch  Date:       []Clotted   []Patent   Flows: []Good  []Poor  []Reversed   If access problem,  notified: []Yes    []N/A  Date:           GRAFT/FISTULA ACCESS:  []N/A     []Right     [x]Left     [x]UE     []LE   [x]AVG   []AVF        []Buttonhole    [x]Medical Aseptic Prep Utilized   []No S/S infection  []Redness  []Drainage []Cultured []Swelling []Pain    Bruit:   [x] Strong    [] Weak       Thrill :   [x] Strong    [] Weak       Needle Gauge:15    Length: 1     If access problem,  notified: []Yes     [x]N/A  Date:        Please describe access if present and not used:       GENERAL ASSESSMENT:    LUNGS: Sat%    98       [x] Clear  [] Coarse  [] Crackles  [] Wheezing        [] Diminished     Location : [x]RLL   [x]LLL    [x]RUL  [x]   Cough: [x]Productive  []Dry  []N/A   Respirations:  [x]Easy  []Labored   Therapy:  [x]RA  []NC         l/min    Mask: []NRB []Venti       O2%                  []Ventilator  []Intubated  []Trach   CARDIAC: [x]Regular      [] Irregular   [] Pericardial Rub  [] JVD        [x]  Monitored  [] N/A  Rhythm:         EDEMA: [x] None  []Generalized [] Pitting [] 1    [] 2    [] 3    [] 4                 [] Facial  [] Pedal  []  UE  [] LE   SKIN:   [x] Warm  [] Hot     [] Cold   [x] Dry     [] Pale   [] Diaphoretic                  [] Flushed  [] Jaundiced  [] Cyanotic  [] Rash  [] Weeping   LOC:    [x] Alert      [x]Oriented:    [x] Person     [] Place  []Time               [x] Confused  [] Lethargic  [] Medicated  [] Non-responsive     GI / ABDOMEN:  [] Flat    [] Distended    [x] Soft    [] Firm   []  Obese                             [x] Diarrhea  [x] Bowel Sounds  [] Nausea  [] Vomiting    FMS - draining        / URINE ASSESSMENT:[] Voiding   [] Oliguria  [x] Anuria   []  Stanton     [] Incontinent    []  Incontinent Brief      []  Bathroom Privileges     PAIN: [x] 0 []1  []2   []3   []4   []5   []6   []7   []8   []9   []10            Scale 0-10  Action/Follow Up:         MOBILITY:  [] Amb    [] Amb/Assist    [x] Bed    [] Wheelchair  [] Stretcher      All Vitals and Treatment Details on Attached [address]: [address] [facility]        Room # 405     [] 1st Time Acute  [] Stat  [x] Routine  [] Urgent     [x] Acute Room  []  Bedside  [] ICU/CCU  [] ER   Isolation Precautions:  [x] Dialysis   [] Airborne   [] Contact    [] Reverse   Special Considerations:         [] Blood Consent Verified [x]N/A     ALLERGIES:   [x] NKA          Code Status:  [x] Full Code  [] DNR  [] Other           HBsAg ONLY: Date Drawn 7/28/17               [x]Negative []Positive []Unknown   HBsAb: Date 7/28/17           [] Susceptible   [x] [id number] []Not Drawn      Current Labs:    Date of Labs:                 Today [x]                                                                                                                              DIET:  [x] Renal    [] Other     [] NPO     []  Diabetic      PRIMARY NURSE REPORT: First initial/Last name/Title      Pre Dialysis: Vera Foster RN     Time: 2651      EDUCATION:    [x] Patient [] Other         Knowledge Basis: []None [x]Minimal []Substantial   [] Access Care     [] S&S of infection     [] Fluid Management     []K+     [x]Procedural    []Albumin     [] Medications     [] Tx Options     [] Transplant     [] Diet     [] Other   Teaching Tools:  [x] Explain  [] Demo  [] Handouts [] Video   Inappropriate due to            [address] Before each treatment:     Machine Number:                   [address] [facility]                                   [x] Unit Machine #  8 with centralized RO                                  [] Portable Machine #1/RO serial # [id number]                                  [] Portable Machine #2/RO serial # [id number]                                  [] Portable Machine #3/RO serial # [id number]                                                                                                       [address] [facility]                                  [] Portable Machine #11/RO serial # [id number]                                   [] Portable Machine #12/RO serial # [id number]                                  [] Portable Machine #13/RO serial #  [id number]      Alarm Test: [x]Pass           Other:         [x] RO/Machine Log Complete   Temp 36.8            [x]Extracorporeal Circuit Tested for integrity   Dialysate: pH  7.4 Conductivity: Meter   14.2     HD Machine   14.2                  TCD: 13.9  Dialyzer Lot # [id number]                 Blood Tubing Lot # [id number]       Saline Lot #  [id number]     CHLORINE TESTING-Before each treatment and every 4 hours    Total Chlorine: [x] less than 0.1 ppm  Time: 0800  4 Hr Check Time: 1100   (if greater than 0.1 ppm from Primary then every 30 minutes from Secondary)     TREATMENT INITIATION  with Dialysis Precautions:   [x] All Connections Secured                 [x] Saline Line Double Clamped   [x] Venous Parameters Set                  [x] Arterial Parameters Set    [x] Prime Given  ml  250             [x]Air Foam Detector Engaged      Treatment Initiation Note: Pt arrived to dialysis room. Alert, but somewhat confuse. Hx of CVA. Accessed via let AVG and started HD tx without problem. Medication Dose Volume Route Initials Dialyzer Cleared: [] Good [] Fair  [] Poor    Blood processed:    L  UF Removed    Ml    Post Wt:  59   kg  POst BP: 120/66    Pulse: 80   Respirations: 16 Temperature: 97.6          Post Tx Vascular Access: AVF/AVG: Bleeding stopped Art 15     min. Oscar. 15     Min   N/A          Catheter: Locking solution: Heparin 1:1000 Art. Oscar. N/A                                 Post Assessment:                                    Skin:  [x] Warm  [x] Dry [] Diaphoretic     [] Flushed  [] Pale [] Cyanotic   [facility] Signatures Title Initials  Time Lungs: [x] Clear    [] Course  [] Crackles  [] Wheezing   Charis Casey RN NP  Cardiac: [x] Regular   [] Irregular   [] Monitor  [] N/A  Rhythm:           Edema:  [x] None    [] General     [] Facial   [] Pedal    [] UE    [] LE       Pain: [x]0  []1  []2   []3  []4   []5   []6   []7   []8   []9   []10         Post Treatment Note: Completed HD tx, tolerated well. Net fluid removed 400 ml during HD.       POST TREATMENT PRIMARY NURSE HANDOFF REPORT:     First initial/Last name/Title         Post Dialysis:  Sunil Holland RN     Time: 1778          Abbreviations: AVG-arterial venous graft, AVF-arterial venous fistula, IJ-Internal Jugular, Subcl-Subclavian, Fem-Femoral, Tx-treatment, AP/HR-apical heart rate, DFR-dialysate flow rate, BFR-blood flow rate, AP-arterial pressure, -venous pressure, UF-ultrafiltrate, TMP-transmembrane pressure, Oscar-Venous, Art-Arterial, RO-Reverse Osmosis

## 2017-08-16 NOTE — PROGRESS NOTES
[facility]  Progress Note    Patient: Meka Leung Age: 55 y.o. : 1960 MR#: [id number] SSN: [id number]  Date/Time: 2017     Subjective:     Pt AA, dysarthric. Calm and cooperative. Pulled NGT out. Per pt he pulled out accidentally. Assessment/Plan:   1. Acute met encephalopathy: due to # 2.    2. Acute pontine lacunar CVA: cont asa and statin. Neuro on case. Recent echo noted    3. S/p PEA arrest. ? Cardiac cause with elevated trop PTA. Echo improving EF, s/p cath, no CAD. 4. Acute resp failure s/p extubation, off O2  5. Dysphagia: S/p SLP eval. NPO status now. resume NGT feeding once new tube is placed. 6. ESRD on HD. M/W/F   7. Sepsis - leucocytosis  () bottles grew E.coli and C.perferinges. S/p Rx  8. HTN: BP was lower side but increasing now, permissive. 9. Elevated LFT - ? Shock liver. better. 10. Thrombocytopenia: secondary to sepsis. better   11. Sz: on keppra per neurology  12. Wounds: wound care    unstageable pressure ulcer to sacrum/coccyx not present on admit    moisture associated skin damage to anus not present  on admit    moisture associated skin damage to buttocks not present on admit  13. Diarrhea due to Abx, cont probiotics and imodium as needed, will cont flagyl    Full code   D/w Daughter Brinda Lees [phone] in detail, explained cont NGT feeding for now, if dysphagia doesn't improve in next few days then will need PEG tube.    Son Jerardo. [phone]    D/w RN    Case discussed with:  [x]Patient  []Family  [x]Nursing  [x]Case Management  DVT Prophylaxis:  []Lovenox  [x]Hep SQ  [x]SCDs  []Coumadin   []On Heparin gtt    Patient Active Problem List   Diagnosis Code    Anemia D64.9    Acidosis E87.2    Cardiac arrest ([address] I46.9    Respiratory failure ([address] J96.90    ESRD needing dialysis ([address] N18.6, Z99.2    Anoxic brain damage (HCC) G93.1    Colitis K52.9    Hypotension I95.9    Acute GI bleeding K92.2    ESRD (end stage renal disease) ([facility]) N18.6    Sepsis ([facility]) A41.9       Objective:   VS:   Visit Vitals    /81 (BP 1 Location: Right arm, BP Patient Position: At rest)    Pulse 85    Temp 98.1 °F (36.7 °C)    Resp 16    Ht 6' 2\" (1.88 m)    Wt 59 kg (130 lb 1.6 oz)    SpO2 100%    BMI 16.7 kg/m2      Tmax/24hrs: Temp (24hrs), Av.9 °F (36.6 °C), Min:97.1 °F (36.2 °C), Max:98.4 °F (36.9 °C)  IOBRIEF    Intake/Output Summary (Last 24 hours) at 17 1614  Last data filed at 17 1230   Gross per 24 hour   Intake              730 ml   Output             2400 ml   Net            -1670 ml       General:  Alert awake in nad  Pulmonary:  CTA Bilaterally. Cardiovascular: Regular rate and Rhythm. GI:  Soft, Non distended, Non tender. + Bowel sounds. Extremities:  No edema, cyanosis, clubbing. Neuro: AA, dysarthria. Follows commands. R side weakness.     Diffuse excoriation in perianal area          Medications:   Current Facility-Administered Medications   Medication Dose Route Frequency    vits A and D-white pet-lanolin (A&D) ointment   Topical QID AFTER MEALS    collagenase (SANTYL) 250 unit/gram ointment   Topical DAILY    levETIRAcetam (KEPPRA) 500 mg in 100 ml IVPB  500 mg IntraVENous Q12H    acetaminophen (TYLENOL) tablet 650 mg  650 mg Oral Q4H PRN    dextrose 5% and 0.9% NaCl infusion  40 mL/hr IntraVENous CONTINUOUS    metroNIDAZOLE (FLAGYL) tablet 500 mg  500 mg Oral TID    lactobacillus sp. 50 billion cpu (BIO-K PLUS) capsule 1 Cap  1 Cap Oral DAILY    loperamide (IMODIUM) capsule 2 mg  2 mg Oral Q6H PRN    heparin (porcine) injection 5,000 Units  5,000 Units SubCUTAneous Q8H    heparin (porcine) 1,000 unit/mL injection 2,000 Units  2,000 Units IntraVENous DIALYSIS ONCE    famotidine (PEPCID) tablet 20 mg  20 mg Oral DAILY    simvastatin (ZOCOR) tablet 20 mg  20 mg Oral QHS    doxercalciferol (HECTOROL) 4 mcg/2 mL injection 3 mcg  3 mcg IntraVENous DIALYSIS MON, WED & FRI    epoetin benjamin (EPOGEN;PROCRIT) injection 10,000 Units  10,000 Units IntraVENous DIALYSIS MON, WED & FRI    insulin lispro (HUMALOG) injection   SubCUTAneous AC&HS    aspirin chewable tablet 81 mg  81 mg Oral DAILY    hydrocortisone (CORTEF) tablet 10 mg  10 mg Oral BID WITH MEALS    glucose chewable tablet 16 g  4 Tab Oral PRN    glucagon (GLUCAGEN) injection 1 mg  1 mg IntraMUSCular PRN    dextrose (D50W) injection syrg 12.5-25 g  25-50 mL IntraVENous PRN    0.9% sodium chloride infusion  100 mL/hr IntraVENous DIALYSIS PRN    naloxone (NARCAN) injection 0.4 mg  0.4 mg IntraVENous PRN       Labs:    Recent Results (from the past 24 hour(s))   GLUCOSE, POC    Collection Time: 08/15/17  5:15 PM   Result Value Ref Range    Glucose (POC) 138 (H) 70 - 110 mg/dL   GLUCOSE, POC    Collection Time: 08/15/17  9:47 PM   Result Value Ref Range    Glucose (POC) 201 (H) 70 - 110 mg/dL   CBC WITH AUTOMATED DIFF    Collection Time: 08/16/17  3:07 AM   Result Value Ref Range    WBC 9.0 4.6 - 13.2 K/uL    RBC 3.12 (L) 4.70 - 5.50 M/uL    HGB 9.6 (L) 13.0 - 16.0 g/dL    HCT 29.6 (L) 36.0 - 48.0 %    MCV 94.9 74.0 - 97.0 FL    MCH 30.8 24.0 - 34.0 PG    MCHC 32.4 31.0 - 37.0 g/dL    RDW 17.8 (H) 11.6 - 14.5 %    PLATELET 481 754 - 902 K/uL    MPV 10.2 9.2 - 11.8 FL    NEUTROPHILS 69 42 - 75 %    BAND NEUTROPHILS 3 0 - 5 %    LYMPHOCYTES 16 (L) 20 - 51 %    MONOCYTES 6 2 - 9 %    EOSINOPHILS 6 (H) 0 - 5 %    BASOPHILS 0 0 - 3 %    ABS. NEUTROPHILS 6.6 1.8 - 8.0 K/UL    ABS. LYMPHOCYTES 1.4 0.8 - 3.5 K/UL    ABS. MONOCYTES 0.5 0 - 1.0 K/UL    ABS. EOSINOPHILS 0.5 (H) 0.0 - 0.4 K/UL    ABS.  BASOPHILS 0.0 0.0 - 0.06 K/UL    DF MANUAL      PLATELET COMMENTS ADEQUATE PLATELETS      RBC COMMENTS ANISOCYTOSIS  1+       METABOLIC PANEL, BASIC    Collection Time: 08/16/17  3:07 AM   Result Value Ref Range    Sodium 144 136 - 145 mmol/L    Potassium 3.6 3.5 - 5.5 mmol/L    Chloride 110 (H) 100 - 108 mmol/L CO2 23 21 - 32 mmol/L    Anion gap 11 3.0 - 18 mmol/L    Glucose 151 (H) 74 - 99 mg/dL    BUN 24 (H) 7.0 - 18 MG/DL    Creatinine 7.59 (H) 0.6 - 1.3 MG/DL    BUN/Creatinine ratio 3 (L) 12 - 20      GFR est AA 9 (L) >60 ml/min/1.73m2    GFR est non-AA 7 (L) >60 ml/min/1.73m2    Calcium 8.1 (L) 8.5 - 10.1 MG/DL   GLUCOSE, POC    Collection Time: 08/16/17  8:32 AM   Result Value Ref Range    Glucose (POC) 133 (H) 70 - 110 mg/dL   GLUCOSE, POC    Collection Time: 08/16/17  3:38 PM   Result Value Ref Range    Glucose (POC) 84 70 - 110 mg/dL       Signed By: Socorro Aponte MD     August 16, 2017

## 2017-08-17 PROBLEM — R64 CACHEXIA (HCC): Status: ACTIVE | Noted: 2017-08-17

## 2017-08-17 PROBLEM — R13.12 OROPHARYNGEAL DYSPHAGIA: Status: ACTIVE | Noted: 2017-08-17

## 2017-08-17 LAB
BASOPHILS # BLD: 0 K/UL (ref 0–0.06)
BASOPHILS NFR BLD: 0 % (ref 0–3)
DIFFERENTIAL METHOD BLD: ABNORMAL
EOSINOPHIL # BLD: 0.2 K/UL (ref 0–0.4)
EOSINOPHIL NFR BLD: 3 % (ref 0–5)
ERYTHROCYTE [DISTWIDTH] IN BLOOD BY AUTOMATED COUNT: 17.6 % (ref 11.6–14.5)
GLUCOSE BLD STRIP.AUTO-MCNC: 169 MG/DL (ref 70–110)
GLUCOSE BLD STRIP.AUTO-MCNC: 170 MG/DL (ref 70–110)
GLUCOSE BLD STRIP.AUTO-MCNC: 173 MG/DL (ref 70–110)
GLUCOSE BLD STRIP.AUTO-MCNC: 205 MG/DL (ref 70–110)
HCT VFR BLD AUTO: 30.5 % (ref 36–48)
HGB BLD-MCNC: 9.7 G/DL (ref 13–16)
LYMPHOCYTES # BLD: 1.6 K/UL (ref 0.8–3.5)
LYMPHOCYTES NFR BLD: 21 % (ref 20–51)
MCH RBC QN AUTO: 30.3 PG (ref 24–34)
MCHC RBC AUTO-ENTMCNC: 31.8 G/DL (ref 31–37)
MCV RBC AUTO: 95.3 FL (ref 74–97)
MONOCYTES # BLD: 1.1 K/UL (ref 0–1)
MONOCYTES NFR BLD: 14 % (ref 2–9)
NEUTS BAND NFR BLD MANUAL: 2 % (ref 0–5)
NEUTS SEG # BLD: 4.6 K/UL (ref 1.8–8)
NEUTS SEG NFR BLD: 60 % (ref 42–75)
PLATELET # BLD AUTO: 348 K/UL (ref 135–420)
PLATELET COMMENTS,PCOM: ABNORMAL
PMV BLD AUTO: 9.7 FL (ref 9.2–11.8)
RBC # BLD AUTO: 3.2 M/UL (ref 4.7–5.5)
RBC MORPH BLD: ABNORMAL
RBC MORPH BLD: ABNORMAL
WBC # BLD AUTO: 7.5 K/UL (ref 4.6–13.2)

## 2017-08-17 PROCEDURE — 74011636637 HC RX REV CODE- 636/637: Performed by: HOSPITALIST

## 2017-08-17 PROCEDURE — 97535 SELF CARE MNGMENT TRAINING: CPT

## 2017-08-17 PROCEDURE — 36415 COLL VENOUS BLD VENIPUNCTURE: CPT | Performed by: PHYSICIAN ASSISTANT

## 2017-08-17 PROCEDURE — 97110 THERAPEUTIC EXERCISES: CPT

## 2017-08-17 PROCEDURE — 92526 ORAL FUNCTION THERAPY: CPT

## 2017-08-17 PROCEDURE — 74011250637 HC RX REV CODE- 250/637: Performed by: HOSPITALIST

## 2017-08-17 PROCEDURE — 74011250636 HC RX REV CODE- 250/636: Performed by: HOSPITALIST

## 2017-08-17 PROCEDURE — 85025 COMPLETE CBC W/AUTO DIFF WBC: CPT | Performed by: PHYSICIAN ASSISTANT

## 2017-08-17 PROCEDURE — 82962 GLUCOSE BLOOD TEST: CPT

## 2017-08-17 PROCEDURE — 74011250637 HC RX REV CODE- 250/637: Performed by: PHYSICIAN ASSISTANT

## 2017-08-17 PROCEDURE — 97530 THERAPEUTIC ACTIVITIES: CPT

## 2017-08-17 PROCEDURE — 74011250637 HC RX REV CODE- 250/637: Performed by: INTERNAL MEDICINE

## 2017-08-17 PROCEDURE — 77030033263 HC DRSG MEPILEX 16-48IN BORD MOLN -B

## 2017-08-17 PROCEDURE — 65660000000 HC RM CCU STEPDOWN

## 2017-08-17 RX ADMIN — HYDROCORTISONE 10 MG: 10 TABLET ORAL at 17:00

## 2017-08-17 RX ADMIN — COLLAGENASE SANTYL: 250 OINTMENT TOPICAL at 07:44

## 2017-08-17 RX ADMIN — INSULIN LISPRO 3 UNITS: 100 INJECTION, SOLUTION INTRAVENOUS; SUBCUTANEOUS at 12:39

## 2017-08-17 RX ADMIN — SIMVASTATIN 20 MG: 10 TABLET, FILM COATED ORAL at 22:04

## 2017-08-17 RX ADMIN — VITAMIN A AND D: 30.8 OINTMENT TOPICAL at 12:40

## 2017-08-17 RX ADMIN — VITAMIN A AND D: 30.8 OINTMENT TOPICAL at 22:08

## 2017-08-17 RX ADMIN — INSULIN LISPRO 3 UNITS: 100 INJECTION, SOLUTION INTRAVENOUS; SUBCUTANEOUS at 16:42

## 2017-08-17 RX ADMIN — HEPARIN SODIUM 5000 UNITS: 5000 INJECTION, SOLUTION INTRAVENOUS; SUBCUTANEOUS at 02:50

## 2017-08-17 RX ADMIN — METRONIDAZOLE 500 MG: 500 TABLET ORAL at 22:04

## 2017-08-17 RX ADMIN — METRONIDAZOLE 500 MG: 500 TABLET ORAL at 07:43

## 2017-08-17 RX ADMIN — INSULIN LISPRO 6 UNITS: 100 INJECTION, SOLUTION INTRAVENOUS; SUBCUTANEOUS at 22:05

## 2017-08-17 RX ADMIN — LEVETIRACETAM 500 MG: 5 INJECTION INTRAVENOUS at 07:40

## 2017-08-17 RX ADMIN — ASPIRIN 81 MG 81 MG: 81 TABLET ORAL at 08:00

## 2017-08-17 RX ADMIN — FAMOTIDINE 20 MG: 20 TABLET ORAL at 07:42

## 2017-08-17 RX ADMIN — VITAMIN A AND D: 30.8 OINTMENT TOPICAL at 07:44

## 2017-08-17 RX ADMIN — HEPARIN SODIUM 5000 UNITS: 5000 INJECTION, SOLUTION INTRAVENOUS; SUBCUTANEOUS at 16:42

## 2017-08-17 RX ADMIN — METRONIDAZOLE 500 MG: 500 TABLET ORAL at 16:42

## 2017-08-17 RX ADMIN — VITAMIN A AND D: 30.8 OINTMENT TOPICAL at 16:43

## 2017-08-17 RX ADMIN — Medication 1 CAPSULE: at 08:00

## 2017-08-17 RX ADMIN — INSULIN LISPRO 3 UNITS: 100 INJECTION, SOLUTION INTRAVENOUS; SUBCUTANEOUS at 07:45

## 2017-08-17 RX ADMIN — HYDROCORTISONE 10 MG: 10 TABLET ORAL at 08:00

## 2017-08-17 RX ADMIN — LEVETIRACETAM 500 MG: 5 INJECTION INTRAVENOUS at 19:15

## 2017-08-17 RX ADMIN — HEPARIN SODIUM 5000 UNITS: 5000 INJECTION, SOLUTION INTRAVENOUS; SUBCUTANEOUS at 07:46

## 2017-08-17 NOTE — PROGRESS NOTES
Problem: Mobility Impaired (Adult and Pediatric)  Goal: *Acute Goals and Plan of Care (Insert Text)  STGs to be addressed within 3 days:  1. Bed mobility: Rolling L to R to L min/CGA with use of HR for positioning. 2. Activity Tolerance: Tolerate EOB sitting 5-10 minutes for change of position. 3. Transfer: Supine to Sit min/CGA with HR for meals. LTGs to be addressed within 7 days:  1. Transfer: Sit to stand max/mod A with RW/SW in prep for transfers. 2. Ambulation: Ambulate 5-25 ft. max/mod A with RW/SW for home mobility. PHYSICAL THERAPY TREATMENT     Patient: Jada Napier [de-identified]55 y.o. male)  Date: 8/17/2017  Diagnosis: Cardiac arrest ([facility]  Acidosis  Respiratory failure ([facility]  Anemia  ESRD needing dialysis ([facility]  Cardiac arrest ([facility]  Acute GI bleeding  Sepsis ([facility]  Hypotension  Anoxic brain damage (HCC)  ESRD (end stage renal disease) ([facility]  Colitis Sepsis ([facility]       Precautions: Fall, Skin, Aspiration (FMS)  Chart, physical therapy assessment, plan of care and goals were reviewed. ASSESSMENT:  Pt presents with diminished motivation to participate this afternoon and req's maximal encouragement to participate in limited session. Pt does demo increased proficiency with bed mobility as decreased assist is req'd for rolling. Pt is profoundly deconditioned and will benefit greatly from upright sitting and oob mobility training as soon as is amendable. Progression toward goals:  [ ]      Improving appropriately and progressing toward goals  [X]      Improving slowly and progressing toward goals  [ ]      Not making progress toward goals and plan of care will be adjusted       PLAN:   Patient continues to benefit from skilled intervention to address the above impairments. Continue treatment per established plan of care. Discharge Recommendations:  Skilled Nursing Facility  Further Equipment Recommendations for Discharge:  N/A          SUBJECTIVE:   Patient stated I don't feel like it.   Pt is minimally receptive to encouragement today and appears quite fatigued. OBJECTIVE DATA SUMMARY:   Critical Behavior:  Neurologic State: Alert  Orientation Level: Oriented to person, Oriented to place  Cognition: Follows commands  Safety/Judgement: Fall prevention  Functional Mobility Training:  Bed Mobility:  Rolling: Minimum assistance (both directions with rail use and hook lying position)  Scooting: Maximum assistance  Balance:  Sitting:  (pt declines attempt)  Therapeutic Exercises:   Supine: heel slides,saq,quad sets,AP's  Sets: 1  Reps: 10  Assist level: minimal assist with L LE needs < R LE. Pain:  Pt reports 0/10 pain or discomfort prior to treatment. Pt reports 0/10 pain or discomfort post treatment. Activity Tolerance:   Poor: pt demo's decreased toleration highlighted by maximal encouragement req'd and substantial rest periods between activities. Please refer to the flowsheet for vital signs taken during this treatment.   After treatment:   [ ] Patient left in no apparent distress sitting up in chair  [X] Patient left in no apparent distress in bed  [X] Call bell left within reach  [ ] Nursing notified  [ ] Caregiver present  [ ] Bed alarm activated      Wendy Steel PTA   Time Calculation: 23 mins

## 2017-08-17 NOTE — PROGRESS NOTES
[facility]    Patient: Frank Marie Age: 55 y.o. : 1960 MR#: [id number] SSN: [id number]  Date/Time: 2017     Subjective:     Pt AA, dysarthric. Calm and cooperative. Assessment/Plan:   1. Acute met encephalopathy: due to # 2.    2. Acute pontine lacunar CVA: cont asa and statin. Neuro on case. Recent echo noted    3. S/p PEA arrest. ? Cardiac cause with elevated trop PTA. Echo improving EF, s/p cath, no CAD. 4. Acute resp failure s/p extubation, off O2  5. Dysphagia: S/p SLP eval. Failed Slp eval agian. cont NGT feeding, pt and family wants PEG. 6. ESRD on HD. M/W/F   7. Sepsis - leucocytosis  () bottles grew E.coli and C.perferinges. S/p Rx  8. HTN: BP was lower side. 9. Elevated LFT - ? Shock liver. better. 10. Thrombocytopenia: secondary to sepsis. better   11. Sz: on keppra per neurology  12. Wounds: wound care    unstageable pressure ulcer to sacrum/coccyx not present on admit    moisture associated skin damage to anus not present  on admit    moisture associated skin damage to buttocks not present on admit  13. Diarrhea due to Abx, cont probiotics and imodium as needed, will cont flagyl    Full code   D/w Daughter Alicia Casarez [phone] in detail, explained cont NGT feeding for now, pt cont fail SLP eval. D/w about need for PEG tube placement, she agrees for PEG.    Called son and left message Son Ekaterina Rubi. [phone]   Will consult GI   D/w RN    Case discussed with:  [x]Patient  [x]Family  [x]Nursing  [x]Case Management  DVT Prophylaxis:  []Lovenox  [x]Hep SQ  [x]SCDs  []Coumadin   []On Heparin gtt    Patient Active Problem List   Diagnosis Code    Anemia D64.9    Acidosis E87.2    Cardiac arrest ([address] I46.9    Respiratory failure ([address] J96.90    ESRD needing dialysis ([address] N18.6, Z99.2    Anoxic brain damage ([facility] G93.1    Colitis K52.9    Hypotension I95.9    Acute GI bleeding K92.2    ESRD (end stage renal disease) (HCC) N18.6    Sepsis (HCC) A41.9       Objective:   VS:   Visit Vitals    BP 95/54 (BP 1 Location: Right arm, BP Patient Position: Supine)    Pulse 87    Temp 98.6 °F (37 °C)    Resp 17    Ht 6' 2\" (1.88 m)    Wt 59.8 kg (131 lb 12.8 oz)    SpO2 99%    BMI 16.92 kg/m2      Tmax/24hrs: Temp (24hrs), Av °F (36.7 °C), Min:97.1 °F (36.2 °C), Max:98.6 °F (37 °C)  IOBRIEF    Intake/Output Summary (Last 24 hours) at 17 1529  Last data filed at 17 1343   Gross per 24 hour   Intake          1478.67 ml   Output             1000 ml   Net           478.67 ml       General:  Alert awake in nad  Pulmonary:  CTA Bilaterally. Cardiovascular: Regular rate and Rhythm. GI:  Soft, Non distended, Non tender. + Bowel sounds. Extremities:  No edema, cyanosis, clubbing. Neuro: AA, dysarthria. Follows commands. R side weakness.     Diffuse excoriation in perianal area          Medications:   Current Facility-Administered Medications   Medication Dose Route Frequency    vits A and D-white pet-lanolin (A&D) ointment   Topical QID AFTER MEALS    collagenase (SANTYL) 250 unit/gram ointment   Topical DAILY    levETIRAcetam (KEPPRA) 500 mg in 100 ml IVPB  500 mg IntraVENous Q12H    acetaminophen (TYLENOL) tablet 650 mg  650 mg Oral Q4H PRN    metroNIDAZOLE (FLAGYL) tablet 500 mg  500 mg Oral TID    lactobacillus sp. 50 billion cpu (BIO-K PLUS) capsule 1 Cap  1 Cap Oral DAILY    loperamide (IMODIUM) capsule 2 mg  2 mg Oral Q6H PRN    heparin (porcine) injection 5,000 Units  5,000 Units SubCUTAneous Q8H    heparin (porcine) 1,000 unit/mL injection 2,000 Units  2,000 Units IntraVENous DIALYSIS ONCE    famotidine (PEPCID) tablet 20 mg  20 mg Oral DAILY    simvastatin (ZOCOR) tablet 20 mg  20 mg Oral QHS    doxercalciferol (HECTOROL) 4 mcg/2 mL injection 3 mcg  3 mcg IntraVENous DIALYSIS MON, WED & FRI    epoetin benjamin (EPOGEN;PROCRIT) injection 10,000 Units  10,000 Units IntraVENous DIALYSIS MON, WED & FRI    insulin lispro (HUMALOG) injection   SubCUTAneous AC&HS    aspirin chewable tablet 81 mg  81 mg Oral DAILY    hydrocortisone (CORTEF) tablet 10 mg  10 mg Oral BID WITH MEALS    glucose chewable tablet 16 g  4 Tab Oral PRN    glucagon (GLUCAGEN) injection 1 mg  1 mg IntraMUSCular PRN    dextrose (D50W) injection syrg 12.5-25 g  25-50 mL IntraVENous PRN    0.9% sodium chloride infusion  100 mL/hr IntraVENous DIALYSIS PRN    naloxone (NARCAN) injection 0.4 mg  0.4 mg IntraVENous PRN       Labs:    Recent Results (from the past 24 hour(s))   GLUCOSE, POC    Collection Time: 08/16/17  3:38 PM   Result Value Ref Range    Glucose (POC) 84 70 - 110 mg/dL   GLUCOSE, POC    Collection Time: 08/16/17 10:53 PM   Result Value Ref Range    Glucose (POC) 162 (H) 70 - 110 mg/dL   CBC WITH AUTOMATED DIFF    Collection Time: 08/17/17  3:20 AM   Result Value Ref Range    WBC 7.5 4.6 - 13.2 K/uL    RBC 3.20 (L) 4.70 - 5.50 M/uL    HGB 9.7 (L) 13.0 - 16.0 g/dL    HCT 30.5 (L) 36.0 - 48.0 %    MCV 95.3 74.0 - 97.0 FL    MCH 30.3 24.0 - 34.0 PG    MCHC 31.8 31.0 - 37.0 g/dL    RDW 17.6 (H) 11.6 - 14.5 %    PLATELET 076 932 - 592 K/uL    MPV 9.7 9.2 - 11.8 FL    NEUTROPHILS 60 42 - 75 %    BAND NEUTROPHILS 2 0 - 5 %    LYMPHOCYTES 21 20 - 51 %    MONOCYTES 14 (H) 2 - 9 %    EOSINOPHILS 3 0 - 5 %    BASOPHILS 0 0 - 3 %    ABS. NEUTROPHILS 4.6 1.8 - 8.0 K/UL    ABS. LYMPHOCYTES 1.6 0.8 - 3.5 K/UL    ABS. MONOCYTES 1.1 (H) 0 - 1.0 K/UL    ABS. EOSINOPHILS 0.2 0.0 - 0.4 K/UL    ABS.  BASOPHILS 0.0 0.0 - 0.06 K/UL    DF MANUAL      PLATELET COMMENTS ADEQUATE PLATELETS      RBC COMMENTS ANISOCYTOSIS  1+        RBC COMMENTS POLYCHROMASIA  1+       GLUCOSE, POC    Collection Time: 08/17/17  6:20 AM   Result Value Ref Range    Glucose (POC) 169 (H) 70 - 110 mg/dL   GLUCOSE, POC    Collection Time: 08/17/17 11:48 AM   Result Value Ref Range    Glucose (POC) 170 (H) 70 - 110 mg/dL       Signed By: Paddy Alvarez Jeannine Fontaine MD     August 17, 2017

## 2017-08-17 NOTE — CONSULTS
[facility] [place]    [email or web address]      Impression:   1.cachexia, anorexia  Oropharyngeal dysphagia  ESRD      Plan:   PEG in am   1. Chief Complaint:   nonverbal    HPI:  Whitney Mayes is a 55 y.o. male who is being seen on consult for the above. Pt has complicated Hx of ESRD, pontine infarct and cardiac disease. He has failed swallowing eval and PEG requested. Pt is non verbal, but appears to understand questions and commands. Roberth Ayala PMH:   Past Medical History:   Diagnosis Date    Anemia     Arthritis     Chronic pain     Back     Diabetes mellitus type II     Hypertension     IBS (irritable bowel syndrome)     Lactose intolerance     TB (tuberculosis)     TB test positive       PSH:   Past Surgical History:   Procedure Laterality Date    CARDIAC CATHETERIZATION  8/9/2017         CORONARY ARTERY ANGIOGRAM  8/9/2017         ENDOSCOPY, COLON, DIAGNOSTIC      HX AMPUTATION      Toe due to injury    LV ANGIOGRAPHY  8/9/2017         MODERATE SEDATION  8/9/2017            Social HX:   Social History     Social History    Marital status: SINGLE     Spouse name: N/A    Number of children: N/A    Years of education: N/A     Occupational History    Not on file. Social History Main Topics    Smoking status: Current Every Day Smoker    Smokeless tobacco: Not on file    Alcohol use Yes    Drug use: Not on file    Sexual activity: Not on file     Other Topics Concern    Not on file     Social History Narrative       FHX:   History reviewed. No pertinent family history. Allergy:   Allergies no known allergies    Home Medications:     No prescriptions prior to admission.        Review of Systems:  Non verbal      Constitutional:     Skin:    HENT:    Eyes:      Cardiovascular:    Respiratory:    Gastrointestinal:    Genitourinary:    Musculoskeletal:    Endo:    Heme:     Allergies:    Neurological:     Psychiatric:                   Visit Vitals    BP (!) 88/43  Comment: Nurse(Mj) was notified of this blood pressure.  Pulse 80    Temp 97.2 °F (36.2 °C)    Resp 18    Ht 6' 2\" (1.88 m)    Wt 59.8 kg (131 lb 12.8 oz)    SpO2 94%    BMI 16.92 kg/m2       Physical Assessment:     constitutional: appearance: thin cachectic  skin: inspection:decubiti , sacrum eyes: inspection: normal conjunctivae and lids; no jaundice pupils: symmetrical, normoreactive to light, normal accommodation and size. ENMT: mouth: normal oral mucosa,lips and gums; good dentition. oropharynx: normal tongue, hard and soft palate; posterior pharynx without erithema, exudate or lesions. neck: thyroid: normal size, consistency and position; no masses or tenderness. respiratory: effort: normal chest excursion; no intercostal retraction or accessory muscle use. cardiovascular: abdominal aorta: normal size and position; no bruits. palpation: PMI of normal size and position; normal rhythm; no thrill or murmurs. abdominal: abdomen: normal consistency; no tenderness or masses. hernias: no hernias appreciated. liver: normal size and consistency. spleen: not palpable. rectal: hemoccult/guaiac: not performed. musculoskeletal: digits and nails: no clubbing, cyanosis, petechiae or other inflammatory conditions. gait: normal gait and station head and neck: normal range of motion; no pain, crepitation or contracture. spine/ribs/pelvis: normal range of motion; no pain, deformity or contracture. lymphatic: axilae: not palpable. groin: not palpable. neck: within normal limits. other: not palpable.    neurologic: cranial nerves: not tested   psychiatric: judgement/insight:  Severely impaired     Basic Metabolic Profile   Recent Labs      08/16/17   0307   NA  144   K  3.6   CL  110*   CO2  23   BUN  24*   GLU  151*   CA  8.1*         CBC w/Diff    Recent Labs      08/17/17   0320   WBC  7.5   RBC  3.20*   HGB  9.7*   HCT  30.5*   MCV  95.3   MCH  30.3   MCHC  31.8   RDW  17.6* PLT  348    Recent Labs      08/17/17   0320   GRANS  60   LYMPH  21   EOS  3        Hepatic Function   No results for input(s): ALB, TP, TBILI, GPT, SGOT, AP, AML, LPSE in the last 72 hours. No lab exists for component: Nayeli Batista MD, M.D. [facility], [address] [place] [address]  [email or web address]. [facility]

## 2017-08-17 NOTE — ROUTINE PROCESS
Bedside shift change report given to Iraj Alas (oncoming nurse) by Radha Salcedo (offgoing nurse). Report included the following information SBAR, Intake/Output, MAR, Recent Results and Cardiac Rhythm . Karthikeyan Sanchez

## 2017-08-17 NOTE — PROGRESS NOTES
Problem: Pressure Injury - Risk of  Goal: *Prevention of pressure ulcer  Outcome: Not Progressing Towards Goal  Pt has an unstageable ulcer that was not present PTA. Diligent turning and wound care per wound care RN.

## 2017-08-17 NOTE — PROGRESS NOTES
Problem: Dysphagia (Adult)  Goal: *Acute Goals and Plan of Care (Insert Text)  Recommendations:  NPO with NG feeds  May need to consider long term alternative means of nutrition or comfort feeds   Strict aspiration precautions (HOB >30 degrees at all times, oral care TID)    Patient will:  1. Tolerate PO trials with 0 s/s overt distress in 4/5 trials  2. Utilize compensatory swallow strategies/maneuvers (decrease bite/sip, size/rate, alt. liq/sol) with min cues in 4/5 trials  3. Perform oral-motor/laryngeal exercises to increase oropharyngeal swallow function with min cues  4. Complete an objective swallow study (i.e., MBSS) to assess swallow integrity, r/o aspiration, and determine of safest LRD, min A - MET 8/7/17       Outcome: Not Progressing Towards Goal  SPEECH LANGUAGE PATHOLOGY DYSPHAGIA TREATMENT     Patient: Zen Arias [de-identified]55 y.o. male)  Date: 8/17/2017  Diagnosis: Cardiac arrest ([address]  Acidosis  Respiratory failure ([address]  Anemia  ESRD needing dialysis ([address]  Cardiac arrest ([address]  Acute GI bleeding  Sepsis ([address]  Hypotension  Anoxic brain damage (HCC)  ESRD (end stage renal disease) ([address]  Colitis   Precautions: Fall, Skin, Aspiration (FMS)      ASSESSMENT:  Pt seen for follow up dysphagia tx, drowsy with eyes opening to verbal stim. Pt demo delayed bolus formation/control with HTL presentations with swallow delay, decreased laryngeal elevation to palpation and delayed, weak cough following ~5 tsp of HTL. Oral care performed via toothette prior to and following po attempts. No further po trials offered due to high risk of aspiration. Recommend continue NPO with NG feeds. May need to consider long term alternative means of nutrition (PEG) or initiation of comfort feeds. Will continue to follow for further dysphagia management.    Progression toward goals:  [ ]         Improving appropriately and progressing toward goals  [ ]         Improving slowly and progressing toward goals  [X]         Not making progress toward goals and plan of care will be adjusted       PLAN:  Recommendations and Planned Interventions:  Patient continues to benefit from skilled intervention to address the above impairments. Continue treatment per established plan of care. Discharge Recommendations: To Be Determined       SUBJECTIVE:   Patient stated Ok. OBJECTIVE:   Cognitive and Communication Status:  Neurologic State: Alert  Orientation Level: Oriented to person, Oriented to place  Cognition: Follows commands  Perception: Appears intact  Perseveration: No perseveration noted  Safety/Judgement: Fall prevention  Dysphagia Treatment:  Oral Assessment:  Oral Assessment  Labial: Decreased rate  Dentition: Edentulous  Oral Hygiene: WFL  Lingual: Decreased rate, Decreased strength  Velum: No impairment  Mandible: No impairment  Gag Reflex: Absent  P.O.  Trials:              Patient Position: HOB 50*              Vocal quality prior to P.O.: Low volume, Hoarse              Consistency Presented: Honey thick liquid              How Presented: Spoon              Bolus Acceptance: No impairment              Bolus Formation/Control: Impaired              Type of Impairment: Delayed, Poor, Posterior              Propulsion: Delayed (# of seconds), Discoordination              Oral Residue: Less than 10% of bolus, Lingual              Initiation of Swallow: Delayed (# of seconds)              Laryngeal Elevation: Decreased, Weak              Aspiration Signs/Symptoms: Weak cough, Delayed cough/throat clear, Increase in RR              Pharyngeal Phase Characteristics: Suspected pharyngeal residue, Altered vocal quality, Effortful swallow, Poor endurance              Effective Modifications: None              Cues for Modifications: Maximal              Oral Phase Severity: Moderate-severe              Pharyngeal Phase Severity : Severe                PAIN:  No pain reported prior to or following treatment       After treatment:   [ ] Patient left in no apparent distress sitting up in chair  [X]              Patient left in no apparent distress in bed  [X]              Call bell left within reach  [X]              Nursing notified  [ ]              Caregiver present  [ ]              Bed alarm activated         COMMUNICATION/EDUCATION:   [X]              SLP educated pt with regard to aspiration risk, diet recs, oral care and positioning     Anitra Eden M.S., [address] [facility]  Speech-Language Pathologist

## 2017-08-17 NOTE — PROGRESS NOTES
Bedside and Verbal shift change report given to [address] [place] [address] (oncoming nurse) by Ketan Louise RN   (offgoing nurse). Report given with SBAR, Kardex, MAR and Recent Results.

## 2017-08-18 ENCOUNTER — APPOINTMENT (OUTPATIENT)
Dept: CT IMAGING | Age: 57
DRG: 004 | End: 2017-08-18
Attending: NURSE PRACTITIONER
Payer: MEDICARE

## 2017-08-18 ENCOUNTER — APPOINTMENT (OUTPATIENT)
Dept: GENERAL RADIOLOGY | Age: 57
DRG: 004 | End: 2017-08-18
Attending: NURSE PRACTITIONER
Payer: MEDICARE

## 2017-08-18 LAB
ALBUMIN SERPL-MCNC: 2.4 G/DL (ref 3.4–5)
ALBUMIN/GLOB SERPL: 0.5 {RATIO} (ref 0.8–1.7)
ALP SERPL-CCNC: 93 U/L (ref 45–117)
ALT SERPL-CCNC: 10 U/L (ref 16–61)
ANION GAP SERPL CALC-SCNC: 17 MMOL/L (ref 3–18)
ARTERIAL PATENCY WRIST A: ABNORMAL
ARTERIAL PATENCY WRIST A: YES
AST SERPL-CCNC: 8 U/L (ref 15–37)
BASE DEFICIT BLD-SCNC: 14 MMOL/L
BASE DEFICIT BLD-SCNC: 4 MMOL/L
BASOPHILS # BLD: 0 K/UL (ref 0–0.06)
BASOPHILS # BLD: 0.1 K/UL (ref 0–0.06)
BASOPHILS NFR BLD: 0 % (ref 0–3)
BASOPHILS NFR BLD: 1 % (ref 0–3)
BDY SITE: ABNORMAL
BDY SITE: ABNORMAL
BILIRUB SERPL-MCNC: 0.6 MG/DL (ref 0.2–1)
BODY TEMPERATURE: 98.6
BUN SERPL-MCNC: 10 MG/DL (ref 7–18)
BUN/CREAT SERPL: 2 (ref 12–20)
CALCIUM SERPL-MCNC: 8.8 MG/DL (ref 8.5–10.1)
CHLORIDE SERPL-SCNC: 109 MMOL/L (ref 100–108)
CK MB CFR SERPL CALC: 7.1 % (ref 0–4)
CK MB SERPL-MCNC: 2.4 NG/ML (ref 5–25)
CK SERPL-CCNC: 34 U/L (ref 39–308)
CO2 SERPL-SCNC: 18 MMOL/L (ref 21–32)
CREAT SERPL-MCNC: 4.14 MG/DL (ref 0.6–1.3)
DIFFERENTIAL METHOD BLD: ABNORMAL
DIFFERENTIAL METHOD BLD: ABNORMAL
EOSINOPHIL # BLD: 0.1 K/UL (ref 0–0.4)
EOSINOPHIL # BLD: 0.6 K/UL (ref 0–0.4)
EOSINOPHIL NFR BLD: 1 % (ref 0–5)
EOSINOPHIL NFR BLD: 5 % (ref 0–5)
ERYTHROCYTE [DISTWIDTH] IN BLOOD BY AUTOMATED COUNT: 17.3 % (ref 11.6–14.5)
ERYTHROCYTE [DISTWIDTH] IN BLOOD BY AUTOMATED COUNT: 17.7 % (ref 11.6–14.5)
GAS FLOW.O2 O2 DELIVERY SYS: ABNORMAL L/MIN
GAS FLOW.O2 O2 DELIVERY SYS: ABNORMAL L/MIN
GAS FLOW.O2 SETTING OXYMISER: 18 BPM
GAS FLOW.O2 SETTING OXYMISER: 18 BPM
GLOBULIN SER CALC-MCNC: 4.6 G/DL (ref 2–4)
GLUCOSE BLD STRIP.AUTO-MCNC: 104 MG/DL (ref 70–110)
GLUCOSE BLD STRIP.AUTO-MCNC: 181 MG/DL (ref 70–110)
GLUCOSE SERPL-MCNC: 183 MG/DL (ref 74–99)
HCO3 BLD-SCNC: 14.3 MMOL/L (ref 22–26)
HCO3 BLD-SCNC: 21.3 MMOL/L (ref 22–26)
HCT VFR BLD AUTO: 31.2 % (ref 36–48)
HCT VFR BLD AUTO: 32.5 % (ref 36–48)
HGB BLD-MCNC: 10.4 G/DL (ref 13–16)
HGB BLD-MCNC: 9.9 G/DL (ref 13–16)
INSPIRATION.DURATION SETTING TIME VENT: 1 SEC
INSPIRATION.DURATION SETTING TIME VENT: 1 SEC
LACTATE SERPL-SCNC: 7.9 MMOL/L (ref 0.4–2)
LYMPHOCYTES # BLD: 1.9 K/UL (ref 0.8–3.5)
LYMPHOCYTES # BLD: 2.7 K/UL (ref 0.8–3.5)
LYMPHOCYTES NFR BLD: 22 % (ref 20–51)
LYMPHOCYTES NFR BLD: 23 % (ref 20–51)
MAGNESIUM SERPL-MCNC: 2.7 MG/DL (ref 1.6–2.6)
MCH RBC QN AUTO: 30.6 PG (ref 24–34)
MCH RBC QN AUTO: 30.8 PG (ref 24–34)
MCHC RBC AUTO-ENTMCNC: 31.7 G/DL (ref 31–37)
MCHC RBC AUTO-ENTMCNC: 32 G/DL (ref 31–37)
MCV RBC AUTO: 95.6 FL (ref 74–97)
MCV RBC AUTO: 97.2 FL (ref 74–97)
MONOCYTES # BLD: 1.1 K/UL (ref 0–1)
MONOCYTES # BLD: 1.3 K/UL (ref 0–1)
MONOCYTES NFR BLD: 16 % (ref 2–9)
MONOCYTES NFR BLD: 9 % (ref 2–9)
NEUTS SEG # BLD: 5.1 K/UL (ref 1.8–8)
NEUTS SEG # BLD: 7.9 K/UL (ref 1.8–8)
NEUTS SEG NFR BLD: 60 % (ref 42–75)
NEUTS SEG NFR BLD: 63 % (ref 42–75)
NRBC BLD-RTO: 1 PER 100 WBC
O2/TOTAL GAS SETTING VFR VENT: 100 %
O2/TOTAL GAS SETTING VFR VENT: 100 %
PCO2 BLD: 35.3 MMHG (ref 35–45)
PCO2 BLD: 35.8 MMHG (ref 35–45)
PEEP RESPIRATORY: 10 CMH2O
PEEP RESPIRATORY: 5 CMH2O
PH BLD: 7.21 [PH] (ref 7.35–7.45)
PH BLD: 7.39 [PH] (ref 7.35–7.45)
PHOSPHATE SERPL-MCNC: 3.5 MG/DL (ref 2.5–4.9)
PLATELET # BLD AUTO: 343 K/UL (ref 135–420)
PLATELET # BLD AUTO: 387 K/UL (ref 135–420)
PLATELET COMMENTS,PCOM: ABNORMAL
PLATELET COMMENTS,PCOM: ABNORMAL
PMV BLD AUTO: 10.1 FL (ref 9.2–11.8)
PMV BLD AUTO: 9.9 FL (ref 9.2–11.8)
PO2 BLD: 52 MMHG (ref 80–100)
PO2 BLD: 539 MMHG (ref 80–100)
POTASSIUM SERPL-SCNC: 3.3 MMOL/L (ref 3.5–5.5)
PROT SERPL-MCNC: 7 G/DL (ref 6.4–8.2)
RBC # BLD AUTO: 3.21 M/UL (ref 4.7–5.5)
RBC # BLD AUTO: 3.4 M/UL (ref 4.7–5.5)
RBC MORPH BLD: ABNORMAL
RBC MORPH BLD: ABNORMAL
SAO2 % BLD: 100 % (ref 92–97)
SAO2 % BLD: 79 % (ref 92–97)
SERVICE CMNT-IMP: ABNORMAL
SERVICE CMNT-IMP: ABNORMAL
SODIUM SERPL-SCNC: 144 MMOL/L (ref 136–145)
SPECIMEN TYPE: ABNORMAL
SPECIMEN TYPE: ABNORMAL
TOTAL RESP. RATE, ITRR: 26
TOTAL RESP. RATE, ITRR: 29
TROPONIN I SERPL-MCNC: 0.05 NG/ML (ref 0–0.04)
VENTILATION MODE VENT: ABNORMAL
VENTILATION MODE VENT: ABNORMAL
VOLUME CONTROL PLUS IVLCP: YES
VOLUME CONTROL PLUS IVLCP: YES
VT SETTING VENT: 450 ML
VT SETTING VENT: 450 ML
WBC # BLD AUTO: 12.4 K/UL (ref 4.6–13.2)
WBC # BLD AUTO: 8.4 K/UL (ref 4.6–13.2)

## 2017-08-18 PROCEDURE — 74011000258 HC RX REV CODE- 258: Performed by: INTERNAL MEDICINE

## 2017-08-18 PROCEDURE — 93005 ELECTROCARDIOGRAM TRACING: CPT

## 2017-08-18 PROCEDURE — 82803 BLOOD GASES ANY COMBINATION: CPT

## 2017-08-18 PROCEDURE — 74011250636 HC RX REV CODE- 250/636

## 2017-08-18 PROCEDURE — 87040 BLOOD CULTURE FOR BACTERIA: CPT

## 2017-08-18 PROCEDURE — 74011250637 HC RX REV CODE- 250/637: Performed by: INTERNAL MEDICINE

## 2017-08-18 PROCEDURE — P9047 ALBUMIN (HUMAN), 25%, 50ML: HCPCS

## 2017-08-18 PROCEDURE — 90935 HEMODIALYSIS ONE EVALUATION: CPT

## 2017-08-18 PROCEDURE — 77030019896 HC KT ARTERIAL LN TELE -B

## 2017-08-18 PROCEDURE — 74011250636 HC RX REV CODE- 250/636: Performed by: PHYSICIAN ASSISTANT

## 2017-08-18 PROCEDURE — 31500 INSERT EMERGENCY AIRWAY: CPT

## 2017-08-18 PROCEDURE — 80053 COMPREHEN METABOLIC PANEL: CPT | Performed by: NURSE PRACTITIONER

## 2017-08-18 PROCEDURE — 77030018719 HC DRSG PTCH ANTIMIC J&J -A

## 2017-08-18 PROCEDURE — 83735 ASSAY OF MAGNESIUM: CPT | Performed by: PHYSICIAN ASSISTANT

## 2017-08-18 PROCEDURE — 83605 ASSAY OF LACTIC ACID: CPT | Performed by: NURSE PRACTITIONER

## 2017-08-18 PROCEDURE — 36415 COLL VENOUS BLD VENIPUNCTURE: CPT | Performed by: PHYSICIAN ASSISTANT

## 2017-08-18 PROCEDURE — 5A1955Z RESPIRATORY VENTILATION, GREATER THAN 96 CONSECUTIVE HOURS: ICD-10-PCS | Performed by: ANESTHESIOLOGY

## 2017-08-18 PROCEDURE — 36600 WITHDRAWAL OF ARTERIAL BLOOD: CPT

## 2017-08-18 PROCEDURE — 71010 XR CHEST PORT: CPT

## 2017-08-18 PROCEDURE — 84100 ASSAY OF PHOSPHORUS: CPT | Performed by: PHYSICIAN ASSISTANT

## 2017-08-18 PROCEDURE — 74011250636 HC RX REV CODE- 250/636: Performed by: INTERNAL MEDICINE

## 2017-08-18 PROCEDURE — 94002 VENT MGMT INPAT INIT DAY: CPT

## 2017-08-18 PROCEDURE — 82550 ASSAY OF CK (CPK): CPT | Performed by: NURSE PRACTITIONER

## 2017-08-18 PROCEDURE — C1751 CATH, INF, PER/CENT/MIDLINE: HCPCS

## 2017-08-18 PROCEDURE — 85025 COMPLETE CBC W/AUTO DIFF WBC: CPT | Performed by: PHYSICIAN ASSISTANT

## 2017-08-18 PROCEDURE — 0BH17EZ INSERTION OF ENDOTRACHEAL AIRWAY INTO TRACHEA, VIA NATURAL OR ARTIFICIAL OPENING: ICD-10-PCS | Performed by: ANESTHESIOLOGY

## 2017-08-18 PROCEDURE — 74011000258 HC RX REV CODE- 258: Performed by: NURSE PRACTITIONER

## 2017-08-18 PROCEDURE — 74011000250 HC RX REV CODE- 250: Performed by: NURSE PRACTITIONER

## 2017-08-18 PROCEDURE — 36556 INSERT NON-TUNNEL CV CATH: CPT

## 2017-08-18 PROCEDURE — 77030013797 HC KT TRNSDUC PRSSR EDWD -A

## 2017-08-18 PROCEDURE — 74011250636 HC RX REV CODE- 250/636: Performed by: NURSE PRACTITIONER

## 2017-08-18 PROCEDURE — 74011636637 HC RX REV CODE- 636/637: Performed by: HOSPITALIST

## 2017-08-18 PROCEDURE — 02HV33Z INSERTION OF INFUSION DEVICE INTO SUPERIOR VENA CAVA, PERCUTANEOUS APPROACH: ICD-10-PCS | Performed by: PHYSICIAN ASSISTANT

## 2017-08-18 PROCEDURE — 65610000006 HC RM INTENSIVE CARE

## 2017-08-18 PROCEDURE — 77030011256 HC DRSG MEPILEX <16IN NO BORD MOLN -A

## 2017-08-18 PROCEDURE — 82962 GLUCOSE BLOOD TEST: CPT

## 2017-08-18 PROCEDURE — 74011250637 HC RX REV CODE- 250/637: Performed by: NURSE PRACTITIONER

## 2017-08-18 PROCEDURE — 36620 INSERTION CATHETER ARTERY: CPT

## 2017-08-18 PROCEDURE — 74011250636 HC RX REV CODE- 250/636: Performed by: HOSPITALIST

## 2017-08-18 RX ORDER — HYDROCORTISONE SODIUM SUCCINATE 100 MG/2ML
50 INJECTION, POWDER, FOR SOLUTION INTRAMUSCULAR; INTRAVENOUS EVERY 6 HOURS
Status: DISCONTINUED | OUTPATIENT
Start: 2017-08-18 | End: 2017-08-21 | Stop reason: DRUGHIGH

## 2017-08-18 RX ORDER — FENTANYL CITRATE 50 UG/ML
50-100 INJECTION, SOLUTION INTRAMUSCULAR; INTRAVENOUS
Status: DISCONTINUED | OUTPATIENT
Start: 2017-08-18 | End: 2017-08-18

## 2017-08-18 RX ORDER — EPINEPHRINE 0.1 MG/ML
INJECTION INTRACARDIAC; INTRAVENOUS
Status: COMPLETED
Start: 2017-08-18 | End: 2017-08-18

## 2017-08-18 RX ORDER — LORAZEPAM 2 MG/ML
1 INJECTION INTRAMUSCULAR
Status: COMPLETED | OUTPATIENT
Start: 2017-08-18 | End: 2017-08-18

## 2017-08-18 RX ORDER — CHLORHEXIDINE GLUCONATE 1.2 MG/ML
10 RINSE ORAL EVERY 12 HOURS
Status: DISCONTINUED | OUTPATIENT
Start: 2017-08-18 | End: 2017-09-09

## 2017-08-18 RX ORDER — MIDAZOLAM HYDROCHLORIDE 1 MG/ML
2 INJECTION, SOLUTION INTRAMUSCULAR; INTRAVENOUS ONCE
Status: COMPLETED | OUTPATIENT
Start: 2017-08-18 | End: 2017-08-18

## 2017-08-18 RX ORDER — MIDAZOLAM IN 0.9 % SOD.CHLORID 1 MG/ML
2 PLASTIC BAG, INJECTION (ML) INTRAVENOUS
Status: DISCONTINUED | OUTPATIENT
Start: 2017-08-18 | End: 2017-08-18

## 2017-08-18 RX ORDER — NOREPINEPHRINE BITARTRATE 1 MG/ML
INJECTION, SOLUTION INTRAVENOUS
Status: DISPENSED
Start: 2017-08-18 | End: 2017-08-19

## 2017-08-18 RX ORDER — POTASSIUM CHLORIDE 14.9 MG/ML
20 INJECTION INTRAVENOUS ONCE
Status: COMPLETED | OUTPATIENT
Start: 2017-08-18 | End: 2017-08-18

## 2017-08-18 RX ORDER — ALBUTEROL SULFATE 0.83 MG/ML
2.5 SOLUTION RESPIRATORY (INHALATION)
Status: DISCONTINUED | OUTPATIENT
Start: 2017-08-18 | End: 2017-09-15

## 2017-08-18 RX ORDER — INSULIN LISPRO 100 [IU]/ML
INJECTION, SOLUTION INTRAVENOUS; SUBCUTANEOUS ONCE
Status: CANCELLED | OUTPATIENT
Start: 2017-08-18 | End: 2017-08-18

## 2017-08-18 RX ORDER — SODIUM CHLORIDE 9 MG/ML
1000 INJECTION, SOLUTION INTRAVENOUS ONCE
Status: DISCONTINUED | OUTPATIENT
Start: 2017-08-18 | End: 2017-08-18

## 2017-08-18 RX ORDER — PHENYLEPHRINE HYDROCHLORIDE 10 MG/ML
INJECTION INTRAVENOUS
Status: DISPENSED
Start: 2017-08-18 | End: 2017-08-19

## 2017-08-18 RX ORDER — MIDAZOLAM HYDROCHLORIDE 1 MG/ML
1-2 INJECTION, SOLUTION INTRAMUSCULAR; INTRAVENOUS
Status: DISCONTINUED | OUTPATIENT
Start: 2017-08-18 | End: 2017-08-20

## 2017-08-18 RX ORDER — FENTANYL CITRATE 50 UG/ML
25-100 INJECTION, SOLUTION INTRAMUSCULAR; INTRAVENOUS
Status: DISCONTINUED | OUTPATIENT
Start: 2017-08-18 | End: 2017-08-20

## 2017-08-18 RX ORDER — EPINEPHRINE 0.1 MG/ML
INJECTION INTRACARDIAC; INTRAVENOUS
Status: DISPENSED
Start: 2017-08-18 | End: 2017-08-19

## 2017-08-18 RX ORDER — ALBUMIN HUMAN 250 G/1000ML
SOLUTION INTRAVENOUS
Status: COMPLETED
Start: 2017-08-18 | End: 2017-08-19

## 2017-08-18 RX ORDER — FENTANYL CITRATE 50 UG/ML
25 INJECTION, SOLUTION INTRAMUSCULAR; INTRAVENOUS ONCE
Status: COMPLETED | OUTPATIENT
Start: 2017-08-18 | End: 2017-08-18

## 2017-08-18 RX ORDER — SODIUM CHLORIDE, SODIUM LACTATE, POTASSIUM CHLORIDE, CALCIUM CHLORIDE 600; 310; 30; 20 MG/100ML; MG/100ML; MG/100ML; MG/100ML
75 INJECTION, SOLUTION INTRAVENOUS CONTINUOUS
Status: CANCELLED | OUTPATIENT
Start: 2017-08-19 | End: 2017-08-19

## 2017-08-18 RX ADMIN — FENTANYL CITRATE 25 MCG: 50 INJECTION, SOLUTION INTRAMUSCULAR; INTRAVENOUS at 19:37

## 2017-08-18 RX ADMIN — LEVETIRACETAM 500 MG: 5 INJECTION INTRAVENOUS at 20:33

## 2017-08-18 RX ADMIN — HEPARIN SODIUM 5000 UNITS: 5000 INJECTION, SOLUTION INTRAVENOUS; SUBCUTANEOUS at 02:13

## 2017-08-18 RX ADMIN — SODIUM CHLORIDE 1000 ML: 9 INJECTION, SOLUTION INTRAVENOUS at 16:00

## 2017-08-18 RX ADMIN — VITAMIN A AND D: 30.8 OINTMENT TOPICAL at 22:56

## 2017-08-18 RX ADMIN — INSULIN LISPRO 3 UNITS: 100 INJECTION, SOLUTION INTRAVENOUS; SUBCUTANEOUS at 08:41

## 2017-08-18 RX ADMIN — LORAZEPAM 1 MG: 2 INJECTION INTRAMUSCULAR; INTRAVENOUS at 22:55

## 2017-08-18 RX ADMIN — EPINEPHRINE: 0.1 INJECTION, SOLUTION ENDOTRACHEAL; INTRACARDIAC; INTRAVENOUS at 16:45

## 2017-08-18 RX ADMIN — ALBUMIN (HUMAN) 12.5 G: 0.25 INJECTION, SOLUTION INTRAVENOUS at 13:30

## 2017-08-18 RX ADMIN — VITAMIN A AND D: 30.8 OINTMENT TOPICAL at 08:45

## 2017-08-18 RX ADMIN — HYDROCORTISONE SODIUM SUCCINATE 50 MG: 100 INJECTION, POWDER, FOR SOLUTION INTRAMUSCULAR; INTRAVENOUS at 17:49

## 2017-08-18 RX ADMIN — COLLAGENASE SANTYL: 250 OINTMENT TOPICAL at 09:00

## 2017-08-18 RX ADMIN — HEPARIN SODIUM 5000 UNITS: 5000 INJECTION, SOLUTION INTRAVENOUS; SUBCUTANEOUS at 18:00

## 2017-08-18 RX ADMIN — CHLORHEXIDINE GLUCONATE 10 ML: 1.2 RINSE ORAL at 20:33

## 2017-08-18 RX ADMIN — SODIUM CHLORIDE 1 G: 900 INJECTION INTRAVENOUS at 02:12

## 2017-08-18 RX ADMIN — MIDAZOLAM HYDROCHLORIDE 2 MG: 1 INJECTION, SOLUTION INTRAMUSCULAR; INTRAVENOUS at 17:20

## 2017-08-18 RX ADMIN — PHENYLEPHRINE HYDROCHLORIDE 300 MCG/MIN: 10 INJECTION INTRAVENOUS at 16:30

## 2017-08-18 RX ADMIN — LEVETIRACETAM 500 MG: 5 INJECTION INTRAVENOUS at 08:42

## 2017-08-18 RX ADMIN — PIPERACILLIN AND TAZOBACTAM 2.25 G: 2; .25 INJECTION, POWDER, FOR SOLUTION INTRAVENOUS at 20:33

## 2017-08-18 RX ADMIN — NOREPINEPHRINE BITARTRATE 5 MCG/MIN: 1 INJECTION INTRAVENOUS at 17:20

## 2017-08-18 RX ADMIN — POTASSIUM CHLORIDE 20 MEQ: 14.9 INJECTION, SOLUTION INTRAVENOUS at 20:33

## 2017-08-18 NOTE — PROGRESS NOTES
ARU/IPR REFERRAL CONTACT NOTE  [address] [place] [address] for Physical Rehabilitation    RE: Ruel Caba     Thank you for the opportunity to review this patient's case for admission to [address] [place] [address] for Physical Rehabilitation. Based on our pre-admission screening:     [x ] This patient does not meet criteria for admission to [facility] for Physical  Rehabilitation due to:    [x ] Too low level, per documentation, patient has not demonstrated tolerance for acute rehabilitation level of intensity. [x ] We recommend the following:  [ ] Re-evaluation of this patient's status when appropriate  [ ] Home with Home Care Services  [ ] Outpatient Therapy Services  [x ] Skilled Nursing Facility/Sub Acute Services with extended stay option  [ ] Assisted Living/ Adult Home    Again, Thank you for this referral. Should you have any questions please do not hesitate to call. Sincerely,  Fauzia Davis. Yousuf Rausch, [address]   Yousuf Rausch, RN  Admissions [facility] for Physical Rehabilitation  [phone]

## 2017-08-18 NOTE — PROGRESS NOTES
Maria D Hebert is a 55 y.o. male Cardiac arrest ([facility]  Acidosis  Respiratory failure ([facility]  Anemia  ESRD needing dialysis ([facility] [address]  Cardiac arrest ([facility]  Acute GI bleeding  Sepsis ([facility] [address] [facility]  Hypotension  Anoxic brain damage ([facility] [address]  ESRD (end stage renal disease) ([facility] [address]  Colitis  Patient's initial status Code blue. CPR and BVM assisted respiration initiated at 1610. Patient's chart was reviewed and discussed with the patient's nurse. Initial rhythm: PEA  Patient was intubated by Dr. Chikis Marcum   Placement was confirmed by auscultation, and ETCO2 monitor. ACLS guidelines were followed per code document. Course of code included CPR and intubation. Outcome of Code: ROSC at 1620  Current vital signs are Blood pressure 172/80, pulse 150, respiratory rate bagged, SpO2 98 %. Prognosis guarded.    Discussed condition with:  [x]nursing []PT/OT    [x]respiratory therapy [x]Dr.Natasha Anthony Wei   []family []     Linsey Clements MD

## 2017-08-18 NOTE — PERIOP NOTES
INTUBATION   NOTE    Referring physician: Cocoa Deep*     Called to bedside secondary to  respiratory failure. Patient pre-oxygenated with 100% oxygen. Smooth ortracheal intubation with    7.5 ETT taped and secured at 23 cm at the teeth.    + Bilateral BS, + Chest rise, + ETCO2 was confirmed.       Care turned over to covering Attending MD.    Alan Espinoza MD  [unfilled]  .5:34 PM

## 2017-08-18 NOTE — PROGRESS NOTES
responded to Code for  [facility], who is a 55 y. o.,male,     The  provided the following Interventions:  Provided crisis pastoral care and pastoral support. Offered prayers on behalf for the patient. Chart reviewed. The following outcomes were achieved:  Patient was successfully resuscitated. Assessment:  There are no spiritual or Nondenominational issues which require intervention at this time. Plan:  Chaplains will continue to follow and will provide pastoral care on an as needed/requested basis.  recommends bedside caregivers page  on duty if patient shows signs of acute spiritual or emotional distress.        [facility]  [phone]

## 2017-08-18 NOTE — DIALYSIS
ACUTE HEMODIALYSIS FLOW SHEET    HEMODIALYSIS ORDERS: Physician: Maribell Ramirezard:  Cris        Duration: 3 hr  BFR: 350   DFR: 800   Dialysate:  Temp 36-37 K+   3    Ca+  3.0 Na 140 Bicarb 35   Weight:   kg    Bed Scale []     Unable to Obtain [x]      Dry weight/UF Goal: 1000ml Access AVG  Needle Gauge 15    Heparin []  Bolus      Units    [] Hourly       Units    [x]None     Catheter locking solution NA   Pre BP:   93/50    Pulse:     85     Temperature:   97.0  Respirations: 18  Tx: NS       ml/Bolus  Other        [x] N/A   Labs: Pre        Post:        [x] N/A   Additional Orders(medications, blood products, hypotension management):       [x] N/A     [x] Time Out/Safety Check  [x] [facility] Consent Verified     CATHETER ACCESS: [x]N/A   []Right   []Left   []IJ     []Fem   [] First use X-ray verified     []Tunnel                [] Non Tunneled   []No S/S infection  []Redness  []Drainage []Cultured []Swelling []Pain   []Medical Aseptic Prep Utilized   []Dressing Changed  [] Biopatch  Date:       []Clotted   []Patent   Flows: []Good  []Poor  []Reversed   If access problem,  notified: []Yes    []N/A  Date:           GRAFT/FISTULA ACCESS:  []N/A     []Right     [x]Left     [x]UE     []LE   [x]AVG   []AVF        []Buttonhole    [x]Medical Aseptic Prep Utilized   [x]No S/S infection  []Redness  []Drainage []Cultured []Swelling []Pain    Bruit:   [x] Strong    [] Weak       Thrill :   [x] Strong    [] Weak       Needle Gauge: 15   Length: 1in   If access problem,  notified: []Yes     [x]N/A  Date:        Please describe access if present and not used:       GENERAL ASSESSMENT:    LUNGS:  Rate 16 SaO2%        [] N/A    [] Clear  [x] Coarse  [] Crackles  [] Wheezing        [] Diminished     Location : []RLL   []LLL    [x]RUL  [x]   Cough: [x]Productive  []Dry  []N/A   Respirations:  [x]Easy  []Labored   Therapy:  [x]RA  []NC  l/min    Mask: []NRB []Venti       O2% []Ventilator  []Intubated  [] Trach  [] BiPaP   CARDIAC: [x]Regular      [] Irregular   [] Pericardial Rub  [] JVD        []  Monitored  [] Bedside  [] Remotely monitored [] N/A  Rhythm:    EDEMA: [] None  [x]Generalized  [] Pitting [] 1    [] 2    [] 3    [] 4                 [] Facial  [] Pedal  []  UE  [] LE   SKIN:   [x] Warm  [] Hot     [] Cold   [] Dry     [] Pale   [] Diaphoretic                  [] Flushed  [] Jaundiced  [] Cyanotic  [] Rash  [] Weeping   LOC:    [] Alert      []Oriented:    [] Person     [] Place  []Time               [] Confused  [x] Lethargic  [] Medicated  [x] Non-responsive     GI / ABDOMEN:  [] Flat    [] Distended    [x] Soft    [] Firm   []  Obese                             [] Diarrhea  [x] Bowel Sounds  [] Nausea  [] Vomiting       / URINE ASSESSMENT:[] Voiding   [x] Oliguria  [] Anuria   []  Stanton     [] Incontinent    []  Incontinent Brief      []  Bathroom Privileges     PAIN: [x] 0 []1  []2   []3   []4   []5   []6   []7   []8   []9   []10            Scale 0-10  Action/Follow Up: non-verbal indicators of pain were absent   MOBILITY:  [] Amb    [] Amb/Assist    [x] Bed    [] Wheelchair  [] Stretcher      All Vitals and Treatment Details on Attached [address]: [facility]          Room # 405     [] 1st Time Acute  [] Stat  [x] Routine  [] Urgent     [x] Acute Room  []  Bedside  [] ICU/CCU  [] ER   Isolation Precautions:  [x] Dialysis   [] Airborne   [] Contact    [] Reverse   Special Considerations:         [] Blood Consent Verified [x]N/A     ALLERGIES:   [x] NKA          Code Status:  [x] Full Code  [] DNR  [] Other           HBsAg ONLY: Date Drawn 07/28/17         [x]Negative []Positive []Unknown   HBsAb: Date     [] Susceptible   [x] [id number] []Not Drawn  [] Drawn     Current Labs:    Date of Labs: Today [x]        Cut and paste current labs here. Results for Adalberto Kan (MRN [id number]) as of 8/18/2017 19:34   Ref.  Range 8/18/2017 01:38   WBC Latest Ref Range: 4.6 - 13.2 K/uL 8.4   RBC Latest Ref Range: 4.70 - 5.50 M/uL 3.40 (L)   HGB Latest Ref Range: 13.0 - 16.0 g/dL 10.4 (L)   HCT Latest Ref Range: 36.0 - 48.0 % 32.5 (L)   MCV Latest Ref Range: 74.0 - 97.0 FL 95.6   MCH Latest Ref Range: 24.0 - 34.0 PG 30.6   MCHC Latest Ref Range: 31.0 - 37.0 g/dL 32.0   RDW Latest Ref Range: 11.6 - 14.5 % 17.7 (H)   PLATELET Latest Ref Range: 135 - 420 K/uL 387   MPV Latest Ref Range: 9.2 - 11.8 FL 9.9   NEUTROPHILS Latest Ref Range: 42 - 75 % 60   LYMPHOCYTES Latest Ref Range: 20 - 51 % 23   MONOCYTES Latest Ref Range: 2 - 9 % 16 (H)   EOSINOPHILS Latest Ref Range: 0 - 5 % 1   BASOPHILS Latest Ref Range: 0 - 3 % 0   DF Latest Units:   AUTOMATED   ABS. NEUTROPHILS Latest Ref Range: 1.8 - 8.0 K/UL 5.1   ABS. LYMPHOCYTES Latest Ref Range: 0.8 - 3.5 K/UL 1.9   ABS. MONOCYTES Latest Ref Range: 0 - 1.0 K/UL 1.3 (H)   ABS. EOSINOPHILS Latest Ref Range: 0.0 - 0.4 K/UL 0.1   ABS. BASOPHILS Latest Ref Range: 0.0 - 0.06 K/UL 0.0   RBC COMMENTS Latest Units:   ANISOCYTOSIS. .. PLATELET COMMENTS Latest Units:   ADEQUATE PLATELETS   Phosphorus Latest Ref Range: 2.5 - 4.9 MG/DL 3.5   Magnesium Latest Ref Range: 1.6 - 2.6 mg/dL 2.7 (H)                                                                                                                                 DIET:  [] Renal    [] Other     [x] NPO     []  Diabetic      PRIMARY NURSE REPORT: First initial/Last name/Title      Pre Dialysis: SANA Rutherford RN    Time: 7122      EDUCATION:    [x] Patient [] Other         Knowledge Basis: []None []Minimal [] Substantial   Barriers to learning Non-responsive []N/A   [] Access Care     [] S&S of infection     [] Fluid Management     []K+     [x]Procedural    []Albumin     [] Medications     [] Tx Options     [] Transplant     [] Diet     [] Other   Teaching Tools:  [x] Explain  [] Demo  [] Handouts [] Video  Patient response:  [] Verbalized understanding  [] Teach back  [] Return demonstration [x] Requires follow up   Inappropriate due to            [address] Before each treatment:     Machine Number:                   [facility]                                   [x] Unit Machine # 7 with centralized RO                                  [] Portable Machine #1/RO serial # [id number]                                  [] Portable Machine #2/RO serial # [id number]                                  [] Portable Machine #3/RO serial # [id number]                                                                                                       [facility]                                  [] Portable Machine #11/RO serial # [id number]                                   [] Portable Machine #12/RO serial # [id number]                                  [] Portable Machine #13/RO serial #  [id number]      Alarm Test:  Pass time 3313         Other:         [x] RO/Machine Log Complete      Temp    37            [x]Extracorporeal Circuit Tested for integrity   Dialysate: pH  7.4 Conductivity: Meter   14.4     HD Machine   14.4                  TCD: 14.2  Dialyzer Lot # [id number]            Blood Tubing Lot # [id number]          Saline Lot #  [id number]     CHLORINE TESTING-Before each treatment and every 4 hours    Total Chlorine: [x] less than 0.1 ppm  Time: 1035 4 Hr/2nd Check Time: 5302   (if greater than 0.1 ppm from Primary then every 30 minutes from Secondary)     TREATMENT INITIATION  with Dialysis Precautions:   [x] All Connections Secured                 [x] Saline Line Double Clamped   [x] Venous Parameters Set                  [x] Arterial Parameters Set    [x] Prime Given  250 ml                       [x]Air Foam Detector Engaged      Treatment Initiation Note: Pt arrived to unit via transport, pt non-verbal and does not follow comands. Pre BP 93/50.  Notified Dr. Cruz Omer via telephone [phone] and received orders to start albumin with treatment and to not remove any fluid this treatment. Pt has LUE AVG with +bruit/thrill, cannulated x2 without complication and tx initiated. 1330 Albumin started. 1545 BP dropped to 73/35, spoke with Dr. Cruz Omer again regarding BP and was given orders to terminate treatment. Post Treatment Note: HD tx stopped at 1550 due to persistent hypotension. 1604 Rapid response called for hypotension. 1610 Pt stopped breathing and had no pulse. Chest compressions started and code blue called. Arterial and venous lines left in place for access. Code team arrived and pt was resuscitated and transferred to ICU for continued care.      POST TREATMENT PRIMARY NURSE HANDOFF REPORT:     First initial/Last name/Title         Post Dialysis: Code team Time:  1290     Abbreviations: AVG-arterial venous graft, AVF-arterial venous fistula, IJ-Internal Jugular, Subcl-Subclavian, Fem-Femoral, Tx-treatment, AP/HR-apical heart rate, DFR-dialysate flow rate, BFR-blood flow rate, AP-arterial pressure, -venous pressure, UF-ultrafiltrate, TMP-transmembrane pressure, Oscar-Venous, Art-Arterial, RO-Reverse Osmosis

## 2017-08-18 NOTE — PROCEDURES
[facility] Pulmonary Specialist  Arterial  Line Procedure Note    Indication: Sepsis with Hypotension and inaccurate BPs    Emergent Procedure; consent was not obtained. Line Bundle:  Full sterile barrier precautions used. 7-Step Sterility Protocol followed. (cap, mask sterile gown, sterile gloves, large sterile sheet, hand hygiene, 2% chlorhexidine for cutaneous antisepsis)        Right Radial Artery cannulated x 1 attempt(s) utilizing the modified Seldinger technique. Good, bright red pulsatile blood return. Catheter secured & Biopatch applied. Sterile Tegaderm placed.     Pt tolerated procedure well, without complications    Avni Sanchez PA-C  08/18/17  6:38 PM

## 2017-08-18 NOTE — PROGRESS NOTES
NUTRITION    Nursing Referral: MST, Pressure Ulcer  Nutrition Consult: Management of Tube Feeding     RECOMMENDATIONS / PLAN:     - Resume tube feeds of Nepro at goal of 55 mL/hr with water flush of 150 mL q 4 hours via peg tube, if placed, or via NGT. - Continue RD inpatient monitoring and evaluation. Goal Regimen: Nepro at 55 mL/hr + 175 mL q 4 hour water flushes to provide: 2376 kcal, 107 gm protein, 127 gm fat, 220 gm CHO, 21 gm fiber, 957 mL free water, 1857 mL total water, 100% RDIs     NUTRITION INTERVENTIONS & DIAGNOSIS:     [x] Enteral nutrition: resume  [x] IV fluids: NS at 100 mL/hr    Nutrition Diagnosis: increased protein needs related to promotion of wound healing and increased expenditure as evidenced by pressure ulcer wound and ESRD, pt on dialysis 3x per week  Altered nutrition related labs related to renal failure on dialysis, inadequate enteral provision of potassium as evidenced by hypokalemia, potassium 3.3 mmol/L. ASSESSMENT:     8/18: Tolerating tube feeds at goal.  Tube feeds held today for PEG placement. 8/15: Tube feeds started this morning. Pt tolerating at rate of 30 mL/hr  8/14: SLP following; recommend NPO. Noted plan for NGT placement and start tube feeds. Potassium WNL. 8/10: Pt reported good appetite and \"eating enough\" from his meals. Noted fair meal intake per chart. Consuming supplements. Discussed increasing Nepro frequency; pt declined. Discussed adding Ensure Pudding; pt agreed with plan. Po intake of meals/supplements encouraged. Has been receiving K replacement. 8/7: K remains low despite previous tube feeding changed to higher potassium formula. Pt extubated 8/5. Tube feeds discontinued 8/6. SLP following; pt s/p MBS today and started on po diet. Noted poor po intake lunch meal per chart. 8/3: K remains low despite continued replacement.  Will change to higher potassium formula per discussion with PA.   8/1: Tolerating feeding at goal. 1 L removed during HD 7/31. Hyperglycemia noted, advance to very insulin resistant SSI and basal insulin started. 7/31: Tolerating advancement of tube feeding. HD today. BG trending up, MD to stop D5.    7/30: Pt remain intubated. GI following; plan to start tube feeds today. Noted order placed; discussed modifying tube feed order and add water flushes with Dr Yessi Currie. RN unavailable; discussed with Nursing Conrad regarding modifying tube feed order  7/28: S/p cardiac arrest and intubated 7/27, NGT clamped. Abdominal ultrasound today to rule out cholecystitis. Will wait to feed. Average po intake adequate to meet patients estimated nutritional needs:   [] Yes     [x] No   [] Unable to determine at this time  EN infusion adequate to meet patients estimated nutritional needs:   [x] Yes     [] No   [] Unable to determine at this time    Tube Feeding: Nepro at 55 mL via NGT  Water Flushes: 150 mL q 4 hour  Residuals: 0-135 mL    Diet: DIET TUBE FEEDING  DIET NPO      Food Allergies: NKFA  Current Appetite:   [] Good     [] Fair     [] Poor     [x] Other: NPO   Appetite/meal intake prior to admission:   [] Good     [] Fair     [] Poor     [x] Other: unknown   Feeding Limitations:  [x] Swallowing difficulty: SLP following; recommended NPO on 8/14    [x] Chewing difficulty: pt downgraded to mechanical soft diet on 8/8 per Glycemic Control    [] Other:    Current Meal Intake:   No data found. BM: 8/17;  FMS  Skin Integrity:   Stage II ressure ulcer anus; DTI sacrum; moisture associated skin damage posterior buttocks  Edema: none  Pertinent Medications: Reviewed: steroid     Recent Labs      08/18/17   0138  08/16/17   0307   NA   --   144   K   --   3.6   CL   --   110*   CO2   --   23   GLU   --   151*   BUN   --   24*   CREA   --   7.59*   CA   --   8.1*   MG  2.7*   --    PHOS  3.5   --        Intake/Output Summary (Last 24 hours) at 08/18/17 1554  Last data filed at 08/17/17 2300   Gross per 24 hour   Intake              520 ml Output             1625 ml   Net            -1105 ml       Anthropometrics:  Ht Readings from Last 1 Encounters:   08/15/17 6' 2\" (1.88 m)     Last 3 Recorded Weights in this Encounter    08/16/17 0525 08/17/17 0338 08/18/17 0400   Weight: 59 kg (130 lb 1.6 oz) 59.8 kg (131 lb 12.8 oz) 63.3 kg (139 lb 9.6 oz)     Body mass index is 17.92 kg/(m^2). Underweight     Weight History:   Weight Metrics 8/18/2017   Weight 139 lb 9.6 oz   BMI 17.92 kg/m2        Admitting Diagnosis: Cardiac arrest (HCC)  Acidosis  Respiratory failure (HCC)  Anemia  ESRD needing dialysis ([facility] [address]  Cardiac arrest ([facility] [address]  Acute GI bleeding  Sepsis ([facility] [address]  Hypotension  Anoxic brain damage (HCC)  ESRD (end stage renal disease) ([facility] [address]  Colitis  dx  dx  Pertinent PMHx: DM, HTN, IBS, ESRD    Education Needs:        [x] None identified  [] Identified - Not appropriate at this time  []  Identified and addressed - refer to education log  Learning Limitations:   [x] None identified  [] Identified:      Cultural, Gnosticism & ethnic food preferences:  [x] None identified    [] Identified and addressed     ESTIMATED NUTRITION NEEDS:     Calories: 5711-2533 kcal (30-35 kcal/kg) based on  [x] Actual BW      [x] SBW:  77 kg   Protein:  gm (1.2-1.5 gm/kg) based on  [] Actual BW      [] SBW   Fluid: 1000 mL + UOP     MONITORING & EVALUATION:     Nutrition Goal(s):  Goals modified  1. Patient will tolerate enteral nutrition formula and regimen without difficulty within the next 7 days. Outcome:  [x] Met/Ongoing    []  Not Met    [] New/Initial Goal   2. Patient will meet their estimated nutritional needs through adequate enteral nutrition within the next 7 days.  Outcome:  [x] Met/Ongoing    []  Not Met/Progressing    [] New/Initial Goal      Monitoring:   [x] EN tolerance    [x] EN infusion   [] Diet tolerance   [] Meal intake   [] Supplement intake   [] GI symptoms/ability to tolerate po diet   [] Respiratory status   [x] Plan of care      Previous Recommendations (for follow-up assessments only):     [x]   Implemented       []   Not Implemented (RD to address)     [] No Recommendation Made     Discharge Planning:  Enteral nutrition regimen via peg tube   [x] Participated in care planning, discharge planning, & interdisciplinary rounds as appropriate       Vy Yan RD, [address] [place]   Pager: [phone]

## 2017-08-18 NOTE — PROGRESS NOTES
[facility]   Pulmonary, Critical Care, and Sleep Medicine     ICU Patient Progress Note    Name: Javier Mackenzie   : 1960   MRN: [id number]   Date: 2017    [x]I have reviewed the flowsheet and previous days notes. Events, vitals, medications and notes from last 24 hours reviewed. HPI/Sujectuve:  Javier Mackenzie is a 55 y.o.  male with a history of DM, ESRD on dialysis prior to admission who intially presenteded to ER via EMS found unresponsive via EMS went into PEA CPR/ACLS protocol ROSC. Patient was in ICU for Bacteremia Sepsis which was treated. Patient improved was extubated and was transferred to the floor. GI plan for PEG placement today but was unable to do because patient was getting dialysis today and was going to place PEG tomorrow. While in dialysis patient began to have worsened hypotension, fluid and albumin given during dialysis no fluid was taken off in the 3 hrs. After dialysis patient be came hypotensive again and Rapid response was called. Patient lost a pulse during rapid and a code blue was called in dialysis. Initial rhythm PEA. CPR performed patient was intubated by anesthesia and 2 rounds of EPI given and patient achieved ROSC. Patient then transferred to the ICU. Continued Hypotension with tachycardia. Started on Sriram gtt, Levophed added after, May need to start EPI/Vasopressing gtt for pressure support. CVL is currently being placed. Allergy:  Allergies no known allergies     ROS: Review of systems not obtained due to patient factors.     []The patient is unable to give any meaningful history or review of systems because the patient is:  [x]Intubated []Sedated   [x]Unresponsive [x]Ventilated     [x]The patient is critically ill on      [x]Mechanical ventilation [x]Pressors   []BiPAP []     Safety Bundles: VAP Bundle/ CAUTI/ Severe Sepsis Protocol/ Electrolyte Replacement Protocol             Vital Signs:    Visit Vitals    /83    Pulse (!) 123    Temp 97 °F (36.1 °C) (Oral)    Resp 27    Ht 6' 2\" (1.88 m)    Wt 63.3 kg (139 lb 9.6 oz)    SpO2 100%    BMI 17.92 kg/m2       O2 Device: Room air   O2 Flow Rate (L/min): 0 l/min   Temp (24hrs), Av.2 °F (36.8 °C), Min:97 °F (36.1 °C), Max:99.8 °F (37.7 °C)       Patient Vitals for the past 8 hrs:   Temp Pulse Resp BP SpO2   17 1730 - (!) 123 27 118/83 100 %   17 1715 - (!) 121 23 (!) 89/58 100 %   17 1700 - (!) 132 26 146/77 -   17 1654 - 98 25 - 100 %   17 1645 - (!) 106 22 (!) 52/36 100 %   17 1550 - 78 14 (!) 73/35 -   17 1530 - 95 18 120/61 -   17 1500 - 75 18 134/65 -   17 1430 - 83 18 (!) 87/43 -   17 1400 - 82 18 (!) 83/51 -   17 1330 - 85 18 (!) 79/50 -   17 1321 97 °F (36.1 °C) 84 18 (!) 88/55 -   17 1311 97 °F (36.1 °C) 85 18 93/50 -       Intake/Output:   Last shift:         Last 3 shifts:  1901 -  0700  In: 1998.7 [I.V.:828.7]  Out: 2625 [Drains:1825]    Intake/Output Summary (Last 24 hours) at 17 1754  Last data filed at 17 1550   Gross per 24 hour   Intake              520 ml   Output             1625 ml   Net            -1105 ml         Latest lactic acid:   Lactic acid   Date Value Ref Range Status   2017 7.9 (HH) 0.4 - 2.0 MMOL/L Final     Comment:     Critical value verified.  Called to and read back by:  AMI WHELAN OF ICU ON 0905 [id number] TO JAVON.     2017 1.9 0.4 - 2.0 MMOL/L Final   2017 0.8 0.4 - 2.0 MMOL/L Final       Completed IVF Resuscitation (30ml/kg) - no  IVF choice: albumin/1 L NS    Suspected source/s of severe sepsis: Pneumonia vs bacteremia    Organ dysfunction: Yes    Antibiotics: vancomycin and Zosyn    Completed physical exam: yes      Past Medical History:  Past Medical History:   Diagnosis Date    Anemia     Arthritis     Chronic pain     Back     Diabetes mellitus type II     Hypertension     IBS (irritable bowel syndrome)     Lactose intolerance     TB (tuberculosis)     TB test positive          Ventilator Settings:  Mode Rate Tidal Volume Pressure FiO2 PEEP   Assist control, VC+   500 ml  7 cm H2O 100 % 10 cm H20     Peak airway pressure: 14 cm H2O    Minute ventilation: 16.6 l/min      ARDS network Guidelines: Lung protective strategy, Pl pressure goals less than or equal to 30.     Physical Exam:  General appearance - ill-appearing and unresponsive  Mental status - unable to assess at this time  Eyes - sclera anicteric, equal round, no reaction to light  Neck - supple, no significant adenopathy  Chest - mildly coarse to auscultation, no wheezes, rales or rhonchi, symmetric air entry  Heart - Tachycardia, S1 and S2 normal  Abdomen - soft, nontender, nondistended, no masses or organomegaly, liquid stool BMS in place  Neurological - Limited, Unresponsive  Musculoskeletal - no joint tenderness, deformity or swelling  Extremities - peripheral pulses normal, no pedal edema, no clubbing or cyanosis, A/V fistula to upper left arm   Skin - normal coloration and turgor, rash in between thighs noted      DATA:   Current Facility-Administered Medications   Medication Dose Route Frequency    PHENYLephrine 90,000 mcg in 0.9% sodium chloride 250 ml (NEOSYNEPHRINE) (premix or compounded) infusion   mcg/min IntraVENous TITRATE    PHENYLephrine (NEOSYNEPHRINE) 10 mg/mL injection        chlorhexidine (PERIDEX) 0.12 % mouthwash 10 mL  10 mL Oral Q12H    fentaNYL citrate (PF) injection  mcg   mcg IntraVENous Q4H PRN    midazolam (VERSED) injection 1-2 mg  1-2 mg IntraVENous Q2H PRN    EPINEPHrine (ADRENALIN) 0.1 mg/mL 0.1 mg/mL syringe        EPINEPHrine (ADRENALIN) 8,000 mcg in 0.9% sodium chloride 250 mL infusion  1-10 mcg/min IntraVENous TITRATE    NOREPINephrine (LEVOPHED) 16,000 mcg in dextrose 5% 250 mL infusion  2-16 mcg/min IntraVENous TITRATE    NOREPINephrine (LEVOPHED) 1 mg/mL injection        hydrocortisone Sod Succ (PF) (SOLU-CORTEF) injection 50 mg  50 mg IntraVENous Q6H    vasopressin (VASOSTRICT) 100 Units in 0.9% sodium chloride 100 mL infusion  0.01-0.04 Units/min IntraVENous TITRATE    EPINEPHrine (ADRENALIN) 0.1 mg/mL 0.1 mg/mL syringe        albuterol (PROVENTIL VENTOLIN) nebulizer solution 2.5 mg  2.5 mg Nebulization Q6H PRN    piperacillin-tazobactam (ZOSYN) 2.25 g in 0.9% sodium chloride (MBP/ADV) 50 mL MBP  2.25 g IntraVENous Q12H    vancomycin (VANCOCIN) 1,500 mg in 0.9% sodium chloride 500 mL IVPB  1,500 mg IntraVENous ONCE    [START ON 8/21/2017] Vancomycin random level  1 Each Other Rx Dosing/Monitoring    Piperacillin-tazobactam (ZOSYN) 0.75 gm Supplemental Dosing by Pharmacy   Other Rx Dosing/Monitoring    vits A and D-white pet-lanolin (A&D) ointment   Topical QID AFTER MEALS    collagenase (SANTYL) 250 unit/gram ointment   Topical DAILY    levETIRAcetam (KEPPRA) 500 mg in 100 ml IVPB  500 mg IntraVENous Q12H    acetaminophen (TYLENOL) tablet 650 mg  650 mg Oral Q4H PRN    metroNIDAZOLE (FLAGYL) tablet 500 mg  500 mg Oral TID    lactobacillus sp. 50 billion cpu (BIO-K PLUS) capsule 1 Cap  1 Cap Oral DAILY    loperamide (IMODIUM) capsule 2 mg  2 mg Oral Q6H PRN    heparin (porcine) injection 5,000 Units  5,000 Units SubCUTAneous Q8H    heparin (porcine) 1,000 unit/mL injection 2,000 Units  2,000 Units IntraVENous DIALYSIS ONCE    famotidine (PEPCID) tablet 20 mg  20 mg Oral DAILY    simvastatin (ZOCOR) tablet 20 mg  20 mg Oral QHS    doxercalciferol (HECTOROL) 4 mcg/2 mL injection 3 mcg  3 mcg IntraVENous DIALYSIS MON, WED & FRI    epoetin benjamin (EPOGEN;PROCRIT) injection 10,000 Units  10,000 Units IntraVENous DIALYSIS MON, WED & FRI    insulin lispro (HUMALOG) injection   SubCUTAneous AC&HS    aspirin chewable tablet 81 mg  81 mg Oral DAILY    glucose chewable tablet 16 g  4 Tab Oral PRN    glucagon (GLUCAGEN) injection 1 mg  1 mg IntraMUSCular PRN    dextrose (D50W) injection syrg 12.5-25 g  25-50 mL IntraVENous PRN    0.9% sodium chloride infusion  100 mL/hr IntraVENous DIALYSIS PRN    naloxone (NARCAN) injection 0.4 mg  0.4 mg IntraVENous PRN        Telemetry: []Sinus []A-flutter []Paced    []A-fib []Multiple PVCs                   ECHO   Echo Results  (Last 48 hours)    None          Labs:  Recent Results (from the past 24 hour(s))   GLUCOSE, POC    Collection Time: 08/17/17 10:01 PM   Result Value Ref Range    Glucose (POC) 205 (H) 70 - 110 mg/dL   CBC WITH AUTOMATED DIFF    Collection Time: 08/18/17  1:38 AM   Result Value Ref Range    WBC 8.4 4.6 - 13.2 K/uL    RBC 3.40 (L) 4.70 - 5.50 M/uL    HGB 10.4 (L) 13.0 - 16.0 g/dL    HCT 32.5 (L) 36.0 - 48.0 %    MCV 95.6 74.0 - 97.0 FL    MCH 30.6 24.0 - 34.0 PG    MCHC 32.0 31.0 - 37.0 g/dL    RDW 17.7 (H) 11.6 - 14.5 %    PLATELET 224 323 - 163 K/uL    MPV 9.9 9.2 - 11.8 FL    NEUTROPHILS 60 42 - 75 %    LYMPHOCYTES 23 20 - 51 %    MONOCYTES 16 (H) 2 - 9 %    EOSINOPHILS 1 0 - 5 %    BASOPHILS 0 0 - 3 %    ABS. NEUTROPHILS 5.1 1.8 - 8.0 K/UL    ABS. LYMPHOCYTES 1.9 0.8 - 3.5 K/UL    ABS. MONOCYTES 1.3 (H) 0 - 1.0 K/UL    ABS. EOSINOPHILS 0.1 0.0 - 0.4 K/UL    ABS.  BASOPHILS 0.0 0.0 - 0.06 K/UL    DF AUTOMATED      PLATELET COMMENTS ADEQUATE PLATELETS      RBC COMMENTS ANISOCYTOSIS  1+       PHOSPHORUS    Collection Time: 08/18/17  1:38 AM   Result Value Ref Range    Phosphorus 3.5 2.5 - 4.9 MG/DL   MAGNESIUM    Collection Time: 08/18/17  1:38 AM   Result Value Ref Range    Magnesium 2.7 (H) 1.6 - 2.6 mg/dL   GLUCOSE, POC    Collection Time: 08/18/17  6:23 AM   Result Value Ref Range    Glucose (POC) 181 (H) 70 - 110 mg/dL   GLUCOSE, POC    Collection Time: 08/18/17 12:09 PM   Result Value Ref Range    Glucose (POC) 104 70 - 110 mg/dL   CBC WITH AUTOMATED DIFF    Collection Time: 08/18/17  4:40 PM   Result Value Ref Range    WBC 12.4 4.6 - 13.2 K/uL    RBC 3.21 (L) 4.70 - 5.50 M/uL    HGB 9.9 (L) 13.0 - 16.0 g/dL    HCT 31.2 (L) 36.0 - 48.0 %    MCV 97.2 (H) 74.0 - 97.0 FL    MCH 30.8 24.0 - 34.0 PG    MCHC 31.7 31.0 - 37.0 g/dL    RDW 17.3 (H) 11.6 - 14.5 %    PLATELET 746 500 - 568 K/uL    MPV 10.1 9.2 - 11.8 FL    NEUTROPHILS PENDING %    LYMPHOCYTES PENDING %    MONOCYTES PENDING %    EOSINOPHILS PENDING %    BASOPHILS PENDING %    ABS. NEUTROPHILS PENDING K/UL    ABS. LYMPHOCYTES PENDING K/UL    ABS. MONOCYTES PENDING K/UL    ABS. EOSINOPHILS PENDING K/UL    ABS. BASOPHILS PENDING K/UL    DF PENDING    LACTIC ACID    Collection Time: 08/18/17  4:40 PM   Result Value Ref Range    Lactic acid 7.9 (HH) 0.4 - 2.0 MMOL/L   METABOLIC PANEL, COMPREHENSIVE    Collection Time: 08/18/17  4:40 PM   Result Value Ref Range    Sodium 144 136 - 145 mmol/L    Potassium 3.3 (L) 3.5 - 5.5 mmol/L    Chloride 109 (H) 100 - 108 mmol/L    CO2 18 (L) 21 - 32 mmol/L    Anion gap 17 3.0 - 18 mmol/L    Glucose 183 (H) 74 - 99 mg/dL    BUN 10 7.0 - 18 MG/DL    Creatinine 4.14 (H) 0.6 - 1.3 MG/DL    BUN/Creatinine ratio 2 (L) 12 - 20      GFR est AA 18 (L) >60 ml/min/1.73m2    GFR est non-AA 15 (L) >60 ml/min/1.73m2    Calcium 8.8 8.5 - 10.1 MG/DL    Bilirubin, total 0.6 0.2 - 1.0 MG/DL    ALT (SGPT) 10 (L) 16 - 61 U/L    AST (SGOT) 8 (L) 15 - 37 U/L    Alk.  phosphatase 93 45 - 117 U/L    Protein, total 7.0 6.4 - 8.2 g/dL    Albumin 2.4 (L) 3.4 - 5.0 g/dL    Globulin 4.6 (H) 2.0 - 4.0 g/dL    A-G Ratio 0.5 (L) 0.8 - 1.7     CARDIAC PANEL,(CK, CKMB & TROPONIN)    Collection Time: 08/18/17  4:40 PM   Result Value Ref Range    CK 34 (L) 39 - 308 U/L    CK - MB 2.4 <3.6 ng/ml    CK-MB Index 7.1 (H) 0.0 - 4.0 %    Troponin-I, Qt. 0.05 (H) 0.0 - 0.045 NG/ML   POC G3    Collection Time: 08/18/17  5:05 PM   Result Value Ref Range    Device: VENT      FIO2 (POC) 100 %    pH (POC) 7.209 (LL) 7.35 - 7.45      pCO2 (POC) 35.8 35.0 - 45.0 MMHG    pO2 (POC) 52 (L) 80 - 100 MMHG    HCO3 (POC) 14.3 (L) 22 - 26 MMOL/L    sO2 (POC) 79 (L) 92 - 97 %    Base deficit (POC) 14 mmol/L Mode ASSIST CONTROL      Tidal volume 450 ml    Set Rate 18 bpm    PEEP/CPAP (POC) 5 cmH2O    Allens test (POC) YES      Inspiratory Time 1 sec    Total resp. rate 26      Site RIGHT BRACHIAL      Specimen type (POC) ARTERIAL      Performed by Sebastian Abreu     Volume control plus YES             Recent Labs      08/18/17   1705   FIO2I  100   HCO3I  14.3*   PCO2I  35.8   PHI  7.209*   PO2I  52*       Special Requests:   Date Value Ref Range Status   08/03/2017 NO SPECIAL REQUESTS   Final     Culture result:   Date Value Ref Range Status   08/03/2017    Final    MRSA target DNA is not detected (presumptive not colonized with MRSA)       Imaging:  [x]I have personally reviewed the patients chest radiographs images and report   []Radiographs reviewed with radiologist   [x] No CXR study available for review today   [] No change from prior study, tubes and lines are in adequate position   [] Improved   []Worsening    CXR Results  (Last 48 hours)    None            Results from Hospital Encounter encounter on 07/27/17   XR ABD PORT  1 V   Narrative HISTORY: Nasogastric tube placement. Exam: Abdomen. Technique: Single view portable abdomen. Compared to 8/15/2017 exam.    FINDINGS: Previously seen Dobbhoff catheter has been removed. Newly placed nasogastric tube is noted with its tip projecting in the region of  the left upper quadrant. No obstruction, pneumoperitoneum, abnormal calcification or visceromegaly is  noted. Visualized soft tissues are unremarkable. Impression IMPRESSION:  1. No acute intra-abdominal process. 2. Nasogastric tube as above. Results from [place] encounter on 07/27/17   CT HEAD WO CONT   Narrative Description:  CT head without contrast    TECHNIQUE: Axial CT imaging of the head without IV contrast was performed. Coronal and sagittal reformations generated and reviewed.     All CT scans at this facility are performed using dose optimization technique as  appropriate to a performed exam, to include automated exposure control,  adjustment of the mA and/or kV according to patient size (including appropriate  matching for site-specific examinations), or use of iterative reconstruction  technique. Clinical Indication:  Facial muscle weakness and altered mental status . CODE S. Comparison: August 8, 2017. Findings:  Cortical sulci, basilar cisterns and ventricles appear within normal  limits in size and configuration. No hemorrhage, mass effect or edema. Small  lacunar type infarcts of the thalami bilaterally noted. Mild to moderate  low-attenuation change within cerebral white matter. Probable small chronic  infarct of the left manny. No extra-axial fluid collections. No mass or mass  effect. Paranasal sinuses and mastoid air cells are clear. Impression Impression:      No acute intracranial findings. Specifically no evidence of hemorrhage. Small lacunar type infarcts of the thalami bilaterally and of the manny, stable. CODE S results discussed with Dr. Sammy Beck. [place], August 13, 2017 at 4:41 PM.  Confirmed. IMPRESSION:   · PEA Cardiac arrest in dialysis 8/18/17 2' Sepsis vs ?PE vs ?Tamponade  · S/P PEA Cardiac arrest on this admission with elevated troponin 7/27/17. S/p Cath No CAD  · Acute Respirtatory Failure requiring Mechanical Ventilation 2' to #1  · Hypotension  · Small Pericardial Effusion on Echo 8/7/17  · ESRD requiring Dialysis - completed 3 hrs of dialysis today prior to code blue  · Sepsis Bacteremia - grew E.coli and C.perferinges 7/27/17 - repeat cultures on 8/1/17 no growth. · Colitis, IBS  · DM2  · Acute GI bleeding   · ESRD (end stage renal disease)  · Oropharyngeal dysphagia - Needs peg tube  · Cachexia  · Tobacco Abuse   RECOMMENDATIONS:   Resp: Check stat chxr and abg post intubation. [facility]. Ventilated patients- aim to keep peak plateau pressure less than or equal to 30cm H2O.  Titrate FiO2 for goal SPO2> 90%,VAP prevention bundle, head of the bed at 30' all times. Daily sedation holiday and assessment for weaning with SBT as tolerated. Continue PRN bronchodilators, pulmonary hygiene care, Aspiration precautions. ID: Steroids - hydrocortisone 50 mg q 12hr, Sepsis bundle per hospital protocol initated, Blood, Sputum, and Urine cultures drawn and will be followed. Lactic acid ordered- initial and repeat Q6hrs if elevated till normalized. Antibiotic choice: Zosyn and Vancomycin  Heme/Onc: Pending post cardiac arrest labs. Suspect Sepsis. CVS: Hypotensive. Started on Sriram, Levophed. Will start Epinephrine gtt if needed. Goal B/P MAP greater than 65 mmhg. Patient will need a CVL placed for vasopressors. Monitor CVP, Actively titrate vasopressors aim MAP >65mmHg, Check cardiac panel, ECHO ordered stat, small Pericardial Effusion noted on last echo. Stat CTA PE protocol. Renal: Dialysis 3 hrs completed prior to code. Dialysis per Renal. Monitor I&Os. Trend Renal indices. GI: NGT to LIWS, Keep NPO GI following, was planning PEG in am. BMS in place. Metabolic: Glycemic control -SSI PRN, Monitor and Replace E-lytes per nephrology. Neuro/Sedation/Pain: PRN Versed and Fentanyl for sedation RASS goal -1 to -2  Stress ulcer prophylaxis: Pepcid IV  DVT prophylaxis: Heparin SC  AM labs: Daily CBC BMP Mag and Phos, ABG and chest X-ray in the am.   Lines/Devices: Will need CVL and A-line  Central Line Bundle Followed  and Vent Bundle Followed, Vent Day 00    Further recommendations will be based on the patient's response to recommended treatment and results of the investigation ordered. The patient is: [] acutely ill Risk of deterioration: [] moderate    [x] critically ill  [x] high     [x]See my orders for details    My assessment, plan of care, findings, medications, side effects etc were discussed with:  [x]nursing []PT/OT    [x]respiratory therapy [x]KENISHA Bean   []family [x]Patient     [x]Total critical care time exclusive of procedures 75 minutes with complex decision making performed and > 50% time spent in face to face evaluation.      Grace Oliver, NP

## 2017-08-18 NOTE — PROGRESS NOTES
Code Blue Note  [address]    Patient: Meka Leung 55 y.o. male  [id number]  1960      Admit Date: 7/27/2017   Admitting Diagnosis: Cardiac arrest ([facility]  Acidosis  Respiratory failure ([facility]  Anemia  ESRD needing dialysis ([facility]  Cardiac arrest ([facility]  Acute GI bleeding  Sepsis ([facility]  Hypotension  Anoxic brain damage (HCC)  ESRD (end stage renal disease) ([facility]  Colitis  dx  dx    CODE BLUE     Rapid was called d/t hypotension as patient was getting dialysis. Pt became pulseless and unresponsive and rapid quickly turned into code. Code [employer] at [facility]. Code was run per ACLS guidelines:  Rhythm at presentation : asystole. High quality chest compressions started at 1610, 1st dose of epi given at 1612, second dose at 1616. Pt intubated and bag was used to ventilate. ROSC achieved at 1620. OBJECTIVE     Patient Vitals for the past 24 hrs:   Temp Pulse Resp BP SpO2   08/18/17 1530 - (P) 95 (P) 18 (P) 120/61 -   08/18/17 1500 - 75 18 134/65 -   08/18/17 1430 - 83 18 (!) 87/43 -   08/18/17 1400 - 82 18 (!) 83/51 -   08/18/17 1330 - 85 18 (!) 79/50 -   08/18/17 1321 97 °F (36.1 °C) 84 18 (!) 88/55 -   08/18/17 1311 97 °F (36.1 °C) 85 18 93/50 -   08/18/17 0800 98.1 °F (36.7 °C) 79 18 91/40 91 %   08/18/17 0400 99.8 °F (37.7 °C) 98 18 95/56 94 %   08/18/17 0000 99.3 °F (37.4 °C) 90 18 96/49 94 %   08/17/17 2000 98.2 °F (36.8 °C) 88 18 94/56 97 %   08/17/17 1850 - - - 94/57 -       PHYSICAL:  Pt unresponsive, pupils fixed and dilated. Intubated and ventilated by bag. Strong carotid and radial pulses. Kong Orchard is a 55y.o. year old male admitted for cardiac arrest at home. Code Blue called for unresponsiveness and asystole after becoming hypotensive. Blood culture, lactic acid, CXR ordered. Disposition: Transferred to ICU    Attending Dr. Escobar Torrez notified of maxx ochoa. In agreement with plan. Primary team resuming care.      Elif Raymond MD, PGY-1  [facility]  08/18/17 4:30 PM

## 2017-08-18 NOTE — ROUTINE PROCESS
TRANSFER - OUT REPORT:    Verbal report given to AMI Fitzpatrick(name) on Lucas Zaman  being transferred to ICU(unit) for urgent transfer       Report consisted of patients Situation, Background, Assessment and   Recommendations(SBAR). Information from the following report(s) SBAR and Kardex was reviewed with the receiving nurse. Lines:   Peripheral IV 08/17/17 Right Antecubital (Active)   Site Assessment Clean, dry, & intact 8/18/2017  8:03 AM   Phlebitis Assessment 0 8/18/2017  8:03 AM   Infiltration Assessment 0 8/18/2017  8:03 AM   Dressing Status Clean, dry, & intact 8/18/2017  8:03 AM   Dressing Type Transparent 8/18/2017  8:03 AM   Hub Color/Line Status Pink 8/18/2017  8:03 AM   Alcohol Cap Used No 8/17/2017  7:35 PM        Opportunity for questions and clarification was provided.       Patient transported with:   Registered Nurse

## 2017-08-18 NOTE — PROGRESS NOTES
PT attempted treatment this AM and is refusing at this time. Will follow up as pt schedule allows. Thank you for this referral. Roslyn Alfonso, PT,DPT.

## 2017-08-18 NOTE — PROGRESS NOTES
Problem: Self Care Deficits Care Plan (Adult)  Goal: *Acute Goals and Plan of Care (Insert Text)  Occupational Therapy Goals  Initiated 8/15/2017 within 7 day(s). 1. Patient will perform grooming with modified independence (using his RUE) progressing to the edge of the bed as he is able  2. Patient will perform UB bathing with modified independence at bed level  3. Patient will demonstrate RUE strength 4-/5 in preparation for his efficient functional use during his self care routine    Will address self feeding should he be placed on a diet   Outcome: Progressing Towards Goal  OCCUPATIONAL THERAPY TREATMENT     Patient: Aquilino Young [de-identified]55 y.o. male)  Date: 8/18/2017  Diagnosis: Cardiac arrest ([address]  Acidosis  Respiratory failure (HCC)  Anemia  ESRD needing dialysis ([address]  Cardiac arrest ([address]  Acute GI bleeding  Sepsis ([address]  Hypotension  Anoxic brain damage (HCC)  ESRD (end stage renal disease) ([address]  Colitis Sepsis ([facility]       Precautions: Fall, Skin, Aspiration (FMS)  Chart, occupational therapy assessment, plan of care, and goals were reviewed. ASSESSMENT: Late entry from 8/17/17  Pt is asleep w/soft wrist restraints applied. Pt is cooperative, removed wrist restraints one at the time, pt reports he will not pull NG tube out. Pt is following commands ~75% of the time. Pt was able to participate in AROM/AAROM of RUE, scapular alignment TherEx pt reports RUE feels weak. Following TherEx Pt participated in ADL grooming task requiring CGA for [facility] task and for sequencing during task w/RUE. Soft wrist restraints re-applied at the end of the session. Progression toward goals:  [ ]          Improving appropriately and progressing toward goals  [X]          Improving slowly and progressing toward goals  [ ]          Not making progress toward goals and plan of care will be adjusted       PLAN:  Patient continues to benefit from skilled intervention to address the above impairments.  Continue treatment per established plan of care. Discharge Recommendations:  Adan Bradley  Further Equipment Recommendations for Discharge:  N/A      G-CODES:      Self Care  Current  CL= 60-79%. The severity rating is based on the Level of Assistance required for Functional Mobility and ADLs. SUBJECTIVE:   Patient stated I will not pull it out.       OBJECTIVE DATA SUMMARY:   Cognitive/Behavioral Status:  Neurologic State: Alert  Orientation Level: Oriented to person, Oriented to place, Disoriented to situation, Disoriented to time  Cognition: Follows commands  Safety/Judgement: Fall prevention              ADL Intervention:    Grooming task CGA  Therapeutic Exercises:   AROM/AAROM and scap strength for RUE in preparation for ADLs     Pain:  Pt didn't rate pain or discomfort prior to treatment. Pt didn't rate pain or discomfort post treatment. Activity Tolerance:    Fair-     Please refer to the flowsheet for vital signs taken during this treatment.   After treatment:   [ ]  Patient left in no apparent distress sitting up in chair  [X]  Patient left in no apparent distress in bed  [X]  Call bell left within reach  [X]  Nursing notified  [ ]  Caregiver present  [ ]  Bed alarm activated        FRANCISCO JAVIER Tripathi/L  Time Calculation: 23 mins

## 2017-08-18 NOTE — PROGRESS NOTES
HEMODIALYSIS ROUNDING NOTE      Patient: Maria D Hebert               Sex: male          DOA: 7/27/2017  4:23 PM        YOB: 1960      Age:  55 y.o.        LOS:  LOS: 22 days     Subjective:     Maria D Hebert is a 55 y.o.  who presents with Cardiac arrest ([facility]  Acidosis  Respiratory failure ([facility]  Anemia  ESRD needing dialysis ([facility]  Cardiac arrest ([facility]  Acute GI bleeding  Sepsis ([facility] [facility]  Hypotension  Anoxic brain damage (HCC)  ESRD (end stage renal disease) ([facility] [facility]  Colitis  dx.   The patient is dialyzing utilizing the following method:Intermittent Hemodialysis    Chief Complains: Patient was seen on dialysis,   - Reviewed last 24 hrs events     Current Facility-Administered Medications   Medication Dose Route Frequency    vits A and D-white pet-lanolin (A&D) ointment   Topical QID AFTER MEALS    collagenase (SANTYL) 250 unit/gram ointment   Topical DAILY    levETIRAcetam (KEPPRA) 500 mg in 100 ml IVPB  500 mg IntraVENous Q12H    acetaminophen (TYLENOL) tablet 650 mg  650 mg Oral Q4H PRN    metroNIDAZOLE (FLAGYL) tablet 500 mg  500 mg Oral TID    lactobacillus sp. 50 billion cpu (BIO-K PLUS) capsule 1 Cap  1 Cap Oral DAILY    loperamide (IMODIUM) capsule 2 mg  2 mg Oral Q6H PRN    heparin (porcine) injection 5,000 Units  5,000 Units SubCUTAneous Q8H    heparin (porcine) 1,000 unit/mL injection 2,000 Units  2,000 Units IntraVENous DIALYSIS ONCE    famotidine (PEPCID) tablet 20 mg  20 mg Oral DAILY    simvastatin (ZOCOR) tablet 20 mg  20 mg Oral QHS    doxercalciferol (HECTOROL) 4 mcg/2 mL injection 3 mcg  3 mcg IntraVENous DIALYSIS MON, WED & FRI    epoetin benjamin (EPOGEN;PROCRIT) injection 10,000 Units  10,000 Units IntraVENous DIALYSIS MON, WED & FRI    insulin lispro (HUMALOG) injection   SubCUTAneous AC&HS    aspirin chewable tablet 81 mg  81 mg Oral DAILY    hydrocortisone (CORTEF) tablet 10 mg  10 mg Oral BID WITH MEALS    glucose chewable tablet 16 g  4 Tab Oral PRN    glucagon (GLUCAGEN) injection 1 mg  1 mg IntraMUSCular PRN    dextrose (D50W) injection syrg 12.5-25 g  25-50 mL IntraVENous PRN    0.9% sodium chloride infusion  100 mL/hr IntraVENous DIALYSIS PRN    naloxone (NARCAN) injection 0.4 mg  0.4 mg IntraVENous PRN       Objective:     Visit Vitals    BP 91/40 (BP 1 Location: Right arm, BP Patient Position: Supine)    Pulse 79    Temp 98.1 °F (36.7 °C)    Resp 18    Ht 6' 2\" (1.88 m)    Wt 63.3 kg (139 lb 9.6 oz)    SpO2 91%    BMI 17.92 kg/m2       Intake/Output Summary (Last 24 hours) at 08/18/17 1319  Last data filed at 08/17/17 2300   Gross per 24 hour   Intake              670 ml   Output             1625 ml   Net             -955 ml       Physical Examination:      RS: Chest is bilateral equal, no wheezing / rales / crackles  CVS: S1-S2 heard, RRR  Abdomen: Soft, Non tender, Not distended, Positive bowel sounds  Extremities: No edema, no cyanosis, skin is warm on touch  CNS: poorly responsive  HEENT: Head is atraumatic, PERRLA, conjunctiva pink & non icteric. No JVD or carotid bruit      Data Review:      Labs:     Hematology:   Recent Labs      08/18/17   0138  08/17/17   0320  08/16/17   0307   WBC  8.4  7.5  9.0   HGB  10.4*  9.7*  9.6*   HCT  32.5*  30.5*  29.6*     Chemistry:   Recent Labs      08/18/17   0138  08/16/17   0307   BUN   --   24*   CREA   --   7.59*   CA   --   8.1*   K   --   3.6   NA   --   144   CL   --   110*   CO2   --   23   PHOS  3.5   --    GLU   --   151*        Images:     XR (Most Recent). CXR reviewed by me and compared with previous CXR   Results from Hospital Encounter encounter on 07/27/17   XR ABD PORT  1 V   Narrative HISTORY: Nasogastric tube placement. Exam: Abdomen. Technique: Single view portable abdomen. Compared to 8/15/2017 exam.    FINDINGS: Previously seen Dobbhoff catheter has been removed.     Newly placed nasogastric tube is noted with its tip projecting in the region of  the left upper quadrant. No obstruction, pneumoperitoneum, abnormal calcification or visceromegaly is  noted. Visualized soft tissues are unremarkable. Impression IMPRESSION:  1. No acute intra-abdominal process. 2. Nasogastric tube as above. CT (Most Recent)   Results from Hospital Encounter encounter on 07/27/17   CT HEAD WO CONT   Narrative Description:  CT head without contrast    TECHNIQUE: Axial CT imaging of the head without IV contrast was performed. Coronal and sagittal reformations generated and reviewed. All CT scans at this facility are performed using dose optimization technique as  appropriate to a performed exam, to include automated exposure control,  adjustment of the mA and/or kV according to patient size (including appropriate  matching for site-specific examinations), or use of iterative reconstruction  technique. Clinical Indication:  Facial muscle weakness and altered mental status . CODE S. Comparison: August 8, 2017. Findings:  Cortical sulci, basilar cisterns and ventricles appear within normal  limits in size and configuration. No hemorrhage, mass effect or edema. Small  lacunar type infarcts of the thalami bilaterally noted. Mild to moderate  low-attenuation change within cerebral white matter. Probable small chronic  infarct of the left manny. No extra-axial fluid collections. No mass or mass  effect. Paranasal sinuses and mastoid air cells are clear. Impression Impression:      No acute intracranial findings. Specifically no evidence of hemorrhage. Small lacunar type infarcts of the thalami bilaterally and of the manny, stable. CODE S results discussed with Dr. Destinee Booth. Toya Genao, August 13, 2017 at 4:41 PM.  Confirmed. Plan / Recommendation:         End Stage Renal Disease:  Plan HD today    At 1 19 pm on 8/18/2017, I saw and examined patient during hemodialysis treatment.  The patient was receiving hemodialysis for treatment of end stage renal disease. I have also reviewed vital signs, intake and output, lab results and recent events, and agreed with today's dialysis order.   bp is low,give iv albumin  Access: No issue    Anemia:  epo    Donny Sullivan MD  Nephrology  8/18/2017

## 2017-08-18 NOTE — PROGRESS NOTES
Pt now on vent after PEA  Will hold off any consideration of PEG   Will be available if condition improves and PEG desired

## 2017-08-18 NOTE — ROUTINE PROCESS
Unable to reach daughter Hafsa Vera to verify signature obtained on consent form for PEG tube. Will continue attempts to reach Ms. Doren Severs

## 2017-08-18 NOTE — PROGRESS NOTES
[facility]  Progress Note    Patient: Ashok Momin Age: 55 y.o. : 1960 MR#: [id number] SSN: [id number]  Date/Time: 2017     Subjective:   Patient was seen and evaluated around 5 pm on 17  Patient is s/p cardiac arrest while in HD, now in ICU, intubated    Assessment/Plan:   1. Acute met encephalopathy: due to # 2.    2. Acute pontine lacunar CVA: on asa, sttain  3. S/p PEA arrest. ? Cardiac cause with elevated trop PTA. Echo improving EF, s/p cath, no CAD. NOw with repeat cardiac arrest and intubated- vent support,  4. Acute resp failure s/p extubation, off O2- Now reintubated after second cardiac arrest  5. Dysphagia:  6. ESRD- HD as pe nephrology  7. Hypotension, ?septic shock, pressors, abx, follow cx  9. Elevated LFT - ? Shock liver. 10. Thrombocytopenia: secondary to sepsis. monitor  11. Sz: on keppra  12. Wounds: wound care    unstageable pressure ulcer to sacrum/coccyx not present on admit    moisture associated skin damage to anus not present  on admit    moisture associated skin damage to buttocks not present on admit    D/w daughter regarding events, Family request full code.   D/w Intensivist    Case discussed with:  [x]Patient  [x]Family  [x]Nursing  [x]Case Management  DVT Prophylaxis:  []Lovenox  [x]Hep SQ  [x]SCDs  []Coumadin   []On Heparin gtt    Patient Active Problem List   Diagnosis Code    Anemia D64.9    Acidosis E87.2    Cardiac arrest ([address] I46.9    Respiratory failure ([address] J96.90    ESRD needing dialysis ([facility] N18.6, Z99.2    Anoxic brain damage (HCC) G93.1    Colitis K52.9    Hypotension I95.9    Acute GI bleeding K92.2    ESRD (end stage renal disease) (HCC) N18.6    Sepsis (HCC) A41.9    Oropharyngeal dysphagia R13.12    Cachexia (HCC) R64       Objective:   VS:   Visit Vitals    BP (P) 120/61    Pulse (P) 95    Temp 97 °F (36.1 °C) (Oral)    Resp (P) 18    Ht 6' 2\" (1.88 m)    Wt 63.3 kg (139 lb 9.6 oz)    SpO2 91%    BMI 17.92 kg/m2      Tmax/24hrs: Temp (24hrs), Av.1 °F (36.7 °C), Min:97 °F (36.1 °C), Max:99.8 °F (37.7 °C)  IOBRIEF    Intake/Output Summary (Last 24 hours) at 17 1555  Last data filed at 17 2300   Gross per 24 hour   Intake              520 ml   Output             1625 ml   Net            -1105 ml       General:  Intubated  Cardiovascular:  S1S2+, Tachy  Pulmonary:  Coarse BS b/l  GI:  Soft, BS+, NT, ND  Extremities:  Trace edema          Medications:   Current Facility-Administered Medications   Medication Dose Route Frequency    vits A and D-white pet-lanolin (A&D) ointment   Topical QID AFTER MEALS    collagenase (SANTYL) 250 unit/gram ointment   Topical DAILY    levETIRAcetam (KEPPRA) 500 mg in 100 ml IVPB  500 mg IntraVENous Q12H    acetaminophen (TYLENOL) tablet 650 mg  650 mg Oral Q4H PRN    metroNIDAZOLE (FLAGYL) tablet 500 mg  500 mg Oral TID    lactobacillus sp. 50 billion cpu (BIO-K PLUS) capsule 1 Cap  1 Cap Oral DAILY    loperamide (IMODIUM) capsule 2 mg  2 mg Oral Q6H PRN    heparin (porcine) injection 5,000 Units  5,000 Units SubCUTAneous Q8H    heparin (porcine) 1,000 unit/mL injection 2,000 Units  2,000 Units IntraVENous DIALYSIS ONCE    famotidine (PEPCID) tablet 20 mg  20 mg Oral DAILY    simvastatin (ZOCOR) tablet 20 mg  20 mg Oral QHS    doxercalciferol (HECTOROL) 4 mcg/2 mL injection 3 mcg  3 mcg IntraVENous DIALYSIS MON, WED & FRI    epoetin benjamin (EPOGEN;PROCRIT) injection 10,000 Units  10,000 Units IntraVENous DIALYSIS MON, WED & FRI    insulin lispro (HUMALOG) injection   SubCUTAneous AC&HS    aspirin chewable tablet 81 mg  81 mg Oral DAILY    hydrocortisone (CORTEF) tablet 10 mg  10 mg Oral BID WITH MEALS    glucose chewable tablet 16 g  4 Tab Oral PRN    glucagon (GLUCAGEN) injection 1 mg  1 mg IntraMUSCular PRN    dextrose (D50W) injection syrg 12.5-25 g  25-50 mL IntraVENous PRN    0.9% sodium chloride infusion  100 mL/hr IntraVENous DIALYSIS PRN    naloxone (NARCAN) injection 0.4 mg  0.4 mg IntraVENous PRN       Labs:    Recent Results (from the past 24 hour(s))   GLUCOSE, POC    Collection Time: 08/17/17 10:01 PM   Result Value Ref Range    Glucose (POC) 205 (H) 70 - 110 mg/dL   CBC WITH AUTOMATED DIFF    Collection Time: 08/18/17  1:38 AM   Result Value Ref Range    WBC 8.4 4.6 - 13.2 K/uL    RBC 3.40 (L) 4.70 - 5.50 M/uL    HGB 10.4 (L) 13.0 - 16.0 g/dL    HCT 32.5 (L) 36.0 - 48.0 %    MCV 95.6 74.0 - 97.0 FL    MCH 30.6 24.0 - 34.0 PG    MCHC 32.0 31.0 - 37.0 g/dL    RDW 17.7 (H) 11.6 - 14.5 %    PLATELET 155 720 - 961 K/uL    MPV 9.9 9.2 - 11.8 FL    NEUTROPHILS 60 42 - 75 %    LYMPHOCYTES 23 20 - 51 %    MONOCYTES 16 (H) 2 - 9 %    EOSINOPHILS 1 0 - 5 %    BASOPHILS 0 0 - 3 %    ABS. NEUTROPHILS 5.1 1.8 - 8.0 K/UL    ABS. LYMPHOCYTES 1.9 0.8 - 3.5 K/UL    ABS. MONOCYTES 1.3 (H) 0 - 1.0 K/UL    ABS. EOSINOPHILS 0.1 0.0 - 0.4 K/UL    ABS.  BASOPHILS 0.0 0.0 - 0.06 K/UL    DF AUTOMATED      PLATELET COMMENTS ADEQUATE PLATELETS      RBC COMMENTS ANISOCYTOSIS  1+       PHOSPHORUS    Collection Time: 08/18/17  1:38 AM   Result Value Ref Range    Phosphorus 3.5 2.5 - 4.9 MG/DL   MAGNESIUM    Collection Time: 08/18/17  1:38 AM   Result Value Ref Range    Magnesium 2.7 (H) 1.6 - 2.6 mg/dL   GLUCOSE, POC    Collection Time: 08/18/17  6:23 AM   Result Value Ref Range    Glucose (POC) 181 (H) 70 - 110 mg/dL   GLUCOSE, POC    Collection Time: 08/18/17 12:09 PM   Result Value Ref Range    Glucose (POC) 104 70 - 110 mg/dL       Signed By: Sue Arciniega MD     August 18, 2017

## 2017-08-18 NOTE — PROGRESS NOTES
Respiratory Care Assessment for Bronchial hygiene or Lung Expansion Therapy  Patient  Wilmar Ordaz     55 y.o.   male     8/18/2017  9:32 AM  Patient Active Problem List   Diagnosis Code    Anemia D64.9    Acidosis E87.2    Cardiac arrest ([facility] I46.9    Respiratory failure ([address] J96.90    ESRD needing dialysis ([facility] N18.6, Z99.2    Anoxic brain damage ([facility] G93.1    Colitis K52.9    Hypotension I95.9    Acute GI bleeding K92.2    ESRD (end stage renal disease) ([facility] N18.6    Sepsis ([facility] A41.9    Oropharyngeal dysphagia R13.12    Cachexia (HCC) R64       ABG:  Date:8/18/2017  No results found for: PH, PHI, PCO2, PCO2I, PO2, PO2I, HCO3, HCO3I, FIO2, FIO2I    Chest X-ray:  Date:8/18/2017    Results from Hospital Encounter encounter on 07/27/17   XR ABD PORT  1 V   Narrative HISTORY: Nasogastric tube placement. Exam: Abdomen. Technique: Single view portable abdomen. Compared to 8/15/2017 exam.    FINDINGS: Previously seen Dobbhoff catheter has been removed. Newly placed nasogastric tube is noted with its tip projecting in the region of  the left upper quadrant. No obstruction, pneumoperitoneum, abnormal calcification or visceromegaly is  noted. Visualized soft tissues are unremarkable. Impression IMPRESSION:  1. No acute intra-abdominal process. 2. Nasogastric tube as above.         Lab Test:  Date:8/18/2017  WBC:   Lab Results   Component Value Date/Time    WBC 8.4 08/18/2017 01:38 AM   HGB: Lab Results   Component Value Date/Time    HGB 10.4 08/18/2017 01:38 AM    PLTS: Lab Results   Component Value Date/Time    PLATELET 276 28/13/2419 01:38 AM       SaO2%/flow:   SpO2 Readings from Last 1 Encounters:   08/18/17 91%       Vital Signs:   Patient Vitals for the past 8 hrs:   Temp Pulse Resp BP SpO2   08/18/17 0800 98.1 °F (36.7 °C) 79 18 91/40 91 %   08/18/17 0400 99.8 °F (37.7 °C) 98 18 95/56 94 %         RA/O2 flow/device:RA      First Inital Assesment:     []Yes [x]No Reevaluation/Reassessment:    [x]Yes []No     CHART REVIEW   Points 0 X 1 X 2 X 3 X 4 X Points   Pulmonary History Smoking History (1) none  Recent Smoking History <1 PPD  Recent Smoking History >1 PPD  Pulmonary Disease or Impairment  Severe Pulmonary Disease  3   Surgical History No Surgery  General Surgery  Lower Abdominal  Thoracic or Upper Abdominal  Thoracic & Pulmonary Disease  0   CXR Clear or not indicated  Chronic changes or CXR Pending  Infiltrate, atelectasis or pleural effusions  Infiltrates in more than 1lobe  Infiltrates +atelectasis  +/or pleural effusions  0     PATIENT ASSESSMENT    0 X 1 X 2 X 3 X 4 X Points   Respiratory Pattern Regular pattern RR 12-20  Increased RR 21-27   Mild Dyspnea at rest, irregular pattern RR 28-32  Moderate Dyspnea at rest, Use of accessory muscles, RR 33-36  Severe Dyspnea, Use of accessory muscles RR >36  0    Alert Oriented cooperative  Confused, Follows commands  Lethargic, Does not follow commands  Obtunded  Unresponsive  1   Breath Sounds Clear  Decreased Unilaterally  Decreased Bilaterally  Mild Scattered wheezing or Crackles in bases  Severe Wheezing or rhonchi  4   Cough Strong dry NPC  Strong Productive  Weak NPC  Weak productive or weak with rhonchi  No cough or may require suctioning  1   Level of Activity Ambulatory  Ambulatory with assistance  Temporarily Non-ambulatory  Non-Ambulatory, able to position self  Unable to position self, confined to bed  2   Total Points/Score:   11     Specific Intervention Chart(Continue with Dillan HUERTA)    Bronchial Hygiene/Secretion Clearance:    []EZPAP []Rotation bed with vibration    []CPT with percussor []CPT via vest   []Oscillastiang positive pressure expiratory device      Lung Expansion:    []Incentive Spirometer w/RT visits []Incentive Spirometer w/nursing    []EZPAP      *Suctioning:    []Nasal Tracheal []Tracheal     *suctioning will be ordered and done PRN with an associated frequency such as QID/PRN based on score       Other:    Care Plan   Level # Score Modality Frequency Comment   Level 1 >17      Level 2 14-17      Level 3 10-13 Cornet TID    Level 4 1-9        BRONCHIAL HYGIENE SCORING AND FREQUENCY GUIDELINES   Frequency Indications/Findings Level #   Q4 ATC Copious secretions, SOB, unable to sleep 1   QID & PRN at night Moderate amounts of secretions 2   TID or Q6 wa Small amounts of secretions and poor cough: recent history of secretions 3   BID or Q8 wa Unable to deep breathe and cough effectively 4        Comments:   Will re-assesss in 3 days    Respiratory Therapist: Jeb Gautam

## 2017-08-18 NOTE — PROGRESS NOTES
Patient s/p cardiac arrest, s/p recent stroke. Currently patient is NPO due to anability to swallow and he will undergo Peg-tube insertion planned for today. CM noted referral to ARU. CM called, made referral to Apolonia Vela.

## 2017-08-18 NOTE — PROCEDURES
Rosenda Noguera Pulmonary Specialist  [facility] Procedure Note With Ultrasound Guidance    Indication: Need for vasopressors; s/p cardiac arrest    Emergent procedure; content unable to be obtained. Time out performed. Patient positioned in Trendelenburg. Central line Bundle:  Full sterile barrier precautions used. 7-Step Sterility Protocol followed. (cap, mask sterile gown, sterile gloves, large sterile sheet, hand hygiene, 2% chlorhexidine for cutaneous antisepsis)  5 mL 1% Lidocaine placed at insertion site. Using ultrasound guidance  Size: 16F  Left internal jugular vein cannulated x 1 attempt(s) utilizing the modified Seldinger technique. Position of guidewire confirmed in vein using ultrasound prior to dilating. Guidewire was removed. Central venous catheter was placed without difficulty. mild immediate complications encountered. Good blood return on all 3 ports. Catheter secured & Biopatch applied. Sterile Tegaderm placed. Procedure was supervised in its entirety by Dr. Fabio Zapata, [address] [place] [address].     CXR pending.       Jennie Carpenter PA-C  08/18/17  5:54 PM

## 2017-08-18 NOTE — ROUTINE PROCESS
Bedside and Verbal shift change report given to Lexis Young RN (oncoming nurse) by Navneet De La Rosa RN (offgoing nurse). Report included the following information Kardex, Intake/Output, MAR and Recent Results.

## 2017-08-19 ENCOUNTER — APPOINTMENT (OUTPATIENT)
Dept: GENERAL RADIOLOGY | Age: 57
DRG: 004 | End: 2017-08-19
Attending: NURSE PRACTITIONER
Payer: MEDICARE

## 2017-08-19 LAB
ALBUMIN SERPL-MCNC: 2.4 G/DL (ref 3.4–5)
ALBUMIN/GLOB SERPL: 0.5 {RATIO} (ref 0.8–1.7)
ALP SERPL-CCNC: 91 U/L (ref 45–117)
ALT SERPL-CCNC: 13 U/L (ref 16–61)
ANION GAP SERPL CALC-SCNC: 10 MMOL/L (ref 3–18)
ARTERIAL PATENCY WRIST A: YES
AST SERPL-CCNC: 18 U/L (ref 15–37)
ATRIAL RATE: 113 BPM
BASE DEFICIT BLD-SCNC: 2 MMOL/L
BASOPHILS # BLD: 0 K/UL (ref 0–0.1)
BASOPHILS NFR BLD: 0 % (ref 0–2)
BDY SITE: ABNORMAL
BILIRUB SERPL-MCNC: 0.8 MG/DL (ref 0.2–1)
BUN SERPL-MCNC: 14 MG/DL (ref 7–18)
BUN/CREAT SERPL: 3 (ref 12–20)
CALCIUM SERPL-MCNC: 8 MG/DL (ref 8.5–10.1)
CALCULATED P AXIS, ECG09: 75 DEGREES
CALCULATED R AXIS, ECG10: 93 DEGREES
CALCULATED T AXIS, ECG11: 88 DEGREES
CHLORIDE SERPL-SCNC: 111 MMOL/L (ref 100–108)
CK MB CFR SERPL CALC: 3 % (ref 0–4)
CK MB CFR SERPL CALC: 4.3 % (ref 0–4)
CK MB SERPL-MCNC: 2.1 NG/ML (ref 5–25)
CK MB SERPL-MCNC: 4.6 NG/ML (ref 5–25)
CK SERPL-CCNC: 108 U/L (ref 39–308)
CK SERPL-CCNC: 70 U/L (ref 39–308)
CO2 SERPL-SCNC: 21 MMOL/L (ref 21–32)
CREAT SERPL-MCNC: 5.06 MG/DL (ref 0.6–1.3)
DIAGNOSIS, 93000: NORMAL
DIFFERENTIAL METHOD BLD: ABNORMAL
EOSINOPHIL # BLD: 0 K/UL (ref 0–0.4)
EOSINOPHIL NFR BLD: 0 % (ref 0–5)
ERYTHROCYTE [DISTWIDTH] IN BLOOD BY AUTOMATED COUNT: 16.9 % (ref 11.6–14.5)
GAS FLOW.O2 O2 DELIVERY SYS: ABNORMAL L/MIN
GAS FLOW.O2 SETTING OXYMISER: 10 BPM
GLOBULIN SER CALC-MCNC: 4.9 G/DL (ref 2–4)
GLUCOSE BLD STRIP.AUTO-MCNC: 166 MG/DL (ref 70–110)
GLUCOSE BLD STRIP.AUTO-MCNC: 177 MG/DL (ref 70–110)
GLUCOSE BLD STRIP.AUTO-MCNC: 207 MG/DL (ref 70–110)
GLUCOSE BLD STRIP.AUTO-MCNC: 218 MG/DL (ref 70–110)
GLUCOSE BLD STRIP.AUTO-MCNC: 236 MG/DL (ref 70–110)
GLUCOSE SERPL-MCNC: 210 MG/DL (ref 74–99)
HCO3 BLD-SCNC: 21.5 MMOL/L (ref 22–26)
HCT VFR BLD AUTO: 31.8 % (ref 36–48)
HGB BLD-MCNC: 10.4 G/DL (ref 13–16)
INSPIRATION.DURATION SETTING TIME VENT: 1 SEC
LACTATE SERPL-SCNC: 1.2 MMOL/L (ref 0.4–2)
LACTATE SERPL-SCNC: 2.6 MMOL/L (ref 0.4–2)
LYMPHOCYTES # BLD: 1.3 K/UL (ref 0.9–3.6)
LYMPHOCYTES NFR BLD: 11 % (ref 21–52)
MAGNESIUM SERPL-MCNC: 2.2 MG/DL (ref 1.6–2.6)
MCH RBC QN AUTO: 31 PG (ref 24–34)
MCHC RBC AUTO-ENTMCNC: 32.7 G/DL (ref 31–37)
MCV RBC AUTO: 94.6 FL (ref 74–97)
MONOCYTES # BLD: 0.7 K/UL (ref 0.05–1.2)
MONOCYTES NFR BLD: 5 % (ref 3–10)
NEUTS SEG # BLD: 10 K/UL (ref 1.8–8)
NEUTS SEG NFR BLD: 84 % (ref 40–73)
O2/TOTAL GAS SETTING VFR VENT: 50 %
P-R INTERVAL, ECG05: 168 MS
PCO2 BLD: 28.8 MMHG (ref 35–45)
PEEP RESPIRATORY: 7 CMH2O
PH BLD: 7.48 [PH] (ref 7.35–7.45)
PHOSPHATE SERPL-MCNC: 2.5 MG/DL (ref 2.5–4.9)
PLATELET # BLD AUTO: 358 K/UL (ref 135–420)
PMV BLD AUTO: 9.8 FL (ref 9.2–11.8)
PO2 BLD: 225 MMHG (ref 80–100)
POTASSIUM SERPL-SCNC: 3.8 MMOL/L (ref 3.5–5.5)
PROT SERPL-MCNC: 7.3 G/DL (ref 6.4–8.2)
Q-T INTERVAL, ECG07: 362 MS
QRS DURATION, ECG06: 80 MS
QTC CALCULATION (BEZET), ECG08: 496 MS
RBC # BLD AUTO: 3.36 M/UL (ref 4.7–5.5)
SAO2 % BLD: 100 % (ref 92–97)
SERVICE CMNT-IMP: ABNORMAL
SODIUM SERPL-SCNC: 142 MMOL/L (ref 136–145)
SPECIMEN TYPE: ABNORMAL
TOTAL RESP. RATE, ITRR: 22
TROPONIN I SERPL-MCNC: 1.01 NG/ML (ref 0–0.04)
TROPONIN I SERPL-MCNC: 2.02 NG/ML (ref 0–0.04)
VENTILATION MODE VENT: ABNORMAL
VENTRICULAR RATE, ECG03: 113 BPM
VOLUME CONTROL PLUS IVLCP: YES
VT SETTING VENT: 450 ML
WBC # BLD AUTO: 12.1 K/UL (ref 4.6–13.2)

## 2017-08-19 PROCEDURE — 71010 XR CHEST PORT: CPT

## 2017-08-19 PROCEDURE — 83605 ASSAY OF LACTIC ACID: CPT | Performed by: PHYSICIAN ASSISTANT

## 2017-08-19 PROCEDURE — 74011250637 HC RX REV CODE- 250/637: Performed by: INTERNAL MEDICINE

## 2017-08-19 PROCEDURE — 82962 GLUCOSE BLOOD TEST: CPT

## 2017-08-19 PROCEDURE — 65610000006 HC RM INTENSIVE CARE

## 2017-08-19 PROCEDURE — 93308 TTE F-UP OR LMTD: CPT

## 2017-08-19 PROCEDURE — 77030020186 HC BOOT HL PROTCT SAGE -B

## 2017-08-19 PROCEDURE — 83735 ASSAY OF MAGNESIUM: CPT | Performed by: NURSE PRACTITIONER

## 2017-08-19 PROCEDURE — 74011636637 HC RX REV CODE- 636/637: Performed by: INTERNAL MEDICINE

## 2017-08-19 PROCEDURE — 85025 COMPLETE CBC W/AUTO DIFF WBC: CPT | Performed by: NURSE PRACTITIONER

## 2017-08-19 PROCEDURE — 87106 FUNGI IDENTIFICATION YEAST: CPT | Performed by: INTERNAL MEDICINE

## 2017-08-19 PROCEDURE — 74011250637 HC RX REV CODE- 250/637: Performed by: NURSE PRACTITIONER

## 2017-08-19 PROCEDURE — 82553 CREATINE MB FRACTION: CPT | Performed by: NURSE PRACTITIONER

## 2017-08-19 PROCEDURE — 74011250636 HC RX REV CODE- 250/636: Performed by: NURSE PRACTITIONER

## 2017-08-19 PROCEDURE — 83605 ASSAY OF LACTIC ACID: CPT | Performed by: NURSE PRACTITIONER

## 2017-08-19 PROCEDURE — 74011636637 HC RX REV CODE- 636/637: Performed by: HOSPITALIST

## 2017-08-19 PROCEDURE — 77030011256 HC DRSG MEPILEX <16IN NO BORD MOLN -A

## 2017-08-19 PROCEDURE — 87070 CULTURE OTHR SPECIMN AEROBIC: CPT | Performed by: INTERNAL MEDICINE

## 2017-08-19 PROCEDURE — 94003 VENT MGMT INPAT SUBQ DAY: CPT

## 2017-08-19 PROCEDURE — 74011000250 HC RX REV CODE- 250: Performed by: PHYSICIAN ASSISTANT

## 2017-08-19 PROCEDURE — 84100 ASSAY OF PHOSPHORUS: CPT | Performed by: NURSE PRACTITIONER

## 2017-08-19 PROCEDURE — 74011000250 HC RX REV CODE- 250: Performed by: NURSE PRACTITIONER

## 2017-08-19 PROCEDURE — 74011000258 HC RX REV CODE- 258: Performed by: NURSE PRACTITIONER

## 2017-08-19 PROCEDURE — 74011250636 HC RX REV CODE- 250/636: Performed by: HOSPITALIST

## 2017-08-19 PROCEDURE — 74011250636 HC RX REV CODE- 250/636: Performed by: INTERNAL MEDICINE

## 2017-08-19 PROCEDURE — 74011250636 HC RX REV CODE- 250/636

## 2017-08-19 PROCEDURE — 74011250637 HC RX REV CODE- 250/637: Performed by: HOSPITALIST

## 2017-08-19 PROCEDURE — 74011250636 HC RX REV CODE- 250/636: Performed by: PHYSICIAN ASSISTANT

## 2017-08-19 PROCEDURE — 36600 WITHDRAWAL OF ARTERIAL BLOOD: CPT

## 2017-08-19 PROCEDURE — 80053 COMPREHEN METABOLIC PANEL: CPT | Performed by: NURSE PRACTITIONER

## 2017-08-19 PROCEDURE — 82803 BLOOD GASES ANY COMBINATION: CPT

## 2017-08-19 RX ORDER — LORAZEPAM 2 MG/ML
INJECTION INTRAMUSCULAR
Status: COMPLETED
Start: 2017-08-19 | End: 2017-08-19

## 2017-08-19 RX ORDER — SODIUM CHLORIDE 9 MG/ML
75 INJECTION, SOLUTION INTRAVENOUS CONTINUOUS
Status: DISCONTINUED | OUTPATIENT
Start: 2017-08-19 | End: 2017-08-19

## 2017-08-19 RX ORDER — INSULIN LISPRO 100 [IU]/ML
INJECTION, SOLUTION INTRAVENOUS; SUBCUTANEOUS EVERY 6 HOURS
Status: DISCONTINUED | OUTPATIENT
Start: 2017-08-19 | End: 2017-09-22 | Stop reason: HOSPADM

## 2017-08-19 RX ORDER — LORAZEPAM 2 MG/ML
1 INJECTION INTRAMUSCULAR
Status: DISCONTINUED | OUTPATIENT
Start: 2017-08-19 | End: 2017-08-20

## 2017-08-19 RX ADMIN — HYDROCORTISONE SODIUM SUCCINATE 50 MG: 100 INJECTION, POWDER, FOR SOLUTION INTRAMUSCULAR; INTRAVENOUS at 11:56

## 2017-08-19 RX ADMIN — PIPERACILLIN AND TAZOBACTAM 2.25 G: 2; .25 INJECTION, POWDER, FOR SOLUTION INTRAVENOUS at 18:12

## 2017-08-19 RX ADMIN — LORAZEPAM 1 MG: 2 INJECTION INTRAMUSCULAR; INTRAVENOUS at 11:47

## 2017-08-19 RX ADMIN — Medication 1 CAPSULE: at 08:16

## 2017-08-19 RX ADMIN — VITAMIN A AND D: 30.8 OINTMENT TOPICAL at 17:15

## 2017-08-19 RX ADMIN — MINERAL OIL AND WHITE PETROLATUM 1 DOSE: 150; 850 OINTMENT OPHTHALMIC at 11:49

## 2017-08-19 RX ADMIN — CHLORHEXIDINE GLUCONATE 10 ML: 1.2 RINSE ORAL at 21:32

## 2017-08-19 RX ADMIN — SIMVASTATIN 20 MG: 10 TABLET, FILM COATED ORAL at 21:32

## 2017-08-19 RX ADMIN — HYDROCORTISONE SODIUM SUCCINATE 50 MG: 100 INJECTION, POWDER, FOR SOLUTION INTRAMUSCULAR; INTRAVENOUS at 17:14

## 2017-08-19 RX ADMIN — LORAZEPAM 1 MG: 2 INJECTION INTRAMUSCULAR; INTRAVENOUS at 05:28

## 2017-08-19 RX ADMIN — NOREPINEPHRINE BITARTRATE 10 MCG/MIN: 1 INJECTION INTRAVENOUS at 08:13

## 2017-08-19 RX ADMIN — VITAMIN A AND D: 30.8 OINTMENT TOPICAL at 09:00

## 2017-08-19 RX ADMIN — HYDROCORTISONE SODIUM SUCCINATE 50 MG: 100 INJECTION, POWDER, FOR SOLUTION INTRAMUSCULAR; INTRAVENOUS at 05:26

## 2017-08-19 RX ADMIN — HEPARIN SODIUM 5000 UNITS: 5000 INJECTION, SOLUTION INTRAVENOUS; SUBCUTANEOUS at 09:37

## 2017-08-19 RX ADMIN — HEPARIN SODIUM 5000 UNITS: 5000 INJECTION, SOLUTION INTRAVENOUS; SUBCUTANEOUS at 01:32

## 2017-08-19 RX ADMIN — LEVETIRACETAM 500 MG: 5 INJECTION INTRAVENOUS at 06:23

## 2017-08-19 RX ADMIN — MINERAL OIL AND WHITE PETROLATUM 1 DOSE: 150; 850 OINTMENT OPHTHALMIC at 13:51

## 2017-08-19 RX ADMIN — INSULIN LISPRO 6 UNITS: 100 INJECTION, SOLUTION INTRAVENOUS; SUBCUTANEOUS at 11:50

## 2017-08-19 RX ADMIN — COLLAGENASE SANTYL: 250 OINTMENT TOPICAL at 09:00

## 2017-08-19 RX ADMIN — HEPARIN SODIUM 5000 UNITS: 5000 INJECTION, SOLUTION INTRAVENOUS; SUBCUTANEOUS at 17:14

## 2017-08-19 RX ADMIN — INSULIN LISPRO 6 UNITS: 100 INJECTION, SOLUTION INTRAVENOUS; SUBCUTANEOUS at 23:51

## 2017-08-19 RX ADMIN — LEVETIRACETAM 500 MG: 5 INJECTION INTRAVENOUS at 18:12

## 2017-08-19 RX ADMIN — FAMOTIDINE 20 MG: 10 INJECTION INTRAVENOUS at 09:33

## 2017-08-19 RX ADMIN — SODIUM CHLORIDE 75 ML/HR: 900 INJECTION, SOLUTION INTRAVENOUS at 09:35

## 2017-08-19 RX ADMIN — INSULIN LISPRO 3 UNITS: 100 INJECTION, SOLUTION INTRAVENOUS; SUBCUTANEOUS at 05:47

## 2017-08-19 RX ADMIN — HYDROCORTISONE SODIUM SUCCINATE 50 MG: 100 INJECTION, POWDER, FOR SOLUTION INTRAMUSCULAR; INTRAVENOUS at 00:20

## 2017-08-19 RX ADMIN — MINERAL OIL AND WHITE PETROLATUM 1 DOSE: 150; 850 OINTMENT OPHTHALMIC at 17:15

## 2017-08-19 RX ADMIN — INSULIN LISPRO 6 UNITS: 100 INJECTION, SOLUTION INTRAVENOUS; SUBCUTANEOUS at 17:24

## 2017-08-19 RX ADMIN — VITAMIN A AND D: 30.8 OINTMENT TOPICAL at 13:50

## 2017-08-19 RX ADMIN — HYDROCORTISONE SODIUM SUCCINATE 50 MG: 100 INJECTION, POWDER, FOR SOLUTION INTRAMUSCULAR; INTRAVENOUS at 23:51

## 2017-08-19 RX ADMIN — PIPERACILLIN AND TAZOBACTAM 2.25 G: 2; .25 INJECTION, POWDER, FOR SOLUTION INTRAVENOUS at 06:23

## 2017-08-19 RX ADMIN — VITAMIN A AND D: 30.8 OINTMENT TOPICAL at 21:32

## 2017-08-19 RX ADMIN — INSULIN LISPRO 3 UNITS: 100 INJECTION, SOLUTION INTRAVENOUS; SUBCUTANEOUS at 00:20

## 2017-08-19 RX ADMIN — LORAZEPAM 1 MG: 2 INJECTION INTRAMUSCULAR; INTRAVENOUS at 02:06

## 2017-08-19 RX ADMIN — VANCOMYCIN HYDROCHLORIDE 1500 MG: 1 INJECTION, POWDER, LYOPHILIZED, FOR SOLUTION INTRAVENOUS at 10:36

## 2017-08-19 RX ADMIN — CHLORHEXIDINE GLUCONATE 10 ML: 1.2 RINSE ORAL at 08:16

## 2017-08-19 RX ADMIN — ASPIRIN 81 MG 81 MG: 81 TABLET ORAL at 08:16

## 2017-08-19 RX ADMIN — LORAZEPAM 1 MG: 2 INJECTION INTRAMUSCULAR; INTRAVENOUS at 18:21

## 2017-08-19 RX ADMIN — MINERAL OIL AND WHITE PETROLATUM 1 DOSE: 150; 850 OINTMENT OPHTHALMIC at 21:33

## 2017-08-19 NOTE — PROGRESS NOTES
Code blue noted  Patient known to me  Let me know if I can be of assistance  Will continue to follow peripherally

## 2017-08-19 NOTE — PROGRESS NOTES
RENAL DAILY PROGRESS NOTE    Patient: Whitney Mayes               Sex: male          DOA: 7/27/2017  4:23 PM        YOB: 1960      Age:  55 y.o.        LOS:  LOS: 23 days     Subjective:     Whitney Mayes is a 55 y.o.  who presents with Cardiac arrest ([facility] [address]  Acidosis  Respiratory failure ([address]  Anemia  ESRD needing dialysis ([facility] [address]  Cardiac arrest ([facility] [address]  Acute GI bleeding  Sepsis ([facility] [address]  Hypotension  Anoxic brain damage (HCC)  ESRD (end stage renal disease) ([facility] [address]  Colitis  dx  dx. Arrived to dialysis yesterday with bp,was given ns and albumin,no fluid removal during dialysis ,dialysis stopped early due to persistent hypotension. had cardiac arrest ,and transferred to icu  Chief complains: on vent  - Reviewed last 24 hrs events     Current Facility-Administered Medications   Medication Dose Route Frequency    LORazepam (ATIVAN) injection 1 mg  1 mg IntraVENous Q2H PRN    insulin lispro (HUMALOG) injection   SubCUTAneous Q6H    VANCOMYCIN INFORMATION NOTE   Other Rx Dosing/Monitoring    0.9% sodium chloride infusion  75 mL/hr IntraVENous CONTINUOUS    white petrolatum-mineral oil 85-15 % oint 1 Dose  1 Dose Ophthalmic QID    PHENYLephrine 90,000 mcg in 0.9% sodium chloride 250 ml (NEOSYNEPHRINE) (premix or compounded) infusion   mcg/min IntraVENous TITRATE    chlorhexidine (PERIDEX) 0.12 % mouthwash 10 mL  10 mL Oral Q12H    midazolam (VERSED) injection 1-2 mg  1-2 mg IntraVENous Q2H PRN    NOREPINephrine (LEVOPHED) 16,000 mcg in dextrose 5% 250 mL infusion  2-16 mcg/min IntraVENous TITRATE    hydrocortisone Sod Succ (PF) (SOLU-CORTEF) injection 50 mg  50 mg IntraVENous Q6H    albuterol (PROVENTIL VENTOLIN) nebulizer solution 2.5 mg  2.5 mg Nebulization Q6H PRN    piperacillin-tazobactam (ZOSYN) 2.25 g in 0.9% sodium chloride (MBP/ADV) 50 mL MBP  2.25 g IntraVENous Q12H    [START ON 8/21/2017] Vancomycin random level  1 Each Other Rx Dosing/Monitoring    Piperacillin-tazobactam (ZOSYN) 0.75 gm Supplemental Dosing by Pharmacy   Other Rx Dosing/Monitoring    fentaNYL citrate (PF) injection  mcg   mcg IntraVENous Q4H PRN    famotidine (PF) (PEPCID) 20 mg in sodium chloride 0.9 % 10 mL injection  20 mg IntraVENous DAILY    vits A and D-white pet-lanolin (A&D) ointment   Topical QID AFTER MEALS    collagenase (SANTYL) 250 unit/gram ointment   Topical DAILY    levETIRAcetam (KEPPRA) 500 mg in 100 ml IVPB  500 mg IntraVENous Q12H    acetaminophen (TYLENOL) tablet 650 mg  650 mg Oral Q4H PRN    lactobacillus sp. 50 billion cpu (BIO-K PLUS) capsule 1 Cap  1 Cap Oral DAILY    loperamide (IMODIUM) capsule 2 mg  2 mg Oral Q6H PRN    heparin (porcine) injection 5,000 Units  5,000 Units SubCUTAneous Q8H    heparin (porcine) 1,000 unit/mL injection 2,000 Units  2,000 Units IntraVENous DIALYSIS ONCE    simvastatin (ZOCOR) tablet 20 mg  20 mg Oral QHS    doxercalciferol (HECTOROL) 4 mcg/2 mL injection 3 mcg  3 mcg IntraVENous DIALYSIS MON, WED & FRI    epoetin benjamin (EPOGEN;PROCRIT) injection 10,000 Units  10,000 Units IntraVENous DIALYSIS MON, WED & FRI    aspirin chewable tablet 81 mg  81 mg Oral DAILY    glucose chewable tablet 16 g  4 Tab Oral PRN    glucagon (GLUCAGEN) injection 1 mg  1 mg IntraMUSCular PRN    dextrose (D50W) injection syrg 12.5-25 g  25-50 mL IntraVENous PRN    0.9% sodium chloride infusion  100 mL/hr IntraVENous DIALYSIS PRN    naloxone (NARCAN) injection 0.4 mg  0.4 mg IntraVENous PRN       Objective:     Visit Vitals    /57    Pulse 88    Temp 100.4 °F (38 °C)    Resp 19    Ht 6' 2\" (1.88 m)    Wt 64.5 kg (142 lb 3.2 oz)    SpO2 100%    BMI 18.26 kg/m2       Intake/Output Summary (Last 24 hours) at 08/19/17 1316  Last data filed at 08/19/17 1309   Gross per 24 hour   Intake          6006.91 ml   Output             1050 ml   Net          4956.91 ml       Physical Examination:     GEN: on vent  RS: Chest is bilateral equal, no wheezing / rales / crackles  CVS: S1-S2 heard, RRR, No S3 / murmur  Abdomen: Soft, Non tender, Not distended, Positive bowel sounds, no organomegaly, no CVA / supra pubic tenderness  Extremities: No edema, no cyanosis, skin is warm on touch  HEENT: Head is atraumatic, PERRLA, conjunctiva pink & non icteric. No JVD or carotid bruit   Musculoskeletal: No gross joints or bone deformities   Lymph Node: No palpable cervical, axillary or groin lymphadenopathy. Data Review:      Labs:     Hematology: Recent Labs      08/19/17   0145 08/18/17   1640  08/18/17   0138  08/17/17   0320   WBC  12.1  12.4  8.4  7.5   HGB  10.4*  9.9*  10.4*  9.7*   HCT  31.8*  31.2*  32.5*  30.5*     Chemistry: Recent Labs      08/19/17 0145 08/18/17 1640 08/18/17 0138   BUN  14  10   --    CREA  5.06*  4.14*   --    CA  8.0*  8.8   --    ALB  2.4*  2.4*   --    K  3.8  3.3*   --    NA  142  144   --    CL  111*  109*   --    CO2  21  18*   --    PHOS  2.5   --   3.5   GLU  210*  183*   --         Images:    XR (Most Recent). CXR reviewed by me and compared with previous CXR   Results from Hospital Encounter encounter on 07/27/17   XR CHEST PORT   Narrative EXAMINATION: Chest portable    INDICATION: Respiratory failure    COMPARISON: 8/18/2017    FINDINGS: Frontal view of the chest obtained. ETT tip roughly 3.1 cm above  bree. Gastric tube tip below GE junction, not imaged. Left neck catheter tip  at upper SVC level. No consolidation. Mediastinal silhouette and pulmonary  vasculature unremarkable. No evidence of pneumothorax. Impression IMPRESSION:    1. Lines/tubes as above. No clearly acute findings. CT (Most Recent)   Results from Hospital Encounter encounter on 07/27/17   CT HEAD WO CONT   Narrative Description:  CT head without contrast    TECHNIQUE: Axial CT imaging of the head without IV contrast was performed. Coronal and sagittal reformations generated and reviewed.     All CT scans at this facility are performed using dose optimization technique as  appropriate to a performed exam, to include automated exposure control,  adjustment of the mA and/or kV according to patient size (including appropriate  matching for site-specific examinations), or use of iterative reconstruction  technique. Clinical Indication:  Facial muscle weakness and altered mental status . CODE S. Comparison: August 8, 2017. Findings:  Cortical sulci, basilar cisterns and ventricles appear within normal  limits in size and configuration. No hemorrhage, mass effect or edema. Small  lacunar type infarcts of the thalami bilaterally noted. Mild to moderate  low-attenuation change within cerebral white matter. Probable small chronic  infarct of the left manny. No extra-axial fluid collections. No mass or mass  effect. Paranasal sinuses and mastoid air cells are clear. Impression Impression:      No acute intracranial findings. Specifically no evidence of hemorrhage. Small lacunar type infarcts of the thalami bilaterally and of the manny, stable. CODE S results discussed with Dr. Saeid Phipps. Caleb Packer, August 13, 2017 at 4:41 PM.  Confirmed. EKG No results found for this or any previous visit. I have personally reviewed the old medical records and patient's labs    Plan / Recommendation:      1.  Esrd,on dialysis mon,wed Friday  2.septic shock ,on pressors,antibiotics ,ivf,ventilator  3.hx of cva      D/w Dr. Carter Ny MD  Nephrology  8/19/2017

## 2017-08-19 NOTE — ROUTINE PROCESS
Bedside and Verbal shift change report given to Damon Deric  (oncoming nurse) by Jagjit Rosado (offgoing nurse). Report included the following information SBAR, Kardex and MAR.

## 2017-08-19 NOTE — PROGRESS NOTES
[facility], [facility]'s GCS less than 4. Spoke with Tan Infante and they want us to call if patient expires.

## 2017-08-19 NOTE — PROGRESS NOTES
[facility] Hospitalist Group  Progress Note    Patient: Jose Stewart Age: 55 y.o. : 1960 MR#: [id number] SSN: [id number]  Date/Time: 2017     Subjective:   Patient intubated, unresponsive    Assessment/Plan:   1. Acute met encephalopathy: due to # 2.    2. Acute pontine lacunar CVA: on asa, sttain  3. S/p PEA arrest. ? Cardiac cause with elevated trop PTA. Echo improving EF, s/p cath, no CAD. NOw with second cardiac arrest and intubated- vent support,  4. Acute resp failure s/p extubation, off O2- Now reintubated after second cardiac arrest  5. Dysphagia:  6. ESRD- HD as pe nephrology  7. Hypotension, ?septic shock, wean pressors, on abx, follow cx  9. Elevated LFT - ? Shock liver. 10. Thrombocytopenia: secondary to sepsis. monitor  11. Sz: on keppra  12.  Wounds: wound care    unstageable pressure ulcer to sacrum/coccyx not present on admit    moisture associated skin damage to anus not present  on admit    moisture associated skin damage to buttocks not present on admit    Full code per family    Case discussed with:  [x]Patient  []Family  []Nursing  []Case Management  DVT Prophylaxis:  []Lovenox  [x]Hep SQ  [x]SCDs  []Coumadin   []On Heparin gtt    Patient Active Problem List   Diagnosis Code    Anemia D64.9    Acidosis E87.2    Cardiac arrest ([address] I46.9    Respiratory failure ([address] J96.90    ESRD needing dialysis ([address] N18.6, Z99.2    Anoxic brain damage (HCC) G93.1    Colitis K52.9    Hypotension I95.9    Acute GI bleeding K92.2    ESRD (end stage renal disease) ([address] N18.6    Sepsis ([address] A41.9    Oropharyngeal dysphagia R13.12    Cachexia ([address] R64       Objective:   VS:   Visit Vitals    /57    Pulse 88    Temp 100.4 °F (38 °C)    Resp 19    Ht 6' 2\" (1.88 m)    Wt 64.5 kg (142 lb 3.2 oz)    SpO2 100%    BMI 18.26 kg/m2      Tmax/24hrs: Temp (24hrs), Av.1 °F (37.8 °C), Min:98.7 °F (37.1 °C), Max:100.8 °F (38.2 °C)  IOBRIEF    Intake/Output Summary (Last 24 hours) at 08/19/17 1549  Last data filed at 08/19/17 1309   Gross per 24 hour   Intake          6006.91 ml   Output             1050 ml   Net          4956.91 ml       General:  Intubated, unresponsive  Cardiovascular:  S1S2+, RRR  Pulmonary:  Coarse BS b/l  GI:  Soft, BS+, NT, ND  Extremities:  No edema        Medications:   Current Facility-Administered Medications   Medication Dose Route Frequency    LORazepam (ATIVAN) injection 1 mg  1 mg IntraVENous Q2H PRN    insulin lispro (HUMALOG) injection   SubCUTAneous Q6H    VANCOMYCIN INFORMATION NOTE   Other Rx Dosing/Monitoring    white petrolatum-mineral oil 85-15 % oint 1 Dose  1 Dose Ophthalmic QID    chlorhexidine (PERIDEX) 0.12 % mouthwash 10 mL  10 mL Oral Q12H    midazolam (VERSED) injection 1-2 mg  1-2 mg IntraVENous Q2H PRN    NOREPINephrine (LEVOPHED) 16,000 mcg in dextrose 5% 250 mL infusion  2-16 mcg/min IntraVENous TITRATE    hydrocortisone Sod Succ (PF) (SOLU-CORTEF) injection 50 mg  50 mg IntraVENous Q6H    albuterol (PROVENTIL VENTOLIN) nebulizer solution 2.5 mg  2.5 mg Nebulization Q6H PRN    piperacillin-tazobactam (ZOSYN) 2.25 g in 0.9% sodium chloride (MBP/ADV) 50 mL MBP  2.25 g IntraVENous Q12H    [START ON 8/21/2017] Vancomycin random level  1 Each Other Rx Dosing/Monitoring    Piperacillin-tazobactam (ZOSYN) 0.75 gm Supplemental Dosing by Pharmacy   Other Rx Dosing/Monitoring    fentaNYL citrate (PF) injection  mcg   mcg IntraVENous Q4H PRN    famotidine (PF) (PEPCID) 20 mg in sodium chloride 0.9 % 10 mL injection  20 mg IntraVENous DAILY    vits A and D-white pet-lanolin (A&D) ointment   Topical QID AFTER MEALS    collagenase (SANTYL) 250 unit/gram ointment   Topical DAILY    levETIRAcetam (KEPPRA) 500 mg in 100 ml IVPB  500 mg IntraVENous Q12H    acetaminophen (TYLENOL) tablet 650 mg  650 mg Oral Q4H PRN    lactobacillus sp. 50 billion cpu (BIO-K PLUS) capsule 1 Cap 1 Cap Oral DAILY    loperamide (IMODIUM) capsule 2 mg  2 mg Oral Q6H PRN    heparin (porcine) injection 5,000 Units  5,000 Units SubCUTAneous Q8H    heparin (porcine) 1,000 unit/mL injection 2,000 Units  2,000 Units IntraVENous DIALYSIS ONCE    simvastatin (ZOCOR) tablet 20 mg  20 mg Oral QHS    doxercalciferol (HECTOROL) 4 mcg/2 mL injection 3 mcg  3 mcg IntraVENous DIALYSIS MON, WED & FRI    epoetin benjamin (EPOGEN;PROCRIT) injection 10,000 Units  10,000 Units IntraVENous DIALYSIS MON, WED & FRI    aspirin chewable tablet 81 mg  81 mg Oral DAILY    glucose chewable tablet 16 g  4 Tab Oral PRN    glucagon (GLUCAGEN) injection 1 mg  1 mg IntraMUSCular PRN    dextrose (D50W) injection syrg 12.5-25 g  25-50 mL IntraVENous PRN    0.9% sodium chloride infusion  100 mL/hr IntraVENous DIALYSIS PRN    naloxone (NARCAN) injection 0.4 mg  0.4 mg IntraVENous PRN       Labs:    Recent Results (from the past 24 hour(s))   CULTURE, BLOOD    Collection Time: 08/18/17  4:40 PM   Result Value Ref Range    Special Requests: NO SPECIAL REQUESTS      Culture result: NO GROWTH AFTER 14 HOURS     CBC WITH AUTOMATED DIFF    Collection Time: 08/18/17  4:40 PM   Result Value Ref Range    WBC 12.4 4.6 - 13.2 K/uL    RBC 3.21 (L) 4.70 - 5.50 M/uL    HGB 9.9 (L) 13.0 - 16.0 g/dL    HCT 31.2 (L) 36.0 - 48.0 %    MCV 97.2 (H) 74.0 - 97.0 FL    MCH 30.8 24.0 - 34.0 PG    MCHC 31.7 31.0 - 37.0 g/dL    RDW 17.3 (H) 11.6 - 14.5 %    PLATELET 046 921 - 830 K/uL    MPV 10.1 9.2 - 11.8 FL    NEUTROPHILS 63 42 - 75 %    LYMPHOCYTES 22 20 - 51 %    MONOCYTES 9 2 - 9 %    EOSINOPHILS 5 0 - 5 %    BASOPHILS 1 0 - 3 %    NRBC 1.0 (H) 0  WBC    ABS. NEUTROPHILS 7.9 1.8 - 8.0 K/UL    ABS. LYMPHOCYTES 2.7 0.8 - 3.5 K/UL    ABS. MONOCYTES 1.1 (H) 0 - 1.0 K/UL    ABS. EOSINOPHILS 0.6 (H) 0.0 - 0.4 K/UL    ABS.  BASOPHILS 0.1 (H) 0.0 - 0.06 K/UL    DF MANUAL      PLATELET COMMENTS ADEQUATE PLATELETS      RBC COMMENTS ANISOCYTOSIS  1+       LACTIC ACID    Collection Time: 08/18/17  4:40 PM   Result Value Ref Range    Lactic acid 7.9 (HH) 0.4 - 2.0 MMOL/L   METABOLIC PANEL, COMPREHENSIVE    Collection Time: 08/18/17  4:40 PM   Result Value Ref Range    Sodium 144 136 - 145 mmol/L    Potassium 3.3 (L) 3.5 - 5.5 mmol/L    Chloride 109 (H) 100 - 108 mmol/L    CO2 18 (L) 21 - 32 mmol/L    Anion gap 17 3.0 - 18 mmol/L    Glucose 183 (H) 74 - 99 mg/dL    BUN 10 7.0 - 18 MG/DL    Creatinine 4.14 (H) 0.6 - 1.3 MG/DL    BUN/Creatinine ratio 2 (L) 12 - 20      GFR est AA 18 (L) >60 ml/min/1.73m2    GFR est non-AA 15 (L) >60 ml/min/1.73m2    Calcium 8.8 8.5 - 10.1 MG/DL    Bilirubin, total 0.6 0.2 - 1.0 MG/DL    ALT (SGPT) 10 (L) 16 - 61 U/L    AST (SGOT) 8 (L) 15 - 37 U/L    Alk. phosphatase 93 45 - 117 U/L    Protein, total 7.0 6.4 - 8.2 g/dL    Albumin 2.4 (L) 3.4 - 5.0 g/dL    Globulin 4.6 (H) 2.0 - 4.0 g/dL    A-G Ratio 0.5 (L) 0.8 - 1.7     CARDIAC PANEL,(CK, CKMB & TROPONIN)    Collection Time: 08/18/17  4:40 PM   Result Value Ref Range    CK 34 (L) 39 - 308 U/L    CK - MB 2.4 <3.6 ng/ml    CK-MB Index 7.1 (H) 0.0 - 4.0 %    Troponin-I, Qt. 0.05 (H) 0.0 - 0.045 NG/ML   CULTURE, BLOOD    Collection Time: 08/18/17  4:40 PM   Result Value Ref Range    Special Requests: NO SPECIAL REQUESTS      Culture result: NO GROWTH AFTER 13 HOURS     POC G3    Collection Time: 08/18/17  5:05 PM   Result Value Ref Range    Device: VENT      FIO2 (POC) 100 %    pH (POC) 7.209 (LL) 7.35 - 7.45      pCO2 (POC) 35.8 35.0 - 45.0 MMHG    pO2 (POC) 52 (L) 80 - 100 MMHG    HCO3 (POC) 14.3 (L) 22 - 26 MMOL/L    sO2 (POC) 79 (L) 92 - 97 %    Base deficit (POC) 14 mmol/L    Mode ASSIST CONTROL      Tidal volume 450 ml    Set Rate 18 bpm    PEEP/CPAP (POC) 5 cmH2O    Allens test (POC) YES      Inspiratory Time 1 sec    Total resp.  rate 26      Site RIGHT BRACHIAL      Specimen type (POC) ARTERIAL      Performed by Lucian Mejia     Volume control plus YES     POC G3    Collection Time: 08/18/17 9:32 PM   Result Value Ref Range    Device: VENT      FIO2 (POC) 100 %    pH (POC) 7.389 7.35 - 7.45      pCO2 (POC) 35.3 35.0 - 45.0 MMHG    pO2 (POC) 539 (H) 80 - 100 MMHG    HCO3 (POC) 21.3 (L) 22 - 26 MMOL/L    sO2 (POC) 100 (H) 92 - 97 %    Base deficit (POC) 4 mmol/L    Mode ASSIST CONTROL      Tidal volume 450 ml    Set Rate 18 bpm    PEEP/CPAP (POC) 10 cmH2O    Allens test (POC) N/A      Inspiratory Time 1 sec    Total resp. rate 29      Site DRAWN FROM ARTERIAL LINE      Patient temp. 98.6      Specimen type (POC) ARTERIAL      Performed by Posey Hemp     Volume control plus YES     EKG, 12 LEAD, SUBSEQUENT    Collection Time: 08/18/17 11:18 PM   Result Value Ref Range    Ventricular Rate 113 BPM    Atrial Rate 113 BPM    P-R Interval 168 ms    QRS Duration 80 ms    Q-T Interval 362 ms    QTC Calculation (Bezet) 496 ms    Calculated P Axis 75 degrees    Calculated R Axis 93 degrees    Calculated T Axis 88 degrees    Diagnosis       Sinus tachycardia  Septal infarct (cited on or before 09-AUG-2017)  Lateral infarct (cited on or before 09-AUG-2017)  Abnormal ECG  When compared with ECG of 09-AUG-2017 06:57,  Vent.  rate has increased BY  40 BPM      Confirmed by Keli Espinoza ([phone]) on 8/19/2017 1:16:46 PM     GLUCOSE, POC    Collection Time: 08/19/17 12:19 AM   Result Value Ref Range    Glucose (POC) 177 (H) 70 - 110 mg/dL   CULTURE, RESPIRATORY/SPUTUM/BRONCH W GRAM STAIN    Collection Time: 08/19/17  1:45 AM   Result Value Ref Range    Special Requests: NO SPECIAL REQUESTS      GRAM STAIN MANY WBC'S      GRAM STAIN NO ORGANISMS SEEN      Culture result: PENDING    CBC WITH AUTOMATED DIFF    Collection Time: 08/19/17  1:45 AM   Result Value Ref Range    WBC 12.1 4.6 - 13.2 K/uL    RBC 3.36 (L) 4.70 - 5.50 M/uL    HGB 10.4 (L) 13.0 - 16.0 g/dL    HCT 31.8 (L) 36.0 - 48.0 %    MCV 94.6 74.0 - 97.0 FL    MCH 31.0 24.0 - 34.0 PG    MCHC 32.7 31.0 - 37.0 g/dL    RDW 16.9 (H) 11.6 - 14.5 %    PLATELET 910 526 - 420 K/uL    MPV 9.8 9.2 - 11.8 FL    NEUTROPHILS 84 (H) 40 - 73 %    LYMPHOCYTES 11 (L) 21 - 52 %    MONOCYTES 5 3 - 10 %    EOSINOPHILS 0 0 - 5 %    BASOPHILS 0 0 - 2 %    ABS. NEUTROPHILS 10.0 (H) 1.8 - 8.0 K/UL    ABS. LYMPHOCYTES 1.3 0.9 - 3.6 K/UL    ABS. MONOCYTES 0.7 0.05 - 1.2 K/UL    ABS. EOSINOPHILS 0.0 0.0 - 0.4 K/UL    ABS. BASOPHILS 0.0 0.0 - 0.1 K/UL    DF AUTOMATED     LACTIC ACID    Collection Time: 08/19/17  1:45 AM   Result Value Ref Range    Lactic acid 2.6 (HH) 0.4 - 2.0 MMOL/L   METABOLIC PANEL, COMPREHENSIVE    Collection Time: 08/19/17  1:45 AM   Result Value Ref Range    Sodium 142 136 - 145 mmol/L    Potassium 3.8 3.5 - 5.5 mmol/L    Chloride 111 (H) 100 - 108 mmol/L    CO2 21 21 - 32 mmol/L    Anion gap 10 3.0 - 18 mmol/L    Glucose 210 (H) 74 - 99 mg/dL    BUN 14 7.0 - 18 MG/DL    Creatinine 5.06 (H) 0.6 - 1.3 MG/DL    BUN/Creatinine ratio 3 (L) 12 - 20      GFR est AA 14 (L) >60 ml/min/1.73m2    GFR est non-AA 12 (L) >60 ml/min/1.73m2    Calcium 8.0 (L) 8.5 - 10.1 MG/DL    Bilirubin, total 0.8 0.2 - 1.0 MG/DL    ALT (SGPT) 13 (L) 16 - 61 U/L    AST (SGOT) 18 15 - 37 U/L    Alk.  phosphatase 91 45 - 117 U/L    Protein, total 7.3 6.4 - 8.2 g/dL    Albumin 2.4 (L) 3.4 - 5.0 g/dL    Globulin 4.9 (H) 2.0 - 4.0 g/dL    A-G Ratio 0.5 (L) 0.8 - 1.7     MAGNESIUM    Collection Time: 08/19/17  1:45 AM   Result Value Ref Range    Magnesium 2.2 1.6 - 2.6 mg/dL   PHOSPHORUS    Collection Time: 08/19/17  1:45 AM   Result Value Ref Range    Phosphorus 2.5 2.5 - 4.9 MG/DL   CARDIAC PANEL,(CK, CKMB & TROPONIN)    Collection Time: 08/19/17  1:45 AM   Result Value Ref Range     39 - 308 U/L    CK - MB 4.6 (H) <3.6 ng/ml    CK-MB Index 4.3 (H) 0.0 - 4.0 %    Troponin-I, Qt. 2.02 (HH) 0.0 - 0.045 NG/ML   POC G3    Collection Time: 08/19/17  5:18 AM   Result Value Ref Range    Device: VENT      FIO2 (POC) 50 %    pH (POC) 7.480 (H) 7.35 - 7.45      pCO2 (POC) 28.8 (L) 35.0 - 45.0 MMHG    pO2 (POC) 225 (H) 80 - 100 MMHG    HCO3 (POC) 21.5 (L) 22 - 26 MMOL/L    sO2 (POC) 100 (H) 92 - 97 %    Base deficit (POC) 2 mmol/L    Mode ASSIST CONTROL      Tidal volume 450 ml    Set Rate 10 bpm    PEEP/CPAP (POC) 7 cmH2O    Allens test (POC) YES      Inspiratory Time 1 sec    Total resp.  rate 22      Site DRAWN FROM ARTERIAL LINE      Specimen type (POC) ARTERIAL      Performed by Flory Levine     Volume control plus YES     GLUCOSE, POC    Collection Time: 08/19/17  5:47 AM   Result Value Ref Range    Glucose (POC) 166 (H) 70 - 110 mg/dL   LACTIC ACID    Collection Time: 08/19/17  7:55 AM   Result Value Ref Range    Lactic acid 1.2 0.4 - 2.0 MMOL/L   GLUCOSE, POC    Collection Time: 08/19/17 10:59 AM   Result Value Ref Range    Glucose (POC) 218 (H) 70 - 110 mg/dL       Signed By: Ivan Swift MD     August 19, 2017

## 2017-08-19 NOTE — ROUTINE PROCESS
pts eyes appear to stay open for long periods, eyes appear to deviate upward, saline soaks in place will get order for lacrilube and d/c soaks no corneal, gag or blink reflexes, pt does cough when suctioned, breathes over the vent

## 2017-08-19 NOTE — PROGRESS NOTES
Orders received, chart reviewed. Patient currently on caseload with alternative means of nutrition. Primary SLP recommends NPO with continued NG feedings. STAT evaluation not indicated at this time; will f/u on next business date.      Thank you for this referral,  Kristen Davis M.S.Ed., CCC/SLP  Speech Language Pathologist

## 2017-08-19 NOTE — PROGRESS NOTES
[facility]  ICU Progress Note      Name: Sharee Clark   : 1960   MRN: [id number]   Date: 2017 3:29 PM     [x]I have reviewed the flowsheet and previous days notes. Events overnight reviewed and discussed with nursing staff. Vital signs and records reviewed. Shannon Perera a 55 y. o.  male with a history of DM, ESRD admitted PEA CPR/ACLS protocol ROSC. Patient's hospitalization was also found to have a CVA and was treated for E.coli and C.perferinges bacteremia. Patient was initially in ICU and transferred to the floor. During HD on 17 patient became hypotensive and had subsequent PEA arrest. Patient is now intubated, unresponsive, and on pressors. Intermittent myoclonus-eyelid and jaw twitching   Four Coma Score 5- [id number]  GCS 6  Febrile   Remains on levophed gtt                  ROS:Review of systems not obtained due to patient factors.     Medication Review:  · Pressors -  · Sedation - PRN ativan for myoclonus   · Antibiotics - Vancomycin and zosyn   · Pain -  · GI/ DVT - Pepcid and sq heparin   · Others (other gtts)    Safety Bundles: VAP Bundle/Severe Sepsis Protocol    Vital Signs:    Visit Vitals    /57    Pulse 88    Temp 100.4 °F (38 °C)    Resp 19    Ht 6' 2\" (1.88 m)    Wt 64.5 kg (142 lb 3.2 oz)    SpO2 100%    BMI 18.26 kg/m2       O2 Device: Endotracheal tube, Ventilator   O2 Flow Rate (L/min): 0 l/min   Temp (24hrs), Av.1 °F (37.8 °C), Min:98.7 °F (37.1 °C), Max:100.8 °F (38.2 °C)       Intake/Output:   Last shift:       07 - 1900  In: 400   Out: -   Last 3 shifts: 1901 -  07  In: 6126.9 [I.V.:5706.9]  Out: 2875 [Drains:2225]    Intake/Output Summary (Last 24 hours) at 17 1529  Last data filed at 17 1309   Gross per 24 hour   Intake          6006.91 ml   Output             1050 ml   Net          4956.91 ml       Ventilator Settings:  Ventilator  Mode: Assist control, VC+  Respiratory Rate  Resp Rate Observed: 15  Back-Up Rate: 16  Insp Time (sec): 1 sec  Insp Flow (l/min): 44 l/min  I:E Ratio: 1:2  Ventilator Volumes  Vt Set (ml): 450 ml  Vt Exhaled (Machine Breath) (ml): 526 ml  Vt Spont (ml): 395 ml  Ve Observed (l/min): 9.44 l/min  Ventilator Pressures  Pressure Support (cm H2O): 7 cm H2O  PIP Observed (cm H2O): 14 cm H2O  Plateau Pressure (cm H2O): 13 cm H2O  MAP (cm H2O): 8.6  PEEP/VENT (cm H2O): 5 cm H20  Auto PEEP Observed (cm H2O): 0 cm H2O    Physical Exam:    General: Intubated, critically ill, unresponsive   HEENT:  Anicteric sclerae; pink palpebral conjunctivae; Pupil non reactive to light. Resp:  Symmetrical chest expansion, no accessory muscle use; good airway entry; no rales/ wheezing/ rhonchi noted  CV:  S1, S2 present;  GI:  Abdomen soft, non-tender; (+) active bowel sounds  Extremities:  +2 pulses on all extremities; + thrill and bruit to L AV fistula   Skin:  Dressing to sacrum. Erythematous perineum   Neurologic:  Facial myoclonus, four coma scare 5, GCS 6.  Pupils fixed with disconjugate upward deviated gaze   Devices:  NGT, ETT, A line, TLC      DATA:     Current Facility-Administered Medications   Medication Dose Route Frequency    LORazepam (ATIVAN) injection 1 mg  1 mg IntraVENous Q2H PRN    insulin lispro (HUMALOG) injection   SubCUTAneous Q6H    VANCOMYCIN INFORMATION NOTE   Other Rx Dosing/Monitoring    white petrolatum-mineral oil 85-15 % oint 1 Dose  1 Dose Ophthalmic QID    chlorhexidine (PERIDEX) 0.12 % mouthwash 10 mL  10 mL Oral Q12H    midazolam (VERSED) injection 1-2 mg  1-2 mg IntraVENous Q2H PRN    NOREPINephrine (LEVOPHED) 16,000 mcg in dextrose 5% 250 mL infusion  2-16 mcg/min IntraVENous TITRATE    hydrocortisone Sod Succ (PF) (SOLU-CORTEF) injection 50 mg  50 mg IntraVENous Q6H    albuterol (PROVENTIL VENTOLIN) nebulizer solution 2.5 mg  2.5 mg Nebulization Q6H PRN    piperacillin-tazobactam (ZOSYN) 2.25 g in 0.9% sodium chloride (MBP/ADV) 50 mL MBP  2.25 g IntraVENous Q12H    [START ON 8/21/2017] Vancomycin random level  1 Each Other Rx Dosing/Monitoring    Piperacillin-tazobactam (ZOSYN) 0.75 gm Supplemental Dosing by Pharmacy   Other Rx Dosing/Monitoring    fentaNYL citrate (PF) injection  mcg   mcg IntraVENous Q4H PRN    famotidine (PF) (PEPCID) 20 mg in sodium chloride 0.9 % 10 mL injection  20 mg IntraVENous DAILY    vits A and D-white pet-lanolin (A&D) ointment   Topical QID AFTER MEALS    collagenase (SANTYL) 250 unit/gram ointment   Topical DAILY    levETIRAcetam (KEPPRA) 500 mg in 100 ml IVPB  500 mg IntraVENous Q12H    acetaminophen (TYLENOL) tablet 650 mg  650 mg Oral Q4H PRN    lactobacillus sp. 50 billion cpu (BIO-K PLUS) capsule 1 Cap  1 Cap Oral DAILY    loperamide (IMODIUM) capsule 2 mg  2 mg Oral Q6H PRN    heparin (porcine) injection 5,000 Units  5,000 Units SubCUTAneous Q8H    heparin (porcine) 1,000 unit/mL injection 2,000 Units  2,000 Units IntraVENous DIALYSIS ONCE    simvastatin (ZOCOR) tablet 20 mg  20 mg Oral QHS    doxercalciferol (HECTOROL) 4 mcg/2 mL injection 3 mcg  3 mcg IntraVENous DIALYSIS MON, WED & FRI    epoetin benjamin (EPOGEN;PROCRIT) injection 10,000 Units  10,000 Units IntraVENous DIALYSIS MON, WED & FRI    aspirin chewable tablet 81 mg  81 mg Oral DAILY    glucose chewable tablet 16 g  4 Tab Oral PRN    glucagon (GLUCAGEN) injection 1 mg  1 mg IntraMUSCular PRN    dextrose (D50W) injection syrg 12.5-25 g  25-50 mL IntraVENous PRN    0.9% sodium chloride infusion  100 mL/hr IntraVENous DIALYSIS PRN    naloxone (NARCAN) injection 0.4 mg  0.4 mg IntraVENous PRN         Labs: Results:       Chemistry Recent Labs      08/19/17   0145  08/18/17   1640   GLU  210*  183*   NA  142  144   K  3.8  3.3*   CL  111*  109*   CO2  21  18*   BUN  14  10   CREA  5.06*  4.14*   CA  8.0*  8.8   AGAP  10  17   BUCR  3*  2*   AP  91  93   TP  7.3  7.0   ALB  2.4*  2.4*   GLOB  4.9*  4.6*   AGRAT  0.5* 0.5*      CBC w/Diff Recent Labs      08/19/17   0145  08/18/17   1640  08/18/17   0138   WBC  12.1  12.4  8.4   RBC  3.36*  3.21*  3.40*   HGB  10.4*  9.9*  10.4*   HCT  31.8*  31.2*  32.5*   PLT  358  343  387   GRANS  84*  63  60   LYMPH  11*  22  23   EOS  0  5  1      Coagulation No results for input(s): PTP, INR, APTT in the last 72 hours. No lab exists for component: INREXT    Liver Enzymes Recent Labs      08/19/17   0145   TP  7.3   ALB  2.4*   AP  91   SGOT  18      ABG Lab Results   Component Value Date/Time    PHI 7.480 (H) 08/19/2017 05:18 AM    PCO2I 28.8 (L) 08/19/2017 05:18 AM    PO2I 225 (H) 08/19/2017 05:18 AM    HCO3I 21.5 (L) 08/19/2017 05:18 AM    FIO2I 50 08/19/2017 05:18 AM      Microbiology Recent Labs      08/19/17   0145  08/18/17   1640   CULT  PENDING  NO GROWTH AFTER 13 HOURS  NO GROWTH AFTER 14 HOURS          Telemetry: [x]Sinus []A-flutter []Paced    []A-fib []Multiple PVCs                    Imaging:    CXR 8/19/17  FINDINGS: Frontal view of the chest obtained. ETT tip roughly 3.1 cm above  bree. Gastric tube tip below GE junction, not imaged. Left neck catheter tip  at upper SVC level. No consolidation. Mediastinal silhouette and pulmonary  vasculature unremarkable. No evidence of pneumothorax. IMPRESSION  IMPRESSION:  1. Lines/tubes as above. No clearly acute findings. IMPRESSION:   · PEA Cardiac arrest in dialysis 8/18/17  · S/P PEA Cardiac arrest on this admission with elevated troponin 7/27/17. S/p Cath No CAD  · Acute Respirtatory Failure requiring Mechanical Ventilation above  · Acute encephalopathy/anoxic encephalopathy   · Hypotension- distributive vs cardiogenic vs obstructive   · ESRD requiring Dialysis - completed 3 hrs of dialysis today prior to code blue  · Sepsis Bacteremia - grew E.coli and C.perferinges 7/27/17 - repeat cultures on 8/1/17 no growth.   · Colitis, IBS  · DM2  · ESRD (end stage renal disease)  · Acute pontine lacunar CVA  · Oropharyngeal dysphagia - Needs peg tube  · Small Pericardial Effusion on Echo 8/7/17  · Cachexia  · Unstageable pressure ulcer to sacrum/coccyx  · Diarrhea- Cdiff negative         PLAN:   · Resp - Currently on Ventilator support with appropriate ventilator bundle. Current settings: CMV 40%,  RR 14 PEEP 5. Bronchial hygiene. · ID - Patient is s/p RX for ecoli and clostridium perfringens. + Fevers overnight. Repeat bcx and sputum cx NGTD from 8/18/17. On vancomycin and zosyn. Also on stress dose steroids. · CVS - Patient remains on levophed gtt with titration goal for MAP > 65 mm/hg. Will need to F/u results of 2D echocardiogram. Troponin is trending down. Hypotension is most likely septic. Doubt cardiogenic since patient had clean coronary arteries on cath from this admission. If RV strain on Echocardiogram will need to consider PE as etiology of hypotension. Ok to stop IVF. · Heme/onc - Stable anemia of chronic disease. Platelet count stable    · Metabolic - No acute electrolyte derangements   · Renal - HD per nephrology service   · Endocrine - SSI for glycemic control. Keep BG < 180. TSH was 7.52 and Free t4 0.8. Most likely sick euthyroid state   · Neuro/ Pain/ Sedation - Patient with frequent facial jerking that resolves with ativan. Consult to neurology placed. Patient was also noted to have acute pontine lacunar infarct on this admission. Continue statin and ASA  · GI - Tolerating TF ( Nepro) Would keep at trickle rate since patient is on pressors. FMS in place for diarrhea.     · Prophylaxis - DVT- Sq heparin, GI- pepcid   · Wound care following for   · Discussed in interdisciplinary rounds          The patient is: [] acutely ill Risk of deterioration: [] moderate    [x] critically ill  [] high     [x]See my orders for details    My assessment/plan was discussed with:  [x]nursing []PT/OT    [x]respiratory therapy [x]   []family []       Silviano Samuels NP

## 2017-08-19 NOTE — PROGRESS NOTES
Per medical chart, patient's Glascow Coma Scale <4. Patient's nurse Hernandez Berrios RN) confirms patient is not appropriate for PT services at this time. Will f/u as medically appropriate. Thank you, Garrett COLVINT.

## 2017-08-19 NOTE — ROUTINE PROCESS
Bedside and Verbal shift change report given to Harris Lynn RN (oncoming nurse) by Elenita Zaragoza RN (offgoing nurse). Report included the following information SBAR, Kardex, Intake/Output, MAR, Recent Results, Med Rec Status and Cardiac Rhythm Sinus Tach.

## 2017-08-19 NOTE — PROGRESS NOTES
Patient transferred to ICU S/P cardiac arrest on ventilator support. Remains critically ill, unresponsive at this time. Intensivist and PA at bedside. Hypotensive, SBP 50- 70s, Bolus of 1L 0.9NS and initiated Neosynephrine gtt as ordered. Labs drawn as ordered awaiting results. PA to place a central line.

## 2017-08-20 ENCOUNTER — APPOINTMENT (OUTPATIENT)
Dept: CT IMAGING | Age: 57
DRG: 004 | End: 2017-08-20
Attending: NURSE PRACTITIONER
Payer: MEDICARE

## 2017-08-20 ENCOUNTER — APPOINTMENT (OUTPATIENT)
Dept: GENERAL RADIOLOGY | Age: 57
DRG: 004 | End: 2017-08-20
Attending: NURSE PRACTITIONER
Payer: MEDICARE

## 2017-08-20 LAB
ALBUMIN SERPL-MCNC: 2.1 G/DL (ref 3.4–5)
ALBUMIN/GLOB SERPL: 0.4 {RATIO} (ref 0.8–1.7)
ALP SERPL-CCNC: 85 U/L (ref 45–117)
ALT SERPL-CCNC: 10 U/L (ref 16–61)
ANION GAP SERPL CALC-SCNC: 9 MMOL/L (ref 3–18)
ARTERIAL PATENCY WRIST A: ABNORMAL
AST SERPL-CCNC: 13 U/L (ref 15–37)
BASE DEFICIT BLD-SCNC: 7 MMOL/L
BASOPHILS # BLD: 0 K/UL (ref 0–0.06)
BASOPHILS NFR BLD: 0 % (ref 0–3)
BDY SITE: ABNORMAL
BILIRUB SERPL-MCNC: 0.5 MG/DL (ref 0.2–1)
BODY TEMPERATURE: 98.6
BUN SERPL-MCNC: 31 MG/DL (ref 7–18)
BUN/CREAT SERPL: 4 (ref 12–20)
CALCIUM SERPL-MCNC: 7.7 MG/DL (ref 8.5–10.1)
CHLORIDE SERPL-SCNC: 112 MMOL/L (ref 100–108)
CO2 SERPL-SCNC: 21 MMOL/L (ref 21–32)
CREAT SERPL-MCNC: 6.95 MG/DL (ref 0.6–1.3)
DIFFERENTIAL METHOD BLD: ABNORMAL
EOSINOPHIL # BLD: 0 K/UL (ref 0–0.4)
EOSINOPHIL NFR BLD: 0 % (ref 0–5)
ERYTHROCYTE [DISTWIDTH] IN BLOOD BY AUTOMATED COUNT: 17.6 % (ref 11.6–14.5)
GAS FLOW.O2 O2 DELIVERY SYS: ABNORMAL L/MIN
GAS FLOW.O2 SETTING OXYMISER: 16 BPM
GLOBULIN SER CALC-MCNC: 4.9 G/DL (ref 2–4)
GLUCOSE BLD STRIP.AUTO-MCNC: 211 MG/DL (ref 70–110)
GLUCOSE BLD STRIP.AUTO-MCNC: 286 MG/DL (ref 70–110)
GLUCOSE BLD STRIP.AUTO-MCNC: 321 MG/DL (ref 70–110)
GLUCOSE BLD STRIP.AUTO-MCNC: 340 MG/DL (ref 70–110)
GLUCOSE SERPL-MCNC: 297 MG/DL (ref 74–99)
HCO3 BLD-SCNC: 18.2 MMOL/L (ref 22–26)
HCT VFR BLD AUTO: 29.9 % (ref 36–48)
HGB BLD-MCNC: 9.6 G/DL (ref 13–16)
INSPIRATION.DURATION SETTING TIME VENT: 1 SEC
LYMPHOCYTES # BLD: 1.5 K/UL (ref 0.8–3.5)
LYMPHOCYTES NFR BLD: 10 % (ref 20–51)
MAGNESIUM SERPL-MCNC: 2.6 MG/DL (ref 1.6–2.6)
MCH RBC QN AUTO: 30.5 PG (ref 24–34)
MCHC RBC AUTO-ENTMCNC: 32.1 G/DL (ref 31–37)
MCV RBC AUTO: 94.9 FL (ref 74–97)
MONOCYTES # BLD: 0.9 K/UL (ref 0–1)
MONOCYTES NFR BLD: 6 % (ref 2–9)
NEUTS SEG # BLD: 12.6 K/UL (ref 1.8–8)
NEUTS SEG NFR BLD: 84 % (ref 42–75)
NRBC BLD-RTO: 1 PER 100 WBC
O2/TOTAL GAS SETTING VFR VENT: 40 %
PCO2 BLD: 30.7 MMHG (ref 35–45)
PEEP RESPIRATORY: 5 CMH2O
PH BLD: 7.38 [PH] (ref 7.35–7.45)
PHOSPHATE SERPL-MCNC: 2.8 MG/DL (ref 2.5–4.9)
PLATELET # BLD AUTO: 430 K/UL (ref 135–420)
PLATELET COMMENTS,PCOM: ABNORMAL
PMV BLD AUTO: 10.3 FL (ref 9.2–11.8)
PO2 BLD: 97 MMHG (ref 80–100)
POTASSIUM SERPL-SCNC: 3.8 MMOL/L (ref 3.5–5.5)
PROT SERPL-MCNC: 7 G/DL (ref 6.4–8.2)
RBC # BLD AUTO: 3.15 M/UL (ref 4.7–5.5)
RBC MORPH BLD: ABNORMAL
SAO2 % BLD: 98 % (ref 92–97)
SERVICE CMNT-IMP: ABNORMAL
SODIUM SERPL-SCNC: 142 MMOL/L (ref 136–145)
SPECIMEN TYPE: ABNORMAL
TOTAL RESP. RATE, ITRR: 16
VENTILATION MODE VENT: ABNORMAL
VOLUME CONTROL PLUS IVLCP: YES
VT SETTING VENT: 450 ML
WBC # BLD AUTO: 15 K/UL (ref 4.6–13.2)

## 2017-08-20 PROCEDURE — 74011250637 HC RX REV CODE- 250/637: Performed by: HOSPITALIST

## 2017-08-20 PROCEDURE — 74011000250 HC RX REV CODE- 250: Performed by: PHYSICIAN ASSISTANT

## 2017-08-20 PROCEDURE — 65610000006 HC RM INTENSIVE CARE

## 2017-08-20 PROCEDURE — 74011250637 HC RX REV CODE- 250/637: Performed by: NURSE PRACTITIONER

## 2017-08-20 PROCEDURE — 70450 CT HEAD/BRAIN W/O DYE: CPT

## 2017-08-20 PROCEDURE — 36600 WITHDRAWAL OF ARTERIAL BLOOD: CPT

## 2017-08-20 PROCEDURE — 74011250636 HC RX REV CODE- 250/636: Performed by: NURSE PRACTITIONER

## 2017-08-20 PROCEDURE — 71275 CT ANGIOGRAPHY CHEST: CPT

## 2017-08-20 PROCEDURE — 82803 BLOOD GASES ANY COMBINATION: CPT

## 2017-08-20 PROCEDURE — 74011636637 HC RX REV CODE- 636/637: Performed by: NURSE PRACTITIONER

## 2017-08-20 PROCEDURE — 80053 COMPREHEN METABOLIC PANEL: CPT | Performed by: NURSE PRACTITIONER

## 2017-08-20 PROCEDURE — 94003 VENT MGMT INPAT SUBQ DAY: CPT

## 2017-08-20 PROCEDURE — 77030011256 HC DRSG MEPILEX <16IN NO BORD MOLN -A

## 2017-08-20 PROCEDURE — 84100 ASSAY OF PHOSPHORUS: CPT | Performed by: NURSE PRACTITIONER

## 2017-08-20 PROCEDURE — 82962 GLUCOSE BLOOD TEST: CPT

## 2017-08-20 PROCEDURE — 74011250637 HC RX REV CODE- 250/637: Performed by: INTERNAL MEDICINE

## 2017-08-20 PROCEDURE — 74011636320 HC RX REV CODE- 636/320: Performed by: INTERNAL MEDICINE

## 2017-08-20 PROCEDURE — 74011250636 HC RX REV CODE- 250/636: Performed by: HOSPITALIST

## 2017-08-20 PROCEDURE — 71010 XR CHEST PORT: CPT

## 2017-08-20 PROCEDURE — 74011636637 HC RX REV CODE- 636/637: Performed by: INTERNAL MEDICINE

## 2017-08-20 PROCEDURE — 85025 COMPLETE CBC W/AUTO DIFF WBC: CPT | Performed by: NURSE PRACTITIONER

## 2017-08-20 PROCEDURE — 74011000258 HC RX REV CODE- 258: Performed by: NURSE PRACTITIONER

## 2017-08-20 PROCEDURE — 74011000250 HC RX REV CODE- 250: Performed by: NURSE PRACTITIONER

## 2017-08-20 PROCEDURE — 83735 ASSAY OF MAGNESIUM: CPT | Performed by: NURSE PRACTITIONER

## 2017-08-20 RX ORDER — LORAZEPAM 2 MG/ML
1 INJECTION INTRAMUSCULAR
Status: DISCONTINUED | OUTPATIENT
Start: 2017-08-20 | End: 2017-09-22 | Stop reason: HOSPADM

## 2017-08-20 RX ORDER — INSULIN GLARGINE 100 [IU]/ML
10 INJECTION, SOLUTION SUBCUTANEOUS DAILY
Status: DISCONTINUED | OUTPATIENT
Start: 2017-08-20 | End: 2017-08-23

## 2017-08-20 RX ADMIN — MINERAL OIL AND WHITE PETROLATUM 1 DOSE: 150; 850 OINTMENT OPHTHALMIC at 17:05

## 2017-08-20 RX ADMIN — LEVETIRACETAM 500 MG: 5 INJECTION INTRAVENOUS at 18:05

## 2017-08-20 RX ADMIN — FAMOTIDINE 20 MG: 10 INJECTION INTRAVENOUS at 08:08

## 2017-08-20 RX ADMIN — CHLORHEXIDINE GLUCONATE 10 ML: 1.2 RINSE ORAL at 08:08

## 2017-08-20 RX ADMIN — INSULIN GLARGINE 10 UNITS: 100 INJECTION, SOLUTION SUBCUTANEOUS at 11:30

## 2017-08-20 RX ADMIN — SIMVASTATIN 20 MG: 10 TABLET, FILM COATED ORAL at 21:23

## 2017-08-20 RX ADMIN — INSULIN LISPRO 12 UNITS: 100 INJECTION, SOLUTION INTRAVENOUS; SUBCUTANEOUS at 11:27

## 2017-08-20 RX ADMIN — ACETAMINOPHEN 650 MG: 325 TABLET ORAL at 18:53

## 2017-08-20 RX ADMIN — HYDROCORTISONE SODIUM SUCCINATE 50 MG: 100 INJECTION, POWDER, FOR SOLUTION INTRAMUSCULAR; INTRAVENOUS at 23:49

## 2017-08-20 RX ADMIN — LEVETIRACETAM 500 MG: 5 INJECTION INTRAVENOUS at 08:07

## 2017-08-20 RX ADMIN — HYDROCORTISONE SODIUM SUCCINATE 50 MG: 100 INJECTION, POWDER, FOR SOLUTION INTRAMUSCULAR; INTRAVENOUS at 06:10

## 2017-08-20 RX ADMIN — VITAMIN A AND D: 30.8 OINTMENT TOPICAL at 08:09

## 2017-08-20 RX ADMIN — COLLAGENASE SANTYL: 250 OINTMENT TOPICAL at 08:10

## 2017-08-20 RX ADMIN — HEPARIN SODIUM 5000 UNITS: 5000 INJECTION, SOLUTION INTRAVENOUS; SUBCUTANEOUS at 09:37

## 2017-08-20 RX ADMIN — PIPERACILLIN AND TAZOBACTAM 2.25 G: 2; .25 INJECTION, POWDER, FOR SOLUTION INTRAVENOUS at 18:06

## 2017-08-20 RX ADMIN — INSULIN LISPRO 12 UNITS: 100 INJECTION, SOLUTION INTRAVENOUS; SUBCUTANEOUS at 17:06

## 2017-08-20 RX ADMIN — MINERAL OIL AND WHITE PETROLATUM 1 DOSE: 150; 850 OINTMENT OPHTHALMIC at 13:00

## 2017-08-20 RX ADMIN — VITAMIN A AND D: 30.8 OINTMENT TOPICAL at 21:22

## 2017-08-20 RX ADMIN — NOREPINEPHRINE BITARTRATE 8 MCG/MIN: 1 INJECTION INTRAVENOUS at 21:22

## 2017-08-20 RX ADMIN — ACETAMINOPHEN 650 MG: 325 TABLET ORAL at 00:50

## 2017-08-20 RX ADMIN — VITAMIN A AND D: 30.8 OINTMENT TOPICAL at 13:00

## 2017-08-20 RX ADMIN — ASPIRIN 81 MG 81 MG: 81 TABLET ORAL at 08:09

## 2017-08-20 RX ADMIN — INSULIN LISPRO 6 UNITS: 100 INJECTION, SOLUTION INTRAVENOUS; SUBCUTANEOUS at 23:48

## 2017-08-20 RX ADMIN — Medication 1 CAPSULE: at 08:09

## 2017-08-20 RX ADMIN — HYDROCORTISONE SODIUM SUCCINATE 50 MG: 100 INJECTION, POWDER, FOR SOLUTION INTRAMUSCULAR; INTRAVENOUS at 17:38

## 2017-08-20 RX ADMIN — MINERAL OIL AND WHITE PETROLATUM 1 DOSE: 150; 850 OINTMENT OPHTHALMIC at 21:23

## 2017-08-20 RX ADMIN — HEPARIN SODIUM 5000 UNITS: 5000 INJECTION, SOLUTION INTRAVENOUS; SUBCUTANEOUS at 17:38

## 2017-08-20 RX ADMIN — INSULIN LISPRO 9 UNITS: 100 INJECTION, SOLUTION INTRAVENOUS; SUBCUTANEOUS at 06:10

## 2017-08-20 RX ADMIN — HEPARIN SODIUM 5000 UNITS: 5000 INJECTION, SOLUTION INTRAVENOUS; SUBCUTANEOUS at 01:47

## 2017-08-20 RX ADMIN — CHLORHEXIDINE GLUCONATE 10 ML: 1.2 RINSE ORAL at 21:23

## 2017-08-20 RX ADMIN — PIPERACILLIN AND TAZOBACTAM 2.25 G: 2; .25 INJECTION, POWDER, FOR SOLUTION INTRAVENOUS at 08:08

## 2017-08-20 RX ADMIN — IOPAMIDOL 70 ML: 755 INJECTION, SOLUTION INTRAVENOUS at 10:00

## 2017-08-20 RX ADMIN — MINERAL OIL AND WHITE PETROLATUM 1 DOSE: 150; 850 OINTMENT OPHTHALMIC at 11:31

## 2017-08-20 RX ADMIN — VITAMIN A AND D: 30.8 OINTMENT TOPICAL at 17:05

## 2017-08-20 RX ADMIN — HYDROCORTISONE SODIUM SUCCINATE 50 MG: 100 INJECTION, POWDER, FOR SOLUTION INTRAMUSCULAR; INTRAVENOUS at 11:32

## 2017-08-20 NOTE — ROUTINE PROCESS
Bedside and Verbal shift change report given to Dmaon Deric  (oncoming nurse) by Lemuel Muñoz (offgoing nurse). Report included the following information SBAR, Kardex and MAR.

## 2017-08-20 NOTE — ROUTINE PROCESS
From 0945 to 1100 taken to ct scan tolerated well, monitor, portable vent, writer and RT accompanied pt

## 2017-08-20 NOTE — PROGRESS NOTES
[facility]  Progress Note    Patient: Javier Mackenzie Age: 55 y.o. : 1960 MR#: [id number] SSN: [id number]  Date/Time: 2017     Subjective:   Patient intubated, remains unresponsive    Assessment/Plan:   1. Acute met encephalopathy: due to # 2.    2. Acute pontine lacunar CVA: on asa, sttain  3. S/p PEA arrest. ? Cardiac cause with elevated trop PTA. Echo improving EF, s/p cath, no CAD. NOw with second cardiac arrest and intubated- Vennt support per pccm  4. Acute resp failure s/p extubation, off O2- Now reintubated after second cardiac arrest  5. Dysphagia:  6. ESRD- HD as pe nephrology  7. Hypotension, ?septic shock, on abx, wean pressors  9. Elevated LFT - ? Shock liver. 10. Thrombocytopenia: secondary to sepsis. monitor  11. Sz: keppra  12.  Wounds: wound care    unstageable pressure ulcer to sacrum/coccyx not present on admit    moisture associated skin damage to anus not present  on admit    moisture associated skin damage to buttocks not present on admit    Full code    Case discussed with:  [x]Patient  []Family  []Nursing  []Case Management  DVT Prophylaxis:  []Lovenox  [x]Hep SQ  [x]SCDs  []Coumadin   []On Heparin gtt    Patient Active Problem List   Diagnosis Code    Anemia D64.9    Acidosis E87.2    Cardiac arrest ([address] I46.9    Respiratory failure ([address] J96.90    ESRD needing dialysis ([address] N18.6, Z99.2    Anoxic brain damage (HCC) G93.1    Colitis K52.9    Hypotension I95.9    Acute GI bleeding K92.2    ESRD (end stage renal disease) ([address] N18.6    Sepsis ([address] A41.9    Oropharyngeal dysphagia R13.12    Cachexia ([address] R64       Objective:   VS:   Visit Vitals    /76    Pulse 87    Temp (!) 100.8 °F (38.2 °C)    Resp 16    Ht 6' 2\" (1.88 m)    Wt 64.5 kg (142 lb 3.2 oz)    SpO2 100%    BMI 18.26 kg/m2      Tmax/24hrs: Temp (24hrs), Av.2 °F (37.9 °C), Min:98.6 °F (37 °C), Max:100.9 °F (38.3 °C)  IOBRIEF    Intake/Output Summary (Last 24 hours) at 08/20/17 1627  Last data filed at 08/20/17 1600   Gross per 24 hour   Intake          2110.25 ml   Output              700 ml   Net          1410.25 ml       General:  intubated  Cardiovascular:  S1S2+, RRR  Pulmonary:  Coarse BS b/l  GI:  Soft, BS+, NT, ND  Extremities:  No edema          Medications:   Current Facility-Administered Medications   Medication Dose Route Frequency    insulin glargine (LANTUS) injection 10 Units  10 Units SubCUTAneous DAILY    insulin lispro (HUMALOG) injection   SubCUTAneous Q6H    VANCOMYCIN INFORMATION NOTE   Other Rx Dosing/Monitoring    white petrolatum-mineral oil 85-15 % oint 1 Dose  1 Dose Ophthalmic QID    chlorhexidine (PERIDEX) 0.12 % mouthwash 10 mL  10 mL Oral Q12H    NOREPINephrine (LEVOPHED) 16,000 mcg in dextrose 5% 250 mL infusion  2-16 mcg/min IntraVENous TITRATE    hydrocortisone Sod Succ (PF) (SOLU-CORTEF) injection 50 mg  50 mg IntraVENous Q6H    albuterol (PROVENTIL VENTOLIN) nebulizer solution 2.5 mg  2.5 mg Nebulization Q6H PRN    piperacillin-tazobactam (ZOSYN) 2.25 g in 0.9% sodium chloride (MBP/ADV) 50 mL MBP  2.25 g IntraVENous Q12H    [START ON 8/21/2017] Vancomycin random level  1 Each Other Rx Dosing/Monitoring    Piperacillin-tazobactam (ZOSYN) 0.75 gm Supplemental Dosing by Pharmacy   Other Rx Dosing/Monitoring    famotidine (PF) (PEPCID) 20 mg in sodium chloride 0.9 % 10 mL injection  20 mg IntraVENous DAILY    vits A and D-white pet-lanolin (A&D) ointment   Topical QID AFTER MEALS    collagenase (SANTYL) 250 unit/gram ointment   Topical DAILY    levETIRAcetam (KEPPRA) 500 mg in 100 ml IVPB  500 mg IntraVENous Q12H    acetaminophen (TYLENOL) tablet 650 mg  650 mg Oral Q4H PRN    lactobacillus sp. 50 billion cpu (BIO-K PLUS) capsule 1 Cap  1 Cap Oral DAILY    loperamide (IMODIUM) capsule 2 mg  2 mg Oral Q6H PRN    heparin (porcine) injection 5,000 Units  5,000 Units SubCUTAneous Q8H    heparin (porcine) 1,000 unit/mL injection 2,000 Units  2,000 Units IntraVENous DIALYSIS ONCE    simvastatin (ZOCOR) tablet 20 mg  20 mg Oral QHS    doxercalciferol (HECTOROL) 4 mcg/2 mL injection 3 mcg  3 mcg IntraVENous DIALYSIS MON, WED & FRI    epoetin benjamin (EPOGEN;PROCRIT) injection 10,000 Units  10,000 Units IntraVENous DIALYSIS MON, WED & FRI    aspirin chewable tablet 81 mg  81 mg Oral DAILY    glucose chewable tablet 16 g  4 Tab Oral PRN    glucagon (GLUCAGEN) injection 1 mg  1 mg IntraMUSCular PRN    dextrose (D50W) injection syrg 12.5-25 g  25-50 mL IntraVENous PRN    0.9% sodium chloride infusion  100 mL/hr IntraVENous DIALYSIS PRN    naloxone (NARCAN) injection 0.4 mg  0.4 mg IntraVENous PRN       Labs:    Recent Results (from the past 24 hour(s))   GLUCOSE, POC    Collection Time: 08/19/17  4:51 PM   Result Value Ref Range    Glucose (POC) 207 (H) 70 - 110 mg/dL   GLUCOSE, POC    Collection Time: 08/19/17 11:51 PM   Result Value Ref Range    Glucose (POC) 236 (H) 70 - 110 mg/dL   CBC WITH AUTOMATED DIFF    Collection Time: 08/20/17  2:25 AM   Result Value Ref Range    WBC 15.0 (H) 4.6 - 13.2 K/uL    RBC 3.15 (L) 4.70 - 5.50 M/uL    HGB 9.6 (L) 13.0 - 16.0 g/dL    HCT 29.9 (L) 36.0 - 48.0 %    MCV 94.9 74.0 - 97.0 FL    MCH 30.5 24.0 - 34.0 PG    MCHC 32.1 31.0 - 37.0 g/dL    RDW 17.6 (H) 11.6 - 14.5 %    PLATELET 370 (H) 852 - 420 K/uL    MPV 10.3 9.2 - 11.8 FL    NEUTROPHILS 84 (H) 42 - 75 %    LYMPHOCYTES 10 (L) 20 - 51 %    MONOCYTES 6 2 - 9 %    EOSINOPHILS 0 0 - 5 %    BASOPHILS 0 0 - 3 %    NRBC 1.0 (H) 0  WBC    ABS. NEUTROPHILS 12.6 (H) 1.8 - 8.0 K/UL    ABS. LYMPHOCYTES 1.5 0.8 - 3.5 K/UL    ABS. MONOCYTES 0.9 0 - 1.0 K/UL    ABS. EOSINOPHILS 0.0 0.0 - 0.4 K/UL    ABS.  BASOPHILS 0.0 0.0 - 0.06 K/UL    DF AUTOMATED      PLATELET COMMENTS ADEQUATE PLATELETS      RBC COMMENTS ANISOCYTOSIS  1+       METABOLIC PANEL, COMPREHENSIVE    Collection Time: 08/20/17  2:25 AM   Result Value Ref Range Sodium 142 136 - 145 mmol/L    Potassium 3.8 3.5 - 5.5 mmol/L    Chloride 112 (H) 100 - 108 mmol/L    CO2 21 21 - 32 mmol/L    Anion gap 9 3.0 - 18 mmol/L    Glucose 297 (H) 74 - 99 mg/dL    BUN 31 (H) 7.0 - 18 MG/DL    Creatinine 6.95 (H) 0.6 - 1.3 MG/DL    BUN/Creatinine ratio 4 (L) 12 - 20      GFR est AA 10 (L) >60 ml/min/1.73m2    GFR est non-AA 8 (L) >60 ml/min/1.73m2    Calcium 7.7 (L) 8.5 - 10.1 MG/DL    Bilirubin, total 0.5 0.2 - 1.0 MG/DL    ALT (SGPT) 10 (L) 16 - 61 U/L    AST (SGOT) 13 (L) 15 - 37 U/L    Alk. phosphatase 85 45 - 117 U/L    Protein, total 7.0 6.4 - 8.2 g/dL    Albumin 2.1 (L) 3.4 - 5.0 g/dL    Globulin 4.9 (H) 2.0 - 4.0 g/dL    A-G Ratio 0.4 (L) 0.8 - 1.7     MAGNESIUM    Collection Time: 08/20/17  2:25 AM   Result Value Ref Range    Magnesium 2.6 1.6 - 2.6 mg/dL   PHOSPHORUS    Collection Time: 08/20/17  2:25 AM   Result Value Ref Range    Phosphorus 2.8 2.5 - 4.9 MG/DL   POC G3    Collection Time: 08/20/17  4:15 AM   Result Value Ref Range    Device: VENT      FIO2 (POC) 40 %    pH (POC) 7.381 7.35 - 7.45      pCO2 (POC) 30.7 (L) 35.0 - 45.0 MMHG    pO2 (POC) 97 80 - 100 MMHG    HCO3 (POC) 18.2 (L) 22 - 26 MMOL/L    sO2 (POC) 98 (H) 92 - 97 %    Base deficit (POC) 7 mmol/L    Mode ASSIST CONTROL      Tidal volume 450 ml    Set Rate 16 bpm    PEEP/CPAP (POC) 5 cmH2O    Allens test (POC) N/A      Inspiratory Time 1 sec    Total resp. rate 16      Site DRAWN FROM ARTERIAL LINE      Patient temp.  98.6      Specimen type (POC) ARTERIAL      Performed by [facility]     Volume control plus YES     GLUCOSE, POC    Collection Time: 08/20/17  6:08 AM   Result Value Ref Range    Glucose (POC) 286 (H) 70 - 110 mg/dL   GLUCOSE, POC    Collection Time: 08/20/17 11:16 AM   Result Value Ref Range    Glucose (POC) 340 (H) 70 - 110 mg/dL       Signed By: Lucy Lao MD     August 20, 2017

## 2017-08-20 NOTE — PROGRESS NOTES
RENAL DAILY PROGRESS NOTE    Patient: Jolanta Dunlap               Sex: male          DOA: 7/27/2017  4:23 PM        YOB: 1960      Age:  55 y.o.        LOS:  LOS: 24 days     Subjective:     Jolanta Dunlap is a 55 y.o.  who presents with Cardiac arrest ([facility] [address]  Acidosis  Respiratory failure ([facility] [address]  Anemia  ESRD needing dialysis ([facility] [address]  Cardiac arrest ([facility] [address]  Acute GI bleeding  Sepsis ([facility] [address]  Hypotension  Anoxic brain damage (HCC)  ESRD (end stage renal disease) ([facility] [address]  Colitis  dx  dx. Arrived to dialysis yesterday with bp,was given ns and albumin,no fluid removal during dialysis ,dialysis stopped early due to persistent hypotension. had cardiac arrest ,and transferred to icu  Chief complains: on vent  - Reviewed last 24 hrs events     Current Facility-Administered Medications   Medication Dose Route Frequency    insulin glargine (LANTUS) injection 10 Units  10 Units SubCUTAneous DAILY    insulin lispro (HUMALOG) injection   SubCUTAneous Q6H    VANCOMYCIN INFORMATION NOTE   Other Rx Dosing/Monitoring    white petrolatum-mineral oil 85-15 % oint 1 Dose  1 Dose Ophthalmic QID    chlorhexidine (PERIDEX) 0.12 % mouthwash 10 mL  10 mL Oral Q12H    NOREPINephrine (LEVOPHED) 16,000 mcg in dextrose 5% 250 mL infusion  2-16 mcg/min IntraVENous TITRATE    hydrocortisone Sod Succ (PF) (SOLU-CORTEF) injection 50 mg  50 mg IntraVENous Q6H    albuterol (PROVENTIL VENTOLIN) nebulizer solution 2.5 mg  2.5 mg Nebulization Q6H PRN    piperacillin-tazobactam (ZOSYN) 2.25 g in 0.9% sodium chloride (MBP/ADV) 50 mL MBP  2.25 g IntraVENous Q12H    [START ON 8/21/2017] Vancomycin random level  1 Each Other Rx Dosing/Monitoring    Piperacillin-tazobactam (ZOSYN) 0.75 gm Supplemental Dosing by Pharmacy   Other Rx Dosing/Monitoring    famotidine (PF) (PEPCID) 20 mg in sodium chloride 0.9 % 10 mL injection  20 mg IntraVENous DAILY    vits A and D-white pet-lanolin (A&D) ointment   Topical QID AFTER MEALS    collagenase (SANTYL) 250 unit/gram ointment   Topical DAILY    levETIRAcetam (KEPPRA) 500 mg in 100 ml IVPB  500 mg IntraVENous Q12H    acetaminophen (TYLENOL) tablet 650 mg  650 mg Oral Q4H PRN    lactobacillus sp. 50 billion cpu (BIO-K PLUS) capsule 1 Cap  1 Cap Oral DAILY    loperamide (IMODIUM) capsule 2 mg  2 mg Oral Q6H PRN    heparin (porcine) injection 5,000 Units  5,000 Units SubCUTAneous Q8H    heparin (porcine) 1,000 unit/mL injection 2,000 Units  2,000 Units IntraVENous DIALYSIS ONCE    simvastatin (ZOCOR) tablet 20 mg  20 mg Oral QHS    doxercalciferol (HECTOROL) 4 mcg/2 mL injection 3 mcg  3 mcg IntraVENous DIALYSIS MON, WED & FRI    epoetin benjamin (EPOGEN;PROCRIT) injection 10,000 Units  10,000 Units IntraVENous DIALYSIS MON, WED & FRI    aspirin chewable tablet 81 mg  81 mg Oral DAILY    glucose chewable tablet 16 g  4 Tab Oral PRN    glucagon (GLUCAGEN) injection 1 mg  1 mg IntraMUSCular PRN    dextrose (D50W) injection syrg 12.5-25 g  25-50 mL IntraVENous PRN    0.9% sodium chloride infusion  100 mL/hr IntraVENous DIALYSIS PRN    naloxone (NARCAN) injection 0.4 mg  0.4 mg IntraVENous PRN       Objective:     Visit Vitals    BP 92/51 (BP 1 Location: Right arm, BP Patient Position: At rest;Head of bed elevated (Comment degrees))    Pulse 98    Temp (!) 100.8 °F (38.2 °C)    Resp 16    Ht 6' 2\" (1.88 m)    Wt 64.5 kg (142 lb 3.2 oz)    SpO2 100%    BMI 18.26 kg/m2       Intake/Output Summary (Last 24 hours) at 08/20/17 1242  Last data filed at 08/20/17 1200   Gross per 24 hour   Intake          2120.25 ml   Output              700 ml   Net          1420.25 ml       Physical Examination:     GEN: on vent  RS: Chest is bilateral equal, no wheezing / rales / crackles  CVS: S1-S2 heard, RRR, No S3 / murmur  Abdomen: Soft, Non tender, Not distended, Positive bowel sounds, no organomegaly, no CVA / supra pubic tenderness  Extremities: No edema, no cyanosis, skin is warm on touch  HEENT: Head is atraumatic, PERRLA, conjunctiva pink & non icteric. No JVD or carotid bruit   Musculoskeletal: No gross joints or bone deformities   Lymph Node: No palpable cervical, axillary or groin lymphadenopathy. Data Review:      Labs:     Hematology:   Recent Labs      08/20/17 0225 08/19/17 0145 08/18/17   1640  08/18/17   0138   WBC  15.0*  12.1  12.4  8.4   HGB  9.6*  10.4*  9.9*  10.4*   HCT  29.9*  31.8*  31.2*  32.5*     Chemistry:   Recent Labs      08/20/17 0225 08/19/17 0145 08/18/17   1640  08/18/17   0138   BUN  31*  14  10   --    CREA  6.95*  5.06*  4.14*   --    CA  7.7*  8.0*  8.8   --    ALB  2.1*  2.4*  2.4*   --    K  3.8  3.8  3.3*   --    NA  142  142  144   --    CL  112*  111*  109*   --    CO2  21  21  18*   --    PHOS  2.8  2.5   --   3.5   GLU  297*  210*  183*   --         Images:    XR (Most Recent). CXR reviewed by me and compared with previous CXR   Results from Hospital Encounter encounter on 07/27/17   XR CHEST PORT   Narrative PORTABLE CHEST RADIOGRAPH     CPT CODE: 72482     INDICATION: Respiratory failure. COMPARISON: 8/19/2017. FINDINGS:    Frontal view of the chest obtained at 0351 hours. Similar positioning support  lines and tubes. Cardiomediastinal silhouette is unchanged. Pulmonary  vasculature is normal. No focal consolidation, significant pleural effusion or  pneumothorax. Impression IMPRESSION:    Similar positioning support lines and tubes without pneumothorax. CT (Most Recent)   Results from Hospital Encounter encounter on 07/27/17   CTA CHEST W OR W WO CONT   Narrative CTA CHEST PULMONARY EMBOLISM PROTOCOL        INDICATION: Cardiac arrest. Question pulmonary embolism.     TECHNIQUE: Thin collimation axial images obtained through the level of the  pulmonary arteries with additional imaging through the chest following the  uneventful administration of 70 cc Isovue-370 nonionic intravenous contrast.   Images reconstructed into three dimensional coronal and sagittal projections for  complete evaluation of the tortuous and overlapping pulmonary vascular  structures and to reduce patient radiation dose. All CT scans at this facility are performed using dose optimization technique as  appropriate to a performed exam, to include automated exposure control,  adjustment of the mA and/or kV according to patient size (including appropriate  matching first site-specific examinations), or use of iterative reconstruction  technique. COMPARISON: CT abdomen pelvis 7/27/2017. FINDINGS:    Evaluation is limited by respiration artifact. No filling defects are  appreciated within the main, left, right, lobar or visualized segmental  pulmonary arteries to suggest embolism. Main pulmonary artery is enlarged up to  3.3 cm. Endotracheal tube tip is approximately 1 cm proximal to the bree. Consider  1-1.5 cm retraction. NG but within the body. Thyroid: Unremarkable in its visualized aspects. Pericardium/ Heart: No significant effusion. Aorta/ Vessels: No aneurysm or dissection. Lymph Nodes: Unremarkable. .    Lungs: Layering secretions in the bilateral mainstem bronchus and at the bree. Patchy left lower lobe bronchovascular opacities. There is central bronchial  opacification of a subsegmental anterior right lower lobe bronchus with mild  adjacent groundglass. Minimal left upper lobe bronchovascular groundglass. Resolved large right pleural effusion and complete atelectasis right lower lobe. Pleura: No effusion. Upper Abdomen: IVC and hepatic veins are enlarged. Heterogeneous liver  attenuation. Both right renal arteries appear diminutive. Bones/soft tissues: Moderate anasarca. Bilateral anteromedial rib fractures  including right fourth-sixth and left fourth and fifth ribs. Degenerative disc  disease most significant at T3-4. Sclerotic bone foci T5 is likely a bone  island. Impression IMPRESSION:  1.   No evidence for pulmonary emboli within limitations of respiratory motion. 2.  Tip of endotracheal tube is approximately 1 cm proximal to the bree,  consider 1-1.5 cm retraction. 3.  Layering secretions in the bronchus with left lower greater than upper  bronchovascular opacities most compatible with infectious etiology, possibly  aspiration given history. 4.  A single moderate length of endobronchial opacification right lower lobe. 5.  Resolved large right pleural effusion and atelectasis entire right lower  lobe. 6.  Bilateral anteromedial rib fractures associated with CPR. 7.  Main pulmonary artery enlargement as may be seen in pulmonary arterial  hypertension. 8.  Dilated IVC and hepatic veins as may be seen in right heart failure. 9.  Diminutive right renal arteries may be associated with hypotension and/or  peripheral arterial constriction. 10.  Moderate anasarca. EKG No results found for this or any previous visit. I have personally reviewed the old medical records and patient's labs    Plan / Recommendation:      1. Esrd,on dialysis mon,wed Friday,til  family decides to withdraw care  2.s/p cardiac arrest,anoxic brain injury,on vent  3.resp failure ,on vent.       Sameer Venegas MD  Nephrology  8/20/2017

## 2017-08-20 NOTE — CONSULTS
[address]    Name:  Claudette Chapman  MR#:  [id number]  :  1960  Account #:  [de-identified]  Date of Adm:  2017  Date of Consultation:  2017      REASON FOR CONSULTATION REQUEST: Evaluate from a  neurologic perspective following code. HISTORY OF PRESENT ILLNESS: This patient had a \"Code Blue\"  event 2 days ago. The patient has been in the ICU on a ventilator  since. The patient previously during his hospitalization had a cardiac  arrest as well as a brainstem stroke. He was gradually improving in  regards to his encephalopathy. Code Blue was called on 2017 at  4:10 p.m. Return to spontaneous circulation was achieved at 4:20 p.m. Please see Dr. Shirlene Luna note dated 2017. The patient  has had no purposeful movement and has not followed commands. He  is noncommunicative so past medical history, social history, family  history and review of systems cannot be obtained first hand. Fentanyl  as needed has been ordered. I see no administrations of this  medication in the last 24-hour period. Versed every 2 hours as needed  is also prescribed. Last administration of this drug was at 5 p.m. on  2017. Last dispensing of this medication was on 2017 at  1:47 a.m. because of jerking myoclonic like. PHYSICAL EXAMINATION  VITAL SIGNS: Current blood pressure is 92/51, pulse is regular at 80,  nontachypneic, afebrile at 99.7 currently. T-max was 100.9. NEUROLOGIC: His eyes are open, forced eye gaze upward. Pupils  not reactive. Could not test if papilledema was present or not. Ocular  cephalics are absent. Corneals are present. There is no response to  deep nail pressure. No motor response. ASSESSMENT: The patient with what appears to be anoxic brain  injury following a pulseless electrical activity event. Would continue to  support through tomorrow.  If no motor response ensues by tomorrow,  then prognosis is very poor, and patient would likely, if he survives, be  in either a vegetative state, minimally conscious state or severely  disabled. MD Jenny Gan / Jose Chaudhry  D:  08/20/2017   11:24  T:  08/20/2017   16:57  Job #:  [id number]

## 2017-08-20 NOTE — PROGRESS NOTES
[facility]  ICU Progress Note      Name: Ruel Caba   : 1960   MRN: [id number]   Date: 2017 3:29 PM     [x]I have reviewed the flowsheet and previous days notes. Events overnight reviewed and discussed with nursing staff. Vital signs and records reviewed. Shan Ojeda a 55 y. o.  male with a history of DM, ESRD admitted PEA CPR/ACLS protocol ROSC. Patient's hospitalization was also found to have a CVA and was treated for E.coli and C.perferinges bacteremia. Patient was initially in ICU and transferred to the floor. During HD on 17 patient became hypotensive and had subsequent PEA arrest. Patient is now intubated, unresponsive, and on pressors. Patient now longer having myoclonic jerking   Four Coma Score 5- [id number]  GCS 6  Febrile overnight   Remains on levophed gtt                  ROS:Review of systems not obtained due to patient factors.     Medication Review:  · Pressors -  · Sedation - PRN ativan for myoclonus   · Antibiotics - Vancomycin and zosyn   · Pain -  · GI/ DVT - Pepcid and sq heparin   · Others (other gtts)    Safety Bundles: VAP Bundle/Severe Sepsis Protocol    Vital Signs:    Visit Vitals    BP 92/51 (BP 1 Location: Right arm, BP Patient Position: At rest;Head of bed elevated (Comment degrees))  Comment (BP Patient Position): 30    Pulse 80    Temp 99.7 °F (37.6 °C)    Resp 16    Ht 6' 2\" (1.88 m)    Wt 64.5 kg (142 lb 3.2 oz)    SpO2 100%    BMI 18.26 kg/m2       O2 Device: Endotracheal tube, Ventilator   O2 Flow Rate (L/min): 0 l/min   Temp (24hrs), Av.1 °F (37.8 °C), Min:98.6 °F (37 °C), Max:100.9 °F (38.3 °C)       Intake/Output:   Last shift:         Last 3 shifts:  1901 -  0700  In: 2281.9 [I.V.:716.9]  Out: 6876 [Drains:1100]    Intake/Output Summary (Last 24 hours) at 17 0924  Last data filed at 17 0400   Gross per 24 hour   Intake          1550.25 ml   Output              700 ml   Net 850.25 ml       Ventilator Settings:  Ventilator  Mode: Assist control, VC+  Respiratory Rate  Resp Rate Observed: 15  Back-Up Rate: 16  Insp Time (sec): 1 sec  Insp Flow (l/min): 44 l/min  I:E Ratio: 1;2.8  Ventilator Volumes  Vt Set (ml): 450 ml  Vt Exhaled (Machine Breath) (ml): 541 ml  Vt Spont (ml): 395 ml  Ve Observed (l/min): 8.72 l/min  Ventilator Pressures  Pressure Support (cm H2O): 7 cm H2O  PIP Observed (cm H2O): 15 cm H2O  Plateau Pressure (cm H2O): 12 cm H2O  MAP (cm H2O): 7.8  PEEP/VENT (cm H2O): 5 cm H20  Auto PEEP Observed (cm H2O): 0 cm H2O    Physical Exam:    General: Intubated, critically ill, unresponsive   HEENT:  Anicteric sclerae; pink palpebral conjunctivae; Pupil non reactive to light. Resp:  Symmetrical chest expansion, no accessory muscle use; good airway entry; no rales/ wheezing/ rhonchi noted  CV:  S1, S2 present;  GI:  Abdomen soft, non-tender; (+) active bowel sounds  Extremities:  +2 pulses on all extremities; + thrill and bruit to L AV fistula   Skin:  Dressing to sacrum. Erythematous perineum   Neurologic:  Facial myoclonus, four coma scare 5, GCS 6.  Pupils fixed with disconjugate upward deviated gaze   Devices:  NGT, ETT, A line, TLC      DATA:     Current Facility-Administered Medications   Medication Dose Route Frequency    insulin glargine (LANTUS) injection 10 Units  10 Units SubCUTAneous DAILY    LORazepam (ATIVAN) injection 1 mg  1 mg IntraVENous Q2H PRN    insulin lispro (HUMALOG) injection   SubCUTAneous Q6H    VANCOMYCIN INFORMATION NOTE   Other Rx Dosing/Monitoring    white petrolatum-mineral oil 85-15 % oint 1 Dose  1 Dose Ophthalmic QID    chlorhexidine (PERIDEX) 0.12 % mouthwash 10 mL  10 mL Oral Q12H    midazolam (VERSED) injection 1-2 mg  1-2 mg IntraVENous Q2H PRN    NOREPINephrine (LEVOPHED) 16,000 mcg in dextrose 5% 250 mL infusion  2-16 mcg/min IntraVENous TITRATE    hydrocortisone Sod Succ (PF) (SOLU-CORTEF) injection 50 mg  50 mg IntraVENous Q6H    albuterol (PROVENTIL VENTOLIN) nebulizer solution 2.5 mg  2.5 mg Nebulization Q6H PRN    piperacillin-tazobactam (ZOSYN) 2.25 g in 0.9% sodium chloride (MBP/ADV) 50 mL MBP  2.25 g IntraVENous Q12H    [START ON 8/21/2017] Vancomycin random level  1 Each Other Rx Dosing/Monitoring    Piperacillin-tazobactam (ZOSYN) 0.75 gm Supplemental Dosing by Pharmacy   Other Rx Dosing/Monitoring    fentaNYL citrate (PF) injection  mcg   mcg IntraVENous Q4H PRN    famotidine (PF) (PEPCID) 20 mg in sodium chloride 0.9 % 10 mL injection  20 mg IntraVENous DAILY    vits A and D-white pet-lanolin (A&D) ointment   Topical QID AFTER MEALS    collagenase (SANTYL) 250 unit/gram ointment   Topical DAILY    levETIRAcetam (KEPPRA) 500 mg in 100 ml IVPB  500 mg IntraVENous Q12H    acetaminophen (TYLENOL) tablet 650 mg  650 mg Oral Q4H PRN    lactobacillus sp. 50 billion cpu (BIO-K PLUS) capsule 1 Cap  1 Cap Oral DAILY    loperamide (IMODIUM) capsule 2 mg  2 mg Oral Q6H PRN    heparin (porcine) injection 5,000 Units  5,000 Units SubCUTAneous Q8H    heparin (porcine) 1,000 unit/mL injection 2,000 Units  2,000 Units IntraVENous DIALYSIS ONCE    simvastatin (ZOCOR) tablet 20 mg  20 mg Oral QHS    doxercalciferol (HECTOROL) 4 mcg/2 mL injection 3 mcg  3 mcg IntraVENous DIALYSIS MON, WED & FRI    epoetin benjamin (EPOGEN;PROCRIT) injection 10,000 Units  10,000 Units IntraVENous DIALYSIS MON, WED & FRI    aspirin chewable tablet 81 mg  81 mg Oral DAILY    glucose chewable tablet 16 g  4 Tab Oral PRN    glucagon (GLUCAGEN) injection 1 mg  1 mg IntraMUSCular PRN    dextrose (D50W) injection syrg 12.5-25 g  25-50 mL IntraVENous PRN    0.9% sodium chloride infusion  100 mL/hr IntraVENous DIALYSIS PRN    naloxone (NARCAN) injection 0.4 mg  0.4 mg IntraVENous PRN         Labs: Results:       Chemistry Recent Labs      08/20/17   0225  08/19/17   0145  08/18/17   1640   GLU  297*  210*  183*   NA  142  142  144   K  3.8 3.8  3.3*   CL  112*  111*  109*   CO2  21  21  18*   BUN  31*  14  10   CREA  6.95*  5.06*  4.14*   CA  7.7*  8.0*  8.8   AGAP  9  10  17   BUCR  4*  3*  2*   AP  85  91  93   TP  7.0  7.3  7.0   ALB  2.1*  2.4*  2.4*   GLOB  4.9*  4.9*  4.6*   AGRAT  0.4*  0.5*  0.5*      CBC w/Diff Recent Labs      08/20/17 0225 08/19/17 0145 08/18/17   1640   WBC  15.0*  12.1  12.4   RBC  3.15*  3.36*  3.21*   HGB  9.6*  10.4*  9.9*   HCT  29.9*  31.8*  31.2*   PLT  430*  358  343   GRANS  84*  84*  63   LYMPH  10*  11*  22   EOS  0  0  5      Coagulation No results for input(s): PTP, INR, APTT in the last 72 hours. No lab exists for component: INREXT, INREXT    Liver Enzymes Recent Labs      08/20/17 0225   TP  7.0   ALB  2.1*   AP  85   SGOT  13*      ABG Lab Results   Component Value Date/Time    PHI 7.381 08/20/2017 04:15 AM    PCO2I 30.7 (L) 08/20/2017 04:15 AM    PO2I 97 08/20/2017 04:15 AM    HCO3I 18.2 (L) 08/20/2017 04:15 AM    FIO2I 40 08/20/2017 04:15 AM      Microbiology Recent Labs      08/19/17 0145  08/18/17   1640   CULT  PENDING  NO GROWTH 2 DAYS  NO GROWTH 2 DAYS          Telemetry: [x]Sinus []A-flutter []Paced    []A-fib []Multiple PVCs                    Imaging:    CXR Results  (Last 48 hours)               08/20/17 0428  XR CHEST PORT Final result    Impression:  IMPRESSION:       Similar positioning support lines and tubes without pneumothorax. Narrative:  PORTABLE CHEST RADIOGRAPH        CPT CODE: 70675        INDICATION: Respiratory failure. COMPARISON: 8/19/2017. FINDINGS:       Frontal view of the chest obtained at 0351 hours. Similar positioning support   lines and tubes. Cardiomediastinal silhouette is unchanged. Pulmonary   vasculature is normal. No focal consolidation, significant pleural effusion or   pneumothorax. 08/19/17 0131  XR CHEST PORT Final result    Impression:  IMPRESSION:       1. Lines/tubes as above. No clearly acute findings. Narrative:  EXAMINATION: Chest portable       INDICATION: Respiratory failure       COMPARISON: 8/18/2017       FINDINGS: Frontal view of the chest obtained. ETT tip roughly 3.1 cm above   bree. Gastric tube tip below GE junction, not imaged. Left neck catheter tip   at upper SVC level. No consolidation. Mediastinal silhouette and pulmonary   vasculature unremarkable. No evidence of pneumothorax. 08/18/17 1757  XR CHEST PORT Final result    Impression:  Impression:       Newly placed support lines and tubes detailed above. No acute cardiopulmonary   findings. Narrative:  CHEST PORTABLE        INDICATIONS: Respiratory failure       COMPARISON: 8/13/2017       FINDINGS:        Support lines/catheters: Endotracheal tube terminates 3 to 4 cm above the   bree. Left jugular central venous catheter tip overlies the SVC. Enteric tube   tip is excluded from the margins of the study however the side-port is seen just   below the level of the diaphragm. External cardiac monitoring leads. There is a   very thin caliber wire overlying the right lower half of the hemithorax, also   presumably external, correlate clinically. Lungs: Clear. Cardiac silhouette and mediastinal contours: Normal.       Pleural spaces: No pneumothorax or pleural effusion. Bones and soft tissues: Unremarkable. IMPRESSION:   · PEA Cardiac arrest in dialysis 8/18/17  · S/P PEA Cardiac arrest on this admission with elevated troponin 7/27/17. S/p Cath No CAD  · Acute Respirtatory Failure requiring Mechanical Ventilation above  · Acute encephalopathy/anoxic encephalopathy   · Hypotension- distributive vs cardiogenic vs obstructive   · ESRD requiring Dialysis - completed 3 hrs of dialysis today prior to code blue  · Sepsis Bacteremia - grew E.coli and C.perferinges 7/27/17 - repeat cultures on 8/18/17 no growth.   · Colitis, IBS  · DM2  · ESRD (end stage renal disease)  · Acute pontine lacunar CVA  · Oropharyngeal dysphagia - Needs peg tube  · Small Pericardial Effusion on Echo 8/7/17  · Cachexia  · Unstageable pressure ulcer to sacrum/coccyx  · Diarrhea- Cdiff negative         PLAN:   · Resp - Currently on Ventilator support with appropriate ventilator bundle. Current settings: CMV 40%,  RR 16 PEEP 5. Continue Bronchial hygiene. · ID - Patient is s/p RX for ecoli and clostridium perfringens. + Fevers overnight. Repeat bcx and sputum cx NGTD from 8/18/17. On vancomycin and zosyn. Also on stress dose steroids. · CVS - Patient remains on levophed gtt with titration goal for MAP > 65 mm/hg. Will need to F/u results of 2D echocardiogram. Troponin is trending down. Patient did have small pericardial effusion on this admission. Will need to f/u formal echocardiogram. Given this is the patient 2nd cardiac arrest will obtain CTA chest to r/o any PE.   · Heme/onc - Stable anemia of chronic disease. Platelet count stable    · Metabolic - No acute electrolyte derangements   · Renal - HD per nephrology service   · Endocrine - SSI for glycemic control. Keep BG < 180. Patient is on very resistant SSI. Based on patient SSI requirements over the last 24 hours will add 10 units Lantus. TSH was 7.52 and Free t4 0.8. Most likely sick euthyroid state   · Neuro/ Pain/ Sedation - No longer having myoclonic jerks. Continue keppra. PRN ativan for seizures. Consult to neurology placed. Patient was also noted to have acute pontine lacunar infarct on this admission. Continue statin and ASA  · GI - Tolerating TF ( Nepro) Would keep at trickle rate since patient is on pressors. FMS in place for diarrhea.     · Prophylaxis - DVT- Sq heparin, GI- pepcid   · Wound care following for           The patient is: [] acutely ill Risk of deterioration: [] moderate    [x] critically ill  [] high     [x]See my orders for details    My assessment/plan was discussed with:  [x]nursing []PT/OT    [x]respiratory therapy [x]   []family []       Gemma Deng, NP

## 2017-08-20 NOTE — ROUTINE PROCESS
No corneal or blink reflexes seen, pt coughs gags, no response to pain, pt does not move, eyes stay open pt rarely closes eyes

## 2017-08-20 NOTE — CONSULTS
Full note dictated  PEA code  Anoxic brain injury  Minimal brain stem function  No motor response  Poor prognosis

## 2017-08-20 NOTE — ROUTINE PROCESS
Bedside and Verbal shift change report given to Ana Fallon RN (oncoming nurse) by Golden Monae RN (offgoing nurse). Report included the following information SBAR, Kardex, Intake/Output, MAR, Accordion, Recent Results, Med Rec Status and Cardiac Rhythm NSR.

## 2017-08-21 ENCOUNTER — APPOINTMENT (OUTPATIENT)
Dept: GENERAL RADIOLOGY | Age: 57
DRG: 004 | End: 2017-08-21
Attending: NURSE PRACTITIONER
Payer: MEDICARE

## 2017-08-21 LAB
ANION GAP SERPL CALC-SCNC: 12 MMOL/L (ref 3–18)
ARTERIAL PATENCY WRIST A: ABNORMAL
BASE DEFICIT BLD-SCNC: 7 MMOL/L
BASOPHILS # BLD: 0 K/UL (ref 0–0.1)
BASOPHILS NFR BLD: 0 % (ref 0–2)
BDY SITE: ABNORMAL
BODY TEMPERATURE: 100.5
BUN SERPL-MCNC: 46 MG/DL (ref 7–18)
BUN/CREAT SERPL: 5 (ref 12–20)
CALCIUM SERPL-MCNC: 7.6 MG/DL (ref 8.5–10.1)
CHLORIDE SERPL-SCNC: 113 MMOL/L (ref 100–108)
CO2 SERPL-SCNC: 20 MMOL/L (ref 21–32)
CREAT SERPL-MCNC: 8.5 MG/DL (ref 0.6–1.3)
DIFFERENTIAL METHOD BLD: ABNORMAL
EOSINOPHIL # BLD: 0.1 K/UL (ref 0–0.4)
EOSINOPHIL NFR BLD: 1 % (ref 0–5)
ERYTHROCYTE [DISTWIDTH] IN BLOOD BY AUTOMATED COUNT: 17.8 % (ref 11.6–14.5)
GAS FLOW.O2 O2 DELIVERY SYS: ABNORMAL L/MIN
GAS FLOW.O2 SETTING OXYMISER: 16 BPM
GLUCOSE BLD STRIP.AUTO-MCNC: 222 MG/DL (ref 70–110)
GLUCOSE BLD STRIP.AUTO-MCNC: 345 MG/DL (ref 70–110)
GLUCOSE SERPL-MCNC: 221 MG/DL (ref 74–99)
HCO3 BLD-SCNC: 18.7 MMOL/L (ref 22–26)
HCT VFR BLD AUTO: 27.4 % (ref 36–48)
HGB BLD-MCNC: 8.8 G/DL (ref 13–16)
INSPIRATION.DURATION SETTING TIME VENT: 1 SEC
LYMPHOCYTES # BLD: 1.4 K/UL (ref 0.9–3.6)
LYMPHOCYTES NFR BLD: 10 % (ref 21–52)
MAGNESIUM SERPL-MCNC: 2.8 MG/DL (ref 1.6–2.6)
MCH RBC QN AUTO: 30.6 PG (ref 24–34)
MCHC RBC AUTO-ENTMCNC: 32.1 G/DL (ref 31–37)
MCV RBC AUTO: 95.1 FL (ref 74–97)
MONOCYTES # BLD: 0.4 K/UL (ref 0.05–1.2)
MONOCYTES NFR BLD: 3 % (ref 3–10)
NEUTS SEG # BLD: 12.1 K/UL (ref 1.8–8)
NEUTS SEG NFR BLD: 86 % (ref 40–73)
O2/TOTAL GAS SETTING VFR VENT: 28 %
PCO2 BLD: 34.2 MMHG (ref 35–45)
PEEP RESPIRATORY: 5 CMH2O
PH BLD: 7.35 [PH] (ref 7.35–7.45)
PHOSPHATE SERPL-MCNC: 3.9 MG/DL (ref 2.5–4.9)
PLATELET # BLD AUTO: 140 K/UL (ref 135–420)
PMV BLD AUTO: 9.7 FL (ref 9.2–11.8)
PO2 BLD: 113 MMHG (ref 80–100)
POTASSIUM SERPL-SCNC: 3.2 MMOL/L (ref 3.5–5.5)
RBC # BLD AUTO: 2.88 M/UL (ref 4.7–5.5)
SAO2 % BLD: 98 % (ref 92–97)
SERVICE CMNT-IMP: ABNORMAL
SODIUM SERPL-SCNC: 145 MMOL/L (ref 136–145)
SPECIMEN TYPE: ABNORMAL
TOTAL RESP. RATE, ITRR: 16
VANCOMYCIN SERPL-MCNC: 22 UG/ML (ref 5–40)
VENTILATION MODE VENT: ABNORMAL
VOLUME CONTROL PLUS IVLCP: YES
VT SETTING VENT: 450 ML
WBC # BLD AUTO: 14 K/UL (ref 4.6–13.2)

## 2017-08-21 PROCEDURE — 74011636637 HC RX REV CODE- 636/637: Performed by: NURSE PRACTITIONER

## 2017-08-21 PROCEDURE — 85025 COMPLETE CBC W/AUTO DIFF WBC: CPT | Performed by: NURSE PRACTITIONER

## 2017-08-21 PROCEDURE — 74011250636 HC RX REV CODE- 250/636: Performed by: INTERNAL MEDICINE

## 2017-08-21 PROCEDURE — 71010 XR CHEST PORT: CPT

## 2017-08-21 PROCEDURE — 74011250636 HC RX REV CODE- 250/636: Performed by: HOSPITALIST

## 2017-08-21 PROCEDURE — 83735 ASSAY OF MAGNESIUM: CPT | Performed by: NURSE PRACTITIONER

## 2017-08-21 PROCEDURE — 74011000258 HC RX REV CODE- 258: Performed by: NURSE PRACTITIONER

## 2017-08-21 PROCEDURE — 74011000250 HC RX REV CODE- 250: Performed by: PHYSICIAN ASSISTANT

## 2017-08-21 PROCEDURE — 94003 VENT MGMT INPAT SUBQ DAY: CPT

## 2017-08-21 PROCEDURE — 36600 WITHDRAWAL OF ARTERIAL BLOOD: CPT

## 2017-08-21 PROCEDURE — 84100 ASSAY OF PHOSPHORUS: CPT | Performed by: NURSE PRACTITIONER

## 2017-08-21 PROCEDURE — 74011636637 HC RX REV CODE- 636/637: Performed by: INTERNAL MEDICINE

## 2017-08-21 PROCEDURE — 80202 ASSAY OF VANCOMYCIN: CPT | Performed by: INTERNAL MEDICINE

## 2017-08-21 PROCEDURE — 74011000258 HC RX REV CODE- 258: Performed by: INTERNAL MEDICINE

## 2017-08-21 PROCEDURE — 74011250637 HC RX REV CODE- 250/637: Performed by: INTERNAL MEDICINE

## 2017-08-21 PROCEDURE — 65610000006 HC RM INTENSIVE CARE

## 2017-08-21 PROCEDURE — 74011250637 HC RX REV CODE- 250/637: Performed by: HOSPITALIST

## 2017-08-21 PROCEDURE — 90935 HEMODIALYSIS ONE EVALUATION: CPT

## 2017-08-21 PROCEDURE — 74011250637 HC RX REV CODE- 250/637: Performed by: NURSE PRACTITIONER

## 2017-08-21 PROCEDURE — 82803 BLOOD GASES ANY COMBINATION: CPT

## 2017-08-21 PROCEDURE — 74011250636 HC RX REV CODE- 250/636: Performed by: NURSE PRACTITIONER

## 2017-08-21 PROCEDURE — 80048 BASIC METABOLIC PNL TOTAL CA: CPT | Performed by: NURSE PRACTITIONER

## 2017-08-21 PROCEDURE — 82962 GLUCOSE BLOOD TEST: CPT

## 2017-08-21 RX ORDER — POTASSIUM CHLORIDE 1.5 G/1.77G
20 POWDER, FOR SOLUTION ORAL 2 TIMES DAILY WITH MEALS
Status: DISCONTINUED | OUTPATIENT
Start: 2017-08-21 | End: 2017-08-21

## 2017-08-21 RX ORDER — HYDROCORTISONE SODIUM SUCCINATE 100 MG/2ML
25 INJECTION, POWDER, FOR SOLUTION INTRAMUSCULAR; INTRAVENOUS EVERY 6 HOURS
Status: DISCONTINUED | OUTPATIENT
Start: 2017-08-21 | End: 2017-08-25

## 2017-08-21 RX ORDER — LEVETIRACETAM 5 MG/ML
500 INJECTION INTRAVASCULAR EVERY 12 HOURS
Status: DISCONTINUED | OUTPATIENT
Start: 2017-08-21 | End: 2017-09-21

## 2017-08-21 RX ORDER — POTASSIUM CHLORIDE 1.5 G/1.77G
20 POWDER, FOR SOLUTION ORAL
Status: COMPLETED | OUTPATIENT
Start: 2017-08-21 | End: 2017-08-21

## 2017-08-21 RX ADMIN — ASPIRIN 81 MG 81 MG: 81 TABLET ORAL at 12:10

## 2017-08-21 RX ADMIN — INSULIN LISPRO 12 UNITS: 100 INJECTION, SOLUTION INTRAVENOUS; SUBCUTANEOUS at 18:00

## 2017-08-21 RX ADMIN — COLLAGENASE SANTYL: 250 OINTMENT TOPICAL at 09:00

## 2017-08-21 RX ADMIN — CHLORHEXIDINE GLUCONATE 10 ML: 1.2 RINSE ORAL at 21:09

## 2017-08-21 RX ADMIN — MINERAL OIL AND WHITE PETROLATUM 1 DOSE: 150; 850 OINTMENT OPHTHALMIC at 09:00

## 2017-08-21 RX ADMIN — INSULIN LISPRO 6 UNITS: 100 INJECTION, SOLUTION INTRAVENOUS; SUBCUTANEOUS at 05:21

## 2017-08-21 RX ADMIN — LEVETIRACETAM 500 MG: 5 INJECTION INTRAVENOUS at 20:24

## 2017-08-21 RX ADMIN — POTASSIUM CHLORIDE 20 MEQ: 1.5 POWDER, FOR SOLUTION ORAL at 03:43

## 2017-08-21 RX ADMIN — MINERAL OIL AND WHITE PETROLATUM 1 DOSE: 150; 850 OINTMENT OPHTHALMIC at 18:00

## 2017-08-21 RX ADMIN — FAMOTIDINE 20 MG: 10 INJECTION INTRAVENOUS at 09:00

## 2017-08-21 RX ADMIN — MINERAL OIL AND WHITE PETROLATUM 1 DOSE: 150; 850 OINTMENT OPHTHALMIC at 21:09

## 2017-08-21 RX ADMIN — HEPARIN SODIUM 5000 UNITS: 5000 INJECTION, SOLUTION INTRAVENOUS; SUBCUTANEOUS at 18:00

## 2017-08-21 RX ADMIN — MINERAL OIL AND WHITE PETROLATUM 1 DOSE: 150; 850 OINTMENT OPHTHALMIC at 13:00

## 2017-08-21 RX ADMIN — DOXERCALCIFEROL 3 MCG: 4 INJECTION, SOLUTION INTRAVENOUS at 13:23

## 2017-08-21 RX ADMIN — LORAZEPAM 1 MG: 2 INJECTION INTRAMUSCULAR; INTRAVENOUS at 00:18

## 2017-08-21 RX ADMIN — VITAMIN A AND D: 30.8 OINTMENT TOPICAL at 18:00

## 2017-08-21 RX ADMIN — LEVETIRACETAM 500 MG: 5 INJECTION INTRAVENOUS at 07:00

## 2017-08-21 RX ADMIN — VITAMIN A AND D: 30.8 OINTMENT TOPICAL at 13:00

## 2017-08-21 RX ADMIN — HYDROCORTISONE SODIUM SUCCINATE 25 MG: 100 INJECTION, POWDER, FOR SOLUTION INTRAMUSCULAR; INTRAVENOUS at 13:00

## 2017-08-21 RX ADMIN — SIMVASTATIN 20 MG: 10 TABLET, FILM COATED ORAL at 21:09

## 2017-08-21 RX ADMIN — VITAMIN A AND D: 30.8 OINTMENT TOPICAL at 09:00

## 2017-08-21 RX ADMIN — HEPARIN SODIUM 5000 UNITS: 5000 INJECTION, SOLUTION INTRAVENOUS; SUBCUTANEOUS at 02:01

## 2017-08-21 RX ADMIN — CHLORHEXIDINE GLUCONATE 10 ML: 1.2 RINSE ORAL at 09:00

## 2017-08-21 RX ADMIN — PIPERACILLIN AND TAZOBACTAM 2.25 G: 2; .25 INJECTION, POWDER, FOR SOLUTION INTRAVENOUS at 07:00

## 2017-08-21 RX ADMIN — LORAZEPAM 1 MG: 2 INJECTION INTRAMUSCULAR; INTRAVENOUS at 05:16

## 2017-08-21 RX ADMIN — HYDROCORTISONE SODIUM SUCCINATE 50 MG: 100 INJECTION, POWDER, FOR SOLUTION INTRAMUSCULAR; INTRAVENOUS at 05:16

## 2017-08-21 RX ADMIN — VITAMIN A AND D: 30.8 OINTMENT TOPICAL at 21:19

## 2017-08-21 RX ADMIN — HEPARIN SODIUM 5000 UNITS: 5000 INJECTION, SOLUTION INTRAVENOUS; SUBCUTANEOUS at 10:00

## 2017-08-21 RX ADMIN — HYDROCORTISONE SODIUM SUCCINATE 25 MG: 100 INJECTION, POWDER, FOR SOLUTION INTRAMUSCULAR; INTRAVENOUS at 20:37

## 2017-08-21 RX ADMIN — INSULIN GLARGINE 10 UNITS: 100 INJECTION, SOLUTION SUBCUTANEOUS at 09:00

## 2017-08-21 RX ADMIN — Medication 1 CAPSULE: at 09:00

## 2017-08-21 RX ADMIN — VANCOMYCIN HYDROCHLORIDE 400 MG: 500 INJECTION, POWDER, LYOPHILIZED, FOR SOLUTION INTRAVENOUS at 11:18

## 2017-08-21 RX ADMIN — PIPERACILLIN AND TAZOBACTAM 2.25 G: 2; .25 INJECTION, POWDER, FOR SOLUTION INTRAVENOUS at 20:55

## 2017-08-21 RX ADMIN — PIPERACILLIN AND TAZOBACTAM 0.75 G: 2; .25 INJECTION, POWDER, FOR SOLUTION INTRAVENOUS at 14:00

## 2017-08-21 RX ADMIN — ERYTHROPOIETIN 10000 UNITS: 10000 INJECTION, SOLUTION INTRAVENOUS; SUBCUTANEOUS at 09:00

## 2017-08-21 NOTE — PROGRESS NOTES
Re:  Shiraz Pete up visit     8/21/2017 1:32 PM    SSN: [id number]    Subjective:   Maribell Campos is seen on the vent in the ICU being dialyzed as I see him.     Medications:    Current Facility-Administered Medications   Medication Dose Route Frequency Provider Last Rate Last Dose    vancomycin (VANCOCIN) 400 mg in 0.9% sodium chloride 100 mL IVPB  400 mg IntraVENous ONCE Carolina Sosa MD       Aiden Esquivel ON 8/23/2017] Vancomycin random level  1 Each Other Rx Dosing/Monitoring Carolina Sosa MD        hydrocortisone Sod Succ (PF) (SOLU-CORTEF) injection 25 mg  25 mg IntraVENous Q6H Carolina Sosa MD        piperacillin-tazobactam (ZOSYN) 0.75 g in 0.9% sodium chloride 50 mL IVPB  0.75 g IntraVENous ONCE Carolina Sosa MD        insulin glargine (LANTUS) injection 10 Units  10 Units SubCUTAneous DAILY Olena Castañeda NP   10 Units at 08/20/17 1130    LORazepam (ATIVAN) injection 1 mg  1 mg IntraVENous Q4H PRN Charma Mcardle, NP   1 mg at 08/21/17 0516    insulin lispro (HUMALOG) injection   SubCUTAneous Q6H Feliciano Christianson MD   6 Units at 08/21/17 8719    VANCOMYCIN INFORMATION NOTE   Other Rx Dosing/Monitoring Charma Mcardle, NP        white petrolatum-mineral oil 85-15 % oint 1 Dose  1 Dose Ophthalmic QID Olena Castañeda NP   1 Dose at 08/20/17 2123    chlorhexidine (PERIDEX) 0.12 % mouthwash 10 mL  10 mL Oral Q12H Charma Mcardle, NP   10 mL at 08/20/17 2123    NOREPINephrine (LEVOPHED) 16,000 mcg in dextrose 5% 250 mL infusion  2-16 mcg/min IntraVENous TITRATE Charma Mcardle, NP 4.7 mL/hr at 08/20/17 2348 5 mcg/min at 08/20/17 2348    albuterol (PROVENTIL VENTOLIN) nebulizer solution 2.5 mg  2.5 mg Nebulization Q6H PRN Charma Mcardle, NP        piperacillin-tazobactam (ZOSYN) 2.25 g in 0.9% sodium chloride (MBP/ADV) 50 mL MBP  2.25 g IntraVENous Q12H Charma Mcardle,  mL/hr at 08/21/17 0700 2.25 g at 08/21/17 0700    Piperacillin-tazobactam (ZOSYN) 0.75 gm Supplemental Dosing by Pharmacy   Other Rx Dosing/Monitoring Connie Hammer MD        famotidine (PF) (PEPCID) 20 mg in sodium chloride 0.9 % 10 mL injection  20 mg IntraVENous DAILY January-Jill Michel VARGAS PA-C   20 mg at 08/20/17 5725    vits A and D-white pet-lanolin (A&D) ointment   Topical QID AFTER MEALS Connie Hammer MD        collagenase (SANTYL) 250 unit/gram ointment   Topical DAILY Connie Hammer MD        levETIRAcetam (KEPPRA) 500 mg in 100 ml IVPB  500 mg IntraVENous Q12H Lyle Garduno  mL/hr at 08/21/17 0700 500 mg at 08/21/17 0700    acetaminophen (TYLENOL) tablet 650 mg  650 mg Oral Q4H PRN Lyle Garduno MD   650 mg at 08/20/17 1853    lactobacillus sp. 50 billion cpu (BIO-K PLUS) capsule 1 Cap  1 Cap Oral DAILY Rosemary Veliz MD   1 Cap at 08/20/17 0809    loperamide (IMODIUM) capsule 2 mg  2 mg Oral Q6H PRN Lyle Garduno MD   2 mg at 08/12/17 2124    heparin (porcine) injection 5,000 Units  5,000 Units SubCUTAneous Q8H Lyle Garduno MD   5,000 Units at 08/21/17 0201    heparin (porcine) 1,000 unit/mL injection 2,000 Units  2,000 Units IntraVENous DIALYSIS ONCE Radha Iraheta MD        simvastatin (ZOCOR) tablet 20 mg  20 mg Oral QHS Lyle Garduno MD   20 mg at 08/20/17 2123    doxercalciferol (HECTOROL) 4 mcg/2 mL injection 3 mcg  3 mcg IntraVENous DIALYSIS Mode TATE MD   3 mcg at 08/21/17 1323    epoetin benjamin (EPOGEN;PROCRIT) injection 10,000 Units  10,000 Units IntraVENous DIALYSIS Mode TATE MD   10,000 Units at 08/16/17 1021    aspirin chewable tablet 81 mg  81 mg Oral DAILY Lyle Garduno MD   81 mg at 08/20/17 0809    glucose chewable tablet 16 g  4 Tab Oral PRN Max Barrett PA-C        glucagon (GLUCAGEN) injection 1 mg  1 mg IntraMUSCular PRN Max Barrett PA-C        dextrose (D50W) injection syrg 12.5-25 g  25-50 mL IntraVENous PRN Nicole Rangel PA-C   25 g at 08/06/17 0005    0.9% sodium chloride infusion  100 mL/hr IntraVENous DIALYSIS PRN Shelley Winston  mL/hr at 08/02/17 1925 100 mL/hr at 08/02/17 1925    naloxone [facility]) injection 0.4 mg  0.4 mg IntraVENous PRN Sera Juarez MD           Vital signs:    Visit Vitals    /60    Pulse 86    Temp 97.2 °F (36.2 °C)    Resp 17    Ht 6' 2\" (1.88 m)    Wt 64.9 kg (143 lb 1.3 oz)    SpO2 100%    BMI 18.37 kg/m2       Review of Systems:   Could not be obtained from the patient because of the medical status. Patient Active Problem List   Diagnosis Code    Anemia D64.9    Acidosis E87.2    Cardiac arrest ([address] I46.9    Respiratory failure ([address] J96.90    ESRD needing dialysis ([address] N18.6, Z99.2    Anoxic brain damage (HCC) G93.1    Colitis K52.9    Hypotension I95.9    Acute GI bleeding K92.2    ESRD (end stage renal disease) ([address] N18.6    Sepsis (HCC) A41.9    Oropharyngeal dysphagia R13.12    Cachexia (HCC) R64         Objective: The patient is comatose on the vent. To deep painful stimuli he shakes his head and swallows. Cranial Nerves: II  Visual fields can't be tested. III, IV, VI  Extraocular movements are absent to Doll's eyes maneuver. His eyes are forcefully deviated upwards. V  Corneal responses are absent. VII  Face is symmetrical.  VIII - Hearing is not testable. IX, X, XII  Gag is absent. Motor: The patient has no movement to pain. Tone is normal. Reflexes are trace and symmetrical. Plantars are down going. Gait is not testable.     Final result (Exam End: 8/20/2017 11:15 AM) Open    Study Result   EXAM:  CT HEAD WO CONT     INDICATION:   Admitted with pulseless electrical activity status post CPR/ACLS  protocol with CVA, bacteremia with fever, hypotension     COMPARISON: CT head 8/13/2017.     TECHNIQUE: Unenhanced CT of the head was performed using 5 mm images. Brain and  bone windows were generated. CT dose reduction was achieved through use of a  standardized protocol tailored for this examination and automatic exposure  control for dose modulation.      FINDINGS:  There may be very subtle loss of gray-white matter differentiation, though a  mildly hyperdense cortex does persist. Similar moderate generalized volume loss. Mild periventricular white matter low density changes. 2 right and one small  left thalamic infarct, as before. No acute intracranial hemorrhage or mass  effect. Basilar cisterns are patent.     Postsurgical changes right orbit. Soft tissues, osseous structures are otherwise  grossly normal. Limited visualized paranasal sinuses and mastoid air cells are  patent.      IMPRESSION  IMPRESSION:   1. There may be subtle mild loss of gray-white matter differentiation  throughout, though mildly hyperdense cortex persists suggesting there may be a  mild component of anoxic brain injury. 2.  Similar thalamic infarcts.               I have reviewed the above imagines myself.     CBC:   Lab Results   Component Value Date/Time    WBC 14.0 08/21/2017 01:00 AM    RBC 2.88 08/21/2017 01:00 AM    HGB 8.8 08/21/2017 01:00 AM    HCT 27.4 08/21/2017 01:00 AM    PLATELET 095 24/35/6612 01:00 AM     BMP:   Lab Results   Component Value Date/Time    Glucose 221 08/21/2017 01:00 AM    Sodium 145 08/21/2017 01:00 AM    Potassium 3.2 08/21/2017 01:00 AM    Chloride 113 08/21/2017 01:00 AM    CO2 20 08/21/2017 01:00 AM    BUN 46 08/21/2017 01:00 AM    Creatinine 8.50 08/21/2017 01:00 AM    Calcium 7.6 08/21/2017 01:00 AM     CMP:   Lab Results   Component Value Date/Time    Glucose 221 08/21/2017 01:00 AM    Sodium 145 08/21/2017 01:00 AM    Potassium 3.2 08/21/2017 01:00 AM    Chloride 113 08/21/2017 01:00 AM    CO2 20 08/21/2017 01:00 AM    BUN 46 08/21/2017 01:00 AM    Creatinine 8.50 08/21/2017 01:00 AM    Calcium 7.6 08/21/2017 01:00 AM    Anion gap 12 08/21/2017 01:00 AM    BUN/Creatinine ratio 5 08/21/2017 01:00 AM    Alk. 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 No evidence at si time of nay recovery of higher cortical function. Plan:  Continue supportive care. Poor prognosis. Sincerely,        Mitchell Cobian.  Timothy Triplett M.D.

## 2017-08-21 NOTE — PROGRESS NOTES
Subjective:  Symptoms:  Stable. No shortness of breath or chest pain. Diet:  NPO. Patient seen and examined and chart reviewed. Patient with severe anoxic brain injury on vent unresponsive off all sedation  Has had low grade temps over the last few days though slightly decreased last night  No new events noted    Temp:  [97 °F (36.1 °C)-101.3 °F (38.5 °C)]   Pulse (Heart Rate):  []   BP: ()/(35-91)   Resp Rate:  [12-33]   O2 Sat (%):  [93 %-100 %]   Weight:  [64.5 kg (142 lb 3.2 oz)-64.9 kg (143 lb 1.3 oz)]      08/19 1901 - 08/21 0700  In: 4095.8 [I.V.:940.8]  Out: 1200 [Drains:1200]      Objective:  General Appearance:  Ill-appearing, uncomfortable and in no acute distress. Vital signs: (most recent): Blood pressure 165/80, pulse 81, temperature 99.9 °F (37.7 °C), resp. rate 17, height 6' 2\" (1.88 m), weight 64.9 kg (143 lb 1.3 oz), SpO2 100 %. Output: No urine output and producing stool. Lungs:  Normal respiratory rate and normal effort. Heart: Normal rate. Regular rhythm. Neurological: (Intubated, unresponsive off sedation). Abdomen: Abdomen is soft.   Bowel sounds are normal.         Recent Results (from the past 24 hour(s))   GLUCOSE, POC    Collection Time: 08/20/17  5:02 PM   Result Value Ref Range    Glucose (POC) 321 (H) 70 - 110 mg/dL   GLUCOSE, POC    Collection Time: 08/20/17 11:47 PM   Result Value Ref Range    Glucose (POC) 211 (H) 70 - 110 mg/dL   CBC WITH AUTOMATED DIFF    Collection Time: 08/21/17  1:00 AM   Result Value Ref Range    WBC 14.0 (H) 4.6 - 13.2 K/uL    RBC 2.88 (L) 4.70 - 5.50 M/uL    HGB 8.8 (L) 13.0 - 16.0 g/dL    HCT 27.4 (L) 36.0 - 48.0 %    MCV 95.1 74.0 - 97.0 FL    MCH 30.6 24.0 - 34.0 PG    MCHC 32.1 31.0 - 37.0 g/dL    RDW 17.8 (H) 11.6 - 14.5 %    PLATELET 069 319 - 663 K/uL    MPV 9.7 9.2 - 11.8 FL    NEUTROPHILS 86 (H) 40 - 73 %    LYMPHOCYTES 10 (L) 21 - 52 %    MONOCYTES 3 3 - 10 %    EOSINOPHILS 1 0 - 5 %    BASOPHILS 0 0 - 2 % ABS. NEUTROPHILS 12.1 (H) 1.8 - 8.0 K/UL    ABS. LYMPHOCYTES 1.4 0.9 - 3.6 K/UL    ABS. MONOCYTES 0.4 0.05 - 1.2 K/UL    ABS. EOSINOPHILS 0.1 0.0 - 0.4 K/UL    ABS. BASOPHILS 0.0 0.0 - 0.1 K/UL    DF AUTOMATED     MAGNESIUM    Collection Time: 08/21/17  1:00 AM   Result Value Ref Range    Magnesium 2.8 (H) 1.6 - 2.6 mg/dL   PHOSPHORUS    Collection Time: 08/21/17  1:00 AM   Result Value Ref Range    Phosphorus 3.9 2.5 - 4.9 MG/DL   VANCOMYCIN, RANDOM    Collection Time: 08/21/17  1:00 AM   Result Value Ref Range    Vancomycin, random 22.0 5.0 - 34.4 UG/ML   METABOLIC PANEL, BASIC    Collection Time: 08/21/17  1:00 AM   Result Value Ref Range    Sodium 145 136 - 145 mmol/L    Potassium 3.2 (L) 3.5 - 5.5 mmol/L    Chloride 113 (H) 100 - 108 mmol/L    CO2 20 (L) 21 - 32 mmol/L    Anion gap 12 3.0 - 18 mmol/L    Glucose 221 (H) 74 - 99 mg/dL    BUN 46 (H) 7.0 - 18 MG/DL    Creatinine 8.50 (H) 0.6 - 1.3 MG/DL    BUN/Creatinine ratio 5 (L) 12 - 20      GFR est AA 8 (L) >60 ml/min/1.73m2    GFR est non-AA 7 (L) >60 ml/min/1.73m2    Calcium 7.6 (L) 8.5 - 10.1 MG/DL   POC G3    Collection Time: 08/21/17  4:43 AM   Result Value Ref Range    Device: VENT      FIO2 (POC) 28 %    pH (POC) 7.350 7.35 - 7.45      pCO2 (POC) 34.2 (L) 35.0 - 45.0 MMHG    pO2 (POC) 113 (H) 80 - 100 MMHG    HCO3 (POC) 18.7 (L) 22 - 26 MMOL/L    sO2 (POC) 98 (H) 92 - 97 %    Base deficit (POC) 7 mmol/L    Mode ASSIST CONTROL      Tidal volume 450 ml    Set Rate 16 bpm    PEEP/CPAP (POC) 5 cmH2O    Allens test (POC) N/A      Inspiratory Time 1 sec    Total resp.  rate 16      Site DRAWN FROM ARTERIAL LINE      Patient temp. 100.5      Specimen type (POC) ARTERIAL      Performed by Óscar Beavers     Volume control plus YES     GLUCOSE, POC    Collection Time: 08/21/17  5:21 AM   Result Value Ref Range    Glucose (POC) 222 (H) 70 - 110 mg/dL           Principal Problem:    Sepsis ([address] (7/27/2017)    Active Problems:    Anemia ()      Acidosis (7/27/2017) Cardiac arrest ([facility] (7/27/2017)      Respiratory failure ([facility] (7/27/2017)      ESRD needing dialysis ([facility] (7/27/2017)      Anoxic brain damage ([facility] (7/27/2017)      Colitis (7/27/2017)      Hypotension (7/27/2017)      Acute GI bleeding (7/27/2017)      ESRD (end stage renal disease) ([facility] (7/27/2017)      Oropharyngeal dysphagia (8/17/2017)      Cachexia ([facility] (8/17/2017)          Assessment:    Condition: In serious condition. Unchanged. 1. Respiratory failure - stable on vent with good gas exchange   -PSV as tolerated around the clock   -will need to address trach and PEG if family definite about long term aggressive cares    2. CV - stable hemodynamics on low dose levophed   Wean levophed as possible    3. Renal - ESRD on HD this am   -defer to nephrology    4. GI - on TF's and tolerating well this far    5. Neuro - no significant interactions noted with various stimuli    6.  Dispo - prognosis is quite poor and family is aware; may need to address trach and PEG as noted above

## 2017-08-21 NOTE — PROGRESS NOTES
NUTRITION    Nursing Referral: MST, Pressure Ulcer  Nutrition Consult: Management of Tube Feeding     RECOMMENDATIONS / PLAN:     - Continue tube feeding of Nepro at goal of 55 mL/hr with 150 mL q 4 hour water flushes.   - Continue RD inpatient monitoring and evaluation. Goal Regimen: Nepro at 55 mL/hr + 150 mL q 4 hour water flushes to provide: 2376 kcal, 107 gm protein, 127 gm fat, 220 gm CHO, 21 gm fiber, 957 mL free water, 1857 mL total water, 100% RDIs     NUTRITION INTERVENTIONS & DIAGNOSIS:     [x] Enteral nutrition support: continue   [x] Coordination of care: interdisciplinary rounds     Nutrition Diagnosis: increased protein needs related to promotion of wound healing and increased expenditure as evidenced by pressure ulcer wound and ESRD, pt on dialysis 3x per week  Inadequate oral intake related to inability to tolerate po as evidenced by NPO. ASSESSMENT:     8/21: Pt intubated after PEA arrest during HD 8/18, PEG placement postponed. Tolerating feeds at goal.   8/18: Tolerating tube feeds at goal.  Tube feeds held today for PEG placement. 8/15: Tube feeds started this morning. Pt tolerating at rate of 30 mL/hr  8/14: SLP following; recommend NPO. Noted plan for NGT placement and start tube feeds. Potassium WNL. 8/10: Pt reported good appetite and \"eating enough\" from his meals. Noted fair meal intake per chart. Consuming supplements. Discussed increasing Nepro frequency; pt declined. Discussed adding Ensure Pudding; pt agreed with plan. Po intake of meals/supplements encouraged. Has been receiving K replacement. 8/7: K remains low despite previous tube feeding changed to higher potassium formula. Pt extubated 8/5. Tube feeds discontinued 8/6. SLP following; pt s/p MBS today and started on po diet. Noted poor po intake lunch meal per chart. 8/3: K remains low despite continued replacement.  Will change to higher potassium formula per discussion with PA.   8/1: Tolerating feeding at goal. 1 L removed during HD 7/31. Hyperglycemia noted, advance to very insulin resistant SSI and basal insulin started. 7/31: Tolerating advancement of tube feeding. HD today. BG trending up, MD to stop D5.    7/30: Pt remain intubated. GI following; plan to start tube feeds today. Noted order placed; discussed modifying tube feed order and add water flushes with Dr Yessi Currie. RN unavailable; discussed with Nursing Alkol regarding modifying tube feed order  7/28: S/p cardiac arrest and intubated 7/27, NGT clamped. Abdominal ultrasound today to rule out cholecystitis. Will wait to feed.      EN infusion adequate to meet patients estimated nutritional needs:   [x] Yes     [] No   [] Unable to determine at this time    Tube Feeding: Nepro at 55 mL via NGT  Water Flushes: 150 mL q 4 hour  Residuals:  mL    Diet: DIET TUBE FEEDING  DIET NPO      Food Allergies: NKFA  Current Appetite:   [] Good     [] Fair     [] Poor     [x] Other: NPO   Appetite/meal intake prior to admission:   [] Good     [] Fair     [] Poor     [x] Other: unknown   Feeding Limitations:  [x] Swallowing difficulty: SLP following; recommended NPO on 8/14    [] Chewing difficulty:    [x] Other: intubated       Anuric   BM: 8/21; FMS  Skin Integrity:  stage II ressure ulcer anus; DTI sacrum; moisture associated skin damage posterior buttocks  Edema: none  Pertinent Medications: Reviewed: steroid, lantus, SSI, lactobacillus, 20 mEq KCl     Recent Labs      08/21/17   0100  08/20/17   0225  08/19/17   0145  08/18/17   1640   NA  145  142  142  144   K  3.2*  3.8  3.8  3.3*   CL  113*  112*  111*  109*   CO2  20*  21  21  18*   GLU  221*  297*  210*  183*   BUN  46*  31*  14  10   CREA  8.50*  6.95*  5.06*  4.14*   CA  7.6*  7.7*  8.0*  8.8   MG  2.8*  2.6  2.2   --    PHOS  3.9  2.8  2.5   --    ALB   --   2.1*  2.4*  2.4*   SGOT   --   13*  18  8*   ALT   --   10*  13*  10*       Intake/Output Summary (Last 24 hours) at 08/21/17 1052  Last data filed at 08/21/17 0700   Gross per 24 hour   Intake          2595.54 ml   Output              600 ml   Net          1995.54 ml       Anthropometrics:  Ht Readings from Last 1 Encounters:   08/15/17 6' 2\" (1.88 m)     Last 3 Recorded Weights in this Encounter    08/19/17 0546 08/20/17 0745 08/21/17 0400   Weight: 64.5 kg (142 lb 3.2 oz) 64.5 kg (142 lb 3.2 oz) 64.9 kg (143 lb 1.3 oz)     Body mass index is 18.37 kg/(m^2). Underweight     Weight History:   Weight Metrics 8/21/2017   Weight 143 lb 1.3 oz   BMI 18.37 kg/m2        Admitting Diagnosis: Cardiac arrest (HCC)  Acidosis  Respiratory failure (HCC)  Anemia  ESRD needing dialysis ([address]  Cardiac arrest ([address]  Acute GI bleeding  Sepsis ([address]  Hypotension  Anoxic brain damage (HCC)  ESRD (end stage renal disease) ([address]  Colitis  dx  dx  Pertinent PMHx: DM, HTN, IBS, ESRD    Education Needs:        [x] None identified  [] Identified - Not appropriate at this time  []  Identified and addressed - refer to education log  Learning Limitations:   [] None identified  [x] Identified: intubated      Cultural, Jew & ethnic food preferences:  [x] None identified    [] Identified and addressed     ESTIMATED NUTRITION NEEDS:     Calories: 0263-5070 kcal ([id number]) based on  [x] Actual BW 65 kg      [] SBW  Protein:  gm (1.2-2 gm/kg) based on  [x] Actual BW      [] SBW   Fluid: 3593-4401 mL     MONITORING & EVALUATION:     Nutrition Goal(s):   1. Patient will tolerate enteral nutrition formula and regimen without difficulty within the next 7 days. Outcome:  [x] Met/Ongoing    []  Not Met    [] New/Initial Goal   2. Patient will meet their estimated nutritional needs through adequate enteral nutrition within the next 7 days.  Outcome:  [x] Met/Ongoing    []  Not Met/Progressing    [] New/Initial Goal      Monitoring:   [x] EN tolerance    [x] EN infusion   [] Diet tolerance   [] Meal intake   [] Supplement intake   [] GI symptoms/ability to tolerate po diet   [x] Respiratory status   [x] Plan of care      Previous Recommendations (for follow-up assessments only):     [x]   Implemented       []   Not Implemented (RD to address)     [] No Recommendation Made     Discharge Planning: goal enteral nutrition regimen   [x] Participated in care planning, discharge planning, & interdisciplinary rounds as appropriate       Lalo Chris, [address]   Pager: [phone]

## 2017-08-21 NOTE — PROGRESS NOTES
Respiratory Care Assessment for Bronchial hygiene or Lung Expansion Therapy  Patient  Frank Marie     55 y.o.   male     8/21/2017  9:53 AM  Patient Active Problem List   Diagnosis Code    Anemia D64.9    Acidosis E87.2    Cardiac arrest ([address] I46.9    Respiratory failure ([address] J96.90    ESRD needing dialysis ([address] N18.6, Z99.2    Anoxic brain damage ([address] G93.1    Colitis K52.9    Hypotension I95.9    Acute GI bleeding K92.2    ESRD (end stage renal disease) ([address] N18.6    Sepsis ([address] A41.9    Oropharyngeal dysphagia R13.12    Cachexia (HCC) R64       ABG:  Date:8/21/2017  Lab Results   Component Value Date/Time    PHI 7.350 08/21/2017 04:43 AM    PCO2I 34.2 (L) 08/21/2017 04:43 AM    PO2I 113 (H) 08/21/2017 04:43 AM    HCO3I 18.7 (L) 08/21/2017 04:43 AM    FIO2I 28 08/21/2017 04:43 AM       Chest X-ray:  Date:8/21/2017    Results from Hospital Encounter encounter on 07/27/17   XR CHEST PORT   Narrative Portable chest x-ray, upright AP view, time 0207 hours on 8/21/2017:        INDICATION:    Respiratory failure. The patient has been on ventilation. Follow-up evaluation. COMPARISON STUDY: Chest x-ray on 8/20/2017, 8/19/2017. FINDINGS:    The lower end of ET tube is 3.8 cm above bree. NG tube extends into upper  stomach. There is stable position of left IJ central venous catheter. No evidence of pneumothorax or pneumomediastinum. Lungs are well ventilated. Lungs are clear bilaterally without definable acute  processes. Cardiac size and pulmonary vascularity are normal. CP angles are  sharp bilaterally. Impression IMPRESSION:    Lower end of ET tube is 3.8 cm above bree. Lungs appear clear bilaterally without definable acute process.         Lab Test:  Date:8/21/2017  WBC:   Lab Results   Component Value Date/Time    WBC 14.0 08/21/2017 01:00 AM   HGB: Lab Results   Component Value Date/Time    HGB 8.8 08/21/2017 01:00 AM    PLTS: Lab Results   Component Value Date/Time    PLATELET 662 91/81/1401 01:00 AM       SaO2%/flow:   SpO2 Readings from Last 1 Encounters:   08/21/17 100%       Vital Signs:   Patient Vitals for the past 8 hrs:   Temp Pulse Resp BP SpO2   08/21/17 0806 - 74 16 - 100 %   08/21/17 0700 - 79 16 - 100 %   08/21/17 0630 - 84 16 - -   08/21/17 0600 - 84 16 - 100 %   08/21/17 0500 - 90 16 - 100 %   08/21/17 0400 (!) 100.8 °F (38.2 °C) 90 16 107/49 100 %   08/21/17 0311 - 83 16 - 95 %   08/21/17 0300 - 83 16 - -   08/21/17 0200 - 83 16 - -         RA/O2 flow/device: Ventilator        First Inital Assesment:     []Yes [x]No   Reevaluation/Reassessment:    [x]Yes []No     CHART REVIEW   Points 0 X 1 X 2 X 3 X 4 X Points   Pulmonary History Smoking History (1) none  Recent Smoking History <1 PPD  Recent Smoking History >1 PPD  Pulmonary Disease or Impairment  Severe Pulmonary Disease  4   Surgical History No Surgery  General Surgery  Lower Abdominal  Thoracic or Upper Abdominal  Thoracic & Pulmonary Disease  0   CXR Clear or not indicated  Chronic changes or CXR Pending  Infiltrate, atelectasis or pleural effusions  Infiltrates in more than 1lobe  Infiltrates +atelectasis  +/or pleural effusions  3     PATIENT ASSESSMENT    0 X 1 X 2 X 3 X 4 X Points   Respiratory Pattern Regular pattern RR 12-20  Increased RR 21-27   Mild Dyspnea at rest, irregular pattern RR 28-32  Moderate Dyspnea at rest, Use of accessory muscles, RR 33-36  Severe Dyspnea, Use of accessory muscles RR >36  0   Mental Status Alert Oriented cooperative  Confused, Follows commands  Lethargic, Does not follow commands  Obtunded  Unresponsive  4   Breath Sounds Clear  Decreased Unilaterally  Decreased Bilaterally  Mild Scattered wheezing or Crackles in bases  Severe Wheezing or rhonchi  2   Cough Strong dry NPC  Strong Productive  Weak NPC  Weak productive or weak with rhonchi  No cough or may require suctioning  4   Level of Activity Ambulatory  Ambulatory with assistance  Temporarily Non-ambulatory  Non-Ambulatory, able to position self  Unable to position self, confined to bed  4   Total Points/Score:   21     Specific Intervention Chart(Suction PRN)    Bronchial Hygiene/Secretion Clearance:    []EZPAP []Rotation bed with vibration    []CPT with percussor []CPT via vest   []Oscillastiang positive pressure expiratory device      Lung Expansion:    []Incentive Spirometer w/RT visits []Incentive Spirometer w/nursing    []EZPAP      *Suctioning:    []Nasal Tracheal []Tracheal     *suctioning will be ordered and done PRN with an associated frequency such as QID/PRN based on score       Other:    Care Plan   Level # Score Modality Frequency Comment   Level 1 >17 Suction PRN    Level 2 14-17 - -    Level 3 10-13 - -    Level 4 1-9 - -      BRONCHIAL HYGIENE SCORING AND FREQUENCY GUIDELINES   Frequency Indications/Findings Level #   Q4 ATC Copious secretions, SOB, unable to sleep 1   QID & PRN at night Moderate amounts of secretions 2   TID or Q6 wa Small amounts of secretions and poor cough: recent history of secretions 3   BID or Q8 wa Unable to deep breathe and cough effectively 4        Comments:  Suction as needed.      Respiratory Therapist: Key Muller, RT

## 2017-08-21 NOTE — PROGRESS NOTES
Hemodialysis Rounding Note      Patient: Edmar Gallegos               Sex: male          DOA: 7/27/2017  4:23 PM        YOB: 1960      Age:  55 y.o.        LOS:  LOS: 25 days     Subjective:     Edmar Gallegos is a 55 y.o.  who presents with Cardiac arrest ([facility]  Acidosis  Respiratory failure ([facility]  Anemia  ESRD needing dialysis ([facility] [facility]  Cardiac arrest ([facility] [address]  Acute GI bleeding  Sepsis ([facility]  Hypotension  Anoxic brain damage (HCC)  ESRD (end stage renal disease) ([facility]  Colitis  dx  dx. The patient is dialyzing utilizing the following method:Intermittent Hemodialysis        Complaints: post code during HD on 8/18. Remains on vent and vasopressors, tolerating NGT feeding. Per unit nurse daughter was out of town over the weekend pt still currently full code and cont HD.   Cont diarrhea    Current Facility-Administered Medications   Medication Dose Route Frequency    vancomycin (VANCOCIN) 400 mg in 0.9% sodium chloride 100 mL IVPB  400 mg IntraVENous ONCE    [START ON 8/23/2017] Vancomycin random level  1 Each Other Rx Dosing/Monitoring    insulin glargine (LANTUS) injection 10 Units  10 Units SubCUTAneous DAILY    LORazepam (ATIVAN) injection 1 mg  1 mg IntraVENous Q4H PRN    insulin lispro (HUMALOG) injection   SubCUTAneous Q6H    VANCOMYCIN INFORMATION NOTE   Other Rx Dosing/Monitoring    white petrolatum-mineral oil 85-15 % oint 1 Dose  1 Dose Ophthalmic QID    chlorhexidine (PERIDEX) 0.12 % mouthwash 10 mL  10 mL Oral Q12H    NOREPINephrine (LEVOPHED) 16,000 mcg in dextrose 5% 250 mL infusion  2-16 mcg/min IntraVENous TITRATE    hydrocortisone Sod Succ (PF) (SOLU-CORTEF) injection 50 mg  50 mg IntraVENous Q6H    albuterol (PROVENTIL VENTOLIN) nebulizer solution 2.5 mg  2.5 mg Nebulization Q6H PRN    piperacillin-tazobactam (ZOSYN) 2.25 g in 0.9% sodium chloride (MBP/ADV) 50 mL MBP  2.25 g IntraVENous Q12H    Piperacillin-tazobactam (ZOSYN) 0.75 gm Supplemental Dosing by Pharmacy   Other Rx Dosing/Monitoring    famotidine (PF) (PEPCID) 20 mg in sodium chloride 0.9 % 10 mL injection  20 mg IntraVENous DAILY    vits A and D-white pet-lanolin (A&D) ointment   Topical QID AFTER MEALS    collagenase (SANTYL) 250 unit/gram ointment   Topical DAILY    levETIRAcetam (KEPPRA) 500 mg in 100 ml IVPB  500 mg IntraVENous Q12H    acetaminophen (TYLENOL) tablet 650 mg  650 mg Oral Q4H PRN    lactobacillus sp. 50 billion cpu (BIO-K PLUS) capsule 1 Cap  1 Cap Oral DAILY    loperamide (IMODIUM) capsule 2 mg  2 mg Oral Q6H PRN    heparin (porcine) injection 5,000 Units  5,000 Units SubCUTAneous Q8H    heparin (porcine) 1,000 unit/mL injection 2,000 Units  2,000 Units IntraVENous DIALYSIS ONCE    simvastatin (ZOCOR) tablet 20 mg  20 mg Oral QHS    doxercalciferol (HECTOROL) 4 mcg/2 mL injection 3 mcg  3 mcg IntraVENous DIALYSIS MON, WED & FRI    epoetin benjamin (EPOGEN;PROCRIT) injection 10,000 Units  10,000 Units IntraVENous DIALYSIS MON, WED & FRI    aspirin chewable tablet 81 mg  81 mg Oral DAILY    glucose chewable tablet 16 g  4 Tab Oral PRN    glucagon (GLUCAGEN) injection 1 mg  1 mg IntraMUSCular PRN    dextrose (D50W) injection syrg 12.5-25 g  25-50 mL IntraVENous PRN    0.9% sodium chloride infusion  100 mL/hr IntraVENous DIALYSIS PRN    naloxone (NARCAN) injection 0.4 mg  0.4 mg IntraVENous PRN           1901 -  0700  In: 4095.8 [I.V.:940.8]  Out: 1200 [Drains:1200]      Objective:     Blood pressure 99/52, pulse 87, temperature 99.9 °F (37.7 °C), resp. rate 21, height 6' 2\" (1.88 m), weight 64.9 kg (143 lb 1.3 oz), SpO2 100 %.   Temp (24hrs), Av.8 °F (38.2 °C), Min:99.9 °F (37.7 °C), Max:101.3 °F (38.5 °C)      Blood Pressure: BP: 99/52  Pulse: Pulse (Heart Rate): 87  Temp:  Temp: 99.9 °F (37.7 °C)    Artificial Kidney Dialyzer/Set Up Inspection: Revaclear   hours Duration of Treatment (hours): 2.5 hours   Heparin Bolus Heparin Bolus (units): 2000 units (verbal order per Dr. Iglesias Speak by ICU MD Rosenberg)  Blood flow rate Blood Flow Rate (ml/min): 300 ml/min   Dialysate rate     Arterial Access Pressure Arterial Access Pressure (mmHg): -80  Venous Return Pressure Venous Return Pressure (mmHg): 160  Ultrafiltration Rate Goal/Amount of Fluid to Remove (mL): 0 mL  Fluid Removal Fluid Removed (mL): 0  Net Fluid Removal NET Fluid Removed (mL): 0 ml      PHYSICAL EXAM: non responsive to stimuli   HEENT: ET  In place, non icteric  NECK:  No JVD  CHEST AND LUNGS:  Equal vent bs  CVS:  Regular no rub  ABDOMEN:  Soft , hypoactive bs  EXT:  No edema, Left arm AVG    DATA REVIEW:    Labs: Results:       Chemistry Recent Labs      08/21/17 0100 08/20/17 0225 08/19/17 0145 08/18/17   1640   GLU  221*  297*  210*  183*   NA  145  142  142  144   K  3.2*  3.8  3.8  3.3*   CL  113*  112*  111*  109*   CO2  20*  21  21  18*   BUN  46*  31*  14  10   CREA  8.50*  6.95*  5.06*  4.14*   CA  7.6*  7.7*  8.0*  8.8   AGAP  12  9  10  17   BUCR  5*  4*  3*  2*   AP   --   85  91  93   TP   --   7.0  7.3  7.0   ALB   --   2.1*  2.4*  2.4*   GLOB   --   4.9*  4.9*  4.6*   AGRAT   --   0.4*  0.5*  0.5*   Mag 2.8   CBC w/Diff Recent Labs      08/21/17   0100 08/20/17 0225 08/19/17   0145   WBC  14.0*  15.0*  12.1   RBC  2.88*  3.15*  3.36*   HGB  8.8*  9.6*  10.4*   HCT  27.4*  29.9*  31.8*   PLT  140  430*  358   GRANS  86*  84*  84*   LYMPH  10*  10*  11*   EOS  1  0  0      Coagulation No results for input(s): PTP, INR, APTT in the last 72 hours. No lab exists for component: INREXT, INREXT    Iron/Ferritin No results for input(s): IRON in the last 72 hours. No lab exists for component: TIBCCALC   BNP No results for input(s): BNPP in the last 72 hours.    Cardiac Enzymes Recent Labs      08/19/17   1500  08/19/17   0145   CPK  70  108   CKND1  3.0  4.3*      Liver Enzymes Recent Labs      08/20/17   0225   TP  7.0   ALB  2.1*   AP  85   SGOT  13*      Thyroid Studies Lab Results Component Value Date/Time    TSH 7.52 08/03/2017 01:01 PM          [address]    CULTURE:   )  Recent Labs      08/19/17   0145  08/18/17   1640   CULT  FEW NORMAL RESPIRATORY LENNOX  NO GROWTH 3 DAYS  NO GROWTH 3 DAYS     Recent Labs      08/19/17   0145  08/18/17   1640   CULT  FEW NORMAL RESPIRATORY LENNOX  NO GROWTH 3 DAYS  NO GROWTH 3 DAYS       Assessment/Plan:     End Stage Renal Disease: Will cont with HD today, K3 yeison 3 bath, no fluid removal and dec BF. SPA prn for BP control. Awaiting family decision re further dialysis goals. At 11:23 AM on 8/21/2017, I saw and examined patient during hemodialysis treatment. The patient was receiving hemodialysis for treatment of end stage renal disease. I have also reviewed vital signs, intake and output, lab results and recent events, and agreed with today's dialysis order. Anemia:  Cont Epo with HD    Renal Metabolic Bone Disease:  Cont hectorol                      Hypotension: cont with vasopressor, no fluid removal    Access: Fistula adequate monitoring/no changes.        Shiraz Saez MD  8/21/2017

## 2017-08-21 NOTE — PROGRESS NOTES
OT orders received and chart reviewed. Patient is intubated and unresponsive in ICU. Will discontinue OT orders. Please re-order when medically stable. Thank you.      Shereen Saeed OTR/L

## 2017-08-21 NOTE — INTERDISCIPLINARY ROUNDS
CRITICAL CARE INTERDISCIPLINARY ROUNDS      Patient Information:    Name:   Paulo Acosta    Age:   55 y.o.     Admission Date:   7/27/2017    Readmit Risk Assessment Information:      Readmit Risk Tool Support Systems: Family member(s), Relationship with Primary Physician Group: Seen at least one time within past 12 months    Surgery Date:      Day of Stay:     Expected Discharge Date:        Attending Provider:   Javi Leach MD    Surgeon:        Consultant:       Primary Care Provider:   Darian Rios MD    Problem List:     Patient Active Problem List   Diagnosis Code    Anemia D64.9    Acidosis E87.2    Cardiac arrest ([address] I46.9    Respiratory failure ([address] J96.90    ESRD needing dialysis ([facility] N18.6, Z99.2    Anoxic brain damage (HCC) G93.1    Colitis K52.9    Hypotension I95.9    Acute GI bleeding K92.2    ESRD (end stage renal disease) ([address] N18.6    Sepsis ([address] A41.9    Oropharyngeal dysphagia R13.12    Cachexia ([address] R64       Principal Problem:  Sepsis ([address]    Procedure:       During rounds the following quality care indicators and evidence based practices were addressed :     DVT Prophylaxis, Pressure Injury Prevention, Pain Management, Sepsis resuscitation and management, Nutritional Status, Critical Care Interventions Airways, Drains, Lines and Pressors and IHI Bundles: Central Line Bundle Followed , Stanton Bundle Followed and Vent Bundle Followed, Vent Day 3           Acute MI/PCI:   Not applicable    Heart Failure:    Not applicable    Cardiac Surgery:  Not applicable    SCIP Measures for other Surgeries:   Not applicable    Pneumonia:    Appropriate Antibiotic Selection (ICU versus Non-ICU)    Stroke:  Patient's Personal Risk Factors for Stroke are:   hypertension, family history, hyperlipidemia or diabetes mellitus    NIH Stroke Score  Total: 8 (08/17/17 0400)    Transfer Level of Care:  Not Ready for Transfer    The patient will require the following interventions based on the Readmission Risk Assessment:  Pharmacy evaluation and teaching, Care Management involvement for home health follow up for:  mobility and assistance with ADL's and Spiritual Care evaluation      Discharge Management:  Home and Palliative Care    Anticipated Discharge Date:  3      Interdisciplinary team rounds were held  with the following team membersCare Management, Diabetes Treatment Specialist, Nursing, Nutrition, Pastoral Care, Pharmacy, Physician and Clinical Coordinator and the  patient and healthcare POA (documentation required). Plan of care discussed. See clinical pathway and/or care plan for interventions and desired outcomes. [facility] notified. Dialysis today. Code status discussion with daughter.

## 2017-08-21 NOTE — PROGRESS NOTES
[facility]  Progress Note    Patient: Hanane Childs Age: 55 y.o. : 1960 MR#: [id number] SSN: [id number]  Date/Time: 2017     Subjective:   Patient intubated, unresponsive    Assessment/Plan:   1. Acute met encephalopathy: due to # 2.    2. Acute pontine lacunar CVA: on asa, sttain  3. S/p PEA arrest. ? Cardiac cause with elevated trop PTA. Echo improving EF, s/p cath, no CAD. NOw with second cardiac arrest and intubated- Vent support  4. Acute resp failure s/p extubation, off O2- Now reintubated after second cardiac arrest  5. Dysphagia:  6. ESRD- HD as pe nephrology  7. Hypotension, ?septic shock, on abx, wean pressors  9. Elevated LFT - ? Shock liver. 10. Thrombocytopenia: secondary to sepsis. monitor  11. Sz: keppra  12. Wounds: wound care    unstageable pressure ulcer to sacrum/coccyx not present on admit    moisture associated skin damage to anus not present  on admit    moisture associated skin damage to buttocks not present on admit    13- ? Anoxic encephalopathy- neuro following  Full code    Case discussed with:  [x]Patient  []Family  []Nursing  []Case Management  DVT Prophylaxis:  []Lovenox  [x]Hep SQ  [x]SCDs  []Coumadin   []On Heparin gtt    Patient Active Problem List   Diagnosis Code    Anemia D64.9    Acidosis E87.2    Cardiac arrest ([address] I46.9    Respiratory failure ([address] J96.90    ESRD needing dialysis ([address] N18.6, Z99.2    Anoxic brain damage (HCC) G93.1    Colitis K52.9    Hypotension I95.9    Acute GI bleeding K92.2    ESRD (end stage renal disease) ([address] N18.6    Sepsis ([address] A41.9    Oropharyngeal dysphagia R13.12    Cachexia ([address] R64       Objective:   VS:   Visit Vitals    /64  Comment: Post tx BP    Pulse 92    Temp 97.2 °F (36.2 °C)    Resp 21    Ht 6' 2\" (1.88 m)    Wt 64.9 kg (143 lb 1.3 oz)    SpO2 100%    BMI 18.37 kg/m2      Tmax/24hrs: Temp (24hrs), Av.2 °F (37.9 °C), Min:97.2 °F (36.2 °C), Max:101.3 °F (38.5 °C)  IOBRIEF    Intake/Output Summary (Last 24 hours) at 08/21/17 1757  Last data filed at 08/21/17 0700   Gross per 24 hour   Intake          1995.54 ml   Output              600 ml   Net          1395.54 ml     General:  Intubated, unresponsive  Cardiovascular:  S1S2+, RRR  Pulmonary:  Coarse BS b/l  GI:  Soft, BS+, NT, ND  Extremities:  No edema            Medications:   Current Facility-Administered Medications   Medication Dose Route Frequency    vancomycin (VANCOCIN) 400 mg in 0.9% sodium chloride 100 mL IVPB  400 mg IntraVENous ONCE    [START ON 8/23/2017] Vancomycin random level  1 Each Other Rx Dosing/Monitoring    hydrocortisone Sod Succ (PF) (SOLU-CORTEF) injection 25 mg  25 mg IntraVENous Q6H    piperacillin-tazobactam (ZOSYN) 0.75 g in 0.9% sodium chloride 50 mL IVPB  0.75 g IntraVENous ONCE    insulin glargine (LANTUS) injection 10 Units  10 Units SubCUTAneous DAILY    LORazepam (ATIVAN) injection 1 mg  1 mg IntraVENous Q4H PRN    insulin lispro (HUMALOG) injection   SubCUTAneous Q6H    VANCOMYCIN INFORMATION NOTE   Other Rx Dosing/Monitoring    white petrolatum-mineral oil 85-15 % oint 1 Dose  1 Dose Ophthalmic QID    chlorhexidine (PERIDEX) 0.12 % mouthwash 10 mL  10 mL Oral Q12H    NOREPINephrine (LEVOPHED) 16,000 mcg in dextrose 5% 250 mL infusion  2-16 mcg/min IntraVENous TITRATE    albuterol (PROVENTIL VENTOLIN) nebulizer solution 2.5 mg  2.5 mg Nebulization Q6H PRN    piperacillin-tazobactam (ZOSYN) 2.25 g in 0.9% sodium chloride (MBP/ADV) 50 mL MBP  2.25 g IntraVENous Q12H    Piperacillin-tazobactam (ZOSYN) 0.75 gm Supplemental Dosing by Pharmacy   Other Rx Dosing/Monitoring    famotidine (PF) (PEPCID) 20 mg in sodium chloride 0.9 % 10 mL injection  20 mg IntraVENous DAILY    vits A and D-white pet-lanolin (A&D) ointment   Topical QID AFTER MEALS    collagenase (SANTYL) 250 unit/gram ointment   Topical DAILY    levETIRAcetam (KEPPRA) 500 mg in 100 ml IVPB 500 mg IntraVENous Q12H    acetaminophen (TYLENOL) tablet 650 mg  650 mg Oral Q4H PRN    lactobacillus sp. 50 billion cpu (BIO-K PLUS) capsule 1 Cap  1 Cap Oral DAILY    loperamide (IMODIUM) capsule 2 mg  2 mg Oral Q6H PRN    heparin (porcine) injection 5,000 Units  5,000 Units SubCUTAneous Q8H    heparin (porcine) 1,000 unit/mL injection 2,000 Units  2,000 Units IntraVENous DIALYSIS ONCE    simvastatin (ZOCOR) tablet 20 mg  20 mg Oral QHS    doxercalciferol (HECTOROL) 4 mcg/2 mL injection 3 mcg  3 mcg IntraVENous DIALYSIS MON, WED & FRI    epoetin benjamin (EPOGEN;PROCRIT) injection 10,000 Units  10,000 Units IntraVENous DIALYSIS MON, WED & FRI    aspirin chewable tablet 81 mg  81 mg Oral DAILY    glucose chewable tablet 16 g  4 Tab Oral PRN    glucagon (GLUCAGEN) injection 1 mg  1 mg IntraMUSCular PRN    dextrose (D50W) injection syrg 12.5-25 g  25-50 mL IntraVENous PRN    0.9% sodium chloride infusion  100 mL/hr IntraVENous DIALYSIS PRN    naloxone (NARCAN) injection 0.4 mg  0.4 mg IntraVENous PRN       Labs:    Recent Results (from the past 24 hour(s))   GLUCOSE, POC    Collection Time: 08/20/17 11:47 PM   Result Value Ref Range    Glucose (POC) 211 (H) 70 - 110 mg/dL   CBC WITH AUTOMATED DIFF    Collection Time: 08/21/17  1:00 AM   Result Value Ref Range    WBC 14.0 (H) 4.6 - 13.2 K/uL    RBC 2.88 (L) 4.70 - 5.50 M/uL    HGB 8.8 (L) 13.0 - 16.0 g/dL    HCT 27.4 (L) 36.0 - 48.0 %    MCV 95.1 74.0 - 97.0 FL    MCH 30.6 24.0 - 34.0 PG    MCHC 32.1 31.0 - 37.0 g/dL    RDW 17.8 (H) 11.6 - 14.5 %    PLATELET 504 916 - 959 K/uL    MPV 9.7 9.2 - 11.8 FL    NEUTROPHILS 86 (H) 40 - 73 %    LYMPHOCYTES 10 (L) 21 - 52 %    MONOCYTES 3 3 - 10 %    EOSINOPHILS 1 0 - 5 %    BASOPHILS 0 0 - 2 %    ABS. NEUTROPHILS 12.1 (H) 1.8 - 8.0 K/UL    ABS. LYMPHOCYTES 1.4 0.9 - 3.6 K/UL    ABS. MONOCYTES 0.4 0.05 - 1.2 K/UL    ABS. EOSINOPHILS 0.1 0.0 - 0.4 K/UL    ABS.  BASOPHILS 0.0 0.0 - 0.1 K/UL    DF AUTOMATED     MAGNESIUM Collection Time: 08/21/17  1:00 AM   Result Value Ref Range    Magnesium 2.8 (H) 1.6 - 2.6 mg/dL   PHOSPHORUS    Collection Time: 08/21/17  1:00 AM   Result Value Ref Range    Phosphorus 3.9 2.5 - 4.9 MG/DL   VANCOMYCIN, RANDOM    Collection Time: 08/21/17  1:00 AM   Result Value Ref Range    Vancomycin, random 22.0 5.0 - 11.9 UG/ML   METABOLIC PANEL, BASIC    Collection Time: 08/21/17  1:00 AM   Result Value Ref Range    Sodium 145 136 - 145 mmol/L    Potassium 3.2 (L) 3.5 - 5.5 mmol/L    Chloride 113 (H) 100 - 108 mmol/L    CO2 20 (L) 21 - 32 mmol/L    Anion gap 12 3.0 - 18 mmol/L    Glucose 221 (H) 74 - 99 mg/dL    BUN 46 (H) 7.0 - 18 MG/DL    Creatinine 8.50 (H) 0.6 - 1.3 MG/DL    BUN/Creatinine ratio 5 (L) 12 - 20      GFR est AA 8 (L) >60 ml/min/1.73m2    GFR est non-AA 7 (L) >60 ml/min/1.73m2    Calcium 7.6 (L) 8.5 - 10.1 MG/DL   POC G3    Collection Time: 08/21/17  4:43 AM   Result Value Ref Range    Device: VENT      FIO2 (POC) 28 %    pH (POC) 7.350 7.35 - 7.45      pCO2 (POC) 34.2 (L) 35.0 - 45.0 MMHG    pO2 (POC) 113 (H) 80 - 100 MMHG    HCO3 (POC) 18.7 (L) 22 - 26 MMOL/L    sO2 (POC) 98 (H) 92 - 97 %    Base deficit (POC) 7 mmol/L    Mode ASSIST CONTROL      Tidal volume 450 ml    Set Rate 16 bpm    PEEP/CPAP (POC) 5 cmH2O    Allens test (POC) N/A      Inspiratory Time 1 sec    Total resp.  rate 16      Site DRAWN FROM ARTERIAL LINE      Patient temp. 100.5      Specimen type (POC) ARTERIAL      Performed by Claudia Garcia     Volume control plus YES     GLUCOSE, POC    Collection Time: 08/21/17  5:21 AM   Result Value Ref Range    Glucose (POC) 222 (H) 70 - 110 mg/dL       Signed By: Will Rutherford MD     August 21, 2017

## 2017-08-21 NOTE — PROGRESS NOTES
Problem: Mobility Impaired (Adult and Pediatric)  Goal: *Acute Goals and Plan of Care (Insert Text)  STGs to be addressed within 3 days:  1. Bed mobility: Rolling L to R to L min/CGA with use of HR for positioning. 2. Activity Tolerance: Tolerate EOB sitting 5-10 minutes for change of position. 3. Transfer: Supine to Sit min/CGA with HR for meals. LTGs to be addressed within 7 days:  1. Transfer: Sit to stand max/mod A with RW/SW in prep for transfers. 2. Ambulation: Ambulate 5-25 ft. max/mod A with RW/SW for home mobility. Outcome: Not Met        Patient with significant decline in medical status. Not appropriate for skilled PT at present time.   Will discharge PT.

## 2017-08-21 NOTE — ROUTINE PROCESS
Bedside and Verbal shift change report given to Marbella Quintana RN (oncoming nurse) by Alen Hanna RN (offgoing nurse). Report included the following information SBAR, Kardex, Intake/Output, MAR, Med Rec Status and Cardiac Rhythm NSR.

## 2017-08-21 NOTE — DIALYSIS
ACUTE HEMODIALYSIS FLOW SHEET    HEMODIALYSIS ORDERS: Physician:      Dialyzer: Revaclear         Duration: 3.0 hr  BFR: 300 DFR: 600   Dialysate:  K+   3.0         Ca+  3.0   Weight:    kg    Bed Scale []     Unable to Obtain []      UF Goal: 0   ml   Heparin []  Bolus      Units    [] Hourly       Units    [x]None   Pre BP:  96/50    Pulse:  86     Temperature:  99.5     Respirations: 16    Tx: NS           ml/Bolus  Other        [x] N/A   Labs: Pre      Post:        [] N/A   Additional Orders:           [x] Time Out/Safety Check  [x] [facility] Consent Verified     CATHETER ACCESS: [x]N/A   []Right   []Left   []IJ     []Fem   [] First use X-ray verified     [x]Tunnel                [] Non Tunneled   []No S/S infection  []Redness  []Drainage []Cultured []Swelling []Pain   []Medical Aseptic Prep Utilized   []Dressing Changed  [] Biopatch  Date:      []Clotted   []Patent   Flows: []Good  []Poor  []Reversed   If access problem,  notified: []Yes    []N/A  Date:           GRAFT/FISTULA ACCESS:  []N/A     []Right     [x]Left     [x]UE     []LE   [x]AVG   []AVF        []Buttonhole    [x]Medical Aseptic Prep Utilized   [x]No S/S infection  []Redness  []Drainage []Cultured []Swelling []Pain    Bruit:   [x] Strong    [] Weak       Thrill :   [x] Strong    [] Weak       Needle Gauge: 15   Length:   1   If access problem,  notified: []Yes     [x]N/A  Date:        Please describe access if present and not used:       GENERAL ASSESSMENT:    LUNGS: Sat%           [x] Clear  [] Coarse  [] Crackles  [] Wheezing        [] Diminished     Location : [x]RLL   [x]LLL    [x]RUL  [x]   Cough: []Productive  []Dry  [x]N/A   Respirations:  [x]Easy  []Labored   Therapy:  []RA  []NC         l/min    Mask: []NRB []Venti       O2%                  [x]Ventilator  [x]Intubated  []Trach   CARDIAC: []Regular      [x] Irregular   [] Pericardial Rub  [] JVD        [x]  Monitored  [] N/A  Rhythm:         EDEMA: [] None [x]Generalized  [] Pitting [] 1    [] 2    [] 3    [] 4                 [] Facial  [] Pedal  []  UE  [] LE   SKIN:   [x] Warm  [] Hot     [] Cold   [x] Dry     [] Pale   [] Diaphoretic                  [] Flushed  [] Jaundiced  [] Cyanotic  [] Rash  [] Weeping   LOC:    [] Alert      []Oriented:    [] Person     [] Place  []Time               [] Confused  [] Lethargic [] Medicated  [x] Non-responsive     GI / ABDOMEN:  [] Flat    [] Distended    [x] Soft    [] Firm   []  Obese                             [] Diarrhea  [x] Bowel Sounds  [] Nausea  [] Vomiting       / URINE ASSESSMENT:[] Voiding   [x] Oliguria  [] Anuria   []  Stanton     [] Incontinent    []  Incontinent Brief      []  Bathroom Privileges     PAIN: [x] 0 []1  []2   []3   []4   []5   []6   []7   []8   []9   []10            Scale 0-10  Action/Follow Up:         MOBILITY:  [] Amb    [] Amb/Assist    [x] Bed    [] Wheelchair  [] Stretcher      All Vitals and Treatment Details on Attached [facility] [place]: [facility]      Room # 309     [] 1st Time Acute  [] Stat  [x] Routine  [] Urgent     [] Acute Room  []  Bedside  [x] ICU/CCU  [] ER   Isolation Precautions:  [x] Dialysis   [] Airborne   [x] Contact    [] Reverse   Special Considerations:         [] Blood Consent Verified [x]N/A     ALLERGIES:   [x] NKA          Code Status:  [x] Full Code  [] DNR  [] Other           HBsAg ONLY: Date Drawn 0731/17               [x]Negative []Positive []Unknown   HBsAb: Date  07/31/17           [] Susceptible   [x] [id number] []Not Drawn      Current Labs:    Date of Labs: Today []     Results for Rosario Otto (MRN [id number]) as of 8/21/2017 10:43   Ref.  Range 8/21/2017 01:00   WBC Latest Ref Range: 4.6 - 13.2 K/uL 14.0 (H)   RBC Latest Ref Range: 4.70 - 5.50 M/uL 2.88 (L)   HGB Latest Ref Range: 13.0 - 16.0 g/dL 8.8 (L)   HCT Latest Ref Range: 36.0 - 48.0 % 27.4 (L)   MCV Latest Ref Range: 74.0 - 97.0 FL 95.1   MCH Latest Ref Range: 24.0 - 34.0 PG 30.6 MCHC Latest Ref Range: 31.0 - 37.0 g/dL 32.1   RDW Latest Ref Range: 11.6 - 14.5 % 17.8 (H)   PLATELET Latest Ref Range: 135 - 420 K/uL 140   MPV Latest Ref Range: 9.2 - 11.8 FL 9.7   NEUTROPHILS Latest Ref Range: 40 - 73 % 86 (H)   LYMPHOCYTES Latest Ref Range: 21 - 52 % 10 (L)   MONOCYTES Latest Ref Range: 3 - 10 % 3   EOSINOPHILS Latest Ref Range: 0 - 5 % 1   BASOPHILS Latest Ref Range: 0 - 2 % 0   DF Latest Units:   AUTOMATED   ABS. NEUTROPHILS Latest Ref Range: 1.8 - 8.0 K/UL 12.1 (H)   ABS. LYMPHOCYTES Latest Ref Range: 0.9 - 3.6 K/UL 1.4   ABS. MONOCYTES Latest Ref Range: 0.05 - 1.2 K/UL 0.4   ABS. EOSINOPHILS Latest Ref Range: 0.0 - 0.4 K/UL 0.1   ABS.  BASOPHILS Latest Ref Range: 0.0 - 0.1 K/UL 0.0   Sodium Latest Ref Range: 136 - 145 mmol/L 145   Potassium Latest Ref Range: 3.5 - 5.5 mmol/L 3.2 (L)   Chloride Latest Ref Range: 100 - 108 mmol/L 113 (H)   CO2 Latest Ref Range: 21 - 32 mmol/L 20 (L)   Anion gap Latest Ref Range: 3.0 - 18 mmol/L 12   Glucose Latest Ref Range: 74 - 99 mg/dL 221 (H)   BUN Latest Ref Range: 7.0 - 18 MG/DL 46 (H)   Creatinine Latest Ref Range: 0.6 - 1.3 MG/DL 8.50 (H)   BUN/Creatinine ratio Latest Ref Range: 12 - 20   5 (L)   Calcium Latest Ref Range: 8.5 - 10.1 MG/DL 7.6 (L)   Phosphorus Latest Ref Range: 2.5 - 4.9 MG/DL 3.9   Magnesium Latest Ref Range: 1.6 - 2.6 mg/dL 2.8 (H)   GFR est non-AA Latest Ref Range: >60 ml/min/1.73m2 7 (L)   GFR est AA Latest Ref Range: >60 ml/min/1.73m2 8 (L)   Vancomycin, random Latest Ref Range: 5.0 - 40.0 UG/ML 22.0                                  DIET:  [] Renal    [x] Other     [] NPO     []  Diabetic      PRIMARY NURSE REPORT: First initial/Last name/Title      Pre Dialysis: [place] RN    Time: 0787       EDUCATION:    [x] Patient [] Other         Knowledge Basis: [x]None []Minimal []Substantial   [x] Access Care     [] S&S of infection     [] Fluid Management     []K+     [x]Procedural    []Albumin     [] Medications     [] Tx Options     [] Transplant     [] Diet     [] Other   Teaching Tools:  [x] Explain  [] Demo  [] Handouts [] Video   Inappropriate due to        [address] Before each treatment:     Machine Number:                   [facility]                                   [] Unit Machine #  with centralized RO                                  [x] Portable Machine #1/RO serial # [id number]                                  [] Portable Machine #2/RO serial # [id number]                                  [] Portable Machine #3/RO serial # [id number]                                                                                                       [facility]                                  [] Portable Machine #11/RO serial # [id number]                                   [] Portable Machine #12/RO serial # [id number]                                  [] Portable Machine #13/RO serial #  [id number]      Alarm Test: [x]Pass           Other:         [x] RO/Machine Log Complete   Temp    36.7           [x]Extracorporeal Circuit Tested for integrity   Dialysate: pH  7.4      Conductivity: Meter   13.8     HD Machine   14.0                  TCD: 14.0  Dialyzer Lot # [id number]        Blood Tubing Lot #[id number]                Saline Lot #  [id number]     CHLORINE TESTING-Before each treatment and every 4 hours    Total Chlorine: [x] less than 0.1 ppm  Time: 1015 4 Hr   Check Time:    (if greater than 0.1 ppm from Primary then every 30 minutes from Secondary)     TREATMENT INITIATION  with Dialysis Precautions:   [x] All Connections Secured                 [x] Saline Line Double Clamped   [x] Venous Parameters Set                  [x] Arterial Parameters Set    [x] Prime Given  ml  250                       [x]Air Foam Detector Engaged      Treatment Initiation Note: Arrived to ICU pt's room 309, Pt Intubated, on ventilation, draining FMS, secure in place, tube feeds running. On pressors.  No acute distress noted prior to HD. Accessed via left AVG and initiated HD tx. BP measure via A -line. Verbal order received to run 300 BFR and 600 DFR by Dr. Mikayla Li. Will continue to monitor during HD tx. Medication Dose Volume Route Initials Dialyzer Cleared: [] Good [x] Fair  [] Poor    Blood processed:   54 L  UF Removed:  0    Ml    Post Wt:     kg  POst BP:  124/64      Pulse: 90      Respirations: 17  Temperature: 97.2          Post Tx Vascular Access: AVF/AVG: Bleeding stopped Art   15   min. Oscar. 10  Min   N/A                                   Catheter: Locking solution: Heparin 1:1000 Art. Oscar. N/A                                 Post Assessment:                                    Skin:  [x] Warm  [x] Dry [] Diaphoretic     [] Flushed  [] Pale [] Cyanotic   [facility] Signatures Title Initials  Time Lungs: [x] Clear    [] Course  [] Crackles  [] Wheezing   Prema Stair RN NP    Cardiac: [] Regular   [x] Irregular   [] Monitor  [] N/A  Rhythm:           Edema:  [] None    [x] General     [] Facial   [] Pedal    [] UE    [] LE       Pain: [x]0  []1  []2   []3  []4   []5   []6   []7   []8   []9   []10       Post Treatment Note: Patient completed treatment, remain in unchanged condition. No acute distress or rxn noted post tx. No fluid removed during HD.       POST TREATMENT PRIMARY NURSE HANDOFF REPORT:     First initial/Last name/Title         Post Dialysis:  Jeniffer Navarro RN     Time: 0178          Abbreviations: AVG-arterial venous graft, AVF-arterial venous fistula, IJ-Internal Jugular, Subcl-Subclavian, Fem-Femoral, Tx-treatment, AP/HR-apical heart rate, DFR-dialysate flow rate, BFR-blood flow rate, AP-arterial pressure, -venous pressure, UF-ultrafiltrate, TMP-transmembrane pressure, Oscar-Venous, Art-Arterial, RO-Reverse Osmosis

## 2017-08-21 NOTE — DIABETES MGMT
GLYCEMIC CONTROL PLAN OF CARE    Assessment/Recommendations:  Pt discussed in interdisciplinary rounds. Blood glucose elevated, likely related to steroids. Lantus insulin started yesterday. Noted steroids being tapered. If blood glucose remains elevated consider increasing Lantus insulin. Continue inpatient monitoring and intervention.      Most recent blood glucose values:  8/20/2017 11:16 8/20/2017 17:02 8/20/2017 23:47 8/21/2017 05:21   340 (H) 321 (H) 211 (H) 222 (H)     Current A1C of 5.4% is equivalent to average blood glucose of 108 mg/dl over the past 2-3 months.     Current hospital diabetes medications:   Correctional Lispro insulin every 6 hours (very resistant scale)  Lantus insulin 10 units daily     Previous day's insulin requirements:   10 units of Lantus insulin   39 units of Lispro insulin      Home diabetes medications:  None     Diet:  NPO  Tube Feeding: Nepro at 55 mL via NGT    Education:  ____Refer to Diabetes Education Record             __x__Education not indicated at this time (intubated)      Fidel Gómez RD, CDE

## 2017-08-22 ENCOUNTER — APPOINTMENT (OUTPATIENT)
Dept: GENERAL RADIOLOGY | Age: 57
DRG: 004 | End: 2017-08-22
Attending: NURSE PRACTITIONER
Payer: MEDICARE

## 2017-08-22 LAB
ANION GAP SERPL CALC-SCNC: 11 MMOL/L (ref 3–18)
ARTERIAL PATENCY WRIST A: ABNORMAL
BACTERIA SPEC CULT: ABNORMAL
BACTERIA SPEC CULT: ABNORMAL
BASE EXCESS BLD CALC-SCNC: 1 MMOL/L
BASOPHILS # BLD: 0 K/UL (ref 0–0.1)
BASOPHILS NFR BLD: 0 % (ref 0–2)
BDY SITE: ABNORMAL
BODY TEMPERATURE: 98.6
BUN SERPL-MCNC: 41 MG/DL (ref 7–18)
BUN/CREAT SERPL: 7 (ref 12–20)
CALCIUM SERPL-MCNC: 8.1 MG/DL (ref 8.5–10.1)
CHLORIDE SERPL-SCNC: 105 MMOL/L (ref 100–108)
CO2 SERPL-SCNC: 25 MMOL/L (ref 21–32)
CREAT SERPL-MCNC: 6.05 MG/DL (ref 0.6–1.3)
DIFFERENTIAL METHOD BLD: ABNORMAL
EOSINOPHIL # BLD: 0.1 K/UL (ref 0–0.4)
EOSINOPHIL NFR BLD: 1 % (ref 0–5)
ERYTHROCYTE [DISTWIDTH] IN BLOOD BY AUTOMATED COUNT: 17.6 % (ref 11.6–14.5)
GAS FLOW.O2 O2 DELIVERY SYS: ABNORMAL L/MIN
GAS FLOW.O2 SETTING OXYMISER: 16 BPM
GLUCOSE BLD STRIP.AUTO-MCNC: 196 MG/DL (ref 70–110)
GLUCOSE BLD STRIP.AUTO-MCNC: 200 MG/DL (ref 70–110)
GLUCOSE BLD STRIP.AUTO-MCNC: 342 MG/DL (ref 70–110)
GLUCOSE BLD STRIP.AUTO-MCNC: 366 MG/DL (ref 70–110)
GLUCOSE SERPL-MCNC: 259 MG/DL (ref 74–99)
GRAM STN SPEC: ABNORMAL
GRAM STN SPEC: ABNORMAL
HCO3 BLD-SCNC: 23.6 MMOL/L (ref 22–26)
HCT VFR BLD AUTO: 29.2 % (ref 36–48)
HGB BLD-MCNC: 9.5 G/DL (ref 13–16)
INSPIRATION.DURATION SETTING TIME VENT: 1 SEC
LYMPHOCYTES # BLD: 1.1 K/UL (ref 0.9–3.6)
LYMPHOCYTES NFR BLD: 9 % (ref 21–52)
MAGNESIUM SERPL-MCNC: 2.6 MG/DL (ref 1.6–2.6)
MCH RBC QN AUTO: 30.5 PG (ref 24–34)
MCHC RBC AUTO-ENTMCNC: 32.5 G/DL (ref 31–37)
MCV RBC AUTO: 93.9 FL (ref 74–97)
MONOCYTES # BLD: 1.2 K/UL (ref 0.05–1.2)
MONOCYTES NFR BLD: 10 % (ref 3–10)
NEUTS SEG # BLD: 10.1 K/UL (ref 1.8–8)
NEUTS SEG NFR BLD: 80 % (ref 40–73)
O2/TOTAL GAS SETTING VFR VENT: 28 %
PCO2 BLD: 29.8 MMHG (ref 35–45)
PEEP RESPIRATORY: 5 CMH2O
PH BLD: 7.51 [PH] (ref 7.35–7.45)
PHOSPHATE SERPL-MCNC: 2.4 MG/DL (ref 2.5–4.9)
PLATELET # BLD AUTO: 168 K/UL (ref 135–420)
PMV BLD AUTO: 11 FL (ref 9.2–11.8)
PO2 BLD: 130 MMHG (ref 80–100)
POTASSIUM SERPL-SCNC: 2.9 MMOL/L (ref 3.5–5.5)
RBC # BLD AUTO: 3.11 M/UL (ref 4.7–5.5)
SAO2 % BLD: 99 % (ref 92–97)
SERVICE CMNT-IMP: ABNORMAL
SERVICE CMNT-IMP: ABNORMAL
SODIUM SERPL-SCNC: 141 MMOL/L (ref 136–145)
SPECIMEN TYPE: ABNORMAL
TOTAL RESP. RATE, ITRR: 16
VENTILATION MODE VENT: ABNORMAL
VOLUME CONTROL PLUS IVLCP: YES
VT SETTING VENT: 450 ML
WBC # BLD AUTO: 12.5 K/UL (ref 4.6–13.2)

## 2017-08-22 PROCEDURE — 74011250636 HC RX REV CODE- 250/636: Performed by: HOSPITALIST

## 2017-08-22 PROCEDURE — 83735 ASSAY OF MAGNESIUM: CPT | Performed by: NURSE PRACTITIONER

## 2017-08-22 PROCEDURE — 74011250637 HC RX REV CODE- 250/637: Performed by: NURSE PRACTITIONER

## 2017-08-22 PROCEDURE — 77030033263 HC DRSG MEPILEX 16-48IN BORD MOLN -B

## 2017-08-22 PROCEDURE — 80048 BASIC METABOLIC PNL TOTAL CA: CPT | Performed by: NURSE PRACTITIONER

## 2017-08-22 PROCEDURE — 85025 COMPLETE CBC W/AUTO DIFF WBC: CPT | Performed by: NURSE PRACTITIONER

## 2017-08-22 PROCEDURE — 74011250637 HC RX REV CODE- 250/637: Performed by: INTERNAL MEDICINE

## 2017-08-22 PROCEDURE — 74011250636 HC RX REV CODE- 250/636: Performed by: INTERNAL MEDICINE

## 2017-08-22 PROCEDURE — 84100 ASSAY OF PHOSPHORUS: CPT | Performed by: NURSE PRACTITIONER

## 2017-08-22 PROCEDURE — 74011250637 HC RX REV CODE- 250/637: Performed by: PHYSICIAN ASSISTANT

## 2017-08-22 PROCEDURE — 74011636637 HC RX REV CODE- 636/637: Performed by: INTERNAL MEDICINE

## 2017-08-22 PROCEDURE — 74011000258 HC RX REV CODE- 258: Performed by: NURSE PRACTITIONER

## 2017-08-22 PROCEDURE — 36600 WITHDRAWAL OF ARTERIAL BLOOD: CPT

## 2017-08-22 PROCEDURE — 71010 XR CHEST PORT: CPT

## 2017-08-22 PROCEDURE — 74011000258 HC RX REV CODE- 258: Performed by: INTERNAL MEDICINE

## 2017-08-22 PROCEDURE — 65610000006 HC RM INTENSIVE CARE

## 2017-08-22 PROCEDURE — 82962 GLUCOSE BLOOD TEST: CPT

## 2017-08-22 PROCEDURE — 74011000250 HC RX REV CODE- 250: Performed by: PHYSICIAN ASSISTANT

## 2017-08-22 PROCEDURE — 94003 VENT MGMT INPAT SUBQ DAY: CPT

## 2017-08-22 PROCEDURE — 74011250637 HC RX REV CODE- 250/637: Performed by: HOSPITALIST

## 2017-08-22 PROCEDURE — 74011636637 HC RX REV CODE- 636/637: Performed by: NURSE PRACTITIONER

## 2017-08-22 PROCEDURE — 82803 BLOOD GASES ANY COMBINATION: CPT

## 2017-08-22 PROCEDURE — 74011000250 HC RX REV CODE- 250: Performed by: NURSE PRACTITIONER

## 2017-08-22 RX ORDER — SODIUM,POTASSIUM PHOSPHATES 280-250MG
1 POWDER IN PACKET (EA) ORAL 2 TIMES DAILY
Status: COMPLETED | OUTPATIENT
Start: 2017-08-22 | End: 2017-08-23

## 2017-08-22 RX ORDER — POTASSIUM CHLORIDE 1.5 G/1.77G
60 POWDER, FOR SOLUTION ORAL
Status: COMPLETED | OUTPATIENT
Start: 2017-08-22 | End: 2017-08-22

## 2017-08-22 RX ADMIN — INSULIN GLARGINE 10 UNITS: 100 INJECTION, SOLUTION SUBCUTANEOUS at 10:31

## 2017-08-22 RX ADMIN — MINERAL OIL AND WHITE PETROLATUM 1 DOSE: 150; 850 OINTMENT OPHTHALMIC at 23:23

## 2017-08-22 RX ADMIN — VITAMIN A AND D: 30.8 OINTMENT TOPICAL at 22:00

## 2017-08-22 RX ADMIN — LEVETIRACETAM 500 MG: 5 INJECTION INTRAVENOUS at 10:28

## 2017-08-22 RX ADMIN — HEPARIN SODIUM 5000 UNITS: 5000 INJECTION, SOLUTION INTRAVENOUS; SUBCUTANEOUS at 19:11

## 2017-08-22 RX ADMIN — INSULIN LISPRO 3 UNITS: 100 INJECTION, SOLUTION INTRAVENOUS; SUBCUTANEOUS at 05:52

## 2017-08-22 RX ADMIN — POTASSIUM & SODIUM PHOSPHATES POWDER PACK 280-160-250 MG 1 PACKET: 280-160-250 PACK at 19:13

## 2017-08-22 RX ADMIN — HEPARIN SODIUM 5000 UNITS: 5000 INJECTION, SOLUTION INTRAVENOUS; SUBCUTANEOUS at 10:25

## 2017-08-22 RX ADMIN — VITAMIN A AND D: 30.8 OINTMENT TOPICAL at 10:22

## 2017-08-22 RX ADMIN — CHLORHEXIDINE GLUCONATE 10 ML: 1.2 RINSE ORAL at 22:22

## 2017-08-22 RX ADMIN — HYDROCORTISONE SODIUM SUCCINATE 25 MG: 100 INJECTION, POWDER, FOR SOLUTION INTRAMUSCULAR; INTRAVENOUS at 19:09

## 2017-08-22 RX ADMIN — NOREPINEPHRINE BITARTRATE 2 MCG/MIN: 1 INJECTION INTRAVENOUS at 23:22

## 2017-08-22 RX ADMIN — INSULIN LISPRO 12 UNITS: 100 INJECTION, SOLUTION INTRAVENOUS; SUBCUTANEOUS at 23:53

## 2017-08-22 RX ADMIN — MINERAL OIL AND WHITE PETROLATUM 1 DOSE: 150; 850 OINTMENT OPHTHALMIC at 14:35

## 2017-08-22 RX ADMIN — POTASSIUM & SODIUM PHOSPHATES POWDER PACK 280-160-250 MG 1 PACKET: 280-160-250 PACK at 13:30

## 2017-08-22 RX ADMIN — HYDROCORTISONE SODIUM SUCCINATE 25 MG: 100 INJECTION, POWDER, FOR SOLUTION INTRAMUSCULAR; INTRAVENOUS at 14:34

## 2017-08-22 RX ADMIN — Medication 1 PACKET: at 19:10

## 2017-08-22 RX ADMIN — MINERAL OIL AND WHITE PETROLATUM 1 DOSE: 150; 850 OINTMENT OPHTHALMIC at 09:00

## 2017-08-22 RX ADMIN — PIPERACILLIN AND TAZOBACTAM 2.25 G: 2; .25 INJECTION, POWDER, FOR SOLUTION INTRAVENOUS at 22:20

## 2017-08-22 RX ADMIN — HYDROCORTISONE SODIUM SUCCINATE 25 MG: 100 INJECTION, POWDER, FOR SOLUTION INTRAMUSCULAR; INTRAVENOUS at 23:52

## 2017-08-22 RX ADMIN — POTASSIUM CHLORIDE 60 MEQ: 1.5 POWDER, FOR SOLUTION ORAL at 10:00

## 2017-08-22 RX ADMIN — ASPIRIN 81 MG 81 MG: 81 TABLET ORAL at 10:40

## 2017-08-22 RX ADMIN — VANCOMYCIN HYDROCHLORIDE 316 MG: 5 INJECTION, POWDER, LYOPHILIZED, FOR SOLUTION INTRAVENOUS at 11:32

## 2017-08-22 RX ADMIN — PIPERACILLIN AND TAZOBACTAM 2.25 G: 2; .25 INJECTION, POWDER, FOR SOLUTION INTRAVENOUS at 10:29

## 2017-08-22 RX ADMIN — CHLORHEXIDINE GLUCONATE 10 ML: 1.2 RINSE ORAL at 10:00

## 2017-08-22 RX ADMIN — VITAMIN A AND D: 30.8 OINTMENT TOPICAL at 19:14

## 2017-08-22 RX ADMIN — ACETAMINOPHEN 650 MG: 325 TABLET ORAL at 19:12

## 2017-08-22 RX ADMIN — MINERAL OIL AND WHITE PETROLATUM 1 DOSE: 150; 850 OINTMENT OPHTHALMIC at 19:14

## 2017-08-22 RX ADMIN — LEVETIRACETAM 500 MG: 5 INJECTION INTRAVENOUS at 22:20

## 2017-08-22 RX ADMIN — HEPARIN SODIUM 5000 UNITS: 5000 INJECTION, SOLUTION INTRAVENOUS; SUBCUTANEOUS at 02:30

## 2017-08-22 RX ADMIN — SIMVASTATIN 20 MG: 10 TABLET, FILM COATED ORAL at 22:26

## 2017-08-22 RX ADMIN — INSULIN LISPRO 6 UNITS: 100 INJECTION, SOLUTION INTRAVENOUS; SUBCUTANEOUS at 00:12

## 2017-08-22 RX ADMIN — VITAMIN A AND D: 30.8 OINTMENT TOPICAL at 14:35

## 2017-08-22 RX ADMIN — COLLAGENASE SANTYL: 250 OINTMENT TOPICAL at 09:00

## 2017-08-22 RX ADMIN — Medication 1 PACKET: at 11:00

## 2017-08-22 RX ADMIN — HYDROCORTISONE SODIUM SUCCINATE 25 MG: 100 INJECTION, POWDER, FOR SOLUTION INTRAMUSCULAR; INTRAVENOUS at 05:55

## 2017-08-22 RX ADMIN — FAMOTIDINE 20 MG: 10 INJECTION INTRAVENOUS at 10:24

## 2017-08-22 RX ADMIN — HYDROCORTISONE SODIUM SUCCINATE 25 MG: 100 INJECTION, POWDER, FOR SOLUTION INTRAMUSCULAR; INTRAVENOUS at 00:16

## 2017-08-22 RX ADMIN — Medication 1 CAPSULE: at 10:21

## 2017-08-22 NOTE — DIABETES MGMT
GLYCEMIC CONTROL PLAN OF CARE    Assessment/Recommendations:  Pt discussed in interdisciplinary rounds. Blood glucose elevated. Steroids reduced yesterday.  If blood glucose does not continue to trend down consider increasing Lantus insulin   Pt is tolerating tube feeds at goal  Continue inpatient monitoring and intervention    Most recent blood glucose values:  8/21/2017 05:21 8/21/2017 20:23 8/22/2017 00:06 8/22/2017 05:51   222 (H) 345 (H) 200 (H) 196 (H)     Current A1C of 5.4% is equivalent to average blood glucose of 108 mg/dl over the past 2-3 months.      Current hospital diabetes medications:   Correctional Lispro insulin every 6 hours (very resistant scale)  Lantus insulin 10 units daily      Previous day's insulin requirements:   10 units of Lantus insulin   18 units of Lispro insulin       Home diabetes medications:  None      Diet:  NPO  Tube Feeding: Nepro at 55 mL via NGT     Education:  ____Refer to Diabetes Education Record                                  __x__Education not indicated at this time (intubated)      Jose Luis Cohen RD, CDE

## 2017-08-22 NOTE — PROGRESS NOTES
[facility]  ICU Progress Note      Name: Debby Barksdale   : 1960   MRN: [id number]   Date: 2017 3:29 PM     [x]I have reviewed the flowsheet and previous days notes. Events overnight reviewed and discussed with nursing staff. Vital signs and records reviewed. HPI:   17   Remains on levophed. Low grade fever. Not breathing over vent. Anuric. HD M,W,F. Not following commands or withdrawing to pain. Pupils pin point and fixed. Negative corneal reflex. (+) cough and gag. Scant tracheal secretions. ROS:Review of systems not obtained due to patient factors.     Medication Review:  · Pressors -  · Sedation - PRN ativan for myoclonus   · Antibiotics - Vancomycin and zosyn   · Pain -  · GI/ DVT - Pepcid and sq heparin   · Others (other gtts)    Safety Bundles: VAP Bundle/Severe Sepsis Protocol    Vital Signs:    Visit Vitals    /55    Pulse 79    Temp 99.5 °F (37.5 °C)    Resp 16    Ht 6' 2\" (1.88 m)    Wt 64.9 kg (143 lb 1.3 oz)    SpO2 100%    BMI 18.37 kg/m2       O2 Device: Endotracheal tube, Ventilator   O2 Flow Rate (L/min): 0 l/min   Temp (24hrs), Av.2 °F (37.3 °C), Min:97.2 °F (36.2 °C), Max:100 °F (37.8 °C)       Intake/Output:   Last shift:         Last 3 shifts:  1901 -  0700  In: 3755.5 [I.V.:950.5]  Out: 1400 [Drains:1400]    Intake/Output Summary (Last 24 hours) at 17 1119  Last data filed at 17 0700   Gross per 24 hour   Intake          1921.21 ml   Output             1000 ml   Net           921.21 ml       Ventilator Settings:  Ventilator  Mode: Assist control, VC+  Respiratory Rate  Resp Rate Observed: 15  Back-Up Rate: 16  Insp Time (sec): 1 sec  Insp Flow (l/min): 49 l/min  I:E Ratio: 1;1.8  Ventilator Volumes  Vt Set (ml): 450 ml  Vt Exhaled (Machine Breath) (ml): 467 ml  Vt Spont (ml): 83 ml  Ve Observed (l/min): 7.55 l/min  Ventilator Pressures  Pressure Support (cm H2O): 7 cm H2O  PIP Observed (cm H2O): 15 cm H2O  Plateau Pressure (cm H2O): 11 cm H2O  MAP (cm H2O): 8.2  PEEP/VENT (cm H2O): 5 cm H20  Auto PEEP Observed (cm H2O): 0.6 cm H2O    Physical Exam:  General: Intubated, critically ill, unresponsive  HEENT:  Anicteric sclerae; pink palpebral conjunctivae; Pupil non reactive to light. Resp:  Symmetrical chest expansion, no accessory muscle use; good airway entry; no rales/ wheezing/ rhonchi noted  CV:  S1, S2 present;  GI:  Abdomen soft, non-tender; (+) active bowel sounds  Extremities:  +2 pulses on all extremities; + thrill and bruit to L AV fistula   Skin:  Dressing to sacrum. Erythematous perineum   Neurologic:  Facial myoclonus, Pupils fixed with disconjugate upward deviated gaze. (+) cough/gag. Negative corneal reflex no withdrawal to pain.     Devices:  NGT, ETT, A line, CVL left IJ      DATA:     Current Facility-Administered Medications   Medication Dose Route Frequency    banana flakes trans-galactooligosaccharide (BANATROL PLUS) powder 1 Packet  1 Packet Per NG tube TID    potassium, sodium phosphates (NEUTRA-PHOS) packet 1 Packet  1 Packet Oral BID    vancomycin (VANCOCIN) 316 mg in 0.9% sodium chloride 100 mL IVPB  316 mg IntraVENous ONCE    [START ON 8/23/2017] Vancomycin random level  1 Each Other Rx Dosing/Monitoring    hydrocortisone Sod Succ (PF) (SOLU-CORTEF) injection 25 mg  25 mg IntraVENous Q6H    levETIRAcetam (KEPPRA) 500 mg in saline (iso-osm) 100 ml IVPB  500 mg IntraVENous Q12H    piperacillin-tazobactam (ZOSYN) 2.25 g in 0.9% sodium chloride (MBP/ADV) 50 mL MBP  2.25 g IntraVENous Q12H    insulin glargine (LANTUS) injection 10 Units  10 Units SubCUTAneous DAILY    LORazepam (ATIVAN) injection 1 mg  1 mg IntraVENous Q4H PRN    insulin lispro (HUMALOG) injection   SubCUTAneous Q6H    VANCOMYCIN INFORMATION NOTE   Other Rx Dosing/Monitoring    white petrolatum-mineral oil 85-15 % oint 1 Dose  1 Dose Ophthalmic QID    chlorhexidine (PERIDEX) 0.12 % mouthwash 10 mL 10 mL Oral Q12H    NOREPINephrine (LEVOPHED) 16,000 mcg in dextrose 5% 250 mL infusion  2-16 mcg/min IntraVENous TITRATE    albuterol (PROVENTIL VENTOLIN) nebulizer solution 2.5 mg  2.5 mg Nebulization Q6H PRN    Piperacillin-tazobactam (ZOSYN) 0.75 gm Supplemental Dosing by Pharmacy   Other Rx Dosing/Monitoring    famotidine (PF) (PEPCID) 20 mg in sodium chloride 0.9 % 10 mL injection  20 mg IntraVENous DAILY    vits A and D-white pet-lanolin (A&D) ointment   Topical QID AFTER MEALS    collagenase (SANTYL) 250 unit/gram ointment   Topical DAILY    acetaminophen (TYLENOL) tablet 650 mg  650 mg Oral Q4H PRN    lactobacillus sp. 50 billion cpu (BIO-K PLUS) capsule 1 Cap  1 Cap Oral DAILY    loperamide (IMODIUM) capsule 2 mg  2 mg Oral Q6H PRN    heparin (porcine) injection 5,000 Units  5,000 Units SubCUTAneous Q8H    heparin (porcine) 1,000 unit/mL injection 2,000 Units  2,000 Units IntraVENous DIALYSIS ONCE    simvastatin (ZOCOR) tablet 20 mg  20 mg Oral QHS    doxercalciferol (HECTOROL) 4 mcg/2 mL injection 3 mcg  3 mcg IntraVENous DIALYSIS MON, WED & FRI    epoetin benjamin (EPOGEN;PROCRIT) injection 10,000 Units  10,000 Units IntraVENous DIALYSIS MON, WED & FRI    aspirin chewable tablet 81 mg  81 mg Oral DAILY    glucose chewable tablet 16 g  4 Tab Oral PRN    glucagon (GLUCAGEN) injection 1 mg  1 mg IntraMUSCular PRN    dextrose (D50W) injection syrg 12.5-25 g  25-50 mL IntraVENous PRN    0.9% sodium chloride infusion  100 mL/hr IntraVENous DIALYSIS PRN    naloxone (NARCAN) injection 0.4 mg  0.4 mg IntraVENous PRN         Labs: Results:       Chemistry Recent Labs      08/22/17   0600  08/21/17   0100  08/20/17   0225   GLU  259*  221*  297*   NA  141  145  142   K  2.9*  3.2*  3.8   CL  105  113*  112*   CO2  25  20*  21   BUN  41*  46*  31*   CREA  6.05*  8.50*  6.95*   CA  8.1*  7.6*  7.7*   AGAP  11  12  9   BUCR  7*  5*  4*   AP   --    --   85   TP   --    --   7.0   ALB   --    --   2.1* GLOB   --    --   4.9*   AGRAT   --    --   0.4*      CBC w/Diff Recent Labs      08/22/17   0600  08/21/17   0100  08/20/17   0225   WBC  12.5  14.0*  15.0*   RBC  3.11*  2.88*  3.15*   HGB  9.5*  8.8*  9.6*   HCT  29.2*  27.4*  29.9*   PLT  168  140  430*   GRANS  80*  86*  84*   LYMPH  9*  10*  10*   EOS  1  1  0      Coagulation No results for input(s): PTP, INR, APTT in the last 72 hours. No lab exists for component: INREXT, INREXT    Liver Enzymes Recent Labs      08/20/17 0225   TP  7.0   ALB  2.1*   AP  85   SGOT  13*      ABG Lab Results   Component Value Date/Time    PHI 7.507 (H) 08/22/2017 05:04 AM    PCO2I 29.8 (L) 08/22/2017 05:04 AM    PO2I 130 (H) 08/22/2017 05:04 AM    HCO3I 23.6 08/22/2017 05:04 AM    FIO2I 28 08/22/2017 05:04 AM      Microbiology No results for input(s): CULT in the last 72 hours. Telemetry: [x]Sinus []A-flutter []Paced    []A-fib []Multiple PVCs                  Imaging:  CXR 8/22/17: Stable and well-positioned support lines and tubes. No acute  radiographic cardiopulmonary findings. IMPRESSION:   · PEA Cardiac arrest in dialysis 8/18/17  · S/P PEA Cardiac arrest on this admission 7/27/17, normal cath  · Acute Respirtatory Failure requiring Mechanical Ventilation above  · Acute Anoxic Encephalopathy with acute with hyperdense cortex on CT   · Hypotension- distributive vs cardiogenic vs obstructive- on pressors   · ESRD requiring Dialysis - completed 3 hrs of dialysis today prior to code blue  · Sepsis Bacteremia - resolved   · DM2  · Oropharyngeal dysphagia - Needs peg tube  · Small Pericardial Effusion on Echo 8/7/17  · Cachexia  · Unstageable pressure ulcer to sacrum/coccyx  · Diarrhea- Cdiff negative       PLAN:   · Resp - stable on vent, not breathing above vent. VAP Bundle. Aspiration precautions. HOB > 30 degrees. PRN nebz. · ID - Patient is s/p RX for ecoli and clostridium perfringens. + Fevers overnight. Repeat bcx and sputum cx NGTD from 8/18/17.  On vancomycin and zosyn. Stress dose steroids. · CVS - Titrate levophed gtt with goal for MAP > 65 mm/hg. Echo results-pending. · Heme/onc - Stable H/H and platelets. Daily CBC. · Metabolic - Monitor and replace lytes per provider. · Renal - HD per nephrology service   · Endocrine - SSI for glycemic control. Lantus daily. · Neuro/ Pain/ Sedation - Neurology following, new myoclonic jerking, note poor prognosis. Continue Keppra. · GI - Continue Tube feeds. · Prophylaxis - DVT- Sq heparin, GI- pepcid   · Full Code- will contact family for possible hospice considerations. · Wound care following.   · Discussed in interdisciplinary rounds          The patient is: [] acutely ill Risk of deterioration: [] moderate    [x] critically ill  [x] high     [x]See my orders for details    My assessment/plan was discussed with:  [x]nursing []PT/OT    [x]respiratory therapy [x]Dr. Jennifer Rodriguez    []family []       Mateo Reich PA-C

## 2017-08-22 NOTE — INTERDISCIPLINARY ROUNDS
CRITICAL CARE INTERDISCIPLINARY ROUNDS      Patient Information:    Name:   Agustin Mcleod    Age:   55 y.o.     Admission Date:   7/27/2017    Readmit Risk Assessment Information:      Readmit Risk Tool Support Systems: Family member(s), Relationship with Primary Physician Group: Seen at least one time within past 12 months    Surgery Date:      Day of Stay:     Expected Discharge Date:        Attending Provider:   Rhea Burden MD    Surgeon:        Consultant:       Primary Care Provider:   Efrem Frost MD    Problem List:     Patient Active Problem List   Diagnosis Code    Anemia D64.9    Acidosis E87.2    Cardiac arrest ([address] I46.9    Respiratory failure ([address] J96.90    ESRD needing dialysis ([facility] N18.6, Z99.2    Anoxic brain damage ([address] G93.1    Colitis K52.9    Hypotension I95.9    Acute GI bleeding K92.2    ESRD (end stage renal disease) ([address] N18.6    Sepsis ([address] A41.9    Oropharyngeal dysphagia R13.12    Cachexia ([address] R64       Principal Problem:  Sepsis ([address]    Procedure:       During rounds the following quality care indicators and evidence based practices were addressed :     DVT Prophylaxis, Pressure Injury Prevention, Pain Management, Nutritional Status, Critical Care Interventions Airways, Drains, Lines and Pressors and IHI Bundles: Central Line Bundle Followed , Stanton Bundle Followed and Vent Bundle Followed, Vent Day 4           Acute MI/PCI:   Not applicable    Heart Failure:    Not applicable    Cardiac Surgery:  Not applicable    SCIP Measures for other Surgeries:   Not applicable    Pneumonia:    Appropriate Antibiotic Selection (ICU versus Non-ICU)    Stroke:  Patient's Personal Risk Factors for Stroke are:   hypertension, family history, hyperlipidemia or diabetes mellitus    NIH Stroke Score  Total: 8 (08/17/17 0400)    Transfer Level of Care:  Not Ready for Transfer    The patient will require the following interventions based on the Readmission Risk Assessment: Pharmacy evaluation and teaching, Care Management involvement for home health follow up for:  mobility and assistance with ADL's and Spiritual Care evaluation      Discharge Management:  Group Home and Palliative Care    Anticipated Discharge Date:  3      Interdisciplinary team rounds were held  with the following team membersCare Management, Diabetes Treatment Specialist, Nursing, Nutrition, Pastoral Care, Pharmacy, Physician and Clinical Coordinator and the  patient and healthcare POA (documentation required). Plan of care discussed. See clinical pathway and/or care plan for interventions and desired outcomes. Bannitol added to regimen and potassium replaced. Comfort care discussion with daughter to be called.

## 2017-08-22 NOTE — PROGRESS NOTES
attended the interdisciplinary rounds for [facility], who is a 55 y. o.,male. Patients Primary Language is: [place]. According to the patients EMR Mandaen Affiliation is: [facility].     The reason the Patient came to the hospital is:   Patient Active Problem List    Diagnosis Date Noted    Oropharyngeal dysphagia 08/17/2017    Cachexia ([facility] [address] 08/17/2017    Acidosis 07/27/2017    Cardiac arrest ([address] 07/27/2017    Respiratory failure ([address] 07/27/2017    ESRD needing dialysis ([address] 07/27/2017    Anoxic brain damage ([address] 07/27/2017    Colitis 07/27/2017    Hypotension 07/27/2017    Acute GI bleeding 07/27/2017    ESRD (end stage renal disease) ([address] 07/27/2017    Sepsis ([address] 07/27/2017    Anemia         Plan:  Chaplains will continue to follow and will provide pastoral care on an as needed/requested basis.  recommends bedside caregivers page  on duty if patient shows signs of acute spiritual or emotional distress.     [address] [facility] [employer]  Board Certified [address] [facility]   [phone]

## 2017-08-22 NOTE — ROUTINE PROCESS
Bedside and Verbal shift change report given to Mary Grace Banda RN (oncoming nurse) by Abhilash Sanchez RN (offgoing nurse).  Report included the following information SBAR, Kardex, ED Summary, Procedure Summary, Intake/Output, MAR, Recent Results and Cardiac Rhythm SR.

## 2017-08-22 NOTE — PROGRESS NOTES
NUTRITION    Nursing Referral: MST, Pressure Ulcer  Nutrition Consult: Management of Tube Feeding     RECOMMENDATIONS / PLAN:     - Continue tube feeding of Nepro at goal of 55 mL/hr with 150 mL q 4 hour water flushes.   - Add Banatrol TID for management of loose stool.   - Continue RD inpatient monitoring and evaluation. Goal Regimen: Nepro at 55 mL/hr + 150 mL q 4 hour water flushes to provide: 2376 kcal, 107 gm protein, 127 gm fat, 220 gm CHO, 21 gm fiber, 957 mL free water, 1857 mL total water, 100% RDIs     NUTRITION INTERVENTIONS & DIAGNOSIS:     [x] Enteral nutrition support: continue   [x] Coordination of care: interdisciplinary rounds, discussed nutrition plan with MD     Nutrition Diagnosis: increased protein needs related to promotion of wound healing and increased expenditure as evidenced by pressure ulcer wound and ESRD, pt on dialysis 3x per week  Inadequate oral intake related to inability to tolerate po as evidenced by NPO. ASSESSMENT:     8/22: Tolerating feeds at goal. Loose stool, will add prebiotic fiber supplement. 8/21: Pt intubated after PEA arrest during HD 8/18, PEG placement postponed. Tolerating feeds at goal.   8/18: Tolerating tube feeds at goal.  Tube feeds held today for PEG placement. 8/15: Tube feeds started this morning. Pt tolerating at rate of 30 mL/hr  8/14: SLP following; recommend NPO. Noted plan for NGT placement and start tube feeds. Potassium WNL. 8/10: Pt reported good appetite and \"eating enough\" from his meals. Noted fair meal intake per chart. Consuming supplements. Discussed increasing Nepro frequency; pt declined. Discussed adding Ensure Pudding; pt agreed with plan. Po intake of meals/supplements encouraged. Has been receiving K replacement. 8/7: K remains low despite previous tube feeding changed to higher potassium formula. Pt extubated 8/5. Tube feeds discontinued 8/6. SLP following; pt s/p MBS today and started on po diet.  Noted poor po intake lunch meal per chart. 8/3: K remains low despite continued replacement. Will change to higher potassium formula per discussion with PA.   8/1: Tolerating feeding at goal. 1 L removed during HD 7/31. Hyperglycemia noted, advance to very insulin resistant SSI and basal insulin started. 7/31: Tolerating advancement of tube feeding. HD today. BG trending up, MD to stop D5.    7/30: Pt remain intubated. GI following; plan to start tube feeds today. Noted order placed; discussed modifying tube feed order and add water flushes with Dr Chester Carlin. RN unavailable; discussed with Nursing [facility] regarding modifying tube feed order  7/28: S/p cardiac arrest and intubated 7/27, NGT clamped. Abdominal ultrasound today to rule out cholecystitis. Will wait to feed. EN infusion adequate to meet patients estimated nutritional needs:   [x] Yes     [] No   [] Unable to determine at this time    Tube Feeding: Nepro at 55 mL via NGT  Water Flushes: 150 mL q 4 hour  Residuals: 0 mL    Diet: DIET TUBE FEEDING  DIET NPO  DIET NUTRITIONAL SUPPLEMENTS Breakfast, Lunch, Dinner; [employer]       Food Allergies: NKFA  Current Appetite:   [] Good     [] Fair     [] Poor     [x] Other: NPO   Appetite/meal intake prior to admission:   [] Good     [] Fair     [] Poor     [x] Other: unknown   Feeding Limitations:  [x] Swallowing difficulty: SLP following; recommended NPO on 8/14    [] Chewing difficulty:    [x] Other: intubated       Anuric   BM: 8/22;  FMS  Skin Integrity:  stage II ressure ulcer anus; DTI sacrum; moisture associated skin damage posterior buttocks  Edema: none  Pertinent Medications: Reviewed: steroid, lantus, SSI, lactobacillus    Recent Labs      08/22/17   0600  08/21/17   0100  08/20/17   0225   NA  141  145  142   K  2.9*  3.2*  3.8   CL  105  113*  112*   CO2  25  20*  21   GLU  259*  221*  297*   BUN  41*  46*  31*   CREA  6.05*  8.50*  6.95*   CA  8.1*  7.6*  7.7*   MG  2.6  2.8*  2.6   PHOS  2.4*  3.9  2.8   ALB   -- --   2.1*   SGOT   --    --   13*   ALT   --    --   10*       Intake/Output Summary (Last 24 hours) at 08/22/17 0942  Last data filed at 08/22/17 0700   Gross per 24 hour   Intake          1921.21 ml   Output             1000 ml   Net           921.21 ml       Anthropometrics:  Ht Readings from Last 1 Encounters:   08/15/17 6' 2\" (1.88 m)     Last 3 Recorded Weights in this Encounter    08/19/17 0546 08/20/17 0745 08/21/17 0400   Weight: 64.5 kg (142 lb 3.2 oz) 64.5 kg (142 lb 3.2 oz) 64.9 kg (143 lb 1.3 oz)     Body mass index is 18.37 kg/(m^2). Underweight     Weight History:   Weight Metrics 8/21/2017   Weight 143 lb 1.3 oz   BMI 18.37 kg/m2        Admitting Diagnosis: Cardiac arrest (HCC)  Acidosis  Respiratory failure (HCC)  Anemia  ESRD needing dialysis ([facility] [address]  Cardiac arrest ([facility] [address]  Acute GI bleeding  Sepsis ([facility] [address]  Hypotension  Anoxic brain damage (HCC)  ESRD (end stage renal disease) ([facility] [address]  Colitis  dx  dx  Pertinent PMHx: DM, HTN, IBS, ESRD    Education Needs:        [x] None identified  [] Identified - Not appropriate at this time  []  Identified and addressed - refer to education log  Learning Limitations:   [] None identified  [x] Identified: intubated      Cultural, Druze & ethnic food preferences:  [x] None identified    [] Identified and addressed     ESTIMATED NUTRITION NEEDS:     Calories: 1480-4667 kcal ([id number]) based on  [x] Actual BW 65 kg      [] SBW  Protein:  gm (1.2-2 gm/kg) based on  [x] Actual BW      [] SBW   Fluid: 5722-3048 mL     MONITORING & EVALUATION:     Nutrition Goal(s):   1. Patient will tolerate enteral nutrition formula and regimen without difficulty within the next 7 days. Outcome:  [x] Met/Ongoing    []  Not Met    [] New/Initial Goal   2. Patient will meet their estimated nutritional needs through adequate enteral nutrition within the next 7 days.  Outcome:  [x] Met/Ongoing    []  Not Met/Progressing    [] New/Initial Goal      Monitoring:   [x] EN tolerance    [x] EN infusion   [] Diet tolerance   [] Meal intake   [] Supplement intake   [] GI symptoms/ability to tolerate po diet   [x] Respiratory status   [x] Plan of care      Previous Recommendations (for follow-up assessments only):     [x]   Implemented       []   Not Implemented (RD to address)     [] No Recommendation Made     Discharge Planning: goal enteral nutrition regimen   [x] Participated in care planning, discharge planning, & interdisciplinary rounds as appropriate       Dheeraj Peguero, [address]   Pager: [phone]

## 2017-08-22 NOTE — PROGRESS NOTES
RENAL DAILY PROGRESS NOTE    Subjective:   Admitted postcode, seen for ESRD and HD    Complaint: remains intubated, unresponsive, on pressors, still with diarrhea    Current Facility-Administered Medications   Medication Dose Route Frequency    potassium chloride (KLOR-CON) packet 60 mEq  60 mEq Oral NOW    banana flakes trans-galactooligosaccharide (BANATROL PLUS) powder 1 Packet  1 Packet Per NG tube TID    [START ON 8/23/2017] Vancomycin random level  1 Each Other Rx Dosing/Monitoring    hydrocortisone Sod Succ (PF) (SOLU-CORTEF) injection 25 mg  25 mg IntraVENous Q6H    levETIRAcetam (KEPPRA) 500 mg in saline (iso-osm) 100 ml IVPB  500 mg IntraVENous Q12H    piperacillin-tazobactam (ZOSYN) 2.25 g in 0.9% sodium chloride (MBP/ADV) 50 mL MBP  2.25 g IntraVENous Q12H    insulin glargine (LANTUS) injection 10 Units  10 Units SubCUTAneous DAILY    LORazepam (ATIVAN) injection 1 mg  1 mg IntraVENous Q4H PRN    insulin lispro (HUMALOG) injection   SubCUTAneous Q6H    VANCOMYCIN INFORMATION NOTE   Other Rx Dosing/Monitoring    white petrolatum-mineral oil 85-15 % oint 1 Dose  1 Dose Ophthalmic QID    chlorhexidine (PERIDEX) 0.12 % mouthwash 10 mL  10 mL Oral Q12H    NOREPINephrine (LEVOPHED) 16,000 mcg in dextrose 5% 250 mL infusion  2-16 mcg/min IntraVENous TITRATE    albuterol (PROVENTIL VENTOLIN) nebulizer solution 2.5 mg  2.5 mg Nebulization Q6H PRN    Piperacillin-tazobactam (ZOSYN) 0.75 gm Supplemental Dosing by Pharmacy   Other Rx Dosing/Monitoring    famotidine (PF) (PEPCID) 20 mg in sodium chloride 0.9 % 10 mL injection  20 mg IntraVENous DAILY    vits A and D-white pet-lanolin (A&D) ointment   Topical QID AFTER MEALS    collagenase (SANTYL) 250 unit/gram ointment   Topical DAILY    acetaminophen (TYLENOL) tablet 650 mg  650 mg Oral Q4H PRN    lactobacillus sp. 50 billion cpu (BIO-K PLUS) capsule 1 Cap  1 Cap Oral DAILY    loperamide (IMODIUM) capsule 2 mg  2 mg Oral Q6H PRN    heparin (porcine) injection 5,000 Units  5,000 Units SubCUTAneous Q8H    heparin (porcine) 1,000 unit/mL injection 2,000 Units  2,000 Units IntraVENous DIALYSIS ONCE    simvastatin (ZOCOR) tablet 20 mg  20 mg Oral QHS    doxercalciferol (HECTOROL) 4 mcg/2 mL injection 3 mcg  3 mcg IntraVENous DIALYSIS MON, WED & FRI    epoetin benjamin (EPOGEN;PROCRIT) injection 10,000 Units  10,000 Units IntraVENous DIALYSIS MON, WED & FRI    aspirin chewable tablet 81 mg  81 mg Oral DAILY    glucose chewable tablet 16 g  4 Tab Oral PRN    glucagon (GLUCAGEN) injection 1 mg  1 mg IntraMUSCular PRN    dextrose (D50W) injection syrg 12.5-25 g  25-50 mL IntraVENous PRN    0.9% sodium chloride infusion  100 mL/hr IntraVENous DIALYSIS PRN    naloxone (NARCAN) injection 0.4 mg  0.4 mg IntraVENous PRN           Objective:     Patient Vitals for the past 24 hrs:   Temp Pulse Resp BP SpO2   08/22/17 0818 - 79 16 - 100 %   08/22/17 0730 - 72 16 - 100 %   08/22/17 0700 - 72 16 - -   08/22/17 0600 - 76 16 - -   08/22/17 0500 - 84 16 - 100 %   08/22/17 0450 - 75 17 - 100 %   08/22/17 0400 99.5 °F (37.5 °C) 89 17 118/55 100 %   08/22/17 0300 - 82 16 - 100 %   08/22/17 0200 - 79 16 - 100 %   08/22/17 0100 - 82 16 - 100 %   08/22/17 0000 100 °F (37.8 °C) 81 16 114/53 100 %   08/21/17 2342 - 89 16 - 100 %   08/21/17 2300 - 91 16 - 100 %   08/21/17 2200 - 92 16 - 100 %   08/21/17 2100 - 90 16 - 100 %   08/21/17 2000 100 °F (37.8 °C) 90 16 102/51 97 %   08/21/17 1959 - 98 16 - 100 %   08/21/17 1900 - 89 16 - 100 %   08/21/17 1805 99.1 °F (37.3 °C) - - - -   08/21/17 1632 - 92 21 - 100 %   08/21/17 1623 - (!) 135 28 - 100 %   08/21/17 1345 - 90 16 124/64 100 %   08/21/17 1330 97.2 °F (36.2 °C) 84 16 108/60 100 %   08/21/17 1315 - 86 17 102/59 -   08/21/17 1300 - 85 18 105/55 -   08/21/17 1245 - 87 18 112/60 -   08/21/17 1230 - 85 18 123/62 -   08/21/17 1220 - 79 16 - 100 %   08/21/17 1215 - 80 17 130/62 -   08/21/17 1200 - 78 17 117/63 -   08/21/17 1145 - 81 17 165/80 -   08/21/17 1130 - 82 18 150/72 -   08/21/17 1115 - 82 17 135/70 -   08/21/17 1100 - 82 19 150/73 -   08/21/17 1045 - 80 19 145/68 -   08/21/17 1030 - 87 21 99/52 100 %   08/21/17 1025 99.9 °F (37.7 °C) 86 16 95/49 -   08/21/17 1015 - 84 16 96/53 100 %        Weight change:      08/20 1901 - 08/22 0700  In: 3755.5 [I.V.:950.5]  Out: 1400 [Drains:1400]    Intake/Output Summary (Last 24 hours) at 08/22/17 1007  Last data filed at 08/22/17 0700   Gross per 24 hour   Intake          1921.21 ml   Output             1000 ml   Net           921.21 ml     Physical Exam: unresponsive, intubated, febrile    HEENT: ET/NGT in place, non icteric  Neck: no JVD  Cardiovascular: regular, no rub  C/L:  Equal vent breath sounds  Abdomen: soft, + BS  Ext:  Left arm AVF + bruit, no LE edema    Data Review:     LABS:   Hematology:   Recent Labs      08/22/17   0600  08/21/17   0100  08/20/17   0225   WBC  12.5  14.0*  15.0*   HGB  9.5*  8.8*  9.6*   HCT  29.2*  27.4*  29.9*   pl 168K  Chemistry:   Recent Labs      08/22/17   0600  08/21/17   0100  08/20/17   0225   BUN  41*  46*  31*   CREA  6.05*  8.50*  6.95*   CA  8.1*  7.6*  7.7*   ALB   --    --   2.1*   K  2.9*  3.2*  3.8   NA  141  145  142   CL  105  113*  112*   CO2  25  20*  21   PHOS  2.4*  3.9  2.8   GLU  259*  221*  297*    Blood c/s 8/18 neg so far        IMPRESSION AND PLAN:   ESRD sched HD MWF for solute management, unless family decides otherwise. Anemia from CKD low fe stores but high ferritin,Hgb stable cont ALONDRA with HD  E. Coli bacteremia remains on zosyn  Hypokalemia hypophosphatemia,  cont with K3 bath on HD. Replace   Hypocalcemia from 2nd HPT cont Hectorol with HD repeat phos low,  no binders  Hypotension post code remains on pressors.    Nutrition cont with NGT feeding           Farrukh Hardy MD  8/22/2017

## 2017-08-22 NOTE — PROGRESS NOTES
[facility] Hospitalist Group  Progress Note    Patient: Ayse Charles Age: 55 y.o. : 1960 MR#: [id number] SSN: [id number]  Date/Time: 2017     Subjective:   Patient intubated. Assessment/Plan:   1. Acute met encephalopathy: due to # 2.    2. Acute pontine lacunar CVA: on asa, sttain  3. S/p PEA arrest. ? Cardiac cause with elevated trop PTA. Echo improving EF, s/p cath, no CAD. NOw with second cardiac arrest and intubated- Vent support  4. Acute resp failure s/p extubation, off O2- Now reintubated after second cardiac arrest  5. Dysphagia:  6. ESRD- HD as pe nephrology  7. Hypotension, ?septic shock, on abx, wean pressors  9. Elevated LFT - ? Shock liver. 10. Thrombocytopenia: secondary to sepsis. monitor  11. Sz: keppra  12. Wounds: wound care    unstageable pressure ulcer to sacrum/coccyx not present on admit    moisture associated skin damage to anus not present  on admit    moisture associated skin damage to buttocks not present on admit    13- ? Anoxic encephalopathy- neuro following  Full code    Case discussed with:  [x]Patient  []Family  []Nursing  []Case Management  DVT Prophylaxis:  []Lovenox  [x]Hep SQ  [x]SCDs  []Coumadin   []On Heparin gtt    Patient Active Problem List   Diagnosis Code    Anemia D64.9    Acidosis E87.2    Cardiac arrest ([address] I46.9    Respiratory failure ([address] J96.90    ESRD needing dialysis ([address] N18.6, Z99.2    Anoxic brain damage (HCC) G93.1    Colitis K52.9    Hypotension I95.9    Acute GI bleeding K92.2    ESRD (end stage renal disease) ([address] N18.6    Sepsis ([address] A41.9    Oropharyngeal dysphagia R13.12    Cachexia ([address] R64       Objective:   VS:   Visit Vitals    /55    Pulse 100    Temp 99.4 °F (37.4 °C)    Resp 16    Ht 6' 2\" (1.88 m)    Wt 64.9 kg (143 lb 1.3 oz)    SpO2 100%    BMI 18.37 kg/m2      Tmax/24hrs: Temp (24hrs), Av.5 °F (37.5 °C), Min:98.9 °F (37.2 °C), Max:100 °F (37.8 °C)  IOBRIEF    Intake/Output Summary (Last 24 hours) at 08/22/17 1551  Last data filed at 08/22/17 0700   Gross per 24 hour   Intake          1921.21 ml   Output             1000 ml   Net           921.21 ml     General:  Intubated, unresponsive  Cardiovascular:  S1S2+, RRR  Pulmonary:  Coarse BS b/l  GI:  Soft, BS+, NT, ND  Extremities:  No edema            Medications:   Current Facility-Administered Medications   Medication Dose Route Frequency    banana flakes trans-galactooligosaccharide (BANATROL PLUS) powder 1 Packet  1 Packet Per NG tube TID    potassium, sodium phosphates (NEUTRA-PHOS) packet 1 Packet  1 Packet Oral BID    [START ON 8/23/2017] Vancomycin random level  1 Each Other Rx Dosing/Monitoring    hydrocortisone Sod Succ (PF) (SOLU-CORTEF) injection 25 mg  25 mg IntraVENous Q6H    levETIRAcetam (KEPPRA) 500 mg in saline (iso-osm) 100 ml IVPB  500 mg IntraVENous Q12H    piperacillin-tazobactam (ZOSYN) 2.25 g in 0.9% sodium chloride (MBP/ADV) 50 mL MBP  2.25 g IntraVENous Q12H    insulin glargine (LANTUS) injection 10 Units  10 Units SubCUTAneous DAILY    LORazepam (ATIVAN) injection 1 mg  1 mg IntraVENous Q4H PRN    insulin lispro (HUMALOG) injection   SubCUTAneous Q6H    VANCOMYCIN INFORMATION NOTE   Other Rx Dosing/Monitoring    white petrolatum-mineral oil 85-15 % oint 1 Dose  1 Dose Ophthalmic QID    chlorhexidine (PERIDEX) 0.12 % mouthwash 10 mL  10 mL Oral Q12H    NOREPINephrine (LEVOPHED) 16,000 mcg in dextrose 5% 250 mL infusion  2-16 mcg/min IntraVENous TITRATE    albuterol (PROVENTIL VENTOLIN) nebulizer solution 2.5 mg  2.5 mg Nebulization Q6H PRN    Piperacillin-tazobactam (ZOSYN) 0.75 gm Supplemental Dosing by Pharmacy   Other Rx Dosing/Monitoring    famotidine (PF) (PEPCID) 20 mg in sodium chloride 0.9 % 10 mL injection  20 mg IntraVENous DAILY    vits A and D-white pet-lanolin (A&D) ointment   Topical QID AFTER MEALS    collagenase (SANTYL) 250 unit/gram ointment Topical DAILY    acetaminophen (TYLENOL) tablet 650 mg  650 mg Oral Q4H PRN    lactobacillus sp. 50 billion cpu (BIO-K PLUS) capsule 1 Cap  1 Cap Oral DAILY    loperamide (IMODIUM) capsule 2 mg  2 mg Oral Q6H PRN    heparin (porcine) injection 5,000 Units  5,000 Units SubCUTAneous Q8H    heparin (porcine) 1,000 unit/mL injection 2,000 Units  2,000 Units IntraVENous DIALYSIS ONCE    simvastatin (ZOCOR) tablet 20 mg  20 mg Oral QHS    doxercalciferol (HECTOROL) 4 mcg/2 mL injection 3 mcg  3 mcg IntraVENous DIALYSIS MON, WED & FRI    epoetin benjamin (EPOGEN;PROCRIT) injection 10,000 Units  10,000 Units IntraVENous DIALYSIS MON, WED & FRI    aspirin chewable tablet 81 mg  81 mg Oral DAILY    glucose chewable tablet 16 g  4 Tab Oral PRN    glucagon (GLUCAGEN) injection 1 mg  1 mg IntraMUSCular PRN    dextrose (D50W) injection syrg 12.5-25 g  25-50 mL IntraVENous PRN    0.9% sodium chloride infusion  100 mL/hr IntraVENous DIALYSIS PRN    naloxone (NARCAN) injection 0.4 mg  0.4 mg IntraVENous PRN       Labs:    Recent Results (from the past 24 hour(s))   GLUCOSE, POC    Collection Time: 08/21/17  8:23 PM   Result Value Ref Range    Glucose (POC) 345 (H) 70 - 110 mg/dL   GLUCOSE, POC    Collection Time: 08/22/17 12:06 AM   Result Value Ref Range    Glucose (POC) 200 (H) 70 - 110 mg/dL   POC G3    Collection Time: 08/22/17  5:04 AM   Result Value Ref Range    Device: VENT      FIO2 (POC) 28 %    pH (POC) 7.507 (H) 7.35 - 7.45      pCO2 (POC) 29.8 (L) 35.0 - 45.0 MMHG    pO2 (POC) 130 (H) 80 - 100 MMHG    HCO3 (POC) 23.6 22 - 26 MMOL/L    sO2 (POC) 99 (H) 92 - 97 %    Base excess (POC) 1 mmol/L    Mode ASSIST CONTROL      Tidal volume 450 ml    Set Rate 16 bpm    PEEP/CPAP (POC) 5 cmH2O    Allens test (POC) N/A      Inspiratory Time 1 sec    Total resp. rate 16      Site DRAWN FROM ARTERIAL LINE      Patient temp.  98.6      Specimen type (POC) ARTERIAL      Performed by Flory Levine     Volume control plus YES GLUCOSE, POC    Collection Time: 08/22/17  5:51 AM   Result Value Ref Range    Glucose (POC) 196 (H) 70 - 110 mg/dL   CBC WITH AUTOMATED DIFF    Collection Time: 08/22/17  6:00 AM   Result Value Ref Range    WBC 12.5 4.6 - 13.2 K/uL    RBC 3.11 (L) 4.70 - 5.50 M/uL    HGB 9.5 (L) 13.0 - 16.0 g/dL    HCT 29.2 (L) 36.0 - 48.0 %    MCV 93.9 74.0 - 97.0 FL    MCH 30.5 24.0 - 34.0 PG    MCHC 32.5 31.0 - 37.0 g/dL    RDW 17.6 (H) 11.6 - 14.5 %    PLATELET 411 479 - 702 K/uL    MPV 11.0 9.2 - 11.8 FL    NEUTROPHILS 80 (H) 40 - 73 %    LYMPHOCYTES 9 (L) 21 - 52 %    MONOCYTES 10 3 - 10 %    EOSINOPHILS 1 0 - 5 %    BASOPHILS 0 0 - 2 %    ABS. NEUTROPHILS 10.1 (H) 1.8 - 8.0 K/UL    ABS. LYMPHOCYTES 1.1 0.9 - 3.6 K/UL    ABS. MONOCYTES 1.2 0.05 - 1.2 K/UL    ABS. EOSINOPHILS 0.1 0.0 - 0.4 K/UL    ABS.  BASOPHILS 0.0 0.0 - 0.1 K/UL    DF AUTOMATED     MAGNESIUM    Collection Time: 08/22/17  6:00 AM   Result Value Ref Range    Magnesium 2.6 1.6 - 2.6 mg/dL   PHOSPHORUS    Collection Time: 08/22/17  6:00 AM   Result Value Ref Range    Phosphorus 2.4 (L) 2.5 - 4.9 MG/DL   METABOLIC PANEL, BASIC    Collection Time: 08/22/17  6:00 AM   Result Value Ref Range    Sodium 141 136 - 145 mmol/L    Potassium 2.9 (LL) 3.5 - 5.5 mmol/L    Chloride 105 100 - 108 mmol/L    CO2 25 21 - 32 mmol/L    Anion gap 11 3.0 - 18 mmol/L    Glucose 259 (H) 74 - 99 mg/dL    BUN 41 (H) 7.0 - 18 MG/DL    Creatinine 6.05 (H) 0.6 - 1.3 MG/DL    BUN/Creatinine ratio 7 (L) 12 - 20      GFR est AA 12 (L) >60 ml/min/1.73m2    GFR est non-AA 10 (L) >60 ml/min/1.73m2    Calcium 8.1 (L) 8.5 - 10.1 MG/DL       Signed By: Nora Mcconnell MD     August 22, 2017

## 2017-08-23 ENCOUNTER — APPOINTMENT (OUTPATIENT)
Dept: GENERAL RADIOLOGY | Age: 57
DRG: 004 | End: 2017-08-23
Attending: PHYSICIAN ASSISTANT
Payer: MEDICARE

## 2017-08-23 LAB
ANION GAP SERPL CALC-SCNC: 12 MMOL/L (ref 3–18)
ARTERIAL PATENCY WRIST A: YES
BASE EXCESS BLD CALC-SCNC: 0 MMOL/L
BASOPHILS # BLD: 0 K/UL (ref 0–0.06)
BASOPHILS NFR BLD: 0 % (ref 0–2)
BDY SITE: ABNORMAL
BODY TEMPERATURE: 37
BUN SERPL-MCNC: 57 MG/DL (ref 7–18)
BUN/CREAT SERPL: 8 (ref 12–20)
CALCIUM SERPL-MCNC: 8.3 MG/DL (ref 8.5–10.1)
CHLORIDE SERPL-SCNC: 109 MMOL/L (ref 100–108)
CO2 SERPL-SCNC: 22 MMOL/L (ref 21–32)
CREAT SERPL-MCNC: 7.59 MG/DL (ref 0.6–1.3)
DIFFERENTIAL METHOD BLD: ABNORMAL
EOSINOPHIL # BLD: 0.1 K/UL (ref 0–0.4)
EOSINOPHIL NFR BLD: 0 % (ref 0–5)
ERYTHROCYTE [DISTWIDTH] IN BLOOD BY AUTOMATED COUNT: 17.4 % (ref 11.6–14.5)
GAS FLOW.O2 O2 DELIVERY SYS: ABNORMAL L/MIN
GAS FLOW.O2 SETTING OXYMISER: 16 BPM
GLUCOSE BLD STRIP.AUTO-MCNC: 148 MG/DL (ref 70–110)
GLUCOSE BLD STRIP.AUTO-MCNC: 196 MG/DL (ref 70–110)
GLUCOSE BLD STRIP.AUTO-MCNC: 218 MG/DL (ref 70–110)
GLUCOSE SERPL-MCNC: 222 MG/DL (ref 74–99)
HCO3 BLD-SCNC: 23.8 MMOL/L (ref 22–26)
HCT VFR BLD AUTO: 30.8 % (ref 36–48)
HGB BLD-MCNC: 10 G/DL (ref 13–16)
LYMPHOCYTES # BLD: 1.2 K/UL (ref 0.9–3.6)
LYMPHOCYTES NFR BLD: 9 % (ref 21–52)
MAGNESIUM SERPL-MCNC: 2.9 MG/DL (ref 1.6–2.6)
MCH RBC QN AUTO: 30.7 PG (ref 24–34)
MCHC RBC AUTO-ENTMCNC: 32.5 G/DL (ref 31–37)
MCV RBC AUTO: 94.5 FL (ref 74–97)
MONOCYTES # BLD: 0.5 K/UL (ref 0.05–1.2)
MONOCYTES NFR BLD: 4 % (ref 3–10)
NEUTS SEG # BLD: 11.5 K/UL (ref 1.8–8)
NEUTS SEG NFR BLD: 87 % (ref 40–73)
O2/TOTAL GAS SETTING VFR VENT: 28 %
PCO2 BLD: 34 MMHG (ref 35–45)
PEEP RESPIRATORY: 5 CMH2O
PH BLD: 7.45 [PH] (ref 7.35–7.45)
PHOSPHATE SERPL-MCNC: 2.6 MG/DL (ref 2.5–4.9)
PLATELET # BLD AUTO: 192 K/UL (ref 135–420)
PMV BLD AUTO: 11.2 FL (ref 9.2–11.8)
PO2 BLD: 135 MMHG (ref 80–100)
POTASSIUM SERPL-SCNC: 3.2 MMOL/L (ref 3.5–5.5)
RBC # BLD AUTO: 3.26 M/UL (ref 4.7–5.5)
SAO2 % BLD: 99 % (ref 92–97)
SERVICE CMNT-IMP: ABNORMAL
SODIUM SERPL-SCNC: 143 MMOL/L (ref 136–145)
SPECIMEN TYPE: ABNORMAL
TOTAL RESP. RATE, ITRR: 18
VANCOMYCIN SERPL-MCNC: 20.5 UG/ML (ref 5–40)
VENTILATION MODE VENT: ABNORMAL
VOLUME CONTROL PLUS IVLCP: YES
VT SETTING VENT: 450 ML
WBC # BLD AUTO: 13.3 K/UL (ref 4.6–13.2)

## 2017-08-23 PROCEDURE — 74011250637 HC RX REV CODE- 250/637: Performed by: INTERNAL MEDICINE

## 2017-08-23 PROCEDURE — 90935 HEMODIALYSIS ONE EVALUATION: CPT

## 2017-08-23 PROCEDURE — 94003 VENT MGMT INPAT SUBQ DAY: CPT

## 2017-08-23 PROCEDURE — 82803 BLOOD GASES ANY COMBINATION: CPT

## 2017-08-23 PROCEDURE — 84100 ASSAY OF PHOSPHORUS: CPT | Performed by: NURSE PRACTITIONER

## 2017-08-23 PROCEDURE — 74011000258 HC RX REV CODE- 258: Performed by: NURSE PRACTITIONER

## 2017-08-23 PROCEDURE — 74011636637 HC RX REV CODE- 636/637: Performed by: INTERNAL MEDICINE

## 2017-08-23 PROCEDURE — 74011250637 HC RX REV CODE- 250/637: Performed by: NURSE PRACTITIONER

## 2017-08-23 PROCEDURE — 74011000258 HC RX REV CODE- 258: Performed by: INTERNAL MEDICINE

## 2017-08-23 PROCEDURE — 85025 COMPLETE CBC W/AUTO DIFF WBC: CPT | Performed by: NURSE PRACTITIONER

## 2017-08-23 PROCEDURE — 36592 COLLECT BLOOD FROM PICC: CPT

## 2017-08-23 PROCEDURE — 74011000250 HC RX REV CODE- 250: Performed by: PHYSICIAN ASSISTANT

## 2017-08-23 PROCEDURE — 80202 ASSAY OF VANCOMYCIN: CPT | Performed by: INTERNAL MEDICINE

## 2017-08-23 PROCEDURE — 74011250636 HC RX REV CODE- 250/636: Performed by: HOSPITALIST

## 2017-08-23 PROCEDURE — 74011250637 HC RX REV CODE- 250/637: Performed by: HOSPITALIST

## 2017-08-23 PROCEDURE — 74011000250 HC RX REV CODE- 250: Performed by: NURSE PRACTITIONER

## 2017-08-23 PROCEDURE — 74011250636 HC RX REV CODE- 250/636: Performed by: INTERNAL MEDICINE

## 2017-08-23 PROCEDURE — 80048 BASIC METABOLIC PNL TOTAL CA: CPT | Performed by: NURSE PRACTITIONER

## 2017-08-23 PROCEDURE — 77030020291 HC FLEXSEAL FMS BMS -C

## 2017-08-23 PROCEDURE — 65610000006 HC RM INTENSIVE CARE

## 2017-08-23 PROCEDURE — 77030033263 HC DRSG MEPILEX 16-48IN BORD MOLN -B

## 2017-08-23 PROCEDURE — 74011636637 HC RX REV CODE- 636/637: Performed by: NURSE PRACTITIONER

## 2017-08-23 PROCEDURE — 82962 GLUCOSE BLOOD TEST: CPT

## 2017-08-23 PROCEDURE — 74011250636 HC RX REV CODE- 250/636: Performed by: NURSE PRACTITIONER

## 2017-08-23 PROCEDURE — 71010 XR CHEST PORT: CPT

## 2017-08-23 PROCEDURE — 36600 WITHDRAWAL OF ARTERIAL BLOOD: CPT

## 2017-08-23 PROCEDURE — 83735 ASSAY OF MAGNESIUM: CPT | Performed by: NURSE PRACTITIONER

## 2017-08-23 PROCEDURE — 95816 EEG AWAKE AND DROWSY: CPT | Performed by: PHYSICIAN ASSISTANT

## 2017-08-23 RX ORDER — INSULIN GLARGINE 100 [IU]/ML
10 INJECTION, SOLUTION SUBCUTANEOUS ONCE
Status: COMPLETED | OUTPATIENT
Start: 2017-08-23 | End: 2017-08-23

## 2017-08-23 RX ORDER — INSULIN GLARGINE 100 [IU]/ML
20 INJECTION, SOLUTION SUBCUTANEOUS DAILY
Status: DISCONTINUED | OUTPATIENT
Start: 2017-08-24 | End: 2017-09-02

## 2017-08-23 RX ADMIN — Medication 1 PACKET: at 10:00

## 2017-08-23 RX ADMIN — LORAZEPAM 1 MG: 2 INJECTION INTRAMUSCULAR; INTRAVENOUS at 10:34

## 2017-08-23 RX ADMIN — VITAMIN A AND D: 30.8 OINTMENT TOPICAL at 18:00

## 2017-08-23 RX ADMIN — VITAMIN A AND D: 30.8 OINTMENT TOPICAL at 09:00

## 2017-08-23 RX ADMIN — LEVETIRACETAM 500 MG: 5 INJECTION INTRAVENOUS at 10:07

## 2017-08-23 RX ADMIN — INSULIN LISPRO 3 UNITS: 100 INJECTION, SOLUTION INTRAVENOUS; SUBCUTANEOUS at 06:25

## 2017-08-23 RX ADMIN — INSULIN LISPRO 6 UNITS: 100 INJECTION, SOLUTION INTRAVENOUS; SUBCUTANEOUS at 12:57

## 2017-08-23 RX ADMIN — SIMVASTATIN 20 MG: 10 TABLET, FILM COATED ORAL at 21:12

## 2017-08-23 RX ADMIN — INSULIN GLARGINE 10 UNITS: 100 INJECTION, SOLUTION SUBCUTANEOUS at 10:06

## 2017-08-23 RX ADMIN — POTASSIUM & SODIUM PHOSPHATES POWDER PACK 280-160-250 MG 1 PACKET: 280-160-250 PACK at 18:37

## 2017-08-23 RX ADMIN — CHLORHEXIDINE GLUCONATE 10 ML: 1.2 RINSE ORAL at 22:02

## 2017-08-23 RX ADMIN — MINERAL OIL AND WHITE PETROLATUM 1 DOSE: 150; 850 OINTMENT OPHTHALMIC at 18:00

## 2017-08-23 RX ADMIN — Medication 1 PACKET: at 18:33

## 2017-08-23 RX ADMIN — HEPARIN SODIUM 5000 UNITS: 5000 INJECTION, SOLUTION INTRAVENOUS; SUBCUTANEOUS at 10:00

## 2017-08-23 RX ADMIN — NOREPINEPHRINE BITARTRATE 4.5 MCG/MIN: 1 INJECTION INTRAVENOUS at 17:28

## 2017-08-23 RX ADMIN — DOXERCALCIFEROL 3 MCG: 4 INJECTION, SOLUTION INTRAVENOUS at 18:29

## 2017-08-23 RX ADMIN — VITAMIN A AND D: 30.8 OINTMENT TOPICAL at 22:03

## 2017-08-23 RX ADMIN — FAMOTIDINE 20 MG: 10 INJECTION INTRAVENOUS at 10:00

## 2017-08-23 RX ADMIN — Medication 1 CAPSULE: at 10:07

## 2017-08-23 RX ADMIN — HYDROCORTISONE SODIUM SUCCINATE 25 MG: 100 INJECTION, POWDER, FOR SOLUTION INTRAMUSCULAR; INTRAVENOUS at 12:49

## 2017-08-23 RX ADMIN — VITAMIN A AND D: 30.8 OINTMENT TOPICAL at 13:00

## 2017-08-23 RX ADMIN — MINERAL OIL AND WHITE PETROLATUM 1 DOSE: 150; 850 OINTMENT OPHTHALMIC at 13:00

## 2017-08-23 RX ADMIN — PIPERACILLIN AND TAZOBACTAM 2.25 G: 2; .25 INJECTION, POWDER, FOR SOLUTION INTRAVENOUS at 21:28

## 2017-08-23 RX ADMIN — PIPERACILLIN AND TAZOBACTAM 2.25 G: 2; .25 INJECTION, POWDER, FOR SOLUTION INTRAVENOUS at 10:10

## 2017-08-23 RX ADMIN — Medication 1 PACKET: at 23:20

## 2017-08-23 RX ADMIN — INSULIN GLARGINE 10 UNITS: 100 INJECTION, SOLUTION SUBCUTANEOUS at 12:00

## 2017-08-23 RX ADMIN — ASPIRIN 81 MG 81 MG: 81 TABLET ORAL at 10:00

## 2017-08-23 RX ADMIN — HEPARIN SODIUM 5000 UNITS: 5000 INJECTION, SOLUTION INTRAVENOUS; SUBCUTANEOUS at 18:35

## 2017-08-23 RX ADMIN — LEVETIRACETAM 500 MG: 5 INJECTION INTRAVENOUS at 21:08

## 2017-08-23 RX ADMIN — HEPARIN SODIUM 5000 UNITS: 5000 INJECTION, SOLUTION INTRAVENOUS; SUBCUTANEOUS at 02:30

## 2017-08-23 RX ADMIN — MINERAL OIL AND WHITE PETROLATUM 1 DOSE: 150; 850 OINTMENT OPHTHALMIC at 10:00

## 2017-08-23 RX ADMIN — MINERAL OIL AND WHITE PETROLATUM 1 DOSE: 150; 850 OINTMENT OPHTHALMIC at 22:03

## 2017-08-23 RX ADMIN — HYDROCORTISONE SODIUM SUCCINATE 25 MG: 100 INJECTION, POWDER, FOR SOLUTION INTRAMUSCULAR; INTRAVENOUS at 18:36

## 2017-08-23 RX ADMIN — SODIUM CHLORIDE 500 MG: 900 INJECTION, SOLUTION INTRAVENOUS at 18:27

## 2017-08-23 RX ADMIN — CHLORHEXIDINE GLUCONATE 10 ML: 1.2 RINSE ORAL at 10:00

## 2017-08-23 RX ADMIN — HYDROCORTISONE SODIUM SUCCINATE 25 MG: 100 INJECTION, POWDER, FOR SOLUTION INTRAMUSCULAR; INTRAVENOUS at 06:27

## 2017-08-23 RX ADMIN — POTASSIUM & SODIUM PHOSPHATES POWDER PACK 280-160-250 MG 1 PACKET: 280-160-250 PACK at 10:11

## 2017-08-23 NOTE — DIALYSIS
ACUTE HEMODIALYSIS FLOW SHEET    HEMODIALYSIS ORDERS: Physician: Clint Lopes      Dialyzer: revaclear         Duration: 3 hr  BFR: 300   DFR: 600   Dialysate:  Temp *37 K+   3    Ca+  3 Na 140 Bicarb 35   Weight:  64.7 kg    Bed Scale [x]     Unable to Obtain []      Dry weight/UF Goal: 0 Access AVF  Needle Gauge 15    Heparin []  Bolus      Units    [] Hourly       Units    [x]None     Catheter locking solution    Pre BP:   108/65    Pulse:     96     Temperature:   97.8  Respirations: 16  Tx: NS       ml/Bolus  Other        [x] N/A   Labs: Pre        Post:        [x] N/A   Additional Orders(medications, blood products, hypotension management):       [x] N/A     [x] Time Out/Safety Check  [x] [facility] Consent Verified     CATHETER ACCESS: [x]N/A   []Right   []Left   []IJ     []Fem   [] First use X-ray verified     []Tunnel                [] Non Tunneled   []No S/S infection  []Redness  []Drainage []Cultured []Swelling []Pain   []Medical Aseptic Prep Utilized   []Dressing Changed  [] Biopatch  Date:       []Clotted   []Patent   Flows: []Good  []Poor  []Reversed   If access problem,  notified: []Yes    []N/A  Date:           GRAFT/FISTULA ACCESS:  []N/A     []Right     [x]Left     [x]UE     []LE   []AVG   [x]AVF        []Buttonhole    [x]Medical Aseptic Prep Utilized   [x]No S/S infection  []Redness  []Drainage []Cultured []Swelling []Pain    Bruit:   [x] Strong    [] Weak       Thrill :   [x] Strong    [] Weak       Needle Gauge: 15   Length: 1   If access problem,  notified: []Yes     [x]N/A  Date:        Please describe access if present and not used:       GENERAL ASSESSMENT:    LUNGS:  Rate  SaO2%        [x] N/A    [x] Clear  [] Coarse  [] Crackles  [] Wheezing        [] Diminished     Location : []RLL   []LLL    []RUL  []   Cough: []Productive  []Dry  [x]N/A   Respirations:  [x]Easy  []Labored   Therapy:  []RA  []NC  l/min    Mask: []NRB []Venti       O2%                  [x]Ventilator [x]Intubated  [] Trach  [] BiPaP   CARDIAC: [x]Regular      [] Irregular   [] Pericardial Rub  [] JVD        []  Monitored  [] Bedside  [] Remotely monitored [] N/A  Rhythm:    EDEMA: [] None  [x]Generalized  [] Pitting [] 1    [] 2    [] 3    [] 4                 [] Facial  [] Pedal  []  UE  [] LE   SKIN:   [x] Warm  [] Hot     [] Cold   [x] Dry     [] Pale   [] Diaphoretic                  [] Flushed  [] Jaundiced  [] Cyanotic  [] Rash  [] Weeping   LOC:    [] Alert      []Oriented:    [] Person     [] Place  []Time               [] Confused  [] Lethargic  [] Medicated  [x] Non-responsive     GI / ABDOMEN:  [] Flat    [] Distended    [x] Soft    [] Firm   []  Obese                             [] Diarrhea  [x] Bowel Sounds  [] Nausea  [] Vomiting       / URINE ASSESSMENT:[] Voiding   [x] Oliguria  [] Anuria   []  Stanton     [] Incontinent    []  Incontinent Brief      []  Bathroom Privileges     PAIN: [x] 0 []1  []2   []3   []4   []5   []6   []7   []8   []9   []10            Scale 0-10  Action/Follow Up:    MOBILITY:  [] Amb    [] Amb/Assist    [x] Bed    [] Wheelchair  [] Stretcher      All Vitals and Treatment Details on Attached [address]: [facility]          Room # 309     [] 1st Time Acute  [] Stat  [x] Routine  [] Urgent     [] Acute Room  []  Bedside  [x] ICU/CCU  [] ER   Isolation Precautions:  [x] Dialysis   [] Airborne   [] Contact    [] Reverse   Special Considerations:         [] Blood Consent Verified [x]N/A     ALLERGIES:   [x] NKA          Code Status:  [x] Full Code  [] DNR  [] Other           HBsAg ONLY: Date Drawn 07/28/17         [x]Negative []Positive []Unknown   HBsAb: Date 07/28/17    [] Susceptible   [x] [id number] []Not Drawn  [] Drawn     Current Labs:    Date of Labs: Today [x]        Cut and paste current labs here.                                                                                                                                   DIET:  [] Renal    [] Other [x] NPO     []  Diabetic      PRIMARY NURSE REPORT: First initial/Last name/Title      Pre Dialysis: Deniz Milligan RN    Time: 1600      EDUCATION:    [x] Patient [] Other         Knowledge Basis: MARQUIS []None []Minimal [] Substantial   Barriers to learning - pt shows no evidence of cognition []N/A   [] Access Care     [] S&S of infection     [] Fluid Management     []K+     [x]Procedural    []Albumin     [] Medications     [] Tx Options     [] Transplant     [] Diet     [] Other   Teaching Tools:  [x] Explain  [] Demo  [] Handouts [] Video  Patient response:   [x] Verbalized understanding  [] Teach back  [] Return demonstration [] Requires follow up   Inappropriate due to            [address] Before each treatment:     Machine Number:                   [facility]                                   [] Unit Machine #  with centralized RO                                  [x] Portable Machine #1/RO serial # [id number]                                  [] Portable Machine #2/RO serial # [id number]                                  [] Portable Machine #3/RO serial # [id number]                                                                                                       [facility]                                  [] Portable Machine #11/RO serial # [id number]                                   [] Portable Machine #12/RO serial # [id number]                                  [] Portable Machine #13/RO serial #  [id number]      Alarm Test:  Pass time 1600         Other:         [x] RO/Machine Log Complete      Temp    37            [x]Extracorporeal Circuit Tested for integrity   Dialysate: pH  7.4 Conductivity: Meter   14     HD Machine   14                  TCD: 14  Dialyzer Lot # [id number]         Blood Tubing Lot # [id number]      Saline Lot #       CHLORINE TESTING-Before each treatment and every 4 hours    Total Chlorine: [x] less than 0.1 ppm  Time: 1630 4 Hr/2nd Check Time: (if greater than 0.1 ppm from Primary then every 30 minutes from Secondary)     TREATMENT INITIATION  with Dialysis Precautions:   [x] All Connections Secured                 [x] Saline Line Double Clamped   [x] Venous Parameters Set                  [x] Arterial Parameters Set    [x] Prime Given  250 ml               [x]Air Foam Detector Engaged      Treatment Initiation Note: arrived to pt room; pt is non verbal and nonresponsive. Pt withdrawls to painful stimuli. AVF accessed without difficulty. And treatment initiated. Medication Dose Volume Route Initials Dialyzer Cleared: [] Good [x] Fair  [] Poor    Blood processed:  46.8 L  UF Removed  0 Ml    Post Wt: 64.7    kg  POst BP:   130/69       Pulse: 82      Respirations: 18  Temperature: 98.2          vancomycin      500mg 100ml                  Post Tx Vascular Access: AVF/AVG: Bleeding stopped Art 10 min. Oscar. 10 Min             hectorol   3mcg         1.5ml             Catheter: Locking solution: Heparin 1:1000 Art. Oscar. N/A        epogen      [id number]      1ml             Post Assessment:                                    Skin:  [x] Warm  [x] Dry [] Diaphoretic    [] Flushed  [] Pale [] Cyanotic   [facility] Signatures Title Initials  Time Lungs: [x] Clear    [] Course  [] Crackles  [] Wheezing [] Diminished   Kt Pop RN    Cardiac: [x] Regular   [] Irregular   [] Monitor  [] N/A  Rhythm:           Edema:  [x] None    [] General     [] Facial   [] Pedal    [] UE    [] LE       Pain: [x]0  []1  []2   []3  []4   []5   []6   []7   []8   []9   []10         Post Treatment Note: HD tolerated. Brief episode of hypotension resolved post 300ml bolus of NS and increase of vasopressors. No UF removal as ordered. +  -  No acute distress noted during or post HD treatment.      POST TREATMENT PRIMARY NURSE HANDOFF REPORT:     First initial/Last name/Title         Post Dialysis: Arik Bar RN Time:  2015     Abbreviations: AVG-arterial venous graft, AVF-arterial venous fistula, IJ-Internal Jugular, Subcl-Subclavian, Fem-Femoral, Tx-treatment, AP/HR-apical heart rate, DFR-dialysate flow rate, BFR-blood flow rate, AP-arterial pressure, -venous pressure, UF-ultrafiltrate, TMP-transmembrane pressure, Oscar-Venous, Art-Arterial, RO-Reverse Osmosis

## 2017-08-23 NOTE — PROGRESS NOTES
Hemodialysis Rounding Note      Patient: Price Joe               Sex: male          DOA: 7/27/2017  4:23 PM        YOB: 1960      Age:  55 y.o.        LOS:  LOS: 27 days     Subjective:     Price Joe is a 55 y.o.  who presents with Cardiac arrest ([facility] [address]  Acidosis  Respiratory failure ([facility] [address]  Anemia  ESRD needing dialysis ([facility] [address]  Cardiac arrest ([facility] [address]  Acute GI bleeding  Sepsis ([facility] [address]  Hypotension  Anoxic brain damage (HCC)  ESRD (end stage renal disease) ([facility] [address]  Colitis  dx  dx.   The patient is dialyzing utilizing the following method:Intermittent Hemodialysis        Complaints: remains intubated, non responsive, remains on vasopressors    Current Facility-Administered Medications   Medication Dose Route Frequency    vancomycin (VANCOCIN) 500 mg in 0.9% sodium chloride (MBP/ADV) 100 mL MBP  500 mg IntraVENous ONCE    [START ON 8/25/2017] Vancomycin random level  1 Each Other Rx Dosing/Monitoring    [START ON 8/24/2017] insulin glargine (LANTUS) injection 20 Units  20 Units SubCUTAneous DAILY    banana flakes trans-galactooligosaccharide (BANATROL PLUS) powder 1 Packet  1 Packet Per NG tube TID    potassium, sodium phosphates (NEUTRA-PHOS) packet 1 Packet  1 Packet Oral BID    hydrocortisone Sod Succ (PF) (SOLU-CORTEF) injection 25 mg  25 mg IntraVENous Q6H    levETIRAcetam (KEPPRA) 500 mg in saline (iso-osm) 100 ml IVPB  500 mg IntraVENous Q12H    piperacillin-tazobactam (ZOSYN) 2.25 g in 0.9% sodium chloride (MBP/ADV) 50 mL MBP  2.25 g IntraVENous Q12H    LORazepam (ATIVAN) injection 1 mg  1 mg IntraVENous Q4H PRN    insulin lispro (HUMALOG) injection   SubCUTAneous Q6H    VANCOMYCIN INFORMATION NOTE   Other Rx Dosing/Monitoring    white petrolatum-mineral oil 85-15 % oint 1 Dose  1 Dose Ophthalmic QID    chlorhexidine (PERIDEX) 0.12 % mouthwash 10 mL  10 mL Oral Q12H    NOREPINephrine (LEVOPHED) 16,000 mcg in dextrose 5% 250 mL infusion  2-16 mcg/min IntraVENous TITRATE    albuterol (PROVENTIL VENTOLIN) nebulizer solution 2.5 mg  2.5 mg Nebulization Q6H PRN    Piperacillin-tazobactam (ZOSYN) 0.75 gm Supplemental Dosing by Pharmacy   Other Rx Dosing/Monitoring    famotidine (PF) (PEPCID) 20 mg in sodium chloride 0.9 % 10 mL injection  20 mg IntraVENous DAILY    vits A and D-white pet-lanolin (A&D) ointment   Topical QID AFTER MEALS    collagenase (SANTYL) 250 unit/gram ointment   Topical DAILY    acetaminophen (TYLENOL) tablet 650 mg  650 mg Oral Q4H PRN    lactobacillus sp. 50 billion cpu (BIO-K PLUS) capsule 1 Cap  1 Cap Oral DAILY    loperamide (IMODIUM) capsule 2 mg  2 mg Oral Q6H PRN    heparin (porcine) injection 5,000 Units  5,000 Units SubCUTAneous Q8H    heparin (porcine) 1,000 unit/mL injection 2,000 Units  2,000 Units IntraVENous DIALYSIS ONCE    simvastatin (ZOCOR) tablet 20 mg  20 mg Oral QHS    doxercalciferol (HECTOROL) 4 mcg/2 mL injection 3 mcg  3 mcg IntraVENous DIALYSIS MON, WED & FRI    epoetin benjamin (EPOGEN;PROCRIT) injection 10,000 Units  10,000 Units IntraVENous DIALYSIS MON, WED & FRI    aspirin chewable tablet 81 mg  81 mg Oral DAILY    glucose chewable tablet 16 g  4 Tab Oral PRN    glucagon (GLUCAGEN) injection 1 mg  1 mg IntraMUSCular PRN    dextrose (D50W) injection syrg 12.5-25 g  25-50 mL IntraVENous PRN    0.9% sodium chloride infusion  100 mL/hr IntraVENous DIALYSIS PRN    naloxone (NARCAN) injection 0.4 mg  0.4 mg IntraVENous PRN        07 - 1900  In: 6.3 [I.V.:6.3]  Out: -    190 -  0700  In: 3635.9 [I.V.:475.9]  Out: 1750 [Drains:1750]      Objective:     Blood pressure 118/87, pulse 92, temperature 98.1 °F (36.7 °C), resp. rate 20, height 6' 2\" (1.88 m), weight 64.9 kg (143 lb 1.3 oz), SpO2 100 %.   Temp (24hrs), Av °F (36.7 °C), Min:97.2 °F (36.2 °C), Max:98.7 °F (37.1 °C)      Blood Pressure: BP: 118/87  Pulse: Pulse (Heart Rate): 92  Temp:  Temp: 98.1 °F (36.7 °C)    Artificial Kidney Dialyzer/Set Up Inspection: Revaclear   hours Duration of Treatment (hours): 3 hours   Heparin Bolus Heparin Bolus (units): 2000 units (verbal order per Dr. Dhara Rebolledo by ICU MD Rosenberg)  Blood flow rate Blood Flow Rate (ml/min): 200 ml/min   Dialysate rate     Arterial Access Pressure Arterial Access Pressure (mmHg): -80  Venous Return Pressure Venous Return Pressure (mmHg): 170  Ultrafiltration Rate Goal/Amount of Fluid to Remove (mL): 0 mL  Fluid Removal Fluid Removed (mL): 0  Net Fluid Removal NET Fluid Removed (mL): 0 ml      PHYSICAL EXAM: non responsive to stimuli   HEENT: ET  In place, non icteric  NECK:  No JVD  CHEST AND LUNGS:  Equal vent bs  CVS:  Regular no rub  ABDOMEN:  Soft , hypoactive bs  EXT:  No edema, Left arm AVG    DATA REVIEW:    Labs: Results:       Chemistry Recent Labs      08/23/17   0615  08/22/17   0600  08/21/17   0100   GLU  222*  259*  221*   NA  143  141  145   K  3.2*  2.9*  3.2*   CL  109*  105  113*   CO2  22  25  20*   BUN  57*  41*  46*   CREA  7.59*  6.05*  8.50*   CA  8.3*  8.1*  7.6*   AGAP  12  11  12   BUCR  8*  7*  5*   Mag 2.8   CBC w/Diff Recent Labs      08/23/17   0615  08/22/17   0600  08/21/17   0100   WBC  13.3*  12.5  14.0*   RBC  3.26*  3.11*  2.88*   HGB  10.0*  9.5*  8.8*   HCT  30.8*  29.2*  27.4*   PLT  192  168  140   GRANS  87*  80*  86*   LYMPH  9*  9*  10*   EOS  0  1  1      Coagulation No results for input(s): PTP, INR, APTT in the last 72 hours. No lab exists for component: INREXT, INREXT    Iron/Ferritin No results for input(s): IRON in the last 72 hours. No lab exists for component: TIBCCALC   BNP No results for input(s): BNPP in the last 72 hours. Cardiac Enzymes No results for input(s): CPK, CKND1, WILFRIDO in the last 72 hours. No lab exists for component: CKRMB, TROIP   Liver Enzymes No results for input(s): TP, ALB, TBIL, AP, SGOT, GPT in the last 72 hours.     No lab exists for component: DBIL   Thyroid Studies Lab Results   Component Value Date/Time    TSH 7.52 08/03/2017 01:01 PM          [address]    CULTURE:   )  No results for input(s): SDES, CULT in the last 72 hours. No results for input(s): CULT in the last 72 hours. Assessment/Plan:     End Stage Renal Disease: Will cont with HD today, K3 yeison 3 bath, no fluid removal and dec BF. SPA prn for BP control. Awaiting family decision re further dialysis goals. At 5:38 PM on 8/23/2017, I saw and examined patient during hemodialysis treatment. The patient was receiving hemodialysis for treatment of end stage renal disease. I have also reviewed vital signs, intake and output, lab results and recent events, and agreed with today's dialysis order. Anemia:  Cont Epo with HD    Renal Metabolic Bone Disease:  Cont hectorol                      Hypotension: cont with vasopressor, no fluid removal    Access: Fistula adequate monitoring/no changes.        Jhon Lubin MD  8/23/2017

## 2017-08-23 NOTE — INTERDISCIPLINARY ROUNDS
CRITICAL CARE INTERDISCIPLINARY ROUNDS      Patient Information:    Name:   Flores Ingram    Age:   55 y.o.     Admission Date:   7/27/2017    Readmit Risk Assessment Information:      Readmit Risk Tool Support Systems: Family member(s), Relationship with Primary Physician Group: Seen at least one time within past 12 months    Surgery Date:      Day of Stay:     Expected Discharge Date:        Attending Provider:   Nesha Nichole MD    Surgeon:        Consultant:       Primary Care Provider:   Aniyah Beck MD    Problem List:     Patient Active Problem List   Diagnosis Code    Anemia D64.9    Acidosis E87.2    Cardiac arrest ([address] I46.9    Respiratory failure ([address] J96.90    ESRD needing dialysis ([address] N18.6, Z99.2    Anoxic brain damage ([address] G93.1    Colitis K52.9    Hypotension I95.9    Acute GI bleeding K92.2    ESRD (end stage renal disease) ([address] N18.6    Sepsis ([address] A41.9    Oropharyngeal dysphagia R13.12    Cachexia ([address] R64       Principal Problem:  Sepsis ([address]    Procedure:       During rounds the following quality care indicators and evidence based practices were addressed :     DVT Prophylaxis, Pressure Injury Prevention, Pain Management, Sepsis resuscitation and management, Nutritional Status, Critical Care Interventions Airways, Drains and Lines and IHI Bundles: Central Line Bundle Followed , Stanton Bundle Followed and Vent Bundle Followed, Vent Day 5           Acute MI/PCI:   Not applicable    Heart Failure:    Not applicable    Cardiac Surgery:  Not applicable    SCIP Measures for other Surgeries:   Not applicable    Pneumonia:    Appropriate Antibiotic Selection (ICU versus Non-ICU)    Stroke:  Patient's Personal Risk Factors for Stroke are:   hypertension, family history, hyperlipidemia or diabetes mellitus    NIH Stroke Score  Total: 8 (08/17/17 0400)    Transfer Level of Care:  Not Ready for Transfer    The patient will require the following interventions based on the Readmission Risk Assessment:  Pharmacy evaluation and teaching, Care Management involvement for home health follow up for:  mobility and assistance with ADL's and Spiritual Care evaluation      Discharge Management:  Palliative Care    Anticipated Discharge Date:  3      Interdisciplinary team rounds were held  with the following team membersCare Management, Diabetes Treatment Specialist, Nursing, Nutrition, Palliative Care, Pastoral Care, Pharmacy, Physician and Clinical Coordinator and the  patient and healthcare POA (documentation required). Plan of care discussed. See clinical pathway and/or care plan for interventions and desired outcomes. Continue care discuss comfort with son and daughter.

## 2017-08-23 NOTE — PROGRESS NOTES
08/23/17 0814   Weaning Parameters   Spontaneous Breathing Trial Complete (Pt failed SBT no spontanenous RR)

## 2017-08-23 NOTE — PROGRESS NOTES
attended the interdisciplinary rounds for [facility], who is a 55 y. o.,male. Patients Primary Language is: [place] Barney[place]. According to the patients EMR Oriental orthodox Affiliation is: [facility].     The reason the Patient came to the hospital is:   Patient Active Problem List    Diagnosis Date Noted    Oropharyngeal dysphagia 08/17/2017    Cachexia ([facility] [address] 08/17/2017    Acidosis 07/27/2017    Cardiac arrest ([facility] [address] 07/27/2017    Respiratory failure ([facility] [address] 07/27/2017    ESRD needing dialysis ([facility] [address] 07/27/2017    Anoxic brain damage ([facility] [address] 07/27/2017    Colitis 07/27/2017    Hypotension 07/27/2017    Acute GI bleeding 07/27/2017    ESRD (end stage renal disease) ([facility] [address] 07/27/2017    Sepsis ([facility] [address] 07/27/2017    Anemia       Not able to assess the patient due to medical condition. Plan:  Chaplains will continue to follow and will provide pastoral care on an as needed/requested basis.  recommends bedside caregivers page  on duty if patient shows signs of acute spiritual or emotional distress.     [address] [facility]  Board Certified [address] [facility]   [phone]

## 2017-08-23 NOTE — PROGRESS NOTES
Re:  Arabella Span up visit     8/23/2017 1:42 PM    SSN: [id number]    Subjective:   Hanane Flavors is seen on the vent in the ICU having an EEG done.     Medications:    Current Facility-Administered Medications   Medication Dose Route Frequency Provider Last Rate Last Dose    vancomycin (VANCOCIN) 500 mg in 0.9% sodium chloride (MBP/ADV) 100 mL MBP  500 mg IntraVENous ONCE Lashon Jacob MD        [START ON 8/25/2017] Vancomycin random level  1 Each Other Rx Dosing/Monitoring Lashon Jacob MD        insulin glargine (LANTUS) injection 10 Units  10 Units SubCUTAneous ONCE MD Corey Patrick ON 8/24/2017] insulin glargine (LANTUS) injection 20 Units  20 Units SubCUTAneous DAILY Carmine Herrera MD        banana flakes trans-galactooligosaccharide (BANATROL PLUS) powder 1 Packet  1 Packet Per NG tube TID Carmine Herrera MD   1 Packet at 08/23/17 1000    potassium, sodium phosphates (NEUTRA-PHOS) packet 1 Packet  1 Packet Oral BID Lon Hough MD   1 Packet at 08/23/17 1011    hydrocortisone Sod Succ (PF) (SOLU-CORTEF) injection 25 mg  25 mg IntraVENous Q6H Lashon Jacob MD   25 mg at 08/23/17 1249    levETIRAcetam (KEPPRA) 500 mg in saline (iso-osm) 100 ml IVPB  500 mg IntraVENous Q12H Keyon Kat  mL/hr at 08/23/17 1007 500 mg at 08/23/17 1007    piperacillin-tazobactam (ZOSYN) 2.25 g in 0.9% sodium chloride (MBP/ADV) 50 mL MBP  2.25 g IntraVENous Q12H Lashon Jacob  mL/hr at 08/23/17 1010 2.25 g at 08/23/17 1010    LORazepam (ATIVAN) injection 1 mg  1 mg IntraVENous Q4H PRN eMndy Smart NP   1 mg at 08/23/17 1034    insulin lispro (HUMALOG) injection   SubCUTAneous Q6H Nikole Solo MD   6 Units at 08/23/17 1257    VANCOMYCIN INFORMATION NOTE   Other Rx Dosing/Monitoring Mendy Smart NP        white petrolatum-mineral oil 85-15 % oint 1 Dose  1 Dose Ophthalmic QID Oliva Moreno NP   1 Dose at 08/23/17 1000    chlorhexidine (PERIDEX) 0.12 % mouthwash 10 mL  10 mL Oral Q12H Genevieve Oliver NP   10 mL at 08/23/17 1000    NOREPINephrine (LEVOPHED) 16,000 mcg in dextrose 5% 250 mL infusion  2-16 mcg/min IntraVENous TITRATE Genevieve Oliver NP 1.9 mL/hr at 08/22/17 2322 2 mcg/min at 08/22/17 2322    albuterol (PROVENTIL VENTOLIN) nebulizer solution 2.5 mg  2.5 mg Nebulization Q6H PRN Genevieve Oliver NP        Piperacillin-tazobactam (ZOSYN) 0.75 gm Supplemental Dosing by Pharmacy   Other Rx Dosing/Monitoring Sulema Capone MD        famotidine (PF) (PEPCID) 20 mg in sodium chloride 0.9 % 10 mL injection  20 mg IntraVENous DAILY January-Jill Michel VARGAS PA-C   20 mg at 08/23/17 1000    vits A and D-white pet-lanolin (A&D) ointment   Topical QID AFTER MEALS Sulema Capone MD        collagenase (SANTYL) 250 unit/gram ointment   Topical DAILY Sulema Capone MD        acetaminophen (TYLENOL) tablet 650 mg  650 mg Oral Q4H PRN Tommy Rocha MD   650 mg at 08/22/17 1912    lactobacillus sp. 50 billion cpu (BIO-K PLUS) capsule 1 Cap  1 Cap Oral DAILY Ricardo Gutierrez MD   1 Cap at 08/23/17 1007    loperamide (IMODIUM) capsule 2 mg  2 mg Oral Q6H PRN Tommy Rocha MD   2 mg at 08/12/17 2124    heparin (porcine) injection 5,000 Units  5,000 Units SubCUTAneous Rusty Tijerina MD   5,000 Units at 08/23/17 1000    heparin (porcine) 1,000 unit/mL injection 2,000 Units  2,000 Units IntraVENous DIALYSIS ONCE Odin Batista MD        simvastatin (ZOCOR) tablet 20 mg  20 mg Oral QHS Tommy Rocha MD   20 mg at 08/22/17 2226    doxercalciferol (HECTOROL) 4 mcg/2 mL injection 3 mcg  3 mcg IntraVENous DIALYSIS JIGNESH TATE & Bill Kirby MD   3 mcg at 08/21/17 1323    epoetin benjamin (EPOGEN;PROCRIT) injection 10,000 Units  10,000 Units IntraVENous DIALYSIS Ilsa TATE MD   10,000 Units at 08/21/17 0900    aspirin chewable tablet 81 mg  81 mg Oral DAILY Ping Castillo MD   81 mg at 08/23/17 1000    glucose chewable tablet 16 g  4 Tab Oral PRN Herrera Duong PA-C        glucagon (GLUCAGEN) injection 1 mg  1 mg IntraMUSCular PRN Herrera Duong PA-C        dextrose (D50W) injection syrg 12.5-25 g  25-50 mL IntraVENous PRN Herrera Duong PA-C   25 g at 08/06/17 0005    0.9% sodium chloride infusion  100 mL/hr IntraVENous DIALYSIS PRN Helen Ricardo  mL/hr at 08/02/17 1925 100 mL/hr at 08/02/17 1925    naloxone [facility]) injection 0.4 mg  0.4 mg IntraVENous PRN Beata Diaz MD           Vital signs:    Visit Vitals    /69    Pulse 85    Temp 98.5 °F (36.9 °C)    Resp 17    Ht 6' 2\" (1.88 m)    Wt 64.9 kg (143 lb 1.3 oz)    SpO2 100%    BMI 18.37 kg/m2       Review of Systems:   Could not be obtained from the patient because of the medical status. Patient Active Problem List   Diagnosis Code    Anemia D64.9    Acidosis E87.2    Cardiac arrest ([address] I46.9    Respiratory failure ([address] J96.90    ESRD needing dialysis ([address] N18.6, Z99.2    Anoxic brain damage (HCC) G93.1    Colitis K52.9    Hypotension I95.9    Acute GI bleeding K92.2    ESRD (end stage renal disease) ([address] N18.6    Sepsis ([facility]) A41.9    Oropharyngeal dysphagia R13.12    Cachexia ([facility]) R64         Objective: The patient is comatose on the vent. To deep painful stimuli he shakes his head and swallows. Cranial Nerves: II  Visual fields can't be tested. III, IV, VI  Extraocular movements are absent to Doll's eyes maneuver. His eyes are forcefully deviated upwards. V  Corneal responses are absent. VII  Face is symmetrical.  VIII - Hearing is not testable. IX, X, XII  Gag is absent. Motor: The patient has no movement to pain. Tone is normal. Reflexes are trace and symmetrical. Plantars are down going. Gait is not testable.     CBC:   Lab Results   Component Value Date/Time    WBC 13.3 08/23/2017 06:15 AM    RBC 3.26 08/23/2017 06:15 AM    HGB 10.0 08/23/2017 06:15 AM    HCT 30.8 08/23/2017 06:15 AM    PLATELET 162 37/82/2718 06:15 AM     BMP:   Lab Results   Component Value Date/Time    Glucose 222 08/23/2017 06:15 AM    Sodium 143 08/23/2017 06:15 AM    Potassium 3.2 08/23/2017 06:15 AM    Chloride 109 08/23/2017 06:15 AM    CO2 22 08/23/2017 06:15 AM    BUN 57 08/23/2017 06:15 AM    Creatinine 7.59 08/23/2017 06:15 AM    Calcium 8.3 08/23/2017 06:15 AM     CMP:   Lab Results   Component Value Date/Time    Glucose 222 08/23/2017 06:15 AM    Sodium 143 08/23/2017 06:15 AM    Potassium 3.2 08/23/2017 06:15 AM    Chloride 109 08/23/2017 06:15 AM    CO2 22 08/23/2017 06:15 AM    BUN 57 08/23/2017 06:15 AM    Creatinine 7.59 08/23/2017 06:15 AM    Calcium 8.3 08/23/2017 06:15 AM    Anion gap 12 08/23/2017 06:15 AM    BUN/Creatinine ratio 8 08/23/2017 06:15 AM    Alk. 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 No evidence at this time of any recovery of higher cortical function. EEG suggests a very poor prognosis. Plan:  Continue supportive care. Poor prognosis. Sincerely,        Luis Vargas.  Jules Mackenzie M.D.

## 2017-08-23 NOTE — PROGRESS NOTES
[address] [place] [address] [facility]  ICU Progress Note      Name: Ruel Caba   : 1960   MRN: [id number]   Date: 2017 3:29 PM     [x]I have reviewed the flowsheet and previous days notes. Events overnight reviewed and discussed with nursing staff. Vital signs and records reviewed. HPI:   17   - Remains on levophed. Febrile yesterday. Not breathing over vent. Anuric.   - HD today. Not following commands or withdrawing to pain. - Pupils pin point and fixed. Negative corneal reflex. (+) cough and gag. - Copious oral secretions. ROS:Review of systems not obtained due to patient factors.     Medication Review:  · Pressors -  · Sedation - PRN ativan for myoclonus   · Antibiotics - Vancomycin and zosyn   · Pain -  · GI/ DVT - Pepcid and sq heparin   · Others (other gtts)    Safety Bundles: VAP Bundle/Severe Sepsis Protocol    Vital Signs:    Visit Vitals    /69    Pulse 81    Temp 97.2 °F (36.2 °C)    Resp 17    Ht 6' 2\" (1.88 m)    Wt 64.9 kg (143 lb 1.3 oz)    SpO2 100%    BMI 18.37 kg/m2       O2 Device: Endotracheal tube, Ventilator   O2 Flow Rate (L/min): 0 l/min   Temp (24hrs), Av.1 °F (37.3 °C), Min:97.2 °F (36.2 °C), Max:101.5 °F (38.6 °C)       Intake/Output:   Last shift:      701 - 1900  In: 6.3 [I.V.:6.3]  Out: -   Last 3 shifts: 1901 - 700  In: 3635.9 [I.V.:475.9]  Out: 8111 [Drains:1750]    Intake/Output Summary (Last 24 hours) at 17 0815  Last data filed at 17 0800   Gross per 24 hour   Intake          2282.46 ml   Output             1150 ml   Net          1132.46 ml     Ventilator Settings:  Ventilator  Mode: Assist control, VC+  Respiratory Rate  Resp Rate Observed: 15  Back-Up Rate: 16  Insp Time (sec): 1 sec  Insp Flow (l/min): 49 l/min  I:E Ratio: 1;1.8  Ventilator Volumes  Vt Set (ml): 450 ml  Vt Exhaled (Machine Breath) (ml): 450 ml  Vt Spont (ml): 83 ml  Ve Observed (l/min): 11 l/min  Ventilator Pressures  Pressure Support (cm H2O): 7 cm H2O  PIP Observed (cm H2O): 15 cm H2O  Plateau Pressure (cm H2O): 11 cm H2O  MAP (cm H2O): 9.2  PEEP/VENT (cm H2O): 5 cm H20  Auto PEEP Observed (cm H2O): 0.4 cm H2O    Physical Exam:  General: Intubated, critically ill, unresponsive  HEENT:  Anicteric sclerae; pink palpebral conjunctivae; Pupil non reactive to light. Resp:  Symmetrical chest expansion, no accessory muscle use; good airway entry; no rales/ wheezing/ rhonchi noted  CV:  S1, S2 present;  GI:  Abdomen soft, non-tender; (+) active bowel sounds  Extremities:  +2 pulses on all extremities; + thrill and bruit to L AV fistula   Skin:  Dressing to sacrum. Erythematous perineum   Neurologic:  Facial myoclonus, Pupils fixed, small with disconjugate upward deviated gaze. (+) cough/gag. Negative corneal reflex no withdrawal to pain.     Devices:  NGT, ETT, A line, CVL left IJ    DATA:     Current Facility-Administered Medications   Medication Dose Route Frequency    banana flakes trans-galactooligosaccharide (BANATROL PLUS) powder 1 Packet  1 Packet Per NG tube TID    potassium, sodium phosphates (NEUTRA-PHOS) packet 1 Packet  1 Packet Oral BID    hydrocortisone Sod Succ (PF) (SOLU-CORTEF) injection 25 mg  25 mg IntraVENous Q6H    levETIRAcetam (KEPPRA) 500 mg in saline (iso-osm) 100 ml IVPB  500 mg IntraVENous Q12H    piperacillin-tazobactam (ZOSYN) 2.25 g in 0.9% sodium chloride (MBP/ADV) 50 mL MBP  2.25 g IntraVENous Q12H    insulin glargine (LANTUS) injection 10 Units  10 Units SubCUTAneous DAILY    LORazepam (ATIVAN) injection 1 mg  1 mg IntraVENous Q4H PRN    insulin lispro (HUMALOG) injection   SubCUTAneous Q6H    VANCOMYCIN INFORMATION NOTE   Other Rx Dosing/Monitoring    white petrolatum-mineral oil 85-15 % oint 1 Dose  1 Dose Ophthalmic QID    chlorhexidine (PERIDEX) 0.12 % mouthwash 10 mL  10 mL Oral Q12H    NOREPINephrine (LEVOPHED) 16,000 mcg in dextrose 5% 250 mL infusion  2-16 mcg/min IntraVENous TITRATE    albuterol (PROVENTIL VENTOLIN) nebulizer solution 2.5 mg  2.5 mg Nebulization Q6H PRN    Piperacillin-tazobactam (ZOSYN) 0.75 gm Supplemental Dosing by Pharmacy   Other Rx Dosing/Monitoring    famotidine (PF) (PEPCID) 20 mg in sodium chloride 0.9 % 10 mL injection  20 mg IntraVENous DAILY    vits A and D-white pet-lanolin (A&D) ointment   Topical QID AFTER MEALS    collagenase (SANTYL) 250 unit/gram ointment   Topical DAILY    acetaminophen (TYLENOL) tablet 650 mg  650 mg Oral Q4H PRN    lactobacillus sp. 50 billion cpu (BIO-K PLUS) capsule 1 Cap  1 Cap Oral DAILY    loperamide (IMODIUM) capsule 2 mg  2 mg Oral Q6H PRN    heparin (porcine) injection 5,000 Units  5,000 Units SubCUTAneous Q8H    heparin (porcine) 1,000 unit/mL injection 2,000 Units  2,000 Units IntraVENous DIALYSIS ONCE    simvastatin (ZOCOR) tablet 20 mg  20 mg Oral QHS    doxercalciferol (HECTOROL) 4 mcg/2 mL injection 3 mcg  3 mcg IntraVENous DIALYSIS MON, WED & FRI    epoetin benjamin (EPOGEN;PROCRIT) injection 10,000 Units  10,000 Units IntraVENous DIALYSIS MON, WED & FRI    aspirin chewable tablet 81 mg  81 mg Oral DAILY    glucose chewable tablet 16 g  4 Tab Oral PRN    glucagon (GLUCAGEN) injection 1 mg  1 mg IntraMUSCular PRN    dextrose (D50W) injection syrg 12.5-25 g  25-50 mL IntraVENous PRN    0.9% sodium chloride infusion  100 mL/hr IntraVENous DIALYSIS PRN    naloxone (NARCAN) injection 0.4 mg  0.4 mg IntraVENous PRN         Labs: Results:       Chemistry Recent Labs      08/23/17   0615  08/22/17   0600  08/21/17   0100   GLU  222*  259*  221*   NA  143  141  145   K  3.2*  2.9*  3.2*   CL  109*  105  113*   CO2  22  25  20*   BUN  57*  41*  46*   CREA  7.59*  6.05*  8.50*   CA  8.3*  8.1*  7.6*   AGAP  12  11  12   BUCR  8*  7*  5*      CBC w/Diff Recent Labs      08/23/17   0615  08/22/17   0600  08/21/17   0100   WBC  13.3*  12.5  14.0*   RBC  3.26*  3.11*  2.88*   HGB  10.0*  9.5*  8.8*   HCT 30.8*  29.2*  27.4*   PLT  192  168  140   GRANS  87*  80*  86*   LYMPH  9*  9*  10*   EOS  0  1  1      Coagulation No results for input(s): PTP, INR, APTT in the last 72 hours. No lab exists for component: INREXT, INREXT    Liver Enzymes No results for input(s): TP, ALB, TBIL, AP, SGOT, GPT in the last 72 hours. No lab exists for component: DBIL   ABG Lab Results   Component Value Date/Time    PHI 7.454 (H) 08/23/2017 04:11 AM    PCO2I 34.0 (L) 08/23/2017 04:11 AM    PO2I 135 (H) 08/23/2017 04:11 AM    HCO3I 23.8 08/23/2017 04:11 AM    FIO2I 28 08/23/2017 04:11 AM      Microbiology No results for input(s): CULT in the last 72 hours. Telemetry: [x]Sinus []A-flutter []Paced    []A-fib []Multiple PVCs                  Imaging:  CXR 8/23/17: Lines and tubes are in appropriate position. The lower end of ET tube is 2.2 cm above bree. Lungs appear clear bilaterally without definable acute process. IMPRESSION:   · PEA Cardiac arrest in dialysis 8/18/17  · S/P PEA Cardiac arrest on this admission 7/27/17, normal cath  · Acute Respirtatory Failure requiring Mechanical Ventilation   · Acute Anoxic Encephalopathy with acute with hyperdense cortex on CT   · Hypotension- distributive vs cardiogenic vs obstructive- on pressors   · ESRD requiring Dialysis - completed 3 hrs of dialysis today prior to code blue  · Sepsis Bacteremia - resolved   · DM2  · Oropharyngeal dysphagia - Needs peg tube  · Small Pericardial Effusion on Echo 8/7/17  · Cachexia  · Unstageable pressure ulcer to sacrum/coccyx  · Diarrhea- Cdiff negative       PLAN:   · Resp - stable on vent, not breathing above vent. VAP Bundle. Aspiration precautions. HOB > 30 degrees. PRN nebz. · ID - afebrile, no leukocytosis. Pt is s/p RX for ecoli and clostridium perfringens. + Fevers overnight. Repeat bcx and sputum cx NGTD from 8/18/17. On vancomycin and zosyn. Stress dose steroids. · CVS - Titrate levophed gtt with goal for MAP > 65 mm/hg.  Echo results-pending. · Heme/onc - Stable H/H and platelets. Daily CBC. · Metabolic - Monitor and replace lytes per provider. · Renal - HD per nephrology service   · Endocrine - SSI for glycemic control. Lantus daily. · Neuro/ Pain/ Sedation - Neurology following, new myoclonic jerking, note poor prognosis. Continue Keppra. Obtain EEG. · GI - Continue Tube feeds. · Prophylaxis - DVT- Sq heparin, GI- pepcid   · Full Code- Spoke with daughter/POA, she is going to discuss comfort care and DNR options with her brother.    · Discussed in interdisciplinary rounds          The patient is: [] acutely ill Risk of deterioration: [] moderate    [x] critically ill  [x] high     [x]See my orders for details    My assessment/plan was discussed with:  [x]nursing []PT/OT    [x]respiratory therapy [x]Dr. Angie Enciso    []family []       Nay Houser PA-C

## 2017-08-23 NOTE — PROGRESS NOTES
[facility]  Progress Note    Patient: Ruel Caba Age: 55 y.o. : 1960 MR#: [id number] SSN: [id number]  Date/Time: 2017     Subjective:   Patient intubated and unresponsive. Assessment/Plan:   1. Acute met encephalopathy: due to # 2.      2. Acute pontine lacunar CVA: on asa, statib. 3. S/p PEA arrest. ? Cardiac cause with elevated trop PTA. Echo improving EF, s/p cath, no CAD. NOw with second cardiac arrest and intubated- Vent support    4. Acute resp failure s/p extubation, off O2- Now reintubated after second cardiac arrest    5. Dysphagia:    6. ESRD- HD as pe nephrology    7. Hypotension, ?septic shock, on abx, wean pressors;    9. Elevated LFT - ? Shock liver. 10. Thrombocytopenia: secondary to sepsis. monitor  11. Sz: keppra  12. Wounds: wound care    unstageable pressure ulcer to sacrum/coccyx not present on admit    moisture associated skin damage to anus not present  on admit    moisture associated skin damage to buttocks not present on admit    13- Anoxic encephalopathy- neuro following, await EEG.   Full code    Case discussed with:  []Patient  []Family  [x]Nursing  []Case Management  DVT Prophylaxis:  []Lovenox  [x]Hep SQ  [x]SCDs  []Coumadin   []On Heparin gtt    Patient Active Problem List   Diagnosis Code    Anemia D64.9    Acidosis E87.2    Cardiac arrest ([address] I46.9    Respiratory failure ([address] J96.90    ESRD needing dialysis ([address] N18.6, Z99.2    Anoxic brain damage (HCC) G93.1    Colitis K52.9    Hypotension I95.9    Acute GI bleeding K92.2    ESRD (end stage renal disease) ([address] N18.6    Sepsis ([address] A41.9    Oropharyngeal dysphagia R13.12    Cachexia ([address] R64       Objective:   VS:   Visit Vitals    /69    Pulse 77    Temp 97.2 °F (36.2 °C)    Resp 16    Ht 6' 2\" (1.88 m)    Wt 64.9 kg (143 lb 1.3 oz)    SpO2 100%    BMI 18.37 kg/m2      Tmax/24hrs: Temp (24hrs), Av.1 °F (37.3 °C), Min:97.2 °F (36.2 °C), Max:101.5 °F (38.6 °C)  IOBRIEF    Intake/Output Summary (Last 24 hours) at 08/23/17 1153  Last data filed at 08/23/17 0800   Gross per 24 hour   Intake          2282.46 ml   Output             1150 ml   Net          1132.46 ml     General:  Intubated, unresponsive  Cardiovascular:  S1S2+, RRR  Pulmonary:  Coarse BS b/l  GI:  Soft, BS+, NT, ND  Extremities:  No edema            Medications:   Current Facility-Administered Medications   Medication Dose Route Frequency    vancomycin (VANCOCIN) 500 mg in 0.9% sodium chloride (MBP/ADV) 100 mL MBP  500 mg IntraVENous ONCE    [START ON 8/25/2017] Vancomycin random level  1 Each Other Rx Dosing/Monitoring    insulin glargine (LANTUS) injection 10 Units  10 Units SubCUTAneous ONCE    [START ON 8/24/2017] insulin glargine (LANTUS) injection 20 Units  20 Units SubCUTAneous DAILY    banana flakes trans-galactooligosaccharide (BANATROL PLUS) powder 1 Packet  1 Packet Per NG tube TID    potassium, sodium phosphates (NEUTRA-PHOS) packet 1 Packet  1 Packet Oral BID    hydrocortisone Sod Succ (PF) (SOLU-CORTEF) injection 25 mg  25 mg IntraVENous Q6H    levETIRAcetam (KEPPRA) 500 mg in saline (iso-osm) 100 ml IVPB  500 mg IntraVENous Q12H    piperacillin-tazobactam (ZOSYN) 2.25 g in 0.9% sodium chloride (MBP/ADV) 50 mL MBP  2.25 g IntraVENous Q12H    LORazepam (ATIVAN) injection 1 mg  1 mg IntraVENous Q4H PRN    insulin lispro (HUMALOG) injection   SubCUTAneous Q6H    VANCOMYCIN INFORMATION NOTE   Other Rx Dosing/Monitoring    white petrolatum-mineral oil 85-15 % oint 1 Dose  1 Dose Ophthalmic QID    chlorhexidine (PERIDEX) 0.12 % mouthwash 10 mL  10 mL Oral Q12H    NOREPINephrine (LEVOPHED) 16,000 mcg in dextrose 5% 250 mL infusion  2-16 mcg/min IntraVENous TITRATE    albuterol (PROVENTIL VENTOLIN) nebulizer solution 2.5 mg  2.5 mg Nebulization Q6H PRN    Piperacillin-tazobactam (ZOSYN) 0.75 gm Supplemental Dosing by Pharmacy   Other Rx Dosing/Monitoring    famotidine (PF) (PEPCID) 20 mg in sodium chloride 0.9 % 10 mL injection  20 mg IntraVENous DAILY    vits A and D-white pet-lanolin (A&D) ointment   Topical QID AFTER MEALS    collagenase (SANTYL) 250 unit/gram ointment   Topical DAILY    acetaminophen (TYLENOL) tablet 650 mg  650 mg Oral Q4H PRN    lactobacillus sp. 50 billion cpu (BIO-K PLUS) capsule 1 Cap  1 Cap Oral DAILY    loperamide (IMODIUM) capsule 2 mg  2 mg Oral Q6H PRN    heparin (porcine) injection 5,000 Units  5,000 Units SubCUTAneous Q8H    heparin (porcine) 1,000 unit/mL injection 2,000 Units  2,000 Units IntraVENous DIALYSIS ONCE    simvastatin (ZOCOR) tablet 20 mg  20 mg Oral QHS    doxercalciferol (HECTOROL) 4 mcg/2 mL injection 3 mcg  3 mcg IntraVENous DIALYSIS MON, WED & FRI    epoetin benjamin (EPOGEN;PROCRIT) injection 10,000 Units  10,000 Units IntraVENous DIALYSIS MON, WED & FRI    aspirin chewable tablet 81 mg  81 mg Oral DAILY    glucose chewable tablet 16 g  4 Tab Oral PRN    glucagon (GLUCAGEN) injection 1 mg  1 mg IntraMUSCular PRN    dextrose (D50W) injection syrg 12.5-25 g  25-50 mL IntraVENous PRN    0.9% sodium chloride infusion  100 mL/hr IntraVENous DIALYSIS PRN    naloxone (NARCAN) injection 0.4 mg  0.4 mg IntraVENous PRN       Labs:      Recent Results (from the past 24 hour(s))   GLUCOSE, POC    Collection Time: 08/22/17  4:30 PM   Result Value Ref Range    Glucose (POC) 366 (H) 70 - 110 mg/dL   GLUCOSE, POC    Collection Time: 08/22/17 11:30 PM   Result Value Ref Range    Glucose (POC) 342 (H) 70 - 110 mg/dL   POC G3    Collection Time: 08/23/17  4:11 AM   Result Value Ref Range    Device: VENT      FIO2 (POC) 28 %    pH (POC) 7.454 (H) 7.35 - 7.45      pCO2 (POC) 34.0 (L) 35.0 - 45.0 MMHG    pO2 (POC) 135 (H) 80 - 100 MMHG    HCO3 (POC) 23.8 22 - 26 MMOL/L    sO2 (POC) 99 (H) 92 - 97 %    Base excess (POC) 0 mmol/L    Mode ASSIST CONTROL      Tidal volume 450 ml    Set Rate 16 bpm    PEEP/CPAP (POC) 5 cmH2O Allens test (POC) YES      Total resp. rate 18      Site RIGHT RADIAL      Patient temp. 37.0      Specimen type (POC) ARTERIAL      Performed by Pedro Pablo Benson     Volume control plus YES     CBC WITH AUTOMATED DIFF    Collection Time: 08/23/17  6:15 AM   Result Value Ref Range    WBC 13.3 (H) 4.6 - 13.2 K/uL    RBC 3.26 (L) 4.70 - 5.50 M/uL    HGB 10.0 (L) 13.0 - 16.0 g/dL    HCT 30.8 (L) 36.0 - 48.0 %    MCV 94.5 74.0 - 97.0 FL    MCH 30.7 24.0 - 34.0 PG    MCHC 32.5 31.0 - 37.0 g/dL    RDW 17.4 (H) 11.6 - 14.5 %    PLATELET 956 501 - 371 K/uL    MPV 11.2 9.2 - 11.8 FL    NEUTROPHILS 87 (H) 40 - 73 %    LYMPHOCYTES 9 (L) 21 - 52 %    MONOCYTES 4 3 - 10 %    EOSINOPHILS 0 0 - 5 %    BASOPHILS 0 0 - 2 %    ABS. NEUTROPHILS 11.5 (H) 1.8 - 8.0 K/UL    ABS. LYMPHOCYTES 1.2 0.9 - 3.6 K/UL    ABS. MONOCYTES 0.5 0.05 - 1.2 K/UL    ABS. EOSINOPHILS 0.1 0.0 - 0.4 K/UL    ABS.  BASOPHILS 0.0 0.0 - 0.06 K/UL    DF AUTOMATED     MAGNESIUM    Collection Time: 08/23/17  6:15 AM   Result Value Ref Range    Magnesium 2.9 (H) 1.6 - 2.6 mg/dL   PHOSPHORUS    Collection Time: 08/23/17  6:15 AM   Result Value Ref Range    Phosphorus 2.6 2.5 - 4.9 MG/DL   VANCOMYCIN, RANDOM    Collection Time: 08/23/17  6:15 AM   Result Value Ref Range    Vancomycin, random 20.5 5.0 - 47.5 UG/ML   METABOLIC PANEL, BASIC    Collection Time: 08/23/17  6:15 AM   Result Value Ref Range    Sodium 143 136 - 145 mmol/L    Potassium 3.2 (L) 3.5 - 5.5 mmol/L    Chloride 109 (H) 100 - 108 mmol/L    CO2 22 21 - 32 mmol/L    Anion gap 12 3.0 - 18 mmol/L    Glucose 222 (H) 74 - 99 mg/dL    BUN 57 (H) 7.0 - 18 MG/DL    Creatinine 7.59 (H) 0.6 - 1.3 MG/DL    BUN/Creatinine ratio 8 (L) 12 - 20      GFR est AA 9 (L) >60 ml/min/1.73m2    GFR est non-AA 7 (L) >60 ml/min/1.73m2    Calcium 8.3 (L) 8.5 - 10.1 MG/DL   GLUCOSE, POC    Collection Time: 08/23/17  6:22 AM   Result Value Ref Range    Glucose (POC) 196 (H) 70 - 110 mg/dL       Signed By: Case Quispe MD August 23, 2017

## 2017-08-23 NOTE — DIABETES MGMT
GLYCEMIC CONTROL PLAN OF CARE    Assessment/Recommendations:  Pt discussed in interdisciplinary rounds. Blood glucose remaining elevated. Lantus insulin increased to 20 units daily.  Pt continues to receive Solu-Cortef, 25 mg every 6 hours  Continue inpatient monitoring and intervention     Most recent blood glucose values:  8/22/2017 05:51 8/22/2017 16:30 8/22/2017 23:30 8/23/2017 06:22   196 (H) 366 (H) 342 (H) 196 (H)     Current A1C of 5.4% is equivalent to average blood glucose of 108 mg/dl over the past 2-3 months.      Current hospital diabetes medications:   Correctional Lispro insulin every 6 hours (very resistant scale)  Lantus insulin 20 units daily      Previous day's insulin requirements:   10 units of Lantus insulin   21 units of Lispro insulin       Home diabetes medications:  None      Diet:  NPO  Tube Feeding: Nepro at 55 mL via NGT    Education:  ____Refer to Diabetes Education Record             _x___Education not indicated at this time      Srikanth Deshpande, ANASTASIA, CDE

## 2017-08-23 NOTE — ROUTINE PROCESS
Bedside and Verbal shift change report given to AMI Mackey (oncoming nurse) by Barry Colon RN (offgoing nurse).  Report included the following information SBAR, Kardex, ED Summary, Procedure Summary, Intake/Output, MAR, Recent Results and Cardiac Rhythm SR.

## 2017-08-24 ENCOUNTER — APPOINTMENT (OUTPATIENT)
Dept: GENERAL RADIOLOGY | Age: 57
DRG: 004 | End: 2017-08-24
Attending: PHYSICIAN ASSISTANT
Payer: MEDICARE

## 2017-08-24 LAB
ANION GAP SERPL CALC-SCNC: 9 MMOL/L (ref 3–18)
ARTERIAL PATENCY WRIST A: YES
BACTERIA SPEC CULT: NORMAL
BACTERIA SPEC CULT: NORMAL
BASE EXCESS BLD CALC-SCNC: 2 MMOL/L
BASOPHILS # BLD: 0 K/UL (ref 0–0.1)
BASOPHILS NFR BLD: 0 % (ref 0–2)
BDY SITE: ABNORMAL
BUN SERPL-MCNC: 31 MG/DL (ref 7–18)
BUN/CREAT SERPL: 6 (ref 12–20)
CALCIUM SERPL-MCNC: 8.6 MG/DL (ref 8.5–10.1)
CHLORIDE SERPL-SCNC: 106 MMOL/L (ref 100–108)
CO2 SERPL-SCNC: 24 MMOL/L (ref 21–32)
CREAT SERPL-MCNC: 5.07 MG/DL (ref 0.6–1.3)
DIFFERENTIAL METHOD BLD: ABNORMAL
EOSINOPHIL # BLD: 0.1 K/UL (ref 0–0.4)
EOSINOPHIL NFR BLD: 0 % (ref 0–5)
ERYTHROCYTE [DISTWIDTH] IN BLOOD BY AUTOMATED COUNT: 17.7 % (ref 11.6–14.5)
GAS FLOW.O2 O2 DELIVERY SYS: ABNORMAL L/MIN
GAS FLOW.O2 SETTING OXYMISER: 16 BPM
GLUCOSE BLD STRIP.AUTO-MCNC: 136 MG/DL (ref 70–110)
GLUCOSE BLD STRIP.AUTO-MCNC: 161 MG/DL (ref 70–110)
GLUCOSE BLD STRIP.AUTO-MCNC: 170 MG/DL (ref 70–110)
GLUCOSE BLD STRIP.AUTO-MCNC: 174 MG/DL (ref 70–110)
GLUCOSE BLD STRIP.AUTO-MCNC: 226 MG/DL (ref 70–110)
GLUCOSE SERPL-MCNC: 237 MG/DL (ref 74–99)
HCO3 BLD-SCNC: 24.9 MMOL/L (ref 22–26)
HCT VFR BLD AUTO: 28.8 % (ref 36–48)
HGB BLD-MCNC: 9.4 G/DL (ref 13–16)
INSPIRATION.DURATION SETTING TIME VENT: 1 SEC
LYMPHOCYTES # BLD: 2.2 K/UL (ref 0.9–3.6)
LYMPHOCYTES NFR BLD: 15 % (ref 21–52)
MAGNESIUM SERPL-MCNC: 2.6 MG/DL (ref 1.6–2.6)
MCH RBC QN AUTO: 30.4 PG (ref 24–34)
MCHC RBC AUTO-ENTMCNC: 32.6 G/DL (ref 31–37)
MCV RBC AUTO: 93.2 FL (ref 74–97)
MONOCYTES # BLD: 1.1 K/UL (ref 0.05–1.2)
MONOCYTES NFR BLD: 7 % (ref 3–10)
NEUTS SEG # BLD: 12 K/UL (ref 1.8–8)
NEUTS SEG NFR BLD: 78 % (ref 40–73)
O2/TOTAL GAS SETTING VFR VENT: 25 %
PCO2 BLD: 29.8 MMHG (ref 35–45)
PEEP RESPIRATORY: 5 CMH2O
PH BLD: 7.53 [PH] (ref 7.35–7.45)
PHOSPHATE SERPL-MCNC: 2.4 MG/DL (ref 2.5–4.9)
PLATELET # BLD AUTO: 254 K/UL (ref 135–420)
PMV BLD AUTO: 11.4 FL (ref 9.2–11.8)
PO2 BLD: 113 MMHG (ref 80–100)
POTASSIUM SERPL-SCNC: 2.9 MMOL/L (ref 3.5–5.5)
POTASSIUM SERPL-SCNC: 3 MMOL/L (ref 3.5–5.5)
RBC # BLD AUTO: 3.09 M/UL (ref 4.7–5.5)
SAO2 % BLD: 99 % (ref 92–97)
SERVICE CMNT-IMP: ABNORMAL
SERVICE CMNT-IMP: NORMAL
SERVICE CMNT-IMP: NORMAL
SODIUM SERPL-SCNC: 139 MMOL/L (ref 136–145)
SPECIMEN TYPE: ABNORMAL
TOTAL RESP. RATE, ITRR: 16
VENTILATION MODE VENT: ABNORMAL
VOLUME CONTROL PLUS IVLCP: YES
VT SETTING VENT: 450 ML
WBC # BLD AUTO: 15.4 K/UL (ref 4.6–13.2)

## 2017-08-24 PROCEDURE — 83735 ASSAY OF MAGNESIUM: CPT | Performed by: NURSE PRACTITIONER

## 2017-08-24 PROCEDURE — 71010 XR CHEST PORT: CPT

## 2017-08-24 PROCEDURE — 84100 ASSAY OF PHOSPHORUS: CPT | Performed by: NURSE PRACTITIONER

## 2017-08-24 PROCEDURE — 74011250637 HC RX REV CODE- 250/637: Performed by: HOSPITALIST

## 2017-08-24 PROCEDURE — 74011250636 HC RX REV CODE- 250/636: Performed by: INTERNAL MEDICINE

## 2017-08-24 PROCEDURE — 74011250636 HC RX REV CODE- 250/636: Performed by: HOSPITALIST

## 2017-08-24 PROCEDURE — 74011250637 HC RX REV CODE- 250/637: Performed by: INTERNAL MEDICINE

## 2017-08-24 PROCEDURE — 82803 BLOOD GASES ANY COMBINATION: CPT

## 2017-08-24 PROCEDURE — 84132 ASSAY OF SERUM POTASSIUM: CPT | Performed by: NURSE PRACTITIONER

## 2017-08-24 PROCEDURE — 74011000250 HC RX REV CODE- 250: Performed by: PHYSICIAN ASSISTANT

## 2017-08-24 PROCEDURE — 85025 COMPLETE CBC W/AUTO DIFF WBC: CPT | Performed by: NURSE PRACTITIONER

## 2017-08-24 PROCEDURE — 74011636637 HC RX REV CODE- 636/637: Performed by: INTERNAL MEDICINE

## 2017-08-24 PROCEDURE — 74011250637 HC RX REV CODE- 250/637: Performed by: PHYSICIAN ASSISTANT

## 2017-08-24 PROCEDURE — 36600 WITHDRAWAL OF ARTERIAL BLOOD: CPT

## 2017-08-24 PROCEDURE — 36592 COLLECT BLOOD FROM PICC: CPT

## 2017-08-24 PROCEDURE — 74011250637 HC RX REV CODE- 250/637: Performed by: NURSE PRACTITIONER

## 2017-08-24 PROCEDURE — 65610000006 HC RM INTENSIVE CARE

## 2017-08-24 PROCEDURE — 82962 GLUCOSE BLOOD TEST: CPT

## 2017-08-24 PROCEDURE — 74011000258 HC RX REV CODE- 258: Performed by: INTERNAL MEDICINE

## 2017-08-24 PROCEDURE — 94003 VENT MGMT INPAT SUBQ DAY: CPT

## 2017-08-24 RX ORDER — POTASSIUM CHLORIDE 1.5 G/1.77G
40 POWDER, FOR SOLUTION ORAL
Status: COMPLETED | OUTPATIENT
Start: 2017-08-24 | End: 2017-08-24

## 2017-08-24 RX ORDER — POTASSIUM CHLORIDE 1.5 G/1.77G
40 POWDER, FOR SOLUTION ORAL DAILY
Status: DISCONTINUED | OUTPATIENT
Start: 2017-08-25 | End: 2017-09-05

## 2017-08-24 RX ADMIN — INSULIN LISPRO 3 UNITS: 100 INJECTION, SOLUTION INTRAVENOUS; SUBCUTANEOUS at 23:19

## 2017-08-24 RX ADMIN — COLLAGENASE SANTYL: 250 OINTMENT TOPICAL at 08:52

## 2017-08-24 RX ADMIN — HYDROCORTISONE SODIUM SUCCINATE 25 MG: 100 INJECTION, POWDER, FOR SOLUTION INTRAMUSCULAR; INTRAVENOUS at 00:42

## 2017-08-24 RX ADMIN — FAMOTIDINE 20 MG: 10 INJECTION INTRAVENOUS at 08:53

## 2017-08-24 RX ADMIN — POTASSIUM CHLORIDE 40 MEQ: 1.5 POWDER, FOR SOLUTION ORAL at 08:55

## 2017-08-24 RX ADMIN — VITAMIN A AND D: 30.8 OINTMENT TOPICAL at 08:55

## 2017-08-24 RX ADMIN — ASPIRIN 81 MG 81 MG: 81 TABLET ORAL at 08:51

## 2017-08-24 RX ADMIN — LEVETIRACETAM 500 MG: 5 INJECTION INTRAVENOUS at 08:54

## 2017-08-24 RX ADMIN — PIPERACILLIN AND TAZOBACTAM 2.25 G: 2; .25 INJECTION, POWDER, FOR SOLUTION INTRAVENOUS at 20:18

## 2017-08-24 RX ADMIN — MINERAL OIL AND WHITE PETROLATUM 1 DOSE: 150; 850 OINTMENT OPHTHALMIC at 21:27

## 2017-08-24 RX ADMIN — HYDROCORTISONE SODIUM SUCCINATE 25 MG: 100 INJECTION, POWDER, FOR SOLUTION INTRAMUSCULAR; INTRAVENOUS at 23:31

## 2017-08-24 RX ADMIN — PIPERACILLIN AND TAZOBACTAM 2.25 G: 2; .25 INJECTION, POWDER, FOR SOLUTION INTRAVENOUS at 08:55

## 2017-08-24 RX ADMIN — MINERAL OIL AND WHITE PETROLATUM 1 DOSE: 150; 850 OINTMENT OPHTHALMIC at 08:56

## 2017-08-24 RX ADMIN — VITAMIN A AND D: 30.8 OINTMENT TOPICAL at 18:59

## 2017-08-24 RX ADMIN — Medication 1 PACKET: at 21:26

## 2017-08-24 RX ADMIN — HYDROCORTISONE SODIUM SUCCINATE 25 MG: 100 INJECTION, POWDER, FOR SOLUTION INTRAMUSCULAR; INTRAVENOUS at 06:00

## 2017-08-24 RX ADMIN — HYDROCORTISONE SODIUM SUCCINATE 25 MG: 100 INJECTION, POWDER, FOR SOLUTION INTRAMUSCULAR; INTRAVENOUS at 18:59

## 2017-08-24 RX ADMIN — HEPARIN SODIUM 5000 UNITS: 5000 INJECTION, SOLUTION INTRAVENOUS; SUBCUTANEOUS at 02:23

## 2017-08-24 RX ADMIN — CHLORHEXIDINE GLUCONATE 10 ML: 1.2 RINSE ORAL at 20:20

## 2017-08-24 RX ADMIN — HYDROCORTISONE SODIUM SUCCINATE 25 MG: 100 INJECTION, POWDER, FOR SOLUTION INTRAMUSCULAR; INTRAVENOUS at 12:32

## 2017-08-24 RX ADMIN — INSULIN LISPRO 3 UNITS: 100 INJECTION, SOLUTION INTRAVENOUS; SUBCUTANEOUS at 12:00

## 2017-08-24 RX ADMIN — HEPARIN SODIUM 5000 UNITS: 5000 INJECTION, SOLUTION INTRAVENOUS; SUBCUTANEOUS at 12:32

## 2017-08-24 RX ADMIN — VITAMIN A AND D: 30.8 OINTMENT TOPICAL at 21:27

## 2017-08-24 RX ADMIN — VITAMIN A AND D: 30.8 OINTMENT TOPICAL at 15:08

## 2017-08-24 RX ADMIN — CHLORHEXIDINE GLUCONATE 10 ML: 1.2 RINSE ORAL at 08:51

## 2017-08-24 RX ADMIN — LEVETIRACETAM 500 MG: 5 INJECTION INTRAVENOUS at 20:11

## 2017-08-24 RX ADMIN — MINERAL OIL AND WHITE PETROLATUM 1 DOSE: 150; 850 OINTMENT OPHTHALMIC at 15:08

## 2017-08-24 RX ADMIN — INSULIN LISPRO 6 UNITS: 100 INJECTION, SOLUTION INTRAVENOUS; SUBCUTANEOUS at 06:02

## 2017-08-24 RX ADMIN — INSULIN LISPRO 3 UNITS: 100 INJECTION, SOLUTION INTRAVENOUS; SUBCUTANEOUS at 00:37

## 2017-08-24 RX ADMIN — INSULIN GLARGINE 20 UNITS: 100 INJECTION, SOLUTION SUBCUTANEOUS at 08:54

## 2017-08-24 RX ADMIN — Medication 1 CAPSULE: at 08:54

## 2017-08-24 RX ADMIN — MINERAL OIL AND WHITE PETROLATUM 1 DOSE: 150; 850 OINTMENT OPHTHALMIC at 18:59

## 2017-08-24 RX ADMIN — Medication 1 PACKET: at 18:47

## 2017-08-24 RX ADMIN — HEPARIN SODIUM 5000 UNITS: 5000 INJECTION, SOLUTION INTRAVENOUS; SUBCUTANEOUS at 18:59

## 2017-08-24 RX ADMIN — SIMVASTATIN 20 MG: 10 TABLET, FILM COATED ORAL at 21:26

## 2017-08-24 NOTE — PROGRESS NOTES
[facility]  Progress Note    Patient: Karlos Grimaldo Age: 55 y.o. : 1960 MR#: [id number] SSN: [id number]  Date/Time: 2017     Subjective:   Patient intubated and unresponsive. Assessment/Plan:     S/p PEA arrest. wth acute resp fauilure on the Vent ? Cardiac cause with elevated trop PTA. Echo improving EF, s/p cath, no CAD. NOw with second cardiac arrest and intubated. Acute met encephalopathy: due to severe anoxic brain injury. Per neurology. Acute pontine lacunar CVA: on asa, statib. ESRD- HD as pe nephrology    .  Wounds: wound care    unstageable pressure ulcer to sacrum/coccyx not present on admit    moisture associated skin damage to anus not present  on admit    moisture associated skin damage to buttocks not present on admit      Case discussed with:  []Patient  []Family  [x]Nursing  []Case Management  DVT Prophylaxis:  []Lovenox  [x]Hep SQ  [x]SCDs  []Coumadin   []On Heparin gtt    Patient Active Problem List   Diagnosis Code    Anemia D64.9    Acidosis E87.2    Cardiac arrest ([address] I46.9    Respiratory failure ([address] J96.90    ESRD needing dialysis ([address] N18.6, Z99.2    Anoxic brain damage (HCC) G93.1    Colitis K52.9    Hypotension I95.9    Acute GI bleeding K92.2    ESRD (end stage renal disease) ([address] N18.6    Sepsis ([address] A41.9    Oropharyngeal dysphagia R13.12    Cachexia ([address] R64       Objective:   VS:   Visit Vitals    BP 97/53    Pulse 83    Temp 98.4 °F (36.9 °C)    Resp 17    Ht 6' 2\" (1.88 m)    Wt 64.7 kg (142 lb 10.2 oz)    SpO2 100%    BMI 18.31 kg/m2      Tmax/24hrs: Temp (24hrs), Av.3 °F (36.8 °C), Min:97.8 °F (36.6 °C), Max:99 °F (37.2 °C)  IOBRIEF    Intake/Output Summary (Last 24 hours) at 08/24/17 1205  Last data filed at 17 0700   Gross per 24 hour   Intake          1844.83 ml   Output              275 ml   Net          1569.83 ml     General:  Intubated, unresponsive  Cardiovascular: S1S2+, RRR  Pulmonary:  Coarse BS b/l  GI:  Soft, BS+, NT, ND  Extremities:  No edema            Medications:   Current Facility-Administered Medications   Medication Dose Route Frequency    [START ON 8/25/2017] potassium chloride (KLOR-CON) packet 40 mEq  40 mEq Per NG tube DAILY    [START ON 8/25/2017] Vancomycin random level  1 Each Other Rx Dosing/Monitoring    insulin glargine (LANTUS) injection 20 Units  20 Units SubCUTAneous DAILY    banana flakes trans-galactooligosaccharide (BANATROL PLUS) powder 1 Packet  1 Packet Per NG tube TID    hydrocortisone Sod Succ (PF) (SOLU-CORTEF) injection 25 mg  25 mg IntraVENous Q6H    levETIRAcetam (KEPPRA) 500 mg in saline (iso-osm) 100 ml IVPB  500 mg IntraVENous Q12H    piperacillin-tazobactam (ZOSYN) 2.25 g in 0.9% sodium chloride (MBP/ADV) 50 mL MBP  2.25 g IntraVENous Q12H    LORazepam (ATIVAN) injection 1 mg  1 mg IntraVENous Q4H PRN    insulin lispro (HUMALOG) injection   SubCUTAneous Q6H    VANCOMYCIN INFORMATION NOTE   Other Rx Dosing/Monitoring    white petrolatum-mineral oil 85-15 % oint 1 Dose  1 Dose Ophthalmic QID    chlorhexidine (PERIDEX) 0.12 % mouthwash 10 mL  10 mL Oral Q12H    NOREPINephrine (LEVOPHED) 16,000 mcg in dextrose 5% 250 mL infusion  2-16 mcg/min IntraVENous TITRATE    albuterol (PROVENTIL VENTOLIN) nebulizer solution 2.5 mg  2.5 mg Nebulization Q6H PRN    Piperacillin-tazobactam (ZOSYN) 0.75 gm Supplemental Dosing by Pharmacy   Other Rx Dosing/Monitoring    famotidine (PF) (PEPCID) 20 mg in sodium chloride 0.9 % 10 mL injection  20 mg IntraVENous DAILY    vits A and D-white pet-lanolin (A&D) ointment   Topical QID AFTER MEALS    collagenase (SANTYL) 250 unit/gram ointment   Topical DAILY    acetaminophen (TYLENOL) tablet 650 mg  650 mg Oral Q4H PRN    lactobacillus sp. 50 billion cpu (BIO-K PLUS) capsule 1 Cap  1 Cap Oral DAILY    loperamide (IMODIUM) capsule 2 mg  2 mg Oral Q6H PRN    heparin (porcine) injection 5,000 Units  5,000 Units SubCUTAneous Q8H    heparin (porcine) 1,000 unit/mL injection 2,000 Units  2,000 Units IntraVENous DIALYSIS ONCE    simvastatin (ZOCOR) tablet 20 mg  20 mg Oral QHS    doxercalciferol (HECTOROL) 4 mcg/2 mL injection 3 mcg  3 mcg IntraVENous DIALYSIS MON, WED & FRI    epoetin benjamin (EPOGEN;PROCRIT) injection 10,000 Units  10,000 Units IntraVENous DIALYSIS MON, WED & FRI    aspirin chewable tablet 81 mg  81 mg Oral DAILY    glucose chewable tablet 16 g  4 Tab Oral PRN    glucagon (GLUCAGEN) injection 1 mg  1 mg IntraMUSCular PRN    dextrose (D50W) injection syrg 12.5-25 g  25-50 mL IntraVENous PRN    0.9% sodium chloride infusion  100 mL/hr IntraVENous DIALYSIS PRN    naloxone (NARCAN) injection 0.4 mg  0.4 mg IntraVENous PRN       Labs:      Recent Results (from the past 24 hour(s))   GLUCOSE, POC    Collection Time: 08/23/17 12:54 PM   Result Value Ref Range    Glucose (POC) 218 (H) 70 - 110 mg/dL   GLUCOSE, POC    Collection Time: 08/23/17  6:32 PM   Result Value Ref Range    Glucose (POC) 148 (H) 70 - 110 mg/dL   GLUCOSE, POC    Collection Time: 08/24/17 12:36 AM   Result Value Ref Range    Glucose (POC) 174 (H) 70 - 110 mg/dL   POC G3    Collection Time: 08/24/17  3:55 AM   Result Value Ref Range    Device: VENT      FIO2 (POC) 25 %    pH (POC) 7.530 (H) 7.35 - 7.45      pCO2 (POC) 29.8 (L) 35.0 - 45.0 MMHG    pO2 (POC) 113 (H) 80 - 100 MMHG    HCO3 (POC) 24.9 22 - 26 MMOL/L    sO2 (POC) 99 (H) 92 - 97 %    Base excess (POC) 2 mmol/L    Mode ASSIST CONTROL      Tidal volume 450 ml    Set Rate 16 bpm    PEEP/CPAP (POC) 5 cmH2O    Allens test (POC) YES      Inspiratory Time 1 sec    Total resp.  rate 16      Site LEFT RADIAL      Specimen type (POC) ARTERIAL      Performed by Monika Yo     Volume control plus YES     GLUCOSE, POC    Collection Time: 08/24/17  6:01 AM   Result Value Ref Range    Glucose (POC) 226 (H) 70 - 110 mg/dL   CBC WITH AUTOMATED DIFF    Collection Time: 08/24/17  6:20 AM   Result Value Ref Range    WBC 15.4 (H) 4.6 - 13.2 K/uL    RBC 3.09 (L) 4.70 - 5.50 M/uL    HGB 9.4 (L) 13.0 - 16.0 g/dL    HCT 28.8 (L) 36.0 - 48.0 %    MCV 93.2 74.0 - 97.0 FL    MCH 30.4 24.0 - 34.0 PG    MCHC 32.6 31.0 - 37.0 g/dL    RDW 17.7 (H) 11.6 - 14.5 %    PLATELET 135 557 - 460 K/uL    MPV 11.4 9.2 - 11.8 FL    NEUTROPHILS 78 (H) 40 - 73 %    LYMPHOCYTES 15 (L) 21 - 52 %    MONOCYTES 7 3 - 10 %    EOSINOPHILS 0 0 - 5 %    BASOPHILS 0 0 - 2 %    ABS. NEUTROPHILS 12.0 (H) 1.8 - 8.0 K/UL    ABS. LYMPHOCYTES 2.2 0.9 - 3.6 K/UL    ABS. MONOCYTES 1.1 0.05 - 1.2 K/UL    ABS. EOSINOPHILS 0.1 0.0 - 0.4 K/UL    ABS.  BASOPHILS 0.0 0.0 - 0.1 K/UL    DF AUTOMATED     MAGNESIUM    Collection Time: 08/24/17  6:20 AM   Result Value Ref Range    Magnesium 2.6 1.6 - 2.6 mg/dL   PHOSPHORUS    Collection Time: 08/24/17  6:20 AM   Result Value Ref Range    Phosphorus 2.4 (L) 2.5 - 4.9 MG/DL   METABOLIC PANEL, BASIC    Collection Time: 08/24/17  6:20 AM   Result Value Ref Range    Sodium 139 136 - 145 mmol/L    Potassium 2.9 (LL) 3.5 - 5.5 mmol/L    Chloride 106 100 - 108 mmol/L    CO2 24 21 - 32 mmol/L    Anion gap 9 3.0 - 18 mmol/L    Glucose 237 (H) 74 - 99 mg/dL    BUN 31 (H) 7.0 - 18 MG/DL    Creatinine 5.07 (H) 0.6 - 1.3 MG/DL    BUN/Creatinine ratio 6 (L) 12 - 20      GFR est AA 14 (L) >60 ml/min/1.73m2    GFR est non-AA 12 (L) >60 ml/min/1.73m2    Calcium 8.6 8.5 - 10.1 MG/DL       Signed By: Hank Pantoja MD     August 24, 2017

## 2017-08-24 NOTE — PROGRESS NOTES
RENAL DAILY PROGRESS NOTE    Subjective:   Admitted postcode, seen for ESRD and HD    Complaint: remains intubated, unresponsive, on pressors, still with diarrhea    Current Facility-Administered Medications   Medication Dose Route Frequency    [START ON 8/25/2017] potassium chloride (KLOR-CON) packet 40 mEq  40 mEq Per NG tube DAILY    Zinc Ox-Aloe Vera-Vitamin E (BALMEX) topical cream   Topical CONTINUOUS    [START ON 8/25/2017] Vancomycin random level  1 Each Other Rx Dosing/Monitoring    insulin glargine (LANTUS) injection 20 Units  20 Units SubCUTAneous DAILY    banana flakes trans-galactooligosaccharide (BANATROL PLUS) powder 1 Packet  1 Packet Per NG tube TID    hydrocortisone Sod Succ (PF) (SOLU-CORTEF) injection 25 mg  25 mg IntraVENous Q6H    levETIRAcetam (KEPPRA) 500 mg in saline (iso-osm) 100 ml IVPB  500 mg IntraVENous Q12H    piperacillin-tazobactam (ZOSYN) 2.25 g in 0.9% sodium chloride (MBP/ADV) 50 mL MBP  2.25 g IntraVENous Q12H    LORazepam (ATIVAN) injection 1 mg  1 mg IntraVENous Q4H PRN    insulin lispro (HUMALOG) injection   SubCUTAneous Q6H    VANCOMYCIN INFORMATION NOTE   Other Rx Dosing/Monitoring    white petrolatum-mineral oil 85-15 % oint 1 Dose  1 Dose Ophthalmic QID    chlorhexidine (PERIDEX) 0.12 % mouthwash 10 mL  10 mL Oral Q12H    NOREPINephrine (LEVOPHED) 16,000 mcg in dextrose 5% 250 mL infusion  2-16 mcg/min IntraVENous TITRATE    albuterol (PROVENTIL VENTOLIN) nebulizer solution 2.5 mg  2.5 mg Nebulization Q6H PRN    Piperacillin-tazobactam (ZOSYN) 0.75 gm Supplemental Dosing by Pharmacy   Other Rx Dosing/Monitoring    famotidine (PF) (PEPCID) 20 mg in sodium chloride 0.9 % 10 mL injection  20 mg IntraVENous DAILY    vits A and D-white pet-lanolin (A&D) ointment   Topical QID AFTER MEALS    collagenase (SANTYL) 250 unit/gram ointment   Topical DAILY    acetaminophen (TYLENOL) tablet 650 mg  650 mg Oral Q4H PRN    lactobacillus sp. 50 billion cpu (BIO-K PLUS) capsule 1 Cap  1 Cap Oral DAILY    loperamide (IMODIUM) capsule 2 mg  2 mg Oral Q6H PRN    heparin (porcine) injection 5,000 Units  5,000 Units SubCUTAneous Q8H    heparin (porcine) 1,000 unit/mL injection 2,000 Units  2,000 Units IntraVENous DIALYSIS ONCE    simvastatin (ZOCOR) tablet 20 mg  20 mg Oral QHS    doxercalciferol (HECTOROL) 4 mcg/2 mL injection 3 mcg  3 mcg IntraVENous DIALYSIS MON, WED & FRI    epoetin benjamin (EPOGEN;PROCRIT) injection 10,000 Units  10,000 Units IntraVENous DIALYSIS MON, WED & FRI    aspirin chewable tablet 81 mg  81 mg Oral DAILY    glucose chewable tablet 16 g  4 Tab Oral PRN    glucagon (GLUCAGEN) injection 1 mg  1 mg IntraMUSCular PRN    dextrose (D50W) injection syrg 12.5-25 g  25-50 mL IntraVENous PRN    0.9% sodium chloride infusion  100 mL/hr IntraVENous DIALYSIS PRN    naloxone (NARCAN) injection 0.4 mg  0.4 mg IntraVENous PRN           Objective:     Patient Vitals for the past 24 hrs:   Temp Pulse Resp BP SpO2   08/24/17 1047 - 83 17 - 100 %   08/24/17 0700 - 76 14 97/53 100 %   08/24/17 0600 - 79 14 145/64 100 %   08/24/17 0500 - 80 14 91/50 100 %   08/24/17 0400 98.4 °F (36.9 °C) 78 15 129/75 100 %   08/24/17 0300 - 80 19 140/73 100 %   08/24/17 0257 - 77 16 - 100 %   08/24/17 0200 - 83 16 91/48 100 %   08/24/17 0100 - 82 16 97/47 100 %   08/24/17 0000 99 °F (37.2 °C) 81 17 114/53 100 %   08/23/17 2331 - 79 18 - 100 %   08/23/17 2300 - 73 14 144/74 100 %   08/23/17 2200 - 76 16 143/81 99 %   08/23/17 2100 - 83 16 99/52 100 %   08/23/17 2015 - 82 18 130/69 100 %   08/23/17 2000 98.2 °F (36.8 °C) 85 12 124/61 100 %   08/23/17 1958 - 87 16 - 100 %   08/23/17 1945 - 84 17 127/65 100 %   08/23/17 1930 - 83 17 110/58 100 %   08/23/17 1915 - 85 16 110/58 100 %   08/23/17 1900 - 84 16 121/57 100 %   08/23/17 1845 - 86 18 115/65 100 %   08/23/17 1830 - 87 19 120/61 99 %   08/23/17 1815 - 94 22 106/54 99 %   08/23/17 1800 - 82 17 99/59 92 %   08/23/17 1745 - 93 18 116/66 92 %   08/23/17 1730 - 97 20 107/60 100 %   08/23/17 1715 - 91 19 93/58 98 %   08/23/17 1700 - 93 20 110/69 99 %   08/23/17 1645 - 91 19 105/67 100 %   08/23/17 1630 97.8 °F (36.6 °C) 96 16 108/65 100 %   08/23/17 1530 - 92 17 118/87 100 %   08/23/17 1500 98.1 °F (36.7 °C) 97 16 116/66 100 %   08/23/17 1430 - 99 15 105/63 99 %   08/23/17 1400 - 95 19 122/61 100 %   08/23/17 1330 - 93 18 113/71 100 %        Weight change:      08/22 1901 - 08/24 0700  In: 3792.3 [I.V.:487.3]  Out: 925 [Drains:925]    Intake/Output Summary (Last 24 hours) at 08/24/17 1319  Last data filed at 08/24/17 0700   Gross per 24 hour   Intake          1789.83 ml   Output              275 ml   Net          1514.83 ml     Physical Exam: unresponsive, intubated, afebrile    HEENT: ET/NGT in place, non icteric  Neck: no JVD  Cardiovascular: regular, no rub  C/L:  Equal vent breath sounds  Abdomen: soft, + BS  Ext:  Left arm AVF + bruit, no LE edema    Data Review:     LABS:   Hematology:   Recent Labs      08/24/17   0620  08/23/17   0615  08/22/17   0600   WBC  15.4*  13.3*  12.5   HGB  9.4*  10.0*  9.5*   HCT  28.8*  30.8*  29.2*   pl 168K  Chemistry:   Recent Labs      08/24/17   0620  08/23/17   0615  08/22/17   0600   BUN  31*  57*  41*   CREA  5.07*  7.59*  6.05*   CA  8.6  8.3*  8.1*   K  2.9*  3.2*  2.9*   NA  139  143  141   CL  106  109*  105   CO2  24  22  25   PHOS  2.4*  2.6  2.4*   GLU  237*  222*  259*    Blood c/s 8/18 neg so far        IMPRESSION AND PLAN:   ESRD sched HD MWF for solute management, unless family decides otherwise. Anemia from CKD low fe stores but high ferritin,Hgb stable cont ALONDRA with HD  E. Coli bacteremia remains on zosyn  Hypokalemia hypophosphatemia,  cont with K3 bath on HD. Replace K and phos po  Hypocalcemia from 2nd HPT cont Hectorol with HD completed phos supplement, repeat phos in am  Hypotension post code remains on pressors.    Nutrition cont with NGT feeding           Esther Sauer, MD  8/24/2017

## 2017-08-24 NOTE — PROGRESS NOTES
[facility]  ICU Progress Note      Name: Sharee Clark   : 1960   MRN: [id number]   Date: 2017 3:29 PM     [x]I have reviewed the flowsheet and previous days notes. Events overnight reviewed and discussed with nursing staff. Vital signs and records reviewed. HPI:   17   - Remains on levophed. Afebrile overnight. Not breathing over vent. Anuric.   -  Not following commands or withdrawing to pain. - Pupils pin point and fixed. (+) corneal reflex. (+) cough and gag. - Moderate oral secretions. ROS:Review of systems not obtained due to patient factors.     Medication Review:  · Pressors -  · Sedation - PRN ativan for myoclonus   · Antibiotics - Vancomycin and zosyn   · Pain -  · GI/ DVT - Pepcid and sq heparin   · Others (other gtts)    Safety Bundles: VAP Bundle/Severe Sepsis Protocol    Vital Signs:    Visit Vitals    BP 97/53    Pulse 83    Temp 98.4 °F (36.9 °C)    Resp 17    Ht 6' 2\" (1.88 m)    Wt 64.7 kg (142 lb 10.2 oz)    SpO2 100%    BMI 18.31 kg/m2       O2 Device: Endotracheal tube, Ventilator   O2 Flow Rate (L/min): 0 l/min   Temp (24hrs), Av.3 °F (36.8 °C), Min:97.8 °F (36.6 °C), Max:99 °F (37.2 °C)       Intake/Output:   Last shift:         Last 3 shifts: 1 -  0700  In: 3792.3 [I.V.:487.3]  Out: 925 [Drains:925]    Intake/Output Summary (Last 24 hours) at 17 1206  Last data filed at 17 0700   Gross per 24 hour   Intake          1844.83 ml   Output              275 ml   Net          1569.83 ml     Ventilator Settings:  Ventilator  Mode: Assist control, VC+  Respiratory Rate  Resp Rate Observed: 15  Back-Up Rate: 14  Insp Time (sec): 1 sec  Insp Flow (l/min): 49 l/min  I:E Ratio: 1:2.8  Ventilator Volumes  Vt Set (ml): 450 ml  Vt Exhaled (Machine Breath) (ml): 467 ml  Vt Spont (ml): 83 ml  Ve Observed (l/min): 6.51 l/min  Ventilator Pressures  Pressure Support (cm H2O): 7 cm H2O  PIP Observed (cm H2O): 15 cm H2O  Plateau Pressure (cm H2O): 12 cm H2O  MAP (cm H2O): 7.9  PEEP/VENT (cm H2O): 5 cm H20  Auto PEEP Observed (cm H2O): 0.4 cm H2O    Physical Exam:  General: Intubated, critically ill, unresponsive  HEENT:  Anicteric sclerae; pink palpebral conjunctivae; Pupil non reactive to light. Resp:  Symmetrical chest expansion, no accessory muscle use; good airway entry; no rales/ wheezing/ rhonchi noted  CV:  S1, S2 present;  GI:  Abdomen soft, non-tender; (+) active bowel sounds  Extremities:  +2 pulses on all extremities; + thrill and bruit to L AV fistula   Skin:  Dressing to sacrum. Erythematous perineum   Neurologic:  Facial myoclonus, Pupils fixed, small with disconjugate upward deviated gaze. (+) cough/gag. Negative corneal reflex no withdrawal to pain.     Devices:  NGT, ETT, A line, CVL left IJ    DATA:     Current Facility-Administered Medications   Medication Dose Route Frequency    [START ON 8/25/2017] potassium chloride (KLOR-CON) packet 40 mEq  40 mEq Per NG tube DAILY    [START ON 8/25/2017] Vancomycin random level  1 Each Other Rx Dosing/Monitoring    insulin glargine (LANTUS) injection 20 Units  20 Units SubCUTAneous DAILY    banana flakes trans-galactooligosaccharide (BANATROL PLUS) powder 1 Packet  1 Packet Per NG tube TID    hydrocortisone Sod Succ (PF) (SOLU-CORTEF) injection 25 mg  25 mg IntraVENous Q6H    levETIRAcetam (KEPPRA) 500 mg in saline (iso-osm) 100 ml IVPB  500 mg IntraVENous Q12H    piperacillin-tazobactam (ZOSYN) 2.25 g in 0.9% sodium chloride (MBP/ADV) 50 mL MBP  2.25 g IntraVENous Q12H    LORazepam (ATIVAN) injection 1 mg  1 mg IntraVENous Q4H PRN    insulin lispro (HUMALOG) injection   SubCUTAneous Q6H    VANCOMYCIN INFORMATION NOTE   Other Rx Dosing/Monitoring    white petrolatum-mineral oil 85-15 % oint 1 Dose  1 Dose Ophthalmic QID    chlorhexidine (PERIDEX) 0.12 % mouthwash 10 mL  10 mL Oral Q12H    NOREPINephrine (LEVOPHED) 16,000 mcg in dextrose 5% 250 mL infusion 2-16 mcg/min IntraVENous TITRATE    albuterol (PROVENTIL VENTOLIN) nebulizer solution 2.5 mg  2.5 mg Nebulization Q6H PRN    Piperacillin-tazobactam (ZOSYN) 0.75 gm Supplemental Dosing by Pharmacy   Other Rx Dosing/Monitoring    famotidine (PF) (PEPCID) 20 mg in sodium chloride 0.9 % 10 mL injection  20 mg IntraVENous DAILY    vits A and D-white pet-lanolin (A&D) ointment   Topical QID AFTER MEALS    collagenase (SANTYL) 250 unit/gram ointment   Topical DAILY    acetaminophen (TYLENOL) tablet 650 mg  650 mg Oral Q4H PRN    lactobacillus sp. 50 billion cpu (BIO-K PLUS) capsule 1 Cap  1 Cap Oral DAILY    loperamide (IMODIUM) capsule 2 mg  2 mg Oral Q6H PRN    heparin (porcine) injection 5,000 Units  5,000 Units SubCUTAneous Q8H    heparin (porcine) 1,000 unit/mL injection 2,000 Units  2,000 Units IntraVENous DIALYSIS ONCE    simvastatin (ZOCOR) tablet 20 mg  20 mg Oral QHS    doxercalciferol (HECTOROL) 4 mcg/2 mL injection 3 mcg  3 mcg IntraVENous DIALYSIS MON, WED & FRI    epoetin benjamin (EPOGEN;PROCRIT) injection 10,000 Units  10,000 Units IntraVENous DIALYSIS MON, WED & FRI    aspirin chewable tablet 81 mg  81 mg Oral DAILY    glucose chewable tablet 16 g  4 Tab Oral PRN    glucagon (GLUCAGEN) injection 1 mg  1 mg IntraMUSCular PRN    dextrose (D50W) injection syrg 12.5-25 g  25-50 mL IntraVENous PRN    0.9% sodium chloride infusion  100 mL/hr IntraVENous DIALYSIS PRN    naloxone (NARCAN) injection 0.4 mg  0.4 mg IntraVENous PRN         Labs: Results:       Chemistry Recent Labs      08/24/17   0620 08/23/17   0615  08/22/17   0600   GLU  237*  222*  259*   NA  139  143  141   K  2.9*  3.2*  2.9*   CL  106  109*  105   CO2  24  22  25   BUN  31*  57*  41*   CREA  5.07*  7.59*  6.05*   CA  8.6  8.3*  8.1*   AGAP  9  12  11   BUCR  6*  8*  7*      CBC w/Diff Recent Labs      08/24/17   0620  08/23/17   0615  08/22/17   0600   WBC  15.4*  13.3*  12.5   RBC  3.09*  3.26*  3.11*   HGB  9.4*  10.0* 9.5*   HCT  28.8*  30.8*  29.2*   PLT  254  192  168   GRANS  78*  87*  80*   LYMPH  15*  9*  9*   EOS  0  0  1      Coagulation No results for input(s): PTP, INR, APTT in the last 72 hours. No lab exists for component: INREXT, INREXT    Liver Enzymes No results for input(s): TP, ALB, TBIL, AP, SGOT, GPT in the last 72 hours. No lab exists for component: DBIL   ABG Lab Results   Component Value Date/Time    PHI 7.530 (H) 08/24/2017 03:55 AM    PCO2I 29.8 (L) 08/24/2017 03:55 AM    PO2I 113 (H) 08/24/2017 03:55 AM    HCO3I 24.9 08/24/2017 03:55 AM    FIO2I 25 08/24/2017 03:55 AM      Microbiology No results for input(s): CULT in the last 72 hours. Telemetry: [x]Sinus []A-flutter []Paced    []A-fib []Multiple PVCs                  Imaging:  CXR 8/23/17: Lines and tubes are in appropriate position. The lower end of ET tube is 2.2 cm above bree. Lungs appear clear bilaterally without definable acute process. IMPRESSION:   · PEA Cardiac arrest in dialysis 8/18/17  · S/P PEA Cardiac arrest on this admission 7/27/17, normal cath  · Acute Respirtatory Failure requiring Mechanical Ventilation   · Acute Anoxic Encephalopathy with acute with hyperdense cortex on CT   · Hypotension- distributive vs cardiogenic vs obstructive- on pressors   · ESRD requiring Dialysis - completed 3 hrs of dialysis today prior to code blue  · Sepsis Bacteremia - resolved   · DM2  · Oropharyngeal dysphagia - Needs peg tube  · Small Pericardial Effusion on Echo 8/7/17  · Cachexia  · Unstageable pressure ulcer to sacrum/coccyx  · Diarrhea- Cdiff negative       PLAN:   · Resp - stable on vent, not breathing above vent. VAP Bundle. Aspiration precautions. HOB > 30 degrees. PRN nebz. · ID - afebrile, no leukocytosis. Repeat bcx and sputum cx NGTD from 8/18/17. On vancomycin and zosyn. Stress dose steroids. · CVS - Titrate levophed gtt with goal for MAP > 65 mm/hg. · Heme/onc - Stable H/H and platelets. Daily CBC.     · Metabolic - Monitor and replace lytes per provider. · Renal - HD per nephrology service   · Endocrine - SSI for glycemic control. Lantus daily. · Neuro/ Pain/ Sedation - Neurology following, Continue Keppra. Preliminary EEG- poor prognosis per Neuro. · GI - Continue Tube feeds. · Prophylaxis - DVT- Sq heparin, GI- pepcid   · Full Code- Spoke with daughter/POA, she is going to discuss comfort care and DNR options with her brother. Will follow-up with phone call today.    · Discussed in interdisciplinary rounds          The patient is: [] acutely ill Risk of deterioration: [] moderate    [x] critically ill  [x] high     [x]See my orders for details    My assessment/plan was discussed with:  [x]nursing []PT/OT    [x]respiratory therapy [x]Dr. Ricardo Valverde    []family []       Chris Barrientos PA-C

## 2017-08-24 NOTE — WOUND CARE
Seen by wound care reassessment of excoriated skin performed at this time. Improved skin excoriation noted during skin assessment, skin redness remains. Treatment plan explained to nursing continue current treatment with the addition of Balmex. Wound care education provided to pt at this time. Plan of care reviewed with nursing. Will turn over care to nursing staff at this time  Fly Phillips, Wound Care Department

## 2017-08-24 NOTE — DIABETES MGMT
GLYCEMIC CONTROL PLAN OF CARE    Assessment/Recommendations:  Pt discussed in interdisciplinary rounds. Blood glucose has improved slightly.    Monitor need to increase Lantus insulin if glucose continues to trend back up  Continue inpatient monitoring and intervention    Most recent blood glucose values:  8/23/2017 06:22 8/23/2017 12:54 8/23/2017 18:32 8/24/2017 00:36 8/24/2017 06:01   196 (H) 218 (H) 148 (H) 174 (H) 226 (H)     Current A1C of 5.4% is equivalent to average blood glucose of 108 mg/dl over the past 2-3 months.      Current hospital diabetes medications:   Correctional Lispro insulin every 6 hours (very resistant scale)  Lantus insulin 20 units daily      Previous day's insulin requirements:   20 units of Lantus insulin   9 units of Lispro insulin       Home diabetes medications:  None      Diet:  NPO  Tube Feeding: Nepro at 55 mL via NGT       Education:  ____Refer to Diabetes Education Record             _x___Education not indicated at this time      Fidel Gómez RD, CDE

## 2017-08-25 ENCOUNTER — APPOINTMENT (OUTPATIENT)
Dept: GENERAL RADIOLOGY | Age: 57
DRG: 004 | End: 2017-08-25
Attending: PHYSICIAN ASSISTANT
Payer: MEDICARE

## 2017-08-25 LAB
ANION GAP SERPL CALC-SCNC: 10 MMOL/L (ref 3–18)
ARTERIAL PATENCY WRIST A: ABNORMAL
BASE DEFICIT BLD-SCNC: 1 MMOL/L
BASOPHILS # BLD: 0 K/UL (ref 0–0.1)
BASOPHILS NFR BLD: 0 % (ref 0–2)
BDY SITE: ABNORMAL
BUN SERPL-MCNC: 39 MG/DL (ref 7–18)
BUN/CREAT SERPL: 6 (ref 12–20)
CALCIUM SERPL-MCNC: 8.4 MG/DL (ref 8.5–10.1)
CHLORIDE SERPL-SCNC: 107 MMOL/L (ref 100–108)
CO2 SERPL-SCNC: 23 MMOL/L (ref 21–32)
CREAT SERPL-MCNC: 6.4 MG/DL (ref 0.6–1.3)
DIFFERENTIAL METHOD BLD: ABNORMAL
EOSINOPHIL # BLD: 0 K/UL (ref 0–0.4)
EOSINOPHIL NFR BLD: 0 % (ref 0–5)
ERYTHROCYTE [DISTWIDTH] IN BLOOD BY AUTOMATED COUNT: 18.3 % (ref 11.6–14.5)
GAS FLOW.O2 O2 DELIVERY SYS: ABNORMAL L/MIN
GAS FLOW.O2 SETTING OXYMISER: 14 BPM
GLUCOSE BLD STRIP.AUTO-MCNC: 141 MG/DL (ref 70–110)
GLUCOSE BLD STRIP.AUTO-MCNC: 152 MG/DL (ref 70–110)
GLUCOSE BLD STRIP.AUTO-MCNC: 264 MG/DL (ref 70–110)
GLUCOSE BLD STRIP.AUTO-MCNC: 294 MG/DL (ref 70–110)
GLUCOSE SERPL-MCNC: 278 MG/DL (ref 74–99)
HCO3 BLD-SCNC: 22.8 MMOL/L (ref 22–26)
HCT VFR BLD AUTO: 29.2 % (ref 36–48)
HGB BLD-MCNC: 9.3 G/DL (ref 13–16)
LYMPHOCYTES # BLD: 1.5 K/UL (ref 0.9–3.6)
LYMPHOCYTES NFR BLD: 10 % (ref 21–52)
MAGNESIUM SERPL-MCNC: 2.9 MG/DL (ref 1.6–2.6)
MCH RBC QN AUTO: 30 PG (ref 24–34)
MCHC RBC AUTO-ENTMCNC: 31.8 G/DL (ref 31–37)
MCV RBC AUTO: 94.2 FL (ref 74–97)
MONOCYTES # BLD: 0.6 K/UL (ref 0.05–1.2)
MONOCYTES NFR BLD: 4 % (ref 3–10)
NEUTS SEG # BLD: 12.7 K/UL (ref 1.8–8)
NEUTS SEG NFR BLD: 86 % (ref 40–73)
O2/TOTAL GAS SETTING VFR VENT: 25 %
PCO2 BLD: 30.2 MMHG (ref 35–45)
PEEP RESPIRATORY: 5 CMH2O
PH BLD: 7.49 [PH] (ref 7.35–7.45)
PHOSPHATE SERPL-MCNC: 2.9 MG/DL (ref 2.5–4.9)
PLATELET # BLD AUTO: 321 K/UL (ref 135–420)
PMV BLD AUTO: 11 FL (ref 9.2–11.8)
PO2 BLD: 125 MMHG (ref 80–100)
POTASSIUM SERPL-SCNC: 3.1 MMOL/L (ref 3.5–5.5)
RBC # BLD AUTO: 3.1 M/UL (ref 4.7–5.5)
SAO2 % BLD: 99 % (ref 92–97)
SERVICE CMNT-IMP: ABNORMAL
SODIUM SERPL-SCNC: 140 MMOL/L (ref 136–145)
SPECIMEN TYPE: ABNORMAL
TOTAL RESP. RATE, ITRR: 14
VANCOMYCIN SERPL-MCNC: 16.3 UG/ML (ref 5–40)
VENTILATION MODE VENT: ABNORMAL
VOLUME CONTROL PLUS IVLCP: YES
VT SETTING VENT: 450 ML
WBC # BLD AUTO: 14.9 K/UL (ref 4.6–13.2)

## 2017-08-25 PROCEDURE — 74011250636 HC RX REV CODE- 250/636: Performed by: NURSE PRACTITIONER

## 2017-08-25 PROCEDURE — 74011250636 HC RX REV CODE- 250/636: Performed by: INTERNAL MEDICINE

## 2017-08-25 PROCEDURE — 90935 HEMODIALYSIS ONE EVALUATION: CPT

## 2017-08-25 PROCEDURE — 77030011256 HC DRSG MEPILEX <16IN NO BORD MOLN -A

## 2017-08-25 PROCEDURE — 84100 ASSAY OF PHOSPHORUS: CPT | Performed by: NURSE PRACTITIONER

## 2017-08-25 PROCEDURE — 74011000250 HC RX REV CODE- 250: Performed by: PHYSICIAN ASSISTANT

## 2017-08-25 PROCEDURE — 36592 COLLECT BLOOD FROM PICC: CPT

## 2017-08-25 PROCEDURE — 36600 WITHDRAWAL OF ARTERIAL BLOOD: CPT

## 2017-08-25 PROCEDURE — 82803 BLOOD GASES ANY COMBINATION: CPT

## 2017-08-25 PROCEDURE — 94003 VENT MGMT INPAT SUBQ DAY: CPT

## 2017-08-25 PROCEDURE — 74011636637 HC RX REV CODE- 636/637: Performed by: INTERNAL MEDICINE

## 2017-08-25 PROCEDURE — 80202 ASSAY OF VANCOMYCIN: CPT | Performed by: INTERNAL MEDICINE

## 2017-08-25 PROCEDURE — 74011250636 HC RX REV CODE- 250/636: Performed by: HOSPITALIST

## 2017-08-25 PROCEDURE — 74011000258 HC RX REV CODE- 258: Performed by: INTERNAL MEDICINE

## 2017-08-25 PROCEDURE — 85025 COMPLETE CBC W/AUTO DIFF WBC: CPT | Performed by: NURSE PRACTITIONER

## 2017-08-25 PROCEDURE — 74011250636 HC RX REV CODE- 250/636: Performed by: PHYSICIAN ASSISTANT

## 2017-08-25 PROCEDURE — 74011250637 HC RX REV CODE- 250/637: Performed by: INTERNAL MEDICINE

## 2017-08-25 PROCEDURE — 74011250637 HC RX REV CODE- 250/637: Performed by: NURSE PRACTITIONER

## 2017-08-25 PROCEDURE — 80048 BASIC METABOLIC PNL TOTAL CA: CPT | Performed by: NURSE PRACTITIONER

## 2017-08-25 PROCEDURE — 74011250637 HC RX REV CODE- 250/637: Performed by: HOSPITALIST

## 2017-08-25 PROCEDURE — 65610000006 HC RM INTENSIVE CARE

## 2017-08-25 PROCEDURE — 83735 ASSAY OF MAGNESIUM: CPT | Performed by: NURSE PRACTITIONER

## 2017-08-25 PROCEDURE — 74011250637 HC RX REV CODE- 250/637: Performed by: FAMILY MEDICINE

## 2017-08-25 PROCEDURE — 71010 XR CHEST PORT: CPT

## 2017-08-25 PROCEDURE — 82962 GLUCOSE BLOOD TEST: CPT

## 2017-08-25 RX ORDER — HYDROCORTISONE SODIUM SUCCINATE 100 MG/2ML
25 INJECTION, POWDER, FOR SOLUTION INTRAMUSCULAR; INTRAVENOUS EVERY 12 HOURS
Status: DISCONTINUED | OUTPATIENT
Start: 2017-08-25 | End: 2017-08-28

## 2017-08-25 RX ORDER — POTASSIUM CHLORIDE 7.45 MG/ML
10 INJECTION INTRAVENOUS
Status: COMPLETED | OUTPATIENT
Start: 2017-08-25 | End: 2017-08-25

## 2017-08-25 RX ADMIN — LOPERAMIDE HYDROCHLORIDE 2 MG: 2 CAPSULE ORAL at 11:55

## 2017-08-25 RX ADMIN — HEPARIN SODIUM 5000 UNITS: 5000 INJECTION, SOLUTION INTRAVENOUS; SUBCUTANEOUS at 18:00

## 2017-08-25 RX ADMIN — POTASSIUM CHLORIDE 10 MEQ: 10 INJECTION, SOLUTION INTRAVENOUS at 05:03

## 2017-08-25 RX ADMIN — LEVETIRACETAM 500 MG: 5 INJECTION INTRAVENOUS at 10:34

## 2017-08-25 RX ADMIN — CHLORHEXIDINE GLUCONATE 10 ML: 1.2 RINSE ORAL at 10:30

## 2017-08-25 RX ADMIN — Medication: at 11:50

## 2017-08-25 RX ADMIN — VITAMIN A AND D: 30.8 OINTMENT TOPICAL at 21:49

## 2017-08-25 RX ADMIN — DOXERCALCIFEROL 3 MCG: 4 INJECTION, SOLUTION INTRAVENOUS at 17:14

## 2017-08-25 RX ADMIN — INSULIN LISPRO 9 UNITS: 100 INJECTION, SOLUTION INTRAVENOUS; SUBCUTANEOUS at 12:06

## 2017-08-25 RX ADMIN — HYDROCORTISONE SODIUM SUCCINATE 25 MG: 100 INJECTION, POWDER, FOR SOLUTION INTRAMUSCULAR; INTRAVENOUS at 05:06

## 2017-08-25 RX ADMIN — Medication 1 PACKET: at 10:40

## 2017-08-25 RX ADMIN — LORAZEPAM 1 MG: 2 INJECTION INTRAMUSCULAR; INTRAVENOUS at 20:44

## 2017-08-25 RX ADMIN — ASPIRIN 81 MG 81 MG: 81 TABLET ORAL at 10:30

## 2017-08-25 RX ADMIN — MINERAL OIL AND WHITE PETROLATUM 1 DOSE: 150; 850 OINTMENT OPHTHALMIC at 10:35

## 2017-08-25 RX ADMIN — ERYTHROPOIETIN 10000 UNITS: 10000 INJECTION, SOLUTION INTRAVENOUS; SUBCUTANEOUS at 17:14

## 2017-08-25 RX ADMIN — INSULIN GLARGINE 20 UNITS: 100 INJECTION, SOLUTION SUBCUTANEOUS at 10:33

## 2017-08-25 RX ADMIN — HEPARIN SODIUM 5000 UNITS: 5000 INJECTION, SOLUTION INTRAVENOUS; SUBCUTANEOUS at 10:33

## 2017-08-25 RX ADMIN — PIPERACILLIN AND TAZOBACTAM 2.25 G: 2; .25 INJECTION, POWDER, FOR SOLUTION INTRAVENOUS at 09:00

## 2017-08-25 RX ADMIN — Medication 1 CAPSULE: at 10:30

## 2017-08-25 RX ADMIN — Medication 1 PACKET: at 16:05

## 2017-08-25 RX ADMIN — VITAMIN A AND D: 30.8 OINTMENT TOPICAL at 19:43

## 2017-08-25 RX ADMIN — FAMOTIDINE 20 MG: 10 INJECTION INTRAVENOUS at 09:58

## 2017-08-25 RX ADMIN — INSULIN LISPRO 9 UNITS: 100 INJECTION, SOLUTION INTRAVENOUS; SUBCUTANEOUS at 05:12

## 2017-08-25 RX ADMIN — POTASSIUM CHLORIDE 40 MEQ: 1.5 POWDER, FOR SOLUTION ORAL at 10:30

## 2017-08-25 RX ADMIN — CHLORHEXIDINE GLUCONATE 10 ML: 1.2 RINSE ORAL at 20:41

## 2017-08-25 RX ADMIN — Medication 1 PACKET: at 21:55

## 2017-08-25 RX ADMIN — HEPARIN SODIUM 5000 UNITS: 5000 INJECTION, SOLUTION INTRAVENOUS; SUBCUTANEOUS at 03:30

## 2017-08-25 RX ADMIN — VITAMIN A AND D: 30.8 OINTMENT TOPICAL at 10:35

## 2017-08-25 RX ADMIN — MINERAL OIL AND WHITE PETROLATUM 1 DOSE: 150; 850 OINTMENT OPHTHALMIC at 15:09

## 2017-08-25 RX ADMIN — VITAMIN A AND D: 30.8 OINTMENT TOPICAL at 15:09

## 2017-08-25 RX ADMIN — MINERAL OIL AND WHITE PETROLATUM 1 DOSE: 150; 850 OINTMENT OPHTHALMIC at 19:43

## 2017-08-25 RX ADMIN — COLLAGENASE SANTYL: 250 OINTMENT TOPICAL at 10:31

## 2017-08-25 RX ADMIN — INSULIN LISPRO 3 UNITS: 100 INJECTION, SOLUTION INTRAVENOUS; SUBCUTANEOUS at 19:42

## 2017-08-25 RX ADMIN — HYDROCORTISONE SODIUM SUCCINATE 25 MG: 100 INJECTION, POWDER, FOR SOLUTION INTRAMUSCULAR; INTRAVENOUS at 20:42

## 2017-08-25 RX ADMIN — LEVETIRACETAM 500 MG: 5 INJECTION INTRAVENOUS at 20:42

## 2017-08-25 RX ADMIN — POTASSIUM CHLORIDE 10 MEQ: 10 INJECTION, SOLUTION INTRAVENOUS at 06:25

## 2017-08-25 NOTE — PROGRESS NOTES
Spoke with pt's daughter Alana Tian [employer] [facility]) on the phone as a follow-up to a conversation that we had earlier in the week about Code Status, Trach/PEG, and pt's prognosis. She talked to her brother Andres Cortez. Her brother does not fully understand his father's course while in the hospital. He told her that he wanted a \"second opinion\" about the pt's prognosis and wanted the pt transferred to [facility] suggest I call her brother and answer his questions. Attempted to call brother, but did not get an answer and was unable to leave a voicemail because the voicemail box was not set up. Ricardo verified that we have the correct phone number for her brother and will try to contact him. Suggest following up with her on 08/26/17.       Alana Tian [phone]  Christen Dakin [phone]     Signed By: Toyin Martins PA-C     August 25, 2017

## 2017-08-25 NOTE — ROUTINE PROCESS
Fecal management leaking. Mepilex to stage 3 pressure ulcers changed. post am bath. Mouth care done last night and this morning.

## 2017-08-25 NOTE — PROGRESS NOTES
Pt placed on SBT trial tolerating well.       08/25/17 0832   Weaning Parameters   Spontaneous Breathing Trial Complete Yes   Resp Rate Observed 17   Ve 6.8      RSBI 55

## 2017-08-25 NOTE — ROUTINE PROCESS
Bedside shift change report given to Ms. Wm Gabriel RN (oncoming nurse) by Ms. Jerrod MUELLER (offgoing nurse). Report included the following information Kardex, Intake/Output, MAR and Recent Results.

## 2017-08-25 NOTE — DIALYSIS
ACUTE HEMODIALYSIS FLOW SHEET    HEMODIALYSIS ORDERS: Physician: yusef      Dialyzer: aclear         Duration: 3 hr  BFR: 300   DFR: 600   Dialysate:  Temp *37 K+   4    Ca+  3 Na 140 Bicarb 35   Weight:  61.5 kg    Bed Scale [x]     Unable to Obtain []      Dry weight/UF Goal: 0 Access AVF  Needle Gauge 15    Heparin []  Bolus      Units    [] Hourly       Units    [x]None     Catheter locking solution    Pre BP:   137/71    Pulse:     78     Temperature:   97.6  Respirations: 16  Tx: NS       ml/Bolus  Other        [x] N/A   Labs: Pre        Post:        [x] N/A   Additional Orders(medications, blood products, hypotension management):       [x] N/A     [x] Time Out/Safety Check  [x] [facility] Consent Verified     CATHETER ACCESS: [x]N/A   []Right   []Left   []IJ     []Fem   [] First use X-ray verified     []Tunnel                [] Non Tunneled   []No S/S infection  []Redness  []Drainage []Cultured []Swelling []Pain   []Medical Aseptic Prep Utilized   []Dressing Changed  [] Biopatch  Date:       []Clotted   []Patent   Flows: []Good  []Poor  []Reversed   If access problem,  notified: []Yes    []N/A  Date:           GRAFT/FISTULA ACCESS:  []N/A     []Right     [x]Left     [x]UE     []LE   []AVG   [x]AVF        []Buttonhole    [x]Medical Aseptic Prep Utilized   [x]No S/S infection  []Redness  []Drainage []Cultured []Swelling []Pain    Bruit:   [x] Strong    [] Weak       Thrill :   [x] Strong    [] Weak       Needle Gauge: 15   Length: 1   If access problem,  notified: []Yes     [x]N/A  Date:        Please describe access if present and not used:       GENERAL ASSESSMENT:    LUNGS:  Rate  SaO2%        [x] N/A    [x] Clear  [] Coarse  [] Crackles  [] Wheezing        [] Diminished     Location : []RLL   []LLL    []RUL  []LLU   Cough: []Productive  []Dry  [x]N/A   Respirations:  [x]Easy  []Labored   Therapy:  []RA  []NC  l/min    Mask: []NRB []Venti       O2%                  [x]Ventilator [x]Intubated  [] Trach  [] BiPaP   CARDIAC: [x]Regular      [] Irregular   [] Pericardial Rub  [] JVD        []  Monitored  [] Bedside  [] Remotely monitored [] N/A  Rhythm:    EDEMA: [x] None  []Generalized  [] Pitting [] 1    [] 2    [] 3    [] 4                 [] Facial  [] Pedal  []  UE  [] LE   SKIN:   [x] Warm  [] Hot     [] Cold   [x] Dry     [] Pale   [] Diaphoretic                  [] Flushed  [] Jaundiced  [] Cyanotic  [] Rash  [] Weeping   LOC:    [] Alert      []Oriented:    [] Person     [] Place  []Time               [] Confused  [] Lethargic  [] Medicated  [x] Non-responsive     GI / ABDOMEN:  [] Flat    [] Distended    [x] Soft    [] Firm   []  Obese                             [] Diarrhea  [x] Bowel Sounds  [] Nausea  [] Vomiting       / URINE ASSESSMENT:[] Voiding   [x] Oliguria  [] Anuria   []  Stanton     [] Incontinent    []  Incontinent Brief      []  Bathroom Privileges     PAIN: [x] 0 []1  []2   []3   []4   []5   []6   []7   []8   []9   []10            Scale 0-10  Action/Follow Up:    MOBILITY:  [] Amb    [] Amb/Assist    [x] Bed    [] Wheelchair  [] Stretcher      All Vitals and Treatment Details on Attached [address]: [facility]          Room # 309     [] 1st Time Acute  [] Stat  [x] Routine  [] Urgent     [] Acute Room  []  Bedside  [x] ICU/CCU  [] ER   Isolation Precautions:  [x] Dialysis   [] Airborne   [] Contact    [] Reverse   Special Considerations:         [] Blood Consent Verified [x]N/A     ALLERGIES:   [x] NKA          Code Status:  [x] Full Code  [] DNR  [] Other           HBsAg ONLY: Date Drawn 07/28/17         [x]Negative []Positive []Unknown   HBsAb: Date 07/28/17    [] Susceptible   [x] Xddesy03 []Not Drawn  [] Drawn     Current Labs:    Date of Labs: Today [x]        Cut and paste current labs here.                                                                                                                                   DIET:  [x] Renal    [] Other [] NPO     []  Diabetic      PRIMARY NURSE REPORT: First initial/Last name/Title      Pre Dialysis: Toño Bates RN    Time: 7016      EDUCATION:    [x] Patient [] Other         Knowledge Basis: []None [x]Minimal [] Substantial   Barriers to learning pt shows no evidence of cognitive function []N/A   [] Access Care     [] S&S of infection     [] Fluid Management     []K+     [x]Procedural    []Albumin     [] Medications     [] Tx Options     [] Transplant     [] Diet     [] Other   Teaching Tools:  [x] Explain  [] Demo  [] Handouts [] Video  Patient response:   [x] Verbalized understanding  [] Teach back  [] Return demonstration [] Requires follow up   Inappropriate due to            [address] Before each treatment:     Machine Number:                   [facility]                                  [x] Unit Machine #  with centralized RO                                  [] Portable Machine #1/RO serial # [id number]                                  [] Portable Machine #2/RO serial # [id number]                                  [x] Portable Machine #3/RO serial # [id number]                                                                                                       [address] [facility]                                  [] Portable Machine #11/RO serial # [id number]                                   [] Portable Machine #12/RO serial # [id number]                                  [] Portable Machine #13/RO serial #  [id number]      Alarm Test:  Pass time 1430         Other:         [x] RO/Machine Log Complete      Temp    37            [x]Extracorporeal Circuit Tested for integrity   Dialysate: pH  7.4 Conductivity: Meter   14     HD Machine   14                  TCD: 14  Dialyzer Lot # [id number]         Blood Tubing Lot # [id number]      Saline Lot #       CHLORINE TESTING-Before each treatment and every 4 hours    Total Chlorine: [x] less than 0.1 ppm  Time: 1500 4 Hr/2nd Check Time: na   (if greater than 0.1 ppm from Primary then every 30 minutes from Secondary)     TREATMENT INITIATION  with Dialysis Precautions:   [x] All Connections Secured                 [x] Saline Line Double Clamped   [x] Venous Parameters Set                  [x] Arterial Parameters Set    [x] Prime Given  250 ml               [x]Air Foam Detector Engaged      Treatment Initiation Note: pt nonresponsive but withdraws to painful stimuli; hemodynamically stable at this time. AVF  accessed and treatment initiated without difficulty. Dr Vidya Beavers at bedside and VO received to place pt on 4K/3Ca bath and tx time reduced to 2.5 hours. Medication Dose Volume Route Initials Dialyzer Cleared: [] Good [x] Fair  [] Poor    Blood processed:  42.1 L  UF Removed  0 Ml    Post Wt: 61.5    kg  POst BP:   113/63       Pulse: 86      Respirations: 20  Temperature: 97.8     epogen      10375k          1ml              Post Tx Vascular Access: AVF/AVG: Bleeding stopped Art 10 min. Oscar. 10 Min             hectorol   3mcg         1.5ml             Catheter: Locking solution: Heparin 1:1000 Art. Oscar. N/A                                 Post Assessment:                                    Skin:  [x] Warm  [x] Dry [] Diaphoretic    [] Flushed  [] Pale [] Cyanotic   [facility] Signatures Title Initials  Time Lungs: [x] Clear    [] Course  [] Crackles  [] Wheezing [] Diminished   Melford Kocher, RN    Cardiac: [x] Regular   [] Irregular   [] Monitor  [] N/A  Rhythm:           Edema:  [x] None    [] General     [] Facial   [] Pedal    [] UE    [] LE       Pain: [x]0  []1  []2   []3  []4   []5   []6   []7   []8   []9   []10         Post Treatment Note: HD well tolerated. 0L UF removed. No acute distress noted during or post HD treatment.      POST TREATMENT PRIMARY NURSE HANDOFF REPORT:     First initial/Last name/Title         Post Dialysis: Lucy Blair RN Time:  1800     Abbreviations: AVG-arterial venous graft, AVF-arterial venous fistula, IJ-Internal Jugular, Subcl-Subclavian, Fem-Femoral, Tx-treatment, AP/HR-apical heart rate, DFR-dialysate flow rate, BFR-blood flow rate, AP-arterial pressure, -venous pressure, UF-ultrafiltrate, TMP-transmembrane pressure, Oscar-Venous, Art-Arterial, RO-Reverse Osmosis

## 2017-08-25 NOTE — PROGRESS NOTES
[facility]  [facility]    Patient: Wilmar Ordaz Age: 55 y.o. : 1960 MR#: [id number] SSN: [id number]  Date/Time: 2017     Subjective:   Patient intubated and unresponsive. Assessment/Plan:     S/p PEA arrest. wth acute resp fauilure on the Vent ? Cardiac cause with elevated trop PTA. Echo improving EF, s/p cath, no CAD. Now with second cardiac arrest and intubated. Acute met encephalopathy: due to severe anoxic brain injury. Per neurology. Acute pontine lacunar CVA: on asa. ESRD- HD as pe nephrology    .  Wounds: per wound care    Case discussed with:  []Patient  []Family  [x]Nursing  []Case Management  DVT Prophylaxis:  []Lovenox  [x]Hep SQ  [x]SCDs  []Coumadin   []On Heparin gtt    Patient Active Problem List   Diagnosis Code    Anemia D64.9    Acidosis E87.2    Cardiac arrest ([address] I46.9    Respiratory failure ([address] J96.90    ESRD needing dialysis ([address] N18.6, Z99.2    Anoxic brain damage (HCC) G93.1    Colitis K52.9    Hypotension I95.9    Acute GI bleeding K92.2    ESRD (end stage renal disease) ([address] N18.6    Sepsis ([address] A41.9    Oropharyngeal dysphagia R13.12    Cachexia ([address] R64       Objective:   VS:   Visit Vitals    /63    Pulse 89    Temp 98.4 °F (36.9 °C)    Resp 12    Ht 6' 2\" (1.88 m)    Wt 61.5 kg (135 lb 9.3 oz)    SpO2 100%    BMI 17.41 kg/m2      Tmax/24hrs: Temp (24hrs), Av.4 °F (36.9 °C), Min:98.2 °F (36.8 °C), Max:98.8 °F (37.1 °C)  IOBRIEF    Intake/Output Summary (Last 24 hours) at 17 1258  Last data filed at 17 0600   Gross per 24 hour   Intake           2248.6 ml   Output             1000 ml   Net           1248.6 ml     General:  Intubated, unresponsive  Cardiovascular:  S1S2+, RRR  Pulmonary:  Coarse BS b/l  GI:  Soft, BS+, NT, ND  Extremities:  No edema            Medications:   Current Facility-Administered Medications   Medication Dose Route Frequency    gelatin absorbable (GELFOAM) 100 sponge        hydrocortisone Sod Succ (PF) (SOLU-CORTEF) injection 25 mg  25 mg IntraVENous Q12H    potassium chloride (KLOR-CON) packet 40 mEq  40 mEq Per NG tube DAILY    Zinc Ox-Aloe Vera-Vitamin E (BALMEX) topical cream   Topical CONTINUOUS    insulin glargine (LANTUS) injection 20 Units  20 Units SubCUTAneous DAILY    banana flakes trans-galactooligosaccharide (BANATROL PLUS) powder 1 Packet  1 Packet Per NG tube TID    levETIRAcetam (KEPPRA) 500 mg in saline (iso-osm) 100 ml IVPB  500 mg IntraVENous Q12H    LORazepam (ATIVAN) injection 1 mg  1 mg IntraVENous Q4H PRN    insulin lispro (HUMALOG) injection   SubCUTAneous Q6H    white petrolatum-mineral oil 85-15 % oint 1 Dose  1 Dose Ophthalmic QID    chlorhexidine (PERIDEX) 0.12 % mouthwash 10 mL  10 mL Oral Q12H    NOREPINephrine (LEVOPHED) 16,000 mcg in dextrose 5% 250 mL infusion  2-16 mcg/min IntraVENous TITRATE    albuterol (PROVENTIL VENTOLIN) nebulizer solution 2.5 mg  2.5 mg Nebulization Q6H PRN    famotidine (PF) (PEPCID) 20 mg in sodium chloride 0.9 % 10 mL injection  20 mg IntraVENous DAILY    vits A and D-white pet-lanolin (A&D) ointment   Topical QID AFTER MEALS    collagenase (SANTYL) 250 unit/gram ointment   Topical DAILY    acetaminophen (TYLENOL) tablet 650 mg  650 mg Oral Q4H PRN    lactobacillus sp. 50 billion cpu (BIO-K PLUS) capsule 1 Cap  1 Cap Oral DAILY    loperamide (IMODIUM) capsule 2 mg  2 mg Oral Q6H PRN    heparin (porcine) injection 5,000 Units  5,000 Units SubCUTAneous Q8H    heparin (porcine) 1,000 unit/mL injection 2,000 Units  2,000 Units IntraVENous DIALYSIS ONCE    simvastatin (ZOCOR) tablet 20 mg  20 mg Oral QHS    doxercalciferol (HECTOROL) 4 mcg/2 mL injection 3 mcg  3 mcg IntraVENous DIALYSIS MON, WED & FRI    epoetin benjamin (EPOGEN;PROCRIT) injection 10,000 Units  10,000 Units IntraVENous DIALYSIS MON, WED & FRI    aspirin chewable tablet 81 mg  81 mg Oral DAILY    glucose chewable tablet 16 g  4 Tab Oral PRN    glucagon (GLUCAGEN) injection 1 mg  1 mg IntraMUSCular PRN    dextrose (D50W) injection syrg 12.5-25 g  25-50 mL IntraVENous PRN    0.9% sodium chloride infusion  100 mL/hr IntraVENous DIALYSIS PRN    naloxone (NARCAN) injection 0.4 mg  0.4 mg IntraVENous PRN       Labs:      Recent Results (from the past 24 hour(s))   POTASSIUM    Collection Time: 08/24/17  6:00 PM   Result Value Ref Range    Potassium 3.0 (L) 3.5 - 5.5 mmol/L   GLUCOSE, POC    Collection Time: 08/24/17  7:06 PM   Result Value Ref Range    Glucose (POC) 136 (H) 70 - 110 mg/dL   GLUCOSE, POC    Collection Time: 08/24/17 11:18 PM   Result Value Ref Range    Glucose (POC) 170 (H) 70 - 110 mg/dL   CBC WITH AUTOMATED DIFF    Collection Time: 08/25/17  3:20 AM   Result Value Ref Range    WBC 14.9 (H) 4.6 - 13.2 K/uL    RBC 3.10 (L) 4.70 - 5.50 M/uL    HGB 9.3 (L) 13.0 - 16.0 g/dL    HCT 29.2 (L) 36.0 - 48.0 %    MCV 94.2 74.0 - 97.0 FL    MCH 30.0 24.0 - 34.0 PG    MCHC 31.8 31.0 - 37.0 g/dL    RDW 18.3 (H) 11.6 - 14.5 %    PLATELET 501 239 - 571 K/uL    MPV 11.0 9.2 - 11.8 FL    NEUTROPHILS 86 (H) 40 - 73 %    LYMPHOCYTES 10 (L) 21 - 52 %    MONOCYTES 4 3 - 10 %    EOSINOPHILS 0 0 - 5 %    BASOPHILS 0 0 - 2 %    ABS. NEUTROPHILS 12.7 (H) 1.8 - 8.0 K/UL    ABS. LYMPHOCYTES 1.5 0.9 - 3.6 K/UL    ABS. MONOCYTES 0.6 0.05 - 1.2 K/UL    ABS. EOSINOPHILS 0.0 0.0 - 0.4 K/UL    ABS.  BASOPHILS 0.0 0.0 - 0.1 K/UL    DF AUTOMATED     MAGNESIUM    Collection Time: 08/25/17  3:20 AM   Result Value Ref Range    Magnesium 2.9 (H) 1.6 - 2.6 mg/dL   PHOSPHORUS    Collection Time: 08/25/17  3:20 AM   Result Value Ref Range    Phosphorus 2.9 2.5 - 4.9 MG/DL   VANCOMYCIN, RANDOM    Collection Time: 08/25/17  3:20 AM   Result Value Ref Range    Vancomycin, random 16.3 5.0 - 29.4 UG/ML   METABOLIC PANEL, BASIC    Collection Time: 08/25/17  3:20 AM   Result Value Ref Range    Sodium 140 136 - 145 mmol/L    Potassium 3.1 (L) 3.5 - 5.5 mmol/L Chloride 107 100 - 108 mmol/L    CO2 23 21 - 32 mmol/L    Anion gap 10 3.0 - 18 mmol/L    Glucose 278 (H) 74 - 99 mg/dL    BUN 39 (H) 7.0 - 18 MG/DL    Creatinine 6.40 (H) 0.6 - 1.3 MG/DL    BUN/Creatinine ratio 6 (L) 12 - 20      GFR est AA 11 (L) >60 ml/min/1.73m2    GFR est non-AA 9 (L) >60 ml/min/1.73m2    Calcium 8.4 (L) 8.5 - 10.1 MG/DL   POC G3    Collection Time: 08/25/17  4:51 AM   Result Value Ref Range    Device: VENT      FIO2 (POC) 25 %    pH (POC) 7.487 (H) 7.35 - 7.45      pCO2 (POC) 30.2 (L) 35.0 - 45.0 MMHG    pO2 (POC) 125 (H) 80 - 100 MMHG    HCO3 (POC) 22.8 22 - 26 MMOL/L    sO2 (POC) 99 (H) 92 - 97 %    Base deficit (POC) 1 mmol/L    Mode ASSIST CONTROL      Tidal volume 450 ml    Set Rate 14 bpm    PEEP/CPAP (POC) 5 cmH2O    Allens test (POC) N/A      Total resp.  rate 14      Site RIGHT RADIAL      Specimen type (POC) ARTERIAL      Performed by Cristine Richardson     Volume control plus YES     GLUCOSE, POC    Collection Time: 08/25/17  5:11 AM   Result Value Ref Range    Glucose (POC) 294 (H) 70 - 110 mg/dL   GLUCOSE, POC    Collection Time: 08/25/17 12:04 PM   Result Value Ref Range    Glucose (POC) 264 (H) 70 - 110 mg/dL       Signed By: Jada Escobar MD     August 25, 2017

## 2017-08-25 NOTE — PROGRESS NOTES
Re:  Carol Pride up visit     8/25/2017 3:08 PM    SSN: [id number]    Subjective:   Sharee Clark is seen on the vent in the ICU.     Medications:    Current Facility-Administered Medications   Medication Dose Route Frequency Provider Last Rate Last Dose    gelatin absorbable (GELFOAM) 100 sponge             hydrocortisone Sod Succ (PF) (SOLU-CORTEF) injection 25 mg  25 mg IntraVENous Q12H Jonathan Drummond PA-C        potassium chloride (KLOR-CON) packet 40 mEq  40 mEq Per NG tube DAILY Ramon Roy MD   40 mEq at 08/25/17 1030    Zinc Ox-Aloe Vera-Vitamin E (BALMEX) topical cream   Topical CONTINUOUS Virgin MD Demetrio        insulin glargine (LANTUS) injection 20 Units  20 Units SubCUTAneous DAILY Bang Ashre MD   20 Units at 08/25/17 1033    banana flakes trans-galactooligosaccharide (BANATROL PLUS) powder 1 Packet  1 Packet Per NG tube TID Bang Asher MD   1 Packet at 08/25/17 1040    levETIRAcetam (KEPPRA) 500 mg in saline (iso-osm) 100 ml IVPB  500 mg IntraVENous Q12H Surinder Garcia  mL/hr at 08/25/17 1034 500 mg at 08/25/17 1034    LORazepam (ATIVAN) injection 1 mg  1 mg IntraVENous Q4H PRN Katherene Fraction, NP   1 mg at 08/23/17 1034    insulin lispro (HUMALOG) injection   SubCUTAneous Q6H Josemanuel Elias MD   9 Units at 08/25/17 1206    white petrolatum-mineral oil 85-15 % oint 1 Dose  1 Dose Ophthalmic QID Sy Cardoza NP   1 Dose at 08/25/17 1035    chlorhexidine (PERIDEX) 0.12 % mouthwash 10 mL  10 mL Oral Q12H Katherene Fraction, NP   10 mL at 08/25/17 1030    NOREPINephrine (LEVOPHED) 16,000 mcg in dextrose 5% 250 mL infusion  2-16 mcg/min IntraVENous TITRATE Bela Leslie, NP 2.8 mL/hr at 08/25/17 0830 3 mcg/min at 08/25/17 0830    albuterol (PROVENTIL VENTOLIN) nebulizer solution 2.5 mg  2.5 mg Nebulization Q6H PRN Katherene Fraction, NP        famotidine (PF) (PEPCID) 20 mg in sodium chloride 0.9 % 10 mL injection  20 mg IntraVENous DAILY January-Luanne Michel VARGAS PA-C   20 mg at 08/25/17 2873    vits A and D-white pet-lanolin (A&D) ointment   Topical QID AFTER MEALS Mariya Borjas MD        collagenase (SANTYL) 250 unit/gram ointment   Topical DAILY Mariya Borjas MD        acetaminophen (TYLENOL) tablet 650 mg  650 mg Oral Q4H PRN Jamilah Lopes MD   650 mg at 08/22/17 1912    lactobacillus sp. 50 billion cpu (BIO-K PLUS) capsule 1 Cap  1 Cap Oral DAILY Jermaine Chi MD   1 Cap at 08/25/17 1030    loperamide (IMODIUM) capsule 2 mg  2 mg Oral Q6H PRN Jamilah Lopes MD   2 mg at 08/25/17 1155    heparin (porcine) injection 5,000 Units  5,000 Units SubCUTAneous Q8H Jamilah Lopes MD   5,000 Units at 08/25/17 1033    heparin (porcine) 1,000 unit/mL injection 2,000 Units  2,000 Units IntraVENous DIALYSIS ONCE Flor Valles MD        simvastatin (ZOCOR) tablet 20 mg  20 mg Oral QHS Jamilah Lopes MD   20 mg at 08/24/17 2126    doxercalciferol (HECTOROL) 4 mcg/2 mL injection 3 mcg  3 mcg IntraVENous DIALYSIS JIGNESH TATE & Rigo Retana MD   3 mcg at 08/23/17 1829    epoetin benjamin (EPOGEN;PROCRIT) injection 10,000 Units  10,000 Units IntraVENous DIALYSIS JIGNESH TATE & Rigo Retana MD   10,000 Units at 08/21/17 0900    aspirin chewable tablet 81 mg  81 mg Oral DAILY Jamilah Lopes MD   81 mg at 08/25/17 1030    glucose chewable tablet 16 g  4 Tab Oral PRN Barbara La PA-C        glucagon (GLUCAGEN) injection 1 mg  1 mg IntraMUSCular PRN Barbara La PA-C        dextrose (D50W) injection syrg 12.5-25 g  25-50 mL IntraVENous PRN Barbara La PA-C   25 g at 08/06/17 0005    0.9% sodium chloride infusion  100 mL/hr IntraVENous DIALYSIS PRN Chris Prado  mL/hr at 08/02/17 1925 100 mL/hr at 08/02/17 1925    naloxone [facility]) injection 0.4 mg  0.4 mg IntraVENous PRN Mariya Borjas MD           Vital signs:    Visit Vitals    /63    Pulse 89    Temp 98.4 °F (36.9 °C)    Resp 12    Ht 6' 2\" (1.88 m)    Wt 61.5 kg (135 lb 9.3 oz)    SpO2 100%    BMI 17.41 kg/m2       Review of Systems:   Could not be obtained from the patient because of the medical status. Patient Active Problem List   Diagnosis Code    Anemia D64.9    Acidosis E87.2    Cardiac arrest ([facility] [address] I46.9    Respiratory failure ([address] J96.90    ESRD needing dialysis ([facility] [address] N18.6, Z99.2    Anoxic brain damage (HCC) G93.1    Colitis K52.9    Hypotension I95.9    Acute GI bleeding K92.2    ESRD (end stage renal disease) ([facility] [address] N18.6    Sepsis ([facility]) A41.9    Oropharyngeal dysphagia R13.12    Cachexia ([facility]) R64         Objective: The patient is comatose on the vent. To deep painful stimuli he has some slight increase in myoclonic jerking. No other movement to pain. Cranial Nerves: II  Visual fields can't be tested. III, IV, VI  Extraocular movements are absent to Doll's eyes maneuver. His eyes are forcefully deviated upwards. V  Corneal responses are absent. VII  Face is symmetrical.  VIII - Hearing is not testable. IX, X, XII  Gag is absent. Motor: The patient has no movement to pain. Tone is normal. Reflexes are trace and symmetrical. Plantars are down going. Gait is not testable.     CBC:   Lab Results   Component Value Date/Time    WBC 14.9 08/25/2017 03:20 AM    RBC 3.10 08/25/2017 03:20 AM    HGB 9.3 08/25/2017 03:20 AM    HCT 29.2 08/25/2017 03:20 AM    PLATELET 277 78/90/6572 03:20 AM     BMP:   Lab Results   Component Value Date/Time    Glucose 278 08/25/2017 03:20 AM    Sodium 140 08/25/2017 03:20 AM    Potassium 3.1 08/25/2017 03:20 AM    Chloride 107 08/25/2017 03:20 AM    CO2 23 08/25/2017 03:20 AM    BUN 39 08/25/2017 03:20 AM    Creatinine 6.40 08/25/2017 03:20 AM    Calcium 8.4 08/25/2017 03:20 AM     CMP:   Lab Results   Component Value Date/Time    Glucose 278 08/25/2017 03:20 AM    Sodium 140 08/25/2017 03:20 AM Potassium 3.1 08/25/2017 03:20 AM    Chloride 107 08/25/2017 03:20 AM    CO2 23 08/25/2017 03:20 AM    BUN 39 08/25/2017 03:20 AM    Creatinine 6.40 08/25/2017 03:20 AM    Calcium 8.4 08/25/2017 03:20 AM    Anion gap 10 08/25/2017 03:20 AM    BUN/Creatinine ratio 6 08/25/2017 03:20 AM    Alk. 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 No evidence at this time of any recovery of higher cortical function. EEG suggests a very poor prognosis. Plan:  Continue supportive care. Poor prognosis. Sincerely,        Hiram Leach.  Мария Islas M.D.

## 2017-08-25 NOTE — ROUTINE PROCESS
Bedside, Verbal and Written shift change report given to Perico Cox (oncoming nurse) by ,me   (offgoing nurse). Report included the following information SBAR, Kardex, Procedure Summary, Intake/Output, MAR and Recent Results.

## 2017-08-25 NOTE — PROGRESS NOTES
HEMODIALYSIS ROUNDING NOTE      Patient: Zen Arias               Sex: male          DOA: 7/27/2017  4:23 PM        YOB: 1960      Age:  55 y.o.        LOS:  LOS: 29 days     Subjective:     Zen Arias is a 55 y.o.  who presents with Cardiac arrest ([facility] [address]  Acidosis  Respiratory failure ([facility] [address]  Anemia  ESRD needing dialysis ([facility] [address]  Cardiac arrest ([facility] [address]  Acute GI bleeding  Sepsis ([facility] [address]  Hypotension  Anoxic brain damage (HCC)  ESRD (end stage renal disease) ([facility] [address]  Colitis  dx  dx.   The patient is dialyzing utilizing the following method:Intermittent Hemodialysis    Chief Complains: Patient was seen on dialysis,   - Reviewed last 24 hrs events     Current Facility-Administered Medications   Medication Dose Route Frequency    gelatin absorbable (GELFOAM) 100 sponge        hydrocortisone Sod Succ (PF) (SOLU-CORTEF) injection 25 mg  25 mg IntraVENous Q12H    potassium chloride (KLOR-CON) packet 40 mEq  40 mEq Per NG tube DAILY    Zinc Ox-Aloe Vera-Vitamin E (BALMEX) topical cream   Topical CONTINUOUS    insulin glargine (LANTUS) injection 20 Units  20 Units SubCUTAneous DAILY    banana flakes trans-galactooligosaccharide (BANATROL PLUS) powder 1 Packet  1 Packet Per NG tube TID    levETIRAcetam (KEPPRA) 500 mg in saline (iso-osm) 100 ml IVPB  500 mg IntraVENous Q12H    LORazepam (ATIVAN) injection 1 mg  1 mg IntraVENous Q4H PRN    insulin lispro (HUMALOG) injection   SubCUTAneous Q6H    white petrolatum-mineral oil 85-15 % oint 1 Dose  1 Dose Ophthalmic QID    chlorhexidine (PERIDEX) 0.12 % mouthwash 10 mL  10 mL Oral Q12H    NOREPINephrine (LEVOPHED) 16,000 mcg in dextrose 5% 250 mL infusion  2-16 mcg/min IntraVENous TITRATE    albuterol (PROVENTIL VENTOLIN) nebulizer solution 2.5 mg  2.5 mg Nebulization Q6H PRN    famotidine (PF) (PEPCID) 20 mg in sodium chloride 0.9 % 10 mL injection  20 mg IntraVENous DAILY    vits A and D-white pet-lanolin (A&D) ointment   Topical QID AFTER MEALS    collagenase (SANTYL) 250 unit/gram ointment   Topical DAILY    acetaminophen (TYLENOL) tablet 650 mg  650 mg Oral Q4H PRN    lactobacillus sp. 50 billion cpu (BIO-K PLUS) capsule 1 Cap  1 Cap Oral DAILY    loperamide (IMODIUM) capsule 2 mg  2 mg Oral Q6H PRN    heparin (porcine) injection 5,000 Units  5,000 Units SubCUTAneous Q8H    heparin (porcine) 1,000 unit/mL injection 2,000 Units  2,000 Units IntraVENous DIALYSIS ONCE    simvastatin (ZOCOR) tablet 20 mg  20 mg Oral QHS    doxercalciferol (HECTOROL) 4 mcg/2 mL injection 3 mcg  3 mcg IntraVENous DIALYSIS MON, WED & FRI    epoetin benjamin (EPOGEN;PROCRIT) injection 10,000 Units  10,000 Units IntraVENous DIALYSIS MON, WED & FRI    aspirin chewable tablet 81 mg  81 mg Oral DAILY    glucose chewable tablet 16 g  4 Tab Oral PRN    glucagon (GLUCAGEN) injection 1 mg  1 mg IntraMUSCular PRN    dextrose (D50W) injection syrg 12.5-25 g  25-50 mL IntraVENous PRN    0.9% sodium chloride infusion  100 mL/hr IntraVENous DIALYSIS PRN    naloxone (NARCAN) injection 0.4 mg  0.4 mg IntraVENous PRN       Objective:     Visit Vitals    /71    Pulse 81    Temp 98.4 °F (36.9 °C)    Resp 20    Ht 6' 2\" (1.88 m)    Wt 61.5 kg (135 lb 9.3 oz)    SpO2 100%    BMI 17.41 kg/m2       Intake/Output Summary (Last 24 hours) at 08/25/17 1600  Last data filed at 08/25/17 1257   Gross per 24 hour   Intake           1859.8 ml   Output             1400 ml   Net            459.8 ml       Physical Examination:    GEN: unresponsive on vent  RS: Chest is bilateral equal, no wheezing / rales / crackles  CVS: S1-S2 heard, RRR  Abdomen: Soft, Non tender, Not distended, Positive bowel sounds  Extremities: No edema, no cyanosis, skin is warm on touch  HEENT: Head is atraumatic, PERRLA, conjunctiva pink & non icteric.  No JVD or carotid bruit      Data Review:      Labs:     Hematology: Recent Labs      08/25/17   0320  08/24/17   0620  08/23/17   0615   WBC  14.9*  15.4*  13.3*   HGB  9.3*  9.4*  10.0*   HCT  29.2*  28.8*  30.8*     Chemistry: Recent Labs      08/25/17   0320  08/24/17   1800  08/24/17   0620  08/23/17   0615   BUN  39*   --   31*  57*   CREA  6.40*   --   5.07*  7.59*   CA  8.4*   --   8.6  8.3*   K  3.1*  3.0*  2.9*  3.2*   NA  140   --   139  143   CL  107   --   106  109*   CO2  23   --   24  22   PHOS  2.9   --   2.4*  2.6   GLU  278*   --   237*  222*        Images:     XR (Most Recent). CXR reviewed by me and compared with previous CXR   Results from Hospital Encounter encounter on 07/27/17   XR CHEST PORT   Narrative PROCEDURE:  Chest Portable    CPT code 77572    INDICATION:  Intubation. Acidosis, respiratory failure, anoxic brain damage. COMPARISON:  8/24/17. FINDINGS:      - ET tube distal tip at 2.5 cm above the bree.  - NG tube distal tip and proximal side-port below the level of the expected  location for the gastroesophageal junction. - Left IJ central venous line distal tip in the superior vena cava at about the  level of the azygos confluence. No infiltrate or consolidation is identified. The cardiomediastinal silhouette  appears unremarkable. No pneumothorax is detected. No costophrenic angle  blunting. Impression IMPRESSION:    1.  ET tube, left IJ central venous line and midline NG tube in place. 2.  No acute lung findings. CT (Most Recent)   Results from Hospital Encounter encounter on 07/27/17   CTA CHEST W OR W WO CONT   Narrative CTA CHEST PULMONARY EMBOLISM PROTOCOL        INDICATION: Cardiac arrest. Question pulmonary embolism.     TECHNIQUE: Thin collimation axial images obtained through the level of the  pulmonary arteries with additional imaging through the chest following the  uneventful administration of 70 cc Isovue-370 nonionic intravenous contrast.   Images reconstructed into three dimensional coronal and sagittal projections for  complete evaluation of the tortuous and overlapping pulmonary vascular  structures and to reduce patient radiation dose. All CT scans at this facility are performed using dose optimization technique as  appropriate to a performed exam, to include automated exposure control,  adjustment of the mA and/or kV according to patient size (including appropriate  matching first site-specific examinations), or use of iterative reconstruction  technique. COMPARISON: CT abdomen pelvis 7/27/2017. FINDINGS:    Evaluation is limited by respiration artifact. No filling defects are  appreciated within the main, left, right, lobar or visualized segmental  pulmonary arteries to suggest embolism. Main pulmonary artery is enlarged up to  3.3 cm. Endotracheal tube tip is approximately 1 cm proximal to the bree. Consider  1-1.5 cm retraction. NG but within the body. Thyroid: Unremarkable in its visualized aspects. Pericardium/ Heart: No significant effusion. Aorta/ Vessels: No aneurysm or dissection. Lymph Nodes: Unremarkable. .    Lungs: Layering secretions in the bilateral mainstem bronchus and at the bree. Patchy left lower lobe bronchovascular opacities. There is central bronchial  opacification of a subsegmental anterior right lower lobe bronchus with mild  adjacent groundglass. Minimal left upper lobe bronchovascular groundglass. Resolved large right pleural effusion and complete atelectasis right lower lobe. Pleura: No effusion. Upper Abdomen: IVC and hepatic veins are enlarged. Heterogeneous liver  attenuation. Both right renal arteries appear diminutive. Bones/soft tissues: Moderate anasarca. Bilateral anteromedial rib fractures  including right fourth-sixth and left fourth and fifth ribs. Degenerative disc  disease most significant at T3-4. Sclerotic bone foci T5 is likely a bone  island. Impression IMPRESSION:  1.   No evidence for pulmonary emboli within limitations of respiratory motion. 2.  Tip of endotracheal tube is approximately 1 cm proximal to the bree,  consider 1-1.5 cm retraction. 3.  Layering secretions in the bronchus with left lower greater than upper  bronchovascular opacities most compatible with infectious etiology, possibly  aspiration given history. 4.  A single moderate length of endobronchial opacification right lower lobe. 5.  Resolved large right pleural effusion and atelectasis entire right lower  lobe. 6.  Bilateral anteromedial rib fractures associated with CPR. 7.  Main pulmonary artery enlargement as may be seen in pulmonary arterial  hypertension. 8.  Dilated IVC and hepatic veins as may be seen in right heart failure. 9.  Diminutive right renal arteries may be associated with hypotension and/or  peripheral arterial constriction. 10.  Moderate anasarca. Plan / Recommendation:         End Stage Renal Disease:  Plan HD today    At 4:00 PM on 8/25/2017, I saw and examined patient during hemodialysis treatment. The patient was receiving hemodialysis for treatment of end stage renal disease. I have also reviewed vital signs, intake and output, lab results and recent events, and agreed with today's dialysis order.     Access: No issue    Anemia:  epo    Sandra Valdez MD  Nephrology  8/25/2017

## 2017-08-25 NOTE — PROGRESS NOTES
[facility]  ICU Progress Note      Name: Sharee Clark   : 1960   MRN: [id number]   Date: 2017 3:29 PM     [x]I have reviewed the flowsheet and previous days notes. Events overnight reviewed and discussed with nursing staff. Vital signs and records reviewed. HPI:   17   - Remains on levophed. Afebrile overnight, tolerating SBT. Anuric.   -  Not following commands or withdrawing to pain. - Pupils pin point and fixed. (+) corneal reflex. (+) cough and gag. - Moderate oral secretions. ROS:Review of systems not obtained due to patient factors.     Medication Review:  · Pressors -  · Sedation - PRN ativan for myoclonus   · Antibiotics - None   · Pain -  · GI/ DVT - Pepcid and sq heparin   · Others (other gtts)    Safety Bundles: VAP Bundle/Severe Sepsis Protocol    Vital Signs:    Visit Vitals    /60    Pulse 87    Temp 98.4 °F (36.9 °C)    Resp 20    Ht 6' 2\" (1.88 m)    Wt 61.5 kg (135 lb 9.3 oz)    SpO2 100%    BMI 17.41 kg/m2       O2 Device: Endotracheal tube   O2 Flow Rate (L/min): 6 l/min   Temp (24hrs), Av.4 °F (36.9 °C), Min:98.2 °F (36.8 °C), Max:98.8 °F (37.1 °C)       Intake/Output:   Last shift:         Last 3 shifts:  1901 -  0700  In: 4177.1 [I.V.:692.1]  Out: 9940 [Drains:1275]    Intake/Output Summary (Last 24 hours) at 17 1149  Last data filed at 17 0600   Gross per 24 hour   Intake           2458.3 ml   Output             1000 ml   Net           1458.3 ml     Ventilator Settings:  Ventilator  Mode: Assist control, VC+  Respiratory Rate  Resp Rate Observed: 17  Back-Up Rate: 14  Insp Time (sec): 1 sec  Insp Flow (l/min): 49 l/min  I:E Ratio: 1:3.2  Ventilator Volumes  Vt Set (ml): 400 ml  Vt Exhaled (Machine Breath) (ml): 637 ml  Vt Spont (ml): 699 ml  Ve Observed (l/min): 7.35 l/min  Ventilator Pressures  Pressure Support (cm H2O): 7 cm H2O  PIP Observed (cm H2O): 12 cm H2O  Plateau Pressure (cm H2O): 13 cm H2O  MAP (cm H2O): 7.7  PEEP/VENT (cm H2O): 5 cm H20  Auto PEEP Observed (cm H2O): 0.4 cm H2O    Physical Exam:  General: Intubated, critically ill, unresponsive  HEENT:  Anicteric sclerae; pink palpebral conjunctivae; Pupil non reactive to light. Resp:  Symmetrical chest expansion, no accessory muscle use; good airway entry; no rales/ wheezing/ rhonchi noted  CV:  S1, S2 present;  GI:  Abdomen soft, non-tender; (+) active bowel sounds  Extremities:  +2 pulses on all extremities; + thrill and bruit to L AV fistula   Skin:  Dressing to sacrum. Erythematous perineum   Neurologic:  Facial myoclonus, Pupils fixed, small with disconjugate upward deviated gaze. (+) cough/gag. Negative corneal reflex no withdrawal to pain.     Devices:  NGT, ETT, A line, CVL left IJ    DATA:     Current Facility-Administered Medications   Medication Dose Route Frequency    gelatin absorbable (GELFOAM) 100 sponge        potassium chloride (KLOR-CON) packet 40 mEq  40 mEq Per NG tube DAILY    Zinc Ox-Aloe Vera-Vitamin E (BALMEX) topical cream   Topical CONTINUOUS    insulin glargine (LANTUS) injection 20 Units  20 Units SubCUTAneous DAILY    banana flakes trans-galactooligosaccharide (BANATROL PLUS) powder 1 Packet  1 Packet Per NG tube TID    hydrocortisone Sod Succ (PF) (SOLU-CORTEF) injection 25 mg  25 mg IntraVENous Q6H    levETIRAcetam (KEPPRA) 500 mg in saline (iso-osm) 100 ml IVPB  500 mg IntraVENous Q12H    LORazepam (ATIVAN) injection 1 mg  1 mg IntraVENous Q4H PRN    insulin lispro (HUMALOG) injection   SubCUTAneous Q6H    white petrolatum-mineral oil 85-15 % oint 1 Dose  1 Dose Ophthalmic QID    chlorhexidine (PERIDEX) 0.12 % mouthwash 10 mL  10 mL Oral Q12H    NOREPINephrine (LEVOPHED) 16,000 mcg in dextrose 5% 250 mL infusion  2-16 mcg/min IntraVENous TITRATE    albuterol (PROVENTIL VENTOLIN) nebulizer solution 2.5 mg  2.5 mg Nebulization Q6H PRN    famotidine (PF) (PEPCID) 20 mg in sodium chloride 0.9 % 10 mL injection  20 mg IntraVENous DAILY    vits A and D-white pet-lanolin (A&D) ointment   Topical QID AFTER MEALS    collagenase (SANTYL) 250 unit/gram ointment   Topical DAILY    acetaminophen (TYLENOL) tablet 650 mg  650 mg Oral Q4H PRN    lactobacillus sp. 50 billion cpu (BIO-K PLUS) capsule 1 Cap  1 Cap Oral DAILY    loperamide (IMODIUM) capsule 2 mg  2 mg Oral Q6H PRN    heparin (porcine) injection 5,000 Units  5,000 Units SubCUTAneous Q8H    heparin (porcine) 1,000 unit/mL injection 2,000 Units  2,000 Units IntraVENous DIALYSIS ONCE    simvastatin (ZOCOR) tablet 20 mg  20 mg Oral QHS    doxercalciferol (HECTOROL) 4 mcg/2 mL injection 3 mcg  3 mcg IntraVENous DIALYSIS MON, WED & FRI    epoetin benjamin (EPOGEN;PROCRIT) injection 10,000 Units  10,000 Units IntraVENous DIALYSIS MON, WED & FRI    aspirin chewable tablet 81 mg  81 mg Oral DAILY    glucose chewable tablet 16 g  4 Tab Oral PRN    glucagon (GLUCAGEN) injection 1 mg  1 mg IntraMUSCular PRN    dextrose (D50W) injection syrg 12.5-25 g  25-50 mL IntraVENous PRN    0.9% sodium chloride infusion  100 mL/hr IntraVENous DIALYSIS PRN    naloxone (NARCAN) injection 0.4 mg  0.4 mg IntraVENous PRN         Labs: Results:       Chemistry Recent Labs      08/25/17   0320  08/24/17   1800  08/24/17   0620  08/23/17   0615   GLU  278*   --   237*  222*   NA  140   --   139  143   K  3.1*  3.0*  2.9*  3.2*   CL  107   --   106  109*   CO2  23   --   24  22   BUN  39*   --   31*  57*   CREA  6.40*   --   5.07*  7.59*   CA  8.4*   --   8.6  8.3*   AGAP  10   --   9  12   BUCR  6*   --   6*  8*      CBC w/Diff Recent Labs      08/25/17   0320  08/24/17   0620  08/23/17   0615   WBC  14.9*  15.4*  13.3*   RBC  3.10*  3.09*  3.26*   HGB  9.3*  9.4*  10.0*   HCT  29.2*  28.8*  30.8*   PLT  321  254  192   GRANS  86*  78*  87*   LYMPH  10*  15*  9*   EOS  0  0  0      Coagulation No results for input(s): PTP, INR, APTT in the last 72 hours.     No lab exists for component: INREXT, INREXT    Liver Enzymes No results for input(s): TP, ALB, TBIL, AP, SGOT, GPT in the last 72 hours. No lab exists for component: DBIL   ABG Lab Results   Component Value Date/Time    PHI 7.487 (H) 08/25/2017 04:51 AM    PCO2I 30.2 (L) 08/25/2017 04:51 AM    PO2I 125 (H) 08/25/2017 04:51 AM    HCO3I 22.8 08/25/2017 04:51 AM    FIO2I 25 08/25/2017 04:51 AM      Microbiology No results for input(s): CULT in the last 72 hours. Telemetry: [x]Sinus []A-flutter []Paced    []A-fib []Multiple PVCs                  Imaging:  CXR 8/25/17: 1.  ET tube, left IJ central venous line and midline NG tube in place. 2.  No acute lung findings. IMPRESSION:   · PEA Cardiac arrest in dialysis 8/18/17  · S/P PEA Cardiac arrest on this admission 7/27/17, normal cath  · Acute Respirtatory Failure requiring Mechanical Ventilation   · Acute Anoxic Encephalopathy with acute with hyperdense cortex on CT   · Hypotension- distributive vs cardiogenic vs obstructive- on pressors   · ESRD requiring Dialysis - completed 3 hrs of dialysis today prior to code blue  · Sepsis Bacteremia - resolved   · DM2  · Oropharyngeal dysphagia - Needs peg tube  · Small Pericardial Effusion on Echo 8/7/17  · Cachexia  · Unstageable pressure ulcer to sacrum/coccyx  · Diarrhea- Cdiff negative       PLAN:   · Resp - stable on vent, SBT. VAP Bundle. Aspiration precautions. HOB > 30 degrees. PRN nebz. · ID - afebrile, no leukocytosis. ABX course completed. Wean stress dose steroids. · CVS - Titrate levophed gtt with goal for MAP > 65 mm/hg. · Heme/onc - Stable H/H and platelets. Daily CBC. · Metabolic - Monitor and replace lytes per provider. · Renal - HD per nephrology service   · Endocrine - SSI for glycemic control. Lantus daily. · Neuro/ Pain/ Sedation - Neurology following, Continue Keppra. Preliminary EEG- poor prognosis per Neuro. · GI - Continue Tube feeds.     · Prophylaxis - DVT- Sq heparin, GI- pepcid   · Full Code- Spoke with daughter/POA, she is going to discuss comfort care and DNR options with her brother. Will follow-up with phone call today.    · Discussed in interdisciplinary rounds          The patient is: [] acutely ill Risk of deterioration: [] moderate    [x] critically ill  [x] high     [x]See my orders for details    My assessment/plan was discussed with:  [x]nursing []PT/OT    [x]respiratory therapy [x]Dr. Johnie Prader    []family []       Nicole Rangel PA-C

## 2017-08-25 NOTE — ROUTINE PROCESS
Patient has cought and gag reflexes, and responsive to painful stimuli. Drools moderate amt. Of thick clear secretions. Minimal to small amt of thick whitish secretions from ETT. Noted slight twitching of eyebrows last night for few minutes but no myoclonic twitching noted this shift. Patient seems like waking up but eyes been looking upward and only white portion is showing and patient eyes won't close Pupils are fixed and dilated but very sluggish.

## 2017-08-25 NOTE — ACP (ADVANCE CARE PLANNING)
SW added contact information for the patient's two adult children, who are the legal NOK, to the [address] tab in chart.

## 2017-08-26 ENCOUNTER — APPOINTMENT (OUTPATIENT)
Dept: GENERAL RADIOLOGY | Age: 57
DRG: 004 | End: 2017-08-26
Attending: PHYSICIAN ASSISTANT
Payer: MEDICARE

## 2017-08-26 LAB
ANION GAP SERPL CALC-SCNC: 6 MMOL/L (ref 3–18)
ARTERIAL PATENCY WRIST A: YES
BASE EXCESS BLD CALC-SCNC: 1 MMOL/L
BASOPHILS # BLD: 0 K/UL (ref 0–0.1)
BASOPHILS NFR BLD: 0 % (ref 0–2)
BDY SITE: ABNORMAL
BUN SERPL-MCNC: 26 MG/DL (ref 7–18)
BUN/CREAT SERPL: 5 (ref 12–20)
CALCIUM SERPL-MCNC: 9 MG/DL (ref 8.5–10.1)
CHLORIDE SERPL-SCNC: 107 MMOL/L (ref 100–108)
CO2 SERPL-SCNC: 26 MMOL/L (ref 21–32)
CREAT SERPL-MCNC: 4.79 MG/DL (ref 0.6–1.3)
DIFFERENTIAL METHOD BLD: ABNORMAL
EOSINOPHIL # BLD: 0 K/UL (ref 0–0.4)
EOSINOPHIL NFR BLD: 0 % (ref 0–5)
ERYTHROCYTE [DISTWIDTH] IN BLOOD BY AUTOMATED COUNT: 18.4 % (ref 11.6–14.5)
GAS FLOW.O2 O2 DELIVERY SYS: ABNORMAL L/MIN
GAS FLOW.O2 SETTING OXYMISER: 14 BPM
GLUCOSE BLD STRIP.AUTO-MCNC: 118 MG/DL (ref 70–110)
GLUCOSE BLD STRIP.AUTO-MCNC: 186 MG/DL (ref 70–110)
GLUCOSE BLD STRIP.AUTO-MCNC: 262 MG/DL (ref 70–110)
GLUCOSE SERPL-MCNC: 255 MG/DL (ref 74–99)
HCO3 BLD-SCNC: 25 MMOL/L (ref 22–26)
HCT VFR BLD AUTO: 29.1 % (ref 36–48)
HGB BLD-MCNC: 9.3 G/DL (ref 13–16)
INSPIRATION.DURATION SETTING TIME VENT: 1 SEC
LYMPHOCYTES # BLD: 1.8 K/UL (ref 0.9–3.6)
LYMPHOCYTES NFR BLD: 12 % (ref 21–52)
MAGNESIUM SERPL-MCNC: 2.5 MG/DL (ref 1.6–2.6)
MCH RBC QN AUTO: 30.5 PG (ref 24–34)
MCHC RBC AUTO-ENTMCNC: 32 G/DL (ref 31–37)
MCV RBC AUTO: 95.4 FL (ref 74–97)
MONOCYTES # BLD: 0.5 K/UL (ref 0.05–1.2)
MONOCYTES NFR BLD: 3 % (ref 3–10)
NEUTS SEG # BLD: 12.7 K/UL (ref 1.8–8)
NEUTS SEG NFR BLD: 85 % (ref 40–73)
O2/TOTAL GAS SETTING VFR VENT: 0.25 %
PCO2 BLD: 34.2 MMHG (ref 35–45)
PEEP RESPIRATORY: 5 CMH2O
PH BLD: 7.47 [PH] (ref 7.35–7.45)
PHOSPHATE SERPL-MCNC: 2.1 MG/DL (ref 2.5–4.9)
PLATELET # BLD AUTO: 377 K/UL (ref 135–420)
PMV BLD AUTO: 10.8 FL (ref 9.2–11.8)
PO2 BLD: 384 MMHG (ref 80–100)
POTASSIUM SERPL-SCNC: 3.5 MMOL/L (ref 3.5–5.5)
RBC # BLD AUTO: 3.05 M/UL (ref 4.7–5.5)
SAO2 % BLD: 100 % (ref 92–97)
SERVICE CMNT-IMP: ABNORMAL
SODIUM SERPL-SCNC: 139 MMOL/L (ref 136–145)
SPECIMEN TYPE: ABNORMAL
TOTAL RESP. RATE, ITRR: 14
VENTILATION MODE VENT: ABNORMAL
VOLUME CONTROL PLUS IVLCP: YES
VT SETTING VENT: 400 ML
WBC # BLD AUTO: 15 K/UL (ref 4.6–13.2)

## 2017-08-26 PROCEDURE — 80048 BASIC METABOLIC PNL TOTAL CA: CPT | Performed by: NURSE PRACTITIONER

## 2017-08-26 PROCEDURE — 74011250637 HC RX REV CODE- 250/637: Performed by: INTERNAL MEDICINE

## 2017-08-26 PROCEDURE — 71010 XR CHEST PORT: CPT

## 2017-08-26 PROCEDURE — 85025 COMPLETE CBC W/AUTO DIFF WBC: CPT | Performed by: NURSE PRACTITIONER

## 2017-08-26 PROCEDURE — 84100 ASSAY OF PHOSPHORUS: CPT | Performed by: NURSE PRACTITIONER

## 2017-08-26 PROCEDURE — 74011250636 HC RX REV CODE- 250/636: Performed by: PHYSICIAN ASSISTANT

## 2017-08-26 PROCEDURE — 74011250637 HC RX REV CODE- 250/637: Performed by: NURSE PRACTITIONER

## 2017-08-26 PROCEDURE — 74011636637 HC RX REV CODE- 636/637: Performed by: INTERNAL MEDICINE

## 2017-08-26 PROCEDURE — 74011250636 HC RX REV CODE- 250/636: Performed by: HOSPITALIST

## 2017-08-26 PROCEDURE — 83735 ASSAY OF MAGNESIUM: CPT | Performed by: NURSE PRACTITIONER

## 2017-08-26 PROCEDURE — 74011250637 HC RX REV CODE- 250/637: Performed by: FAMILY MEDICINE

## 2017-08-26 PROCEDURE — 82962 GLUCOSE BLOOD TEST: CPT

## 2017-08-26 PROCEDURE — 77030011256 HC DRSG MEPILEX <16IN NO BORD MOLN -A

## 2017-08-26 PROCEDURE — 74011250637 HC RX REV CODE- 250/637: Performed by: HOSPITALIST

## 2017-08-26 PROCEDURE — 74011000250 HC RX REV CODE- 250: Performed by: PHYSICIAN ASSISTANT

## 2017-08-26 PROCEDURE — 94003 VENT MGMT INPAT SUBQ DAY: CPT

## 2017-08-26 PROCEDURE — 82803 BLOOD GASES ANY COMBINATION: CPT

## 2017-08-26 PROCEDURE — 36592 COLLECT BLOOD FROM PICC: CPT

## 2017-08-26 PROCEDURE — 65610000006 HC RM INTENSIVE CARE

## 2017-08-26 PROCEDURE — 36600 WITHDRAWAL OF ARTERIAL BLOOD: CPT

## 2017-08-26 RX ADMIN — MINERAL OIL AND WHITE PETROLATUM 1 DOSE: 150; 850 OINTMENT OPHTHALMIC at 17:31

## 2017-08-26 RX ADMIN — LOPERAMIDE HYDROCHLORIDE 2 MG: 2 CAPSULE ORAL at 16:30

## 2017-08-26 RX ADMIN — LEVETIRACETAM 500 MG: 5 INJECTION INTRAVENOUS at 09:11

## 2017-08-26 RX ADMIN — INSULIN LISPRO 9 UNITS: 100 INJECTION, SOLUTION INTRAVENOUS; SUBCUTANEOUS at 06:44

## 2017-08-26 RX ADMIN — VITAMIN A AND D: 30.8 OINTMENT TOPICAL at 08:46

## 2017-08-26 RX ADMIN — HYDROCORTISONE SODIUM SUCCINATE 25 MG: 100 INJECTION, POWDER, FOR SOLUTION INTRAMUSCULAR; INTRAVENOUS at 23:32

## 2017-08-26 RX ADMIN — ASPIRIN 81 MG 81 MG: 81 TABLET ORAL at 09:12

## 2017-08-26 RX ADMIN — HYDROCORTISONE SODIUM SUCCINATE 25 MG: 100 INJECTION, POWDER, FOR SOLUTION INTRAMUSCULAR; INTRAVENOUS at 09:11

## 2017-08-26 RX ADMIN — HEPARIN SODIUM 5000 UNITS: 5000 INJECTION, SOLUTION INTRAVENOUS; SUBCUTANEOUS at 17:31

## 2017-08-26 RX ADMIN — INSULIN LISPRO 3 UNITS: 100 INJECTION, SOLUTION INTRAVENOUS; SUBCUTANEOUS at 12:27

## 2017-08-26 RX ADMIN — SIMVASTATIN 20 MG: 10 TABLET, FILM COATED ORAL at 23:32

## 2017-08-26 RX ADMIN — VITAMIN A AND D: 30.8 OINTMENT TOPICAL at 12:29

## 2017-08-26 RX ADMIN — MINERAL OIL AND WHITE PETROLATUM 1 DOSE: 150; 850 OINTMENT OPHTHALMIC at 09:13

## 2017-08-26 RX ADMIN — Medication: at 23:34

## 2017-08-26 RX ADMIN — HEPARIN SODIUM 5000 UNITS: 5000 INJECTION, SOLUTION INTRAVENOUS; SUBCUTANEOUS at 02:06

## 2017-08-26 RX ADMIN — HEPARIN SODIUM 5000 UNITS: 5000 INJECTION, SOLUTION INTRAVENOUS; SUBCUTANEOUS at 09:11

## 2017-08-26 RX ADMIN — VITAMIN A AND D: 30.8 OINTMENT TOPICAL at 17:32

## 2017-08-26 RX ADMIN — INSULIN GLARGINE 20 UNITS: 100 INJECTION, SOLUTION SUBCUTANEOUS at 09:12

## 2017-08-26 RX ADMIN — Medication 1 PACKET: at 23:31

## 2017-08-26 RX ADMIN — MINERAL OIL AND WHITE PETROLATUM 1 DOSE: 150; 850 OINTMENT OPHTHALMIC at 12:28

## 2017-08-26 RX ADMIN — POTASSIUM CHLORIDE 40 MEQ: 1.5 POWDER, FOR SOLUTION ORAL at 09:12

## 2017-08-26 RX ADMIN — CHLORHEXIDINE GLUCONATE 10 ML: 1.2 RINSE ORAL at 09:12

## 2017-08-26 RX ADMIN — Medication 1 CAPSULE: at 09:00

## 2017-08-26 RX ADMIN — CHLORHEXIDINE GLUCONATE 10 ML: 1.2 RINSE ORAL at 23:31

## 2017-08-26 RX ADMIN — MINERAL OIL AND WHITE PETROLATUM 1 DOSE: 150; 850 OINTMENT OPHTHALMIC at 01:59

## 2017-08-26 RX ADMIN — COLLAGENASE SANTYL: 250 OINTMENT TOPICAL at 08:45

## 2017-08-26 RX ADMIN — Medication 1 PACKET: at 15:41

## 2017-08-26 RX ADMIN — SIMVASTATIN 20 MG: 10 TABLET, FILM COATED ORAL at 01:59

## 2017-08-26 RX ADMIN — FAMOTIDINE 20 MG: 10 INJECTION INTRAVENOUS at 09:12

## 2017-08-26 RX ADMIN — MINERAL OIL AND WHITE PETROLATUM 1 DOSE: 150; 850 OINTMENT OPHTHALMIC at 23:32

## 2017-08-26 RX ADMIN — LEVETIRACETAM 500 MG: 5 INJECTION INTRAVENOUS at 23:32

## 2017-08-26 RX ADMIN — Medication 1 PACKET: at 09:29

## 2017-08-26 NOTE — ROUTINE PROCESS
Bedside and Verbal shift change report given to AMI Barker (oncoming nurse) by Kamryn Arciniega RN (offgoing nurse). Report included the following information SBAR, Kardex, MAR and Recent Results. SITUATION:    Code Status: Full Code   Reason for Admission: Card[facility] arrest ([facility]   Acidosis   Respiratory failure (HCC)   Anemia   ESRD needing dialysis ([facility]   Cardiac arrest ([facility]   Acute GI bleeding   [facility]   Hypotension   Anoxic brain damage (HCC)   ESRD (end stage renal disease) ([facility]   Colitis   dx   dx    [facility] day: 34   Problem List:       Hospital Problems  Date Reviewed: 7/27/2017          Codes Class Noted POA    Oropharyngeal dysphagia ICD-10-CM: R13.12  ICD-9-CM: 787.22  8/17/2017 Unknown        Cachexia ([facility] ICD-10-CM: R64  ICD-9-CM: 799.4  8/17/2017 Unknown        Acidosis ICD-10-CM: E87.2  ICD-9-CM: 276.2  7/27/2017 Unknown        Cardiac arrest ([facility] ICD-10-CM: I46.9  ICD-9-CM: 427.5  7/27/2017 Unknown        Respiratory failure ([facility] ICD-10-CM: J96.90  ICD-9-CM: 518.81  7/27/2017 Unknown        ESRD needing dialysis ([facility] ICD-10-CM: N18.6, Z99.2  ICD-9-CM: 585.6  7/27/2017 Unknown        Anoxic brain damage ([facility] ICD-10-CM: G93.1  ICD-9-CM: 348.1  7/27/2017 Unknown        Colitis ICD-10-CM: K52.9  ICD-9-CM: 558.9  7/27/2017 Unknown        Hypotension ICD-10-CM: I95.9  ICD-9-CM: 458.9  7/27/2017 Unknown        Acute GI bleeding ICD-10-CM: K92.2  ICD-9-CM: 578.9  7/27/2017 Unknown        ESRD (end stage renal disease) ([facility] ICD-10-CM: N18.6  ICD-9-CM: 585.6  7/27/2017 Unknown        * (Principal)Sepsis ([facility] ICD-10-CM: A41.9  ICD-9-CM: 038.9, 995.91  7/27/2017 Unknown        Anemia ICD-10-CM: D64.9  ICD-9-CM: 285. 9  Unknown Unknown              BACKGROUND:    Past Medical History:   Past Medical History:   Diagnosis Date    Anemia     Arthritis     Chronic pain     Back     Diabetes mellitus type II     Hypertension     IBS (irritable bowel syndrome)     Lactose intolerance     TB (tuberculosis)     TB test positive         Patient taking anticoagulants yes     ASSESSMENT:    Changes in Assessment Throughout Shift: tolerated 2hrs dialysis this afternoon- increased amts sx per ETT- thick white- tolerated SBT throughout day with intermittent periods of apnea- 02 sats maintained 100%     Patient has Central Line: yes Reasons if yes: pressors   Patient has Stanton Cath: no Reasons if yes: n/a      Last Vitals:     Vitals:    08/25/17 1630 08/25/17 1700 08/25/17 1930 08/25/17 2000   BP: 109/66 108/64 117/53 119/60   Pulse: 83 83 96 95   Resp: 15 21 16 22   Temp:       TempSrc:       SpO2: 100% 100% 100% 100%   Weight:       Height:            IV and DRAINS (will only show if present)   [REMOVED] Peripheral IV 08/01/17 Right Hand-Site Assessment: Clean, dry, & intact  [REMOVED] Peripheral IV 08/05/17 Right Arm-Site Assessment: Clean, dry, & intact  [REMOVED] Peripheral IV 08/09/17 Right Hand-Site Assessment: Clean, dry, & intact  [REMOVED] Peripheral IV 08/09/17 Right Antecubital-Site Assessment: Clean, dry, & intact  [REMOVED] Sheath 08/09/17-Site Assessment: Clean, dry, & intact  Nasogastric Tube 08/14/17-Site Assessment: Clean, dry, & intact  [REMOVED] Peripheral IV 08/17/17 Right Forearm-Site Assessment: Clean, dry, & intact  Peripheral IV 08/17/17 Right Wrist-Site Assessment: Clean, dry, & intact  [REMOVED] Airway - Endotracheal Tube 08/18/17 Oral-Site Assessment: Clean, dry, & intact  Triple Lumen Left IJ CVL 08/18/17 Left Internal jugular-Site Assessment: Clean, dry, & intact  [REMOVED] Arterial Line 08/18/17 Right Radial artery-Site Assessment: Clean, dry, & intact  Airway - Continuous Aspiration of Subglottic Secretions (MISBAH) Tube Oral-Site Assessment: Clean, dry, & intact  [REMOVED] Airway - Continuous Aspiration of Subglottic Secretions (MISBAH) Tube 07/27/17 Oral-Site Assessment: Clean, dry, & intact  [REMOVED] Nasogastric Tube 07/27/17-Site Assessment: Clean, dry, & intact  [REMOVED] Venous Access Device 07/27/17 Upper chest (subclavicular area, right-Site Assessment: Clean, dry, & intact  [REMOVED] Peripheral IV 07/27/17 Right Antecubital-Site Assessment: Clean, dry, & intact  [REMOVED] Triple Lumen 07/27/17 Right Internal jugular-Site Assessment: Clean, dry, & intact  [REMOVED] Peripheral IV 07/27/17 Right Other(comment)-Site Assessment: Clean, dry, & intact  [REMOVED] Peripheral IV 07/27/17 Right Antecubital-Site Assessment: Swelling     WOUND (if present)   Wound Type: sacral   Dressing present Dressing Present : Yes   Wound Concerns/Notes:  none     PAIN    Pain Assessment    Pain Intensity 1: 0 (08/25/17 0400)    Pain Location 1: Generalized    Pain Intervention(s) 1: Medication (see MAR)    Patient Stated Pain Goal: Unable to verbalize/indicatate pain  o Interventions for Pain:  none  o Intervention effective: n/a  o Time of last intervention: n/a   o Reassessment Completed: yes      Last 3 Weights:  Last 3 Recorded Weights in this Encounter    08/23/17 0800 08/24/17 2000 08/25/17 0500   Weight: 64.7 kg (142 lb 10.2 oz) 61.5 kg (135 lb 9.3 oz) 61.5 kg (135 lb 9.3 oz)     Weight change: -3.2 kg (-7 lb 0.9 oz)     INTAKE/OUPUT    Current Shift:      Last three shifts: 08/24 0701 - 08/25 1900  In: 2845.1 [I.V.:500.1]  Out: 1400 [Drains:1400]     LAB RESULTS     Recent Labs      08/25/17   0320  08/24/17   0620 08/23/17   0615   WBC  14.9*  15.4*  13.3*   HGB  9.3*  9.4*  10.0*   HCT  29.2*  28.8*  30.8*   PLT  321  254  192        Recent Labs      08/25/17   0320  08/24/17   1800  08/24/17   0620  08/23/17   0615   NA  140   --   139  143   K  3.1*  3.0*  2.9*  3.2*   GLU  278*   --   237*  222*   BUN  39*   --   31*  57*   CREA  6.40*   --   5.07*  7.59*   CA  8.4*   --   8.6  8.3*   MG  2.9*   --   2.6  2.9*       RECOMMENDATIONS AND DISCHARGE PLANNING     1. Pending tests/procedures/ Plan of Care or Other Needs: am labs, abgs, cxr     2.  Discharge plan for patient and Needs/Barriers: unk    3. Estimated Discharge Date: unk Posted on Whiteboard in Patients Room: yes      4. The patient's care plan was reviewed with the oncoming nurse. \"HEALS\" SAFETY CHECK      Fall Risk    Total Score: 2    Safety Measures: Safety Measures: Bed/Chair-Wheels locked, Bed in low position    A safety check occurred in the patient's room between off going nurse and oncoming nurse listed above. The safety check included the below items  Area Items   H  High Alert Medications - Verify all high alert medication drips (heparin, PCA, etc.)   E  Equipment - Suction is set up for ALL patients (with cary)  - Red plugs utilized for all equipment (IV pumps, etc.)  - WOWs wiped down at end of shift.  - Room stocked with oxygen, suction, and other unit-specific supplies   A  Alarms - Bed alarm is set for fall risk patients  - Ensure chair alarm is in place and activated if patient is up in a chair   L  Lines - Check IV for any infiltration  - Stanton bag is empty if patient has a Stanton   - Tubing and IV bags are labeled   S  Safety   - Room is clean, patient is clean, and equipment is clean. - Hallways are clear from equipment besides carts. - Fall bracelet on for fall risk patients  - Ensure room is clear and free of clutter  - Suction is set up for ALL patients (with cary)  - Hallways are clear from equipment besides carts.    - Isolation precautions followed, supplies available outside room, sign posted     Nikki Alvarez RN

## 2017-08-26 NOTE — ROUTINE PROCESS
Bedside shift change report given to [address] [place] [address] (oncoming nurse) by Cash Mayes (offgoing nurse). Report included the following information SBAR, Kardex, Intake/Output and MAR.

## 2017-08-26 NOTE — PROGRESS NOTES
[facility]. Pulmonary, Critical Care, and Sleep Medicine    Name: Shirlene Winkler MRN: [id number]   : 1960 Hospital: [address] [place] [address] Dr   Date: 2017  Admission Date: 2017     Chart and notes reviewed. Data reviewed. I have evaluated all findings. [x]I have reviewed the flowsheet and previous days notes. [x]The patient is unable to give any meaningful history or review of systems because the patient is:  [x]Intubated []Sedated   [x]Unresponsive      [x]The patient is critically ill on      [x]Mechanical ventilation []Pressors   []BiPAP []                 ROS:Review of systems not obtained due to patient factors. Events and notes from last 24 hours reviewed. Care plan discussed on multidisciplinary rounds.   Patient Active Problem List   Diagnosis Code    Anemia D64.9    Acidosis E87.2    Cardiac arrest ([facility] I46.9    Respiratory failure ([facility] [address] J96.90    ESRD needing dialysis ([address] N18.6, Z99.2    Anoxic brain damage ([address] G93.1    Colitis K52.9    Hypotension I95.9    Acute GI bleeding K92.2    ESRD (end stage renal disease) ([address] N18.6    Sepsis (HCC) A41.9    Oropharyngeal dysphagia R13.12    Cachexia (HCC) R64       Vital Signs:  Visit Vitals    BP 94/51    Pulse 73    Temp 96.4 °F (35.8 °C)    Resp 21    Ht 6' 2\" (1.88 m)    Wt 62.6 kg (138 lb)    SpO2 100%    BMI 17.72 kg/m2       O2 Device: Endotracheal tube   O2 Flow Rate (L/min): 6 l/min   Temp (24hrs), Av.6 °F (36.4 °C), Min:96.4 °F (35.8 °C), Max:98.6 °F (37 °C)       Intake/Output:   Last shift:      701 - 1900  In: 57.8 [I.V.:2.8]  Out: 1   Last 3 shifts: 1901 -  0700  In: 4114.4 [I.V.:529.4]  Out: 1325 [Drains:1325]    Intake/Output Summary (Last 24 hours) at 17 1359  Last data filed at 17 0800   Gross per 24 hour   Intake          1756.84 ml   Output              326 ml   Net          1430.84 ml      Ventilator Settings:  Ventilator  Mode: Assist control  Respiratory Rate  Resp Rate Observed: 17  Back-Up Rate: 14  Insp Time (sec): 1 sec  Insp Flow (l/min): 44 l/min  I:E Ratio: 1:3.2  Ventilator Volumes  Vt Set (ml): 400 ml  Vt Exhaled (Machine Breath) (ml): 402 ml  Vt Spont (ml): 473 ml  Ve Observed (l/min): 7.57 l/min  Ventilator Pressures  Pressure Support (cm H2O): 7 cm H2O  PIP Observed (cm H2O): 11 cm H2O  Plateau Pressure (cm H2O): 13 cm H2O  MAP (cm H2O): 7.4  PEEP/VENT (cm H2O): 5 cm H20  Auto PEEP Observed (cm H2O): 0.4 cm H2O    Current Facility-Administered Medications   Medication Dose Route Frequency    hydrocortisone Sod Succ (PF) (SOLU-CORTEF) injection 25 mg  25 mg IntraVENous Q12H    potassium chloride (KLOR-CON) packet 40 mEq  40 mEq Per NG tube DAILY    Zinc Ox-Aloe Vera-Vitamin E (BALMEX) topical cream   Topical CONTINUOUS    insulin glargine (LANTUS) injection 20 Units  20 Units SubCUTAneous DAILY    banana flakes trans-galactooligosaccharide (BANATROL PLUS) powder 1 Packet  1 Packet Per NG tube TID    levETIRAcetam (KEPPRA) 500 mg in saline (iso-osm) 100 ml IVPB  500 mg IntraVENous Q12H    insulin lispro (HUMALOG) injection   SubCUTAneous Q6H    white petrolatum-mineral oil 85-15 % oint 1 Dose  1 Dose Ophthalmic QID    chlorhexidine (PERIDEX) 0.12 % mouthwash 10 mL  10 mL Oral Q12H    NOREPINephrine (LEVOPHED) 16,000 mcg in dextrose 5% 250 mL infusion  2-16 mcg/min IntraVENous TITRATE    famotidine (PF) (PEPCID) 20 mg in sodium chloride 0.9 % 10 mL injection  20 mg IntraVENous DAILY    vits A and D-white pet-lanolin (A&D) ointment   Topical QID AFTER MEALS    collagenase (SANTYL) 250 unit/gram ointment   Topical DAILY    lactobacillus sp. 50 billion cpu (BIO-K PLUS) capsule 1 Cap  1 Cap Oral DAILY    heparin (porcine) injection 5,000 Units  5,000 Units SubCUTAneous Q8H    heparin (porcine) 1,000 unit/mL injection 2,000 Units  2,000 Units IntraVENous DIALYSIS ONCE    simvastatin (ZOCOR) tablet 20 mg  20 mg Oral QHS    doxercalciferol (HECTOROL) 4 mcg/2 mL injection 3 mcg  3 mcg IntraVENous DIALYSIS MON, WED & FRI    epoetin benjamin (EPOGEN;PROCRIT) injection 10,000 Units  10,000 Units IntraVENous DIALYSIS MON, WED & FRI    aspirin chewable tablet 81 mg  81 mg Oral DAILY       Telemetry:     Physical Exam:   General: in no apparent distress   HEENT: Pupils non reactive. Neck: No abnormally enlarged lymph nodes. Chest: normal   Lungs: decreased air exchange bibasilar, no dullness/ tenderness/ rash   Heart: Regular rate and rhythm   Abdomen: non distended, bowel sounds normoactive, tympanic, abdomen is soft without significant tenderness, masses, organomegaly or guarding   Extremity: Trace edema   Neuro: comatose   Skin: Skin color, texture, turgor normal. No rashes or lesions   DATA:  MAR reviewed and pertinent medications noted or modified as needed    Labs:  Recent Labs      08/26/17   0300  08/25/17   0320  08/24/17   0620   WBC  15.0*  14.9*  15.4*   HGB  9.3*  9.3*  9.4*   HCT  29.1*  29.2*  28.8*   PLT  377  321  254     Recent Labs      08/26/17   0300  08/25/17   0320  08/24/17   1800  08/24/17   0620   NA  139  140   --   139   K  3.5  3.1*  3.0*  2.9*   CL  107  107   --   106   CO2  26  23   --   24   GLU  255*  278*   --   237*   BUN  26*  39*   --   31*   CREA  4.79*  6.40*   --   5.07*   CA  9.0  8.4*   --   8.6   MG  2.5  2.9*   --   2.6   PHOS  2.1*  2.9   --   2.4*     No results for input(s): PH, PCO2, PO2, HCO3, FIO2 in the last 72 hours. Recent Labs      08/26/17   0431  08/25/17   0451  08/24/17   0355   FIO2I  0.25  25  25   HCO3I  25.0  22.8  24.9   PCO2I  34.2*  30.2*  29.8*   PHI  7.473*  7.487*  7.530*   PO2I  384*  125*  113*     Imaging:  [x]                           I have personally reviewed the patients radiographs and reports    High complexity decision making was performed during this consultation and evaluation. [x]       Pt is at high risk for further organ failure and dysfunction. Critical care time spent  minutes with patient exclusive of procedures. IMPRESSION:   · Acute encephalopathy which appears to be secondary to anoxic brain injury. Overall prognosis appears to be very dismal. Consider family discussion and refer to hospice/palliative care as appropriate. · Cardiac arrest on admission and this resulted in significant anoxic brain injury. · Acute hypoxic respiratory failure requiring intubation and mechanical ventilation. · Acute shock which appears to be secondary to sepsis. No cardiac issues related to PEA cardiac arrest.   · ESRD requiring dialysis  · Oropharyngeal dysphagia  · Pericardial effusion   · Diarrhea - C diff testing negative. PLAN:   Neurological:  - Sedation: Continue to adhere to pain, agitation and delirium guidelines in the ICU. Continue to assess prevent and manage pain. Patient is currently not requiring any medication for sedation.   - ICU delirium assessment: Continue to assess, prevent and manage delirium using the CAM-ICU tool. - Continue Keppra. Respiratory:  Mechanical ventilation: Adhere to low tidal volume ventilation strategy as per the [employer] guidelines. Aim for plateau pressures < 09US. Continue to adhere to the VAP bundle to minimize the risk of VAP. Oxygenation: Optimize PEEP and FiO2 to maintain oxygenation. Cardiac:  Vasopressors: Aim to keep MAP>65 mm of Hg. Currently off pressor support. Renal:  Currently requiring renal support. Nephrology following for HD. Renal functions and Cr: stable    GI:  Diet: Continue tube feeds. Stress ulcer ppx: Pepcid. Endocrinology:  Glycemic control: stable. Best practice :    MVentilated patients- VAP bundle , aim to keep peak plateau pressure 59-38OY H2O  Sress ulcer prophylaxis  DVT prophylaxis  Need for Lines, mancini assessed.       Scooter Zhao MD

## 2017-08-26 NOTE — PROGRESS NOTES
[facility]  Progress Note    Patient: Flores Ingram Age: 55 y.o. : 1960 MR#: [id number] SSN: [id number]  Date/Time: 2017     Subjective:   Patient intubated and unresponsive. Assessment/Plan:     S/p PEA arrest. wth acute resp fauilure on the Vent ? Cardiac cause with elevated trop PTA. Echo improving EF, s/p cath, no CAD. Now with second cardiac arrest and intubated. Acute met encephalopathy: due to severe anoxic brain injury. Per neurology.  - Still on vent ,   - Continue supportive care      Acute pontine lacunar CVA: on asa. ESRD- HD as pe nephrology    .  Wounds: per wound care    Case discussed with:  []Patient  []Family  []Nursing  []Case Management  DVT Prophylaxis:  []Lovenox  [x]Hep SQ  [x]SCDs  []Coumadin   []On Heparin gtt    Patient Active Problem List   Diagnosis Code    Anemia D64.9    Acidosis E87.2    Cardiac arrest ([address] I46.9    Respiratory failure ([address] J96.90    ESRD needing dialysis ([address] N18.6, Z99.2    Anoxic brain damage (HCC) G93.1    Colitis K52.9    Hypotension I95.9    Acute GI bleeding K92.2    ESRD (end stage renal disease) ([address] N18.6    Sepsis ([address] A41.9    Oropharyngeal dysphagia R13.12    Cachexia ([address] R64       Objective:   VS:   Visit Vitals    /63    Pulse 79    Temp 97.9 °F (36.6 °C)    Resp 24    Ht 6' 2\" (1.88 m)    Wt 62.6 kg (138 lb)    SpO2 100%    BMI 17.72 kg/m2      Tmax/24hrs: Temp (24hrs), Av.9 °F (36.6 °C), Min:97 °F (36.1 °C), Max:98.6 °F (37 °C)  IOBRIEF    Intake/Output Summary (Last 24 hours) at 17 0957  Last data filed at 17 0800   Gross per 24 hour   Intake          2376.84 ml   Output              726 ml   Net          1650.84 ml     General:  Intubated, unresponsive  Cardiovascular:  S1S2+, RRR  Pulmonary:  Coarse BS b/l  GI:  Soft, BS+, NT, ND  Extremities:  No edema            Medications:   Current Facility-Administered Medications   Medication Dose Route Frequency    hydrocortisone Sod Succ (PF) (SOLU-CORTEF) injection 25 mg  25 mg IntraVENous Q12H    potassium chloride (KLOR-CON) packet 40 mEq  40 mEq Per NG tube DAILY    Zinc Ox-Aloe Vera-Vitamin E (BALMEX) topical cream   Topical CONTINUOUS    insulin glargine (LANTUS) injection 20 Units  20 Units SubCUTAneous DAILY    banana flakes trans-galactooligosaccharide (BANATROL PLUS) powder 1 Packet  1 Packet Per NG tube TID    levETIRAcetam (KEPPRA) 500 mg in saline (iso-osm) 100 ml IVPB  500 mg IntraVENous Q12H    LORazepam (ATIVAN) injection 1 mg  1 mg IntraVENous Q4H PRN    insulin lispro (HUMALOG) injection   SubCUTAneous Q6H    white petrolatum-mineral oil 85-15 % oint 1 Dose  1 Dose Ophthalmic QID    chlorhexidine (PERIDEX) 0.12 % mouthwash 10 mL  10 mL Oral Q12H    NOREPINephrine (LEVOPHED) 16,000 mcg in dextrose 5% 250 mL infusion  2-16 mcg/min IntraVENous TITRATE    albuterol (PROVENTIL VENTOLIN) nebulizer solution 2.5 mg  2.5 mg Nebulization Q6H PRN    famotidine (PF) (PEPCID) 20 mg in sodium chloride 0.9 % 10 mL injection  20 mg IntraVENous DAILY    vits A and D-white pet-lanolin (A&D) ointment   Topical QID AFTER MEALS    collagenase (SANTYL) 250 unit/gram ointment   Topical DAILY    acetaminophen (TYLENOL) tablet 650 mg  650 mg Oral Q4H PRN    lactobacillus sp. 50 billion cpu (BIO-K PLUS) capsule 1 Cap  1 Cap Oral DAILY    loperamide (IMODIUM) capsule 2 mg  2 mg Oral Q6H PRN    heparin (porcine) injection 5,000 Units  5,000 Units SubCUTAneous Q8H    heparin (porcine) 1,000 unit/mL injection 2,000 Units  2,000 Units IntraVENous DIALYSIS ONCE    simvastatin (ZOCOR) tablet 20 mg  20 mg Oral QHS    doxercalciferol (HECTOROL) 4 mcg/2 mL injection 3 mcg  3 mcg IntraVENous DIALYSIS MON, WED & FRI    epoetin benjamin (EPOGEN;PROCRIT) injection 10,000 Units  10,000 Units IntraVENous DIALYSIS MON, WED & FRI    aspirin chewable tablet 81 mg  81 mg Oral DAILY    glucose chewable tablet 16 g 4 Tab Oral PRN    glucagon (GLUCAGEN) injection 1 mg  1 mg IntraMUSCular PRN    dextrose (D50W) injection syrg 12.5-25 g  25-50 mL IntraVENous PRN    0.9% sodium chloride infusion  100 mL/hr IntraVENous DIALYSIS PRN    naloxone (NARCAN) injection 0.4 mg  0.4 mg IntraVENous PRN       Labs:      Recent Results (from the past 24 hour(s))   GLUCOSE, POC    Collection Time: 08/25/17 12:04 PM   Result Value Ref Range    Glucose (POC) 264 (H) 70 - 110 mg/dL   GLUCOSE, POC    Collection Time: 08/25/17  5:10 PM   Result Value Ref Range    Glucose (POC) 152 (H) 70 - 110 mg/dL   GLUCOSE, POC    Collection Time: 08/25/17 11:53 PM   Result Value Ref Range    Glucose (POC) 141 (H) 70 - 110 mg/dL   CBC WITH AUTOMATED DIFF    Collection Time: 08/26/17  3:00 AM   Result Value Ref Range    WBC 15.0 (H) 4.6 - 13.2 K/uL    RBC 3.05 (L) 4.70 - 5.50 M/uL    HGB 9.3 (L) 13.0 - 16.0 g/dL    HCT 29.1 (L) 36.0 - 48.0 %    MCV 95.4 74.0 - 97.0 FL    MCH 30.5 24.0 - 34.0 PG    MCHC 32.0 31.0 - 37.0 g/dL    RDW 18.4 (H) 11.6 - 14.5 %    PLATELET 781 857 - 751 K/uL    MPV 10.8 9.2 - 11.8 FL    NEUTROPHILS 85 (H) 40 - 73 %    LYMPHOCYTES 12 (L) 21 - 52 %    MONOCYTES 3 3 - 10 %    EOSINOPHILS 0 0 - 5 %    BASOPHILS 0 0 - 2 %    ABS. NEUTROPHILS 12.7 (H) 1.8 - 8.0 K/UL    ABS. LYMPHOCYTES 1.8 0.9 - 3.6 K/UL    ABS. MONOCYTES 0.5 0.05 - 1.2 K/UL    ABS. EOSINOPHILS 0.0 0.0 - 0.4 K/UL    ABS.  BASOPHILS 0.0 0.0 - 0.1 K/UL    DF AUTOMATED     MAGNESIUM    Collection Time: 08/26/17  3:00 AM   Result Value Ref Range    Magnesium 2.5 1.6 - 2.6 mg/dL   PHOSPHORUS    Collection Time: 08/26/17  3:00 AM   Result Value Ref Range    Phosphorus 2.1 (L) 2.5 - 4.9 MG/DL   METABOLIC PANEL, BASIC    Collection Time: 08/26/17  3:00 AM   Result Value Ref Range    Sodium 139 136 - 145 mmol/L    Potassium 3.5 3.5 - 5.5 mmol/L    Chloride 107 100 - 108 mmol/L    CO2 26 21 - 32 mmol/L    Anion gap 6 3.0 - 18 mmol/L    Glucose 255 (H) 74 - 99 mg/dL    BUN 26 (H) 7.0 - 18 MG/DL    Creatinine 4.79 (H) 0.6 - 1.3 MG/DL    BUN/Creatinine ratio 5 (L) 12 - 20      GFR est AA 15 (L) >60 ml/min/1.73m2    GFR est non-AA 13 (L) >60 ml/min/1.73m2    Calcium 9.0 8.5 - 10.1 MG/DL   POC G3    Collection Time: 08/26/17  4:31 AM   Result Value Ref Range    Device: VENT      FIO2 (POC) 0.25 %    pH (POC) 7.473 (H) 7.35 - 7.45      pCO2 (POC) 34.2 (L) 35.0 - 45.0 MMHG    pO2 (POC) 384 (H) 80 - 100 MMHG    HCO3 (POC) 25.0 22 - 26 MMOL/L    sO2 (POC) 100 (H) 92 - 97 %    Base excess (POC) 1 mmol/L    Mode ASSIST CONTROL      Tidal volume 400 ml    Set Rate 14 bpm    PEEP/CPAP (POC) 5.0 cmH2O    Allens test (POC) YES      Inspiratory Time 1.00 sec    Total resp.  rate 14      Site RIGHT RADIAL      Specimen type (POC) ARTERIAL      Performed by Kwame Lovell     Volume control plus YES     GLUCOSE, POC    Collection Time: 08/26/17  5:54 AM   Result Value Ref Range    Glucose (POC) 262 (H) 70 - 110 mg/dL       Signed By: Isabella Stallings MD     August 26, 2017

## 2017-08-26 NOTE — ROUTINE PROCESS
CHANGE OF SHIFT REPORT GIVEN TO LUIZ KILGORE RN BY Sanjuana Castellano RN  USING JITENDRA CORTEZ KARDEX

## 2017-08-27 ENCOUNTER — APPOINTMENT (OUTPATIENT)
Dept: GENERAL RADIOLOGY | Age: 57
DRG: 004 | End: 2017-08-27
Attending: PHYSICIAN ASSISTANT
Payer: MEDICARE

## 2017-08-27 LAB
ANION GAP SERPL CALC-SCNC: 11 MMOL/L (ref 3–18)
ARTERIAL PATENCY WRIST A: ABNORMAL
BASE DEFICIT BLD-SCNC: 2 MMOL/L
BASOPHILS # BLD: 0 K/UL (ref 0–0.1)
BASOPHILS NFR BLD: 0 % (ref 0–2)
BDY SITE: ABNORMAL
BUN SERPL-MCNC: 36 MG/DL (ref 7–18)
BUN/CREAT SERPL: 6 (ref 12–20)
CALCIUM SERPL-MCNC: 8.5 MG/DL (ref 8.5–10.1)
CHLORIDE SERPL-SCNC: 109 MMOL/L (ref 100–108)
CO2 SERPL-SCNC: 22 MMOL/L (ref 21–32)
CREAT SERPL-MCNC: 6.1 MG/DL (ref 0.6–1.3)
DIFFERENTIAL METHOD BLD: ABNORMAL
EOSINOPHIL # BLD: 0.1 K/UL (ref 0–0.4)
EOSINOPHIL NFR BLD: 1 % (ref 0–5)
ERYTHROCYTE [DISTWIDTH] IN BLOOD BY AUTOMATED COUNT: 18.9 % (ref 11.6–14.5)
GAS FLOW.O2 O2 DELIVERY SYS: ABNORMAL L/MIN
GAS FLOW.O2 SETTING OXYMISER: 14 BPM
GLUCOSE BLD STRIP.AUTO-MCNC: 112 MG/DL (ref 70–110)
GLUCOSE BLD STRIP.AUTO-MCNC: 129 MG/DL (ref 70–110)
GLUCOSE BLD STRIP.AUTO-MCNC: 134 MG/DL (ref 70–110)
GLUCOSE BLD STRIP.AUTO-MCNC: 190 MG/DL (ref 70–110)
GLUCOSE SERPL-MCNC: 141 MG/DL (ref 74–99)
HCO3 BLD-SCNC: 22 MMOL/L (ref 22–26)
HCT VFR BLD AUTO: 28.6 % (ref 36–48)
HGB BLD-MCNC: 9.2 G/DL (ref 13–16)
LYMPHOCYTES # BLD: 2.7 K/UL (ref 0.9–3.6)
LYMPHOCYTES NFR BLD: 20 % (ref 21–52)
MAGNESIUM SERPL-MCNC: 2.8 MG/DL (ref 1.6–2.6)
MCH RBC QN AUTO: 31.1 PG (ref 24–34)
MCHC RBC AUTO-ENTMCNC: 32.2 G/DL (ref 31–37)
MCV RBC AUTO: 96.6 FL (ref 74–97)
MONOCYTES # BLD: 0.8 K/UL (ref 0.05–1.2)
MONOCYTES NFR BLD: 6 % (ref 3–10)
NEUTS SEG # BLD: 10.1 K/UL (ref 1.8–8)
NEUTS SEG NFR BLD: 73 % (ref 40–73)
O2/TOTAL GAS SETTING VFR VENT: 25 %
PCO2 BLD: 33.9 MMHG (ref 35–45)
PEEP RESPIRATORY: 5 CMH2O
PH BLD: 7.42 [PH] (ref 7.35–7.45)
PHOSPHATE SERPL-MCNC: 2.1 MG/DL (ref 2.5–4.9)
PLATELET # BLD AUTO: 448 K/UL (ref 135–420)
PMV BLD AUTO: 10.7 FL (ref 9.2–11.8)
PO2 BLD: 110 MMHG (ref 80–100)
POTASSIUM SERPL-SCNC: 3.2 MMOL/L (ref 3.5–5.5)
RBC # BLD AUTO: 2.96 M/UL (ref 4.7–5.5)
SAO2 % BLD: 98 % (ref 92–97)
SERVICE CMNT-IMP: ABNORMAL
SODIUM SERPL-SCNC: 142 MMOL/L (ref 136–145)
SPECIMEN TYPE: ABNORMAL
TOTAL RESP. RATE, ITRR: 16
VENTILATION MODE VENT: ABNORMAL
VOLUME CONTROL PLUS IVLCP: YES
VT SETTING VENT: 400 ML
WBC # BLD AUTO: 13.8 K/UL (ref 4.6–13.2)

## 2017-08-27 PROCEDURE — 82803 BLOOD GASES ANY COMBINATION: CPT

## 2017-08-27 PROCEDURE — 74011250636 HC RX REV CODE- 250/636: Performed by: PHYSICIAN ASSISTANT

## 2017-08-27 PROCEDURE — 84100 ASSAY OF PHOSPHORUS: CPT | Performed by: NURSE PRACTITIONER

## 2017-08-27 PROCEDURE — 74011636637 HC RX REV CODE- 636/637: Performed by: INTERNAL MEDICINE

## 2017-08-27 PROCEDURE — 36592 COLLECT BLOOD FROM PICC: CPT

## 2017-08-27 PROCEDURE — 74011250637 HC RX REV CODE- 250/637: Performed by: HOSPITALIST

## 2017-08-27 PROCEDURE — 82962 GLUCOSE BLOOD TEST: CPT

## 2017-08-27 PROCEDURE — 74011250637 HC RX REV CODE- 250/637: Performed by: FAMILY MEDICINE

## 2017-08-27 PROCEDURE — 74011250636 HC RX REV CODE- 250/636: Performed by: FAMILY MEDICINE

## 2017-08-27 PROCEDURE — 74011250637 HC RX REV CODE- 250/637: Performed by: INTERNAL MEDICINE

## 2017-08-27 PROCEDURE — 94003 VENT MGMT INPAT SUBQ DAY: CPT

## 2017-08-27 PROCEDURE — 74011000250 HC RX REV CODE- 250: Performed by: PHYSICIAN ASSISTANT

## 2017-08-27 PROCEDURE — 74011250637 HC RX REV CODE- 250/637: Performed by: NURSE PRACTITIONER

## 2017-08-27 PROCEDURE — 36600 WITHDRAWAL OF ARTERIAL BLOOD: CPT

## 2017-08-27 PROCEDURE — 74011250636 HC RX REV CODE- 250/636: Performed by: HOSPITALIST

## 2017-08-27 PROCEDURE — 77030033263 HC DRSG MEPILEX 16-48IN BORD MOLN -B

## 2017-08-27 PROCEDURE — 83735 ASSAY OF MAGNESIUM: CPT | Performed by: NURSE PRACTITIONER

## 2017-08-27 PROCEDURE — 65610000006 HC RM INTENSIVE CARE

## 2017-08-27 PROCEDURE — 85025 COMPLETE CBC W/AUTO DIFF WBC: CPT | Performed by: NURSE PRACTITIONER

## 2017-08-27 PROCEDURE — 71010 XR CHEST PORT: CPT

## 2017-08-27 PROCEDURE — 80048 BASIC METABOLIC PNL TOTAL CA: CPT | Performed by: NURSE PRACTITIONER

## 2017-08-27 RX ORDER — POTASSIUM CHLORIDE 7.45 MG/ML
10 INJECTION INTRAVENOUS
Status: COMPLETED | OUTPATIENT
Start: 2017-08-27 | End: 2017-09-03

## 2017-08-27 RX ADMIN — POTASSIUM CHLORIDE 10 MEQ: 10 INJECTION, SOLUTION INTRAVENOUS at 15:23

## 2017-08-27 RX ADMIN — INSULIN LISPRO 3 UNITS: 100 INJECTION, SOLUTION INTRAVENOUS; SUBCUTANEOUS at 12:14

## 2017-08-27 RX ADMIN — POTASSIUM CHLORIDE 40 MEQ: 1.5 POWDER, FOR SOLUTION ORAL at 08:05

## 2017-08-27 RX ADMIN — LEVETIRACETAM 500 MG: 5 INJECTION INTRAVENOUS at 22:17

## 2017-08-27 RX ADMIN — MINERAL OIL AND WHITE PETROLATUM 1 DOSE: 150; 850 OINTMENT OPHTHALMIC at 17:07

## 2017-08-27 RX ADMIN — SIMVASTATIN 20 MG: 10 TABLET, FILM COATED ORAL at 22:26

## 2017-08-27 RX ADMIN — LEVETIRACETAM 500 MG: 5 INJECTION INTRAVENOUS at 08:01

## 2017-08-27 RX ADMIN — Medication 1 CAPSULE: at 08:05

## 2017-08-27 RX ADMIN — HEPARIN SODIUM 5000 UNITS: 5000 INJECTION, SOLUTION INTRAVENOUS; SUBCUTANEOUS at 17:07

## 2017-08-27 RX ADMIN — HEPARIN SODIUM 5000 UNITS: 5000 INJECTION, SOLUTION INTRAVENOUS; SUBCUTANEOUS at 10:00

## 2017-08-27 RX ADMIN — HYDROCORTISONE SODIUM SUCCINATE 25 MG: 100 INJECTION, POWDER, FOR SOLUTION INTRAMUSCULAR; INTRAVENOUS at 08:04

## 2017-08-27 RX ADMIN — MINERAL OIL AND WHITE PETROLATUM 1 DOSE: 150; 850 OINTMENT OPHTHALMIC at 12:15

## 2017-08-27 RX ADMIN — VITAMIN A AND D: 30.8 OINTMENT TOPICAL at 08:08

## 2017-08-27 RX ADMIN — MINERAL OIL AND WHITE PETROLATUM 1 DOSE: 150; 850 OINTMENT OPHTHALMIC at 08:05

## 2017-08-27 RX ADMIN — Medication 1 PACKET: at 15:04

## 2017-08-27 RX ADMIN — Medication: at 12:30

## 2017-08-27 RX ADMIN — FAMOTIDINE 20 MG: 10 INJECTION INTRAVENOUS at 08:05

## 2017-08-27 RX ADMIN — INSULIN GLARGINE 20 UNITS: 100 INJECTION, SOLUTION SUBCUTANEOUS at 08:05

## 2017-08-27 RX ADMIN — VITAMIN A AND D: 30.8 OINTMENT TOPICAL at 12:30

## 2017-08-27 RX ADMIN — Medication 1 PACKET: at 22:30

## 2017-08-27 RX ADMIN — MINERAL OIL AND WHITE PETROLATUM 1 DOSE: 150; 850 OINTMENT OPHTHALMIC at 22:26

## 2017-08-27 RX ADMIN — POTASSIUM CHLORIDE 10 MEQ: 10 INJECTION, SOLUTION INTRAVENOUS at 17:00

## 2017-08-27 RX ADMIN — HYDROCORTISONE SODIUM SUCCINATE 25 MG: 100 INJECTION, POWDER, FOR SOLUTION INTRAMUSCULAR; INTRAVENOUS at 22:26

## 2017-08-27 RX ADMIN — ASPIRIN 81 MG 81 MG: 81 TABLET ORAL at 08:05

## 2017-08-27 RX ADMIN — CHLORHEXIDINE GLUCONATE 10 ML: 1.2 RINSE ORAL at 22:25

## 2017-08-27 RX ADMIN — Medication: at 08:09

## 2017-08-27 RX ADMIN — LOPERAMIDE HYDROCHLORIDE 2 MG: 2 CAPSULE ORAL at 22:26

## 2017-08-27 RX ADMIN — HEPARIN SODIUM 5000 UNITS: 5000 INJECTION, SOLUTION INTRAVENOUS; SUBCUTANEOUS at 03:00

## 2017-08-27 RX ADMIN — COLLAGENASE SANTYL: 250 OINTMENT TOPICAL at 08:09

## 2017-08-27 RX ADMIN — VITAMIN A AND D: 30.8 OINTMENT TOPICAL at 22:25

## 2017-08-27 RX ADMIN — VITAMIN A AND D: 30.8 OINTMENT TOPICAL at 17:07

## 2017-08-27 RX ADMIN — Medication 1 PACKET: at 08:22

## 2017-08-27 RX ADMIN — CHLORHEXIDINE GLUCONATE 10 ML: 1.2 RINSE ORAL at 08:06

## 2017-08-27 NOTE — PROGRESS NOTES
[facility] Hospitalist Group  Progress Note    Patient: Flores Ingram Age: 55 y.o. : 1960 MR#: [id number] SSN: [id number]  Date/Time: 2017 11:46 AM    Subjective/24-hour events:     Essentially unchanged. Assessment:   PEA cardiac arrest  Acute respiratory failure  Acute metabolic encephalopathy secondary to anxoic brain injury  Acute CVA  ESRD    Plan:   Aggressive management being continued, but overall prognosis remains grave. Following. Case discussed with:  []Patient  []Family  []Nursing  []Case Management  DVT Prophylaxis:  []Lovenox  [x]Hep SQ  [x]SCDs  []Coumadin   []On Heparin gtt    Objective:   VS:   Visit Vitals    /61    Pulse 86    Temp 98.8 °F (37.1 °C)    Resp 20    Ht 6' 2\" (1.88 m)    Wt 62.6 kg (138 lb)    SpO2 100%    BMI 17.72 kg/m2      Tmax/24hrs: Temp (24hrs), Av.9 °F (36.6 °C), Min:96.4 °F (35.8 °C), Max:98.8 °F (37.1 °C)    Intake/Output Summary (Last 24 hours) at 17 1146  Last data filed at 17 0801   Gross per 24 hour   Intake          2325.44 ml   Output              800 ml   Net          1525.44 ml       General:  In no distress. Thin, chronically ill-appearing. Cardiovascular:  RRR. Pulmonary:  Clear, no wheezing. GI:  Abdomen soft. Extremities:  Warm, no ischemia.     Labs:    Recent Results (from the past 24 hour(s))   GLUCOSE, POC    Collection Time: 17  5:50 PM   Result Value Ref Range    Glucose (POC) 118 (H) 70 - 110 mg/dL   GLUCOSE, POC    Collection Time: 17 12:15 AM   Result Value Ref Range    Glucose (POC) 134 (H) 70 - 110 mg/dL   CBC WITH AUTOMATED DIFF    Collection Time: 17  4:00 AM   Result Value Ref Range    WBC 13.8 (H) 4.6 - 13.2 K/uL    RBC 2.96 (L) 4.70 - 5.50 M/uL    HGB 9.2 (L) 13.0 - 16.0 g/dL    HCT 28.6 (L) 36.0 - 48.0 %    MCV 96.6 74.0 - 97.0 FL    MCH 31.1 24.0 - 34.0 PG    MCHC 32.2 31.0 - 37.0 g/dL    RDW 18.9 (H) 11.6 - 14.5 %    PLATELET 440 (H) 984 - 420 K/uL MPV 10.7 9.2 - 11.8 FL    NEUTROPHILS 73 40 - 73 %    LYMPHOCYTES 20 (L) 21 - 52 %    MONOCYTES 6 3 - 10 %    EOSINOPHILS 1 0 - 5 %    BASOPHILS 0 0 - 2 %    ABS. NEUTROPHILS 10.1 (H) 1.8 - 8.0 K/UL    ABS. LYMPHOCYTES 2.7 0.9 - 3.6 K/UL    ABS. MONOCYTES 0.8 0.05 - 1.2 K/UL    ABS. EOSINOPHILS 0.1 0.0 - 0.4 K/UL    ABS. BASOPHILS 0.0 0.0 - 0.1 K/UL    DF AUTOMATED     MAGNESIUM    Collection Time: 08/27/17  4:00 AM   Result Value Ref Range    Magnesium 2.8 (H) 1.6 - 2.6 mg/dL   PHOSPHORUS    Collection Time: 08/27/17  4:00 AM   Result Value Ref Range    Phosphorus 2.1 (L) 2.5 - 4.9 MG/DL   METABOLIC PANEL, BASIC    Collection Time: 08/27/17  4:00 AM   Result Value Ref Range    Sodium 142 136 - 145 mmol/L    Potassium 3.2 (L) 3.5 - 5.5 mmol/L    Chloride 109 (H) 100 - 108 mmol/L    CO2 22 21 - 32 mmol/L    Anion gap 11 3.0 - 18 mmol/L    Glucose 141 (H) 74 - 99 mg/dL    BUN 36 (H) 7.0 - 18 MG/DL    Creatinine 6.10 (H) 0.6 - 1.3 MG/DL    BUN/Creatinine ratio 6 (L) 12 - 20      GFR est AA 12 (L) >60 ml/min/1.73m2    GFR est non-AA 10 (L) >60 ml/min/1.73m2    Calcium 8.5 8.5 - 10.1 MG/DL   POC G3    Collection Time: 08/27/17  4:54 AM   Result Value Ref Range    Device: VENT      FIO2 (POC) 25 %    pH (POC) 7.420 7.35 - 7.45      pCO2 (POC) 33.9 (L) 35.0 - 45.0 MMHG    pO2 (POC) 110 (H) 80 - 100 MMHG    HCO3 (POC) 22.0 22 - 26 MMOL/L    sO2 (POC) 98 (H) 92 - 97 %    Base deficit (POC) 2 mmol/L    Mode ASSIST CONTROL      Tidal volume 400 ml    Set Rate 14 bpm    PEEP/CPAP (POC) 5 cmH2O    Allens test (POC) N/A      Total resp.  rate 16      Site RIGHT RADIAL      Specimen type (POC) ARTERIAL      Performed by Paul Dense     Volume control plus YES     GLUCOSE, POC    Collection Time: 08/27/17  6:48 AM   Result Value Ref Range    Glucose (POC) 129 (H) 70 - 110 mg/dL       Signed By: Manoj Hackett MD     August 27, 2017 11:46 AM

## 2017-08-27 NOTE — PROGRESS NOTES
[facility]. Pulmonary, Critical Care, and Sleep Medicine    Name: Jose Stewart MRN: [id number]   : 1960 Hospital: [address] [place] [address] Dr   Date: 2017  Admission Date: 2017     Chart and notes reviewed. Data reviewed. I have evaluated all findings. [x]I have reviewed the flowsheet and previous days notes. [x]The patient is unable to give any meaningful history or review of systems because the patient is:  [x]Intubated []Sedated   [x]Unresponsive      [x]The patient is critically ill on      [x]Mechanical ventilation []Pressors   []BiPAP []                 ROS:Review of systems not obtained due to patient factors. Events and notes from last 24 hours reviewed. Care plan discussed on multidisciplinary rounds.   Patient Active Problem List   Diagnosis Code    Anemia D64.9    Acidosis E87.2    Cardiac arrest ([facility] I46.9    Respiratory failure ([address] J96.90    ESRD needing dialysis ([address] N18.6, Z99.2    Anoxic brain damage ([address] G93.1    Colitis K52.9    Hypotension I95.9    Acute GI bleeding K92.2    ESRD (end stage renal disease) ([address] N18.6    Sepsis (HCC) A41.9    Oropharyngeal dysphagia R13.12    Cachexia (HCC) R64       Vital Signs:  Visit Vitals    BP (!) 87/52    Pulse 80    Temp 99.1 °F (37.3 °C)    Resp 19    Ht 6' 2\" (1.88 m)    Wt 62.6 kg (138 lb)    SpO2 100%    BMI 17.72 kg/m2       O2 Device: Endotracheal tube, Ventilator   O2 Flow Rate (L/min): 6 l/min   Temp (24hrs), Av.3 °F (36.8 °C), Min:97.5 °F (36.4 °C), Max:99.1 °F (37.3 °C)       Intake/Output:   Last shift:      701 - 1900  In: 376 [I.V.:118]  Out: -   Last 3 shifts: 1901 -  07  In: 3702.5 [I.V.:231.5]  Out: 1831 [Drains:1075]    Intake/Output Summary (Last 24 hours) at 17 1556  Last data filed at 17 1200   Gross per 24 hour   Intake          2220.62 ml   Output              500 ml   Net          1720.62 ml      Ventilator Settings:  Ventilator  Mode: Assist control  Respiratory Rate  Resp Rate Observed: 17  Back-Up Rate: 14  Insp Time (sec): 1 sec  Insp Flow (l/min): 44 l/min  I:E Ratio: 1:3.7  Ventilator Volumes  Vt Set (ml): 400 ml  Vt Exhaled (Machine Breath) (ml): 435 ml  Vt Spont (ml): 390 ml  Ve Observed (l/min): 7.99 l/min  Ventilator Pressures  Pressure Support (cm H2O): 7 cm H2O  PIP Observed (cm H2O): 12 cm H2O  Plateau Pressure (cm H2O): 11 cm H2O  MAP (cm H2O): 7.5  PEEP/VENT (cm H2O): 5 cm H20  Auto PEEP Observed (cm H2O): 0.4 cm H2O    Current Facility-Administered Medications   Medication Dose Route Frequency    potassium chloride 10 mEq in 100 ml IVPB  10 mEq IntraVENous Q1H    hydrocortisone Sod Succ (PF) (SOLU-CORTEF) injection 25 mg  25 mg IntraVENous Q12H    potassium chloride (KLOR-CON) packet 40 mEq  40 mEq Per NG tube DAILY    Zinc Ox-Aloe Vera-Vitamin E (BALMEX) topical cream   Topical CONTINUOUS    insulin glargine (LANTUS) injection 20 Units  20 Units SubCUTAneous DAILY    banana flakes trans-galactooligosaccharide (BANATROL PLUS) powder 1 Packet  1 Packet Per NG tube TID    levETIRAcetam (KEPPRA) 500 mg in saline (iso-osm) 100 ml IVPB  500 mg IntraVENous Q12H    insulin lispro (HUMALOG) injection   SubCUTAneous Q6H    white petrolatum-mineral oil 85-15 % oint 1 Dose  1 Dose Ophthalmic QID    chlorhexidine (PERIDEX) 0.12 % mouthwash 10 mL  10 mL Oral Q12H    NOREPINephrine (LEVOPHED) 16,000 mcg in dextrose 5% 250 mL infusion  2-16 mcg/min IntraVENous TITRATE    famotidine (PF) (PEPCID) 20 mg in sodium chloride 0.9 % 10 mL injection  20 mg IntraVENous DAILY    vits A and D-white pet-lanolin (A&D) ointment   Topical QID AFTER MEALS    collagenase (SANTYL) 250 unit/gram ointment   Topical DAILY    lactobacillus sp. 50 billion cpu (BIO-K PLUS) capsule 1 Cap  1 Cap Oral DAILY    heparin (porcine) injection 5,000 Units  5,000 Units SubCUTAneous Q8H    heparin (porcine) 1,000 unit/mL injection 2,000 Units  2,000 Units IntraVENous DIALYSIS ONCE    simvastatin (ZOCOR) tablet 20 mg  20 mg Oral QHS    doxercalciferol (HECTOROL) 4 mcg/2 mL injection 3 mcg  3 mcg IntraVENous DIALYSIS MON, WED & FRI    epoetin benjamin (EPOGEN;PROCRIT) injection 10,000 Units  10,000 Units IntraVENous DIALYSIS MON, WED & FRI    aspirin chewable tablet 81 mg  81 mg Oral DAILY       Telemetry:     Physical Exam:    General: in no apparent distress   HEENT: Pupils non reactive. Neck: No abnormally enlarged lymph nodes. Chest: normal   Lungs: decreased air exchange bibasilar, no dullness/ tenderness/ rash   Heart: Regular rate and rhythm   Abdomen: non distended, bowel sounds normoactive, tympanic, abdomen is soft without significant tenderness, masses, organomegaly or guarding   Extremity: Trace edema   Neuro: comatose. Extraocular movements are absent to doll's eye maneuver. Absent gag reflex. Patient withdraws arms to painful stimulus. Skin: Skin color, texture, turgor normal. No rashes or lesions   DATA:  MAR reviewed and pertinent medications noted or modified as needed    Labs:  Recent Labs      08/27/17   0400  08/26/17   0300  08/25/17   0320   WBC  13.8*  15.0*  14.9*   HGB  9.2*  9.3*  9.3*   HCT  28.6*  29.1*  29.2*   PLT  448*  377  321     Recent Labs      08/27/17   0400  08/26/17   0300  08/25/17   0320   NA  142  139  140   K  3.2*  3.5  3.1*   CL  109*  107  107   CO2  22  26  23   GLU  141*  255*  278*   BUN  36*  26*  39*   CREA  6.10*  4.79*  6.40*   CA  8.5  9.0  8.4*   MG  2.8*  2.5  2.9*   PHOS  2.1*  2.1*  2.9     No results for input(s): PH, PCO2, PO2, HCO3, FIO2 in the last 72 hours.   Recent Labs      08/27/17   0454  08/26/17   0431  08/25/17   0451   FIO2I  25  0.25  25   HCO3I  22.0  25.0  22.8   PCO2I  33.9*  34.2*  30.2*   PHI  7.420  7.473*  7.487*   PO2I  110*  384*  125*     Imaging:  [x]                           I have personally reviewed the patients radiographs and reports    High complexity decision making was performed during this consultation and evaluation. [x]       Pt is at high risk for further organ failure and dysfunction. Critical care time spent  minutes with patient exclusive of procedures. IMPRESSION:   · Acute encephalopathy which appears to be secondary to anoxic brain injury. Overall prognosis appears to be very dismal. Consider family discussion and refer to hospice/palliative care as appropriate. · Cardiac arrest on admission and this resulted in significant anoxic brain injury. · Acute hypoxic respiratory failure requiring intubation and mechanical ventilation. His FiO2 requirements are minimal - however the major factor over here is his mental status and he most likely would need tracheostomy and peg tube placement. · Acute shock which appears to be secondary to sepsis. No cardiac issues related to PEA cardiac arrest.   · ESRD requiring dialysis  · Oropharyngeal dysphagia  · Pericardial effusion   · Diarrhea - C diff testing negative. PLAN:   Neurological:  - Sedation: Continue to adhere to pain, agitation and delirium guidelines in the ICU. Continue to assess prevent and manage pain. Patient is currently not requiring any medication for sedation.   - ICU delirium assessment: Continue to assess, prevent and manage delirium using the CAM-ICU tool. - Continue Keppra. Respiratory:  Mechanical ventilation: Adhere to low tidal volume ventilation strategy as per the [employer] guidelines. Aim for plateau pressures < 28JJ. Continue to adhere to the VAP bundle to minimize the risk of VAP. Oxygenation: Optimize PEEP and FiO2 to maintain oxygenation. Consider ENT referral tomorrow for tracheostomy and PEG tube placement. Cardiac:  Vasopressors: Aim to keep MAP>65 mm of Hg. Currently off pressor support. Renal:  Currently requiring renal support. Nephrology following for HD. Renal functions and Cr: stable    GI:  Diet: Continue tube feeds. Stress ulcer ppx: Pepcid. Endocrinology:  Glycemic control: stable. Best practice :    MVentilated patients- VAP bundle , aim to keep peak plateau pressure 42-77PA H2O  Sress ulcer prophylaxis  DVT prophylaxis  Need for Lines, mancini assessed.       Everardo Gallegos MD

## 2017-08-27 NOTE — PROGRESS NOTES
Patient remains intubated with tube secured with preeti. Patient has moderate amount of secretions. Active gag reflex when suctioned. Will continue to monitor.

## 2017-08-27 NOTE — PROGRESS NOTES
Re:  Tamika Kearney up visit     8/27/2017 11:57 AM    SSN: [id number]    Subjective:   Maria D Hebert is seen on the vent in the ICU.     Medications:    Current Facility-Administered Medications   Medication Dose Route Frequency Provider Last Rate Last Dose    hydrocortisone Sod Succ (PF) (SOLU-CORTEF) injection 25 mg  25 mg IntraVENous Q12H Robyn Brooks PA-C   25 mg at 08/27/17 0804    potassium chloride (KLOR-CON) packet 40 mEq  40 mEq Per NG tube DAILY Stewart Godoy MD   40 mEq at 08/27/17 0805    Zinc Ox-Aloe Vera-Vitamin E (BALMEX) topical cream   Topical CONTINUOUS Neida Garcia MD        insulin glargine (LANTUS) injection 20 Units  20 Units SubCUTAneous DAILY Cherelle Moore MD   20 Units at 08/27/17 0805    banana flakes trans-galactooligosaccharide (BANATROL PLUS) powder 1 Packet  1 Packet Per NG tube TID Cherelle Moore MD   1 Packet at 08/27/17 1757    levETIRAcetam (KEPPRA) 500 mg in saline (iso-osm) 100 ml IVPB  500 mg IntraVENous Q12H Cody Lyman  mL/hr at 08/27/17 0801 500 mg at 08/27/17 0801    LORazepam (ATIVAN) injection 1 mg  1 mg IntraVENous Q4H PRN Gavino Gold NP   1 mg at 08/25/17 2044    insulin lispro (HUMALOG) injection   SubCUTAneous Q6H Linsey Clements MD   Stopped at 08/26/17 1800    white petrolatum-mineral oil 85-15 % oint 1 Dose  1 Dose Ophthalmic QID Ismael Carty NP   1 Dose at 08/27/17 0805    chlorhexidine (PERIDEX) 0.12 % mouthwash 10 mL  10 mL Oral Q12H Gavino Gold NP   10 mL at 08/27/17 0806    NOREPINephrine (LEVOPHED) 16,000 mcg in dextrose 5% 250 mL infusion  2-16 mcg/min IntraVENous TITRATE Gavino Gold NP 3.8 mL/hr at 08/27/17 0801 4 mcg/min at 08/27/17 0801    albuterol (PROVENTIL VENTOLIN) nebulizer solution 2.5 mg  2.5 mg Nebulization Q6H PRN Gavino Gold NP        famotidine (PF) (PEPCID) 20 mg in sodium chloride 0.9 % 10 mL injection  20 mg IntraVENous DAILY January-Luanne Michel VARGAS LAKISHA   20 mg at 08/27/17 0805    vits A and D-white pet-lanolin (A&D) ointment   Topical QID AFTER MEALS Beata Diaz MD        collagenase (SANTYL) 250 unit/gram ointment   Topical DAILY Beata Diaz MD        acetaminophen (TYLENOL) tablet 650 mg  650 mg Oral Q4H PRN Ping Castillo MD   650 mg at 08/22/17 1912    lactobacillus sp. 50 billion cpu (BIO-K PLUS) capsule 1 Cap  1 Cap Oral DAILY Desiree Barrera MD   1 Cap at 08/27/17 0805    loperamide (IMODIUM) capsule 2 mg  2 mg Oral Q6H PRN Ping Castillo MD   2 mg at 08/26/17 1630    heparin (porcine) injection 5,000 Units  5,000 Units SubCUTAneous Q8H Ping Castillo MD   5,000 Units at 08/27/17 1000    heparin (porcine) 1,000 unit/mL injection 2,000 Units  2,000 Units IntraVENous DIALYSIS ONCE Dulce Plata MD        simvastatin (ZOCOR) tablet 20 mg  20 mg Oral QHS Ping Castillo MD   20 mg at 08/26/17 2332    doxercalciferol (HECTOROL) 4 mcg/2 mL injection 3 mcg  3 mcg IntraVENous DIALYSIS MON, WED & KATIE Plata MD   3 mcg at 08/25/17 1714    epoetin benjamin (EPOGEN;PROCRIT) injection 10,000 Units  10,000 Units IntraVENous DIALYSIS MON WED & Ramos Salinas MD   10,000 Units at 08/25/17 1714    aspirin chewable tablet 81 mg  81 mg Oral DAILY Ping Castillo MD   81 mg at 08/27/17 0805    glucose chewable tablet 16 g  4 Tab Oral PRN Herrera Duong PA-C        glucagon (GLUCAGEN) injection 1 mg  1 mg IntraMUSCular PRN Herrera Duong PA-C        dextrose (D50W) injection syrg 12.5-25 g  25-50 mL IntraVENous PRN Robyn Brooks PA-C   25 g at 08/06/17 0005    0.9% sodium chloride infusion  100 mL/hr IntraVENous DIALYSIS PRN Helen Ricardo  mL/hr at 08/02/17 1925 100 mL/hr at 08/02/17 1925    naloxone [facility]) injection 0.4 mg  0.4 mg IntraVENous PRN Beata Diaz MD           Vital signs:    Visit Vitals    /61    Pulse 86    Temp 98.8 °F (37.1 °C)    Resp 20    Ht 6' 2\" (1.88 m)    Wt 62.6 kg (138 lb)    SpO2 100%    BMI 17.72 kg/m2       Review of Systems:   Could not be obtained from the patient because of the medical status. Patient Active Problem List   Diagnosis Code    Anemia D64.9    Acidosis E87.2    Cardiac arrest ([facility] [address] I46.9    Respiratory failure ([facility] [address] J96.90    ESRD needing dialysis ([facility] [address] N18.6, Z99.2    Anoxic brain damage (HCC) G93.1    Colitis K52.9    Hypotension I95.9    Acute GI bleeding K92.2    ESRD (end stage renal disease) ([facility] [address] N18.6    Sepsis ([facility]) A41.9    Oropharyngeal dysphagia R13.12    Cachexia ([facility]) R64         Objective: The patient is comatose on the vent. To deep painful stimuli he has some slight increase in myoclonic jerking. Seems to withdraw the arms from painful stimuli bilaterally. Cranial Nerves: II  Visual fields can't be tested. III, IV, VI  Extraocular movements are absent to Doll's eyes maneuver. His eyes are forcefully deviated upwards. V  Corneal responses are present. VII  Face is symmetrical.  VIII - Hearing is not testable. IX, X, XII  Gag is absent. Motor: The patient has withdrawal of the arms to pain. Tone is normal. Reflexes are trace and symmetrical. Plantars are down going. Gait is not testable.     CBC:   Lab Results   Component Value Date/Time    WBC 13.8 08/27/2017 04:00 AM    RBC 2.96 08/27/2017 04:00 AM    HGB 9.2 08/27/2017 04:00 AM    HCT 28.6 08/27/2017 04:00 AM    PLATELET 335 69/59/7768 04:00 AM     BMP:   Lab Results   Component Value Date/Time    Glucose 141 08/27/2017 04:00 AM    Sodium 142 08/27/2017 04:00 AM    Potassium 3.2 08/27/2017 04:00 AM    Chloride 109 08/27/2017 04:00 AM    CO2 22 08/27/2017 04:00 AM    BUN 36 08/27/2017 04:00 AM    Creatinine 6.10 08/27/2017 04:00 AM    Calcium 8.5 08/27/2017 04:00 AM     CMP:   Lab Results   Component Value Date/Time    Glucose 141 08/27/2017 04:00 AM    Sodium 142 08/27/2017 04:00 AM Potassium 3.2 08/27/2017 04:00 AM    Chloride 109 08/27/2017 04:00 AM    CO2 22 08/27/2017 04:00 AM    BUN 36 08/27/2017 04:00 AM    Creatinine 6.10 08/27/2017 04:00 AM    Calcium 8.5 08/27/2017 04:00 AM    Anion gap 11 08/27/2017 04:00 AM    BUN/Creatinine ratio 6 08/27/2017 04:00 AM    Alk. phosphatase 85 08/20/2017 02:25 AM    Protein, total 7.0 08/20/2017 02:25 AM    Albumin 2.1 08/20/2017 02:25 AM    Globulin 4.9 08/20/2017 02:25 AM    A-G Ratio 0.4 08/20/2017 02:25 AM     Coagulation:   Lab Results   Component Value Date/Time    Prothrombin time 21.4 07/27/2017 04:26 PM    INR 2.0 07/27/2017 04:26 PM    aPTT 21.4 08/01/2017 01:28 PM     Assessment:  Post hypoxic encephalopathy, doing slightly better from the neurologic standpoint. No evidence at this time of any recovery of higher cortical function. EEG suggests a very poor prognosis. Plan:  Continue supportive care. Poor prognosis. Sincerely,        Darleen Angel.  Cherrie Dancer, M.D.

## 2017-08-27 NOTE — ROUTINE PROCESS
Bedside shift change report given to [address] [place] [address] (oncoming nurse) by Usman Hanson (offgoing nurse). Report included the following information SBAR, Kardex, Intake/Output and MAR.

## 2017-08-27 NOTE — ROUTINE PROCESS
CHANGE OF SHIFT REPORT GIVEN TO Margret Pritchett RN  BY [facility] [address] RN USING DIEGO, ROGELIO HAMDEX

## 2017-08-28 ENCOUNTER — APPOINTMENT (OUTPATIENT)
Dept: GENERAL RADIOLOGY | Age: 57
DRG: 004 | End: 2017-08-28
Attending: PHYSICIAN ASSISTANT
Payer: MEDICARE

## 2017-08-28 LAB
ANION GAP SERPL CALC-SCNC: 11 MMOL/L (ref 3–18)
ARTERIAL PATENCY WRIST A: ABNORMAL
ARTERIAL PATENCY WRIST A: ABNORMAL
BASE DEFICIT BLD-SCNC: 3 MMOL/L
BASE DEFICIT BLD-SCNC: 4 MMOL/L
BASOPHILS # BLD: 0 K/UL (ref 0–0.1)
BASOPHILS NFR BLD: 0 % (ref 0–2)
BDY SITE: ABNORMAL
BDY SITE: ABNORMAL
BODY TEMPERATURE: 37
BODY TEMPERATURE: 37
BUN SERPL-MCNC: 45 MG/DL (ref 7–18)
BUN/CREAT SERPL: 6 (ref 12–20)
CALCIUM SERPL-MCNC: 8.9 MG/DL (ref 8.5–10.1)
CHLORIDE SERPL-SCNC: 108 MMOL/L (ref 100–108)
CO2 SERPL-SCNC: 20 MMOL/L (ref 21–32)
CREAT SERPL-MCNC: 7.43 MG/DL (ref 0.6–1.3)
DIFFERENTIAL METHOD BLD: ABNORMAL
EOSINOPHIL # BLD: 0.1 K/UL (ref 0–0.4)
EOSINOPHIL NFR BLD: 0 % (ref 0–5)
ERYTHROCYTE [DISTWIDTH] IN BLOOD BY AUTOMATED COUNT: 19.8 % (ref 11.6–14.5)
GAS FLOW.O2 O2 DELIVERY SYS: ABNORMAL L/MIN
GAS FLOW.O2 O2 DELIVERY SYS: ABNORMAL L/MIN
GAS FLOW.O2 SETTING OXYMISER: 14 BPM
GAS FLOW.O2 SETTING OXYMISER: 14 BPM
GLUCOSE BLD STRIP.AUTO-MCNC: 163 MG/DL (ref 70–110)
GLUCOSE BLD STRIP.AUTO-MCNC: 181 MG/DL (ref 70–110)
GLUCOSE BLD STRIP.AUTO-MCNC: 185 MG/DL (ref 70–110)
GLUCOSE BLD STRIP.AUTO-MCNC: 227 MG/DL (ref 70–110)
GLUCOSE BLD STRIP.AUTO-MCNC: 244 MG/DL (ref 70–110)
GLUCOSE SERPL-MCNC: 261 MG/DL (ref 74–99)
HCO3 BLD-SCNC: 20.4 MMOL/L (ref 22–26)
HCO3 BLD-SCNC: 21.6 MMOL/L (ref 22–26)
HCT VFR BLD AUTO: 29.3 % (ref 36–48)
HGB BLD-MCNC: 9.4 G/DL (ref 13–16)
INSPIRATION.DURATION SETTING TIME VENT: 1 SEC
INSPIRATION.DURATION SETTING TIME VENT: 1 SEC
LYMPHOCYTES # BLD: 1.4 K/UL (ref 0.9–3.6)
LYMPHOCYTES NFR BLD: 11 % (ref 21–52)
MAGNESIUM SERPL-MCNC: 3.1 MG/DL (ref 1.6–2.6)
MCH RBC QN AUTO: 31.2 PG (ref 24–34)
MCHC RBC AUTO-ENTMCNC: 32.1 G/DL (ref 31–37)
MCV RBC AUTO: 97.3 FL (ref 74–97)
MONOCYTES # BLD: 0.5 K/UL (ref 0.05–1.2)
MONOCYTES NFR BLD: 4 % (ref 3–10)
NEUTS SEG # BLD: 10.3 K/UL (ref 1.8–8)
NEUTS SEG NFR BLD: 85 % (ref 40–73)
NITRIC:PPM ISTAT,INITR: 0 PPM
NITRIC:PPM ISTAT,INITR: 0 PPM
O2/TOTAL GAS SETTING VFR VENT: 25 %
O2/TOTAL GAS SETTING VFR VENT: 25 %
PCO2 BLD: 29.7 MMHG (ref 35–45)
PCO2 BLD: 34.3 MMHG (ref 35–45)
PEEP RESPIRATORY: 0.5 CMH2O
PEEP RESPIRATORY: 0.5 CMH2O
PH BLD: 7.41 [PH] (ref 7.35–7.45)
PH BLD: 7.45 [PH] (ref 7.35–7.45)
PHOSPHATE SERPL-MCNC: 2.6 MG/DL (ref 2.5–4.9)
PIP ISTAT,IPIP: 18
PIP ISTAT,IPIP: 19
PLATELET # BLD AUTO: 538 K/UL (ref 135–420)
PMV BLD AUTO: 10.6 FL (ref 9.2–11.8)
PO2 BLD: 125 MMHG (ref 80–100)
PO2 BLD: 46 MMHG (ref 80–100)
POTASSIUM SERPL-SCNC: 4.2 MMOL/L (ref 3.5–5.5)
POTASSIUM SERPL-SCNC: 4.2 MMOL/L (ref 3.5–5.5)
RBC # BLD AUTO: 3.01 M/UL (ref 4.7–5.5)
SAO2 % BLD: 82 % (ref 92–97)
SAO2 % BLD: 99 % (ref 92–97)
SERVICE CMNT-IMP: ABNORMAL
SERVICE CMNT-IMP: ABNORMAL
SODIUM SERPL-SCNC: 139 MMOL/L (ref 136–145)
SPECIMEN TYPE: ABNORMAL
SPECIMEN TYPE: ABNORMAL
TOTAL RESP. RATE, ITRR: 14
TOTAL RESP. RATE, ITRR: 17
VENTILATION MODE VENT: ABNORMAL
VENTILATION MODE VENT: ABNORMAL
VOLUME CONTROL IVLC: YES
VOLUME CONTROL PLUS IVLCP: YES
VT SETTING VENT: 400 ML
VT SETTING VENT: 400 ML
WBC # BLD AUTO: 12.2 K/UL (ref 4.6–13.2)

## 2017-08-28 PROCEDURE — 36600 WITHDRAWAL OF ARTERIAL BLOOD: CPT

## 2017-08-28 PROCEDURE — 83735 ASSAY OF MAGNESIUM: CPT | Performed by: NURSE PRACTITIONER

## 2017-08-28 PROCEDURE — 74011250637 HC RX REV CODE- 250/637: Performed by: NURSE PRACTITIONER

## 2017-08-28 PROCEDURE — 74011000258 HC RX REV CODE- 258: Performed by: NURSE PRACTITIONER

## 2017-08-28 PROCEDURE — 65610000006 HC RM INTENSIVE CARE

## 2017-08-28 PROCEDURE — 84100 ASSAY OF PHOSPHORUS: CPT | Performed by: NURSE PRACTITIONER

## 2017-08-28 PROCEDURE — 74011250637 HC RX REV CODE- 250/637: Performed by: PHYSICIAN ASSISTANT

## 2017-08-28 PROCEDURE — 71010 XR CHEST PORT: CPT

## 2017-08-28 PROCEDURE — 85025 COMPLETE CBC W/AUTO DIFF WBC: CPT | Performed by: NURSE PRACTITIONER

## 2017-08-28 PROCEDURE — 74011000250 HC RX REV CODE- 250: Performed by: PHYSICIAN ASSISTANT

## 2017-08-28 PROCEDURE — 74011636637 HC RX REV CODE- 636/637: Performed by: INTERNAL MEDICINE

## 2017-08-28 PROCEDURE — 74011250636 HC RX REV CODE- 250/636: Performed by: INTERNAL MEDICINE

## 2017-08-28 PROCEDURE — 82962 GLUCOSE BLOOD TEST: CPT

## 2017-08-28 PROCEDURE — 90935 HEMODIALYSIS ONE EVALUATION: CPT

## 2017-08-28 PROCEDURE — 74011250637 HC RX REV CODE- 250/637: Performed by: INTERNAL MEDICINE

## 2017-08-28 PROCEDURE — 74011000250 HC RX REV CODE- 250: Performed by: NURSE PRACTITIONER

## 2017-08-28 PROCEDURE — 82803 BLOOD GASES ANY COMBINATION: CPT

## 2017-08-28 PROCEDURE — 94003 VENT MGMT INPAT SUBQ DAY: CPT

## 2017-08-28 PROCEDURE — 74011250637 HC RX REV CODE- 250/637: Performed by: HOSPITALIST

## 2017-08-28 PROCEDURE — 84132 ASSAY OF SERUM POTASSIUM: CPT | Performed by: NURSE PRACTITIONER

## 2017-08-28 PROCEDURE — 74011250636 HC RX REV CODE- 250/636: Performed by: HOSPITALIST

## 2017-08-28 PROCEDURE — 74011250636 HC RX REV CODE- 250/636: Performed by: PHYSICIAN ASSISTANT

## 2017-08-28 PROCEDURE — 36592 COLLECT BLOOD FROM PICC: CPT

## 2017-08-28 RX ORDER — MIDODRINE HYDROCHLORIDE 2.5 MG/1
10 TABLET ORAL 2 TIMES DAILY WITH MEALS
Status: DISCONTINUED | OUTPATIENT
Start: 2017-08-28 | End: 2017-08-29 | Stop reason: DRUGHIGH

## 2017-08-28 RX ADMIN — FAMOTIDINE 20 MG: 10 INJECTION INTRAVENOUS at 08:15

## 2017-08-28 RX ADMIN — ASPIRIN 81 MG 81 MG: 81 TABLET ORAL at 08:17

## 2017-08-28 RX ADMIN — SIMVASTATIN 20 MG: 10 TABLET, FILM COATED ORAL at 23:34

## 2017-08-28 RX ADMIN — VITAMIN A AND D: 30.8 OINTMENT TOPICAL at 18:00

## 2017-08-28 RX ADMIN — Medication 1 PACKET: at 14:18

## 2017-08-28 RX ADMIN — MINERAL OIL AND WHITE PETROLATUM 1 DOSE: 150; 850 OINTMENT OPHTHALMIC at 13:00

## 2017-08-28 RX ADMIN — MINERAL OIL AND WHITE PETROLATUM 1 DOSE: 150; 850 OINTMENT OPHTHALMIC at 18:00

## 2017-08-28 RX ADMIN — MINERAL OIL AND WHITE PETROLATUM 1 DOSE: 150; 850 OINTMENT OPHTHALMIC at 23:25

## 2017-08-28 RX ADMIN — INSULIN LISPRO 6 UNITS: 100 INJECTION, SOLUTION INTRAVENOUS; SUBCUTANEOUS at 11:43

## 2017-08-28 RX ADMIN — INSULIN LISPRO 3 UNITS: 100 INJECTION, SOLUTION INTRAVENOUS; SUBCUTANEOUS at 00:33

## 2017-08-28 RX ADMIN — MIDODRINE HYDROCHLORIDE 10 MG: 2.5 TABLET ORAL at 17:30

## 2017-08-28 RX ADMIN — COLLAGENASE SANTYL: 250 OINTMENT TOPICAL at 09:00

## 2017-08-28 RX ADMIN — HEPARIN SODIUM 5000 UNITS: 5000 INJECTION, SOLUTION INTRAVENOUS; SUBCUTANEOUS at 11:43

## 2017-08-28 RX ADMIN — LOPERAMIDE HYDROCHLORIDE 2 MG: 2 CAPSULE ORAL at 05:20

## 2017-08-28 RX ADMIN — CHLORHEXIDINE GLUCONATE 10 ML: 1.2 RINSE ORAL at 23:25

## 2017-08-28 RX ADMIN — INSULIN LISPRO 6 UNITS: 100 INJECTION, SOLUTION INTRAVENOUS; SUBCUTANEOUS at 06:01

## 2017-08-28 RX ADMIN — VITAMIN A AND D: 30.8 OINTMENT TOPICAL at 08:27

## 2017-08-28 RX ADMIN — HEPARIN SODIUM 5000 UNITS: 5000 INJECTION, SOLUTION INTRAVENOUS; SUBCUTANEOUS at 17:29

## 2017-08-28 RX ADMIN — Medication 1 CAPSULE: at 08:16

## 2017-08-28 RX ADMIN — VITAMIN A AND D: 30.8 OINTMENT TOPICAL at 13:00

## 2017-08-28 RX ADMIN — Medication 1 PACKET: at 17:33

## 2017-08-28 RX ADMIN — LEVETIRACETAM 500 MG: 5 INJECTION INTRAVENOUS at 23:19

## 2017-08-28 RX ADMIN — HEPARIN SODIUM 5000 UNITS: 5000 INJECTION, SOLUTION INTRAVENOUS; SUBCUTANEOUS at 02:05

## 2017-08-28 RX ADMIN — CHLORHEXIDINE GLUCONATE 10 ML: 1.2 RINSE ORAL at 08:15

## 2017-08-28 RX ADMIN — NOREPINEPHRINE BITARTRATE 2 MCG/MIN: 1 INJECTION INTRAVENOUS at 17:28

## 2017-08-28 RX ADMIN — INSULIN LISPRO 3 UNITS: 100 INJECTION, SOLUTION INTRAVENOUS; SUBCUTANEOUS at 17:36

## 2017-08-28 RX ADMIN — Medication 1 PACKET: at 23:25

## 2017-08-28 RX ADMIN — INSULIN GLARGINE 20 UNITS: 100 INJECTION, SOLUTION SUBCUTANEOUS at 08:15

## 2017-08-28 RX ADMIN — HYDROCORTISONE SODIUM SUCCINATE 25 MG: 100 INJECTION, POWDER, FOR SOLUTION INTRAMUSCULAR; INTRAVENOUS at 08:15

## 2017-08-28 RX ADMIN — VITAMIN A AND D: 30.8 OINTMENT TOPICAL at 23:31

## 2017-08-28 RX ADMIN — MINERAL OIL AND WHITE PETROLATUM 1 DOSE: 150; 850 OINTMENT OPHTHALMIC at 08:17

## 2017-08-28 RX ADMIN — LEVETIRACETAM 500 MG: 5 INJECTION INTRAVENOUS at 08:17

## 2017-08-28 NOTE — ROUTINE PROCESS
Bedside and Verbal shift change report given to Cherelle Squires RN (oncoming nurse) by Vic Jacobs RN (offgoing nurse).  Report included the following information SBAR, Kardex, ED Summary, Procedure Summary, Intake/Output, MAR, Recent Results and Cardiac Rhythm SR.

## 2017-08-28 NOTE — PROGRESS NOTES
[facility]  ICU Progress Note      Name: Clif Bettencourt   : 1960   MRN: [id number]   Date: 2017 3:29 PM     [x]I have reviewed the flowsheet and previous days notes. Events overnight reviewed and discussed with nursing staff. Vital signs and records reviewed. HPI:   17   - Remains on levophed. Afebrile overnight, tolerating SBT. Anuric.   - Not following commands or withdrawing to pain. - Pupils pin point and fixed. (+) corneal reflex. (+) cough and gag. - Moderate oral secretions. ROS:Review of systems not obtained due to patient factors.     Medication Review:  · Pressors - Levophed   · Sedation - PRN ativan for myoclonus   · Antibiotics - None   · Pain - PRN  · GI/ DVT - Pepcid and sq heparin   · Others (other gtts)    Safety Bundles: VAP Bundle/Severe Sepsis Protocol    Vital Signs:    Visit Vitals    /63    Pulse 84    Temp 99 °F (37.2 °C)    Resp 20    Ht 6' 2\" (1.88 m)    Wt 64.6 kg (142 lb 6.7 oz)    SpO2 100%    BMI 18.29 kg/m2       O2 Device: Endotracheal tube   O2 Flow Rate (L/min): 6 l/min   Temp (24hrs), Av.9 °F (37.2 °C), Min:98.4 °F (36.9 °C), Max:99.2 °F (37.3 °C)       Intake/Output:   Last shift:      701 - 1900  In: 310.6 [I.V.:105.6]  Out: -   Last 3 shifts: 1901 -  0700  In: 4045.1 [I.V.:530.1]  Out: 700 [Drains:700]    Intake/Output Summary (Last 24 hours) at 17 1148  Last data filed at 17 0817   Gross per 24 hour   Intake          2627.93 ml   Output              500 ml   Net          2127.93 ml     Ventilator Settings:  Ventilator  Mode: Assist control, VC+  Respiratory Rate  Resp Rate Observed: 17  Back-Up Rate: 14  Insp Time (sec): 1 sec  Insp Flow (l/min): 44 l/min  I:E Ratio: 1:2.5  Ventilator Volumes  Vt Set (ml): 400 ml  Vt Exhaled (Machine Breath) (ml): 406 ml  Vt Spont (ml): 390 ml  Ve Observed (l/min): 9.25 l/min  Ventilator Pressures  Pressure Support (cm H2O): 7 cm H2O  PIP Observed (cm H2O): 13 cm H2O  Plateau Pressure (cm H2O): 11 cm H2O  MAP (cm H2O): 8.2  PEEP/VENT (cm H2O): 5 cm H20  Auto PEEP Observed (cm H2O): 0.4 cm H2O    Physical Exam:  General: Intubated, critically ill, unresponsive  HEENT:  Anicteric sclerae; pink palpebral conjunctivae; Pupil non reactive to light. Resp:  Symmetrical chest expansion, no accessory muscle use; good airway entry; no rales/ wheezing/ rhonchi noted  CV:  S1, S2 present;  GI:  Abdomen soft, non-tender; (+) active bowel sounds  Extremities:  +2 pulses on all extremities; + thrill and bruit to L AV fistula   Skin:  Dressing to sacrum. Erythematous perineum   Neurologic:  Facial myoclonus, Pupils fixed, small with disconjugate upward deviated gaze. (+) cough/gag. Negative corneal reflex no withdrawal to pain.     Devices:  NGT, ETT, CVL left IJ    DATA:     Current Facility-Administered Medications   Medication Dose Route Frequency    midodrine (PROAMITINE) tablet 10 mg  10 mg Oral BID WITH MEALS    potassium chloride (KLOR-CON) packet 40 mEq  40 mEq Per NG tube DAILY    Zinc Ox-Aloe Vera-Vitamin E (BALMEX) topical cream   Topical CONTINUOUS    insulin glargine (LANTUS) injection 20 Units  20 Units SubCUTAneous DAILY    banana flakes trans-galactooligosaccharide (BANATROL PLUS) powder 1 Packet  1 Packet Per NG tube TID    levETIRAcetam (KEPPRA) 500 mg in saline (iso-osm) 100 ml IVPB  500 mg IntraVENous Q12H    LORazepam (ATIVAN) injection 1 mg  1 mg IntraVENous Q4H PRN    insulin lispro (HUMALOG) injection   SubCUTAneous Q6H    white petrolatum-mineral oil 85-15 % oint 1 Dose  1 Dose Ophthalmic QID    chlorhexidine (PERIDEX) 0.12 % mouthwash 10 mL  10 mL Oral Q12H    NOREPINephrine (LEVOPHED) 16,000 mcg in dextrose 5% 250 mL infusion  2-16 mcg/min IntraVENous TITRATE    albuterol (PROVENTIL VENTOLIN) nebulizer solution 2.5 mg  2.5 mg Nebulization Q6H PRN    famotidine (PF) (PEPCID) 20 mg in sodium chloride 0.9 % 10 mL injection  20 mg IntraVENous DAILY    vits A and D-white pet-lanolin (A&D) ointment   Topical QID AFTER MEALS    collagenase (SANTYL) 250 unit/gram ointment   Topical DAILY    acetaminophen (TYLENOL) tablet 650 mg  650 mg Oral Q4H PRN    lactobacillus sp. 50 billion cpu (BIO-K PLUS) capsule 1 Cap  1 Cap Oral DAILY    loperamide (IMODIUM) capsule 2 mg  2 mg Oral Q6H PRN    heparin (porcine) injection 5,000 Units  5,000 Units SubCUTAneous Q8H    heparin (porcine) 1,000 unit/mL injection 2,000 Units  2,000 Units IntraVENous DIALYSIS ONCE    simvastatin (ZOCOR) tablet 20 mg  20 mg Oral QHS    doxercalciferol (HECTOROL) 4 mcg/2 mL injection 3 mcg  3 mcg IntraVENous DIALYSIS MON, WED & FRI    epoetin benjamin (EPOGEN;PROCRIT) injection 10,000 Units  10,000 Units IntraVENous DIALYSIS MON, WED & FRI    aspirin chewable tablet 81 mg  81 mg Oral DAILY    glucose chewable tablet 16 g  4 Tab Oral PRN    glucagon (GLUCAGEN) injection 1 mg  1 mg IntraMUSCular PRN    dextrose (D50W) injection syrg 12.5-25 g  25-50 mL IntraVENous PRN    0.9% sodium chloride infusion  100 mL/hr IntraVENous DIALYSIS PRN    naloxone (NARCAN) injection 0.4 mg  0.4 mg IntraVENous PRN         Labs: Results:       Chemistry Recent Labs      08/28/17 0515 08/27/17   0400  08/26/17   0300   GLU  261*  141*  255*   NA  139  142  139   K  4.2  4.2  3.2*  3.5   CL  108  109*  107   CO2  20*  22  26   BUN  45*  36*  26*   CREA  7.43*  6.10*  4.79*   CA  8.9  8.5  9.0   AGAP  11  11  6   BUCR  6*  6*  5*      CBC w/Diff Recent Labs      08/28/17 0515 08/27/17   0400  08/26/17   0300   WBC  12.2  13.8*  15.0*   RBC  3.01*  2.96*  3.05*   HGB  9.4*  9.2*  9.3*   HCT  29.3*  28.6*  29.1*   PLT  538*  448*  377   GRANS  85*  73  85*   LYMPH  11*  20*  12*   EOS  0  1  0      Coagulation No results for input(s): PTP, INR, APTT in the last 72 hours.     No lab exists for component: INREXT, INREXT    Liver Enzymes No results for input(s): TP, ALB, TBIL, AP, SGOT, GPT in the last 72 hours. No lab exists for component: DBIL   ABG Lab Results   Component Value Date/Time    PHI 7.445 08/28/2017 05:15 AM    PCO2I 29.7 (L) 08/28/2017 05:15 AM    PO2I 125 (H) 08/28/2017 05:15 AM    HCO3I 20.4 (L) 08/28/2017 05:15 AM    FIO2I 25 08/28/2017 05:15 AM      Microbiology No results for input(s): CULT in the last 72 hours. Telemetry: [x]Sinus []A-flutter []Paced    []A-fib []Multiple PVCs                  Imaging:  CXR 8/28/17: The ET tube is 3.6 cm above bree. There is no definable acute cardiopulmonary process. Lungs remain clear bilaterally. IMPRESSION:   · PEA Cardiac arrest in dialysis 8/18/17  · S/P PEA Cardiac arrest on this admission 7/27/17, normal cath  · Acute Respirtatory Failure requiring Mechanical Ventilation   · Acute Anoxic Encephalopathy with acute with hyperdense cortex on CT   · Hypotension- distributive vs cardiogenic vs obstructive- on pressors   · ESRD- HD MWF. · Sepsis Bacteremia - resolved   · DM2  · Oropharyngeal dysphagia - Needs peg tube  · Small Pericardial Effusion on Echo 8/7/17  · Cachexia  · Unstageable pressure ulcer to sacrum/coccyx  · Diarrhea- Cdiff negative       PLAN:   · Resp - stable on vent, SBT. VAP Bundle. Aspiration precautions. HOB > 30 degrees. PRN nebz. · ID - afebrile, no leukocytosis. ABX course completed. Wean stress dose steroids. · CVS - Titrate levophed gtt with goal for MAP > 65 mm/hg, will start midodrine. · Heme/onc - Stable H/H and platelets. Daily CBC. · Metabolic - Monitor and replace lytes per provider. · Renal - HD per nephrology service   · Endocrine - SSI for glycemic control. Lantus daily. · Neuro/ Pain/ Sedation - Neurology following, Continue Keppra. EEG- poor prognosis per Neuro. · GI - Continue Tube feeds. · Prophylaxis - DVT- Sq heparin, GI- pepcid   · Full Code- Spoke with daughter/POA. Her and her brother do not want to make pt DNR or comfort care.  Will call daughter today to inquire about trach and PEG.    · Discussed in interdisciplinary rounds         The patient is: [] acutely ill Risk of deterioration: [] moderate    [x] critically ill  [x] high     [x]See my orders for details    My assessment/plan was discussed with:  [x]nursing []PT/OT    [x]respiratory therapy [x]Dr. Jose G Chris    []family []     Bay Chery PA-C   12:05 PM 08/28/17

## 2017-08-28 NOTE — DIALYSIS
Arrived to ICU pt's room 309, Pt Intubated, on ventilation, draining FMS, secure in place, tube feeds running. No acute distress noted prior to HD. Accessed via left AVG and initiated HD tx. Pt was on HD machine for 10 min, needed to discontinue HD due to having poor water pressure. Discontinue HD tx. Dr. Nora Mayfield notified and aware. Will schedule HD for tomorrow per Dr. Nora Mayfield.

## 2017-08-28 NOTE — PROGRESS NOTES
attended the interdisciplinary rounds for [facility], who is a 65 y.o.,male. Patients Primary Language is: Olman Florez. According to the patients EMR Pent[employer]ism Affiliation is: [facility].     The reason the Patient came to the hospital is:   Patient Active Problem List    Diagnosis Date Noted    Oropharyngeal dysphagia 08/17/2017    Cachexia ([facility] 08/17/2017    Acidosis 07/27/2017    Cardiac arrest ([facility] 07/27/2017    Respiratory failure ([facility] 07/27/2017    ESRD needing dialysis ([facility] 07/27/2017    Anoxic brain damage ([facility] 07/27/2017    Colitis 07/27/2017    Hypotension 07/27/2017    Acute GI bleeding 07/27/2017    ESRD (end stage renal disease) ([facility] 07/27/2017    Sepsis ([facility] 07/27/2017    Anemia           Plan:  Chaplains will continue to follow and will provide pastoral care on an as needed/requested basis.  recommends bedside caregivers page  on duty if patient shows signs of acute spiritual or emotional distress.     [address]  Board Certified [address] [place] [address]   [phone]

## 2017-08-28 NOTE — PROGRESS NOTES
Spoke with daughter Adrian Navarro. She would like her father to get a trach and PEG.  Consult surgery in the AM.    Signed By: Joce Vega PA-C     August 28, 2017 08/28/17

## 2017-08-28 NOTE — PROGRESS NOTES
Discussed this patient with  Manager, JESIKA Recinos RN. Recommended Ethics Consult be placed regarding this patient. SW discussed with hospitalist who is in agreement and will place consult.

## 2017-08-28 NOTE — INTERDISCIPLINARY ROUNDS
CRITICAL CARE INTERDISCIPLINARY ROUNDS      Patient Information:    Name:   Rudy Sher    Age:   65 y.o.     Admission Date:   7/27/2017    Readmit Risk Assessment Information:      Readmit Risk Tool Support Systems: Family member(s), Relationship with Primary Physician Group: Seen at least one time within past 12 months    Surgery Date:      Day of Stay:     Expected Discharge Date:        Attending Provider:   Yamileth Redmond MD    Surgeon:        Consultant:       Primary Care Provider:   Isaura Sanchez MD    Problem List:     Patient Active Problem List   Diagnosis Code    Anemia D64.9    Acidosis E87.2    Cardiac arrest ([address] I46.9    Respiratory failure ([address] J96.90    ESRD needing dialysis ([facility] N18.6, Z99.2    Anoxic brain damage (HCC) G93.1    Colitis K52.9    Hypotension I95.9    Acute GI bleeding K92.2    ESRD (end stage renal disease) ([address] N18.6    Sepsis ([address] A41.9    Oropharyngeal dysphagia R13.12    Cachexia ([address] R64       Principal Problem:  Sepsis ([address]    Procedure:       During rounds the following quality care indicators and evidence based practices were addressed :     DVT Prophylaxis, Pressure Injury Prevention, Pain Management, Sepsis resuscitation and management, Nutritional Status, Critical Care Interventions Airways, Dialysis, Drains, Lines and Pressors and IHI Bundles: Central Line Bundle Followed  and Vent Bundle Followed, Vent Day 10           Acute MI/PCI:   Not applicable    Heart Failure:    Not applicable    Cardiac Surgery:  Not applicable    SCIP Measures for other Surgeries:   Not applicable    Pneumonia:    Appropriate Antibiotic Selection (ICU versus Non-ICU)    Stroke:  Patient's Personal Risk Factors for Stroke are:   hypertension, family history, hyperlipidemia or diabetes mellitus    NIH Stroke Score  Total: 8 (08/17/17 0400)    Transfer Level of Care:  Not Ready for Transfer    The patient will require the following interventions based on the Readmission Risk Assessment:  Pharmacy evaluation and teaching, Care Management involvement for home health follow up for:  mobility and assistance with ADL's and Spiritual Care evaluation      Discharge Management:  Palliative Care    Anticipated Discharge Date:  3      Interdisciplinary team rounds were held  with the following team membersCare Management, Diabetes Treatment Specialist, Nursing, Nutrition, Pharmacy, Physician and Clinical Coordinator and the  patient and healthcare POA (documentation required). Plan of care discussed. See clinical pathway and/or care plan for interventions and desired outcomes. Continue treatment . Consult surgery for trach and peg placement. Start [id number] TID.

## 2017-08-28 NOTE — PROGRESS NOTES
RENAL DAILY PROGRESS NOTE    Patient: Wilmar Ordaz               Sex: male          DOA: 7/27/2017  4:23 PM        YOB: 1960      Age:  65 y.o.        LOS:  LOS: 32 days     Subjective:     Wilmar Ordaz is a 65 y.o.  who presents with Cardiac arrest ([facility]  Acidosis  Respiratory failure ([facility]  Anemia  ESRD needing dialysis ([facility]  Cardiac arrest ([facility]  Acute GI bleeding  Sepsis ([facility]  Hypotension  Anoxic brain damage (HCC)  ESRD (end stage renal disease) ([facility]  Colitis  dx  dx.    Asked to evaluate for esrd,had cardiac arrest,anoxic brain injury  - Reviewed last 24 hrs events     Current Facility-Administered Medications   Medication Dose Route Frequency    midodrine (PROAMITINE) tablet 10 mg  10 mg Oral BID WITH MEALS    potassium chloride (KLOR-CON) packet 40 mEq  40 mEq Per NG tube DAILY    Zinc Ox-Aloe Vera-Vitamin E (BALMEX) topical cream   Topical CONTINUOUS    insulin glargine (LANTUS) injection 20 Units  20 Units SubCUTAneous DAILY    banana flakes trans-galactooligosaccharide (BANATROL PLUS) powder 1 Packet  1 Packet Per NG tube TID    levETIRAcetam (KEPPRA) 500 mg in saline (iso-osm) 100 ml IVPB  500 mg IntraVENous Q12H    LORazepam (ATIVAN) injection 1 mg  1 mg IntraVENous Q4H PRN    insulin lispro (HUMALOG) injection   SubCUTAneous Q6H    white petrolatum-mineral oil 85-15 % oint 1 Dose  1 Dose Ophthalmic QID    chlorhexidine (PERIDEX) 0.12 % mouthwash 10 mL  10 mL Oral Q12H    NOREPINephrine (LEVOPHED) 16,000 mcg in dextrose 5% 250 mL infusion  2-16 mcg/min IntraVENous TITRATE    albuterol (PROVENTIL VENTOLIN) nebulizer solution 2.5 mg  2.5 mg Nebulization Q6H PRN    famotidine (PF) (PEPCID) 20 mg in sodium chloride 0.9 % 10 mL injection  20 mg IntraVENous DAILY    vits A and D-white pet-lanolin (A&D) ointment   Topical QID AFTER MEALS    collagenase (SANTYL) 250 unit/gram ointment   Topical DAILY    acetaminophen (TYLENOL) tablet 650 mg  650 mg Oral Q4H PRN  lactobacillus sp. 50 billion cpu (BIO-K PLUS) capsule 1 Cap  1 Cap Oral DAILY    loperamide (IMODIUM) capsule 2 mg  2 mg Oral Q6H PRN    heparin (porcine) injection 5,000 Units  5,000 Units SubCUTAneous Q8H    heparin (porcine) 1,000 unit/mL injection 2,000 Units  2,000 Units IntraVENous DIALYSIS ONCE    simvastatin (ZOCOR) tablet 20 mg  20 mg Oral QHS    doxercalciferol (HECTOROL) 4 mcg/2 mL injection 3 mcg  3 mcg IntraVENous DIALYSIS MON, WED & FRI    epoetin benjamin (EPOGEN;PROCRIT) injection 10,000 Units  10,000 Units IntraVENous DIALYSIS MON, WED & FRI    aspirin chewable tablet 81 mg  81 mg Oral DAILY    glucose chewable tablet 16 g  4 Tab Oral PRN    glucagon (GLUCAGEN) injection 1 mg  1 mg IntraMUSCular PRN    dextrose (D50W) injection syrg 12.5-25 g  25-50 mL IntraVENous PRN    0.9% sodium chloride infusion  100 mL/hr IntraVENous DIALYSIS PRN    naloxone (NARCAN) injection 0.4 mg  0.4 mg IntraVENous PRN       Objective:     Visit Vitals    /63    Pulse 84    Temp 99 °F (37.2 °C)    Resp 20    Ht 6' 2\" (1.88 m)    Wt 64.6 kg (142 lb 6.7 oz)    SpO2 100%    BMI 18.29 kg/m2       Intake/Output Summary (Last 24 hours) at 08/28/17 1158  Last data filed at 08/28/17 0817   Gross per 24 hour   Intake          2627.93 ml   Output              500 ml   Net          2127.93 ml       Physical Examination:     GEN: on vent  RS: Chest is bilateral equal, no wheezing / rales / crackles  CVS: S1-S2 heard, RRR, No S3 / murmur  Abdomen: Soft, Non tender, Not distended, Positive bowel sounds, no organomegaly, no CVA / supra pubic tenderness  Extremities: No edema, no cyanosis, skin is warm on touch  CNS: unresponsive  HEENT: Head is atraumatic, PERRLA, conjunctiva pink & non icteric. No JVD or carotid bruit   Musculoskeletal: No gross joints or bone deformities   Lymph Node: No palpable cervical, axillary or groin lymphadenopathy.       Data Review:      Labs:     Hematology: Recent Labs 08/28/17   0515  08/27/17   0400  08/26/17   0300   WBC  12.2  13.8*  15.0*   HGB  9.4*  9.2*  9.3*   HCT  29.3*  28.6*  29.1*     Chemistry: Recent Labs      08/28/17   0515  08/27/17   0400  08/26/17   0300   BUN  45*  36*  26*   CREA  7.43*  6.10*  4.79*   CA  8.9  8.5  9.0   K  4.2  4.2  3.2*  3.5   NA  139  142  139   CL  108  109*  107   CO2  20*  22  26   PHOS  2.6  2.1*  2.1*   GLU  261*  141*  255*        Images:    XR (Most Recent). CXR reviewed by me and compared with previous CXR   Results from Hospital Encounter encounter on 07/27/17   XR CHEST PORT   Narrative Portable chest x-ray, supine AP view, time 0146 hours on 8/27/2017:        INDICATION:    Respiratory failure. The patient has been on ventilation. Follow-up evaluation. COMPARISON STUDY: Portable chest x-ray on 8/26/2017, 8/25/2017. FINDINGS:    The lower end of the ET tube is 3.6 cm above bree. NG tube extends into upper stomach, as before. Stable position of left IJ central venous catheter. No evidence of pneumothorax or pneumomediastinum. Lungs are normally ventilated. Lungs are clear bilaterally. Cardiac size and  pulmonary vascularity are normal.             Impression IMPRESSION:    The ET tube is 3.6 cm above bree. There is no definable acute cardiopulmonary process. Lungs remain clear  bilaterally. CT (Most Recent)   Results from Hospital Encounter encounter on 07/27/17   CTA CHEST W OR W WO CONT   Narrative CTA CHEST PULMONARY EMBOLISM PROTOCOL        INDICATION: Cardiac arrest. Question pulmonary embolism.     TECHNIQUE: Thin collimation axial images obtained through the level of the  pulmonary arteries with additional imaging through the chest following the  uneventful administration of 70 cc Isovue-370 nonionic intravenous contrast.   Images reconstructed into three dimensional coronal and sagittal projections for  complete evaluation of the tortuous and overlapping pulmonary vascular  structures and to reduce patient radiation dose. All CT scans at this facility are performed using dose optimization technique as  appropriate to a performed exam, to include automated exposure control,  adjustment of the mA and/or kV according to patient size (including appropriate  matching first site-specific examinations), or use of iterative reconstruction  technique. COMPARISON: CT abdomen pelvis 7/27/2017. FINDINGS:    Evaluation is limited by respiration artifact. No filling defects are  appreciated within the main, left, right, lobar or visualized segmental  pulmonary arteries to suggest embolism. Main pulmonary artery is enlarged up to  3.3 cm. Endotracheal tube tip is approximately 1 cm proximal to the bree. Consider  1-1.5 cm retraction. NG but within the body. Thyroid: Unremarkable in its visualized aspects. Pericardium/ Heart: No significant effusion. Aorta/ Vessels: No aneurysm or dissection. Lymph Nodes: Unremarkable. .    Lungs: Layering secretions in the bilateral mainstem bronchus and at the bree. Patchy left lower lobe bronchovascular opacities. There is central bronchial  opacification of a subsegmental anterior right lower lobe bronchus with mild  adjacent groundglass. Minimal left upper lobe bronchovascular groundglass. Resolved large right pleural effusion and complete atelectasis right lower lobe. Pleura: No effusion. Upper Abdomen: IVC and hepatic veins are enlarged. Heterogeneous liver  attenuation. Both right renal arteries appear diminutive. Bones/soft tissues: Moderate anasarca. Bilateral anteromedial rib fractures  including right fourth-sixth and left fourth and fifth ribs. Degenerative disc  disease most significant at T3-4. Sclerotic bone foci T5 is likely a bone  island. Impression IMPRESSION:  1. No evidence for pulmonary emboli within limitations of respiratory motion.    2.  Tip of endotracheal tube is approximately 1 cm proximal to the bree,  consider 1-1.5 cm retraction. 3.  Layering secretions in the bronchus with left lower greater than upper  bronchovascular opacities most compatible with infectious etiology, possibly  aspiration given history. 4.  A single moderate length of endobronchial opacification right lower lobe. 5.  Resolved large right pleural effusion and atelectasis entire right lower  lobe. 6.  Bilateral anteromedial rib fractures associated with CPR. 7.  Main pulmonary artery enlargement as may be seen in pulmonary arterial  hypertension. 8.  Dilated IVC and hepatic veins as may be seen in right heart failure. 9.  Diminutive right renal arteries may be associated with hypotension and/or  peripheral arterial constriction. 10.  Moderate anasarca. EKG No results found for this or any previous visit. I have personally reviewed the old medical records and patient's labs    Plan / Recommendation:      1. esrd ,plan dialysis later today or tomorrow,depending on dialysis machine availability. one machine is broken   2.anemia,on epo  3.hypokalemia,dialysis with high potassium bath    D/w intensivist    Niraj Calvert MD  Nephrology  8/28/2017

## 2017-08-28 NOTE — DIABETES MGMT
GLYCEMIC CONTROL PLAN OF CARE    Assessment/Recommendations:  Pt discussed in interdisciplinary rounds. Blood glucose fluctuating. Pt continues to receive basal and correctional insulin coverage. No history of diabetes. Steroids decreased today. Monitor need to adjust insulin as steroids are tapered and discontinued.      Most recent blood glucose values:  8/27/2017 11:59 8/27/2017 17:09 8/28/2017 00:03 8/28/2017 05:49 8/28/2017 05:53   190 (H) 112 (H) 181 (H) 256 (H) 244 (H)     Current A1C of 5.4% is equivalent to average blood glucose of 108 mg/dl over the past 2-3 months.      Current hospital diabetes medications:   Correctional Lispro insulin every 6 hours (very resistant scale)  Lantus insulin 20 units daily      Previous day's insulin requirements:   20 units of Lantus insulin   3 units of Lispro insulin       Home diabetes medications:  None      Diet:  NPO  Tube Feeding: Nepro at 55 mL via NGT    Education:  ____Refer to Diabetes Education Record             __x__Education not indicated at this time      Terrie Singleton RD, CDE

## 2017-08-28 NOTE — PROGRESS NOTES
NUTRITION    Nursing Referral: MST, Pressure Ulcer  Nutrition Consult: Management of Tube Feeding     RECOMMENDATIONS / PLAN:     - Continue tube feeding of Nepro at goal of 55 mL/hr with 150 mL q 4 hour water flushes.   - Continue Banatrol TID for management of loose stool.   - Continue RD inpatient monitoring and evaluation. Goal Regimen: Nepro at 55 mL/hr + 150 mL q 4 hour water flushes to provide: 2376 kcal, 107 gm protein, 127 gm fat, 220 gm CHO, 21 gm fiber, 957 mL free water, 1857 mL total water, 100% RDIs     NUTRITION INTERVENTIONS & DIAGNOSIS:     [x] Enteral nutrition support: continue   [x] Coordination of care: interdisciplinary rounds     Nutrition Diagnosis: increased protein needs related to promotion of wound healing and increased expenditure as evidenced by pressure ulcer wound and ESRD, pt on dialysis 3x per week  Inadequate oral intake related to inability to tolerate po as evidenced by NPO. ASSESSMENT:     8/28: Pt tolerating tube feeding at goal   8/22: Tolerating feeds at goal. Loose stool, will add prebiotic fiber supplement. 8/21: Pt intubated after PEA arrest during HD 8/18, PEG placement postponed. Tolerating feeds at goal.   8/18: Tolerating tube feeds at goal.  Tube feeds held today for PEG placement. 8/15: Tube feeds started this morning. Pt tolerating at rate of 30 mL/hr  8/14: SLP following; recommend NPO. Noted plan for NGT placement and start tube feeds. Potassium WNL. 8/10: Pt reported good appetite and \"eating enough\" from his meals. Noted fair meal intake per chart. Consuming supplements. Discussed increasing Nepro frequency; pt declined. Discussed adding Ensure Pudding; pt agreed with plan. Po intake of meals/supplements encouraged. Has been receiving K replacement. 8/7: K remains low despite previous tube feeding changed to higher potassium formula. Pt extubated 8/5. Tube feeds discontinued 8/6. SLP following; pt s/p MBS today and started on po diet.  Noted poor po intake lunch meal per chart. 8/3: K remains low despite continued replacement. Will change to higher potassium formula per discussion with PA.   8/1: Tolerating feeding at goal. 1 L removed during HD 7/31. Hyperglycemia noted, advance to very insulin resistant SSI and basal insulin started. 7/31: Tolerating advancement of tube feeding. HD today. BG trending up, MD to stop D5.    7/30: Pt remain intubated. GI following; plan to start tube feeds today. Noted order placed; discussed modifying tube feed order and add water flushes with Dr Yessi Currie. RN unavailable; discussed with Nursing Thorndike regarding modifying tube feed order  7/28: S/p cardiac arrest and intubated 7/27, NGT clamped. Abdominal ultrasound today to rule out cholecystitis. Will wait to feed. EN infusion adequate to meet patients estimated nutritional needs:   [x] Yes     [] No   [] Unable to determine at this time    Tube Feeding: Nepro at 55 mL via NGT  Water Flushes: 150 mL q 4 hour  Residuals: 5 mL    Diet: DIET TUBE FEEDING  DIET NPO  DIET NUTRITIONAL SUPPLEMENTS Breakfast, Lunch, Dinner; [employer]       Food Allergies: NKFA  Current Appetite:   [] Good     [] Fair     [] Poor     [x] Other: NPO   Appetite/meal intake prior to admission:   [] Good     [] Fair     [] Poor     [x] Other: unknown   Feeding Limitations:  [x] Swallowing difficulty: SLP following; recommended NPO on 8/14    [] Chewing difficulty:    [x] Other: intubated       Anuric   BM: 8/28;  FMS  Skin Integrity:  stage II ressure ulcer anus; DTI sacrum; moisture associated skin damage posterior buttocks  Edema: none  Pertinent Medications: Reviewed: steroid, lantus, SSI, lactobacillus, imodium, levophed     Recent Labs      08/28/17   0515  08/27/17   0400  08/26/17   0300   NA  139  142  139   K  4.2  4.2  3.2*  3.5   CL  108  109*  107   CO2  20*  22  26   GLU  261*  141*  255*   BUN  45*  36*  26*   CREA  7.43*  6.10*  4.79*   CA  8.9  8.5  9.0   MG  3.1*  2.8*  2.5 PHOS  2.6  2.1*  2.1*       Intake/Output Summary (Last 24 hours) at 08/28/17 1028  Last data filed at 08/28/17 0817   Gross per 24 hour   Intake          2694.31 ml   Output              500 ml   Net          2194.31 ml       Anthropometrics:  Ht Readings from Last 1 Encounters:   08/15/17 6' 2\" (1.88 m)     Last 3 Recorded Weights in this Encounter    08/26/17 0500 08/27/17 0500 08/28/17 0558   Weight: 62.6 kg (138 lb) 62.6 kg (138 lb) 64.6 kg (142 lb 6.7 oz)     Body mass index is 18.29 kg/(m^2). Underweight     Weight History:   Weight Metrics 8/28/2017   Weight 142 lb 6.7 oz   BMI 18.29 kg/m2        Admitting Diagnosis: Cardiac arrest (HCC)  Acidosis  Respiratory failure (HCC)  Anemia  ESRD needing dialysis ([facility]  Cardiac arrest ([facility]  Acute GI bleeding  Sepsis ([facility]  Hypotension  Anoxic brain damage (HCC)  ESRD (end stage renal disease) ([facility]  Colitis  dx  dx  Pertinent PMHx: DM, HTN, IBS, ESRD    Education Needs:        [x] None identified  [] Identified - Not appropriate at this time  []  Identified and addressed - refer to education log  Learning Limitations:   [] None identified  [x] Identified: intubated      Cultural, Buddhist & ethnic food preferences:  [x] None identified    [] Identified and addressed     ESTIMATED NUTRITION NEEDS:     Calories: 2600-5721 kcal ([id number]) based on  [x] Actual BW 65 kg      [] SBW  Protein:  gm (1.2-2 gm/kg) based on  [x] Actual BW      [] SBW   Fluid: 5661-9749 mL     MONITORING & EVALUATION:     Nutrition Goal(s):   1. Patient will tolerate enteral nutrition formula and regimen without difficulty within the next 7 days. Outcome:  [x] Met/Ongoing    []  Not Met    [] New/Initial Goal   2. Patient will meet their estimated nutritional needs through adequate enteral nutrition within the next 7 days.  Outcome:  [x] Met/Ongoing    []  Not Met/Progressing    [] New/Initial Goal      Monitoring:   [x] EN tolerance    [x] EN infusion   [] Diet tolerance [] Meal intake   [] Supplement intake   [] GI symptoms/ability to tolerate po diet   [x] Respiratory status   [x] Plan of care      Previous Recommendations (for follow-up assessments only):     [x]   Implemented       []   Not Implemented (RD to address)     [] No Recommendation Made     Discharge Planning: goal enteral nutrition regimen   [x] Participated in care planning, discharge planning, & interdisciplinary rounds as appropriate       Maya Fuentes, [address]   Pager: [phone]

## 2017-08-29 ENCOUNTER — APPOINTMENT (OUTPATIENT)
Dept: GENERAL RADIOLOGY | Age: 57
DRG: 004 | End: 2017-08-29
Attending: PHYSICIAN ASSISTANT
Payer: MEDICARE

## 2017-08-29 LAB
ANION GAP SERPL CALC-SCNC: 11 MMOL/L (ref 3–18)
ARTERIAL PATENCY WRIST A: YES
BASE DEFICIT BLD-SCNC: 5 MMOL/L
BASOPHILS # BLD: 0 K/UL (ref 0–0.06)
BASOPHILS NFR BLD: 0 % (ref 0–2)
BDY SITE: ABNORMAL
BODY TEMPERATURE: 37
BUN SERPL-MCNC: 50 MG/DL (ref 7–18)
BUN/CREAT SERPL: 6 (ref 12–20)
CALCIUM SERPL-MCNC: 8.4 MG/DL (ref 8.5–10.1)
CHLORIDE SERPL-SCNC: 107 MMOL/L (ref 100–108)
CO2 SERPL-SCNC: 21 MMOL/L (ref 21–32)
CREAT SERPL-MCNC: 7.74 MG/DL (ref 0.6–1.3)
DIFFERENTIAL METHOD BLD: ABNORMAL
EOSINOPHIL # BLD: 0.2 K/UL (ref 0–0.4)
EOSINOPHIL NFR BLD: 2 % (ref 0–5)
ERYTHROCYTE [DISTWIDTH] IN BLOOD BY AUTOMATED COUNT: 20.2 % (ref 11.6–14.5)
GAS FLOW.O2 O2 DELIVERY SYS: ABNORMAL L/MIN
GAS FLOW.O2 SETTING OXYMISER: 14 BPM
GLUCOSE BLD STRIP.AUTO-MCNC: 101 MG/DL (ref 70–110)
GLUCOSE BLD STRIP.AUTO-MCNC: 107 MG/DL (ref 70–110)
GLUCOSE BLD STRIP.AUTO-MCNC: 139 MG/DL (ref 70–110)
GLUCOSE BLD STRIP.AUTO-MCNC: 256 MG/DL (ref 70–110)
GLUCOSE SERPL-MCNC: 165 MG/DL (ref 74–99)
HCO3 BLD-SCNC: 19.9 MMOL/L (ref 22–26)
HCT VFR BLD AUTO: 28.7 % (ref 36–48)
HGB BLD-MCNC: 9.1 G/DL (ref 13–16)
LYMPHOCYTES # BLD: 1.8 K/UL (ref 0.9–3.6)
LYMPHOCYTES NFR BLD: 15 % (ref 21–52)
MAGNESIUM SERPL-MCNC: 3.1 MG/DL (ref 1.6–2.6)
MCH RBC QN AUTO: 31.2 PG (ref 24–34)
MCHC RBC AUTO-ENTMCNC: 31.7 G/DL (ref 31–37)
MCV RBC AUTO: 98.3 FL (ref 74–97)
MONOCYTES # BLD: 0.7 K/UL (ref 0.05–1.2)
MONOCYTES NFR BLD: 6 % (ref 3–10)
NEUTS SEG # BLD: 9 K/UL (ref 1.8–8)
NEUTS SEG NFR BLD: 77 % (ref 40–73)
O2/TOTAL GAS SETTING VFR VENT: 25 %
PCO2 BLD: 31.3 MMHG (ref 35–45)
PH BLD: 7.41 [PH] (ref 7.35–7.45)
PHOSPHATE SERPL-MCNC: 2.6 MG/DL (ref 2.5–4.9)
PIP ISTAT,IPIP: 5
PLATELET # BLD AUTO: 478 K/UL (ref 135–420)
PMV BLD AUTO: 10.3 FL (ref 9.2–11.8)
PO2 BLD: 85 MMHG (ref 80–100)
POTASSIUM SERPL-SCNC: 3.4 MMOL/L (ref 3.5–5.5)
RBC # BLD AUTO: 2.92 M/UL (ref 4.7–5.5)
SAO2 % BLD: 97 % (ref 92–97)
SERVICE CMNT-IMP: ABNORMAL
SODIUM SERPL-SCNC: 139 MMOL/L (ref 136–145)
SPECIMEN TYPE: ABNORMAL
TOTAL RESP. RATE, ITRR: 17
VENTILATION MODE VENT: ABNORMAL
VOLUME CONTROL PLUS IVLCP: YES
VT SETTING VENT: 400 ML
WBC # BLD AUTO: 11.7 K/UL (ref 4.6–13.2)

## 2017-08-29 PROCEDURE — 74011250637 HC RX REV CODE- 250/637: Performed by: INTERNAL MEDICINE

## 2017-08-29 PROCEDURE — 83735 ASSAY OF MAGNESIUM: CPT | Performed by: NURSE PRACTITIONER

## 2017-08-29 PROCEDURE — 74011000250 HC RX REV CODE- 250: Performed by: NURSE PRACTITIONER

## 2017-08-29 PROCEDURE — 80048 BASIC METABOLIC PNL TOTAL CA: CPT | Performed by: NURSE PRACTITIONER

## 2017-08-29 PROCEDURE — 74011250637 HC RX REV CODE- 250/637: Performed by: HOSPITALIST

## 2017-08-29 PROCEDURE — 71010 XR CHEST PORT: CPT

## 2017-08-29 PROCEDURE — 74011000250 HC RX REV CODE- 250: Performed by: PHYSICIAN ASSISTANT

## 2017-08-29 PROCEDURE — 74011636637 HC RX REV CODE- 636/637: Performed by: INTERNAL MEDICINE

## 2017-08-29 PROCEDURE — 65610000006 HC RM INTENSIVE CARE

## 2017-08-29 PROCEDURE — 85025 COMPLETE CBC W/AUTO DIFF WBC: CPT | Performed by: NURSE PRACTITIONER

## 2017-08-29 PROCEDURE — 36600 WITHDRAWAL OF ARTERIAL BLOOD: CPT

## 2017-08-29 PROCEDURE — 84100 ASSAY OF PHOSPHORUS: CPT | Performed by: NURSE PRACTITIONER

## 2017-08-29 PROCEDURE — 74011250637 HC RX REV CODE- 250/637: Performed by: NURSE PRACTITIONER

## 2017-08-29 PROCEDURE — 74000 XR ABD PORT  1 V: CPT

## 2017-08-29 PROCEDURE — 74011000258 HC RX REV CODE- 258: Performed by: NURSE PRACTITIONER

## 2017-08-29 PROCEDURE — 36592 COLLECT BLOOD FROM PICC: CPT

## 2017-08-29 PROCEDURE — 74011250637 HC RX REV CODE- 250/637: Performed by: PHYSICIAN ASSISTANT

## 2017-08-29 PROCEDURE — 82803 BLOOD GASES ANY COMBINATION: CPT

## 2017-08-29 PROCEDURE — 74011250637 HC RX REV CODE- 250/637: Performed by: FAMILY MEDICINE

## 2017-08-29 PROCEDURE — 74011250636 HC RX REV CODE- 250/636: Performed by: INTERNAL MEDICINE

## 2017-08-29 PROCEDURE — 94003 VENT MGMT INPAT SUBQ DAY: CPT

## 2017-08-29 PROCEDURE — 90935 HEMODIALYSIS ONE EVALUATION: CPT

## 2017-08-29 PROCEDURE — 74011250636 HC RX REV CODE- 250/636: Performed by: HOSPITALIST

## 2017-08-29 PROCEDURE — 82962 GLUCOSE BLOOD TEST: CPT

## 2017-08-29 PROCEDURE — 77030008771 HC TU NG SALEM SUMP -A

## 2017-08-29 RX ORDER — MIDODRINE HYDROCHLORIDE 2.5 MG/1
15 TABLET ORAL
Status: DISCONTINUED | OUTPATIENT
Start: 2017-08-29 | End: 2017-09-13

## 2017-08-29 RX ADMIN — MIDODRINE HYDROCHLORIDE 10 MG: 2.5 TABLET ORAL at 08:20

## 2017-08-29 RX ADMIN — LEVETIRACETAM 500 MG: 5 INJECTION INTRAVENOUS at 21:53

## 2017-08-29 RX ADMIN — MIDODRINE HYDROCHLORIDE 15 MG: 2.5 TABLET ORAL at 16:50

## 2017-08-29 RX ADMIN — VITAMIN A AND D: 30.8 OINTMENT TOPICAL at 17:00

## 2017-08-29 RX ADMIN — VITAMIN A AND D: 30.8 OINTMENT TOPICAL at 08:28

## 2017-08-29 RX ADMIN — Medication 1 CAPSULE: at 08:26

## 2017-08-29 RX ADMIN — HEPARIN SODIUM 5000 UNITS: 5000 INJECTION, SOLUTION INTRAVENOUS; SUBCUTANEOUS at 09:12

## 2017-08-29 RX ADMIN — INSULIN LISPRO 2 UNITS: 100 INJECTION, SOLUTION INTRAVENOUS; SUBCUTANEOUS at 08:56

## 2017-08-29 RX ADMIN — Medication 1 PACKET: at 16:53

## 2017-08-29 RX ADMIN — INSULIN GLARGINE 20 UNITS: 100 INJECTION, SOLUTION SUBCUTANEOUS at 08:25

## 2017-08-29 RX ADMIN — SIMVASTATIN 20 MG: 10 TABLET, FILM COATED ORAL at 21:53

## 2017-08-29 RX ADMIN — FAMOTIDINE 20 MG: 10 INJECTION INTRAVENOUS at 08:26

## 2017-08-29 RX ADMIN — Medication 1 PACKET: at 23:25

## 2017-08-29 RX ADMIN — MIDODRINE HYDROCHLORIDE 15 MG: 2.5 TABLET ORAL at 12:00

## 2017-08-29 RX ADMIN — MINERAL OIL AND WHITE PETROLATUM 1 DOSE: 150; 850 OINTMENT OPHTHALMIC at 14:07

## 2017-08-29 RX ADMIN — VITAMIN A AND D: 30.8 OINTMENT TOPICAL at 14:06

## 2017-08-29 RX ADMIN — POTASSIUM CHLORIDE 40 MEQ: 1.5 POWDER, FOR SOLUTION ORAL at 08:20

## 2017-08-29 RX ADMIN — MINERAL OIL AND WHITE PETROLATUM 1 DOSE: 150; 850 OINTMENT OPHTHALMIC at 17:00

## 2017-08-29 RX ADMIN — HEPARIN SODIUM 5000 UNITS: 5000 INJECTION, SOLUTION INTRAVENOUS; SUBCUTANEOUS at 02:00

## 2017-08-29 RX ADMIN — MINERAL OIL AND WHITE PETROLATUM 1 DOSE: 150; 850 OINTMENT OPHTHALMIC at 21:53

## 2017-08-29 RX ADMIN — MINERAL OIL AND WHITE PETROLATUM 1 DOSE: 150; 850 OINTMENT OPHTHALMIC at 08:26

## 2017-08-29 RX ADMIN — DOXERCALCIFEROL 3 MCG: 4 INJECTION, SOLUTION INTRAVENOUS at 21:00

## 2017-08-29 RX ADMIN — COLLAGENASE SANTYL: 250 OINTMENT TOPICAL at 08:27

## 2017-08-29 RX ADMIN — ASPIRIN 81 MG 81 MG: 81 TABLET ORAL at 08:25

## 2017-08-29 RX ADMIN — CHLORHEXIDINE GLUCONATE 10 ML: 1.2 RINSE ORAL at 08:20

## 2017-08-29 RX ADMIN — INSULIN LISPRO 2 UNITS: 100 INJECTION, SOLUTION INTRAVENOUS; SUBCUTANEOUS at 01:30

## 2017-08-29 RX ADMIN — VITAMIN A AND D: 30.8 OINTMENT TOPICAL at 23:23

## 2017-08-29 RX ADMIN — ERYTHROPOIETIN 10000 UNITS: 10000 INJECTION, SOLUTION INTRAVENOUS; SUBCUTANEOUS at 21:00

## 2017-08-29 RX ADMIN — Medication 1 PACKET: at 08:28

## 2017-08-29 RX ADMIN — NOREPINEPHRINE BITARTRATE 4 MCG/MIN: 1 INJECTION INTRAVENOUS at 22:30

## 2017-08-29 RX ADMIN — HEPARIN SODIUM 5000 UNITS: 5000 INJECTION, SOLUTION INTRAVENOUS; SUBCUTANEOUS at 18:00

## 2017-08-29 RX ADMIN — CHLORHEXIDINE GLUCONATE 10 ML: 1.2 RINSE ORAL at 21:53

## 2017-08-29 RX ADMIN — LEVETIRACETAM 500 MG: 5 INJECTION INTRAVENOUS at 08:27

## 2017-08-29 NOTE — ROUTINE PROCESS
Pt with copious saliva today with drip pads under chin changed every 2 hours today, suctioned orally every 1-2 hours for clear saliva, pt with strong cough requiring frequent suctioned from et tube also remains unresponsive, with posturing, eyes closed most of today, no cornea reflexes, threat or blink eyes with tearing have to be wiped about every 1-2 hours

## 2017-08-29 NOTE — ROUTINE PROCESS
{BSI BEDSIDE_VERBAL_RECORDED_WRITTEN:[id number]::\"Bedside\" shift change report given to [employer] (oncoming nurse) by Len Macias RN (offgoing nurse). Report included the following information SBAR, Kardex, and patients labs, shift of neurtrophils, eos, noted. Pt having copious amounts pulmonary secretions thick stringy clear secretions.

## 2017-08-29 NOTE — PROGRESS NOTES
RENAL DAILY PROGRESS NOTE    Patient: Whitney Mayes               Sex: male          DOA: 7/27/2017  4:23 PM        YOB: 1960      Age:  65 y.o.        LOS:  LOS: 33 days     Subjective:     Whitney Mayes is a 65 y.o.  who presents with Cardiac arrest ([facility]  Acidosis  Respiratory failure ([facility]  Anemia  ESRD needing dialysis ([facility]  Cardiac arrest ([facility]  Acute GI bleeding  Sepsis ([facility]  Hypotension  Anoxic brain damage (HCC)  ESRD (end stage renal disease) ([facility]  Colitis  dx  dx.    Asked to evaluate for esrd,had cardiac arrest,anoxic brain injury  - Reviewed last 24 hrs events     Current Facility-Administered Medications   Medication Dose Route Frequency    midodrine (PROAMITINE) tablet 15 mg  15 mg Oral TID WITH MEALS    potassium chloride (KLOR-CON) packet 40 mEq  40 mEq Per NG tube DAILY    Zinc Ox-Aloe Vera-Vitamin E (BALMEX) topical cream   Topical CONTINUOUS    insulin glargine (LANTUS) injection 20 Units  20 Units SubCUTAneous DAILY    banana flakes trans-galactooligosaccharide (BANATROL PLUS) powder 1 Packet  1 Packet Per NG tube TID    levETIRAcetam (KEPPRA) 500 mg in saline (iso-osm) 100 ml IVPB  500 mg IntraVENous Q12H    LORazepam (ATIVAN) injection 1 mg  1 mg IntraVENous Q4H PRN    insulin lispro (HUMALOG) injection   SubCUTAneous Q6H    white petrolatum-mineral oil 85-15 % oint 1 Dose  1 Dose Ophthalmic QID    chlorhexidine (PERIDEX) 0.12 % mouthwash 10 mL  10 mL Oral Q12H    NOREPINephrine (LEVOPHED) 16,000 mcg in dextrose 5% 250 mL infusion  2-16 mcg/min IntraVENous TITRATE    albuterol (PROVENTIL VENTOLIN) nebulizer solution 2.5 mg  2.5 mg Nebulization Q6H PRN    famotidine (PF) (PEPCID) 20 mg in sodium chloride 0.9 % 10 mL injection  20 mg IntraVENous DAILY    vits A and D-white pet-lanolin (A&D) ointment   Topical QID AFTER MEALS    collagenase (SANTYL) 250 unit/gram ointment   Topical DAILY    acetaminophen (TYLENOL) tablet 650 mg  650 mg Oral Q4H PRN  lactobacillus sp. 50 billion cpu (BIO-K PLUS) capsule 1 Cap  1 Cap Oral DAILY    loperamide (IMODIUM) capsule 2 mg  2 mg Oral Q6H PRN    heparin (porcine) injection 5,000 Units  5,000 Units SubCUTAneous Q8H    heparin (porcine) 1,000 unit/mL injection 2,000 Units  2,000 Units IntraVENous DIALYSIS ONCE    simvastatin (ZOCOR) tablet 20 mg  20 mg Oral QHS    doxercalciferol (HECTOROL) 4 mcg/2 mL injection 3 mcg  3 mcg IntraVENous DIALYSIS MON, WED & FRI    epoetin benjamin (EPOGEN;PROCRIT) injection 10,000 Units  10,000 Units IntraVENous DIALYSIS MON, WED & FRI    aspirin chewable tablet 81 mg  81 mg Oral DAILY    glucose chewable tablet 16 g  4 Tab Oral PRN    glucagon (GLUCAGEN) injection 1 mg  1 mg IntraMUSCular PRN    dextrose (D50W) injection syrg 12.5-25 g  25-50 mL IntraVENous PRN    0.9% sodium chloride infusion  100 mL/hr IntraVENous DIALYSIS PRN    naloxone (NARCAN) injection 0.4 mg  0.4 mg IntraVENous PRN       Objective:     Visit Vitals    /84    Pulse 76    Temp 98.7 °F (37.1 °C)    Resp 18    Ht 6' 2\" (1.88 m)    Wt 64.6 kg (142 lb 6.7 oz)    SpO2 100%    BMI 18.29 kg/m2       Intake/Output Summary (Last 24 hours) at 08/29/17 1628  Last data filed at 08/29/17 1200   Gross per 24 hour   Intake          2192.02 ml   Output              850 ml   Net          1342.02 ml       Physical Examination:     GEN: on vent  RS: Chest is bilateral equal, no wheezing / rales / crackles  CVS: S1-S2 heard, RRR, No S3 / murmur  Abdomen: Soft, Non tender, Not distended, Positive bowel sounds, no organomegaly, no CVA / supra pubic tenderness  Extremities: No edema, no cyanosis, skin is warm on touch  CNS: unresponsive  HEENT: Head is atraumatic, PERRLA, conjunctiva pink & non icteric. No JVD or carotid bruit   Musculoskeletal: No gross joints or bone deformities   Lymph Node: No palpable cervical, axillary or groin lymphadenopathy.       Data Review:      Labs:     Hematology:   Recent Labs 08/29/17   0620  08/28/17   0515  08/27/17   0400   WBC  11.7  12.2  13.8*   HGB  9.1*  9.4*  9.2*   HCT  28.7*  29.3*  28.6*     Chemistry:   Recent Labs      08/29/17   0620  08/28/17   0515  08/27/17   0400   BUN  50*  45*  36*   CREA  7.74*  7.43*  6.10*   CA  8.4*  8.9  8.5   K  3.4*  4.2  4.2  3.2*   NA  139  139  142   CL  107  108  109*   CO2  21  20*  22   PHOS  2.6  2.6  2.1*   GLU  165*  261*  141*        Images:    XR (Most Recent). CXR reviewed by me and compared with previous CXR   Results from Hospital Encounter encounter on 07/27/17   XR ABD PORT  1 V   Narrative History: Nasogastric tube placement    COMPARISON: August 16, 2017    Portable frontal view the abdomen. FINDINGS:    Rotoscoliosis noted. Overlying telemetry leads present. No visualized dilated loops of bowel. Partial evaluated feeding tube noted with tip and sidehole terminating over the  left upper quadrant. Impression IMPRESSION:    Nasogastric tube as discussed. CT (Most Recent)   Results from Hospital Encounter encounter on 07/27/17   CTA CHEST W OR W WO CONT   Narrative CTA CHEST PULMONARY EMBOLISM PROTOCOL        INDICATION: Cardiac arrest. Question pulmonary embolism. TECHNIQUE: Thin collimation axial images obtained through the level of the  pulmonary arteries with additional imaging through the chest following the  uneventful administration of 70 cc Isovue-370 nonionic intravenous contrast.   Images reconstructed into three dimensional coronal and sagittal projections for  complete evaluation of the tortuous and overlapping pulmonary vascular  structures and to reduce patient radiation dose.      All CT scans at this facility are performed using dose optimization technique as  appropriate to a performed exam, to include automated exposure control,  adjustment of the mA and/or kV according to patient size (including appropriate  matching first site-specific examinations), or use of iterative reconstruction  technique. COMPARISON: CT abdomen pelvis 7/27/2017. FINDINGS:    Evaluation is limited by respiration artifact. No filling defects are  appreciated within the main, left, right, lobar or visualized segmental  pulmonary arteries to suggest embolism. Main pulmonary artery is enlarged up to  3.3 cm. Endotracheal tube tip is approximately 1 cm proximal to the bree. Consider  1-1.5 cm retraction. NG but within the body. Thyroid: Unremarkable in its visualized aspects. Pericardium/ Heart: No significant effusion. Aorta/ Vessels: No aneurysm or dissection. Lymph Nodes: Unremarkable. .    Lungs: Layering secretions in the bilateral mainstem bronchus and at the bree. Patchy left lower lobe bronchovascular opacities. There is central bronchial  opacification of a subsegmental anterior right lower lobe bronchus with mild  adjacent groundglass. Minimal left upper lobe bronchovascular groundglass. Resolved large right pleural effusion and complete atelectasis right lower lobe. Pleura: No effusion. Upper Abdomen: IVC and hepatic veins are enlarged. Heterogeneous liver  attenuation. Both right renal arteries appear diminutive. Bones/soft tissues: Moderate anasarca. Bilateral anteromedial rib fractures  including right fourth-sixth and left fourth and fifth ribs. Degenerative disc  disease most significant at T3-4. Sclerotic bone foci T5 is likely a bone  island. Impression IMPRESSION:  1. No evidence for pulmonary emboli within limitations of respiratory motion. 2.  Tip of endotracheal tube is approximately 1 cm proximal to the bree,  consider 1-1.5 cm retraction. 3.  Layering secretions in the bronchus with left lower greater than upper  bronchovascular opacities most compatible with infectious etiology, possibly  aspiration given history. 4.  A single moderate length of endobronchial opacification right lower lobe.   5.  Resolved large right pleural effusion and atelectasis entire right lower  lobe. 6.  Bilateral anteromedial rib fractures associated with CPR. 7.  Main pulmonary artery enlargement as may be seen in pulmonary arterial  hypertension. 8.  Dilated IVC and hepatic veins as may be seen in right heart failure. 9.  Diminutive right renal arteries may be associated with hypotension and/or  peripheral arterial constriction. 10.  Moderate anasarca. EKG No results found for this or any previous visit.      I have personally reviewed the old medical records and patient's labs    Plan / Recommendation:      1. esrd ,plan dialysis later today  2.anemia,on epo  3.hypokalemia,dialysis with high potassium bath    D/w intensivist    Breana Zhang MD  Nephrology  8/29/2017

## 2017-08-29 NOTE — ROUTINE PROCESS
.Bedside and Verbal shift change report given to Woo (oncoming nurse) by Catrina Tobar (offgoing nurse). Report included the following information SBAR, Kardex and MAR.

## 2017-08-29 NOTE — INTERDISCIPLINARY ROUNDS
CRITICAL CARE INTERDISCIPLINARY ROUNDS      Patient Information:    Name:   Frank Marie    Age:   65 y.o.     Admission Date:   7/27/2017    Readmit Risk Assessment Information:      Readmit Risk Tool Support Systems: Family member(s), Relationship with Primary Physician Group: Seen at least one time within past 12 months    Surgery Date:      Day of Stay:     Expected Discharge Date:        Attending Provider:   David Olvia MD    Surgeon:        Consultant:       Primary Care Provider:   Hill Ott MD    Problem List:     Patient Active Problem List   Diagnosis Code    Anemia D64.9    Acidosis E87.2    Cardiac arrest ([address] I46.9    Respiratory failure ([address] J96.90    ESRD needing dialysis ([facility] N18.6, Z99.2    Anoxic brain damage (HCC) G93.1    Colitis K52.9    Hypotension I95.9    Acute GI bleeding K92.2    ESRD (end stage renal disease) ([address] N18.6    Sepsis ([address] A41.9    Oropharyngeal dysphagia R13.12    Cachexia ([address] R64       Principal Problem:  Sepsis ([address]    Procedure:       During rounds the following quality care indicators and evidence based practices were addressed :     DVT Prophylaxis, Pressure Injury Prevention, Pain Management, Sepsis resuscitation and management, Nutritional Status, Critical Care Interventions Airways, Lines and Pressors and IHI Bundles: Central Line Bundle Followed  and Vent Bundle Followed, Vent Day 11           Acute MI/PCI:   Not applicable    Heart Failure:    Not applicable    Cardiac Surgery:  Not applicable    SCIP Measures for other Surgeries:   Not applicable    Pneumonia:    Appropriate Antibiotic Selection (ICU versus Non-ICU)    Stroke:  Patient's Personal Risk Factors for Stroke are:   hypertension, family history, hyperlipidemia or diabetes mellitus    NIH Stroke Score  Total: 8 (08/17/17 0400)    Transfer Level of Care:  Not Ready for Transfer    The patient will require the following interventions based on the Readmission Risk Assessment:  Pharmacy evaluation and teaching, Care Management involvement for home health follow up for:  mobility and assistance with ADL's and Spiritual Care evaluation      Discharge Management:  Palliative Care    Anticipated Discharge Date:  5      Interdisciplinary team rounds were held  with the following team membersCare Management, Diabetes Treatment Specialist, Nursing, Nutrition, Palliative Care, Pastoral Care, Pharmacy, Physician and Clinical Coordinator and the  patient and healthcare POA (documentation required). Plan of care discussed. See clinical pathway and/or care plan for interventions and desired outcomes. Ethics consult placed. Family wants trach and peg placed. Dialysis continued. Dr Robin Seals called and midodrine increased.

## 2017-08-29 NOTE — PROGRESS NOTES
[facility]. Pulmonary, Critical Care, and Sleep Medicine    Name: Henry Aguirre MRN: [id number]   : 1960 Hospital: [address] [place] [address] Dr   Date: 2017  Admission Date: 2017     Chart and notes reviewed. Data reviewed. I have evaluated all findings. [x]I have reviewed the flowsheet and previous days notes. [x]The patient is unable to give any meaningful history or review of systems because the patient is:  [x]Intubated []Sedated   [x]Unresponsive      [x]The patient is critically ill on      [x]Mechanical ventilation []Pressors   []BiPAP []                 ROS:Review of systems not obtained due to patient factors. Events and notes from last 24 hours reviewed. Care plan discussed on multidisciplinary rounds.   Patient Active Problem List   Diagnosis Code    Anemia D64.9    Acidosis E87.2    Cardiac arrest ([address] I46.9    Respiratory failure ([address] J96.90    ESRD needing dialysis ([address] N18.6, Z99.2    Anoxic brain damage ([address] G93.1    Colitis K52.9    Hypotension I95.9    Acute GI bleeding K92.2    ESRD (end stage renal disease) ([address] N18.6    Sepsis (HCC) A41.9    Oropharyngeal dysphagia R13.12    Cachexia (HCC) R64       Vital Signs:  Visit Vitals    /84    Pulse 70    Temp 98.8 °F (37.1 °C)    Resp 12    Ht 6' 2\" (1.88 m)    Wt 64.6 kg (142 lb 6.7 oz)    SpO2 100%    BMI 18.29 kg/m2       O2 Device: Endotracheal tube   O2 Flow Rate (L/min): 6 l/min   Temp (24hrs), Av.4 °F (36.9 °C), Min:97.4 °F (36.3 °C), Max:99.1 °F (37.3 °C)       Intake/Output:   Last shift:      701 - 1900  In: 260   Out: -   Last 3 shifts: 1901 -  0700  In: 4449.6 [I.V.:294.6]  Out: 1150 [Drains:1150]    Intake/Output Summary (Last 24 hours) at 17 1541  Last data filed at 17 1200   Gross per 24 hour   Intake          2408.27 ml   Output              850 ml   Net          1558.27 ml      Ventilator Settings:  Ventilator  Mode: Assist control, VC+  Respiratory Rate  Resp Rate Observed: 17  Back-Up Rate: 14  Insp Time (sec): 1 sec  Insp Flow (l/min): 44 l/min  I:E Ratio: 1:2.5  Ventilator Volumes  Vt Set (ml): 400 ml  Vt Exhaled (Machine Breath) (ml): 393 ml  Vt Spont (ml): 390 ml  Ve Observed (l/min): 7.16 l/min  Ventilator Pressures  Pressure Support (cm H2O): 7 cm H2O  PIP Observed (cm H2O): 11 cm H2O  Plateau Pressure (cm H2O): 12 cm H2O  MAP (cm H2O): 7.2  PEEP/VENT (cm H2O): 5 cm H20  Auto PEEP Observed (cm H2O): 0.4 cm H2O    Current Facility-Administered Medications   Medication Dose Route Frequency    midodrine (PROAMITINE) tablet 15 mg  15 mg Oral TID WITH MEALS    potassium chloride (KLOR-CON) packet 40 mEq  40 mEq Per NG tube DAILY    Zinc Ox-Aloe Vera-Vitamin E (BALMEX) topical cream   Topical CONTINUOUS    insulin glargine (LANTUS) injection 20 Units  20 Units SubCUTAneous DAILY    banana flakes trans-galactooligosaccharide (BANATROL PLUS) powder 1 Packet  1 Packet Per NG tube TID    levETIRAcetam (KEPPRA) 500 mg in saline (iso-osm) 100 ml IVPB  500 mg IntraVENous Q12H    insulin lispro (HUMALOG) injection   SubCUTAneous Q6H    white petrolatum-mineral oil 85-15 % oint 1 Dose  1 Dose Ophthalmic QID    chlorhexidine (PERIDEX) 0.12 % mouthwash 10 mL  10 mL Oral Q12H    NOREPINephrine (LEVOPHED) 16,000 mcg in dextrose 5% 250 mL infusion  2-16 mcg/min IntraVENous TITRATE    famotidine (PF) (PEPCID) 20 mg in sodium chloride 0.9 % 10 mL injection  20 mg IntraVENous DAILY    vits A and D-white pet-lanolin (A&D) ointment   Topical QID AFTER MEALS    collagenase (SANTYL) 250 unit/gram ointment   Topical DAILY    lactobacillus sp. 50 billion cpu (BIO-K PLUS) capsule 1 Cap  1 Cap Oral DAILY    heparin (porcine) injection 5,000 Units  5,000 Units SubCUTAneous Q8H    heparin (porcine) 1,000 unit/mL injection 2,000 Units  2,000 Units IntraVENous DIALYSIS ONCE    simvastatin (ZOCOR) tablet 20 mg  20 mg Oral QHS    doxercalciferol (HECTOROL) 4 mcg/2 mL injection 3 mcg  3 mcg IntraVENous DIALYSIS MON, WED & FRI    epoetin benjamin (EPOGEN;PROCRIT) injection 10,000 Units  10,000 Units IntraVENous DIALYSIS MON, WED & FRI    aspirin chewable tablet 81 mg  81 mg Oral DAILY       Telemetry:     Physical Exam:         Lungs: decreased air exchange bibasilar, no dullness/ tenderness/ rash   Heart: Regular rate and rhythm   Abdomen: non-distended, bowel sounds normoactive, tympanic, abdomen is soft without significant tenderness, masses, organomegaly or guarding   Extremity: Trace edema   Neuro: comatose. Extraocular movements are absent to doll's eye maneuver. Absent gag reflex. Patient withdraws arms to painful stimulus. DATA:  MAR reviewed and pertinent medications noted or modified as needed    Labs:  Recent Labs      08/29/17   0620 08/28/17   0515  08/27/17   0400   WBC  11.7  12.2  13.8*   HGB  9.1*  9.4*  9.2*   HCT  28.7*  29.3*  28.6*   PLT  478*  538*  448*     Recent Labs      08/29/17   0620  08/28/17   0515  08/27/17   0400   NA  139  139  142   K  3.4*  4.2  4.2  3.2*   CL  107  108  109*   CO2  21  20*  22   GLU  165*  261*  141*   BUN  50*  45*  36*   CREA  7.74*  7.43*  6.10*   CA  8.4*  8.9  8.5   MG  3.1*  3.1*  2.8*   PHOS  2.6  2.6  2.1*     No results for input(s): PH, PCO2, PO2, HCO3, FIO2 in the last 72 hours. Recent Labs      08/29/17   0408  08/28/17   0515  08/28/17   0505   FIO2I  25  25  25   HCO3I  19.9*  20.4*  21.6*   PCO2I  31.3*  29.7*  34.3*   PHI  7.412  7.445  7.408   PO2I  85  125*  46*     Imaging:  [x]                           I have personally reviewed the patients radiographs and reports    High complexity decision making was performed during this consultation and evaluation. [x]       Pt is at high risk for further organ failure and dysfunction. Critical care time spent  55 minutes with patient exclusive of procedures.     IMPRESSION:   · Acute encephalopathy which appears to be secondary to anoxic brain injury. Overall prognosis appears to be very dismal. Consider family discussion and refer to hospice/palliative care as appropriate. · Cardiac arrest on admission and this resulted in significant anoxic brain injury. · Acute hypoxic respiratory failure requiring intubation and mechanical ventilation. His FiO2 requirements are minimal - however the major factor over here is his mental status and he most likely would need tracheostomy and peg tube placement. · Acute shock which appears to be secondary to sepsis. No cardiac issues related to PEA cardiac arrest.   · ESRD requiring dialysis  · Oropharyngeal dysphagia  · Pericardial effusion   · Diarrhea - C diff testing negative. PLAN:   Neurological:  - Sedation: Continue to adhere to pain, agitation and delirium guidelines in the ICU. Continue to assess prevent and manage pain. Patient is currently not requiring any medication for sedation.   - ICU delirium assessment: Continue to assess, prevent and manage delirium using the CAM-ICU tool. - Continue Keppra. Respiratory:  Mechanical ventilation: Adhere to low tidal volume ventilation strategy as per the [employer] guidelines. Aim for plateau pressures < 48JL. Continue to adhere to the VAP bundle to minimize the risk of VAP. Oxygenation: Optimize PEEP and FiO2 to maintain oxygenation. Consulted for  tracheostomy and PEG tube placemen  --> daughter wants this done     Cardiac:  Vasopressors: Aim to keep MAP>65 mm of Hg. Low dose Levophed  Increase  Midodrine to 15 tid    Renal:  Currently requiring renal support. Nephrology following for HD. Renal functions and Cr: stable    GI:  Diet: Continue tube feeds. Stress ulcer ppx: Pepcid. Endocrinology:  Glycemic control: stable.    D/c steroid taper       Best practice :    MVentilated patients- VAP bundle , aim to keep peak plateau pressure 37-83RG H2O  Sress ulcer prophylaxis  DVT prophylaxis  Need for Lines, mancini assessed.       Get Strong MD

## 2017-08-29 NOTE — PROGRESS NOTES
[facility]. Pulmonary, Critical Care, and Sleep Medicine    Name: Hanane Childs MRN: [id number]   : 1960 Hospital: [address] [place] [address] Dr   Date: 2017  Admission Date: 2017     Chart and notes reviewed. Data reviewed. I have evaluated all findings. [x]I have reviewed the flowsheet and previous days notes. [x]The patient is unable to give any meaningful history or review of systems because the patient is:  [x]Intubated []Sedated   [x]Unresponsive      [x]The patient is critically ill on      [x]Mechanical ventilation []Pressors   []BiPAP []                 ROS:Review of systems not obtained due to patient factors. Events and notes from last 24 hours reviewed. Care plan discussed on multidisciplinary rounds.   Patient Active Problem List   Diagnosis Code    Anemia D64.9    Acidosis E87.2    Cardiac arrest ([facility] I46.9    Respiratory failure ([facility] J96.90    ESRD needing dialysis ([address] N18.6, Z99.2    Anoxic brain damage ([address] G93.1    Colitis K52.9    Hypotension I95.9    Acute GI bleeding K92.2    ESRD (end stage renal disease) ([address] N18.6    Sepsis (HCC) A41.9    Oropharyngeal dysphagia R13.12    Cachexia (HCC) R64       Vital Signs:  Visit Vitals    /84    Pulse 70    Temp 98.8 °F (37.1 °C)    Resp 12    Ht 6' 2\" (1.88 m)    Wt 64.6 kg (142 lb 6.7 oz)    SpO2 100%    BMI 18.29 kg/m2       O2 Device: Endotracheal tube   O2 Flow Rate (L/min): 6 l/min   Temp (24hrs), Av.4 °F (36.9 °C), Min:97.4 °F (36.3 °C), Max:99.1 °F (37.3 °C)       Intake/Output:   Last shift:      701 - 1900  In: 260   Out: -   Last 3 shifts: 1901 -  0700  In: 4449.6 [I.V.:294.6]  Out: 1150 [Drains:1150]    Intake/Output Summary (Last 24 hours) at 17 1536  Last data filed at 17 1200   Gross per 24 hour   Intake          2408.27 ml   Output              850 ml   Net          1558.27 ml      Ventilator Settings:  Ventilator  Mode: Assist control, VC+  Respiratory Rate  Resp Rate Observed: 17  Back-Up Rate: 14  Insp Time (sec): 1 sec  Insp Flow (l/min): 44 l/min  I:E Ratio: 1:2.5  Ventilator Volumes  Vt Set (ml): 400 ml  Vt Exhaled (Machine Breath) (ml): 393 ml  Vt Spont (ml): 390 ml  Ve Observed (l/min): 7.16 l/min  Ventilator Pressures  Pressure Support (cm H2O): 7 cm H2O  PIP Observed (cm H2O): 11 cm H2O  Plateau Pressure (cm H2O): 12 cm H2O  MAP (cm H2O): 7.2  PEEP/VENT (cm H2O): 5 cm H20  Auto PEEP Observed (cm H2O): 0.4 cm H2O    Current Facility-Administered Medications   Medication Dose Route Frequency    midodrine (PROAMITINE) tablet 15 mg  15 mg Oral TID WITH MEALS    potassium chloride (KLOR-CON) packet 40 mEq  40 mEq Per NG tube DAILY    Zinc Ox-Aloe Vera-Vitamin E (BALMEX) topical cream   Topical CONTINUOUS    insulin glargine (LANTUS) injection 20 Units  20 Units SubCUTAneous DAILY    banana flakes trans-galactooligosaccharide (BANATROL PLUS) powder 1 Packet  1 Packet Per NG tube TID    levETIRAcetam (KEPPRA) 500 mg in saline (iso-osm) 100 ml IVPB  500 mg IntraVENous Q12H    insulin lispro (HUMALOG) injection   SubCUTAneous Q6H    white petrolatum-mineral oil 85-15 % oint 1 Dose  1 Dose Ophthalmic QID    chlorhexidine (PERIDEX) 0.12 % mouthwash 10 mL  10 mL Oral Q12H    NOREPINephrine (LEVOPHED) 16,000 mcg in dextrose 5% 250 mL infusion  2-16 mcg/min IntraVENous TITRATE    famotidine (PF) (PEPCID) 20 mg in sodium chloride 0.9 % 10 mL injection  20 mg IntraVENous DAILY    vits A and D-white pet-lanolin (A&D) ointment   Topical QID AFTER MEALS    collagenase (SANTYL) 250 unit/gram ointment   Topical DAILY    lactobacillus sp. 50 billion cpu (BIO-K PLUS) capsule 1 Cap  1 Cap Oral DAILY    heparin (porcine) injection 5,000 Units  5,000 Units SubCUTAneous Q8H    heparin (porcine) 1,000 unit/mL injection 2,000 Units  2,000 Units IntraVENous DIALYSIS ONCE    simvastatin (ZOCOR) tablet 20 mg  20 mg Oral QHS    doxercalciferol (HECTOROL) 4 mcg/2 mL injection 3 mcg  3 mcg IntraVENous DIALYSIS MON, WED & FRI    epoetin benjamin (EPOGEN;PROCRIT) injection 10,000 Units  10,000 Units IntraVENous DIALYSIS MON, WED & FRI    aspirin chewable tablet 81 mg  81 mg Oral DAILY       Telemetry:     Physical Exam:         Lungs: decreased air exchange bibasilar, no dullness/ tenderness/ rash   Heart: Regular rate and rhythm   Abdomen: non-distended, bowel sounds normoactive, tympanic, abdomen is soft without significant tenderness, masses, organomegaly or guarding   Extremity: Trace edema   Neuro: comatose. Extraocular movements are absent to doll's eye maneuver. Absent gag reflex. Patient withdraws arms to painful stimulus. DATA:  MAR reviewed and pertinent medications noted or modified as needed    Labs:  Recent Labs      08/29/17   0620 08/28/17   0515  08/27/17   0400   WBC  11.7  12.2  13.8*   HGB  9.1*  9.4*  9.2*   HCT  28.7*  29.3*  28.6*   PLT  478*  538*  448*     Recent Labs      08/29/17   0620  08/28/17   0515  08/27/17   0400   NA  139  139  142   K  3.4*  4.2  4.2  3.2*   CL  107  108  109*   CO2  21  20*  22   GLU  165*  261*  141*   BUN  50*  45*  36*   CREA  7.74*  7.43*  6.10*   CA  8.4*  8.9  8.5   MG  3.1*  3.1*  2.8*   PHOS  2.6  2.6  2.1*     No results for input(s): PH, PCO2, PO2, HCO3, FIO2 in the last 72 hours. Recent Labs      08/29/17   0408  08/28/17   0515  08/28/17   0505   FIO2I  25  25  25   HCO3I  19.9*  20.4*  21.6*   PCO2I  31.3*  29.7*  34.3*   PHI  7.412  7.445  7.408   PO2I  85  125*  46*     Imaging:  [x]                           I have personally reviewed the patients radiographs and reports    High complexity decision making was performed during this consultation and evaluation. [x]       Pt is at high risk for further organ failure and dysfunction. Critical care time spent  55 minutes with patient exclusive of procedures.     IMPRESSION:   · Acute encephalopathy which appears to be secondary to anoxic brain injury. Overall prognosis appears to be very dismal. Consider family discussion and refer to hospice/palliative care as appropriate. · Cardiac arrest on admission and this resulted in significant anoxic brain injury. · Acute hypoxic respiratory failure requiring intubation and mechanical ventilation. His FiO2 requirements are minimal - however the major factor over here is his mental status and he most likely would need tracheostomy and peg tube placement. · Acute shock which appears to be secondary to sepsis. No cardiac issues related to PEA cardiac arrest.   · ESRD requiring dialysis  · Oropharyngeal dysphagia  · Pericardial effusion   · Diarrhea - C diff testing negative. PLAN:   Neurological:  - Sedation: Continue to adhere to pain, agitation and delirium guidelines in the ICU. Continue to assess prevent and manage pain. Patient is currently not requiring any medication for sedation.   - ICU delirium assessment: Continue to assess, prevent and manage delirium using the CAM-ICU tool. - Continue Keppra. Respiratory:  Mechanical ventilation: Adhere to low tidal volume ventilation strategy as per the ARDSnet guidelines. Aim for plateau pressures < 26IR. Continue to adhere to the VAP bundle to minimize the risk of VAP. Oxygenation: Optimize PEEP and FiO2 to maintain oxygenation. Consult for tracheostomy and PEG tube placement__> daughter wants this done     Cardiac:  Vasopressors: Aim to keep MAP>65 mm of Hg. Low dose pressors  Add Midodrine    Renal:  Currently requiring renal support. Nephrology following for HD. Renal functions and Cr: stable    GI:  Diet: Continue tube feeds. Stress ulcer ppx: Pepcid. Endocrinology:  Glycemic control: stable. D/c steroid taper       Best practice :    MVentilated patients- VAP bundle , aim to keep peak plateau pressure 88-93PV H2O  Sress ulcer prophylaxis  DVT prophylaxis  Need for Lines, mancini assessed.       [address] [facility], MD

## 2017-08-29 NOTE — PROGRESS NOTES
attended the interdisciplinary rounds for Wilmar Ordaz, who is a 65 y.o.,male. Patients Primary Language is: [place]. According to the patients EMR Taoist Affiliation is: [facility].     The reason the Patient came to the hospital is:   Patient Active Problem List    Diagnosis Date Noted    Oropharyngeal dysphagia 08/17/2017    Cachexia ([facility] 08/17/2017    Acidosis 07/27/2017    Cardiac arrest ([facility] 07/27/2017    Respiratory failure ([facility] 07/27/2017    ESRD needing dialysis ([facility] 07/27/2017    Anoxic brain damage ([facility] 07/27/2017    Colitis 07/27/2017    Hypotension 07/27/2017    Acute GI bleeding 07/27/2017    ESRD (end stage renal disease) ([facility] 07/27/2017    Sepsis ([facility] 07/27/2017    Anemia       Not able to assess the patient due to medical condition. No family at bedside. Plan:  Chaplains will continue to follow and will provide pastoral care on an as needed/requested basis.  recommends bedside caregivers page  on duty if patient shows signs of acute spiritual or emotional distress.     [address] [facility] [employer]  Board Certified [address] [facility]   [phone]

## 2017-08-30 ENCOUNTER — APPOINTMENT (OUTPATIENT)
Dept: GENERAL RADIOLOGY | Age: 57
DRG: 004 | End: 2017-08-30
Attending: PHYSICIAN ASSISTANT
Payer: MEDICARE

## 2017-08-30 LAB
ANION GAP SERPL CALC-SCNC: 10 MMOL/L (ref 3–18)
ARTERIAL PATENCY WRIST A: ABNORMAL
ARTERIAL PATENCY WRIST A: YES
BASE DEFICIT BLD-SCNC: 1 MMOL/L
BASE DEFICIT BLD-SCNC: 1 MMOL/L
BDY SITE: ABNORMAL
BDY SITE: ABNORMAL
BODY TEMPERATURE: 98.7
BUN SERPL-MCNC: 30 MG/DL (ref 7–18)
BUN/CREAT SERPL: 6 (ref 12–20)
CALCIUM SERPL-MCNC: 8.7 MG/DL (ref 8.5–10.1)
CHLORIDE SERPL-SCNC: 105 MMOL/L (ref 100–108)
CO2 SERPL-SCNC: 22 MMOL/L (ref 21–32)
CREAT SERPL-MCNC: 5.36 MG/DL (ref 0.6–1.3)
ERYTHROCYTE [DISTWIDTH] IN BLOOD BY AUTOMATED COUNT: 20.2 % (ref 11.6–14.5)
GAS FLOW.O2 O2 DELIVERY SYS: ABNORMAL L/MIN
GAS FLOW.O2 O2 DELIVERY SYS: ABNORMAL L/MIN
GAS FLOW.O2 SETTING OXYMISER: 14 BPM
GAS FLOW.O2 SETTING OXYMISER: 14 BPM
GLUCOSE BLD STRIP.AUTO-MCNC: 109 MG/DL (ref 70–110)
GLUCOSE BLD STRIP.AUTO-MCNC: 177 MG/DL (ref 70–110)
GLUCOSE BLD STRIP.AUTO-MCNC: 199 MG/DL (ref 70–110)
GLUCOSE SERPL-MCNC: 203 MG/DL (ref 74–99)
HCO3 BLD-SCNC: 22.7 MMOL/L (ref 22–26)
HCO3 BLD-SCNC: 23.2 MMOL/L (ref 22–26)
HCT VFR BLD AUTO: 31.2 % (ref 36–48)
HGB BLD-MCNC: 10 G/DL (ref 13–16)
INSPIRATION.DURATION SETTING TIME VENT: 1 SEC
MAGNESIUM SERPL-MCNC: 2.8 MG/DL (ref 1.6–2.6)
MCH RBC QN AUTO: 31.3 PG (ref 24–34)
MCHC RBC AUTO-ENTMCNC: 32.1 G/DL (ref 31–37)
MCV RBC AUTO: 97.5 FL (ref 74–97)
O2/TOTAL GAS SETTING VFR VENT: 25 %
O2/TOTAL GAS SETTING VFR VENT: 35 %
PCO2 BLD: 32.2 MMHG (ref 35–45)
PCO2 BLD: 35.9 MMHG (ref 35–45)
PEEP RESPIRATORY: 5 CMH2O
PEEP RESPIRATORY: 5 CMH2O
PH BLD: 7.42 [PH] (ref 7.35–7.45)
PH BLD: 7.46 [PH] (ref 7.35–7.45)
PHOSPHATE SERPL-MCNC: 2.4 MG/DL (ref 2.5–4.9)
PLATELET # BLD AUTO: 498 K/UL (ref 135–420)
PMV BLD AUTO: 10.4 FL (ref 9.2–11.8)
PO2 BLD: 181 MMHG (ref 80–100)
PO2 BLD: 61 MMHG (ref 80–100)
POTASSIUM SERPL-SCNC: 3.5 MMOL/L (ref 3.5–5.5)
PRESSURE SUPPORT SETTING VENT: 7 CMH2O
RBC # BLD AUTO: 3.2 M/UL (ref 4.7–5.5)
SAO2 % BLD: 100 % (ref 92–97)
SAO2 % BLD: 92 % (ref 92–97)
SERVICE CMNT-IMP: ABNORMAL
SERVICE CMNT-IMP: ABNORMAL
SODIUM SERPL-SCNC: 137 MMOL/L (ref 136–145)
SPECIMEN TYPE: ABNORMAL
SPECIMEN TYPE: ABNORMAL
TOTAL RESP. RATE, ITRR: 17
TOTAL RESP. RATE, ITRR: 21
VENTILATION MODE VENT: ABNORMAL
VENTILATION MODE VENT: ABNORMAL
VOLUME CONTROL IVLC: YES
VOLUME CONTROL PLUS IVLCP: YES
VT SETTING VENT: 400 ML
VT SETTING VENT: 400 ML
WBC # BLD AUTO: 11.6 K/UL (ref 4.6–13.2)

## 2017-08-30 PROCEDURE — 74011250637 HC RX REV CODE- 250/637: Performed by: FAMILY MEDICINE

## 2017-08-30 PROCEDURE — 74011250637 HC RX REV CODE- 250/637: Performed by: NURSE PRACTITIONER

## 2017-08-30 PROCEDURE — 36600 WITHDRAWAL OF ARTERIAL BLOOD: CPT

## 2017-08-30 PROCEDURE — 74011636637 HC RX REV CODE- 636/637: Performed by: INTERNAL MEDICINE

## 2017-08-30 PROCEDURE — 82803 BLOOD GASES ANY COMBINATION: CPT

## 2017-08-30 PROCEDURE — 85027 COMPLETE CBC AUTOMATED: CPT | Performed by: INTERNAL MEDICINE

## 2017-08-30 PROCEDURE — 74011000250 HC RX REV CODE- 250: Performed by: PHYSICIAN ASSISTANT

## 2017-08-30 PROCEDURE — 36592 COLLECT BLOOD FROM PICC: CPT

## 2017-08-30 PROCEDURE — 74011250637 HC RX REV CODE- 250/637: Performed by: INTERNAL MEDICINE

## 2017-08-30 PROCEDURE — 74011250636 HC RX REV CODE- 250/636: Performed by: HOSPITALIST

## 2017-08-30 PROCEDURE — 83735 ASSAY OF MAGNESIUM: CPT | Performed by: INTERNAL MEDICINE

## 2017-08-30 PROCEDURE — 84100 ASSAY OF PHOSPHORUS: CPT | Performed by: INTERNAL MEDICINE

## 2017-08-30 PROCEDURE — 74011250637 HC RX REV CODE- 250/637: Performed by: HOSPITALIST

## 2017-08-30 PROCEDURE — 94003 VENT MGMT INPAT SUBQ DAY: CPT

## 2017-08-30 PROCEDURE — 82962 GLUCOSE BLOOD TEST: CPT

## 2017-08-30 PROCEDURE — 65610000006 HC RM INTENSIVE CARE

## 2017-08-30 PROCEDURE — 71010 XR CHEST PORT: CPT

## 2017-08-30 PROCEDURE — 80048 BASIC METABOLIC PNL TOTAL CA: CPT | Performed by: INTERNAL MEDICINE

## 2017-08-30 RX ORDER — DOXERCALCIFEROL 4 UG/2ML
3 INJECTION INTRAVENOUS
Status: DISCONTINUED | OUTPATIENT
Start: 2017-08-31 | End: 2017-09-08

## 2017-08-30 RX ADMIN — MIDODRINE HYDROCHLORIDE 15 MG: 2.5 TABLET ORAL at 12:56

## 2017-08-30 RX ADMIN — MIDODRINE HYDROCHLORIDE 15 MG: 2.5 TABLET ORAL at 18:56

## 2017-08-30 RX ADMIN — CHLORHEXIDINE GLUCONATE 10 ML: 1.2 RINSE ORAL at 09:45

## 2017-08-30 RX ADMIN — VITAMIN A AND D: 30.8 OINTMENT TOPICAL at 13:03

## 2017-08-30 RX ADMIN — INSULIN LISPRO 3 UNITS: 100 INJECTION, SOLUTION INTRAVENOUS; SUBCUTANEOUS at 06:58

## 2017-08-30 RX ADMIN — MINERAL OIL AND WHITE PETROLATUM 1 DOSE: 150; 850 OINTMENT OPHTHALMIC at 18:55

## 2017-08-30 RX ADMIN — MINERAL OIL AND WHITE PETROLATUM 1 DOSE: 150; 850 OINTMENT OPHTHALMIC at 09:45

## 2017-08-30 RX ADMIN — HEPARIN SODIUM 5000 UNITS: 5000 INJECTION, SOLUTION INTRAVENOUS; SUBCUTANEOUS at 18:54

## 2017-08-30 RX ADMIN — MINERAL OIL AND WHITE PETROLATUM 1 DOSE: 150; 850 OINTMENT OPHTHALMIC at 21:00

## 2017-08-30 RX ADMIN — LEVETIRACETAM 500 MG: 5 INJECTION INTRAVENOUS at 09:45

## 2017-08-30 RX ADMIN — VITAMIN A AND D: 30.8 OINTMENT TOPICAL at 21:01

## 2017-08-30 RX ADMIN — HEPARIN SODIUM 5000 UNITS: 5000 INJECTION, SOLUTION INTRAVENOUS; SUBCUTANEOUS at 09:45

## 2017-08-30 RX ADMIN — CHLORHEXIDINE GLUCONATE 10 ML: 1.2 RINSE ORAL at 21:00

## 2017-08-30 RX ADMIN — VITAMIN A AND D: 30.8 OINTMENT TOPICAL at 18:57

## 2017-08-30 RX ADMIN — COLLAGENASE SANTYL: 250 OINTMENT TOPICAL at 09:45

## 2017-08-30 RX ADMIN — MIDODRINE HYDROCHLORIDE 15 MG: 2.5 TABLET ORAL at 09:45

## 2017-08-30 RX ADMIN — SIMVASTATIN 20 MG: 10 TABLET, FILM COATED ORAL at 21:00

## 2017-08-30 RX ADMIN — ASPIRIN 81 MG 81 MG: 81 TABLET ORAL at 09:45

## 2017-08-30 RX ADMIN — Medication 1 CAPSULE: at 09:45

## 2017-08-30 RX ADMIN — POTASSIUM CHLORIDE 40 MEQ: 1.5 POWDER, FOR SOLUTION ORAL at 09:45

## 2017-08-30 RX ADMIN — Medication 1 PACKET: at 09:45

## 2017-08-30 RX ADMIN — Medication 1 PACKET: at 21:00

## 2017-08-30 RX ADMIN — VITAMIN A AND D: 30.8 OINTMENT TOPICAL at 09:45

## 2017-08-30 RX ADMIN — HEPARIN SODIUM 5000 UNITS: 5000 INJECTION, SOLUTION INTRAVENOUS; SUBCUTANEOUS at 04:10

## 2017-08-30 RX ADMIN — Medication 1 PACKET: at 18:56

## 2017-08-30 RX ADMIN — INSULIN LISPRO 3 UNITS: 100 INJECTION, SOLUTION INTRAVENOUS; SUBCUTANEOUS at 13:00

## 2017-08-30 RX ADMIN — FAMOTIDINE 20 MG: 10 INJECTION INTRAVENOUS at 09:45

## 2017-08-30 RX ADMIN — INSULIN GLARGINE 20 UNITS: 100 INJECTION, SOLUTION SUBCUTANEOUS at 09:45

## 2017-08-30 RX ADMIN — MINERAL OIL AND WHITE PETROLATUM 1 DOSE: 150; 850 OINTMENT OPHTHALMIC at 18:56

## 2017-08-30 RX ADMIN — LEVETIRACETAM 500 MG: 5 INJECTION INTRAVENOUS at 21:00

## 2017-08-30 NOTE — INTERDISCIPLINARY ROUNDS
CRITICAL CARE INTERDISCIPLINARY ROUNDS      Patient Information:    Name:   Jose Stewart    Age:   65 y.o.     Admission Date:   7/27/2017    Readmit Risk Assessment Information:      Readmit Risk Tool Support Systems: Family member(s), Relationship with Primary Physician Group: Seen at least one time within past 12 months    Surgery Date:      Day of Stay:     Expected Discharge Date:        Attending Provider:   Quyen Castrejon MD    Surgeon:        Consultant:       Primary Care Provider:   Brittany Nickerson MD    Problem List:     Patient Active Problem List   Diagnosis Code    Anemia D64.9    Acidosis E87.2    Cardiac arrest ([address] I46.9    Respiratory failure ([address] J96.90    ESRD needing dialysis ([facility] N18.6, Z99.2    Anoxic brain damage (HCC) G93.1    Colitis K52.9    Hypotension I95.9    Acute GI bleeding K92.2    ESRD (end stage renal disease) ([facility] N18.6    Sepsis ([address] A41.9    Oropharyngeal dysphagia R13.12    Cachexia ([facility] R64       Principal Problem:  Sepsis ([address]    Procedure:       During rounds the following quality care indicators and evidence based practices were addressed :     DVT Prophylaxis, PUD Prophylaxis, Pressure Injury Prevention, Pain Management, Sepsis resuscitation and management, Nutritional Status, Critical Care Interventions Airways, Drains, Lines and Pressors and IHI Bundles: Central Line Bundle Followed , Stanton Bundle Followed and Vent Bundle Followed, Vent Day 12           Acute MI/PCI:   Not applicable    Heart Failure:    Not applicable    Cardiac Surgery:  Not applicable    SCIP Measures for other Surgeries:   Not applicable    Pneumonia:    Appropriate Antibiotic Selection (ICU versus Non-ICU)    Stroke:  Patient's Personal Risk Factors for Stroke are:   hypertension, family history, hyperlipidemia or diabetes mellitus    NIH Stroke Score  Total: 8 (08/17/17 0400)    Transfer Level of Care:  Not Ready for Transfer    The patient will require the following interventions based on the Readmission Risk Assessment:  Pharmacy evaluation and teaching, Care Management involvement for home health follow up for:  mobility and assistance with ADL's and Spiritual Care evaluation      Discharge Management:  Group Home and Palliative Care    Anticipated Discharge Date:  3      Interdisciplinary team rounds were held  with the following team membersCare Management, Diabetes Treatment Specialist, Nursing, Nutrition, Pastoral Care, Pharmacy, Physician and Clinical Coordinator and the  patient and healthcare POA (documentation required). Plan of care discussed. See clinical pathway and/or care plan for interventions and desired outcomes. Ethics consulted consulted surgery for trach and peg. Stop levophed today midodrine increased.

## 2017-08-30 NOTE — PROGRESS NOTES
attended the interdisciplinary rounds for [facility], who is a 65 y.o.,male. Patients Primary Language is: [place]. According to the patients EMR Hinduism Affiliation is: [facility].     The reason the Patient came to the hospital is:   Patient Active Problem List    Diagnosis Date Noted    Oropharyngeal dysphagia 08/17/2017    Cachexia ([facility] 08/17/2017    Acidosis 07/27/2017    Cardiac arrest ([address] 07/27/2017    Respiratory failure ([address] 07/27/2017    ESRD needing dialysis ([address] 07/27/2017    Anoxic brain damage ([address] 07/27/2017    Colitis 07/27/2017    Hypotension 07/27/2017    Acute GI bleeding 07/27/2017    ESRD (end stage renal disease) ([facility] 07/27/2017    Sepsis ([facility] 07/27/2017    Anemia       Not able to assess the patient due to medical condition. No family at bedside. Plan:  Chaplains will continue to follow and will provide pastoral care on an as needed/requested basis.  recommends bedside caregivers page  on duty if patient shows signs of acute spiritual or emotional distress.     [address] [facility]  Board Certified [address] [facility]   [phone]

## 2017-08-30 NOTE — PROGRESS NOTES
[facility]. Pulmonary, Critical Care, and Sleep Medicine    Name: Christen Dakin MRN: [id number]   : 1960 Hospital: [facility]   Date: 2017  Admission Date: 2017     Chart and notes reviewed. Data reviewed. I have evaluated all findings. [x]I have reviewed the flowsheet and previous days notes. [x]The patient is unable to give any meaningful history or review of systems because the patient is:  [x]Intubated []Sedated   [x]Unresponsive      [x]The patient is critically ill on      [x]Mechanical ventilation []Pressors   []BiPAP []                 ROS:Review of systems not obtained due to patient factors. Events and notes from last 24 hours reviewed. Care plan discussed on multidisciplinary rounds.   Patient Active Problem List   Diagnosis Code    Anemia D64.9    Acidosis E87.2    Cardiac arrest ([facility] I46.9    Respiratory failure ([address] J96.90    ESRD needing dialysis ([facility] N18.6, Z99.2    Anoxic brain damage (HCC) G93.1    Colitis K52.9    Hypotension I95.9    Acute GI bleeding K92.2    ESRD (end stage renal disease) ([facility] N18.6    Sepsis (HCC) A41.9    Oropharyngeal dysphagia R13.12    Cachexia (HCC) R64       Vital Signs:  Visit Vitals    /64    Pulse (!) 103    Temp 98.4 °F (36.9 °C)    Resp 21    Ht 6' 2\" (1.88 m)    Wt 64.6 kg (142 lb 6.7 oz)    SpO2 100%    BMI 18.29 kg/m2       O2 Device: Ventilator   O2 Flow Rate (L/min): 6 l/min   Temp (24hrs), Av.8 °F (37.1 °C), Min:97.6 °F (36.4 °C), Max:99.7 °F (37.6 °C)       Intake/Output:   Last shift:         Last 3 shifts: 1901 -  0700  In: 4501.8 [I.V.:386.8]  Out: 1950 [Drains:1950]    Intake/Output Summary (Last 24 hours) at 17 1009  Last data filed at 17 0700   Gross per 24 hour   Intake          2631.83 ml   Output             1400 ml   Net          1231.83 ml      Ventilator Settings:  Ventilator  Mode: Assist control  Respiratory Rate  Resp Rate Observed: 17  Back-Up Rate: 14  Insp Time (sec): 1 sec  Insp Flow (l/min): 44 l/min  I:E Ratio: 1:3.7  Ventilator Volumes  Vt Set (ml): 400 ml  Vt Exhaled (Machine Breath) (ml): 449 ml  Vt Spont (ml): 397 ml  Ve Observed (l/min): 6.57 l/min  Ventilator Pressures  Pressure Support (cm H2O): 7 cm H2O  PIP Observed (cm H2O): 17 cm H2O  Plateau Pressure (cm H2O): 12 cm H2O  MAP (cm H2O): 8.1  PEEP/VENT (cm H2O): 5 cm H20  Auto PEEP Observed (cm H2O): 0.4 cm H2O    Current Facility-Administered Medications   Medication Dose Route Frequency    midodrine (PROAMITINE) tablet 15 mg  15 mg Oral TID WITH MEALS    potassium chloride (KLOR-CON) packet 40 mEq  40 mEq Per NG tube DAILY    Zinc Ox-Aloe Vera-Vitamin E (BALMEX) topical cream   Topical CONTINUOUS    insulin glargine (LANTUS) injection 20 Units  20 Units SubCUTAneous DAILY    banana flakes trans-galactooligosaccharide (BANATROL PLUS) powder 1 Packet  1 Packet Per NG tube TID    levETIRAcetam (KEPPRA) 500 mg in saline (iso-osm) 100 ml IVPB  500 mg IntraVENous Q12H    insulin lispro (HUMALOG) injection   SubCUTAneous Q6H    white petrolatum-mineral oil 85-15 % oint 1 Dose  1 Dose Ophthalmic QID    chlorhexidine (PERIDEX) 0.12 % mouthwash 10 mL  10 mL Oral Q12H    NOREPINephrine (LEVOPHED) 16,000 mcg in dextrose 5% 250 mL infusion  2-16 mcg/min IntraVENous TITRATE    famotidine (PF) (PEPCID) 20 mg in sodium chloride 0.9 % 10 mL injection  20 mg IntraVENous DAILY    vits A and D-white pet-lanolin (A&D) ointment   Topical QID AFTER MEALS    collagenase (SANTYL) 250 unit/gram ointment   Topical DAILY    lactobacillus sp. 50 billion cpu (BIO-K PLUS) capsule 1 Cap  1 Cap Oral DAILY    heparin (porcine) injection 5,000 Units  5,000 Units SubCUTAneous Q8H    heparin (porcine) 1,000 unit/mL injection 2,000 Units  2,000 Units IntraVENous DIALYSIS ONCE    simvastatin (ZOCOR) tablet 20 mg  20 mg Oral QHS    doxercalciferol (HECTOROL) 4 mcg/2 mL injection 3 mcg  3 mcg IntraVENous DIALYSIS MON, WED & FRI    epoetin benjamin (EPOGEN;PROCRIT) injection 10,000 Units  10,000 Units IntraVENous DIALYSIS MON, WED & FRI    aspirin chewable tablet 81 mg  81 mg Oral DAILY       Telemetry:     Physical Exam:         Lungs: decreased air exchange bibasilar, no dullness/ tenderness/ rash   Heart: Regular rate and rhythm   Abdomen: non-distended, bowel sounds normoactive, tympanic, abdomen is soft without significant tenderness, masses, organomegaly or guarding   Extremity: Trace edema   Neuro: comatose. Extraocular movements are absent to doll's eye maneuver. Absent gag reflex. Patient withdraws arms to painful stimulus. DATA:  MAR reviewed and pertinent medications noted or modified as needed    Labs:  Recent Labs      08/30/17 0620 08/29/17 0620 08/28/17   0515   WBC  11.6  11.7  12.2   HGB  10.0*  9.1*  9.4*   HCT  31.2*  28.7*  29.3*   PLT  498*  478*  538*     Recent Labs      08/30/17   0620 08/29/17 0620 08/28/17   0515   NA  137  139  139   K  3.5  3.4*  4.2  4.2   CL  105  107  108   CO2  22  21  20*   GLU  203*  165*  261*   BUN  30*  50*  45*   CREA  5.36*  7.74*  7.43*   CA  8.7  8.4*  8.9   MG  2.8*  3.1*  3.1*   PHOS  2.4*  2.6  2.6     No results for input(s): PH, PCO2, PO2, HCO3, FIO2 in the last 72 hours. Recent Labs      08/30/17   0536  08/30/17   0527  08/29/17   0408   FIO2I  25  25  25   HCO3I  22.7  24.4  19.9*   PCO2I  32.2*  34.4*  31.3*   PHI  7.456*  7.458*  7.412   PO2I  61*  48*  85     Imaging:  [x]                           I have personally reviewed the patients radiographs and reports    High complexity decision making was performed during this consultation and evaluation. [x]       Pt is at high risk for further organ failure and dysfunction. Critical care time spent  45 minutes with patient exclusive of procedures. IMPRESSION:   · Acute encephalopathy which appears to be secondary to anoxic brain injury.  Overall prognosis appears to be very dismal. Consider family discussion and refer to hospice/palliative care as appropriate. · Cardiac arrest on admission and this resulted in significant anoxic brain injury. · Acute hypoxic respiratory failure requiring intubation and mechanical ventilation. His FiO2 requirements are minimal - however the major factor over here is his mental status and he most likely would need tracheostomy and peg tube placement. · Acute shock which appears to be secondary to sepsis. No cardiac issues related to PEA cardiac arrest.   · ESRD requiring dialysis  · Oropharyngeal dysphagia  · Pericardial effusion   · Diarrhea - C diff testing negative. PLAN:   Neurological:  - Sedation: On none   - Continue Keppra. Respiratory:  Mechanical ventilation: Adhere to low tidal volume ventilation strategy as per the ARDSnet guidelines. Aim for plateau pressures < 52PR. Continue to adhere to the VAP bundle to minimize the risk of VAP. Oxygenation: Optimize PEEP and FiO2 to maintain oxygenation. Consulted Surgery for  tracheostomy and PEG tube placement  --> daughter wants this done     Cardiac:  Vasopressors: Aim to keep MAP>65 mm of Hg. Low dose Levophed  Midodrine to 15 tid    Renal:  Currently requiring renal support. Nephrology following for HD. GI:  Diet: Continue tube feeds. Stress ulcer ppx: Pepcid. Endocrinology:  Glycemic control: stable. Best practice :    MVentilated patients- VAP bundle , aim to keep peak plateau pressure 42-20PH H2O  Sress ulcer prophylaxis  DVT prophylaxis  Need for Lines, mancini assessed.       Yunior Jose MD

## 2017-08-30 NOTE — PROGRESS NOTES
Count includes the [facility] Hospitalist Group  Progress Note    Patient: Rudy Sher Age: 65 y.o. : 1960 MR#: [id number] SSN: [id number]  Date/Time: 2017     Subjective:     Patient intubated, unresponsive    Assessment/Plan:     S/p PEA arrest. wth acute resp fauilure on the Vent ? Cardiac cause with elevated trop PTA. Echo improving EF, s/p cath, no CAD. Now with second cardiac arrest - intubated, Vent support    Acute met encephalopathy: likely anoxic encephalopathy     Acute pontine lacunar CVA: on asa. ESRD- HD as pe nephrology    wound care    Called and d/w Daughter Lincoln over the phone [phone]. I advised Cierra that patients prognosis for meaningful neurological recovery was Poor.     Case discussed with:  []Patient  []Family  []Nursing  [x]Case Management  DVT Prophylaxis:  []Lovenox  [x]Hep SQ  [x]SCDs  []Coumadin   []On Heparin gtt    Patient Active Problem List   Diagnosis Code    Anemia D64.9    Acidosis E87.2    Cardiac arrest ([address] I46.9    Respiratory failure ([address] J96.90    ESRD needing dialysis ([address] N18.6, Z99.2    Anoxic brain damage (HCC) G93.1    Colitis K52.9    Hypotension I95.9    Acute GI bleeding K92.2    ESRD (end stage renal disease) ([address] N18.6    Sepsis ([address] A41.9    Oropharyngeal dysphagia R13.12    Cachexia ([address] R64       Objective:   VS:   Visit Vitals    BP 96/61    Pulse 91    Temp 98.7 °F (37.1 °C)    Resp 20    Ht 6' 2\" (1.88 m)    Wt 67.3 kg (148 lb 4.8 oz)    SpO2 100%    BMI 19.04 kg/m2      Tmax/24hrs: Temp (24hrs), Av °F (37.2 °C), Min:97.6 °F (36.4 °C), Max:101.3 °F (38.5 °C)  IOBRIEF    Intake/Output Summary (Last 24 hours) at 17 1638  Last data filed at 17 0700   Gross per 24 hour   Intake          2111.83 ml   Output             1400 ml   Net           711.83 ml     General: intubated, unresponsive  Cardiovascular:  S1S2+, RRR  Pulmonary:  Coarse BS b/l  GI:  Soft, BS+, NT, ND  Extremities:  No edema          Medications:   Current Facility-Administered Medications   Medication Dose Route Frequency    [START ON 8/31/2017] doxercalciferol (HECTOROL) 4 mcg/2 mL injection 3 mcg  3 mcg IntraVENous Q TUE, THU & SAT    [START ON 8/31/2017] epoetin benjamin (EPOGEN;PROCRIT) injection 10,000 Units  10,000 Units IntraVENous DIALYSIS TUE, THU & SAT    midodrine (PROAMITINE) tablet 15 mg  15 mg Oral TID WITH MEALS    potassium chloride (KLOR-CON) packet 40 mEq  40 mEq Per NG tube DAILY    Zinc Ox-Aloe Vera-Vitamin E (BALMEX) topical cream   Topical CONTINUOUS    insulin glargine (LANTUS) injection 20 Units  20 Units SubCUTAneous DAILY    banana flakes trans-galactooligosaccharide (BANATROL PLUS) powder 1 Packet  1 Packet Per NG tube TID    levETIRAcetam (KEPPRA) 500 mg in saline (iso-osm) 100 ml IVPB  500 mg IntraVENous Q12H    LORazepam (ATIVAN) injection 1 mg  1 mg IntraVENous Q4H PRN    insulin lispro (HUMALOG) injection   SubCUTAneous Q6H    white petrolatum-mineral oil 85-15 % oint 1 Dose  1 Dose Ophthalmic QID    chlorhexidine (PERIDEX) 0.12 % mouthwash 10 mL  10 mL Oral Q12H    NOREPINephrine (LEVOPHED) 16,000 mcg in dextrose 5% 250 mL infusion  2-16 mcg/min IntraVENous TITRATE    albuterol (PROVENTIL VENTOLIN) nebulizer solution 2.5 mg  2.5 mg Nebulization Q6H PRN    famotidine (PF) (PEPCID) 20 mg in sodium chloride 0.9 % 10 mL injection  20 mg IntraVENous DAILY    vits A and D-white pet-lanolin (A&D) ointment   Topical QID AFTER MEALS    collagenase (SANTYL) 250 unit/gram ointment   Topical DAILY    acetaminophen (TYLENOL) tablet 650 mg  650 mg Oral Q4H PRN    lactobacillus sp. 50 billion cpu (BIO-K PLUS) capsule 1 Cap  1 Cap Oral DAILY    loperamide (IMODIUM) capsule 2 mg  2 mg Oral Q6H PRN    heparin (porcine) injection 5,000 Units  5,000 Units SubCUTAneous Q8H    heparin (porcine) 1,000 unit/mL injection 2,000 Units  2,000 Units IntraVENous DIALYSIS ONCE    simvastatin (ZOCOR) tablet 20 mg  20 mg Oral QHS    aspirin chewable tablet 81 mg  81 mg Oral DAILY    glucose chewable tablet 16 g  4 Tab Oral PRN    glucagon (GLUCAGEN) injection 1 mg  1 mg IntraMUSCular PRN    dextrose (D50W) injection syrg 12.5-25 g  25-50 mL IntraVENous PRN    0.9% sodium chloride infusion  100 mL/hr IntraVENous DIALYSIS PRN    naloxone (NARCAN) injection 0.4 mg  0.4 mg IntraVENous PRN       Labs:      Recent Results (from the past 24 hour(s))   GLUCOSE, POC    Collection Time: 08/29/17  5:29 PM   Result Value Ref Range    Glucose (POC) 101 70 - 110 mg/dL   GLUCOSE, POC    Collection Time: 08/29/17 11:55 PM   Result Value Ref Range    Glucose (POC) 107 70 - 110 mg/dL   POC G3    Collection Time: 08/30/17  5:27 AM   Result Value Ref Range    Device: VENT      FIO2 (POC) 25 %    pH (POC) 7.458 (H) 7.35 - 7.45      pCO2 (POC) 34.4 (L) 35.0 - 45.0 MMHG    pO2 (POC) 48 (LL) 80 - 100 MMHG    HCO3 (POC) 24.4 22 - 26 MMOL/L    sO2 (POC) 86 (L) 92 - 97 %    Base excess (POC) 1 mmol/L    Mode ASSIST CONTROL      Tidal volume 400 ml    Set Rate 14 bpm    PEEP/CPAP (POC) 5 cmH2O    Allens test (POC) N/A      Total resp. rate 16      Site RIGHT RADIAL      Specimen type (POC) ARTERIAL      Performed by Paul Dense     Volume control plus YES     POC G3    Collection Time: 08/30/17  5:36 AM   Result Value Ref Range    Device: VENT      FIO2 (POC) 25 %    pH (POC) 7.456 (H) 7.35 - 7.45      pCO2 (POC) 32.2 (L) 35.0 - 45.0 MMHG    pO2 (POC) 61 (L) 80 - 100 MMHG    HCO3 (POC) 22.7 22 - 26 MMOL/L    sO2 (POC) 92 92 - 97 %    Base deficit (POC) 1 mmol/L    Mode ASSIST CONTROL      Tidal volume 400 ml    Set Rate 14 bpm    PEEP/CPAP (POC) 5 cmH2O    Allens test (POC) N/A      Total resp.  rate 17      Site LEFT RADIAL      Specimen type (POC) ARTERIAL      Performed by Paul Dense     Volume control plus YES     METABOLIC PANEL, BASIC    Collection Time: 08/30/17  6:20 AM   Result Value Ref Range    Sodium 137 136 - 145 mmol/L Potassium 3.5 3.5 - 5.5 mmol/L    Chloride 105 100 - 108 mmol/L    CO2 22 21 - 32 mmol/L    Anion gap 10 3.0 - 18 mmol/L    Glucose 203 (H) 74 - 99 mg/dL    BUN 30 (H) 7.0 - 18 MG/DL    Creatinine 5.36 (H) 0.6 - 1.3 MG/DL    BUN/Creatinine ratio 6 (L) 12 - 20      GFR est AA 13 (L) >60 ml/min/1.73m2    GFR est non-AA 11 (L) >60 ml/min/1.73m2    Calcium 8.7 8.5 - 10.1 MG/DL   MAGNESIUM    Collection Time: 08/30/17  6:20 AM   Result Value Ref Range    Magnesium 2.8 (H) 1.6 - 2.6 mg/dL   PHOSPHORUS    Collection Time: 08/30/17  6:20 AM   Result Value Ref Range    Phosphorus 2.4 (L) 2.5 - 4.9 MG/DL   CBC W/O DIFF    Collection Time: 08/30/17  6:20 AM   Result Value Ref Range    WBC 11.6 4.6 - 13.2 K/uL    RBC 3.20 (L) 4.70 - 5.50 M/uL    HGB 10.0 (L) 13.0 - 16.0 g/dL    HCT 31.2 (L) 36.0 - 48.0 %    MCV 97.5 (H) 74.0 - 97.0 FL    MCH 31.3 24.0 - 34.0 PG    MCHC 32.1 31.0 - 37.0 g/dL    RDW 20.2 (H) 11.6 - 14.5 %    PLATELET 167 (H) 889 - 420 K/uL    MPV 10.4 9.2 - 11.8 FL   GLUCOSE, POC    Collection Time: 08/30/17  6:49 AM   Result Value Ref Range    Glucose (POC) 177 (H) 70 - 110 mg/dL   GLUCOSE, POC    Collection Time: 08/30/17 12:38 PM   Result Value Ref Range    Glucose (POC) 199 (H) 70 - 110 mg/dL       Signed By: Brett Blackmon MD     August 30, 2017

## 2017-08-30 NOTE — ROUTINE PROCESS
Report given to on coming shift, with review of Kardex, MAR and events over night, pt stable at time of transfer.

## 2017-08-30 NOTE — PROGRESS NOTES
RENAL DAILY PROGRESS NOTE    Patient: Christen Dakin               Sex: male          DOA: 7/27/2017  4:23 PM        YOB: 1960      Age:  65 y.o.        LOS:  LOS: 34 days     Subjective:     Christen Dakin is a 65 y.o.  who presents with Cardiac arrest ([facility]  Acidosis  Respiratory failure ([facility]  Anemia  ESRD needing dialysis ([facility]  Cardiac arrest ([facility]  Acute GI bleeding  Sepsis ([facility]  Hypotension  Anoxic brain damage (HCC)  ESRD (end stage renal disease) ([facility]  Colitis  dx  dx.    Asked to evaluate for esrd,had cardiac arrest,anoxic brain injury  - Reviewed last 24 hrs events     Current Facility-Administered Medications   Medication Dose Route Frequency    midodrine (PROAMITINE) tablet 15 mg  15 mg Oral TID WITH MEALS    potassium chloride (KLOR-CON) packet 40 mEq  40 mEq Per NG tube DAILY    Zinc Ox-Aloe Vera-Vitamin E (BALMEX) topical cream   Topical CONTINUOUS    insulin glargine (LANTUS) injection 20 Units  20 Units SubCUTAneous DAILY    banana flakes trans-galactooligosaccharide (BANATROL PLUS) powder 1 Packet  1 Packet Per NG tube TID    levETIRAcetam (KEPPRA) 500 mg in saline (iso-osm) 100 ml IVPB  500 mg IntraVENous Q12H    LORazepam (ATIVAN) injection 1 mg  1 mg IntraVENous Q4H PRN    insulin lispro (HUMALOG) injection   SubCUTAneous Q6H    white petrolatum-mineral oil 85-15 % oint 1 Dose  1 Dose Ophthalmic QID    chlorhexidine (PERIDEX) 0.12 % mouthwash 10 mL  10 mL Oral Q12H    NOREPINephrine (LEVOPHED) 16,000 mcg in dextrose 5% 250 mL infusion  2-16 mcg/min IntraVENous TITRATE    albuterol (PROVENTIL VENTOLIN) nebulizer solution 2.5 mg  2.5 mg Nebulization Q6H PRN    famotidine (PF) (PEPCID) 20 mg in sodium chloride 0.9 % 10 mL injection  20 mg IntraVENous DAILY    vits A and D-white pet-lanolin (A&D) ointment   Topical QID AFTER MEALS    collagenase (SANTYL) 250 unit/gram ointment   Topical DAILY    acetaminophen (TYLENOL) tablet 650 mg  650 mg Oral Q4H PRN  lactobacillus sp. 50 billion cpu (BIO-K PLUS) capsule 1 Cap  1 Cap Oral DAILY    loperamide (IMODIUM) capsule 2 mg  2 mg Oral Q6H PRN    heparin (porcine) injection 5,000 Units  5,000 Units SubCUTAneous Q8H    heparin (porcine) 1,000 unit/mL injection 2,000 Units  2,000 Units IntraVENous DIALYSIS ONCE    simvastatin (ZOCOR) tablet 20 mg  20 mg Oral QHS    doxercalciferol (HECTOROL) 4 mcg/2 mL injection 3 mcg  3 mcg IntraVENous DIALYSIS MON, WED & FRI    epoetin benjamin (EPOGEN;PROCRIT) injection 10,000 Units  10,000 Units IntraVENous DIALYSIS MON, WED & FRI    aspirin chewable tablet 81 mg  81 mg Oral DAILY    glucose chewable tablet 16 g  4 Tab Oral PRN    glucagon (GLUCAGEN) injection 1 mg  1 mg IntraMUSCular PRN    dextrose (D50W) injection syrg 12.5-25 g  25-50 mL IntraVENous PRN    0.9% sodium chloride infusion  100 mL/hr IntraVENous DIALYSIS PRN    naloxone (NARCAN) injection 0.4 mg  0.4 mg IntraVENous PRN       Objective:     Visit Vitals    /64    Pulse (!) 103    Temp 98.4 °F (36.9 °C)    Resp 21    Ht 6' 2\" (1.88 m)    Wt 67.3 kg (148 lb 4.8 oz)    SpO2 100%    BMI 19.04 kg/m2       Intake/Output Summary (Last 24 hours) at 08/30/17 1120  Last data filed at 08/30/17 0700   Gross per 24 hour   Intake          2631.83 ml   Output             1400 ml   Net          1231.83 ml       Physical Examination:     GEN: on vent  RS: Chest is bilateral equal, no wheezing / rales / crackles  CVS: S1-S2 heard, RRR, No S3 / murmur  Abdomen: Soft, Non tender, Not distended, Positive bowel sounds, no organomegaly, no CVA / supra pubic tenderness  Extremities: No edema, no cyanosis, skin is warm on touch  CNS: unresponsive  HEENT: Head is atraumatic, PERRLA, conjunctiva pink & non icteric. No JVD or carotid bruit   Musculoskeletal: No gross joints or bone deformities   Lymph Node: No palpable cervical, axillary or groin lymphadenopathy.       Data Review:      Labs:     Hematology:   Recent Labs 08/30/17   0620  08/29/17   0620  08/28/17   0515   WBC  11.6  11.7  12.2   HGB  10.0*  9.1*  9.4*   HCT  31.2*  28.7*  29.3*     Chemistry:   Recent Labs      08/30/17   0620  08/29/17   0620  08/28/17   0515   BUN  30*  50*  45*   CREA  5.36*  7.74*  7.43*   CA  8.7  8.4*  8.9   K  3.5  3.4*  4.2  4.2   NA  137  139  139   CL  105  107  108   CO2  22  21  20*   PHOS  2.4*  2.6  2.6   GLU  203*  165*  261*        Images:    XR (Most Recent). CXR reviewed by me and compared with previous CXR   Results from Hospital Encounter encounter on 07/27/17   XR ABD PORT  1 V   Narrative History: Nasogastric tube placement    COMPARISON: August 16, 2017    Portable frontal view the abdomen. FINDINGS:    Rotoscoliosis noted. Overlying telemetry leads present. No visualized dilated loops of bowel. Partial evaluated feeding tube noted with tip and sidehole terminating over the  left upper quadrant. Impression IMPRESSION:    Nasogastric tube as discussed. CT (Most Recent)   Results from Hospital Encounter encounter on 07/27/17   CTA CHEST W OR W WO CONT   Narrative CTA CHEST PULMONARY EMBOLISM PROTOCOL        INDICATION: Cardiac arrest. Question pulmonary embolism. TECHNIQUE: Thin collimation axial images obtained through the level of the  pulmonary arteries with additional imaging through the chest following the  uneventful administration of 70 cc Isovue-370 nonionic intravenous contrast.   Images reconstructed into three dimensional coronal and sagittal projections for  complete evaluation of the tortuous and overlapping pulmonary vascular  structures and to reduce patient radiation dose.      All CT scans at this facility are performed using dose optimization technique as  appropriate to a performed exam, to include automated exposure control,  adjustment of the mA and/or kV according to patient size (including appropriate  matching first site-specific examinations), or use of iterative reconstruction  technique. COMPARISON: CT abdomen pelvis 7/27/2017. FINDINGS:    Evaluation is limited by respiration artifact. No filling defects are  appreciated within the main, left, right, lobar or visualized segmental  pulmonary arteries to suggest embolism. Main pulmonary artery is enlarged up to  3.3 cm. Endotracheal tube tip is approximately 1 cm proximal to the bree. Consider  1-1.5 cm retraction. NG but within the body. Thyroid: Unremarkable in its visualized aspects. Pericardium/ Heart: No significant effusion. Aorta/ Vessels: No aneurysm or dissection. Lymph Nodes: Unremarkable. .    Lungs: Layering secretions in the bilateral mainstem bronchus and at the bree. Patchy left lower lobe bronchovascular opacities. There is central bronchial  opacification of a subsegmental anterior right lower lobe bronchus with mild  adjacent groundglass. Minimal left upper lobe bronchovascular groundglass. Resolved large right pleural effusion and complete atelectasis right lower lobe. Pleura: No effusion. Upper Abdomen: IVC and hepatic veins are enlarged. Heterogeneous liver  attenuation. Both right renal arteries appear diminutive. Bones/soft tissues: Moderate anasarca. Bilateral anteromedial rib fractures  including right fourth-sixth and left fourth and fifth ribs. Degenerative disc  disease most significant at T3-4. Sclerotic bone foci T5 is likely a bone  island. Impression IMPRESSION:  1. No evidence for pulmonary emboli within limitations of respiratory motion. 2.  Tip of endotracheal tube is approximately 1 cm proximal to the bree,  consider 1-1.5 cm retraction. 3.  Layering secretions in the bronchus with left lower greater than upper  bronchovascular opacities most compatible with infectious etiology, possibly  aspiration given history. 4.  A single moderate length of endobronchial opacification right lower lobe.   5.  Resolved large right pleural effusion and atelectasis entire right lower  lobe. 6.  Bilateral anteromedial rib fractures associated with CPR. 7.  Main pulmonary artery enlargement as may be seen in pulmonary arterial  hypertension. 8.  Dilated IVC and hepatic veins as may be seen in right heart failure. 9.  Diminutive right renal arteries may be associated with hypotension and/or  peripheral arterial constriction. 10.  Moderate anasarca. EKG No results found for this or any previous visit.      I have personally reviewed the old medical records and patient's labs    Plan / Recommendation:      1. esrd ,plan dialysis tomorrow  2.anemia,on epo  3.hypokalemia,dialysis with high potassium bath    D/w intensivist    Deborah Blanca MD  Nephrology  8/30/2017

## 2017-08-30 NOTE — PROGRESS NOTES
[facility] Hospitalist Group  Progress Note    Patient: Kaur Pascal Age: 65 y.o. : 1960 MR#: [id number] SSN: [id number]  Date/Time: 2017     Subjective:     Patient intubated, unresponsive. RN in room. No family has visited today per RN    Assessment/Plan:     S/p PEA arrest. wth acute resp fauilure on the Vent ? Cardiac cause with elevated trop PTA. Echo improving EF, s/p cath, no CAD. Now with second cardiac arrest - intubated, Vent support    Acute met encephalopathy: likely anoxic encephalopathy     Acute pontine lacunar CVA: on asa.     ESRD- HD as pe nephrology    wound care    D/w RN    Case discussed with:  []Patient  []Family  []Nursing  [x]Case Management  DVT Prophylaxis:  []Lovenox  [x]Hep SQ  [x]SCDs  []Coumadin   []On Heparin gtt    Patient Active Problem List   Diagnosis Code    Anemia D64.9    Acidosis E87.2    Cardiac arrest ([address] I46.9    Respiratory failure ([address] J96.90    ESRD needing dialysis ([address] N18.6, Z99.2    Anoxic brain damage ([address] G93.1    Colitis K52.9    Hypotension I95.9    Acute GI bleeding K92.2    ESRD (end stage renal disease) ([address] N18.6    Sepsis ([address] A41.9    Oropharyngeal dysphagia R13.12    Cachexia ([address] R64       Objective:   VS:   Visit Vitals    /64 (BP 1 Location: Right arm, BP Patient Position: Head of bed elevated (Comment degrees))  Comment (BP Patient Position): 30    Pulse 85    Temp 99.1 °F (37.3 °C)    Resp 19    Ht 6' 2\" (1.88 m)    Wt 64.6 kg (142 lb 6.7 oz)    SpO2 100%    BMI 18.29 kg/m2      Tmax/24hrs: Temp (24hrs), Av.3 °F (36.8 °C), Min:97.6 °F (36.4 °C), Max:99.1 °F (37.3 °C)  IOBRIEF    Intake/Output Summary (Last 24 hours) at 17 0127  Last data filed at 17 0000   Gross per 24 hour   Intake          2730.23 ml   Output              850 ml   Net          1880.23 ml     General:  Intubated, unresponsive  Cardiovascular:  S1S2+, RRR  Pulmonary:  Coarse BS b/l  GI:  Soft, BS+, NT, ND  Extremities:  No edema        Medications:   Current Facility-Administered Medications   Medication Dose Route Frequency    midodrine (PROAMITINE) tablet 15 mg  15 mg Oral TID WITH MEALS    potassium chloride (KLOR-CON) packet 40 mEq  40 mEq Per NG tube DAILY    Zinc Ox-Aloe Vera-Vitamin E (BALMEX) topical cream   Topical CONTINUOUS    insulin glargine (LANTUS) injection 20 Units  20 Units SubCUTAneous DAILY    banana flakes trans-galactooligosaccharide (BANATROL PLUS) powder 1 Packet  1 Packet Per NG tube TID    levETIRAcetam (KEPPRA) 500 mg in saline (iso-osm) 100 ml IVPB  500 mg IntraVENous Q12H    LORazepam (ATIVAN) injection 1 mg  1 mg IntraVENous Q4H PRN    insulin lispro (HUMALOG) injection   SubCUTAneous Q6H    white petrolatum-mineral oil 85-15 % oint 1 Dose  1 Dose Ophthalmic QID    chlorhexidine (PERIDEX) 0.12 % mouthwash 10 mL  10 mL Oral Q12H    NOREPINephrine (LEVOPHED) 16,000 mcg in dextrose 5% 250 mL infusion  2-16 mcg/min IntraVENous TITRATE    albuterol (PROVENTIL VENTOLIN) nebulizer solution 2.5 mg  2.5 mg Nebulization Q6H PRN    famotidine (PF) (PEPCID) 20 mg in sodium chloride 0.9 % 10 mL injection  20 mg IntraVENous DAILY    vits A and D-white pet-lanolin (A&D) ointment   Topical QID AFTER MEALS    collagenase (SANTYL) 250 unit/gram ointment   Topical DAILY    acetaminophen (TYLENOL) tablet 650 mg  650 mg Oral Q4H PRN    lactobacillus sp. 50 billion cpu (BIO-K PLUS) capsule 1 Cap  1 Cap Oral DAILY    loperamide (IMODIUM) capsule 2 mg  2 mg Oral Q6H PRN    heparin (porcine) injection 5,000 Units  5,000 Units SubCUTAneous Q8H    heparin (porcine) 1,000 unit/mL injection 2,000 Units  2,000 Units IntraVENous DIALYSIS ONCE    simvastatin (ZOCOR) tablet 20 mg  20 mg Oral QHS    doxercalciferol (HECTOROL) 4 mcg/2 mL injection 3 mcg  3 mcg IntraVENous DIALYSIS MON, WED & FRI    epoetin benjamin (EPOGEN;PROCRIT) injection 10,000 Units  10,000 Units IntraVENous DIALYSIS MON, WED & FRI    aspirin chewable tablet 81 mg  81 mg Oral DAILY    glucose chewable tablet 16 g  4 Tab Oral PRN    glucagon (GLUCAGEN) injection 1 mg  1 mg IntraMUSCular PRN    dextrose (D50W) injection syrg 12.5-25 g  25-50 mL IntraVENous PRN    0.9% sodium chloride infusion  100 mL/hr IntraVENous DIALYSIS PRN    naloxone (NARCAN) injection 0.4 mg  0.4 mg IntraVENous PRN       Labs:      Recent Results (from the past 24 hour(s))   POC G3    Collection Time: 08/29/17  4:08 AM   Result Value Ref Range    Device: VENT      FIO2 (POC) 25 %    pH (POC) 7.412 7.35 - 7.45      pCO2 (POC) 31.3 (L) 35.0 - 45.0 MMHG    pO2 (POC) 85 80 - 100 MMHG    HCO3 (POC) 19.9 (L) 22 - 26 MMOL/L    sO2 (POC) 97 92 - 97 %    Base deficit (POC) 5 mmol/L    Mode ASSIST CONTROL      Tidal volume 400 ml    Set Rate 14 bpm    PIP (POC) 5      Allens test (POC) YES      Total resp. rate 17      Site RIGHT RADIAL      Patient temp. 37.0      Specimen type (POC) ARTERIAL      Performed by Rody Huang     Volume control plus YES     CBC WITH AUTOMATED DIFF    Collection Time: 08/29/17  6:20 AM   Result Value Ref Range    WBC 11.7 4.6 - 13.2 K/uL    RBC 2.92 (L) 4.70 - 5.50 M/uL    HGB 9.1 (L) 13.0 - 16.0 g/dL    HCT 28.7 (L) 36.0 - 48.0 %    MCV 98.3 (H) 74.0 - 97.0 FL    MCH 31.2 24.0 - 34.0 PG    MCHC 31.7 31.0 - 37.0 g/dL    RDW 20.2 (H) 11.6 - 14.5 %    PLATELET 284 (H) 849 - 420 K/uL    MPV 10.3 9.2 - 11.8 FL    NEUTROPHILS 77 (H) 40 - 73 %    LYMPHOCYTES 15 (L) 21 - 52 %    MONOCYTES 6 3 - 10 %    EOSINOPHILS 2 0 - 5 %    BASOPHILS 0 0 - 2 %    ABS. NEUTROPHILS 9.0 (H) 1.8 - 8.0 K/UL    ABS. LYMPHOCYTES 1.8 0.9 - 3.6 K/UL    ABS. MONOCYTES 0.7 0.05 - 1.2 K/UL    ABS. EOSINOPHILS 0.2 0.0 - 0.4 K/UL    ABS.  BASOPHILS 0.0 0.0 - 0.06 K/UL    DF AUTOMATED     MAGNESIUM    Collection Time: 08/29/17  6:20 AM   Result Value Ref Range    Magnesium 3.1 (H) 1.6 - 2.6 mg/dL   PHOSPHORUS    Collection Time: 08/29/17  6:20 AM   Result Value Ref Range Phosphorus 2.6 2.5 - 4.9 MG/DL   METABOLIC PANEL, BASIC    Collection Time: 08/29/17  6:20 AM   Result Value Ref Range    Sodium 139 136 - 145 mmol/L    Potassium 3.4 (L) 3.5 - 5.5 mmol/L    Chloride 107 100 - 108 mmol/L    CO2 21 21 - 32 mmol/L    Anion gap 11 3.0 - 18 mmol/L    Glucose 165 (H) 74 - 99 mg/dL    BUN 50 (H) 7.0 - 18 MG/DL    Creatinine 7.74 (H) 0.6 - 1.3 MG/DL    BUN/Creatinine ratio 6 (L) 12 - 20      GFR est AA 9 (L) >60 ml/min/1.73m2    GFR est non-AA 7 (L) >60 ml/min/1.73m2    Calcium 8.4 (L) 8.5 - 10.1 MG/DL   GLUCOSE, POC    Collection Time: 08/29/17 11:49 AM   Result Value Ref Range    Glucose (POC) 139 (H) 70 - 110 mg/dL   GLUCOSE, POC    Collection Time: 08/29/17  5:29 PM   Result Value Ref Range    Glucose (POC) 101 70 - 110 mg/dL   GLUCOSE, POC    Collection Time: 08/29/17 11:55 PM   Result Value Ref Range    Glucose (POC) 107 70 - 110 mg/dL       Signed By: Migdalia Christian MD     August 30, 2017

## 2017-08-31 ENCOUNTER — APPOINTMENT (OUTPATIENT)
Dept: GENERAL RADIOLOGY | Age: 57
DRG: 004 | End: 2017-08-31
Attending: PHYSICIAN ASSISTANT
Payer: MEDICARE

## 2017-08-31 LAB
ANION GAP SERPL CALC-SCNC: 10 MMOL/L (ref 3–18)
ARTERIAL PATENCY WRIST A: YES
BASE DEFICIT BLD-SCNC: 3 MMOL/L
BASOPHILS # BLD: 0 K/UL (ref 0–0.1)
BASOPHILS NFR BLD: 0 % (ref 0–2)
BDY SITE: ABNORMAL
BODY TEMPERATURE: 97.8
BUN SERPL-MCNC: 40 MG/DL (ref 7–18)
BUN/CREAT SERPL: 6 (ref 12–20)
CALCIUM SERPL-MCNC: 8.6 MG/DL (ref 8.5–10.1)
CHLORIDE SERPL-SCNC: 109 MMOL/L (ref 100–108)
CO2 SERPL-SCNC: 22 MMOL/L (ref 21–32)
CREAT SERPL-MCNC: 6.74 MG/DL (ref 0.6–1.3)
DIFFERENTIAL METHOD BLD: ABNORMAL
EOSINOPHIL # BLD: 0.2 K/UL (ref 0–0.4)
EOSINOPHIL NFR BLD: 2 % (ref 0–5)
ERYTHROCYTE [DISTWIDTH] IN BLOOD BY AUTOMATED COUNT: 20.5 % (ref 11.6–14.5)
GAS FLOW.O2 O2 DELIVERY SYS: ABNORMAL L/MIN
GAS FLOW.O2 SETTING OXYMISER: 14 BPM
GLUCOSE BLD STRIP.AUTO-MCNC: 138 MG/DL (ref 70–110)
GLUCOSE BLD STRIP.AUTO-MCNC: 143 MG/DL (ref 70–110)
GLUCOSE BLD STRIP.AUTO-MCNC: 150 MG/DL (ref 70–110)
GLUCOSE BLD STRIP.AUTO-MCNC: 99 MG/DL (ref 70–110)
GLUCOSE SERPL-MCNC: 128 MG/DL (ref 74–99)
HCO3 BLD-SCNC: 21.7 MMOL/L (ref 22–26)
HCT VFR BLD AUTO: 29.6 % (ref 36–48)
HGB BLD-MCNC: 9.6 G/DL (ref 13–16)
INSPIRATION.DURATION SETTING TIME VENT: 1 SEC
LYMPHOCYTES # BLD: 2.1 K/UL (ref 0.9–3.6)
LYMPHOCYTES NFR BLD: 19 % (ref 21–52)
MAGNESIUM SERPL-MCNC: 3.1 MG/DL (ref 1.6–2.6)
MCH RBC QN AUTO: 31.5 PG (ref 24–34)
MCHC RBC AUTO-ENTMCNC: 32.4 G/DL (ref 31–37)
MCV RBC AUTO: 97 FL (ref 74–97)
MONOCYTES # BLD: 0.7 K/UL (ref 0.05–1.2)
MONOCYTES NFR BLD: 6 % (ref 3–10)
NEUTS SEG # BLD: 8.2 K/UL (ref 1.8–8)
NEUTS SEG NFR BLD: 73 % (ref 40–73)
O2/TOTAL GAS SETTING VFR VENT: 35 %
PCO2 BLD: 32 MMHG (ref 35–45)
PEEP RESPIRATORY: 5 CMH2O
PH BLD: 7.44 [PH] (ref 7.35–7.45)
PHOSPHATE SERPL-MCNC: 3.3 MG/DL (ref 2.5–4.9)
PLATELET # BLD AUTO: 453 K/UL (ref 135–420)
PLATELET COMMENTS,PCOM: ABNORMAL
PMV BLD AUTO: 10 FL (ref 9.2–11.8)
PO2 BLD: 187 MMHG (ref 80–100)
POTASSIUM SERPL-SCNC: 4.1 MMOL/L (ref 3.5–5.5)
PRESSURE SUPPORT SETTING VENT: 7 CMH2O
RBC # BLD AUTO: 3.05 M/UL (ref 4.7–5.5)
RBC MORPH BLD: ABNORMAL
RBC MORPH BLD: ABNORMAL
SAO2 % BLD: 100 % (ref 92–97)
SERVICE CMNT-IMP: ABNORMAL
SODIUM SERPL-SCNC: 141 MMOL/L (ref 136–145)
SPECIMEN TYPE: ABNORMAL
TOTAL RESP. RATE, ITRR: 14
VENTILATION MODE VENT: ABNORMAL
VOLUME CONTROL IVLC: YES
VT SETTING VENT: 400 ML
WBC # BLD AUTO: 11.2 K/UL (ref 4.6–13.2)

## 2017-08-31 PROCEDURE — 84100 ASSAY OF PHOSPHORUS: CPT | Performed by: NURSE PRACTITIONER

## 2017-08-31 PROCEDURE — 74011250637 HC RX REV CODE- 250/637: Performed by: INTERNAL MEDICINE

## 2017-08-31 PROCEDURE — 74011250636 HC RX REV CODE- 250/636: Performed by: INTERNAL MEDICINE

## 2017-08-31 PROCEDURE — 74011250637 HC RX REV CODE- 250/637: Performed by: NURSE PRACTITIONER

## 2017-08-31 PROCEDURE — 90935 HEMODIALYSIS ONE EVALUATION: CPT

## 2017-08-31 PROCEDURE — 74011636637 HC RX REV CODE- 636/637: Performed by: INTERNAL MEDICINE

## 2017-08-31 PROCEDURE — 71010 XR CHEST PORT: CPT

## 2017-08-31 PROCEDURE — 77030033263 HC DRSG MEPILEX 16-48IN BORD MOLN -B

## 2017-08-31 PROCEDURE — 74011250637 HC RX REV CODE- 250/637: Performed by: HOSPITALIST

## 2017-08-31 PROCEDURE — 82962 GLUCOSE BLOOD TEST: CPT

## 2017-08-31 PROCEDURE — 80048 BASIC METABOLIC PNL TOTAL CA: CPT | Performed by: NURSE PRACTITIONER

## 2017-08-31 PROCEDURE — 65610000006 HC RM INTENSIVE CARE

## 2017-08-31 PROCEDURE — 74011250636 HC RX REV CODE- 250/636: Performed by: HOSPITALIST

## 2017-08-31 PROCEDURE — 85025 COMPLETE CBC W/AUTO DIFF WBC: CPT | Performed by: NURSE PRACTITIONER

## 2017-08-31 PROCEDURE — 36592 COLLECT BLOOD FROM PICC: CPT

## 2017-08-31 PROCEDURE — 36600 WITHDRAWAL OF ARTERIAL BLOOD: CPT

## 2017-08-31 PROCEDURE — 74011000250 HC RX REV CODE- 250: Performed by: PHYSICIAN ASSISTANT

## 2017-08-31 PROCEDURE — 83735 ASSAY OF MAGNESIUM: CPT | Performed by: NURSE PRACTITIONER

## 2017-08-31 PROCEDURE — 82803 BLOOD GASES ANY COMBINATION: CPT

## 2017-08-31 PROCEDURE — 94003 VENT MGMT INPAT SUBQ DAY: CPT

## 2017-08-31 RX ADMIN — MIDODRINE HYDROCHLORIDE 15 MG: 2.5 TABLET ORAL at 09:00

## 2017-08-31 RX ADMIN — COLLAGENASE SANTYL: 250 OINTMENT TOPICAL at 09:02

## 2017-08-31 RX ADMIN — HEPARIN SODIUM 5000 UNITS: 5000 INJECTION, SOLUTION INTRAVENOUS; SUBCUTANEOUS at 18:23

## 2017-08-31 RX ADMIN — CHLORHEXIDINE GLUCONATE 10 ML: 1.2 RINSE ORAL at 21:11

## 2017-08-31 RX ADMIN — ERYTHROPOIETIN 10000 UNITS: 10000 INJECTION, SOLUTION INTRAVENOUS; SUBCUTANEOUS at 11:40

## 2017-08-31 RX ADMIN — LEVETIRACETAM 500 MG: 5 INJECTION INTRAVENOUS at 21:10

## 2017-08-31 RX ADMIN — Medication 1 PACKET: at 09:08

## 2017-08-31 RX ADMIN — VITAMIN A AND D: 30.8 OINTMENT TOPICAL at 09:04

## 2017-08-31 RX ADMIN — INSULIN GLARGINE 20 UNITS: 100 INJECTION, SOLUTION SUBCUTANEOUS at 09:01

## 2017-08-31 RX ADMIN — VITAMIN A AND D: 30.8 OINTMENT TOPICAL at 21:14

## 2017-08-31 RX ADMIN — MINERAL OIL AND WHITE PETROLATUM 1 DOSE: 150; 850 OINTMENT OPHTHALMIC at 09:01

## 2017-08-31 RX ADMIN — HEPARIN SODIUM 5000 UNITS: 5000 INJECTION, SOLUTION INTRAVENOUS; SUBCUTANEOUS at 10:57

## 2017-08-31 RX ADMIN — MIDODRINE HYDROCHLORIDE 15 MG: 2.5 TABLET ORAL at 14:04

## 2017-08-31 RX ADMIN — Medication 1 PACKET: at 21:10

## 2017-08-31 RX ADMIN — Medication 1 PACKET: at 16:43

## 2017-08-31 RX ADMIN — MINERAL OIL AND WHITE PETROLATUM 1 DOSE: 150; 850 OINTMENT OPHTHALMIC at 21:10

## 2017-08-31 RX ADMIN — FAMOTIDINE 20 MG: 10 INJECTION INTRAVENOUS at 09:01

## 2017-08-31 RX ADMIN — MINERAL OIL AND WHITE PETROLATUM 1 DOSE: 150; 850 OINTMENT OPHTHALMIC at 10:00

## 2017-08-31 RX ADMIN — LEVETIRACETAM 500 MG: 5 INJECTION INTRAVENOUS at 09:00

## 2017-08-31 RX ADMIN — MIDODRINE HYDROCHLORIDE 15 MG: 2.5 TABLET ORAL at 16:42

## 2017-08-31 RX ADMIN — VITAMIN A AND D: 30.8 OINTMENT TOPICAL at 14:05

## 2017-08-31 RX ADMIN — HEPARIN SODIUM 5000 UNITS: 5000 INJECTION, SOLUTION INTRAVENOUS; SUBCUTANEOUS at 02:24

## 2017-08-31 RX ADMIN — MINERAL OIL AND WHITE PETROLATUM 1 DOSE: 150; 850 OINTMENT OPHTHALMIC at 18:23

## 2017-08-31 RX ADMIN — ASPIRIN 81 MG 81 MG: 81 TABLET ORAL at 09:01

## 2017-08-31 RX ADMIN — SIMVASTATIN 20 MG: 10 TABLET, FILM COATED ORAL at 21:11

## 2017-08-31 RX ADMIN — CHLORHEXIDINE GLUCONATE 10 ML: 1.2 RINSE ORAL at 09:01

## 2017-08-31 RX ADMIN — DOXERCALCIFEROL 3 MCG: 4 INJECTION, SOLUTION INTRAVENOUS at 16:43

## 2017-08-31 RX ADMIN — Medication 1 CAPSULE: at 09:01

## 2017-08-31 NOTE — PROGRESS NOTES
0700-  Assumed care. Pt unresponsive. On vent, no gag present. Tolerating well. Report received. Orders, labs, meds and plan of care reviewed. VS stable. Assessment completed. 1030-  IDR completed. Plan for trach/peg next week as family wants pt to be full code and all care provided. VS stable. 1040-  Dialysis started. VS stable. 1130-  Tolerating dialysis well. No change in VS noted. 1440-  Dialysis complete. Pt tolerated well with no VS changes. 1600-  No interval changes. VS stable. 1800-  No change. Pt remains unresponsive. VS stable. 1900-  Report given to oncoming nurse. SBAR, KARDEX and plan of care reviewed. Care transferred.

## 2017-08-31 NOTE — PROGRESS NOTES
[facility] Hospitalist Group  Progress Note    Patient: Kaur Pascal Age: 65 y.o. : 1960 MR#: [id number] SSN: [id number]  Date/Time: 2017     Subjective:     Patient remains intubated, unresponsive    Assessment/Plan:     S/p PEA arrest. wth acute resp fauilure on the Vent ? Cardiac cause with elevated trop PTA. Echo improving EF, s/p cath, no CAD. Now with second cardiac arrest - intubated, vent support    Acute met encephalopathy: likely anoxic encephalopathy     Acute pontine lacunar CVA: on asa.     ESRD- HD as pe nephrology    DM2- Lantus, ssi    wound care    Poor prognosis    Case discussed with:  []Patient  []Family  []Nursing  [x]Case Management  DVT Prophylaxis:  []Lovenox  [x]Hep SQ  [x]SCDs  []Coumadin   []On Heparin gtt    Patient Active Problem List   Diagnosis Code    Anemia D64.9    Acidosis E87.2    Cardiac arrest ([address] I46.9    Respiratory failure ([address] J96.90    ESRD needing dialysis ([address] N18.6, Z99.2    Anoxic brain damage ([address] G93.1    Colitis K52.9    Hypotension I95.9    Acute GI bleeding K92.2    ESRD (end stage renal disease) ([address] N18.6    Sepsis ([address] A41.9    Oropharyngeal dysphagia R13.12    Cachexia ([address] R64       Objective:   VS:   Visit Vitals    /68    Pulse 84    Temp 98 °F (36.7 °C)    Resp 22    Ht 6' 2\" (1.88 m)    Wt 67.3 kg (148 lb 4.8 oz)    SpO2 100%    BMI 19.04 kg/m2      Tmax/24hrs: Temp (24hrs), Av °F (36.7 °C), Min:97.7 °F (36.5 °C), Max:98.7 °F (37.1 °C)  IOBRIEF    Intake/Output Summary (Last 24 hours) at 17 1353  Last data filed at 17 0700   Gross per 24 hour   Intake              960 ml   Output                0 ml   Net              960 ml     General:  Intubated, unresponsive  Cardiovascular:  S1S2+, RRR  Pulmonary:  Coarse BS b/l  GI:  Soft, BS+, NT, ND  Extremities:  No edema        Medications:   Current Facility-Administered Medications   Medication Dose Route Frequency    doxercalciferol (HECTOROL) 4 mcg/2 mL injection 3 mcg  3 mcg IntraVENous Q TUE, THU & SAT    epoetin benjamin (EPOGEN;PROCRIT) injection 10,000 Units  10,000 Units IntraVENous DIALYSIS TUE, THU & SAT    midodrine (PROAMITINE) tablet 15 mg  15 mg Oral TID WITH MEALS    potassium chloride (KLOR-CON) packet 40 mEq  40 mEq Per NG tube DAILY    Zinc Ox-Aloe Vera-Vitamin E (BALMEX) topical cream   Topical CONTINUOUS    insulin glargine (LANTUS) injection 20 Units  20 Units SubCUTAneous DAILY    banana flakes trans-galactooligosaccharide (BANATROL PLUS) powder 1 Packet  1 Packet Per NG tube TID    levETIRAcetam (KEPPRA) 500 mg in saline (iso-osm) 100 ml IVPB  500 mg IntraVENous Q12H    LORazepam (ATIVAN) injection 1 mg  1 mg IntraVENous Q4H PRN    insulin lispro (HUMALOG) injection   SubCUTAneous Q6H    white petrolatum-mineral oil 85-15 % oint 1 Dose  1 Dose Ophthalmic QID    chlorhexidine (PERIDEX) 0.12 % mouthwash 10 mL  10 mL Oral Q12H    NOREPINephrine (LEVOPHED) 16,000 mcg in dextrose 5% 250 mL infusion  2-16 mcg/min IntraVENous TITRATE    albuterol (PROVENTIL VENTOLIN) nebulizer solution 2.5 mg  2.5 mg Nebulization Q6H PRN    famotidine (PF) (PEPCID) 20 mg in sodium chloride 0.9 % 10 mL injection  20 mg IntraVENous DAILY    vits A and D-white pet-lanolin (A&D) ointment   Topical QID AFTER MEALS    collagenase (SANTYL) 250 unit/gram ointment   Topical DAILY    acetaminophen (TYLENOL) tablet 650 mg  650 mg Oral Q4H PRN    lactobacillus sp. 50 billion cpu (BIO-K PLUS) capsule 1 Cap  1 Cap Oral DAILY    loperamide (IMODIUM) capsule 2 mg  2 mg Oral Q6H PRN    heparin (porcine) injection 5,000 Units  5,000 Units SubCUTAneous Q8H    heparin (porcine) 1,000 unit/mL injection 2,000 Units  2,000 Units IntraVENous DIALYSIS ONCE    simvastatin (ZOCOR) tablet 20 mg  20 mg Oral QHS    aspirin chewable tablet 81 mg  81 mg Oral DAILY    glucose chewable tablet 16 g  4 Tab Oral PRN    glucagon (GLUCAGEN) injection 1 mg  1 mg IntraMUSCular PRN    dextrose (D50W) injection syrg 12.5-25 g  25-50 mL IntraVENous PRN    0.9% sodium chloride infusion  100 mL/hr IntraVENous DIALYSIS PRN    naloxone (NARCAN) injection 0.4 mg  0.4 mg IntraVENous PRN       Labs:      Recent Results (from the past 24 hour(s))   GLUCOSE, POC    Collection Time: 08/30/17  6:33 PM   Result Value Ref Range    Glucose (POC) 109 70 - 110 mg/dL   POC G3    Collection Time: 08/30/17  9:18 PM   Result Value Ref Range    Device: VENT      FIO2 (POC) 35 %    pH (POC) 7.420 7.35 - 7.45      pCO2 (POC) 35.9 35.0 - 45.0 MMHG    pO2 (POC) 181 (H) 80 - 100 MMHG    HCO3 (POC) 23.2 22 - 26 MMOL/L    sO2 (POC) 100 (H) 92 - 97 %    Base deficit (POC) 1 mmol/L    Mode SIMV      Tidal volume 400 ml    Set Rate 14 bpm    PEEP/CPAP (POC) 5 cmH2O    Pressure support 7 cmH2O    Allens test (POC) YES      Inspiratory Time 1 sec    Total resp. rate 21      Site RIGHT RADIAL      Patient temp. 98.7      Specimen type (POC) ARTERIAL      Performed by [employer]     Volume control YES     GLUCOSE, POC    Collection Time: 08/31/17  1:16 AM   Result Value Ref Range    Glucose (POC) 99 70 - 110 mg/dL   POC G3    Collection Time: 08/31/17  3:57 AM   Result Value Ref Range    Device: VENT      FIO2 (POC) 35 %    pH (POC) 7.438 7.35 - 7.45      pCO2 (POC) 32.0 (L) 35.0 - 45.0 MMHG    pO2 (POC) 187 (H) 80 - 100 MMHG    HCO3 (POC) 21.7 (L) 22 - 26 MMOL/L    sO2 (POC) 100 (H) 92 - 97 %    Base deficit (POC) 3 mmol/L    Mode SIMV      Tidal volume 400 ml    Set Rate 14 bpm    PEEP/CPAP (POC) 5 cmH2O    Pressure support 7 cmH2O    Allens test (POC) YES      Inspiratory Time 1 sec    Total resp.  rate 14      Site RIGHT RADIAL      Patient temp. 97.8      Specimen type (POC) ARTERIAL      Performed by [employer]     Volume control YES     CBC WITH AUTOMATED DIFF    Collection Time: 08/31/17  6:10 AM   Result Value Ref Range    WBC 11.2 4.6 - 13.2 K/uL    RBC 3.05 (L) 4.70 - 5.50 M/uL HGB 9.6 (L) 13.0 - 16.0 g/dL    HCT 29.6 (L) 36.0 - 48.0 %    MCV 97.0 74.0 - 97.0 FL    MCH 31.5 24.0 - 34.0 PG    MCHC 32.4 31.0 - 37.0 g/dL    RDW 20.5 (H) 11.6 - 14.5 %    PLATELET 098 (H) 668 - 420 K/uL    MPV 10.0 9.2 - 11.8 FL    NEUTROPHILS 73 40 - 73 %    LYMPHOCYTES 19 (L) 21 - 52 %    MONOCYTES 6 3 - 10 %    EOSINOPHILS 2 0 - 5 %    BASOPHILS 0 0 - 2 %    ABS. NEUTROPHILS 8.2 (H) 1.8 - 8.0 K/UL    ABS. LYMPHOCYTES 2.1 0.9 - 3.6 K/UL    ABS. MONOCYTES 0.7 0.05 - 1.2 K/UL    ABS. EOSINOPHILS 0.2 0.0 - 0.4 K/UL    ABS.  BASOPHILS 0.0 0.0 - 0.1 K/UL    DF AUTOMATED      PLATELET COMMENTS INCREASED PLATELETS      RBC COMMENTS ANISOCYTOSIS  2+        RBC COMMENTS POLYCHROMASIA  1+       MAGNESIUM    Collection Time: 08/31/17  6:10 AM   Result Value Ref Range    Magnesium 3.1 (H) 1.6 - 2.6 mg/dL   PHOSPHORUS    Collection Time: 08/31/17  6:10 AM   Result Value Ref Range    Phosphorus 3.3 2.5 - 4.9 MG/DL   METABOLIC PANEL, BASIC    Collection Time: 08/31/17  6:10 AM   Result Value Ref Range    Sodium 141 136 - 145 mmol/L    Potassium 4.1 3.5 - 5.5 mmol/L    Chloride 109 (H) 100 - 108 mmol/L    CO2 22 21 - 32 mmol/L    Anion gap 10 3.0 - 18 mmol/L    Glucose 128 (H) 74 - 99 mg/dL    BUN 40 (H) 7.0 - 18 MG/DL    Creatinine 6.74 (H) 0.6 - 1.3 MG/DL    BUN/Creatinine ratio 6 (L) 12 - 20      GFR est AA 10 (L) >60 ml/min/1.73m2    GFR est non-AA 9 (L) >60 ml/min/1.73m2    Calcium 8.6 8.5 - 10.1 MG/DL   GLUCOSE, POC    Collection Time: 08/31/17 11:37 AM   Result Value Ref Range    Glucose (POC) 150 (H) 70 - 110 mg/dL   GLUCOSE, POC    Collection Time: 08/31/17  1:49 PM   Result Value Ref Range    Glucose (POC) 138 (H) 70 - 110 mg/dL       Signed By: Suzette Riley MD     August 31, 2017

## 2017-08-31 NOTE — DIALYSIS
ACUTE HEMODIALYSIS FLOW SHEET    HEMODIALYSIS ORDERS: Physician: KRUPA andrew      Dialyzer: revaclear         Duration: 3 hr  BFR: 300   DFR: 600   Dialysate:  Temp *37 K+   2    Ca+  2.5 Na 140 Bicarb 30   Weight:  67.3 kg    Bed Scale [x]     Unable to Obtain []      Dry weight/UF Goal: 0 Access AVG  Needle Gauge 15    Heparin []  Bolus      Units    [] Hourly       Units    [x]None     Catheter locking solution na   Pre BP:   115/65    Pulse:     75     Temperature:   97.8  Respirations: 16  Tx: NS       ml/Bolus  Other        [x] N/A   Labs: Pre        Post:        [x] N/A   Additional Orders(medications, blood products, hypotension management):       [x] N/A     [x] Time Out/Safety Check  [x] [facility] Consent Verified     CATHETER ACCESS: [x]N/A   []Right   []Left   []IJ     []Fem   [] First use X-ray verified     []Tunnel                [] Non Tunneled   []No S/S infection  []Redness  []Drainage []Cultured []Swelling []Pain   []Medical Aseptic Prep Utilized   []Dressing Changed  [] Biopatch  Date:       []Clotted   []Patent   Flows: []Good  []Poor  []Reversed   If access problem,  notified: []Yes    []N/A  Date:           GRAFT/FISTULA ACCESS:  []N/A     []Right     [x]Left     [x]UE     []LE   [x]AVG   []AVF        []Buttonhole    [x]Medical Aseptic Prep Utilized   [x]No S/S infection  []Redness  []Drainage []Cultured []Swelling []Pain    Bruit:   [x] Strong    [] Weak       Thrill :   [x] Strong    [] Weak       Needle Gauge: 15   Length: 1   If access problem,  notified: []Yes     [x]N/A  Date:        Please describe access if present and not used:       GENERAL ASSESSMENT:    LUNGS:  Rate  SaO2%        [x] N/A    [x] Clear  [] Coarse  [] Crackles  [] Wheezing        [] Diminished     Location : []RLL   []LLL    []RUL  []   Cough: []Productive  []Dry  [x]N/A   Respirations:  [x]Easy  []Labored   Therapy:  []RA  []NC  l/min    Mask: []NRB []Venti       O2%                  [x]Ventilator [x]Intubated  [] Trach  [] BiPaP   CARDIAC: [x]Regular      [] Irregular   [] Pericardial Rub  [] JVD        []  Monitored  [] Bedside  [] Remotely monitored [] N/A  Rhythm:    EDEMA: [x] None  [x]Generalized  [] Pitting [] 1    [] 2    [] 3    [] 4                 [] Facial  [] Pedal  []  UE  [] LE   SKIN:   [x] Warm  [] Hot     [] Cold   [x] Dry     [] Pale   [] Diaphoretic                  [] Flushed  [] Jaundiced  [] Cyanotic  [] Rash  [] Weeping   LOC:    [] Alert      []Oriented:    [] Person     [] Place  []Time               [] Confused  [] Lethargic  [] Medicated  [x] Non-responsive     GI / ABDOMEN:  [] Flat    [] Distended    [x] Soft    [] Firm   []  Obese                             [] Diarrhea  [x] Bowel Sounds  [] Nausea  [] Vomiting       / URINE ASSESSMENT:[] Voiding   [x] Oliguria  [] Anuria   []  Stanton     [] Incontinent    []  Incontinent Brief      []  Bathroom Privileges     PAIN: [x] 0 []1  []2   []3   []4   []5   []6   []7   []8   []9   []10            Scale 0-10  Action/Follow Up:    MOBILITY:  [] Amb    [] Amb/Assist    [x] Bed    [] Wheelchair  [] Stretcher      All Vitals and Treatment Details on Attached [address]: [facility]          Room # 309     [] 1st Time Acute  [] Stat  [x] Routine  [] Urgent     [] Acute Room  []  Bedside  [x] ICU/CCU  [] ER   Isolation Precautions:  [x] Dialysis   [] Airborne   [] Contact    [] Reverse   Special Considerations:         [] Blood Consent Verified [x]N/A     ALLERGIES:   [x] NKA          Code Status:  [x] Full Code  [] DNR  [] Other           HBsAg ONLY: Date Drawn 07/28/17         [x]Negative []Positive []Unknown   HBsAb: Date 07/28/17    [] Susceptible   [x] [id number] []Not Drawn  [] Drawn     Current Labs:    Date of Labs: Today [x]        Cut and paste current labs here.                                                                                                                                   DIET:  [] Renal    [] Other [x] NPO     []  Diabetic      PRIMARY NURSE REPORT: First initial/Last name/Title      Pre Dialysis: Indra Jasmine RN    Time: 0945      EDUCATION:    [x] Patient [] Other         Knowledge Basis: []None [x]Minimal [] Substantial   Barriers to learning  Pt with anoxic brain injury- no evidence of learning or understanding []N/A   [] Access Care     [] S&S of infection     [] Fluid Management     []K+     [x]Procedural    []Albumin     [] Medications     [] Tx Options     [] Transplant     [] Diet     [] Other   Teaching Tools:  [x] Explain  [] Demo  [] Handouts [] Video  Patient response:   [] Verbalized understanding  [] Teach back  [] Return demonstration [] Requires follow up   Inappropriate due to            [address] Before each treatment:     Machine Number:                   [facility]                                  [] Unit Machine #  with centralized RO                                  [] Portable Machine #1/RO serial # [id number]                                  [] Portable Machine #2/RO serial # [id number]                                  [x] Portable Machine #3/RO serial # [id number]                                                                                                       [facility]                                  [] Portable Machine #11/RO serial # [id number]                                   [] Portable Machine #12/RO serial # [id number]                                  [] Portable Machine #13/RO serial #  [id number]      Alarm Test:  Pass time 0930         Other:         [x] RO/Machine Log Complete      Temp    37            [x]Extracorporeal Circuit Tested for integrity   Dialysate: pH  7.4 Conductivity: Meter   14     HD Machine   14                  TCD: 14  Dialyzer Lot # [id number]         Blood Tubing Lot # [id number]      Saline Lot #       CHLORINE TESTING-Before each treatment and every 4 hours    Total Chlorine: [x] less than 0.1 ppm Time: 0955 4 Hr/2nd Check Time: na   (if greater than 0.1 ppm from Primary then every 30 minutes from Secondary)     TREATMENT INITIATION  with Dialysis Precautions:   [x] All Connections Secured                 [x] Saline Line Double Clamped   [x] Venous Parameters Set                  [x] Arterial Parameters Set    [x] Prime Given  250 ml               [x]Air Foam Detector Engaged      Treatment Initiation Note: pt arrived in stable condition via bed AVG accessed and treatment initiated without difficulty. Dr Evaristo Escobedo at bedside     Medication Dose Volume Route Initials Dialyzer Cleared: [] Good [x] Fair  [] Poor    Blood processed:  62.4 L  UF Removed  0 Ml    Post Wt: 67.3    kg  POst BP:   123/68       Pulse: 84      Respirations: 17  Temperature: 98       epogen    [id number]      1ml                  Post Tx Vascular Access: AVF/AVG: Bleeding stopped Art 10 min. Oscar. 10 Min                                      Catheter: Locking solution: Heparin 1:1000 Art. Oscar. N/A                                 Post Assessment:                                    Skin:  [x] Warm  [x] Dry [] Diaphoretic    [] Flushed  [] Pale [] Cyanotic   [facility] Signatures Title Initials  Time Lungs: [x] Clear    [] Course  [] Crackles  [] Wheezing [] Diminished   Joe Dickerson RN    Cardiac: [x] Regular   [] Irregular   [] Monitor  [] N/A  Rhythm:           Edema:  [] None    [] General     [] Facial   [] Pedal    [] UE    [] LE       Pain: [x]0  []1  []2   []3  []4   []5   []6   []7   []8   []9   []10         Post Treatment Note: HD well tolerated. 0L UF removed. No acute distress noted during or post HD treatment.      POST TREATMENT PRIMARY NURSE HANDOFF REPORT:     First initial/Last name/Title         Post Dialysis: Ambar Astudillo RN Time:  8767     Abbreviations: AVG-arterial venous graft, AVF-arterial venous fistula, IJ-Internal Jugular, Subcl-Subclavian, Fem-Femoral, Tx-treatment, AP/HR-apical heart rate, DFR-dialysate flow rate, BFR-blood flow rate, AP-arterial pressure, -venous pressure, UF-ultrafiltrate, TMP-transmembrane pressure, Oscar-Venous, Art-Arterial, RO-Reverse Osmosis

## 2017-08-31 NOTE — PROGRESS NOTES
RENAL DAILY PROGRESS NOTE      IMPRESSION:   ESRD, due for HD today   Anemia, on epo  Encephalopathy, severe anoxic brain injury   Respiratory failure on ventilation. PLAN:    No clear goal of care yet. Prognosis appear very poor. Will continue HD support as needed until family makes any decision. At 10.28 AM on 2017, I saw and examined patient during hemodialysis treatment. The patient was receiving hemodialysis for treatment of  renal failure. I have also reviewed vital signs, intake and output, lab results and recent events, and agreed with today's dialysis order. HD rounding    Blood pressure 123/68, pulse 84, temperature 98.8 °F (37.1 °C), resp. rate 17, height 6' 2\" (1.88 m), weight 67.3 kg (148 lb 4.8 oz), SpO2 100 %.   Temp (24hrs), Av.1 °F (36.7 °C), Min:97.7 °F (36.5 °C), Max:98.8 °F (37.1 °C)      Blood Pressure: BP: 123/68  Pulse: Pulse (Heart Rate): 84  Temp:  Temp: 98.8 °F (37.1 °C)    Artificial Kidney Dialyzer/Set Up Inspection: Revaclear   hours Duration of Treatment (hours): 3 hours   Heparin Bolus Heparin Bolus (units): 2000 units (verbal order per Dr. Sammy Felipe by ICU MD Rosenberg)  Blood flow rate Blood Flow Rate (ml/min): 300 ml/min   Dialysate rate     Arterial Access Pressure Arterial Access Pressure (mmHg): -100  Venous Return Pressure Venous Return Pressure (mmHg): 170  Ultrafiltration Rate Goal/Amount of Fluid to Remove (mL): 0 mL  Fluid Removal Fluid Removed (mL): 400  Net Fluid Removal NET Fluid Removed (mL): 0 ml                Subjective:     64ty M with ESRD   Complaint:   Overnight events noted  Remain intubated,  Severe anoxic brain injury     Current Facility-Administered Medications   Medication Dose Route Frequency    doxercalciferol (HECTOROL) 4 mcg/2 mL injection 3 mcg  3 mcg IntraVENous Q TUE, THU & SAT    epoetin benjamin (EPOGEN;PROCRIT) injection 10,000 Units  10,000 Units IntraVENous DIALYSIS TUE, THU & SAT    midodrine (PROAMITINE) tablet 15 mg  15 mg Oral TID WITH MEALS    potassium chloride (KLOR-CON) packet 40 mEq  40 mEq Per NG tube DAILY    Zinc Ox-Aloe Vera-Vitamin E (BALMEX) topical cream   Topical CONTINUOUS    insulin glargine (LANTUS) injection 20 Units  20 Units SubCUTAneous DAILY    banana flakes trans-galactooligosaccharide (BANATROL PLUS) powder 1 Packet  1 Packet Per NG tube TID    levETIRAcetam (KEPPRA) 500 mg in saline (iso-osm) 100 ml IVPB  500 mg IntraVENous Q12H    LORazepam (ATIVAN) injection 1 mg  1 mg IntraVENous Q4H PRN    insulin lispro (HUMALOG) injection   SubCUTAneous Q6H    white petrolatum-mineral oil 85-15 % oint 1 Dose  1 Dose Ophthalmic QID    chlorhexidine (PERIDEX) 0.12 % mouthwash 10 mL  10 mL Oral Q12H    NOREPINephrine (LEVOPHED) 16,000 mcg in dextrose 5% 250 mL infusion  2-16 mcg/min IntraVENous TITRATE    albuterol (PROVENTIL VENTOLIN) nebulizer solution 2.5 mg  2.5 mg Nebulization Q6H PRN    famotidine (PF) (PEPCID) 20 mg in sodium chloride 0.9 % 10 mL injection  20 mg IntraVENous DAILY    vits A and D-white pet-lanolin (A&D) ointment   Topical QID AFTER MEALS    collagenase (SANTYL) 250 unit/gram ointment   Topical DAILY    acetaminophen (TYLENOL) tablet 650 mg  650 mg Oral Q4H PRN    lactobacillus sp. 50 billion cpu (BIO-K PLUS) capsule 1 Cap  1 Cap Oral DAILY    loperamide (IMODIUM) capsule 2 mg  2 mg Oral Q6H PRN    heparin (porcine) injection 5,000 Units  5,000 Units SubCUTAneous Q8H    heparin (porcine) 1,000 unit/mL injection 2,000 Units  2,000 Units IntraVENous DIALYSIS ONCE    simvastatin (ZOCOR) tablet 20 mg  20 mg Oral QHS    aspirin chewable tablet 81 mg  81 mg Oral DAILY    glucose chewable tablet 16 g  4 Tab Oral PRN    glucagon (GLUCAGEN) injection 1 mg  1 mg IntraMUSCular PRN    dextrose (D50W) injection syrg 12.5-25 g  25-50 mL IntraVENous PRN    0.9% sodium chloride infusion  100 mL/hr IntraVENous DIALYSIS PRN    naloxone (NARCAN) injection 0.4 mg  0.4 mg IntraVENous PRN       Review of Symptoms: intubated   Objective:   Patient Vitals for the past 24 hrs:   Temp Pulse Resp BP SpO2   08/31/17 1534 98.8 °F (37.1 °C) - - - -   08/31/17 1345 - 84 17 123/68 100 %   08/31/17 1330 - 85 14 107/62 100 %   08/31/17 1315 - 83 13 101/58 100 %   08/31/17 1300 - 85 23 125/65 100 %   08/31/17 1245 - 86 19 117/77 100 %   08/31/17 1230 - 84 22 110/68 100 %   08/31/17 1215 - 86 22 105/58 100 %   08/31/17 1213 98 °F (36.7 °C) - - - -   08/31/17 1200 - 83 24 119/68 100 %   08/31/17 1145 - 83 22 112/60 100 %   08/31/17 1130 - 80 15 126/69 100 %   08/31/17 1125 - 80 22 - 100 %   08/31/17 1115 - 81 21 105/57 100 %   08/31/17 1100 - 80 20 109/65 100 %   08/31/17 1045 - 78 16 108/60 100 %   08/31/17 1030 - 79 22 98/57 99 %   08/31/17 1015 - 74 15 103/65 100 %   08/31/17 1000 97.8 °F (36.6 °C) 74 17 112/62 98 %   08/31/17 0945 - 75 16 115/65 98 %   08/31/17 0800 - 73 19 108/50 100 %   08/31/17 0730 - 73 24 - 100 %   08/31/17 0700 - 70 20 111/47 100 %   08/31/17 0600 - 75 15 115/56 100 %   08/31/17 0500 - 73 - (!) 87/41 100 %   08/31/17 0400 97.8 °F (36.6 °C) 79 10 110/67 100 %   08/31/17 0300 - 77 14 122/70 100 %   08/31/17 0251 - 79 19 - 99 %   08/31/17 0200 - 71 - 123/74 100 %   08/31/17 0130 - 69 - 95/57 100 %   08/31/17 0100 - 73 - 103/61 100 %   08/31/17 0030 - 77 - 104/61 100 %   08/31/17 0000 98.2 °F (36.8 °C) 75 - 127/72 100 %   08/30/17 2330 - 75 - 101/56 100 %   08/30/17 2318 - 74 20 - 100 %   08/30/17 2300 - 76 - 120/71 100 %   08/30/17 2230 - 79 - 123/67 100 %   08/30/17 2200 - 77 - 127/72 100 %   08/30/17 2130 - 73 - (!) 155/101 100 %   08/30/17 2100 - 76 15 132/77 100 %   08/30/17 2030 - 74 15 104/59 100 %   08/30/17 2000 97.7 °F (36.5 °C) 74 18 123/69 100 %   08/30/17 1940 - 77 24 - 100 %   08/30/17 1900 - 76 14 124/65 100 %   08/30/17 1800 - 76 - - 100 %   08/30/17 1700 - 82 15 100/62 100 %   08/30/17 1640 - 81 14 - 100 %   08/30/17 1600 - 84 19 96/65 100 %   08/30/17 1547 98.7 °F (37.1 °C) - - - -        Weight change: 2.669 kg (5 lb 14.1 oz)     08/29 1901 - 08/31 0700  In: 3111.8 [I.V.:386.8]  Out: 1400 [Drains:1400]    Intake/Output Summary (Last 24 hours) at 08/31/17 1546  Last data filed at 08/31/17 1345   Gross per 24 hour   Intake              960 ml   Output                0 ml   Net              960 ml     Physical Exam:   General: intubated   HEENT  no thyromegaly  CVS: S1S2 heard,  no rub  RS: + air entry b/l,   Abd: Soft, Non tender,   Neuro: intubated,   Extrm: +edema,   Access; left AVG + thrill            Data Review:     LABS:   Hematology: Recent Labs      08/31/17   0610  08/30/17   0620  08/29/17 0620   WBC  11.2  11.6  11.7   HGB  9.6*  10.0*  9.1*   HCT  29.6*  31.2*  28.7*     Chemistry: Recent Labs      08/31/17   0610  08/30/17   0620  08/29/17 0620   BUN  40*  30*  50*   CREA  6.74*  5.36*  7.74*   CA  8.6  8.7  8.4*   K  4.1  3.5  3.4*   NA  141  137  139   CL  109*  105  107   CO2  22  22  21   PHOS  3.3  2.4*  2.6   GLU  128*  203*  165*              Procedures/imaging: see electronic medical records for all procedures, Xrays and details which were not copied into this note but were reviewed prior to creation of Plan          Assessment & Plan:     As above         Odin Batista MD  8/31/2017  3:46 PM

## 2017-08-31 NOTE — PROGRESS NOTES
[facility]. Pulmonary, Critical Care, and Sleep Medicine    Name: Ayse Charles MRN: [id number]   : 1960 Hospital: [address] [place] [address] Dr   Date: 2017  Admission Date: 2017     Chart and notes reviewed. Data reviewed. I have evaluated all findings. [x]I have reviewed the flowsheet and previous days notes. [x]The patient is unable to give any meaningful history or review of systems because the patient is:  [x]Intubated []Sedated   [x]Unresponsive      [x]The patient is critically ill on      [x]Mechanical ventilation []Pressors   []BiPAP []                 ROS:Review of systems not obtained due to patient factors. Events and notes from last 24 hours reviewed. Care plan discussed on multidisciplinary rounds.   Patient Active Problem List   Diagnosis Code    Anemia D64.9    Acidosis E87.2    Cardiac arrest ([facility] I46.9    Respiratory failure ([facility] [facility] J96.90    ESRD needing dialysis ([facility] [address] [facility] N18.6, Z99.2    Anoxic brain damage ([address] G93.1    Colitis K52.9    Hypotension I95.9    Acute GI bleeding K92.2    ESRD (end stage renal disease) ([facility] [facility] N18.6    Sepsis (HCC) A41.9    Oropharyngeal dysphagia R13.12    Cachexia (HCC) R64       Vital Signs:  Visit Vitals    /50    Pulse 73    Temp 97.8 °F (36.6 °C)    Resp 19    Ht 6' 2\" (1.88 m)    Wt 67.3 kg (148 lb 4.8 oz)    SpO2 100%    BMI 19.04 kg/m2       O2 Device: Ventilator   O2 Flow Rate (L/min): 6 l/min   Temp (24hrs), Av.7 °F (37.1 °C), Min:97.7 °F (36.5 °C), Max:101.3 °F (38.5 °C)       Intake/Output:   Last shift:         Last 3 shifts:  1901 -  0700  In: 3111.8 [I.V.:386.8]  Out: 1400 [Drains:1400]    Intake/Output Summary (Last 24 hours) at 17 1019  Last data filed at 17 0700   Gross per 24 hour   Intake              960 ml   Output                0 ml   Net              960 ml      Ventilator Settings:  Ventilator  Mode: SIMV  Respiratory Rate  Resp Rate Observed: 17  Back-Up Rate: 14  Insp Time (sec): 1 sec  Insp Flow (l/min): 44 l/min  I:E Ratio: 1:2.1  Ventilator Volumes  Vt Set (ml): 400 ml  Vt Exhaled (Machine Breath) (ml): 349 ml  Vt Spont (ml): 193 ml  Ve Observed (l/min): 5.38 l/min  Ventilator Pressures  Pressure Support (cm H2O): 7 cm H2O  PIP Observed (cm H2O): 25 cm H2O  Plateau Pressure (cm H2O): 17 cm H2O  MAP (cm H2O): 7.5  PEEP/VENT (cm H2O): 5 cm H20  Auto PEEP Observed (cm H2O): 0 cm H2O    Current Facility-Administered Medications   Medication Dose Route Frequency    doxercalciferol (HECTOROL) 4 mcg/2 mL injection 3 mcg  3 mcg IntraVENous Q TUE, THU & SAT    epoetin benjamin (EPOGEN;PROCRIT) injection 10,000 Units  10,000 Units IntraVENous DIALYSIS TUE, THU & SAT    midodrine (PROAMITINE) tablet 15 mg  15 mg Oral TID WITH MEALS    potassium chloride (KLOR-CON) packet 40 mEq  40 mEq Per NG tube DAILY    Zinc Ox-Aloe Vera-Vitamin E (BALMEX) topical cream   Topical CONTINUOUS    insulin glargine (LANTUS) injection 20 Units  20 Units SubCUTAneous DAILY    banana flakes trans-galactooligosaccharide (BANATROL PLUS) powder 1 Packet  1 Packet Per NG tube TID    levETIRAcetam (KEPPRA) 500 mg in saline (iso-osm) 100 ml IVPB  500 mg IntraVENous Q12H    insulin lispro (HUMALOG) injection   SubCUTAneous Q6H    white petrolatum-mineral oil 85-15 % oint 1 Dose  1 Dose Ophthalmic QID    chlorhexidine (PERIDEX) 0.12 % mouthwash 10 mL  10 mL Oral Q12H    NOREPINephrine (LEVOPHED) 16,000 mcg in dextrose 5% 250 mL infusion  2-16 mcg/min IntraVENous TITRATE    famotidine (PF) (PEPCID) 20 mg in sodium chloride 0.9 % 10 mL injection  20 mg IntraVENous DAILY    vits A and D-white pet-lanolin (A&D) ointment   Topical QID AFTER MEALS    collagenase (SANTYL) 250 unit/gram ointment   Topical DAILY    lactobacillus sp. 50 billion cpu (BIO-K PLUS) capsule 1 Cap  1 Cap Oral DAILY    heparin (porcine) injection 5,000 Units  5,000 Units SubCUTAneous Q8H    heparin (porcine) 1,000 unit/mL injection 2,000 Units  2,000 Units IntraVENous DIALYSIS ONCE    simvastatin (ZOCOR) tablet 20 mg  20 mg Oral QHS    aspirin chewable tablet 81 mg  81 mg Oral DAILY         Physical Exam:         Lungs: decreased  Bibasilar BS    Heart: Regular rate and rhythm   Abdomen: non-distended, bowel sounds normoactive, tympanic, abdomen is soft without significant tenderness, masses, organomegaly or guarding   Extremity: Trace edema   Neuro: comatose. Extraocular movements are absent to doll's eye maneuver. Absent gag reflex. Patient withdraws arms to painful stimulus. DATA:  MAR reviewed and pertinent medications noted or modified as needed    Labs:  Recent Labs      08/31/17   0610  08/30/17   0620  08/29/17   0620   WBC  11.2  11.6  11.7   HGB  9.6*  10.0*  9.1*   HCT  29.6*  31.2*  28.7*   PLT  453*  498*  478*     Recent Labs      08/31/17   0610  08/30/17   0620  08/29/17   0620   NA  141  137  139   K  4.1  3.5  3.4*   CL  109*  105  107   CO2  22  22  21   GLU  128*  203*  165*   BUN  40*  30*  50*   CREA  6.74*  5.36*  7.74*   CA  8.6  8.7  8.4*   MG  3.1*  2.8*  3.1*   PHOS  3.3  2.4*  2.6     No results for input(s): PH, PCO2, PO2, HCO3, FIO2 in the last 72 hours. Recent Labs      08/31/17   0357  08/30/17   2118  08/30/17   0536   FIO2I  35  35  25   HCO3I  21.7*  23.2  22.7   PCO2I  32.0*  35.9  32.2*   PHI  7.438  7.420  7.456*   PO2I  187*  181*  61*     Imaging:  [x]                           I have personally reviewed the patients radiographs and reports    High complexity decision making was performed during this consultation and evaluation. [x]       Pt is at high risk for further organ failure and dysfunction. Critical care time spent  48 minutes with patient exclusive of procedures. IMPRESSION:   · Acute encephalopathy which appears to be secondary to anoxic brain injury.  Overall prognosis appears to be very dismal. Consider family discussion and refer to hospice/palliative care as appropriate. · Cardiac arrest on admission and this resulted in significant anoxic brain injury. · Acute hypoxic respiratory failure requiring intubation and mechanical ventilation. His FiO2 requirements are minimal - however the major factor over here is his mental status and he most likely would need tracheostomy and peg tube placement. · Acute shock which appears to be secondary to sepsis. No cardiac issues related to PEA cardiac arrest.   · ESRD requiring dialysis  · Oropharyngeal dysphagia  · Pericardial effusion   · Diarrhea - C diff testing negative. PLAN:   Neurological:  - Sedation: On none   - Continue Keppra. Respiratory:  Mechanical ventilation: Adhere to low tidal volume ventilation strategy as per the [employer] guidelines. Aim for plateau pressures < 48IZ. Continue to adhere to the VAP bundle to minimize the risk of VAP. Consulted Surgery for  tracheostomy and PEG tube placement  --> daughter wants this done   Scheduled for Tuesday    Cardiac:  Vasopressors: Aim to keep MAP>65 mm of Hg. Off Levophed since this AM.  Midodrine to 15 tid    Renal:  Currently requiring renal support. Nephrology following for HD. Will check routine labs with HD to minimize blood draws. Add labs prn    GI:  Diet: Continue tube feeds. Stress ulcer ppx: Pepcid. Endocrinology:  Glycemic control: stable. Best practice :    MVentilated patients- VAP bundle , aim to keep peak plateau pressure 44-27AT H2O  Sress ulcer prophylaxis  DVT prophylaxis  Need for Lines, mancini assessed.       Juliann Mccord MD

## 2017-08-31 NOTE — INTERDISCIPLINARY ROUNDS
CRITICAL CARE INTERDISCIPLINARY ROUNDS      Patient Information:    Name:   Ayse Charles    Age:   65 y.o.     Admission Date:   7/27/2017    Readmit Risk Assessment Information:      Readmit Risk Tool Support Systems: Family member(s), Relationship with Primary Physician Group: Seen at least one time within past 12 months    Surgery Date:      Day of Stay:     Expected Discharge Date:        Attending Provider:   Peder Libman, MD    Surgeon:        Consultant:       Primary Care Provider:   Gianluca Mims MD    Problem List:     Patient Active Problem List   Diagnosis Code    Anemia D64.9    Acidosis E87.2    Cardiac arrest ([address] I46.9    Respiratory failure ([address] J96.90    ESRD needing dialysis ([facility] N18.6, Z99.2    Anoxic brain damage (HCC) G93.1    Colitis K52.9    Hypotension I95.9    Acute GI bleeding K92.2    ESRD (end stage renal disease) ([facility] N18.6    Sepsis ([address] A41.9    Oropharyngeal dysphagia R13.12    Cachexia ([address] R64       Principal Problem:  Sepsis ([address]    Procedure:       During rounds the following quality care indicators and evidence based practices were addressed :     DVT Prophylaxis, Pressure Injury Prevention, Pain Management, Sepsis resuscitation and management, Nutritional Status, Critical Care Interventions Airways, Drains, Lines and Pressors and IHI Bundles: Central Line Bundle Followed , Stanton Bundle Followed and Vent Bundle Followed, Vent Day 13           Acute MI/PCI:   Not applicable    Heart Failure:    Not applicable    Cardiac Surgery:  Not applicable    SCIP Measures for other Surgeries:   Not applicable    Pneumonia:    Appropriate Antibiotic Selection (ICU versus Non-ICU)    Stroke:  Patient's Personal Risk Factors for Stroke are:   hypertension, family history, hyperlipidemia or diabetes mellitus    NIH Stroke Score  Total: 8 (08/17/17 0400)    Transfer Level of Care:  Not Ready for Transfer    The patient will require the following interventions based on the Readmission Risk Assessment:  Pharmacy evaluation and teaching, Care Management involvement for home health follow up for:  mobility and assistance with ADL's and Spiritual Care evaluation      Discharge Management:  Home    Anticipated Discharge Date:  3      Interdisciplinary team rounds were held  with the following team membersCare Management, Diabetes Treatment Specialist, Nursing, Nutrition, Pastoral Care, Pharmacy, Physician and Clinical Coordinator and the  patient. Plan of care discussed. See clinical pathway and/or care plan for interventions and desired outcomes. Pressors off continue midodrine. Trach and peg planned for Tuesday. Labs to be ordered with dialysis.

## 2017-08-31 NOTE — ROUTINE PROCESS
Charlene Gauthier {[facility] [id number]_RECORDED_WRITTEN:[id number]::\"Bedside\" shift change report given to [employer] (oncoming nurse) by Wilbert President RN (offgoing nurse). Report included the following information SBAR, Kardex, pt. Have loose stools around Fecal Management system. Some thickening of stool mostly likely due to banna flakes adm. However, patient cont. To have watery stools.

## 2017-09-01 ENCOUNTER — APPOINTMENT (OUTPATIENT)
Dept: GENERAL RADIOLOGY | Age: 57
DRG: 004 | End: 2017-09-01
Attending: PHYSICIAN ASSISTANT
Payer: MEDICARE

## 2017-09-01 LAB
ARTERIAL PATENCY WRIST A: ABNORMAL
BASE DEFICIT BLD-SCNC: 1 MMOL/L
BDY SITE: ABNORMAL
GAS FLOW.O2 O2 DELIVERY SYS: ABNORMAL L/MIN
GAS FLOW.O2 SETTING OXYMISER: 12 BPM
GLUCOSE BLD STRIP.AUTO-MCNC: 146 MG/DL (ref 70–110)
GLUCOSE BLD STRIP.AUTO-MCNC: 161 MG/DL (ref 70–110)
GLUCOSE BLD STRIP.AUTO-MCNC: 166 MG/DL (ref 70–110)
GLUCOSE BLD STRIP.AUTO-MCNC: 75 MG/DL (ref 70–110)
HCO3 BLD-SCNC: 23.2 MMOL/L (ref 22–26)
O2/TOTAL GAS SETTING VFR VENT: 30 %
PCO2 BLD: 34.9 MMHG (ref 35–45)
PEEP RESPIRATORY: 5 CMH2O
PH BLD: 7.43 [PH] (ref 7.35–7.45)
PO2 BLD: 101 MMHG (ref 80–100)
PRESSURE SUPPORT SETTING VENT: 7 CMH2O
SAO2 % BLD: 98 % (ref 92–97)
SERVICE CMNT-IMP: ABNORMAL
SPECIMEN TYPE: ABNORMAL
TOTAL RESP. RATE, ITRR: 21
VENTILATION MODE VENT: ABNORMAL
VOLUME CONTROL PLUS IVLCP: YES
VT SETTING VENT: 400 ML

## 2017-09-01 PROCEDURE — 74011250637 HC RX REV CODE- 250/637: Performed by: INTERNAL MEDICINE

## 2017-09-01 PROCEDURE — 74011636637 HC RX REV CODE- 636/637: Performed by: INTERNAL MEDICINE

## 2017-09-01 PROCEDURE — 82962 GLUCOSE BLOOD TEST: CPT

## 2017-09-01 PROCEDURE — 74011250636 HC RX REV CODE- 250/636: Performed by: PHYSICIAN ASSISTANT

## 2017-09-01 PROCEDURE — 74011250637 HC RX REV CODE- 250/637: Performed by: FAMILY MEDICINE

## 2017-09-01 PROCEDURE — 77030011256 HC DRSG MEPILEX <16IN NO BORD MOLN -A

## 2017-09-01 PROCEDURE — 65610000006 HC RM INTENSIVE CARE

## 2017-09-01 PROCEDURE — P9047 ALBUMIN (HUMAN), 25%, 50ML: HCPCS | Performed by: PHYSICIAN ASSISTANT

## 2017-09-01 PROCEDURE — 74011250637 HC RX REV CODE- 250/637: Performed by: NURSE PRACTITIONER

## 2017-09-01 PROCEDURE — 74011250636 HC RX REV CODE- 250/636: Performed by: HOSPITALIST

## 2017-09-01 PROCEDURE — 71010 XR CHEST PORT: CPT

## 2017-09-01 PROCEDURE — 36600 WITHDRAWAL OF ARTERIAL BLOOD: CPT

## 2017-09-01 PROCEDURE — 74011250637 HC RX REV CODE- 250/637: Performed by: HOSPITALIST

## 2017-09-01 PROCEDURE — 82803 BLOOD GASES ANY COMBINATION: CPT

## 2017-09-01 PROCEDURE — 74011000250 HC RX REV CODE- 250: Performed by: PHYSICIAN ASSISTANT

## 2017-09-01 RX ORDER — ALBUMIN HUMAN 250 G/1000ML
50 SOLUTION INTRAVENOUS ONCE
Status: COMPLETED | OUTPATIENT
Start: 2017-09-01 | End: 2017-09-01

## 2017-09-01 RX ADMIN — INSULIN LISPRO 3 UNITS: 100 INJECTION, SOLUTION INTRAVENOUS; SUBCUTANEOUS at 07:31

## 2017-09-01 RX ADMIN — INSULIN GLARGINE 20 UNITS: 100 INJECTION, SOLUTION SUBCUTANEOUS at 08:39

## 2017-09-01 RX ADMIN — SIMVASTATIN 20 MG: 10 TABLET, FILM COATED ORAL at 21:27

## 2017-09-01 RX ADMIN — VITAMIN A AND D: 30.8 OINTMENT TOPICAL at 12:59

## 2017-09-01 RX ADMIN — LEVETIRACETAM 500 MG: 5 INJECTION INTRAVENOUS at 21:27

## 2017-09-01 RX ADMIN — ALBUMIN (HUMAN) 50 G: 0.25 INJECTION, SOLUTION INTRAVENOUS at 12:58

## 2017-09-01 RX ADMIN — LEVETIRACETAM 500 MG: 5 INJECTION INTRAVENOUS at 08:38

## 2017-09-01 RX ADMIN — MINERAL OIL AND WHITE PETROLATUM 1 DOSE: 150; 850 OINTMENT OPHTHALMIC at 21:27

## 2017-09-01 RX ADMIN — MINERAL OIL AND WHITE PETROLATUM 1 DOSE: 150; 850 OINTMENT OPHTHALMIC at 17:57

## 2017-09-01 RX ADMIN — INSULIN LISPRO 3 UNITS: 100 INJECTION, SOLUTION INTRAVENOUS; SUBCUTANEOUS at 12:58

## 2017-09-01 RX ADMIN — MIDODRINE HYDROCHLORIDE 15 MG: 2.5 TABLET ORAL at 08:40

## 2017-09-01 RX ADMIN — Medication 1 PACKET: at 16:27

## 2017-09-01 RX ADMIN — CHLORHEXIDINE GLUCONATE 10 ML: 1.2 RINSE ORAL at 08:39

## 2017-09-01 RX ADMIN — ASPIRIN 81 MG 81 MG: 81 TABLET ORAL at 08:40

## 2017-09-01 RX ADMIN — Medication 1 CAPSULE: at 08:40

## 2017-09-01 RX ADMIN — HEPARIN SODIUM 5000 UNITS: 5000 INJECTION, SOLUTION INTRAVENOUS; SUBCUTANEOUS at 09:31

## 2017-09-01 RX ADMIN — LOPERAMIDE HYDROCHLORIDE 2 MG: 2 CAPSULE ORAL at 21:27

## 2017-09-01 RX ADMIN — VITAMIN A AND D: 30.8 OINTMENT TOPICAL at 08:41

## 2017-09-01 RX ADMIN — MIDODRINE HYDROCHLORIDE 15 MG: 2.5 TABLET ORAL at 16:26

## 2017-09-01 RX ADMIN — HEPARIN SODIUM 5000 UNITS: 5000 INJECTION, SOLUTION INTRAVENOUS; SUBCUTANEOUS at 04:20

## 2017-09-01 RX ADMIN — Medication: at 13:00

## 2017-09-01 RX ADMIN — MIDODRINE HYDROCHLORIDE 15 MG: 2.5 TABLET ORAL at 12:57

## 2017-09-01 RX ADMIN — VITAMIN A AND D: 30.8 OINTMENT TOPICAL at 22:20

## 2017-09-01 RX ADMIN — HEPARIN SODIUM 5000 UNITS: 5000 INJECTION, SOLUTION INTRAVENOUS; SUBCUTANEOUS at 17:57

## 2017-09-01 RX ADMIN — MINERAL OIL AND WHITE PETROLATUM 1 DOSE: 150; 850 OINTMENT OPHTHALMIC at 12:58

## 2017-09-01 RX ADMIN — POTASSIUM CHLORIDE 40 MEQ: 1.5 POWDER, FOR SOLUTION ORAL at 08:40

## 2017-09-01 RX ADMIN — VITAMIN A AND D: 30.8 OINTMENT TOPICAL at 17:57

## 2017-09-01 RX ADMIN — CHLORHEXIDINE GLUCONATE 10 ML: 1.2 RINSE ORAL at 21:26

## 2017-09-01 RX ADMIN — Medication 1 PACKET: at 21:27

## 2017-09-01 RX ADMIN — FAMOTIDINE 20 MG: 10 INJECTION INTRAVENOUS at 08:39

## 2017-09-01 RX ADMIN — MINERAL OIL AND WHITE PETROLATUM 1 DOSE: 150; 850 OINTMENT OPHTHALMIC at 08:39

## 2017-09-01 RX ADMIN — Medication 1 PACKET: at 08:44

## 2017-09-01 RX ADMIN — COLLAGENASE SANTYL: 250 OINTMENT TOPICAL at 08:41

## 2017-09-01 NOTE — ROUTINE PROCESS
Bedside shift change report given to Krista MUELLER (oncoming nurse) by Kiko Moran (offgoing nurse). Report included the following information SBAR, Kardex, Intake/Output and MAR.

## 2017-09-01 NOTE — PROGRESS NOTES
[facility]  ICU Progress Note      Name: Rudy Sher   : 1960   MRN: [id number]   Date: 2017 1:05 PM     [x]I have reviewed the flowsheet and previous days notes. Events overnight reviewed and discussed with nursing staff. Vital signs and records reviewed. Subjective:  - No new events overnight.  - Off Pressors this AM, however, BP trending down again  - No change on neuro exam    [x]The patient is unable to give any meaningful history or review of systems because the patient is:  [x]Intubated []Sedated   [x]Unresponsive      [x]The patient is critically ill on      [x]Mechanical ventilation []Pressors   []BiPAP []                 ROS:Review of systems not obtained due to patient factors.     Medication Review:  · Pressors - Weaned off Levophed ()  · Sedation - N/A  · Antibiotics - NONE  · Pain - PRN Tylenol  · GI/ DVT - SQH; Pepcid  · Others (other gtts)    Safety Bundles: VAP Bundle/ CAUTI/ Severe Sepsis Protocol/ Electrolyte Replacement Protocol    Vital Signs:    Visit Vitals    /51    Pulse 79    Temp 100.4 °F (38 °C)    Resp 17    Ht 6' 2\" (1.88 m)    Wt 63.4 kg (139 lb 12.4 oz)    SpO2 100%    BMI 17.95 kg/m2       O2 Device: Endotracheal tube, Ventilator   O2 Flow Rate (L/min): 6 l/min   Temp (24hrs), Av.2 °F (37.3 °C), Min:97.9 °F (36.6 °C), Max:100.4 °F (38 °C)       Intake/Output:   Last shift:      701 - 1900  In: 570 [I.V.:100]  Out: -   Last 3 shifts: 1901 -  07  In: 7382 [I.V.:300]  Out: 101 [Drains:100]    Intake/Output Summary (Last 24 hours) at 17 1305  Last data filed at 17 1100   Gross per 24 hour   Intake             1765 ml   Output              101 ml   Net             1664 ml       Ventilator Settings:  Ventilator  Mode: SIMV  Respiratory Rate  Resp Rate Observed: 17  Back-Up Rate: 12  Insp Time (sec): 1 sec  Insp Flow (l/min): 44 l/min  I:E Ratio: 1:3.7  Ventilator Volumes  Vt Set (ml): 12 ml  Vt Exhaled (Machine Breath) (ml): 379 ml  Vt Spont (ml): 324 ml  Ve Observed (l/min): 4.5 l/min  Ventilator Pressures  PC Set: 400  Pressure Support (cm H2O): 7 cm H2O  PIP Observed (cm H2O): 18 cm H2O  Plateau Pressure (cm H2O): 12 cm H2O  MAP (cm H2O): 8  PEEP/VENT (cm H2O): 5 cm H20  Auto PEEP Observed (cm H2O): 0 cm H2O    Physical Exam:    General: Intubated/sedated  HEENT:  Anicteric sclerae; pink palpebral conjunctivae; mucosa moist  Resp:  Symmetrical chest expansion, no accessory muscle use; Mod AE Bilat; decreased bibasilar - no change; No rales/ wheezing/ rhonchi noted  CV:  S1, S2 present; RRR; No m/r/g  GI:  Abdomen soft, non-tender; (+) active bowel sounds  Extremities: +2 pulses on all extremities; no edema/ cyanosis/ clubbing noted  Skin:  Warm; no rashes/ lesions noted  Neurologic: Minimally responsive to painful stimuli - will move mouth and spontaneously cough; Will not follow commands; Absent gag reflex; EOM absent to doll's eye maneuver; withdraws arms to painful stimuli.    Devices:     08/18/17: ETT; OGT; CVL - L IJ   08/29/17: FMS  DATA:     Current Facility-Administered Medications   Medication Dose Route Frequency    doxercalciferol (HECTOROL) 4 mcg/2 mL injection 3 mcg  3 mcg IntraVENous Q TUE, THU & SAT    epoetin benjamin (EPOGEN;PROCRIT) injection 10,000 Units  10,000 Units IntraVENous DIALYSIS TUE, THU & SAT    midodrine (PROAMITINE) tablet 15 mg  15 mg Oral TID WITH MEALS    potassium chloride (KLOR-CON) packet 40 mEq  40 mEq Per NG tube DAILY    Zinc Ox-Aloe Vera-Vitamin E (BALMEX) topical cream   Topical CONTINUOUS    insulin glargine (LANTUS) injection 20 Units  20 Units SubCUTAneous DAILY    banana flakes trans-galactooligosaccharide (BANATROL PLUS) powder 1 Packet  1 Packet Per NG tube TID    levETIRAcetam (KEPPRA) 500 mg in saline (iso-osm) 100 ml IVPB  500 mg IntraVENous Q12H    LORazepam (ATIVAN) injection 1 mg  1 mg IntraVENous Q4H PRN    insulin lispro (HUMALOG) injection   SubCUTAneous Q6H    white petrolatum-mineral oil 85-15 % oint 1 Dose  1 Dose Ophthalmic QID    chlorhexidine (PERIDEX) 0.12 % mouthwash 10 mL  10 mL Oral Q12H    NOREPINephrine (LEVOPHED) 16,000 mcg in dextrose 5% 250 mL infusion  2-16 mcg/min IntraVENous TITRATE    albuterol (PROVENTIL VENTOLIN) nebulizer solution 2.5 mg  2.5 mg Nebulization Q6H PRN    famotidine (PF) (PEPCID) 20 mg in sodium chloride 0.9 % 10 mL injection  20 mg IntraVENous DAILY    vits A and D-white pet-lanolin (A&D) ointment   Topical QID AFTER MEALS    collagenase (SANTYL) 250 unit/gram ointment   Topical DAILY    acetaminophen (TYLENOL) tablet 650 mg  650 mg Oral Q4H PRN    lactobacillus sp. 50 billion cpu (BIO-K PLUS) capsule 1 Cap  1 Cap Oral DAILY    loperamide (IMODIUM) capsule 2 mg  2 mg Oral Q6H PRN    heparin (porcine) injection 5,000 Units  5,000 Units SubCUTAneous Q8H    heparin (porcine) 1,000 unit/mL injection 2,000 Units  2,000 Units IntraVENous DIALYSIS ONCE    simvastatin (ZOCOR) tablet 20 mg  20 mg Oral QHS    aspirin chewable tablet 81 mg  81 mg Oral DAILY    glucose chewable tablet 16 g  4 Tab Oral PRN    glucagon (GLUCAGEN) injection 1 mg  1 mg IntraMUSCular PRN    dextrose (D50W) injection syrg 12.5-25 g  25-50 mL IntraVENous PRN    0.9% sodium chloride infusion  100 mL/hr IntraVENous DIALYSIS PRN    naloxone (NARCAN) injection 0.4 mg  0.4 mg IntraVENous PRN         Labs: Results:       Chemistry Recent Labs      08/31/17   0610  08/30/17   0620   GLU  128*  203*   NA  141  137   K  4.1  3.5   CL  109*  105   CO2  22  22   BUN  40*  30*   CREA  6.74*  5.36*   CA  8.6  8.7   AGAP  10  10   BUCR  6*  6*      CBC w/Diff Recent Labs      08/31/17   0610  08/30/17   0620   WBC  11.2  11.6   RBC  3.05*  3.20*   HGB  9.6*  10.0*   HCT  29.6*  31.2*   PLT  453*  498*   GRANS  73   --    LYMPH  19*   --    EOS  2   --       Coagulation No results for input(s): PTP, INR, APTT in the last 72 hours.     No lab exists for component: INREXT    Liver Enzymes No results for input(s): TP, ALB, TBIL, AP, SGOT, GPT in the last 72 hours. No lab exists for component: DBIL   ABG Lab Results   Component Value Date/Time    PHI 7.431 09/01/2017 04:05 AM    PCO2I 34.9 (L) 09/01/2017 04:05 AM    PO2I 101 (H) 09/01/2017 04:05 AM    HCO3I 23.2 09/01/2017 04:05 AM    FIO2I 30 09/01/2017 04:05 AM      Microbiology No results for input(s): CULT in the last 72 hours. Telemetry: [x]Sinus []A-flutter []Paced    []A-fib []Multiple PVCs                    Imaging:  CXR [09/01/17]: FINDINGS:   Endotracheal tube terminates 5.8 cm proximal to the bree. The esophagogastrictube terminates at the proximal stomach or distal esophagus. The sidehole is within the distal esophagus. Left IJ approach central catheter terminates at the proximal superior vena cava without change. There is no pneumothorax. Pulmonary  vascularity is normal. The lungs are clear. The costophrenic angles are sharp. The heart is normal in size. Skin folds project over the left hemithorax. .    IMPRESSION:  The esophagogastric tube sidehole is within the lower esophagus. Advancement by 8 to 9 cm is recommended to place the sidehole within the lumen of the stomach. [x]I have personally reviewed the patients radiographs  [x]Radiographs reviewed with radiologist   []No change from prior, tubes and lines in adequate position  []Improved   []Worsening      IMPRESSION:   · Acute Encephalopathy - Multifactoral. Overall prognosis appears to be very dismal.   · Cardiac arrest on admission with subsequent significant anoxic brain injury  · Acute Hypoxic Respiratory Failure - Continues to fail SBTs; currently on SIMV mode 2/2 breath stacking; FiO2 requirements remain minimal; complicated by acute Anoxic Brain injury and subsequent mental status; Plan for Trach and Peg on Tuesday (09/05)   · Acute Septic Shock- Resolving; off ABX;  No cardiac issues related to PEA cardiac arrest.   · ESRD requiring dialysis  · Oropharyngeal dysphagia  · Pericardial effusion   · Diarrhea - C diff (-). PLAN:   · Resp - FiO2 to maintain SpO2 goal >92%; currently on SIMV on vent; VAP bundle; oral care; pulmonary hygiene; aspiration precautions - HOB >30'; duo-nebs q6; Daily CXR & ABG;   · ID - Afebrile; no daily labs; monitor s/s for infection; off ABX  · CVS - Currently HD stable; Levophed weaned (08/31); BP beginning downward trend again, Gave 50g Albumin, will monitor and restart pressors if needed. · Heme/onc - Labs per renal  · Metabolic - Labs per renal; replace e-lytes PRN  · Renal - HD PRN; Nephrology following; HD (8/31), filtered blood only  · Endocrine - Glycemic control goal <180; SSI; POC glucose q6  · Neuro/ Pain/ Sedation - Minimally reactive without sedation; will move mouth and coughs spontaneously; no other purposeful movement noted. · GI - Cont' TF to goal; Pepcid; NPO.    · Prophylaxis - DVT - SQH, GI - Pepcid   · Discussed in interdisciplinary rounds  · Plan for Peg & Trach on Tuesday           The patient is: [] acutely ill Risk of deterioration: [] moderate    [x] critically ill  [x] high     [x]See my orders for details    My assessment/plan was discussed with:  [x]nursing []PT/OT    []respiratory therapy [x]Dr. Librado Pena []     [x]Total critical care time exclusive of procedures 30 minutes    Melvin Cartagena PA-C

## 2017-09-01 NOTE — PROGRESS NOTES
[facility]  Progress Note    Patient: Wilmar Ordaz Age: 65 y.o. : 1960 MR#: [id number] SSN: [id number]  Date/Time: 2017     Subjective:     Patient remains intubated, unresponsive . Intermittent hypotension: 80/50s. Albumin 25% 50 gm IV ordered. Assessment/Plan:     1. S/p PEA arrest. wth acute resp fauilure on the Vent ? Cardiac cause with elevated trop PTA. Echo improving EF, s/p cath, no CAD. Now with second cardiac arrest - intubated, vent support  2. Acute met encephalopathy: likely anoxic encephalopathy  3. Acute pontine lacunar CVA: on asa. 4. ESRD- HD as pe nephrology  5. DM2- Lantus, ssi    Full code.  Poor prognosis    Case discussed with:  []Patient  []Family  [x]Nursing  []Case Management  DVT Prophylaxis:  []Lovenox  [x]Hep SQ  [x]SCDs  []Coumadin   []On Heparin gtt    Patient Active Problem List   Diagnosis Code    Anemia D64.9    Acidosis E87.2    Cardiac arrest ([address] I46.9    Respiratory failure ([address] J96.90    ESRD needing dialysis ([address] N18.6, Z99.2    Anoxic brain damage (HCC) G93.1    Colitis K52.9    Hypotension I95.9    Acute GI bleeding K92.2    ESRD (end stage renal disease) ([address] N18.6    Sepsis ([address] A41.9    Oropharyngeal dysphagia R13.12    Cachexia ([address] R64       Objective:   VS:   Visit Vitals    BP 95/53    Pulse 82    Temp 100.4 °F (38 °C)    Resp 16    Ht 6' 2\" (1.88 m)    Wt 63.4 kg (139 lb 12.4 oz)    SpO2 100%    BMI 17.95 kg/m2      Tmax/24hrs: Temp (24hrs), Av.2 °F (37.3 °C), Min:97.9 °F (36.6 °C), Max:100.4 °F (38 °C)  IOBRIEF    Intake/Output Summary (Last 24 hours) at 17 6235  Last data filed at 17 1400   Gross per 24 hour   Intake             2280 ml   Output              101 ml   Net             2179 ml     General:  Intubated, unresponsive  Cardiovascular:  S1S2+, RRR  Pulmonary:  Coarse BS b/l  GI:  Soft, BS+, NT, ND  Extremities:  No edema    Medications:   Current Facility-Administered Medications   Medication Dose Route Frequency    doxercalciferol (HECTOROL) 4 mcg/2 mL injection 3 mcg  3 mcg IntraVENous Q TUE, THU & SAT    epoetin benjamin (EPOGEN;PROCRIT) injection 10,000 Units  10,000 Units IntraVENous DIALYSIS TUE, THU & SAT    midodrine (PROAMITINE) tablet 15 mg  15 mg Oral TID WITH MEALS    potassium chloride (KLOR-CON) packet 40 mEq  40 mEq Per NG tube DAILY    Zinc Ox-Aloe Vera-Vitamin E (BALMEX) topical cream   Topical CONTINUOUS    insulin glargine (LANTUS) injection 20 Units  20 Units SubCUTAneous DAILY    banana flakes trans-galactooligosaccharide (BANATROL PLUS) powder 1 Packet  1 Packet Per NG tube TID    levETIRAcetam (KEPPRA) 500 mg in saline (iso-osm) 100 ml IVPB  500 mg IntraVENous Q12H    LORazepam (ATIVAN) injection 1 mg  1 mg IntraVENous Q4H PRN    insulin lispro (HUMALOG) injection   SubCUTAneous Q6H    white petrolatum-mineral oil 85-15 % oint 1 Dose  1 Dose Ophthalmic QID    chlorhexidine (PERIDEX) 0.12 % mouthwash 10 mL  10 mL Oral Q12H    NOREPINephrine (LEVOPHED) 16,000 mcg in dextrose 5% 250 mL infusion  2-16 mcg/min IntraVENous TITRATE    albuterol (PROVENTIL VENTOLIN) nebulizer solution 2.5 mg  2.5 mg Nebulization Q6H PRN    famotidine (PF) (PEPCID) 20 mg in sodium chloride 0.9 % 10 mL injection  20 mg IntraVENous DAILY    vits A and D-white pet-lanolin (A&D) ointment   Topical QID AFTER MEALS    collagenase (SANTYL) 250 unit/gram ointment   Topical DAILY    acetaminophen (TYLENOL) tablet 650 mg  650 mg Oral Q4H PRN    lactobacillus sp. 50 billion cpu (BIO-K PLUS) capsule 1 Cap  1 Cap Oral DAILY    loperamide (IMODIUM) capsule 2 mg  2 mg Oral Q6H PRN    heparin (porcine) injection 5,000 Units  5,000 Units SubCUTAneous Q8H    heparin (porcine) 1,000 unit/mL injection 2,000 Units  2,000 Units IntraVENous DIALYSIS ONCE    simvastatin (ZOCOR) tablet 20 mg  20 mg Oral QHS    aspirin chewable tablet 81 mg  81 mg Oral DAILY    glucose chewable tablet 16 g  4 Tab Oral PRN    glucagon (GLUCAGEN) injection 1 mg  1 mg IntraMUSCular PRN    dextrose (D50W) injection syrg 12.5-25 g  25-50 mL IntraVENous PRN    0.9% sodium chloride infusion  100 mL/hr IntraVENous DIALYSIS PRN    naloxone (NARCAN) injection 0.4 mg  0.4 mg IntraVENous PRN       Labs:      Recent Results (from the past 24 hour(s))   GLUCOSE, POC    Collection Time: 08/31/17  6:18 PM   Result Value Ref Range    Glucose (POC) 143 (H) 70 - 110 mg/dL   GLUCOSE, POC    Collection Time: 09/01/17 12:09 AM   Result Value Ref Range    Glucose (POC) 146 (H) 70 - 110 mg/dL   POC G3    Collection Time: 09/01/17  4:05 AM   Result Value Ref Range    Device: VENT      FIO2 (POC) 30 %    pH (POC) 7.431 7.35 - 7.45      pCO2 (POC) 34.9 (L) 35.0 - 45.0 MMHG    pO2 (POC) 101 (H) 80 - 100 MMHG    HCO3 (POC) 23.2 22 - 26 MMOL/L    sO2 (POC) 98 (H) 92 - 97 %    Base deficit (POC) 1 mmol/L    Mode SIMV      Tidal volume 400 ml    Set Rate 12 bpm    PEEP/CPAP (POC) 5 cmH2O    Pressure support 7 cmH2O    Allens test (POC) N/A      Total resp.  rate 21      Site RIGHT RADIAL      Specimen type (POC) ARTERIAL      Performed by Kalyani Al     Volume control plus YES     GLUCOSE, POC    Collection Time: 09/01/17  5:20 AM   Result Value Ref Range    Glucose (POC) 161 (H) 70 - 110 mg/dL   GLUCOSE, POC    Collection Time: 09/01/17 11:45 AM   Result Value Ref Range    Glucose (POC) 166 (H) 70 - 110 mg/dL       Signed By: Edy Shabazz MD     September 1, 2017

## 2017-09-01 NOTE — PROGRESS NOTES
NUTRITION    Nursing Referral: MST, Pressure Ulcer  Nutrition Consult: Management of Tube Feeding     RECOMMENDATIONS / PLAN:     - Continue tube feeding of Nepro at goal of 55 mL/hr with 150 mL q 4 hour water flushes.   - Continue Banatrol TID for management of loose stool.   - Continue RD inpatient monitoring and evaluation. Goal Regimen: Nepro at 55 mL/hr + 150 mL q 4 hour water flushes to provide: 2376 kcal, 107 gm protein, 127 gm fat, 220 gm CHO, 21 gm fiber, 957 mL free water, 1857 mL total water, 100% RDIs     NUTRITION INTERVENTIONS & DIAGNOSIS:     [x] Enteral nutrition support: continue   [x] Coordination of care: interdisciplinary rounds     Nutrition Diagnosis: Increased protein needs related to promotion of wound healing and increased expenditure as evidenced by pressure ulcer wound and ESRD, pt on dialysis 3x per week  Inadequate oral intake related to inability to tolerate po as evidenced by NPO. ASSESSMENT:     9/1: Tolerating feeds at goal. Trach and PEG placement next week. HD prn.  8/28: Pt tolerating tube feeding at goal   8/22: Tolerating feeds at goal. Loose stool, will add prebiotic fiber supplement. 8/21: Pt intubated after PEA arrest during HD 8/18, PEG placement postponed. Tolerating feeds at goal.   8/18: Tolerating tube feeds at goal.  Tube feeds held today for PEG placement. 8/15: Tube feeds started this morning. Pt tolerating at rate of 30 mL/hr  8/14: SLP following; recommend NPO. Noted plan for NGT placement and start tube feeds. Potassium WNL. 8/10: Pt reported good appetite and \"eating enough\" from his meals. Noted fair meal intake per chart. Consuming supplements. Discussed increasing Nepro frequency; pt declined. Discussed adding Ensure Pudding; pt agreed with plan. Po intake of meals/supplements encouraged. Has been receiving K replacement. 8/7: K remains low despite previous tube feeding changed to higher potassium formula. Pt extubated 8/5.  Tube feeds discontinued 8/6. SLP following; pt s/p MBS today and started on po diet. Noted poor po intake lunch meal per chart. 8/3: K remains low despite continued replacement. Will change to higher potassium formula per discussion with PA.   8/1: Tolerating feeding at goal. 1 L removed during HD 7/31. Hyperglycemia noted, advance to very insulin resistant SSI and basal insulin started. 7/31: Tolerating advancement of tube feeding. HD today. BG trending up, MD to stop D5.    7/30: Pt remain intubated. GI following; plan to start tube feeds today. Noted order placed; discussed modifying tube feed order and add water flushes with Dr Yessi Currie. RN unavailable; discussed with Nursing [place] regarding modifying tube feed order  7/28: S/p cardiac arrest and intubated 7/27, NGT clamped. Abdominal ultrasound today to rule out cholecystitis. Will wait to feed.      EN infusion adequate to meet patients estimated nutritional needs:   [x] Yes     [] No   [] Unable to determine at this time    Tube Feeding: Nepro at 55 mL via NGT  Water Flushes: 150 mL q 4 hour  Residuals: 10 mL    Diet: DIET TUBE FEEDING  DIET NPO  DIET NUTRITIONAL SUPPLEMENTS Breakfast, Lunch, Dinner; [employer]       Food Allergies: NKFA  Current Appetite:   [] Good     [] Fair     [] Poor     [x] Other: NPO   Appetite/meal intake prior to admission:   [] Good     [] Fair     [] Poor     [x] Other: unknown   Feeding Limitations:  [x] Swallowing difficulty: SLP following; recommended NPO on 8/14    [] Chewing difficulty:    [x] Other: intubated       Anuric   BM: 9/1; FMS  Skin Integrity:  stage II ressure ulcer anus; DTI sacrum; moisture associated skin damage posterior buttocks  Edema: none  Pertinent Medications: Reviewed: steroid, lantus, SSI, lactobacillus, imodium    Recent Labs      08/31/17   0610  08/30/17   0620   NA  141  137   K  4.1  3.5   CL  109*  105   CO2  22  22   GLU  128*  203*   BUN  40*  30*   CREA  6.74*  5.36*   CA  8.6  8.7   MG  3.1*  2.8* PHOS  3.3  2.4*       Intake/Output Summary (Last 24 hours) at 09/01/17 1025  Last data filed at 09/01/17 1000   Gross per 24 hour   Intake             1710 ml   Output              101 ml   Net             1609 ml       Anthropometrics:  Ht Readings from Last 1 Encounters:   08/15/17 6' 2\" (1.88 m)     Last 3 Recorded Weights in this Encounter    08/30/17 1039 08/31/17 0800 09/01/17 0600   Weight: 67.3 kg (148 lb 4.8 oz) 63.7 kg (140 lb 6.4 oz) 63.4 kg (139 lb 12.4 oz)     Body mass index is 17.95 kg/(m^2). Underweight     Weight History:   Weight Metrics 9/1/2017   Weight 139 lb 12.4 oz   BMI 17.95 kg/m2        Admitting Diagnosis: Cardiac arrest (HCC)  Acidosis  Respiratory failure (HCC)  Anemia  ESRD needing dialysis ([facility]  Cardiac arrest ([facility]  Acute GI bleeding  Sepsis ([facility]  Hypotension  Anoxic brain damage (HCC)  ESRD (end stage renal disease) ([facility]  Colitis  dx  dx  DX  Pertinent PMHx: DM, HTN, IBS, ESRD    Education Needs:        [x] None identified  [] Identified - Not appropriate at this time  []  Identified and addressed - refer to education log  Learning Limitations:   [] None identified  [x] Identified: intubated      Cultural, Hinduism & ethnic food preferences:  [x] None identified    [] Identified and addressed     ESTIMATED NUTRITION NEEDS:     Calories: 3029-5883 kcal ([id number]) based on  [x] Actual BW 65 kg      [] SBW  Protein:  gm (1.2-2 gm/kg) based on  [x] Actual BW      [] SBW   Fluid: 0526-5226 mL     MONITORING & EVALUATION:     Nutrition Goal(s):   1. Patient will tolerate enteral nutrition formula and regimen without difficulty within the next 7 days. Outcome:  [x] Met/Ongoing    []  Not Met    [] New/Initial Goal   2. Patient will meet their estimated nutritional needs through adequate enteral nutrition within the next 7 days.  Outcome:  [x] Met/Ongoing    []  Not Met/Progressing    [] New/Initial Goal      Monitoring:   [x] EN tolerance    [x] EN infusion   [] Diet tolerance   [] Meal intake   [] Supplement intake   [] GI symptoms/ability to tolerate po diet   [x] Respiratory status   [x] Plan of care      Previous Recommendations (for follow-up assessments only):     [x]   Implemented       []   Not Implemented (RD to address)     [] No Recommendation Made     Discharge Planning: goal enteral nutrition regimen   [x] Participated in care planning, discharge planning, & interdisciplinary rounds as appropriate       Aubrie Newman, [address], [address] [place] [address]   Pager: [phone]

## 2017-09-01 NOTE — ROUTINE PROCESS
Bedside and Verbal shift change report given to Tressa Jacobo RN (oncoming nurse) by Buddy Weston RN (offgoing nurse). Report included the following information SBAR, Kardex, MAR and Recent Results. SITUATION:    Code Status: Full Code   Reason for Admission: Cardiac arrest ([facility]   Acidosis   Respiratory failure (HCC)   Anemia   ESRD needing dialysis ([facility]   Cardiac arrest ([facility]   Acute GI bleeding   Sepsis ([facility]   Hypotension   Anoxic brain damage (HCC)   ESRD (end stage renal disease) ([facility]   Colitis   dx   dx   [address] day: 39   Problem List:       Hospital Problems  Date Reviewed: 7/27/2017          Codes Class Noted POA    Oropharyngeal dysphagia ICD-10-CM: R13.12  ICD-9-CM: 787.22  8/17/2017 Unknown        Cachexia ([facility] ICD-10-CM: R64  ICD-9-CM: 799.4  8/17/2017 Unknown        Acidosis ICD-10-CM: E87.2  ICD-9-CM: 276.2  7/27/2017 Unknown        Cardiac arrest ([facility] ICD-10-CM: I46.9  ICD-9-CM: 427.5  7/27/2017 Unknown        Respiratory failure ([facility] ICD-10-CM: J96.90  ICD-9-CM: 518.81  7/27/2017 Unknown        ESRD needing dialysis ([facility] ICD-10-CM: N18.6, Z99.2  ICD-9-CM: 585.6  7/27/2017 Unknown        Anoxic brain damage ([facility] ICD-10-CM: G93.1  ICD-9-CM: 348.1  7/27/2017 Unknown        Colitis ICD-10-CM: K52.9  ICD-9-CM: 558.9  7/27/2017 Unknown        Hypotension ICD-10-CM: I95.9  ICD-9-CM: 458.9  7/27/2017 Unknown        Acute GI bleeding ICD-10-CM: K92.2  ICD-9-CM: 578.9  7/27/2017 Unknown        ESRD (end stage renal disease) ([facility] ICD-10-CM: N18.6  ICD-9-CM: 585.6  7/27/2017 Unknown        * (Principal)Sepsis ([facility] ICD-10-CM: A41.9  ICD-9-CM: 038.9, 995.91  7/27/2017 Unknown        Anemia ICD-10-CM: D64.9  ICD-9-CM: 194. 9  Unknown Unknown              BACKGROUND:    Past Medical History:   Past Medical History:   Diagnosis Date    Anemia     Arthritis     Chronic pain     Back     Diabetes mellitus type II     Hypertension     IBS (irritable bowel syndrome)     Lactose intolerance     TB (tuberculosis)     TB test positive         Patient taking anticoagulants: Yes     ASSESSMENT:    Changes in Assessment Throughout Shift: Intermittent hypotension: 80/50s. Albumin 25% 50 gm IV ordered. Large amount liquid stool. Otherwise, no change in assessment / condition.      Patient has Central Line: Yes Reasons if yes: Poor venous access     Patient has Stanton Cath: No      Last Vitals:     Vitals:    09/01/17 1000 09/01/17 1030 09/01/17 1100 09/01/17 1130   BP: (!) 86/49 (!) 83/51 90/49 102/53   Pulse: 83 82 82 81   Resp: 16 17 17 16   Temp:       TempSrc:       SpO2: 100% 100% 100% 100%   Weight:       Height:            IV and DRAINS (will only show if present)   [REMOVED] Peripheral IV 08/01/17 Right Hand-Site Assessment: Clean, dry, & intact  [REMOVED] Peripheral IV 08/05/17 Right Arm-Site Assessment: Clean, dry, & intact  [REMOVED] Peripheral IV 08/09/17 Right Hand-Site Assessment: Clean, dry, & intact  [REMOVED] Peripheral IV 08/09/17 Right Antecubital-Site Assessment: Clean, dry, & intact  [REMOVED] Sheath 08/09/17-Site Assessment: Clean, dry, & intact  [REMOVED] Nasogastric Tube 08/14/17-Site Assessment: Clean, dry, & intact  [REMOVED] Peripheral IV 08/17/17 Right Forearm-Site Assessment: Clean, dry, & intact  [REMOVED] Peripheral IV 08/17/17 Right Wrist-Site Assessment: Clean, dry, & intact  [REMOVED] Airway - Endotracheal Tube 08/18/17 Oral-Site Assessment: Clean, dry, & intact  Triple Lumen Left IJ CVL 08/18/17 Left Internal jugular-Site Assessment: Clean, dry, & intact  [REMOVED] Arterial Line 08/18/17 Right Radial artery-Site Assessment: Clean, dry, & intact  Airway - Continuous Aspiration of Subglottic Secretions (MISBAH) Tube 08/18/17 Oral-Site Assessment: Clean, dry, & intact  Nasogastric Tube 08/29/17-Site Assessment: Clean, dry, & intact  [REMOVED] Airway - Continuous Aspiration of Subglottic Secretions (MISBAH) Tube 07/27/17 Oral-Site Assessment: Clean, dry, & intact  [REMOVED] Nasogastric Tube 07/27/17-Site Assessment: Clean, dry, & intact  [REMOVED] Venous Access Device 07/27/17 Upper chest (subclavicular area, right-Site Assessment: Clean, dry, & intact  [REMOVED] Peripheral IV 07/27/17 Right Antecubital-Site Assessment: Clean, dry, & intact  [REMOVED] Triple Lumen 07/27/17 Right Internal jugular-Site Assessment: Clean, dry, & intact  [REMOVED] Peripheral IV 07/27/17 Right Other(comment)-Site Assessment: Clean, dry, & intact  [REMOVED] Peripheral IV 07/27/17 Right Antecubital-Site Assessment: Swelling     WOUND (if present)   Wound Type:  Sacral decub   Dressing present Dressing Present : Changed   Wound Concerns/Notes: Wound care with Santyl ointment daily & PRN     PAIN    Pain Assessment    Pain Intensity 1: 0 (09/01/17 0400)    Pain Location 1: Generalized    Pain Intervention(s) 1: Medication (see MAR)    Patient Stated Pain Goal: Unable to verbalize/indicatate pain  o Interventions for Pain:  None  o Intervention effective: N/A  o Time of last intervention: N/A   o Reassessment Completed: yes      Last 3 Weights:  Last 3 Recorded Weights in this Encounter    08/30/17 1039 08/31/17 0800 09/01/17 0600   Weight: 67.3 kg (148 lb 4.8 oz) 63.7 kg (140 lb 6.4 oz) 63.4 kg (139 lb 12.4 oz)     Weight change: -3.583 kg (-7 lb 14.4 oz)     INTAKE/OUPUT    Current Shift: 09/01 0701 - 09/01 1900  In: 570 [I.V.:100]  Out: -     Last three shifts: 08/30 1901 - 09/01 0700  In: 2455 [I.V.:300]  Out: 101 [Drains:100]     LAB RESULTS     Recent Labs      08/31/17   0610  08/30/17   0620   WBC  11.2  11.6   HGB  9.6*  10.0*   HCT  29.6*  31.2*   PLT  453*  498*        Recent Labs      08/31/17   0610  08/30/17   0620   NA  141  137   K  4.1  3.5   GLU  128*  203*   BUN  40*  30*   CREA  6.74*  5.36*   CA  8.6  8.7   MG  3.1*  2.8*       RECOMMENDATIONS AND DISCHARGE PLANNING     1.  Pending tests/procedures/ Plan of Care or Other Needs: Pulmonary hygiene; Trach & PEG pending for next week     2. Discharge plan for patient and Needs/Barriers: TBD    3. Estimated Discharge Date: TBD; Posted on Whiteboard in Patients Room: Yes      4. The patient's care plan was reviewed with the oncoming nurse. \"HEALS\" SAFETY CHECK      Fall Risk    Total Score: 2    Safety Measures: Safety Measures: Bed/Chair-Wheels locked, Bed in low position, Call light within reach, Emergency bedside equipment, Fall prevention (comment), Nurse at bedside, Seizure precaution, Side rails X 3    A safety check occurred in the patient's room between off going nurse and oncoming nurse listed above. The safety check included the below items  Area Items   H  High Alert Medications - Verify all high alert medication drips (heparin, PCA, etc.)   E  Equipment - Suction is set up for ALL patients (with cary)  - Red plugs utilized for all equipment (IV pumps, etc.)  - WOWs wiped down at end of shift.  - Room stocked with oxygen, suction, and other unit-specific supplies   A  Alarms - Bed alarm is set for fall risk patients  - Ensure chair alarm is in place and activated if patient is up in a chair   L  Lines - Check IV for any infiltration  - Stanton bag is empty if patient has a Stanton   - Tubing and IV bags are labeled   S  Safety   - Room is clean, patient is clean, and equipment is clean. - Hallways are clear from equipment besides carts. - Fall bracelet on for fall risk patients  - Ensure room is clear and free of clutter  - Suction is set up for ALL patients (with cary)  - Hallways are clear from equipment besides carts.    - Isolation precautions followed, supplies available outside room, sign posted     Krista Patterson RN

## 2017-09-01 NOTE — PROGRESS NOTES
attended the interdisciplinary rounds for [facility], who is a 65 y.o.,male. Patients Primary Language is: [place]. According to the patients EMR Moravian Affiliation is: [facility].     The reason the Patient came to the hospital is:   Patient Active Problem List    Diagnosis Date Noted    Oropharyngeal dysphagia 08/17/2017    Cachexia ([facility] 08/17/2017    Acidosis 07/27/2017    Cardiac arrest ([address] 07/27/2017    Respiratory failure ([address] 07/27/2017    ESRD needing dialysis ([address] 07/27/2017    Anoxic brain damage ([address] 07/27/2017    Colitis 07/27/2017    Hypotension 07/27/2017    Acute GI bleeding 07/27/2017    ESRD (end stage renal disease) ([facility] 07/27/2017    Sepsis ([facility] 07/27/2017    Anemia         Not able to assess the patient due to medical condition. No family at bedside. Plan:  Chaplains will continue to follow and will provide pastoral care on an as needed/requested basis.  recommends bedside caregivers page  on duty if patient shows signs of acute spiritual or emotional distress.     [address] [facility] [employer]  Board Certified [address] [facility]   [phone]

## 2017-09-02 ENCOUNTER — APPOINTMENT (OUTPATIENT)
Dept: GENERAL RADIOLOGY | Age: 57
DRG: 004 | End: 2017-09-02
Attending: PHYSICIAN ASSISTANT
Payer: MEDICARE

## 2017-09-02 ENCOUNTER — APPOINTMENT (OUTPATIENT)
Dept: GENERAL RADIOLOGY | Age: 57
DRG: 004 | End: 2017-09-02
Attending: INTERNAL MEDICINE
Payer: MEDICARE

## 2017-09-02 LAB
ANION GAP SERPL CALC-SCNC: 8 MMOL/L (ref 3–18)
ARTERIAL PATENCY WRIST A: YES
BASE DEFICIT BLD-SCNC: 4 MMOL/L
BASOPHILS # BLD: 0 K/UL (ref 0–0.06)
BASOPHILS NFR BLD: 0 % (ref 0–2)
BDY SITE: ABNORMAL
BODY TEMPERATURE: 98.6
BUN SERPL-MCNC: 38 MG/DL (ref 7–18)
BUN/CREAT SERPL: 6 (ref 12–20)
CALCIUM SERPL-MCNC: 8.8 MG/DL (ref 8.5–10.1)
CHLORIDE SERPL-SCNC: 108 MMOL/L (ref 100–108)
CO2 SERPL-SCNC: 22 MMOL/L (ref 21–32)
CORTIS SERPL-MCNC: 15.4 UG/DL (ref 3.09–22.4)
CREAT SERPL-MCNC: 6.64 MG/DL (ref 0.6–1.3)
DIFFERENTIAL METHOD BLD: ABNORMAL
EOSINOPHIL # BLD: 0.3 K/UL (ref 0–0.4)
EOSINOPHIL NFR BLD: 3 % (ref 0–5)
ERYTHROCYTE [DISTWIDTH] IN BLOOD BY AUTOMATED COUNT: 19.6 % (ref 11.6–14.5)
GAS FLOW.O2 O2 DELIVERY SYS: ABNORMAL L/MIN
GAS FLOW.O2 SETTING OXYMISER: 12 BPM
GLUCOSE BLD STRIP.AUTO-MCNC: 102 MG/DL (ref 70–110)
GLUCOSE BLD STRIP.AUTO-MCNC: 149 MG/DL (ref 70–110)
GLUCOSE BLD STRIP.AUTO-MCNC: 150 MG/DL (ref 70–110)
GLUCOSE BLD STRIP.AUTO-MCNC: 195 MG/DL (ref 70–110)
GLUCOSE BLD STRIP.AUTO-MCNC: 57 MG/DL (ref 70–110)
GLUCOSE BLD STRIP.AUTO-MCNC: 63 MG/DL (ref 70–110)
GLUCOSE BLD STRIP.AUTO-MCNC: 65 MG/DL (ref 70–110)
GLUCOSE BLD STRIP.AUTO-MCNC: 99 MG/DL (ref 70–110)
GLUCOSE SERPL-MCNC: 50 MG/DL (ref 74–99)
HCO3 BLD-SCNC: 21.3 MMOL/L (ref 22–26)
HCT VFR BLD AUTO: 28 % (ref 36–48)
HGB BLD-MCNC: 9 G/DL (ref 13–16)
INSPIRATION.DURATION SETTING TIME VENT: 1 SEC
LYMPHOCYTES # BLD: 2.4 K/UL (ref 0.9–3.6)
LYMPHOCYTES NFR BLD: 25 % (ref 21–52)
MAGNESIUM SERPL-MCNC: 3.3 MG/DL (ref 1.6–2.6)
MCH RBC QN AUTO: 31.5 PG (ref 24–34)
MCHC RBC AUTO-ENTMCNC: 32.1 G/DL (ref 31–37)
MCV RBC AUTO: 97.9 FL (ref 74–97)
MONOCYTES # BLD: 0.6 K/UL (ref 0.05–1.2)
MONOCYTES NFR BLD: 6 % (ref 3–10)
NEUTS SEG # BLD: 6.4 K/UL (ref 1.8–8)
NEUTS SEG NFR BLD: 66 % (ref 40–73)
O2/TOTAL GAS SETTING VFR VENT: 30 %
PCO2 BLD: 36.1 MMHG (ref 35–45)
PEEP RESPIRATORY: 5 CMH2O
PH BLD: 7.38 [PH] (ref 7.35–7.45)
PLATELET # BLD AUTO: 402 K/UL (ref 135–420)
PMV BLD AUTO: 9.6 FL (ref 9.2–11.8)
PO2 BLD: 142 MMHG (ref 80–100)
POTASSIUM SERPL-SCNC: 4 MMOL/L (ref 3.5–5.5)
RBC # BLD AUTO: 2.86 M/UL (ref 4.7–5.5)
SAO2 % BLD: 99 % (ref 92–97)
SERVICE CMNT-IMP: ABNORMAL
SODIUM SERPL-SCNC: 138 MMOL/L (ref 136–145)
SPECIMEN TYPE: ABNORMAL
TOTAL RESP. RATE, ITRR: 15
VENTILATION MODE VENT: ABNORMAL
VOLUME CONTROL PLUS IVLCP: YES
VT SETTING VENT: 400 ML
WBC # BLD AUTO: 9.8 K/UL (ref 4.6–13.2)

## 2017-09-02 PROCEDURE — 71010 XR CHEST PORT: CPT

## 2017-09-02 PROCEDURE — 74011250636 HC RX REV CODE- 250/636: Performed by: INTERNAL MEDICINE

## 2017-09-02 PROCEDURE — 82962 GLUCOSE BLOOD TEST: CPT

## 2017-09-02 PROCEDURE — 74011250637 HC RX REV CODE- 250/637: Performed by: INTERNAL MEDICINE

## 2017-09-02 PROCEDURE — 80048 BASIC METABOLIC PNL TOTAL CA: CPT | Performed by: NURSE PRACTITIONER

## 2017-09-02 PROCEDURE — 74011000258 HC RX REV CODE- 258: Performed by: NURSE PRACTITIONER

## 2017-09-02 PROCEDURE — 74011250637 HC RX REV CODE- 250/637: Performed by: FAMILY MEDICINE

## 2017-09-02 PROCEDURE — 77030011256 HC DRSG MEPILEX <16IN NO BORD MOLN -A

## 2017-09-02 PROCEDURE — 82803 BLOOD GASES ANY COMBINATION: CPT

## 2017-09-02 PROCEDURE — 77030033263 HC DRSG MEPILEX 16-48IN BORD MOLN -B

## 2017-09-02 PROCEDURE — 74011000250 HC RX REV CODE- 250: Performed by: PHYSICIAN ASSISTANT

## 2017-09-02 PROCEDURE — 36600 WITHDRAWAL OF ARTERIAL BLOOD: CPT

## 2017-09-02 PROCEDURE — 74011636637 HC RX REV CODE- 636/637: Performed by: INTERNAL MEDICINE

## 2017-09-02 PROCEDURE — 77030008771 HC TU NG SALEM SUMP -A

## 2017-09-02 PROCEDURE — 85025 COMPLETE CBC W/AUTO DIFF WBC: CPT | Performed by: NURSE PRACTITIONER

## 2017-09-02 PROCEDURE — 74011250636 HC RX REV CODE- 250/636: Performed by: HOSPITALIST

## 2017-09-02 PROCEDURE — 94003 VENT MGMT INPAT SUBQ DAY: CPT

## 2017-09-02 PROCEDURE — 65610000006 HC RM INTENSIVE CARE

## 2017-09-02 PROCEDURE — 74011250637 HC RX REV CODE- 250/637: Performed by: HOSPITALIST

## 2017-09-02 PROCEDURE — 74011250637 HC RX REV CODE- 250/637: Performed by: NURSE PRACTITIONER

## 2017-09-02 PROCEDURE — 74011000250 HC RX REV CODE- 250: Performed by: NURSE PRACTITIONER

## 2017-09-02 PROCEDURE — 36592 COLLECT BLOOD FROM PICC: CPT

## 2017-09-02 PROCEDURE — 83735 ASSAY OF MAGNESIUM: CPT | Performed by: NURSE PRACTITIONER

## 2017-09-02 PROCEDURE — 82533 TOTAL CORTISOL: CPT | Performed by: NURSE PRACTITIONER

## 2017-09-02 RX ORDER — INSULIN GLARGINE 100 [IU]/ML
10 INJECTION, SOLUTION SUBCUTANEOUS DAILY
Status: DISCONTINUED | OUTPATIENT
Start: 2017-09-02 | End: 2017-09-03

## 2017-09-02 RX ADMIN — DOXERCALCIFEROL 3 MCG: 4 INJECTION, SOLUTION INTRAVENOUS at 17:41

## 2017-09-02 RX ADMIN — MINERAL OIL AND WHITE PETROLATUM 1 DOSE: 150; 850 OINTMENT OPHTHALMIC at 22:27

## 2017-09-02 RX ADMIN — Medication 1 CAPSULE: at 09:30

## 2017-09-02 RX ADMIN — HEPARIN SODIUM 5000 UNITS: 5000 INJECTION, SOLUTION INTRAVENOUS; SUBCUTANEOUS at 17:41

## 2017-09-02 RX ADMIN — MINERAL OIL AND WHITE PETROLATUM 1 DOSE: 150; 850 OINTMENT OPHTHALMIC at 09:31

## 2017-09-02 RX ADMIN — VITAMIN A AND D: 30.8 OINTMENT TOPICAL at 13:09

## 2017-09-02 RX ADMIN — VITAMIN A AND D: 30.8 OINTMENT TOPICAL at 09:30

## 2017-09-02 RX ADMIN — LOPERAMIDE HYDROCHLORIDE 2 MG: 2 CAPSULE ORAL at 09:30

## 2017-09-02 RX ADMIN — Medication 1 PACKET: at 09:30

## 2017-09-02 RX ADMIN — MINERAL OIL AND WHITE PETROLATUM 1 DOSE: 150; 850 OINTMENT OPHTHALMIC at 17:43

## 2017-09-02 RX ADMIN — HEPARIN SODIUM 5000 UNITS: 5000 INJECTION, SOLUTION INTRAVENOUS; SUBCUTANEOUS at 09:32

## 2017-09-02 RX ADMIN — VITAMIN A AND D: 30.8 OINTMENT TOPICAL at 17:42

## 2017-09-02 RX ADMIN — LEVETIRACETAM 500 MG: 5 INJECTION INTRAVENOUS at 09:31

## 2017-09-02 RX ADMIN — LEVETIRACETAM 500 MG: 5 INJECTION INTRAVENOUS at 21:25

## 2017-09-02 RX ADMIN — SIMVASTATIN 20 MG: 10 TABLET, FILM COATED ORAL at 22:26

## 2017-09-02 RX ADMIN — CHLORHEXIDINE GLUCONATE 10 ML: 1.2 RINSE ORAL at 09:30

## 2017-09-02 RX ADMIN — MIDODRINE HYDROCHLORIDE 15 MG: 2.5 TABLET ORAL at 17:41

## 2017-09-02 RX ADMIN — DEXTROSE MONOHYDRATE 25 G: 25 INJECTION, SOLUTION INTRAVENOUS at 00:28

## 2017-09-02 RX ADMIN — MIDODRINE HYDROCHLORIDE 15 MG: 2.5 TABLET ORAL at 09:30

## 2017-09-02 RX ADMIN — DEXTROSE MONOHYDRATE 25 G: 25 INJECTION, SOLUTION INTRAVENOUS at 07:16

## 2017-09-02 RX ADMIN — COLLAGENASE SANTYL: 250 OINTMENT TOPICAL at 09:30

## 2017-09-02 RX ADMIN — Medication 1 PACKET: at 16:00

## 2017-09-02 RX ADMIN — MIDODRINE HYDROCHLORIDE 15 MG: 2.5 TABLET ORAL at 12:55

## 2017-09-02 RX ADMIN — VITAMIN A AND D: 30.8 OINTMENT TOPICAL at 22:30

## 2017-09-02 RX ADMIN — MINERAL OIL AND WHITE PETROLATUM 1 DOSE: 150; 850 OINTMENT OPHTHALMIC at 13:08

## 2017-09-02 RX ADMIN — POTASSIUM CHLORIDE 40 MEQ: 1.5 POWDER, FOR SOLUTION ORAL at 09:30

## 2017-09-02 RX ADMIN — HEPARIN SODIUM 5000 UNITS: 5000 INJECTION, SOLUTION INTRAVENOUS; SUBCUTANEOUS at 02:02

## 2017-09-02 RX ADMIN — FAMOTIDINE 20 MG: 10 INJECTION INTRAVENOUS at 09:31

## 2017-09-02 RX ADMIN — CHLORHEXIDINE GLUCONATE 10 ML: 1.2 RINSE ORAL at 22:27

## 2017-09-02 RX ADMIN — NOREPINEPHRINE BITARTRATE 2 MCG/MIN: 1 INJECTION INTRAVENOUS at 05:37

## 2017-09-02 RX ADMIN — INSULIN LISPRO 3 UNITS: 100 INJECTION, SOLUTION INTRAVENOUS; SUBCUTANEOUS at 23:53

## 2017-09-02 RX ADMIN — NOREPINEPHRINE BITARTRATE 4 MCG/MIN: 1 INJECTION INTRAVENOUS at 01:48

## 2017-09-02 RX ADMIN — ASPIRIN 81 MG 81 MG: 81 TABLET ORAL at 09:30

## 2017-09-02 RX ADMIN — Medication 1 PACKET: at 22:50

## 2017-09-02 NOTE — PROGRESS NOTES
[facility]  Progress Note    Patient: Ruel Caba Age: 65 y.o. : 1960 MR#: [id number] SSN: [id number]  Date/Time: 2017     Subjective:     Remains unresponsive. Assessment/Plan:     1. S/p PEA arrest. wth acute resp fauilure on the Vent ? Cardiac cause with elevated trop PTA. Echo improving EF, s/p cath, no CAD. Now with second cardiac arrest - intubated, vent support  2. Acute met encephalopathy: likely anoxic encephalopathy  3. Acute pontine lacunar CVA: on asa. 4. ESRD- HD as pe nephrology  5. DM2- Lantus, ssi    Full code.  Poor prognosis    Brittany Valero (daughter) (decision maker): [phone]  Eric Mckinney (niece): [address] [place] : [phone]  Deyanira Schafer (brother): [phone]    Case discussed with:  []Patient  []Family  [x]Nursing  []Case Management  DVT Prophylaxis:  []Lovenox  [x]Hep SQ  [x]SCDs  []Coumadin   []On Heparin gtt    Patient Active Problem List   Diagnosis Code    Anemia D64.9    Acidosis E87.2    Cardiac arrest ([address] I46.9    Respiratory failure ([address] J96.90    ESRD needing dialysis ([address] N18.6, Z99.2    Anoxic brain damage (HCC) G93.1    Colitis K52.9    Hypotension I95.9    Acute GI bleeding K92.2    ESRD (end stage renal disease) ([address] N18.6    Sepsis ([address] A41.9    Oropharyngeal dysphagia R13.12    Cachexia ([address] R64       Objective:   VS:   Visit Vitals    /56    Pulse 73    Temp 98.5 °F (36.9 °C)    Resp 15    Ht 6' 2\" (1.88 m)    Wt 62 kg (136 lb 9.6 oz)    SpO2 100%    BMI 17.54 kg/m2      Tmax/24hrs: Temp (24hrs), Av.6 °F (37 °C), Min:98.2 °F (36.8 °C), Max:99.9 °F (37.7 °C)  IOBRIEF    Intake/Output Summary (Last 24 hours) at 17 1554  Last data filed at 17 1500   Gross per 24 hour   Intake          2366.71 ml   Output             1700 ml   Net           666.71 ml     General:  Intubated, unresponsive  Cardiovascular:  S1S2+, RRR  Pulmonary:  Coarse BS b/l  GI:  Soft, BS+, NT, ND  Extremities:  No edema.  Good thrill over right arm graft    Medications:   Current Facility-Administered Medications   Medication Dose Route Frequency    insulin glargine (LANTUS) injection 10 Units  10 Units SubCUTAneous DAILY    doxercalciferol (HECTOROL) 4 mcg/2 mL injection 3 mcg  3 mcg IntraVENous Q TUE, THU & SAT    epoetin benjamin (EPOGEN;PROCRIT) injection 10,000 Units  10,000 Units IntraVENous DIALYSIS TUE, THU & SAT    midodrine (PROAMITINE) tablet 15 mg  15 mg Oral TID WITH MEALS    potassium chloride (KLOR-CON) packet 40 mEq  40 mEq Per NG tube DAILY    Zinc Ox-Aloe Vera-Vitamin E (BALMEX) topical cream   Topical CONTINUOUS    banana flakes trans-galactooligosaccharide (BANATROL PLUS) powder 1 Packet  1 Packet Per NG tube TID    levETIRAcetam (KEPPRA) 500 mg in saline (iso-osm) 100 ml IVPB  500 mg IntraVENous Q12H    LORazepam (ATIVAN) injection 1 mg  1 mg IntraVENous Q4H PRN    insulin lispro (HUMALOG) injection   SubCUTAneous Q6H    white petrolatum-mineral oil 85-15 % oint 1 Dose  1 Dose Ophthalmic QID    chlorhexidine (PERIDEX) 0.12 % mouthwash 10 mL  10 mL Oral Q12H    NOREPINephrine (LEVOPHED) 16,000 mcg in dextrose 5% 250 mL infusion  2-16 mcg/min IntraVENous TITRATE    albuterol (PROVENTIL VENTOLIN) nebulizer solution 2.5 mg  2.5 mg Nebulization Q6H PRN    famotidine (PF) (PEPCID) 20 mg in sodium chloride 0.9 % 10 mL injection  20 mg IntraVENous DAILY    vits A and D-white pet-lanolin (A&D) ointment   Topical QID AFTER MEALS    collagenase (SANTYL) 250 unit/gram ointment   Topical DAILY    acetaminophen (TYLENOL) tablet 650 mg  650 mg Oral Q4H PRN    lactobacillus sp. 50 billion cpu (BIO-K PLUS) capsule 1 Cap  1 Cap Oral DAILY    loperamide (IMODIUM) capsule 2 mg  2 mg Oral Q6H PRN    heparin (porcine) injection 5,000 Units  5,000 Units SubCUTAneous Q8H    heparin (porcine) 1,000 unit/mL injection 2,000 Units  2,000 Units IntraVENous DIALYSIS ONCE  simvastatin (ZOCOR) tablet 20 mg  20 mg Oral QHS    aspirin chewable tablet 81 mg  81 mg Oral DAILY    glucose chewable tablet 16 g  4 Tab Oral PRN    glucagon (GLUCAGEN) injection 1 mg  1 mg IntraMUSCular PRN    dextrose (D50W) injection syrg 12.5-25 g  25-50 mL IntraVENous PRN    0.9% sodium chloride infusion  100 mL/hr IntraVENous DIALYSIS PRN    naloxone (NARCAN) injection 0.4 mg  0.4 mg IntraVENous PRN       Labs:      Recent Results (from the past 24 hour(s))   GLUCOSE, POC    Collection Time: 09/01/17  6:39 PM   Result Value Ref Range    Glucose (POC) 75 70 - 110 mg/dL   GLUCOSE, POC    Collection Time: 09/02/17 12:22 AM   Result Value Ref Range    Glucose (POC) 62 (L) 70 - 110 mg/dL   GLUCOSE, POC    Collection Time: 09/02/17 12:24 AM   Result Value Ref Range    Glucose (POC) 65 (L) 70 - 110 mg/dL   GLUCOSE, POC    Collection Time: 09/02/17  1:00 AM   Result Value Ref Range    Glucose (POC) 149 (H) 70 - 110 mg/dL   POC G3    Collection Time: 09/02/17  5:10 AM   Result Value Ref Range    Device: VENT      FIO2 (POC) 30 %    pH (POC) 7.379 7.35 - 7.45      pCO2 (POC) 36.1 35.0 - 45.0 MMHG    pO2 (POC) 142 (H) 80 - 100 MMHG    HCO3 (POC) 21.3 (L) 22 - 26 MMOL/L    sO2 (POC) 99 (H) 92 - 97 %    Base deficit (POC) 4 mmol/L    Mode ASSIST CONTROL      Tidal volume 400 ml    Set Rate 12 bpm    PEEP/CPAP (POC) 5 cmH2O    Allens test (POC) YES      Inspiratory Time 1 sec    Total resp. rate 15      Site RIGHT RADIAL      Patient temp.  98.6      Specimen type (POC) ARTERIAL      Performed by Brisa Lima     Volume control plus YES     GLUCOSE, POC    Collection Time: 09/02/17  6:43 AM   Result Value Ref Range    Glucose (POC) 63 (L) 70 - 110 mg/dL   CBC WITH AUTOMATED DIFF    Collection Time: 09/02/17  6:51 AM   Result Value Ref Range    WBC 9.8 4.6 - 13.2 K/uL    RBC 2.86 (L) 4.70 - 5.50 M/uL    HGB 9.0 (L) 13.0 - 16.0 g/dL    HCT 28.0 (L) 36.0 - 48.0 %    MCV 97.9 (H) 74.0 - 97.0 FL    MCH 31.5 24.0 - 34.0 PG    MCHC 32.1 31.0 - 37.0 g/dL    RDW 19.6 (H) 11.6 - 14.5 %    PLATELET 925 273 - 796 K/uL    MPV 9.6 9.2 - 11.8 FL    NEUTROPHILS 66 40 - 73 %    LYMPHOCYTES 25 21 - 52 %    MONOCYTES 6 3 - 10 %    EOSINOPHILS 3 0 - 5 %    BASOPHILS 0 0 - 2 %    ABS. NEUTROPHILS 6.4 1.8 - 8.0 K/UL    ABS. LYMPHOCYTES 2.4 0.9 - 3.6 K/UL    ABS. MONOCYTES 0.6 0.05 - 1.2 K/UL    ABS. EOSINOPHILS 0.3 0.0 - 0.4 K/UL    ABS.  BASOPHILS 0.0 0.0 - 0.06 K/UL    DF AUTOMATED     METABOLIC PANEL, BASIC    Collection Time: 09/02/17  6:51 AM   Result Value Ref Range    Sodium 138 136 - 145 mmol/L    Potassium 4.0 3.5 - 5.5 mmol/L    Chloride 108 100 - 108 mmol/L    CO2 22 21 - 32 mmol/L    Anion gap 8 3.0 - 18 mmol/L    Glucose 50 (LL) 74 - 99 mg/dL    BUN 38 (H) 7.0 - 18 MG/DL    Creatinine 6.64 (H) 0.6 - 1.3 MG/DL    BUN/Creatinine ratio 6 (L) 12 - 20      GFR est AA 11 (L) >60 ml/min/1.73m2    GFR est non-AA 9 (L) >60 ml/min/1.73m2    Calcium 8.8 8.5 - 10.1 MG/DL   MAGNESIUM    Collection Time: 09/02/17  6:51 AM   Result Value Ref Range    Magnesium 3.3 (H) 1.6 - 2.6 mg/dL   CORTISOL    Collection Time: 09/02/17  6:51 AM   Result Value Ref Range    Cortisol, random 15.4 3.09 - 22.40 ug/dL   GLUCOSE, POC    Collection Time: 09/02/17  7:14 AM   Result Value Ref Range    Glucose (POC) 57 (L) 70 - 110 mg/dL   GLUCOSE, POC    Collection Time: 09/02/17  7:32 AM   Result Value Ref Range    Glucose (POC) 102 70 - 110 mg/dL   GLUCOSE, POC    Collection Time: 09/02/17 11:52 AM   Result Value Ref Range    Glucose (POC) 99 70 - 110 mg/dL       Signed By: Luz Maria Muller MD     September 2, 2017

## 2017-09-02 NOTE — PROGRESS NOTES
RENAL DAILY PROGRESS NOTE      IMPRESSION:   ESRD, last HD on 8/31  Anemia, on epo  Encephalopathy, severe anoxic brain injury   Respiratory failure on ventilation. PLAN:   No change in mental status  Remain full code  Severe anoxic injury  Family wants continue support  Good thrill over right arm graft  NO urgent indication for HD today, will arrange HD tomorrow. Will continue HD support as needed until family makes any decision.                    Subjective:     64ty M with ESRD   Complaint:   Overnight events noted  Remain intubated,  Severe anoxic brain injury     Current Facility-Administered Medications   Medication Dose Route Frequency    insulin glargine (LANTUS) injection 10 Units  10 Units SubCUTAneous DAILY    doxercalciferol (HECTOROL) 4 mcg/2 mL injection 3 mcg  3 mcg IntraVENous Q TUE, THU & SAT    epoetin benjamin (EPOGEN;PROCRIT) injection 10,000 Units  10,000 Units IntraVENous DIALYSIS TUE, THU & SAT    midodrine (PROAMITINE) tablet 15 mg  15 mg Oral TID WITH MEALS    potassium chloride (KLOR-CON) packet 40 mEq  40 mEq Per NG tube DAILY    Zinc Ox-Aloe Vera-Vitamin E (BALMEX) topical cream   Topical CONTINUOUS    banana flakes trans-galactooligosaccharide (BANATROL PLUS) powder 1 Packet  1 Packet Per NG tube TID    levETIRAcetam (KEPPRA) 500 mg in saline (iso-osm) 100 ml IVPB  500 mg IntraVENous Q12H    LORazepam (ATIVAN) injection 1 mg  1 mg IntraVENous Q4H PRN    insulin lispro (HUMALOG) injection   SubCUTAneous Q6H    white petrolatum-mineral oil 85-15 % oint 1 Dose  1 Dose Ophthalmic QID    chlorhexidine (PERIDEX) 0.12 % mouthwash 10 mL  10 mL Oral Q12H    NOREPINephrine (LEVOPHED) 16,000 mcg in dextrose 5% 250 mL infusion  2-16 mcg/min IntraVENous TITRATE    albuterol (PROVENTIL VENTOLIN) nebulizer solution 2.5 mg  2.5 mg Nebulization Q6H PRN    famotidine (PF) (PEPCID) 20 mg in sodium chloride 0.9 % 10 mL injection  20 mg IntraVENous DAILY    vits A and D-white pet-lanolin (A&D) ointment   Topical QID AFTER MEALS    collagenase (SANTYL) 250 unit/gram ointment   Topical DAILY    acetaminophen (TYLENOL) tablet 650 mg  650 mg Oral Q4H PRN    lactobacillus sp. 50 billion cpu (BIO-K PLUS) capsule 1 Cap  1 Cap Oral DAILY    loperamide (IMODIUM) capsule 2 mg  2 mg Oral Q6H PRN    heparin (porcine) injection 5,000 Units  5,000 Units SubCUTAneous Q8H    heparin (porcine) 1,000 unit/mL injection 2,000 Units  2,000 Units IntraVENous DIALYSIS ONCE    simvastatin (ZOCOR) tablet 20 mg  20 mg Oral QHS    aspirin chewable tablet 81 mg  81 mg Oral DAILY    glucose chewable tablet 16 g  4 Tab Oral PRN    glucagon (GLUCAGEN) injection 1 mg  1 mg IntraMUSCular PRN    dextrose (D50W) injection syrg 12.5-25 g  25-50 mL IntraVENous PRN    0.9% sodium chloride infusion  100 mL/hr IntraVENous DIALYSIS PRN    naloxone (NARCAN) injection 0.4 mg  0.4 mg IntraVENous PRN       Review of Symptoms: intubated   Objective:     Patient Vitals for the past 24 hrs:   Temp Pulse Resp BP SpO2   09/02/17 1030 - 79 17 132/67 100 %   09/02/17 1000 - 78 13 129/59 100 %   09/02/17 0930 - 72 17 100/47 100 %   09/02/17 0915 - 71 16 108/48 100 %   09/02/17 0900 - 76 21 141/60 100 %   09/02/17 0845 - 72 16 - 100 %   09/02/17 0831 98.2 °F (36.8 °C) - - - -   09/02/17 0830 - 68 16 106/47 100 %   09/02/17 0800 98.2 °F (36.8 °C) 68 17 104/47 100 %   09/02/17 0730 - 70 18 117/54 100 %   09/02/17 0700 - 73 20 (!) 87/42 100 %   09/02/17 0600 - 75 14 111/46 100 %   09/02/17 0500 - 78 18 135/55 100 %   09/02/17 0400 98.3 °F (36.8 °C) 78 16 149/73 100 %   09/02/17 0331 - 79 15 - 100 %   09/02/17 0300 - 75 16 114/60 100 %   09/02/17 0200 - 71 15 109/57 100 %   09/02/17 0100 - 74 14 96/51 100 %   09/02/17 0000 98.3 °F (36.8 °C) 74 16 106/58 100 %   09/01/17 2303 - 74 15 - 100 %   09/01/17 2300 - 74 16 114/60 100 %   09/01/17 2200 - 75 15 157/90 100 %   09/01/17 2100 - 74 15 123/59 100 %   09/01/17 2000 98.5 °F (36.9 °C) 70 20 104/54 100 %   09/01/17 1930 - 72 15 134/66 100 %   09/01/17 1919 - 71 15 - 100 %   09/01/17 1900 - 70 14 101/47 100 %   09/01/17 1830 - 76 15 123/62 100 %   09/01/17 1800 - 78 15 148/61 100 %   09/01/17 1730 - 81 16 151/80 100 %   09/01/17 1700 - 75 18 130/64 100 %   09/01/17 1630 - 76 17 106/57 100 %   09/01/17 1600 99.9 °F (37.7 °C) 76 16 107/56 100 %   09/01/17 1543 99.9 °F (37.7 °C) - - - -   09/01/17 1533 - 79 16 - 100 %   09/01/17 1530 - 79 16 105/59 100 %   09/01/17 1500 - 79 17 98/52 100 %   09/01/17 1430 - 79 17 96/53 100 %   09/01/17 1400 - 82 16 95/53 100 %   09/01/17 1330 - 81 16 94/55 100 %   09/01/17 1309 - 78 18 - 100 %   09/01/17 1300 - 79 16 102/51 100 %   09/01/17 1230 - 79 17 101/51 100 %   09/01/17 1221 100.4 °F (38 °C) - - - -   09/01/17 1200 100.4 °F (38 °C) 81 18 111/59 100 %   09/01/17 1130 - 81 16 102/53 100 %        Weight change: -1.724 kg (-3 lb 12.8 oz)     08/31 1901 - 09/02 0700  In: 3733.4 [I.V.:333.4]  Out: 1801 [Drains:1800]    Intake/Output Summary (Last 24 hours) at 09/02/17 1110  Last data filed at 09/02/17 0700   Gross per 24 hour   Intake          2023.41 ml   Output             1700 ml   Net           323.41 ml     Physical Exam:   General: intubated   HEENT  no thyromegaly  CVS: S1S2 heard,  no rub  RS: + air entry b/l,   Abd: Soft, Non tender,   Neuro: intubated,   Extrm: +edema,   Access; left AVG + thrill            Data Review:     LABS:   Hematology:   Recent Labs      09/02/17   0651  08/31/17   0610   WBC  9.8  11.2   HGB  9.0*  9.6*   HCT  28.0*  29.6*     Chemistry:   Recent Labs      09/02/17 0651  08/31/17   0610   BUN  38*  40*   CREA  6.64*  6.74*   CA  8.8  8.6   K  4.0  4.1   NA  138  141   CL  108  109*   CO2  22  22   PHOS   --   3.3   GLU  50*  128*              Procedures/imaging: see electronic medical records for all procedures, Xrays and details which were not copied into this note but were reviewed prior to creation of Plan          Assessment & Plan:     As above         Duane Garrison MD  9/2/2017  3:46 PM

## 2017-09-02 NOTE — PROGRESS NOTES
[facility]  ICU Progress Note      Name: Agustin Mcleod   : 1960   MRN: [id number]   Date: 2017 6:47 AM     [x]I have reviewed the flowsheet and previous days notes. Events overnight reviewed and discussed with nursing staff. Vital signs and records reviewed. Olaf De La Cruz a 55 y. o.  male with a history of DM, ESRD admitted PEA. Patient's hospitalization was also found to have a CVA and was treated for E.coli and C.perferinges bacteremia. Subsequent PEA arrest 17. Remains intubated and off sedation. Patient remains unresponsive  Restarted on levophed gtt for hemodynamic support  Afebrile overnight, but was febrile yesterday   OGT accidentally removed overnight   Anuric                ROS:Review of systems not obtained due to patient factors.     Medication Review:  · Pressors - Levophed gtt  · Sedation -  · Antibiotics -   · Pain -  · GI: pepcid, DVT: Sq heparin  · Others (other gtts)    Safety Bundles: VAP Bundlel    Vital Signs:    Visit Vitals    /46    Pulse 75    Temp 98.3 °F (36.8 °C)    Resp 14    Ht 6' 2\" (1.88 m)    Wt 62 kg (136 lb 9.6 oz)    SpO2 100%    BMI 17.54 kg/m2       O2 Device: Endotracheal tube, Ventilator   O2 Flow Rate (L/min): 6 l/min   Temp (24hrs), Av.4 °F (37.4 °C), Min:98.3 °F (36.8 °C), Max:100.4 °F (38 °C)       Intake/Output:   Last shift:      1901 -  0700  In: 640   Out: 800 [Drains:800]  Last 3 shifts:  07 -  190  In: 2950 [I.V.:600]  Out: 901 [Drains:900]    Intake/Output Summary (Last 24 hours) at 17 0647  Last data filed at 17 0100   Gross per 24 hour   Intake             2150 ml   Output             1701 ml   Net              449 ml       Ventilator Settings:  Ventilator  Mode: Assist control, VC+  Respiratory Rate  Resp Rate Observed: 17  Back-Up Rate: 12  Insp Time (sec): 1 sec  Insp Flow (l/min): 44 l/min  I:E Ratio: 1:4  Ventilator Volumes  Vt Set (ml): 400 ml  Vt Exhaled (Machine Breath) (ml): 439 ml  Vt Spont (ml): 324 ml  Ve Observed (l/min): 7.23 l/min  Ventilator Pressures  PC Set: 400  Pressure Support (cm H2O): 7 cm H2O  PIP Observed (cm H2O): 22 cm H2O  Plateau Pressure (cm H2O): 12 cm H2O  MAP (cm H2O): 8.6  PEEP/VENT (cm H2O): 5 cm H20  Auto PEEP Observed (cm H2O): 0 cm H2O    Physical Exam:    General: Intubated, cachectic, unresponsive  HEENT:  Anicteric sclerae; pink palpebral conjunctivae; mucosa moist, ETT   Resp:  Symmetrical chest expansion, no accessory muscle use; good airway entry; BBS coarse   CV:  S1, S2 present; regular rate and rhythm  GI:  Abdomen soft, non-tender; (+) active bowel sounds  Extremities:  +2 pulses on all extremities; significant muscle atrophy, AV fistula LUE: + bruit + thrill   Skin:  Warm; no rashes/ lesions noted  Neurologic:  Negative dolls eye, pinpoint pupils nonreactive to light, + cough, - gag, decorticate posturing with stimulation, FOUR COMA SCORE 8  Devices:  OGT, ETT (8/18/17), LIJ TLC (8/18/17)      DATA:     Current Facility-Administered Medications   Medication Dose Route Frequency    doxercalciferol (HECTOROL) 4 mcg/2 mL injection 3 mcg  3 mcg IntraVENous Q TUE, THU & SAT    epoetin benjamin (EPOGEN;PROCRIT) injection 10,000 Units  10,000 Units IntraVENous DIALYSIS TUE, THU & SAT    midodrine (PROAMITINE) tablet 15 mg  15 mg Oral TID WITH MEALS    potassium chloride (KLOR-CON) packet 40 mEq  40 mEq Per NG tube DAILY    Zinc Ox-Aloe Vera-Vitamin E (BALMEX) topical cream   Topical CONTINUOUS    insulin glargine (LANTUS) injection 20 Units  20 Units SubCUTAneous DAILY    banana flakes trans-galactooligosaccharide (BANATROL PLUS) powder 1 Packet  1 Packet Per NG tube TID    levETIRAcetam (KEPPRA) 500 mg in saline (iso-osm) 100 ml IVPB  500 mg IntraVENous Q12H    LORazepam (ATIVAN) injection 1 mg  1 mg IntraVENous Q4H PRN    insulin lispro (HUMALOG) injection   SubCUTAneous Q6H    white petrolatum-mineral oil 85-15 % oint 1 Dose  1 Dose Ophthalmic QID    chlorhexidine (PERIDEX) 0.12 % mouthwash 10 mL  10 mL Oral Q12H    NOREPINephrine (LEVOPHED) 16,000 mcg in dextrose 5% 250 mL infusion  2-16 mcg/min IntraVENous TITRATE    albuterol (PROVENTIL VENTOLIN) nebulizer solution 2.5 mg  2.5 mg Nebulization Q6H PRN    famotidine (PF) (PEPCID) 20 mg in sodium chloride 0.9 % 10 mL injection  20 mg IntraVENous DAILY    vits A and D-white pet-lanolin (A&D) ointment   Topical QID AFTER MEALS    collagenase (SANTYL) 250 unit/gram ointment   Topical DAILY    acetaminophen (TYLENOL) tablet 650 mg  650 mg Oral Q4H PRN    lactobacillus sp. 50 billion cpu (BIO-K PLUS) capsule 1 Cap  1 Cap Oral DAILY    loperamide (IMODIUM) capsule 2 mg  2 mg Oral Q6H PRN    heparin (porcine) injection 5,000 Units  5,000 Units SubCUTAneous Q8H    heparin (porcine) 1,000 unit/mL injection 2,000 Units  2,000 Units IntraVENous DIALYSIS ONCE    simvastatin (ZOCOR) tablet 20 mg  20 mg Oral QHS    aspirin chewable tablet 81 mg  81 mg Oral DAILY    glucose chewable tablet 16 g  4 Tab Oral PRN    glucagon (GLUCAGEN) injection 1 mg  1 mg IntraMUSCular PRN    dextrose (D50W) injection syrg 12.5-25 g  25-50 mL IntraVENous PRN    0.9% sodium chloride infusion  100 mL/hr IntraVENous DIALYSIS PRN    naloxone (NARCAN) injection 0.4 mg  0.4 mg IntraVENous PRN         Labs: Results:       Chemistry Recent Labs      08/31/17   0610   GLU  128*   NA  141   K  4.1   CL  109*   CO2  22   BUN  40*   CREA  6.74*   CA  8.6   AGAP  10   BUCR  6*      CBC w/Diff Recent Labs      08/31/17   0610   WBC  11.2   RBC  3.05*   HGB  9.6*   HCT  29.6*   PLT  453*   GRANS  73   LYMPH  19*   EOS  2      Coagulation No results for input(s): PTP, INR, APTT in the last 72 hours. No lab exists for component: INREXT    Liver Enzymes No results for input(s): TP, ALB, TBIL, AP, SGOT, GPT in the last 72 hours.     No lab exists for component: DBIL   ABG Lab Results   Component Value Date/Time    PHI 7.379 09/02/2017 05:10 AM    PCO2I 36.1 09/02/2017 05:10 AM    PO2I 142 (H) 09/02/2017 05:10 AM    HCO3I 21.3 (L) 09/02/2017 05:10 AM    FIO2I 30 09/02/2017 05:10 AM      Microbiology No results for input(s): CULT in the last 72 hours. Telemetry: [x]Sinus []A-flutter []Paced    []A-fib []Multiple PVCs                    Imaging:  CXR Results  (Last 48 hours)               09/02/17 0704  XR CHEST PORT Final result    Impression:  IMPRESSION:       The tip of the OG tube is just past the gastroesophageal junction with the side   hole just above the gastroesophageal junction. .. Narrative:  Chest Single View        CPT CODE: 82771       HISTORY: OG tube placement. COMPARISON: September 2, 2017. FINDINGS:       The examination includes the lower chest and upper abdomen and is adequate for   evaluation of OG tube but incompletely evaluates the chest..   The tip of the OG tube is just past the gastroesophageal junction with the side   hole just above the gastroesophageal junction. .   .           09/01/17 9605  XR CHEST PORT Final result    Impression:  IMPRESSION:       The esophagogastric tube sidehole is within the lower esophagus. Advancement by   8 to 9 cm is recommended to place the sidehole within the lumen of the stomach. Narrative:  Portable Chest 0502 hours       CPT CODE:26474       HISTORY:    End-stage renal disease on hemodialysis, encephalopathy, severe anoxic brain   injury status post PEA arrest with recurrent cardiac arrest intubated,   eval   ETT. COMPARISON: Chest x-ray August 31 through August 29, 2017. FINDINGS:        Endotracheal tube terminates 5.8 cm proximal to the bree. The esophagogastric   tube terminates at the proximal stomach or distal esophagus. The sidehole is   within the distal esophagus. Left IJ approach central catheter terminates at the   proximal superior vena cava without change. There is no pneumothorax.  Pulmonary vascularity is normal. The lungs are clear. The costophrenic angles are sharp. The heart is normal in size. Skin folds project over the left hemithorax. .                       IMPRESSION:   · Acute encephalopathy, anoxic encephalopathy   · S/p PEA Cardiac arrest in dialysis 7/27/17 and 8/18/17  · Acute Respirtatory Failure requiring Mechanical Ventilation. Remains intubated 2' neurologic status   · Hypotension- workup negative for PE. Cannot r/o sepsis with recurrent fevers, Dysautonomia? · ESRD on HD  · DM2  · Acute pontine lacunar CVA  · Oropharyngeal dysphagia  · Cachexia  · Unstageable pressure ulcer to sacrum/coccyx  · S/p septic shock  treated for E.coli and C.perferinges bacteremia s/p abx        PLAN:   · Resp -  VAP bundle, Adhere to low tidal volume ventilation strategy as per the [employer] guidelines. Aim for plateau pressures < [id number] current vent settings SIMV 30%, RR 12, PS 7, PEEP 5. Patient does not meet criteria for extubation 2' inability to protect airway. · ID - patient with low grade fevers yesterday. Checking BMP today to assess for any leukocytosis or bands. Bcx 8/18/17 NGTD. Respiratory cx 8/19/17 with candida. Low threshold to pan culture and restart abx. Cxr without any acute infiltrates   · CVS - Patient hypotensive requiring pressors. Currently on High dose midodrine 15 mg TID. Titrate Levophed gtt for MAP > 65 mm/hg. Last echo with EF 55-60% and noted to have G1DD. PE workup negative. Hypotension possible r/t dysautonomia? · Heme/onc -   Check CBC now. · Metabolic - Check BMP and Mg now. · Renal - Last HD 8/21/17 without any UF removed. · Endocrine - Check cortisol with persistent hypotension and hypoglycemia. Will also hold lantus today with plans to decrease to 10 units daily starting tomorrow. Continue SSI. Goal -180 while in ICU. Previous thyroid study: TSH was 7.52 and Free t4 0.8. Most likely sick euthyroid state   · Neuro/ Pain/ Sedation - Four Coma Score 8. Neurology has signed off. On prophylactic kepppra  · GI - OGT around GE junction. Will advance and restart TF and goal: Nepro 55 ml/hr w/ 150 h20 q4h.  Continue banatrol for diarrhea   · Prophylaxis - DVT- SQH, GI- Pepcid   · Surgery on board for Trach and PEG  · Discussed in interdisciplinary rounds          The patient is: [x] acutely ill Risk of deterioration: [] moderate    [] critically ill  [] high     [x]See my orders for details    My assessment/plan was discussed with:  [x]nursing []PT/OT    [x]respiratory therapy [x]    []family []     Wicho Del Castillo NP

## 2017-09-02 NOTE — ROUTINE PROCESS
Bedside shift change report given to Krista MUELLER (oncoming nurse) by Bobby Singh (offgoing nurse). Report included the following information SBAR, Kardex, Intake/Output and MAR.

## 2017-09-03 ENCOUNTER — ANESTHESIA EVENT (OUTPATIENT)
Dept: SURGERY | Age: 57
DRG: 004 | End: 2017-09-03
Payer: MEDICARE

## 2017-09-03 ENCOUNTER — APPOINTMENT (OUTPATIENT)
Dept: GENERAL RADIOLOGY | Age: 57
DRG: 004 | End: 2017-09-03
Attending: PHYSICIAN ASSISTANT
Payer: MEDICARE

## 2017-09-03 LAB
ARTERIAL PATENCY WRIST A: YES
BASE DEFICIT BLD-SCNC: 5 MMOL/L
BDY SITE: ABNORMAL
BODY TEMPERATURE: 98.6
GAS FLOW.O2 O2 DELIVERY SYS: ABNORMAL L/MIN
GAS FLOW.O2 SETTING OXYMISER: 12 BPM
GLUCOSE BLD STRIP.AUTO-MCNC: 126 MG/DL (ref 70–110)
GLUCOSE BLD STRIP.AUTO-MCNC: 168 MG/DL (ref 70–110)
GLUCOSE BLD STRIP.AUTO-MCNC: 219 MG/DL (ref 70–110)
HCO3 BLD-SCNC: 19.2 MMOL/L (ref 22–26)
INSPIRATION.DURATION SETTING TIME VENT: 1 SEC
O2/TOTAL GAS SETTING VFR VENT: 30 %
PCO2 BLD: 30.3 MMHG (ref 35–45)
PEEP RESPIRATORY: 5 CMH2O
PH BLD: 7.41 [PH] (ref 7.35–7.45)
PO2 BLD: 118 MMHG (ref 80–100)
PRESSURE SUPPORT SETTING VENT: 7 CMH2O
SAO2 % BLD: 99 % (ref 92–97)
SERVICE CMNT-IMP: ABNORMAL
SPECIMEN TYPE: ABNORMAL
TOTAL RESP. RATE, ITRR: 18
VENTILATION MODE VENT: ABNORMAL
VOLUME CONTROL PLUS IVLCP: YES
VT SETTING VENT: 400 ML

## 2017-09-03 PROCEDURE — 74011636637 HC RX REV CODE- 636/637: Performed by: INTERNAL MEDICINE

## 2017-09-03 PROCEDURE — 74011000250 HC RX REV CODE- 250: Performed by: PHYSICIAN ASSISTANT

## 2017-09-03 PROCEDURE — 36600 WITHDRAWAL OF ARTERIAL BLOOD: CPT

## 2017-09-03 PROCEDURE — 74011250637 HC RX REV CODE- 250/637: Performed by: INTERNAL MEDICINE

## 2017-09-03 PROCEDURE — 82962 GLUCOSE BLOOD TEST: CPT

## 2017-09-03 PROCEDURE — 74011250637 HC RX REV CODE- 250/637: Performed by: NURSE PRACTITIONER

## 2017-09-03 PROCEDURE — 82803 BLOOD GASES ANY COMBINATION: CPT

## 2017-09-03 PROCEDURE — 74011250637 HC RX REV CODE- 250/637: Performed by: HOSPITALIST

## 2017-09-03 PROCEDURE — 74011250637 HC RX REV CODE- 250/637: Performed by: FAMILY MEDICINE

## 2017-09-03 PROCEDURE — 74011250636 HC RX REV CODE- 250/636: Performed by: HOSPITALIST

## 2017-09-03 PROCEDURE — 65610000006 HC RM INTENSIVE CARE

## 2017-09-03 PROCEDURE — 71010 XR CHEST PORT: CPT

## 2017-09-03 PROCEDURE — 77030011256 HC DRSG MEPILEX <16IN NO BORD MOLN -A

## 2017-09-03 PROCEDURE — 74011636637 HC RX REV CODE- 636/637: Performed by: NURSE PRACTITIONER

## 2017-09-03 PROCEDURE — 94003 VENT MGMT INPAT SUBQ DAY: CPT

## 2017-09-03 RX ADMIN — MINERAL OIL AND WHITE PETROLATUM 1 DOSE: 150; 850 OINTMENT OPHTHALMIC at 18:04

## 2017-09-03 RX ADMIN — VITAMIN A AND D: 30.8 OINTMENT TOPICAL at 18:04

## 2017-09-03 RX ADMIN — MINERAL OIL AND WHITE PETROLATUM 1 DOSE: 150; 850 OINTMENT OPHTHALMIC at 21:22

## 2017-09-03 RX ADMIN — Medication 1 PACKET: at 17:55

## 2017-09-03 RX ADMIN — MINERAL OIL AND WHITE PETROLATUM 1 DOSE: 150; 850 OINTMENT OPHTHALMIC at 08:19

## 2017-09-03 RX ADMIN — MIDODRINE HYDROCHLORIDE 15 MG: 2.5 TABLET ORAL at 08:18

## 2017-09-03 RX ADMIN — HEPARIN SODIUM 5000 UNITS: 5000 INJECTION, SOLUTION INTRAVENOUS; SUBCUTANEOUS at 17:51

## 2017-09-03 RX ADMIN — Medication 1 PACKET: at 21:43

## 2017-09-03 RX ADMIN — FAMOTIDINE 20 MG: 10 INJECTION INTRAVENOUS at 08:18

## 2017-09-03 RX ADMIN — MIDODRINE HYDROCHLORIDE 15 MG: 2.5 TABLET ORAL at 16:42

## 2017-09-03 RX ADMIN — SIMVASTATIN 20 MG: 10 TABLET, FILM COATED ORAL at 21:24

## 2017-09-03 RX ADMIN — LEVETIRACETAM 500 MG: 5 INJECTION INTRAVENOUS at 21:19

## 2017-09-03 RX ADMIN — VITAMIN A AND D: 30.8 OINTMENT TOPICAL at 21:25

## 2017-09-03 RX ADMIN — MINERAL OIL AND WHITE PETROLATUM 1 DOSE: 150; 850 OINTMENT OPHTHALMIC at 12:43

## 2017-09-03 RX ADMIN — Medication 1 CAPSULE: at 08:18

## 2017-09-03 RX ADMIN — VITAMIN A AND D: 30.8 OINTMENT TOPICAL at 09:00

## 2017-09-03 RX ADMIN — HEPARIN SODIUM 5000 UNITS: 5000 INJECTION, SOLUTION INTRAVENOUS; SUBCUTANEOUS at 01:36

## 2017-09-03 RX ADMIN — MIDODRINE HYDROCHLORIDE 15 MG: 2.5 TABLET ORAL at 12:19

## 2017-09-03 RX ADMIN — INSULIN LISPRO 6 UNITS: 100 INJECTION, SOLUTION INTRAVENOUS; SUBCUTANEOUS at 06:56

## 2017-09-03 RX ADMIN — POTASSIUM CHLORIDE 40 MEQ: 1.5 POWDER, FOR SOLUTION ORAL at 08:18

## 2017-09-03 RX ADMIN — CHLORHEXIDINE GLUCONATE 10 ML: 1.2 RINSE ORAL at 08:18

## 2017-09-03 RX ADMIN — HEPARIN SODIUM 5000 UNITS: 5000 INJECTION, SOLUTION INTRAVENOUS; SUBCUTANEOUS at 10:00

## 2017-09-03 RX ADMIN — CHLORHEXIDINE GLUCONATE 10 ML: 1.2 RINSE ORAL at 21:24

## 2017-09-03 RX ADMIN — LEVETIRACETAM 500 MG: 5 INJECTION INTRAVENOUS at 08:18

## 2017-09-03 RX ADMIN — COLLAGENASE SANTYL: 250 OINTMENT TOPICAL at 12:42

## 2017-09-03 RX ADMIN — INSULIN GLARGINE 10 UNITS: 100 INJECTION, SOLUTION SUBCUTANEOUS at 08:19

## 2017-09-03 RX ADMIN — ASPIRIN 81 MG 81 MG: 81 TABLET ORAL at 08:18

## 2017-09-03 RX ADMIN — VITAMIN A AND D: 30.8 OINTMENT TOPICAL at 12:20

## 2017-09-03 RX ADMIN — INSULIN LISPRO 3 UNITS: 100 INJECTION, SOLUTION INTRAVENOUS; SUBCUTANEOUS at 17:50

## 2017-09-03 RX ADMIN — Medication 1 PACKET: at 08:24

## 2017-09-03 NOTE — ROUTINE PROCESS
Bedside and Verbal shift change report given to Nurse Cleotilde Kawasaki RN (oncoming nurse) by Kevin West RN (offgoing nurse). Report included the following information SBAR, Kardex, ED Summary, Procedure Summary, Intake/Output, MAR and Recent Results.

## 2017-09-03 NOTE — PROGRESS NOTES
[facility]  ICU Progress Note      Name: Lucas Zaman   : 1960   MRN: [id number]   Date: 9/3/2017 6:47 AM     [x]I have reviewed the flowsheet and previous days notes. Events overnight reviewed and discussed with nursing staff. Vital signs and records reviewed. Lisa Quezada a 55 y. o.  male with a history of DM, ESRD admitted PEA. Patient's hospitalization was also found to have a CVA and was treated for E.coli and C.perferinges bacteremia. Subsequent PEA arrest 17. Remains intubated and off sedation. Patient remains unresponsive  Levophed weaned off - maintaining BP  Afebrile overnight  Anuric                ROS:Review of systems not obtained due to patient factors.     Medication Review:  · Pressors -  · Sedation -  · Antibiotics -   · Pain -  · GI: pepcid, DVT: Sq heparin  · Others (other gtts)    Safety Bundles: VAP Bundlel    Vital Signs:    Visit Vitals    /69    Pulse 74    Temp 97.8 °F (36.6 °C)    Resp 19    Ht 6' 2\" (1.88 m)    Wt 49.6 kg (109 lb 6.4 oz)    SpO2 100%    BMI 14.05 kg/m2       O2 Device: Ventilator   O2 Flow Rate (L/min): 6 l/min   Temp (24hrs), Av.2 °F (36.8 °C), Min:97.8 °F (36.6 °C), Max:98.5 °F (36.9 °C)       Intake/Output:   Last shift:         Last 3 shifts:  1901 -  0700  In: 2571.7 [I.V.:151.7]  Out: 1200 [Drains:1200]    Intake/Output Summary (Last 24 hours) at 17 0816  Last data filed at 17 2356   Gross per 24 hour   Intake           1173.3 ml   Output              300 ml   Net            873.3 ml       Ventilator Settings:  Ventilator  Mode: SIMV, VC+  Respiratory Rate  Resp Rate Observed: 17  Back-Up Rate: 12  Insp Time (sec): 1 sec  Insp Flow (l/min): 44 l/min  I:E Ratio: 1;4  Ventilator Volumes  Vt Set (ml): 400 ml  Vt Exhaled (Machine Breath) (ml): 525 ml  Vt Spont (ml): 324 ml  Ve Observed (l/min): 8.94 l/min  Ventilator Pressures  PC Set: 400  Pressure Support (cm H2O): 7 cm H2O  PIP Observed (cm H2O): 13 cm H2O  Plateau Pressure (cm H2O): 11 cm H2O  MAP (cm H2O): 8.1  PEEP/VENT (cm H2O): 5 cm H20  Auto PEEP Observed (cm H2O): 0 cm H2O    Physical Exam:    General: Intubated, cachectic, unresponsive  HEENT:  Anicteric sclerae; pink palpebral conjunctivae; mucosa moist, ETT   Resp:  Symmetrical chest expansion, no accessory muscle use; good airway entry; BBS coarse   CV:  S1, S2 present; regular rate and rhythm  GI:  Abdomen soft, non-tender; (+) active bowel sounds  Extremities:  +2 pulses on all extremities; significant muscle atrophy, AV fistula LUE: + bruit + thrill   Skin:  Warm; no rashes/ lesions noted  Neurologic:  Negative dolls eye, pinpoint pupils nonreactive to light, + cough, - gag, decorticate posturing with stimulation, FOUR COMA SCORE 8  Devices:  OGT, ETT (8/18/17), LIJ TLC (8/18/17)      DATA:     Current Facility-Administered Medications   Medication Dose Route Frequency    insulin glargine (LANTUS) injection 10 Units  10 Units SubCUTAneous DAILY    doxercalciferol (HECTOROL) 4 mcg/2 mL injection 3 mcg  3 mcg IntraVENous Q TUE, THU & SAT    epoetin benjamin (EPOGEN;PROCRIT) injection 10,000 Units  10,000 Units IntraVENous DIALYSIS TUE, THU & SAT    midodrine (PROAMITINE) tablet 15 mg  15 mg Oral TID WITH MEALS    potassium chloride (KLOR-CON) packet 40 mEq  40 mEq Per NG tube DAILY    Zinc Ox-Aloe Vera-Vitamin E (BALMEX) topical cream   Topical CONTINUOUS    banana flakes trans-galactooligosaccharide (BANATROL PLUS) powder 1 Packet  1 Packet Per NG tube TID    levETIRAcetam (KEPPRA) 500 mg in saline (iso-osm) 100 ml IVPB  500 mg IntraVENous Q12H    LORazepam (ATIVAN) injection 1 mg  1 mg IntraVENous Q4H PRN    insulin lispro (HUMALOG) injection   SubCUTAneous Q6H    white petrolatum-mineral oil 85-15 % oint 1 Dose  1 Dose Ophthalmic QID    chlorhexidine (PERIDEX) 0.12 % mouthwash 10 mL  10 mL Oral Q12H    NOREPINephrine (LEVOPHED) 16,000 mcg in dextrose 5% 250 mL infusion  2-16 mcg/min IntraVENous TITRATE    albuterol (PROVENTIL VENTOLIN) nebulizer solution 2.5 mg  2.5 mg Nebulization Q6H PRN    famotidine (PF) (PEPCID) 20 mg in sodium chloride 0.9 % 10 mL injection  20 mg IntraVENous DAILY    vits A and D-white pet-lanolin (A&D) ointment   Topical QID AFTER MEALS    collagenase (SANTYL) 250 unit/gram ointment   Topical DAILY    acetaminophen (TYLENOL) tablet 650 mg  650 mg Oral Q4H PRN    lactobacillus sp. 50 billion cpu (BIO-K PLUS) capsule 1 Cap  1 Cap Oral DAILY    loperamide (IMODIUM) capsule 2 mg  2 mg Oral Q6H PRN    heparin (porcine) injection 5,000 Units  5,000 Units SubCUTAneous Q8H    heparin (porcine) 1,000 unit/mL injection 2,000 Units  2,000 Units IntraVENous DIALYSIS ONCE    simvastatin (ZOCOR) tablet 20 mg  20 mg Oral QHS    aspirin chewable tablet 81 mg  81 mg Oral DAILY    glucose chewable tablet 16 g  4 Tab Oral PRN    glucagon (GLUCAGEN) injection 1 mg  1 mg IntraMUSCular PRN    dextrose (D50W) injection syrg 12.5-25 g  25-50 mL IntraVENous PRN    0.9% sodium chloride infusion  100 mL/hr IntraVENous DIALYSIS PRN    naloxone (NARCAN) injection 0.4 mg  0.4 mg IntraVENous PRN         Labs: Results:       Chemistry Recent Labs      09/02/17   0651   GLU  50*   NA  138   K  4.0   CL  108   CO2  22   BUN  38*   CREA  6.64*   CA  8.8   AGAP  8   BUCR  6*      CBC w/Diff Recent Labs      09/02/17   0651   WBC  9.8   RBC  2.86*   HGB  9.0*   HCT  28.0*   PLT  402   GRANS  66   LYMPH  25   EOS  3      Coagulation No results for input(s): PTP, INR, APTT in the last 72 hours. No lab exists for component: INREXT, INREXT    Liver Enzymes No results for input(s): TP, ALB, TBIL, AP, SGOT, GPT in the last 72 hours.     No lab exists for component: DBIL   ABG Lab Results   Component Value Date/Time    PHI 7.409 09/03/2017 04:47 AM    PCO2I 30.3 (L) 09/03/2017 04:47 AM    PO2I 118 (H) 09/03/2017 04:47 AM    HCO3I 19.2 (L) 09/03/2017 04:47 AM    FIO2I 30 09/03/2017 04:47 AM      Microbiology No results for input(s): CULT in the last 72 hours. Telemetry: [x]Sinus []A-flutter []Paced    []A-fib []Multiple PVCs                    Imaging:  CXR Results  (Last 48 hours)               09/02/17 0704  XR CHEST PORT Final result    Impression:  IMPRESSION:       The tip of the OG tube is just past the gastroesophageal junction with the side   hole just above the gastroesophageal junction. .. Narrative:  Chest Single View        CPT CODE: 21058       HISTORY: OG tube placement. COMPARISON: September 2, 2017. FINDINGS:       The examination includes the lower chest and upper abdomen and is adequate for   evaluation of OG tube but incompletely evaluates the chest..   The tip of the OG tube is just past the gastroesophageal junction with the side   hole just above the gastroesophageal junction. .   .           09/02/17 0239  XR CHEST PORT Final result    Impression:  IMPRESSION:       Support devices as above. Clear lungs. Narrative:  Chest Single View        CPT CODE: 95764       HISTORY: Endotracheal tube. COMPARISON: September 1, 2017. FINDINGS:       The endotracheal tube tip is 6.5 cm above the bree. Left internal jugular   venous catheter tip is in the upper SVC. NG tube tip is below the left   hemidiaphragm. EKG leads and wires overlie the chest. The lungs are clear. Atherosclerotic vascular calcification. Heart is normal in size. There is no evidence of pleural effusion. IMPRESSION:   · Acute encephalopathy, anoxic encephalopathy   · S/p PEA Cardiac arrest in dialysis 7/27/17 and 8/18/17  · Acute Respirtatory Failure requiring Mechanical Ventilation. Remains intubated 2' neurologic status   · Hypotension- workup negative for PE. Cannot r/o sepsis with recurrent fevers, Dysautonomia?    · ESRD on HD  · DM2  · Acute pontine lacunar CVA  · Oropharyngeal dysphagia  · Cachexia  · Unstageable pressure ulcer to sacrum/coccyx  · S/p septic shock  treated for E.coli and C.perferinges bacteremia s/p abx        PLAN:   · Resp -  VAP bundle, Adhere to low tidal volume ventilation strategy as per the [employer] guidelines. Aim for plateau pressures < [id number] current vent settings SIMV 30%, RR 12, PS 7, PEEP 5. Patient does not meet criteria for extubation 2' inability to protect airway. · ID - . Bcx 8/18/17 NGTD. Respiratory cx 8/19/17 with candida. Low threshold to pan culture and restart abx. Cxr without any acute infiltrates   · CVS - . Currently on High dose midodrine 15 mg TID. MAP goal > 65 mm/hg. Last echo with EF 55-60% and noted to have G1DD. PE workup negative. · Heme/onc -   Check CBC every other day  · Metabolic - Check BMP and Mg now. · Renal - Last HD 8/21/17 without any UF removed. · Endocrine - Cortisol normal.D/c lantus today  Continue SSI. Goal -180 while in ICU. Previous thyroid study: TSH was 7.52 and Free t4 0.8. Most likely sick euthyroid state   · Neuro/ Pain/ Sedation - Four Coma Score 8. Neurology has signed off. On prophylactic kepppra  · GI - OGT around GE junction. Will advance and restart TF and goal: Nepro 55 ml/hr w/ 150 h20 q4h.  Continue banatrol for diarrhea   · Prophylaxis - DVT- SQH, GI- Pepcid   · Surgery on board for Trach and PEG          The patient is: [x] acutely ill Risk of deterioration: [] moderate    [] critically ill  [] high     [x]See my orders for details    My assessment/plan was discussed with:  [x]nursing []PT/OT    [x]respiratory therapy [x]    []family []     Ye Oleary MD

## 2017-09-03 NOTE — PROGRESS NOTES
[facility]  [facility]    Patient: Sharee Clark Age: 65 y.o. : 1960 MR#: [id number] SSN: [id number]  Date/Time: 9/3/2017     Subjective:     Remains intubated and off sedation. Remains unresponsive. Levophed weaned off - maintaining BP  Afebrile overnight. Anuric. Assessment/Plan:     1. S/p PEA arrest. wth acute resp fauilure on the Vent ? Cardiac cause with elevated trop PTA. Echo improving EF, s/p cath, no CAD. Now with second cardiac arrest - intubated, vent support  2. Acute met encephalopathy: likely anoxic encephalopathy  3. Acute pontine lacunar CVA: on asa. 4. ESRD- HD as pe nephrology  5. DM2- Lantus, ssi    Full code.  Poor prognosis    Barb Arthur (daughter) (decision maker): [phone]  Radha Leonard (niece): [facility] [address] [place] : [phone]  Tamea Blizzard (brother): [phone]    Case discussed with:  []Patient  []Family  [x]Nursing  []Case Management  DVT Prophylaxis:  []Lovenox  [x]Hep SQ  [x]SCDs  []Coumadin   []On Heparin gtt    Patient Active Problem List   Diagnosis Code    Anemia D64.9    Acidosis E87.2    Cardiac arrest ([address] I46.9    Respiratory failure ([address] J96.90    ESRD needing dialysis ([address] N18.6, Z99.2    Anoxic brain damage (HCC) G93.1    Colitis K52.9    Hypotension I95.9    Acute GI bleeding K92.2    ESRD (end stage renal disease) ([address] N18.6    Sepsis ([address] A41.9    Oropharyngeal dysphagia R13.12    Cachexia ([address] R64       Objective:   VS:   Visit Vitals    /67    Pulse 73    Temp 97.8 °F (36.6 °C)    Resp 15    Ht 6' 2\" (1.88 m)    Wt 49.6 kg (109 lb 6.4 oz)    SpO2 100%    BMI 14.05 kg/m2      Tmax/24hrs: Temp (24hrs), Av.8 °F (36.6 °C), Min:97.8 °F (36.6 °C), Max:97.8 °F (36.6 °C)  IOBRIEF    Intake/Output Summary (Last 24 hours) at 17 1327  Last data filed at 17 2356   Gross per 24 hour   Intake              630 ml   Output              300 ml   Net 330 ml     General:  Intubated, unresponsive  Cardiovascular:  S1S2+, RRR  Pulmonary:  Coarse BS b/l  GI:  Soft, BS+, NT, ND  Extremities:  No edema.  Good thrill over right arm graft    Medications:   Current Facility-Administered Medications   Medication Dose Route Frequency    doxercalciferol (HECTOROL) 4 mcg/2 mL injection 3 mcg  3 mcg IntraVENous Q TUE, THU & SAT    epoetin benjamin (EPOGEN;PROCRIT) injection 10,000 Units  10,000 Units IntraVENous DIALYSIS TUE, THU & SAT    midodrine (PROAMITINE) tablet 15 mg  15 mg Oral TID WITH MEALS    potassium chloride (KLOR-CON) packet 40 mEq  40 mEq Per NG tube DAILY    Zinc Ox-Aloe Vera-Vitamin E (BALMEX) topical cream   Topical CONTINUOUS    banana flakes trans-galactooligosaccharide (BANATROL PLUS) powder 1 Packet  1 Packet Per NG tube TID    levETIRAcetam (KEPPRA) 500 mg in saline (iso-osm) 100 ml IVPB  500 mg IntraVENous Q12H    LORazepam (ATIVAN) injection 1 mg  1 mg IntraVENous Q4H PRN    insulin lispro (HUMALOG) injection   SubCUTAneous Q6H    white petrolatum-mineral oil 85-15 % oint 1 Dose  1 Dose Ophthalmic QID    chlorhexidine (PERIDEX) 0.12 % mouthwash 10 mL  10 mL Oral Q12H    NOREPINephrine (LEVOPHED) 16,000 mcg in dextrose 5% 250 mL infusion  2-16 mcg/min IntraVENous TITRATE    albuterol (PROVENTIL VENTOLIN) nebulizer solution 2.5 mg  2.5 mg Nebulization Q6H PRN    famotidine (PF) (PEPCID) 20 mg in sodium chloride 0.9 % 10 mL injection  20 mg IntraVENous DAILY    vits A and D-white pet-lanolin (A&D) ointment   Topical QID AFTER MEALS    collagenase (SANTYL) 250 unit/gram ointment   Topical DAILY    acetaminophen (TYLENOL) tablet 650 mg  650 mg Oral Q4H PRN    lactobacillus sp. 50 billion cpu (BIO-K PLUS) capsule 1 Cap  1 Cap Oral DAILY    loperamide (IMODIUM) capsule 2 mg  2 mg Oral Q6H PRN    heparin (porcine) injection 5,000 Units  5,000 Units SubCUTAneous Q8H    heparin (porcine) 1,000 unit/mL injection 2,000 Units  2,000 Units IntraVENous DIALYSIS ONCE    simvastatin (ZOCOR) tablet 20 mg  20 mg Oral QHS    aspirin chewable tablet 81 mg  81 mg Oral DAILY    glucose chewable tablet 16 g  4 Tab Oral PRN    glucagon (GLUCAGEN) injection 1 mg  1 mg IntraMUSCular PRN    dextrose (D50W) injection syrg 12.5-25 g  25-50 mL IntraVENous PRN    0.9% sodium chloride infusion  100 mL/hr IntraVENous DIALYSIS PRN    naloxone (NARCAN) injection 0.4 mg  0.4 mg IntraVENous PRN       Labs:      Recent Results (from the past 24 hour(s))   GLUCOSE, POC    Collection Time: 09/02/17  3:57 PM   Result Value Ref Range    Glucose (POC) 150 (H) 70 - 110 mg/dL   GLUCOSE, POC    Collection Time: 09/02/17 11:50 PM   Result Value Ref Range    Glucose (POC) 195 (H) 70 - 110 mg/dL   POC G3    Collection Time: 09/03/17  4:47 AM   Result Value Ref Range    Device: VENT      FIO2 (POC) 30 %    pH (POC) 7.409 7.35 - 7.45      pCO2 (POC) 30.3 (L) 35.0 - 45.0 MMHG    pO2 (POC) 118 (H) 80 - 100 MMHG    HCO3 (POC) 19.2 (L) 22 - 26 MMOL/L    sO2 (POC) 99 (H) 92 - 97 %    Base deficit (POC) 5 mmol/L    Mode SIMV      Tidal volume 400 ml    Set Rate 12 bpm    PEEP/CPAP (POC) 5 cmH2O    Pressure support 7 cmH2O    Allens test (POC) YES      Inspiratory Time 1 sec    Total resp. rate 18      Site RIGHT RADIAL      Patient temp.  98.6      Specimen type (POC) ARTERIAL      Performed by Linh Valle     Volume control plus YES     GLUCOSE, POC    Collection Time: 09/03/17  6:42 AM   Result Value Ref Range    Glucose (POC) 219 (H) 70 - 110 mg/dL   GLUCOSE, POC    Collection Time: 09/03/17 11:56 AM   Result Value Ref Range    Glucose (POC) 126 (H) 70 - 110 mg/dL       Signed By: Merry Morris MD     September 3, 2017

## 2017-09-03 NOTE — ROUTINE PROCESS
Bedside and Verbal shift change report given to Loy Reece RN (oncoming nurse) by Lawrence Najera RN (offgoing nurse). Report included the following information SBAR, Kardex, MAR and Recent Results. SITUATION:    Code Status: Full Code   Reason for Admission: Cardiac arrest ([facility]   Acidosis   Respiratory failure (HCC)   Anemia   ESRD needing dialysis ([facility]   [facility]   Acute GI bleeding   Sepsis [facility]   Hypotension   Anoxic brain damage (HCC)   ESRD (end stage renal disease) ([facility]   Colitis   dx   dx   [address] day: 40   Problem List:       Hospital Problems  Date Reviewed: 7/27/2017          Codes Class Noted POA    Oropharyngeal dysphagia ICD-10-CM: R13.12  ICD-9-CM: 787.22  8/17/2017 Unknown        Cachexia ([facility] ICD-10-CM: R64  ICD-9-CM: 799.4  8/17/2017 Unknown        Acidosis ICD-10-CM: E87.2  ICD-9-CM: 276.2  7/27/2017 Unknown        Cardiac arrest ([facility] ICD-10-CM: I46.9  ICD-9-CM: 427.5  7/27/2017 Unknown        Respiratory failure ([facility] ICD-10-CM: J96.90  ICD-9-CM: 518.81  7/27/2017 Unknown        ESRD needing dialysis ([facility] ICD-10-CM: N18.6, Z99.2  ICD-9-CM: 585.6  7/27/2017 Unknown        Anoxic brain damage ([facility] ICD-10-CM: G93.1  ICD-9-CM: 348.1  7/27/2017 Unknown        Colitis ICD-10-CM: K52.9  ICD-9-CM: 558.9  7/27/2017 Unknown        Hypotension ICD-10-CM: I95.9  ICD-9-CM: 458.9  7/27/2017 Unknown        Acute GI bleeding ICD-10-CM: K92.2  ICD-9-CM: 578.9  7/27/2017 Unknown        ESRD (end stage renal disease) ([facility] ICD-10-CM: N18.6  ICD-9-CM: 585.6  7/27/2017 Unknown        * (Principal)Sepsis ([facility] ICD-10-CM: A41.9  ICD-9-CM: 038.9, 995.91  7/27/2017 Unknown        Anemia ICD-10-CM: D64.9  ICD-9-CM: 798. 9  Unknown Unknown              BACKGROUND:    Past Medical History:   Past Medical History:   Diagnosis Date    Anemia     Arthritis     Chronic pain     Back     Diabetes mellitus type II     Hypertension     IBS (irritable bowel syndrome)     Lactose intolerance     TB (tuberculosis)     TB test positive         Patient taking anticoagulants: Yes     ASSESSMENT:    Changes in Assessment Throughout Shift: Levophed drip weaned off. Hypoglycemic at start of shift with blood glucose readin / repeat: 57 - treated with Dextrose 50% 25 gm IV per protocol. Lantus insulin held today, and Lantus dose decreased to 10 units SC to begin tomorrow.  Patient has Central Line: Yes - Reasons if yes: Poor venous access;  Intermittent Levophed requirement     Patient has Stanton Cath: No      Last Vitals:     Vitals:    17 1800 17 1830 17 1900 17 1951   BP: 144/75 144/73 154/78    Pulse: 73 75 76 76   Resp:    Temp:       TempSrc:       SpO2: 100% 100% 100% 100%   Weight:       Height:            IV and DRAINS (will only show if present)   [REMOVED] Peripheral IV 17 Right Hand-Site Assessment: Clean, dry, & intact  [REMOVED] Peripheral IV 17 Right Arm-Site Assessment: Clean, dry, & intact  [REMOVED] Peripheral IV 17 Right Hand-Site Assessment: Clean, dry, & intact  [REMOVED] Peripheral IV 17 Right Antecubital-Site Assessment: Clean, dry, & intact  [REMOVED] Sheath 17-Site Assessment: Clean, dry, & intact  [REMOVED] Nasogastric Tube 17-Site Assessment: Clean, dry, & intact  [REMOVED] Peripheral IV 17 Right Forearm-Site Assessment: Clean, dry, & intact  [REMOVED] Peripheral IV 17 Right Wrist-Site Assessment: Clean, dry, & intact  [REMOVED] Airway - Endotracheal Tube 17 Oral-Site Assessment: Clean, dry, & intact  Triple Lumen Left IJ CVL 17 Left Internal jugular-Site Assessment: Clean, dry, & intact  [REMOVED] Arterial Line 17 Right Radial artery-Site Assessment: Clean, dry, & intact  Airway - Continuous Aspiration of Subglottic Secretions (MISBAH) Tube 17 Oral-Site Assessment: Clean, dry, & intact  Nasogastric Tube 17-Site Assessment: Clean, dry, & intact  [REMOVED] Airway - Continuous Aspiration of Subglottic Secretions (MISBAH) Tube 07/27/17 Oral-Site Assessment: Clean, dry, & intact  [REMOVED] Nasogastric Tube 07/27/17-Site Assessment: Clean, dry, & intact  [REMOVED] Venous Access Device 07/27/17 Upper chest (subclavicular area, right-Site Assessment: Clean, dry, & intact  [REMOVED] Peripheral IV 07/27/17 Right Antecubital-Site Assessment: Clean, dry, & intact  [REMOVED] Triple Lumen 07/27/17 Right Internal jugular-Site Assessment: Clean, dry, & intact  [REMOVED] Peripheral IV 07/27/17 Right Other(comment)-Site Assessment: Clean, dry, & intact  [REMOVED] Peripheral IV 07/27/17 Right Antecubital-Site Assessment: Swelling     WOUND (if present)   Wound Type:  Sacral wound   Dressing present Dressing Present : Yes, Intact, not due to be changed   Wound Concerns/Notes: Wound care with Santyl ointment daily & PRN     PAIN    Pain Assessment    Pain Intensity 1: 0 (09/02/17 0400)    Pain Location 1: Generalized    Pain Intervention(s) 1: Medication (see MAR)    Patient Stated Pain Goal: Unable to verbalize/indicatate pain  o Interventions for Pain:  none  o Intervention effective: N/A  o Time of last intervention: N/A   o Reassessment Completed: yes      Last 3 Weights:  Last 3 Recorded Weights in this Encounter    08/31/17 0800 09/01/17 0600 09/02/17 0500   Weight: 63.7 kg (140 lb 6.4 oz) 63.4 kg (139 lb 12.4 oz) 62 kg (136 lb 9.6 oz)     Weight change: -1.724 kg (-3 lb 12.8 oz)     INTAKE/OUPUT    Current Shift:      Last three shifts: 09/01 0701 - 09/02 1900  In: 3876.7 [I.V.:451.7]  Out: 2000 [Drains:2000]     LAB RESULTS     Recent Labs      09/02/17   0651  08/31/17   0610   WBC  9.8  11.2   HGB  9.0*  9.6*   HCT  28.0*  29.6*   PLT  402  453*        Recent Labs      09/02/17   0651  08/31/17   0610   NA  138  141   K  4.0  4.1   GLU  50*  128*   BUN  38*  40*   CREA  6.64*  6.74*   CA  8.8  8.6   MG  3.3*  3.1*       RECOMMENDATIONS AND DISCHARGE PLANNING     1. Pending tests/procedures/ Plan of Care or Other Needs: Pulmonary hygiene; Trach & PEG pending for next week    2. Discharge plan for patient and Needs/Barriers: TBD    3. Estimated Discharge Date: TBD; Posted on Whiteboard in Patients Room: Yes      4. The patient's care plan was reviewed with the oncoming nurse. \"HEALS\" SAFETY CHECK      Fall Risk    Total Score: 2    Safety Measures: Safety Measures: Bed/Chair-Wheels locked, Bed in low position, Call light within reach, Emergency bedside equipment, Gripper socks, Fall prevention (comment), Nurse at bedside, Side rails X 3    A safety check occurred in the patient's room between off going nurse and oncoming nurse listed above. The safety check included the below items  Area Items   H  High Alert Medications - Verify all high alert medication drips (heparin, PCA, etc.)   E  Equipment - Suction is set up for ALL patients (with cary)  - Red plugs utilized for all equipment (IV pumps, etc.)  - WOWs wiped down at end of shift.  - Room stocked with oxygen, suction, and other unit-specific supplies   A  Alarms - Bed alarm is set for fall risk patients  - Ensure chair alarm is in place and activated if patient is up in a chair   L  Lines - Check IV for any infiltration  - Stanton bag is empty if patient has a Stanton   - Tubing and IV bags are labeled   S  Safety   - Room is clean, patient is clean, and equipment is clean. - Hallways are clear from equipment besides carts. - Fall bracelet on for fall risk patients  - Ensure room is clear and free of clutter  - Suction is set up for ALL patients (with cary)  - Hallways are clear from equipment besides carts.    - Isolation precautions followed, supplies available outside room, sign posted     Love Charmayne Rick, RN

## 2017-09-03 NOTE — PROGRESS NOTES
During assessment, noted patient's left arm from the elbow down is significantly cooler than right. Bruit/Thrill present in dialysis shunt, cap refill still less than 3 seconds. Pulse easily palpable. Dr Nelda Hutchins notified. Will keep close check on vascular status and will order doppler study if any deterioration occurs. 0230  Throughout the night, have been assessing left arm hourly,  Lower arm began to warm up gradually moving from elbow down gradually until entire arm was warm.

## 2017-09-04 ENCOUNTER — APPOINTMENT (OUTPATIENT)
Dept: GENERAL RADIOLOGY | Age: 57
DRG: 004 | End: 2017-09-04
Attending: PHYSICIAN ASSISTANT
Payer: MEDICARE

## 2017-09-04 LAB
ARTERIAL PATENCY WRIST A: YES
BASE DEFICIT BLD-SCNC: 9 MMOL/L
BDY SITE: ABNORMAL
GAS FLOW.O2 O2 DELIVERY SYS: ABNORMAL L/MIN
GAS FLOW.O2 SETTING OXYMISER: 12 BPM
GLUCOSE BLD STRIP.AUTO-MCNC: 126 MG/DL (ref 70–110)
GLUCOSE BLD STRIP.AUTO-MCNC: 128 MG/DL (ref 70–110)
GLUCOSE BLD STRIP.AUTO-MCNC: 133 MG/DL (ref 70–110)
GLUCOSE BLD STRIP.AUTO-MCNC: 201 MG/DL (ref 70–110)
GLUCOSE BLD STRIP.AUTO-MCNC: 62 MG/DL (ref 70–110)
HCO3 BLD-SCNC: 16.5 MMOL/L (ref 22–26)
INSPIRATION.DURATION SETTING TIME VENT: 1 SEC
O2/TOTAL GAS SETTING VFR VENT: 30 %
PCO2 BLD: 29.2 MMHG (ref 35–45)
PEEP RESPIRATORY: 5 CMH2O
PH BLD: 7.36 [PH] (ref 7.35–7.45)
PO2 BLD: 78 MMHG (ref 80–100)
PRESSURE SUPPORT SETTING VENT: 7 CMH2O
SAO2 % BLD: 95 % (ref 92–97)
SERVICE CMNT-IMP: ABNORMAL
SPECIMEN TYPE: ABNORMAL
TOTAL RESP. RATE, ITRR: 18
VENTILATION MODE VENT: ABNORMAL
VOLUME CONTROL PLUS IVLCP: YES
VT SETTING VENT: 400 ML

## 2017-09-04 PROCEDURE — 74011250637 HC RX REV CODE- 250/637: Performed by: NURSE PRACTITIONER

## 2017-09-04 PROCEDURE — 74011636637 HC RX REV CODE- 636/637: Performed by: INTERNAL MEDICINE

## 2017-09-04 PROCEDURE — 74011250636 HC RX REV CODE- 250/636: Performed by: HOSPITALIST

## 2017-09-04 PROCEDURE — 65610000006 HC RM INTENSIVE CARE

## 2017-09-04 PROCEDURE — 74011250637 HC RX REV CODE- 250/637: Performed by: INTERNAL MEDICINE

## 2017-09-04 PROCEDURE — 94003 VENT MGMT INPAT SUBQ DAY: CPT

## 2017-09-04 PROCEDURE — 82803 BLOOD GASES ANY COMBINATION: CPT

## 2017-09-04 PROCEDURE — 74011250637 HC RX REV CODE- 250/637: Performed by: FAMILY MEDICINE

## 2017-09-04 PROCEDURE — 77030011256 HC DRSG MEPILEX <16IN NO BORD MOLN -A

## 2017-09-04 PROCEDURE — 74011000250 HC RX REV CODE- 250: Performed by: PHYSICIAN ASSISTANT

## 2017-09-04 PROCEDURE — 82962 GLUCOSE BLOOD TEST: CPT

## 2017-09-04 PROCEDURE — 74011250637 HC RX REV CODE- 250/637: Performed by: HOSPITALIST

## 2017-09-04 PROCEDURE — 36600 WITHDRAWAL OF ARTERIAL BLOOD: CPT

## 2017-09-04 PROCEDURE — 71010 XR CHEST PORT: CPT

## 2017-09-04 RX ADMIN — VITAMIN A AND D: 30.8 OINTMENT TOPICAL at 09:10

## 2017-09-04 RX ADMIN — INSULIN LISPRO 6 UNITS: 100 INJECTION, SOLUTION INTRAVENOUS; SUBCUTANEOUS at 12:22

## 2017-09-04 RX ADMIN — Medication 1 CAPSULE: at 09:08

## 2017-09-04 RX ADMIN — VITAMIN A AND D: 30.8 OINTMENT TOPICAL at 17:41

## 2017-09-04 RX ADMIN — MIDODRINE HYDROCHLORIDE 15 MG: 2.5 TABLET ORAL at 16:10

## 2017-09-04 RX ADMIN — MINERAL OIL AND WHITE PETROLATUM 1 DOSE: 150; 850 OINTMENT OPHTHALMIC at 09:09

## 2017-09-04 RX ADMIN — SIMVASTATIN 20 MG: 10 TABLET, FILM COATED ORAL at 22:00

## 2017-09-04 RX ADMIN — Medication: at 17:42

## 2017-09-04 RX ADMIN — MINERAL OIL AND WHITE PETROLATUM 1 DOSE: 150; 850 OINTMENT OPHTHALMIC at 12:21

## 2017-09-04 RX ADMIN — VITAMIN A AND D 1 EACH: 30.8 OINTMENT TOPICAL at 22:00

## 2017-09-04 RX ADMIN — LEVETIRACETAM 500 MG: 5 INJECTION INTRAVENOUS at 21:21

## 2017-09-04 RX ADMIN — Medication 1 PACKET: at 22:00

## 2017-09-04 RX ADMIN — ASPIRIN 81 MG 81 MG: 81 TABLET ORAL at 09:09

## 2017-09-04 RX ADMIN — Medication 1 PACKET: at 09:09

## 2017-09-04 RX ADMIN — FAMOTIDINE 20 MG: 10 INJECTION INTRAVENOUS at 09:08

## 2017-09-04 RX ADMIN — MINERAL OIL AND WHITE PETROLATUM 1 DOSE: 150; 850 OINTMENT OPHTHALMIC at 22:00

## 2017-09-04 RX ADMIN — VITAMIN A AND D: 30.8 OINTMENT TOPICAL at 12:22

## 2017-09-04 RX ADMIN — Medication 1 PACKET: at 16:06

## 2017-09-04 RX ADMIN — HEPARIN SODIUM 5000 UNITS: 5000 INJECTION, SOLUTION INTRAVENOUS; SUBCUTANEOUS at 17:38

## 2017-09-04 RX ADMIN — CHLORHEXIDINE GLUCONATE 10 ML: 1.2 RINSE ORAL at 09:00

## 2017-09-04 RX ADMIN — MIDODRINE HYDROCHLORIDE 15 MG: 2.5 TABLET ORAL at 09:08

## 2017-09-04 RX ADMIN — MINERAL OIL AND WHITE PETROLATUM 1 DOSE: 150; 850 OINTMENT OPHTHALMIC at 17:42

## 2017-09-04 RX ADMIN — Medication: at 17:41

## 2017-09-04 RX ADMIN — HEPARIN SODIUM 5000 UNITS: 5000 INJECTION, SOLUTION INTRAVENOUS; SUBCUTANEOUS at 09:08

## 2017-09-04 RX ADMIN — POTASSIUM CHLORIDE 40 MEQ: 1.5 POWDER, FOR SOLUTION ORAL at 09:08

## 2017-09-04 RX ADMIN — MIDODRINE HYDROCHLORIDE 15 MG: 2.5 TABLET ORAL at 12:21

## 2017-09-04 RX ADMIN — COLLAGENASE SANTYL: 250 OINTMENT TOPICAL at 09:10

## 2017-09-04 RX ADMIN — LEVETIRACETAM 500 MG: 5 INJECTION INTRAVENOUS at 09:07

## 2017-09-04 RX ADMIN — CHLORHEXIDINE GLUCONATE 10 ML: 1.2 RINSE ORAL at 21:21

## 2017-09-04 NOTE — PROGRESS NOTES
[facility]  Progress Note    Patient: Henry Aguirre Age: 65 y.o. : 1960 MR#: [id number] SSN: [id number]  Date/Time: 2017     Subjective:     Remains intubated and off sedation. Remains unresponsive. Remained off pressors and afebrile. Assessment/Plan:     1. S/p PEA arrest. wth acute resp fauilure on the Vent ? Cardiac cause with elevated trop PTA. Echo improving EF, s/p cath, no CAD. Now with second cardiac arrest - intubated, vent support  2. Acute met encephalopathy: likely anoxic encephalopathy  3. Acute pontine lacunar CVA: on asa. 4. ESRD- HD as pe nephrology  5. DM2- Lantus, ssi    Full code.  Poor prognosis    Syl Justice (daughter) (decision maker): [phone]  Joselin Carolina (niece): [address] Alissa Bal : [phone]  Christianne Car (brother): [phone]    Case discussed with:  []Patient  []Family  [x]Nursing  []Case Management  DVT Prophylaxis:  []Lovenox  [x]Hep SQ  [x]SCDs  []Coumadin   []On Heparin gtt    Patient Active Problem List   Diagnosis Code    Anemia D64.9    Acidosis E87.2    Cardiac arrest ([address] I46.9    Respiratory failure ([address] J96.90    ESRD needing dialysis ([address] N18.6, Z99.2    Anoxic brain damage (HCC) G93.1    Colitis K52.9    Hypotension I95.9    Acute GI bleeding K92.2    ESRD (end stage renal disease) ([address] N18.6    Sepsis ([address] A41.9    Oropharyngeal dysphagia R13.12    Cachexia ([address] R64       Objective:   VS:   Visit Vitals    /64    Pulse 79    Temp 98.3 °F (36.8 °C)    Resp 21    Ht 6' 2\" (1.88 m)    Wt 60 kg (132 lb 4.4 oz)    SpO2 100%    BMI 16.98 kg/m2      Tmax/24hrs: Temp (24hrs), Av °F (36.7 °C), Min:97.1 °F (36.2 °C), Max:98.3 °F (36.8 °C)  IOBRIEF    Intake/Output Summary (Last 24 hours) at 17 1702  Last data filed at 17 1500   Gross per 24 hour   Intake              995 ml   Output              900 ml   Net               95 ml     General: Intubated, unresponsive  Cardiovascular:  S1S2+, RRR  Pulmonary:  Coarse BS b/l  GI:  Soft, BS+, NT, ND  Extremities:  No edema.  Good thrill over right arm graft    Medications:   Current Facility-Administered Medications   Medication Dose Route Frequency    doxercalciferol (HECTOROL) 4 mcg/2 mL injection 3 mcg  3 mcg IntraVENous Q TUE, THU & SAT    epoetin benjamin (EPOGEN;PROCRIT) injection 10,000 Units  10,000 Units IntraVENous DIALYSIS TUE, THU & SAT    midodrine (PROAMITINE) tablet 15 mg  15 mg Oral TID WITH MEALS    potassium chloride (KLOR-CON) packet 40 mEq  40 mEq Per NG tube DAILY    Zinc Ox-Aloe Vera-Vitamin E (BALMEX) topical cream   Topical CONTINUOUS    banana flakes trans-galactooligosaccharide (BANATROL PLUS) powder 1 Packet  1 Packet Per NG tube TID    levETIRAcetam (KEPPRA) 500 mg in saline (iso-osm) 100 ml IVPB  500 mg IntraVENous Q12H    LORazepam (ATIVAN) injection 1 mg  1 mg IntraVENous Q4H PRN    insulin lispro (HUMALOG) injection   SubCUTAneous Q6H    white petrolatum-mineral oil 85-15 % oint 1 Dose  1 Dose Ophthalmic QID    chlorhexidine (PERIDEX) 0.12 % mouthwash 10 mL  10 mL Oral Q12H    NOREPINephrine (LEVOPHED) 16,000 mcg in dextrose 5% 250 mL infusion  2-16 mcg/min IntraVENous TITRATE    albuterol (PROVENTIL VENTOLIN) nebulizer solution 2.5 mg  2.5 mg Nebulization Q6H PRN    famotidine (PF) (PEPCID) 20 mg in sodium chloride 0.9 % 10 mL injection  20 mg IntraVENous DAILY    vits A and D-white pet-lanolin (A&D) ointment   Topical QID AFTER MEALS    collagenase (SANTYL) 250 unit/gram ointment   Topical DAILY    acetaminophen (TYLENOL) tablet 650 mg  650 mg Oral Q4H PRN    lactobacillus sp. 50 billion cpu (BIO-K PLUS) capsule 1 Cap  1 Cap Oral DAILY    loperamide (IMODIUM) capsule 2 mg  2 mg Oral Q6H PRN    heparin (porcine) injection 5,000 Units  5,000 Units SubCUTAneous Q8H    heparin (porcine) 1,000 unit/mL injection 2,000 Units  2,000 Units IntraVENous DIALYSIS ONCE    simvastatin (ZOCOR) tablet 20 mg  20 mg Oral QHS    aspirin chewable tablet 81 mg  81 mg Oral DAILY    glucose chewable tablet 16 g  4 Tab Oral PRN    glucagon (GLUCAGEN) injection 1 mg  1 mg IntraMUSCular PRN    dextrose (D50W) injection syrg 12.5-25 g  25-50 mL IntraVENous PRN    0.9% sodium chloride infusion  100 mL/hr IntraVENous DIALYSIS PRN    naloxone (NARCAN) injection 0.4 mg  0.4 mg IntraVENous PRN       Labs:      Recent Results (from the past 24 hour(s))   GLUCOSE, POC    Collection Time: 09/03/17  5:48 PM   Result Value Ref Range    Glucose (POC) 168 (H) 70 - 110 mg/dL   GLUCOSE, POC    Collection Time: 09/04/17 12:24 AM   Result Value Ref Range    Glucose (POC) 128 (H) 70 - 110 mg/dL   POC G3    Collection Time: 09/04/17  5:11 AM   Result Value Ref Range    Device: VENT      FIO2 (POC) 30 %    pH (POC) 7.360 7.35 - 7.45      pCO2 (POC) 29.2 (L) 35.0 - 45.0 MMHG    pO2 (POC) 78 (L) 80 - 100 MMHG    HCO3 (POC) 16.5 (L) 22 - 26 MMOL/L    sO2 (POC) 95 92 - 97 %    Base deficit (POC) 9 mmol/L    Mode SIMV      Tidal volume 400 ml    Set Rate 12 bpm    PEEP/CPAP (POC) 5 cmH2O    Pressure support 7 cmH2O    Allens test (POC) YES      Inspiratory Time 1.0 sec    Total resp.  rate 18      Site RIGHT RADIAL      Specimen type (POC) ARTERIAL      Performed by Antonella Lagunas     Volume control plus YES     GLUCOSE, POC    Collection Time: 09/04/17  5:47 AM   Result Value Ref Range    Glucose (POC) 133 (H) 70 - 110 mg/dL   GLUCOSE, POC    Collection Time: 09/04/17 11:38 AM   Result Value Ref Range    Glucose (POC) 201 (H) 70 - 110 mg/dL       Signed By: Annita Ly MD     September 4, 2017

## 2017-09-04 NOTE — PROGRESS NOTES
[facility]  ICU Progress Note      Name: Whitney Mayes   : 1960   MRN: [id number]   Date: 2017 6:47 AM     [x]I have reviewed the flowsheet and previous days notes. Events overnight reviewed and discussed with nursing staff. Vital signs and records reviewed. HPI:  Kira Ragland a 55 y. o.  male with a history of DM, ESRD admitted PEA. Patient's hospitalization was also found to have a CVA and was treated for E.coli and C.perferinges bacteremia. Subsequent PEA arrest 17. Remains intubated and off sedation. 17:  - No acute changes overnight  - Patient remains unresponsive  - Off pressors; maintaining BP  - Afebrile overnight  - Anuric              ROS:Review of systems not obtained due to patient factors.     Medication Review:  · Pressors - N/A  · Sedation - N/A  · Antibiotics - N/A  · Pain - PRN Tylenol  · GI: Pepcid, DVT: SQH  · Others (other gtts)    Safety Bundles: VAP Bundlel    Vital Signs:    Visit Vitals    /89    Pulse 76    Temp 97.1 °F (36.2 °C)    Resp 20    Ht 6' 2\" (1.88 m)    Wt 49.6 kg (109 lb 6.4 oz)    SpO2 100%    BMI 14.05 kg/m2       O2 Device: Endotracheal tube, Ventilator   O2 Flow Rate (L/min): 6 l/min   Temp (24hrs), Av.1 °F (36.7 °C), Min:97.1 °F (36.2 °C), Max:98.6 °F (37 °C)       Intake/Output:   Last shift:      701 - 1900  In: 301 [I.V.:100]  Out: -   Last 3 shifts: 1901 -  07  In: 7625 [I.V.:200]  Out: 900 [Drains:900]    Intake/Output Summary (Last 24 hours) at 17 1535  Last data filed at 17 1400   Gross per 24 hour   Intake             1200 ml   Output              900 ml   Net              300 ml       Ventilator Settings:  Ventilator  Mode: SIMV  Respiratory Rate  Resp Rate Observed: 17  Back-Up Rate: 12  Insp Time (sec): 1 sec  Insp Flow (l/min): 44 l/min  I:E Ratio: 1;4  Ventilator Volumes  Vt Set (ml): 400 ml  Vt Exhaled (Machine Breath) (ml): 451 ml  Vt Spont (ml): 486 ml  Ve Observed (l/min): 11 l/min  Ventilator Pressures  PC Set: 400  Pressure Support (cm H2O): 7 cm H2O  PIP Observed (cm H2O): 12 cm H2O  Plateau Pressure (cm H2O): 11 cm H2O  MAP (cm H2O): 8.6  PEEP/VENT (cm H2O): 5 cm H20  Auto PEEP Observed (cm H2O): 0 cm H2O    Physical Exam:    General: Intubated, cachectic, unresponsive  HEENT:  Anicteric sclerae; pink palpebral conjunctivae; mucosa moist, ETT   Resp:  Symmetrical chest expansion, no accessory muscle use; good airway entry; BBS coarse   CV:  S1, S2 present; regular rate and rhythm  GI:  Abdomen soft, non-tender; (+) active bowel sounds  Extremities:  +2 pulses on all extremities; significant muscle atrophy, AV fistula LUE: + bruit + thrill   Skin:  Warm; no rashes/ lesions noted  Neurologic:  Negative dolls eye, pinpoint pupils nonreactive to light, + cough, - gag, decorticate posturing with stimulation, FOUR COMA SCORE 8  Devices:  OGT, ETT (8/18/17), LIJ TLC (8/18/17)      DATA:     Current Facility-Administered Medications   Medication Dose Route Frequency    doxercalciferol (HECTOROL) 4 mcg/2 mL injection 3 mcg  3 mcg IntraVENous Q TUE, THU & SAT    epoetin benjamin (EPOGEN;PROCRIT) injection 10,000 Units  10,000 Units IntraVENous DIALYSIS TUE, THU & SAT    midodrine (PROAMITINE) tablet 15 mg  15 mg Oral TID WITH MEALS    potassium chloride (KLOR-CON) packet 40 mEq  40 mEq Per NG tube DAILY    Zinc Ox-Aloe Vera-Vitamin E (BALMEX) topical cream   Topical CONTINUOUS    banana flakes trans-galactooligosaccharide (BANATROL PLUS) powder 1 Packet  1 Packet Per NG tube TID    levETIRAcetam (KEPPRA) 500 mg in saline (iso-osm) 100 ml IVPB  500 mg IntraVENous Q12H    LORazepam (ATIVAN) injection 1 mg  1 mg IntraVENous Q4H PRN    insulin lispro (HUMALOG) injection   SubCUTAneous Q6H    white petrolatum-mineral oil 85-15 % oint 1 Dose  1 Dose Ophthalmic QID    chlorhexidine (PERIDEX) 0.12 % mouthwash 10 mL  10 mL Oral Q12H    NOREPINephrine (LEVOPHED) 16,000 mcg in dextrose 5% 250 mL infusion  2-16 mcg/min IntraVENous TITRATE    albuterol (PROVENTIL VENTOLIN) nebulizer solution 2.5 mg  2.5 mg Nebulization Q6H PRN    famotidine (PF) (PEPCID) 20 mg in sodium chloride 0.9 % 10 mL injection  20 mg IntraVENous DAILY    vits A and D-white pet-lanolin (A&D) ointment   Topical QID AFTER MEALS    collagenase (SANTYL) 250 unit/gram ointment   Topical DAILY    acetaminophen (TYLENOL) tablet 650 mg  650 mg Oral Q4H PRN    lactobacillus sp. 50 billion cpu (BIO-K PLUS) capsule 1 Cap  1 Cap Oral DAILY    loperamide (IMODIUM) capsule 2 mg  2 mg Oral Q6H PRN    heparin (porcine) injection 5,000 Units  5,000 Units SubCUTAneous Q8H    heparin (porcine) 1,000 unit/mL injection 2,000 Units  2,000 Units IntraVENous DIALYSIS ONCE    simvastatin (ZOCOR) tablet 20 mg  20 mg Oral QHS    aspirin chewable tablet 81 mg  81 mg Oral DAILY    glucose chewable tablet 16 g  4 Tab Oral PRN    glucagon (GLUCAGEN) injection 1 mg  1 mg IntraMUSCular PRN    dextrose (D50W) injection syrg 12.5-25 g  25-50 mL IntraVENous PRN    0.9% sodium chloride infusion  100 mL/hr IntraVENous DIALYSIS PRN    naloxone (NARCAN) injection 0.4 mg  0.4 mg IntraVENous PRN         Labs: Results:       Chemistry Recent Labs      09/02/17   0651   GLU  50*   NA  138   K  4.0   CL  108   CO2  22   BUN  38*   CREA  6.64*   CA  8.8   AGAP  8   BUCR  6*      CBC w/Diff Recent Labs      09/02/17   0651   WBC  9.8   RBC  2.86*   HGB  9.0*   HCT  28.0*   PLT  402   GRANS  66   LYMPH  25   EOS  3      Coagulation No results for input(s): PTP, INR, APTT in the last 72 hours. No lab exists for component: INREXT, INREXT    Liver Enzymes No results for input(s): TP, ALB, TBIL, AP, SGOT, GPT in the last 72 hours.     No lab exists for component: DBIL   ABG Lab Results   Component Value Date/Time    PHI 7.360 09/04/2017 05:11 AM    PCO2I 29.2 (L) 09/04/2017 05:11 AM    PO2I 78 (L) 09/04/2017 05:11 AM    HCO3I 16.5 (L) 09/04/2017 05:11 AM    FIO2I 30 09/04/2017 05:11 AM      Microbiology No results for input(s): CULT in the last 72 hours. Telemetry: [x]Sinus []A-flutter []Paced    []A-fib []Multiple PVCs                    Imaging:  CXR Results  (Last 48 hours)               09/04/17 0250  XR CHEST PORT Final result    Impression:  IMPRESSION:       The ET tube is 6.3 cm above bree. Lungs remain clear bilaterally without definable acute process. No evidence of   pneumothorax or pleural effusion. Evidence of pre-existing COPD. No evidence of cardiac decompensation. Narrative:  Portable chest x-ray, semierect AP view, time 0134 hours on 09/04/2017:               INDICATION:       Respiratory failure. The patient has been on ventilation. History of hypertension, diabetes, and end-stage renal disease. History of   previous cardiac arrest.       COMPARISON STUDY: Chest x-ray on 9/3/2017 and 9/2/2017. FINDINGS:       The lower end of ET tube is 6.3 cm above bree. The patient is mildly rotated   to the left. Stable position of left IJ central venous catheter. NG tube remains extended into stomach. No evidence of pneumothorax or pneumomediastinum. Cardiac size is normal. Pulmonary vascularity is not engorged or cephalized. Lungs remain clear bilaterally without definable acute process. CP angles are   sharp bilaterally. Lungs are mildly to moderately hyperinflated. 09/03/17 0553  XR CHEST PORT Final result    Impression:  Impression:   1.  ET tube in satisfactory position. No acute process otherwise. Narrative:  XR CHEST PORT       Indication: eval ETT       Comparison: None       Findings:   ET tube is in satisfactory position 6.6 cm above the bree. NG tube courses   through the esophagus out of the field-of-view. The lungs are clear. No effusion   or pneumothorax. Cardiomediastinal silhouette is normal in size. IMPRESSION:   · Acute encephalopathy, anoxic encephalopathy   · S/p PEA Cardiac arrest in dialysis 7/27/17 and 8/18/17  · Acute Respirtatory Failure requiring Mechanical Ventilation. Remains intubated 2' neurologic status   · Hypotension- workup negative for PE. Cannot r/o sepsis with recurrent fevers, Dysautonomia? · ESRD on HD  · DM2  · Acute pontine lacunar CVA  · Oropharyngeal dysphagia  · Cachexia  · Unstageable pressure ulcer to sacrum/coccyx  · S/p septic shock  treated for E.coli and C.perferinges bacteremia s/p abx      PLAN:   · Resp -  VAP bundle, Adhere to low tidal volume ventilation strategy as per the ARDS net guidelines. Aim for plateau pressures < 12XW; Current vent settings SIMV 30%, RR 12, PS 7, PEEP 5. Patient does not meet criteria for extubation 2' inability to protect airway. · ID - Afebrile; Bcx 8/18/17 NGTD. Resp cx 8/19/17 - (+)candida. Low threshold to pan culture and restart abx. CXR without any acute infiltrates   · CVS - HD stable; Currently on High dose midodrine 15 mg TID. MAP goal > 65 mm/hg. Last echo with EF 55-60% and noted to have G1DD. PE workup negative. · Heme/onc -  Check CBC PRN - will check tomorrow AM (31/29)  · Metabolic - Check BMP and Mg tomorrow AM (09/05). Replace e-lytes PRN  · Renal - Last HD 8/21/17 without any UF removed; PRN dialysis  · Endocrine - Cortisol normal. Glycemic control <180; Continue SSI. Previous thyroid study: TSH was 7.52 and Free t4 0.8. Most likely sick euthyroid state   · Neuro/ Pain/ Sedation - Four Coma Score 8. Neurology has signed off. On prophylactic kepppra; no sedation. · GI - NPO; TF at Nepro 55 ml/hr w/ 150 h20 q4h. Continue banatrol for diarrhea   · Prophylaxis - DVT- SQH, GI- Pepcid   · Surgery on board for Trach and PEG - plan for Tuesday (09/05) - Need to obtain consent from [address] [place] [address] still?           The patient is: [x] acutely ill Risk of deterioration: [] moderate    [] critically ill  [] high     [x]See my orders for details    My assessment/plan was discussed with:  [x]nursing []PT/OT    [x]respiratory therapy [x]Dr.El Loza    []family []     Srikanth Seen, LAKISHA

## 2017-09-04 NOTE — ROUTINE PROCESS
Bedside and Verbal shift change report given to Nurse Leticia Butler RN (oncoming nurse) by Gómez Brantley RN (offgoing nurse). Report included the following information SBAR, Kardex, ED Summary, Procedure Summary, Intake/Output, MAR and Recent Results.

## 2017-09-05 ENCOUNTER — APPOINTMENT (OUTPATIENT)
Dept: GENERAL RADIOLOGY | Age: 57
DRG: 004 | End: 2017-09-05
Attending: PHYSICIAN ASSISTANT
Payer: MEDICARE

## 2017-09-05 ENCOUNTER — ANESTHESIA (OUTPATIENT)
Dept: SURGERY | Age: 57
DRG: 004 | End: 2017-09-05
Payer: MEDICARE

## 2017-09-05 LAB
ANION GAP SERPL CALC-SCNC: 9 MMOL/L (ref 3–18)
ARTERIAL PATENCY WRIST A: YES
BASE DEFICIT BLD-SCNC: 10 MMOL/L
BASOPHILS # BLD: 0 K/UL (ref 0–0.06)
BASOPHILS NFR BLD: 0 % (ref 0–2)
BDY SITE: ABNORMAL
BODY TEMPERATURE: 98.6
BUN SERPL-MCNC: 65 MG/DL (ref 7–18)
BUN/CREAT SERPL: 7 (ref 12–20)
CALCIUM SERPL-MCNC: 9.2 MG/DL (ref 8.5–10.1)
CHLORIDE SERPL-SCNC: 110 MMOL/L (ref 100–108)
CO2 SERPL-SCNC: 16 MMOL/L (ref 21–32)
CREAT SERPL-MCNC: 9.43 MG/DL (ref 0.6–1.3)
DIFFERENTIAL METHOD BLD: ABNORMAL
EOSINOPHIL # BLD: 0.2 K/UL (ref 0–0.4)
EOSINOPHIL NFR BLD: 2 % (ref 0–5)
ERYTHROCYTE [DISTWIDTH] IN BLOOD BY AUTOMATED COUNT: 19 % (ref 11.6–14.5)
GAS FLOW.O2 O2 DELIVERY SYS: ABNORMAL L/MIN
GAS FLOW.O2 SETTING OXYMISER: 12 BPM
GLUCOSE BLD STRIP.AUTO-MCNC: 159 MG/DL (ref 70–110)
GLUCOSE BLD STRIP.AUTO-MCNC: 164 MG/DL (ref 70–110)
GLUCOSE BLD STRIP.AUTO-MCNC: 170 MG/DL (ref 70–110)
GLUCOSE BLD STRIP.AUTO-MCNC: 184 MG/DL (ref 70–110)
GLUCOSE SERPL-MCNC: 204 MG/DL (ref 74–99)
HCO3 BLD-SCNC: 16 MMOL/L (ref 22–26)
HCT VFR BLD AUTO: 28.3 % (ref 36–48)
HGB BLD-MCNC: 9.1 G/DL (ref 13–16)
INSPIRATION.DURATION SETTING TIME VENT: 1 SEC
LYMPHOCYTES # BLD: 3.1 K/UL (ref 0.9–3.6)
LYMPHOCYTES NFR BLD: 30 % (ref 21–52)
MCH RBC QN AUTO: 31.1 PG (ref 24–34)
MCHC RBC AUTO-ENTMCNC: 32.2 G/DL (ref 31–37)
MCV RBC AUTO: 96.6 FL (ref 74–97)
MONOCYTES # BLD: 0.6 K/UL (ref 0.05–1.2)
MONOCYTES NFR BLD: 6 % (ref 3–10)
NEUTS SEG # BLD: 6.2 K/UL (ref 1.8–8)
NEUTS SEG NFR BLD: 62 % (ref 40–73)
O2/TOTAL GAS SETTING VFR VENT: 30 %
PCO2 BLD: 29.4 MMHG (ref 35–45)
PEEP RESPIRATORY: 5 CMH2O
PH BLD: 7.34 [PH] (ref 7.35–7.45)
PLATELET # BLD AUTO: 441 K/UL (ref 135–420)
PMV BLD AUTO: 9.4 FL (ref 9.2–11.8)
PO2 BLD: 89 MMHG (ref 80–100)
POTASSIUM SERPL-SCNC: 6.1 MMOL/L (ref 3.5–5.5)
POTASSIUM SERPL-SCNC: 6.2 MMOL/L (ref 3.5–5.5)
PRESSURE SUPPORT SETTING VENT: 7 CMH2O
RBC # BLD AUTO: 2.93 M/UL (ref 4.7–5.5)
SAO2 % BLD: 97 % (ref 92–97)
SERVICE CMNT-IMP: ABNORMAL
SODIUM SERPL-SCNC: 135 MMOL/L (ref 136–145)
SPECIMEN TYPE: ABNORMAL
TOTAL RESP. RATE, ITRR: 19
VENTILATION MODE VENT: ABNORMAL
VOLUME CONTROL PLUS IVLCP: YES
VT SETTING VENT: 400 ML
WBC # BLD AUTO: 10.1 K/UL (ref 4.6–13.2)

## 2017-09-05 PROCEDURE — 74011250636 HC RX REV CODE- 250/636: Performed by: INTERNAL MEDICINE

## 2017-09-05 PROCEDURE — 71010 XR CHEST PORT: CPT

## 2017-09-05 PROCEDURE — 74011250637 HC RX REV CODE- 250/637: Performed by: INTERNAL MEDICINE

## 2017-09-05 PROCEDURE — 84132 ASSAY OF SERUM POTASSIUM: CPT

## 2017-09-05 PROCEDURE — 74011250636 HC RX REV CODE- 250/636: Performed by: PHYSICIAN ASSISTANT

## 2017-09-05 PROCEDURE — 74011000250 HC RX REV CODE- 250: Performed by: PHYSICIAN ASSISTANT

## 2017-09-05 PROCEDURE — 90935 HEMODIALYSIS ONE EVALUATION: CPT

## 2017-09-05 PROCEDURE — 82962 GLUCOSE BLOOD TEST: CPT

## 2017-09-05 PROCEDURE — P9047 ALBUMIN (HUMAN), 25%, 50ML: HCPCS | Performed by: PHYSICIAN ASSISTANT

## 2017-09-05 PROCEDURE — 74011636637 HC RX REV CODE- 636/637: Performed by: INTERNAL MEDICINE

## 2017-09-05 PROCEDURE — 82803 BLOOD GASES ANY COMBINATION: CPT

## 2017-09-05 PROCEDURE — 85025 COMPLETE CBC W/AUTO DIFF WBC: CPT

## 2017-09-05 PROCEDURE — 74011250637 HC RX REV CODE- 250/637: Performed by: HOSPITALIST

## 2017-09-05 PROCEDURE — 36592 COLLECT BLOOD FROM PICC: CPT

## 2017-09-05 PROCEDURE — 74011250637 HC RX REV CODE- 250/637: Performed by: NURSE PRACTITIONER

## 2017-09-05 PROCEDURE — 74011250636 HC RX REV CODE- 250/636: Performed by: HOSPITALIST

## 2017-09-05 PROCEDURE — P9047 ALBUMIN (HUMAN), 25%, 50ML: HCPCS | Performed by: INTERNAL MEDICINE

## 2017-09-05 PROCEDURE — 74011000250 HC RX REV CODE- 250: Performed by: INTERNAL MEDICINE

## 2017-09-05 PROCEDURE — 65610000006 HC RM INTENSIVE CARE

## 2017-09-05 PROCEDURE — 94003 VENT MGMT INPAT SUBQ DAY: CPT

## 2017-09-05 PROCEDURE — 36600 WITHDRAWAL OF ARTERIAL BLOOD: CPT

## 2017-09-05 RX ORDER — ALBUMIN HUMAN 250 G/1000ML
50 SOLUTION INTRAVENOUS ONCE
Status: COMPLETED | OUTPATIENT
Start: 2017-09-05 | End: 2017-09-08

## 2017-09-05 RX ORDER — DEXTROSE 50 % IN WATER (D50W) INTRAVENOUS SYRINGE
25
Status: COMPLETED | OUTPATIENT
Start: 2017-09-05 | End: 2017-09-05

## 2017-09-05 RX ORDER — ALBUMIN HUMAN 250 G/1000ML
12.5 SOLUTION INTRAVENOUS ONCE
Status: DISCONTINUED | OUTPATIENT
Start: 2017-09-05 | End: 2017-09-05

## 2017-09-05 RX ORDER — ALBUMIN HUMAN 250 G/1000ML
12.5 SOLUTION INTRAVENOUS
Status: DISCONTINUED | OUTPATIENT
Start: 2017-09-05 | End: 2017-09-22 | Stop reason: HOSPADM

## 2017-09-05 RX ORDER — SODIUM POLYSTYRENE SULFONATE 15 G/60ML
30 SUSPENSION ORAL; RECTAL
Status: COMPLETED | OUTPATIENT
Start: 2017-09-05 | End: 2017-09-05

## 2017-09-05 RX ADMIN — LEVETIRACETAM 500 MG: 5 INJECTION INTRAVENOUS at 20:59

## 2017-09-05 RX ADMIN — VITAMIN A AND D: 30.8 OINTMENT TOPICAL at 22:13

## 2017-09-05 RX ADMIN — HEPARIN SODIUM 5000 UNITS: 5000 INJECTION, SOLUTION INTRAVENOUS; SUBCUTANEOUS at 09:10

## 2017-09-05 RX ADMIN — CHLORHEXIDINE GLUCONATE 10 ML: 1.2 RINSE ORAL at 08:37

## 2017-09-05 RX ADMIN — MIDODRINE HYDROCHLORIDE 15 MG: 2.5 TABLET ORAL at 08:38

## 2017-09-05 RX ADMIN — ACETAMINOPHEN 650 MG: 325 TABLET ORAL at 20:59

## 2017-09-05 RX ADMIN — VITAMIN A AND D: 30.8 OINTMENT TOPICAL at 08:39

## 2017-09-05 RX ADMIN — VITAMIN A AND D: 30.8 OINTMENT TOPICAL at 17:00

## 2017-09-05 RX ADMIN — INSULIN LISPRO 3 UNITS: 100 INJECTION, SOLUTION INTRAVENOUS; SUBCUTANEOUS at 19:22

## 2017-09-05 RX ADMIN — SIMVASTATIN 20 MG: 10 TABLET, FILM COATED ORAL at 22:12

## 2017-09-05 RX ADMIN — INSULIN HUMAN 10 UNITS: 100 INJECTION, SOLUTION PARENTERAL at 08:16

## 2017-09-05 RX ADMIN — SODIUM POLYSTYRENE SULFONATE 30 G: 15 SUSPENSION ORAL; RECTAL at 08:29

## 2017-09-05 RX ADMIN — COLLAGENASE SANTYL: 250 OINTMENT TOPICAL at 08:39

## 2017-09-05 RX ADMIN — MINERAL OIL AND WHITE PETROLATUM 1 DOSE: 150; 850 OINTMENT OPHTHALMIC at 13:00

## 2017-09-05 RX ADMIN — Medication 1 PACKET: at 22:12

## 2017-09-05 RX ADMIN — ASPIRIN 81 MG 81 MG: 81 TABLET ORAL at 08:37

## 2017-09-05 RX ADMIN — DEXTROSE MONOHYDRATE 25 G: 25 INJECTION, SOLUTION INTRAVENOUS at 08:16

## 2017-09-05 RX ADMIN — ALBUMIN (HUMAN) 12.5 G: 0.25 INJECTION, SOLUTION INTRAVENOUS at 11:46

## 2017-09-05 RX ADMIN — ALBUMIN (HUMAN) 12.5 G: 0.25 INJECTION, SOLUTION INTRAVENOUS at 13:15

## 2017-09-05 RX ADMIN — HEPARIN SODIUM 5000 UNITS: 5000 INJECTION, SOLUTION INTRAVENOUS; SUBCUTANEOUS at 02:38

## 2017-09-05 RX ADMIN — FAMOTIDINE 20 MG: 10 INJECTION INTRAVENOUS at 08:26

## 2017-09-05 RX ADMIN — Medication 1 PACKET: at 08:39

## 2017-09-05 RX ADMIN — VITAMIN A AND D: 30.8 OINTMENT TOPICAL at 15:00

## 2017-09-05 RX ADMIN — CHLORHEXIDINE GLUCONATE 10 ML: 1.2 RINSE ORAL at 20:59

## 2017-09-05 RX ADMIN — LEVETIRACETAM 500 MG: 5 INJECTION INTRAVENOUS at 08:22

## 2017-09-05 RX ADMIN — MIDODRINE HYDROCHLORIDE 15 MG: 2.5 TABLET ORAL at 16:00

## 2017-09-05 RX ADMIN — MINERAL OIL AND WHITE PETROLATUM 1 DOSE: 150; 850 OINTMENT OPHTHALMIC at 22:12

## 2017-09-05 RX ADMIN — ALBUMIN (HUMAN) 50 G: 0.25 INJECTION, SOLUTION INTRAVENOUS at 22:23

## 2017-09-05 RX ADMIN — DOXERCALCIFEROL 3 MCG: 4 INJECTION, SOLUTION INTRAVENOUS at 13:28

## 2017-09-05 RX ADMIN — ALBUMIN (HUMAN) 50 G: 0.25 INJECTION, SOLUTION INTRAVENOUS at 16:06

## 2017-09-05 RX ADMIN — INSULIN LISPRO 3 UNITS: 100 INJECTION, SOLUTION INTRAVENOUS; SUBCUTANEOUS at 11:25

## 2017-09-05 RX ADMIN — INSULIN LISPRO 3 UNITS: 100 INJECTION, SOLUTION INTRAVENOUS; SUBCUTANEOUS at 00:53

## 2017-09-05 RX ADMIN — MINERAL OIL AND WHITE PETROLATUM 1 DOSE: 150; 850 OINTMENT OPHTHALMIC at 19:00

## 2017-09-05 RX ADMIN — INSULIN LISPRO 3 UNITS: 100 INJECTION, SOLUTION INTRAVENOUS; SUBCUTANEOUS at 05:20

## 2017-09-05 RX ADMIN — HEPARIN SODIUM 5000 UNITS: 5000 INJECTION, SOLUTION INTRAVENOUS; SUBCUTANEOUS at 19:17

## 2017-09-05 RX ADMIN — Medication 1 PACKET: at 16:07

## 2017-09-05 RX ADMIN — MINERAL OIL AND WHITE PETROLATUM 1 DOSE: 150; 850 OINTMENT OPHTHALMIC at 08:38

## 2017-09-05 RX ADMIN — ERYTHROPOIETIN 10000 UNITS: 10000 INJECTION, SOLUTION INTRAVENOUS; SUBCUTANEOUS at 13:25

## 2017-09-05 RX ADMIN — Medication 1 CAPSULE: at 08:37

## 2017-09-05 NOTE — PROGRESS NOTES
[facility]  ICU Progress Note      Name: Flores Ingram   : 1960   MRN: [id number]   Date: 2017 6:47 AM     [x]I have reviewed the flowsheet and previous days notes. Events overnight reviewed and discussed with nursing staff. Vital signs and records reviewed. HPI:  Mikael Merida a 55 y. o.  male with a history of DM, ESRD admitted PEA. Patient's hospitalization was also found to have a CVA and was treated for E.coli and C.perferinges bacteremia. Subsequent PEA arrest 17. Remains intubated and off sedation. 17:  - Patient K+ increased to 6.2 this am, Surgery cancelled and rescheuled for tommorow.  - Nephrology to do dialysis  Today.  - Patient remains unresponsive  - Off pressors; maintaining BP  - Afebrile overnight  - Anuric              ROS:Review of systems not obtained due to patient factors.     Medication Review:  · Pressors - N/A  · Sedation - N/A  · Antibiotics - N/A  · Pain - PRN Tylenol  · GI: Pepcid, DVT: SQH  · Others (other gtts)    Safety Bundles: VAP Bundlel    Vital Signs:    Visit Vitals    BP 93/56    Pulse (!) 101    Temp 98.4 °F (36.9 °C)    Resp 25    Ht 6' 2\" (1.88 m)    Wt 60 kg (132 lb 4.4 oz)    SpO2 100%    BMI 16.98 kg/m2       O2 Device: Endotracheal tube, Ventilator   O2 Flow Rate (L/min): 6 l/min   Temp (24hrs), Av.3 °F (36.8 °C), Min:97.7 °F (36.5 °C), Max:98.9 °F (37.2 °C)       Intake/Output:   Last shift:      701 - 1900  In: 515 [I.V.:100]  Out: -   Last 3 shifts: 1901 -  07  In: 2060 [I.V.:100]  Out: 1350 [Drains:1350]    Intake/Output Summary (Last 24 hours) at 17 1356  Last data filed at 17 1000   Gross per 24 hour   Intake             1745 ml   Output              850 ml   Net              895 ml       Ventilator Settings:  Ventilator  Mode: SIMV  Respiratory Rate  Resp Rate Observed: 17  Back-Up Rate: 12  Insp Time (sec): 1 sec  Insp Flow (l/min): 44 l/min  I:E Ratio: 1:4  Ventilator Volumes  Vt Set (ml): 400 ml  Vt Exhaled (Machine Breath) (ml): 450 ml  Vt Spont (ml): 486 ml  Ve Observed (l/min): 9.15 l/min  Ventilator Pressures  PC Set: 400  Pressure Support (cm H2O): 7 cm H2O  PIP Observed (cm H2O): 13 cm H2O  Plateau Pressure (cm H2O): 13 cm H2O  MAP (cm H2O): 8.6  PEEP/VENT (cm H2O): 5 cm H20  Auto PEEP Observed (cm H2O): 0 cm H2O    Physical Exam:    General: Intubated, cachectic, unresponsive  HEENT:  Anicteric sclerae; pink palpebral conjunctivae; mucosa moist, ETT   Resp:  Symmetrical chest expansion, no accessory muscle use; good airway entry; BBS coarse   CV:  S1, S2 present; regular rate and rhythm  GI:  Abdomen soft, non-tender; (+) active bowel sounds  Extremities:  +2 pulses on all extremities; significant muscle atrophy, AV fistula LUE: + bruit + thrill   Skin:  Warm; no rashes/ lesions noted  Neurologic:  Negative dolls eye, pinpoint pupils nonreactive to light, + cough, - gag, decorticate posturing with stimulation, FOUR COMA SCORE 8  Devices:  OGT, ETT (8/18/17), LIJ TLC (8/18/17)      DATA:     Current Facility-Administered Medications   Medication Dose Route Frequency    albumin human 25% (BUMINATE) solution 12.5 g  12.5 g IntraVENous DIALYSIS PRN    doxercalciferol (HECTOROL) 4 mcg/2 mL injection 3 mcg  3 mcg IntraVENous Q TUE, THU & SAT    epoetin benjamin (EPOGEN;PROCRIT) injection 10,000 Units  10,000 Units IntraVENous DIALYSIS TUE, THU & SAT    midodrine (PROAMITINE) tablet 15 mg  15 mg Oral TID WITH MEALS    Zinc Ox-Aloe Vera-Vitamin E (BALMEX) topical cream   Topical CONTINUOUS    banana flakes trans-galactooligosaccharide (BANATROL PLUS) powder 1 Packet  1 Packet Per NG tube TID    levETIRAcetam (KEPPRA) 500 mg in saline (iso-osm) 100 ml IVPB  500 mg IntraVENous Q12H    LORazepam (ATIVAN) injection 1 mg  1 mg IntraVENous Q4H PRN    insulin lispro (HUMALOG) injection   SubCUTAneous Q6H    white petrolatum-mineral oil 85-15 % oint 1 Dose  1 Dose Ophthalmic QID    chlorhexidine (PERIDEX) 0.12 % mouthwash 10 mL  10 mL Oral Q12H    NOREPINephrine (LEVOPHED) 16,000 mcg in dextrose 5% 250 mL infusion  2-16 mcg/min IntraVENous TITRATE    albuterol (PROVENTIL VENTOLIN) nebulizer solution 2.5 mg  2.5 mg Nebulization Q6H PRN    famotidine (PF) (PEPCID) 20 mg in sodium chloride 0.9 % 10 mL injection  20 mg IntraVENous DAILY    vits A and D-white pet-lanolin (A&D) ointment   Topical QID AFTER MEALS    collagenase (SANTYL) 250 unit/gram ointment   Topical DAILY    acetaminophen (TYLENOL) tablet 650 mg  650 mg Oral Q4H PRN    lactobacillus sp. 50 billion cpu (BIO-K PLUS) capsule 1 Cap  1 Cap Oral DAILY    loperamide (IMODIUM) capsule 2 mg  2 mg Oral Q6H PRN    heparin (porcine) injection 5,000 Units  5,000 Units SubCUTAneous Q8H    heparin (porcine) 1,000 unit/mL injection 2,000 Units  2,000 Units IntraVENous DIALYSIS ONCE    simvastatin (ZOCOR) tablet 20 mg  20 mg Oral QHS    aspirin chewable tablet 81 mg  81 mg Oral DAILY    glucose chewable tablet 16 g  4 Tab Oral PRN    glucagon (GLUCAGEN) injection 1 mg  1 mg IntraMUSCular PRN    dextrose (D50W) injection syrg 12.5-25 g  25-50 mL IntraVENous PRN    0.9% sodium chloride infusion  100 mL/hr IntraVENous DIALYSIS PRN    naloxone (NARCAN) injection 0.4 mg  0.4 mg IntraVENous PRN         Labs: Results:       Chemistry Recent Labs      09/05/17   0720  09/05/17   0346   GLU   --   204*   NA   --   135*   K  6.1*  6.2*   CL   --   110*   CO2   --   16*   BUN   --   65*   CREA   --   9.43*   CA   --   9.2   AGAP   --   9   BUCR   --   7*      CBC w/Diff Recent Labs      09/05/17   0346   WBC  10.1   RBC  2.93*   HGB  9.1*   HCT  28.3*   PLT  441*   GRANS  62   LYMPH  30   EOS  2      Coagulation No results for input(s): PTP, INR, APTT in the last 72 hours. No lab exists for component: INREXT, INREXT    Liver Enzymes No results for input(s): TP, ALB, TBIL, AP, SGOT, GPT in the last 72 hours.     No lab exists for component: DBIL   ABG Lab Results   Component Value Date/Time    PHI 7.344 (L) 09/05/2017 04:24 AM    PCO2I 29.4 (L) 09/05/2017 04:24 AM    PO2I 89 09/05/2017 04:24 AM    HCO3I 16.0 (L) 09/05/2017 04:24 AM    FIO2I 30 09/05/2017 04:24 AM      Microbiology No results for input(s): CULT in the last 72 hours. Telemetry: [x]Sinus []A-flutter []Paced    []A-fib []Multiple PVCs                    Imaging:  CXR Results  (Last 48 hours)               09/05/17 0620  XR CHEST PORT Final result    Impression:  IMPRESSION: Lines and tubes are unchanged. Right lung base minimal hazy opacity,   likely representing atelectasis versus infiltrate. Narrative:  INDICATION:  ET tube placement. COMPARISON:  9/4/2017       FINDINGS: A portable AP radiograph of the chest was obtained at 0535 hours. Endotracheal tube, enteric tube and left IJ approach central venous line are   unchanged. Apparent lucency left upper lung region is favored to represent   overlapping structures. No definite pneumothorax. Cardiac silhouette is stable. Pulmonary vascularity is within normal limits. Hyperinflation. Right lung base   minimal hazy opacity likely representing atelectasis or infiltrate. No acute   osseous abnormality. 09/04/17 0250  XR CHEST PORT Final result    Impression:  IMPRESSION:       The ET tube is 6.3 cm above bree. Lungs remain clear bilaterally without definable acute process. No evidence of   pneumothorax or pleural effusion. Evidence of pre-existing COPD. No evidence of cardiac decompensation. Narrative:  Portable chest x-ray, semierect AP view, time 0134 hours on 09/04/2017:               INDICATION:       Respiratory failure. The patient has been on ventilation. History of hypertension, diabetes, and end-stage renal disease. History of   previous cardiac arrest.       COMPARISON STUDY: Chest x-ray on 9/3/2017 and 9/2/2017.        FINDINGS: The lower end of ET tube is 6.3 cm above bree. The patient is mildly rotated   to the left. Stable position of left IJ central venous catheter. NG tube remains extended into stomach. No evidence of pneumothorax or pneumomediastinum. Cardiac size is normal. Pulmonary vascularity is not engorged or cephalized. Lungs remain clear bilaterally without definable acute process. CP angles are   sharp bilaterally. Lungs are mildly to moderately hyperinflated. IMPRESSION:   · Acute encephalopathy, anoxic encephalopathy   · S/p PEA Cardiac arrest in dialysis 7/27/17 and 8/18/17  · Acute Respirtatory Failure requiring Mechanical Ventilation. Remains intubated 2' neurologic status   · Hypotension- workup negative for PE. Cannot r/o sepsis with recurrent fevers, Dysautonomia? · ESRD on HD  · DM2  · Acute pontine lacunar CVA  · Oropharyngeal dysphagia  · Cachexia  · Unstageable pressure ulcer to sacrum/coccyx  · S/p septic shock  treated for E.coli and C.perferinges bacteremia s/p abx      PLAN:   · Resp -  VAP bundle, Adhere to low tidal volume ventilation strategy as per the ARDS net guidelines. Aim for plateau pressures < 62WJ; Current vent settings SIMV 30%, RR 12, PS 7, PEEP 5. Patient does not meet criteria for extubation 2' inability to protect airway. Scheduled for Trach placement 9/6/17  · ID - Afebrile; Bcx 8/18/17 NGTD. Resp cx 8/19/17 - (+)candida. Low threshold to pan culture and restart abx. CXR without any acute infiltrates   · CVS - HD stable; Currently on High dose midodrine 15 mg TID. MAP goal > 65 mm/hg. Last echo with EF 55-60% and noted to have G1DD. PE workup negative. · Heme/onc -  Check CBC PRN - will check tomorrow AM (97/04)  · Metabolic - Check BMP and Mg tomorrow AM (09/05). Replace e-lytes PRN  · Renal - Last HD 8/21/17 without any UF removed; PRN dialysis  · Endocrine - Cortisol normal. Glycemic control <180; Continue SSI.  Previous thyroid study: TSH was 7.52 and Free t4 0.8. Most likely sick euthyroid state   · Neuro/ Pain/ Sedation - Four Coma Score 8. Neurology has signed off. On prophylactic kepppra; no sedation. · GI - NPO; TF at Nepro 55 ml/hr w/ 150 h20 q4h. Continue banatrol for diarrhea   · Prophylaxis - DVT- SQH, GI- Pepcid   · Surgery on board for Trach and PEG - plan for Wednesday 9/6/17. Spoke with surgery who stated they will contact family about schedule change.           The patient is: [x] acutely ill Risk of deterioration: [x] moderate    [] critically ill  [] high     [x]See my orders for details     My assessment/plan was discussed with:  [x]nursing []PT/OT    [x]respiratory therapy [x]Dr.El Loza    []family []     Monet Love NP

## 2017-09-05 NOTE — ROUTINE PROCESS
Received pt in bed. Pt calm  Mobility--bedrest  Respiratory-- vent setting ET R 12  FIO2 30%  Peep 5  GI--NG right nare Nepro 55cc/hr  Gu--anuria  Skin--Dressing to sarum   With assistance of off going nurse incontinence care for stool given. Pt repositioned for comfortBed low and locked. 3748 Potassium 6.2 as call by Lab. Placed call to Dr Lizzie Velasco. New order give 1 amp  D50,  Insulin 10 unit  Kayaxatate 30 g    0642 Answering service  Paged Dr Lesly Hartley to inform about pt elevated Potassium. Awaiting  Returned call. 8350 Dr Wicho Ponce paged by answering service. Not able to paged Dr Lesly Hartley until after 0700.  0650  Called Or and informed needed to inform Dr Lesly Hartley about adnormal lab.     0700  Dr Lesly Hartley return call. Repeat potassium. Bedside and Verbal shift change report given to Krista RN (oncoming nurse) by Brett Greer RN   (offgoing nurse). Report included the following information SBAR, Kardex, Intake/Output and MAR.

## 2017-09-05 NOTE — DIABETES MGMT
GLYCEMIC CONTROL. Pt discussed in interdisciplinary rounds this morning. Pt with hypoglycemic event 9/2 and lantus d/c. Correctional lispro every 6 hours continued very insulin resistant scale. BG ranged from 128-201 mg/dl yesterday.  Monitor for need to restart 10 units lantus    Ghazal Mayo MS, RD, CDE  Pager: [phone]

## 2017-09-05 NOTE — PROGRESS NOTES
[facility]ist Group  Progress Note    Patient: Maribell Campos Age: 65 y.o. : 1960 MR#: [id number] SSN: [id number]  Date/Time: 2017     Subjective:     Remains intubated and off sedation. K+ increased to 6.2 this am, Surgery cancelled and rescheuled for tomorrow. Nephrology to do dialysis today. Assessment/Plan:     1. S/p PEA arrest. wth acute resp fauilure on the Vent ? Cardiac cause with elevated trop PTA. Echo improving EF, s/p cath, no CAD. Now with second cardiac arrest - intubated, vent support. 2. Acute met encephalopathy: likely anoxic encephalopathy  3. Acute pontine lacunar CVA: on asa. 4. ESRD- HD as per nephrology  5. DM2- Lantus, ssi  6. Unstageable pressure ulcer to sacrum/coccyx  7. Last echo with EF 55-60% and noted to have G1DD    Full code.  Poor prognosis    Carol Mcknight (daughter) (decision maker): [phone]  Damion Daniel (niece): [address] [place] : [phone]  Cruz Enrique (brother): [phone]    Case discussed with:  []Patient  []Family  [x]Nursing  []Case Management  DVT Prophylaxis:  []Lovenox  [x]Hep SQ  [x]SCDs  []Coumadin   []On Heparin gtt    Patient Active Problem List   Diagnosis Code    Anemia D64.9    Acidosis E87.2    Cardiac arrest ([address] I46.9    Respiratory failure ([address] J96.90    ESRD needing dialysis ([address] N18.6, Z99.2    Anoxic brain damage (HCC) G93.1    Colitis K52.9    Hypotension I95.9    Acute GI bleeding K92.2    ESRD (end stage renal disease) ([address] N18.6    Sepsis ([address] A41.9    Oropharyngeal dysphagia R13.12    Cachexia ([address] R64       Objective:   VS:   Visit Vitals    BP (!) 87/51    Pulse (!) 102    Temp 99.9 °F (37.7 °C)    Resp 23    Ht 6' 2\" (1.88 m)    Wt 60 kg (132 lb 4.4 oz)    SpO2 100%    BMI 16.98 kg/m2      Tmax/24hrs: Temp (24hrs), Av.5 °F (36.9 °C), Min:97.7 °F (36.5 °C), Max:99.9 °F (37.7 °C)  IOBRIEF    Intake/Output Summary (Last 24 hours) at 17 4800 [facility] filed at 09/05/17 1445   Gross per 24 hour   Intake             1375 ml   Output             1700 ml   Net             -325 ml     General:  Intubated, unresponsive  Cardiovascular:  S1S2+, RRR  Pulmonary:  Coarse BS b/l  GI:  Soft, BS+, NT, ND  Extremities:  No edema.  Good thrill over right arm graft    Medications:   Current Facility-Administered Medications   Medication Dose Route Frequency    albumin human 25% (BUMINATE) solution 12.5 g  12.5 g IntraVENous DIALYSIS PRN    doxercalciferol (HECTOROL) 4 mcg/2 mL injection 3 mcg  3 mcg IntraVENous Q TUE, THU & SAT    epoetin benjamin (EPOGEN;PROCRIT) injection 10,000 Units  10,000 Units IntraVENous DIALYSIS TUE, THU & SAT    midodrine (PROAMITINE) tablet 15 mg  15 mg Oral TID WITH MEALS    Zinc Ox-Aloe Vera-Vitamin E (BALMEX) topical cream   Topical CONTINUOUS    banana flakes trans-galactooligosaccharide (BANATROL PLUS) powder 1 Packet  1 Packet Per NG tube TID    levETIRAcetam (KEPPRA) 500 mg in saline (iso-osm) 100 ml IVPB  500 mg IntraVENous Q12H    LORazepam (ATIVAN) injection 1 mg  1 mg IntraVENous Q4H PRN    insulin lispro (HUMALOG) injection   SubCUTAneous Q6H    white petrolatum-mineral oil 85-15 % oint 1 Dose  1 Dose Ophthalmic QID    chlorhexidine (PERIDEX) 0.12 % mouthwash 10 mL  10 mL Oral Q12H    NOREPINephrine (LEVOPHED) 16,000 mcg in dextrose 5% 250 mL infusion  2-16 mcg/min IntraVENous TITRATE    albuterol (PROVENTIL VENTOLIN) nebulizer solution 2.5 mg  2.5 mg Nebulization Q6H PRN    famotidine (PF) (PEPCID) 20 mg in sodium chloride 0.9 % 10 mL injection  20 mg IntraVENous DAILY    vits A and D-white pet-lanolin (A&D) ointment   Topical QID AFTER MEALS    collagenase (SANTYL) 250 unit/gram ointment   Topical DAILY    acetaminophen (TYLENOL) tablet 650 mg  650 mg Oral Q4H PRN    lactobacillus sp. 50 billion cpu (BIO-K PLUS) capsule 1 Cap  1 Cap Oral DAILY    loperamide (IMODIUM) capsule 2 mg  2 mg Oral Q6H PRN    heparin (porcine) injection 5,000 Units  5,000 Units SubCUTAneous Q8H    heparin (porcine) 1,000 unit/mL injection 2,000 Units  2,000 Units IntraVENous DIALYSIS ONCE    simvastatin (ZOCOR) tablet 20 mg  20 mg Oral QHS    aspirin chewable tablet 81 mg  81 mg Oral DAILY    glucose chewable tablet 16 g  4 Tab Oral PRN    glucagon (GLUCAGEN) injection 1 mg  1 mg IntraMUSCular PRN    dextrose (D50W) injection syrg 12.5-25 g  25-50 mL IntraVENous PRN    0.9% sodium chloride infusion  100 mL/hr IntraVENous DIALYSIS PRN    naloxone (NARCAN) injection 0.4 mg  0.4 mg IntraVENous PRN       Labs:      Recent Results (from the past 24 hour(s))   GLUCOSE, POC    Collection Time: 09/05/17 12:55 AM   Result Value Ref Range    Glucose (POC) 170 (H) 70 - 110 mg/dL   CBC WITH AUTOMATED DIFF    Collection Time: 09/05/17  3:46 AM   Result Value Ref Range    WBC 10.1 4.6 - 13.2 K/uL    RBC 2.93 (L) 4.70 - 5.50 M/uL    HGB 9.1 (L) 13.0 - 16.0 g/dL    HCT 28.3 (L) 36.0 - 48.0 %    MCV 96.6 74.0 - 97.0 FL    MCH 31.1 24.0 - 34.0 PG    MCHC 32.2 31.0 - 37.0 g/dL    RDW 19.0 (H) 11.6 - 14.5 %    PLATELET 359 (H) 756 - 420 K/uL    MPV 9.4 9.2 - 11.8 FL    NEUTROPHILS 62 40 - 73 %    LYMPHOCYTES 30 21 - 52 %    MONOCYTES 6 3 - 10 %    EOSINOPHILS 2 0 - 5 %    BASOPHILS 0 0 - 2 %    ABS. NEUTROPHILS 6.2 1.8 - 8.0 K/UL    ABS. LYMPHOCYTES 3.1 0.9 - 3.6 K/UL    ABS. MONOCYTES 0.6 0.05 - 1.2 K/UL    ABS. EOSINOPHILS 0.2 0.0 - 0.4 K/UL    ABS.  BASOPHILS 0.0 0.0 - 0.06 K/UL    DF AUTOMATED     METABOLIC PANEL, BASIC    Collection Time: 09/05/17  3:46 AM   Result Value Ref Range    Sodium 135 (L) 136 - 145 mmol/L    Potassium 6.2 (HH) 3.5 - 5.5 mmol/L    Chloride 110 (H) 100 - 108 mmol/L    CO2 16 (L) 21 - 32 mmol/L    Anion gap 9 3.0 - 18 mmol/L    Glucose 204 (H) 74 - 99 mg/dL    BUN 65 (H) 7.0 - 18 MG/DL    Creatinine 9.43 (H) 0.6 - 1.3 MG/DL    BUN/Creatinine ratio 7 (L) 12 - 20      GFR est AA 7 (L) >60 ml/min/1.73m2    GFR est non-AA 6 (L) >60 ml/min/1.73m2    Calcium 9.2 8.5 - 10.1 MG/DL   POC G3    Collection Time: 09/05/17  4:24 AM   Result Value Ref Range    Device: VENT      FIO2 (POC) 30 %    pH (POC) 7.344 (L) 7.35 - 7.45      pCO2 (POC) 29.4 (L) 35.0 - 45.0 MMHG    pO2 (POC) 89 80 - 100 MMHG    HCO3 (POC) 16.0 (L) 22 - 26 MMOL/L    sO2 (POC) 97 92 - 97 %    Base deficit (POC) 10 mmol/L    Mode SIMV      Tidal volume 400 ml    Set Rate 12 bpm    PEEP/CPAP (POC) 5 cmH2O    Pressure support 7 cmH2O    Allens test (POC) YES      Inspiratory Time 1 sec    Total resp. rate 19      Site RIGHT RADIAL      Patient temp.  98.6      Specimen type (POC) ARTERIAL      Performed by Beryl Leyva     Volume control plus YES     GLUCOSE, POC    Collection Time: 09/05/17  5:16 AM   Result Value Ref Range    Glucose (POC) 184 (H) 70 - 110 mg/dL   POTASSIUM    Collection Time: 09/05/17  7:20 AM   Result Value Ref Range    Potassium 6.1 (HH) 3.5 - 5.5 mmol/L   GLUCOSE, POC    Collection Time: 09/05/17 11:25 AM   Result Value Ref Range    Glucose (POC) 159 (H) 70 - 110 mg/dL       Signed By: Woodrow Aden MD     September 5, 2017

## 2017-09-05 NOTE — ANESTHESIA PREPROCEDURE EVALUATION
Anesthetic History   No history of anesthetic complications            Review of Systems / Medical History  Patient summary reviewed, nursing notes reviewed and pertinent labs reviewed    Pulmonary  Within defined limits                 Neuro/Psych   Within defined limits           Cardiovascular    Hypertension: well controlled        Dysrhythmias       Exercise tolerance: <4 METS     GI/Hepatic/Renal  Within defined limits              Endo/Other    Diabetes: well controlled, type 2         Other Findings   Comments: Risk Factors for Postoperative nausea/vomiting:       History of postoperative nausea/vomiting? NO       Female? NO       Motion sickness? NO       Intended opioid administration for postoperative analgesia? NO      Smoking Abstinence  Current Smoker? YES  Elective Surgery? YES  Seen preoperatively by anesthesiologist or proxy prior to day of surgery? YES  Pt abstained from smoking 24 hours prior to anesthesia?  YES           Physical Exam    Airway          Intubated   Cardiovascular    Rhythm: irregular           Dental    Dentition: Poor dentition     Pulmonary                 Abdominal  GI exam deferred       Other Findings            Anesthetic Plan    ASA: 4  Anesthesia type: general            Anesthetic plan and risks discussed with: Son / Daughter

## 2017-09-05 NOTE — PROGRESS NOTES
RENAL DAILY PROGRESS NOTE      IMPRESSION:   ESRD, last HD on   Anemia, on epo  Encephalopathy, severe anoxic brain injury   Respiratory failure on ventilation. Hyperkalemia, arranged urgent HD today. PLAN:   No change in mental status  Remain full code  Severe anoxic injury  Family wants continue support  Good thrill over right arm graft     At 1:27 PM on 2017, I saw and examined patient during hemodialysis treatment. The patient was receiving hemodialysis for treatment of  renal failure. I have also reviewed vital signs, intake and output, lab results and recent events, and agreed with today's dialysis order. 2K bath UF 1 L,   BP runs low during HD but able to tolerate so far  HD rounding    Blood pressure 117/59, pulse (!) 102, temperature 98.4 °F (36.9 °C), resp. rate 19, height 6' 2\" (1.88 m), weight 60 kg (132 lb 4.4 oz), SpO2 95 %.   Temp (24hrs), Av.3 °F (36.8 °C), Min:97.7 °F (36.5 °C), Max:98.9 °F (37.2 °C)      Blood Pressure: BP: 117/59  Pulse: Pulse (Heart Rate): (!) 102  Temp:  Temp: 98.4 °F (36.9 °C)    Artificial Kidney Dialyzer/Set Up Inspection: Revaclear   hours Duration of Treatment (hours): 3 hours   Heparin Bolus Heparin Bolus (units): 2000 units (verbal order per Dr. Cardozo Patch by ICU MD Rosenberg)  Blood flow rate Blood Flow Rate (ml/min): 350 ml/min   Dialysate rate     Arterial Access Pressure Arterial Access Pressure (mmHg): -200  Venous Return Pressure Venous Return Pressure (mmHg): 190  Ultrafiltration Rate Goal/Amount of Fluid to Remove (mL): 1000 mL (order is for as tolerated)  Fluid Removal Fluid Removed (mL): 473  Net Fluid Removal NET Fluid Removed (mL): 0 ml                    Subjective:     64ty M with ESRD   Complaint:   Overnight events noted  Remain intubated,  Severe anoxic brain injury     Current Facility-Administered Medications   Medication Dose Route Frequency    albumin human 25% (BUMINATE) solution 12.5 g  12.5 g IntraVENous DIALYSIS PRN  doxercalciferol (HECTOROL) 4 mcg/2 mL injection 3 mcg  3 mcg IntraVENous Q TUE, THU & SAT    epoetin benjamin (EPOGEN;PROCRIT) injection 10,000 Units  10,000 Units IntraVENous DIALYSIS TUE, THU & SAT    midodrine (PROAMITINE) tablet 15 mg  15 mg Oral TID WITH MEALS    Zinc Ox-Aloe Vera-Vitamin E (BALMEX) topical cream   Topical CONTINUOUS    banana flakes trans-galactooligosaccharide (BANATROL PLUS) powder 1 Packet  1 Packet Per NG tube TID    levETIRAcetam (KEPPRA) 500 mg in saline (iso-osm) 100 ml IVPB  500 mg IntraVENous Q12H    LORazepam (ATIVAN) injection 1 mg  1 mg IntraVENous Q4H PRN    insulin lispro (HUMALOG) injection   SubCUTAneous Q6H    white petrolatum-mineral oil 85-15 % oint 1 Dose  1 Dose Ophthalmic QID    chlorhexidine (PERIDEX) 0.12 % mouthwash 10 mL  10 mL Oral Q12H    NOREPINephrine (LEVOPHED) 16,000 mcg in dextrose 5% 250 mL infusion  2-16 mcg/min IntraVENous TITRATE    albuterol (PROVENTIL VENTOLIN) nebulizer solution 2.5 mg  2.5 mg Nebulization Q6H PRN    famotidine (PF) (PEPCID) 20 mg in sodium chloride 0.9 % 10 mL injection  20 mg IntraVENous DAILY    vits A and D-white pet-lanolin (A&D) ointment   Topical QID AFTER MEALS    collagenase (SANTYL) 250 unit/gram ointment   Topical DAILY    acetaminophen (TYLENOL) tablet 650 mg  650 mg Oral Q4H PRN    lactobacillus sp. 50 billion cpu (BIO-K PLUS) capsule 1 Cap  1 Cap Oral DAILY    loperamide (IMODIUM) capsule 2 mg  2 mg Oral Q6H PRN    heparin (porcine) injection 5,000 Units  5,000 Units SubCUTAneous Q8H    heparin (porcine) 1,000 unit/mL injection 2,000 Units  2,000 Units IntraVENous DIALYSIS ONCE    simvastatin (ZOCOR) tablet 20 mg  20 mg Oral QHS    aspirin chewable tablet 81 mg  81 mg Oral DAILY    glucose chewable tablet 16 g  4 Tab Oral PRN    glucagon (GLUCAGEN) injection 1 mg  1 mg IntraMUSCular PRN    dextrose (D50W) injection syrg 12.5-25 g  25-50 mL IntraVENous PRN    0.9% sodium chloride infusion  100 mL/hr IntraVENous DIALYSIS PRN    naloxone (NARCAN) injection 0.4 mg  0.4 mg IntraVENous PRN       Review of Symptoms: intubated   Objective:     Patient Vitals for the past 24 hrs:   Temp Pulse Resp BP SpO2   09/05/17 1300 - (!) 102 19 117/59 -   09/05/17 1248 - (!) 103 18 107/59 -   09/05/17 1245 - (!) 103 19 (!) 81/38 95 %   09/05/17 1230 - (!) 106 23 115/65 100 %   09/05/17 1215 - (!) 103 24 115/60 -   09/05/17 1200 - 100 21 107/64 -   09/05/17 1145 - (!) 104 20 100/54 -   09/05/17 1130 - 100 24 107/50 100 %   09/05/17 1125 98.4 °F (36.9 °C) - - - -   09/05/17 1122 - (!) 101 23 92/43 100 %   09/05/17 1119 - (!) 102 23 (!) 86/53 100 %   09/05/17 1115 - (!) 102 22 (!) 84/45 -   09/05/17 1111 98.9 °F (37.2 °C) 75 22 120/65 100 %   09/05/17 1000 - 92 20 120/65 100 %   09/05/17 0900 - 90 23 137/69 99 %   09/05/17 0818 - 81 24 - 100 %   09/05/17 0800 97.7 °F (36.5 °C) 77 15 120/65 100 %   09/05/17 0736 97.7 °F (36.5 °C) - - - -   09/05/17 0700 - 82 19 139/72 100 %   09/05/17 0400 98.9 °F (37.2 °C) 78 19 131/71 100 %   09/05/17 0311 - 79 20 - 100 %   09/05/17 0300 - 79 20 137/70 100 %   09/05/17 0200 - 77 20 134/76 96 %   09/05/17 0100 - 76 18 122/66 100 %   09/05/17 0000 98.3 °F (36.8 °C) 80 19 140/69 99 %   09/04/17 2300 - 82 19 138/73 99 %   09/04/17 2259 - 81 20 - 99 %   09/04/17 2200 - 83 23 146/76 100 %   09/04/17 2100 - 81 21 143/78 100 %   09/04/17 2000 98.4 °F (36.9 °C) 85 19 137/69 100 %   09/04/17 1908 - 81 20 - 98 %   09/04/17 1900 - 83 19 132/61 100 %   09/04/17 1830 - 79 20 - 100 %   09/04/17 1800 - 81 21 129/68 100 %   09/04/17 1730 - 79 18 - 100 %   09/04/17 1709 - 81 18 - 100 %   09/04/17 1700 - 81 17 133/72 100 %   09/04/17 1630 - 79 21 - 100 %   09/04/17 1621 98.2 °F (36.8 °C) - - - -   09/04/17 1600 - 77 19 119/64 98 %   09/04/17 1538 98.3 °F (36.8 °C) - - - -   09/04/17 1530 - 76 22 - 100 %   09/04/17 1500 - 77 19 135/65 99 %   09/04/17 1430 - 76 20 - 100 %   09/04/17 1400 - 85 21 158/89 100 % 09/04/17 1330 - 80 18 - 100 %        Weight change:      09/03 1901 - 09/05 0700  In: 2060 [I.V.:100]  Out: 1350 [Drains:1350]    Intake/Output Summary (Last 24 hours) at 09/05/17 1326  Last data filed at 09/05/17 1000   Gross per 24 hour   Intake             1745 ml   Output              850 ml   Net              895 ml     Physical Exam:   General: intubated   HEENT  no thyromegaly  CVS: S1S2 heard,  no rub  RS: + air entry b/l,   Abd: Soft, Non tender,   Neuro: intubated,   Extrm: +edema,   Access; left AVG + thrill            Data Review:     LABS:   Hematology:   Recent Labs      09/05/17   0346   WBC  10.1   HGB  9.1*   HCT  28.3*     Chemistry:   Recent Labs      09/05/17   0720  09/05/17   0346   BUN   --   65*   CREA   --   9.43*   CA   --   9.2   K  6.1*  6.2*   NA   --   135*   CL   --   110*   CO2   --   16*   GLU   --   204*              Procedures/imaging: see electronic medical records for all procedures, Xrays and details which were not copied into this note but were reviewed prior to creation of Plan          Assessment & Plan:     As above         Alfornia Councilman, MD  9/5/2017  3:46 PM

## 2017-09-05 NOTE — DIALYSIS
ACUTE HEMODIALYSIS FLOW SHEET    HEMODIALYSIS ORDERS: Physician: Tin Mancilla   Dialyzer:   revaclear   Duration: 3.5   hr  BFR: 350 DFR: 600   Dialysate:  Temp   K+ 2   Ca+ 2.5  Na 140 Bicarb 30   Weight:   63.8kg. (WITH EQUIP.  ON BED)  Bed Scale [x]     Unable to Obtain []        Dry weight/UF Goal:  1000 mL (as tolerated)  Access AVG  Needle Gauge 15   Heparin []  Bolus      Units    [] Hourly       Units    [x]None     Catheter locking solution Heparin   Pre BP:  120/65 Pulse: 75  Temperature:  98.9 (a)  Respirations:  Tx: NS       ml/Bolus  Other        [] N/A   Labs: Pre       Post:        [x] N/A    Additional Orders(medications, blood products, hypotension management):       [] N/A     [x] Time Out/Safety Check  [x] [facility] Consent Verified     CATHETER ACCESS: [x]N/A   []Right   []Left   []IJ     []Fem   [] First use X-ray verified     []Tunnel                [] Non Tunneled   []No S/S infection  []Redness  []Drainage []Cultured []Swelling []Pain   []Medical Aseptic Prep Utilized   []Dressing Changed  [] Biopatch  Date:    []Clotted   []Patent   Flows: []Good  []Poor  []Reversed   If access problem,  notified: []Yes    [x]N/A  Date:           GRAFT/FISTULA ACCESS:  []N/A     []Right     [x]Left     [x]UE     []LE   [x]AVG   []AVF        []Buttonhole    [x]Medical Aseptic Prep Utilized   [x]No S/S infection  []Redness  []Drainage []Cultured []Swelling []Pain    Bruit:   [x] Strong    [] Weak       Thrill :   [x] Strong    [] Weak       Needle Gauge: 15  Length: 1   If access problem,  notified: []Yes     [x]N/A  Date:        Please describe access if present and not used:       GENERAL ASSESSMENT:    LUNGS:  Rate 22 SaO2 100 %   [] N/A    [] Clear  [x] Coarse  [] Crackles  [] Wheezing        [] Diminished     Location : []RLL   []LLL    [x]RUL  [x]   Cough: []Productive  []Dry  [x]N/A   Respirations:  [x]Easy  []Labored   Therapy:  []RA  []NC  l/min    Mask: []NRB []Venti       O2% []Ventilator  [x]Intubated  [] Trach  [] BiPaP   CARDIAC: []Regular      [] Irregular   [] Pericardial Rub  [] JVD        []  Monitored  [x] Bedside  [] Remotely monitored [] N/A  Rhythm: sinus tachy   EDEMA: [] None  [x]Generalized  [] Pitting [] 1    [] 2    [] 3    [] 4                 [] Facial  [] Pedal  [x]  UE  [] LE   SKIN:   [x] Warm  [] Hot     [] Cold   [x] Dry     [] Pale   [] Diaphoretic                  [] Flushed  [] Jaundiced  [] Cyanotic  [] Rash  [] Weeping   LOC:    [] Alert      []Oriented:    [] Person     [] Place  []Time               [] Confused  [] Lethargic  [] Medicated  [x] Non-responsive     GI / ABDOMEN:  [x] Flat    [] Distended    [] Soft    [x] Firm   []  Obese                             [] Diarrhea  [x] Bowel Sounds  [] Nausea  [] Vomiting       / URINE ASSESSMENT:[] Voiding   [] Oliguria  [] Anuria   [x]  Stanton     [] Incontinent    []  Incontinent Brief      []  Bathroom Privileges     PAIN: [x] 0 []1  []2   []3   []4   []5   []6   []7   []8   []9   []10            Scale 0-10  Action/Follow Up:    MOBILITY:  [] Amb    [] Amb/Assist    [x] Bed    [] Wheelchair  [] Stretcher      All Vitals and Treatment Details on Attached [address]:  [address] [place] [address],[address]   [] 1st Time Acute  [] Stat  [] Routine  [x] Urgent     [] Acute Room  []  Bedside  [x] ICU/CCU  [] ER   Isolation Precautions:  [x] Dialysis   [] Airborne   [] Contact    [] Reverse   Special Considerations:         [] Blood Consent Verified []N/A     ALLERGIES:  NKA   Code Status:  [x] Full Code  [] DNR  [] Other           HBsAg ONLY: Date Drawn   07/31/17     [x]Negative []Positive []Unknown   HBsAb:   07/31/17 [] Susceptible   [x] [id number] []Not Drawn  [] Drawn     Current Labs:    Date of Labs: Today [x]     Results for Jazz Fox (MRN [id number]) as of 9/5/2017 10:46   Ref.  Range 9/5/2017 03:46 9/5/2017 07:20   Sodium Latest Ref Range: 136 - 145 mmol/L 135 (L)    Potassium Latest Ref Range: 3.5 - 5.5 mmol/L 6.2 (HH) 6.1 (HH)   Chloride Latest Ref Range: 100 - 108 mmol/L 110 (H)    CO2 Latest Ref Range: 21 - 32 mmol/L 16 (L)    Anion gap Latest Ref Range: 3.0 - 18 mmol/L 9    Glucose Latest Ref Range: 74 - 99 mg/dL 204 (H)    BUN Latest Ref Range: 7.0 - 18 MG/DL 65 (H)    Creatinine Latest Ref Range: 0.6 - 1.3 MG/DL 9.43 (H)    BUN/Creatinine ratio Latest Ref Range: 12 - 20   7 (L)    Calcium Latest Ref Range: 8.5 - 10.1 MG/DL 9.2    GFR est non-AA Latest Ref Range: >60 ml/min/1.73m2 6 (L)    GFR est AA Latest Ref Range: >60 ml/min/1.73m2 7 (L)       Results for Jordan Beauchamp (MRN [id number]) as of 9/5/2017 10:46   Ref. Range 9/5/2017 03:46   WBC Latest Ref Range: 4.6 - 13.2 K/uL 10.1   RBC Latest Ref Range: 4.70 - 5.50 M/uL 2.93 (L)   HGB Latest Ref Range: 13.0 - 16.0 g/dL 9.1 (L)   HCT Latest Ref Range: 36.0 - 48.0 % 28.3 (L)   MCV Latest Ref Range: 74.0 - 97.0 FL 96.6   MCH Latest Ref Range: 24.0 - 34.0 PG 31.1   MCHC Latest Ref Range: 31.0 - 37.0 g/dL 32.2   RDW Latest Ref Range: 11.6 - 14.5 % 19.0 (H)   PLATELET Latest Ref Range: 135 - 420 K/uL 441 (H)   MPV Latest Ref Range: 9.2 - 11.8 FL 9.4   NEUTROPHILS Latest Ref Range: 40 - 73 % 62   LYMPHOCYTES Latest Ref Range: 21 - 52 % 30   MONOCYTES Latest Ref Range: 3 - 10 % 6   EOSINOPHILS Latest Ref Range: 0 - 5 % 2   BASOPHILS Latest Ref Range: 0 - 2 % 0   DF Latest Units:   AUTOMATED   ABS. NEUTROPHILS Latest Ref Range: 1.8 - 8.0 K/UL 6.2   ABS. LYMPHOCYTES Latest Ref Range: 0.9 - 3.6 K/UL 3.1   ABS. MONOCYTES Latest Ref Range: 0.05 - 1.2 K/UL 0.6   ABS. EOSINOPHILS Latest Ref Range: 0.0 - 0.4 K/UL 0.2   ABS.  BASOPHILS Latest Ref Range: 0.0 - 0.06 K/UL 0.0                                                                                                                              DIET:  [] Renal    [] Other     [x] NPO     []  Diabetic      PRIMARY NURSE REPORT: First initial/Last name/Title      Pre Dialysis:   Chandu Lang RN @ 6923     EDUCATION: [x] Patient [] Other         Knowledge Basis: []None []Minimal [x] Substantial   Barriers to learning  [x] Coma   [] Access Care     [] S&S of infection     [] Fluid Management     []K+     [x]Procedural    []Albumin     [] Medications     [] Tx Options     [] Transplant     [] Diet     [] Other   Teaching Tools:  [] Explain  [] Demo  [] Handouts [] Video  Patient response:   [] Verbalized understanding  [] Teach back  [] Return demonstration [x] Requires follow up   Inappropriate due to            [address] Before each treatment:     Machine Number:                   [facility]                                  [] Unit Machine # 4  with centralized RO                                  [] Portable Machine #1/RO serial # [id number]                                  [] Portable Machine #2/RO serial # [id number]                                  [x] Portable Machine #3/RO serial # [id number]                                                                                                       [facility]                                  [] Portable Machine #11/RO serial # [id number]                                   [] Portable Machine #12/RO serial # [id number]                                  [] Portable Machine #13/RO serial #  [id number]      Alarm Test:  Pass time 6212  Other:         [x] RO/Machine Log Complete      Temp   36.5  [x]Extracorporeal Circuit Tested for integrity   Dialysate: Conductivity: Meter 13.8 HD Machine       15.4          TCD: 14.0  Dialyzer Lot # [id number]      Blood Tubing Lot #     [id number]  Saline Lot # [id number] [id number]     CHLORINE TESTING-Before each treatment and every 4 hours    Total Chlorine: [x] less than 0.1 ppm  Time:  4 Hr/2nd Check Time:    (if greater than 0.1 ppm from Primary then every 30 minutes from Secondary)     TREATMENT INITIATION  with Dialysis Precautions:   [x] All Connections Secured                 [x] Saline Line Double Clamped   [x] Venous Parameters Set                  [x] Arterial Parameters Set    [x] Prime Given 250   ml                       [x]Air Foam Detector Engaged      Treatment Initiation Note: HD treatment started in left AVG UE.     Medication Dose Volume Route Initials Dialyzer Cleared: [] Good [x] Fair  [] Poor    Blood processed: 68.8 L  UF Removed 850 mL Post Wt:  63.1  kg   POst BP:   92/66 Pulse: 71   Respirations: 18Temperature: 98.2 (A)      NaCl 0.9%      250 mL prim  250 mL rinse  500   mL    IV          Post Tx Vascular Access: AVF/AVG:   AVG  Minutes pressure held to site:  Art: 5  Oscar: 5   Albumin 12G 50 mL        Catheter: Locking solution: Heparin 1:1000 Art. mL  Socar. mL  N/A   Albumin 12G 50 mL        Post Assessment:                                    Skin:  [x] Warm  [x] Dry [] Diaphoretic    [] Flushed  [] Pale [] Cyanotic   [facility] Signatures Title Initials  Time Lungs: [x] Clear    [] Course  [] Crackles  [] Wheezing [] Diminished        Cardiac: [] Regular   [x] Irregular   [] Monitor  [] N/A  Rhythm:           Edema:  [] None    [x] General     [] Facial   [x] Pedal    [] UE    [x] LE       Pain: [x]0  []1  []2   []3  []4   []5   []6   []7   []8   []9   []10         Post Treatment Note: HD treatment complete. Patient tolerated fairly.      POST TREATMENT PRIMARY NURSE HANDOFF REPORT:     First initial/Last name/Title         Post Dialysis:Time: Zenaida Perez RN @ 1971     Abbreviations: AVG-arterial venous graft, AVF-arterial venous fistula, IJ-Internal Jugular, Subcl-Subclavian, Fem-Femoral, Tx-treatment, AP/HR-apical heart rate, DFR-dialysate flow rate, BFR-blood flow rate, AP-arterial pressure, -venous pressure, UF-ultrafiltrate, TMP-transmembrane pressure, Oscar-Venous, Art-Arterial, RO-Reverse Osmosis

## 2017-09-05 NOTE — INTERDISCIPLINARY ROUNDS
CRITICAL CARE INTERDISCIPLINARY ROUNDS      Patient Information:    Name:   Sharee Clark    Age:   65 y.o.     Admission Date:   7/27/2017    Readmit Risk Assessment Information:      Readmit Risk Tool Support Systems: Family member(s), Relationship with Primary Physician Group: Seen at least one time within past 12 months    Surgery Date:      Day of Stay:     Expected Discharge Date:        Attending Provider:   Isabella Atkinson MD    Surgeon:        Consultant:       Primary Care Provider:   Deysi Lopez MD    Problem List:     Patient Active Problem List   Diagnosis Code    Anemia D64.9    Acidosis E87.2    Cardiac arrest ([address] I46.9    Respiratory failure ([address] J96.90    ESRD needing dialysis ([facility] N18.6, Z99.2    Anoxic brain damage (HCC) G93.1    Colitis K52.9    Hypotension I95.9    Acute GI bleeding K92.2    ESRD (end stage renal disease) ([facility] N18.6    Sepsis ([address] A41.9    Oropharyngeal dysphagia R13.12    Cachexia ([address] R64       Principal Problem:  Sepsis ([address]    Procedure:       During rounds the following quality care indicators and evidence based practices were addressed :     DVT Prophylaxis, Pressure Injury Prevention, Pain Management, Sepsis resuscitation and management, Nutritional Status, Critical Care Interventions Airways, Drains, Lines and Pressors and IHI Bundles: Central Line Bundle Followed , Stanton Bundle Followed and Vent Bundle Followed, Vent Day 23           Acute MI/PCI:   Not applicable    Heart Failure:    Not applicable    Cardiac Surgery:  Not applicable    SCIP Measures for other Surgeries:   Not applicable    Pneumonia:    Appropriate Antibiotic Selection (ICU versus Non-ICU)    Stroke:  Patient's Personal Risk Factors for Stroke are:   hypertension, family history, hyperlipidemia or diabetes mellitus    NIH Stroke Score  Total: 8 (08/17/17 0400)    Transfer Level of Care:  Not Ready for Transfer    The patient will require the following interventions based on the Readmission Risk Assessment:  Pharmacy evaluation and teaching, Care Management involvement for home health follow up for:  mobility and assistance with ADL's and Spiritual Care evaluation      Discharge Management:  Group Home    Anticipated Discharge Date:  3      Interdisciplinary team rounds were held  with the following team membersCare Management, Diabetes Treatment Specialist, Nursing, Nutrition, Pastoral Care, Pharmacy, Physician and Clinical Coordinator and the  patient and healthcare POA (documentation required). Plan of care discussed. See clinical pathway and/or care plan for interventions and desired outcomes. Tube feeding resumed possible trach and peg tomorrow.

## 2017-09-06 ENCOUNTER — APPOINTMENT (OUTPATIENT)
Dept: GENERAL RADIOLOGY | Age: 57
DRG: 004 | End: 2017-09-06
Attending: PHYSICIAN ASSISTANT
Payer: MEDICARE

## 2017-09-06 LAB
ANION GAP SERPL CALC-SCNC: 10 MMOL/L (ref 3–18)
ARTERIAL PATENCY WRIST A: YES
BASE DEFICIT BLD-SCNC: 1 MMOL/L
BASOPHILS # BLD: 0 K/UL (ref 0–0.06)
BASOPHILS NFR BLD: 0 % (ref 0–3)
BDY SITE: ABNORMAL
BUN SERPL-MCNC: 29 MG/DL (ref 7–18)
BUN/CREAT SERPL: 5 (ref 12–20)
CALCIUM SERPL-MCNC: 8.8 MG/DL (ref 8.5–10.1)
CHLORIDE SERPL-SCNC: 104 MMOL/L (ref 100–108)
CO2 SERPL-SCNC: 24 MMOL/L (ref 21–32)
CREAT SERPL-MCNC: 5.72 MG/DL (ref 0.6–1.3)
DIFFERENTIAL METHOD BLD: ABNORMAL
EOSINOPHIL # BLD: 0.2 K/UL (ref 0–0.4)
EOSINOPHIL NFR BLD: 2 % (ref 0–5)
ERYTHROCYTE [DISTWIDTH] IN BLOOD BY AUTOMATED COUNT: 19 % (ref 11.6–14.5)
GAS FLOW.O2 O2 DELIVERY SYS: ABNORMAL L/MIN
GAS FLOW.O2 SETTING OXYMISER: 12 BPM
GLUCOSE BLD STRIP.AUTO-MCNC: 159 MG/DL (ref 70–110)
GLUCOSE BLD STRIP.AUTO-MCNC: 185 MG/DL (ref 70–110)
GLUCOSE BLD STRIP.AUTO-MCNC: 188 MG/DL (ref 70–110)
GLUCOSE SERPL-MCNC: 166 MG/DL (ref 74–99)
HCO3 BLD-SCNC: 23.9 MMOL/L (ref 22–26)
HCT VFR BLD AUTO: 26.5 % (ref 36–48)
HGB BLD-MCNC: 8.4 G/DL (ref 13–16)
INR PPP: 1.2 (ref 0.8–1.2)
INSPIRATION.DURATION SETTING TIME VENT: 1 SEC
LYMPHOCYTES # BLD: 3.4 K/UL (ref 0.8–3.5)
LYMPHOCYTES NFR BLD: 37 % (ref 20–51)
MAGNESIUM SERPL-MCNC: 3.2 MG/DL (ref 1.6–2.6)
MCH RBC QN AUTO: 30.9 PG (ref 24–34)
MCHC RBC AUTO-ENTMCNC: 31.7 G/DL (ref 31–37)
MCV RBC AUTO: 97.4 FL (ref 74–97)
MONOCYTES # BLD: 0.7 K/UL (ref 0–1)
MONOCYTES NFR BLD: 7 % (ref 2–9)
NEUTS SEG # BLD: 5 K/UL (ref 1.8–8)
NEUTS SEG NFR BLD: 54 % (ref 42–75)
O2/TOTAL GAS SETTING VFR VENT: 30 %
PCO2 BLD: 37.4 MMHG (ref 35–45)
PEEP RESPIRATORY: 5 CMH2O
PH BLD: 7.41 [PH] (ref 7.35–7.45)
PHOSPHATE SERPL-MCNC: 3.1 MG/DL (ref 2.5–4.9)
PLATELET # BLD AUTO: 354 K/UL (ref 135–420)
PLATELET COMMENTS,PCOM: ABNORMAL
PMV BLD AUTO: 9.2 FL (ref 9.2–11.8)
PO2 BLD: 57 MMHG (ref 80–100)
POTASSIUM SERPL-SCNC: 3.9 MMOL/L (ref 3.5–5.5)
PRESSURE SUPPORT SETTING VENT: 7 CMH2O
PROTHROMBIN TIME: 14.4 SEC (ref 11.5–15.2)
RBC # BLD AUTO: 2.72 M/UL (ref 4.7–5.5)
RBC MORPH BLD: ABNORMAL
SAO2 % BLD: 90 % (ref 92–97)
SERVICE CMNT-IMP: ABNORMAL
SODIUM SERPL-SCNC: 138 MMOL/L (ref 136–145)
SPECIMEN TYPE: ABNORMAL
TOTAL RESP. RATE, ITRR: 16
VENTILATION MODE VENT: ABNORMAL
VOLUME CONTROL PLUS IVLCP: YES
VT SETTING VENT: 400 ML
WBC # BLD AUTO: 9.3 K/UL (ref 4.6–13.2)

## 2017-09-06 PROCEDURE — 74011250637 HC RX REV CODE- 250/637: Performed by: INTERNAL MEDICINE

## 2017-09-06 PROCEDURE — 77030011256 HC DRSG MEPILEX <16IN NO BORD MOLN -A

## 2017-09-06 PROCEDURE — 87077 CULTURE AEROBIC IDENTIFY: CPT | Performed by: PHYSICIAN ASSISTANT

## 2017-09-06 PROCEDURE — 83735 ASSAY OF MAGNESIUM: CPT | Performed by: PHYSICIAN ASSISTANT

## 2017-09-06 PROCEDURE — 65610000006 HC RM INTENSIVE CARE

## 2017-09-06 PROCEDURE — 85025 COMPLETE CBC W/AUTO DIFF WBC: CPT | Performed by: PHYSICIAN ASSISTANT

## 2017-09-06 PROCEDURE — 74011250637 HC RX REV CODE- 250/637: Performed by: HOSPITALIST

## 2017-09-06 PROCEDURE — 87070 CULTURE OTHR SPECIMN AEROBIC: CPT | Performed by: PHYSICIAN ASSISTANT

## 2017-09-06 PROCEDURE — 84100 ASSAY OF PHOSPHORUS: CPT | Performed by: PHYSICIAN ASSISTANT

## 2017-09-06 PROCEDURE — 82803 BLOOD GASES ANY COMBINATION: CPT

## 2017-09-06 PROCEDURE — 87186 SC STD MICRODIL/AGAR DIL: CPT | Performed by: PHYSICIAN ASSISTANT

## 2017-09-06 PROCEDURE — 74011250637 HC RX REV CODE- 250/637: Performed by: NURSE PRACTITIONER

## 2017-09-06 PROCEDURE — 80048 BASIC METABOLIC PNL TOTAL CA: CPT | Performed by: PHYSICIAN ASSISTANT

## 2017-09-06 PROCEDURE — 74011250636 HC RX REV CODE- 250/636: Performed by: HOSPITALIST

## 2017-09-06 PROCEDURE — 82962 GLUCOSE BLOOD TEST: CPT

## 2017-09-06 PROCEDURE — 74011000250 HC RX REV CODE- 250: Performed by: PHYSICIAN ASSISTANT

## 2017-09-06 PROCEDURE — 36600 WITHDRAWAL OF ARTERIAL BLOOD: CPT

## 2017-09-06 PROCEDURE — 94003 VENT MGMT INPAT SUBQ DAY: CPT

## 2017-09-06 PROCEDURE — 36592 COLLECT BLOOD FROM PICC: CPT

## 2017-09-06 PROCEDURE — 85610 PROTHROMBIN TIME: CPT | Performed by: INTERNAL MEDICINE

## 2017-09-06 PROCEDURE — 87040 BLOOD CULTURE FOR BACTERIA: CPT | Performed by: PHYSICIAN ASSISTANT

## 2017-09-06 PROCEDURE — 74011636637 HC RX REV CODE- 636/637: Performed by: INTERNAL MEDICINE

## 2017-09-06 PROCEDURE — 71010 XR CHEST PORT: CPT

## 2017-09-06 RX ADMIN — ACETAMINOPHEN 650 MG: 325 TABLET ORAL at 12:00

## 2017-09-06 RX ADMIN — COLLAGENASE SANTYL: 250 OINTMENT TOPICAL at 09:43

## 2017-09-06 RX ADMIN — VITAMIN A AND D: 30.8 OINTMENT TOPICAL at 09:15

## 2017-09-06 RX ADMIN — MINERAL OIL AND WHITE PETROLATUM 1 DOSE: 150; 850 OINTMENT OPHTHALMIC at 21:26

## 2017-09-06 RX ADMIN — VITAMIN A AND D: 30.8 OINTMENT TOPICAL at 21:27

## 2017-09-06 RX ADMIN — MINERAL OIL AND WHITE PETROLATUM 1 DOSE: 150; 850 OINTMENT OPHTHALMIC at 13:20

## 2017-09-06 RX ADMIN — Medication 1 PACKET: at 21:26

## 2017-09-06 RX ADMIN — INSULIN LISPRO 3 UNITS: 100 INJECTION, SOLUTION INTRAVENOUS; SUBCUTANEOUS at 07:29

## 2017-09-06 RX ADMIN — INSULIN LISPRO 2 UNITS: 100 INJECTION, SOLUTION INTRAVENOUS; SUBCUTANEOUS at 00:00

## 2017-09-06 RX ADMIN — ASPIRIN 81 MG 81 MG: 81 TABLET ORAL at 09:42

## 2017-09-06 RX ADMIN — MIDODRINE HYDROCHLORIDE 15 MG: 2.5 TABLET ORAL at 18:51

## 2017-09-06 RX ADMIN — MINERAL OIL AND WHITE PETROLATUM 1 DOSE: 150; 850 OINTMENT OPHTHALMIC at 09:20

## 2017-09-06 RX ADMIN — Medication 1 PACKET: at 18:52

## 2017-09-06 RX ADMIN — MIDODRINE HYDROCHLORIDE 15 MG: 2.5 TABLET ORAL at 12:45

## 2017-09-06 RX ADMIN — MINERAL OIL AND WHITE PETROLATUM 1 DOSE: 150; 850 OINTMENT OPHTHALMIC at 18:50

## 2017-09-06 RX ADMIN — MIDODRINE HYDROCHLORIDE 15 MG: 2.5 TABLET ORAL at 09:42

## 2017-09-06 RX ADMIN — HEPARIN SODIUM 5000 UNITS: 5000 INJECTION, SOLUTION INTRAVENOUS; SUBCUTANEOUS at 09:43

## 2017-09-06 RX ADMIN — HEPARIN SODIUM 5000 UNITS: 5000 INJECTION, SOLUTION INTRAVENOUS; SUBCUTANEOUS at 18:51

## 2017-09-06 RX ADMIN — VITAMIN A AND D: 30.8 OINTMENT TOPICAL at 13:30

## 2017-09-06 RX ADMIN — ACETAMINOPHEN 650 MG: 325 TABLET ORAL at 01:25

## 2017-09-06 RX ADMIN — FAMOTIDINE 20 MG: 10 INJECTION INTRAVENOUS at 09:42

## 2017-09-06 RX ADMIN — CHLORHEXIDINE GLUCONATE 10 ML: 1.2 RINSE ORAL at 09:42

## 2017-09-06 RX ADMIN — LEVETIRACETAM 500 MG: 5 INJECTION INTRAVENOUS at 21:26

## 2017-09-06 RX ADMIN — LEVETIRACETAM 500 MG: 5 INJECTION INTRAVENOUS at 09:41

## 2017-09-06 RX ADMIN — CHLORHEXIDINE GLUCONATE 10 ML: 1.2 RINSE ORAL at 21:26

## 2017-09-06 RX ADMIN — SIMVASTATIN 20 MG: 10 TABLET, FILM COATED ORAL at 21:26

## 2017-09-06 RX ADMIN — Medication 1 CAPSULE: at 09:42

## 2017-09-06 RX ADMIN — Medication 1 PACKET: at 09:42

## 2017-09-06 NOTE — ROUTINE PROCESS
Bedside and Verbal shift change report given to Tyler Bliss, AMI (oncoming nurse) by Gisselle Buitrago RN (offgoing nurse). Report included the following information SBAR, Kardex, MAR and Recent Results. SITUATION:    Code Status: Full Code   Reason for Admission: [facility] arrest ([facility]   Acidosis   Respiratory failure (HCC)   Anemia   ESRD needing dialysis ([facility]   [facility]   Acute GI bleeding   Sepsis ([facility]   Hypotension   Anoxic brain damage (HCC)   ESRD (end stage renal disease) ([facility]   Colitis   dx   dx   [address] day: 36   Problem List:       Hospital Problems  Date Reviewed: 7/27/2017          Codes Class Noted POA    Oropharyngeal dysphagia ICD-10-CM: R13.12  ICD-9-CM: 787.22  8/17/2017 Unknown        Cachexia ([facility] ICD-10-CM: R64  ICD-9-CM: 799.4  8/17/2017 Unknown        Acidosis ICD-10-CM: E87.2  ICD-9-CM: 276.2  7/27/2017 Unknown        Cardiac arrest ([facility] ICD-10-CM: I46.9  ICD-9-CM: 427.5  7/27/2017 Unknown        Respiratory failure ([facility] ICD-10-CM: J96.90  ICD-9-CM: 518.81  7/27/2017 Unknown        ESRD needing dialysis ([facility] ICD-10-CM: N18.6, Z99.2  ICD-9-CM: 585.6  7/27/2017 Unknown        Anoxic brain damage ([facility] ICD-10-CM: G93.1  ICD-9-CM: 348.1  7/27/2017 Unknown        Colitis ICD-10-CM: K52.9  ICD-9-CM: 558.9  7/27/2017 Unknown        Hypotension ICD-10-CM: I95.9  ICD-9-CM: 458.9  7/27/2017 Unknown        Acute GI bleeding ICD-10-CM: K92.2  ICD-9-CM: 578.9  7/27/2017 Unknown        ESRD (end stage renal disease) ([facility] ICD-10-CM: N18.6  ICD-9-CM: 585.6  7/27/2017 Unknown        * (Principal)Sepsis ([facility] ICD-10-CM: A41.9  ICD-9-CM: 038.9, 995.91  7/27/2017 Unknown        Anemia ICD-10-CM: D64.9  ICD-9-CM: 372. 9  Unknown Unknown              BACKGROUND:    Past Medical History:   Past Medical History:   Diagnosis Date    Anemia     Arthritis     Chronic pain     Back     Diabetes mellitus type II     Hypertension     IBS (irritable bowel syndrome)     Lactose intolerance     TB (tuberculosis)     TB test positive         Patient taking anticoagulants: Yes     ASSESSMENT:    Changes in Assessment Throughout Shift: Trach / PEG procedures not completed today due to hyperkalemia (K: 6.2 / repeat K: 6.1). Hyperkalemia treated per orders. Hemodialysis completed without complication - 808 ml removed. Hypotension (80/50s) noted following hemodialysis - treated successfully with Albumin 25%  50 gm IV.      Patient has Central Line: Yes - Reasons if yes: Poor venous access     Patient has Stanton Cath: No      Last Vitals:     Vitals:    09/05/17 2100 09/05/17 2130 09/05/17 2200 09/05/17 2230   BP: 90/47 (!) 88/41 (!) 83/39 102/57   Pulse: 90 87 91 97   Resp: 22 23 23 25   Temp:       TempSrc:       SpO2:  100% 100% 100%   Weight:       Height:            IV and DRAINS (will only show if present)   [REMOVED] Peripheral IV 08/01/17 Right Hand-Site Assessment: Clean, dry, & intact  [REMOVED] Peripheral IV 08/05/17 Right Arm-Site Assessment: Clean, dry, & intact  [REMOVED] Peripheral IV 08/09/17 Right Hand-Site Assessment: Clean, dry, & intact  [REMOVED] Peripheral IV 08/09/17 Right Antecubital-Site Assessment: Clean, dry, & intact  [REMOVED] Sheath 08/09/17-Site Assessment: Clean, dry, & intact  [REMOVED] Nasogastric Tube 08/14/17-Site Assessment: Clean, dry, & intact  [REMOVED] Peripheral IV 08/17/17 Right Forearm-Site Assessment: Clean, dry, & intact  [REMOVED] Peripheral IV 08/17/17 Right Wrist-Site Assessment: Clean, dry, & intact  [REMOVED] Airway - Endotracheal Tube 08/18/17 Oral-Site Assessment: Clean, dry, & intact  Triple Lumen Left IJ CVL 08/18/17 Left Internal jugular-Site Assessment: Clean, dry, & intact  [REMOVED] Arterial Line 08/18/17 Right Radial artery-Site Assessment: Clean, dry, & intact  Airway - Continuous Aspiration of Subglottic Secretions (MISBAH) Tube 08/18/17 Oral-Site Assessment: Clean, dry, & intact  Nasogastric Tube 08/29/17-Site Assessment: Clean, dry, & intact  Peripheral IV 09/04/17 Right Wrist-Site Assessment: Clean, dry, & intact  [REMOVED] Airway - Continuous Aspiration of Subglottic Secretions (MISBAH) Tube 07/27/17 Oral-Site Assessment: Clean, dry, & intact  [REMOVED] Nasogastric Tube 07/27/17-Site Assessment: Clean, dry, & intact  [REMOVED] Venous Access Device 07/27/17 Upper chest (subclavicular area, right-Site Assessment: Clean, dry, & intact  [REMOVED] Peripheral IV 07/27/17 Right Antecubital-Site Assessment: Clean, dry, & intact  [REMOVED] Triple Lumen 07/27/17 Right Internal jugular-Site Assessment: Clean, dry, & intact  [REMOVED] Peripheral IV 07/27/17 Right Other(comment)-Site Assessment: Clean, dry, & intact  [REMOVED] Peripheral IV 07/27/17 Right Antecubital-Site Assessment: Swelling     WOUND (if present)   Wound Type:  Sacral decub   Dressing present Dressing Present : Yes, Changed   Wound Concerns/Notes: Santyl & Mepilex, daily & PRN     PAIN    Pain Assessment    Pain Intensity 1: 0 (09/05/17 0400)    Pain Location 1: Generalized    Pain Intervention(s) 1: Medication (see MAR)    Patient Stated Pain Goal: Unable to verbalize/indicatate pain  o Interventions for Pain:  None  o Intervention effective: N/A  o Time of last intervention: N/A   o Reassessment Completed: Yes      Last 3 Weights:  Last 3 Recorded Weights in this Encounter    09/02/17 0500 09/03/17 0655 09/04/17 1605   Weight: 62 kg (136 lb 9.6 oz) 49.6 kg (109 lb 6.4 oz) 60 kg (132 lb 4.4 oz)     Weight change:      INTAKE/OUPUT    Current Shift:      Last three shifts: 09/04 0701 - 09/05 1900  In: 3309 [I.V.:500]  Out: 2400 [Drains:1550]     LAB RESULTS     Recent Labs      09/05/17   0346   WBC  10.1   HGB  9.1*   HCT  28.3*   PLT  441*        Recent Labs      09/05/17   0720  09/05/17   0346   NA   --   135*   K  6.1*  6.2*   GLU   --   204*   BUN   --   65*   CREA   --   9.43*   CA   --   9.2       RECOMMENDATIONS AND DISCHARGE PLANNING     1.  Pending tests/procedures/ Plan of Care or Other Needs: NPO past midnight for possible Trach & PEG placement tomorrow by Dr. Aby Schneider     2. Discharge plan for patient and Needs/Barriers: TBD    3. Estimated Discharge Date: TBD; Posted on Whiteboard in Patients Room: Yes      4. The patient's care plan was reviewed with the oncoming nurse. \"HEALS\" SAFETY CHECK      Fall Risk    Total Score: 2    Safety Measures: Safety Measures: Bed/Chair-Wheels locked, Bed in low position, Call light within reach, Emergency bedside equipment, Fall prevention (comment), Nurse at bedside, Side rails X 3, Seizure precaution    A safety check occurred in the patient's room between off going nurse and oncoming nurse listed above. The safety check included the below items  Area Items   H  High Alert Medications - Verify all high alert medication drips (heparin, PCA, etc.)   E  Equipment - Suction is set up for ALL patients (with cary)  - Red plugs utilized for all equipment (IV pumps, etc.)  - WOWs wiped down at end of shift.  - Room stocked with oxygen, suction, and other unit-specific supplies   A  Alarms - Bed alarm is set for fall risk patients  - Ensure chair alarm is in place and activated if patient is up in a chair   L  Lines - Check IV for any infiltration  - Stanton bag is empty if patient has a Stanton   - Tubing and IV bags are labeled   S  Safety   - Room is clean, patient is clean, and equipment is clean. - Hallways are clear from equipment besides carts. - Fall bracelet on for fall risk patients  - Ensure room is clear and free of clutter  - Suction is set up for ALL patients (with cary)  - Hallways are clear from equipment besides carts.    - Isolation precautions followed, supplies available outside room, sign posted     Krista Valenzuela RN

## 2017-09-06 NOTE — PROGRESS NOTES
attended the interdisciplinary rounds for Wilmar Ordaz, who is a 65 y.o.,male. Patients Primary Language is: [place]. According to the patients EMR Sikhism Affiliation is: [facility].     The reason the Patient came to the hospital is:   Patient Active Problem List    Diagnosis Date Noted    Oropharyngeal dysphagia 08/17/2017    Cachexia ([facility] 08/17/2017    Acidosis 07/27/2017    Cardiac arrest ([facility] 07/27/2017    Respiratory failure ([address] 07/27/2017    ESRD needing dialysis ([facility] 07/27/2017    Anoxic brain damage ([facility] 07/27/2017    Colitis 07/27/2017    Hypotension 07/27/2017    Acute GI bleeding 07/27/2017    ESRD (end stage renal disease) ([facility] 07/27/2017    Sepsis ([facility] 07/27/2017    Anemia           Plan:  Chaplains will continue to follow and will provide pastoral care on an as needed/requested basis.  recommends bedside caregivers page  on duty if patient shows signs of acute spiritual or emotional distress.     [address] [facility] [employer]  Board Certified [address] [facility]   [phone]

## 2017-09-06 NOTE — PROGRESS NOTES
RENAL DAILY PROGRESS NOTE      IMPRESSION:   ESRD, last HD on 9/5  Anemia, on epo  Encephalopathy, severe anoxic brain injury   Respiratory failure on ventilation.    Hyperkalemia, corrected   PLAN:   Hyperkalemia improved, no plan for HD today   No change in mental status  Remain full code  Severe anoxic injury  Family wants continue support  Good thrill over right arm graft                     Subjective:     64ty M with ESRD   Complaint:   Overnight events noted  Remain intubated,  Severe anoxic brain injury     Current Facility-Administered Medications   Medication Dose Route Frequency    albumin human 25% (BUMINATE) solution 12.5 g  12.5 g IntraVENous DIALYSIS PRN    doxercalciferol (HECTOROL) 4 mcg/2 mL injection 3 mcg  3 mcg IntraVENous Q TUE, THU & SAT    epoetin benjamin (EPOGEN;PROCRIT) injection 10,000 Units  10,000 Units IntraVENous DIALYSIS TUE, THU & SAT    midodrine (PROAMITINE) tablet 15 mg  15 mg Oral TID WITH MEALS    Zinc Ox-Aloe Vera-Vitamin E (BALMEX) topical cream   Topical CONTINUOUS    banana flakes trans-galactooligosaccharide (BANATROL PLUS) powder 1 Packet  1 Packet Per NG tube TID    levETIRAcetam (KEPPRA) 500 mg in saline (iso-osm) 100 ml IVPB  500 mg IntraVENous Q12H    LORazepam (ATIVAN) injection 1 mg  1 mg IntraVENous Q4H PRN    insulin lispro (HUMALOG) injection   SubCUTAneous Q6H    white petrolatum-mineral oil 85-15 % oint 1 Dose  1 Dose Ophthalmic QID    chlorhexidine (PERIDEX) 0.12 % mouthwash 10 mL  10 mL Oral Q12H    NOREPINephrine (LEVOPHED) 16,000 mcg in dextrose 5% 250 mL infusion  2-16 mcg/min IntraVENous TITRATE    albuterol (PROVENTIL VENTOLIN) nebulizer solution 2.5 mg  2.5 mg Nebulization Q6H PRN    famotidine (PF) (PEPCID) 20 mg in sodium chloride 0.9 % 10 mL injection  20 mg IntraVENous DAILY    vits A and D-white pet-lanolin (A&D) ointment   Topical QID AFTER MEALS    collagenase (SANTYL) 250 unit/gram ointment   Topical DAILY    acetaminophen (TYLENOL) tablet 650 mg  650 mg Oral Q4H PRN    lactobacillus sp. 50 billion cpu (BIO-K PLUS) capsule 1 Cap  1 Cap Oral DAILY    loperamide (IMODIUM) capsule 2 mg  2 mg Oral Q6H PRN    heparin (porcine) injection 5,000 Units  5,000 Units SubCUTAneous Q8H    heparin (porcine) 1,000 unit/mL injection 2,000 Units  2,000 Units IntraVENous DIALYSIS ONCE    simvastatin (ZOCOR) tablet 20 mg  20 mg Oral QHS    aspirin chewable tablet 81 mg  81 mg Oral DAILY    glucose chewable tablet 16 g  4 Tab Oral PRN    glucagon (GLUCAGEN) injection 1 mg  1 mg IntraMUSCular PRN    dextrose (D50W) injection syrg 12.5-25 g  25-50 mL IntraVENous PRN    0.9% sodium chloride infusion  100 mL/hr IntraVENous DIALYSIS PRN    naloxone (NARCAN) injection 0.4 mg  0.4 mg IntraVENous PRN       Review of Symptoms: intubated   Objective:     Patient Vitals for the past 24 hrs:   Temp Pulse Resp BP SpO2   09/06/17 0745 - - - - 97 %   09/06/17 0732 - 73 26 - (!) 87 %   09/06/17 0700 - 71 23 (!) 85/34 90 %   09/06/17 0630 - 69 18 95/40 94 %   09/06/17 0600 - 70 17 (!) 89/39 95 %   09/06/17 0530 - 73 13 93/43 97 %   09/06/17 0500 - 70 16 99/43 95 %   09/06/17 0430 - 70 19 99/41 93 %   09/06/17 0400 97.8 °F (36.6 °C) 70 12 98/41 97 %   09/06/17 0330 - 72 15 97/44 100 %   09/06/17 0300 - 69 13 96/44 100 %   09/06/17 0230 - 74 14 101/45 100 %   09/06/17 0200 - 74 16 95/43 100 %   09/06/17 0130 - 74 15 97/43 100 %   09/06/17 0100 - 81 17 102/46 99 %   09/06/17 0030 - 90 - 103/42 98 %   09/06/17 0001 - 94 20 - 100 %   09/06/17 0000 (!) 101 °F (38.3 °C) 95 20 123/55 100 %   09/05/17 2330 - 94 21 110/56 100 %   09/05/17 2300 - 94 20 101/56 100 %   09/05/17 2230 - 97 25 102/57 100 %   09/05/17 2200 - 91 23 (!) 83/39 100 %   09/05/17 2130 - 87 23 (!) 88/41 100 %   09/05/17 2100 - 90 22 90/47 -   09/05/17 2054 - 88 20 - 100 %   09/05/17 2030 - 88 20 104/49 100 %   09/05/17 2000 (!) 101.6 °F (38.7 °C) 93 21 98/45 100 %   09/05/17 1930 - 89 22 110/49 100 %   09/05/17 1900 - 85 19 106/54 100 %   09/05/17 1830 - 84 17 108/55 100 %   09/05/17 1800 - 87 16 95/50 100 %   09/05/17 1730 - 91 19 114/56 100 %   09/05/17 1700 - 95 22 120/62 100 %   09/05/17 1630 - 100 18 115/64 100 %   09/05/17 1600 99.9 °F (37.7 °C) (!) 101 17 (!) 89/53 100 %   09/05/17 1545 - (!) 102 18 (!) 87/51 100 %   09/05/17 1530 - (!) 102 18 (!) 80/47 -   09/05/17 1500 - (!) 103 23 (!) 85/52 -   09/05/17 1445 - (!) 102 19 96/65 -   09/05/17 1430 - (!) 102 22 95/61 -   09/05/17 1415 - (!) 103 25 96/75 -   09/05/17 1400 - (!) 105 23 115/68 -   09/05/17 1345 - (!) 101 25 93/56 -   09/05/17 1330 - 100 18 (!) 81/48 100 %   09/05/17 1315 - (!) 105 21 110/56 -   09/05/17 1300 - (!) 102 19 117/59 -   09/05/17 1248 - (!) 103 18 107/59 -   09/05/17 1245 - (!) 103 19 (!) 81/38 95 %   09/05/17 1230 - (!) 106 23 115/65 100 %   09/05/17 1215 - (!) 103 24 115/60 -   09/05/17 1200 98.4 °F (36.9 °C) 100 21 107/64 100 %   09/05/17 1145 - (!) 104 20 100/54 -   09/05/17 1130 - 100 24 107/50 100 %   09/05/17 1125 98.4 °F (36.9 °C) - - - -   09/05/17 1122 - (!) 101 23 92/43 100 %   09/05/17 1119 - (!) 102 23 (!) 86/53 100 %   09/05/17 1115 - (!) 102 22 (!) 84/45 -   09/05/17 1111 98.9 °F (37.2 °C) 75 22 120/65 100 %   09/05/17 1100 - 96 21 122/70 100 %        Weight change: 3 kg (6 lb 9.8 oz)     09/04 1901 - 09/06 0700  In: 3385 [I.V.:700]  Out: 2000 [Drains:1150]    Intake/Output Summary (Last 24 hours) at 09/06/17 1056  Last data filed at 09/06/17 0940   Gross per 24 hour   Intake             2270 ml   Output             1750 ml   Net              520 ml     Physical Exam:   General: intubated   HEENT  no thyromegaly  CVS: S1S2 heard,  no rub  RS: + air entry b/l,   Abd: Soft, Non tender,   Neuro: intubated,   Extrm: +edema,   Access; left AVG + thrill            Data Review:     LABS:   Hematology:   Recent Labs      09/06/17   0815  09/05/17   0346   WBC  9.3  10.1   HGB  8.4*  9.1*   HCT  26.5*  28.3* Chemistry:   Recent Labs      09/06/17   0815  09/05/17   0720  09/05/17   0346   BUN  29*   --   65*   CREA  5.72*   --   9.43*   CA  8.8   --   9.2   K  3.9  6.1*  6.2*   NA  138   --   135*   CL  104   --   110*   CO2  24   --   16*   PHOS  3.1   --    --    GLU  166*   --   204*              Procedures/imaging: see electronic medical records for all procedures, Xrays and details which were not copied into this note but were reviewed prior to creation of Plan          Assessment & Plan:     As above         Jhon Irving MD  9/6/2017  3:46 PM

## 2017-09-06 NOTE — PROGRESS NOTES
[facility]  ICU Progress Note      Name: Javier Mackenzie   : 1960   MRN: [id number]   Date: 2017 6:47 AM     [x]I have reviewed the flowsheet and previous days notes. Events overnight reviewed and discussed with nursing staff. Vital signs and records reviewed. HPI:  Jami Gann a 55 y. o. African American male with a history of DM, ESRD admitted PEA. Patient's hospitalization was also found to have a CVA and was treated for E.coli and C.perferinges bacteremia. Subsequent PEA arrest 17. Remains intubated and off sedation. 17  - Awaiting recheck of K+, Surgery cancelled today, possibly may go for trach/peg on Friday. - Patient remains unresponsive  - Off pressors; maintaining BP  - Afebrile overnight  - Anuric   - Dialysis last done yesterday pulled 850ml off. ROS:Review of systems not obtained due to patient factors.     Medication Review:  · Pressors - N/A  · Sedation - N/A  · Antibiotics - N/A  · Pain - PRN Tylenol  · GI: Pepcid, DVT: SQH  · Others (other gtts)    Safety Bundles: VAP Bundle, CVL Bundle    Vital Signs:    Visit Vitals    BP (!) 85/34    Pulse 73    Temp 97.8 °F (36.6 °C)    Resp 26    Ht 6' 2\" (1.88 m)    Wt 63 kg (138 lb 14.2 oz)    SpO2 97%    BMI 17.83 kg/m2       O2 Device: Endotracheal tube, Ventilator, Heated, Humidifier   O2 Flow Rate (L/min): 6 l/min   Temp (24hrs), Av.4 °F (37.4 °C), Min:97.8 °F (36.6 °C), Max:101.6 °F (38.7 °C)       Intake/Output:   Last shift:      701 - 1900  In: 150   Out: -   Last 3 shifts: 1901 -  07  In: 9341 [I.V.:700]  Out: 2000 [Drains:1150]    Intake/Output Summary (Last 24 hours) at 17 0958  Last data filed at 17 0940   Gross per 24 hour   Intake             2325 ml   Output             1750 ml   Net              575 ml       Ventilator Settings:  Ventilator  Mode: SIMV, Pressure support  Respiratory Rate  Resp Rate Observed: 17  Back-Up Rate: 12  Insp Time (sec): 1 sec  Insp Flow (l/min): 44 l/min  I:E Ratio: 1:4  Ventilator Volumes  Vt Set (ml): 400 ml  Vt Exhaled (Machine Breath) (ml): 500 ml  Vt Spont (ml): 195 ml  Ve Observed (l/min): 4.92 l/min  Ventilator Pressures  PC Set: 400  Pressure Support (cm H2O): 7 cm H2O  PIP Observed (cm H2O): 17 cm H2O  Plateau Pressure (cm H2O): 13 cm H2O  MAP (cm H2O): 7.7  PEEP/VENT (cm H2O): 5 cm H20  Auto PEEP Observed (cm H2O): 0 cm H2O    Physical Exam:    General: Intubated, cachectic, unresponsive  HEENT:  Anicteric sclerae; pink palpebral conjunctivae; mucosa moist, ETT   Resp:  Symmetrical chest expansion, no accessory muscle use; good airway entry; BBS coarse   CV:  S1, S2 present; regular rate and rhythm  GI:  Abdomen soft, non-tender; (+) active bowel sounds  Extremities:  +2 pulses on all extremities; significant muscle atrophy, AV fistula LUE: + bruit + thrill   Skin:  Warm; no rashes/ lesions noted  Neurologic:  Negative dolls eye, pinpoint pupils nonreactive to light, + cough, - gag, decorticate posturing with stimulation, FOUR COMA SCORE 8  Devices:  OGT, ETT (8/18/17), LIJ TLC (8/18/17)      DATA:     Current Facility-Administered Medications   Medication Dose Route Frequency    albumin human 25% (BUMINATE) solution 12.5 g  12.5 g IntraVENous DIALYSIS PRN    doxercalciferol (HECTOROL) 4 mcg/2 mL injection 3 mcg  3 mcg IntraVENous Q TUE, THU & SAT    epoetin benjamin (EPOGEN;PROCRIT) injection 10,000 Units  10,000 Units IntraVENous DIALYSIS TUE, THU & SAT    midodrine (PROAMITINE) tablet 15 mg  15 mg Oral TID WITH MEALS    Zinc Ox-Aloe Vera-Vitamin E (BALMEX) topical cream   Topical CONTINUOUS    banana flakes trans-galactooligosaccharide (BANATROL PLUS) powder 1 Packet  1 Packet Per NG tube TID    levETIRAcetam (KEPPRA) 500 mg in saline (iso-osm) 100 ml IVPB  500 mg IntraVENous Q12H    LORazepam (ATIVAN) injection 1 mg  1 mg IntraVENous Q4H PRN    insulin lispro (HUMALOG) injection SubCUTAneous Q6H    white petrolatum-mineral oil 85-15 % oint 1 Dose  1 Dose Ophthalmic QID    chlorhexidine (PERIDEX) 0.12 % mouthwash 10 mL  10 mL Oral Q12H    NOREPINephrine (LEVOPHED) 16,000 mcg in dextrose 5% 250 mL infusion  2-16 mcg/min IntraVENous TITRATE    albuterol (PROVENTIL VENTOLIN) nebulizer solution 2.5 mg  2.5 mg Nebulization Q6H PRN    famotidine (PF) (PEPCID) 20 mg in sodium chloride 0.9 % 10 mL injection  20 mg IntraVENous DAILY    vits A and D-white pet-lanolin (A&D) ointment   Topical QID AFTER MEALS    collagenase (SANTYL) 250 unit/gram ointment   Topical DAILY    acetaminophen (TYLENOL) tablet 650 mg  650 mg Oral Q4H PRN    lactobacillus sp. 50 billion cpu (BIO-K PLUS) capsule 1 Cap  1 Cap Oral DAILY    loperamide (IMODIUM) capsule 2 mg  2 mg Oral Q6H PRN    heparin (porcine) injection 5,000 Units  5,000 Units SubCUTAneous Q8H    heparin (porcine) 1,000 unit/mL injection 2,000 Units  2,000 Units IntraVENous DIALYSIS ONCE    simvastatin (ZOCOR) tablet 20 mg  20 mg Oral QHS    aspirin chewable tablet 81 mg  81 mg Oral DAILY    glucose chewable tablet 16 g  4 Tab Oral PRN    glucagon (GLUCAGEN) injection 1 mg  1 mg IntraMUSCular PRN    dextrose (D50W) injection syrg 12.5-25 g  25-50 mL IntraVENous PRN    0.9% sodium chloride infusion  100 mL/hr IntraVENous DIALYSIS PRN    naloxone (NARCAN) injection 0.4 mg  0.4 mg IntraVENous PRN         Labs: Results:       Chemistry Recent Labs      09/06/17 0815 09/05/17   0720  09/05/17   0346   GLU  166*   --   204*   NA  138   --   135*   K  3.9  6.1*  6.2*   CL  104   --   110*   CO2  24   --   16*   BUN  29*   --   65*   CREA  5.72*   --   9.43*   CA  8.8   --   9.2   AGAP  10   --   9   BUCR  5*   --   7*      CBC w/Diff Recent Labs      09/06/17 0815 09/05/17   0346   WBC  9.3  10.1   RBC  2.72*  2.93*   HGB  8.4*  9.1*   HCT  26.5*  28.3*   PLT  354  441*   GRANS  PENDING  62   LYMPH  PENDING  30   EOS  PENDING  2 Coagulation Recent Labs      09/06/17   0815   PTP  14.4   INR  1.2       Liver Enzymes No results for input(s): TP, ALB, TBIL, AP, SGOT, GPT in the last 72 hours. No lab exists for component: DBIL   ABG Lab Results   Component Value Date/Time    PHI 7.413 09/06/2017 05:20 AM    PCO2I 37.4 09/06/2017 05:20 AM    PO2I 57 (L) 09/06/2017 05:20 AM    HCO3I 23.9 09/06/2017 05:20 AM    FIO2I 30 09/06/2017 05:20 AM      Microbiology Recent Labs      09/06/17   0359  09/06/17   0300   CULT  NO GROWTH AFTER 2 HOURS  PENDING  NO GROWTH AFTER 2 HOURS          Telemetry: [x]Sinus []A-flutter []Paced    []A-fib []Multiple PVCs                    Imaging:  CXR Results  (Last 48 hours)               09/06/17 0614  XR CHEST PORT Final result    Impression:  IMPRESSION:   1. Tubes and lines in good position. 2.  New retrocardiac opacity. Narrative:  EXAM: Chest X-Ray       INDICATION: Intubated       TECHNIQUE: AP view of the chest       COMPARISON: 9/5/2017, 9/4/2017, 9/3/2017 and 9/2/2017       FINDINGS:    Tubes and Lines: The ET tube, left IJ catheter and enteric tube are unchanged. Pleura: No pneumothorax appreciated. There is mild blunting of the right   costophrenic angle which may represent a small pleural effusion. Lungs:  Noted retrocardiac opacity is noted which is new. Cardiac silhouette: The cardiac silhouette set the upper limits of normal. This   unchanged. Pulmonary Vascularity: The pulmonary vasculature is unremarkable. Osseous Structures: Degenerative changes of the shoulders are noted. 09/05/17 0620  XR CHEST PORT Final result    Impression:  IMPRESSION: Lines and tubes are unchanged. Right lung base minimal hazy opacity,   likely representing atelectasis versus infiltrate. Narrative:  INDICATION:  ET tube placement. COMPARISON:  9/4/2017       FINDINGS: A portable AP radiograph of the chest was obtained at 0535 hours.    Endotracheal tube, enteric tube and left IJ approach central venous line are   unchanged. Apparent lucency left upper lung region is favored to represent   overlapping structures. No definite pneumothorax. Cardiac silhouette is stable. Pulmonary vascularity is within normal limits. Hyperinflation. Right lung base   minimal hazy opacity likely representing atelectasis or infiltrate. No acute   osseous abnormality. IMPRESSION:   · Acute encephalopathy, anoxic encephalopathy   · S/p PEA Cardiac arrest in dialysis 7/27/17 and 8/18/17  · Acute Respirtatory Failure requiring Mechanical Ventilation. Remains intubated 2' neurologic status   · Hypotension- workup negative for PE. Cannot r/o sepsis with recurrent fevers, Dysautonomia? · ESRD on HD  · DM2  · Acute pontine lacunar CVA  · Oropharyngeal dysphagia  · Cachexia  · Unstageable pressure ulcer to sacrum/coccyx  · S/p septic shock  treated for E.coli and C.perferinges bacteremia s/p abx      PLAN:   · Resp -  VAP bundle, Adhere to low tidal volume ventilation strategy as per the ARDS net guidelines. Aim for plateau pressures < 03HY; Patient does not meet criteria for extubation 2' inability to protect airway. Scheduled for Trach placement 9/8/17  · ID - Afebrile; Bcx 8/18/17 NGTD. Resp cx 8/19/17 - (+)candida. Low threshold to pan culture and restart abx. CXR without any acute infiltrates   · CVS - HD stable; Currently on High dose midodrine 15 mg TID. MAP goal > 65 mm/hg. Last echo with EF 55-60% and noted to have G1DD. PE workup negative. · Heme/onc -  Check CBC in am  · Metabolic - Check BMP in am. Replace e-lytes PRN per renal   · Renal - Last HD 9/5/17/ with 850cc removed; PRN dialysis per renal  · Endocrine - Cortisol normal. Glycemic control <180; Continue SSI. Previous thyroid study: TSH was 7.52 and Free t4 0.8. Most likely sick euthyroid state   · Neuro/ Pain/ Sedation - Four Coma Score 8. Neurology has signed off. On prophylactic kepppra; no sedation. · GI - NPO; TF at Nepro 55 ml/hr w/ 150 h20 q4h. Continue banatrol for diarrhea   · Prophylaxis - DVT- SQH, GI- Pepcid   · Surgery on board for Trach and PEG - plan for Friday 9/8/17. Spoke with surgery who stated they will contact family about schedule change. · Will D/C CVL today after PIVs placed.           The patient is: [x] acutely ill Risk of deterioration: [x] moderate    [] critically ill  [] high     [x]See my orders for details     My assessment/plan was discussed with:  [x]nursing []PT/OT    [x]respiratory therapy [x]Dr.El Sage Galvan, Dr. Luis Monique    []family []     Rao Darby, NP

## 2017-09-06 NOTE — PROGRESS NOTES
Spoke to patient's daughter about his prognosis. Explained that he would not ever be able to communicate again or interact with the world again. She understands but is concerned about making decisions without her brother. However, her brother will not come to the hospital.  Told her I would call and speak to him héctor. 1745  Attempted to call patient's son, no answer. Left message for him to return call.

## 2017-09-06 NOTE — INTERDISCIPLINARY ROUNDS
CRITICAL CARE INTERDISCIPLINARY ROUNDS      Patient Information:    Name:   Kaur Pascal    Age:   65 y.o.     Admission Date:   7/27/2017    Readmit Risk Assessment Information:      Readmit Risk Tool Support Systems: Family member(s), Relationship with Primary Physician Group: Seen at least one time within past 12 months    Surgery Date:      Day of Stay:     Expected Discharge Date:        Attending Provider:   Hank Pantoja MD    Surgeon:        Consultant:       Primary Care Provider:   Belkis Underwood MD    Problem List:     Patient Active Problem List   Diagnosis Code    Anemia D64.9    Acidosis E87.2    Cardiac arrest ([address] I46.9    Respiratory failure ([address] J96.90    ESRD needing dialysis ([facility] N18.6, Z99.2    Anoxic brain damage (HCC) G93.1    Colitis K52.9    Hypotension I95.9    Acute GI bleeding K92.2    ESRD (end stage renal disease) ([facility] N18.6    Sepsis ([address] A41.9    Oropharyngeal dysphagia R13.12    Cachexia ([address] R64       Principal Problem:  Sepsis ([address]    Procedure:       During rounds the following quality care indicators and evidence based practices were addressed :     DVT Prophylaxis, Pressure Injury Prevention, Pain Management, Sepsis resuscitation and management, Nutritional Status, Critical Care Interventions Airways, Drains and Lines and IHI Bundles: Central Line Bundle Followed  and Vent Bundle Followed, Vent Day 19           Acute MI/PCI:   Not applicable    Heart Failure:    Not applicable    Cardiac Surgery:  Not applicable    SCIP Measures for other Surgeries:   Not applicable    Pneumonia:    Appropriate Antibiotic Selection (ICU versus Non-ICU)    Stroke:  Patient's Personal Risk Factors for Stroke are:   hypertension, family history, hyperlipidemia or diabetes mellitus    NIH Stroke Score  Total: 8 (08/17/17 0400)    Transfer Level of Care:  Not Ready for Transfer    The patient will require the following interventions based on the Readmission Risk Assessment:  Pharmacy evaluation and teaching, Care Management involvement for home health follow up for:  mobility and assistance with ADL's and Spiritual Care evaluation      Discharge Management:  Group Home and Palliative Care    Anticipated Discharge Date:  3      Interdisciplinary team rounds were held  with the following team membersCare Management, Diabetes Treatment Specialist, Nursing, Nutrition, Pastoral Care, Pharmacy, Physician and Clinical Coordinator and the  patient and healthcare POA (documentation required). Plan of care discussed. See clinical pathway and/or care plan for interventions and desired outcomes. Surgery cancelled today trach and peg next week, possibly Friday.

## 2017-09-06 NOTE — PROGRESS NOTES
[facility]  Progress Note    Patient: Ruel Caba Age: 65 y.o. : 1960 MR#: [id number] SSN: [id number]  Date/Time: 2017     Subjective:     No change clinically. K improved to 3.9 after HD yesterday. Assessment/Plan:     1. S/p PEA arrest. wth acute resp fauilure on the Vent ? Cardiac cause with elevated trop PTA. Echo improving EF, s/p cath, no CAD. Now with second cardiac arrest - intubated, vent support. 2. Acute met encephalopathy: likely anoxic encephalopathy  3. Acute pontine lacunar CVA: on asa. 4. ESRD- HD as per nephrology  5. DM2- Lantus, ssi  6. Unstageable pressure ulcer to sacrum/coccyx  7. Last echo with EF 55-60% and noted to have G1DD  8. Full code. icu . Consider ethics consult. Full code.  Poor prognosis    Brittany Valero (daughter) (decision maker): [phone]  Eric [place] (niece): [address] [place] : [phone]  Deyanira Schafer (brother): [phone]    Case discussed with:  []Patient  []Family  [x]Nursing  []Case Management  DVT Prophylaxis:  []Lovenox  [x]Hep SQ  [x]SCDs  []Coumadin   []On Heparin gtt    Patient Active Problem List   Diagnosis Code    Anemia D64.9    Acidosis E87.2    Cardiac arrest ([address] I46.9    Respiratory failure ([address] J96.90    ESRD needing dialysis ([address] N18.6, Z99.2    Anoxic brain damage (HCC) G93.1    Colitis K52.9    Hypotension I95.9    Acute GI bleeding K92.2    ESRD (end stage renal disease) ([address] N18.6    Sepsis ([address] A41.9    Oropharyngeal dysphagia R13.12    Cachexia ([address] R64       Objective:   VS:   Visit Vitals    BP (!) 85/34    Pulse 73    Temp 99.2 °F (37.3 °C)    Resp 26    Ht 6' 2\" (1.88 m)    Wt 63 kg (138 lb 14.2 oz)    SpO2 97%    BMI 17.83 kg/m2      Tmax/24hrs: Temp (24hrs), Av.2 °F (37.9 °C), Min:97.8 °F (36.6 °C), Max:101.9 °F (38.8 °C)  IOBRIEF    Intake/Output Summary (Last 24 hours) at 17 1500  Last data filed at 17 4301 Gross per 24 hour   Intake             1745 ml   Output              900 ml   Net              845 ml     General:  Intubated, unresponsive  Cardiovascular:  S1S2+, RRR  Pulmonary:  Coarse BS b/l  GI:  Soft, BS+, NT, ND  Extremities:  No edema.  Good thrill over right arm graft    Medications:   Current Facility-Administered Medications   Medication Dose Route Frequency    albumin human 25% (BUMINATE) solution 12.5 g  12.5 g IntraVENous DIALYSIS PRN    doxercalciferol (HECTOROL) 4 mcg/2 mL injection 3 mcg  3 mcg IntraVENous Q TUE, THU & SAT    epoetin benjamin (EPOGEN;PROCRIT) injection 10,000 Units  10,000 Units IntraVENous DIALYSIS TUE, THU & SAT    midodrine (PROAMITINE) tablet 15 mg  15 mg Oral TID WITH MEALS    Zinc Ox-Aloe Vera-Vitamin E (BALMEX) topical cream   Topical CONTINUOUS    banana flakes trans-galactooligosaccharide (BANATROL PLUS) powder 1 Packet  1 Packet Per NG tube TID    levETIRAcetam (KEPPRA) 500 mg in saline (iso-osm) 100 ml IVPB  500 mg IntraVENous Q12H    LORazepam (ATIVAN) injection 1 mg  1 mg IntraVENous Q4H PRN    insulin lispro (HUMALOG) injection   SubCUTAneous Q6H    white petrolatum-mineral oil 85-15 % oint 1 Dose  1 Dose Ophthalmic QID    chlorhexidine (PERIDEX) 0.12 % mouthwash 10 mL  10 mL Oral Q12H    NOREPINephrine (LEVOPHED) 16,000 mcg in dextrose 5% 250 mL infusion  2-16 mcg/min IntraVENous TITRATE    albuterol (PROVENTIL VENTOLIN) nebulizer solution 2.5 mg  2.5 mg Nebulization Q6H PRN    famotidine (PF) (PEPCID) 20 mg in sodium chloride 0.9 % 10 mL injection  20 mg IntraVENous DAILY    vits A and D-white pet-lanolin (A&D) ointment   Topical QID AFTER MEALS    collagenase (SANTYL) 250 unit/gram ointment   Topical DAILY    acetaminophen (TYLENOL) tablet 650 mg  650 mg Oral Q4H PRN    lactobacillus sp. 50 billion cpu (BIO-K PLUS) capsule 1 Cap  1 Cap Oral DAILY    loperamide (IMODIUM) capsule 2 mg  2 mg Oral Q6H PRN    heparin (porcine) injection 5,000 Units  5,000 Units SubCUTAneous Q8H    heparin (porcine) 1,000 unit/mL injection 2,000 Units  2,000 Units IntraVENous DIALYSIS ONCE    simvastatin (ZOCOR) tablet 20 mg  20 mg Oral QHS    aspirin chewable tablet 81 mg  81 mg Oral DAILY    glucose chewable tablet 16 g  4 Tab Oral PRN    glucagon (GLUCAGEN) injection 1 mg  1 mg IntraMUSCular PRN    dextrose (D50W) injection syrg 12.5-25 g  25-50 mL IntraVENous PRN    0.9% sodium chloride infusion  100 mL/hr IntraVENous DIALYSIS PRN    naloxone (NARCAN) injection 0.4 mg  0.4 mg IntraVENous PRN       Labs:      Recent Results (from the past 24 hour(s))   GLUCOSE, POC    Collection Time: 09/05/17  5:18 PM   Result Value Ref Range    Glucose (POC) 164 (H) 70 - 110 mg/dL   GLUCOSE, POC    Collection Time: 09/06/17 12:24 AM   Result Value Ref Range    Glucose (POC) 185 (H) 70 - 110 mg/dL   CULTURE, BLOOD    Collection Time: 09/06/17  3:00 AM   Result Value Ref Range    Special Requests: NO SPECIAL REQUESTS      Culture result: NO GROWTH AFTER 2 HOURS     CULTURE, RESPIRATORY/SPUTUM/BRONCH W GRAM STAIN    Collection Time: 09/06/17  3:00 AM   Result Value Ref Range    Special Requests: NO SPECIAL REQUESTS      GRAM STAIN MANY WBC'S      GRAM STAIN MANY GRAM NEGATIVE RODS      GRAM STAIN RARE GRAM POSITIVE RODS      Culture result: PENDING    CULTURE, BLOOD    Collection Time: 09/06/17  3:59 AM   Result Value Ref Range    Special Requests: NO SPECIAL REQUESTS      Culture result: NO GROWTH AFTER 2 HOURS     POC G3    Collection Time: 09/06/17  5:20 AM   Result Value Ref Range    Device: VENT      FIO2 (POC) 30 %    pH (POC) 7.413 7.35 - 7.45      pCO2 (POC) 37.4 35.0 - 45.0 MMHG    pO2 (POC) 57 (L) 80 - 100 MMHG    HCO3 (POC) 23.9 22 - 26 MMOL/L    sO2 (POC) 90 (L) 92 - 97 %    Base deficit (POC) 1 mmol/L    Mode SIMV      Tidal volume 400 ml    Set Rate 12 bpm    PEEP/CPAP (POC) 5 cmH2O    Pressure support 7 cmH2O    Allens test (POC) YES      Inspiratory Time 1 sec    Total resp. rate 16      Site RIGHT RADIAL      Specimen type (POC) ARTERIAL      Performed by [facility] [place]     Volume control plus YES     CBC WITH AUTOMATED DIFF    Collection Time: 09/06/17  8:15 AM   Result Value Ref Range    WBC 9.3 4.6 - 13.2 K/uL    RBC 2.72 (L) 4.70 - 5.50 M/uL    HGB 8.4 (L) 13.0 - 16.0 g/dL    HCT 26.5 (L) 36.0 - 48.0 %    MCV 97.4 (H) 74.0 - 97.0 FL    MCH 30.9 24.0 - 34.0 PG    MCHC 31.7 31.0 - 37.0 g/dL    RDW 19.0 (H) 11.6 - 14.5 %    PLATELET 709 954 - 935 K/uL    MPV 9.2 9.2 - 11.8 FL    NEUTROPHILS 54 42 - 75 %    LYMPHOCYTES 37 20 - 51 %    MONOCYTES 7 2 - 9 %    EOSINOPHILS 2 0 - 5 %    BASOPHILS 0 0 - 3 %    ABS. NEUTROPHILS 5.0 1.8 - 8.0 K/UL    ABS. LYMPHOCYTES 3.4 0.8 - 3.5 K/UL    ABS. MONOCYTES 0.7 0 - 1.0 K/UL    ABS. EOSINOPHILS 0.2 0.0 - 0.4 K/UL    ABS.  BASOPHILS 0.0 0.0 - 0.06 K/UL    DF MANUAL      PLATELET COMMENTS ADEQUATE PLATELETS      RBC COMMENTS STOMATOCYTES  1+        RBC COMMENTS ANISOCYTOSIS  1+        RBC COMMENTS POLYCHROMASIA  1+       METABOLIC PANEL, BASIC    Collection Time: 09/06/17  8:15 AM   Result Value Ref Range    Sodium 138 136 - 145 mmol/L    Potassium 3.9 3.5 - 5.5 mmol/L    Chloride 104 100 - 108 mmol/L    CO2 24 21 - 32 mmol/L    Anion gap 10 3.0 - 18 mmol/L    Glucose 166 (H) 74 - 99 mg/dL    BUN 29 (H) 7.0 - 18 MG/DL    Creatinine 5.72 (H) 0.6 - 1.3 MG/DL    BUN/Creatinine ratio 5 (L) 12 - 20      GFR est AA 13 (L) >60 ml/min/1.73m2    GFR est non-AA 10 (L) >60 ml/min/1.73m2    Calcium 8.8 8.5 - 10.1 MG/DL   MAGNESIUM    Collection Time: 09/06/17  8:15 AM   Result Value Ref Range    Magnesium 3.2 (H) 1.6 - 2.6 mg/dL   PHOSPHORUS    Collection Time: 09/06/17  8:15 AM   Result Value Ref Range    Phosphorus 3.1 2.5 - 4.9 MG/DL   PROTHROMBIN TIME + INR    Collection Time: 09/06/17  8:15 AM   Result Value Ref Range    Prothrombin time 14.4 11.5 - 15.2 sec    INR 1.2 0.8 - 1.2     GLUCOSE, POC    Collection Time: 09/06/17 11:59 AM   Result Value Ref Range    Glucose (POC) 159 (H) 70 - 110 mg/dL       Signed By: Temitope Meza MD     September 6, 2017

## 2017-09-07 ENCOUNTER — APPOINTMENT (OUTPATIENT)
Dept: GENERAL RADIOLOGY | Age: 57
DRG: 004 | End: 2017-09-07
Attending: PHYSICIAN ASSISTANT
Payer: MEDICARE

## 2017-09-07 LAB
ANION GAP SERPL CALC-SCNC: 11 MMOL/L (ref 3–18)
ARTERIAL PATENCY WRIST A: ABNORMAL
BASE DEFICIT BLD-SCNC: 1 MMOL/L
BASOPHILS # BLD: 0 K/UL (ref 0–0.1)
BASOPHILS NFR BLD: 0 % (ref 0–2)
BDY SITE: ABNORMAL
BUN SERPL-MCNC: 38 MG/DL (ref 7–18)
BUN/CREAT SERPL: 5 (ref 12–20)
CALCIUM SERPL-MCNC: 8.8 MG/DL (ref 8.5–10.1)
CHLORIDE SERPL-SCNC: 104 MMOL/L (ref 100–108)
CO2 SERPL-SCNC: 21 MMOL/L (ref 21–32)
CREAT SERPL-MCNC: 7.13 MG/DL (ref 0.6–1.3)
DIFFERENTIAL METHOD BLD: ABNORMAL
EOSINOPHIL # BLD: 0.2 K/UL (ref 0–0.4)
EOSINOPHIL NFR BLD: 1 % (ref 0–5)
ERYTHROCYTE [DISTWIDTH] IN BLOOD BY AUTOMATED COUNT: 18.5 % (ref 11.6–14.5)
GAS FLOW.O2 O2 DELIVERY SYS: ABNORMAL L/MIN
GAS FLOW.O2 SETTING OXYMISER: 12 BPM
GLUCOSE BLD STRIP.AUTO-MCNC: 125 MG/DL (ref 70–110)
GLUCOSE BLD STRIP.AUTO-MCNC: 221 MG/DL (ref 70–110)
GLUCOSE BLD STRIP.AUTO-MCNC: 261 MG/DL (ref 70–110)
GLUCOSE SERPL-MCNC: 236 MG/DL (ref 74–99)
HCO3 BLD-SCNC: 23.3 MMOL/L (ref 22–26)
HCT VFR BLD AUTO: 27.3 % (ref 36–48)
HGB BLD-MCNC: 8.8 G/DL (ref 13–16)
LYMPHOCYTES # BLD: 3.2 K/UL (ref 0.9–3.6)
LYMPHOCYTES NFR BLD: 22 % (ref 21–52)
MCH RBC QN AUTO: 31.5 PG (ref 24–34)
MCHC RBC AUTO-ENTMCNC: 32.2 G/DL (ref 31–37)
MCV RBC AUTO: 97.8 FL (ref 74–97)
MONOCYTES # BLD: 0.6 K/UL (ref 0.05–1.2)
MONOCYTES NFR BLD: 5 % (ref 3–10)
NEUTS SEG # BLD: 10.3 K/UL (ref 1.8–8)
NEUTS SEG NFR BLD: 72 % (ref 40–73)
O2/TOTAL GAS SETTING VFR VENT: 35 %
PCO2 BLD: 34.3 MMHG (ref 35–45)
PEEP RESPIRATORY: 5 CMH2O
PH BLD: 7.44 [PH] (ref 7.35–7.45)
PLATELET # BLD AUTO: 373 K/UL (ref 135–420)
PMV BLD AUTO: 9.4 FL (ref 9.2–11.8)
PO2 BLD: 109 MMHG (ref 80–100)
POTASSIUM SERPL-SCNC: 4.4 MMOL/L (ref 3.5–5.5)
PRESSURE SUPPORT SETTING VENT: 7 CMH2O
RBC # BLD AUTO: 2.79 M/UL (ref 4.7–5.5)
SAO2 % BLD: 99 % (ref 92–97)
SERVICE CMNT-IMP: ABNORMAL
SODIUM SERPL-SCNC: 136 MMOL/L (ref 136–145)
SPECIMEN TYPE: ABNORMAL
TOTAL RESP. RATE, ITRR: 22
VENTILATION MODE VENT: ABNORMAL
VOLUME CONTROL PLUS IVLCP: YES
VT SETTING VENT: 400 ML
WBC # BLD AUTO: 14.3 K/UL (ref 4.6–13.2)

## 2017-09-07 PROCEDURE — 82803 BLOOD GASES ANY COMBINATION: CPT

## 2017-09-07 PROCEDURE — 77030011256 HC DRSG MEPILEX <16IN NO BORD MOLN -A

## 2017-09-07 PROCEDURE — 82962 GLUCOSE BLOOD TEST: CPT

## 2017-09-07 PROCEDURE — 80048 BASIC METABOLIC PNL TOTAL CA: CPT | Performed by: NURSE PRACTITIONER

## 2017-09-07 PROCEDURE — 74011000250 HC RX REV CODE- 250: Performed by: PHYSICIAN ASSISTANT

## 2017-09-07 PROCEDURE — 74011250636 HC RX REV CODE- 250/636: Performed by: HOSPITALIST

## 2017-09-07 PROCEDURE — 74011000258 HC RX REV CODE- 258: Performed by: INTERNAL MEDICINE

## 2017-09-07 PROCEDURE — 36592 COLLECT BLOOD FROM PICC: CPT

## 2017-09-07 PROCEDURE — 74011250637 HC RX REV CODE- 250/637: Performed by: NURSE PRACTITIONER

## 2017-09-07 PROCEDURE — 74011636637 HC RX REV CODE- 636/637: Performed by: INTERNAL MEDICINE

## 2017-09-07 PROCEDURE — 74011250637 HC RX REV CODE- 250/637: Performed by: HOSPITALIST

## 2017-09-07 PROCEDURE — 36600 WITHDRAWAL OF ARTERIAL BLOOD: CPT

## 2017-09-07 PROCEDURE — 74011250637 HC RX REV CODE- 250/637: Performed by: INTERNAL MEDICINE

## 2017-09-07 PROCEDURE — 65610000006 HC RM INTENSIVE CARE

## 2017-09-07 PROCEDURE — 74011250636 HC RX REV CODE- 250/636: Performed by: INTERNAL MEDICINE

## 2017-09-07 PROCEDURE — 94003 VENT MGMT INPAT SUBQ DAY: CPT

## 2017-09-07 PROCEDURE — 85025 COMPLETE CBC W/AUTO DIFF WBC: CPT | Performed by: NURSE PRACTITIONER

## 2017-09-07 PROCEDURE — 71010 XR CHEST PORT: CPT

## 2017-09-07 RX ADMIN — PIPERACILLIN AND TAZOBACTAM 2.25 G: 2; .25 INJECTION, POWDER, FOR SOLUTION INTRAVENOUS at 18:30

## 2017-09-07 RX ADMIN — MINERAL OIL AND WHITE PETROLATUM 1 DOSE: 150; 850 OINTMENT OPHTHALMIC at 22:07

## 2017-09-07 RX ADMIN — HEPARIN SODIUM 5000 UNITS: 5000 INJECTION, SOLUTION INTRAVENOUS; SUBCUTANEOUS at 09:51

## 2017-09-07 RX ADMIN — VITAMIN A AND D: 30.8 OINTMENT TOPICAL at 22:18

## 2017-09-07 RX ADMIN — MIDODRINE HYDROCHLORIDE 15 MG: 2.5 TABLET ORAL at 07:59

## 2017-09-07 RX ADMIN — ASPIRIN 81 MG 81 MG: 81 TABLET ORAL at 09:00

## 2017-09-07 RX ADMIN — INSULIN LISPRO 6 UNITS: 100 INJECTION, SOLUTION INTRAVENOUS; SUBCUTANEOUS at 12:47

## 2017-09-07 RX ADMIN — LEVETIRACETAM 500 MG: 5 INJECTION INTRAVENOUS at 22:06

## 2017-09-07 RX ADMIN — VITAMIN A AND D: 30.8 OINTMENT TOPICAL at 14:19

## 2017-09-07 RX ADMIN — CHLORHEXIDINE GLUCONATE 10 ML: 1.2 RINSE ORAL at 09:51

## 2017-09-07 RX ADMIN — INSULIN LISPRO 9 UNITS: 100 INJECTION, SOLUTION INTRAVENOUS; SUBCUTANEOUS at 06:07

## 2017-09-07 RX ADMIN — Medication 1 CAPSULE: at 09:52

## 2017-09-07 RX ADMIN — HEPARIN SODIUM 5000 UNITS: 5000 INJECTION, SOLUTION INTRAVENOUS; SUBCUTANEOUS at 18:30

## 2017-09-07 RX ADMIN — Medication 1 PACKET: at 18:30

## 2017-09-07 RX ADMIN — MINERAL OIL AND WHITE PETROLATUM 1 DOSE: 150; 850 OINTMENT OPHTHALMIC at 11:57

## 2017-09-07 RX ADMIN — Medication 1 PACKET: at 22:07

## 2017-09-07 RX ADMIN — Medication 1 PACKET: at 08:00

## 2017-09-07 RX ADMIN — MINERAL OIL AND WHITE PETROLATUM 1 DOSE: 150; 850 OINTMENT OPHTHALMIC at 13:24

## 2017-09-07 RX ADMIN — MIDODRINE HYDROCHLORIDE 15 MG: 2.5 TABLET ORAL at 18:30

## 2017-09-07 RX ADMIN — HEPARIN SODIUM 5000 UNITS: 5000 INJECTION, SOLUTION INTRAVENOUS; SUBCUTANEOUS at 01:58

## 2017-09-07 RX ADMIN — CHLORHEXIDINE GLUCONATE 10 ML: 1.2 RINSE ORAL at 22:07

## 2017-09-07 RX ADMIN — FAMOTIDINE 20 MG: 10 INJECTION INTRAVENOUS at 09:51

## 2017-09-07 RX ADMIN — MIDODRINE HYDROCHLORIDE 15 MG: 2.5 TABLET ORAL at 12:20

## 2017-09-07 RX ADMIN — VITAMIN A AND D: 30.8 OINTMENT TOPICAL at 11:57

## 2017-09-07 RX ADMIN — LEVETIRACETAM 500 MG: 5 INJECTION INTRAVENOUS at 09:51

## 2017-09-07 RX ADMIN — COLLAGENASE SANTYL: 250 OINTMENT TOPICAL at 11:57

## 2017-09-07 RX ADMIN — SIMVASTATIN 20 MG: 10 TABLET, FILM COATED ORAL at 22:06

## 2017-09-07 RX ADMIN — VANCOMYCIN HYDROCHLORIDE 1250 MG: 10 INJECTION, POWDER, LYOPHILIZED, FOR SOLUTION INTRAVENOUS at 11:51

## 2017-09-07 RX ADMIN — PIPERACILLIN AND TAZOBACTAM 2.25 G: 2; .25 INJECTION, POWDER, FOR SOLUTION INTRAVENOUS at 11:56

## 2017-09-07 RX ADMIN — ACETAMINOPHEN 650 MG: 325 TABLET ORAL at 07:59

## 2017-09-07 NOTE — PROGRESS NOTES
09/07/17 1235   Weaning Parameters   Spontaneous Breathing Trial Complete (SBT)   Resp Rate Observed 26   Ve 9.6      RSBI 70

## 2017-09-07 NOTE — PROGRESS NOTES
[facility]  ICU Progress Note      Name: Price Joe   : 1960   MRN: [id number]   Date: 2017 6:47 AM     [x]I have reviewed the flowsheet and previous days notes. Events overnight reviewed and discussed with nursing staff. Vital signs and records reviewed. HPI:  Tariq Infante a 55 y. o.  male with a history of DM, ESRD admitted PEA. Patient's hospitalization was also found to have a CVA and was treated for E.coli and C.perferinges bacteremia. Subsequent PEA arrest 17. Remains intubated and off sedation. 17  - No new event overnight  - Trach and Peg on Friday. - Patient remains unresponsive  - Off pressors; maintaining BP  - Afebrile overnight  - Anuric   - Dialysis PRN per Nephrology              ROS:Review of systems not obtained due to patient factors.     Medication Review:  · Pressors - N/A  · Sedation - N/A  · Antibiotics - N/A  · Pain - PRN Tylenol  · GI: Pepcid, DVT: SQH  · Others (other gtts)    Safety Bundles: VAP Bundle, CVL Bundle    Vital Signs:    Visit Vitals    BP (!) 88/53    Pulse 75    Temp 97.9 °F (36.6 °C)    Resp 23    Ht 6' 2\" (1.88 m)    Wt 61 kg (134 lb 7.7 oz)    SpO2 95%    BMI 17.27 kg/m2       O2 Device: Endotracheal tube, Ventilator   O2 Flow Rate (L/min): 6 l/min   Temp (24hrs), Av.7 °F (37.6 °C), Min:97.9 °F (36.6 °C), Max:101.1 °F (38.4 °C)       Intake/Output:   Last shift:         Last 3 shifts: 1901 -  07  In: 6443 [I.V.:500]  Out: 1700 [Drains:1700]    Intake/Output Summary (Last 24 hours) at 17 1740  Last data filed at 17 0700   Gross per 24 hour   Intake             1370 ml   Output             1500 ml   Net             -130 ml       Ventilator Settings:  Ventilator  Mode: SIMV  Respiratory Rate  Resp Rate Observed: 26  Back-Up Rate: 12  Insp Time (sec): 1 sec  Insp Flow (l/min): 44 l/min  I:E Ratio: 1:4  Ventilator Volumes  Vt Set (ml): 400 ml  Vt Exhaled (Machine Breath) (ml): 382 ml  Vt Spont (ml): 398 ml  Ve Observed (l/min): 9.6 l/min  Ventilator Pressures  PC Set: 400  Pressure Support (cm H2O): 7 cm H2O  PIP Observed (cm H2O): 14 cm H2O  Plateau Pressure (cm H2O): 13 cm H2O  MAP (cm H2O): 9  PEEP/VENT (cm H2O): 5 cm H20  Auto PEEP Observed (cm H2O): 0 cm H2O    Physical Exam:    General: Intubated, cachectic, unresponsive  HEENT:  Anicteric sclerae; pink palpebral conjunctivae; mucosa moist, ETT   Resp:  Symmetrical chest expansion, no accessory muscle use; good airway entry; BBS coarse   CV:  S1, S2 present; regular rate and rhythm  GI:  Abdomen soft, non-tender; (+) active bowel sounds  Extremities:  +2 pulses on all extremities; significant muscle atrophy, AV fistula LUE: + bruit + thrill   Skin:  Warm; no rashes/ lesions noted  Neurologic:  Negative dolls eye, pinpoint pupils nonreactive to light, + cough, - gag, decorticate posturing with stimulation, FOUR COMA SCORE 8  Devices:  OGT, ETT (8/18/17), LIJ TLC (8/18/17)      DATA:     Current Facility-Administered Medications   Medication Dose Route Frequency    VANCOMYCIN INFORMATION NOTE   Other Rx Dosing/Monitoring    piperacillin-tazobactam (ZOSYN) 2.25 g in 0.9% sodium chloride (MBP/ADV) 50 mL MBP  2.25 g IntraVENous Q8H    Piperacillin-tazobactam (ZOSYN) 0.75 gm Supplemental Dosing by Pharmacy   Other Rx Dosing/Monitoring    albumin human 25% (BUMINATE) solution 12.5 g  12.5 g IntraVENous DIALYSIS PRN    doxercalciferol (HECTOROL) 4 mcg/2 mL injection 3 mcg  3 mcg IntraVENous Q TUE, THU & SAT    epoetin benjamin (EPOGEN;PROCRIT) injection 10,000 Units  10,000 Units IntraVENous DIALYSIS TUE, THU & SAT    midodrine (PROAMITINE) tablet 15 mg  15 mg Oral TID WITH MEALS    Zinc Ox-Aloe Vera-Vitamin E (BALMEX) topical cream   Topical CONTINUOUS    banana flakes trans-galactooligosaccharide (BANATROL PLUS) powder 1 Packet  1 Packet Per NG tube TID    levETIRAcetam (KEPPRA) 500 mg in saline (iso-osm) 100 ml IVPB  500 mg IntraVENous Q12H    LORazepam (ATIVAN) injection 1 mg  1 mg IntraVENous Q4H PRN    insulin lispro (HUMALOG) injection   SubCUTAneous Q6H    white petrolatum-mineral oil 85-15 % oint 1 Dose  1 Dose Ophthalmic QID    chlorhexidine (PERIDEX) 0.12 % mouthwash 10 mL  10 mL Oral Q12H    NOREPINephrine (LEVOPHED) 16,000 mcg in dextrose 5% 250 mL infusion  2-16 mcg/min IntraVENous TITRATE    albuterol (PROVENTIL VENTOLIN) nebulizer solution 2.5 mg  2.5 mg Nebulization Q6H PRN    famotidine (PF) (PEPCID) 20 mg in sodium chloride 0.9 % 10 mL injection  20 mg IntraVENous DAILY    vits A and D-white pet-lanolin (A&D) ointment   Topical QID AFTER MEALS    collagenase (SANTYL) 250 unit/gram ointment   Topical DAILY    acetaminophen (TYLENOL) tablet 650 mg  650 mg Oral Q4H PRN    lactobacillus sp. 50 billion cpu (BIO-K PLUS) capsule 1 Cap  1 Cap Oral DAILY    loperamide (IMODIUM) capsule 2 mg  2 mg Oral Q6H PRN    heparin (porcine) injection 5,000 Units  5,000 Units SubCUTAneous Q8H    heparin (porcine) 1,000 unit/mL injection 2,000 Units  2,000 Units IntraVENous DIALYSIS ONCE    simvastatin (ZOCOR) tablet 20 mg  20 mg Oral QHS    aspirin chewable tablet 81 mg  81 mg Oral DAILY    glucose chewable tablet 16 g  4 Tab Oral PRN    glucagon (GLUCAGEN) injection 1 mg  1 mg IntraMUSCular PRN    dextrose (D50W) injection syrg 12.5-25 g  25-50 mL IntraVENous PRN    0.9% sodium chloride infusion  100 mL/hr IntraVENous DIALYSIS PRN    naloxone (NARCAN) injection 0.4 mg  0.4 mg IntraVENous PRN         Labs: Results:       Chemistry Recent Labs      09/07/17   0230  09/06/17   0815  09/05/17   0720  09/05/17   0346   GLU  236*  166*   --   204*   NA  136  138   --   135*   K  4.4  3.9  6.1*  6.2*   CL  104  104   --   110*   CO2  21  24   --   16*   BUN  38*  29*   --   65*   CREA  7.13*  5.72*   --   9.43*   CA  8.8  8.8   --   9.2   AGAP  11  10   --   9   BUCR  5*  5*   --   7*      CBC w/Diff Recent Labs 09/07/17   0230  09/06/17   0815  09/05/17   0346   WBC  14.3*  9.3  10.1   RBC  2.79*  2.72*  2.93*   HGB  8.8*  8.4*  9.1*   HCT  27.3*  26.5*  28.3*   PLT  373  354  441*   GRANS  72  54  62   LYMPH  22  37  30   EOS  1  2  2      Coagulation Recent Labs      09/06/17   0815   PTP  14.4   INR  1.2       Liver Enzymes No results for input(s): TP, ALB, TBIL, AP, SGOT, GPT in the last 72 hours. No lab exists for component: DBIL   ABG Lab Results   Component Value Date/Time    PHI 7.441 09/07/2017 04:51 AM    PCO2I 34.3 (L) 09/07/2017 04:51 AM    PO2I 109 (H) 09/07/2017 04:51 AM    HCO3I 23.3 09/07/2017 04:51 AM    FIO2I 35 09/07/2017 04:51 AM      Microbiology Recent Labs      09/06/17   0359  09/06/17   0300   CULT  NO GROWTH 1 DAY  FEW PSEUDOMONAS SPECIES*  NO GROWTH 1 DAY          Telemetry: [x]Sinus []A-flutter []Paced    []A-fib []Multiple PVCs                    Imaging:  CXR Results  (Last 48 hours)               09/07/17 0616  XR CHEST PORT Final result    Impression:  IMPRESSION:   1. Tubes and lines in good position. 2.  Improved aeration and expansion of the lungs. Narrative:  EXAM: Chest X-Ray       INDICATION: Intubated       TECHNIQUE: AP view of the chest       COMPARISON: 9/6/2017, 9/5/2017, 9/4/2017 and 9/3/2017       FINDINGS:    Tubes and Lines: ET tube is noted which is 6 cm above the bree. An enteric   tube is present with the tip overlying the stomach. Pleura: No pneumothorax appreciated. No effusions appreciated. Lungs: There is improved aeration and expansion of the left lung. Cardiac silhouette: The cardiac silhouette is unremarkable. The thoracic aorta   has calcifications. Pulmonary Vascularity: The pulmonary vasculature is unremarkable. Osseous Structures: Unremarkable           09/06/17 0614  XR CHEST PORT Final result    Impression:  IMPRESSION:   1. Tubes and lines in good position. 2.  New retrocardiac opacity. Narrative:  EXAM: Chest X-Ray       INDICATION: Intubated       TECHNIQUE: AP view of the chest       COMPARISON: 9/5/2017, 9/4/2017, 9/3/2017 and 9/2/2017       FINDINGS:    Tubes and Lines: The ET tube, left IJ catheter and enteric tube are unchanged. Pleura: No pneumothorax appreciated. There is mild blunting of the right   costophrenic angle which may represent a small pleural effusion. Lungs:  Noted retrocardiac opacity is noted which is new. Cardiac silhouette: The cardiac silhouette set the upper limits of normal. This   unchanged. Pulmonary Vascularity: The pulmonary vasculature is unremarkable. Osseous Structures: Degenerative changes of the shoulders are noted. IMPRESSION:   · Acute encephalopathy, anoxic encephalopathy   · S/p PEA Cardiac arrest in dialysis 7/27/17 and 8/18/17  · Acute Respirtatory Failure requiring Mechanical Ventilation. Remains intubated 2' neurologic status   · Hypotension- workup negative for PE. Cannot r/o sepsis with recurrent fevers, Dysautonomia? · ESRD on HD  · DM2  · Acute pontine lacunar CVA  · Oropharyngeal dysphagia  · Cachexia  · Unstageable pressure ulcer to sacrum/coccyx  · S/p septic shock  treated for E.coli and C.perferinges bacteremia s/p abx      PLAN:   · Resp - VAP bundle, Adhere to low tidal volume ventilation strategy as per the ARDS net guidelines. Aim for plateau pressures < 96CK; Patient does not meet criteria for extubation 2' inability to protect airway. Scheduled for Trach placement 9/8/17  · ID - Afebrile; Bcx 8/18/17 NGTD. Resp cx 8/19/17 - (+) candida. Low threshold to pan culture and restart abx. CXR without any acute infiltrates   · CVS - HD stable; Currently on High dose midodrine 15 mg TID. MAP goal > 65 mm/hg. Last echo with EF 55-60% and noted to have G1DD. PE workup negative.    · Heme/onc -  Check CBC in am  · Metabolic - Check BMP in am. Replace e-lytes PRN per renal   · Renal - Last HD 9/5/17/ with 850cc removed; PRN dialysis per renal  · Endocrine - Cortisol normal. Glycemic control <180; Continue SSI. Previous thyroid study: TSH was 7.52 and Free t4 0.8. Most likely sick euthyroid state   · Neuro/ Pain/ Sedation - Four Coma Score 8. Neurology has signed off. On prophylactic kepppra; no sedation. · GI - NPO; TF at Nepro 55 ml/hr w/ 150 h20 q4h. Continue banatrol for diarrhea   · Prophylaxis - DVT- SQH, GI- Pepcid   · Surgery on board for Trach and PEG - plan for Friday 9/8/17. Spoke with surgery who stated they will contact family about schedule change.           The patient is: [x] acutely ill Risk of deterioration: [x] moderate    [] critically ill  [] high     [x]See my orders for details     My assessment/plan was discussed with:  [x]nursing []PT/OT    [x]respiratory therapy [x]Dr.El Loza   []family []     Deyanira Hilario NP

## 2017-09-07 NOTE — PROGRESS NOTES
attended the interdisciplinary rounds for [facility], who is a 65 y.o.,male. Patients Primary Language is: [place]. According to the patients EMR Mu-ism Affiliation is: [facility].     The reason the Patient came to the hospital is:   Patient Active Problem List    Diagnosis Date Noted    Oropharyngeal dysphagia 08/17/2017    Cachexia ([facility] [address] 08/17/2017    Acidosis 07/27/2017    Cardiac arrest ([address] 07/27/2017    Respiratory failure ([address] 07/27/2017    ESRD needing dialysis ([address] 07/27/2017    Anoxic brain damage ([address] 07/27/2017    Colitis 07/27/2017    Hypotension 07/27/2017    Acute GI bleeding 07/27/2017    ESRD (end stage renal disease) ([address] 07/27/2017    Sepsis ([facility] [address] 07/27/2017    Anemia           Plan:  Chaplains will continue to follow and will provide pastoral care on an as needed/requested basis.  recommends bedside caregivers page  on duty if patient shows signs of acute spiritual or emotional distress.     [address]  Board Certified [facility]   [phone]

## 2017-09-07 NOTE — INTERDISCIPLINARY ROUNDS
CRITICAL CARE INTERDISCIPLINARY ROUNDS      Patient Information:    Name:   Jonel Lees    Age:   65 y.o.     Admission Date:   7/27/2017    Readmit Risk Assessment Information:      Readmit Risk Tool Support Systems: Family member(s), Relationship with Primary Physician Group: Seen at least one time within past 12 months    Surgery Date:      Day of Stay:     Expected Discharge Date:        Attending Provider:   Arianna Colon MD    Surgeon:        Consultant:       Primary Care Provider:   Ashlyn Oreilly MD    Problem List:     Patient Active Problem List   Diagnosis Code    Anemia D64.9    Acidosis E87.2    Cardiac arrest ([address] I46.9    Respiratory failure ([address] J96.90    ESRD needing dialysis ([facility] [address] N18.6, Z99.2    Anoxic brain damage (HCC) G93.1    Colitis K52.9    Hypotension I95.9    Acute GI bleeding K92.2    ESRD (end stage renal disease) ([address] N18.6    Sepsis ([address] A41.9    Oropharyngeal dysphagia R13.12    Cachexia ([address] R64       Principal Problem:  Sepsis ([address]    Procedure:       During rounds the following quality care indicators and evidence based practices were addressed :     DVT Prophylaxis, Pressure Injury Prevention, Pain Management, Sepsis resuscitation and management, Nutritional Status, Critical Care Interventions Airways, Drains and Lines and IHI Bundles: Vent Bundle Followed, Vent Day 20           Acute MI/PCI:   Not applicable    Heart Failure:    Vent Bundle Followed, Vent Day 20    Cardiac Surgery:  Not applicable    SCIP Measures for other Surgeries:   Not applicable    Pneumonia:    Appropriate Antibiotic Selection (ICU versus Non-ICU)    Stroke:  Patient's Personal Risk Factors for Stroke are:   hypertension, family history, hyperlipidemia or diabetes mellitus    NIH Stroke Score  Total: 8 (08/17/17 0400)    Transfer Level of Care:  Not Ready for Transfer    The patient will require the following interventions based on the Readmission Risk Assessment:  Pharmacy evaluation and teaching, Care Management involvement for home health follow up for:  mobility and assistance with ADL's and Spiritual Care evaluation      Discharge Management:  Group Home    Anticipated Discharge Date:  5      Interdisciplinary team rounds were held  with the following team membersCare Management, Diabetes Treatment Specialist, Nursing, Nutrition, Pastoral Care, Pharmacy, Physician and Clinical Coordinator and the  patient and healthcare POA (documentation required). Plan of care discussed. See clinical pathway and/or care plan for interventions and desired outcomes. Central line removed antibiotics ordered and continue dialysis. For trach and peg next week.

## 2017-09-07 NOTE — ROUTINE PROCESS
Bedside and Verbal shift change report given to Ronen Matthews RN (oncoming nurse) by Ian Engel RN (offgoing nurse). Report included the following information SBAR, Kardex, MAR and Recent Results. SITUATION:    Code Status: Full Code   Reason for Admission: Cardiac arrest ([facility]   Acidosis   Respiratory failure (HCC)   Anemia   ESRD needing dialysis ([facility] [address]   Cardiac arrest ([facility] [address]   Acute GI bleeding   Sepsis ([facility] [address]   Hypotension   Anoxic brain damage (HCC)   ESRD (end stage renal disease) ([facility] [address]   Colitis   dx   dx   [address] day: 43   Problem List:       Hospital Problems  Date Reviewed: 7/27/2017          Codes Class Noted POA    Oropharyngeal dysphagia ICD-10-CM: R13.12  ICD-9-CM: 787.22  8/17/2017 Unknown        Cachexia ([address] ICD-10-CM: R64  ICD-9-CM: 799.4  8/17/2017 Unknown        Acidosis ICD-10-CM: E87.2  ICD-9-CM: 276.2  7/27/2017 Unknown        Cardiac arrest ([address] ICD-10-CM: I46.9  ICD-9-CM: 427.5  7/27/2017 Unknown        Respiratory failure ([address] ICD-10-CM: J96.90  ICD-9-CM: 518.81  7/27/2017 Unknown        ESRD needing dialysis ([address] ICD-10-CM: N18.6, Z99.2  ICD-9-CM: 585.6  7/27/2017 Unknown        Anoxic brain damage ([address] ICD-10-CM: G93.1  ICD-9-CM: 348.1  7/27/2017 Unknown        Colitis ICD-10-CM: K52.9  ICD-9-CM: 558.9  7/27/2017 Unknown        Hypotension ICD-10-CM: I95.9  ICD-9-CM: 458.9  7/27/2017 Unknown        Acute GI bleeding ICD-10-CM: K92.2  ICD-9-CM: 578.9  7/27/2017 Unknown        ESRD (end stage renal disease) ([address] ICD-10-CM: N18.6  ICD-9-CM: 585.6  7/27/2017 Unknown        * (Principal)Sepsis ([address] ICD-10-CM: A41.9  ICD-9-CM: 038.9, 995.91  7/27/2017 Unknown        Anemia ICD-10-CM: D64.9  ICD-9-CM: 503. 9  Unknown Unknown              BACKGROUND:    Past Medical History:   Past Medical History:   Diagnosis Date    Anemia     Arthritis     Chronic pain     Back     Diabetes mellitus type II     Hypertension     IBS (irritable bowel syndrome)     Lactose intolerance     TB (tuberculosis)     TB test positive         Patient taking anticoagulants yes     ASSESSMENT:    Changes in Assessment Throughout Shift: none     Patient has Central Line: no Reasons if yes: discontinued this morning   Patient has Stanton Cath: no Reasons if yes: n/a      Last Vitals:     Vitals:    09/07/17 0500 09/07/17 0600 09/07/17 0700 09/07/17 0713   BP: 95/60 99/58 (!) 88/53    Pulse: 89 94 (!) 101    Resp: 23 16 27    Temp:    (!) 101.1 °F (38.4 °C)   TempSrc:       SpO2: 100% 100% 100%    Weight:       Height:            IV and DRAINS (will only show if present)   [REMOVED] Peripheral IV 08/01/17 Right Hand-Site Assessment: Clean, dry, & intact  [REMOVED] Peripheral IV 08/05/17 Right Arm-Site Assessment: Clean, dry, & intact  [REMOVED] Peripheral IV 08/09/17 Right Hand-Site Assessment: Clean, dry, & intact  [REMOVED] Peripheral IV 08/09/17 Right Antecubital-Site Assessment: Clean, dry, & intact  [REMOVED] Sheath 08/09/17-Site Assessment: Clean, dry, & intact  [REMOVED] Nasogastric Tube 08/14/17-Site Assessment: Clean, dry, & intact  [REMOVED] Peripheral IV 08/17/17 Right Forearm-Site Assessment: Clean, dry, & intact  [REMOVED] Peripheral IV 08/17/17 Right Wrist-Site Assessment: Clean, dry, & intact  [REMOVED] Airway - Endotracheal Tube 08/18/17 Oral-Site Assessment: Clean, dry, & intact  [REMOVED] Triple Lumen Left IJ CVL 08/18/17 Left Internal jugular-Site Assessment: Clean, dry, & intact  [REMOVED] Arterial Line 08/18/17 Right Radial artery-Site Assessment: Clean, dry, & intact  Airway - Continuous Aspiration of Subglottic Secretions (MISBAH) Tube 08/18/17 Oral-Site Assessment: Clean, dry, & intact  Nasogastric Tube 08/29/17-Site Assessment: Clean, dry, & intact  [REMOVED] Peripheral IV 09/04/17 Right Wrist-Site Assessment: Intact  [REMOVED] Airway - Continuous Aspiration of Subglottic Secretions (MISBAH) Tube 07/27/17 Oral-Site Assessment: Clean, dry, & intact  Peripheral IV 09/06/17 Left Hand-Site Assessment: Clean, dry, & intact  Peripheral IV 09/07/17 Right Arm-Site Assessment: Clean, dry, & intact  [REMOVED] Nasogastric Tube 07/27/17-Site Assessment: Clean, dry, & intact  [REMOVED] Venous Access Device 07/27/17 Upper chest (subclavicular area, right-Site Assessment: Clean, dry, & intact  [REMOVED] Peripheral IV 07/27/17 Right Antecubital-Site Assessment: Clean, dry, & intact  [REMOVED] Triple Lumen 07/27/17 Right Internal jugular-Site Assessment: Clean, dry, & intact  [REMOVED] Peripheral IV 07/27/17 Right Other(comment)-Site Assessment: Clean, dry, & intact  [REMOVED] Peripheral IV 07/27/17 Right Antecubital-Site Assessment: Swelling     WOUND (if present)   Wound Type:  Stage 3/unstageable sacrum   Dressing present Dressing Present : Yes   Wound Concerns/Notes:  More yellow wound base today     PAIN    Pain Assessment    Pain Intensity 1: 0 (09/05/17 0400)    Pain Location 1: Generalized    Pain Intervention(s) 1: Medication (see MAR)    Patient Stated Pain Goal: Unable to verbalize/indicatate pain  o Interventions for Pain:  none  o Intervention effective: n/a  o Time of last intervention: n/a   o Reassessment Completed: n/a      Last 3 Weights:  Last 3 Recorded Weights in this Encounter    09/04/17 1605 09/06/17 0600 09/07/17 0300   Weight: 60 kg (132 lb 4.4 oz) 63 kg (138 lb 14.2 oz) 61 kg (134 lb 7.7 oz)     Weight change: -2 kg (-4 lb 6.6 oz)     INTAKE/OUPUT    Current Shift:      Last three shifts: 09/05 1901 - 09/07 0700  In: 2545 [I.V.:500]  Out: 1700 [Drains:1700]     LAB RESULTS     Recent Labs      09/07/17   0230  09/06/17   0815  09/05/17   0346   WBC  14.3*  9.3  10.1   HGB  8.8*  8.4*  9.1*   HCT  27.3*  26.5*  28.3*   PLT  373  354  441*        Recent Labs      09/07/17   0230  09/06/17   0815  09/05/17   0720  09/05/17   0346   NA  136  138   --   135*   K  4.4  3.9  6.1*  6.2*   GLU  236*  166*   --   204*   BUN  38*  29*   --   65*   CREA  7.13*  5.72* --   9.43*   CA  8.8  8.8   --   9.2   MG   --   3.2*   --    --    INR   --   1.2   --    --        RECOMMENDATIONS AND DISCHARGE PLANNING     1. Pending tests/procedures/ Plan of Care or Other Needs: none planned - pending trach and peg     2. Discharge plan for patient and Needs/Barriers: not at this time    3. Estimated Discharge Date: unknown. Posted on Whiteboard in [address] [place] [address] Room: yes      4. The patient's care plan was reviewed with the oncoming nurse. \"HEALS\" SAFETY CHECK      Fall Risk    Total Score: 3    Safety Measures: Safety Measures: Bed/Chair-Wheels locked, Bed in low position, Call light within reach, Fall prevention (comment), Side rails X 3    A safety check occurred in the patient's room between off going nurse and oncoming nurse listed above. The safety check included the below items  Area Items   H  High Alert Medications - Verify all high alert medication drips (heparin, PCA, etc.)   E  Equipment - Suction is set up for ALL patients (with cary)  - Red plugs utilized for all equipment (IV pumps, etc.)  - WOWs wiped down at end of shift.  - Room stocked with oxygen, suction, and other unit-specific supplies   A  Alarms - Bed alarm is set for fall risk patients  - Ensure chair alarm is in place and activated if patient is up in a chair   L  Lines - Check IV for any infiltration  - Stanton bag is empty if patient has a Stanton   - Tubing and IV bags are labeled   S  Safety   - Room is clean, patient is clean, and equipment is clean. - Hallways are clear from equipment besides carts. - Fall bracelet on for fall risk patients  - Ensure room is clear and free of clutter  - Suction is set up for ALL patients (with cary)  - Hallways are clear from equipment besides carts.    - Isolation precautions followed, supplies available outside room, sign posted     Shorty Frederick RN

## 2017-09-07 NOTE — ROUTINE PROCESS
Bedside and Verbal shift change report given to Phan Torrez RN (oncoming nurse) by Pilo Mack RN (offgoing nurse). Report included the following information SBAR, Kardex, MAR and Recent Results. SITUATION:    Code Status: Full Code   Reason for Admission: [facility]iac arrest ([facility]   Acidosis   Respiratory failure (HCC)   Anemia   ESRD needing dialysis ([facility]   [facility]   Acute GI bleeding   Sepsis ([facility]   Hypotension   Anoxic brain damage (HCC)   ESRD (end stage renal disease) ([facility]   Colitis   dx   dx   [address] day: 43   Problem List:       Hospital Problems  Date Reviewed: 7/27/2017          Codes Class Noted POA    Oropharyngeal dysphagia ICD-10-CM: R13.12  ICD-9-CM: 787.22  8/17/2017 Unknown        Cachexia ([facility] ICD-10-CM: R64  ICD-9-CM: 799.4  8/17/2017 Unknown        Acidosis ICD-10-CM: E87.2  ICD-9-CM: 276.2  7/27/2017 Unknown        Cardiac arrest ([facility] ICD-10-CM: I46.9  ICD-9-CM: 427.5  7/27/2017 Unknown        Respiratory failure ([facility] ICD-10-CM: J96.90  ICD-9-CM: 518.81  7/27/2017 Unknown        ESRD needing dialysis ([facility] ICD-10-CM: N18.6, Z99.2  ICD-9-CM: 585.6  7/27/2017 Unknown        Anoxic brain damage ([facility] ICD-10-CM: G93.1  ICD-9-CM: 348.1  7/27/2017 Unknown        Colitis ICD-10-CM: K52.9  ICD-9-CM: 558.9  7/27/2017 Unknown        Hypotension ICD-10-CM: I95.9  ICD-9-CM: 458.9  7/27/2017 Unknown        Acute GI bleeding ICD-10-CM: K92.2  ICD-9-CM: 578.9  7/27/2017 Unknown        ESRD (end stage renal disease) ([facility] ICD-10-CM: N18.6  ICD-9-CM: 585.6  7/27/2017 Unknown        * (Principal)Sepsis ([facility] ICD-10-CM: A41.9  ICD-9-CM: 038.9, 995.91  7/27/2017 Unknown        Anemia ICD-10-CM: D64.9  ICD-9-CM: 714. 9  Unknown Unknown              BACKGROUND:    Past Medical History:   Past Medical History:   Diagnosis Date    Anemia     Arthritis     Chronic pain     Back     Diabetes mellitus type II     Hypertension     IBS (irritable bowel syndrome)     Lactose intolerance     TB (tuberculosis)     TB test positive         Patient taking anticoagulants yes     ASSESSMENT:    Changes in Assessment Throughout Shift: none     Patient has Central Line: yes Reasons if yes: IV access   Patient has Stanton Cath: no Reasons if yes: n/a      Last Vitals:     Vitals:    09/07/17 0500 09/07/17 0600 09/07/17 0700 09/07/17 0713   BP: 95/60 99/58 (!) 88/53    Pulse: 89 94 (!) 101    Resp: 23 16 27    Temp:    (!) 101.1 °F (38.4 °C)   TempSrc:       SpO2: 100% 100% 100%    Weight:       Height:            IV and DRAINS (will only show if present)   [REMOVED] Peripheral IV 08/01/17 Right Hand-Site Assessment: Clean, dry, & intact  [REMOVED] Peripheral IV 08/05/17 Right Arm-Site Assessment: Clean, dry, & intact  [REMOVED] Peripheral IV 08/09/17 Right Hand-Site Assessment: Clean, dry, & intact  [REMOVED] Peripheral IV 08/09/17 Right Antecubital-Site Assessment: Clean, dry, & intact  [REMOVED] Sheath 08/09/17-Site Assessment: Clean, dry, & intact  [REMOVED] Nasogastric Tube 08/14/17-Site Assessment: Clean, dry, & intact  [REMOVED] Peripheral IV 08/17/17 Right Forearm-Site Assessment: Clean, dry, & intact  [REMOVED] Peripheral IV 08/17/17 Right Wrist-Site Assessment: Clean, dry, & intact  [REMOVED] Airway - Endotracheal Tube 08/18/17 Oral-Site Assessment: Clean, dry, & intact  [REMOVED] Triple Lumen Left IJ CVL 08/18/17 Left Internal jugular-Site Assessment: Clean, dry, & intact  [REMOVED] Arterial Line 08/18/17 Right Radial artery-Site Assessment: Clean, dry, & intact  Airway - Continuous Aspiration of Subglottic Secretions (MISBAH) Tube 08/18/17 Oral-Site Assessment: Clean, dry, & intact  Nasogastric Tube 08/29/17-Site Assessment: Clean, dry, & intact  [REMOVED] Peripheral IV 09/04/17 Right Wrist-Site Assessment: Intact  [REMOVED] Airway - Continuous Aspiration of Subglottic Secretions (MISBAH) Tube 07/27/17 Oral-Site Assessment: Clean, dry, & intact  Peripheral IV 09/06/17 Left Hand-Site Assessment: Clean, dry, & intact  Peripheral IV 09/07/17 Right Arm-Site Assessment: Clean, dry, & intact  [REMOVED] Nasogastric Tube 07/27/17-Site Assessment: Clean, dry, & intact  [REMOVED] Venous Access Device 07/27/17 Upper chest (subclavicular area, right-Site Assessment: Clean, dry, & intact  [REMOVED] Peripheral IV 07/27/17 Right Antecubital-Site Assessment: Clean, dry, & intact  [REMOVED] Triple Lumen 07/27/17 Right Internal jugular-Site Assessment: Clean, dry, & intact  [REMOVED] Peripheral IV 07/27/17 Right Other(comment)-Site Assessment: Clean, dry, & intact  [REMOVED] Peripheral IV 07/27/17 Right Antecubital-Site Assessment: Swelling     WOUND (if present)   Wound Type:  Stage 3 sacrum   Dressing present Dressing Present : Yes   Wound Concerns/Notes:  none     PAIN    Pain Assessment    Pain Intensity 1: 0 (09/05/17 0400)    Pain Location 1: Generalized    Pain Intervention(s) 1: Medication (see MAR)    Patient Stated Pain Goal: Unable to verbalize/indicatate pain  o Interventions for Pain:  none  o Intervention effective: n/a  o Time of last intervention: n/a   o Reassessment Completed: n/a      Last 3 Weights:  Last 3 Recorded Weights in this Encounter    09/04/17 1605 09/06/17 0600 09/07/17 0300   Weight: 60 kg (132 lb 4.4 oz) 63 kg (138 lb 14.2 oz) 61 kg (134 lb 7.7 oz)     Weight change: -2 kg (-4 lb 6.6 oz)     INTAKE/OUPUT    Current Shift:      Last three shifts: 09/05 1901 - 09/07 0700  In: 2545 [I.V.:500]  Out: 1700 [Drains:1700]     LAB RESULTS     Recent Labs      09/07/17   0230  09/06/17   0815  09/05/17   0346   WBC  14.3*  9.3  10.1   HGB  8.8*  8.4*  9.1*   HCT  27.3*  26.5*  28.3*   PLT  373  354  441*        Recent Labs      09/07/17   0230  09/06/17   0815  09/05/17   0720  09/05/17   0346   NA  136  138   --   135*   K  4.4  3.9  6.1*  6.2*   GLU  236*  166*   --   204*   BUN  38*  29*   --   65*   CREA  7.13*  5.72*   --   9.43*   CA  8.8  8.8   --   9.2   MG   -- 3.2*   --    --    INR   --   1.2   --    --        RECOMMENDATIONS AND DISCHARGE PLANNING     1. Pending tests/procedures/ Plan of Care or Other Needs: maybe trach and peg - npo since midnight and heparin sc held. 2. Discharge plan for patient and Needs/Barriers: not at this time    3. Estimated Discharge Date: unknown. Posted on Whiteboard in [address] [place] [address] Room: yes      4. The patient's care plan was reviewed with the oncoming nurse. \"HEALS\" SAFETY CHECK      Fall Risk    Total Score: 3    Safety Measures: Safety Measures: Bed/Chair-Wheels locked, Bed in low position, Call light within reach, Fall prevention (comment), Side rails X 3    A safety check occurred in the patient's room between off going nurse and oncoming nurse listed above. The safety check included the below items  Area Items   H  High Alert Medications - Verify all high alert medication drips (heparin, PCA, etc.)   E  Equipment - Suction is set up for ALL patients (with cary)  - Red plugs utilized for all equipment (IV pumps, etc.)  - WOWs wiped down at end of shift.  - Room stocked with oxygen, suction, and other unit-specific supplies   A  Alarms - Bed alarm is set for fall risk patients  - Ensure chair alarm is in place and activated if patient is up in a chair   L  Lines - Check IV for any infiltration  - Stanton bag is empty if patient has a Stanton   - Tubing and IV bags are labeled   S  Safety   - Room is clean, patient is clean, and equipment is clean. - Hallways are clear from equipment besides carts. - Fall bracelet on for fall risk patients  - Ensure room is clear and free of clutter  - Suction is set up for ALL patients (with cary)  - Hallways are clear from equipment besides carts.    - Isolation precautions followed, supplies available outside room, sign posted     Syeda Ibrahim RN

## 2017-09-07 NOTE — PROGRESS NOTES
Spoke to patient's son this morning and explained his status and prognosis. Son states he understands. I also explained that his sister did not want to make a decision regarding father's code status without him being involved. He states he is not able to come to hospital in the next day but will try to come this weekend. I told him I will be here this weekend if he wishes to come then. He states he will contact his sister.

## 2017-09-08 ENCOUNTER — APPOINTMENT (OUTPATIENT)
Dept: GENERAL RADIOLOGY | Age: 57
DRG: 004 | End: 2017-09-08
Attending: PHYSICIAN ASSISTANT
Payer: MEDICARE

## 2017-09-08 LAB
ANION GAP SERPL CALC-SCNC: 11 MMOL/L (ref 3–18)
ARTERIAL PATENCY WRIST A: YES
BASE DEFICIT BLD-SCNC: 3 MMOL/L
BASOPHILS # BLD: 0 K/UL (ref 0–0.1)
BASOPHILS NFR BLD: 0 % (ref 0–2)
BDY SITE: ABNORMAL
BODY TEMPERATURE: 98.4
BUN SERPL-MCNC: 69 MG/DL (ref 7–18)
BUN/CREAT SERPL: 8 (ref 12–20)
CALCIUM SERPL-MCNC: 9.6 MG/DL (ref 8.5–10.1)
CHLORIDE SERPL-SCNC: 104 MMOL/L (ref 100–108)
CO2 SERPL-SCNC: 22 MMOL/L (ref 21–32)
CREAT SERPL-MCNC: 8.48 MG/DL (ref 0.6–1.3)
DIFFERENTIAL METHOD BLD: ABNORMAL
EOSINOPHIL # BLD: 0.4 K/UL (ref 0–0.4)
EOSINOPHIL NFR BLD: 4 % (ref 0–5)
ERYTHROCYTE [DISTWIDTH] IN BLOOD BY AUTOMATED COUNT: 18.2 % (ref 11.6–14.5)
GAS FLOW.O2 O2 DELIVERY SYS: ABNORMAL L/MIN
GAS FLOW.O2 SETTING OXYMISER: 12 BPM
GLUCOSE BLD STRIP.AUTO-MCNC: 124 MG/DL (ref 70–110)
GLUCOSE BLD STRIP.AUTO-MCNC: 167 MG/DL (ref 70–110)
GLUCOSE BLD STRIP.AUTO-MCNC: 183 MG/DL (ref 70–110)
GLUCOSE BLD STRIP.AUTO-MCNC: 293 MG/DL (ref 70–110)
GLUCOSE BLD STRIP.AUTO-MCNC: 96 MG/DL (ref 70–110)
GLUCOSE SERPL-MCNC: 185 MG/DL (ref 74–99)
HCO3 BLD-SCNC: 21.7 MMOL/L (ref 22–26)
HCT VFR BLD AUTO: 28.6 % (ref 36–48)
HGB BLD-MCNC: 9.3 G/DL (ref 13–16)
INSPIRATION.DURATION SETTING TIME VENT: 1 SEC
LYMPHOCYTES # BLD: 2.7 K/UL (ref 0.9–3.6)
LYMPHOCYTES NFR BLD: 24 % (ref 21–52)
MAGNESIUM SERPL-MCNC: 3.9 MG/DL (ref 1.6–2.6)
MCH RBC QN AUTO: 31.5 PG (ref 24–34)
MCHC RBC AUTO-ENTMCNC: 32.5 G/DL (ref 31–37)
MCV RBC AUTO: 96.9 FL (ref 74–97)
MONOCYTES # BLD: 0.9 K/UL (ref 0.05–1.2)
MONOCYTES NFR BLD: 8 % (ref 3–10)
NEUTS SEG # BLD: 7.5 K/UL (ref 1.8–8)
NEUTS SEG NFR BLD: 64 % (ref 40–73)
O2/TOTAL GAS SETTING VFR VENT: 35 %
PCO2 BLD: 34.1 MMHG (ref 35–45)
PEEP RESPIRATORY: 5 CMH2O
PH BLD: 7.41 [PH] (ref 7.35–7.45)
PHOSPHATE SERPL-MCNC: 4.7 MG/DL (ref 2.5–4.9)
PLATELET # BLD AUTO: 411 K/UL (ref 135–420)
PMV BLD AUTO: 10 FL (ref 9.2–11.8)
PO2 BLD: 87 MMHG (ref 80–100)
POTASSIUM SERPL-SCNC: 4.1 MMOL/L (ref 3.5–5.5)
PRESSURE SUPPORT SETTING VENT: 7 CMH2O
RBC # BLD AUTO: 2.95 M/UL (ref 4.7–5.5)
SAO2 % BLD: 97 % (ref 92–97)
SERVICE CMNT-IMP: ABNORMAL
SODIUM SERPL-SCNC: 137 MMOL/L (ref 136–145)
SPECIMEN TYPE: ABNORMAL
TOTAL RESP. RATE, ITRR: 19
VENTILATION MODE VENT: ABNORMAL
VOLUME CONTROL PLUS IVLCP: YES
VT SETTING VENT: 400 ML
WBC # BLD AUTO: 11.6 K/UL (ref 4.6–13.2)

## 2017-09-08 PROCEDURE — 77030018836 HC SOL IRR NACL ICUM -A

## 2017-09-08 PROCEDURE — 74011250636 HC RX REV CODE- 250/636: Performed by: INTERNAL MEDICINE

## 2017-09-08 PROCEDURE — 74011250637 HC RX REV CODE- 250/637: Performed by: INTERNAL MEDICINE

## 2017-09-08 PROCEDURE — 84100 ASSAY OF PHOSPHORUS: CPT | Performed by: NURSE PRACTITIONER

## 2017-09-08 PROCEDURE — 74011000258 HC RX REV CODE- 258: Performed by: HOSPITALIST

## 2017-09-08 PROCEDURE — 74011250637 HC RX REV CODE- 250/637: Performed by: NURSE PRACTITIONER

## 2017-09-08 PROCEDURE — 90935 HEMODIALYSIS ONE EVALUATION: CPT

## 2017-09-08 PROCEDURE — 77030002986 HC SUT PROL J&J -A

## 2017-09-08 PROCEDURE — 76060000033 HC ANESTHESIA 1 TO 1.5 HR

## 2017-09-08 PROCEDURE — 77030020262 HC SOL INJ SOD CL 0.9% 100ML

## 2017-09-08 PROCEDURE — 74011000250 HC RX REV CODE- 250: Performed by: PHYSICIAN ASSISTANT

## 2017-09-08 PROCEDURE — 74011000250 HC RX REV CODE- 250

## 2017-09-08 PROCEDURE — 74011250636 HC RX REV CODE- 250/636: Performed by: HOSPITALIST

## 2017-09-08 PROCEDURE — 77030011265 HC ELECTRD BLD HEX COVD -A

## 2017-09-08 PROCEDURE — 77030021679 HC TU TRACH CUF BLU SIMS -B

## 2017-09-08 PROCEDURE — 85025 COMPLETE CBC W/AUTO DIFF WBC: CPT | Performed by: NURSE PRACTITIONER

## 2017-09-08 PROCEDURE — 82803 BLOOD GASES ANY COMBINATION: CPT

## 2017-09-08 PROCEDURE — 74011250636 HC RX REV CODE- 250/636

## 2017-09-08 PROCEDURE — 36415 COLL VENOUS BLD VENIPUNCTURE: CPT | Performed by: NURSE PRACTITIONER

## 2017-09-08 PROCEDURE — 94003 VENT MGMT INPAT SUBQ DAY: CPT

## 2017-09-08 PROCEDURE — 74011000258 HC RX REV CODE- 258: Performed by: INTERNAL MEDICINE

## 2017-09-08 PROCEDURE — 71010 XR CHEST PORT: CPT

## 2017-09-08 PROCEDURE — 83735 ASSAY OF MAGNESIUM: CPT | Performed by: NURSE PRACTITIONER

## 2017-09-08 PROCEDURE — 77030011640 HC PAD GRND REM COVD -A

## 2017-09-08 PROCEDURE — 77030020782 HC GWN BAIR PAWS FLX 3M -B

## 2017-09-08 PROCEDURE — 74011636637 HC RX REV CODE- 636/637: Performed by: INTERNAL MEDICINE

## 2017-09-08 PROCEDURE — 77030009426 HC FCPS BIOP ENDOSC BSC -B

## 2017-09-08 PROCEDURE — 82962 GLUCOSE BLOOD TEST: CPT

## 2017-09-08 PROCEDURE — 80048 BASIC METABOLIC PNL TOTAL CA: CPT | Performed by: NURSE PRACTITIONER

## 2017-09-08 PROCEDURE — 74011250637 HC RX REV CODE- 250/637: Performed by: HOSPITALIST

## 2017-09-08 PROCEDURE — 65610000006 HC RM INTENSIVE CARE

## 2017-09-08 PROCEDURE — P9047 ALBUMIN (HUMAN), 25%, 50ML: HCPCS | Performed by: INTERNAL MEDICINE

## 2017-09-08 PROCEDURE — 77030005122 HC CATH GASTMY PEG BSC -B

## 2017-09-08 PROCEDURE — 76010000149 HC OR TIME 1 TO 1.5 HR

## 2017-09-08 PROCEDURE — 36600 WITHDRAWAL OF ARTERIAL BLOOD: CPT

## 2017-09-08 PROCEDURE — 77030011256 HC DRSG MEPILEX <16IN NO BORD MOLN -A

## 2017-09-08 DEVICE — IMPLANTABLE DEVICE
Type: IMPLANTABLE DEVICE | Status: FUNCTIONAL
Brand: PORTEX

## 2017-09-08 RX ORDER — PROPOFOL 10 MG/ML
INJECTION, EMULSION INTRAVENOUS AS NEEDED
Status: DISCONTINUED | OUTPATIENT
Start: 2017-09-08 | End: 2017-09-08 | Stop reason: HOSPADM

## 2017-09-08 RX ORDER — SODIUM CHLORIDE 9 MG/ML
INJECTION, SOLUTION INTRAVENOUS
Status: DISCONTINUED | OUTPATIENT
Start: 2017-09-08 | End: 2017-09-08 | Stop reason: HOSPADM

## 2017-09-08 RX ORDER — ROCURONIUM BROMIDE 10 MG/ML
INJECTION, SOLUTION INTRAVENOUS AS NEEDED
Status: DISCONTINUED | OUTPATIENT
Start: 2017-09-08 | End: 2017-09-08 | Stop reason: HOSPADM

## 2017-09-08 RX ORDER — FENTANYL CITRATE 50 UG/ML
INJECTION, SOLUTION INTRAMUSCULAR; INTRAVENOUS AS NEEDED
Status: DISCONTINUED | OUTPATIENT
Start: 2017-09-08 | End: 2017-09-08 | Stop reason: HOSPADM

## 2017-09-08 RX ORDER — DOXERCALCIFEROL 4 UG/2ML
2 INJECTION INTRAVENOUS
Status: DISCONTINUED | OUTPATIENT
Start: 2017-09-08 | End: 2017-09-10

## 2017-09-08 RX ADMIN — Medication 1 PACKET: at 08:25

## 2017-09-08 RX ADMIN — INSULIN LISPRO 3 UNITS: 100 INJECTION, SOLUTION INTRAVENOUS; SUBCUTANEOUS at 06:26

## 2017-09-08 RX ADMIN — PROPOFOL 40 MG: 10 INJECTION, EMULSION INTRAVENOUS at 17:07

## 2017-09-08 RX ADMIN — FENTANYL CITRATE 50 MCG: 50 INJECTION, SOLUTION INTRAMUSCULAR; INTRAVENOUS at 17:07

## 2017-09-08 RX ADMIN — DOXERCALCIFEROL 2 MCG: 4 INJECTION, SOLUTION INTRAVENOUS at 13:00

## 2017-09-08 RX ADMIN — ASPIRIN 81 MG 81 MG: 81 TABLET ORAL at 08:23

## 2017-09-08 RX ADMIN — HEPARIN SODIUM 5000 UNITS: 5000 INJECTION, SOLUTION INTRAVENOUS; SUBCUTANEOUS at 19:07

## 2017-09-08 RX ADMIN — SODIUM CHLORIDE: 9 INJECTION, SOLUTION INTRAVENOUS at 16:53

## 2017-09-08 RX ADMIN — FENTANYL CITRATE 25 MCG: 50 INJECTION, SOLUTION INTRAMUSCULAR; INTRAVENOUS at 17:14

## 2017-09-08 RX ADMIN — PIPERACILLIN AND TAZOBACTAM 2.25 G: 2; .25 INJECTION, POWDER, FOR SOLUTION INTRAVENOUS at 22:21

## 2017-09-08 RX ADMIN — MINERAL OIL AND WHITE PETROLATUM 1 DOSE: 150; 850 OINTMENT OPHTHALMIC at 22:11

## 2017-09-08 RX ADMIN — MIDODRINE HYDROCHLORIDE 15 MG: 2.5 TABLET ORAL at 14:24

## 2017-09-08 RX ADMIN — COLLAGENASE SANTYL: 250 OINTMENT TOPICAL at 09:00

## 2017-09-08 RX ADMIN — VITAMIN A AND D: 30.8 OINTMENT TOPICAL at 08:25

## 2017-09-08 RX ADMIN — PIPERACILLIN AND TAZOBACTAM 2.25 G: 2; .25 INJECTION, POWDER, FOR SOLUTION INTRAVENOUS at 14:27

## 2017-09-08 RX ADMIN — ALBUMIN (HUMAN) 12.5 G: 0.25 INJECTION, SOLUTION INTRAVENOUS at 14:38

## 2017-09-08 RX ADMIN — ERYTHROPOIETIN 10000 UNITS: 10000 INJECTION, SOLUTION INTRAVENOUS; SUBCUTANEOUS at 13:21

## 2017-09-08 RX ADMIN — CHLORHEXIDINE GLUCONATE 10 ML: 1.2 RINSE ORAL at 08:23

## 2017-09-08 RX ADMIN — MIDODRINE HYDROCHLORIDE 15 MG: 2.5 TABLET ORAL at 08:23

## 2017-09-08 RX ADMIN — MINERAL OIL AND WHITE PETROLATUM 1 DOSE: 150; 850 OINTMENT OPHTHALMIC at 08:24

## 2017-09-08 RX ADMIN — INSULIN LISPRO 9 UNITS: 100 INJECTION, SOLUTION INTRAVENOUS; SUBCUTANEOUS at 00:49

## 2017-09-08 RX ADMIN — ALBUMIN (HUMAN) 12.5 G: 0.25 INJECTION, SOLUTION INTRAVENOUS at 10:07

## 2017-09-08 RX ADMIN — FAMOTIDINE 20 MG: 10 INJECTION INTRAVENOUS at 08:23

## 2017-09-08 RX ADMIN — VITAMIN A AND D: 30.8 OINTMENT TOPICAL at 22:12

## 2017-09-08 RX ADMIN — FENTANYL CITRATE 25 MCG: 50 INJECTION, SOLUTION INTRAMUSCULAR; INTRAVENOUS at 17:17

## 2017-09-08 RX ADMIN — LEVETIRACETAM 500 MG: 5 INJECTION INTRAVENOUS at 08:27

## 2017-09-08 RX ADMIN — ROCURONIUM BROMIDE 15 MG: 10 INJECTION, SOLUTION INTRAVENOUS at 17:07

## 2017-09-08 RX ADMIN — VITAMIN A AND D: 30.8 OINTMENT TOPICAL at 14:27

## 2017-09-08 RX ADMIN — CHLORHEXIDINE GLUCONATE 10 ML: 1.2 RINSE ORAL at 22:08

## 2017-09-08 RX ADMIN — PIPERACILLIN AND TAZOBACTAM 0.75 G: 2; .25 INJECTION, POWDER, LYOPHILIZED, FOR SOLUTION INTRAVENOUS; PARENTERAL at 14:24

## 2017-09-08 RX ADMIN — PIPERACILLIN AND TAZOBACTAM 2.25 G: 2; .25 INJECTION, POWDER, FOR SOLUTION INTRAVENOUS at 03:15

## 2017-09-08 RX ADMIN — Medication 1 CAPSULE: at 09:00

## 2017-09-08 RX ADMIN — HEPARIN SODIUM 5000 UNITS: 5000 INJECTION, SOLUTION INTRAVENOUS; SUBCUTANEOUS at 03:15

## 2017-09-08 RX ADMIN — HEPARIN SODIUM 5000 UNITS: 5000 INJECTION, SOLUTION INTRAVENOUS; SUBCUTANEOUS at 08:26

## 2017-09-08 RX ADMIN — INSULIN LISPRO 3 UNITS: 100 INJECTION, SOLUTION INTRAVENOUS; SUBCUTANEOUS at 11:08

## 2017-09-08 RX ADMIN — LEVETIRACETAM 500 MG: 5 INJECTION INTRAVENOUS at 22:08

## 2017-09-08 NOTE — PROGRESS NOTES
Hemodialysis Rounding Note      Patient: Aquilino Young               Sex: male          DOA: 7/27/2017  4:23 PM        YOB: 1960      Age:  65 y.o.        LOS:  LOS: 43 days     Subjective:     Aquilino Young is a 65 y.o.  who presents with Cardiac arrest ([facility]  Acidosis  Respiratory failure ([facility]  Anemia  ESRD needing dialysis ([facility]  Cardiac arrest ([facility]  Acute GI bleeding  Sepsis ([facility]  Hypotension  Anoxic brain damage (HCC)  ESRD (end stage renal disease) ([facility]  Colitis  dx  dx  DX  dx. The patient is dialyzing utilizing the following method:Intermittent Hemodialysis        Complaints remains unresponsive except to pain.      Current Facility-Administered Medications   Medication Dose Route Frequency    VANCOMYCIN INFORMATION NOTE   Other Rx Dosing/Monitoring    piperacillin-tazobactam (ZOSYN) 2.25 g in 0.9% sodium chloride (MBP/ADV) 50 mL MBP  2.25 g IntraVENous Q8H    Piperacillin-tazobactam (ZOSYN) 0.75 gm Supplemental Dosing by Pharmacy   Other Rx Dosing/Monitoring    albumin human 25% (BUMINATE) solution 12.5 g  12.5 g IntraVENous DIALYSIS PRN    doxercalciferol (HECTOROL) 4 mcg/2 mL injection 3 mcg  3 mcg IntraVENous Q TUE, THU & SAT    epoetin benjamin (EPOGEN;PROCRIT) injection 10,000 Units  10,000 Units IntraVENous DIALYSIS TUE, THU & SAT    midodrine (PROAMITINE) tablet 15 mg  15 mg Oral TID WITH MEALS    Zinc Ox-Aloe Vera-Vitamin E (BALMEX) topical cream   Topical CONTINUOUS    banana flakes trans-galactooligosaccharide (BANATROL PLUS) powder 1 Packet  1 Packet Per NG tube TID    levETIRAcetam (KEPPRA) 500 mg in saline (iso-osm) 100 ml IVPB  500 mg IntraVENous Q12H    LORazepam (ATIVAN) injection 1 mg  1 mg IntraVENous Q4H PRN    insulin lispro (HUMALOG) injection   SubCUTAneous Q6H    white petrolatum-mineral oil 85-15 % oint 1 Dose  1 Dose Ophthalmic QID    chlorhexidine (PERIDEX) 0.12 % mouthwash 10 mL  10 mL Oral Q12H    NOREPINephrine (LEVOPHED) 16,000 mcg in dextrose 5% 250 mL infusion  2-16 mcg/min IntraVENous TITRATE    albuterol (PROVENTIL VENTOLIN) nebulizer solution 2.5 mg  2.5 mg Nebulization Q6H PRN    famotidine (PF) (PEPCID) 20 mg in sodium chloride 0.9 % 10 mL injection  20 mg IntraVENous DAILY    vits A and D-white pet-lanolin (A&D) ointment   Topical QID AFTER MEALS    collagenase (SANTYL) 250 unit/gram ointment   Topical DAILY    acetaminophen (TYLENOL) tablet 650 mg  650 mg Oral Q4H PRN    lactobacillus sp. 50 billion cpu (BIO-K PLUS) capsule 1 Cap  1 Cap Oral DAILY    loperamide (IMODIUM) capsule 2 mg  2 mg Oral Q6H PRN    heparin (porcine) injection 5,000 Units  5,000 Units SubCUTAneous Q8H    heparin (porcine) 1,000 unit/mL injection 2,000 Units  2,000 Units IntraVENous DIALYSIS ONCE    simvastatin (ZOCOR) tablet 20 mg  20 mg Oral QHS    aspirin chewable tablet 81 mg  81 mg Oral DAILY    glucose chewable tablet 16 g  4 Tab Oral PRN    glucagon (GLUCAGEN) injection 1 mg  1 mg IntraMUSCular PRN    dextrose (D50W) injection syrg 12.5-25 g  25-50 mL IntraVENous PRN    0.9% sodium chloride infusion  100 mL/hr IntraVENous DIALYSIS PRN    naloxone (NARCAN) injection 0.4 mg  0.4 mg IntraVENous PRN       701 - 1900  In: 354 [I.V.:100]  Out: -   1901 -  07  In: 2680 [I.V.:400]  Out:  [Drains:]      Objective:     Blood pressure 123/65, pulse 76, temperature 98.5 °F (36.9 °C), temperature source Axillary, resp. rate 16, height 6' 2\" (1.88 m), weight 61.8 kg (136 lb 3.9 oz), SpO2 100 %.   Temp (24hrs), Av.8 °F (37.1 °C), Min:97.7 °F (36.5 °C), Max:100.8 °F (38.2 °C)      Blood Pressure: BP: 123/65  Pulse: Pulse (Heart Rate): 76  Temp:  Temp: 98.5 °F (36.9 °C)    Artificial Kidney Dialyzer/Set Up Inspection: Revaclear   hours Duration of Treatment (hours): 3.5 hours   Heparin Bolus Heparin Bolus (units): 2000 units (verbal order per Dr. Karen Miles by ICU MD Rosenberg)  Blood flow rate Blood Flow Rate (ml/min): 350 ml/min   Dialysate rate     Arterial Access Pressure Arterial Access Pressure (mmHg): -150  Venous Return Pressure Venous Return Pressure (mmHg): 190  Ultrafiltration Rate Goal/Amount of Fluid to Remove (mL): 1000 mL (order is for as tolerated)  Fluid Removal Fluid Removed (mL): 1350  Net Fluid Removal NET Fluid Removed (mL): 850 ml      PHYSICAL EXAM:   HEENT:  ET/NGT in place  NECK:  No JVD  CHEST AND LUNGS:  Equal vent breath sounds  CVS:  regular  ABDOMEN:  Soft + bs  EXT:  No edema, left arm AVG    DATA REVIEW:    Labs: Results:       Chemistry Recent Labs      09/08/17 0316 09/07/17   0230 09/06/17   0815   GLU  185*  236*  166*   NA  137  136  138   K  4.1  4.4  3.9   CL  104  104  104   CO2  22  21  24   BUN  69*  38*  29*   CREA  8.48*  7.13*  5.72*   CA  9.6  8.8  8.8   AGAP  11  11  10   BUCR  8*  5*  5*   mag 3.9  Phos 4.7   CBC w/Diff Recent Labs      09/08/17 0316 09/07/17 0230 09/06/17   0815   WBC  11.6  14.3*  9.3   RBC  2.95*  2.79*  2.72*   HGB  9.3*  8.8*  8.4*   HCT  28.6*  27.3*  26.5*   PLT  411  373  354   GRANS  64  72  54   LYMPH  24  22  37   EOS  4  1  2      Coagulation Recent Labs      09/06/17   0815   PTP  14.4   INR  1.2       Iron/Ferritin No results for input(s): IRON in the last 72 hours. No lab exists for component: TIBCCALC   BNP No results for input(s): BNPP in the last 72 hours. Cardiac Enzymes No results for input(s): CPK, CKND1, WILFRIDO in the last 72 hours. No lab exists for component: CKRMB, TROIP   Liver Enzymes No results for input(s): TP, ALB, TBIL, AP, SGOT, GPT in the last 72 hours.     No lab exists for component: DBIL   Thyroid Studies Lab Results   Component Value Date/Time    TSH 7.52 08/03/2017 01:01 PM        IPTH pend    CULTURE:   )  Recent Labs      09/06/17   0359  09/06/17   0300   CULT  NO GROWTH 2 DAYS  NO GROWTH 2 DAYS  FEW PSEUDOMONAS SPECIES*     Recent Labs      09/06/17   0359  09/06/17   0300   CULT  NO GROWTH 2 DAYS  NO GROWTH 2 DAYS  FEW PSEUDOMONAS SPECIES*       Assessment/Plan:     End Stage Renal Disease:  Patient is tolerating dialysis treatment well. .  Additionally the patient has experienced normal dialysis treatment during dialysis. Dry weight Estimated Weight: 67.3 kg (148 lb 5.9 oz) same. At 9:54 AM on 9/8/2017, I saw and examined patient during hemodialysis treatment. The patient was receiving hemodialysis for treatment of end stage renal disease. I have also reviewed vital signs, intake and output, lab results and recent events, and agreed with today's dialysis order. Still waiting on family decision re continued HD. Sched MWF until further changes in overall plan.      Anemia:  Cont Epo with hd    Renal Metabolic Bone Disease:  Repeat IPTH trend calcium dec Hectorol                      Hypertension: controlled    Access: Graft adequate monitoring/no changes         Bobbi Ardon MD  9/8/2017

## 2017-09-08 NOTE — DIALYSIS
ACUTE HEMODIALYSIS FLOW SHEET    HEMODIALYSIS ORDERS: Physician: Glenn Barber     Dialyzer: Revaclear         Duration: 3.5 hr  BFR: 300   DFR: 600   Dialysate:  K+   2    Ca+  2.5   Weight:    kg    Bed Scale []     Unable to Obtain []      UF Goal:  1000 as tolerated       Heparin []  Bolus      Units    [] Hourly       Units    [x]None   Pre BP:   123/65    Pulse:     76        Temperature:   98.5  Respirations: 16  Tx: NS           ml/Bolus  Other        [x] N/A   Labs: Pre        Post:        [x] N/A   Additional Orders:           [x] Time Out/Safety Check  [x] [facility] Consent Verified     CATHETER ACCESS: [x]N/A   []Right   []Left   []IJ     []Fem   [] First use X-ray verified     []Tunnel                [] Non Tunneled   []No S/S infection  []Redness  []Drainage []Cultured []Swelling []Pain   []Medical Aseptic Prep Utilized   []Dressing Changed  [] Biopatch  Date:       []Clotted   []Patent   Flows: []Good  []Poor  []Reversed   If access problem,  notified: []Yes    []N/A  Date:           GRAFT/FISTULA ACCESS:  []N/A     []Right     [x]Left     [x]UE     []LE   [x]AVG   []AVF        []Buttonhole    [x]Medical Aseptic Prep Utilized   [x]No S/S infection  []Redness  []Drainage []Cultured []Swelling []Pain    Bruit:   [x] Strong    [] Weak       Thrill :   [x] Strong    [] Weak       Needle Gauge:15    Length: 1     If access problem,  notified: []Yes     [x]N/A  Date:        Please describe access if present and not used:       GENERAL ASSESSMENT:    LUNGS: Sat%           [x] Clear  [] Coarse  [] Crackles  [] Wheezing        [] Diminished     Location : []RLL   []LLL    []RUL  []   Cough: []Productive  []Dry  [x]N/A   Respirations:  [x]Easy  []Labored   Therapy:  []RA  []NC         l/min    Mask: []NRB []Venti       O2%                  [x]Ventilator  []Intubated  []Trach   CARDIAC: [x]Regular      [] Irregular   [] Pericardial Rub  [] JVD        [x]  Monitored  [] N/A  Rhythm:         EDEMA: [x] None  []Generalized  [] Pitting [] 1    [] 2    [] 3    [] 4                 [] Facial  [] Pedal  []  UE  [] LE   SKIN:   [x] Warm  [] Hot     [] Cold   [x] Dry     [] Pale   [] Diaphoretic                  [] Flushed  [] Jaundiced  [] Cyanotic  [] Rash  [] Weeping   LOC:    [] Alert      []Oriented:    [] Person     [] Place  []Time               [] Confused  [] Lethargic  [] Medicated  [x] Non-responsive: responds only to pain     GI / ABDOMEN:  [x] Flat    [] Distended    [] Soft    [] Firm   []  Obese                             [] Diarrhea  [x] Bowel Sounds  [] Nausea  [] Vomiting       / URINE ASSESSMENT:[] Voiding   [] Oliguria  [x] Anuria   []  Stanton     [] Incontinent    []  Incontinent Brief      []  Bathroom Privileges     PAIN: [x] 0 []1  []2   []3   []4   []5   []6   []7   []8   []9   []10            Scale 0-10  Action/Follow Up:         MOBILITY:  [] Amb    [] Amb/Assist    [x] Bed    [] Wheelchair  [] Stretcher      All Vitals and Treatment Details on Attached [address]: [facility]          Room #309      [] 1st Time Acute  [] Stat  [x] Routine  [] Urgent     [] Acute Room  []  Bedside  [x] ICU/CCU  [] ER   Isolation Precautions:  [x] Dialysis   [] Airborne   [] Contact    [] Reverse   Special Considerations:         [] Blood Consent Verified [x]N/A     ALLERGIES:   [x] NKA          Code Status:  [x] Full Code  [] DNR  [] Other           HBsAg ONLY: Date Drawn 7/28/17               [x]Negative []Positive []Unknown   HBsAb: Date 7/28/17           [] Susceptible   [x] [id number] []Not Drawn      Current Labs:    Date of Labs: Today [x]     Results for Donte Barlow (MRN [id number]) as of 9/8/2017 09:54   Ref.  Range 9/8/2017 03:16   WBC Latest Ref Range: 4.6 - 13.2 K/uL 11.6   RBC Latest Ref Range: 4.70 - 5.50 M/uL 2.95 (L)   HGB Latest Ref Range: 13.0 - 16.0 g/dL 9.3 (L)   HCT Latest Ref Range: 36.0 - 48.0 % 28.6 (L)   MCV Latest Ref Range: 74.0 - 97.0 FL 96.9   MCH Latest Ref Range: 24.0 - 34.0 PG 31.5   MCHC Latest Ref Range: 31.0 - 37.0 g/dL 32.5   RDW Latest Ref Range: 11.6 - 14.5 % 18.2 (H)   PLATELET Latest Ref Range: 135 - 420 K/uL 411   MPV Latest Ref Range: 9.2 - 11.8 FL 10.0   NEUTROPHILS Latest Ref Range: 40 - 73 % 64   LYMPHOCYTES Latest Ref Range: 21 - 52 % 24   MONOCYTES Latest Ref Range: 3 - 10 % 8   EOSINOPHILS Latest Ref Range: 0 - 5 % 4   BASOPHILS Latest Ref Range: 0 - 2 % 0   DF Latest Units:   AUTOMATED   ABS. NEUTROPHILS Latest Ref Range: 1.8 - 8.0 K/UL 7.5   ABS. LYMPHOCYTES Latest Ref Range: 0.9 - 3.6 K/UL 2.7   ABS. MONOCYTES Latest Ref Range: 0.05 - 1.2 K/UL 0.9   ABS. EOSINOPHILS Latest Ref Range: 0.0 - 0.4 K/UL 0.4   ABS. BASOPHILS Latest Ref Range: 0.0 - 0.1 K/UL 0.0     Results for Jordan Beauchamp (MRN [id number]) as of 9/8/2017 09:54   Ref.  Range 9/8/2017 03:16   Sodium Latest Ref Range: 136 - 145 mmol/L 137   Potassium Latest Ref Range: 3.5 - 5.5 mmol/L 4.1   Chloride Latest Ref Range: 100 - 108 mmol/L 104   CO2 Latest Ref Range: 21 - 32 mmol/L 22   Anion gap Latest Ref Range: 3.0 - 18 mmol/L 11   Glucose Latest Ref Range: 74 - 99 mg/dL 185 (H)   BUN Latest Ref Range: 7.0 - 18 MG/DL 69 (H)   Creatinine Latest Ref Range: 0.6 - 1.3 MG/DL 8.48 (H)   BUN/Creatinine ratio Latest Ref Range: 12 - 20   8 (L)   Calcium Latest Ref Range: 8.5 - 10.1 MG/DL 9.6   Phosphorus Latest Ref Range: 2.5 - 4.9 MG/DL 4.7   Magnesium Latest Ref Range: 1.6 - 2.6 mg/dL 3.9 (H)   GFR est non-AA Latest Ref Range: >60 ml/min/1.73m2 7 (L)   GFR est AA Latest Ref Range: >60 ml/min/1.73m2 8 (L)                                                                                                                                   DIET:  [] Renal    [] Other     [x] NPO     []  Diabetic      PRIMARY NURSE REPORT: First initial/Last name/Title      Pre Dialysis:Nanette Dennis RN    Time: 0930       EDUCATION:    [x] Patient [] Other         Knowledge Basis: []None []Minimal []Substantial   [] Access Care     [] S&S of infection     [] Fluid Management     []K+     [x]Procedural    []Albumin     [] Medications     [] Tx Options     [] Transplant     [] Diet     [] Other   Teaching Tools:  [x] Explain  [] Demo  [] Handouts [] Video   Inappropriate due to    Pt only responds to pain, verbalized actions as performed        RO/HEMODIALYSIS MACHINE SAFETY CHECKS  Before each treatment:     Machine Number:                   [facility]                                   [] Unit Machine #  with centralized RO                                  [] Portable Machine #1/RO serial # [id number]                                  [] Portable Machine #2/RO serial # [id number]                                  [x] Portable Machine #3/RO serial # [id number]                                                                                                       [facility]                                  [] Portable Machine #11/RO serial # [id number]                                   [] Portable Machine #12/RO serial # [id number]                                  [] Portable Machine #13/RO serial #  [id number]      Alarm Test: [x]Pass           Other:         [x] RO/Machine Log Complete      Temp    36.0            [x]Extracorporeal Circuit Tested for integrity   Dialysate: pH  7.4 Conductivity: Meter   14.2     HD Machine   14.4                  TCD: 14.0  Dialyzer Lot # [id number]                             Blood Tubing Lot #[id number]                Saline Lot #  [id number]     CHLORINE TESTING-Before each treatment and every 4 hours    Total Chlorine: [x] less than 0.1 ppm  Time: 0930 4 Hr Check Time:    (if greater than 0.1 ppm from Primary then every 30 minutes from Secondary)     TREATMENT INITIATION  with Dialysis Precautions:   [x] All Connections Secured                 [x] Saline Line Double Clamped   [x] Venous Parameters Set                  [x] Arterial Parameters Set    [x] Prime Given  ml  250             [x]Air Foam Detector Engaged      Treatment Initiation Note: Arrived to room, pt lying in bed, eyes open, on ventilator, rectal tube in place, NG tube in place, no feeds or drips running. VSS, pt responds to pain but otherwise non-responsive. Treatment initiated via LUE AVG without complication. Site without sxs of infection. Will continue to monitor throughout treatment. 5680 Dr. Ely Underwood at bedside. No new orders at this time. 1000 BP 96/56, turned off UF, decreased goal by 300 ml, started albumin, will monitor   1009 BP 92/49, UF remaining off, decreased goal by another 200 ml, gave 100 ml saline, will continue to monitor  1011 BP 99/54, no new changes, will monitor. 1015 /57; UF back on, will monitor. 1230 BP 98/53; UF off; will monitor  1245 /51; UF on; will monitor       Medication Dose Volume Route Initials Dialyzer Cleared: [] Good [x] Fair  [] Poor    Blood processed: 62   L  UF Removed 400    Ml    Post Wt:     kg  POst BP:   112/57       Pulse: 72      Respirations: 17  Temperature: 98.4     Albumin 12.5 g 50 ml IV SN Post Tx Vascular Access: AVF/AVG: Bleeding stopped Art  5   min. Oscar.   5   Min   N/A     hectorol 2 mcg 1 ml IV  SN Catheter: Locking solution: Heparin 1:1000 Art. Oscar. N/A   epogen 45191 units 1 ml IV SN Post Assessment:                                    Skin:  [x] Warm  [x] Dry [] Diaphoretic     [] Flushed  [] Pale [] Cyanotic   [facility] Signatures Title Initials  Time Lungs: [x] Clear    [] Course  [] Crackles  [] Wheezing   Dewain Loge RN SN  Cardiac: [x] Regular   [] Irregular   [] Monitor  [] N/A  Rhythm:           Edema:  [x] None    [] General     [] Facial   [] Pedal    [] UE    [] LE       Pain: [x]0  []1  []2   []3  []4   []5   []6   []7   []8   []9   []10         Post Treatment Note: Pt tolerated treatment with intermittent episodes of hypotension. All blood returned via AVG. VSS, in no acute distress at handoff.  IN same general condition as when first assessed.            POST TREATMENT PRIMARY NURSE HANDOFF REPORT:     First initial/Last name/Title         Post Dialysis:  Laura Garner RN     Time: 8312          Abbreviations: AVG-arterial venous graft, AVF-arterial venous fistula, IJ-Internal Jugular, Subcl-Subclavian, Fem-Femoral, Tx-treatment, AP/HR-apical heart rate, DFR-dialysate flow rate, BFR-blood flow rate, AP-arterial pressure, -venous pressure, UF-ultrafiltrate, TMP-transmembrane pressure, Oscar-Venous, Art-Arterial, RO-Reverse Osmosis

## 2017-09-08 NOTE — PROGRESS NOTES
Surgery  Hold tube feeds  Planning trach and PEG this afternoon if consent is obtainable  Will discuss with family

## 2017-09-08 NOTE — ROUTINE PROCESS
Stopped tube feeding per Dr Adair Breath and peg insertion today. NG tube to suction. Called daughter to get consent.

## 2017-09-08 NOTE — PROGRESS NOTES
1552:  Bedside and Verbal shift change report given to Harrison Mcfarland RN (oncoming nurse) by YULIANA Ayers (offgoing nurse). Report included the following information SBAR, Kardex, Procedure Summary, Intake/Output, MAR, Accordion, Recent Results, Cardiac Rhythm NSR. and Alarm Parameters . [phone]:  Report to OR staff, to OR via bed with RN.  1800:  Returned to room via bed with OR staff s/p #9 Portex Trach and PEG insertion. Reassessed. Neuro without acute change noted. NSR. See flowsheet for vital sign details. Trach to vent with previous settings per Resp Therapy; site with scant red drainage noted, sutures intact. .  PEG site clean, dry,intact, clamped. HOB elevated. Monitor. 1925:  Bedside and Verbal shift change report given to AMI Dobson (oncoming nurse) by Harrison Mcfarland RN (offgoing nurse). Report included the following information SBAR, Kardex, OR Summary, Intake/Output, MAR, Accordion, Recent Results, Cardiac Rhythm NSR. and Alarm Parameters .

## 2017-09-08 NOTE — PROGRESS NOTES
[facility] Hospitalist Group  Progress Note    Patient: Jonel Lees Age: 65 y.o. : 1960 MR#: [id number] SSN: [id number]  Date/Time: 2017     Subjective:     He is found on dialysis in his room. Per surgery notes, planning trach and PEG this afternoon if consent is obtainable. Assessment/Plan:     1. S/p PEA arrest. wth acute resp fauilure on the Vent ? Cardiac cause with elevated trop PTA. Echo improving EF, s/p cath, no CAD. Now with second cardiac arrest - intubated, vent support. 2. Acute met encephalopathy: likely anoxic encephalopathy  3. Acute pontine lacunar CVA: on asa. 4. ESRD- HD as per nephrology  5. DM2- Lantus, ssi  6. Unstageable pressure ulcer to sacrum/coccyx  7. Last echo with EF 55-60% and noted to have G1DD  8. Full code. icu . Consider ethics consult. Full code.  Poor prognosis    Catha Pouch (daughter) (decision maker): [phone]  Eulalia Saez (niece): [address] [place] : [phone]  Hui Borrero (brother): [phone]    Case discussed with:  []Patient  []Family  [x]Nursing  []Case Management  DVT Prophylaxis:  []Lovenox  [x]Hep SQ  [x]SCDs  []Coumadin   []On Heparin gtt    Patient Active Problem List   Diagnosis Code    Anemia D64.9    Acidosis E87.2    Cardiac arrest ([address] I46.9    Respiratory failure ([address] J96.90    ESRD needing dialysis ([address] N18.6, Z99.2    Anoxic brain damage (HCC) G93.1    Colitis K52.9    Hypotension I95.9    Acute GI bleeding K92.2    ESRD (end stage renal disease) ([address] N18.6    Sepsis ([address] A41.9    Oropharyngeal dysphagia R13.12    Cachexia ([address] R64       Objective:   VS:   Visit Vitals    BP 98/53    Pulse 72    Temp 98.5 °F (36.9 °C) (Axillary)    Resp 18    Ht 6' 2\" (1.88 m)    Wt 61.8 kg (136 lb 3.9 oz)    SpO2 100%    BMI 17.49 kg/m2      Tmax/24hrs: Temp (24hrs), Av.3 °F (36.8 °C), Min:97.7 °F (36.5 °C), Max:98.7 °F (37.1 °C)  IOBRIEF    Intake/Output Summary (Last 24 hours) at 09/08/17 1241  Last data filed at 09/08/17 1131   Gross per 24 hour   Intake             2245 ml   Output              500 ml   Net             1745 ml     General:  Intubated, unresponsive  Cardiovascular:  S1S2+, RRR  Pulmonary:  Coarse BS b/l  GI:  Soft, BS+, NT, ND  Extremities:  No edema.  Good thrill over right arm graft    Medications:   Current Facility-Administered Medications   Medication Dose Route Frequency    doxercalciferol (HECTOROL) 4 mcg/2 mL injection 2 mcg  2 mcg IntraVENous DIALYSIS MON, WED & FRI    epoetin benjamin (EPOGEN;PROCRIT) injection 10,000 Units  10,000 Units IntraVENous DIALYSIS MON, WED & FRI    piperacillin-tazobactam (ZOSYN) 0.75 g in 0.9% sodium chloride 50 mL IVPB  0.75 g IntraVENous ONCE    VANCOMYCIN INFORMATION NOTE   Other Rx Dosing/Monitoring    piperacillin-tazobactam (ZOSYN) 2.25 g in 0.9% sodium chloride (MBP/ADV) 50 mL MBP  2.25 g IntraVENous Q8H    Piperacillin-tazobactam (ZOSYN) 0.75 gm Supplemental Dosing by Pharmacy   Other Rx Dosing/Monitoring    albumin human 25% (BUMINATE) solution 12.5 g  12.5 g IntraVENous DIALYSIS PRN    midodrine (PROAMITINE) tablet 15 mg  15 mg Oral TID WITH MEALS    Zinc Ox-Aloe Vera-Vitamin E (BALMEX) topical cream   Topical CONTINUOUS    banana flakes trans-galactooligosaccharide (BANATROL PLUS) powder 1 Packet  1 Packet Per NG tube TID    levETIRAcetam (KEPPRA) 500 mg in saline (iso-osm) 100 ml IVPB  500 mg IntraVENous Q12H    LORazepam (ATIVAN) injection 1 mg  1 mg IntraVENous Q4H PRN    insulin lispro (HUMALOG) injection   SubCUTAneous Q6H    white petrolatum-mineral oil 85-15 % oint 1 Dose  1 Dose Ophthalmic QID    chlorhexidine (PERIDEX) 0.12 % mouthwash 10 mL  10 mL Oral Q12H    NOREPINephrine (LEVOPHED) 16,000 mcg in dextrose 5% 250 mL infusion  2-16 mcg/min IntraVENous TITRATE    albuterol (PROVENTIL VENTOLIN) nebulizer solution 2.5 mg  2.5 mg Nebulization Q6H PRN    famotidine (PF) (PEPCID) 20 mg in sodium chloride 0.9 % 10 mL injection  20 mg IntraVENous DAILY    vits A and D-white pet-lanolin (A&D) ointment   Topical QID AFTER MEALS    collagenase (SANTYL) 250 unit/gram ointment   Topical DAILY    acetaminophen (TYLENOL) tablet 650 mg  650 mg Oral Q4H PRN    lactobacillus sp. 50 billion cpu (BIO-K PLUS) capsule 1 Cap  1 Cap Oral DAILY    loperamide (IMODIUM) capsule 2 mg  2 mg Oral Q6H PRN    heparin (porcine) injection 5,000 Units  5,000 Units SubCUTAneous Q8H    heparin (porcine) 1,000 unit/mL injection 2,000 Units  2,000 Units IntraVENous DIALYSIS ONCE    simvastatin (ZOCOR) tablet 20 mg  20 mg Oral QHS    aspirin chewable tablet 81 mg  81 mg Oral DAILY    glucose chewable tablet 16 g  4 Tab Oral PRN    glucagon (GLUCAGEN) injection 1 mg  1 mg IntraMUSCular PRN    dextrose (D50W) injection syrg 12.5-25 g  25-50 mL IntraVENous PRN    0.9% sodium chloride infusion  100 mL/hr IntraVENous DIALYSIS PRN    naloxone (NARCAN) injection 0.4 mg  0.4 mg IntraVENous PRN       Labs:      Recent Results (from the past 24 hour(s))   GLUCOSE, POC    Collection Time: 09/07/17  4:30 PM   Result Value Ref Range    Glucose (POC) 125 (H) 70 - 110 mg/dL   GLUCOSE, POC    Collection Time: 09/08/17 12:17 AM   Result Value Ref Range    Glucose (POC) 293 (H) 70 - 773 mg/dL   METABOLIC PANEL, BASIC    Collection Time: 09/08/17  3:16 AM   Result Value Ref Range    Sodium 137 136 - 145 mmol/L    Potassium 4.1 3.5 - 5.5 mmol/L    Chloride 104 100 - 108 mmol/L    CO2 22 21 - 32 mmol/L    Anion gap 11 3.0 - 18 mmol/L    Glucose 185 (H) 74 - 99 mg/dL    BUN 69 (H) 7.0 - 18 MG/DL    Creatinine 8.48 (H) 0.6 - 1.3 MG/DL    BUN/Creatinine ratio 8 (L) 12 - 20      GFR est AA 8 (L) >60 ml/min/1.73m2    GFR est non-AA 7 (L) >60 ml/min/1.73m2    Calcium 9.6 8.5 - 10.1 MG/DL   CBC WITH AUTOMATED DIFF    Collection Time: 09/08/17  3:16 AM   Result Value Ref Range    WBC 11.6 4.6 - 13.2 K/uL    RBC 2.95 (L) 4.70 - 5.50 M/uL    HGB 9.3 (L) 13.0 - 16.0 g/dL    HCT 28.6 (L) 36.0 - 48.0 %    MCV 96.9 74.0 - 97.0 FL    MCH 31.5 24.0 - 34.0 PG    MCHC 32.5 31.0 - 37.0 g/dL    RDW 18.2 (H) 11.6 - 14.5 %    PLATELET 682 695 - 365 K/uL    MPV 10.0 9.2 - 11.8 FL    NEUTROPHILS 64 40 - 73 %    LYMPHOCYTES 24 21 - 52 %    MONOCYTES 8 3 - 10 %    EOSINOPHILS 4 0 - 5 %    BASOPHILS 0 0 - 2 %    ABS. NEUTROPHILS 7.5 1.8 - 8.0 K/UL    ABS. LYMPHOCYTES 2.7 0.9 - 3.6 K/UL    ABS. MONOCYTES 0.9 0.05 - 1.2 K/UL    ABS. EOSINOPHILS 0.4 0.0 - 0.4 K/UL    ABS. BASOPHILS 0.0 0.0 - 0.1 K/UL    DF AUTOMATED     MAGNESIUM    Collection Time: 09/08/17  3:16 AM   Result Value Ref Range    Magnesium 3.9 (H) 1.6 - 2.6 mg/dL   PHOSPHORUS    Collection Time: 09/08/17  3:16 AM   Result Value Ref Range    Phosphorus 4.7 2.5 - 4.9 MG/DL   POC G3    Collection Time: 09/08/17  4:33 AM   Result Value Ref Range    Device: VENT      FIO2 (POC) 35 %    pH (POC) 7.411 7.35 - 7.45      pCO2 (POC) 34.1 (L) 35.0 - 45.0 MMHG    pO2 (POC) 87 80 - 100 MMHG    HCO3 (POC) 21.7 (L) 22 - 26 MMOL/L    sO2 (POC) 97 92 - 97 %    Base deficit (POC) 3 mmol/L    Mode SIMV      Tidal volume 400 ml    Set Rate 12 bpm    PEEP/CPAP (POC) 5 cmH2O    Pressure support 7 cmH2O    Allens test (POC) YES      Inspiratory Time 1 sec    Total resp.  rate 19      Site RIGHT RADIAL      Patient temp. 98.4      Specimen type (POC) ARTERIAL      Performed by Thais Saucedo     Volume control plus YES     GLUCOSE, POC    Collection Time: 09/08/17  6:26 AM   Result Value Ref Range    Glucose (POC) 183 (H) 70 - 110 mg/dL   GLUCOSE, POC    Collection Time: 09/08/17 11:07 AM   Result Value Ref Range    Glucose (POC) 167 (H) 70 - 110 mg/dL       Signed By: Paz Winn MD     September 8, 2017

## 2017-09-09 ENCOUNTER — APPOINTMENT (OUTPATIENT)
Dept: GENERAL RADIOLOGY | Age: 57
DRG: 004 | End: 2017-09-09
Attending: PHYSICIAN ASSISTANT
Payer: MEDICARE

## 2017-09-09 LAB
ANION GAP SERPL CALC-SCNC: 11 MMOL/L (ref 3–18)
BASOPHILS # BLD: 0 K/UL (ref 0–0.1)
BASOPHILS NFR BLD: 0 % (ref 0–2)
BUN SERPL-MCNC: 34 MG/DL (ref 7–18)
BUN/CREAT SERPL: 7 (ref 12–20)
CALCIUM SERPL-MCNC: 8.9 MG/DL (ref 8.5–10.1)
CALCIUM SERPL-MCNC: 9 MG/DL (ref 8.5–10.1)
CHLORIDE SERPL-SCNC: 105 MMOL/L (ref 100–108)
CO2 SERPL-SCNC: 22 MMOL/L (ref 21–32)
CREAT SERPL-MCNC: 4.95 MG/DL (ref 0.6–1.3)
DIFFERENTIAL METHOD BLD: ABNORMAL
EOSINOPHIL # BLD: 0.3 K/UL (ref 0–0.4)
EOSINOPHIL NFR BLD: 4 % (ref 0–5)
ERYTHROCYTE [DISTWIDTH] IN BLOOD BY AUTOMATED COUNT: 18.2 % (ref 11.6–14.5)
GLUCOSE BLD STRIP.AUTO-MCNC: 123 MG/DL (ref 70–110)
GLUCOSE BLD STRIP.AUTO-MCNC: 132 MG/DL (ref 70–110)
GLUCOSE BLD STRIP.AUTO-MCNC: 150 MG/DL (ref 70–110)
GLUCOSE BLD STRIP.AUTO-MCNC: 192 MG/DL (ref 70–110)
GLUCOSE SERPL-MCNC: 126 MG/DL (ref 74–99)
HCT VFR BLD AUTO: 28.9 % (ref 36–48)
HGB BLD-MCNC: 9.2 G/DL (ref 13–16)
LYMPHOCYTES # BLD: 2.7 K/UL (ref 0.9–3.6)
LYMPHOCYTES NFR BLD: 29 % (ref 21–52)
MCH RBC QN AUTO: 31.4 PG (ref 24–34)
MCHC RBC AUTO-ENTMCNC: 31.8 G/DL (ref 31–37)
MCV RBC AUTO: 98.6 FL (ref 74–97)
MONOCYTES # BLD: 0.6 K/UL (ref 0.05–1.2)
MONOCYTES NFR BLD: 7 % (ref 3–10)
NEUTS SEG # BLD: 5.5 K/UL (ref 1.8–8)
NEUTS SEG NFR BLD: 60 % (ref 40–73)
PLATELET # BLD AUTO: 380 K/UL (ref 135–420)
PMV BLD AUTO: 9.9 FL (ref 9.2–11.8)
POTASSIUM SERPL-SCNC: 4.1 MMOL/L (ref 3.5–5.5)
PTH-INTACT SERPL-MCNC: 67.5 PG/ML (ref 14–72)
RBC # BLD AUTO: 2.93 M/UL (ref 4.7–5.5)
SODIUM SERPL-SCNC: 138 MMOL/L (ref 136–145)
VANCOMYCIN SERPL-MCNC: 16.2 UG/ML (ref 5–40)
WBC # BLD AUTO: 9.2 K/UL (ref 4.6–13.2)

## 2017-09-09 PROCEDURE — 74011000258 HC RX REV CODE- 258: Performed by: INTERNAL MEDICINE

## 2017-09-09 PROCEDURE — 74011636637 HC RX REV CODE- 636/637: Performed by: INTERNAL MEDICINE

## 2017-09-09 PROCEDURE — 74011250637 HC RX REV CODE- 250/637: Performed by: HOSPITALIST

## 2017-09-09 PROCEDURE — 74011250637 HC RX REV CODE- 250/637: Performed by: NURSE PRACTITIONER

## 2017-09-09 PROCEDURE — 74011250637 HC RX REV CODE- 250/637: Performed by: INTERNAL MEDICINE

## 2017-09-09 PROCEDURE — 36415 COLL VENOUS BLD VENIPUNCTURE: CPT | Performed by: NURSE PRACTITIONER

## 2017-09-09 PROCEDURE — 71010 XR CHEST PORT: CPT

## 2017-09-09 PROCEDURE — 80202 ASSAY OF VANCOMYCIN: CPT | Performed by: INTERNAL MEDICINE

## 2017-09-09 PROCEDURE — 94003 VENT MGMT INPAT SUBQ DAY: CPT

## 2017-09-09 PROCEDURE — 80048 BASIC METABOLIC PNL TOTAL CA: CPT | Performed by: NURSE PRACTITIONER

## 2017-09-09 PROCEDURE — 74011000250 HC RX REV CODE- 250: Performed by: PHYSICIAN ASSISTANT

## 2017-09-09 PROCEDURE — 85025 COMPLETE CBC W/AUTO DIFF WBC: CPT | Performed by: NURSE PRACTITIONER

## 2017-09-09 PROCEDURE — 74011250636 HC RX REV CODE- 250/636: Performed by: INTERNAL MEDICINE

## 2017-09-09 PROCEDURE — 0B110F4 BYPASS TRACHEA TO CUTANEOUS WITH TRACHEOSTOMY DEVICE, OPEN APPROACH: ICD-10-PCS

## 2017-09-09 PROCEDURE — 83970 ASSAY OF PARATHORMONE: CPT | Performed by: INTERNAL MEDICINE

## 2017-09-09 PROCEDURE — 65610000006 HC RM INTENSIVE CARE

## 2017-09-09 PROCEDURE — 74011250636 HC RX REV CODE- 250/636: Performed by: HOSPITALIST

## 2017-09-09 PROCEDURE — 0DH63UZ INSERTION OF FEEDING DEVICE INTO STOMACH, PERCUTANEOUS APPROACH: ICD-10-PCS

## 2017-09-09 PROCEDURE — 82962 GLUCOSE BLOOD TEST: CPT

## 2017-09-09 RX ADMIN — VITAMIN A AND D: 30.8 OINTMENT TOPICAL at 18:01

## 2017-09-09 RX ADMIN — Medication 1 PACKET: at 15:15

## 2017-09-09 RX ADMIN — MINERAL OIL AND WHITE PETROLATUM 1 DOSE: 150; 850 OINTMENT OPHTHALMIC at 09:39

## 2017-09-09 RX ADMIN — SIMVASTATIN 20 MG: 10 TABLET, FILM COATED ORAL at 21:12

## 2017-09-09 RX ADMIN — HEPARIN SODIUM 5000 UNITS: 5000 INJECTION, SOLUTION INTRAVENOUS; SUBCUTANEOUS at 18:01

## 2017-09-09 RX ADMIN — LOPERAMIDE HYDROCHLORIDE 2 MG: 2 CAPSULE ORAL at 10:09

## 2017-09-09 RX ADMIN — LEVETIRACETAM 500 MG: 5 INJECTION INTRAVENOUS at 21:03

## 2017-09-09 RX ADMIN — VITAMIN A AND D: 30.8 OINTMENT TOPICAL at 21:22

## 2017-09-09 RX ADMIN — MINERAL OIL AND WHITE PETROLATUM 1 DOSE: 150; 850 OINTMENT OPHTHALMIC at 18:02

## 2017-09-09 RX ADMIN — MIDODRINE HYDROCHLORIDE 15 MG: 2.5 TABLET ORAL at 18:01

## 2017-09-09 RX ADMIN — FAMOTIDINE 20 MG: 10 INJECTION INTRAVENOUS at 09:50

## 2017-09-09 RX ADMIN — HEPARIN SODIUM 5000 UNITS: 5000 INJECTION, SOLUTION INTRAVENOUS; SUBCUTANEOUS at 02:14

## 2017-09-09 RX ADMIN — Medication 1 PACKET: at 21:02

## 2017-09-09 RX ADMIN — HEPARIN SODIUM 5000 UNITS: 5000 INJECTION, SOLUTION INTRAVENOUS; SUBCUTANEOUS at 10:00

## 2017-09-09 RX ADMIN — CHLORHEXIDINE GLUCONATE 10 ML: 1.2 RINSE ORAL at 09:40

## 2017-09-09 RX ADMIN — PIPERACILLIN AND TAZOBACTAM 2.25 G: 2; .25 INJECTION, POWDER, FOR SOLUTION INTRAVENOUS at 18:02

## 2017-09-09 RX ADMIN — Medication: at 09:41

## 2017-09-09 RX ADMIN — VITAMIN A AND D: 30.8 OINTMENT TOPICAL at 09:00

## 2017-09-09 RX ADMIN — MIDODRINE HYDROCHLORIDE 15 MG: 2.5 TABLET ORAL at 15:13

## 2017-09-09 RX ADMIN — INSULIN LISPRO 3 UNITS: 100 INJECTION, SOLUTION INTRAVENOUS; SUBCUTANEOUS at 12:11

## 2017-09-09 RX ADMIN — INSULIN LISPRO 3 UNITS: 100 INJECTION, SOLUTION INTRAVENOUS; SUBCUTANEOUS at 23:56

## 2017-09-09 RX ADMIN — PIPERACILLIN AND TAZOBACTAM 2.25 G: 2; .25 INJECTION, POWDER, FOR SOLUTION INTRAVENOUS at 05:11

## 2017-09-09 RX ADMIN — LEVETIRACETAM 500 MG: 5 INJECTION INTRAVENOUS at 09:40

## 2017-09-09 RX ADMIN — Medication 1 PACKET: at 09:00

## 2017-09-09 RX ADMIN — MINERAL OIL AND WHITE PETROLATUM 1 DOSE: 150; 850 OINTMENT OPHTHALMIC at 13:00

## 2017-09-09 RX ADMIN — MINERAL OIL AND WHITE PETROLATUM 1 DOSE: 150; 850 OINTMENT OPHTHALMIC at 21:21

## 2017-09-09 RX ADMIN — PIPERACILLIN AND TAZOBACTAM 2.25 G: 2; .25 INJECTION, POWDER, FOR SOLUTION INTRAVENOUS at 12:09

## 2017-09-09 RX ADMIN — ASPIRIN 81 MG 81 MG: 81 TABLET ORAL at 09:39

## 2017-09-09 RX ADMIN — VITAMIN A AND D: 30.8 OINTMENT TOPICAL at 12:14

## 2017-09-09 RX ADMIN — Medication 1 CAPSULE: at 09:39

## 2017-09-09 RX ADMIN — COLLAGENASE SANTYL: 250 OINTMENT TOPICAL at 09:40

## 2017-09-09 RX ADMIN — MIDODRINE HYDROCHLORIDE 15 MG: 2.5 TABLET ORAL at 09:39

## 2017-09-09 NOTE — ROUTINE PROCESS
Bedside, Verbal and Written shift change report given to Linda Sandoval (oncoming nurse) by Ari Brown RN   (offgoing nurse). Report included the following information SBAR, Kardex, OR Summary, Procedure Summary, Intake/Output, MAR and Recent Results.

## 2017-09-09 NOTE — ROUTINE PROCESS
Bedside and Verbal shift change report given to Estrella (oncoming nurse) by Rigoberto Conway (offgoing nurse). Report included the following information SBAR, Kardex and MAR.

## 2017-09-09 NOTE — ROUTINE PROCESS
Dressing to the unstageable pressure ulcer on sacrum changed this am due to leaking of FMS. Balmex ointment/cream applied to excoriated areas on groins and thighs.

## 2017-09-09 NOTE — PROGRESS NOTES
Surgery  Stable in unit  No bleeding from trach or peg  Low vent settings with normal pressures  Sign off

## 2017-09-09 NOTE — PROGRESS NOTES
[facility] Hospitalist Group  Progress Note    Patient: Clif Bettencourt Age: 65 y.o. : 1960 MR#: [id number] SSN: [id number]  Date: 2017     Subjective:     Appears comfortable. Eyes open. Does not respond to verbal, tactile stim. Case d/w nursing. Assessment/Plan:   1. PEA arrest with acute hypoxic resp failure - maintain vent support. 2. Acute metabolic and anoxic encephalopathy - due to above. Supportive care. 3. Acute CVA - ASA. 4. ESRD c HD - as scheduled. 5. DM - basal/bolus insulin. 6. Unstageable pressure ulcer to sacrum/coccyx - wound care. 7. Septic shock - resolved. Last sputum cxs with moderate Pseudomonas aer, few 2nd GNR, Candida para. Last blood cx NGTD. Maintain current med tx and follow. 8. Is s/p PEG/trach. Additional Notes:      Case discussed with:  []Patient  []Family  [x]Nursing  []Case Management  DVT Prophylaxis:  []Lovenox  []Hep SQ  []SCDs  []Coumadin   []On Heparin gtt    Objective:   VS:   Visit Vitals    /57    Pulse 87    Temp 99.2 °F (37.3 °C)    Resp 15    Ht 6' 2\" (1.88 m)    Wt 59 kg (130 lb 1.1 oz)    SpO2 100%    BMI 16.7 kg/m2      Tmax/24hrs: Temp (24hrs), Av.7 °F (37.1 °C), Min:97.6 °F (36.4 °C), Max:100.2 °F (37.9 °C)    Intake/Output Summary (Last 24 hours) at 17 1640  Last data filed at 17 1600   Gross per 24 hour   Intake              290 ml   Output              100 ml   Net              190 ml       General:  Eyes open. NAD. Does not respond to verbal/tactile stim. Cardiovascular:  RRR. Pulmonary:  CTA B ant with some coarse, scattered ronchi. GI:  Soft, ND, NABS. PEG site c/d/i. Extremities:  No edema noted.    Additional:      Labs:    Recent Results (from the past 24 hour(s))   GLUCOSE, POC    Collection Time: 17  7:05 PM   Result Value Ref Range    Glucose (POC) 96 70 - 110 mg/dL   GLUCOSE, POC    Collection Time: 17 11:16 PM   Result Value Ref Range    Glucose (POC) 124 (H) 70 - 110 mg/dL   PTH INTACT    Collection Time: 09/09/17  5:15 AM   Result Value Ref Range    Calcium 8.9 8.5 - 10.1 MG/DL    PTH, Intact 67.5 14.0 - 15.3 pg/mL   METABOLIC PANEL, BASIC    Collection Time: 09/09/17  5:15 AM   Result Value Ref Range    Sodium 138 136 - 145 mmol/L    Potassium 4.1 3.5 - 5.5 mmol/L    Chloride 105 100 - 108 mmol/L    CO2 22 21 - 32 mmol/L    Anion gap 11 3.0 - 18 mmol/L    Glucose 126 (H) 74 - 99 mg/dL    BUN 34 (H) 7.0 - 18 MG/DL    Creatinine 4.95 (H) 0.6 - 1.3 MG/DL    BUN/Creatinine ratio 7 (L) 12 - 20      GFR est AA 15 (L) >60 ml/min/1.73m2    GFR est non-AA 12 (L) >60 ml/min/1.73m2    Calcium 9.0 8.5 - 10.1 MG/DL   CBC WITH AUTOMATED DIFF    Collection Time: 09/09/17  5:15 AM   Result Value Ref Range    WBC 9.2 4.6 - 13.2 K/uL    RBC 2.93 (L) 4.70 - 5.50 M/uL    HGB 9.2 (L) 13.0 - 16.0 g/dL    HCT 28.9 (L) 36.0 - 48.0 %    MCV 98.6 (H) 74.0 - 97.0 FL    MCH 31.4 24.0 - 34.0 PG    MCHC 31.8 31.0 - 37.0 g/dL    RDW 18.2 (H) 11.6 - 14.5 %    PLATELET 475 883 - 757 K/uL    MPV 9.9 9.2 - 11.8 FL    NEUTROPHILS 60 40 - 73 %    LYMPHOCYTES 29 21 - 52 %    MONOCYTES 7 3 - 10 %    EOSINOPHILS 4 0 - 5 %    BASOPHILS 0 0 - 2 %    ABS. NEUTROPHILS 5.5 1.8 - 8.0 K/UL    ABS. LYMPHOCYTES 2.7 0.9 - 3.6 K/UL    ABS. MONOCYTES 0.6 0.05 - 1.2 K/UL    ABS. EOSINOPHILS 0.3 0.0 - 0.4 K/UL    ABS.  BASOPHILS 0.0 0.0 - 0.1 K/UL    DF AUTOMATED     VANCOMYCIN, RANDOM    Collection Time: 09/09/17  5:15 AM   Result Value Ref Range    Vancomycin, random 16.2 5.0 - 40.0 UG/ML   GLUCOSE, POC    Collection Time: 09/09/17  5:22 AM   Result Value Ref Range    Glucose (POC) 123 (H) 70 - 110 mg/dL   GLUCOSE, POC    Collection Time: 09/09/17 11:34 AM   Result Value Ref Range    Glucose (POC) 150 (H) 70 - 110 mg/dL       Signed By: Charmayne Gelinas, MD     September 9, 2017 4:40 PM

## 2017-09-09 NOTE — OP NOTES
[address]    Name:  Cas Ngo  MR#:  [id number]  :  1960  Account #:  [de-identified]  Date of Adm:  2017  Date of Surgery:  2017      PREOPERATIVE DIAGNOSIS: Anoxic encephalopathy. POSTOPERATIVE DIAGNOSIS: Anoxic encephalopathy. PROCEDURES PERFORMED: Tracheostomy and percutaneous  endoscopic gastrostomy. SURGEON: Scott Jeffery MD    ASSISTANT: .    ESTIMATED BLOOD LOSS: Minimal.    SPECIMENS REMOVED: none    ANESTHESIA:  gen    COMPLICATIONS: None. INDICATIONS: The patient is a 52-year-old gentleman status post  arrest with anoxic encephalopathy for tracheostomy and PEG tube  placement. DESCRIPTION OF PROCEDURE: After appropriate antibiotics and  sequential compression stockings, the patient was taken to the  operating room, placed in supine position on the OR table. After  adequate general anesthesia, his neck and abdomen were prepped  and draped to [facility] standards. Standard tracheostomy incision was  created and Bovie electrocautery was used to take down the  subcutaneous tissue to the Plastisma. The central raphae was dissected using the  Bovie and the anterior tracheal wall was cleaned. A Bombay door  incision into the tracheal wall was performed and the flaps were  sutured with 2-0 Prolene. A #9 Portex tracheostomy tube was placed  without difficulty, and the patient never had a desaturation. Immediate  CO2 return was noted, and the trach tube was sutured into position  with 2-0 Prolene and trach tape. Attention was then turned to the PEG tube placement and this was  done by placing a standard gastroscope in the oropharynx and  advancing into the stomach. The esophageal mucosa appeared  normal. The stomach and its rugae also appeared normal, without  ulceration. The scope was passed through the stomach and into the  duodenal bulb. No ulceration and normal mucosa were noted.  The  duodenum was decompressed and the gastroscope pulled back into  the stomach and retroflexed. The fundus appeared normal. A good site  for the PEG tube was ascertained using digital pressure, and using the  Seldinger technique, a blue wire was placed into the stomach. This  was grasped with the snare of the gastroscope and pulled retrograde  out of the stomach and esophagus through the oropharynx. A Ponsky  pull-through PEG tube was affixed to the wire and the wire was pulled  back into the stomach, pulling the PEG tube into position. It was affixed  in position using the bunting, and cleaned and dressed. The patient  tolerated the procedure well and was returned to the ICU in stable  condition.         MD Rivka Biggs / Nichole Shay  D:  09/08/2017   20:57  T:  09/08/2017   21:32  Job #:  [id number]

## 2017-09-09 NOTE — ANESTHESIA POSTPROCEDURE EVALUATION
Post-Anesthesia Evaluation and Assessment    Patient: Maribell Campos MRN: [id number]  SSN: [id number]    YOB: 1960  Age: 65 y.o. Sex: male       Cardiovascular Function/Vital Signs  Visit Vitals    BP 96/60    Pulse 67    Temp 36.8 °C (98.3 °F)    Resp 12    Ht 6' 2\" (1.88 m)    Wt 61.8 kg (136 lb 3.9 oz)    SpO2 100%    BMI 17.49 kg/m2       Patient is status post general anesthesia for Procedure(s):  TRACHEOSTOMY/ PERCUTANEOUS ENDOSCOPIC GASTROSTOMY TUBE INSERTION  PERCUTANEOUS ENDOSCOPIC GASTROSTOMY TUBE INSERTION. Nausea/Vomiting: None    Postoperative hydration reviewed and adequate. Pain:  Pain Scale 1: Adult Nonverbal Pain Scale (09/08/17 1600)  Pain Intensity 1: 0 (09/08/17 1600)   Managed    Neurological Status:   Neuro  Neurologic State: Unresponsive (09/08/17 1600)  Orientation Level: Unable to verbalize (09/08/17 1600)  Cognition: Unable to assess (comment) (09/08/17 1600)  Speech: Intubated (09/08/17 1600)  Assessment L Pupil: Fixed (09/08/17 1600)  Size L Pupil (mm): 2 (09/08/17 1600)  Assessment R Pupil: Fixed (09/08/17 1600)  Size R Pupil (mm): 2 (09/08/17 1600)  LUE Motor Response: Flaccid (09/08/17 1600)  LLE Motor Response: Flaccid (09/08/17 1600)  RUE Motor Response: Flaccid (09/08/17 1600)  RLE Motor Response: Flaccid (09/08/17 1600)   At baseline    Mental Status and Level of Consciousness: Arousable    Pulmonary Status:   O2 Device: Endotracheal tube (09/08/17 1600)   Adequate oxygenation and airway patent, Planned ventilator as in preop. Complications related to anesthesia: None    Post-anesthesia assessment completed.  No concerns    Signed By: Michelle Coombs MD     September 8, 2017

## 2017-09-10 ENCOUNTER — APPOINTMENT (OUTPATIENT)
Dept: GENERAL RADIOLOGY | Age: 57
DRG: 004 | End: 2017-09-10
Attending: INTERNAL MEDICINE
Payer: MEDICARE

## 2017-09-10 LAB
ANION GAP SERPL CALC-SCNC: 12 MMOL/L (ref 3–18)
ARTERIAL PATENCY WRIST A: ABNORMAL
BASE EXCESS BLD CALC-SCNC: 1 MMOL/L
BASOPHILS # BLD: 0 K/UL (ref 0–0.1)
BASOPHILS NFR BLD: 0 % (ref 0–2)
BDY SITE: ABNORMAL
BUN SERPL-MCNC: 45 MG/DL (ref 7–18)
BUN/CREAT SERPL: 7 (ref 12–20)
CALCIUM SERPL-MCNC: 8.6 MG/DL (ref 8.5–10.1)
CHLORIDE SERPL-SCNC: 103 MMOL/L (ref 100–108)
CO2 SERPL-SCNC: 22 MMOL/L (ref 21–32)
CREAT SERPL-MCNC: 6.82 MG/DL (ref 0.6–1.3)
DIFFERENTIAL METHOD BLD: ABNORMAL
EOSINOPHIL # BLD: 0.4 K/UL (ref 0–0.4)
EOSINOPHIL NFR BLD: 4 % (ref 0–5)
ERYTHROCYTE [DISTWIDTH] IN BLOOD BY AUTOMATED COUNT: 17.9 % (ref 11.6–14.5)
FERRITIN SERPL-MCNC: 419 NG/ML (ref 8–388)
GAS FLOW.O2 O2 DELIVERY SYS: ABNORMAL L/MIN
GAS FLOW.O2 SETTING OXYMISER: 14 BPM
GLUCOSE BLD STRIP.AUTO-MCNC: 154 MG/DL (ref 70–110)
GLUCOSE BLD STRIP.AUTO-MCNC: 186 MG/DL (ref 70–110)
GLUCOSE BLD STRIP.AUTO-MCNC: 226 MG/DL (ref 70–110)
GLUCOSE BLD STRIP.AUTO-MCNC: 231 MG/DL (ref 70–110)
GLUCOSE BLD STRIP.AUTO-MCNC: 232 MG/DL (ref 70–110)
GLUCOSE SERPL-MCNC: 163 MG/DL (ref 74–99)
HCO3 BLD-SCNC: 24.4 MMOL/L (ref 22–26)
HCT VFR BLD AUTO: 27 % (ref 36–48)
HGB BLD-MCNC: 8.5 G/DL (ref 13–16)
IRON SATN MFR SERPL: 15 %
IRON SERPL-MCNC: 23 UG/DL (ref 50–175)
LYMPHOCYTES # BLD: 3.1 K/UL (ref 0.9–3.6)
LYMPHOCYTES NFR BLD: 31 % (ref 21–52)
MCH RBC QN AUTO: 31 PG (ref 24–34)
MCHC RBC AUTO-ENTMCNC: 31.5 G/DL (ref 31–37)
MCV RBC AUTO: 98.5 FL (ref 74–97)
MONOCYTES # BLD: 1 K/UL (ref 0.05–1.2)
MONOCYTES NFR BLD: 10 % (ref 3–10)
NEUTS SEG # BLD: 5.5 K/UL (ref 1.8–8)
NEUTS SEG NFR BLD: 55 % (ref 40–73)
O2/TOTAL GAS SETTING VFR VENT: 25 %
PCO2 BLD: 34.4 MMHG (ref 35–45)
PEEP RESPIRATORY: 5 CMH2O
PH BLD: 7.46 [PH] (ref 7.35–7.45)
PLATELET # BLD AUTO: 436 K/UL (ref 135–420)
PMV BLD AUTO: 9.7 FL (ref 9.2–11.8)
PO2 BLD: 48 MMHG (ref 80–100)
POTASSIUM SERPL-SCNC: 3.9 MMOL/L (ref 3.5–5.5)
RBC # BLD AUTO: 2.74 M/UL (ref 4.7–5.5)
SAO2 % BLD: 86 % (ref 92–97)
SERVICE CMNT-IMP: ABNORMAL
SODIUM SERPL-SCNC: 137 MMOL/L (ref 136–145)
SPECIMEN TYPE: ABNORMAL
TIBC SERPL-MCNC: 154 UG/DL (ref 250–450)
TOTAL RESP. RATE, ITRR: 16
VENTILATION MODE VENT: ABNORMAL
VOLUME CONTROL PLUS IVLCP: YES
VT SETTING VENT: 400 ML
WBC # BLD AUTO: 9.9 K/UL (ref 4.6–13.2)

## 2017-09-10 PROCEDURE — 74011250636 HC RX REV CODE- 250/636: Performed by: HOSPITALIST

## 2017-09-10 PROCEDURE — 82728 ASSAY OF FERRITIN: CPT | Performed by: INTERNAL MEDICINE

## 2017-09-10 PROCEDURE — 85025 COMPLETE CBC W/AUTO DIFF WBC: CPT | Performed by: NURSE PRACTITIONER

## 2017-09-10 PROCEDURE — 74011250637 HC RX REV CODE- 250/637: Performed by: INTERNAL MEDICINE

## 2017-09-10 PROCEDURE — 74011000250 HC RX REV CODE- 250: Performed by: PHYSICIAN ASSISTANT

## 2017-09-10 PROCEDURE — 74011250636 HC RX REV CODE- 250/636: Performed by: INTERNAL MEDICINE

## 2017-09-10 PROCEDURE — 74011636637 HC RX REV CODE- 636/637: Performed by: INTERNAL MEDICINE

## 2017-09-10 PROCEDURE — 36415 COLL VENOUS BLD VENIPUNCTURE: CPT | Performed by: NURSE PRACTITIONER

## 2017-09-10 PROCEDURE — 65610000006 HC RM INTENSIVE CARE

## 2017-09-10 PROCEDURE — 83540 ASSAY OF IRON: CPT | Performed by: INTERNAL MEDICINE

## 2017-09-10 PROCEDURE — 77030011256 HC DRSG MEPILEX <16IN NO BORD MOLN -A

## 2017-09-10 PROCEDURE — 71010 XR CHEST PORT: CPT

## 2017-09-10 PROCEDURE — 94003 VENT MGMT INPAT SUBQ DAY: CPT

## 2017-09-10 PROCEDURE — 74011250637 HC RX REV CODE- 250/637: Performed by: NURSE PRACTITIONER

## 2017-09-10 PROCEDURE — 82962 GLUCOSE BLOOD TEST: CPT

## 2017-09-10 PROCEDURE — 80048 BASIC METABOLIC PNL TOTAL CA: CPT | Performed by: NURSE PRACTITIONER

## 2017-09-10 PROCEDURE — 74011000258 HC RX REV CODE- 258: Performed by: INTERNAL MEDICINE

## 2017-09-10 PROCEDURE — 74011250637 HC RX REV CODE- 250/637: Performed by: HOSPITALIST

## 2017-09-10 RX ORDER — HYDRALAZINE HYDROCHLORIDE 20 MG/ML
INJECTION INTRAMUSCULAR; INTRAVENOUS
Status: DISPENSED
Start: 2017-09-10 | End: 2017-09-10

## 2017-09-10 RX ADMIN — COLLAGENASE SANTYL: 250 OINTMENT TOPICAL at 08:26

## 2017-09-10 RX ADMIN — Medication 1 PACKET: at 17:11

## 2017-09-10 RX ADMIN — INSULIN LISPRO 3 UNITS: 100 INJECTION, SOLUTION INTRAVENOUS; SUBCUTANEOUS at 12:59

## 2017-09-10 RX ADMIN — MIDODRINE HYDROCHLORIDE 15 MG: 2.5 TABLET ORAL at 17:11

## 2017-09-10 RX ADMIN — LEVETIRACETAM 500 MG: 5 INJECTION INTRAVENOUS at 08:27

## 2017-09-10 RX ADMIN — MINERAL OIL AND WHITE PETROLATUM 1 DOSE: 150; 850 OINTMENT OPHTHALMIC at 13:00

## 2017-09-10 RX ADMIN — SIMVASTATIN 20 MG: 10 TABLET, FILM COATED ORAL at 21:35

## 2017-09-10 RX ADMIN — HEPARIN SODIUM 5000 UNITS: 5000 INJECTION, SOLUTION INTRAVENOUS; SUBCUTANEOUS at 02:08

## 2017-09-10 RX ADMIN — FAMOTIDINE 20 MG: 10 INJECTION INTRAVENOUS at 08:26

## 2017-09-10 RX ADMIN — HEPARIN SODIUM 5000 UNITS: 5000 INJECTION, SOLUTION INTRAVENOUS; SUBCUTANEOUS at 09:14

## 2017-09-10 RX ADMIN — PIPERACILLIN AND TAZOBACTAM 2.25 G: 2; .25 INJECTION, POWDER, FOR SOLUTION INTRAVENOUS at 12:58

## 2017-09-10 RX ADMIN — MINERAL OIL AND WHITE PETROLATUM 1 DOSE: 150; 850 OINTMENT OPHTHALMIC at 08:26

## 2017-09-10 RX ADMIN — PIPERACILLIN AND TAZOBACTAM 2.25 G: 2; .25 INJECTION, POWDER, FOR SOLUTION INTRAVENOUS at 02:27

## 2017-09-10 RX ADMIN — HEPARIN SODIUM 5000 UNITS: 5000 INJECTION, SOLUTION INTRAVENOUS; SUBCUTANEOUS at 17:11

## 2017-09-10 RX ADMIN — MINERAL OIL AND WHITE PETROLATUM 1 DOSE: 150; 850 OINTMENT OPHTHALMIC at 21:36

## 2017-09-10 RX ADMIN — VITAMIN A AND D: 30.8 OINTMENT TOPICAL at 08:27

## 2017-09-10 RX ADMIN — MINERAL OIL AND WHITE PETROLATUM 1 DOSE: 150; 850 OINTMENT OPHTHALMIC at 17:12

## 2017-09-10 RX ADMIN — Medication 1 CAPSULE: at 08:25

## 2017-09-10 RX ADMIN — Medication 1 PACKET: at 08:27

## 2017-09-10 RX ADMIN — MIDODRINE HYDROCHLORIDE 15 MG: 2.5 TABLET ORAL at 08:25

## 2017-09-10 RX ADMIN — MIDODRINE HYDROCHLORIDE 15 MG: 2.5 TABLET ORAL at 12:58

## 2017-09-10 RX ADMIN — LOPERAMIDE HYDROCHLORIDE 2 MG: 2 CAPSULE ORAL at 12:57

## 2017-09-10 RX ADMIN — VITAMIN A AND D: 30.8 OINTMENT TOPICAL at 13:00

## 2017-09-10 RX ADMIN — ASPIRIN 81 MG 81 MG: 81 TABLET ORAL at 08:25

## 2017-09-10 RX ADMIN — VITAMIN A AND D: 30.8 OINTMENT TOPICAL at 18:00

## 2017-09-10 RX ADMIN — INSULIN LISPRO 6 UNITS: 100 INJECTION, SOLUTION INTRAVENOUS; SUBCUTANEOUS at 17:23

## 2017-09-10 RX ADMIN — Medication 1 PACKET: at 21:34

## 2017-09-10 RX ADMIN — PIPERACILLIN AND TAZOBACTAM 2.25 G: 2; .25 INJECTION, POWDER, FOR SOLUTION INTRAVENOUS at 19:37

## 2017-09-10 RX ADMIN — LEVETIRACETAM 500 MG: 5 INJECTION INTRAVENOUS at 21:35

## 2017-09-10 RX ADMIN — VITAMIN A AND D: 30.8 OINTMENT TOPICAL at 21:35

## 2017-09-10 RX ADMIN — INSULIN LISPRO 3 UNITS: 100 INJECTION, SOLUTION INTRAVENOUS; SUBCUTANEOUS at 06:00

## 2017-09-10 NOTE — PROGRESS NOTES
SBT per Dr. Tyson Apple- PS 7, PEEP +5, FiO2 35%. Pt does not follow any commands. 09/10/17 0947   Weaning Parameters   Spontaneous Breathing Trial Complete Yes   Resp Rate Observed 16   Ve 5.8      RSBI 47     Vitals stable. WIll continue to monitor and assess for ability to tolerate trach collar.

## 2017-09-10 NOTE — PROGRESS NOTES
RENAL DAILY PROGRESS NOTE    Subjective:   Admitted postcode, seen for ESRD and HD    Complaint: post trach and PEG placement, still with diarrhea     Current Facility-Administered Medications   Medication Dose Route Frequency    hydrALAZINE (APRESOLINE) 20 mg/mL injection        doxercalciferol (HECTOROL) 4 mcg/2 mL injection 2 mcg  2 mcg IntraVENous DIALYSIS MON, WED & FRI    epoetin benjamin (EPOGEN;PROCRIT) injection 10,000 Units  10,000 Units IntraVENous DIALYSIS MON, WED & FRI    VANCOMYCIN INFORMATION NOTE   Other Rx Dosing/Monitoring    piperacillin-tazobactam (ZOSYN) 2.25 g in 0.9% sodium chloride (MBP/ADV) 50 mL MBP  2.25 g IntraVENous Q8H    Piperacillin-tazobactam (ZOSYN) 0.75 gm Supplemental Dosing by Pharmacy   Other Rx Dosing/Monitoring    albumin human 25% (BUMINATE) solution 12.5 g  12.5 g IntraVENous DIALYSIS PRN    midodrine (PROAMITINE) tablet 15 mg  15 mg Oral TID WITH MEALS    Zinc Ox-Aloe Vera-Vitamin E (BALMEX) topical cream   Topical CONTINUOUS    banana flakes trans-galactooligosaccharide (BANATROL PLUS) powder 1 Packet  1 Packet Per NG tube TID    levETIRAcetam (KEPPRA) 500 mg in saline (iso-osm) 100 ml IVPB  500 mg IntraVENous Q12H    LORazepam (ATIVAN) injection 1 mg  1 mg IntraVENous Q4H PRN    insulin lispro (HUMALOG) injection   SubCUTAneous Q6H    white petrolatum-mineral oil 85-15 % oint 1 Dose  1 Dose Ophthalmic QID    NOREPINephrine (LEVOPHED) 16,000 mcg in dextrose 5% 250 mL infusion  2-16 mcg/min IntraVENous TITRATE    albuterol (PROVENTIL VENTOLIN) nebulizer solution 2.5 mg  2.5 mg Nebulization Q6H PRN    famotidine (PF) (PEPCID) 20 mg in sodium chloride 0.9 % 10 mL injection  20 mg IntraVENous DAILY    vits A and D-white pet-lanolin (A&D) ointment   Topical QID AFTER MEALS    collagenase (SANTYL) 250 unit/gram ointment   Topical DAILY    acetaminophen (TYLENOL) tablet 650 mg  650 mg Oral Q4H PRN    lactobacillus sp. 50 billion cpu (BIO-K PLUS) capsule 1 Cap  1 Cap Oral DAILY    loperamide (IMODIUM) capsule 2 mg  2 mg Oral Q6H PRN    heparin (porcine) injection 5,000 Units  5,000 Units SubCUTAneous Q8H    heparin (porcine) 1,000 unit/mL injection 2,000 Units  2,000 Units IntraVENous DIALYSIS ONCE    simvastatin (ZOCOR) tablet 20 mg  20 mg Oral QHS    aspirin chewable tablet 81 mg  81 mg Oral DAILY    glucose chewable tablet 16 g  4 Tab Oral PRN    glucagon (GLUCAGEN) injection 1 mg  1 mg IntraMUSCular PRN    dextrose (D50W) injection syrg 12.5-25 g  25-50 mL IntraVENous PRN    0.9% sodium chloride infusion  100 mL/hr IntraVENous DIALYSIS PRN    naloxone (NARCAN) injection 0.4 mg  0.4 mg IntraVENous PRN           Objective:     Patient Vitals for the past 24 hrs:   Temp Pulse Resp BP SpO2   09/10/17 0848 97.4 °F (36.3 °C) - - - -   09/10/17 0500 - 74 18 124/67 100 %   09/10/17 0433 - 83 18 - 100 %   09/10/17 0400 97.4 °F (36.3 °C) - - - -   09/10/17 0000 - 82 19 124/71 100 %   09/09/17 2300 99.3 °F (37.4 °C) 82 18 123/69 100 %   09/09/17 2200 - 83 18 122/72 100 %   09/09/17 2100 - 82 18 128/71 100 %   09/09/17 2000 100 °F (37.8 °C) 82 15 125/68 100 %   09/09/17 1933 - 84 16 - 100 %   09/09/17 1900 - 83 17 120/71 100 %   09/09/17 1800 - 84 18 111/69 100 %   09/09/17 1700 - 78 14 135/67 100 %   09/09/17 1639 98.8 °F (37.1 °C) - - - -   09/09/17 1600 - 81 15 106/54 100 %   09/09/17 1500 - 84 19 93/49 100 %   09/09/17 1400 - 86 17 107/59 100 %   09/09/17 1300 - 90 23 96/49 100 %   09/09/17 1214 99.2 °F (37.3 °C) - - - -   09/09/17 1200 - 87 14 99/51 100 %   09/09/17 1144 - 87 15 - 100 %   09/09/17 1100 - 85 18 94/54 100 %   09/09/17 1000 - 89 17 104/59 100 %        Weight change:      09/08 1901 - 09/10 0700  In: 600 [I.V.:250]  Out: 100 [Drains:100]    Intake/Output Summary (Last 24 hours) at 09/10/17 0905  Last data filed at 09/09/17 1900   Gross per 24 hour   Intake              550 ml   Output                0 ml   Net              550 ml     Physical Exam: unresponsive, intubated, afebrile    HEENT: non icteric  Neck: no JVD, trach in place  Cardiovascular: regular, no rub  C/L:  Equal vent breath sounds  Abdomen: soft, + BS, PEG  Ext:  Left arm AVF + bruit, no LE edema    Data Review:     LABS:   Hematology:   Recent Labs      09/10/17   0251  09/09/17   0515  09/08/17 0316   WBC  9.9  9.2  11.6   HGB  8.5*  9.2*  9.3*   HCT  27.0*  28.9*  28.6*   pl 168K  Chemistry:   Recent Labs      09/10/17   0251  09/09/17   0515  09/08/17 0316   BUN  45*  34*  69*   CREA  6.82*  4.95*  8.48*   CA  8.6  8.9  9.0  9.6   K  3.9  4.1  4.1   NA  137  138  137   CL  103  105  104   CO2  22  22  22   PHOS   --    --   4.7   GLU  163*  126*  185*     IPTH 67      IMPRESSION AND PLAN:   ESRD sched HD MWF for solute management, unless family decides otherwise.   2nd HPT low IPTH, d/c Hectorol  Anemia from CKD cont ALONDRA with HD, check Fe stores  Nutrition cont with NGT feeding           Kallie Ruiz MD  9/10/2017

## 2017-09-10 NOTE — ROUTINE PROCESS
Bedside, Verbal and Written shift change report given to Dominique Alcantara (oncoming nurse) by Dev Oliva RN   (offgoing nurse). Report included the following information SBAR, Kardex, Intake/Output, MAR and Recent Results.

## 2017-09-10 NOTE — PROGRESS NOTES
[facility]  ICU Progress Note      Name: Lee Ann Ricketts   : 1960   MRN: [id number]   Date: 9/10/2017 6:47 AM     [x]I have reviewed the flowsheet and previous days notes. Events overnight reviewed and discussed with nursing staff. Vital signs and records reviewed. HPI:  Wilber Ortega a 55 y. o.  male with a history of DM, ESRD admitted PEA. Patient's hospitalization was also found to have a CVA and was treated for E.coli and C.perferinges bacteremia. Subsequent PEA arrest 17. Remains intubated and off sedation. 09/10/17  - No new event overnight  - Trach and Peg on Friday. - Patient remains unresponsive  - Off pressors; maintaining BP  - Afebrile overnight  - Anuric   - Dialysis PRN per Nephrology              ROS:Review of systems not obtained due to patient factors.     Medication Review:  · Pressors - N/A  · Sedation - N/A  · Antibiotics - N/A  · Pain - PRN Tylenol  · GI: Pepcid, DVT: SQH  · Others (other gtts)    Safety Bundles: VAP Bundle, CVL Bundle    Vital Signs:    Visit Vitals    /63    Pulse 71    Temp 97.4 °F (36.3 °C)    Resp 16    Ht 6' 2\" (1.88 m)    Wt 59 kg (130 lb 1.1 oz)    SpO2 100%    BMI 16.7 kg/m2       O2 Device: Tracheostomy, Ventilator   O2 Flow Rate (L/min): 6 l/min   Temp (24hrs), Av.7 °F (37.1 °C), Min:97.4 °F (36.3 °C), Max:100 °F (37.8 °C)       Intake/Output:   Last shift:         Last 3 shifts: 1901 - 09/10 0700  In: 600 [I.V.:250]  Out: 100 [Drains:100]    Intake/Output Summary (Last 24 hours) at 09/10/17 0944  Last data filed at 17 190   Gross per 24 hour   Intake              450 ml   Output                0 ml   Net              450 ml       Ventilator Settings:  Ventilator  Mode: SIMV  Respiratory Rate  Resp Rate Observed: 26  Back-Up Rate: 12  Insp Time (sec): 1 sec  Insp Flow (l/min): 44 l/min  I:E Ratio: 1:2.5  Ventilator Volumes  Vt Set (ml): 400 ml  Vt Exhaled (Machine Breath) (ml): 425 ml  Vt Spont (ml): 312 ml  Ve Observed (l/min): 5 l/min  Ventilator Pressures  PC Set: 400  Pressure Support (cm H2O): 7 cm H2O  PIP Observed (cm H2O): 21 cm H2O  Plateau Pressure (cm H2O): 13 cm H2O  MAP (cm H2O): 8  PEEP/VENT (cm H2O): 5 cm H20  Auto PEEP Observed (cm H2O): 0 cm H2O    Physical Exam:    General: Intubated, cachectic, unresponsive  HEENT:  Anicteric sclerae; pink palpebral conjunctivae; mucosa moist, ETT   Resp:  Symmetrical chest expansion, no accessory muscle use; good airway entry; BBS coarse   CV:  S1, S2 present; regular rate and rhythm  GI:  Abdomen soft, non-tender; (+) active bowel sounds  Extremities:  +2 pulses on all extremities; significant muscle atrophy, AV fistula LUE: + bruit + thrill   Skin:  Warm; no rashes/ lesions noted  Neurologic:  Negative dolls eye, pinpoint pupils nonreactive to light, + cough, - gag, decorticate posturing with stimulation, FOUR COMA SCORE 8  Devices:  OGT, ETT (8/18/17), LIJ TLC (8/18/17)      DATA:     Current Facility-Administered Medications   Medication Dose Route Frequency    hydrALAZINE (APRESOLINE) 20 mg/mL injection        epoetin benjamin (EPOGEN;PROCRIT) injection 10,000 Units  10,000 Units IntraVENous DIALYSIS MON, WED & FRI    VANCOMYCIN INFORMATION NOTE   Other Rx Dosing/Monitoring    piperacillin-tazobactam (ZOSYN) 2.25 g in 0.9% sodium chloride (MBP/ADV) 50 mL MBP  2.25 g IntraVENous Q8H    Piperacillin-tazobactam (ZOSYN) 0.75 gm Supplemental Dosing by Pharmacy   Other Rx Dosing/Monitoring    albumin human 25% (BUMINATE) solution 12.5 g  12.5 g IntraVENous DIALYSIS PRN    midodrine (PROAMITINE) tablet 15 mg  15 mg Oral TID WITH MEALS    Zinc Ox-Aloe Vera-Vitamin E (BALMEX) topical cream   Topical CONTINUOUS    banana flakes trans-galactooligosaccharide (BANATROL PLUS) powder 1 Packet  1 Packet Per NG tube TID    levETIRAcetam (KEPPRA) 500 mg in saline (iso-osm) 100 ml IVPB  500 mg IntraVENous Q12H    LORazepam (ATIVAN) injection 1 mg 1 mg IntraVENous Q4H PRN    insulin lispro (HUMALOG) injection   SubCUTAneous Q6H    white petrolatum-mineral oil 85-15 % oint 1 Dose  1 Dose Ophthalmic QID    albuterol (PROVENTIL VENTOLIN) nebulizer solution 2.5 mg  2.5 mg Nebulization Q6H PRN    famotidine (PF) (PEPCID) 20 mg in sodium chloride 0.9 % 10 mL injection  20 mg IntraVENous DAILY    vits A and D-white pet-lanolin (A&D) ointment   Topical QID AFTER MEALS    collagenase (SANTYL) 250 unit/gram ointment   Topical DAILY    acetaminophen (TYLENOL) tablet 650 mg  650 mg Oral Q4H PRN    lactobacillus sp. 50 billion cpu (BIO-K PLUS) capsule 1 Cap  1 Cap Oral DAILY    loperamide (IMODIUM) capsule 2 mg  2 mg Oral Q6H PRN    heparin (porcine) injection 5,000 Units  5,000 Units SubCUTAneous Q8H    heparin (porcine) 1,000 unit/mL injection 2,000 Units  2,000 Units IntraVENous DIALYSIS ONCE    simvastatin (ZOCOR) tablet 20 mg  20 mg Oral QHS    aspirin chewable tablet 81 mg  81 mg Oral DAILY    glucose chewable tablet 16 g  4 Tab Oral PRN    glucagon (GLUCAGEN) injection 1 mg  1 mg IntraMUSCular PRN    dextrose (D50W) injection syrg 12.5-25 g  25-50 mL IntraVENous PRN    0.9% sodium chloride infusion  100 mL/hr IntraVENous DIALYSIS PRN    naloxone (NARCAN) injection 0.4 mg  0.4 mg IntraVENous PRN         Labs: Results:       Chemistry Recent Labs      09/10/17   0251 09/09/17   0515  09/08/17 0316   GLU  163*  126*  185*   NA  137  138  137   K  3.9  4.1  4.1   CL  103  105  104   CO2  22  22  22   BUN  45*  34*  69*   CREA  6.82*  4.95*  8.48*   CA  8.6  8.9  9.0  9.6   AGAP  12  11  11   BUCR  7*  7*  8*      CBC w/Diff Recent Labs      09/10/17   0251 09/09/17   0515  09/08/17 0316   WBC  9.9  9.2  11.6   RBC  2.74*  2.93*  2.95*   HGB  8.5*  9.2*  9.3*   HCT  27.0*  28.9*  28.6*   PLT  436*  380  411   GRANS  55  60  64   LYMPH  31  29  24   EOS  4  4  4      Coagulation No results for input(s): PTP, INR, APTT in the last 72 hours.     No lab exists for component: INREXT, INREXT    Liver Enzymes No results for input(s): TP, ALB, TBIL, AP, SGOT, GPT in the last 72 hours. No lab exists for component: DBIL   ABG No results found for: PH, PHI, PCO2, PCO2I, PO2, PO2I, HCO3, HCO3I, FIO2, FIO2I   Microbiology No results for input(s): CULT in the last 72 hours. Telemetry: [x]Sinus []A-flutter []Paced    []A-fib []Multiple PVCs                    Imaging:  CXR Results  (Last 48 hours)               09/09/17 0645  XR CHEST PORT Final result    Impression:  IMPRESSION:       Replacement of the endotracheal tube with a tracheostomy. Removal of the   esophagogastric tube. Narrative:  Portable Chest 0558 hours       CPT CODE:86953       HISTORY:    Tracheostomy and endoscopic gastrostomy tube insertion, acute metabolic   encephalopathy, status post PEA arrest with acute respiratory failure, end-stage   renal disease,   eval ETT. COMPARISON: Chest x-ray April 8, 2017. FINDINGS:        The endotracheal tube has been exchanged for a tracheostomy tube which is poorly   visualized but in expected position. There is no pneumothorax nor   pneumomediastinum. The lungs are clear. Pulmonary vascularity is normal. The   costophrenic angles are sharp. The heart is normal in size. Roberth Ayala IMPRESSION:   · Acute encephalopathy, anoxic encephalopathy   · S/p PEA Cardiac arrest in dialysis 7/27/17 and 8/18/17  · Acute Respirtatory Failure requiring Mechanical Ventilation. trached and peged   · Hypotension- workup negative for PE. Cannot r/o sepsis with recurrent fevers, Dysautonomia? · ESRD on HD  · DM2  · Acute pontine lacunar CVA  · Oropharyngeal dysphagia  · Cachexia  · Unstageable pressure ulcer to sacrum/coccyx  · S/p septic shock  treated for E.coli and C.perferinges bacteremia s/p abx      PLAN:   · Resp -s/p trach will start SBT to see if we could put him on trach collar   · ID - Afebrile; Bcx 8/18/17 NGTD. Roberth Ayala  CXR without any acute infiltrates   · CVS - HD stable; Currently on High dose midodrine 15 mg TID. MAP goal > 65 mm/hg. Last echo with EF 55-60% and noted to have G1DD. PE workup negative. · Heme/onc -  Check CBC in am  · Metabolic - Check BMP in am. Replace e-lytes PRN per renal   · Renal -; PRN dialysis per renal  · Endocrine - Cortisol normal. Glycemic control <180; Continue SSI. Previous thyroid study: TSH was 7.52 and Free t4 0.8. Most likely sick euthyroid state   · Neuro/ Pain/ Sedation - Four Coma Score 8. Neurology has signed off. On prophylactic kepppra; no sedation. · GI - NPO; TF at Nepro 55 ml/hr w/ 150 h20 q4h.  Continue banatrol for diarrhea   · Prophylaxis - DVT- SQH, GI- Pepcid   ·           The patient is: [x] acutely ill Risk of deterioration: [x] moderate    [] critically ill  [] high     [x]See my orders for details     My assessment/plan was discussed with:  [x]nursing []PT/OT    [x]respiratory therapy [x]Dr.El Loza   []family []     Negar Dejesus MD

## 2017-09-10 NOTE — PROGRESS NOTES
[facility]  ICU Progress Note      Name: Jose Stewart   : 1960   MRN: [id number]   Date: 9/10/2017 6:47 AM     [x]I have reviewed the flowsheet and previous days notes. Events overnight reviewed and discussed with nursing staff. Vital signs and records reviewed. HPI:  Cindy Rueda a 55 y. o.  male with a history of DM, ESRD admitted PEA. Patient's hospitalization was also found to have a CVA and was treated for E.coli and C.perferinges bacteremia. Subsequent PEA arrest 17. Remains intubated and off sedation. 09/10/17  - No new event overnight  - Trach and Peg on Friday. - Patient remains unresponsive  - Off pressors; maintaining BP  - Afebrile overnight  - Anuric   - Dialysis PRN per Nephrology              ROS:Review of systems not obtained due to patient factors.     Medication Review:  · Pressors - N/A  · Sedation - N/A  · Antibiotics - N/A  · Pain - PRN Tylenol  · GI: Pepcid, DVT: SQH  · Others (other gtts)    Safety Bundles: VAP Bundle, CVL Bundle    Vital Signs:    Visit Vitals    /63    Pulse 73    Temp 97.4 °F (36.3 °C)    Resp 12    Ht 6' 2\" (1.88 m)    Wt 59 kg (130 lb 1.1 oz)    SpO2 100%    BMI 16.7 kg/m2       O2 Device: Tracheostomy, Ventilator   O2 Flow Rate (L/min): 6 l/min   Temp (24hrs), Av.7 °F (37.1 °C), Min:97.4 °F (36.3 °C), Max:100 °F (37.8 °C)       Intake/Output:   Last shift:         Last 3 shifts: 1901 - 09/10 0700  In: 600 [I.V.:250]  Out: 100 [Drains:100]    Intake/Output Summary (Last 24 hours) at 09/10/17 0948  Last data filed at 17 190   Gross per 24 hour   Intake              450 ml   Output                0 ml   Net              450 ml       Ventilator Settings:  Ventilator  Mode: SIMV, Pressure support  Respiratory Rate  Resp Rate Observed: 26  Back-Up Rate: 12  Insp Time (sec): 1 sec  Insp Flow (l/min): 44 l/min  I:E Ratio: 1:2.5  Ventilator Volumes  Vt Set (ml): 400 ml  Vt Exhaled (Machine Breath) (ml): 422 ml  Vt Spont (ml): 312 ml  Ve Observed (l/min): 5.04 l/min  Ventilator Pressures  PC Set: 400  Pressure Support (cm H2O): 7 cm H2O  PIP Observed (cm H2O): 16 cm H2O  Plateau Pressure (cm H2O): 15 cm H2O  MAP (cm H2O): 7.6  PEEP/VENT (cm H2O): 5 cm H20  Auto PEEP Observed (cm H2O): 0 cm H2O    Physical Exam:    General: Intubated, cachectic, unresponsive  HEENT:  Anicteric sclerae; pink palpebral conjunctivae; mucosa moist, ETT   Resp:  Symmetrical chest expansion, no accessory muscle use; good airway entry; BBS coarse   CV:  S1, S2 present; regular rate and rhythm  GI:  Abdomen soft, non-tender; (+) active bowel sounds  Extremities:  +2 pulses on all extremities; significant muscle atrophy, AV fistula LUE: + bruit + thrill   Skin:  Warm; no rashes/ lesions noted  Neurologic:  Negative dolls eye, pinpoint pupils nonreactive to light, + cough, - gag, decorticate posturing with stimulation, FOUR COMA SCORE 8  Devices:  OGT, ETT (8/18/17), LIJ TLC (8/18/17)      DATA:     Current Facility-Administered Medications   Medication Dose Route Frequency    hydrALAZINE (APRESOLINE) 20 mg/mL injection        epoetin benjamin (EPOGEN;PROCRIT) injection 10,000 Units  10,000 Units IntraVENous DIALYSIS MON, WED & FRI    VANCOMYCIN INFORMATION NOTE   Other Rx Dosing/Monitoring    piperacillin-tazobactam (ZOSYN) 2.25 g in 0.9% sodium chloride (MBP/ADV) 50 mL MBP  2.25 g IntraVENous Q8H    Piperacillin-tazobactam (ZOSYN) 0.75 gm Supplemental Dosing by Pharmacy   Other Rx Dosing/Monitoring    albumin human 25% (BUMINATE) solution 12.5 g  12.5 g IntraVENous DIALYSIS PRN    midodrine (PROAMITINE) tablet 15 mg  15 mg Oral TID WITH MEALS    Zinc Ox-Aloe Vera-Vitamin E (BALMEX) topical cream   Topical CONTINUOUS    banana flakes trans-galactooligosaccharide (BANATROL PLUS) powder 1 Packet  1 Packet Per NG tube TID    levETIRAcetam (KEPPRA) 500 mg in saline (iso-osm) 100 ml IVPB  500 mg IntraVENous Q12H    LORazepam (ATIVAN) injection 1 mg  1 mg IntraVENous Q4H PRN    insulin lispro (HUMALOG) injection   SubCUTAneous Q6H    white petrolatum-mineral oil 85-15 % oint 1 Dose  1 Dose Ophthalmic QID    albuterol (PROVENTIL VENTOLIN) nebulizer solution 2.5 mg  2.5 mg Nebulization Q6H PRN    famotidine (PF) (PEPCID) 20 mg in sodium chloride 0.9 % 10 mL injection  20 mg IntraVENous DAILY    vits A and D-white pet-lanolin (A&D) ointment   Topical QID AFTER MEALS    collagenase (SANTYL) 250 unit/gram ointment   Topical DAILY    acetaminophen (TYLENOL) tablet 650 mg  650 mg Oral Q4H PRN    lactobacillus sp. 50 billion cpu (BIO-K PLUS) capsule 1 Cap  1 Cap Oral DAILY    loperamide (IMODIUM) capsule 2 mg  2 mg Oral Q6H PRN    heparin (porcine) injection 5,000 Units  5,000 Units SubCUTAneous Q8H    heparin (porcine) 1,000 unit/mL injection 2,000 Units  2,000 Units IntraVENous DIALYSIS ONCE    simvastatin (ZOCOR) tablet 20 mg  20 mg Oral QHS    aspirin chewable tablet 81 mg  81 mg Oral DAILY    glucose chewable tablet 16 g  4 Tab Oral PRN    glucagon (GLUCAGEN) injection 1 mg  1 mg IntraMUSCular PRN    dextrose (D50W) injection syrg 12.5-25 g  25-50 mL IntraVENous PRN    0.9% sodium chloride infusion  100 mL/hr IntraVENous DIALYSIS PRN    naloxone (NARCAN) injection 0.4 mg  0.4 mg IntraVENous PRN         Labs: Results:       Chemistry Recent Labs      09/10/17   0251  09/09/17   0515  09/08/17   0316   GLU  163*  126*  185*   NA  137  138  137   K  3.9  4.1  4.1   CL  103  105  104   CO2  22  22  22   BUN  45*  34*  69*   CREA  6.82*  4.95*  8.48*   CA  8.6  8.9  9.0  9.6   AGAP  12  11  11   BUCR  7*  7*  8*      CBC w/Diff Recent Labs      09/10/17   0251 09/09/17 0515 09/08/17 0316   WBC  9.9  9.2  11.6   RBC  2.74*  2.93*  2.95*   HGB  8.5*  9.2*  9.3*   HCT  27.0*  28.9*  28.6*   PLT  436*  380  411   GRANS  55  60  64   LYMPH  31  29  24   EOS  4  4  4      Coagulation No results for input(s): PTP, INR, APTT in the last 72 hours. No lab exists for component: INREXT, INREXT    Liver Enzymes No results for input(s): TP, ALB, TBIL, AP, SGOT, GPT in the last 72 hours. No lab exists for component: DBIL   ABG No results found for: PH, PHI, PCO2, PCO2I, PO2, PO2I, HCO3, HCO3I, FIO2, FIO2I   Microbiology No results for input(s): CULT in the last 72 hours. Telemetry: [x]Sinus []A-flutter []Paced    []A-fib []Multiple PVCs                    Imaging:  CXR Results  (Last 48 hours)               09/09/17 0645  XR CHEST PORT Final result    Impression:  IMPRESSION:       Replacement of the endotracheal tube with a tracheostomy. Removal of the   esophagogastric tube. Narrative:  Portable Chest 0558 hours       CPT CODE:97797       HISTORY:    Tracheostomy and endoscopic gastrostomy tube insertion, acute metabolic   encephalopathy, status post PEA arrest with acute respiratory failure, end-stage   renal disease,   eval ETT. COMPARISON: Chest x-ray April 8, 2017. FINDINGS:        The endotracheal tube has been exchanged for a tracheostomy tube which is poorly   visualized but in expected position. There is no pneumothorax nor   pneumomediastinum. The lungs are clear. Pulmonary vascularity is normal. The   costophrenic angles are sharp. The heart is normal in size. Liza Mcdonnell IMPRESSION:   · Acute encephalopathy, anoxic encephalopathy   · S/p PEA Cardiac arrest in dialysis 7/27/17 and 8/18/17  · Acute Respirtatory Failure requiring Mechanical Ventilation. trached and peged   · Hypotension- workup negative for PE. Cannot r/o sepsis with recurrent fevers, Dysautonomia? · ESRD on HD  · DM2  · Acute pontine lacunar CVA  · Oropharyngeal dysphagia  · Cachexia  · Unstageable pressure ulcer to sacrum/coccyx  · S/p septic shock  treated for E.coli and C.perferinges bacteremia s/p abx      PLAN:   · Resp -s/p trach Friday on minimal fio2 ID - Afebrile; Bcx 8/18/17 NGTD. Liza Mcdonnell  CXR without any acute infiltrates · CVS - HD stable; Currently on High dose midodrine 15 mg TID. MAP goal > 65 mm/hg. Last echo with EF 55-60% and noted to have G1DD. PE workup negative. · Heme/onc -  Check CBC in am  · Metabolic - Check BMP in am. Replace e-lytes PRN per renal   · Renal -; PRN dialysis per renal  · Endocrine - Cortisol normal. Glycemic control <180; Continue SSI. Previous thyroid study: TSH was 7.52 and Free t4 0.8. Most likely sick euthyroid state   · Neuro/ Pain/ Sedation - Four Coma Score 8. Neurology has signed off. On prophylactic kepppra; no sedation. · GI - NPO; TF at Nepro 55 ml/hr w/ 150 h20 q4h.  Continue banatrol for diarrhea   · Prophylaxis - DVT- SQH, GI- Pepcid   ·           The patient is: [x] acutely ill Risk of deterioration: [x] moderate    [] critically ill  [] high     [x]See my orders for details     My assessment/plan was discussed with:  [x]nursing []PT/OT    [x]respiratory therapy [x]Dr.El Loza   []family []     Jean Ramirez MD

## 2017-09-10 NOTE — PROGRESS NOTES
[facility]  ICU Progress Note      Name: Jada Napier   : 1960   MRN: [id number]   Date: 9/10/2017 6:47 AM     [x]I have reviewed the flowsheet and previous days notes. Events overnight reviewed and discussed with nursing staff. Vital signs and records reviewed. HPI:  Geneva Guerra a 55 y. o.  male with a history of DM, ESRD admitted PEA. Patient's hospitalization was also found to have a CVA and was treated for E.coli and C.perferinges bacteremia. Subsequent PEA arrest 17. Remains intubated and off sedation. 09/10/17  - No new event overnight  - Trach and Peg on Friday. - Patient remains unresponsive  - Off pressors; maintaining BP  - Afebrile overnight  - Anuric   - Dialysis PRN per Nephrology              ROS:Review of systems not obtained due to patient factors.     Medication Review:  · Pressors - N/A  · Sedation - N/A  · Antibiotics - N/A  · Pain - PRN Tylenol  · GI: Pepcid, DVT: SQH  · Others (other gtts)    Safety Bundles: VAP Bundle, CVL Bundle    Vital Signs:    Visit Vitals    /63    Pulse 74    Temp 97.4 °F (36.3 °C)    Resp 10    Ht 6' 2\" (1.88 m)    Wt 59 kg (130 lb 1.1 oz)    SpO2 100%    BMI 16.7 kg/m2       O2 Device: Tracheostomy, Ventilator   O2 Flow Rate (L/min): 6 l/min   Temp (24hrs), Av.7 °F (37.1 °C), Min:97.4 °F (36.3 °C), Max:100 °F (37.8 °C)       Intake/Output:   Last shift:         Last 3 shifts: 1901 - 09/10 0700  In: 600 [I.V.:250]  Out: 100 [Drains:100]    Intake/Output Summary (Last 24 hours) at 09/10/17 0951  Last data filed at 17 1900   Gross per 24 hour   Intake              450 ml   Output                0 ml   Net              450 ml       Ventilator Settings:  Ventilator  Mode: Spontaneous, Pressure support  Respiratory Rate  Resp Rate Observed: 16  Back-Up Rate: 12  Insp Time (sec): 1 sec  Insp Flow (l/min): 44 l/min  I:E Ratio: 1:2.5  Ventilator Volumes  Vt Set (ml): 400 ml  Vt Exhaled (Machine Breath) (ml): 422 ml  Vt Spont (ml): 377 ml  Ve Observed (l/min): 5.98 l/min  Ventilator Pressures  PC Set: 400  Pressure Support (cm H2O): 7 cm H2O  PIP Observed (cm H2O): 13 cm H2O  Plateau Pressure (cm H2O): 15 cm H2O  MAP (cm H2O): 7.2  PEEP/VENT (cm H2O): 5 cm H20  Auto PEEP Observed (cm H2O): 0 cm H2O    Physical Exam:    General: Intubated, cachectic, unresponsive  HEENT:  Anicteric sclerae; pink palpebral conjunctivae; mucosa moist, ETT   Resp:  Symmetrical chest expansion, no accessory muscle use; good airway entry; BBS coarse   CV:  S1, S2 present; regular rate and rhythm  GI:  Abdomen soft, non-tender; (+) active bowel sounds  Extremities:  +2 pulses on all extremities; significant muscle atrophy, AV fistula LUE: + bruit + thrill   Skin:  Warm; no rashes/ lesions noted  Neurologic:  Negative dolls eye, pinpoint pupils nonreactive to light, + cough, - gag, decorticate posturing with stimulation, FOUR COMA SCORE 8  Devices:  OGT, ETT (8/18/17), LIJ TLC (8/18/17)      DATA:     Current Facility-Administered Medications   Medication Dose Route Frequency    hydrALAZINE (APRESOLINE) 20 mg/mL injection        epoetin benjamin (EPOGEN;PROCRIT) injection 10,000 Units  10,000 Units IntraVENous DIALYSIS MON, WED & FRI    VANCOMYCIN INFORMATION NOTE   Other Rx Dosing/Monitoring    piperacillin-tazobactam (ZOSYN) 2.25 g in 0.9% sodium chloride (MBP/ADV) 50 mL MBP  2.25 g IntraVENous Q8H    Piperacillin-tazobactam (ZOSYN) 0.75 gm Supplemental Dosing by Pharmacy   Other Rx Dosing/Monitoring    albumin human 25% (BUMINATE) solution 12.5 g  12.5 g IntraVENous DIALYSIS PRN    midodrine (PROAMITINE) tablet 15 mg  15 mg Oral TID WITH MEALS    Zinc Ox-Aloe Vera-Vitamin E (BALMEX) topical cream   Topical CONTINUOUS    banana flakes trans-galactooligosaccharide (BANATROL PLUS) powder 1 Packet  1 Packet Per NG tube TID    levETIRAcetam (KEPPRA) 500 mg in saline (iso-osm) 100 ml IVPB  500 mg IntraVENous Q12H    LORazepam (ATIVAN) injection 1 mg  1 mg IntraVENous Q4H PRN    insulin lispro (HUMALOG) injection   SubCUTAneous Q6H    white petrolatum-mineral oil 85-15 % oint 1 Dose  1 Dose Ophthalmic QID    albuterol (PROVENTIL VENTOLIN) nebulizer solution 2.5 mg  2.5 mg Nebulization Q6H PRN    famotidine (PF) (PEPCID) 20 mg in sodium chloride 0.9 % 10 mL injection  20 mg IntraVENous DAILY    vits A and D-white pet-lanolin (A&D) ointment   Topical QID AFTER MEALS    collagenase (SANTYL) 250 unit/gram ointment   Topical DAILY    acetaminophen (TYLENOL) tablet 650 mg  650 mg Oral Q4H PRN    lactobacillus sp. 50 billion cpu (BIO-K PLUS) capsule 1 Cap  1 Cap Oral DAILY    loperamide (IMODIUM) capsule 2 mg  2 mg Oral Q6H PRN    heparin (porcine) injection 5,000 Units  5,000 Units SubCUTAneous Q8H    heparin (porcine) 1,000 unit/mL injection 2,000 Units  2,000 Units IntraVENous DIALYSIS ONCE    simvastatin (ZOCOR) tablet 20 mg  20 mg Oral QHS    aspirin chewable tablet 81 mg  81 mg Oral DAILY    glucose chewable tablet 16 g  4 Tab Oral PRN    glucagon (GLUCAGEN) injection 1 mg  1 mg IntraMUSCular PRN    dextrose (D50W) injection syrg 12.5-25 g  25-50 mL IntraVENous PRN    0.9% sodium chloride infusion  100 mL/hr IntraVENous DIALYSIS PRN    naloxone (NARCAN) injection 0.4 mg  0.4 mg IntraVENous PRN         Labs: Results:       Chemistry Recent Labs      09/10/17   0251 09/09/17   0515  09/08/17 0316   GLU  163*  126*  185*   NA  137  138  137   K  3.9  4.1  4.1   CL  103  105  104   CO2  22  22  22   BUN  45*  34*  69*   CREA  6.82*  4.95*  8.48*   CA  8.6  8.9  9.0  9.6   AGAP  12  11  11   BUCR  7*  7*  8*      CBC w/Diff Recent Labs      09/10/17   0251 09/09/17   0515  09/08/17 0316   WBC  9.9  9.2  11.6   RBC  2.74*  2.93*  2.95*   HGB  8.5*  9.2*  9.3*   HCT  27.0*  28.9*  28.6*   PLT  436*  380  411   GRANS  55  60  64   LYMPH  31  29  24   EOS  4  4  4      Coagulation No results for input(s): PTP, INR, APTT in the last 72 hours. No lab exists for component: INREXT, INREXT    Liver Enzymes No results for input(s): TP, ALB, TBIL, AP, SGOT, GPT in the last 72 hours. No lab exists for component: DBIL   ABG No results found for: PH, PHI, PCO2, PCO2I, PO2, PO2I, HCO3, HCO3I, FIO2, FIO2I   Microbiology No results for input(s): CULT in the last 72 hours. Telemetry: [x]Sinus []A-flutter []Paced    []A-fib []Multiple PVCs                    Imaging:  CXR Results  (Last 48 hours)               09/09/17 0645  XR CHEST PORT Final result    Impression:  IMPRESSION:       Replacement of the endotracheal tube with a tracheostomy. Removal of the   esophagogastric tube. Narrative:  Portable Chest 0558 hours       CPT CODE:[id number]       HISTORY:    Tracheostomy and endoscopic gastrostomy tube insertion, acute metabolic   encephalopathy, status post PEA arrest with acute respiratory failure, end-stage   renal disease,   eval ETT. COMPARISON: Chest x-ray April 8, 2017. FINDINGS:        The endotracheal tube has been exchanged for a tracheostomy tube which is poorly   visualized but in expected position. There is no pneumothorax nor   pneumomediastinum. The lungs are clear. Pulmonary vascularity is normal. The   costophrenic angles are sharp. The heart is normal in size. Liza Mcdonnell IMPRESSION:   · Acute encephalopathy, anoxic encephalopathy   · S/p PEA Cardiac arrest in dialysis 7/27/17 and 8/18/17  · Acute Respirtatory Failure requiring Mechanical Ventilation. trached and peged   · Hypotension- workup negative for PE. Cannot r/o sepsis with recurrent fevers, Dysautonomia? · ESRD on HD  · DM2  · Acute pontine lacunar CVA  · Oropharyngeal dysphagia  · Cachexia  · Unstageable pressure ulcer to sacrum/coccyx  · S/p septic shock  treated for E.coli and C.perferinges bacteremia s/p abx      PLAN:   · Resp -for trach today  · - Afebrile; Bcx 8/18/17 NGTD. Liza Mcdonnell  CXR without any acute infiltrates   · CVS - HD stable; Currently on High dose midodrine 15 mg TID. MAP goal > 65 mm/hg. Last echo with EF 55-60% and noted to have G1DD. PE workup negative. · Heme/onc -  Check CBC in am  · Metabolic - Check BMP in am. Replace e-lytes PRN per renal   · Renal -; PRN dialysis per renal  · Endocrine - Cortisol normal. Glycemic control <180; Continue SSI. Previous thyroid study: TSH was 7.52 and Free t4 0.8. Most likely sick euthyroid state   · Neuro/ Pain/ Sedation - Four Coma Score 8. Neurology has signed off. On prophylactic kepppra; no sedation. · GI - NPO; TF at Nepro 55 ml/hr w/ 150 h20 q4h.  Continue banatrol for diarrhea   · Prophylaxis - DVT- SQH, GI- Pepcid   ·           The patient is: [x] acutely ill Risk of deterioration: [x] moderate    [] critically ill  [] high     [x]See my orders for details     My assessment/plan was discussed with:  [x]nursing []PT/OT    [x]respiratory therapy [x]Dr.El Loza   []family []     Lane Fisher MD

## 2017-09-11 ENCOUNTER — APPOINTMENT (OUTPATIENT)
Dept: GENERAL RADIOLOGY | Age: 57
DRG: 004 | End: 2017-09-11
Attending: INTERNAL MEDICINE
Payer: MEDICARE

## 2017-09-11 LAB
ANION GAP SERPL CALC-SCNC: 13 MMOL/L (ref 3–18)
BACTERIA SPEC CULT: ABNORMAL
BUN SERPL-MCNC: 56 MG/DL (ref 7–18)
BUN/CREAT SERPL: 7 (ref 12–20)
CALCIUM SERPL-MCNC: 8.6 MG/DL (ref 8.5–10.1)
CHLORIDE SERPL-SCNC: 102 MMOL/L (ref 100–108)
CO2 SERPL-SCNC: 20 MMOL/L (ref 21–32)
CREAT SERPL-MCNC: 7.76 MG/DL (ref 0.6–1.3)
GLUCOSE BLD STRIP.AUTO-MCNC: 195 MG/DL (ref 70–110)
GLUCOSE BLD STRIP.AUTO-MCNC: 220 MG/DL (ref 70–110)
GLUCOSE BLD STRIP.AUTO-MCNC: 235 MG/DL (ref 70–110)
GLUCOSE BLD STRIP.AUTO-MCNC: 236 MG/DL (ref 70–110)
GLUCOSE BLD STRIP.AUTO-MCNC: 241 MG/DL (ref 70–110)
GLUCOSE SERPL-MCNC: 282 MG/DL (ref 74–99)
GRAM STN SPEC: ABNORMAL
HCT VFR BLD AUTO: 27.8 % (ref 36–48)
HGB BLD-MCNC: 9.1 G/DL (ref 13–16)
POTASSIUM SERPL-SCNC: 3.8 MMOL/L (ref 3.5–5.5)
SERVICE CMNT-IMP: ABNORMAL
SODIUM SERPL-SCNC: 135 MMOL/L (ref 136–145)
VANCOMYCIN SERPL-MCNC: 13 UG/ML (ref 5–40)

## 2017-09-11 PROCEDURE — 74011250636 HC RX REV CODE- 250/636: Performed by: INTERNAL MEDICINE

## 2017-09-11 PROCEDURE — 94003 VENT MGMT INPAT SUBQ DAY: CPT

## 2017-09-11 PROCEDURE — 80048 BASIC METABOLIC PNL TOTAL CA: CPT | Performed by: INTERNAL MEDICINE

## 2017-09-11 PROCEDURE — 77030033263 HC DRSG MEPILEX 16-48IN BORD MOLN -B

## 2017-09-11 PROCEDURE — 74011250636 HC RX REV CODE- 250/636: Performed by: HOSPITALIST

## 2017-09-11 PROCEDURE — 74011250637 HC RX REV CODE- 250/637: Performed by: HOSPITALIST

## 2017-09-11 PROCEDURE — 71010 XR CHEST PORT: CPT

## 2017-09-11 PROCEDURE — 82962 GLUCOSE BLOOD TEST: CPT

## 2017-09-11 PROCEDURE — 74011636637 HC RX REV CODE- 636/637: Performed by: INTERNAL MEDICINE

## 2017-09-11 PROCEDURE — 36415 COLL VENOUS BLD VENIPUNCTURE: CPT | Performed by: INTERNAL MEDICINE

## 2017-09-11 PROCEDURE — 74011250637 HC RX REV CODE- 250/637: Performed by: INTERNAL MEDICINE

## 2017-09-11 PROCEDURE — 77030011256 HC DRSG MEPILEX <16IN NO BORD MOLN -A

## 2017-09-11 PROCEDURE — 74011000258 HC RX REV CODE- 258: Performed by: INTERNAL MEDICINE

## 2017-09-11 PROCEDURE — P9047 ALBUMIN (HUMAN), 25%, 50ML: HCPCS | Performed by: INTERNAL MEDICINE

## 2017-09-11 PROCEDURE — 65610000006 HC RM INTENSIVE CARE

## 2017-09-11 PROCEDURE — 77030018836 HC SOL IRR NACL ICUM -A

## 2017-09-11 PROCEDURE — 80202 ASSAY OF VANCOMYCIN: CPT | Performed by: HOSPITALIST

## 2017-09-11 PROCEDURE — 90935 HEMODIALYSIS ONE EVALUATION: CPT

## 2017-09-11 PROCEDURE — 85018 HEMOGLOBIN: CPT | Performed by: INTERNAL MEDICINE

## 2017-09-11 PROCEDURE — 74011000250 HC RX REV CODE- 250: Performed by: PHYSICIAN ASSISTANT

## 2017-09-11 PROCEDURE — 74011250637 HC RX REV CODE- 250/637: Performed by: NURSE PRACTITIONER

## 2017-09-11 RX ORDER — INSULIN GLARGINE 100 [IU]/ML
10 INJECTION, SOLUTION SUBCUTANEOUS
Status: DISCONTINUED | OUTPATIENT
Start: 2017-09-11 | End: 2017-09-13

## 2017-09-11 RX ORDER — INFANT FORMULA WITH IRON
POWDER (GRAM) ORAL EVERY 6 HOURS
Status: DISCONTINUED | OUTPATIENT
Start: 2017-09-11 | End: 2017-09-21

## 2017-09-11 RX ORDER — CIPROFLOXACIN 2 MG/ML
400 INJECTION, SOLUTION INTRAVENOUS EVERY 24 HOURS
Status: DISCONTINUED | OUTPATIENT
Start: 2017-09-11 | End: 2017-09-15

## 2017-09-11 RX ADMIN — PIPERACILLIN AND TAZOBACTAM 2.25 G: 2; .25 INJECTION, POWDER, FOR SOLUTION INTRAVENOUS at 03:53

## 2017-09-11 RX ADMIN — VITAMIN A AND D: 30.8 OINTMENT TOPICAL at 09:12

## 2017-09-11 RX ADMIN — HEPARIN SODIUM 5000 UNITS: 5000 INJECTION, SOLUTION INTRAVENOUS; SUBCUTANEOUS at 02:50

## 2017-09-11 RX ADMIN — PIPERACILLIN AND TAZOBACTAM 2.25 G: 2; .25 INJECTION, POWDER, FOR SOLUTION INTRAVENOUS at 10:41

## 2017-09-11 RX ADMIN — VANCOMYCIN HYDROCHLORIDE 750 MG: 10 INJECTION, POWDER, LYOPHILIZED, FOR SOLUTION INTRAVENOUS at 10:45

## 2017-09-11 RX ADMIN — HEPARIN SODIUM 5000 UNITS: 5000 INJECTION, SOLUTION INTRAVENOUS; SUBCUTANEOUS at 19:21

## 2017-09-11 RX ADMIN — MINERAL OIL AND WHITE PETROLATUM 1 DOSE: 150; 850 OINTMENT OPHTHALMIC at 19:20

## 2017-09-11 RX ADMIN — Medication 1 PACKET: at 09:11

## 2017-09-11 RX ADMIN — PIPERACILLIN AND TAZOBACTAM 0.75 G: 2; .25 INJECTION, POWDER, FOR SOLUTION INTRAVENOUS at 19:26

## 2017-09-11 RX ADMIN — INSULIN LISPRO 3 UNITS: 100 INJECTION, SOLUTION INTRAVENOUS; SUBCUTANEOUS at 12:24

## 2017-09-11 RX ADMIN — HEPARIN SODIUM 5000 UNITS: 5000 INJECTION, SOLUTION INTRAVENOUS; SUBCUTANEOUS at 09:11

## 2017-09-11 RX ADMIN — VITAMIN A AND VITAMIN D: 929.3 OINTMENT TOPICAL at 23:36

## 2017-09-11 RX ADMIN — COLLAGENASE SANTYL: 250 OINTMENT TOPICAL at 09:25

## 2017-09-11 RX ADMIN — PIPERACILLIN AND TAZOBACTAM 2.25 G: 2; .25 INJECTION, POWDER, FOR SOLUTION INTRAVENOUS at 19:19

## 2017-09-11 RX ADMIN — ALBUMIN (HUMAN) 12.5 G: 0.25 INJECTION, SOLUTION INTRAVENOUS at 14:50

## 2017-09-11 RX ADMIN — ERYTHROPOIETIN 10000 UNITS: 10000 INJECTION, SOLUTION INTRAVENOUS; SUBCUTANEOUS at 15:39

## 2017-09-11 RX ADMIN — INSULIN LISPRO 6 UNITS: 100 INJECTION, SOLUTION INTRAVENOUS; SUBCUTANEOUS at 00:32

## 2017-09-11 RX ADMIN — MINERAL OIL AND WHITE PETROLATUM 1 DOSE: 150; 850 OINTMENT OPHTHALMIC at 23:37

## 2017-09-11 RX ADMIN — INSULIN LISPRO 6 UNITS: 100 INJECTION, SOLUTION INTRAVENOUS; SUBCUTANEOUS at 19:21

## 2017-09-11 RX ADMIN — ALBUMIN (HUMAN) 12.5 G: 0.25 INJECTION, SOLUTION INTRAVENOUS at 14:00

## 2017-09-11 RX ADMIN — INSULIN LISPRO 6 UNITS: 100 INJECTION, SOLUTION INTRAVENOUS; SUBCUTANEOUS at 06:29

## 2017-09-11 RX ADMIN — MINERAL OIL AND WHITE PETROLATUM 1 DOSE: 150; 850 OINTMENT OPHTHALMIC at 12:25

## 2017-09-11 RX ADMIN — ASPIRIN 81 MG 81 MG: 81 TABLET ORAL at 09:11

## 2017-09-11 RX ADMIN — Medication: at 19:30

## 2017-09-11 RX ADMIN — INSULIN LISPRO 6 UNITS: 100 INJECTION, SOLUTION INTRAVENOUS; SUBCUTANEOUS at 23:34

## 2017-09-11 RX ADMIN — INSULIN GLARGINE 10 UNITS: 100 INJECTION, SOLUTION SUBCUTANEOUS at 22:02

## 2017-09-11 RX ADMIN — SIMVASTATIN 20 MG: 10 TABLET, FILM COATED ORAL at 22:03

## 2017-09-11 RX ADMIN — VITAMIN A AND D: 30.8 OINTMENT TOPICAL at 12:26

## 2017-09-11 RX ADMIN — LEVETIRACETAM 500 MG: 5 INJECTION INTRAVENOUS at 20:27

## 2017-09-11 RX ADMIN — MINERAL OIL AND WHITE PETROLATUM 1 DOSE: 150; 850 OINTMENT OPHTHALMIC at 09:11

## 2017-09-11 RX ADMIN — FAMOTIDINE 20 MG: 10 INJECTION INTRAVENOUS at 09:11

## 2017-09-11 RX ADMIN — CIPROFLOXACIN 400 MG: 2 INJECTION, SOLUTION INTRAVENOUS at 12:24

## 2017-09-11 RX ADMIN — LEVETIRACETAM 500 MG: 5 INJECTION INTRAVENOUS at 09:25

## 2017-09-11 RX ADMIN — Medication 1 PACKET: at 22:00

## 2017-09-11 RX ADMIN — MIDODRINE HYDROCHLORIDE 15 MG: 2.5 TABLET ORAL at 19:20

## 2017-09-11 RX ADMIN — Medication 1 CAPSULE: at 09:11

## 2017-09-11 RX ADMIN — MIDODRINE HYDROCHLORIDE 15 MG: 2.5 TABLET ORAL at 11:00

## 2017-09-11 RX ADMIN — VITAMIN A AND VITAMIN D: 929.3 OINTMENT TOPICAL at 18:00

## 2017-09-11 NOTE — DIABETES MGMT
GLYCEMIC CONTROL. Pt discussed in interdisciplinary rounds this morning. Pt BG above target range, 154-231 mg/dl yesterday, 9/10 and 195-220mg/dl today. Pt received 12 units lispro yesterday, 9/10. Recommend begin 5 units lantus and continue very insulin resistant scale lispro every 6 hours. Will continue to monitor inpatient for intervention.     Samina Velasquez MS, [address] [place] [address], CDE  Pager: [phone]

## 2017-09-11 NOTE — ROUTINE PROCESS
Patient's tolerating Spotaneous Mode on the Vent Settings might be ABLE TO WEAN FROM THE VENT AND START ON tRACH cOLLAR THIS AM.

## 2017-09-11 NOTE — DIALYSIS
ACUTE HEMODIALYSIS FLOW SHEET    HEMODIALYSIS ORDERS: Physician: Felicia Alex     Dialyzer: Revaclear         Duration: 3.5 hr  BFR: 300   DFR: 600   Dialysate:  K+   2    Ca+  2.5   Weight:    kg    Bed Scale []     Unable to Obtain []      UF Goal:  1000       Heparin []  Bolus      Units    [] Hourly       Units    [x]None   Pre BP:   120/73    Pulse:     82        Temperature:   97.9  Respirations: 20  Tx: NS           ml/Bolus  Other        [x] N/A   Labs: Pre        Post:        [x] N/A   Additional Orders:           [x] Time Out/Safety Check  [x] [facility] Consent Verified     CATHETER ACCESS: [x]N/A   []Right   []Left   []IJ     []Fem   [] First use X-ray verified     []Tunnel                [] Non Tunneled   []No S/S infection  []Redness  []Drainage []Cultured []Swelling []Pain   []Medical Aseptic Prep Utilized   []Dressing Changed  [] Biopatch  Date:       []Clotted   []Patent   Flows: []Good  []Poor  []Reversed   If access problem,  notified: []Yes    []N/A  Date:           GRAFT/FISTULA ACCESS:  []N/A     []Right     [x]Left     [x]UE     []LE   [x]AVG   []AVF        []Buttonhole    [x]Medical Aseptic Prep Utilized   [x]No S/S infection  []Redness  []Drainage []Cultured []Swelling []Pain    Bruit:   [x] Strong    [] Weak       Thrill :   [x] Strong    [] Weak       Needle Gauge: 15   Length: 1     If access problem,  notified: []Yes     [x]N/A  Date:        Please describe access if present and not used:       GENERAL ASSESSMENT:    LUNGS: Sat%           [x] Clear  [] Coarse  [] Crackles  [] Wheezing        [] Diminished     Location : [x]RLL   [x]LLL    [x]RUL  [x]   Cough: []Productive  []Dry  [x]N/A   Respirations:  []Easy  []Labored   Therapy:  []RA  []NC         l/min    Mask: []NRB []Venti       O2%                  []Ventilator  []Intubated  [x]Trach   CARDIAC: [x]Regular      [] Irregular   [] Pericardial Rub  [] JVD        [x]  Monitored  [] N/A  Rhythm:         EDEMA: [x] None []Generalized  [] Pitting [] 1    [] 2    [] 3    [] 4                 [] Facial  [] Pedal  []  UE  [] LE   SKIN:   [x] Warm  [] Hot     [] Cold   [x] Dry     [] Pale   [] Diaphoretic                  [] Flushed  [] Jaundiced  [] Cyanotic  [] Rash  [] Weeping   LOC:    [] Alert      []Oriented:    [] Person     [] Place  []Time               [] Confused  [] Lethargic  [] Medicated  [x] Non-responsive: only responds to pain     GI / ABDOMEN:  [x] Flat    [] Distended    [] Soft    [x] Firm   []  Obese                             [] Diarrhea  [x] Bowel Sounds  [] Nausea  [] Vomiting       / URINE ASSESSMENT:[] Voiding   [] Oliguria  [x] Anuria   []  Stanton     [] Incontinent    []  Incontinent Brief      []  Bathroom Privileges     PAIN: [x] 0 []1  []2   []3   []4   []5   []6   []7   []8   []9   []10            Scale 0-10  Action/Follow Up:         MOBILITY:  [] Amb    [] Amb/Assist    [x] Bed    [] Wheelchair  [] Stretcher      All Vitals and Treatment Details on Attached [address]: [facility]          Room # 309     [] 1st Time Acute  [] Stat  [x] Routine  [] Urgent     [] Acute Room  []  Bedside  [x] ICU/CCU  [] ER   Isolation Precautions:  [x] Dialysis   [] Airborne   [] Contact    [] Reverse   Special Considerations:         [] Blood Consent Verified [x]N/A     ALLERGIES:   [x] NKA          Code Status:  [x] Full Code  [] DNR  [] Other           HBsAg ONLY: Date Drawn 7/28/17               [x]Negative []Positive []Unknown   HBsAb: Date 7/28/17           [] Susceptible   [x] [id number] []Not Drawn      Current Labs:    Date of Labs: Today [x]     Results for Brandon Rincon (MRN [id number]) as of 9/11/2017 14:03   Ref. Range 9/11/2017 02:29   HGB Latest Ref Range: 13.0 - 16.0 g/dL 9.1 (L)   HCT Latest Ref Range: 36.0 - 48.0 % 27.8 (L)                                                               Results for Brandon Neat (MRN [id number]) as of 9/11/2017 14:03   Ref.  Range 9/11/2017 02:29   Sodium Latest Ref Range: 136 - 145 mmol/L 135 (L)   Potassium Latest Ref Range: 3.5 - 5.5 mmol/L 3.8   Chloride Latest Ref Range: 100 - 108 mmol/L 102   CO2 Latest Ref Range: 21 - 32 mmol/L 20 (L)   Anion gap Latest Ref Range: 3.0 - 18 mmol/L 13   Glucose Latest Ref Range: 74 - 99 mg/dL 282 (H)   BUN Latest Ref Range: 7.0 - 18 MG/DL 56 (H)   Creatinine Latest Ref Range: 0.6 - 1.3 MG/DL 7.76 (H)   BUN/Creatinine ratio Latest Ref Range: 12 - 20   7 (L)   Calcium Latest Ref Range: 8.5 - 10.1 MG/DL 8.6   GFR est non-AA Latest Ref Range: >60 ml/min/1.73m2 7 (L)   GFR est AA Latest Ref Range: >60 ml/min/1.73m2 9 (L)                                                                     DIET:  [] Renal    [x] Other     [] NPO     []  Diabetic      PRIMARY NURSE REPORT: First initial/Last name/Title      Pre Dialysis: Patience Arshad RN    Time: 1603       EDUCATION:    [x] Patient [] Other         Knowledge Basis: [x]None []Minimal []Substantial   [] Access Care     [] S&S of infection     [] Fluid Management     []K+     [x]Procedural    []Albumin     [] Medications     [] Tx Options     [] Transplant     [] Diet     [] Other   Teaching Tools:  [x] Explain  [] Demo  [] Handouts [] Video   Inappropriate due to    Pt non-responsive; verbalized actions as performed.          RO/HEMODIALYSIS MACHINE SAFETY CHECKS  Before each treatment:     Machine Number:                   [facility]                                  [] Unit Machine #  with centralized RO                                  [] Portable Machine #1/RO serial # [id number]                                  [] Portable Machine #2/RO serial # [id number]                                  [x] Portable Machine #3/RO serial # [id number]                                                                                                       [facility]                                  [] Portable Machine #11/RO serial # [id number]                                   [] Portable Machine #12/RO serial # [id number]                                  [] Portable Machine #13/RO serial #  [id number]      Alarm Test: [x]Pass           Other:         [x] RO/Machine Log Complete      Temp    36.0C            [x]Extracorporeal Circuit Tested for integrity   Dialysate: pH  7.4 Conductivity: Meter   14.2     HD Machine   14.4                  TCD: 14.0  Dialyzer Lot # [id number]                             Blood Tubing Lot #[id number]                Saline Lot #  [id number]     CHLORINE TESTING-Before each treatment and every 4 hours    Total Chlorine: [x] less than 0.1 ppm  Time: 1340 4 Hr Check Time:    (if greater than 0.1 ppm from Primary then every 30 minutes from Secondary)     TREATMENT INITIATION  with Dialysis Precautions:   [x] All Connections Secured                 [x] Saline Line Double Clamped   [x] Venous Parameters Set                  [x] Arterial Parameters Set    [x] Prime Given  ml 250              [x]Air Foam Detector Engaged      Treatment Initiation Note:Arrived to room with pt lying in bed, eyes open, on ventilated trach, rectal tube in place, responds only to painful stimuli, KVO drip of normal saline at 5 ml/ hour, nepro running through Peg tube at 55 ml/hr, VSS, in no acute distress. Treatment initiated via LUE AVG without complication. Dr. Simona Coleman notified of treatment initiation. No new orders at this time. Will continue to monitor. 1400 BP 81/51; gave 400 ml saline, BFR decreased to 250 , UF turned off and goal decreased by 500 ml; Primary RN stated that the pt did not receive both doses of midodrine this monring and instead only received one dose; BP slowly increased and came up to 101/62 at 1411; notified Dr. Simona Coleman, no new orders at this time. Will continue to monitor. 1415 BP 91/56; Dr. Simona Coleman aware; will continue to monitor. 1445: Dr. Simona Coleman at bedside. Verbal order to end treatment at 3 hours instead of 3.5 hours.    1500: Pt's BP stable last 30 minutes; UF back on; will monitor. 1515: Pt's /68; goal increased to 1500 total; will monitor  1530: /66; UF goal turned back down to 1000 ml total; will monitor     Medication Dose Volume Route Initials Dialyzer Cleared: [] Good [x] Fair  [] Poor    Blood processed: 48.6   L  UF Removed  0   Ml    Post Wt:     kg  POst BP:   104/62       Pulse: 83      Respirations: 15  Temperature: 97.6     Albumin 25 g 100 ml IV SN Post Tx Vascular Access: AVF/AVG: Bleeding stopped Art   5   min. Oscar.   5   Min   N/A     Epogen 12279 units 1 ml IV SN Catheter: Locking solution: Heparin 1:1000 Art. Oscar. N/A                                 Post Assessment:                                    Skin:  [x] Warm  [x] Dry [] Diaphoretic     [] Flushed  [] Pale [] Cyanotic   [facility] Signatures Title Initials  Time Lungs: [x] Clear    [] Course  [] Crackles  [] Wheezing   Dirk Mcpherson RN SN  Cardiac: [x] Regular   [] Irregular   [] Monitor  [] N/A  Rhythm:           Edema:  [x] None    [] General     [] Facial   [] Pedal    [] UE    [] LE       Pain: [x]0  []1  []2   []3  []4   []5   []6   []7   []8   []9   []10         Post Treatment Note: Pt tolerated treatment well. All blood returned via AVG. VSS and in no acute distress at handoff.           POST TREATMENT PRIMARY NURSE HANDOFF REPORT:     First initial/Last name/Title         Post Dialysis:  Rosalind Ventura RN     Time:  1700         Abbreviations: AVG-arterial venous graft, AVF-arterial venous fistula, IJ-Internal Jugular, Subcl-Subclavian, Fem-Femoral, Tx-treatment, AP/HR-apical heart rate, DFR-dialysate flow rate, BFR-blood flow rate, AP-arterial pressure, -venous pressure, UF-ultrafiltrate, TMP-transmembrane pressure, Oscar-Venous, Art-Arterial, RO-Reverse Osmosis

## 2017-09-11 NOTE — ROUTINE PROCESS
Bedside, Verbal and Written shift change report given to Jose Luis Cohen (oncoming nurse) by Roxann Flowers RN   (offgoing nurse). Report included the following information SBAR, Kardex, Procedure Summary, Intake/Output, MAR and Recent Results.

## 2017-09-11 NOTE — PROGRESS NOTES
attended the interdisciplinary rounds for [facility], who is a 65 y.o.,male. Patients Primary Language is: [place]. According to the patients EMR Muslim Affiliation is: Sanjay Boone.     The reason the Patient came to the hospital is:   Patient Active Problem List    Diagnosis Date Noted    Oropharyngeal dysphagia 08/17/2017    Cachexia ([facility] 08/17/2017    Acidosis 07/27/2017    Cardiac arrest ([facility] [address] 07/27/2017    Respiratory failure ([address] 07/27/2017    ESRD needing dialysis ([address] 07/27/2017    Anoxic brain damage ([facility] [address] 07/27/2017    Colitis 07/27/2017    Hypotension 07/27/2017    Acute GI bleeding 07/27/2017    ESRD (end stage renal disease) ([facility] [address] 07/27/2017    Sepsis ([facility] [address] 07/27/2017    Anemia       Not able to assess the patient due to medical condition. No family at bedside. Plan:  Chaplains will continue to follow and will provide pastoral care on an as needed/requested basis.  recommends bedside caregivers page  on duty if patient shows signs of acute spiritual or emotional distress.     [address] [facility]  Board Certified [address] [facility]   [phone]

## 2017-09-11 NOTE — ROUTINE PROCESS
No significant changes noted on patient's condition this shift. Cont. To leaks large amount of loose stools around Fecal management everytime patient has Bowel movements. perinium and groins and scrotom excoriated from frequent BM. Given prn Imodium and scheduled Bannatrol. Dressing to Unstageasable changed and covered with mepilex.

## 2017-09-11 NOTE — PROGRESS NOTES
PCCM Progress Note                                              I have reviewed the flowsheet and previous days notes. Events, vitals, medications and notes from last 24 hours reviewed. Care plan discussed with staff and on multidisciplinary rounds. Subjective:  9/11/2017   Pt is on vent via trach. No response to voice commands    Impression and Plan  VDRF - Pt is s/p Trach. Try Trach collar trial  Acute pontine CVA - on keppra, F/u with neurology  Pneumonia - Sputum cx from 9/6 is growing Pseudomonas and Klebsiella. Pt is on Zosyn and Vanco day #5 today. Stop the Vanco. Add cipro for double coverage of pseudomonas  ESRD - on HD, F/u with renal  Anemia - on Epogen with HD, Hb is stable  Hypotension - on Midodrine  DM - Add lantus  S/p septic shock  Anoxic encephalopathy  S/p PEA arrest   DVT and GI Proph - on heparin and Pepcid  Nutrition - Nepro at goal    OTHER:  Glycemic Control. Glucose stabilizer per ICU protocol when on insulin drip. Maintain blood glucose 140-180. Replace electrolytes per ICU electrolyte replacement protocol  Ventilator bundle & Sedation protocol followed. Daily morning sedation holiday, assessment for readiness for SBT & weaning from ventilator; and then re-titrate if required. Aim to keep peak plateau pressure 04-22EG H2O in ARDS patient. Celena tube to suction at 20-30 cm H2O, Maintain [place] tube with 5-10ml air every 4 hours. Chlorhexidine mouth washes and routine oral care every 4 hours. Stress ulcer and DVT prophylaxis. HOB >=30 degree elevation all the time. HOB >=30 degree elevation all the time. Aggressive pulmonary toileting. Incentive spirometry when appropriate. Aspiration precautions. Further recommendations will be based on the patient's response to recommended treatment and results of the investigation ordered. Quality Care: Stress ulcer prophylaxis, DVT prophylaxis, HOB elevated, Infection control all reviewed and addressed.   Events and notes from last 24 hours reviewed. Care plan discussed with nursing, JAI. CC TIME: >35 min     Medication Reviewed:    Safety Bundles:Electrolyte Replacement Protocol    No Known Allergies   Past Medical History:   Diagnosis Date    Anemia     Arthritis     Chronic pain     Back     Diabetes mellitus type II     Hypertension     IBS (irritable bowel syndrome)     Lactose intolerance     TB (tuberculosis)     TB test positive      Past Surgical History:   Procedure Laterality Date    CARDIAC CATHETERIZATION  8/9/2017         CORONARY ARTERY ANGIOGRAM  8/9/2017         ENDOSCOPY, COLON, DIAGNOSTIC      HX AMPUTATION      Toe due to injury    LV ANGIOGRAPHY  8/9/2017         MODERATE SEDATION  8/9/2017           Social History   Substance Use Topics    Smoking status: Current Every Day Smoker    Smokeless tobacco: Not on file    Alcohol use Yes      History reviewed. No pertinent family history.    Prior to Admission medications    Not on File     Current Facility-Administered Medications   Medication Dose Route Frequency    ciprofloxacin (CIPRO) 400 mg IVPB (premix)  400 mg IntraVENous Q24H    white petrolatum-mineral oil 85-15 % oint 1 Dose  1 Dose Ophthalmic Q6H    vits A and D-white pet-lanolin (A&D) ointment   Topical Q6H    piperacillin-tazobactam (ZOSYN) 0.75 g in 0.9% sodium chloride 50 mL IVPB  0.75 g IntraVENous DAILY    epoetin benjamin (EPOGEN;PROCRIT) injection 10,000 Units  10,000 Units IntraVENous DIALYSIS MON, WED & FRI    piperacillin-tazobactam (ZOSYN) 2.25 g in 0.9% sodium chloride (MBP/ADV) 50 mL MBP  2.25 g IntraVENous Q8H    Piperacillin-tazobactam (ZOSYN) 0.75 gm Supplemental Dosing by Pharmacy   Other Rx Dosing/Monitoring    midodrine (PROAMITINE) tablet 15 mg  15 mg Oral TID WITH MEALS    Zinc Ox-Aloe Vera-Vitamin E (BALMEX) topical cream   Topical CONTINUOUS    banana flakes trans-galactooligosaccharide (BANATROL PLUS) powder 1 Packet  1 Packet Per NG tube TID    levETIRAcetam (KEPPRA) 500 mg in saline (iso-osm) 100 ml IVPB  500 mg IntraVENous Q12H    insulin lispro (HUMALOG) injection   SubCUTAneous Q6H    famotidine (PF) (PEPCID) 20 mg in sodium chloride 0.9 % 10 mL injection  20 mg IntraVENous DAILY    collagenase (SANTYL) 250 unit/gram ointment   Topical DAILY    lactobacillus sp. 50 billion cpu (BIO-K PLUS) capsule 1 Cap  1 Cap Oral DAILY    heparin (porcine) injection 5,000 Units  5,000 Units SubCUTAneous Q8H    heparin (porcine) 1,000 unit/mL injection 2,000 Units  2,000 Units IntraVENous DIALYSIS ONCE    simvastatin (ZOCOR) tablet 20 mg  20 mg Oral QHS    aspirin chewable tablet 81 mg  81 mg Oral DAILY       Peripheral Intravenous Line:  Peripheral IV 09/06/17 Left Hand (Active)   Site Assessment Clean, dry, & intact 9/10/2017  8:00 PM   Phlebitis Assessment 0 9/10/2017  8:00 PM   Infiltration Assessment 0 9/10/2017  8:00 PM   Dressing Status Clean, dry, & intact 9/10/2017  8:00 PM   Dressing Type Tape;Transparent 9/10/2017  8:00 PM   Hub Color/Line Status Pink 9/10/2017  8:00 PM   Action Taken Other (comment) 9/10/2017  8:00 PM   Alcohol Cap Used No 9/10/2017  8:00 PM       Peripheral IV 09/10/17 Right Wrist (Active)   Site Assessment Clean, dry, & intact 9/10/2017  8:00 PM   Phlebitis Assessment 0 9/10/2017  8:00 PM   Infiltration Assessment 0 9/10/2017  8:00 PM   Dressing Status Clean, dry, & intact 9/10/2017  8:00 PM   Dressing Type Transparent 9/10/2017  8:00 PM   Hub Color/Line Status Green;Capped;Flushed 9/10/2017  8:00 PM   Action Taken Other (comment) 9/10/2017  8:00 PM   Alcohol Cap Used No 9/10/2017  8:00 PM      Drain(s):  PEG/Gastrostomy Tube 09/08/17 (Active)   Site Assessment Dry;Bleeding 9/10/2017  8:00 PM   Dressing Status Dry; Intact 9/10/2017  8:00 PM   G Port Status Infusing 9/10/2017  8:00 PM   Action Taken Other (comment) 9/10/2017  8:00 PM   Drainage Description Tan 9/10/2017  8:00 PM   Gastric Residual (mL) 20 ml 9/10/2017  8:00 PM   Tube Feeding/Formula Options Renal (Nepro) 9/10/2017  8:00 PM   Modular Nutrients Protein liquid 9/10/2017  7:00 AM   Tube Feeding/Verify Rate (mL/hr) 50 9/10/2017  7:00 AM   Water Flush Volume (mL) 150 mL 2017  4:00 AM   Intake (ml) 55 ml 2017  6:00 AM   Medication Volume 75 ml 9/10/2017  8:00 PM       Fecal Management (Active)   Stool Consistency Semi-liquid 2017  5:00 AM   Position of Indicator Line Visible 2017  5:00 AM   Signal Indicator Bubble Appropriate 2017  5:00 AM   Skin Assessment of the Anal Area Denuded/excoriated 2017  5:00 AM   Drainage Bag Changed Not due to be changed 2017  5:00 AM   Balloon Deflated No 2017  5:00 AM   Tube Irrigated No 2017  5:00 AM   Output (ml) 120 ml 2017  2:30 PM     Airway:  Airway - Tracheostomy Tube 17 Portex (Active)   Cuff Pressure 25 cmH20 2017  7:33 PM   Site Assessment Clean, dry, & intact 2017  9:09 AM   Trach Dressing Changed Yes 9/10/2017  8:00 PM   Trach Cleaned With Normal saline 9/10/2017  8:00 PM   Trach Tie Changed No 9/10/2017  8:00 PM   Trach Cannula Changed No 9/10/2017  8:00 PM   Inner Cannula Cuffed, cuff inflated 2017  9:09 AM   Line Trung Top of the lock 2017  9:09 AM   Side Secured Centered 2017  9:09 AM   Spare Trach at Bedside Yes 2017  9:09 AM   Spare [place] Tube One Size Smaller at Bedside Yes 2017  9:09 AM   Ambu Bag With Mask at Bedside Yes 2017  9:09 AM       Objective:  Vital Signs:    Visit Vitals    /66    Pulse 81    Temp 97.9 °F (36.6 °C) (Axillary)    Resp 15    Ht 6' 2\" (1.88 m)    Wt 56.6 kg (124 lb 12.5 oz)    SpO2 100%    BMI 16.02 kg/m2      O2 Device: Tracheostomy, Ventilator  O2 Flow Rate (L/min): 6 l/min  Temp (24hrs), Av.8 °F (36.6 °C), Min:97.2 °F (36.2 °C), Max:98.4 °F (36.9 °C)      Intake/Output:   Last shift:      701 - 1900  In: -   Out: 120 [Drains:120]    Last 3 shifts: 1901 - 700  In: 2670 [I.V.:500]  Out: 350 [Drains:350]      Intake/Output Summary (Last 24 hours) at 09/11/17 1508  Last data filed at 09/11/17 1430   Gross per 24 hour   Intake             1930 ml   Output              470 ml   Net             1460 ml       Last 3 Recorded Weights in this Encounter    09/09/17 0700 09/10/17 0847 09/11/17 0500   Weight: 59 kg (130 lb 1.1 oz) 59 kg (130 lb 1.1 oz) 56.6 kg (124 lb 12.5 oz)       Ventilator Settings:  Mode Rate Tidal Volume Pressure FiO2 PEEP  SIMV   4 ml  7 cm H2O 35 % 5 cm H20    Peak airway pressure: 13 cm H2O   Plateau pressure:    Tidal volume:   Minute ventilation: 8.37 l/min  SPO2     ARDS network Guidelines: Lung protective strategy and Plateau pressure goals less than or equal to 30    Physical Exam:     General/Neurology: no response to voice commands  Head:   Normocephalic, without obvious abnormality  Eye:   PERRL, EOM intact, no scleral icterus, no pallor  Oral:   Mucus membranes moist  Neck:   Trach present  Lung:   B/l sacttered rales   Heart:   S1 S2 present. Abdomen/: Soft. NT. ND. +BS.     Extremities:  Min pedal edema  Skin:   No rashes or lesions    Data:      Recent Results (from the past 24 hour(s))   GLUCOSE, POC    Collection Time: 09/10/17  5:22 PM   Result Value Ref Range    Glucose (POC) 232 (H) 70 - 110 mg/dL   GLUCOSE, POC    Collection Time: 09/10/17  5:54 PM   Result Value Ref Range    Glucose (POC) 231 (H) 70 - 110 mg/dL   GLUCOSE, POC    Collection Time: 09/10/17 10:59 PM   Result Value Ref Range    Glucose (POC) 226 (H) 70 - 122 mg/dL   METABOLIC PANEL, BASIC    Collection Time: 09/11/17  2:29 AM   Result Value Ref Range    Sodium 135 (L) 136 - 145 mmol/L    Potassium 3.8 3.5 - 5.5 mmol/L    Chloride 102 100 - 108 mmol/L    CO2 20 (L) 21 - 32 mmol/L    Anion gap 13 3.0 - 18 mmol/L    Glucose 282 (H) 74 - 99 mg/dL    BUN 56 (H) 7.0 - 18 MG/DL    Creatinine 7.76 (H) 0.6 - 1.3 MG/DL    BUN/Creatinine ratio 7 (L) 12 - 20      GFR est AA 9 (L) >60 ml/min/1.73m2    GFR est non-AA 7 (L) >60 ml/min/1.73m2    Calcium 8.6 8.5 - 10.1 MG/DL   HGB & HCT    Collection Time: 09/11/17  2:29 AM   Result Value Ref Range    HGB 9.1 (L) 13.0 - 16.0 g/dL    HCT 27.8 (L) 36.0 - 48.0 %   VANCOMYCIN, RANDOM    Collection Time: 09/11/17  2:29 AM   Result Value Ref Range    Vancomycin, random 13.0 5.0 - 40.0 UG/ML   GLUCOSE, POC    Collection Time: 09/11/17  6:05 AM   Result Value Ref Range    Glucose (POC) 220 (H) 70 - 110 mg/dL   GLUCOSE, POC    Collection Time: 09/11/17 11:47 AM   Result Value Ref Range    Glucose (POC) 195 (H) 70 - 110 mg/dL         Chemistry Recent Labs      09/11/17   0229  09/10/17   0251  09/09/17   0515   GLU  282*  163*  126*   NA  135*  137  138   K  3.8  3.9  4.1   CL  102  103  105   CO2  20*  22  22   BUN  56*  45*  34*   CREA  7.76*  6.82*  4.95*   CA  8.6  8.6  8.9  9.0   AGAP  13  12  11   BUCR  7*  7*  7*       Lactic Acid   Lactic acid   Date Value Ref Range Status   08/19/2017 1.2 0.4 - 2.0 MMOL/L Final     No results for input(s): LAC in the last 72 hours. Liver Enzymes Protein, total   Date Value Ref Range Status   08/20/2017 7.0 6.4 - 8.2 g/dL Final     Albumin   Date Value Ref Range Status   08/20/2017 2.1 (L) 3.4 - 5.0 g/dL Final     Globulin   Date Value Ref Range Status   08/20/2017 4.9 (H) 2.0 - 4.0 g/dL Final     A-G Ratio   Date Value Ref Range Status   08/20/2017 0.4 (L) 0.8 - 1.7   Final     AST (SGOT)   Date Value Ref Range Status   08/20/2017 13 (L) 15 - 37 U/L Final     Alk.  phosphatase   Date Value Ref Range Status   08/20/2017 85 45 - 117 U/L Final       CBC w/Diff Recent Labs      09/11/17   0229  09/10/17   0251  09/09/17   0515   WBC   --   9.9  9.2   RBC   --   2.74*  2.93*   HGB  9.1*  8.5*  9.2*   HCT  27.8*  27.0*  28.9*   PLT   --   436*  380   GRANS   --   55  60   LYMPH   --   31  29   EOS   --   4  4   CT (Most Recent)    Results from Hospital Encounter encounter on 07/27/17   CTA CHEST W OR W WO CONT   Narrative CTA CHEST PULMONARY EMBOLISM PROTOCOL        INDICATION: Cardiac arrest. Question pulmonary embolism. TECHNIQUE: Thin collimation axial images obtained through the level of the  pulmonary arteries with additional imaging through the chest following the  uneventful administration of 70 cc Isovue-370 nonionic intravenous contrast.   Images reconstructed into three dimensional coronal and sagittal projections for  complete evaluation of the tortuous and overlapping pulmonary vascular  structures and to reduce patient radiation dose. All CT scans at this facility are performed using dose optimization technique as  appropriate to a performed exam, to include automated exposure control,  adjustment of the mA and/or kV according to patient size (including appropriate  matching first site-specific examinations), or use of iterative reconstruction  technique. COMPARISON: CT abdomen pelvis 7/27/2017. FINDINGS:    Evaluation is limited by respiration artifact. No filling defects are  appreciated within the main, left, right, lobar or visualized segmental  pulmonary arteries to suggest embolism. Main pulmonary artery is enlarged up to  3.3 cm. Endotracheal tube tip is approximately 1 cm proximal to the bree. Consider  1-1.5 cm retraction. NG but within the body. Thyroid: Unremarkable in its visualized aspects. Pericardium/ Heart: No significant effusion. Aorta/ Vessels: No aneurysm or dissection. Lymph Nodes: Unremarkable. .    Lungs: Layering secretions in the bilateral mainstem bronchus and at the bree. Patchy left lower lobe bronchovascular opacities. There is central bronchial  opacification of a subsegmental anterior right lower lobe bronchus with mild  adjacent groundglass. Minimal left upper lobe bronchovascular groundglass. Resolved large right pleural effusion and complete atelectasis right lower lobe. Pleura: No effusion. Upper Abdomen: IVC and hepatic veins are enlarged. Heterogeneous liver  attenuation.  Both right renal arteries appear diminutive. Bones/soft tissues: Moderate anasarca. Bilateral anteromedial rib fractures  including right fourth-sixth and left fourth and fifth ribs. Degenerative disc  disease most significant at T3-4. Sclerotic bone foci T5 is likely a bone  island. Impression IMPRESSION:  1. No evidence for pulmonary emboli within limitations of respiratory motion. 2.  Tip of endotracheal tube is approximately 1 cm proximal to the bree,  consider 1-1.5 cm retraction. 3.  Layering secretions in the bronchus with left lower greater than upper  bronchovascular opacities most compatible with infectious etiology, possibly  aspiration given history. 4.  A single moderate length of endobronchial opacification right lower lobe. 5.  Resolved large right pleural effusion and atelectasis entire right lower  lobe. 6.  Bilateral anteromedial rib fractures associated with CPR. 7.  Main pulmonary artery enlargement as may be seen in pulmonary arterial  hypertension. 8.  Dilated IVC and hepatic veins as may be seen in right heart failure. 9.  Diminutive right renal arteries may be associated with hypotension and/or  peripheral arterial constriction. 10.  Moderate anasarca. XR (Most Recent). CXR reviewed by me and compared with previous CXR   Results from Hospital Encounter encounter on 07/27/17   XR CHEST PORT   Narrative Portable chest x-ray, semierect AP view, time 0508 hours on 9/11/2017:        INDICATION:    Respiratory failure. History of anoxic brain damage with multiple CVAs. COMPARISON STUDY: Chest x-ray on 9/10/2017, 9/9/2017. FINDINGS:    The study again demonstrates tracheostomy tube in stable position. There is no  evidence of pneumothorax or obvious pleural effusion. Cardiac size and pulmonary vascularity are normal and stable. Lungs are clear bilaterally without definable acute process.  There are noted  made of skin fold in vertical orientation over the lateral portion of left mid  and lower lung fields. No interval changes compared to previous study. Impression IMPRESSION:    Stable chest x-ray with tracheostomy tube in position. No definable acute or new cardiopulmonary process. High complexity decision making was performed during the evaluation of this patient at high risk for decompensation with multiple organ involvement     Above mentioned total time spent on reviewing the case/medical record/data/notes/EMR/patient examination/documentation/coordinating care with nurse/consultants, exclusive of procedures with complex decision making performed and > 50% time spent in face to face evaluation.     Melissa Anders MD  9/11/2017

## 2017-09-11 NOTE — PROGRESS NOTES
Hemodialysis Rounding Note      Patient: Sharee Clark               Sex: male          DOA: 7/27/2017  4:23 PM        YOB: 1960      Age:  65 y.o.        LOS:  LOS: 46 days     Subjective:     Shaere Clark is a 65 y.o.  who presents with Cardiac arrest ([facility]  Acidosis  Respiratory failure ([facility]  Anemia  ESRD needing dialysis ([facility]  Cardiac arrest ([facility]  Acute GI bleeding  [facility]  Hypotension  Anoxic brain damage (H[facility])  ESRD (end stage renal disease) ([facility]  Colitis  dx  dx  DX  dx. The patient is dialyzing utilizing the following method:Intermittent Hemodialysis        Complaints: Post trach and PEG, diarhea persists, remains unresponsive except to pain.      Current Facility-Administered Medications   Medication Dose Route Frequency    ciprofloxacin (CIPRO) 400 mg IVPB (premix)  400 mg IntraVENous Q24H    white petrolatum-mineral oil 85-15 % oint 1 Dose  1 Dose Ophthalmic Q6H    vits A and D-white pet-lanolin (A&D) ointment   Topical Q6H    epoetin benjamin (EPOGEN;PROCRIT) injection 10,000 Units  10,000 Units IntraVENous DIALYSIS MON, WED & FRI    piperacillin-tazobactam (ZOSYN) 2.25 g in 0.9% sodium chloride (MBP/ADV) 50 mL MBP  2.25 g IntraVENous Q8H    Piperacillin-tazobactam (ZOSYN) 0.75 gm Supplemental Dosing by Pharmacy   Other Rx Dosing/Monitoring    albumin human 25% (BUMINATE) solution 12.5 g  12.5 g IntraVENous DIALYSIS PRN    midodrine (PROAMITINE) tablet 15 mg  15 mg Oral TID WITH MEALS    Zinc Ox-Aloe Vera-Vitamin E (BALMEX) topical cream   Topical CONTINUOUS    banana flakes trans-galactooligosaccharide (BANATROL PLUS) powder 1 Packet  1 Packet Per NG tube TID    levETIRAcetam (KEPPRA) 500 mg in saline (iso-osm) 100 ml IVPB  500 mg IntraVENous Q12H    LORazepam (ATIVAN) injection 1 mg  1 mg IntraVENous Q4H PRN    insulin lispro (HUMALOG) injection   SubCUTAneous Q6H    albuterol (PROVENTIL VENTOLIN) nebulizer solution 2.5 mg  2.5 mg Nebulization Q6H PRN    famotidine (PF) (PEPCID) 20 mg in sodium chloride 0.9 % 10 mL injection  20 mg IntraVENous DAILY    collagenase (SANTYL) 250 unit/gram ointment   Topical DAILY    acetaminophen (TYLENOL) tablet 650 mg  650 mg Oral Q4H PRN    lactobacillus sp. 50 billion cpu (BIO-K PLUS) capsule 1 Cap  1 Cap Oral DAILY    loperamide (IMODIUM) capsule 2 mg  2 mg Oral Q6H PRN    heparin (porcine) injection 5,000 Units  5,000 Units SubCUTAneous Q8H    heparin (porcine) 1,000 unit/mL injection 2,000 Units  2,000 Units IntraVENous DIALYSIS ONCE    simvastatin (ZOCOR) tablet 20 mg  20 mg Oral QHS    aspirin chewable tablet 81 mg  81 mg Oral DAILY    glucose chewable tablet 16 g  4 Tab Oral PRN    glucagon (GLUCAGEN) injection 1 mg  1 mg IntraMUSCular PRN    dextrose (D50W) injection syrg 12.5-25 g  25-50 mL IntraVENous PRN    0.9% sodium chloride infusion  100 mL/hr IntraVENous DIALYSIS PRN    naloxone (NARCAN) injection 0.4 mg  0.4 mg IntraVENous PRN           1901 -  0700  In: 2670 [I.V.:500]  Out: 350 [Drains:350]      Objective:     Blood pressure 97/58, pulse 79, temperature 97.9 °F (36.6 °C), temperature source Axillary, resp. rate 15, height 6' 2\" (1.88 m), weight 56.6 kg (124 lb 12.5 oz), SpO2 100 %.   Temp (24hrs), Av.8 °F (36.6 °C), Min:97.2 °F (36.2 °C), Max:98.4 °F (36.9 °C)      Blood Pressure: BP: 97/58  Pulse: Pulse (Heart Rate): 79  Temp:  Temp: 97.9 °F (36.6 °C)    Artificial Kidney Dialyzer/Set Up Inspection: Revaclear   hours Duration of Treatment (hours): 3.5 hours   Heparin Bolus Heparin Bolus (units): 0 units  Blood flow rate Blood Flow Rate (ml/min): 250 ml/min   Dialysate rate     Arterial Access Pressure Arterial Access Pressure (mmHg): -30  Venous Return Pressure Venous Return Pressure (mmHg): 140  Ultrafiltration Rate Goal/Amount of Fluid to Remove (mL): 1000 mL  Fluid Removal Fluid Removed (mL): 90  Net Fluid Removal NET Fluid Removed (mL): 400 ml      PHYSICAL EXAM: unresponsive, emaciated  HEENT: non icteric  NECK:  No JVD, trach  CHEST AND LUNGS:  Equal vent breath sounds  CVS:  regular  ABDOMEN:  Soft + bs, PEG  EXT:  No edema, left arm AVG    DATA REVIEW:    Labs: Results:       Chemistry Recent Labs      09/11/17   0229  09/10/17   0251  09/09/17   0515   GLU  282*  163*  126*   NA  135*  137  138   K  3.8  3.9  4.1   CL  102  103  105   CO2  20*  22  22   BUN  56*  45*  34*   CREA  7.76*  6.82*  4.95*   CA  8.6  8.6  8.9  9.0   AGAP  13  12  11   BUCR  7*  7*  7*   mag 3.9  Phos 4.7   CBC w/Diff Recent Labs      09/11/17   0229  09/10/17   0251  09/09/17   0515   WBC   --   9.9  9.2   RBC   --   2.74*  2.93*   HGB  9.1*  8.5*  9.2*   HCT  27.8*  27.0*  28.9*   PLT   --   436*  380   GRANS   --   55  60   LYMPH   --   31  29   EOS   --   4  4      Coagulation No results for input(s): PTP, INR, APTT in the last 72 hours. No lab exists for component: INREXT, INREXT    Iron/Ferritin Recent Labs      09/10/17   1235   IRON  23*      BNP No results for input(s): BNPP in the last 72 hours. Cardiac Enzymes No results for input(s): CPK, CKND1, WILFRIDO in the last 72 hours. No lab exists for component: CKRMB, TROIP   Liver Enzymes No results for input(s): TP, ALB, TBIL, AP, SGOT, GPT in the last 72 hours. No lab exists for component: DBIL   Thyroid Studies Lab Results   Component Value Date/Time    TSH 7.52 08/03/2017 01:01 PM        IPTH pend    CULTURE:   )  No results for input(s): SDES, CULT in the last 72 hours. No results for input(s): CULT in the last 72 hours. Assessment/Plan:     End Stage Renal Disease:  Patient has hypotension despite midodrine. Will sched no UF today. Dec  and adjust time time to 3 H. NS and SPA to maintain MAP at 60. Will see how he does with HD today. If he cont to have problems with low BP will need to start vasopressors or  will need to discuss with family re plans for continued aggressive treatment.  Certainly repeated hypotension during HD will increase his risk for another cardiac event. At 2:59 PM on 9/11/2017, I saw and examined patient during hemodialysis treatment. The patient was receiving hemodialysis for treatment of end stage renal disease. I have also reviewed vital signs, intake and output, lab results and recent events, and agreed with today's dialysis order. Still waiting on family decision re continued HD. Sched MWF until further changes in overall plan. Anemia:  Cont Epo with hd    Renal Metabolic Bone Disease: D/C hectorol                      Hypotension: on Midodrine pre HD. Apparently missed his dose this morning.     Access: Graft adequate monitoring/no changes         Mihaela Quintanilla MD  9/11/2017

## 2017-09-11 NOTE — ROUTINE PROCESS
Received pt in bed. Pt calm  Mobility--bedrest  Respiratory-- vent setting  SIMV R 12  FIO2 35 Peep 5  GI-- Tube feeding continous 55cc/hr   Gu--[facility] x week  Skin-- Dressing to sacrum    Bedside and Verbal shift change report given to Israel Pereira (oncoming nurse) by Britany Chowdary RN   (offgoing nurse). Report included the following information SBAR, Kardex, Intake/Output and MAR.

## 2017-09-12 ENCOUNTER — APPOINTMENT (OUTPATIENT)
Dept: GENERAL RADIOLOGY | Age: 57
DRG: 004 | End: 2017-09-12
Attending: NURSE PRACTITIONER
Payer: MEDICARE

## 2017-09-12 LAB
BACTERIA SPEC CULT: NORMAL
BACTERIA SPEC CULT: NORMAL
GLUCOSE BLD STRIP.AUTO-MCNC: 185 MG/DL (ref 70–110)
GLUCOSE BLD STRIP.AUTO-MCNC: 187 MG/DL (ref 70–110)
GLUCOSE BLD STRIP.AUTO-MCNC: 234 MG/DL (ref 70–110)
GLUCOSE BLD STRIP.AUTO-MCNC: 307 MG/DL (ref 70–110)
SERVICE CMNT-IMP: NORMAL
SERVICE CMNT-IMP: NORMAL

## 2017-09-12 PROCEDURE — 74011250637 HC RX REV CODE- 250/637: Performed by: INTERNAL MEDICINE

## 2017-09-12 PROCEDURE — 65610000006 HC RM INTENSIVE CARE

## 2017-09-12 PROCEDURE — 71010 XR CHEST PORT: CPT

## 2017-09-12 PROCEDURE — 74011000250 HC RX REV CODE- 250: Performed by: PHYSICIAN ASSISTANT

## 2017-09-12 PROCEDURE — 82962 GLUCOSE BLOOD TEST: CPT

## 2017-09-12 PROCEDURE — 77030025757

## 2017-09-12 PROCEDURE — 74011636637 HC RX REV CODE- 636/637: Performed by: INTERNAL MEDICINE

## 2017-09-12 PROCEDURE — 94003 VENT MGMT INPAT SUBQ DAY: CPT

## 2017-09-12 PROCEDURE — 74011250636 HC RX REV CODE- 250/636: Performed by: HOSPITALIST

## 2017-09-12 PROCEDURE — 74011250636 HC RX REV CODE- 250/636: Performed by: INTERNAL MEDICINE

## 2017-09-12 PROCEDURE — 74011000258 HC RX REV CODE- 258: Performed by: INTERNAL MEDICINE

## 2017-09-12 PROCEDURE — 74011250637 HC RX REV CODE- 250/637: Performed by: HOSPITALIST

## 2017-09-12 PROCEDURE — 77030009834 HC MSK O2 TRACH VYRM -A

## 2017-09-12 RX ADMIN — Medication 1 PACKET: at 17:00

## 2017-09-12 RX ADMIN — Medication 1 CAPSULE: at 09:06

## 2017-09-12 RX ADMIN — MINERAL OIL AND WHITE PETROLATUM 1 DOSE: 150; 850 OINTMENT OPHTHALMIC at 23:54

## 2017-09-12 RX ADMIN — VITAMIN A AND VITAMIN D: 929.3 OINTMENT TOPICAL at 06:04

## 2017-09-12 RX ADMIN — MIDODRINE HYDROCHLORIDE 15 MG: 2.5 TABLET ORAL at 02:01

## 2017-09-12 RX ADMIN — Medication 1 PACKET: at 21:59

## 2017-09-12 RX ADMIN — INSULIN GLARGINE 10 UNITS: 100 INJECTION, SOLUTION SUBCUTANEOUS at 21:37

## 2017-09-12 RX ADMIN — LOPERAMIDE HYDROCHLORIDE 2 MG: 2 CAPSULE ORAL at 22:05

## 2017-09-12 RX ADMIN — SIMVASTATIN 20 MG: 10 TABLET, FILM COATED ORAL at 21:37

## 2017-09-12 RX ADMIN — PIPERACILLIN AND TAZOBACTAM 2.25 G: 2; .25 INJECTION, POWDER, FOR SOLUTION INTRAVENOUS at 20:04

## 2017-09-12 RX ADMIN — INSULIN LISPRO 3 UNITS: 100 INJECTION, SOLUTION INTRAVENOUS; SUBCUTANEOUS at 06:24

## 2017-09-12 RX ADMIN — LEVETIRACETAM 500 MG: 5 INJECTION INTRAVENOUS at 09:06

## 2017-09-12 RX ADMIN — Medication 1 PACKET: at 09:12

## 2017-09-12 RX ADMIN — VITAMIN A AND VITAMIN D: 929.3 OINTMENT TOPICAL at 23:55

## 2017-09-12 RX ADMIN — HEPARIN SODIUM 5000 UNITS: 5000 INJECTION, SOLUTION INTRAVENOUS; SUBCUTANEOUS at 02:10

## 2017-09-12 RX ADMIN — VITAMIN A AND VITAMIN D: 929.3 OINTMENT TOPICAL at 12:00

## 2017-09-12 RX ADMIN — CIPROFLOXACIN 400 MG: 2 INJECTION, SOLUTION INTRAVENOUS at 11:26

## 2017-09-12 RX ADMIN — INSULIN LISPRO 6 UNITS: 100 INJECTION, SOLUTION INTRAVENOUS; SUBCUTANEOUS at 18:02

## 2017-09-12 RX ADMIN — HEPARIN SODIUM 5000 UNITS: 5000 INJECTION, SOLUTION INTRAVENOUS; SUBCUTANEOUS at 18:01

## 2017-09-12 RX ADMIN — VITAMIN A AND VITAMIN D: 929.3 OINTMENT TOPICAL at 18:00

## 2017-09-12 RX ADMIN — LEVETIRACETAM 500 MG: 5 INJECTION INTRAVENOUS at 21:37

## 2017-09-12 RX ADMIN — MINERAL OIL AND WHITE PETROLATUM 1 DOSE: 150; 850 OINTMENT OPHTHALMIC at 11:25

## 2017-09-12 RX ADMIN — HEPARIN SODIUM 5000 UNITS: 5000 INJECTION, SOLUTION INTRAVENOUS; SUBCUTANEOUS at 09:06

## 2017-09-12 RX ADMIN — INSULIN LISPRO 12 UNITS: 100 INJECTION, SOLUTION INTRAVENOUS; SUBCUTANEOUS at 12:00

## 2017-09-12 RX ADMIN — MINERAL OIL AND WHITE PETROLATUM 1 DOSE: 150; 850 OINTMENT OPHTHALMIC at 18:01

## 2017-09-12 RX ADMIN — MIDODRINE HYDROCHLORIDE 15 MG: 2.5 TABLET ORAL at 09:05

## 2017-09-12 RX ADMIN — FAMOTIDINE 20 MG: 10 INJECTION INTRAVENOUS at 09:05

## 2017-09-12 RX ADMIN — MINERAL OIL AND WHITE PETROLATUM 1 DOSE: 150; 850 OINTMENT OPHTHALMIC at 06:05

## 2017-09-12 RX ADMIN — PIPERACILLIN AND TAZOBACTAM 2.25 G: 2; .25 INJECTION, POWDER, FOR SOLUTION INTRAVENOUS at 11:26

## 2017-09-12 RX ADMIN — ASPIRIN 81 MG 81 MG: 81 TABLET ORAL at 09:06

## 2017-09-12 RX ADMIN — MIDODRINE HYDROCHLORIDE 15 MG: 2.5 TABLET ORAL at 11:25

## 2017-09-12 RX ADMIN — PIPERACILLIN AND TAZOBACTAM 2.25 G: 2; .25 INJECTION, POWDER, FOR SOLUTION INTRAVENOUS at 02:10

## 2017-09-12 RX ADMIN — COLLAGENASE SANTYL: 250 OINTMENT TOPICAL at 09:07

## 2017-09-12 NOTE — CDMP QUERY
Please clarify if this patient is being treated/managed for:    =>Ventilator associated pneumonia(VAP) d/t klebsiella and pseudomonas (not POA)  or  =>Pneumonia d/t klebsiella and pseudomonas (not POA)  =>Other Explanation of clinical findings  =>Unable to Determine (no explanation of clinical findings)    The medical record reflects the following:  Risk:  --has been in hospital since 7/27, on ventilator 7/27 - 8/4, re-intubated on 8/18, remains on ventilator    Clinical Indicators:  --sputum culture 8/1: NO GROWTH   --sputum culture 8/19: FEW NORMAL RESPIRATORY LENNOX, FEW CANDIDA PARAPSILOSIS  --sputum culture 9/6: MODERATE PSEUDOMONAS AERUGINOSA, FEW KLEBSIELLA PNEUMONIAE    Treatment:   --receiving Zosyn IV, Cipro IV    Please clarify and document your clinical opinion in the progress notes and discharge summary including the definitive and/or presumptive diagnosis, (suspected or probable), related to the above clinical findings. Please include clinical findings supporting your diagnosis. If you DECLINE this query or would like to communicate with [facility], please utilize the \"[facility] message box\" at the TOP of the Progress Note on the right.       Thank you,  Celestino Maza [address]

## 2017-09-12 NOTE — PROGRESS NOTES
Pt placed on cool aerosol trach collar 6 LPM 30% FiO2. Suctioned airway and performed trach care prior to trach collar placement. Tolerating well, will continue to monitor.

## 2017-09-12 NOTE — PROGRESS NOTES
NUTRITION    Nursing Referral: MST, Pressure Ulcer  Nutrition Consult: Management of Tube Feeding     RECOMMENDATIONS / PLAN:     - Continue tube feeding of Nepro at goal of 55 mL/hr with 150 mL q 4 hour water flushes.   - Continue Banatrol TID and Reymundo BID.  - Continue RD inpatient monitoring and evaluation. Goal Regimen: Nepro at 55 mL/hr + 150 mL q 4 hour water flushes to provide: 2376 kcal, 107 gm protein, 127 gm fat, 220 gm CHO, 21 gm fiber, 957 mL free water, 1857 mL total water, 100% RDIs     NUTRITION INTERVENTIONS & DIAGNOSIS:     [x] Enteral nutrition support: continue   [x] Coordination of care: interdisciplinary rounds     Nutrition Diagnosis: Increased protein needs related to promotion of wound healing and increased expenditure as evidenced by pressure ulcer wound and ESRD, pt on dialysis 3x per week  Inadequate oral intake related to inability to tolerate po as evidenced by NPO. ASSESSMENT:     9/12: PEG placed 9/8, tolerating feeds at goal.   9/6: Tolerating feeds at goal. Celena Kemps and PEG placement postponed due to hyperkalemia, K WNL today after HD 9/5 (850 mL removed). Procedure to be re-scheduled per Surgery availability. 9/1: Tolerating feeds at goal. Celena Kemps and PEG placement next week. HD prn, last dialysis 8/31.  8/28: Pt tolerating tube feeding at goal   8/22: Tolerating feeds at goal. Loose stool, will add prebiotic fiber supplement. 8/21: Pt intubated after PEA arrest during HD 8/18, PEG placement postponed. Tolerating feeds at goal.   8/18: Tolerating tube feeds at goal.  Tube feeds held today for PEG placement. 8/15: Tube feeds started this morning. Pt tolerating at rate of 30 mL/hr  8/14: SLP following; recommend NPO. Noted plan for NGT placement and start tube feeds. Potassium WNL. 8/10: Pt reported good appetite and \"eating enough\" from his meals. Noted fair meal intake per chart. Consuming supplements. Discussed increasing Nepro frequency; pt declined.  Discussed adding Ensure Pudding; pt agreed with plan. Po intake of meals/supplements encouraged. Has been receiving K replacement. 8/7: K remains low despite previous tube feeding changed to higher potassium formula. Pt extubated 8/5. Tube feeds discontinued 8/6. SLP following; pt s/p MBS today and started on po diet. Noted poor po intake lunch meal per chart. 8/3: K remains low despite continued replacement. Will change to higher potassium formula per discussion with PA.   8/1: Tolerating feeding at goal. 1 L removed during HD 7/31. Hyperglycemia noted, advance to very insulin resistant SSI and basal insulin started. 7/31: Tolerating advancement of tube feeding. HD today. BG trending up, MD to stop D5.    7/30: Pt remain intubated. GI following; plan to start tube feeds today. Noted order placed; discussed modifying tube feed order and add water flushes with Dr Mohini Castrejon. RN unavailable; discussed with Nursing [place] regarding modifying tube feed order  7/28: S/p cardiac arrest and intubated 7/27, NGT clamped. Abdominal ultrasound today to rule out cholecystitis. Will wait to feed.      EN infusion adequate to meet patients estimated nutritional needs:   [x] Yes     [] No   [] Unable to determine at this time    Tube Feeding: Nepro at 55 mL via NGT  Water Flushes: 150 mL q 4 hour  Residuals: 0 mL    Diet: DIET TUBE FEEDING  DIET NUTRITIONAL SUPPLEMENTS Breakfast, Lunch, Dinner; [employer]   DIET NUTRITIONAL SUPPLEMENTS Lunch, Dinner; [address] DRINK MIX      Food Allergies: NKFA  Appetite/meal intake prior to admission:   [] Good     [] Fair     [] Poor     [x] Other: unknown   Feeding Limitations:  [x] Swallowing difficulty: SLP following; recommended NPO on 8/14    [] Chewing difficulty:    [x] Other: intubated       Anuric   BM: 9/11; FMS  Skin Integrity:  stage II ressure ulcer anus; DTI sacrum; moisture associated skin damage posterior buttocks  Edema: none  Pertinent Medications: Reviewed: lantus, SSI, lactobacillus, imodium    Recent Labs      09/11/17   0229  09/10/17   0251   NA  135*  137   K  3.8  3.9   CL  102  103   CO2  20*  22   GLU  282*  163*   BUN  56*  45*   CREA  7.76*  6.82*   CA  8.6  8.6       Intake/Output Summary (Last 24 hours) at 09/12/17 1029  Last data filed at 09/12/17 0900   Gross per 24 hour   Intake             1950 ml   Output              820 ml   Net             1130 ml       Anthropometrics:  Ht Readings from Last 1 Encounters:   08/15/17 6' 2\" (1.88 m)     Last 3 Recorded Weights in this Encounter    09/10/17 0847 09/11/17 0500 09/12/17 0212   Weight: 59 kg (130 lb 1.1 oz) 56.6 kg (124 lb 12.5 oz) 62.2 kg (137 lb 3.2 oz)     Body mass index is 17.62 kg/(m^2). Underweight     Weight History:   Weight Metrics 9/12/2017   Weight 137 lb 3.2 oz   BMI 17.62 kg/m2        Admitting Diagnosis: Cardiac arrest (HCC)  Acidosis  Respiratory failure (HCC)  Anemia  ESRD needing dialysis ([facility]  Cardiac arrest ([facility]  Acute GI bleeding  Sepsis ([facility]  Hypotension  Anoxic brain damage (HCC)  ESRD (end stage renal disease) ([facility]  Colitis  dx  dx  DX  dx  Pertinent PMHx: DM, HTN, IBS, ESRD    Education Needs:        [x] None identified  [] Identified - Not appropriate at this time  []  Identified and addressed - refer to education log  Learning Limitations:   [] None identified  [x] Identified: intubated      Cultural, Anabaptist & ethnic food preferences:  [x] None identified    [] Identified and addressed     ESTIMATED NUTRITION NEEDS:     Calories: 5013-9028 kcal ([id number]) based on  [x] Actual BW 65 kg      [] SBW  Protein:  gm (1.2-2 gm/kg) based on  [x] Actual BW      [] SBW   Fluid: 7694-6020 mL     MONITORING & EVALUATION:     Nutrition Goal(s):   1. Patient will tolerate enteral nutrition formula and regimen without difficulty within the next 7 days. Outcome:  [x] Met/Ongoing    []  Not Met    [] New/Initial Goal   2.  Patient will meet their estimated nutritional needs through adequate enteral nutrition within the next 7 days.  Outcome:  [x] Met/Ongoing    []  Not Met/Progressing    [] New/Initial Goal      Monitoring:   [x] EN tolerance    [x] EN infusion   [] Diet tolerance   [] Meal intake   [] Supplement intake   [] GI symptoms/ability to tolerate po diet   [x] Respiratory status   [x] Plan of care      Previous Recommendations (for follow-up assessments only):     [x]   Implemented       []   Not Implemented (RD to address)     [] No Recommendation Made     Discharge Planning: goal enteral nutrition regimen   [x] Participated in care planning, discharge planning, & interdisciplinary rounds as appropriate       Aubrie Newman, [address]   Pager: [phone]

## 2017-09-12 NOTE — PROGRESS NOTES
PCCM Progress Note                                              I have reviewed the flowsheet and previous days notes. Events, vitals, medications and notes from last 24 hours reviewed. Care plan discussed with staff and on multidisciplinary rounds. Subjective:  9/12/2017   Pt is on vent via trach. No response to voice commands today. Impression and Plan  VDRF - Pt is s/p Trach. Try Trach collar trial today  Acute pontine CVA - Pt is on keppra, defer further mgt to neurology  Pneumonia - Sputum cx from 9/6 is growing Pseudomonas and Klebsiella. Pt is on Zosyn #6 and Cipro # 2 today. Also received Vanco for 5 days and it was stopped yesterday. Pt is afebrile and Cxr does not show any infiltrates. Check CBC in am  ESRD - on HD, F/u with renal  Anemia - on Epogen with HD, Hb is stable  Hypotension - on Midodrine  DM - Cont lantus and ISS  S/p septic shock  Anoxic encephalopathy  S/p PEA arrest   DVT and GI Proph - on heparin and Pepcid  Nutrition - Nepro at goal    OTHER:  Glycemic Control. Glucose stabilizer per ICU protocol when on insulin drip. Maintain blood glucose 140-180. Replace electrolytes per ICU electrolyte replacement protocol  Ventilator bundle & Sedation protocol followed. Daily morning sedation holiday, assessment for readiness for SBT & weaning from ventilator; and then re-titrate if required. Aim to keep peak plateau pressure 28-17TT H2O in ARDS patient. Celena tube to suction at 20-30 cm H2O, Maintain Greenlee tube with 5-10ml air every 4 hours. Chlorhexidine mouth washes and routine oral care every 4 hours. Stress ulcer and DVT prophylaxis. HOB >=30 degree elevation all the time. HOB >=30 degree elevation all the time. Aggressive pulmonary toileting. Incentive spirometry when appropriate. Aspiration precautions. Further recommendations will be based on the patient's response to recommended treatment and results of the investigation ordered.     Quality Care: Stress ulcer prophylaxis, DVT prophylaxis, HOB elevated, Infection control all reviewed and addressed. Events and notes from last 24 hours reviewed. Care plan discussed with nursingJAI. CC TIME: >35 min     Medication Reviewed:    Safety Bundles:Electrolyte Replacement Protocol    No Known Allergies   Past Medical History:   Diagnosis Date    Anemia     Arthritis     Chronic pain     Back     Diabetes mellitus type II     Hypertension     IBS (irritable bowel syndrome)     Lactose intolerance     TB (tuberculosis)     TB test positive      Past Surgical History:   Procedure Laterality Date    CARDIAC CATHETERIZATION  8/9/2017         CORONARY ARTERY ANGIOGRAM  8/9/2017         ENDOSCOPY, COLON, DIAGNOSTIC      HX AMPUTATION      Toe due to injury    LV ANGIOGRAPHY  8/9/2017         MODERATE SEDATION  8/9/2017           Social History   Substance Use Topics    Smoking status: Current Every Day Smoker    Smokeless tobacco: Not on file    Alcohol use Yes      History reviewed. No pertinent family history.    Prior to Admission medications    Not on File     Current Facility-Administered Medications   Medication Dose Route Frequency    ciprofloxacin (CIPRO) 400 mg IVPB (premix)  400 mg IntraVENous Q24H    white petrolatum-mineral oil 85-15 % oint 1 Dose  1 Dose Ophthalmic Q6H    vits A and D-white pet-lanolin (A&D) ointment   Topical Q6H    insulin glargine (LANTUS) injection 10 Units  10 Units SubCUTAneous QHS    epoetin benjamin (EPOGEN;PROCRIT) injection 10,000 Units  10,000 Units IntraVENous DIALYSIS MON, WED & FRI    piperacillin-tazobactam (ZOSYN) 2.25 g in 0.9% sodium chloride (MBP/ADV) 50 mL MBP  2.25 g IntraVENous Q8H    Piperacillin-tazobactam (ZOSYN) 0.75 gm Supplemental Dosing by Pharmacy   Other Rx Dosing/Monitoring    midodrine (PROAMITINE) tablet 15 mg  15 mg Oral TID WITH MEALS    Zinc Ox-Aloe Vera-Vitamin E (BALMEX) topical cream   Topical CONTINUOUS    banana flakes trans-galactooligosaccharide (BANATROL PLUS) powder 1 Packet  1 Packet Per NG tube TID    levETIRAcetam (KEPPRA) 500 mg in saline (iso-osm) 100 ml IVPB  500 mg IntraVENous Q12H    insulin lispro (HUMALOG) injection   SubCUTAneous Q6H    famotidine (PF) (PEPCID) 20 mg in sodium chloride 0.9 % 10 mL injection  20 mg IntraVENous DAILY    collagenase (SANTYL) 250 unit/gram ointment   Topical DAILY    lactobacillus sp. 50 billion cpu (BIO-K PLUS) capsule 1 Cap  1 Cap Oral DAILY    heparin (porcine) injection 5,000 Units  5,000 Units SubCUTAneous Q8H    heparin (porcine) 1,000 unit/mL injection 2,000 Units  2,000 Units IntraVENous DIALYSIS ONCE    simvastatin (ZOCOR) tablet 20 mg  20 mg Oral QHS    aspirin chewable tablet 81 mg  81 mg Oral DAILY       Peripheral Intravenous Line:  Peripheral IV 09/06/17 Left Hand (Active)   Site Assessment Clean, dry, & intact 9/10/2017  8:00 PM   Phlebitis Assessment 0 9/10/2017  8:00 PM   Infiltration Assessment 0 9/10/2017  8:00 PM   Dressing Status Clean, dry, & intact 9/10/2017  8:00 PM   Dressing Type Tape;Transparent 9/10/2017  8:00 PM   Hub Color/Line Status Pink 9/10/2017  8:00 PM   Action Taken Other (comment) 9/10/2017  8:00 PM   Alcohol Cap Used No 9/10/2017  8:00 PM       Peripheral IV 09/10/17 Right Wrist (Active)   Site Assessment Clean, dry, & intact 9/10/2017  8:00 PM   Phlebitis Assessment 0 9/10/2017  8:00 PM   Infiltration Assessment 0 9/10/2017  8:00 PM   Dressing Status Clean, dry, & intact 9/10/2017  8:00 PM   Dressing Type Transparent 9/10/2017  8:00 PM   Hub Color/Line Status Green;Capped;Flushed 9/10/2017  8:00 PM   Action Taken Other (comment) 9/10/2017  8:00 PM   Alcohol Cap Used No 9/10/2017  8:00 PM      Drain(s):  PEG/Gastrostomy Tube 09/08/17 (Active)   Site Assessment Dry;Bleeding 9/10/2017  8:00 PM   Dressing Status Dry; Intact 9/10/2017  8:00 PM   G Port Status Infusing 9/10/2017  8:00 PM   Action Taken Other (comment) 9/10/2017  8:00 PM   Drainage Description Tan 9/10/2017 8:00 PM   Gastric Residual (mL) 20 ml 9/10/2017  8:00 PM   Tube Feeding/Formula Options Renal (Nepro) 9/10/2017  8:00 PM   Modular Nutrients Protein liquid 9/10/2017  7:00 AM   Tube Feeding/Verify Rate (mL/hr) 50 9/10/2017  7:00 AM   Water Flush Volume (mL) 150 mL 2017  4:00 AM   Intake (ml) 55 ml 2017  6:00 AM   Medication Volume 75 ml 9/10/2017  8:00 PM       Fecal Management (Active)   Stool Consistency Semi-liquid 2017  5:00 AM   Position of Indicator Line Visible 2017  5:00 AM   Signal Indicator Bubble Appropriate 2017  5:00 AM   Skin Assessment of the Anal Area Denuded/excoriated 2017  5:00 AM   Drainage Bag Changed Not due to be changed 2017  5:00 AM   Balloon Deflated No 2017  5:00 AM   Tube Irrigated No 2017  5:00 AM   Output (ml) 120 ml 2017  2:30 PM     Airway:  Airway - Tracheostomy Tube 17 Portex (Active)   Cuff Pressure 25 cmH20 2017  7:33 PM   Site Assessment Clean, dry, & intact 2017  9:09 AM   Trach Dressing Changed Yes 9/10/2017  8:00 PM   Trach Cleaned With Normal saline 9/10/2017  8:00 PM   Trach Tie Changed No 9/10/2017  8:00 PM   Trach Cannula Changed No 9/10/2017  8:00 PM   Inner Cannula Cuffed, cuff inflated 2017  9:09 AM   Line Trung Top of the lock 2017  9:09 AM   Side Secured Centered 2017  9:09 AM   Spare Trach at Bedside Yes 2017  9:09 AM   Spare Durwin Otter Tube One Size Smaller at Bedside Yes 2017  9:09 AM   Ambu Bag With Mask at Bedside Yes 2017  9:09 AM       Objective:  Vital Signs:    Visit Vitals    /68    Pulse 87    Temp 98.4 °F (36.9 °C)    Resp 17    Ht 6' 2\" (1.88 m)    Wt 62.2 kg (137 lb 3.2 oz)    SpO2 100%    BMI 17.62 kg/m2      O2 Device: Tracheostomy  O2 Flow Rate (L/min): 6 l/min  Temp (24hrs), Av.7 °F (36.5 °C), Min:96.5 °F (35.8 °C), Max:98.5 °F (36.9 °C)      Intake/Output:   Last shift:       07 -  1900  In: 100 [I.V.:100]  Out: -     Last 3 shifts: 09/10 1901 - 09/12 0700  In: 3795 [I.V.:850]  Out: 1170 [Drains:970]      Intake/Output Summary (Last 24 hours) at 09/12/17 1144  Last data filed at 09/12/17 0900   Gross per 24 hour   Intake             1895 ml   Output              820 ml   Net             1075 ml       Last 3 Recorded Weights in this Encounter    09/10/17 0847 09/11/17 0500 09/12/17 0212   Weight: 59 kg (130 lb 1.1 oz) 56.6 kg (124 lb 12.5 oz) 62.2 kg (137 lb 3.2 oz)       Ventilator Settings:  Mode Rate Tidal Volume Pressure FiO2 PEEP  SIMV, VC+   400 ml  7 cm H2O 35 % 5 cm H20    Peak airway pressure: 13 cm H2O   Plateau pressure:    Tidal volume:   Minute ventilation: 8.42 l/min  SPO2     ARDS network Guidelines: Lung protective strategy and Plateau pressure goals less than or equal to 30    Physical Exam:     General/Neurology: no response to voice commands  Head:   Normocephalic, without obvious abnormality  Eye:   PERRL, EOM intact, no scleral icterus, no pallor  Oral:   Mucus membranes moist  Neck:   Trach present  Lung:   B/l air entry fair  Heart:   S1 S2 present. Abdomen/: Soft. NT. ND. +BS.     Extremities:  Min pedal edema  Skin:   No rashes or lesions    Data:      Recent Results (from the past 24 hour(s))   GLUCOSE, POC    Collection Time: 09/11/17 11:47 AM   Result Value Ref Range    Glucose (POC) 195 (H) 70 - 110 mg/dL   GLUCOSE, POC    Collection Time: 09/11/17  5:30 PM   Result Value Ref Range    Glucose (POC) 236 (H) 70 - 110 mg/dL   GLUCOSE, POC    Collection Time: 09/11/17  9:59 PM   Result Value Ref Range    Glucose (POC) 235 (H) 70 - 110 mg/dL   GLUCOSE, POC    Collection Time: 09/11/17 11:57 PM   Result Value Ref Range    Glucose (POC) 241 (H) 70 - 110 mg/dL   GLUCOSE, POC    Collection Time: 09/12/17  5:08 AM   Result Value Ref Range    Glucose (POC) 185 (H) 70 - 110 mg/dL   GLUCOSE, POC    Collection Time: 09/12/17 11:19 AM   Result Value Ref Range    Glucose (POC) 307 (H) 70 - 110 mg/dL         Chemistry   Recent Labs      09/11/17   0229  09/10/17   0251   GLU  282*  163*   NA  135*  137   K  3.8  3.9   CL  102  103   CO2  20*  22   BUN  56*  45*   CREA  7.76*  6.82*   CA  8.6  8.6   AGAP  13  12   BUCR  7*  7*         CBC w/Diff   Recent Labs      09/11/17   0229  09/10/17   0251   WBC   --   9.9   RBC   --   2.74*   HGB  9.1*  8.5*   HCT  27.8*  27.0*   PLT   --   436*   GRANS   --   55   LYMPH   --   31   EOS   --   4       XR (Most Recent). CXR reviewed by me and compared with previous CXR     Results from Hospital Encounter encounter on 07/27/17   XR CHEST PORT   Narrative Portable chest x-ray, semierect AP view, time 0508 hours on 9/11/2017:        INDICATION:    Respiratory failure. History of anoxic brain damage with multiple CVAs. COMPARISON STUDY: Chest x-ray on 9/10/2017, 9/9/2017. FINDINGS:    The study again demonstrates tracheostomy tube in stable position. There is no  evidence of pneumothorax or obvious pleural effusion. Cardiac size and pulmonary vascularity are normal and stable. Lungs are clear bilaterally without definable acute process. There are noted  made of skin fold in vertical orientation over the lateral portion of left mid  and lower lung fields. No interval changes compared to previous study. Impression IMPRESSION:    Stable chest x-ray with tracheostomy tube in position. No definable acute or new cardiopulmonary process. High complexity decision making was performed during the evaluation of this patient at high risk for decompensation with multiple organ involvement     Above mentioned total time spent on reviewing the case/medical record/data/notes/EMR/patient examination/documentation/coordinating care with nurse/consultants, exclusive of procedures with complex decision making performed and > 50% time spent in face to face evaluation.     Chris Emmanuel MD  9/12/2017

## 2017-09-12 NOTE — INTERDISCIPLINARY ROUNDS
CRITICAL CARE INTERDISCIPLINARY ROUNDS      Patient Information:    Name:   Silva Breen    Age:   65 y.o.     Admission Date:   7/27/2017    Readmit Risk Assessment Information:      Readmit Risk Tool Support Systems: Family member(s), Relationship with Primary Physician Group: Seen at least one time within past 12 months    Surgery Date:      Day of Stay:     Expected Discharge Date:        Attending Provider:   Paz Winn MD    Surgeon:        Consultant:       Primary Care Provider:   Alejandra Tobar MD    Problem List:     Patient Active Problem List   Diagnosis Code    Anemia D64.9    Acidosis E87.2    Cardiac arrest ([facility] I46.9    Respiratory failure ([facility] J96.90    ESRD needing dialysis ([facility] N18.6, Z99.2    Anoxic brain damage (HCC) G93.1    Colitis K52.9    Hypotension I95.9    Acute GI bleeding K92.2    ESRD (end stage renal disease) ([facility] N18.6    Sepsis ([facility] A41.9    Oropharyngeal dysphagia R13.12    Cachexia ([facility] R64       Principal Problem:  <principal problem not specified>    Procedure:       During rounds the following quality care indicators and evidence based practices were addressed :     DVT Prophylaxis, Pressure Injury Prevention, Pain Management, Sepsis resuscitation and management, Nutritional Status, Critical Care Interventions Airways, Drains and Lines and IHI Bundles: Central Line Bundle Followed , Stanton Bundle Followed and Vent Bundle Followed, Vent Day 16           Acute MI/PCI:   Not applicable    Heart Failure:    Central Line Bundle Followed , Stanton Bundle Followed and Vent Bundle Followed, Vent Day 16    Cardiac Surgery:  Not applicable    SCIP Measures for other Surgeries:   Not applicable    Pneumonia:    Appropriate Antibiotic Selection (ICU versus Non-ICU)    Stroke:  Patient's Personal Risk Factors for Stroke are:   hypertension, family history, hyperlipidemia or diabetes mellitus    NIH Stroke Score  Total: 8 (08/17/17 0400)    Transfer Level of Care:  Not Ready for Transfer    The patient will require the following interventions based on the Readmission Risk Assessment:  Pharmacy evaluation and teaching, Care Management involvement for home health follow up for:  mobility and assistance with ADL's and Spiritual Care evaluation      Discharge Management:  Home and Palliative Care    Anticipated Discharge Date:  3      Interdisciplinary team rounds were held  with the following team membersCare Management, Nursing, Nutrition, Pastoral Care, Pharmacy, Physician and Clinical Coordinator and the  patient and healthcare POA (documentation required). Plan of care discussed. See clinical pathway and/or care plan for interventions and desired outcomes. Place on trach collar.  Placement issue

## 2017-09-12 NOTE — PROGRESS NOTES
Bedside and Verbal shift change report given to Wendy Alexander (oncoming nurse) by Rachel Rand (offgoing nurse). Report included the following information SBAR and Kardex.

## 2017-09-12 NOTE — PROGRESS NOTES
JEANNETTE discussed this patient with RN. Patient still requiring vent at night thus he is appropriate for transfer to [facility]. SW contacted the patient's son and daughter. Unable to leave VM on daughter's phone. VML with son. Awaiting call back provided permission to submit clinical information to [facility].

## 2017-09-13 LAB
ANION GAP SERPL CALC-SCNC: 12 MMOL/L (ref 3–18)
BASOPHILS # BLD: 0 K/UL (ref 0–0.1)
BASOPHILS NFR BLD: 0 % (ref 0–2)
BUN SERPL-MCNC: 53 MG/DL (ref 7–18)
BUN/CREAT SERPL: 8 (ref 12–20)
CALCIUM SERPL-MCNC: 9.1 MG/DL (ref 8.5–10.1)
CHLORIDE SERPL-SCNC: 103 MMOL/L (ref 100–108)
CO2 SERPL-SCNC: 22 MMOL/L (ref 21–32)
CREAT SERPL-MCNC: 6.52 MG/DL (ref 0.6–1.3)
DIFFERENTIAL METHOD BLD: ABNORMAL
EOSINOPHIL # BLD: 0.6 K/UL (ref 0–0.4)
EOSINOPHIL NFR BLD: 5 % (ref 0–5)
ERYTHROCYTE [DISTWIDTH] IN BLOOD BY AUTOMATED COUNT: 17 % (ref 11.6–14.5)
GLUCOSE BLD STRIP.AUTO-MCNC: 146 MG/DL (ref 70–110)
GLUCOSE BLD STRIP.AUTO-MCNC: 212 MG/DL (ref 70–110)
GLUCOSE BLD STRIP.AUTO-MCNC: 216 MG/DL (ref 70–110)
GLUCOSE BLD STRIP.AUTO-MCNC: 246 MG/DL (ref 70–110)
GLUCOSE SERPL-MCNC: 217 MG/DL (ref 74–99)
HCT VFR BLD AUTO: 27.7 % (ref 36–48)
HGB BLD-MCNC: 9 G/DL (ref 13–16)
LYMPHOCYTES # BLD: 2.9 K/UL (ref 0.9–3.6)
LYMPHOCYTES NFR BLD: 26 % (ref 21–52)
MCH RBC QN AUTO: 31.1 PG (ref 24–34)
MCHC RBC AUTO-ENTMCNC: 32.5 G/DL (ref 31–37)
MCV RBC AUTO: 95.8 FL (ref 74–97)
MONOCYTES # BLD: 0.8 K/UL (ref 0.05–1.2)
MONOCYTES NFR BLD: 7 % (ref 3–10)
NEUTS SEG # BLD: 7 K/UL (ref 1.8–8)
NEUTS SEG NFR BLD: 62 % (ref 40–73)
PLATELET # BLD AUTO: 461 K/UL (ref 135–420)
PMV BLD AUTO: 10 FL (ref 9.2–11.8)
POTASSIUM SERPL-SCNC: 3.4 MMOL/L (ref 3.5–5.5)
RBC # BLD AUTO: 2.89 M/UL (ref 4.7–5.5)
SODIUM SERPL-SCNC: 137 MMOL/L (ref 136–145)
WBC # BLD AUTO: 11.3 K/UL (ref 4.6–13.2)

## 2017-09-13 PROCEDURE — 74011250636 HC RX REV CODE- 250/636: Performed by: INTERNAL MEDICINE

## 2017-09-13 PROCEDURE — 74011636637 HC RX REV CODE- 636/637: Performed by: INTERNAL MEDICINE

## 2017-09-13 PROCEDURE — 94003 VENT MGMT INPAT SUBQ DAY: CPT

## 2017-09-13 PROCEDURE — 74011250637 HC RX REV CODE- 250/637: Performed by: INTERNAL MEDICINE

## 2017-09-13 PROCEDURE — 74011250637 HC RX REV CODE- 250/637: Performed by: HOSPITALIST

## 2017-09-13 PROCEDURE — 82962 GLUCOSE BLOOD TEST: CPT

## 2017-09-13 PROCEDURE — 74011250636 HC RX REV CODE- 250/636: Performed by: HOSPITALIST

## 2017-09-13 PROCEDURE — 65610000006 HC RM INTENSIVE CARE

## 2017-09-13 PROCEDURE — 77030025757

## 2017-09-13 PROCEDURE — 85025 COMPLETE CBC W/AUTO DIFF WBC: CPT | Performed by: INTERNAL MEDICINE

## 2017-09-13 PROCEDURE — 74011000258 HC RX REV CODE- 258: Performed by: INTERNAL MEDICINE

## 2017-09-13 PROCEDURE — 74011000250 HC RX REV CODE- 250: Performed by: PHYSICIAN ASSISTANT

## 2017-09-13 PROCEDURE — 80048 BASIC METABOLIC PNL TOTAL CA: CPT | Performed by: INTERNAL MEDICINE

## 2017-09-13 PROCEDURE — 36415 COLL VENOUS BLD VENIPUNCTURE: CPT | Performed by: INTERNAL MEDICINE

## 2017-09-13 PROCEDURE — 74011636637 HC RX REV CODE- 636/637: Performed by: PHYSICIAN ASSISTANT

## 2017-09-13 RX ORDER — INSULIN GLARGINE 100 [IU]/ML
15 INJECTION, SOLUTION SUBCUTANEOUS
Status: DISCONTINUED | OUTPATIENT
Start: 2017-09-13 | End: 2017-09-16

## 2017-09-13 RX ORDER — MIDODRINE HYDROCHLORIDE 2.5 MG/1
10 TABLET ORAL
Status: DISCONTINUED | OUTPATIENT
Start: 2017-09-14 | End: 2017-09-22 | Stop reason: HOSPADM

## 2017-09-13 RX ORDER — POTASSIUM CHLORIDE 1.5 G/1.77G
20 POWDER, FOR SOLUTION ORAL
Status: COMPLETED | OUTPATIENT
Start: 2017-09-13 | End: 2017-09-13

## 2017-09-13 RX ADMIN — MIDODRINE HYDROCHLORIDE 15 MG: 2.5 TABLET ORAL at 11:55

## 2017-09-13 RX ADMIN — PIPERACILLIN AND TAZOBACTAM 2.25 G: 2; .25 INJECTION, POWDER, FOR SOLUTION INTRAVENOUS at 18:00

## 2017-09-13 RX ADMIN — CIPROFLOXACIN 400 MG: 2 INJECTION, SOLUTION INTRAVENOUS at 11:56

## 2017-09-13 RX ADMIN — SIMVASTATIN 20 MG: 10 TABLET, FILM COATED ORAL at 22:02

## 2017-09-13 RX ADMIN — MIDODRINE HYDROCHLORIDE 15 MG: 2.5 TABLET ORAL at 08:09

## 2017-09-13 RX ADMIN — LEVETIRACETAM 500 MG: 5 INJECTION INTRAVENOUS at 08:12

## 2017-09-13 RX ADMIN — MINERAL OIL AND WHITE PETROLATUM 1 DOSE: 150; 850 OINTMENT OPHTHALMIC at 18:00

## 2017-09-13 RX ADMIN — ASPIRIN 81 MG 81 MG: 81 TABLET ORAL at 08:10

## 2017-09-13 RX ADMIN — MINERAL OIL AND WHITE PETROLATUM 1 DOSE: 150; 850 OINTMENT OPHTHALMIC at 05:25

## 2017-09-13 RX ADMIN — INSULIN LISPRO 6 UNITS: 100 INJECTION, SOLUTION INTRAVENOUS; SUBCUTANEOUS at 07:09

## 2017-09-13 RX ADMIN — MINERAL OIL AND WHITE PETROLATUM 1 DOSE: 150; 850 OINTMENT OPHTHALMIC at 23:21

## 2017-09-13 RX ADMIN — PIPERACILLIN AND TAZOBACTAM 2.25 G: 2; .25 INJECTION, POWDER, FOR SOLUTION INTRAVENOUS at 03:30

## 2017-09-13 RX ADMIN — INSULIN GLARGINE 15 UNITS: 100 INJECTION, SOLUTION SUBCUTANEOUS at 22:04

## 2017-09-13 RX ADMIN — VITAMIN A AND VITAMIN D: 929.3 OINTMENT TOPICAL at 18:00

## 2017-09-13 RX ADMIN — INSULIN LISPRO 6 UNITS: 100 INJECTION, SOLUTION INTRAVENOUS; SUBCUTANEOUS at 11:51

## 2017-09-13 RX ADMIN — Medication 1 CAPSULE: at 08:10

## 2017-09-13 RX ADMIN — HEPARIN SODIUM 5000 UNITS: 5000 INJECTION, SOLUTION INTRAVENOUS; SUBCUTANEOUS at 09:41

## 2017-09-13 RX ADMIN — COLLAGENASE SANTYL: 250 OINTMENT TOPICAL at 08:11

## 2017-09-13 RX ADMIN — HEPARIN SODIUM 5000 UNITS: 5000 INJECTION, SOLUTION INTRAVENOUS; SUBCUTANEOUS at 17:05

## 2017-09-13 RX ADMIN — INSULIN LISPRO 3 UNITS: 100 INJECTION, SOLUTION INTRAVENOUS; SUBCUTANEOUS at 01:27

## 2017-09-13 RX ADMIN — FAMOTIDINE 20 MG: 10 INJECTION INTRAVENOUS at 08:09

## 2017-09-13 RX ADMIN — PIPERACILLIN AND TAZOBACTAM 2.25 G: 2; .25 INJECTION, POWDER, FOR SOLUTION INTRAVENOUS at 11:55

## 2017-09-13 RX ADMIN — Medication 1 PACKET: at 16:00

## 2017-09-13 RX ADMIN — LOPERAMIDE HYDROCHLORIDE 2 MG: 2 CAPSULE ORAL at 09:44

## 2017-09-13 RX ADMIN — MIDODRINE HYDROCHLORIDE 15 MG: 2.5 TABLET ORAL at 16:06

## 2017-09-13 RX ADMIN — HEPARIN SODIUM 5000 UNITS: 5000 INJECTION, SOLUTION INTRAVENOUS; SUBCUTANEOUS at 01:27

## 2017-09-13 RX ADMIN — POTASSIUM CHLORIDE 20 MEQ: 1.5 POWDER, FOR SOLUTION ORAL at 17:55

## 2017-09-13 RX ADMIN — Medication 1 PACKET: at 09:00

## 2017-09-13 RX ADMIN — INSULIN LISPRO 6 UNITS: 100 INJECTION, SOLUTION INTRAVENOUS; SUBCUTANEOUS at 23:55

## 2017-09-13 RX ADMIN — VITAMIN A AND VITAMIN D: 929.3 OINTMENT TOPICAL at 05:25

## 2017-09-13 RX ADMIN — MINERAL OIL AND WHITE PETROLATUM 1 DOSE: 150; 850 OINTMENT OPHTHALMIC at 11:52

## 2017-09-13 RX ADMIN — VITAMIN A AND VITAMIN D: 929.3 OINTMENT TOPICAL at 23:22

## 2017-09-13 RX ADMIN — Medication 1 PACKET: at 23:21

## 2017-09-13 RX ADMIN — LEVETIRACETAM 500 MG: 5 INJECTION INTRAVENOUS at 22:01

## 2017-09-13 RX ADMIN — VITAMIN A AND VITAMIN D: 929.3 OINTMENT TOPICAL at 12:00

## 2017-09-13 NOTE — DIABETES MGMT
GLYCEMIC CONTROL PLAN OF CARE    Assessment/Recommendations:  Pt discussed in interdisciplinary rounds. Blood glucose elevated above targets, Lantus insulin being increased to 15 units nightly. Continue correctional Lispro insulin.    Continue inpatient monitoring and intervention    Most recent blood glucose values:  9/12/2017 17:26 9/12/2017 23:45 9/13/2017 06:38 9/13/2017 11:49   234 (H) 187 (H) 246 (H) 212 (H)     Current A1C of 5.4% is equivalent to average blood glucose of 108 mg/dl over the past 2-3 months.      Current hospital diabetes medications:   Correctional Lispro insulin every 6 hours (very resistant scale)  Lantus insulin 15 units daily      Previous day's insulin requirements:   10 units of Lantus insulin   21 units of Lispro insulin       Home diabetes medications:  None      Diet:  NPO  Tube Feeding: Nepro at 55 mL via NGT     Education:  ____Refer to Diabetes Education Record                                  __x__Education not indicated at this time      Terrie Singleton RD, CDE

## 2017-09-13 NOTE — PROGRESS NOTES
SW spoke with the patient's daughter, Raman Jimenez. She provided consent for the patient's clinical information uploaded to [address] [place] [address] [address].  Patient placed in [address] [place] [address] for [facility] and Darian Jacob contacted. Patient placed in [facility] for [facility] and Simone Gold contacted--[facility]

## 2017-09-13 NOTE — INTERDISCIPLINARY ROUNDS
CRITICAL CARE INTERDISCIPLINARY ROUNDS      Patient Information:    Name:   Aquilino Young    Age:   65 y.o. Admission Date:   7/27/2017    Readmit Risk Assessment Information:      Readmit Risk Tool Support Systems: Family member(s), Relationship with Primary Physician Group: Seen at least one time within past 12 months    Surgery Date:      Day of Stay:     Expected Discharge Date:        Attending Provider:   Deric Clark MD    Surgeon:        Consultant:       Primary Care Provider:   Guadalupe Bell MD    Problem List:     Patient Active Problem List   Diagnosis Code    Anemia D64.9    Acidosis E87.2    Cardiac arrest ([address] I46.9    Respiratory failure ([address] J96.90    ESRD needing dialysis ([address] N18.6, Z99.2    Anoxic brain damage ([address] G93.1    Colitis K52.9    Hypotension I95.9    Acute GI bleeding K92.2    ESRD (end stage renal disease) ([address] N18.6    Sepsis ([address] A41.9    Oropharyngeal dysphagia R13.12    Cachexia ([address] R64       Principal Problem:  <principal problem not specified>    Procedure:       During rounds the following quality care indicators and evidence based practices were addressed :     DVT Prophylaxis, Pressure Injury Prevention, Pain Management, Sepsis resuscitation and management, Nutritional Status, Critical Care Interventions Airways, Drains and Lines and IHI Bundles:  Stanton Bundle Followed           Acute MI/PCI:   Not applicable    Heart Failure:    Not applicable    Cardiac Surgery:  Not applicable    SCIP Measures for other Surgeries:   Not applicable    Pneumonia:    Appropriate Antibiotic Selection (ICU versus Non-ICU)    Stroke:  Patient's Personal Risk Factors for Stroke are:   hypertension, family history, hyperlipidemia or diabetes mellitus    NIH Stroke Score  Total: 8 (08/17/17 0400)    Transfer Level of Care:  Not Ready for Transfer    The patient will require the following interventions based on the Readmission Risk Assessment:  Pharmacy evaluation and teaching, Care Management involvement for home health follow up for:  mobility and assistance with ADL's and Spiritual Care evaluation      Discharge Management:  Palliative Care    Anticipated Discharge Date:  5      Interdisciplinary team rounds were held  with the following team membersCare Management, Diabetes Treatment Specialist, Nursing, Nutrition, Pastoral Care, Pharmacy, Physician and Clinical Coordinator and the  patient and healthcare POA (documentation required). Plan of care discussed. See clinical pathway and/or care plan for interventions and desired outcomes. Placement needed for patient. Increased lantus for elevated sugars.

## 2017-09-13 NOTE — ROUTINE PROCESS
Bedside and Verbal shift change report given to [facility] [address] (oncoming nurse) by Mariano Maya (offgoing nurse). Report included the following information SBAR, Kardex and MAR.

## 2017-09-13 NOTE — PROGRESS NOTES
Bedside and Verbal shift change report given to Lucero Olea (oncoming nurse) by Mariana Meredith (offgoing nurse). Report included the following information SBAR and Kardex.

## 2017-09-13 NOTE — PROGRESS NOTES
PCCM Progress Note                                              I have reviewed the flowsheet and previous days notes. Events, vitals, medications and notes from last 24 hours reviewed. Care plan discussed with staff and on multidisciplinary rounds. Subjective:  9/13/2017   Pt is on vent via trach. No response to voice commands today. Impression and Plan  VDRF - Pt is s/p Trach. Cont trach mask as tolerated  Acute pontine CVA - Pt is on keppra, defer further mgt to neurology  Pneumonia - Sputum cx from 9/6 is growing Pseudomonas and Klebsiella. Pt is on Zosyn #7 and Cipro # 3 today. Also received Vanco for 5 days. Pt is afebrile today and Cxr does not show any infiltrates. WBC count is hernandez; Will stop abx after 8 days. ESRD - on HD, F/u with renal  Anemia - on Epogen with HD, Hb is stable  Hypotension - on Midodrine  DM - Cont lantus and ISS  S/p septic shock  Anoxic encephalopathy  S/p PEA arrest   DVT and GI Proph - on heparin and Pepcid  Nutrition - Nepro at goal    OTHER:  Glycemic Control. Glucose stabilizer per ICU protocol when on insulin drip. Maintain blood glucose 140-180. Replace electrolytes per ICU electrolyte replacement protocol  HOB >=30 degree elevation all the time. Aggressive pulmonary toileting. Incentive spirometry when appropriate. Aspiration precautions. Further recommendations will be based on the patient's response to recommended treatment and results of the investigation ordered. Quality Care: Stress ulcer prophylaxis, DVT prophylaxis, HOB elevated, Infection control all reviewed and addressed. Events and notes from last 24 hours reviewed. Care plan discussed with nursing, MDR.      CC TIME: >35 min     Medication Reviewed:    Safety Bundles:Electrolyte Replacement Protocol    No Known Allergies   Past Medical History:   Diagnosis Date    Anemia     Arthritis     Chronic pain     Back     Diabetes mellitus type II     Hypertension     IBS (irritable bowel syndrome)  Lactose intolerance     TB (tuberculosis)     TB test positive      Past Surgical History:   Procedure Laterality Date    CARDIAC CATHETERIZATION  8/9/2017         CORONARY ARTERY ANGIOGRAM  8/9/2017         ENDOSCOPY, COLON, DIAGNOSTIC      HX AMPUTATION      Toe due to injury    LV ANGIOGRAPHY  8/9/2017         MODERATE SEDATION  8/9/2017           Social History   Substance Use Topics    Smoking status: Current Every Day Smoker    Smokeless tobacco: Not on file    Alcohol use Yes      History reviewed. No pertinent family history.    Prior to Admission medications    Not on File     Current Facility-Administered Medications   Medication Dose Route Frequency    insulin glargine (LANTUS) injection 15 Units  15 Units SubCUTAneous QHS    ciprofloxacin (CIPRO) 400 mg IVPB (premix)  400 mg IntraVENous Q24H    white petrolatum-mineral oil 85-15 % oint 1 Dose  1 Dose Ophthalmic Q6H    vits A and D-white pet-lanolin (A&D) ointment   Topical Q6H    epoetin benjamin (EPOGEN;PROCRIT) injection 10,000 Units  10,000 Units IntraVENous DIALYSIS MON, WED & FRI    piperacillin-tazobactam (ZOSYN) 2.25 g in 0.9% sodium chloride (MBP/ADV) 50 mL MBP  2.25 g IntraVENous Q8H    Piperacillin-tazobactam (ZOSYN) 0.75 gm Supplemental Dosing by Pharmacy   Other Rx Dosing/Monitoring    midodrine (PROAMITINE) tablet 15 mg  15 mg Oral TID WITH MEALS    Zinc Ox-Aloe Vera-Vitamin E (BALMEX) topical cream   Topical CONTINUOUS    banana flakes trans-galactooligosaccharide (BANATROL PLUS) powder 1 Packet  1 Packet Per NG tube TID    levETIRAcetam (KEPPRA) 500 mg in saline (iso-osm) 100 ml IVPB  500 mg IntraVENous Q12H    insulin lispro (HUMALOG) injection   SubCUTAneous Q6H    famotidine (PF) (PEPCID) 20 mg in sodium chloride 0.9 % 10 mL injection  20 mg IntraVENous DAILY    collagenase (SANTYL) 250 unit/gram ointment   Topical DAILY    lactobacillus sp. 50 billion cpu (BIO-K PLUS) capsule 1 Cap  1 Cap Oral DAILY    heparin (porcine) injection 5,000 Units  5,000 Units SubCUTAneous Q8H    heparin (porcine) 1,000 unit/mL injection 2,000 Units  2,000 Units IntraVENous DIALYSIS ONCE    simvastatin (ZOCOR) tablet 20 mg  20 mg Oral QHS    aspirin chewable tablet 81 mg  81 mg Oral DAILY       Peripheral Intravenous Line:  Peripheral IV 09/06/17 Left Hand (Active)   Site Assessment Clean, dry, & intact 9/10/2017  8:00 PM   Phlebitis Assessment 0 9/10/2017  8:00 PM   Infiltration Assessment 0 9/10/2017  8:00 PM   Dressing Status Clean, dry, & intact 9/10/2017  8:00 PM   Dressing Type Tape;Transparent 9/10/2017  8:00 PM   Hub Color/Line Status Pink 9/10/2017  8:00 PM   Action Taken Other (comment) 9/10/2017  8:00 PM   Alcohol Cap Used No 9/10/2017  8:00 PM       Peripheral IV 09/10/17 Right Wrist (Active)   Site Assessment Clean, dry, & intact 9/10/2017  8:00 PM   Phlebitis Assessment 0 9/10/2017  8:00 PM   Infiltration Assessment 0 9/10/2017  8:00 PM   Dressing Status Clean, dry, & intact 9/10/2017  8:00 PM   Dressing Type Transparent 9/10/2017  8:00 PM   Hub Color/Line Status Green;Capped;Flushed 9/10/2017  8:00 PM   Action Taken Other (comment) 9/10/2017  8:00 PM   Alcohol Cap Used No 9/10/2017  8:00 PM      Drain(s):  PEG/Gastrostomy Tube 09/08/17 (Active)   Site Assessment Dry;Bleeding 9/10/2017  8:00 PM   Dressing Status Dry; Intact 9/10/2017  8:00 PM   G Port Status Infusing 9/10/2017  8:00 PM   Action Taken Other (comment) 9/10/2017  8:00 PM   Drainage Description Tan 9/10/2017  8:00 PM   Gastric Residual (mL) 20 ml 9/10/2017  8:00 PM   Tube Feeding/Formula Options Renal (Nepro) 9/10/2017  8:00 PM   Modular Nutrients Protein liquid 9/10/2017  7:00 AM   Tube Feeding/Verify Rate (mL/hr) 50 9/10/2017  7:00 AM   Water Flush Volume (mL) 150 mL 9/11/2017  4:00 AM   Intake (ml) 55 ml 9/11/2017  6:00 AM   Medication Volume 75 ml 9/10/2017  8:00 PM       Fecal Management (Active)   Stool Consistency Semi-liquid 9/11/2017  5:00 AM   Position of Indicator Line Visible 2017  5:00 AM   Signal Indicator Bubble Appropriate 2017  5:00 AM   Skin Assessment of the Anal Area Denuded/excoriated 2017  5:00 AM   Drainage Bag Changed Not due to be changed 2017  5:00 AM   Balloon Deflated No 2017  5:00 AM   Tube Irrigated No 2017  5:00 AM   Output (ml) 120 ml 2017  2:30 PM     Airway:  Airway - Tracheostomy Tube 17 Portex (Active)   Cuff Pressure 25 cmH20 2017  7:33 PM   Site Assessment Clean, dry, & intact 2017  9:09 AM   Trach Dressing Changed Yes 9/10/2017  8:00 PM   Trach Cleaned With Normal saline 9/10/2017  8:00 PM   Trach Tie Changed No 9/10/2017  8:00 PM   Trach Cannula Changed No 9/10/2017  8:00 PM   Inner Cannula Cuffed, cuff inflated 2017  9:09 AM   Line Trung Top of the lock 2017  9:09 AM   Side Secured Centered 2017  9:09 AM   Spare Trach at Bedside Yes 2017  9:09 AM   Spare [place] Tube One Size Smaller at Bedside Yes 2017  9:09 AM   Ambu Bag With Mask at Bedside Yes 2017  9:09 AM       Objective:  Vital Signs:    Visit Vitals    /71    Pulse 80    Temp 98.7 °F (37.1 °C)    Resp 18    Ht 6' 2\" (1.88 m)    Wt 59 kg (130 lb)    SpO2 100%    BMI 16.69 kg/m2      O2 Device: Tracheal collar, Tracheostomy, Humidifier  O2 Flow Rate (L/min): 6 l/min  Temp (24hrs), Av.5 °F (36.9 °C), Min:98 °F (36.7 °C), Max:99.2 °F (37.3 °C)      Intake/Output:   Last shift:           Last 3 shifts: 1901 -  07  In: 3210 [I.V.:500]  Out: 1200 [Drains:700]      Intake/Output Summary (Last 24 hours) at 17 1518  Last data filed at 09/13/17 0700   Gross per 24 hour   Intake             1280 ml   Output              500 ml   Net              780 ml       Last 3 Recorded Weights in this Encounter    17 0500 17 0212 17 0710   Weight: 56.6 kg (124 lb 12.5 oz) 62.2 kg (137 lb 3.2 oz) 59 kg (130 lb)       Ventilator Settings:  Mode Rate Tidal Volume Pressure FiO2 PEEP  SIMV, Pressure support   400 ml  7 cm H2O 28 % 5 cm H20    Peak airway pressure: 11 cm H2O   Plateau pressure:    Tidal volume:   Minute ventilation: 7.2 l/min  SPO2     ARDS network Guidelines: Lung protective strategy and Plateau pressure goals less than or equal to 30    Physical Exam:     General/Neurology: no response to voice commands  Head:   Normocephalic, without obvious abnormality  Eye:   PERRL, EOM intact, no scleral icterus, no pallor  Oral:   Mucus membranes moist  Neck:   Trach present  Lung:   B/l air entry fair  Heart:   S1 S2 present. Abdomen/: Soft. NT. ND. +BS.     Extremities:  Min pedal edema  Skin:   No rashes or lesions    Data:      Recent Results (from the past 24 hour(s))   GLUCOSE, POC    Collection Time: 09/12/17  5:26 PM   Result Value Ref Range    Glucose (POC) 234 (H) 70 - 110 mg/dL   GLUCOSE, POC    Collection Time: 09/12/17 11:45 PM   Result Value Ref Range    Glucose (POC) 187 (H) 70 - 282 mg/dL   METABOLIC PANEL, BASIC    Collection Time: 09/13/17  3:56 AM   Result Value Ref Range    Sodium 137 136 - 145 mmol/L    Potassium 3.4 (L) 3.5 - 5.5 mmol/L    Chloride 103 100 - 108 mmol/L    CO2 22 21 - 32 mmol/L    Anion gap 12 3.0 - 18 mmol/L    Glucose 217 (H) 74 - 99 mg/dL    BUN 53 (H) 7.0 - 18 MG/DL    Creatinine 6.52 (H) 0.6 - 1.3 MG/DL    BUN/Creatinine ratio 8 (L) 12 - 20      GFR est AA 11 (L) >60 ml/min/1.73m2    GFR est non-AA 9 (L) >60 ml/min/1.73m2    Calcium 9.1 8.5 - 10.1 MG/DL   CBC WITH AUTOMATED DIFF    Collection Time: 09/13/17  3:56 AM   Result Value Ref Range    WBC 11.3 4.6 - 13.2 K/uL    RBC 2.89 (L) 4.70 - 5.50 M/uL    HGB 9.0 (L) 13.0 - 16.0 g/dL    HCT 27.7 (L) 36.0 - 48.0 %    MCV 95.8 74.0 - 97.0 FL    MCH 31.1 24.0 - 34.0 PG    MCHC 32.5 31.0 - 37.0 g/dL    RDW 17.0 (H) 11.6 - 14.5 %    PLATELET 189 (H) 541 - 420 K/uL    MPV 10.0 9.2 - 11.8 FL    NEUTROPHILS 62 40 - 73 %    LYMPHOCYTES 26 21 - 52 %    MONOCYTES 7 3 - 10 % EOSINOPHILS 5 0 - 5 %    BASOPHILS 0 0 - 2 %    ABS. NEUTROPHILS 7.0 1.8 - 8.0 K/UL    ABS. LYMPHOCYTES 2.9 0.9 - 3.6 K/UL    ABS. MONOCYTES 0.8 0.05 - 1.2 K/UL    ABS. EOSINOPHILS 0.6 (H) 0.0 - 0.4 K/UL    ABS. BASOPHILS 0.0 0.0 - 0.1 K/UL    DF AUTOMATED     GLUCOSE, POC    Collection Time: 09/13/17  6:38 AM   Result Value Ref Range    Glucose (POC) 246 (H) 70 - 110 mg/dL   GLUCOSE, POC    Collection Time: 09/13/17 11:49 AM   Result Value Ref Range    Glucose (POC) 212 (H) 70 - 110 mg/dL         Chemistry   Recent Labs      09/13/17 0356 09/11/17 0229   GLU  217*  282*   NA  137  135*   K  3.4*  3.8   CL  103  102   CO2  22  20*   BUN  53*  56*   CREA  6.52*  7.76*   CA  9.1  8.6   AGAP  12  13   BUCR  8*  7*         CBC w/Diff   Recent Labs      09/13/17 0356 09/11/17 0229   WBC  11.3   --    RBC  2.89*   --    HGB  9.0*  9.1*   HCT  27.7*  27.8*   PLT  461*   --    GRANS  62   --    LYMPH  26   --    EOS  5   --        XR (Most Recent). CXR reviewed by me and compared with previous CXR     Results from Hospital Encounter encounter on 07/27/17   XR CHEST PORT   Narrative Portable Chest 0457 hours    CPT CODE:61002    HISTORY:  Pseudomonas and Klebsiella in sputum, afebrile, hypotension, status post septic  shock, status post PEA arrest, acute pontine CVA,   Respiratory failure, Trach. COMPARISON: Chest x-ray September 11 through September 8, 2017. FINDINGS:     Tracheostomy tube is midline. The patient is mildly rotated. The right  hemidiaphragm is minimally elevated. The bronchovascular markings at the right  base are accentuated possibly due to the elevated hemidiaphragm. The  costophrenic angles are sharp.  Pulmonary vascularity is normal.         Impression IMPRESSION:    Mild elevation of the right hemidiaphragm with likely crowding of  bronchovascular markings at the right lung base          High complexity decision making was performed during the evaluation of this patient at high risk for decompensation with multiple organ involvement     Above mentioned total time spent on reviewing the case/medical record/data/notes/EMR/patient examination/documentation/coordinating care with nurse/consultants, exclusive of procedures with complex decision making performed and > 50% time spent in face to face evaluation.     Andrew Moreno MD  9/13/2017

## 2017-09-13 NOTE — PROGRESS NOTES
RENAL DAILY PROGRESS NOTE    Subjective:   Admitted postcode, seen for ESRD and HD    Complaint: remains on trach and PEG feeding, still with diarrhea     Current Facility-Administered Medications   Medication Dose Route Frequency    insulin glargine (LANTUS) injection 15 Units  15 Units SubCUTAneous QHS    ciprofloxacin (CIPRO) 400 mg IVPB (premix)  400 mg IntraVENous Q24H    white petrolatum-mineral oil 85-15 % oint 1 Dose  1 Dose Ophthalmic Q6H    vits A and D-white pet-lanolin (A&D) ointment   Topical Q6H    epoetin benjamin (EPOGEN;PROCRIT) injection 10,000 Units  10,000 Units IntraVENous DIALYSIS MON, WED & FRI    piperacillin-tazobactam (ZOSYN) 2.25 g in 0.9% sodium chloride (MBP/ADV) 50 mL MBP  2.25 g IntraVENous Q8H    Piperacillin-tazobactam (ZOSYN) 0.75 gm Supplemental Dosing by Pharmacy   Other Rx Dosing/Monitoring    albumin human 25% (BUMINATE) solution 12.5 g  12.5 g IntraVENous DIALYSIS PRN    midodrine (PROAMITINE) tablet 15 mg  15 mg Oral TID WITH MEALS    Zinc Ox-Aloe Vera-Vitamin E (BALMEX) topical cream   Topical CONTINUOUS    banana flakes trans-galactooligosaccharide (BANATROL PLUS) powder 1 Packet  1 Packet Per NG tube TID    levETIRAcetam (KEPPRA) 500 mg in saline (iso-osm) 100 ml IVPB  500 mg IntraVENous Q12H    LORazepam (ATIVAN) injection 1 mg  1 mg IntraVENous Q4H PRN    insulin lispro (HUMALOG) injection   SubCUTAneous Q6H    albuterol (PROVENTIL VENTOLIN) nebulizer solution 2.5 mg  2.5 mg Nebulization Q6H PRN    famotidine (PF) (PEPCID) 20 mg in sodium chloride 0.9 % 10 mL injection  20 mg IntraVENous DAILY    collagenase (SANTYL) 250 unit/gram ointment   Topical DAILY    acetaminophen (TYLENOL) tablet 650 mg  650 mg Oral Q4H PRN    lactobacillus sp. 50 billion cpu (BIO-K PLUS) capsule 1 Cap  1 Cap Oral DAILY    loperamide (IMODIUM) capsule 2 mg  2 mg Oral Q6H PRN    heparin (porcine) injection 5,000 Units  5,000 Units SubCUTAneous Q8H    heparin (porcine) 1,000 unit/mL injection 2,000 Units  2,000 Units IntraVENous DIALYSIS ONCE    simvastatin (ZOCOR) tablet 20 mg  20 mg Oral QHS    aspirin chewable tablet 81 mg  81 mg Oral DAILY    glucose chewable tablet 16 g  4 Tab Oral PRN    glucagon (GLUCAGEN) injection 1 mg  1 mg IntraMUSCular PRN    dextrose (D50W) injection syrg 12.5-25 g  25-50 mL IntraVENous PRN    0.9% sodium chloride infusion  100 mL/hr IntraVENous DIALYSIS PRN    naloxone (NARCAN) injection 0.4 mg  0.4 mg IntraVENous PRN           Objective:     Patient Vitals for the past 24 hrs:   Temp Pulse Resp BP SpO2   09/13/17 1626 98.3 °F (36.8 °C) - - - -   09/13/17 1600 - 78 20 124/70 99 %   09/13/17 1500 - 80 20 121/63 100 %   09/13/17 1400 - 80 18 142/71 100 %   09/13/17 1300 - 80 19 141/79 100 %   09/13/17 1235 98.7 °F (37.1 °C) - - - -   09/13/17 1200 - 82 17 141/74 99 %   09/13/17 1100 - 80 19 142/77 98 %   09/13/17 1000 - 85 29 128/66 100 %   09/13/17 0900 - 82 18 136/72 100 %   09/13/17 0818 99.2 °F (37.3 °C) - - - -   09/13/17 0800 - 86 20 122/66 100 %   09/13/17 0749 - - - - 100 %   09/13/17 0700 - 90 17 143/76 100 %   09/13/17 0600 - 82 19 124/69 99 %   09/13/17 0500 - 78 17 112/66 99 %   09/13/17 0451 - 84 13 - 100 %   09/13/17 0400 98 °F (36.7 °C) 79 17 115/65 99 %   09/13/17 0300 - 78 16 110/63 99 %   09/13/17 0200 - 79 17 115/68 100 %   09/13/17 0100 - 78 16 122/71 99 %   09/13/17 0000 98.3 °F (36.8 °C) 82 16 136/83 100 %   09/12/17 2353 - 79 16 - 100 %   09/12/17 2300 - 78 15 153/77 100 %   09/12/17 2200 - 75 15 132/71 100 %   09/12/17 2100 - 74 15 106/62 100 %   09/12/17 2013 - - - - 100 %   09/12/17 2000 98.2 °F (36.8 °C) 80 18 134/73 100 %   09/12/17 1900 - 85 19 131/73 100 %   09/12/17 1800 - 84 20 132/70 99 %   09/12/17 1700 - 84 20 132/74 99 %   09/12/17 1655 98.4 °F (36.9 °C) - - - -        Weight change:      09/11 1901 - 09/13 0700  In: 3210 [I.V.:500]  Out: 1200 [Drains:700]    Intake/Output Summary (Last 24 hours) at 09/13/17 1648  Last data filed at 09/13/17 1529   Gross per 24 hour   Intake             1965 ml   Output              500 ml   Net             1465 ml     Physical Exam: unresponsive, afebrile    HEENT: non icteric  Neck: no JVD, trach collar in place  Cardiovascular: regular, no rub  C/L:  Equal vent breath sounds  Abdomen: soft, + BS, PEG  Ext:  Left arm AVF + bruit, no LE edema    Data Review:     LABS:   Hematology:   Recent Labs      09/13/17 0356  09/11/17 0229   WBC  11.3   --    HGB  9.0*  9.1*   HCT  27.7*  27.8*   pl 461K  Chemistry:   Recent Labs      09/13/17 0356 09/11/17 0229   BUN  53*  56*   CREA  6.52*  7.76*   CA  9.1  8.6   K  3.4*  3.8   NA  137  135*   CL  103  102   CO2  22  20*   GLU  217*  282*     IPTH 67  Iron 23  Sat 15% Tashi 419      IMPRESSION AND PLAN:   ESRD no acute need for HD today, sched HD 2x a week (M F).    Hypokalemia replace per PEG  2nd HPT low IPTH, monitor yeison phos off binders and Vit D  Anemia from CKD cont ALONDRA with HD, low Fe stores  Nutrition cont with PEG feeding  Hypotension better on Midodrine give a dose pre HD             Jhon Lubin MD  9/13/2017

## 2017-09-14 ENCOUNTER — APPOINTMENT (OUTPATIENT)
Dept: GENERAL RADIOLOGY | Age: 57
DRG: 004 | End: 2017-09-14
Attending: INTERNAL MEDICINE
Payer: MEDICARE

## 2017-09-14 LAB
GLUCOSE BLD STRIP.AUTO-MCNC: 128 MG/DL (ref 70–110)
GLUCOSE BLD STRIP.AUTO-MCNC: 181 MG/DL (ref 70–110)
GLUCOSE BLD STRIP.AUTO-MCNC: 200 MG/DL (ref 70–110)
POTASSIUM SERPL-SCNC: 4.6 MMOL/L (ref 3.5–5.5)

## 2017-09-14 PROCEDURE — 74011250637 HC RX REV CODE- 250/637: Performed by: INTERNAL MEDICINE

## 2017-09-14 PROCEDURE — 74011000250 HC RX REV CODE- 250: Performed by: PHYSICIAN ASSISTANT

## 2017-09-14 PROCEDURE — 77030011256 HC DRSG MEPILEX <16IN NO BORD MOLN -A

## 2017-09-14 PROCEDURE — 77030018846 HC SOL IRR STRL H20 ICUM -A

## 2017-09-14 PROCEDURE — 71010 XR CHEST PORT: CPT

## 2017-09-14 PROCEDURE — 74011250637 HC RX REV CODE- 250/637: Performed by: HOSPITALIST

## 2017-09-14 PROCEDURE — 82962 GLUCOSE BLOOD TEST: CPT

## 2017-09-14 PROCEDURE — 74011636637 HC RX REV CODE- 636/637: Performed by: PHYSICIAN ASSISTANT

## 2017-09-14 PROCEDURE — 74011250636 HC RX REV CODE- 250/636: Performed by: INTERNAL MEDICINE

## 2017-09-14 PROCEDURE — 65610000006 HC RM INTENSIVE CARE

## 2017-09-14 PROCEDURE — 36415 COLL VENOUS BLD VENIPUNCTURE: CPT | Performed by: INTERNAL MEDICINE

## 2017-09-14 PROCEDURE — 74011250636 HC RX REV CODE- 250/636: Performed by: HOSPITALIST

## 2017-09-14 PROCEDURE — 74011636637 HC RX REV CODE- 636/637: Performed by: INTERNAL MEDICINE

## 2017-09-14 PROCEDURE — 77030033263 HC DRSG MEPILEX 16-48IN BORD MOLN -B

## 2017-09-14 PROCEDURE — 94003 VENT MGMT INPAT SUBQ DAY: CPT

## 2017-09-14 PROCEDURE — 74011000258 HC RX REV CODE- 258: Performed by: INTERNAL MEDICINE

## 2017-09-14 PROCEDURE — 84132 ASSAY OF SERUM POTASSIUM: CPT | Performed by: INTERNAL MEDICINE

## 2017-09-14 RX ADMIN — MINERAL OIL AND WHITE PETROLATUM 1 DOSE: 150; 850 OINTMENT OPHTHALMIC at 12:16

## 2017-09-14 RX ADMIN — ASPIRIN 81 MG 81 MG: 81 TABLET ORAL at 09:00

## 2017-09-14 RX ADMIN — MINERAL OIL AND WHITE PETROLATUM 1 DOSE: 150; 850 OINTMENT OPHTHALMIC at 18:59

## 2017-09-14 RX ADMIN — LEVETIRACETAM 500 MG: 5 INJECTION INTRAVENOUS at 21:12

## 2017-09-14 RX ADMIN — Medication 1 PACKET: at 12:12

## 2017-09-14 RX ADMIN — FAMOTIDINE 20 MG: 10 INJECTION INTRAVENOUS at 12:14

## 2017-09-14 RX ADMIN — Medication 1 PACKET: at 21:16

## 2017-09-14 RX ADMIN — CIPROFLOXACIN 400 MG: 2 INJECTION, SOLUTION INTRAVENOUS at 12:12

## 2017-09-14 RX ADMIN — INSULIN GLARGINE 15 UNITS: 100 INJECTION, SOLUTION SUBCUTANEOUS at 22:40

## 2017-09-14 RX ADMIN — VITAMIN A AND VITAMIN D: 929.3 OINTMENT TOPICAL at 07:11

## 2017-09-14 RX ADMIN — INSULIN LISPRO 6 UNITS: 100 INJECTION, SOLUTION INTRAVENOUS; SUBCUTANEOUS at 12:18

## 2017-09-14 RX ADMIN — MIDODRINE HYDROCHLORIDE 10 MG: 2.5 TABLET ORAL at 12:08

## 2017-09-14 RX ADMIN — HEPARIN SODIUM 5000 UNITS: 5000 INJECTION, SOLUTION INTRAVENOUS; SUBCUTANEOUS at 18:59

## 2017-09-14 RX ADMIN — Medication 1 CAPSULE: at 12:11

## 2017-09-14 RX ADMIN — VITAMIN A AND VITAMIN D: 929.3 OINTMENT TOPICAL at 12:17

## 2017-09-14 RX ADMIN — HEPARIN SODIUM 5000 UNITS: 5000 INJECTION, SOLUTION INTRAVENOUS; SUBCUTANEOUS at 02:00

## 2017-09-14 RX ADMIN — INSULIN LISPRO 3 UNITS: 100 INJECTION, SOLUTION INTRAVENOUS; SUBCUTANEOUS at 18:59

## 2017-09-14 RX ADMIN — MIDODRINE HYDROCHLORIDE 10 MG: 2.5 TABLET ORAL at 18:59

## 2017-09-14 RX ADMIN — PIPERACILLIN AND TAZOBACTAM 2.25 G: 2; .25 INJECTION, POWDER, FOR SOLUTION INTRAVENOUS at 03:02

## 2017-09-14 RX ADMIN — LEVETIRACETAM 500 MG: 5 INJECTION INTRAVENOUS at 12:15

## 2017-09-14 RX ADMIN — SIMVASTATIN 20 MG: 10 TABLET, FILM COATED ORAL at 21:11

## 2017-09-14 RX ADMIN — Medication 1 PACKET: at 18:59

## 2017-09-14 RX ADMIN — PIPERACILLIN AND TAZOBACTAM 2.25 G: 2; .25 INJECTION, POWDER, FOR SOLUTION INTRAVENOUS at 19:09

## 2017-09-14 RX ADMIN — COLLAGENASE SANTYL: 250 OINTMENT TOPICAL at 12:13

## 2017-09-14 RX ADMIN — VITAMIN A AND VITAMIN D: 929.3 OINTMENT TOPICAL at 18:59

## 2017-09-14 RX ADMIN — PIPERACILLIN AND TAZOBACTAM 2.25 G: 2; .25 INJECTION, POWDER, FOR SOLUTION INTRAVENOUS at 12:16

## 2017-09-14 RX ADMIN — HEPARIN SODIUM 5000 UNITS: 5000 INJECTION, SOLUTION INTRAVENOUS; SUBCUTANEOUS at 12:14

## 2017-09-14 RX ADMIN — MINERAL OIL AND WHITE PETROLATUM 1 DOSE: 150; 850 OINTMENT OPHTHALMIC at 07:10

## 2017-09-14 NOTE — PROGRESS NOTES
RENAL DAILY PROGRESS NOTE    Subjective:   Admitted postcode, seen for ESRD and HD    Complaint: remains on trach/vent and on PEG feeding, still with diarrhea     Current Facility-Administered Medications   Medication Dose Route Frequency    insulin glargine (LANTUS) injection 15 Units  15 Units SubCUTAneous QHS    [START ON 9/15/2017] epoetin benjamin (EPOGEN;PROCRIT) injection 10,000 Units  10,000 Units IntraVENous EVERY MON & FRI    [START ON 9/15/2017] iron sucrose (VENOFER) 100 mg in 0.9% sodium chloride 100 mL IVPB  100 mg IntraVENous EVERY MON & FRI    midodrine (PROAMITINE) tablet 10 mg  10 mg Oral TID WITH MEALS    ciprofloxacin (CIPRO) 400 mg IVPB (premix)  400 mg IntraVENous Q24H    white petrolatum-mineral oil 85-15 % oint 1 Dose  1 Dose Ophthalmic Q6H    vits A and D-white pet-lanolin (A&D) ointment   Topical Q6H    piperacillin-tazobactam (ZOSYN) 2.25 g in 0.9% sodium chloride (MBP/ADV) 50 mL MBP  2.25 g IntraVENous Q8H    Piperacillin-tazobactam (ZOSYN) 0.75 gm Supplemental Dosing by Pharmacy   Other Rx Dosing/Monitoring    albumin human 25% (BUMINATE) solution 12.5 g  12.5 g IntraVENous DIALYSIS PRN    Zinc Ox-Aloe Vera-Vitamin E (BALMEX) topical cream   Topical CONTINUOUS    banana flakes trans-galactooligosaccharide (BANATROL PLUS) powder 1 Packet  1 Packet Per NG tube TID    levETIRAcetam (KEPPRA) 500 mg in saline (iso-osm) 100 ml IVPB  500 mg IntraVENous Q12H    LORazepam (ATIVAN) injection 1 mg  1 mg IntraVENous Q4H PRN    insulin lispro (HUMALOG) injection   SubCUTAneous Q6H    albuterol (PROVENTIL VENTOLIN) nebulizer solution 2.5 mg  2.5 mg Nebulization Q6H PRN    famotidine (PF) (PEPCID) 20 mg in sodium chloride 0.9 % 10 mL injection  20 mg IntraVENous DAILY    collagenase (SANTYL) 250 unit/gram ointment   Topical DAILY    acetaminophen (TYLENOL) tablet 650 mg  650 mg Oral Q4H PRN    lactobacillus sp. 50 billion cpu (BIO-K PLUS) capsule 1 Cap  1 Cap Oral DAILY    loperamide (IMODIUM) capsule 2 mg  2 mg Oral Q6H PRN    heparin (porcine) injection 5,000 Units  5,000 Units SubCUTAneous Q8H    heparin (porcine) 1,000 unit/mL injection 2,000 Units  2,000 Units IntraVENous DIALYSIS ONCE    simvastatin (ZOCOR) tablet 20 mg  20 mg Oral QHS    aspirin chewable tablet 81 mg  81 mg Oral DAILY    glucose chewable tablet 16 g  4 Tab Oral PRN    glucagon (GLUCAGEN) injection 1 mg  1 mg IntraMUSCular PRN    dextrose (D50W) injection syrg 12.5-25 g  25-50 mL IntraVENous PRN    0.9% sodium chloride infusion  100 mL/hr IntraVENous DIALYSIS PRN    naloxone (NARCAN) injection 0.4 mg  0.4 mg IntraVENous PRN           Objective:     Patient Vitals for the past 24 hrs:   Temp Pulse Resp BP SpO2   09/14/17 0817 - - - - 100 %   09/14/17 0736 - 84 15 - 100 %   09/14/17 0719 99.3 °F (37.4 °C) - - - -   09/14/17 0700 - 79 14 109/62 100 %   09/14/17 0600 - 81 18 120/65 100 %   09/14/17 0500 - 86 21 139/79 100 %   09/14/17 0422 98.4 °F (36.9 °C) - - - -   09/14/17 0400 98.4 °F (36.9 °C) 79 17 132/68 100 %   09/14/17 0300 - 79 17 102/57 100 %   09/14/17 0251 - 83 15 - 100 %   09/14/17 0200 - 82 17 112/63 100 %   09/14/17 0100 - 81 16 107/63 99 %   09/14/17 0000 97.8 °F (36.6 °C) 79 18 117/70 100 %   09/13/17 2349 - 77 21 - 100 %   09/13/17 2300 - 88 14 115/60 98 %   09/13/17 2100 - 76 16 124/66 100 %   09/13/17 2028 - - - - 98 %   09/13/17 2000 98.7 °F (37.1 °C) 77 17 123/66 100 %   09/13/17 1900 - 77 15 136/68 100 %   09/13/17 1705 - - - - 100 %   09/13/17 1626 98.3 °F (36.8 °C) - - - -   09/13/17 1600 - 78 20 124/70 99 %   09/13/17 1500 - 80 20 121/63 100 %   09/13/17 1400 - 80 18 142/71 100 %   09/13/17 1300 - 80 19 141/79 100 %   09/13/17 1235 98.7 °F (37.1 °C) - - - -   09/13/17 1200 - 82 17 141/74 99 %   09/13/17 1100 - 80 19 142/77 98 %   09/13/17 1000 - 85 29 128/66 100 %        Weight change:      09/12 1901 - 09/14 0700  In: 1910 [I.V.:50]  Out: 250 [Drains:250]    Intake/Output Summary (Last 24 hours) at 09/14/17 0920  Last data filed at 09/13/17 1729   Gross per 24 hour   Intake              740 ml   Output               50 ml   Net              690 ml     Physical Exam: unresponsive, afebrile    HEENT: non icteric  Neck: no JVD, trach collar in place  Cardiovascular: regular, no rub  C/L:  Equal vent breath sounds  Abdomen: soft, + BS, PEG  Ext:  Left arm AVF + bruit, no LE edema    Data Review:     LABS:   Hematology:   Recent Labs      09/13/17   0356   WBC  11.3   HGB  9.0*   HCT  27.7*   pl 461K  Chemistry:   Recent Labs      09/13/17 0356   BUN  53*   CREA  6.52*   CA  9.1   K  3.4*   NA  137   CL  103   CO2  22   GLU  217*     IPTH 67  Iron 23  Sat 15% Tashi 419      IMPRESSION AND PLAN:   ESRD no acute need for HD today, sched HD 2x a week (M F).    Hypokalemia replace per PEG repeat K today  2nd HPT low IPTH, monitor yeison phos off binders and Vit D  Anemia from CKD cont ALONDRA with HD, low Fe stores  Nutrition cont with PEG feeding  Hypotension better on Midodrine give a dose pre HD               Bobbi Ardon MD  9/14/2017

## 2017-09-14 NOTE — PROGRESS NOTES
PCCM Progress Note                                              I have reviewed the flowsheet and previous days notes. Events, vitals, medications and notes from last 24 hours reviewed. Care plan discussed with staff and on multidisciplinary rounds. Subjective:  9/14/2017   Pt is on vent via trach. No response to voice commands today. Impression and Plan  VDRF - Pt is s/p Trach. Cont trach mask as tolerated  Acute pontine CVA - Pt is on keppra, defer further mgt to neurology  Pneumonia/VAP - Sputum cx from 9/6 is growing Pseudomonas and Klebsiella. Pt is on Zosyn #8 and Cipro # 4 today. Also received Vanco for 5 days. Pt has low grade fever today. Cxr shows RLL atelectasis. Cont abx and repeat CBC in am  ESRD - on HD, F/u with renal  Anemia - on Epogen with HD, Hb is stable  Hypotension - on Midodrine  DM - Cont lantus and ISS  Anoxic encephalopathy  S/p PEA arrest   DVT and GI Proph - on heparin and Pepcid  Nutrition - Nepro at goal    OTHER:  Glycemic Control. Glucose stabilizer per ICU protocol when on insulin drip. Maintain blood glucose 140-180. Replace electrolytes per ICU electrolyte replacement protocol  HOB >=30 degree elevation all the time. Aggressive pulmonary toileting. Incentive spirometry when appropriate. Aspiration precautions. Further recommendations will be based on the patient's response to recommended treatment and results of the investigation ordered. Quality Care: Stress ulcer prophylaxis, DVT prophylaxis, HOB elevated, Infection control all reviewed and addressed. Events and notes from last 24 hours reviewed. Care plan discussed with nursing, MDR.      CC TIME: >35 min     Medication Reviewed:    Safety Bundles:Electrolyte Replacement Protocol    No Known Allergies   Past Medical History:   Diagnosis Date    Anemia     Arthritis     Chronic pain     Back     Diabetes mellitus type II     Hypertension     IBS (irritable bowel syndrome)     Lactose intolerance     TB (tuberculosis)     TB test positive      Past Surgical History:   Procedure Laterality Date    CARDIAC CATHETERIZATION  8/9/2017         CORONARY ARTERY ANGIOGRAM  8/9/2017         ENDOSCOPY, COLON, DIAGNOSTIC      HX AMPUTATION      Toe due to injury    LV ANGIOGRAPHY  8/9/2017         MODERATE SEDATION  8/9/2017           Social History   Substance Use Topics    Smoking status: Current Every Day Smoker    Smokeless tobacco: Not on file    Alcohol use Yes      History reviewed. No pertinent family history.    Prior to Admission medications    Not on File     Current Facility-Administered Medications   Medication Dose Route Frequency    insulin glargine (LANTUS) injection 15 Units  15 Units SubCUTAneous QHS    [START ON 9/15/2017] epoetin benjamin (EPOGEN;PROCRIT) injection 10,000 Units  10,000 Units IntraVENous EVERY MON & FRI    [START ON 9/15/2017] iron sucrose (VENOFER) 100 mg in 0.9% sodium chloride 100 mL IVPB  100 mg IntraVENous EVERY MON & FRI    midodrine (PROAMITINE) tablet 10 mg  10 mg Oral TID WITH MEALS    ciprofloxacin (CIPRO) 400 mg IVPB (premix)  400 mg IntraVENous Q24H    white petrolatum-mineral oil 85-15 % oint 1 Dose  1 Dose Ophthalmic Q6H    vits A and D-white pet-lanolin (A&D) ointment   Topical Q6H    piperacillin-tazobactam (ZOSYN) 2.25 g in 0.9% sodium chloride (MBP/ADV) 50 mL MBP  2.25 g IntraVENous Q8H    Piperacillin-tazobactam (ZOSYN) 0.75 gm Supplemental Dosing by Pharmacy   Other Rx Dosing/Monitoring    Zinc Ox-Aloe Vera-Vitamin E (BALMEX) topical cream   Topical CONTINUOUS    banana flakes trans-galactooligosaccharide (BANATROL PLUS) powder 1 Packet  1 Packet Per NG tube TID    levETIRAcetam (KEPPRA) 500 mg in saline (iso-osm) 100 ml IVPB  500 mg IntraVENous Q12H    insulin lispro (HUMALOG) injection   SubCUTAneous Q6H    famotidine (PF) (PEPCID) 20 mg in sodium chloride 0.9 % 10 mL injection  20 mg IntraVENous DAILY    collagenase (SANTYL) 250 unit/gram ointment Topical DAILY    lactobacillus sp. 50 billion cpu (BIO-K PLUS) capsule 1 Cap  1 Cap Oral DAILY    heparin (porcine) injection 5,000 Units  5,000 Units SubCUTAneous Q8H    heparin (porcine) 1,000 unit/mL injection 2,000 Units  2,000 Units IntraVENous DIALYSIS ONCE    simvastatin (ZOCOR) tablet 20 mg  20 mg Oral QHS    aspirin chewable tablet 81 mg  81 mg Oral DAILY       Peripheral Intravenous Line:  Peripheral IV 09/06/17 Left Hand (Active)   Site Assessment Clean, dry, & intact 9/10/2017  8:00 PM   Phlebitis Assessment 0 9/10/2017  8:00 PM   Infiltration Assessment 0 9/10/2017  8:00 PM   Dressing Status Clean, dry, & intact 9/10/2017  8:00 PM   Dressing Type Tape;Transparent 9/10/2017  8:00 PM   Hub Color/Line Status Pink 9/10/2017  8:00 PM   Action Taken Other (comment) 9/10/2017  8:00 PM   Alcohol Cap Used No 9/10/2017  8:00 PM       Peripheral IV 09/10/17 Right Wrist (Active)   Site Assessment Clean, dry, & intact 9/10/2017  8:00 PM   Phlebitis Assessment 0 9/10/2017  8:00 PM   Infiltration Assessment 0 9/10/2017  8:00 PM   Dressing Status Clean, dry, & intact 9/10/2017  8:00 PM   Dressing Type Transparent 9/10/2017  8:00 PM   Hub Color/Line Status Green;Capped;Flushed 9/10/2017  8:00 PM   Action Taken Other (comment) 9/10/2017  8:00 PM   Alcohol Cap Used No 9/10/2017  8:00 PM      Drain(s):  PEG/Gastrostomy Tube 09/08/17 (Active)   Site Assessment Dry;Bleeding 9/10/2017  8:00 PM   Dressing Status Dry; Intact 9/10/2017  8:00 PM   G Port Status Infusing 9/10/2017  8:00 PM   Action Taken Other (comment) 9/10/2017  8:00 PM   Drainage Description Tan 9/10/2017  8:00 PM   Gastric Residual (mL) 20 ml 9/10/2017  8:00 PM   Tube Feeding/Formula Options Renal (Nepro) 9/10/2017  8:00 PM   Modular Nutrients Protein liquid 9/10/2017  7:00 AM   Tube Feeding/Verify Rate (mL/hr) 50 9/10/2017  7:00 AM   Water Flush Volume (mL) 150 mL 9/11/2017  4:00 AM   Intake (ml) 55 ml 9/11/2017  6:00 AM   Medication Volume 75 ml 9/10/2017  8:00 PM       Fecal Management (Active)   Stool Consistency Semi-liquid 2017  5:00 AM   Position of Indicator Line Visible 2017  5:00 AM   Signal Indicator Bubble Appropriate 2017  5:00 AM   Skin Assessment of the Anal Area Denuded/excoriated 2017  5:00 AM   Drainage Bag Changed Not due to be changed 2017  5:00 AM   Balloon Deflated No 2017  5:00 AM   Tube Irrigated No 2017  5:00 AM   Output (ml) 120 ml 2017  2:30 PM     Airway:  Airway - Tracheostomy Tube 17 Portex (Active)   Cuff Pressure 25 cmH20 2017  7:33 PM   Site Assessment Clean, dry, & intact 2017  9:09 AM   Trach Dressing Changed Yes 9/10/2017  8:00 PM   Trach Cleaned With Normal saline 9/10/2017  8:00 PM   Trach Tie Changed No 9/10/2017  8:00 PM   Trach Cannula Changed No 9/10/2017  8:00 PM   Inner Cannula Cuffed, cuff inflated 2017  9:09 AM   Line Trung Top of the lock 2017  9:09 AM   Side Secured Centered 2017  9:09 AM   Spare Trach at Bedside Yes 2017  9:09 AM   Spare Melissa Seeds Tube One Size Smaller at Bedside Yes 2017  9:09 AM   Ambu Bag With Mask at Bedside Yes 2017  9:09 AM       Objective:  Vital Signs:    Visit Vitals    /62    Pulse 84    Temp 99.3 °F (37.4 °C)    Resp 15    Ht 6' 2\" (1.88 m)    Wt 59 kg (130 lb 1.1 oz)    SpO2 100%    BMI 16.7 kg/m2      O2 Device: Tracheal collar  O2 Flow Rate (L/min): 10 l/min  Temp (24hrs), Av.6 °F (37 °C), Min:97.8 °F (36.6 °C), Max:99.3 °F (37.4 °C)      Intake/Output:   Last shift:           Last 3 shifts:  1901 -  0700  In: 1855 [I.V.:50]  Out: 310 [Drains:310]      Intake/Output Summary (Last 24 hours) at 17 1504  Last data filed at 17 0700   Gross per 24 hour   Intake             1425 ml   Output              110 ml   Net             1315 ml       Last 3 Recorded Weights in this Encounter    17 0212 17 0710 17 0422   Weight: 62.2 kg (137 lb 3.2 oz) 59 kg (130 lb) 59 kg (130 lb 1.1 oz)       Ventilator Settings:  Mode Rate Tidal Volume Pressure FiO2 PEEP  SIMV   400 ml  7 cm H2O 30 % 5 cm H20    Peak airway pressure: 14 cm H2O   Plateau pressure:    Tidal volume:   Minute ventilation: 5.83 l/min  SPO2     ARDS network Guidelines: Lung protective strategy and Plateau pressure goals less than or equal to 30    Physical Exam:     General/Neurology: no response to voice commands  Head:   Normocephalic, without obvious abnormality  Eye:   PERRL, EOM intact, no scleral icterus, no pallor  Oral:   Mucus membranes moist  Neck:   Trach present  Lung:   B/l air entry fair  Heart:   S1 S2 present. Abdomen/: Soft. NT. ND. +BS. Extremities:  Min pedal edema  Skin:   No rashes or lesions    Data:      Recent Results (from the past 24 hour(s))   GLUCOSE, POC    Collection Time: 09/13/17  5:01 PM   Result Value Ref Range    Glucose (POC) 146 (H) 70 - 110 mg/dL   GLUCOSE, POC    Collection Time: 09/13/17 11:31 PM   Result Value Ref Range    Glucose (POC) 216 (H) 70 - 110 mg/dL   GLUCOSE, POC    Collection Time: 09/14/17  6:23 AM   Result Value Ref Range    Glucose (POC) 128 (H) 70 - 110 mg/dL   GLUCOSE, POC    Collection Time: 09/14/17 11:50 AM   Result Value Ref Range    Glucose (POC) 200 (H) 70 - 110 mg/dL   POTASSIUM    Collection Time: 09/14/17 12:07 PM   Result Value Ref Range    Potassium 4.6 3.5 - 5.5 mmol/L         Chemistry   Recent Labs      09/14/17   1207  09/13/17   0356   GLU   --   217*   NA   --   137   K  4.6  3.4*   CL   --   103   CO2   --   22   BUN   --   53*   CREA   --   6.52*   CA   --   9.1   AGAP   --   12   BUCR   --   8*         CBC w/Diff   Recent Labs      09/13/17   0356   WBC  11.3   RBC  2.89*   HGB  9.0*   HCT  27.7*   PLT  461*   GRANS  62   LYMPH  26   EOS  5       XR (Most Recent).  CXR reviewed by me and compared with previous CXR     Results from [place] encounter on 07/27/17   XR CHEST PORT   Narrative PORTABLE CHEST RADIOGRAPH CPT CODE: 80392     INDICATION: Follow-up pneumonia. COMPARISON: 9/12/2017. FINDINGS:    Tracheostomy tube appears midline. Patient is rotated. The cardiomediastinal  silhouette is unchanged. Persistent elevation of the right hemidiaphragm. Vague  patchy opacity noted along the right lung base may reflect atelectasis and/or  infiltrate, new compared to the prior exam. Left lung is clear. No overt  pneumothorax. Osseous structures are grossly intact. Impression IMPRESSION:    Interval development of vague opacity along the right lung base may reflect  atelectasis and/or infiltrate, new from prior exam.          High complexity decision making was performed during the evaluation of this patient at high risk for decompensation with multiple organ involvement     Above mentioned total time spent on reviewing the case/medical record/data/notes/EMR/patient examination/documentation/coordinating care with nurse/consultants, exclusive of procedures with complex decision making performed and > 50% time spent in face to face evaluation.     Raul Germain MD  9/14/2017

## 2017-09-14 NOTE — PROGRESS NOTES
HELADIO Mahoney LPN from [facility] . She is here to eval pt for transfer. Provided her with latest nephrology notes, CXR, CT, wound care notes, nutrition notes at her request to continue her eval.    She will contact family if pt approved & also the CM assigned to his case that day.

## 2017-09-14 NOTE — INTERDISCIPLINARY ROUNDS
CRITICAL CARE INTERDISCIPLINARY ROUNDS      Patient Information:    Name:   Christen Dakin    Age:   65 y.o.     Admission Date:   7/27/2017    Readmit Risk Assessment Information:      Readmit Risk Tool Support Systems: Family member(s), Relationship with Primary Physician Group: Seen at least one time within past 12 months    Surgery Date:      Day of Stay:     Expected Discharge Date:        Attending Provider:   Woodrow Aden MD    Surgeon:        Consultant:       Primary Care Provider:   Jaimee John MD    Problem List:     Patient Active Problem List   Diagnosis Code    Anemia D64.9    Acidosis E87.2    Cardiac arrest ([address] I46.9    Respiratory failure ([address] J96.90    ESRD needing dialysis ([facility] N18.6, Z99.2    Anoxic brain damage (HCC) G93.1    Colitis K52.9    Hypotension I95.9    Acute GI bleeding K92.2    ESRD (end stage renal disease) ([facility] N18.6    Sepsis ([facility] A41.9    Oropharyngeal dysphagia R13.12    Cachexia ([facility] R64       Principal Problem:  <principal problem not specified>    Procedure:       During rounds the following quality care indicators and evidence based practices were addressed :     DVT Prophylaxis, Pressure Injury Prevention, Pain Management, Sepsis resuscitation and management, Nutritional Status, Critical Care Interventions Airways, Dialysis, Drains and Lines and IHI Bundles: Central Line Bundle Followed , Stanton Bundle Followed and Vent Bundle Followed, Vent Day >14days           Acute MI/PCI:   Not applicable    Heart Failure:    Central Line Bundle Followed , Stanton Bundle Followed and Vent Bundle Followed, Vent Day >14days    Cardiac Surgery:  Not applicable    SCIP Measures for other Surgeries:   Not applicable    Pneumonia:    Appropriate Antibiotic Selection (ICU versus Non-ICU)    Stroke:  Patient's Personal Risk Factors for Stroke are:   hypertension, family history, hyperlipidemia or diabetes mellitus    NIH Stroke Score  Total: 8 (08/17/17 0400)    Transfer Level of Care:  Progressing to Transfer    The patient will require the following interventions based on the Readmission Risk Assessment:  Pharmacy evaluation and teaching, Care Management involvement for home health follow up for:  mobility and assistance with ADL's, Palliative Care evaluation and Spiritual Care evaluation      Discharge Management:  Group Home and Palliative Care    Anticipated Discharge Date:  3      Interdisciplinary team rounds were held  with the following team membersCare Management, Diabetes Treatment Specialist, Nursing, Nutrition, Pastoral Care, Pharmacy, Physician and Clinical Coordinator and the  patient and healthcare POA (documentation required). Plan of care discussed. See clinical pathway and/or care plan for interventions and desired outcomes. Stop ventilator at night, continuous trach collar to be maintained. Case management working on placement.

## 2017-09-14 NOTE — PROGRESS NOTES
Patient placed on trach collar 10 lpm, 30% and tolerating well. Discussed in rounds leaving patient on TC for as long as he will tolerate it and vent is now prn.

## 2017-09-15 ENCOUNTER — APPOINTMENT (OUTPATIENT)
Dept: GENERAL RADIOLOGY | Age: 57
DRG: 004 | End: 2017-09-15
Attending: PHYSICIAN ASSISTANT
Payer: MEDICARE

## 2017-09-15 ENCOUNTER — APPOINTMENT (OUTPATIENT)
Dept: CT IMAGING | Age: 57
DRG: 004 | End: 2017-09-15
Attending: INTERNAL MEDICINE
Payer: MEDICARE

## 2017-09-15 LAB
ANION GAP SERPL CALC-SCNC: 12 MMOL/L (ref 3–18)
BASOPHILS # BLD: 0 K/UL (ref 0–0.1)
BASOPHILS NFR BLD: 0 % (ref 0–2)
BUN SERPL-MCNC: 78 MG/DL (ref 7–18)
BUN/CREAT SERPL: 9 (ref 12–20)
CALCIUM SERPL-MCNC: 9.5 MG/DL (ref 8.5–10.1)
CHLORIDE SERPL-SCNC: 99 MMOL/L (ref 100–108)
CO2 SERPL-SCNC: 21 MMOL/L (ref 21–32)
CREAT SERPL-MCNC: 8.4 MG/DL (ref 0.6–1.3)
DIFFERENTIAL METHOD BLD: ABNORMAL
EOSINOPHIL # BLD: 0.7 K/UL (ref 0–0.4)
EOSINOPHIL NFR BLD: 5 % (ref 0–5)
ERYTHROCYTE [DISTWIDTH] IN BLOOD BY AUTOMATED COUNT: 16.6 % (ref 11.6–14.5)
GLUCOSE BLD STRIP.AUTO-MCNC: 145 MG/DL (ref 70–110)
GLUCOSE BLD STRIP.AUTO-MCNC: 156 MG/DL (ref 70–110)
GLUCOSE BLD STRIP.AUTO-MCNC: 209 MG/DL (ref 70–110)
GLUCOSE BLD STRIP.AUTO-MCNC: 227 MG/DL (ref 70–110)
GLUCOSE BLD STRIP.AUTO-MCNC: 88 MG/DL (ref 70–110)
GLUCOSE SERPL-MCNC: 112 MG/DL (ref 74–99)
HCT VFR BLD AUTO: 32.9 % (ref 36–48)
HGB BLD-MCNC: 10.8 G/DL (ref 13–16)
LACTATE SERPL-SCNC: 0.4 MMOL/L (ref 0.4–2)
LYMPHOCYTES # BLD: 3.7 K/UL (ref 0.9–3.6)
LYMPHOCYTES NFR BLD: 25 % (ref 21–52)
MCH RBC QN AUTO: 31.3 PG (ref 24–34)
MCHC RBC AUTO-ENTMCNC: 32.8 G/DL (ref 31–37)
MCV RBC AUTO: 95.4 FL (ref 74–97)
MONOCYTES # BLD: 0.9 K/UL (ref 0.05–1.2)
MONOCYTES NFR BLD: 6 % (ref 3–10)
NEUTS SEG # BLD: 9.6 K/UL (ref 1.8–8)
NEUTS SEG NFR BLD: 64 % (ref 40–73)
PLATELET # BLD AUTO: 540 K/UL (ref 135–420)
PMV BLD AUTO: 9.9 FL (ref 9.2–11.8)
POTASSIUM SERPL-SCNC: 4.7 MMOL/L (ref 3.5–5.5)
RBC # BLD AUTO: 3.45 M/UL (ref 4.7–5.5)
SODIUM SERPL-SCNC: 132 MMOL/L (ref 136–145)
WBC # BLD AUTO: 15 K/UL (ref 4.6–13.2)

## 2017-09-15 PROCEDURE — 74011636637 HC RX REV CODE- 636/637: Performed by: INTERNAL MEDICINE

## 2017-09-15 PROCEDURE — 74011250637 HC RX REV CODE- 250/637: Performed by: HOSPITALIST

## 2017-09-15 PROCEDURE — 71010 XR CHEST PORT: CPT

## 2017-09-15 PROCEDURE — P9045 ALBUMIN (HUMAN), 5%, 250 ML: HCPCS | Performed by: INTERNAL MEDICINE

## 2017-09-15 PROCEDURE — 74011250636 HC RX REV CODE- 250/636: Performed by: PHYSICIAN ASSISTANT

## 2017-09-15 PROCEDURE — 77030033263 HC DRSG MEPILEX 16-48IN BORD MOLN -B

## 2017-09-15 PROCEDURE — 74011000250 HC RX REV CODE- 250: Performed by: PHYSICIAN ASSISTANT

## 2017-09-15 PROCEDURE — 74011000258 HC RX REV CODE- 258: Performed by: INTERNAL MEDICINE

## 2017-09-15 PROCEDURE — 87070 CULTURE OTHR SPECIMN AEROBIC: CPT | Performed by: PHYSICIAN ASSISTANT

## 2017-09-15 PROCEDURE — 74011250637 HC RX REV CODE- 250/637: Performed by: INTERNAL MEDICINE

## 2017-09-15 PROCEDURE — 74011000250 HC RX REV CODE- 250: Performed by: INTERNAL MEDICINE

## 2017-09-15 PROCEDURE — 82962 GLUCOSE BLOOD TEST: CPT

## 2017-09-15 PROCEDURE — 71250 CT THORAX DX C-: CPT

## 2017-09-15 PROCEDURE — 94640 AIRWAY INHALATION TREATMENT: CPT

## 2017-09-15 PROCEDURE — 85025 COMPLETE CBC W/AUTO DIFF WBC: CPT | Performed by: INTERNAL MEDICINE

## 2017-09-15 PROCEDURE — 74011250636 HC RX REV CODE- 250/636: Performed by: INTERNAL MEDICINE

## 2017-09-15 PROCEDURE — 74011000258 HC RX REV CODE- 258: Performed by: PHYSICIAN ASSISTANT

## 2017-09-15 PROCEDURE — 36415 COLL VENOUS BLD VENIPUNCTURE: CPT | Performed by: INTERNAL MEDICINE

## 2017-09-15 PROCEDURE — 77030025757

## 2017-09-15 PROCEDURE — 87077 CULTURE AEROBIC IDENTIFY: CPT | Performed by: PHYSICIAN ASSISTANT

## 2017-09-15 PROCEDURE — 74011636637 HC RX REV CODE- 636/637: Performed by: PHYSICIAN ASSISTANT

## 2017-09-15 PROCEDURE — 74011250636 HC RX REV CODE- 250/636: Performed by: HOSPITALIST

## 2017-09-15 PROCEDURE — 77030011256 HC DRSG MEPILEX <16IN NO BORD MOLN -A

## 2017-09-15 PROCEDURE — 65610000006 HC RM INTENSIVE CARE

## 2017-09-15 PROCEDURE — 87040 BLOOD CULTURE FOR BACTERIA: CPT | Performed by: PHYSICIAN ASSISTANT

## 2017-09-15 PROCEDURE — 87186 SC STD MICRODIL/AGAR DIL: CPT | Performed by: PHYSICIAN ASSISTANT

## 2017-09-15 PROCEDURE — 80048 BASIC METABOLIC PNL TOTAL CA: CPT | Performed by: INTERNAL MEDICINE

## 2017-09-15 PROCEDURE — 83605 ASSAY OF LACTIC ACID: CPT | Performed by: PHYSICIAN ASSISTANT

## 2017-09-15 RX ORDER — IPRATROPIUM BROMIDE AND ALBUTEROL SULFATE 2.5; .5 MG/3ML; MG/3ML
3 SOLUTION RESPIRATORY (INHALATION)
Status: DISCONTINUED | OUTPATIENT
Start: 2017-09-15 | End: 2017-09-22 | Stop reason: HOSPADM

## 2017-09-15 RX ORDER — SODIUM CHLORIDE 9 MG/ML
1000 INJECTION, SOLUTION INTRAVENOUS ONCE
Status: COMPLETED | OUTPATIENT
Start: 2017-09-15 | End: 2017-09-15

## 2017-09-15 RX ORDER — SODIUM CHLORIDE 9 MG/ML
500 INJECTION, SOLUTION INTRAVENOUS ONCE
Status: COMPLETED | OUTPATIENT
Start: 2017-09-15 | End: 2017-09-15

## 2017-09-15 RX ORDER — ALBUMIN HUMAN 50 G/1000ML
12.5 SOLUTION INTRAVENOUS ONCE
Status: COMPLETED | OUTPATIENT
Start: 2017-09-15 | End: 2017-09-15

## 2017-09-15 RX ADMIN — MINERAL OIL AND WHITE PETROLATUM 1 DOSE: 150; 850 OINTMENT OPHTHALMIC at 00:48

## 2017-09-15 RX ADMIN — MINERAL OIL AND WHITE PETROLATUM 1 DOSE: 150; 850 OINTMENT OPHTHALMIC at 23:04

## 2017-09-15 RX ADMIN — HEPARIN SODIUM 5000 UNITS: 5000 INJECTION, SOLUTION INTRAVENOUS; SUBCUTANEOUS at 18:00

## 2017-09-15 RX ADMIN — FAMOTIDINE 20 MG: 10 INJECTION INTRAVENOUS at 09:53

## 2017-09-15 RX ADMIN — VITAMIN A AND VITAMIN D: 929.3 OINTMENT TOPICAL at 12:00

## 2017-09-15 RX ADMIN — MIDODRINE HYDROCHLORIDE 10 MG: 2.5 TABLET ORAL at 17:30

## 2017-09-15 RX ADMIN — MINERAL OIL AND WHITE PETROLATUM 1 DOSE: 150; 850 OINTMENT OPHTHALMIC at 17:29

## 2017-09-15 RX ADMIN — ACETAMINOPHEN 650 MG: 325 TABLET ORAL at 07:28

## 2017-09-15 RX ADMIN — MIDODRINE HYDROCHLORIDE 10 MG: 2.5 TABLET ORAL at 12:34

## 2017-09-15 RX ADMIN — INSULIN LISPRO 6 UNITS: 100 INJECTION, SOLUTION INTRAVENOUS; SUBCUTANEOUS at 00:59

## 2017-09-15 RX ADMIN — COLLAGENASE SANTYL: 250 OINTMENT TOPICAL at 17:18

## 2017-09-15 RX ADMIN — LEVETIRACETAM 500 MG: 5 INJECTION INTRAVENOUS at 09:53

## 2017-09-15 RX ADMIN — ALBUMIN (HUMAN) 12.5 G: 12.5 INJECTION, SOLUTION INTRAVENOUS at 17:18

## 2017-09-15 RX ADMIN — MIDODRINE HYDROCHLORIDE 10 MG: 2.5 TABLET ORAL at 09:52

## 2017-09-15 RX ADMIN — INSULIN GLARGINE 15 UNITS: 100 INJECTION, SOLUTION SUBCUTANEOUS at 22:50

## 2017-09-15 RX ADMIN — SODIUM CHLORIDE 1000 ML: 900 INJECTION, SOLUTION INTRAVENOUS at 12:34

## 2017-09-15 RX ADMIN — MEROPENEM 500 MG: 500 INJECTION, POWDER, FOR SOLUTION INTRAVENOUS at 17:20

## 2017-09-15 RX ADMIN — VITAMIN A AND VITAMIN D: 929.3 OINTMENT TOPICAL at 18:00

## 2017-09-15 RX ADMIN — LOPERAMIDE HYDROCHLORIDE 2 MG: 2 CAPSULE ORAL at 20:32

## 2017-09-15 RX ADMIN — VITAMIN A AND VITAMIN D: 929.3 OINTMENT TOPICAL at 07:00

## 2017-09-15 RX ADMIN — ASPIRIN 81 MG 81 MG: 81 TABLET ORAL at 09:52

## 2017-09-15 RX ADMIN — Medication 1 PACKET: at 17:18

## 2017-09-15 RX ADMIN — Medication 1 PACKET: at 22:48

## 2017-09-15 RX ADMIN — LEVETIRACETAM 500 MG: 5 INJECTION INTRAVENOUS at 20:29

## 2017-09-15 RX ADMIN — INSULIN LISPRO 3 UNITS: 100 INJECTION, SOLUTION INTRAVENOUS; SUBCUTANEOUS at 12:34

## 2017-09-15 RX ADMIN — IPRATROPIUM BROMIDE AND ALBUTEROL SULFATE 3 ML: 2.5; .5 SOLUTION RESPIRATORY (INHALATION) at 21:58

## 2017-09-15 RX ADMIN — CIPROFLOXACIN 400 MG: 2 INJECTION, SOLUTION INTRAVENOUS at 12:34

## 2017-09-15 RX ADMIN — MINERAL OIL AND WHITE PETROLATUM 1 DOSE: 150; 850 OINTMENT OPHTHALMIC at 12:34

## 2017-09-15 RX ADMIN — IPRATROPIUM BROMIDE AND ALBUTEROL SULFATE 3 ML: 2.5; .5 SOLUTION RESPIRATORY (INHALATION) at 16:47

## 2017-09-15 RX ADMIN — SODIUM CHLORIDE 500 ML: 900 INJECTION, SOLUTION INTRAVENOUS at 11:17

## 2017-09-15 RX ADMIN — PIPERACILLIN AND TAZOBACTAM 2.25 G: 2; .25 INJECTION, POWDER, FOR SOLUTION INTRAVENOUS at 03:48

## 2017-09-15 RX ADMIN — VITAMIN A AND VITAMIN D: 929.3 OINTMENT TOPICAL at 00:48

## 2017-09-15 RX ADMIN — Medication 1 CAPSULE: at 09:52

## 2017-09-15 RX ADMIN — SODIUM CHLORIDE 500 ML: 900 INJECTION, SOLUTION INTRAVENOUS at 12:00

## 2017-09-15 RX ADMIN — HEPARIN SODIUM 5000 UNITS: 5000 INJECTION, SOLUTION INTRAVENOUS; SUBCUTANEOUS at 03:48

## 2017-09-15 RX ADMIN — MINERAL OIL AND WHITE PETROLATUM 1 DOSE: 150; 850 OINTMENT OPHTHALMIC at 06:00

## 2017-09-15 RX ADMIN — SIMVASTATIN 20 MG: 10 TABLET, FILM COATED ORAL at 22:49

## 2017-09-15 RX ADMIN — PIPERACILLIN AND TAZOBACTAM 2.25 G: 2; .25 INJECTION, POWDER, FOR SOLUTION INTRAVENOUS at 10:16

## 2017-09-15 RX ADMIN — DEXTROSE MONOHYDRATE 2 MCG/MIN: 5 INJECTION, SOLUTION INTRAVENOUS at 14:56

## 2017-09-15 RX ADMIN — HEPARIN SODIUM 5000 UNITS: 5000 INJECTION, SOLUTION INTRAVENOUS; SUBCUTANEOUS at 09:52

## 2017-09-15 RX ADMIN — Medication 1 PACKET: at 09:00

## 2017-09-15 NOTE — PROGRESS NOTES
PCCM Progress Note                                              I have reviewed the flowsheet and previous days notes. Events, vitals, medications and notes from last 24 hours reviewed. Care plan discussed with staff and on multidisciplinary rounds. Subjective:  9/15/2017   Pt is on vent via trach. No response to voice commands today. Pt placed back on vent today as he has an episode of hypotension today. Impression and Plan  VDRF - Pt is s/p Trach. Cont with vent today, try trach collar trial again in am as tolerated  Hypotension - This has responded to IVF bolus. Pneumonia/VAP -  Pt is febrile and his WBC count went up today. Will panculture again today. LA is only 0.4. Sputum cx from 9/6 is growing Pseudomonas and Klebsiella. Pt received Zosyn #9 and Cipro # 5 days. ID consulted today. They have ordered a CT chest to evaluate further. Abx changed to [address] Defer further mgt to ID  Acute pontine CVA - Pt is on keppra, defer further mgt to neurology  ESRD - on HD, F/u with renal  Anemia - on Epogen with HD, Hb is stable  DM - Cont lantus and ISS  Anoxic encephalopathy  S/p PEA arrest   DVT and GI Proph - on heparin and Pepcid  Nutrition - Nepro at goal    OTHER:  Glycemic Control. Glucose stabilizer per ICU protocol when on insulin drip. Maintain blood glucose 140-180. Replace electrolytes per ICU electrolyte replacement protocol  HOB >=30 degree elevation all the time. Aggressive pulmonary toileting. Incentive spirometry when appropriate. Aspiration precautions. Further recommendations will be based on the patient's response to recommended treatment and results of the investigation ordered. Quality Care: Stress ulcer prophylaxis, DVT prophylaxis, HOB elevated, Infection control all reviewed and addressed. Events and notes from last 24 hours reviewed. Care plan discussed with nursing, MDR.      CC TIME: >40 min     Medication Reviewed:    Safety Bundles:Electrolyte Replacement Protocol    No Known Allergies   Past Medical History:   Diagnosis Date    Anemia     Arthritis     Chronic pain     Back     Diabetes mellitus type II     Hypertension     IBS (irritable bowel syndrome)     Lactose intolerance     TB (tuberculosis)     TB test positive      Past Surgical History:   Procedure Laterality Date    CARDIAC CATHETERIZATION  8/9/2017         CORONARY ARTERY ANGIOGRAM  8/9/2017         ENDOSCOPY, COLON, DIAGNOSTIC      HX AMPUTATION      Toe due to injury    LV ANGIOGRAPHY  8/9/2017         MODERATE SEDATION  8/9/2017           Social History   Substance Use Topics    Smoking status: Current Every Day Smoker    Smokeless tobacco: Not on file    Alcohol use Yes      History reviewed. No pertinent family history.    Prior to Admission medications    Not on File     Current Facility-Administered Medications   Medication Dose Route Frequency    NOREPINephrine (LEVOPHED) 16,000 mcg in dextrose 5% 250 mL infusion  2-30 mcg/min IntraVENous TITRATE    meropenem (MERREM) 500 mg in 0.9% sodium chloride (MBP/ADV) 50 mL MBP  0.5 g IntraVENous Q24H    insulin glargine (LANTUS) injection 15 Units  15 Units SubCUTAneous QHS    epoetin benjamin (EPOGEN;PROCRIT) injection 10,000 Units  10,000 Units IntraVENous EVERY MON & FRI    iron sucrose (VENOFER) 100 mg in 0.9% sodium chloride 100 mL IVPB  100 mg IntraVENous EVERY MON & FRI    midodrine (PROAMITINE) tablet 10 mg  10 mg Oral TID WITH MEALS    white petrolatum-mineral oil 85-15 % oint 1 Dose  1 Dose Ophthalmic Q6H    vits A and D-white pet-lanolin (A&D) ointment   Topical Q6H    Zinc Ox-Aloe Vera-Vitamin E (BALMEX) topical cream   Topical CONTINUOUS    banana flakes trans-galactooligosaccharide (BANATROL PLUS) powder 1 Packet  1 Packet Per NG tube TID    levETIRAcetam (KEPPRA) 500 mg in saline (iso-osm) 100 ml IVPB  500 mg IntraVENous Q12H    insulin lispro (HUMALOG) injection   SubCUTAneous Q6H    famotidine (PF) (PEPCID) 20 mg in sodium chloride 0.9 % 10 mL injection  20 mg IntraVENous DAILY    collagenase (SANTYL) 250 unit/gram ointment   Topical DAILY    lactobacillus sp. 50 billion cpu (BIO-K PLUS) capsule 1 Cap  1 Cap Oral DAILY    heparin (porcine) injection 5,000 Units  5,000 Units SubCUTAneous Q8H    heparin (porcine) 1,000 unit/mL injection 2,000 Units  2,000 Units IntraVENous DIALYSIS ONCE    simvastatin (ZOCOR) tablet 20 mg  20 mg Oral QHS    aspirin chewable tablet 81 mg  81 mg Oral DAILY       Peripheral Intravenous Line:  Peripheral IV 09/06/17 Left Hand (Active)   Site Assessment Clean, dry, & intact 9/10/2017  8:00 PM   Phlebitis Assessment 0 9/10/2017  8:00 PM   Infiltration Assessment 0 9/10/2017  8:00 PM   Dressing Status Clean, dry, & intact 9/10/2017  8:00 PM   Dressing Type Tape;Transparent 9/10/2017  8:00 PM   Hub Color/Line Status Pink 9/10/2017  8:00 PM   Action Taken Other (comment) 9/10/2017  8:00 PM   Alcohol Cap Used No 9/10/2017  8:00 PM       Peripheral IV 09/10/17 Right Wrist (Active)   Site Assessment Clean, dry, & intact 9/10/2017  8:00 PM   Phlebitis Assessment 0 9/10/2017  8:00 PM   Infiltration Assessment 0 9/10/2017  8:00 PM   Dressing Status Clean, dry, & intact 9/10/2017  8:00 PM   Dressing Type Transparent 9/10/2017  8:00 PM   Hub Color/Line Status Green;Capped;Flushed 9/10/2017  8:00 PM   Action Taken Other (comment) 9/10/2017  8:00 PM   Alcohol Cap Used No 9/10/2017  8:00 PM      Drain(s):  PEG/Gastrostomy Tube 09/08/17 (Active)   Site Assessment Dry;Bleeding 9/10/2017  8:00 PM   Dressing Status Dry; Intact 9/10/2017  8:00 PM   G Port Status Infusing 9/10/2017  8:00 PM   Action Taken Other (comment) 9/10/2017  8:00 PM   Drainage Description Tan 9/10/2017  8:00 PM   Gastric Residual (mL) 20 ml 9/10/2017  8:00 PM   Tube Feeding/Formula Options Renal (Nepro) 9/10/2017  8:00 PM   Modular Nutrients Protein liquid 9/10/2017  7:00 AM   Tube Feeding/Verify Rate (mL/hr) 50 9/10/2017  7:00 AM   Water Flush Volume (mL) 150 mL 2017  4:00 AM   Intake (ml) 55 ml 2017  6:00 AM   Medication Volume 75 ml 9/10/2017  8:00 PM       Fecal Management (Active)   Stool Consistency Semi-liquid 2017  5:00 AM   Position of Indicator Line Visible 2017  5:00 AM   Signal Indicator Bubble Appropriate 2017  5:00 AM   Skin Assessment of the Anal Area Denuded/excoriated 2017  5:00 AM   Drainage Bag Changed Not due to be changed 2017  5:00 AM   Balloon Deflated No 2017  5:00 AM   Tube Irrigated No 2017  5:00 AM   Output (ml) 120 ml 2017  2:30 PM     Airway:  Airway - Tracheostomy Tube 17 Portex (Active)   Cuff Pressure 25 cmH20 2017  7:33 PM   Site Assessment Clean, dry, & intact 2017  9:09 AM   Trach Dressing Changed Yes 9/10/2017  8:00 PM   Trach Cleaned With Normal saline 9/10/2017  8:00 PM   Trach Tie Changed No 9/10/2017  8:00 PM   Trach Cannula Changed No 9/10/2017  8:00 PM   Inner Cannula Cuffed, cuff inflated 2017  9:09 AM   Line Trung Top of the lock 2017  9:09 AM   Side Secured Centered 2017  9:09 AM   Spare Trach at Bedside Yes 2017  9:09 AM   Spare Rohan Blush Tube One Size Smaller at Bedside Yes 2017  9:09 AM   Ambu Bag With Mask at Bedside Yes 2017  9:09 AM       Objective:  Vital Signs:    Visit Vitals    /58    Pulse 89    Temp 99.4 °F (37.4 °C)    Resp 20    Ht 6' 2\" (1.88 m)    Wt 62 kg (136 lb 11 oz)    SpO2 100%    BMI 17.55 kg/m2      O2 Device: Tracheal collar  O2 Flow Rate (L/min): 9 l/min  Temp (24hrs), Av.7 °F (37.1 °C), Min:97.6 °F (36.4 °C), Max:101.1 °F (38.4 °C)      Intake/Output:   Last shift:           Last 3 shifts: 1901 - 09/15 0700  In: 2305   Out: 250 [Drains:250]      Intake/Output Summary (Last 24 hours) at 09/15/17 1417  Last data filed at 09/15/17 0700   Gross per 24 hour   Intake             1195 ml   Output              190 ml   Net             1005 ml       Last 3 Recorded Weights in this Encounter    09/13/17 0710 09/14/17 0422 09/15/17 0500   Weight: 59 kg (130 lb) 59 kg (130 lb 1.1 oz) 62 kg (136 lb 11 oz)       Ventilator Settings:  Mode Rate Tidal Volume Pressure FiO2 PEEP  Assist control, VC+   400 ml  7 cm H2O 28 % 5 cm H20    Peak airway pressure: 13 cm H2O   Plateau pressure:    Tidal volume:   Minute ventilation: 7.05 l/min  SPO2     ARDS network Guidelines: Lung protective strategy and Plateau pressure goals less than or equal to 30    Physical Exam:     General/Neurology: no response to voice commands  Head:   Normocephalic, without obvious abnormality  Eye:   no scleral icterus, no pallor  Oral:   Mucus membranes moist  Neck:   Trach present  Lung:   Rt base rales present  Heart:   S1 S2 present. Abdomen/: Soft.     Extremities:  Min pedal edema  Skin:   No rashes or lesions    Data:      Recent Results (from the past 24 hour(s))   GLUCOSE, POC    Collection Time: 09/14/17  3:48 PM   Result Value Ref Range    Glucose (POC) 181 (H) 70 - 110 mg/dL   GLUCOSE, POC    Collection Time: 09/15/17 12:26 AM   Result Value Ref Range    Glucose (POC) 227 (H) 70 - 572 mg/dL   METABOLIC PANEL, BASIC    Collection Time: 09/15/17  4:02 AM   Result Value Ref Range    Sodium 132 (L) 136 - 145 mmol/L    Potassium 4.7 3.5 - 5.5 mmol/L    Chloride 99 (L) 100 - 108 mmol/L    CO2 21 21 - 32 mmol/L    Anion gap 12 3.0 - 18 mmol/L    Glucose 112 (H) 74 - 99 mg/dL    BUN 78 (H) 7.0 - 18 MG/DL    Creatinine 8.40 (H) 0.6 - 1.3 MG/DL    BUN/Creatinine ratio 9 (L) 12 - 20      GFR est AA 8 (L) >60 ml/min/1.73m2    GFR est non-AA 7 (L) >60 ml/min/1.73m2    Calcium 9.5 8.5 - 10.1 MG/DL   CBC WITH AUTOMATED DIFF    Collection Time: 09/15/17  4:02 AM   Result Value Ref Range    WBC 15.0 (H) 4.6 - 13.2 K/uL    RBC 3.45 (L) 4.70 - 5.50 M/uL    HGB 10.8 (L) 13.0 - 16.0 g/dL    HCT 32.9 (L) 36.0 - 48.0 %    MCV 95.4 74.0 - 97.0 FL    MCH 31.3 24.0 - 34.0 PG    MCHC 32.8 31.0 - 37.0 g/dL    RDW 16.6 (H) 11.6 - 14.5 % PLATELET 280 (H) 018 - 420 K/uL    MPV 9.9 9.2 - 11.8 FL    NEUTROPHILS 64 40 - 73 %    LYMPHOCYTES 25 21 - 52 %    MONOCYTES 6 3 - 10 %    EOSINOPHILS 5 0 - 5 %    BASOPHILS 0 0 - 2 %    ABS. NEUTROPHILS 9.6 (H) 1.8 - 8.0 K/UL    ABS. LYMPHOCYTES 3.7 (H) 0.9 - 3.6 K/UL    ABS. MONOCYTES 0.9 0.05 - 1.2 K/UL    ABS. EOSINOPHILS 0.7 (H) 0.0 - 0.4 K/UL    ABS. BASOPHILS 0.0 0.0 - 0.1 K/UL    DF AUTOMATED     GLUCOSE, POC    Collection Time: 09/15/17  6:48 AM   Result Value Ref Range    Glucose (POC) 88 70 - 110 mg/dL   CULTURE, RESPIRATORY/SPUTUM/BRONCH W GRAM STAIN    Collection Time: 09/15/17 10:00 AM   Result Value Ref Range    Special Requests: NO SPECIAL REQUESTS      GRAM STAIN >25 WBC/lpf      GRAM STAIN 10-25 EPI/lpf      GRAM STAIN MUCUS PRESENT      GRAM STAIN NO ORGANISMS SEEN      Culture result: PENDING    LACTIC ACID    Collection Time: 09/15/17 11:27 AM   Result Value Ref Range    Lactic acid 0.4 0.4 - 2.0 MMOL/L   GLUCOSE, POC    Collection Time: 09/15/17 12:33 PM   Result Value Ref Range    Glucose (POC) 156 (H) 70 - 110 mg/dL         Chemistry   Recent Labs      09/15/17   0402  09/14/17   1207  09/13/17   0356   GLU  112*   --   217*   NA  132*   --   137   K  4.7  4.6  3.4*   CL  99*   --   103   CO2  21   --   22   BUN  78*   --   53*   CREA  8.40*   --   6.52*   CA  9.5   --   9.1   AGAP  12   --   12   BUCR  9*   --   8*         CBC w/Diff   Recent Labs      09/15/17   0402  09/13/17   0356   WBC  15.0*  11.3   RBC  3.45*  2.89*   HGB  10.8*  9.0*   HCT  32.9*  27.7*   PLT  540*  461*   GRANS  64  62   LYMPH  25  26   EOS  5  5       XR (Most Recent). CXR reviewed by me and compared with previous CXR     Results from Hospital Encounter encounter on 07/27/17   XR CHEST PORT   Narrative EXAM:  XR CHEST PORT    INDICATION:  fever    COMPARISON: September 14, 2017. FINDINGS: A single frontal view of the chest was obtained.      There is interval development of a large right pleural opacity consistent with  pleural effusion. Underlying infiltrate and atelectasis of the right lung base  is suspected. Left lung is clear. Left costophrenic angle is sharp. Impression IMPRESSION:    1. New large right pleural effusion. Underlying right basilar infiltrate or  pleural effusion suspected. High complexity decision making was performed during the evaluation of this patient at high risk for decompensation with multiple organ involvement     Above mentioned total time spent on reviewing the case/medical record/data/notes/EMR/patient examination/documentation/coordinating care with nurse/consultants, exclusive of procedures with complex decision making performed and > 50% time spent in face to face evaluation.     Jonathan Smith MD  9/15/2017

## 2017-09-15 NOTE — PROGRESS NOTES
Respiratory Care Assessment for Bronchial hygiene or Lung Expansion Therapy  Patient  Jada Napier     65 y.o.   male     9/15/2017  5:00 PM  Patient Active Problem List   Diagnosis Code    Anemia D64.9    Acidosis E87.2    Cardiac arrest ([facility] [address] I46.9    Respiratory failure ([address] J96.90    ESRD needing dialysis ([facility] [address] N18.6, Z99.2    Anoxic brain damage ([facility] [address] G93.1    Colitis K52.9    Hypotension I95.9    Acute GI bleeding K92.2    ESRD (end stage renal disease) ([facility] [address] N18.6    Sepsis ([facility] [address] A41.9    Oropharyngeal dysphagia R13.12    Cachexia ([facility] [address] R64       ABG:  Date:9/15/2017  No results found for: PH, PHI, PCO2, PCO2I, PO2, PO2I, HCO3, HCO3I, FIO2, FIO2I    Chest X-ray:  Date:9/15/2017    Results from Hospital Encounter encounter on 07/27/17   XR CHEST PORT   Narrative EXAM:  XR CHEST PORT    INDICATION:  fever    COMPARISON: September 14, 2017. FINDINGS: A single frontal view of the chest was obtained. There is interval development of a large right pleural opacity consistent with  pleural effusion. Underlying infiltrate and atelectasis of the right lung base  is suspected. Left lung is clear. Left costophrenic angle is sharp. Impression IMPRESSION:    1. New large right pleural effusion. Underlying right basilar infiltrate or  pleural effusion suspected.               Lab Test:  Date:9/15/2017  WBC:   Lab Results   Component Value Date/Time    WBC 15.0 09/15/2017 04:02 AM   HGB: Lab Results   Component Value Date/Time    HGB 10.8 09/15/2017 04:02 AM    PLTS: Lab Results   Component Value Date/Time    PLATELET 344 30/77/1173 04:02 AM       SaO2%/flow:   SpO2 Readings from Last 1 Encounters:   09/15/17 100%       Vital Signs:   Patient Vitals for the past 8 hrs:   Temp Pulse Resp BP SpO2   09/15/17 1647 - 86 16 - 100 %   09/15/17 1600 97.1 °F (36.2 °C) - - - -   09/15/17 1315 - 89 20 110/58 100 %   09/15/17 1300 - 89 21 96/52 99 %   09/15/17 1230 - 88 21 92/51 99 %   09/15/17 1203 99.4 °F (37.4 °C) 92 20 - 99 %   09/15/17 1200 - 91 20 (!) 81/48 99 %   09/15/17 1130 - 90 20 (!) 80/44 100 %   09/15/17 1100 - 93 21 (!) 72/45 99 %   09/15/17 1000 98.7 °F (37.1 °C) 99 27 101/46 100 %   09/15/17 0930 - 96 21 (!) 80/42 100 %         RA/O2 flow/device:28% on Ventilatoer       First Inital Assesment:     []Yes [x]No   Reevaluation/Reassessment:    [x]Yes []No     CHART REVIEW   Points 0 X 1 X 2 X 3 X 4 X Points   Pulmonary History Smoking History (1) none  Recent Smoking History <1 PPD  Recent Smoking History >1 PPD  Pulmonary Disease or Impairment  Severe Pulmonary Disease  0   Surgical History No Surgery  General Surgery  Lower Abdominal  Thoracic or Upper Abdominal  Thoracic & Pulmonary Disease  0   CXR Clear or not indicated  Chronic changes or CXR Pending  Infiltrate, atelectasis or pleural effusions  Infiltrates in more than 1lobe  Infiltrates +atelectasis  +/or pleural effusions  2     PATIENT ASSESSMENT    0 X 1 X 2 X 3 X 4 X Points   Respiratory Pattern Regular pattern RR 12-20  Increased RR 21-27   Mild Dyspnea at rest, irregular pattern RR 28-32  Moderate Dyspnea at rest, Use of accessory muscles, RR 33-36  Severe Dyspnea, Use of accessory muscles RR >36  0   Mental Status Alert Oriented cooperative  Confused, Follows commands  Lethargic, Does not follow commands  Obtunded  Unresponsive  4   Breath Sounds Clear  Decreased Unilaterally  Decreased Bilaterally  Mild Scattered wheezing or Crackles in bases  Severe Wheezing or rhonchi  2   Cough Strong dry NPC  Strong Productive  Weak NPC  Weak productive or weak with rhonchi  No cough or may require suctioning  1   Level of Activity Ambulatory  Ambulatory with assistance  Temporarily Non-ambulatory  Non-Ambulatory, able to position self  Unable to position self, confined to bed  4   Total Points/Score:   13     Specific Intervention Chart()    Bronchial Hygiene/Secretion Clearance:    []EZPAP []Rotation bed with vibration    []CPT with percussor []CPT via vest   []Oscillastiang positive pressure expiratory device      Lung Expansion:    []Incentive Spirometer w/RT visits []Incentive Spirometer w/nursing    []EZPAP      *Suctioning:    []Nasal Tracheal []Tracheal     *suctioning will be ordered and done PRN with an associated frequency such as QID/PRN based on score       Other:    Care Plan   Level # Score Modality Frequency Comment   Level 1 >17      Level 2 14-17      Level 3 10-13 CPT bid    Level 4 1-9        BRONCHIAL HYGIENE SCORING AND FREQUENCY GUIDELINES   Frequency Indications/Findings Level #   Q4 ATC Copious secretions, SOB, unable to sleep 1   QID & PRN at night Moderate amounts of secretions 2   TID or Q6 wa Small amounts of secretions and poor cough: recent history of secretions 3   BID or Q8 wa Unable to deep breathe and cough effectively 4        Comments:  CPT via bed bid    Respiratory Therapist: Eunice Astudillo, RT

## 2017-09-15 NOTE — PROGRESS NOTES
[facility] Hospitalist Group  Progress Note    Patient: Ashok Momin Age: 65 y.o. : 1960 MR#: [id number] SSN: [id number]  Date: 9/15/2017     Subjective:     Appears comfortable. .     Assessment/Plan:   -PEA arrest with acute hypoxic resp failure - maintain vent support. Pt s/p trach.  -Acute metabolic and anoxic encephalopathy - due to above. Supportive care. -Pneumonia/VAP, sputum culture + Pseudamonas and klebsiella from . Management per ID.  - Acute pontine CVA - ASA. - ESRD c HD - as scheduled. - DM - basal/bolus insulin. - Unstageable pressure ulcer to sacrum/coccyx - wound care. - Hypotension-on midodrine  - Is s/p PEG/trach. Additional Notes:      Case discussed with:  []Patient  []Family  [x]Nursing  []Case Management  DVT Prophylaxis:  []Lovenox  []Hep SQ  []SCDs  []Coumadin   []On Heparin gtt    Objective:   VS:   Visit Vitals    /58    Pulse 89    Temp 99.4 °F (37.4 °C)    Resp 20    Ht 6' 2\" (1.88 m)    Wt 62 kg (136 lb 11 oz)    SpO2 100%    BMI 17.55 kg/m2      Tmax/24hrs: Temp (24hrs), Av.7 °F (37.1 °C), Min:97.6 °F (36.4 °C), Max:101.1 °F (38.4 °C)      Intake/Output Summary (Last 24 hours) at 09/15/17 1556  Last data filed at 09/15/17 0700   Gross per 24 hour   Intake             1195 ml   Output              190 ml   Net             1005 ml       General:  Eyes open. NAD. Does not respond to verbal/tactile stim. Cardiovascular:  RRR. Pulmonary:  CTA B ant with some coarse, scattered ronchi. GI:  Soft, ND, NABS. PEG site c/d/i. Extremities:  No edema noted.    Additional:      Labs:    Recent Results (from the past 24 hour(s))   GLUCOSE, POC    Collection Time: 09/15/17 12:26 AM   Result Value Ref Range    Glucose (POC) 227 (H) 70 - 103 mg/dL   METABOLIC PANEL, BASIC    Collection Time: 09/15/17  4:02 AM   Result Value Ref Range    Sodium 132 (L) 136 - 145 mmol/L    Potassium 4.7 3.5 - 5.5 mmol/L    Chloride 99 (L) 100 - 108 mmol/L    CO2 21 21 - 32 mmol/L    Anion gap 12 3.0 - 18 mmol/L    Glucose 112 (H) 74 - 99 mg/dL    BUN 78 (H) 7.0 - 18 MG/DL    Creatinine 8.40 (H) 0.6 - 1.3 MG/DL    BUN/Creatinine ratio 9 (L) 12 - 20      GFR est AA 8 (L) >60 ml/min/1.73m2    GFR est non-AA 7 (L) >60 ml/min/1.73m2    Calcium 9.5 8.5 - 10.1 MG/DL   CBC WITH AUTOMATED DIFF    Collection Time: 09/15/17  4:02 AM   Result Value Ref Range    WBC 15.0 (H) 4.6 - 13.2 K/uL    RBC 3.45 (L) 4.70 - 5.50 M/uL    HGB 10.8 (L) 13.0 - 16.0 g/dL    HCT 32.9 (L) 36.0 - 48.0 %    MCV 95.4 74.0 - 97.0 FL    MCH 31.3 24.0 - 34.0 PG    MCHC 32.8 31.0 - 37.0 g/dL    RDW 16.6 (H) 11.6 - 14.5 %    PLATELET 896 (H) 797 - 420 K/uL    MPV 9.9 9.2 - 11.8 FL    NEUTROPHILS 64 40 - 73 %    LYMPHOCYTES 25 21 - 52 %    MONOCYTES 6 3 - 10 %    EOSINOPHILS 5 0 - 5 %    BASOPHILS 0 0 - 2 %    ABS. NEUTROPHILS 9.6 (H) 1.8 - 8.0 K/UL    ABS. LYMPHOCYTES 3.7 (H) 0.9 - 3.6 K/UL    ABS. MONOCYTES 0.9 0.05 - 1.2 K/UL    ABS. EOSINOPHILS 0.7 (H) 0.0 - 0.4 K/UL    ABS.  BASOPHILS 0.0 0.0 - 0.1 K/UL    DF AUTOMATED     GLUCOSE, POC    Collection Time: 09/15/17  6:48 AM   Result Value Ref Range    Glucose (POC) 88 70 - 110 mg/dL   CULTURE, RESPIRATORY/SPUTUM/BRONCH W GRAM STAIN    Collection Time: 09/15/17 10:00 AM   Result Value Ref Range    Special Requests: NO SPECIAL REQUESTS      GRAM STAIN >25 WBC/lpf      GRAM STAIN 10-25 EPI/lpf      GRAM STAIN MUCUS PRESENT      GRAM STAIN NO ORGANISMS SEEN      Culture result: PENDING    LACTIC ACID    Collection Time: 09/15/17 11:27 AM   Result Value Ref Range    Lactic acid 0.4 0.4 - 2.0 MMOL/L   GLUCOSE, POC    Collection Time: 09/15/17 12:33 PM   Result Value Ref Range    Glucose (POC) 156 (H) 70 - 110 mg/dL       Signed By: Renzo Perry MD     September 15, 2017 4:40 PM

## 2017-09-15 NOTE — PROGRESS NOTES
RENAL DAILY PROGRESS NOTE      IMPRESSION:   ESRD, last HD on 9/11  Anemia, on epo  Encephalopathy, severe anoxic brain injury   Respiratory failure s/p trach , PEG. Hypokalemia, improving    PLAN:    unable to provide HD as bp runs low. Discussed with ICU team, will evaluate tomorrow.                           Subjective:     64ty M with ESRD   Complaint:   No change in mental status  Remain full code  Severe anoxic injury  Family wants continue support  Overnight events noted      Current Facility-Administered Medications   Medication Dose Route Frequency    NOREPINephrine (LEVOPHED) 16,000 mcg in dextrose 5% 250 mL infusion  2-30 mcg/min IntraVENous TITRATE    meropenem (MERREM) 500 mg in 0.9% sodium chloride (MBP/ADV) 50 mL MBP  0.5 g IntraVENous Q24H    albuterol-ipratropium (DUO-NEB) 2.5 MG-0.5 MG/3 ML  3 mL Nebulization Q6H RT    insulin glargine (LANTUS) injection 15 Units  15 Units SubCUTAneous QHS    epoetin benjamin (EPOGEN;PROCRIT) injection 10,000 Units  10,000 Units IntraVENous EVERY MON & FRI    iron sucrose (VENOFER) 100 mg in 0.9% sodium chloride 100 mL IVPB  100 mg IntraVENous EVERY MON & FRI    midodrine (PROAMITINE) tablet 10 mg  10 mg Oral TID WITH MEALS    white petrolatum-mineral oil 85-15 % oint 1 Dose  1 Dose Ophthalmic Q6H    vits A and D-white pet-lanolin (A&D) ointment   Topical Q6H    albumin human 25% (BUMINATE) solution 12.5 g  12.5 g IntraVENous DIALYSIS PRN    Zinc Ox-Aloe Vera-Vitamin E (BALMEX) topical cream   Topical CONTINUOUS    banana flakes trans-galactooligosaccharide (BANATROL PLUS) powder 1 Packet  1 Packet Per NG tube TID    levETIRAcetam (KEPPRA) 500 mg in saline (iso-osm) 100 ml IVPB  500 mg IntraVENous Q12H    LORazepam (ATIVAN) injection 1 mg  1 mg IntraVENous Q4H PRN    insulin lispro (HUMALOG) injection   SubCUTAneous Q6H    famotidine (PF) (PEPCID) 20 mg in sodium chloride 0.9 % 10 mL injection  20 mg IntraVENous DAILY    collagenase (SANTYL) 250 unit/gram ointment   Topical DAILY    acetaminophen (TYLENOL) tablet 650 mg  650 mg Oral Q4H PRN    lactobacillus sp. 50 billion cpu (BIO-K PLUS) capsule 1 Cap  1 Cap Oral DAILY    loperamide (IMODIUM) capsule 2 mg  2 mg Oral Q6H PRN    heparin (porcine) injection 5,000 Units  5,000 Units SubCUTAneous Q8H    heparin (porcine) 1,000 unit/mL injection 2,000 Units  2,000 Units IntraVENous DIALYSIS ONCE    simvastatin (ZOCOR) tablet 20 mg  20 mg Oral QHS    aspirin chewable tablet 81 mg  81 mg Oral DAILY    glucose chewable tablet 16 g  4 Tab Oral PRN    glucagon (GLUCAGEN) injection 1 mg  1 mg IntraMUSCular PRN    dextrose (D50W) injection syrg 12.5-25 g  25-50 mL IntraVENous PRN    0.9% sodium chloride infusion  100 mL/hr IntraVENous DIALYSIS PRN    naloxone (NARCAN) injection 0.4 mg  0.4 mg IntraVENous PRN       Review of Symptoms: intubated   Objective:     Patient Vitals for the past 24 hrs:   Temp Pulse Resp BP SpO2   09/15/17 1315 - 89 20 110/58 100 %   09/15/17 1300 - 89 21 96/52 99 %   09/15/17 1230 - 88 21 92/51 99 %   09/15/17 1203 99.4 °F (37.4 °C) 92 20 - 99 %   09/15/17 1200 - 91 20 (!) 81/48 99 %   09/15/17 1130 - 90 20 (!) 80/44 100 %   09/15/17 1100 - 93 21 (!) 72/45 99 %   09/15/17 1000 98.7 °F (37.1 °C) 99 27 101/46 100 %   09/15/17 0930 - 96 21 (!) 80/42 100 %   09/15/17 0900 - 93 21 (!) 66/35 100 %   09/15/17 0830 - 99 19 (!) 81/45 100 %   09/15/17 0800 - (!) 102 21 (!) 87/51 100 %   09/15/17 0745 - (!) 104 24 91/50 100 %   09/15/17 0730 (!) 101.1 °F (38.4 °C) - - - -   09/15/17 0700 - (!) 108 26 101/59 98 %   09/15/17 0600 - (!) 107 24 109/50 100 %   09/15/17 0500 - (!) 110 26 93/53 100 %   09/15/17 0400 97.8 °F (36.6 °C) (!) 109 26 (!) 86/45 100 %   09/15/17 0300 - (!) 103 25 (!) 82/53 93 %   09/15/17 0200 - 96 24 119/66 99 %   09/15/17 0100 - 93 22 136/75 100 %   09/15/17 0000 97.6 °F (36.4 °C) 92 18 136/80 100 %   09/14/17 2300 - 87 15 122/70 100 %   09/14/17 2200 - 84 15 127/66 98 %   09/14/17 2108 - - - - 100 %   09/14/17 2100 - 78 17 127/62 100 %   09/14/17 2000 97.7 °F (36.5 °C) 77 15 110/56 100 %   09/14/17 1900 - 82 15 128/61 100 %   09/14/17 1623 - - - - 100 %   09/14/17 1600 - 85 18 123/69 100 %   09/14/17 1549 98.7 °F (37.1 °C) - - - -   09/14/17 1500 - 86 19 117/65 100 %        Weight change: 3.032 kg (6 lb 11 oz)     09/13 1901 - 09/15 0700  In: 2305   Out: 250 [Drains:250]    Intake/Output Summary (Last 24 hours) at 09/15/17 1451  Last data filed at 09/15/17 0700   Gross per 24 hour   Intake             1195 ml   Output              190 ml   Net             1005 ml     Physical Exam:   General: cachexia  HEENT  no thyromegaly  CVS: S1S2 heard,  no rub  RS: + air entry b/l,   Abd: Soft, Non tender,   Neuro: s/p trach  Extrm: +edema,   Access; left AVG + thrill            Data Review:     LABS:   Hematology:   Recent Labs      09/15/17   0402  09/13/17   0356   WBC  15.0*  11.3   HGB  10.8*  9.0*   HCT  32.9*  27.7*     Chemistry:   Recent Labs      09/15/17   0402  09/14/17   1207  09/13/17   0356   BUN  78*   --   53*   CREA  8.40*   --   6.52*   CA  9.5   --   9.1   K  4.7  4.6  3.4*   NA  132*   --   137   CL  99*   --   103   CO2  21   --   22   GLU  112*   --   217*              Procedures/imaging: see electronic medical records for all procedures, Xrays and details which were not copied into this note but were reviewed prior to creation of Plan          Assessment & Plan:     As above         Britton Alas MD  9/15/2017  3:46 PM

## 2017-09-15 NOTE — INTERDISCIPLINARY ROUNDS
CRITICAL CARE INTERDISCIPLINARY ROUNDS      Patient Information:    Name:   Price Joe    Age:   65 y.o.     Admission Date:   7/27/2017    Readmit Risk Assessment Information:      Readmit Risk Tool Support Systems: Family member(s), Relationship with Primary Physician Group: Seen at least one time within past 12 months    Surgery Date:      Day of Stay:     Expected Discharge Date:        Attending Provider:   Teresa Varela MD    Surgeon:        Consultant:       Primary Care Provider:   Raulito Sheikh MD    Problem List:     Patient Active Problem List   Diagnosis Code    Anemia D64.9    Acidosis E87.2    Cardiac arrest ([address] I46.9    Respiratory failure ([address] J96.90    ESRD needing dialysis ([address] N18.6, Z99.2    Anoxic brain damage ([address] G93.1    Colitis K52.9    Hypotension I95.9    Acute GI bleeding K92.2    ESRD (end stage renal disease) ([address] N18.6    Sepsis ([address] A41.9    Oropharyngeal dysphagia R13.12    Cachexia ([address] R64       Principal Problem:  <principal problem not specified>    Procedure:       During rounds the following quality care indicators and evidence based practices were addressed :     DVT Prophylaxis, Pressure Injury Prevention, Pain Management, Sepsis resuscitation and management, Nutritional Status, Critical Care Interventions Airways, Drains and Lines and IHI Bundles: Central Line Bundle Followed            Acute MI/PCI:   Not applicable    Heart Failure:    Not applicable    Cardiac Surgery:  Not applicable    SCIP Measures for other Surgeries:   Not applicable    Pneumonia:    Appropriate Antibiotic Selection (ICU versus Non-ICU)    Stroke:  Patient's Personal Risk Factors for Stroke are:   hypertension, family history, hyperlipidemia or diabetes mellitus    NIH Stroke Score  Total: 8 (08/17/17 0400)    Transfer Level of Care:  Not Ready for Transfer    The patient will require the following interventions based on the Readmission Risk Assessment:  Pharmacy evaluation and teaching, Care Management involvement for home health follow up for:  mobility and assistance with ADL's and Spiritual Care evaluation      Discharge Management:  Group Home    Anticipated Discharge Date:  3      Interdisciplinary team rounds were held  with the following team membersCare Management, Diabetes Treatment Specialist, Nursing, Nutrition, Pastoral Care, Pharmacy, Physician and Clinical Coordinator and the  patient and healthcare POA (documentation required). Plan of care discussed. See clinical pathway and/or care plan for interventions and desired outcomes. Working on placement. Continue ventilator at night. ID consulted and cultures ordered.

## 2017-09-15 NOTE — PROGRESS NOTES
Infectious Disease Progress Note    Requested by: Dr. Laverne Monk    Reason: sepsis, e.coli/clostridium pefringens bacteremia    Current abx Prior abx   Pip/tazo since 9/7  Ciprofloxacin since 9/11/17 Levofloxacin 7/27  Pip/tazo 7/27-7/31; 8/2-8/10; 8/18-8/25  Meropenem  7/31-8/2,   vancomycin  7/27-8/2; 8/18-8/25, 9/7-9/13     Lines:       Assessment :  55year old man with h/o type 2 DM (hgb a1c 5.4 on 7/29/17) ,ESRD admitted to [facility] on 7/27/17 s/p cardiorespiratory arrest, hypotension, bacteremia, elevated LFTs. sepsis (POA) due to e.coli, clostridium perfringens bloodstream infection (positive blood culture 7/27, negative blood culture 8/1) . Most likely source of bacteremia is intra abdominal infection/inflammation. Elevated LFTs, thickened gallbladder - no evidence of cholecystitis on HIDA scan 8/2/17    Acute pontine CVA, s/p PEA arrest     Now with RLL atelectasis on cxr, low grade fevers tm:101 on 9/15/17, increasing wbc. Clinical picture consistent with RLL healthcare associated/ventilator associated pneumonia due to pseudomonas, klebsiella. Worsening wbc/fevers on current antibiotics likely due to decreased efficacy of current antibiotics (pseudomonas in sputum cx 9/6/17 had pip/tazo IRMA:32, was resistant to levofloxacin) & ?undrained infected pleural fluid    Recommendations:    1. D/c pip/tazo, ciprofloxacin. Start meropenem  2. Obtain ct chest  3. Recommend thoracentesis if moderate effusion noted on ct chest for diagnostic purposes - r/o empyema. Long term prognosis: poor    Advance Care planning: full code, patient wasnt able to name a surrogate decision maker or provide an advance care plan    D/w dr. Elly Escalante, ICU PA - cc time spent >35  Min. Please call me if any further questions or concerns. Will continue to participate in the care of this patient.     subjective:  Non verbal         Home medications:   list reviewed        Current Facility-Administered Medications   Medication Dose Route Frequency    NOREPINephrine (LEVOPHED) 16,000 mcg in dextrose 5% 250 mL infusion  2-30 mcg/min IntraVENous TITRATE    insulin glargine (LANTUS) injection 15 Units  15 Units SubCUTAneous QHS    epoetin benjamin (EPOGEN;PROCRIT) injection 10,000 Units  10,000 Units IntraVENous EVERY MON & FRI    iron sucrose (VENOFER) 100 mg in 0.9% sodium chloride 100 mL IVPB  100 mg IntraVENous EVERY MON & FRI    midodrine (PROAMITINE) tablet 10 mg  10 mg Oral TID WITH MEALS    ciprofloxacin (CIPRO) 400 mg IVPB (premix)  400 mg IntraVENous Q24H    white petrolatum-mineral oil 85-15 % oint 1 Dose  1 Dose Ophthalmic Q6H    vits A and D-white pet-lanolin (A&D) ointment   Topical Q6H    piperacillin-tazobactam (ZOSYN) 2.25 g in 0.9% sodium chloride (MBP/ADV) 50 mL MBP  2.25 g IntraVENous Q8H    Piperacillin-tazobactam (ZOSYN) 0.75 gm Supplemental Dosing by Pharmacy   Other Rx Dosing/Monitoring    albumin human 25% (BUMINATE) solution 12.5 g  12.5 g IntraVENous DIALYSIS PRN    Zinc Ox-Aloe Vera-Vitamin E (BALMEX) topical cream   Topical CONTINUOUS    banana flakes trans-galactooligosaccharide (BANATROL PLUS) powder 1 Packet  1 Packet Per NG tube TID    levETIRAcetam (KEPPRA) 500 mg in saline (iso-osm) 100 ml IVPB  500 mg IntraVENous Q12H    LORazepam (ATIVAN) injection 1 mg  1 mg IntraVENous Q4H PRN    insulin lispro (HUMALOG) injection   SubCUTAneous Q6H    albuterol (PROVENTIL VENTOLIN) nebulizer solution 2.5 mg  2.5 mg Nebulization Q6H PRN    famotidine (PF) (PEPCID) 20 mg in sodium chloride 0.9 % 10 mL injection  20 mg IntraVENous DAILY    collagenase (SANTYL) 250 unit/gram ointment   Topical DAILY    acetaminophen (TYLENOL) tablet 650 mg  650 mg Oral Q4H PRN    lactobacillus sp. 50 billion cpu (BIO-K PLUS) capsule 1 Cap  1 Cap Oral DAILY    loperamide (IMODIUM) capsule 2 mg  2 mg Oral Q6H PRN    heparin (porcine) injection 5,000 Units  5,000 Units SubCUTAneous Q8H    heparin (porcine) 1,000 unit/mL injection 2,000 Units  2,000 Units IntraVENous DIALYSIS ONCE    simvastatin (ZOCOR) tablet 20 mg  20 mg Oral QHS    aspirin chewable tablet 81 mg  81 mg Oral DAILY    glucose chewable tablet 16 g  4 Tab Oral PRN    glucagon (GLUCAGEN) injection 1 mg  1 mg IntraMUSCular PRN    dextrose (D50W) injection syrg 12.5-25 g  25-50 mL IntraVENous PRN    0.9% sodium chloride infusion  100 mL/hr IntraVENous DIALYSIS PRN    naloxone (NARCAN) injection 0.4 mg  0.4 mg IntraVENous PRN       Allergies: Review of patient's allergies indicates no known allergies. Temp (24hrs), Av.7 °F (37.1 °C), Min:97.6 °F (36.4 °C), Max:101.1 °F (38.4 °C)    Visit Vitals    /58    Pulse 89    Temp 99.4 °F (37.4 °C)    Resp 20    Ht 6' 2\" (1.88 m)    Wt 62 kg (136 lb 11 oz)    SpO2 100%    BMI 17.55 kg/m2       ROS: limited ROS obtained since patient not very communicative. Physical Exam:    General: Well developed, well nourished male laying on the bed,non communicative. HEENT:  Normocephalic, atraumatic, present conjunctival hemmohage;  nasal and oral mucous are moist and without evidence of lesions. Trach in place   Lymph Nodes:   no cervical, axillary or inguinal adenopathy   Lungs:   non-labored, decreased breath sounds right lung, no crackles wheezes rales or rhonchi   Heart:  RRR, s1 and s2; no  rubs or gallops, no edema, + pedal pulses   Abdomen:  soft, non-distended, active bowel sounds, no hepatomegaly, no splenomegaly.   Non-tender, peg in place   Genitourinary:  deferred   Extremities:   no clubbing, cyanosis; no joint effusions or swelling; muscle mass appropriate for age   Neurologic:  Detailed neuro eval not feasible on sedation                        Skin:  Sacral ulcers per report   Back:  no spinal or paraspinal muscle tenderness or rigidity, no CVA tenderness     Psychiatric:  Unable to assess         Labs: Results:   Chemistry Recent Labs      09/15/17   0402  17   1207  17   0356   GLU 112*   --   217*   NA  132*   --   137   K  4.7  4.6  3.4*   CL  99*   --   103   CO2  21   --   22   BUN  78*   --   53*   CREA  8.40*   --   6.52*   CA  9.5   --   9.1   AGAP  12   --   12   BUCR  9*   --   8*      CBC w/Diff Recent Labs      09/15/17   0402  09/13/17   0356   WBC  15.0*  11.3   RBC  3.45*  2.89*   HGB  10.8*  9.0*   HCT  32.9*  27.7*   PLT  540*  461*   GRANS  64  62   LYMPH  25  26   EOS  5  5      Microbiology No results for input(s): CULT in the last 72 hours.        RADIOLOGY:    All available imaging studies/reports in [facility] care for this admission were reviewed    Dr. Alessandro Anderson, Infectious Disease Specialist  [phone]  September 15, 2017  4:28 PM

## 2017-09-15 NOTE — ROUTINE PROCESS
Bedside shift change report given to Mario Harry (oncoming nurse) by Kiko Moran (offgoing nurse). Report included the following information SBAR, Kardex, Intake/Output and MAR.

## 2017-09-16 LAB
ANION GAP SERPL CALC-SCNC: 11 MMOL/L (ref 3–18)
BASOPHILS # BLD: 0 K/UL (ref 0–0.06)
BASOPHILS NFR BLD: 0 % (ref 0–2)
BUN SERPL-MCNC: 86 MG/DL (ref 7–18)
BUN/CREAT SERPL: 10 (ref 12–20)
CALCIUM SERPL-MCNC: 8.7 MG/DL (ref 8.5–10.1)
CHLORIDE SERPL-SCNC: 101 MMOL/L (ref 100–108)
CO2 SERPL-SCNC: 20 MMOL/L (ref 21–32)
CREAT SERPL-MCNC: 8.25 MG/DL (ref 0.6–1.3)
DIFFERENTIAL METHOD BLD: ABNORMAL
EOSINOPHIL # BLD: 0.5 K/UL (ref 0–0.4)
EOSINOPHIL NFR BLD: 5 % (ref 0–5)
ERYTHROCYTE [DISTWIDTH] IN BLOOD BY AUTOMATED COUNT: 16.3 % (ref 11.6–14.5)
GLUCOSE BLD STRIP.AUTO-MCNC: 117 MG/DL (ref 70–110)
GLUCOSE BLD STRIP.AUTO-MCNC: 133 MG/DL (ref 70–110)
GLUCOSE BLD STRIP.AUTO-MCNC: 143 MG/DL (ref 70–110)
GLUCOSE BLD STRIP.AUTO-MCNC: 58 MG/DL (ref 70–110)
GLUCOSE BLD STRIP.AUTO-MCNC: 92 MG/DL (ref 70–110)
GLUCOSE SERPL-MCNC: 87 MG/DL (ref 74–99)
HCT VFR BLD AUTO: 26.4 % (ref 36–48)
HGB BLD-MCNC: 8.5 G/DL (ref 13–16)
LYMPHOCYTES # BLD: 2.2 K/UL (ref 0.9–3.6)
LYMPHOCYTES NFR BLD: 20 % (ref 21–52)
MAGNESIUM SERPL-MCNC: 3.2 MG/DL (ref 1.6–2.6)
MCH RBC QN AUTO: 30.5 PG (ref 24–34)
MCHC RBC AUTO-ENTMCNC: 32.2 G/DL (ref 31–37)
MCV RBC AUTO: 94.6 FL (ref 74–97)
MONOCYTES # BLD: 0.9 K/UL (ref 0.05–1.2)
MONOCYTES NFR BLD: 8 % (ref 3–10)
NEUTS SEG # BLD: 7.3 K/UL (ref 1.8–8)
NEUTS SEG NFR BLD: 67 % (ref 40–73)
PLATELET # BLD AUTO: 441 K/UL (ref 135–420)
PMV BLD AUTO: 9.6 FL (ref 9.2–11.8)
POTASSIUM SERPL-SCNC: 3.9 MMOL/L (ref 3.5–5.5)
RBC # BLD AUTO: 2.79 M/UL (ref 4.7–5.5)
SODIUM SERPL-SCNC: 132 MMOL/L (ref 136–145)
WBC # BLD AUTO: 11 K/UL (ref 4.6–13.2)

## 2017-09-16 PROCEDURE — 90935 HEMODIALYSIS ONE EVALUATION: CPT

## 2017-09-16 PROCEDURE — 74011250636 HC RX REV CODE- 250/636: Performed by: INTERNAL MEDICINE

## 2017-09-16 PROCEDURE — 94640 AIRWAY INHALATION TREATMENT: CPT

## 2017-09-16 PROCEDURE — 74011000250 HC RX REV CODE- 250: Performed by: PHYSICIAN ASSISTANT

## 2017-09-16 PROCEDURE — 74011000258 HC RX REV CODE- 258: Performed by: INTERNAL MEDICINE

## 2017-09-16 PROCEDURE — 83735 ASSAY OF MAGNESIUM: CPT | Performed by: PHYSICIAN ASSISTANT

## 2017-09-16 PROCEDURE — 85025 COMPLETE CBC W/AUTO DIFF WBC: CPT | Performed by: PHYSICIAN ASSISTANT

## 2017-09-16 PROCEDURE — 94003 VENT MGMT INPAT SUBQ DAY: CPT

## 2017-09-16 PROCEDURE — 74011000250 HC RX REV CODE- 250: Performed by: INTERNAL MEDICINE

## 2017-09-16 PROCEDURE — 74011250637 HC RX REV CODE- 250/637: Performed by: INTERNAL MEDICINE

## 2017-09-16 PROCEDURE — 80048 BASIC METABOLIC PNL TOTAL CA: CPT | Performed by: PHYSICIAN ASSISTANT

## 2017-09-16 PROCEDURE — 74011250637 HC RX REV CODE- 250/637: Performed by: HOSPITALIST

## 2017-09-16 PROCEDURE — 74011250636 HC RX REV CODE- 250/636

## 2017-09-16 PROCEDURE — 74011250637 HC RX REV CODE- 250/637: Performed by: NURSE PRACTITIONER

## 2017-09-16 PROCEDURE — 82962 GLUCOSE BLOOD TEST: CPT

## 2017-09-16 PROCEDURE — 65610000006 HC RM INTENSIVE CARE

## 2017-09-16 PROCEDURE — 77030025757

## 2017-09-16 PROCEDURE — 36415 COLL VENOUS BLD VENIPUNCTURE: CPT | Performed by: PHYSICIAN ASSISTANT

## 2017-09-16 PROCEDURE — 74011636637 HC RX REV CODE- 636/637: Performed by: INTERNAL MEDICINE

## 2017-09-16 PROCEDURE — 74011250636 HC RX REV CODE- 250/636: Performed by: HOSPITALIST

## 2017-09-16 PROCEDURE — P9047 ALBUMIN (HUMAN), 25%, 50ML: HCPCS | Performed by: INTERNAL MEDICINE

## 2017-09-16 RX ORDER — CHLORHEXIDINE GLUCONATE 1.2 MG/ML
10 RINSE ORAL EVERY 12 HOURS
Status: DISCONTINUED | OUTPATIENT
Start: 2017-09-16 | End: 2017-09-22 | Stop reason: HOSPADM

## 2017-09-16 RX ORDER — HEPARIN SODIUM 1000 [USP'U]/ML
INJECTION, SOLUTION INTRAVENOUS; SUBCUTANEOUS
Status: COMPLETED
Start: 2017-09-16 | End: 2017-09-16

## 2017-09-16 RX ORDER — HEPARIN SODIUM 1000 [USP'U]/ML
1000 INJECTION, SOLUTION INTRAVENOUS; SUBCUTANEOUS
Status: DISCONTINUED | OUTPATIENT
Start: 2017-09-16 | End: 2017-09-22 | Stop reason: HOSPADM

## 2017-09-16 RX ORDER — CHLORHEXIDINE GLUCONATE 1.2 MG/ML
10 RINSE ORAL ONCE
Status: DISCONTINUED | OUTPATIENT
Start: 2017-09-16 | End: 2017-09-16

## 2017-09-16 RX ORDER — INSULIN GLARGINE 100 [IU]/ML
7 INJECTION, SOLUTION SUBCUTANEOUS
Status: DISCONTINUED | OUTPATIENT
Start: 2017-09-16 | End: 2017-09-20

## 2017-09-16 RX ADMIN — HEPARIN SODIUM 5000 UNITS: 5000 INJECTION, SOLUTION INTRAVENOUS; SUBCUTANEOUS at 18:57

## 2017-09-16 RX ADMIN — DEXTROSE MONOHYDRATE 25 G: 25 INJECTION, SOLUTION INTRAVENOUS at 06:59

## 2017-09-16 RX ADMIN — ACETAMINOPHEN 650 MG: 325 TABLET ORAL at 01:22

## 2017-09-16 RX ADMIN — VITAMIN A AND VITAMIN D: 929.3 OINTMENT TOPICAL at 00:22

## 2017-09-16 RX ADMIN — MINERAL OIL AND WHITE PETROLATUM 1 DOSE: 150; 850 OINTMENT OPHTHALMIC at 06:58

## 2017-09-16 RX ADMIN — MIDODRINE HYDROCHLORIDE 10 MG: 2.5 TABLET ORAL at 10:13

## 2017-09-16 RX ADMIN — HEPARIN SODIUM 1000 UNITS: 1000 INJECTION, SOLUTION INTRAVENOUS; SUBCUTANEOUS at 10:42

## 2017-09-16 RX ADMIN — IPRATROPIUM BROMIDE AND ALBUTEROL SULFATE 3 ML: 2.5; .5 SOLUTION RESPIRATORY (INHALATION) at 01:00

## 2017-09-16 RX ADMIN — INSULIN LISPRO 6 UNITS: 100 INJECTION, SOLUTION INTRAVENOUS; SUBCUTANEOUS at 00:17

## 2017-09-16 RX ADMIN — VITAMIN A AND VITAMIN D: 929.3 OINTMENT TOPICAL at 07:00

## 2017-09-16 RX ADMIN — IPRATROPIUM BROMIDE AND ALBUTEROL SULFATE 3 ML: 2.5; .5 SOLUTION RESPIRATORY (INHALATION) at 14:00

## 2017-09-16 RX ADMIN — FAMOTIDINE 20 MG: 10 INJECTION INTRAVENOUS at 10:12

## 2017-09-16 RX ADMIN — Medication 1 CAPSULE: at 10:14

## 2017-09-16 RX ADMIN — ASPIRIN 81 MG 81 MG: 81 TABLET ORAL at 09:00

## 2017-09-16 RX ADMIN — Medication 1 PACKET: at 18:52

## 2017-09-16 RX ADMIN — VITAMIN A AND VITAMIN D: 929.3 OINTMENT TOPICAL at 12:00

## 2017-09-16 RX ADMIN — LEVETIRACETAM 500 MG: 5 INJECTION INTRAVENOUS at 20:59

## 2017-09-16 RX ADMIN — LEVETIRACETAM 500 MG: 5 INJECTION INTRAVENOUS at 14:00

## 2017-09-16 RX ADMIN — ALBUMIN (HUMAN) 12.5 G: 0.25 INJECTION, SOLUTION INTRAVENOUS at 11:37

## 2017-09-16 RX ADMIN — IPRATROPIUM BROMIDE AND ALBUTEROL SULFATE 3 ML: 2.5; .5 SOLUTION RESPIRATORY (INHALATION) at 19:44

## 2017-09-16 RX ADMIN — VITAMIN A AND VITAMIN D: 929.3 OINTMENT TOPICAL at 23:24

## 2017-09-16 RX ADMIN — LOPERAMIDE HYDROCHLORIDE 2 MG: 2 CAPSULE ORAL at 02:13

## 2017-09-16 RX ADMIN — VITAMIN A AND VITAMIN D: 929.3 OINTMENT TOPICAL at 18:55

## 2017-09-16 RX ADMIN — HEPARIN SODIUM 5000 UNITS: 5000 INJECTION, SOLUTION INTRAVENOUS; SUBCUTANEOUS at 01:22

## 2017-09-16 RX ADMIN — MINERAL OIL AND WHITE PETROLATUM 1 DOSE: 150; 850 OINTMENT OPHTHALMIC at 12:00

## 2017-09-16 RX ADMIN — CHLORHEXIDINE GLUCONATE 10 ML: 1.2 RINSE ORAL at 20:59

## 2017-09-16 RX ADMIN — MINERAL OIL AND WHITE PETROLATUM 1 DOSE: 150; 850 OINTMENT OPHTHALMIC at 23:24

## 2017-09-16 RX ADMIN — SIMVASTATIN 20 MG: 10 TABLET, FILM COATED ORAL at 23:28

## 2017-09-16 RX ADMIN — Medication 1 PACKET: at 10:09

## 2017-09-16 RX ADMIN — Medication 1 PACKET: at 22:00

## 2017-09-16 RX ADMIN — ALBUMIN (HUMAN) 12.5 G: 0.25 INJECTION, SOLUTION INTRAVENOUS at 11:08

## 2017-09-16 RX ADMIN — IPRATROPIUM BROMIDE AND ALBUTEROL SULFATE 3 ML: 2.5; .5 SOLUTION RESPIRATORY (INHALATION) at 08:55

## 2017-09-16 RX ADMIN — MIDODRINE HYDROCHLORIDE 10 MG: 2.5 TABLET ORAL at 18:54

## 2017-09-16 RX ADMIN — MEROPENEM 500 MG: 500 INJECTION, POWDER, FOR SOLUTION INTRAVENOUS at 18:53

## 2017-09-16 RX ADMIN — Medication: at 18:59

## 2017-09-16 RX ADMIN — MINERAL OIL AND WHITE PETROLATUM 1 DOSE: 150; 850 OINTMENT OPHTHALMIC at 18:55

## 2017-09-16 RX ADMIN — CHLORHEXIDINE GLUCONATE 10 ML: 1.2 RINSE ORAL at 10:00

## 2017-09-16 NOTE — PROGRESS NOTES
Patient placed back on vent with peep of 8 per Bernal Trixie. Patient has atelectasis on the right side. Patient tolerating vent settings well at this time. No distress noted.

## 2017-09-16 NOTE — PROGRESS NOTES
RENAL DAILY PROGRESS NOTE      IMPRESSION:   ESRD, last HD on , planning for HD today , BP is better today  Anemia, on epo  Encephalopathy, severe anoxic brain injury   Respiratory failure s/p trach , PEG. , cxr shows large right side pleural effusion   Hypokalemia, improving    PLAN:    At 10:14 AM on 2017, I saw and examined patient during hemodialysis treatment. The patient was receiving hemodialysis for treatment of  renal failure. I have also reviewed vital signs, intake and output, lab results and recent events, and agreed with today's dialysis order. UF 1L as tolerated        HD rounding    Blood pressure 127/68, pulse 82, temperature 97.2 °F (36.2 °C), resp. rate 19, height 6' 2\" (1.88 m), weight 62 kg (136 lb 11 oz), SpO2 100 %.   Temp (24hrs), Av.7 °F (36.5 °C), Min:97.1 °F (36.2 °C), Max:99.4 °F (37.4 °C)      Blood Pressure: BP: 127/68  Pulse: Pulse (Heart Rate): 82  Temp:  Temp: 97.2 °F (36.2 °C)    Artificial Kidney Dialyzer/Set Up Inspection: Revaclear   hours Duration of Treatment (hours): 3 hours   Heparin Bolus Heparin Bolus (units): 0 units  Blood flow rate Blood Flow Rate (ml/min): 250 ml/min   Dialysate rate     Arterial Access Pressure Arterial Access Pressure (mmHg): -50  Venous Return Pressure Venous Return Pressure (mmHg): 140  Ultrafiltration Rate Goal/Amount of Fluid to Remove (mL): 1000 mL  Fluid Removal Fluid Removed (mL): 426  Net Fluid Removal NET Fluid Removed (mL): 0 ml                    Subjective:     64ty M with ESRD   Complaint:   No change in mental status  Remain full code  Severe anoxic injury  Family wants continue support  Overnight events noted      Current Facility-Administered Medications   Medication Dose Route Frequency    chlorhexidine (PERIDEX) 0.12 % mouthwash 10 mL  10 mL Oral ONCE    chlorhexidine (PERIDEX) 0.12 % mouthwash 10 mL  10 mL Oral Q12H    insulin glargine (LANTUS) injection 7 Units  7 Units SubCUTAneous QHS    meropenem (MERREM) 500 mg in 0.9% sodium chloride (MBP/ADV) 50 mL MBP  0.5 g IntraVENous Q24H    albuterol-ipratropium (DUO-NEB) 2.5 MG-0.5 MG/3 ML  3 mL Nebulization Q6H RT    epoetin benjamin (EPOGEN;PROCRIT) injection 10,000 Units  10,000 Units IntraVENous EVERY MON & FRI    iron sucrose (VENOFER) 100 mg in 0.9% sodium chloride 100 mL IVPB  100 mg IntraVENous EVERY MON & FRI    midodrine (PROAMITINE) tablet 10 mg  10 mg Oral TID WITH MEALS    white petrolatum-mineral oil 85-15 % oint 1 Dose  1 Dose Ophthalmic Q6H    vits A and D-white pet-lanolin (A&D) ointment   Topical Q6H    albumin human 25% (BUMINATE) solution 12.5 g  12.5 g IntraVENous DIALYSIS PRN    Zinc Ox-Aloe Vera-Vitamin E (BALMEX) topical cream   Topical CONTINUOUS    banana flakes trans-galactooligosaccharide (BANATROL PLUS) powder 1 Packet  1 Packet Per NG tube TID    levETIRAcetam (KEPPRA) 500 mg in saline (iso-osm) 100 ml IVPB  500 mg IntraVENous Q12H    LORazepam (ATIVAN) injection 1 mg  1 mg IntraVENous Q4H PRN    insulin lispro (HUMALOG) injection   SubCUTAneous Q6H    famotidine (PF) (PEPCID) 20 mg in sodium chloride 0.9 % 10 mL injection  20 mg IntraVENous DAILY    collagenase (SANTYL) 250 unit/gram ointment   Topical DAILY    acetaminophen (TYLENOL) tablet 650 mg  650 mg Oral Q4H PRN    lactobacillus sp. 50 billion cpu (BIO-K PLUS) capsule 1 Cap  1 Cap Oral DAILY    loperamide (IMODIUM) capsule 2 mg  2 mg Oral Q6H PRN    heparin (porcine) injection 5,000 Units  5,000 Units SubCUTAneous Q8H    heparin (porcine) 1,000 unit/mL injection 2,000 Units  2,000 Units IntraVENous DIALYSIS ONCE    simvastatin (ZOCOR) tablet 20 mg  20 mg Oral QHS    aspirin chewable tablet 81 mg  81 mg Oral DAILY    glucose chewable tablet 16 g  4 Tab Oral PRN    glucagon (GLUCAGEN) injection 1 mg  1 mg IntraMUSCular PRN    dextrose (D50W) injection syrg 12.5-25 g  25-50 mL IntraVENous PRN    0.9% sodium chloride infusion  100 mL/hr IntraVENous DIALYSIS PRN  naloxone (NARCAN) injection 0.4 mg  0.4 mg IntraVENous PRN       Review of Symptoms: intubated   Objective:     Patient Vitals for the past 24 hrs:   Temp Pulse Resp BP SpO2   09/16/17 0951 - 82 19 - 100 %   09/16/17 0855 - - - - 100 %   09/16/17 0727 97.2 °F (36.2 °C) - - - -   09/16/17 0700 - 80 16 127/68 100 %   09/16/17 0600 - 84 16 123/65 100 %   09/16/17 0500 - 82 18 91/53 100 %   09/16/17 0400 97.6 °F (36.4 °C) 79 24 125/67 100 %   09/16/17 0300 - 85 18 123/65 100 %   09/16/17 0200 - 91 17 121/62 100 %   09/16/17 0101 - - - - 98 %   09/16/17 0100 - 86 19 140/71 100 %   09/16/17 0000 97.4 °F (36.3 °C) 83 15 120/66 100 %   09/15/17 2300 - 88 17 132/74 100 %   09/15/17 2200 - 87 18 125/68 98 %   09/15/17 2100 - 85 17 117/62 100 %   09/15/17 2000 97.3 °F (36.3 °C) 89 19 127/64 100 %   09/15/17 1900 - 88 17 110/57 99 %   09/15/17 1800 - 91 17 114/58 99 %   09/15/17 1700 - 86 - - 100 %   09/15/17 1647 - 86 16 - 100 %   09/15/17 1600 97.1 °F (36.2 °C) 84 17 110/52 98 %   09/15/17 1500 - 81 18 (!) 84/45 100 %   09/15/17 1400 - 88 19 110/54 100 %   09/15/17 1315 - 89 20 110/58 100 %   09/15/17 1300 - 89 21 96/52 99 %   09/15/17 1230 - 88 21 92/51 99 %   09/15/17 1203 99.4 °F (37.4 °C) 92 20 - 99 %   09/15/17 1200 - 91 20 (!) 81/48 99 %   09/15/17 1130 - 90 20 (!) 80/44 100 %   09/15/17 1100 - 93 21 (!) 72/45 99 %        Weight change: 0 kg (0 lb)     09/14 1901 - 09/16 0700  In: 5709.8 [I.V.:2404.8]  Out: 800 [Drains:800]    Intake/Output Summary (Last 24 hours) at 09/16/17 1010  Last data filed at 09/16/17 0700   Gross per 24 hour   Intake           4199.8 ml   Output              610 ml   Net           3589.8 ml     Physical Exam:   General: cachexia  HEENT  no thyromegaly  CVS: S1S2 heard,  no rub  RS: + air entry b/l,   Abd: Soft, Non tender,   Neuro: s/p trach  Extrm: +edema,   Access; left AVG + thrill            Data Review:     LABS:   Hematology:   Recent Labs      09/16/17   0514  09/15/17   0402   WBC 11.0  15.0*   HGB  8.5*  10.8*   HCT  26.4*  32.9*     Chemistry:   Recent Labs      09/16/17   0514  09/15/17   0402  09/14/17   1207   BUN  86*  78*   --    CREA  8.25*  8.40*   --    CA  8.7  9.5   --    K  3.9  4.7  4.6   NA  132*  132*   --    CL  101  99*   --    CO2  20*  21   --    GLU  87  112*   --               Procedures/imaging: see electronic medical records for all procedures, Xrays and details which were not copied into this note but were reviewed prior to creation of Plan          Assessment & Plan:     As above         Betito Craft MD  9/16/2017  3:46 PM

## 2017-09-16 NOTE — PROGRESS NOTES
[facility] Hospitalist Group  Progress Note    Patient: Shirlene Winkler Age: 65 y.o. : 1960 MR#: [id number] SSN: [id number]  Date: 2017     Subjective:     Patient in nad    Assessment/Plan:   -PEA arrest with acute hypoxic resp failure - trach care , as per PCCM  -Acute metabolic and anoxic encephalopathy - monitor  -Pneumonia/VAP, sputum culture + Pseudamonas and klebsiella from . - as per ID  - Acute pontine CVA - asa.  - ESRD , - HD as pernephro  - DM - basal/bolus insulin. - Unstageable pressure ulcer to sacrum/coccyx - wound care. - Hypotension-on midodrine  - Is s/p PEG/trach.     Additional Notes:      Case discussed with:  []Patient  []Family  [x]Nursing  []Case Management  DVT Prophylaxis:  []Lovenox  []Hep SQ  []SCDs  []Coumadin   []On Heparin gtt    Objective:   VS:   Visit Vitals    BP 90/47    Pulse 89    Temp 97.6 °F (36.4 °C)    Resp 23    Ht 6' 2\" (1.88 m)    Wt 62 kg (136 lb 11 oz)    SpO2 100%    BMI 17.55 kg/m2      Tmax/24hrs: Temp (24hrs), Av.4 °F (36.3 °C), Min:97.1 °F (36.2 °C), Max:97.6 °F (36.4 °C)      Intake/Output Summary (Last 24 hours) at 17 1243  Last data filed at 17 1153   Gross per 24 hour   Intake           3939.8 ml   Output              138 ml   Net           3801.8 ml       General:  Opens eyes, does not follow commands  Cardiovascular:  S1S2+, RRR  Pulmonary:  Coarse bs b/l  GI:  Soft, BS+, NT, ND, +peg  Extremities:  trace edema  +tracheostomy    Labs:    Recent Results (from the past 24 hour(s))   GLUCOSE, POC    Collection Time: 09/15/17  4:28 PM   Result Value Ref Range    Glucose (POC) 145 (H) 70 - 110 mg/dL   GLUCOSE, POC    Collection Time: 09/15/17 11:18 PM   Result Value Ref Range    Glucose (POC) 209 (H) 70 - 110 mg/dL   CBC WITH AUTOMATED DIFF    Collection Time: 17  5:14 AM   Result Value Ref Range    WBC 11.0 4.6 - 13.2 K/uL    RBC 2.79 (L) 4.70 - 5.50 M/uL    HGB 8.5 (L) 13.0 - 16.0 g/dL    HCT 26.4 (L) 36.0 - 48.0 %    MCV 94.6 74.0 - 97.0 FL    MCH 30.5 24.0 - 34.0 PG    MCHC 32.2 31.0 - 37.0 g/dL    RDW 16.3 (H) 11.6 - 14.5 %    PLATELET 931 (H) 172 - 420 K/uL    MPV 9.6 9.2 - 11.8 FL    NEUTROPHILS 67 40 - 73 %    LYMPHOCYTES 20 (L) 21 - 52 %    MONOCYTES 8 3 - 10 %    EOSINOPHILS 5 0 - 5 %    BASOPHILS 0 0 - 2 %    ABS. NEUTROPHILS 7.3 1.8 - 8.0 K/UL    ABS. LYMPHOCYTES 2.2 0.9 - 3.6 K/UL    ABS. MONOCYTES 0.9 0.05 - 1.2 K/UL    ABS. EOSINOPHILS 0.5 (H) 0.0 - 0.4 K/UL    ABS.  BASOPHILS 0.0 0.0 - 0.06 K/UL    DF AUTOMATED     METABOLIC PANEL, BASIC    Collection Time: 09/16/17  5:14 AM   Result Value Ref Range    Sodium 132 (L) 136 - 145 mmol/L    Potassium 3.9 3.5 - 5.5 mmol/L    Chloride 101 100 - 108 mmol/L    CO2 20 (L) 21 - 32 mmol/L    Anion gap 11 3.0 - 18 mmol/L    Glucose 87 74 - 99 mg/dL    BUN 86 (H) 7.0 - 18 MG/DL    Creatinine 8.25 (H) 0.6 - 1.3 MG/DL    BUN/Creatinine ratio 10 (L) 12 - 20      GFR est AA 8 (L) >60 ml/min/1.73m2    GFR est non-AA 7 (L) >60 ml/min/1.73m2    Calcium 8.7 8.5 - 10.1 MG/DL   MAGNESIUM    Collection Time: 09/16/17  5:14 AM   Result Value Ref Range    Magnesium 3.2 (H) 1.6 - 2.6 mg/dL   GLUCOSE, POC    Collection Time: 09/16/17  6:44 AM   Result Value Ref Range    Glucose (POC) 63 (L) 70 - 110 mg/dL   GLUCOSE, POC    Collection Time: 09/16/17  6:46 AM   Result Value Ref Range    Glucose (POC) 58 (L) 70 - 110 mg/dL   GLUCOSE, POC    Collection Time: 09/16/17  6:55 AM   Result Value Ref Range    Glucose (POC) 58 (L) 70 - 110 mg/dL   GLUCOSE, POC    Collection Time: 09/16/17  7:58 AM   Result Value Ref Range    Glucose (POC) 117 (H) 70 - 110 mg/dL   GLUCOSE, POC    Collection Time: 09/16/17 12:21 PM   Result Value Ref Range    Glucose (POC) 92 70 - 110 mg/dL       Signed By: Regino Rodrigues MD     September 16, 2017

## 2017-09-16 NOTE — ROUTINE PROCESS
Bedside shift change report given to [address] [id number] (oncoming nurse) by Mao Mcqueen (offgoing nurse). Report included the following information SBAR, Kardex, Intake/Output and MAR.

## 2017-09-16 NOTE — PROGRESS NOTES
[facility]  ICU Progress Note      Name: Christen Dakin   : 1960   MRN: [id number]   Date: 2017 6:37 PM     [x]I have reviewed the flowsheet and previous days notes. Events overnight reviewed and discussed with nursing staff. Vital signs and records reviewed. Juan Carlos Moreira a 55 y. o. African American male with a history of DM, ESRD admitted PEA arrest. Patient's hospitalization was also found to have a CVA and was treated for E.coli and C.perferinges bacteremia. Subsequent PEA arrest 17. Remains intubated and off sedation.    -Off pressors since yesterday afternoon  -CT chest from 9/15/17 shows opacity in right lower hemithorax is mostly due to complete atelectasis (with infiltrates) of right lower lobe. In the right middle lobe there are also moderate infiltrates with partial atelectatic changes.  - No improvement of neuro status   - placed back on vent support 2' to CT chest findings  - Did not tolerate dialysis 2' hypotension      CX  Sputum cx 17 + pseudomonas aeruginosa and klebsiella pneumoniae    bcx 17 + escherichia coli and clostridium perfringens     ROS:Review of systems not obtained due to patient factors.     Medication Review:  · Pressors -  · Sedation - n/a  · Antibiotics -  Meropenem   · Pain -  · GI/ DVT - Pepcid/ Sq heparin   · Others (other gtts)    Safety Bundles: VAP Bundle/Severe Sepsis Protocol    Vital Signs:    Visit Vitals    BP 90/47    Pulse 89    Temp 98.9 °F (37.2 °C)    Resp 23    Ht 6' 2\" (1.88 m)    Wt 62 kg (136 lb 11 oz)    SpO2 100%    BMI 17.55 kg/m2       O2 Device: Tracheal collar   O2 Flow Rate (L/min): 6 l/min   Temp (24hrs), Av.7 °F (36.5 °C), Min:97.2 °F (36.2 °C), Max:98.9 °F (37.2 °C)       Intake/Output:   Last shift:       07 - 1900  In: -   Out: -272   Last 3 shifts: 1901 -  0700  In: 5709.8 [I.V.:2404.8]  Out: 800 [Drains:800]    Intake/Output Summary (Last 24 hours) at 17 [address] [address] filed at 09/16/17 1153   Gross per 24 hour   Intake           3459.8 ml   Output             -162 ml   Net           3621.8 ml       Ventilator Settings:  Ventilator  Mode: Spontaneous  Respiratory Rate  Resp Rate Observed: 14  Back-Up Rate: 12  Insp Time (sec): 1 sec  Insp Flow (l/min): 44 l/min  I:E Ratio: 1:1.2  Ventilator Volumes  Vt Set (ml): 400 ml  Vt Exhaled (Machine Breath) (ml): 600 ml  Vt Spont (ml): 500 ml  Ve Observed (l/min): 9.51 l/min  Ventilator Pressures  PC Set: 400  Pressure Support (cm H2O): 5 cm H2O  PIP Observed (cm H2O): 14 cm H2O  Plateau Pressure (cm H2O): 14 cm H2O  MAP (cm H2O): 10  PEEP/VENT (cm H2O): 8 cm H20  Auto PEEP Observed (cm H2O): 0 cm H2O    Physical Exam:    General:  cachectic, unresponsive, acyanotic, NAD  HEENT:  Anicteric sclerae; pink palpebral conjunctivae; mucosa moist,   Resp:  Symmetrical chest expansion, no accessory muscle use; good airway entry; BBS coarse.  Diminished RLL  CV:  S1, S2 present; regular rate and rhythm  GI:  Abdomen soft, non-tender; (+) active bowel sounds, s/p PEG  Extremities:  +2 pulses on all extremities; significant muscle atrophy, AV fistula LUE: + bruit + thrill   Skin:  Warm; no rashes/ lesions noted  Neurologic: pinpoint pupils nonreactive to light, + cough, -+ gag, decorticate posturing with stimulation  Devices:  PIV, FMS, PEG, Trach       DATA:     Current Facility-Administered Medications   Medication Dose Route Frequency    chlorhexidine (PERIDEX) 0.12 % mouthwash 10 mL  10 mL Oral ONCE    chlorhexidine (PERIDEX) 0.12 % mouthwash 10 mL  10 mL Oral Q12H    insulin glargine (LANTUS) injection 7 Units  7 Units SubCUTAneous QHS    heparin (porcine) 1,000 unit/mL injection 1,000 Units  1,000 Units IntraVENous Q1H PRN    meropenem (MERREM) 500 mg in 0.9% sodium chloride (MBP/ADV) 50 mL MBP  0.5 g IntraVENous Q24H    albuterol-ipratropium (DUO-NEB) 2.5 MG-0.5 MG/3 ML  3 mL Nebulization Q6H RT    epoetin benjamin (EPOGEN;PROCRIT) injection 10,000 Units  10,000 Units IntraVENous EVERY MON & FRI    iron sucrose (VENOFER) 100 mg in 0.9% sodium chloride 100 mL IVPB  100 mg IntraVENous EVERY MON & FRI    midodrine (PROAMITINE) tablet 10 mg  10 mg Oral TID WITH MEALS    white petrolatum-mineral oil 85-15 % oint 1 Dose  1 Dose Ophthalmic Q6H    vits A and D-white pet-lanolin (A&D) ointment   Topical Q6H    albumin human 25% (BUMINATE) solution 12.5 g  12.5 g IntraVENous DIALYSIS PRN    Zinc Ox-Aloe Vera-Vitamin E (BALMEX) topical cream   Topical CONTINUOUS    banana flakes trans-galactooligosaccharide (BANATROL PLUS) powder 1 Packet  1 Packet Per NG tube TID    levETIRAcetam (KEPPRA) 500 mg in saline (iso-osm) 100 ml IVPB  500 mg IntraVENous Q12H    LORazepam (ATIVAN) injection 1 mg  1 mg IntraVENous Q4H PRN    insulin lispro (HUMALOG) injection   SubCUTAneous Q6H    famotidine (PF) (PEPCID) 20 mg in sodium chloride 0.9 % 10 mL injection  20 mg IntraVENous DAILY    collagenase (SANTYL) 250 unit/gram ointment   Topical DAILY    acetaminophen (TYLENOL) tablet 650 mg  650 mg Oral Q4H PRN    lactobacillus sp. 50 billion cpu (BIO-K PLUS) capsule 1 Cap  1 Cap Oral DAILY    loperamide (IMODIUM) capsule 2 mg  2 mg Oral Q6H PRN    heparin (porcine) injection 5,000 Units  5,000 Units SubCUTAneous Q8H    heparin (porcine) 1,000 unit/mL injection 2,000 Units  2,000 Units IntraVENous DIALYSIS ONCE    simvastatin (ZOCOR) tablet 20 mg  20 mg Oral QHS    aspirin chewable tablet 81 mg  81 mg Oral DAILY    glucose chewable tablet 16 g  4 Tab Oral PRN    glucagon (GLUCAGEN) injection 1 mg  1 mg IntraMUSCular PRN    dextrose (D50W) injection syrg 12.5-25 g  25-50 mL IntraVENous PRN    0.9% sodium chloride infusion  100 mL/hr IntraVENous DIALYSIS PRN    naloxone (NARCAN) injection 0.4 mg  0.4 mg IntraVENous PRN         Labs: Results:       Chemistry Recent Labs      09/16/17   0514  09/15/17   0402  09/14/17   1207   GLU  87 112*   --    NA  132*  132*   --    K  3.9  4.7  4.6   CL  101  99*   --    CO2  20*  21   --    BUN  86*  78*   --    CREA  8.25*  8.40*   --    CA  8.7  9.5   --    AGAP  11  12   --    BUCR  10*  9*   --       CBC w/Diff Recent Labs      09/16/17   0514  09/15/17   0402   WBC  11.0  15.0*   RBC  2.79*  3.45*   HGB  8.5*  10.8*   HCT  26.4*  32.9*   PLT  441*  540*   GRANS  67  64   LYMPH  20*  25   EOS  5  5      Coagulation No results for input(s): PTP, INR, APTT in the last 72 hours. No lab exists for component: INREXT    Liver Enzymes No results for input(s): TP, ALB, TBIL, AP, SGOT, GPT in the last 72 hours. No lab exists for component: DBIL   ABG No results found for: PH, PHI, PCO2, PCO2I, PO2, PO2I, HCO3, HCO3I, FIO2, FIO2I   Microbiology Recent Labs      09/15/17   1127  09/15/17   1115  09/15/17   1000   CULT  NO GROWTH AFTER 19 HOURS  NO GROWTH AFTER 19 HOURS  FEW GRAM NEGATIVE RODS*          Telemetry: [x]Sinus []A-flutter []Paced    []A-fib []Multiple PVCs                    Imaging:  CT Results  (Last 48 hours)               09/15/17 1534  CT CHEST WO CONT Final result    Narrative:  CT scan of chest, without intravenous contrast:               INDICATION:       Evaluation of right pleural effusion. To assess need for thoracentesis       History of respiratory failure, and approximately brain injury, end stage renal   disease. TECHNIQUE:       Multidetector CT scan with 5 mm size thickness, without intravenous contrast,   including chest and subphrenic levels. Coronal and sagittal images reformatted. All CT scans at this facility are performed using dose optimization technique as   appropriate to a  performed  examination, to include automated exposer control,   adjustment mA and / or  KV according to patient size (including appropriate   matching  for site specific examination), or use  of iterative  reconstruction   technique.        COMPARISON STUDY: Chest x-ray obtained on 9/15/2017. CT scan of chest on 8/20/2017. FINDINGS:               On the chest x-ray obtained on 9/15/2017, there is evidence of opacification of   lower half of right hemithorax. There is volume loss in right lower lung. There   is mild shift of mediastinal structures from left-to-right. There is also   finding suggestive of right pleural effusion. The findings appear to be new, as   compared to previous CT scan on 8/20/2017. On current study, there are findings of marked atelectatic changes, with or   without pneumonia infiltrates involving entire right moderate scattered   infiltrates with partial atelectatic changes. The aerated right upper lobe   extends to area anterior to right middle lobe. There is no definite infiltrates   in right upper lobe except for minimal dependent atelectatic changes. Right posterior CP gutter is mostly filled with atelectatic and partially   consolidated base of right lower lobe with only very small amount of pleural   fluid surrounding these atelectatic lung parenchyma. The left lung appears to be free of infiltrates There is compensatory   hyperinflation of left lung. In the posterolateral portion of base of left lower lobe there is an oblong   density, likely to be  bandlike atelectasis, which measures 5.5 cm in vertical   extent, 0.78 cm transverse and 0.7 cm AP. There is no evidence of left-sided pleural effusion or pneumothorax. Note is made of tracheostomy tube in position. In mediastinum there is no obvious mass. The ascending thoracic aorta measures   2.5 cm in diameter. There is minimal pericardial effusion anterior to hyoid, with a thickness of   0.98 cm. There is no obvious pleural effusion posterior to left ventricle. In visualized upper and midportion of liver there is no diagnostic finding. Gallbladder is markedly contracted.  Spleen appears to be grossly normal. In tail   and body of pancreas and upper portion of head of pancreas there is no obvious   abnormality. Common bile duct is dilated with a diameter of 0.86 cm. The bony thorax appears to mildly sclerotic. In the T6 vertebral body there is a   small calcific density, probably bone island.       ----------------------------------------------------------------       IMPRESSIONS:               The opacity in right lower hemithorax is mostly due to complete atelectasis   (with infiltrates) of right lower lobe. In the right middle lobe there are also   moderate infiltrates with partial atelectatic changes. The right posterior CP gutter is mostly filled with atelectatic and partially   consolidated right lower lobe. There are only minimal amount of pleural effusion   present around the atelectatic basal right lower lobe. There is small amount of   pleural effusion noted posterior to right upper lobe in the supine position. The   volume of right pleural effusion does not appear to be enough to necessitate   thoracentesis. IMPRESSION:   · Acute encephalopathy, anoxic encephalopathy   · S/p PEA Cardiac arrest in dialysis 7/27/17 and 8/18/17  · VDRF 2' neuro status   · Hypotension- multifactorial in patient with sepsis and possible Dysautonomia? · RLL pneumonia +/- mucus plugging with atelectasis- Sputum cx 9/6/17 + pseudomonas aeruginosa and klebsiella pneumoniae    · ESRD on HD  · DM2  · Acute pontine lacunar CVA  · Anoxic encephalopathy   · Anemia of chronic disease   · Oropharyngeal dysphagia s/p PEG  · Cachexia  · Unstageable pressure ulcer to sacrum/coccyx  · S/p septic shock  treated for E.coli and C.perferinges bacteremia s/p abx        PLAN:       · Resp -     - CT chest with opacity in right lower hemithorax is mostly due to complete atelectasis  (with infiltrates) of right lower lobe. In the right middle lobe there are also moderate infiltrates with partial atelectatic changes.  No signficant amount of fluid for thoracentesis   - Given CT results will place patient back on vent support with PS of 5 and PEEP 8  - Would recommend specialty bed with rotation capability given possible mucus plugging on CT   - Bronchial hygiene   - Trach care per protocol  - CPT  - Will need bronchoscopy. Attempted to call daughter and she did not answer the phone. Will call again tomorrow for consent   - Continue bronchodilators   - CXR in the AM  · ID  - ID is following  -Sputum cx 9/6/17 + pseudomonas aeruginosa and klebsiella pneumoniae    -Bcx 7/27/17 + escherichia coli and clostridium perfringens   - abx changed to meropenum on 9/15/17  - no significant amount of pleural fluid for diagnostic thoracentesis per CT read   -cdiff negative   -continue wound care   · CVS -  - Hypotension multifactorial. Possible sepsis +/- Dysautonomia  - Continue Midodrine 10 mg TID  - D/c levophed off MAR  - MAP goal > 65 mm/hg  - Last echo with EF 55-60% and noted to have G1DD  - PE workup negative in the past   · Heme/onc -  - Drop in hgb noted. - Continue epo per nephro recs      - iron sucrose 100mg 2 times a week   · Metabolic -   - no acute electrolyte derangements  - Mild hyponatremia noted. Continue to follow   · Renal -    - Patient did not tolerate HD today. Stopped early 2' hypotension   -  with HD  · Endocrine -    - Cortisol 15.4 (9/2/17)  - Glycemic control <180; Continue SSI. Some hypoglycemia noted overnight. Decrease lantus to 7 units qd  - Previous thyroid study: TSH was 7.52 and Free t4 0.8.    · Neuro/ Pain/ Sedation -  - Continue 2' prevention for CVA with asa and zocor  - Keppra 500 mg q12h  · GI -   -Continue TF; Nepro @ 55 ml/hr  -banana flakes for diarrhea   -probiotics   -pepcid PID  · Prophylaxis   - Sq heparin   · Discussed in interdisciplinary rounds          The patient is: [x] acutely ill Risk of deterioration: [] moderate    [] critically ill  [] high     [x]See my orders for details    My assessment/plan was discussed with:  []nursing []PT/OT    [x]respiratory therapy [x]Dr. Coello [place]    []family []       Marek Cooper NP      [facility] Staff Addendum     I have independently evaluated the patient and reviewed the patient's chart.     I have discussed the findings and care plan with    I agree with the above evaluation, assessment and recommendations     Clinical Care and time spent coordinating care, minus procedure time: 60 min    Yvan Cortes DO, [facility]  Pulmonary, Sleep and Critical Care Medicine  7:42 PM

## 2017-09-17 ENCOUNTER — APPOINTMENT (OUTPATIENT)
Dept: GENERAL RADIOLOGY | Age: 57
DRG: 004 | End: 2017-09-17
Attending: NURSE PRACTITIONER
Payer: MEDICARE

## 2017-09-17 LAB
ANION GAP SERPL CALC-SCNC: 13 MMOL/L (ref 3–18)
BASOPHILS # BLD: 0 K/UL (ref 0–0.1)
BASOPHILS NFR BLD: 0 % (ref 0–2)
BUN SERPL-MCNC: 75 MG/DL (ref 7–18)
BUN/CREAT SERPL: 11 (ref 12–20)
CALCIUM SERPL-MCNC: 8.3 MG/DL (ref 8.5–10.1)
CHLORIDE SERPL-SCNC: 101 MMOL/L (ref 100–108)
CO2 SERPL-SCNC: 19 MMOL/L (ref 21–32)
CORTIS SERPL-MCNC: 8 UG/DL (ref 3.09–22.4)
CREAT SERPL-MCNC: 7.08 MG/DL (ref 0.6–1.3)
DIFFERENTIAL METHOD BLD: ABNORMAL
EOSINOPHIL # BLD: 0.7 K/UL (ref 0–0.4)
EOSINOPHIL NFR BLD: 6 % (ref 0–5)
ERYTHROCYTE [DISTWIDTH] IN BLOOD BY AUTOMATED COUNT: 16.5 % (ref 11.6–14.5)
GLUCOSE BLD STRIP.AUTO-MCNC: 136 MG/DL (ref 70–110)
GLUCOSE BLD STRIP.AUTO-MCNC: 156 MG/DL (ref 70–110)
GLUCOSE BLD STRIP.AUTO-MCNC: 163 MG/DL (ref 70–110)
GLUCOSE BLD STRIP.AUTO-MCNC: 58 MG/DL (ref 70–110)
GLUCOSE BLD STRIP.AUTO-MCNC: 63 MG/DL (ref 70–110)
GLUCOSE BLD STRIP.AUTO-MCNC: 94 MG/DL (ref 70–110)
GLUCOSE SERPL-MCNC: 137 MG/DL (ref 74–99)
HCT VFR BLD AUTO: 23.4 % (ref 36–48)
HGB BLD-MCNC: 7.5 G/DL (ref 13–16)
LYMPHOCYTES # BLD: 2.7 K/UL (ref 0.9–3.6)
LYMPHOCYTES NFR BLD: 24 % (ref 21–52)
MAGNESIUM SERPL-MCNC: 3.2 MG/DL (ref 1.6–2.6)
MCH RBC QN AUTO: 29.9 PG (ref 24–34)
MCHC RBC AUTO-ENTMCNC: 32.1 G/DL (ref 31–37)
MCV RBC AUTO: 93.2 FL (ref 74–97)
MONOCYTES # BLD: 0.8 K/UL (ref 0.05–1.2)
MONOCYTES NFR BLD: 7 % (ref 3–10)
NEUTS SEG # BLD: 7 K/UL (ref 1.8–8)
NEUTS SEG NFR BLD: 63 % (ref 40–73)
PLATELET # BLD AUTO: 462 K/UL (ref 135–420)
PMV BLD AUTO: 9.8 FL (ref 9.2–11.8)
POTASSIUM SERPL-SCNC: 4.1 MMOL/L (ref 3.5–5.5)
RBC # BLD AUTO: 2.51 M/UL (ref 4.7–5.5)
SODIUM SERPL-SCNC: 133 MMOL/L (ref 136–145)
WBC # BLD AUTO: 11.2 K/UL (ref 4.6–13.2)

## 2017-09-17 PROCEDURE — 74011250637 HC RX REV CODE- 250/637: Performed by: INTERNAL MEDICINE

## 2017-09-17 PROCEDURE — 80048 BASIC METABOLIC PNL TOTAL CA: CPT | Performed by: PHYSICIAN ASSISTANT

## 2017-09-17 PROCEDURE — 65610000006 HC RM INTENSIVE CARE

## 2017-09-17 PROCEDURE — 74011250636 HC RX REV CODE- 250/636: Performed by: INTERNAL MEDICINE

## 2017-09-17 PROCEDURE — 74011636637 HC RX REV CODE- 636/637: Performed by: INTERNAL MEDICINE

## 2017-09-17 PROCEDURE — 82533 TOTAL CORTISOL: CPT | Performed by: NURSE PRACTITIONER

## 2017-09-17 PROCEDURE — 74011250637 HC RX REV CODE- 250/637: Performed by: HOSPITALIST

## 2017-09-17 PROCEDURE — P9016 RBC LEUKOCYTES REDUCED: HCPCS | Performed by: NURSE PRACTITIONER

## 2017-09-17 PROCEDURE — 85025 COMPLETE CBC W/AUTO DIFF WBC: CPT | Performed by: PHYSICIAN ASSISTANT

## 2017-09-17 PROCEDURE — 94003 VENT MGMT INPAT SUBQ DAY: CPT

## 2017-09-17 PROCEDURE — 71010 XR CHEST PORT: CPT

## 2017-09-17 PROCEDURE — 74011000250 HC RX REV CODE- 250: Performed by: PHYSICIAN ASSISTANT

## 2017-09-17 PROCEDURE — 74011636637 HC RX REV CODE- 636/637: Performed by: NURSE PRACTITIONER

## 2017-09-17 PROCEDURE — 86920 COMPATIBILITY TEST SPIN: CPT | Performed by: NURSE PRACTITIONER

## 2017-09-17 PROCEDURE — 74011250636 HC RX REV CODE- 250/636: Performed by: HOSPITALIST

## 2017-09-17 PROCEDURE — 74011000250 HC RX REV CODE- 250: Performed by: INTERNAL MEDICINE

## 2017-09-17 PROCEDURE — 74011000258 HC RX REV CODE- 258: Performed by: INTERNAL MEDICINE

## 2017-09-17 PROCEDURE — 36415 COLL VENOUS BLD VENIPUNCTURE: CPT | Performed by: PHYSICIAN ASSISTANT

## 2017-09-17 PROCEDURE — 74011250637 HC RX REV CODE- 250/637: Performed by: NURSE PRACTITIONER

## 2017-09-17 PROCEDURE — 82962 GLUCOSE BLOOD TEST: CPT

## 2017-09-17 PROCEDURE — 94640 AIRWAY INHALATION TREATMENT: CPT

## 2017-09-17 PROCEDURE — 36430 TRANSFUSION BLD/BLD COMPNT: CPT

## 2017-09-17 PROCEDURE — 86900 BLOOD TYPING SEROLOGIC ABO: CPT | Performed by: NURSE PRACTITIONER

## 2017-09-17 PROCEDURE — 83735 ASSAY OF MAGNESIUM: CPT | Performed by: PHYSICIAN ASSISTANT

## 2017-09-17 RX ORDER — SODIUM CHLORIDE 9 MG/ML
250 INJECTION, SOLUTION INTRAVENOUS AS NEEDED
Status: DISCONTINUED | OUTPATIENT
Start: 2017-09-17 | End: 2017-09-22 | Stop reason: HOSPADM

## 2017-09-17 RX ADMIN — IPRATROPIUM BROMIDE AND ALBUTEROL SULFATE 3 ML: 2.5; .5 SOLUTION RESPIRATORY (INHALATION) at 01:20

## 2017-09-17 RX ADMIN — INSULIN GLARGINE 7 UNITS: 100 INJECTION, SOLUTION SUBCUTANEOUS at 00:29

## 2017-09-17 RX ADMIN — Medication 1 PACKET: at 17:28

## 2017-09-17 RX ADMIN — LEVETIRACETAM 500 MG: 5 INJECTION INTRAVENOUS at 10:08

## 2017-09-17 RX ADMIN — Medication 1 CAPSULE: at 10:09

## 2017-09-17 RX ADMIN — INSULIN LISPRO 3 UNITS: 100 INJECTION, SOLUTION INTRAVENOUS; SUBCUTANEOUS at 06:27

## 2017-09-17 RX ADMIN — HEPARIN SODIUM 5000 UNITS: 5000 INJECTION, SOLUTION INTRAVENOUS; SUBCUTANEOUS at 03:34

## 2017-09-17 RX ADMIN — HEPARIN SODIUM 5000 UNITS: 5000 INJECTION, SOLUTION INTRAVENOUS; SUBCUTANEOUS at 17:27

## 2017-09-17 RX ADMIN — MINERAL OIL AND WHITE PETROLATUM 1 DOSE: 150; 850 OINTMENT OPHTHALMIC at 06:28

## 2017-09-17 RX ADMIN — IPRATROPIUM BROMIDE AND ALBUTEROL SULFATE 3 ML: 2.5; .5 SOLUTION RESPIRATORY (INHALATION) at 14:32

## 2017-09-17 RX ADMIN — IPRATROPIUM BROMIDE AND ALBUTEROL SULFATE 3 ML: 2.5; .5 SOLUTION RESPIRATORY (INHALATION) at 20:13

## 2017-09-17 RX ADMIN — HEPARIN SODIUM 5000 UNITS: 5000 INJECTION, SOLUTION INTRAVENOUS; SUBCUTANEOUS at 10:08

## 2017-09-17 RX ADMIN — INSULIN GLARGINE 7 UNITS: 100 INJECTION, SOLUTION SUBCUTANEOUS at 23:12

## 2017-09-17 RX ADMIN — INSULIN LISPRO 3 UNITS: 100 INJECTION, SOLUTION INTRAVENOUS; SUBCUTANEOUS at 13:32

## 2017-09-17 RX ADMIN — IPRATROPIUM BROMIDE AND ALBUTEROL SULFATE 3 ML: 2.5; .5 SOLUTION RESPIRATORY (INHALATION) at 09:13

## 2017-09-17 RX ADMIN — MINERAL OIL AND WHITE PETROLATUM 1 DOSE: 150; 850 OINTMENT OPHTHALMIC at 17:27

## 2017-09-17 RX ADMIN — MEROPENEM 500 MG: 500 INJECTION, POWDER, FOR SOLUTION INTRAVENOUS at 17:28

## 2017-09-17 RX ADMIN — MIDODRINE HYDROCHLORIDE 10 MG: 2.5 TABLET ORAL at 17:27

## 2017-09-17 RX ADMIN — FAMOTIDINE 20 MG: 10 INJECTION INTRAVENOUS at 10:08

## 2017-09-17 RX ADMIN — SIMVASTATIN 20 MG: 10 TABLET, FILM COATED ORAL at 22:12

## 2017-09-17 RX ADMIN — VITAMIN A AND VITAMIN D: 929.3 OINTMENT TOPICAL at 18:00

## 2017-09-17 RX ADMIN — Medication 1 PACKET: at 10:11

## 2017-09-17 RX ADMIN — VITAMIN A AND VITAMIN D: 929.3 OINTMENT TOPICAL at 06:00

## 2017-09-17 RX ADMIN — CHLORHEXIDINE GLUCONATE 10 ML: 1.2 RINSE ORAL at 10:08

## 2017-09-17 RX ADMIN — ASPIRIN 81 MG 81 MG: 81 TABLET ORAL at 10:08

## 2017-09-17 RX ADMIN — CHLORHEXIDINE GLUCONATE 10 ML: 1.2 RINSE ORAL at 22:08

## 2017-09-17 RX ADMIN — Medication 1 PACKET: at 22:00

## 2017-09-17 RX ADMIN — LEVETIRACETAM 500 MG: 5 INJECTION INTRAVENOUS at 22:08

## 2017-09-17 RX ADMIN — MIDODRINE HYDROCHLORIDE 10 MG: 2.5 TABLET ORAL at 10:08

## 2017-09-17 RX ADMIN — COLLAGENASE SANTYL: 250 OINTMENT TOPICAL at 10:09

## 2017-09-17 RX ADMIN — MIDODRINE HYDROCHLORIDE 10 MG: 2.5 TABLET ORAL at 13:33

## 2017-09-17 NOTE — PROGRESS NOTES
PT orders received, chart reviewed. Spoke with patient's nurse Freddie Padilla) who states patient has no purposeful movements and is unable to follow commands, only responds to painful stimuli. When PT entered room, patient shows no signs of acknowledgment, eyes open and fixated upwards. No response to touch or ability to follow simple commands demonstrated. Patient is not appropriate candidate for skilled PT d/t inability to actively participate, orders will be D/C at this time. Thank you, Nissa Mcgraw DPT.

## 2017-09-17 NOTE — PROGRESS NOTES
[facility]  ICU Progress Note      Name: Lee Ann Ricketts   : 1960   MRN: [id number]   Date: 2017 6:37 PM     [x]I have reviewed the flowsheet and previous days notes. Events overnight reviewed and discussed with nursing staff. Vital signs and records reviewed. Wilber Ortega a 55 y. o.  male with a history of DM, ESRD admitted PEA arrest. Patient's hospitalization was also found to have a CVA and was treated for E.coli and C.perferinges bacteremia. Subsequent PEA arrest 17. Remains intubated and off sedation.    -Off pressors since afternoon 9/15/17  -CT chest from 9/15/17 shows opacity in right lower hemithorax is mostly due to complete atelectasis (with infiltrates) of right lower lobe. In the right middle lobe there are also moderate infiltrates with partial atelectatic changes. - much improved since placing patient back on ventilator  - No improvement of neuro status   - Did not tolerate dialysis 2' hypotension yesterday- 17  -Hemoglobin decreased today- no signs of acute or active bleeding  -afebrile    CX  Sputum cx 17 + pseudomonas aeruginosa and klebsiella pneumoniae    bcx 17 + escherichia coli and clostridium perfringens     ROS:Review of systems not obtained due to patient factors.     Medication Review:  · Pressors -  · Sedation - n/a  · Antibiotics -  Meropenem   · Pain -  · GI/ DVT - Pepcid/ Sq heparin   · Others (other gtts)    Safety Bundles: VAP Bundle/Severe Sepsis Protocol    Vital Signs:    Visit Vitals    /66    Pulse 94    Temp 98.8 °F (37.1 °C)    Resp 19    Ht 6' 2\" (1.88 m)    Wt 71.9 kg (158 lb 8 oz)    SpO2 100%    BMI 20.35 kg/m2       O2 Device: Ventilator   O2 Flow Rate (L/min): 6 l/min   Temp (24hrs), Av.2 °F (36.8 °C), Min:97.6 °F (36.4 °C), Max:98.9 °F (37.2 °C)       Intake/Output:   Last shift:       0701 - 1900  In: 550 [I.V.:400]  Out: -   Last 3 shifts: 09/15 1901 - 700  In: 2730   Out: 138 [Drains:410]    Intake/Output Summary (Last 24 hours) at 09/17/17 1452  Last data filed at 09/17/17 1311   Gross per 24 hour   Intake             1615 ml   Output              300 ml   Net             1315 ml       Ventilator Settings:  Ventilator  Mode: Spontaneous  Respiratory Rate  Resp Rate Observed: 14  Back-Up Rate: 12  Insp Time (sec): 1 sec  Insp Flow (l/min): 44 l/min  I:E Ratio: 1:1  Ventilator Volumes  Vt Set (ml): 400 ml  Vt Exhaled (Machine Breath) (ml): 397 ml  Vt Spont (ml): 556 ml  Ve Observed (l/min): 13.4 l/min  Ventilator Pressures  PC Set: 400  Pressure Support (cm H2O): 5 cm H2O  PIP Observed (cm H2O): 15 cm H2O  Plateau Pressure (cm H2O): 14 cm H2O  MAP (cm H2O): 10  PEEP/VENT (cm H2O): 8 cm H20  Auto PEEP Observed (cm H2O): 0 cm H2O    Physical Exam:    General:  cachectic, unresponsive, acyanotic, NAD  HEENT:  Anicteric sclerae; pink palpebral conjunctivae; mucosa moist,   Resp:  Symmetrical chest expansion, No wheezing.  Improved aeration at right base  CV:  S1, S2 present; regular rate and rhythm  GI:  Abdomen soft, non-tender; (+) active bowel sounds, s/p PEG  Extremities:  +2 pulses on all extremities; significant muscle atrophy, AV fistula LUE: + bruit + thrill   Skin:  Warm; no rashes/ lesions noted  Neurologic: pinpoint pupils nonreactive to light, + cough, -+ gag, decorticate posturing with stimulation  Devices:  PIV, FMS, PEG, Trach       DATA:     Current Facility-Administered Medications   Medication Dose Route Frequency    0.9% sodium chloride infusion 250 mL  250 mL IntraVENous PRN    chlorhexidine (PERIDEX) 0.12 % mouthwash 10 mL  10 mL Oral Q12H    insulin glargine (LANTUS) injection 7 Units  7 Units SubCUTAneous QHS    heparin (porcine) 1,000 unit/mL injection 1,000 Units  1,000 Units IntraVENous Q1H PRN    meropenem (MERREM) 500 mg in 0.9% sodium chloride (MBP/ADV) 50 mL MBP  0.5 g IntraVENous Q24H    albuterol-ipratropium (DUO-NEB) 2.5 MG-0.5 MG/3 ML  3 mL Nebulization Q6H RT    epoetin benjamin (EPOGEN;PROCRIT) injection 10,000 Units  10,000 Units IntraVENous EVERY MON & FRI    iron sucrose (VENOFER) 100 mg in 0.9% sodium chloride 100 mL IVPB  100 mg IntraVENous EVERY MON & FRI    midodrine (PROAMITINE) tablet 10 mg  10 mg Oral TID WITH MEALS    white petrolatum-mineral oil 85-15 % oint 1 Dose  1 Dose Ophthalmic Q6H    vits A and D-white pet-lanolin (A&D) ointment   Topical Q6H    albumin human 25% (BUMINATE) solution 12.5 g  12.5 g IntraVENous DIALYSIS PRN    Zinc Ox-Aloe Vera-Vitamin E (BALMEX) topical cream   Topical CONTINUOUS    banana flakes trans-galactooligosaccharide (BANATROL PLUS) powder 1 Packet  1 Packet Per NG tube TID    levETIRAcetam (KEPPRA) 500 mg in saline (iso-osm) 100 ml IVPB  500 mg IntraVENous Q12H    LORazepam (ATIVAN) injection 1 mg  1 mg IntraVENous Q4H PRN    insulin lispro (HUMALOG) injection   SubCUTAneous Q6H    famotidine (PF) (PEPCID) 20 mg in sodium chloride 0.9 % 10 mL injection  20 mg IntraVENous DAILY    collagenase (SANTYL) 250 unit/gram ointment   Topical DAILY    acetaminophen (TYLENOL) tablet 650 mg  650 mg Oral Q4H PRN    lactobacillus sp. 50 billion cpu (BIO-K PLUS) capsule 1 Cap  1 Cap Oral DAILY    loperamide (IMODIUM) capsule 2 mg  2 mg Oral Q6H PRN    heparin (porcine) injection 5,000 Units  5,000 Units SubCUTAneous Q8H    heparin (porcine) 1,000 unit/mL injection 2,000 Units  2,000 Units IntraVENous DIALYSIS ONCE    simvastatin (ZOCOR) tablet 20 mg  20 mg Oral QHS    aspirin chewable tablet 81 mg  81 mg Oral DAILY    glucose chewable tablet 16 g  4 Tab Oral PRN    glucagon (GLUCAGEN) injection 1 mg  1 mg IntraMUSCular PRN    dextrose (D50W) injection syrg 12.5-25 g  25-50 mL IntraVENous PRN    0.9% sodium chloride infusion  100 mL/hr IntraVENous DIALYSIS PRN    naloxone (NARCAN) injection 0.4 mg  0.4 mg IntraVENous PRN         Labs: Results:       Chemistry Recent Labs      09/17/17   7123 09/16/17   0514  09/15/17   0402   GLU  137*  87  112*   NA  133*  132*  132*   K  4.1  3.9  4.7   CL  101  101  99*   CO2  19*  20*  21   BUN  75*  86*  78*   CREA  7.08*  8.25*  8.40*   CA  8.3*  8.7  9.5   AGAP  13  11  12   BUCR  11*  10*  9*      CBC w/Diff Recent Labs      09/17/17   0423  09/16/17   0514  09/15/17   0402   WBC  11.2  11.0  15.0*   RBC  2.51*  2.79*  3.45*   HGB  7.5*  8.5*  10.8*   HCT  23.4*  26.4*  32.9*   PLT  462*  441*  540*   GRANS  63  67  64   LYMPH  24  20*  25   EOS  6*  5  5      Coagulation No results for input(s): PTP, INR, APTT in the last 72 hours. No lab exists for component: INREXT, INREXT    Liver Enzymes No results for input(s): TP, ALB, TBIL, AP, SGOT, GPT in the last 72 hours. No lab exists for component: DBIL   ABG No results found for: PH, PHI, PCO2, PCO2I, PO2, PO2I, HCO3, HCO3I, FIO2, FIO2I   Microbiology Recent Labs      09/15/17   1127  09/15/17   1115  09/15/17   1000   CULT  NO GROWTH 2 DAYS  NO GROWTH 2 DAYS  FEW PSEUDOMONAS AERUGINOSA*          Telemetry: [x]Sinus []A-flutter []Paced    []A-fib []Multiple PVCs                    Imaging:  CT Results  (Last 48 hours)               09/15/17 1534  CT CHEST WO CONT Final result    Narrative:  CT scan of chest, without intravenous contrast:               INDICATION:       Evaluation of right pleural effusion. To assess need for thoracentesis       History of respiratory failure, and approximately brain injury, end stage renal   disease. TECHNIQUE:       Multidetector CT scan with 5 mm size thickness, without intravenous contrast,   including chest and subphrenic levels. Coronal and sagittal images reformatted.        All CT scans at this facility are performed using dose optimization technique as   appropriate to a  performed  examination, to include automated exposer control,   adjustment mA and / or  KV according to patient size (including appropriate   matching  for site specific examination), or use  of iterative  reconstruction   technique. COMPARISON STUDY: Chest x-ray obtained on 9/15/2017. CT scan of chest on 8/20/2017. FINDINGS:               On the chest x-ray obtained on 9/15/2017, there is evidence of opacification of   lower half of right hemithorax. There is volume loss in right lower lung. There   is mild shift of mediastinal structures from left-to-right. There is also   finding suggestive of right pleural effusion. The findings appear to be new, as   compared to previous CT scan on 8/20/2017. On current study, there are findings of marked atelectatic changes, with or   without pneumonia infiltrates involving entire right moderate scattered   infiltrates with partial atelectatic changes. The aerated right upper lobe   extends to area anterior to right middle lobe. There is no definite infiltrates   in right upper lobe except for minimal dependent atelectatic changes. Right posterior CP gutter is mostly filled with atelectatic and partially   consolidated base of right lower lobe with only very small amount of pleural   fluid surrounding these atelectatic lung parenchyma. The left lung appears to be free of infiltrates There is compensatory   hyperinflation of left lung. In the posterolateral portion of base of left lower lobe there is an oblong   density, likely to be  bandlike atelectasis, which measures 5.5 cm in vertical   extent, 0.78 cm transverse and 0.7 cm AP. There is no evidence of left-sided pleural effusion or pneumothorax. Note is made of tracheostomy tube in position. In mediastinum there is no obvious mass. The ascending thoracic aorta measures   2.5 cm in diameter. There is minimal pericardial effusion anterior to hyoid, with a thickness of   0.98 cm. There is no obvious pleural effusion posterior to left ventricle. In visualized upper and midportion of liver there is no diagnostic finding. Gallbladder is markedly contracted. Spleen appears to be grossly normal. In tail   and body of pancreas and upper portion of head of pancreas there is no obvious   abnormality. Common bile duct is dilated with a diameter of 0.86 cm. The bony thorax appears to mildly sclerotic. In the T6 vertebral body there is a   small calcific density, probably bone island.       ----------------------------------------------------------------       IMPRESSIONS:               The opacity in right lower hemithorax is mostly due to complete atelectasis   (with infiltrates) of right lower lobe. In the right middle lobe there are also   moderate infiltrates with partial atelectatic changes. The right posterior CP gutter is mostly filled with atelectatic and partially   consolidated right lower lobe. There are only minimal amount of pleural effusion   present around the atelectatic basal right lower lobe. There is small amount of   pleural effusion noted posterior to right upper lobe in the supine position. The   volume of right pleural effusion does not appear to be enough to necessitate   thoracentesis. CXR Results  (Last 48 hours)               09/17/17 0614  XR CHEST PORT Final result    Impression:   IMPRESSION:   1. Complete resolution of right lung base airspace opacity. Narrative:  HISTORY: Right lung base infiltrate follow-up. Exam: Chest.       Technique: Single view portable semiupright chest. Compared to 9/15/2017 exam.       FINDINGS: Previously seen right lung base airspace opacity with associated small   right pleural effusion are no longer demonstrated on 6 exam. No pneumothorax,   pneumonia or pleural effusions are seen. Heart and mediastinal structures are   unchanged and unremarkable. Visualized bony thorax and soft tissues are within   normal limits.                      IMPRESSION:   · Acute encephalopathy, anoxic encephalopathy   · S/p PEA Cardiac arrest in dialysis 7/27/17 and 8/18/17  · VDRF 2' neuro status   · Hypotension- multifactorial in patient with sepsis and possible Dysautonomia? · RLL pneumonia +/- mucus plugging with atelectasis- Sputum cx 9/6/17 + pseudomonas aeruginosa and klebsiella pneumoniae  -treated  · Atelectasis RLL 9/16/17- placed from TC back on vent- resolved 9/17/17  · ESRD on HD  · DM2  · Acute pontine lacunar CVA  · Anoxic encephalopathy   · Anemia of chronic disease Hb 7.5 today- down from 10, on 9/15/17  · Oropharyngeal dysphagia s/p PEG  · Cachexia  · Unstageable pressure ulcer to sacrum/coccyx  · S/p septic shock  treated for E.coli and C.perferinges bacteremia s/p abx        PLAN:       · Resp -     - CT chest with opacity in right lower hemithorax is mostly due to complete atelectasis  (with infiltrates) of right lower lobe. In the right middle lobe there are also moderate infiltrates with partial atelectatic changes. - much improved today  - Continue with current vent support- Consider trach trials tomorrow and rest on vent at night  - Would recommend specialty bed with rotation capability to assist with minimizign atelectasis. - Bronchial hygiene   - Trach care per protocol  - CPT  - Continue bronchodilators   - CXR in the AM  · ID  - ID is following  -Sputum cx 9/6/17 + pseudomonas aeruginosa and klebsiella pneumoniae    -Bcx 7/27/17 + escherichia coli and clostridium perfringens   - abx changed to meropenum on 9/15/17  - no significant amount of pleural fluid for diagnostic thoracentesis per CT read   -cdiff negative   -continue wound care   · CVS -  - Hypotension multifactorial. Possible sepsis +/- Dysautonomia  - Continue Midodrine 10 mg TID  - D/c levophed off MAR  - MAP goal > 65 mm/hg  - Last echo with EF 55-60% and noted to have G1DD  - PE workup negative in the past   · Heme/onc -  - Drop in hgb noted.  Transfuse 1 unit PRBC today  - Continue epo per nephro recs      - iron sucrose 100mg 2 times a week   · Metabolic -   - no acute electrolyte derangements  - Mild hyponatremia noted. Continue to follow   · Renal -    - Spoke with nephrology- plan to HD tomorrow at a slower rate- no urgent need for HD today  · Endocrine -    - Cortisol 15.4 (9/2/17)  - Glycemic control <180; Continue SSI. Some hypoglycemia noted overnight. Decrease lantus to 7 units qd  - Previous thyroid study: TSH was 7.52 and Free t4 0.8.    · Neuro/ Pain/ Sedation -  - Continue 2' prevention for CVA with asa and zocor  - Keppra 500 mg q12h  · GI -   -Continue TF; Nepro @ 55 ml/hr  -banana flakes for diarrhea   -probiotics   -pepcid PID  · Prophylaxis   - Sq heparin     · Other: PT consult for splits - especially in upper extremities to preserve anatomic positioning          The patient is: [x] acutely ill Risk of deterioration: [] moderate    [] critically ill  [] high     [x]See my orders for details    My assessment/plan was discussed with:  [x]nursing []PT/OT    [x]respiratory therapy []Dr. Aydee Reina    []family []       Fracisco Jennings, DO     Clinical care time: 35 min

## 2017-09-17 NOTE — ROUTINE PROCESS
Received pt in bed. Pt calm  Mobility--bedrest  Respiratory-- vent setting/trachea vent setting mode-spontanous  FIO2 28% peep 8  GI--Nepro 55  Gu--anuria  Skin--dressing to sacrum    Bedside and Verbal shift change report given to [address] (oncoming nurse) by Ludy Keller RN   (offgoing nurse). Report included the following information SBAR, Kardex, Intake/Output and MAR.

## 2017-09-17 NOTE — PROGRESS NOTES
[facility]  Progress Note    Patient: Clif Bettencourt Age: 65 y.o. : 1960 MR#: [id number] SSN: [id number]  Date: 2017     Subjective:     Patient in NAD    Assessment/Plan:   -PEA arrest with acute hypoxic resp failure - trach care, PCCM following  -Acute metabolic and anoxic encephalopathy - monitor  -Pneumonia/VAP, sputum culture + Pseudamonas and klebsiella from . - on merrem  - Acute pontine CVA - asa.  - ESRD , - HD as per nephro  - DM - basal/bolus insulin. - Unstageable pressure ulcer to sacrum/coccyx - wound care. - Hypotension-on midodrine  - Is s/p PEG/trach.   Monitor H&H  D/w ICU PA    Additional Notes:      Case discussed with:  []Patient  []Family  [x]Nursing  []Case Management  DVT Prophylaxis:  []Lovenox  []Hep SQ  []SCDs  []Coumadin   []On Heparin gtt    Objective:   VS:   Visit Vitals    /66    Pulse 93    Temp 98.8 °F (37.1 °C)    Resp 19    Ht 6' 2\" (1.88 m)    Wt 71.9 kg (158 lb 8 oz)    SpO2 100%    BMI 20.35 kg/m2      Tmax/24hrs: Temp (24hrs), Av.2 °F (36.8 °C), Min:97.6 °F (36.4 °C), Max:98.9 °F (37.2 °C)      Intake/Output Summary (Last 24 hours) at 17 1233  Last data filed at 17 0301   Gross per 24 hour   Intake             1175 ml   Output              300 ml   Net              875 ml       General:  Eyes open, does not follow commands, no purposeful movement noted  Cardiovascular:  S1S2+, RRR  Pulmonary:  Coarse bs b/l  GI:  Soft, BS+, NT, ND, +peg  Extremities:  trace edema  +tracheostomy    Labs:    Recent Results (from the past 24 hour(s))   GLUCOSE, POC    Collection Time: 17  7:13 PM   Result Value Ref Range    Glucose (POC) 133 (H) 70 - 110 mg/dL   GLUCOSE, POC    Collection Time: 17 11:22 PM   Result Value Ref Range    Glucose (POC) 143 (H) 70 - 110 mg/dL   CBC WITH AUTOMATED DIFF    Collection Time: 17  4:23 AM   Result Value Ref Range    WBC 11.2 4.6 - 13.2 K/uL    RBC 2.51 (L) 4.70 - 5.50 M/uL    HGB 7.5 (L) 13.0 - 16.0 g/dL    HCT 23.4 (L) 36.0 - 48.0 %    MCV 93.2 74.0 - 97.0 FL    MCH 29.9 24.0 - 34.0 PG    MCHC 32.1 31.0 - 37.0 g/dL    RDW 16.5 (H) 11.6 - 14.5 %    PLATELET 208 (H) 674 - 420 K/uL    MPV 9.8 9.2 - 11.8 FL    NEUTROPHILS 63 40 - 73 %    LYMPHOCYTES 24 21 - 52 %    MONOCYTES 7 3 - 10 %    EOSINOPHILS 6 (H) 0 - 5 %    BASOPHILS 0 0 - 2 %    ABS. NEUTROPHILS 7.0 1.8 - 8.0 K/UL    ABS. LYMPHOCYTES 2.7 0.9 - 3.6 K/UL    ABS. MONOCYTES 0.8 0.05 - 1.2 K/UL    ABS. EOSINOPHILS 0.7 (H) 0.0 - 0.4 K/UL    ABS.  BASOPHILS 0.0 0.0 - 0.1 K/UL    DF AUTOMATED     METABOLIC PANEL, BASIC    Collection Time: 09/17/17  4:23 AM   Result Value Ref Range    Sodium 133 (L) 136 - 145 mmol/L    Potassium 4.1 3.5 - 5.5 mmol/L    Chloride 101 100 - 108 mmol/L    CO2 19 (L) 21 - 32 mmol/L    Anion gap 13 3.0 - 18 mmol/L    Glucose 137 (H) 74 - 99 mg/dL    BUN 75 (H) 7.0 - 18 MG/DL    Creatinine 7.08 (H) 0.6 - 1.3 MG/DL    BUN/Creatinine ratio 11 (L) 12 - 20      GFR est AA 10 (L) >60 ml/min/1.73m2    GFR est non-AA 8 (L) >60 ml/min/1.73m2    Calcium 8.3 (L) 8.5 - 10.1 MG/DL   MAGNESIUM    Collection Time: 09/17/17  4:23 AM   Result Value Ref Range    Magnesium 3.2 (H) 1.6 - 2.6 mg/dL   CORTISOL    Collection Time: 09/17/17  4:23 AM   Result Value Ref Range    Cortisol, random 8.0 3.09 - 22.40 ug/dL   GLUCOSE, POC    Collection Time: 09/17/17  6:07 AM   Result Value Ref Range    Glucose (POC) 156 (H) 70 - 110 mg/dL   TYPE + CROSSMATCH    Collection Time: 09/17/17 10:00 AM   Result Value Ref Range    Crossmatch Expiration 09/20/2017     ABO/Rh(D) A POSITIVE     Antibody screen NEG     CALLED TO: San[facility] Araujo  ICU AT [id number] ON [id number] BY [id number]     Unit number [id number]     Blood component type RC LR AS1     Unit division 00     Status of unit ALLOCATED     Crossmatch result Compatible    GLUCOSE, POC    Collection Time: 09/17/17 11:29 AM   Result Value Ref Range    Glucose (POC) 163 (H) 70 - 110 mg/dL       Signed By: Stephanie Hinds MD     September 17, 2017

## 2017-09-18 ENCOUNTER — APPOINTMENT (OUTPATIENT)
Dept: GENERAL RADIOLOGY | Age: 57
DRG: 004 | End: 2017-09-18
Attending: PHYSICIAN ASSISTANT
Payer: MEDICARE

## 2017-09-18 LAB
ABO + RH BLD: NORMAL
ANION GAP SERPL CALC-SCNC: 14 MMOL/L (ref 3–18)
BACTERIA SPEC CULT: ABNORMAL
BACTERIA SPEC CULT: ABNORMAL
BASOPHILS # BLD: 0 K/UL (ref 0–0.06)
BASOPHILS NFR BLD: 0 % (ref 0–2)
BLD PROD TYP BPU: NORMAL
BLOOD GROUP ANTIBODIES SERPL: NORMAL
BPU ID: NORMAL
BUN SERPL-MCNC: 85 MG/DL (ref 7–18)
BUN/CREAT SERPL: 11 (ref 12–20)
CALCIUM SERPL-MCNC: 8.7 MG/DL (ref 8.5–10.1)
CALLED TO:,BCALL1: NORMAL
CHLORIDE SERPL-SCNC: 100 MMOL/L (ref 100–108)
CO2 SERPL-SCNC: 18 MMOL/L (ref 21–32)
CREAT SERPL-MCNC: 7.75 MG/DL (ref 0.6–1.3)
CROSSMATCH RESULT,%XM: NORMAL
DIFFERENTIAL METHOD BLD: ABNORMAL
EOSINOPHIL # BLD: 0.7 K/UL (ref 0–0.4)
EOSINOPHIL NFR BLD: 6 % (ref 0–5)
ERYTHROCYTE [DISTWIDTH] IN BLOOD BY AUTOMATED COUNT: 16.5 % (ref 11.6–14.5)
GLUCOSE BLD STRIP.AUTO-MCNC: 128 MG/DL (ref 70–110)
GLUCOSE BLD STRIP.AUTO-MCNC: 138 MG/DL (ref 70–110)
GLUCOSE BLD STRIP.AUTO-MCNC: 146 MG/DL (ref 70–110)
GLUCOSE SERPL-MCNC: 123 MG/DL (ref 74–99)
GRAM STN SPEC: ABNORMAL
HCT VFR BLD AUTO: 28 % (ref 36–48)
HGB BLD-MCNC: 9.3 G/DL (ref 13–16)
LYMPHOCYTES # BLD: 2.9 K/UL (ref 0.9–3.6)
LYMPHOCYTES NFR BLD: 23 % (ref 21–52)
MAGNESIUM SERPL-MCNC: 3.4 MG/DL (ref 1.6–2.6)
MCH RBC QN AUTO: 30.7 PG (ref 24–34)
MCHC RBC AUTO-ENTMCNC: 33.2 G/DL (ref 31–37)
MCV RBC AUTO: 92.4 FL (ref 74–97)
MONOCYTES # BLD: 1.3 K/UL (ref 0.05–1.2)
MONOCYTES NFR BLD: 10 % (ref 3–10)
NEUTS SEG # BLD: 7.9 K/UL (ref 1.8–8)
NEUTS SEG NFR BLD: 61 % (ref 40–73)
PLATELET # BLD AUTO: 497 K/UL (ref 135–420)
PMV BLD AUTO: 9.7 FL (ref 9.2–11.8)
POTASSIUM SERPL-SCNC: 4.5 MMOL/L (ref 3.5–5.5)
RBC # BLD AUTO: 3.03 M/UL (ref 4.7–5.5)
SERVICE CMNT-IMP: ABNORMAL
SODIUM SERPL-SCNC: 132 MMOL/L (ref 136–145)
SPECIMEN EXP DATE BLD: NORMAL
STATUS OF UNIT,%ST: NORMAL
UNIT DIVISION, %UDIV: 0
WBC # BLD AUTO: 12.9 K/UL (ref 4.6–13.2)

## 2017-09-18 PROCEDURE — 94003 VENT MGMT INPAT SUBQ DAY: CPT

## 2017-09-18 PROCEDURE — 77030010545

## 2017-09-18 PROCEDURE — 74011000250 HC RX REV CODE- 250: Performed by: PHYSICIAN ASSISTANT

## 2017-09-18 PROCEDURE — 74011250637 HC RX REV CODE- 250/637: Performed by: HOSPITALIST

## 2017-09-18 PROCEDURE — 65610000006 HC RM INTENSIVE CARE

## 2017-09-18 PROCEDURE — 83735 ASSAY OF MAGNESIUM: CPT | Performed by: NURSE PRACTITIONER

## 2017-09-18 PROCEDURE — 77030005546 HC CATH URETH FOL 3W BARD -A

## 2017-09-18 PROCEDURE — 36415 COLL VENOUS BLD VENIPUNCTURE: CPT | Performed by: NURSE PRACTITIONER

## 2017-09-18 PROCEDURE — 90935 HEMODIALYSIS ONE EVALUATION: CPT

## 2017-09-18 PROCEDURE — 80048 BASIC METABOLIC PNL TOTAL CA: CPT | Performed by: NURSE PRACTITIONER

## 2017-09-18 PROCEDURE — 74011250637 HC RX REV CODE- 250/637: Performed by: INTERNAL MEDICINE

## 2017-09-18 PROCEDURE — 74011250636 HC RX REV CODE- 250/636: Performed by: HOSPITALIST

## 2017-09-18 PROCEDURE — 77030011256 HC DRSG MEPILEX <16IN NO BORD MOLN -A

## 2017-09-18 PROCEDURE — 74011250636 HC RX REV CODE- 250/636: Performed by: INTERNAL MEDICINE

## 2017-09-18 PROCEDURE — 94640 AIRWAY INHALATION TREATMENT: CPT

## 2017-09-18 PROCEDURE — 74011000250 HC RX REV CODE- 250: Performed by: INTERNAL MEDICINE

## 2017-09-18 PROCEDURE — 74011000258 HC RX REV CODE- 258: Performed by: INTERNAL MEDICINE

## 2017-09-18 PROCEDURE — 82962 GLUCOSE BLOOD TEST: CPT

## 2017-09-18 PROCEDURE — 74011250637 HC RX REV CODE- 250/637: Performed by: NURSE PRACTITIONER

## 2017-09-18 PROCEDURE — 71010 XR CHEST PORT: CPT

## 2017-09-18 PROCEDURE — 85025 COMPLETE CBC W/AUTO DIFF WBC: CPT | Performed by: NURSE PRACTITIONER

## 2017-09-18 PROCEDURE — 77030010520

## 2017-09-18 RX ADMIN — IPRATROPIUM BROMIDE AND ALBUTEROL SULFATE 3 ML: 2.5; .5 SOLUTION RESPIRATORY (INHALATION) at 14:58

## 2017-09-18 RX ADMIN — Medication 1 PACKET: at 22:36

## 2017-09-18 RX ADMIN — Medication 1 PACKET: at 16:13

## 2017-09-18 RX ADMIN — Medication 1 CAPSULE: at 09:33

## 2017-09-18 RX ADMIN — MINERAL OIL AND WHITE PETROLATUM 1 DOSE: 150; 850 OINTMENT OPHTHALMIC at 12:13

## 2017-09-18 RX ADMIN — VITAMIN A AND VITAMIN D: 929.3 OINTMENT TOPICAL at 00:00

## 2017-09-18 RX ADMIN — CHLORHEXIDINE GLUCONATE 10 ML: 1.2 RINSE ORAL at 09:32

## 2017-09-18 RX ADMIN — MEROPENEM 500 MG: 500 INJECTION, POWDER, FOR SOLUTION INTRAVENOUS at 16:13

## 2017-09-18 RX ADMIN — HEPARIN SODIUM 5000 UNITS: 5000 INJECTION, SOLUTION INTRAVENOUS; SUBCUTANEOUS at 18:08

## 2017-09-18 RX ADMIN — MINERAL OIL AND WHITE PETROLATUM 1 DOSE: 150; 850 OINTMENT OPHTHALMIC at 00:00

## 2017-09-18 RX ADMIN — Medication 1 PACKET: at 09:33

## 2017-09-18 RX ADMIN — SIMVASTATIN 20 MG: 10 TABLET, FILM COATED ORAL at 22:36

## 2017-09-18 RX ADMIN — MINERAL OIL AND WHITE PETROLATUM 1 DOSE: 150; 850 OINTMENT OPHTHALMIC at 06:53

## 2017-09-18 RX ADMIN — IPRATROPIUM BROMIDE AND ALBUTEROL SULFATE 3 ML: 2.5; .5 SOLUTION RESPIRATORY (INHALATION) at 21:14

## 2017-09-18 RX ADMIN — ERYTHROPOIETIN 10000 UNITS: 10000 INJECTION, SOLUTION INTRAVENOUS; SUBCUTANEOUS at 13:43

## 2017-09-18 RX ADMIN — MINERAL OIL AND WHITE PETROLATUM 1 DOSE: 150; 850 OINTMENT OPHTHALMIC at 18:09

## 2017-09-18 RX ADMIN — HEPARIN SODIUM 5000 UNITS: 5000 INJECTION, SOLUTION INTRAVENOUS; SUBCUTANEOUS at 09:32

## 2017-09-18 RX ADMIN — HEPARIN SODIUM 5000 UNITS: 5000 INJECTION, SOLUTION INTRAVENOUS; SUBCUTANEOUS at 03:11

## 2017-09-18 RX ADMIN — COLLAGENASE SANTYL: 250 OINTMENT TOPICAL at 09:34

## 2017-09-18 RX ADMIN — VITAMIN A AND VITAMIN D: 929.3 OINTMENT TOPICAL at 06:53

## 2017-09-18 RX ADMIN — MIDODRINE HYDROCHLORIDE 10 MG: 2.5 TABLET ORAL at 09:33

## 2017-09-18 RX ADMIN — ASPIRIN 81 MG 81 MG: 81 TABLET ORAL at 09:33

## 2017-09-18 RX ADMIN — LEVETIRACETAM 500 MG: 5 INJECTION INTRAVENOUS at 22:35

## 2017-09-18 RX ADMIN — IRON SUCROSE 100 MG: 20 INJECTION, SOLUTION INTRAVENOUS at 13:43

## 2017-09-18 RX ADMIN — MIDODRINE HYDROCHLORIDE 10 MG: 2.5 TABLET ORAL at 12:12

## 2017-09-18 RX ADMIN — VITAMIN A AND VITAMIN D: 929.3 OINTMENT TOPICAL at 18:08

## 2017-09-18 RX ADMIN — VITAMIN A AND VITAMIN D: 929.3 OINTMENT TOPICAL at 23:16

## 2017-09-18 RX ADMIN — IPRATROPIUM BROMIDE AND ALBUTEROL SULFATE 3 ML: 2.5; .5 SOLUTION RESPIRATORY (INHALATION) at 09:24

## 2017-09-18 RX ADMIN — MINERAL OIL AND WHITE PETROLATUM 1 DOSE: 150; 850 OINTMENT OPHTHALMIC at 23:11

## 2017-09-18 RX ADMIN — FAMOTIDINE 20 MG: 10 INJECTION INTRAVENOUS at 09:33

## 2017-09-18 RX ADMIN — MIDODRINE HYDROCHLORIDE 10 MG: 2.5 TABLET ORAL at 16:13

## 2017-09-18 RX ADMIN — IPRATROPIUM BROMIDE AND ALBUTEROL SULFATE 3 ML: 2.5; .5 SOLUTION RESPIRATORY (INHALATION) at 01:32

## 2017-09-18 RX ADMIN — LOPERAMIDE HYDROCHLORIDE 2 MG: 2 CAPSULE ORAL at 23:10

## 2017-09-18 RX ADMIN — VITAMIN A AND VITAMIN D: 929.3 OINTMENT TOPICAL at 12:13

## 2017-09-18 RX ADMIN — LEVETIRACETAM 500 MG: 5 INJECTION INTRAVENOUS at 09:32

## 2017-09-18 RX ADMIN — CHLORHEXIDINE GLUCONATE 10 ML: 1.2 RINSE ORAL at 01:00

## 2017-09-18 NOTE — ROUTINE PROCESS
Bedside and Verbal shift change report given to Kevin Galvez RN (oncoming nurse) by Etelvina Angel RN (offgoing nurse). Report included the following information SBAR, Kardex, MAR and Recent Results. SITUATION:    Code Status: Full Code   Reason for Admission: Cardiac arrest ([facility]   Acidosis   Respiratory failure (HCC)   Anemia   ESRD needing dialysis ([facility]   Cardiac arrest ([facility]   Acute GI bleeding   Sepsis ([facility]   Hypotension   Anoxic brain damage (HCC)   ESRD (end stage renal disease) ([facility]   Colitis   dx   dx   DX   dx    [facility] day: 48   Problem List:       Hospital Problems  Date Reviewed: 9/8/2017          Codes Class Noted POA    Oropharyngeal dysphagia ICD-10-CM: R13.12  ICD-9-CM: 787.22  8/17/2017 Unknown        Cachexia ([facility] ICD-10-CM: R64  ICD-9-CM: 799.4  8/17/2017 Unknown        Acidosis ICD-10-CM: E87.2  ICD-9-CM: 276.2  7/27/2017 Yes        Cardiac arrest ([facility] ICD-10-CM: I46.9  ICD-9-CM: 427.5  7/27/2017 Unknown        Respiratory failure ([facility] ICD-10-CM: J96.90  ICD-9-CM: 518.81  7/27/2017 Unknown        ESRD needing dialysis ([facility] ICD-10-CM: N18.6, Z99.2  ICD-9-CM: 585.6  7/27/2017 Unknown        Anoxic brain damage ([facility] ICD-10-CM: G93.1  ICD-9-CM: 348.1  7/27/2017 Unknown        Colitis ICD-10-CM: K52.9  ICD-9-CM: 558.9  7/27/2017 Unknown        Hypotension ICD-10-CM: I95.9  ICD-9-CM: 458.9  7/27/2017 Unknown        Acute GI bleeding ICD-10-CM: K92.2  ICD-9-CM: 578.9  7/27/2017 Unknown        ESRD (end stage renal disease) ([facility] ICD-10-CM: N18.6  ICD-9-CM: 585.6  7/27/2017 Unknown        Sepsis ([facility] ICD-10-CM: A41.9  ICD-9-CM: 038.9, 995.91  7/27/2017 Unknown        Anemia ICD-10-CM: D64.9  ICD-9-CM: 285. 9  Unknown Unknown              BACKGROUND:    Past Medical History:   Past Medical History:   Diagnosis Date    Anemia     Arthritis     Chronic pain     Back     Diabetes mellitus type II     Hypertension     IBS (irritable bowel syndrome)     Lactose intolerance     TB (tuberculosis)     TB test positive         Patient taking anticoagulants: Yes     ASSESSMENT:    Changes in Assessment Throughout Shift: Tolerated hemodialysis today - 2 liters removed. Hemodynamically stable.       Patient has Central Line: No     Patient has Stanton Cath: No      Last Vitals:     Vitals:    09/18/17 1630 09/18/17 1700 09/18/17 1730 09/18/17 1800   BP: 95/51 117/64 139/72 139/69   Pulse: (!) 102 96 98 97   Resp: 17 17 19 17   Temp:       TempSrc:       SpO2: 100% 100% 100% 100%   Weight:       Height:            IV and DRAINS (will only show if present)   [REMOVED] Peripheral IV 08/01/17 Right Hand-Site Assessment: Clean, dry, & intact  [REMOVED] Peripheral IV 08/05/17 Right Arm-Site Assessment: Clean, dry, & intact  [REMOVED] Peripheral IV 08/09/17 Right Hand-Site Assessment: Clean, dry, & intact  [REMOVED] Peripheral IV 08/09/17 Right Antecubital-Site Assessment: Clean, dry, & intact  [REMOVED] Sheath 08/09/17-Site Assessment: Clean, dry, & intact  [REMOVED] Nasogastric Tube 08/14/17-Site Assessment: Clean, dry, & intact  [REMOVED] Peripheral IV 08/17/17 Right Forearm-Site Assessment: Clean, dry, & intact  [REMOVED] Peripheral IV 08/17/17 Right Wrist-Site Assessment: Clean, dry, & intact  [REMOVED] Airway - Endotracheal Tube 08/18/17 Oral-Site Assessment: Clean, dry, & intact  [REMOVED] Triple Lumen Left IJ CVL 08/18/17 Left Internal jugular-Site Assessment: Clean, dry, & intact  [REMOVED] Arterial Line 08/18/17 Right Radial artery-Site Assessment: Clean, dry, & intact  [REMOVED] Airway - Continuous Aspiration of Subglottic Secretions (MISBAH) Tube 08/18/17 Oral-Site Assessment: Clean, dry, & intact  [REMOVED] Nasogastric Tube 08/29/17-Site Assessment: Clean, dry, & intact  [REMOVED] Peripheral IV 09/04/17 Right Wrist-Site Assessment: Intact  [REMOVED] Airway - Continuous Aspiration of Subglottic Secretions (MISBAH) Tube 07/27/17 Oral-Site Assessment: Clean, dry, & intact  [REMOVED] Peripheral IV 09/06/17 Left Hand-Site Assessment: Clean, dry, & intact  [REMOVED] Peripheral IV 09/07/17 Right Arm-Site Assessment: Clean, dry, & intact  Airway - Tracheostomy Tube 09/08/17 Portex-Site Assessment: Clean, dry, & intact  PEG/Gastrostomy Tube 09/08/17-Site Assessment: Clean, dry, & intact  [REMOVED] Peripheral IV 09/10/17 Right Wrist-Site Assessment: Clean, dry, & intact  Peripheral IV 09/15/17 Right Forearm-Site Assessment: Clean, dry, & intact  [REMOVED] Nasogastric Tube 07/27/17-Site Assessment: Clean, dry, & intact  [REMOVED] Venous Access Device 07/27/17 Upper chest (subclavicular area, right-Site Assessment: Clean, dry, & intact  [REMOVED] Peripheral IV 07/27/17 Right Antecubital-Site Assessment: Clean, dry, & intact  [REMOVED] Triple Lumen 07/27/17 Right Internal jugular-Site Assessment: Clean, dry, & intact  [REMOVED] Peripheral IV 07/27/17 Right Other(comment)-Site Assessment: Clean, dry, & intact  [REMOVED] Peripheral IV 07/27/17 Right Antecubital-Site Assessment: Swelling     WOUND (if present)   Wound Type:  Sacral decub   Dressing present Dressing Present : Yes, Intact, not due to be changed   Wound Concerns/Notes:  Santyl ointment & Mepilex dressing daily & PRN     PAIN    Pain Assessment    Pain Intensity 1: 0 (09/18/17 0400)    Pain Location 1: Coccyx    Pain Intervention(s) 1: Repositioned, Rest, Therapeutic presence, Therapeutic touch    Patient Stated Pain Goal: Unable to verbalize/indicatate pain  o Interventions for Pain:  None  o Intervention effective: N/A  o Time of last intervention: N/A   o Reassessment Completed:  Yes      Last 3 Weights:  Last 3 Recorded Weights in this Encounter    09/16/17 0500 09/17/17 0504 09/18/17 0651   Weight: 62 kg (136 lb 11 oz) 71.9 kg (158 lb 8 oz) 68.8 kg (151 lb 9.6 oz)     Weight change: -3.13 kg (-6 lb 14.4 oz)     INTAKE/OUPUT    Current Shift:      Last three shifts: 09/17 0701 - 09/18 1900  In: 1844 [I.V.:700]  Out: 2700 [Drains:700]     LAB RESULTS     Recent Labs      09/18/17   0428  09/17/17   0423  09/16/17   0514   WBC  12.9  11.2  11.0   HGB  9.3*  7.5*  8.5*   HCT  28.0*  23.4*  26.4*   PLT  497*  462*  441*        Recent Labs      09/18/17   0428  09/17/17   0423  09/16/17   0514   NA  132*  133*  132*   K  4.5  4.1  3.9   GLU  123*  137*  87   BUN  85*  75*  86*   CREA  7.75*  7.08*  8.25*   CA  8.7  8.3*  8.7   MG  3.4*  3.2*  3.2*       RECOMMENDATIONS AND DISCHARGE PLANNING     1. Pending tests/procedures/ Plan of Care or Other Needs: Pulmonary hygiene; Wound care; Aspiration precautions; Hemodialysis as needed     2. Discharge plan for patient and Needs/Barriers: TBD    3. Estimated Discharge Date: TBD; Posted on Whiteboard in Patients Room: Yes      4. The patient's care plan was reviewed with the oncoming nurse. \"HEALS\" SAFETY CHECK      Fall Risk    Total Score: 2    Safety Measures: Safety Measures: Bed/Chair alarm on, Bed/Chair-Wheels locked, Bed in low position, Call light within reach, Emergency bedside equipment, Fall prevention (comment), Nurse at bedside, Side rails X 3, Seizure precaution    A safety check occurred in the patient's room between off going nurse and oncoming nurse listed above. The safety check included the below items  Area Items   H  High Alert Medications - Verify all high alert medication drips (heparin, PCA, etc.)   E  Equipment - Suction is set up for ALL patients (with yanker)  - Red plugs utilized for all equipment (IV pumps, etc.)  - WOWs wiped down at end of shift.  - Room stocked with oxygen, suction, and other unit-specific supplies   A  Alarms - Bed alarm is set for fall risk patients  - Ensure chair alarm is in place and activated if patient is up in a chair   L  Lines - Check IV for any infiltration  - Stanton bag is empty if patient has a Stanton   - Tubing and IV bags are labeled   S  Safety   - Room is clean, patient is clean, and equipment is clean.   - Hallways are clear from equipment besides carts. - Fall bracelet on for fall risk patients  - Ensure room is clear and free of clutter  - Suction is set up for ALL patients (with cary)  - Hallways are clear from equipment besides carts.    - Isolation precautions followed, supplies available outside room, sign posted     Krista Callejas RN

## 2017-09-18 NOTE — DIABETES MGMT
Glycemic Control: Pt discussed in interdisciplinary rounds. Blood glucose has ranged from 94 to 163 mg/dL. Pt is receiving 7 units of Lantus insulin nightly along with correctional Lispro insulin. Tolerating tube feeds at goal. Continue inpatient monitoring and intervention.      Jorge Campuzano RD, CDE

## 2017-09-18 NOTE — ROUTINE PROCESS
Received pt in bed. Pt calm  Mobility--bedrest  Respiratory-- vent setting-[place]a collar mode: spontanousTV  400  FIO2 28  Peep 8  GI--Peg Nepro at goal 50cc/hr  Gu--Anuria  Skin--dressing to sarcum    Bedside and Verbal shift change report given to Krista MUELLER (oncoming nurse) by Chantel Hill RN   (offgoing nurse). Report included the following information SBAR, Kardex, Intake/Output and MAR.

## 2017-09-18 NOTE — PROGRESS NOTES
[facility] Hospitalist Group  Progress Note    Patient: Henry Aguirre Age: 65 y.o. : 1960 MR#: [id number] SSN: [id number]  Date: 2017     Subjective:     Patient lying in bed , Unresponsive, Has trach and peg    Assessment/Plan:   -PEA arrest with acute hypoxic resp failure - trach care  -Acute metabolic and anoxic encephalopathy - monitor  -Pneumonia/VAP, sputum culture + Pseudamonas and klebsiella from . - on merrem  - Acute pontine CVA - asa  - ESRD , - HD as per nephro  - DM - basal/bolus insulin. - Unstageable pressure ulcer to sacrum/coccyx - wound care.   - Hypotension-on midodrine  - Is s/p PEG/trach.  monitor    Additional Notes:      Case discussed with:  []Patient  []Family  [x]Nursing  []Case Management  DVT Prophylaxis:  []Lovenox  []Hep SQ  []SCDs  []Coumadin   []On Heparin gtt    Objective:   VS:   Visit Vitals    /61    Pulse (!) 101    Temp 97.5 °F (36.4 °C)    Resp 18    Ht 6' 2\" (1.88 m)    Wt 68.8 kg (151 lb 9.6 oz)    SpO2 100%    BMI 19.46 kg/m2      Tmax/24hrs: Temp (24hrs), Av.1 °F (36.7 °C), Min:97.5 °F (36.4 °C), Max:98.9 °F (37.2 °C)      Intake/Output Summary (Last 24 hours) at 17 1423  Last data filed at 17 0620   Gross per 24 hour   Intake              455 ml   Output              700 ml   Net             -245 ml       General:  Eyes open but does not respond  Cardiovascular:  S1S2+, RRR  Pulmonary:  Coarse bs b/l  GI:  Soft, BS+, NT, ND, +peg  Extremities:  trace edema  +tracheostomy    Labs:    Recent Results (from the past 24 hour(s))   GLUCOSE, POC    Collection Time: 17  4:51 PM   Result Value Ref Range    Glucose (POC) 136 (H) 70 - 110 mg/dL   GLUCOSE, POC    Collection Time: 17 11:11 PM   Result Value Ref Range    Glucose (POC) 94 70 - 051 mg/dL   METABOLIC PANEL, BASIC    Collection Time: 17  4:28 AM   Result Value Ref Range    Sodium 132 (L) 136 - 145 mmol/L    Potassium 4.5 3.5 - 5.5 mmol/L    Chloride 100 100 - 108 mmol/L    CO2 18 (L) 21 - 32 mmol/L    Anion gap 14 3.0 - 18 mmol/L    Glucose 123 (H) 74 - 99 mg/dL    BUN 85 (H) 7.0 - 18 MG/DL    Creatinine 7.75 (H) 0.6 - 1.3 MG/DL    BUN/Creatinine ratio 11 (L) 12 - 20      GFR est AA 9 (L) >60 ml/min/1.73m2    GFR est non-AA 7 (L) >60 ml/min/1.73m2    Calcium 8.7 8.5 - 10.1 MG/DL   MAGNESIUM    Collection Time: 09/18/17  4:28 AM   Result Value Ref Range    Magnesium 3.4 (H) 1.6 - 2.6 mg/dL   CBC WITH AUTOMATED DIFF    Collection Time: 09/18/17  4:28 AM   Result Value Ref Range    WBC 12.9 4.6 - 13.2 K/uL    RBC 3.03 (L) 4.70 - 5.50 M/uL    HGB 9.3 (L) 13.0 - 16.0 g/dL    HCT 28.0 (L) 36.0 - 48.0 %    MCV 92.4 74.0 - 97.0 FL    MCH 30.7 24.0 - 34.0 PG    MCHC 33.2 31.0 - 37.0 g/dL    RDW 16.5 (H) 11.6 - 14.5 %    PLATELET 318 (H) 314 - 420 K/uL    MPV 9.7 9.2 - 11.8 FL    NEUTROPHILS 61 40 - 73 %    LYMPHOCYTES 23 21 - 52 %    MONOCYTES 10 3 - 10 %    EOSINOPHILS 6 (H) 0 - 5 %    BASOPHILS 0 0 - 2 %    ABS. NEUTROPHILS 7.9 1.8 - 8.0 K/UL    ABS. LYMPHOCYTES 2.9 0.9 - 3.6 K/UL    ABS. MONOCYTES 1.3 (H) 0.05 - 1.2 K/UL    ABS. EOSINOPHILS 0.7 (H) 0.0 - 0.4 K/UL    ABS.  BASOPHILS 0.0 0.0 - 0.06 K/UL    DF AUTOMATED     GLUCOSE, POC    Collection Time: 09/18/17  6:05 AM   Result Value Ref Range    Glucose (POC) 128 (H) 70 - 110 mg/dL   GLUCOSE, POC    Collection Time: 09/18/17 11:46 AM   Result Value Ref Range    Glucose (POC) 146 (H) 70 - 110 mg/dL       Signed By: Cranston Schlatter, MD     September 18, 2017

## 2017-09-18 NOTE — PROGRESS NOTES
Patient does not follow commants. No SBT today, although patient is on a spontaneous mode with PS 5 CPAP 8 Fi02 28%. Will continue to monitor.

## 2017-09-18 NOTE — DIALYSIS
ACUTE HEMODIALYSIS FLOW SHEET    HEMODIALYSIS ORDERS: Physician: Marcelo Ape:  Cris        Duration: 3 hr  BFR: 300   DFR: 600   Dialysate:  Temp 36-37 K+   3    Ca+  2.5 Na 140 Bicarb 30   Weight:  68.8 kg    Bed Scale []     Unable to Obtain []      Dry weight/UF Goal: 2000ml Access AVF  Needle Gauge 15    Heparin []  Bolus      Units    [] Hourly       Units    [x]None     Catheter locking solution NA   Pre BP:   128/60    Pulse:     93     Temperature:   97.5  Respirations: 15  Tx: NS       ml/Bolus  Other        [x] N/A   Labs: Pre        Post:        [x] N/A   Additional Orders(medications, blood products, hypotension management):       [x] N/A     [x] Time Out/Safety Check  [x] [facility] Consent Verified     CATHETER ACCESS: [x]N/A   []Right   []Left   []IJ     []Fem   [] First use X-ray verified     []Tunnel                [] Non Tunneled   []No S/S infection  []Redness  []Drainage []Cultured []Swelling []Pain   []Medical Aseptic Prep Utilized   []Dressing Changed  [] Biopatch  Date:       []Clotted   []Patent   Flows: []Good  []Poor  []Reversed   If access problem,  notified: []Yes    []N/A  Date:           GRAFT/FISTULA ACCESS:  []N/A     []Right     [x]Left     [x]UE     []LE   []AVG   [x]AVF        []Buttonhole    [x]Medical Aseptic Prep Utilized   [x]No S/S infection  []Redness  []Drainage []Cultured []Swelling []Pain    Bruit:   [x] Strong    [] Weak       Thrill :   [x] Strong    [] Weak       Needle Gauge: 15   Length: 1 in   If access problem,  notified: []Yes     [x]N/A  Date:        Please describe access if present and not used:       GENERAL ASSESSMENT:    LUNGS:  Rate 15 SaO2%  100     [] N/A    [] Clear  [x] Coarse  [] Crackles  [] Wheezing        [] Diminished     Location : []RLL   []LLL    [x]RUL  [x]   Cough: []Productive  []Dry  []N/A   Respirations:  []Easy  []Labored   Therapy:  []RA  []NC  l/min    Mask: []NRB []Venti       O2% []Ventilator  []Intubated  [x] Trach  [] BiPaP   CARDIAC: [x]Regular      [] Irregular   [] Pericardial Rub  [] JVD        [x]  Monitored  [x] Bedside  [] Remotely monitored [] N/A  Rhythm:    EDEMA: [] None  []Generalized  [] Pitting [] 1    [] 2    [] 3    [] 4                 [] Facial  [] Pedal  [x]  UE  [x] LE   SKIN:   [x] Warm  [] Hot     [] Cold   [x] Dry     [] Pale   [] Diaphoretic                  [] Flushed  [] Jaundiced  [] Cyanotic  [] Rash  [] Weeping   LOC:    [] Alert      []Oriented:    [] Person     [] Place  []Time               [] Confused  [] Lethargic  [] Medicated  [x] Non-responsive     GI / ABDOMEN:  [] Flat    [] Distended    [x] Soft    [] Firm   []  Obese                             [] Diarrhea  [x] Bowel Sounds  [] Nausea  [] Vomiting       / URINE ASSESSMENT:[] Voiding   [] Oliguria  [x] Anuria   []  Stanton     [] Incontinent    []  Incontinent Brief      []  Bathroom Privileges     PAIN: [x] 0 []1  []2   []3   []4   []5   []6   []7   []8   []9   []10            Scale 0-10  Action/Follow Up:    MOBILITY:  [] Amb    [] Amb/Assist    [x] Bed    [] Wheelchair  [] Stretcher      All Vitals and Treatment Details on Attached [address]: [facility]          Room # 309     [] 1st Time Acute  [] Stat  [x] Routine  [] Urgent     [] Acute Room  []  Bedside  [x] ICU/CCU  [] ER   Isolation Precautions:  [x] Dialysis   [] Airborne   [] Contact    [] Reverse   Special Considerations:         [] Blood Consent Verified [x]N/A     ALLERGIES:   [x] NKA          Code Status:  [x] Full Code  [] DNR  [] Other           HBsAg ONLY: Date Drawn 07/28/2017         [x]Negative []Positive []Unknown   HBsAb: Date     [] Susceptible   [x] Frgdst22 []Not Drawn  [] Drawn     Current Labs:    Date of Labs: Today []        Cut and paste current labs here. Results for Arturo Gudino (MRN [id number]) as of 9/18/2017 12:49   Ref.  Range 9/18/2017 04:28   WBC Latest Ref Range: 4.6 - 13.2 K/uL 12.9   RBC Latest Ref Range: 4.70 - 5.50 M/uL 3.03 (L)   HGB Latest Ref Range: 13.0 - 16.0 g/dL 9.3 (L)   HCT Latest Ref Range: 36.0 - 48.0 % 28.0 (L)   MCV Latest Ref Range: 74.0 - 97.0 FL 92.4   MCH Latest Ref Range: 24.0 - 34.0 PG 30.7   MCHC Latest Ref Range: 31.0 - 37.0 g/dL 33.2   RDW Latest Ref Range: 11.6 - 14.5 % 16.5 (H)   PLATELET Latest Ref Range: 135 - 420 K/uL 497 (H)   MPV Latest Ref Range: 9.2 - 11.8 FL 9.7   NEUTROPHILS Latest Ref Range: 40 - 73 % 61   LYMPHOCYTES Latest Ref Range: 21 - 52 % 23   MONOCYTES Latest Ref Range: 3 - 10 % 10   EOSINOPHILS Latest Ref Range: 0 - 5 % 6 (H)   BASOPHILS Latest Ref Range: 0 - 2 % 0   DF Latest Units:   AUTOMATED   ABS. NEUTROPHILS Latest Ref Range: 1.8 - 8.0 K/UL 7.9   ABS. LYMPHOCYTES Latest Ref Range: 0.9 - 3.6 K/UL 2.9   ABS. MONOCYTES Latest Ref Range: 0.05 - 1.2 K/UL 1.3 (H)   ABS. EOSINOPHILS Latest Ref Range: 0.0 - 0.4 K/UL 0.7 (H)   ABS. BASOPHILS Latest Ref Range: 0.0 - 0.06 K/UL 0.0   Sodium Latest Ref Range: 136 - 145 mmol/L 132 (L)   Potassium Latest Ref Range: 3.5 - 5.5 mmol/L 4.5   Chloride Latest Ref Range: 100 - 108 mmol/L 100   CO2 Latest Ref Range: 21 - 32 mmol/L 18 (L)   Anion gap Latest Ref Range: 3.0 - 18 mmol/L 14   Glucose Latest Ref Range: 74 - 99 mg/dL 123 (H)   BUN Latest Ref Range: 7.0 - 18 MG/DL 85 (H)   Creatinine Latest Ref Range: 0.6 - 1.3 MG/DL 7.75 (H)   BUN/Creatinine ratio Latest Ref Range: 12 - 20   11 (L)   Calcium Latest Ref Range: 8.5 - 10.1 MG/DL 8.7   Magnesium Latest Ref Range: 1.6 - 2.6 mg/dL 3.4 (H)   GFR est non-AA Latest Ref Range: >60 ml/min/1.73m2 7 (L)   GFR est AA Latest Ref Range: >60 ml/min/1.73m2 9 (L)                                                                                                                                 DIET:  [] Renal    [] Other     [x] NPO     []  Diabetic      PRIMARY NURSE REPORT: First initial/Last name/Title      Pre Dialysis: EMBER Morgan RN    Time: 1906      EDUCATION:    [x] Patient [] Other         Knowledge Basis: [x]None []Minimal [] Substantial   Barriers to learning non-responsive []N/A   [] Access Care     [] S&S of infection     [] Fluid Management     []K+     [x]Procedural    []Albumin     [x] Medications     [] Tx Options     [] Transplant     [] Diet     [] Other   Teaching Tools:  [x] Explain  [] Demo  [] Handouts [] Video  Patient response:   [] Verbalized understanding  [] Teach back  [] Return demonstration [x] Requires follow up   Inappropriate due to            [address] Before each treatment:     Machine Number:                   [address] [facility] [address]                                  [] Unit Machine #  with centralized RO                                  [x] Portable Machine #1/RO serial # [id number]                                  [] Portable Machine #2/RO serial # [id number]                                  [] Portable Machine #3/RO serial # [id number]                                                                                                       [address] [facility]                                  [] Portable Machine #11/RO serial # [id number]                                   [] Portable Machine #12/RO serial # [id number]                                  [] Portable Machine #13/RO serial #  [id number]      Alarm Test:  Pass time 4994         Other:         [x] RO/Machine Log Complete      Temp    37.0            [x]Extracorporeal Circuit Tested for integrity   Dialysate: pH  7.4 Conductivity: Meter   13.8     HD Machine   13.9                  TCD: 13.8  Dialyzer Lot # [id number]            Blood Tubing Lot # [id number]          Saline Lot #  [id number]     CHLORINE TESTING-Before each treatment and every 4 hours    Total Chlorine: [x] less than 0.1 ppm  Time: 1210 4 Hr/2nd Check Time:    (if greater than 0.1 ppm from Primary then every 30 minutes from Secondary)     TREATMENT INITIATION  with Dialysis Precautions:   [x] All Connections Secured [x] Saline Line Double Clamped   [x] Venous Parameters Set                  [x] Arterial Parameters Set    [x] Prime Given  250 ml                        [x]Air Foam Detector Engaged      Treatment Initiation Note: Spoke with Dr. Angelica Reilly and he stated to take 2L as tolerated due to blood pressure being stable, use 2K as K is 4.5. Arrived to bedside in ICU, pt non-responsive. Pt has LUE AVF with +bruit/thrill, cannulated x2 without complication, tx initiated. Medication Dose Volume Route Initials Dialyzer Cleared: [] Good [x] Fair  [] Poor    Blood processed:  48.1 L  UF Removed  2000 Ml    Post Wt: 66.8    kg  POst BP:   109/57       Pulse: 104      Respirations: 15  Temperature: 98.3     Epogen 99984v 1ml IV AP Post Tx Vascular Access: AVF/AVG: Bleeding stopped Art 5 min. Oscar. 8 Min        Venofer 100mg 100ml IVPB AP Catheter: Locking solution: Heparin 1:1000 Art. Oscar. N/A                                 Post Assessment:                                    Skin:  [x] Warm  [x] Dry [] Diaphoretic    [] Flushed  [] Pale [] Cyanotic   [facility] Signatures Title Initials  Time Lungs: [] Clear    [x] Course  [] Crackles  [] Wheezing [] Diminished   Hany Most RN AP  Cardiac: [x] Regular   [] Irregular   [] Monitor  [] N/A  Rhythm:           Edema:  [] None    [] General     [] Facial   [] Pedal    [x] UE    [x] LE       Pain: [x]0  []1  []2   []3  []4   []5   []6   []7   []8   []9   []10         Post Treatment Note: HD tx complete, patient tolerated procedure well, 2000ml net UF removed     POST TREATMENT PRIMARY NURSE HANDOFF REPORT:     First initial/Last name/Title         Post Dialysis: EMBER Nguyen RN Time:  7720     Abbreviations: AVG-arterial venous graft, AVF-arterial venous fistula, IJ-Internal Jugular, Subcl-Subclavian, Fem-Femoral, Tx-treatment, AP/HR-apical heart rate, DFR-dialysate flow rate, BFR-blood flow rate, AP-arterial pressure, -venous pressure, UF-ultrafiltrate, TMP-transmembrane pressure, Oscar-Venous, Art-Arterial, RO-Reverse Osmosis

## 2017-09-18 NOTE — PROGRESS NOTES
[facility]  ICU Progress Note      Name: Shirlene Winkler   : 1960   MRN: [id number]   Date: 2017 3:46 PM     [x]I have reviewed the flowsheet and previous days notes. Events overnight reviewed and discussed with nursing staff. Vital signs and records reviewed. HPI:  Rina Francis a 55 y. o.  male with PMH of DM, ESRD admitted PEA arrest. Patient's hospitalization was also found to have a CVA and was treated for E.coli and C.perferinges bacteremia. Subsequent PEA arrest 17. Pt is now s/p Trach and Peg on 17; pt remains on ventilator and off sedation. Subjective:  - Pt was tolerating T/C; however is back on vent to help improve RLL atelectasis; doing well on vent   - No new events overnight.  - HD today - goal is to pull 2L off  - Anuric  - No improvement in neuro status      [x]The patient is unable to give any meaningful history or review of systems because the patient is:  [x]Intubated []Sedated   [x]Unresponsive      [x]The patient is critically ill on      [x]Mechanical ventilation []Pressors   []BiPAP []                 ROS:Review of systems not obtained due to patient factors.     Medication Review:  · Pressors - N/A  · Sedation - N/A  · Antibiotics - Meropenem  · Pain - PRN Tylenol  · GI/ DVT - Pepcid; SQH  · Others (other gtts)    Safety Bundles: VAP Bundle/ CAUTI/ Severe Sepsis Protocol/ Electrolyte Replacement Protocol    Vital Signs:    Visit Vitals    /57    Pulse (!) 104    Temp 98.3 °F (36.8 °C)    Resp 15    Ht 6' 2\" (1.88 m)    Wt 68.8 kg (151 lb 9.6 oz)    SpO2 100%    BMI 19.46 kg/m2       O2 Device: Tracheostomy, Ventilator   O2 Flow Rate (L/min): 6 l/min   Temp (24hrs), Av °F (36.7 °C), Min:97.5 °F (36.4 °C), Max:98.9 °F (37.2 °C)       Intake/Output:   Last shift:       0701 -  1900  In: 940 [I.V.:200]  Out:    Last 3 shifts: 1901 -  0700  In: 0808 [I.V.:500]  Out: 800 [Drains:800]    Intake/Output Summary (Last 24 hours) at 09/18/17 1546  Last data filed at 09/18/17 1523   Gross per 24 hour   Intake             1450 ml   Output             2700 ml   Net            -1250 ml       Ventilator Settings:  Ventilator  Mode: Spontaneous, Pressure support  Respiratory Rate  Resp Rate Observed: 14  Back-Up Rate: 12  Insp Time (sec): 1 sec  Insp Flow (l/min): 44 l/min  I:E Ratio: varies  Ventilator Volumes  Vt Set (ml): 400 ml  Vt Exhaled (Machine Breath) (ml): 498 ml  Vt Spont (ml): 604 ml  Ve Observed (l/min): 11.2 l/min  Ventilator Pressures  PC Set: 400  Pressure Support (cm H2O): 5 cm H2O  PIP Observed (cm H2O): 21 cm H2O  Plateau Pressure (cm H2O): 14 cm H2O  MAP (cm H2O): 11  PEEP/VENT (cm H2O): 8 cm H20  Auto PEEP Observed (cm H2O): 0 cm H2O    Physical Exam:    General: Intubated/sedated; NAD; unresponsive  HEENT:  Anicteric sclerae; pink palpebral conjunctivae; mucosa moist  Resp:  Symmetrical chest expansion, no accessory muscle use;  Mod AE bilat; Decreased bibasilar; improved from previous; mildly course throughout; no rales/ wheezing/ rhonchi noted  CV:  S1, S2 present; RRR; No m/r/g  GI:  Abdomen soft, non-tender; (+) active bowel sounds  Extremities:  +2 pulses on all extremities; significant muscle atrophy, AV fistula LUE: + bruit + thrill; no edema/ cyanosis/ clubbing noted  Skin:  Warm; no rashes/ lesions noted  Neurologic: Pinpoint pupils nonreactive to light, + cough, -+ gag, decorticate posturing with stimulation  Devices:     09/08/17: Trach/Peg; FMS    DATA:     Current Facility-Administered Medications   Medication Dose Route Frequency    0.9% sodium chloride infusion 250 mL  250 mL IntraVENous PRN    chlorhexidine (PERIDEX) 0.12 % mouthwash 10 mL  10 mL Oral Q12H    insulin glargine (LANTUS) injection 7 Units  7 Units SubCUTAneous QHS    heparin (porcine) 1,000 unit/mL injection 1,000 Units  1,000 Units IntraVENous Q1H PRN    meropenem (MERREM) 500 mg in 0.9% sodium chloride (MBP/ADV) 50 mL MBP  0.5 g IntraVENous Q24H    albuterol-ipratropium (DUO-NEB) 2.5 MG-0.5 MG/3 ML  3 mL Nebulization Q6H RT    epoetin benjamin (EPOGEN;PROCRIT) injection 10,000 Units  10,000 Units IntraVENous EVERY MON & FRI    iron sucrose (VENOFER) 100 mg in 0.9% sodium chloride 100 mL IVPB  100 mg IntraVENous EVERY MON & FRI    midodrine (PROAMITINE) tablet 10 mg  10 mg Oral TID WITH MEALS    white petrolatum-mineral oil 85-15 % oint 1 Dose  1 Dose Ophthalmic Q6H    vits A and D-white pet-lanolin (A&D) ointment   Topical Q6H    albumin human 25% (BUMINATE) solution 12.5 g  12.5 g IntraVENous DIALYSIS PRN    Zinc Ox-Aloe Vera-Vitamin E (BALMEX) topical cream   Topical CONTINUOUS    banana flakes trans-galactooligosaccharide (BANATROL PLUS) powder 1 Packet  1 Packet Per NG tube TID    levETIRAcetam (KEPPRA) 500 mg in saline (iso-osm) 100 ml IVPB  500 mg IntraVENous Q12H    LORazepam (ATIVAN) injection 1 mg  1 mg IntraVENous Q4H PRN    insulin lispro (HUMALOG) injection   SubCUTAneous Q6H    famotidine (PF) (PEPCID) 20 mg in sodium chloride 0.9 % 10 mL injection  20 mg IntraVENous DAILY    collagenase (SANTYL) 250 unit/gram ointment   Topical DAILY    acetaminophen (TYLENOL) tablet 650 mg  650 mg Oral Q4H PRN    lactobacillus sp. 50 billion cpu (BIO-K PLUS) capsule 1 Cap  1 Cap Oral DAILY    loperamide (IMODIUM) capsule 2 mg  2 mg Oral Q6H PRN    heparin (porcine) injection 5,000 Units  5,000 Units SubCUTAneous Q8H    heparin (porcine) 1,000 unit/mL injection 2,000 Units  2,000 Units IntraVENous DIALYSIS ONCE    simvastatin (ZOCOR) tablet 20 mg  20 mg Oral QHS    aspirin chewable tablet 81 mg  81 mg Oral DAILY    glucose chewable tablet 16 g  4 Tab Oral PRN    glucagon (GLUCAGEN) injection 1 mg  1 mg IntraMUSCular PRN    dextrose (D50W) injection syrg 12.5-25 g  25-50 mL IntraVENous PRN    0.9% sodium chloride infusion  100 mL/hr IntraVENous DIALYSIS PRN    naloxone (NARCAN) injection 0.4 mg  0.4 mg IntraVENous PRN         Labs: Results:       Chemistry Recent Labs      09/18/17 0428 09/17/17 0423  09/16/17   0514   GLU  123*  137*  87   NA  132*  133*  132*   K  4.5  4.1  3.9   CL  100  101  101   CO2  18*  19*  20*   BUN  85*  75*  86*   CREA  7.75*  7.08*  8.25*   CA  8.7  8.3*  8.7   AGAP  14  13  11   BUCR  11*  11*  10*      CBC w/Diff Recent Labs      09/18/17 0428 09/17/17 0423  09/16/17   0514   WBC  12.9  11.2  11.0   RBC  3.03*  2.51*  2.79*   HGB  9.3*  7.5*  8.5*   HCT  28.0*  23.4*  26.4*   PLT  497*  462*  441*   GRANS  61  63  67   LYMPH  23  24  20*   EOS  6*  6*  5      Coagulation No results for input(s): PTP, INR, APTT in the last 72 hours. No lab exists for component: INREXT    Liver Enzymes No results for input(s): TP, ALB, TBIL, AP, SGOT, GPT in the last 72 hours. No lab exists for component: DBIL   ABG No results found for: PH, PHI, PCO2, PCO2I, PO2, PO2I, HCO3, HCO3I, FIO2, FIO2I   Microbiology No results for input(s): CULT in the last 72 hours. Telemetry: [x]Sinus []A-flutter []Paced    []A-fib []Multiple PVCs                    Imaging: No new imaging (09/18)  [x]I have personally reviewed the patients radiographs  []Radiographs reviewed with radiologist   []No change from prior, tubes and lines in adequate position  [x]Improved   []Worsening    IMPRESSION:   · Acute on Chronic Hypoxic Respiratory Failure - Multifactorial at this point; s/p Trach on 09/08; interval development of RLL atelectasis 2/2 positioning in bed, requiring ventilator support; remains on vent (09/18). · Acute Encephalopathy - Anoxic encephalopathy   · S/p PEA Cardiac arrest in dialysis 7/27/17 and 8/18/17  · VDRF - 2/2 neuro status   · Hypotension- Improving; multifactorial in pt with sepsis and possible Dysautonomia?   · RLL pneumonia +/- mucus plugging with atelectasis- Sputum cx 9/6/17 + pseudomonas aeruginosa and klebsiella pneumoniae -treated  · ESRD on HD  · DM2  · Acute pontine lacunar CVA  · Anemia of chronic disease - Improved, s/p 1U PRBC's (09/17)  · Oropharyngeal dysphagia s/p PEG  · Cachexia  · Unstageable pressure ulcer - Sacrum/coccyx  · Septic shock Bacteremia - Resolved;  E.coli and C.perferinges; s/p abx      PLAN:   · Resp -  Keep on Vent today; LEAVE ON VENT SUPPORT FOR NOW; SpO2 btwn 88-94%; improved RLL atelectasis with better positioning and vent support; improved lung exam sounds; pulmonary/bronchial hygiene; aspiration precautions- HOB>30'; con't  Duo-nebs q6; oral care; continue CPT; CXR in AM  · ID - ID following; Currently afebrile; aleukocytosis; monitor s/s for infection; trend temp curve; cont' ABX - Merrem; con't wound care for sacral ulcer  · CVS - HD stable; monitor for hypotension during dialysis today; con't Midodrine; MAP goal >65  · Heme/onc - Daily CBC; monitor H/H & plts; s/p 1U PRBC's (09/17); Hbg improved  · Metabolic - Daily BMP; monitor e-lytes; replace with caution pending dialysis schedule  · Renal - Trend renal indices; Nephrology following; HD today, tolerating well currently; goal to take 2L off; will f/u  · Endocrine - Glycemic control <180; SSI. No issues currently  · Neuro/ Pain/ Sedation - Unresponsive, off sedation; PRN Tylenol; Keppra 500mg BID  · Musculoskeletal: Consulted PT /OT - Especially in BUE - to presevrve anatomical positioning to avoid contractures.    · GI - NPO; PEG tube (09/08); TF to goal - Nepro @ 55mL/hr; Banana flakes for diarrhea; Probiotic; Pepcid  · Prophylaxis - DVT - SQH, GI - Pepcid   · Discussed in interdisciplinary rounds          The patient is: [x] acutely ill Risk of deterioration: [x] moderate    [x] critically ill  [x] high     [x]See my orders for details    My assessment/plan was discussed with:  [x]nursing [x]PT/OT    [x]respiratory therapy [x]Dr. Maeve Roque MD   [x]family []     [x]Total critical care time exclusive of procedures 45 minutes    Melvin Cartagena PA-C

## 2017-09-18 NOTE — PROGRESS NOTES
Infectious Disease Progress Note    Requested by: Dr. Delroy Benitez    Reason: sepsis, e.coli/clostridium pefringens bacteremia    Current abx Prior abx   Meropenem since 9/15/17 Levofloxacin 7/27  Pip/tazo 7/27-7/31; 8/2-8/10; 8/18-8/25  Meropenem  7/31-8/2,   vancomycin  7/27-8/2; 8/18-8/25, 9/7-9/13  Pip/tazo since 9/7-9/15  Ciprofloxacin since 9/11/17-9/15     Lines:       Assessment :  55year old man with h/o type 2 DM (hgb a1c 5.4 on 7/29/17) ,ESRD admitted to [facility] on 7/27/17 s/p cardiorespiratory arrest, hypotension, bacteremia, elevated LFTs. sepsis (POA) due to e.coli, clostridium perfringens bloodstream infection (positive blood culture 7/27, negative blood culture 8/1) . Most likely source of bacteremia is intra abdominal infection/inflammation. Elevated LFTs, thickened gallbladder - no evidence of cholecystitis on HIDA scan 8/2/17    Acute pontine CVA, s/p PEA arrest     Now with RLL atelectasis on cxr, low grade fevers tm:101 on 9/15/17, increasing wbc. Clinical picture consistent with RLL healthcare associated/ventilator associated pneumonia due to pseudomonas, klebsiella. Worsening wbc/fevers on current antibiotics likely due to decreased efficacy of current antibiotics (pseudomonas in sputum cx 9/6/17 had pip/tazo IRMA:32, was resistant to levofloxacin)     CT chest 9/15/17 revealed complete collapse of right lower lobe. Small effusion    S/p pulmonary toillete, chest PT with complete resolution of rll opacity on cxr 9/17/17      Recommendations:    1. cont meropenem (d3/7)  2. F/u clinically      Long term prognosis: poor    Advance Care planning: full code, patient wasnt able to name a surrogate decision maker or provide an advance care plan   Please call me if any further questions or concerns. Will continue to participate in the care of this patient.     subjective:  Non verbal         Home medications:   list reviewed        Current Facility-Administered Medications   Medication Dose Route Frequency    0.9% sodium chloride infusion 250 mL  250 mL IntraVENous PRN    chlorhexidine (PERIDEX) 0.12 % mouthwash 10 mL  10 mL Oral Q12H    insulin glargine (LANTUS) injection 7 Units  7 Units SubCUTAneous QHS    heparin (porcine) 1,000 unit/mL injection 1,000 Units  1,000 Units IntraVENous Q1H PRN    meropenem (MERREM) 500 mg in 0.9% sodium chloride (MBP/ADV) 50 mL MBP  0.5 g IntraVENous Q24H    albuterol-ipratropium (DUO-NEB) 2.5 MG-0.5 MG/3 ML  3 mL Nebulization Q6H RT    epoetin benjamin (EPOGEN;PROCRIT) injection 10,000 Units  10,000 Units IntraVENous EVERY MON & FRI    iron sucrose (VENOFER) 100 mg in 0.9% sodium chloride 100 mL IVPB  100 mg IntraVENous EVERY MON & FRI    midodrine (PROAMITINE) tablet 10 mg  10 mg Oral TID WITH MEALS    white petrolatum-mineral oil 85-15 % oint 1 Dose  1 Dose Ophthalmic Q6H    vits A and D-white pet-lanolin (A&D) ointment   Topical Q6H    albumin human 25% (BUMINATE) solution 12.5 g  12.5 g IntraVENous DIALYSIS PRN    Zinc Ox-Aloe Vera-Vitamin E (BALMEX) topical cream   Topical CONTINUOUS    banana flakes trans-galactooligosaccharide (BANATROL PLUS) powder 1 Packet  1 Packet Per NG tube TID    levETIRAcetam (KEPPRA) 500 mg in saline (iso-osm) 100 ml IVPB  500 mg IntraVENous Q12H    LORazepam (ATIVAN) injection 1 mg  1 mg IntraVENous Q4H PRN    insulin lispro (HUMALOG) injection   SubCUTAneous Q6H    famotidine (PF) (PEPCID) 20 mg in sodium chloride 0.9 % 10 mL injection  20 mg IntraVENous DAILY    collagenase (SANTYL) 250 unit/gram ointment   Topical DAILY    acetaminophen (TYLENOL) tablet 650 mg  650 mg Oral Q4H PRN    lactobacillus sp. 50 billion cpu (BIO-K PLUS) capsule 1 Cap  1 Cap Oral DAILY    loperamide (IMODIUM) capsule 2 mg  2 mg Oral Q6H PRN    heparin (porcine) injection 5,000 Units  5,000 Units SubCUTAneous Q8H    heparin (porcine) 1,000 unit/mL injection 2,000 Units  2,000 Units IntraVENous DIALYSIS ONCE    simvastatin (ZOCOR) tablet 20 mg 20 mg Oral QHS    aspirin chewable tablet 81 mg  81 mg Oral DAILY    glucose chewable tablet 16 g  4 Tab Oral PRN    glucagon (GLUCAGEN) injection 1 mg  1 mg IntraMUSCular PRN    dextrose (D50W) injection syrg 12.5-25 g  25-50 mL IntraVENous PRN    0.9% sodium chloride infusion  100 mL/hr IntraVENous DIALYSIS PRN    naloxone (NARCAN) injection 0.4 mg  0.4 mg IntraVENous PRN       Allergies: Review of patient's allergies indicates no known allergies. Temp (24hrs), Av.2 °F (36.8 °C), Min:97.5 °F (36.4 °C), Max:98.9 °F (37.2 °C)    Visit Vitals    /58    Pulse 93    Temp 97.5 °F (36.4 °C)    Resp 16    Ht 6' 2\" (1.88 m)    Wt 68.8 kg (151 lb 9.6 oz)    SpO2 100%    BMI 19.46 kg/m2       ROS: limited ROS obtained since patient not very communicative. Physical Exam:    General: Well developed, well nourished male laying on the bed,non communicative. HEENT:  Normocephalic, atraumatic, present conjunctival hemmohage;  nasal and oral mucous are moist and without evidence of lesions. Trach in place   Lymph Nodes:   no cervical, axillary or inguinal adenopathy   Lungs:   non-labored, decreased breath sounds right lung, no crackles wheezes rales or rhonchi   Heart:  RRR, s1 and s2; no  rubs or gallops, no edema, + pedal pulses   Abdomen:  soft, non-distended, active bowel sounds, no hepatomegaly, no splenomegaly.   Non-tender, peg in place   Genitourinary:  deferred   Extremities:   no clubbing, cyanosis; no joint effusions or swelling; muscle mass appropriate for age   Neurologic:  Detailed neuro eval not feasible on sedation                        Skin:  Sacral ulcers per report   Back:  no spinal or paraspinal muscle tenderness or rigidity, no CVA tenderness     Psychiatric:  Unable to assess         Labs: Results:   Chemistry Recent Labs      17   0428  17   0423  17   0514   GLU  123*  137*  87   NA  132*  133*  132*   K  4.5  4.1  3.9   CL  100  101  101   CO2  18*  19* 20*   BUN  85*  75*  86*   CREA  7.75*  7.08*  8.25*   CA  8.7  8.3*  8.7   AGAP  14  13  11   BUCR  11*  11*  10*      CBC w/Diff Recent Labs      09/18/17   0428  09/17/17   0423  09/16/17   0514   WBC  12.9  11.2  11.0   RBC  3.03*  2.51*  2.79*   HGB  9.3*  7.5*  8.5*   HCT  28.0*  23.4*  26.4*   PLT  497*  462*  441*   GRANS  61  63  67   LYMPH  23  24  20*   EOS  6*  6*  5      Microbiology No results for input(s): CULT in the last 72 hours.        RADIOLOGY:    All available imaging studies/reports in [facility] care for this admission were reviewed    Dr. Jaren Harrell, Infectious Disease Specialist  [phone]  September 18, 2017  4:28 PM

## 2017-09-18 NOTE — PROGRESS NOTES
RENAL DAILY PROGRESS NOTE      IMPRESSION:   ESRD, last HD on , planning for HD today , BP is better today  Anemia, on epo, received BT on   Encephalopathy, severe anoxic brain injury   Respiratory failure s/p trach , PEG. , cxr shows large right side pleural effusion   Hypokalemia, improving    PLAN:   BP is acceptable today , will arrange HD with 3K bath UF 1.5-2 L as tolerated   epo duirng HD         Addendum  At 2:28 PM on 2017, I saw and examined patient during hemodialysis treatment. The patient was receiving hemodialysis for treatment of  renal failure. I have also reviewed vital signs, intake and output, lab results and recent events, and agreed with today's dialysis order. Tolerating HD well so far  HD rounding    Blood pressure 121/61, pulse (!) 101, temperature 97.5 °F (36.4 °C), resp. rate 18, height 6' 2\" (1.88 m), weight 68.8 kg (151 lb 9.6 oz), SpO2 100 %.   Temp (24hrs), Av.1 °F (36.7 °C), Min:97.5 °F (36.4 °C), Max:98.9 °F (37.2 °C)      Blood Pressure: BP: 121/61  Pulse: Pulse (Heart Rate): (!) 101  Temp:  Temp: 97.5 °F (36.4 °C)    Artificial Kidney Dialyzer/Set Up Inspection: Revaclear   hours Duration of Treatment (hours): 2 hours   Heparin Bolus Heparin Bolus (units): 0 units  Blood flow rate Blood Flow Rate (ml/min): 300 ml/min   Dialysate rate     Arterial Access Pressure Arterial Access Pressure (mmHg): -80  Venous Return Pressure Venous Return Pressure (mmHg): 150  Ultrafiltration Rate Goal/Amount of Fluid to Remove (mL): 2000 mL                    Subjective:     64ty M with ESRD   Complaint:   No change in mental status  Remain full code  Severe anoxic injury  Family wants continue support  Overnight events noted  BP improving, off pressure      Current Facility-Administered Medications   Medication Dose Route Frequency    0.9% sodium chloride infusion 250 mL  250 mL IntraVENous PRN    chlorhexidine (PERIDEX) 0.12 % mouthwash 10 mL  10 mL Oral Q12H    insulin glargine (LANTUS) injection 7 Units  7 Units SubCUTAneous QHS    heparin (porcine) 1,000 unit/mL injection 1,000 Units  1,000 Units IntraVENous Q1H PRN    meropenem (MERREM) 500 mg in 0.9% sodium chloride (MBP/ADV) 50 mL MBP  0.5 g IntraVENous Q24H    albuterol-ipratropium (DUO-NEB) 2.5 MG-0.5 MG/3 ML  3 mL Nebulization Q6H RT    epoetin benjamin (EPOGEN;PROCRIT) injection 10,000 Units  10,000 Units IntraVENous EVERY MON & FRI    iron sucrose (VENOFER) 100 mg in 0.9% sodium chloride 100 mL IVPB  100 mg IntraVENous EVERY MON & FRI    midodrine (PROAMITINE) tablet 10 mg  10 mg Oral TID WITH MEALS    white petrolatum-mineral oil 85-15 % oint 1 Dose  1 Dose Ophthalmic Q6H    vits A and D-white pet-lanolin (A&D) ointment   Topical Q6H    albumin human 25% (BUMINATE) solution 12.5 g  12.5 g IntraVENous DIALYSIS PRN    Zinc Ox-Aloe Vera-Vitamin E (BALMEX) topical cream   Topical CONTINUOUS    banana flakes trans-galactooligosaccharide (BANATROL PLUS) powder 1 Packet  1 Packet Per NG tube TID    levETIRAcetam (KEPPRA) 500 mg in saline (iso-osm) 100 ml IVPB  500 mg IntraVENous Q12H    LORazepam (ATIVAN) injection 1 mg  1 mg IntraVENous Q4H PRN    insulin lispro (HUMALOG) injection   SubCUTAneous Q6H    famotidine (PF) (PEPCID) 20 mg in sodium chloride 0.9 % 10 mL injection  20 mg IntraVENous DAILY    collagenase (SANTYL) 250 unit/gram ointment   Topical DAILY    acetaminophen (TYLENOL) tablet 650 mg  650 mg Oral Q4H PRN    lactobacillus sp. 50 billion cpu (BIO-K PLUS) capsule 1 Cap  1 Cap Oral DAILY    loperamide (IMODIUM) capsule 2 mg  2 mg Oral Q6H PRN    heparin (porcine) injection 5,000 Units  5,000 Units SubCUTAneous Q8H    heparin (porcine) 1,000 unit/mL injection 2,000 Units  2,000 Units IntraVENous DIALYSIS ONCE    simvastatin (ZOCOR) tablet 20 mg  20 mg Oral QHS    aspirin chewable tablet 81 mg  81 mg Oral DAILY    glucose chewable tablet 16 g  4 Tab Oral PRN    glucagon (GLUCAGEN) injection 1 mg  1 mg IntraMUSCular PRN    dextrose (D50W) injection syrg 12.5-25 g  25-50 mL IntraVENous PRN    0.9% sodium chloride infusion  100 mL/hr IntraVENous DIALYSIS PRN    naloxone (NARCAN) injection 0.4 mg  0.4 mg IntraVENous PRN       Review of Symptoms: intubated   Objective:     Patient Vitals for the past 24 hrs:   Temp Pulse Resp BP SpO2   09/18/17 1000 - (!) 106 22 156/72 100 %   09/18/17 0913 - 93 18 - 100 %   09/18/17 0900 - 94 17 129/63 100 %   09/18/17 0800 98.4 °F (36.9 °C) 92 19 129/61 100 %   09/18/17 0725 98.4 °F (36.9 °C) - - - -   09/18/17 0700 - 93 20 144/69 100 %   09/18/17 0600 - 93 18 139/65 100 %   09/18/17 0500 - 91 16 139/62 100 %   09/18/17 0426 - 94 14 - 100 %   09/18/17 0400 97.9 °F (36.6 °C) 92 18 162/76 -   09/18/17 0300 - 87 15 119/66 100 %   09/18/17 0200 - 93 16 138/61 100 %   09/18/17 0133 - - - - 100 %   09/18/17 0100 - 87 14 108/51 100 %   09/18/17 0000 97.7 °F (36.5 °C) 87 14 134/62 100 %   09/17/17 2355 - 89 20 - 100 %   09/17/17 2300 - 89 14 110/51 100 %   09/17/17 2200 - 94 17 147/68 100 %   09/17/17 2100 - 92 16 113/54 -   09/17/17 2017 - 92 17 - 100 %   09/17/17 2014 - - - - 100 %   09/17/17 2000 98.9 °F (37.2 °C) 91 16 128/61 -   09/17/17 1900 - 93 16 119/66 -   09/17/17 1830 - 94 18 141/68 -   09/17/17 1800 - 92 18 148/71 100 %   09/17/17 1751 98.3 °F (36.8 °C) - - - -   09/17/17 1730 - 93 19 155/79 100 %   09/17/17 1700 - 93 12 118/51 100 %   09/17/17 1630 - 89 16 102/48 100 %   09/17/17 1600 - 88 19 96/43 100 %   09/17/17 1533 98.3 °F (36.8 °C) - - - -   09/17/17 1530 - 93 15 110/52 100 %   09/17/17 1516 98.6 °F (37 °C) - - - -   09/17/17 1510 - 90 16 - 100 %   09/17/17 1500 - 90 17 92/40 100 %   09/17/17 1430 - 87 17 (!) 92/38 100 %   09/17/17 1400 - 89 16 99/45 100 %   09/17/17 1330 - 95 19 103/51 100 %   09/17/17 1300 - 93 19 126/62 100 %   09/17/17 1233 - 94 19 - 100 %   09/17/17 1230 - 93 20 100/54 100 %   09/17/17 1200 - 94 18 96/57 100 %   09/17/17 1130 98.8 °F (37.1 °C) 95 16 117/55 100 %   09/17/17 1100 - 99 18 134/65 100 %        Weight change: -3.13 kg (-6 lb 14.4 oz)     09/16 1901 - 09/18 0700  In: 1515 [I.V.:500]  Out: 800 [Drains:800]    Intake/Output Summary (Last 24 hours) at 09/18/17 1059  Last data filed at 09/18/17 0620   Gross per 24 hour   Intake              855 ml   Output              700 ml   Net              155 ml     Physical Exam:   General: cachexia  HEENT  no thyromegaly  CVS: S1S2 heard,  no rub  RS: + air entry b/l,   Abd: Soft, Non tender,   Neuro: s/p trach  Extrm: +edema,   Access; left AVG + thrill            Data Review:     LABS:   Hematology:   Recent Labs      09/18/17   0428  09/17/17   0423  09/16/17   0514   WBC  12.9  11.2  11.0   HGB  9.3*  7.5*  8.5*   HCT  28.0*  23.4*  26.4*     Chemistry:   Recent Labs      09/18/17   0428  09/17/17   0423  09/16/17   0514   BUN  85*  75*  86*   CREA  7.75*  7.08*  8.25*   CA  8.7  8.3*  8.7   K  4.5  4.1  3.9   NA  132*  133*  132*   CL  100  101  101   CO2  18*  19*  20*   GLU  123*  137*  87              Procedures/imaging: see electronic medical records for all procedures, Xrays and details which were not copied into this note but were reviewed prior to creation of Plan          Assessment & Plan:     As above         Juan Hassan MD  9/18/2017  3:46 PM

## 2017-09-19 LAB
ANION GAP SERPL CALC-SCNC: 9 MMOL/L (ref 3–18)
BASOPHILS # BLD: 0 K/UL (ref 0–0.1)
BASOPHILS NFR BLD: 0 % (ref 0–2)
BUN SERPL-MCNC: 52 MG/DL (ref 7–18)
BUN/CREAT SERPL: 10 (ref 12–20)
CALCIUM SERPL-MCNC: 8.5 MG/DL (ref 8.5–10.1)
CHLORIDE SERPL-SCNC: 100 MMOL/L (ref 100–108)
CO2 SERPL-SCNC: 26 MMOL/L (ref 21–32)
CREAT SERPL-MCNC: 5.26 MG/DL (ref 0.6–1.3)
DIFFERENTIAL METHOD BLD: ABNORMAL
EOSINOPHIL # BLD: 0.6 K/UL (ref 0–0.4)
EOSINOPHIL NFR BLD: 5 % (ref 0–5)
ERYTHROCYTE [DISTWIDTH] IN BLOOD BY AUTOMATED COUNT: 16.4 % (ref 11.6–14.5)
GLUCOSE BLD STRIP.AUTO-MCNC: 149 MG/DL (ref 70–110)
GLUCOSE BLD STRIP.AUTO-MCNC: 197 MG/DL (ref 70–110)
GLUCOSE BLD STRIP.AUTO-MCNC: 226 MG/DL (ref 70–110)
GLUCOSE BLD STRIP.AUTO-MCNC: 56 MG/DL (ref 70–110)
GLUCOSE BLD STRIP.AUTO-MCNC: 80 MG/DL (ref 70–110)
GLUCOSE BLD STRIP.AUTO-MCNC: 97 MG/DL (ref 70–110)
GLUCOSE SERPL-MCNC: 62 MG/DL (ref 74–99)
HCT VFR BLD AUTO: 26.5 % (ref 36–48)
HGB BLD-MCNC: 8.6 G/DL (ref 13–16)
LYMPHOCYTES # BLD: 2.5 K/UL (ref 0.9–3.6)
LYMPHOCYTES NFR BLD: 20 % (ref 21–52)
MCH RBC QN AUTO: 29.9 PG (ref 24–34)
MCHC RBC AUTO-ENTMCNC: 32.5 G/DL (ref 31–37)
MCV RBC AUTO: 92 FL (ref 74–97)
MONOCYTES # BLD: 1.3 K/UL (ref 0.05–1.2)
MONOCYTES NFR BLD: 11 % (ref 3–10)
NEUTS SEG # BLD: 7.7 K/UL (ref 1.8–8)
NEUTS SEG NFR BLD: 64 % (ref 40–73)
PLATELET # BLD AUTO: 529 K/UL (ref 135–420)
PMV BLD AUTO: 9.6 FL (ref 9.2–11.8)
POTASSIUM SERPL-SCNC: 3.5 MMOL/L (ref 3.5–5.5)
RBC # BLD AUTO: 2.88 M/UL (ref 4.7–5.5)
SODIUM SERPL-SCNC: 135 MMOL/L (ref 136–145)
WBC # BLD AUTO: 12 K/UL (ref 4.6–13.2)

## 2017-09-19 PROCEDURE — 74011250637 HC RX REV CODE- 250/637: Performed by: HOSPITALIST

## 2017-09-19 PROCEDURE — 65610000006 HC RM INTENSIVE CARE

## 2017-09-19 PROCEDURE — 74011000258 HC RX REV CODE- 258: Performed by: INTERNAL MEDICINE

## 2017-09-19 PROCEDURE — 94640 AIRWAY INHALATION TREATMENT: CPT

## 2017-09-19 PROCEDURE — 74011636637 HC RX REV CODE- 636/637: Performed by: INTERNAL MEDICINE

## 2017-09-19 PROCEDURE — 74011250637 HC RX REV CODE- 250/637: Performed by: INTERNAL MEDICINE

## 2017-09-19 PROCEDURE — 74011250637 HC RX REV CODE- 250/637: Performed by: PHYSICIAN ASSISTANT

## 2017-09-19 PROCEDURE — 74011250637 HC RX REV CODE- 250/637: Performed by: NURSE PRACTITIONER

## 2017-09-19 PROCEDURE — 94003 VENT MGMT INPAT SUBQ DAY: CPT

## 2017-09-19 PROCEDURE — 85025 COMPLETE CBC W/AUTO DIFF WBC: CPT | Performed by: NURSE PRACTITIONER

## 2017-09-19 PROCEDURE — 97164 PT RE-EVAL EST PLAN CARE: CPT

## 2017-09-19 PROCEDURE — 77030033263 HC DRSG MEPILEX 16-48IN BORD MOLN -B

## 2017-09-19 PROCEDURE — 74011000250 HC RX REV CODE- 250: Performed by: PHYSICIAN ASSISTANT

## 2017-09-19 PROCEDURE — 97110 THERAPEUTIC EXERCISES: CPT

## 2017-09-19 PROCEDURE — 97168 OT RE-EVAL EST PLAN CARE: CPT

## 2017-09-19 PROCEDURE — 74011000250 HC RX REV CODE- 250: Performed by: INTERNAL MEDICINE

## 2017-09-19 PROCEDURE — 74011636637 HC RX REV CODE- 636/637: Performed by: NURSE PRACTITIONER

## 2017-09-19 PROCEDURE — 74011250636 HC RX REV CODE- 250/636: Performed by: HOSPITALIST

## 2017-09-19 PROCEDURE — 82962 GLUCOSE BLOOD TEST: CPT

## 2017-09-19 PROCEDURE — 36415 COLL VENOUS BLD VENIPUNCTURE: CPT | Performed by: NURSE PRACTITIONER

## 2017-09-19 PROCEDURE — 80048 BASIC METABOLIC PNL TOTAL CA: CPT | Performed by: NURSE PRACTITIONER

## 2017-09-19 PROCEDURE — 74011250636 HC RX REV CODE- 250/636: Performed by: INTERNAL MEDICINE

## 2017-09-19 RX ORDER — LOPERAMIDE HYDROCHLORIDE 2 MG/1
2 CAPSULE ORAL EVERY 6 HOURS
Status: COMPLETED | OUTPATIENT
Start: 2017-09-19 | End: 2017-09-20

## 2017-09-19 RX ADMIN — MINERAL OIL AND WHITE PETROLATUM 1 DOSE: 150; 850 OINTMENT OPHTHALMIC at 06:04

## 2017-09-19 RX ADMIN — Medication 1 PACKET: at 16:24

## 2017-09-19 RX ADMIN — HEPARIN SODIUM 5000 UNITS: 5000 INJECTION, SOLUTION INTRAVENOUS; SUBCUTANEOUS at 09:52

## 2017-09-19 RX ADMIN — LOPERAMIDE HYDROCHLORIDE 2 MG: 2 CAPSULE ORAL at 17:02

## 2017-09-19 RX ADMIN — CHLORHEXIDINE GLUCONATE 10 ML: 1.2 RINSE ORAL at 22:19

## 2017-09-19 RX ADMIN — Medication 1 CAPSULE: at 09:53

## 2017-09-19 RX ADMIN — ASPIRIN 81 MG 81 MG: 81 TABLET ORAL at 09:53

## 2017-09-19 RX ADMIN — IPRATROPIUM BROMIDE AND ALBUTEROL SULFATE 3 ML: 2.5; .5 SOLUTION RESPIRATORY (INHALATION) at 14:26

## 2017-09-19 RX ADMIN — VITAMIN A AND VITAMIN D: 929.3 OINTMENT TOPICAL at 06:08

## 2017-09-19 RX ADMIN — COLLAGENASE SANTYL: 250 OINTMENT TOPICAL at 09:54

## 2017-09-19 RX ADMIN — LEVETIRACETAM 500 MG: 5 INJECTION INTRAVENOUS at 22:19

## 2017-09-19 RX ADMIN — LEVETIRACETAM 500 MG: 5 INJECTION INTRAVENOUS at 09:53

## 2017-09-19 RX ADMIN — LOPERAMIDE HYDROCHLORIDE 2 MG: 2 CAPSULE ORAL at 12:09

## 2017-09-19 RX ADMIN — IPRATROPIUM BROMIDE AND ALBUTEROL SULFATE 3 ML: 2.5; .5 SOLUTION RESPIRATORY (INHALATION) at 07:40

## 2017-09-19 RX ADMIN — HEPARIN SODIUM 5000 UNITS: 5000 INJECTION, SOLUTION INTRAVENOUS; SUBCUTANEOUS at 02:11

## 2017-09-19 RX ADMIN — MIDODRINE HYDROCHLORIDE 10 MG: 2.5 TABLET ORAL at 09:53

## 2017-09-19 RX ADMIN — FAMOTIDINE 20 MG: 10 INJECTION INTRAVENOUS at 09:53

## 2017-09-19 RX ADMIN — VITAMIN A AND VITAMIN D: 929.3 OINTMENT TOPICAL at 12:11

## 2017-09-19 RX ADMIN — DEXTROSE MONOHYDRATE 25 G: 25 INJECTION, SOLUTION INTRAVENOUS at 06:05

## 2017-09-19 RX ADMIN — MEROPENEM 500 MG: 500 INJECTION, POWDER, FOR SOLUTION INTRAVENOUS at 16:18

## 2017-09-19 RX ADMIN — MINERAL OIL AND WHITE PETROLATUM 1 DOSE: 150; 850 OINTMENT OPHTHALMIC at 12:10

## 2017-09-19 RX ADMIN — IPRATROPIUM BROMIDE AND ALBUTEROL SULFATE 3 ML: 2.5; .5 SOLUTION RESPIRATORY (INHALATION) at 21:05

## 2017-09-19 RX ADMIN — HEPARIN SODIUM 5000 UNITS: 5000 INJECTION, SOLUTION INTRAVENOUS; SUBCUTANEOUS at 17:03

## 2017-09-19 RX ADMIN — MIDODRINE HYDROCHLORIDE 10 MG: 2.5 TABLET ORAL at 16:17

## 2017-09-19 RX ADMIN — IPRATROPIUM BROMIDE AND ALBUTEROL SULFATE 3 ML: 2.5; .5 SOLUTION RESPIRATORY (INHALATION) at 01:00

## 2017-09-19 RX ADMIN — Medication 1 PACKET: at 09:54

## 2017-09-19 RX ADMIN — INSULIN GLARGINE 7 UNITS: 100 INJECTION, SOLUTION SUBCUTANEOUS at 00:40

## 2017-09-19 RX ADMIN — SIMVASTATIN 20 MG: 10 TABLET, FILM COATED ORAL at 22:30

## 2017-09-19 RX ADMIN — VITAMIN A AND VITAMIN D: 929.3 OINTMENT TOPICAL at 17:03

## 2017-09-19 RX ADMIN — INSULIN LISPRO 6 UNITS: 100 INJECTION, SOLUTION INTRAVENOUS; SUBCUTANEOUS at 00:41

## 2017-09-19 RX ADMIN — Medication 1 PACKET: at 22:30

## 2017-09-19 RX ADMIN — CHLORHEXIDINE GLUCONATE 10 ML: 1.2 RINSE ORAL at 09:53

## 2017-09-19 RX ADMIN — MIDODRINE HYDROCHLORIDE 10 MG: 2.5 TABLET ORAL at 12:08

## 2017-09-19 RX ADMIN — MINERAL OIL AND WHITE PETROLATUM 1 DOSE: 150; 850 OINTMENT OPHTHALMIC at 17:03

## 2017-09-19 NOTE — PROGRESS NOTES
Problem: Mobility Impaired (Adult and Pediatric)  Goal: *Acute Goals and Plan of Care (Insert Text)  Goals to be addressed in 5-7 days:    1. Positioning: Trial use of abduction wedge for proper anatomical positioning for contracture prevention. 2. Education: Instruct/educate PT tech on FMP for PROM/abduction wedge placement. Outcome: Progressing Towards Goal  PHYSICAL THERAPY RE-EVALUATION     Patient: Ladi López [de-identified]65 y.o. male)  Date: 9/19/2017  Primary Diagnosis: Cardiac arrest ([facility]  Acidosis  Respiratory failure (HCC)  Anemia  ESRD needing dialysis ([facility]  Cardiac arrest ([facility]  Acute GI bleeding  Sepsis ([facility]  Hypotension  Anoxic brain damage (HCC)  ESRD (end stage renal disease) ([facility]  Colitis  dx  dx  DX  dx  Procedure(s) (LRB):  TRACHEOSTOMY/ PERCUTANEOUS ENDOSCOPIC GASTROSTOMY TUBE INSERTION (N/A)  PERCUTANEOUS ENDOSCOPIC GASTROSTOMY TUBE INSERTION (N/A) 11 Days Post-Op   Precautions:   Fall, Skin      ASSESSMENT :  Based on the objective data described below, the patient presents to PT s/p PEA arrest and CVA. Patient was intubated, now on trach with ventilator and has PEG tube placement. Patient does not follow motor command. Per YASMEEN Ballard, request for possible hand splints and PROM for functional maintenance of mobility. OT to address UE/splint needs. Assess PROM of B LE, significant tone noted with B LE (L>R) with movement. Patient may benefit from use of abduction wedge at times to facilitate proper positioning and contracture prevention. Plan for transition to [facility] with instruction/education with positioning/PROM. Karthikeyan Sanchez Patient will benefit from skilled intervention to address the above impairments.   Patients rehabilitation potential is considered to be Poor   Factors which may influence rehabilitation potential include:   [ ]         None noted  [X]         Mental ability/status  [X]         Medical condition  [ ]         Home/family situation and support systems  [ ]         Safety awareness  [ ]         Pain tolerance/management  [ ]         Other:        PLAN :  Recommendations and Planned Interventions:  [ ]           Bed Mobility Training             [ ]    Neuromuscular Re-Education  [ ]           Transfer Training                   [ ]    Orthotic/Prosthetic Training  [ ]           Gait Training                          [ ]    Modalities  [X]           Therapeutic Exercises          [ ]    Edema Management/Control  [X]           Therapeutic Activities            [X]    Patient and Family Training/Education  [ ]           Other (comment):     Frequency/Duration: Patient will be followed by physical therapy daily 3-5 x week to address goals.   Discharge Recommendations: LTAC/SNF  Further Equipment Recommendations for Discharge: hospital bed, mechanical lift        SUBJECTIVE:   Patient non verbal.      OBJECTIVE DATA SUMMARY:       Past Medical History:   Diagnosis Date    Anemia      Arthritis      Chronic pain       Back     Diabetes mellitus type II      Hypertension      IBS (irritable bowel syndrome)      Lactose intolerance      TB (tuberculosis)       TB test positive     Past Surgical History:   Procedure Laterality Date    CARDIAC CATHETERIZATION   8/9/2017          CORONARY ARTERY ANGIOGRAM   8/9/2017          ENDOSCOPY, COLON, DIAGNOSTIC        HX AMPUTATION         Toe due to injury    LV ANGIOGRAPHY   8/9/2017          MODERATE SEDATION   8/9/2017           Barriers to Learning/Limitations: yes;  cognitive  Compensate with: N/A  Prior Level of Function/Home Situation:   Home Situation  Home Environment: Private residence  # Steps to Enter: 3  Rails to Enter: Yes  Hand Rails : Bilateral  One/Two Story Residence: One story  Living Alone: No  Support Systems: Family member(s)  Patient Expects to be Discharged to[de-identified] Private residence  Current DME Used/Available at Home: None  Tub or Shower Type: Tub/Shower combination  Critical Behavior:  Neurologic State: Responds to noxious stimuli only  Psychosocial  Patient Behaviors: Not interactive  Strength:    Strength:  (Unable to accurately assess 2/2 min responsiveness)  Tone & Sensation:   Tone: Abnormal (Increased tone noted in RUE with PROM)  Sensation:  (unable to accurately assess 2/2 min responsiveness)   Range Of Motion:  AROM: Generally decreased, functional  Functional Mobility:  Bed Mobility:  Rolling:  (N/A)  Balance:   Sitting:  (N/A)  Pain:  Pain Scale 1: Adult Nonverbal Pain Scale  Activity Tolerance:   Fair  Please refer to the flowsheet for vital signs taken during this treatment. After treatment:   [ ] Patient left in no apparent distress sitting up in chair  [ ] Patient left sitting on EOB  [X] Patient left in no apparent distress in bed  [ ] Patient declined to be OOB at this time due to   [X] Call bell left within reach  [X] Nursing notified(AMI Velasco)  [ ] Caregiver present  [ ] Bed alarm activated      COMMUNICATION/EDUCATION:   [ ]         Fall prevention education was provided and the patient/caregiver indicated understanding. [ ]         Patient/family have participated as able in goal setting and plan of care. [ ]         Patient/family agree to work toward stated goals and plan of care. [ ]         Patient understands intent and goals of therapy, but is neutral about his/her participation. [X]         Patient is unable to participate in goal setting and plan of care. Thank you for this referral.  Kameron Giles, PT   Time Calculation: 20 mins      G-codes:  Mobility  Current  CI= 1-19%   Goal  CI= 1-19%. The severity rating is based on the Level of Assistance required for Functional Mobility and ADLs.

## 2017-09-19 NOTE — ROUTINE PROCESS
Bedside and Verbal shift change report given to Orin Aguilar RN (oncoming nurse) by Mika Garrison RN (offgoing nurse). Report included the following information SBAR, Kardex, MAR and Recent Results. SITUATION:    Code Status: Full Code   Reason for Admission: Cardiac arrest ([facility]   Acidosis   Respiratory failure (HCC)   Anemia   ESRD needing dialysis ([facility]   Cardiac arrest ([facility]   Acute GI bleeding   Sepsis ([facility]   Hypotension   Anoxic brain damage (HCC)   ESRD (end stage renal disease) ([facility]   Colitis   dx   dx   DX   dx    [facility] day: 47   Problem List:       Hospital Problems  Date Reviewed: 9/8/2017          Codes Class Noted POA    Oropharyngeal dysphagia ICD-10-CM: R13.12  ICD-9-CM: 787.22  8/17/2017 Unknown        Cachexia ([facility] ICD-10-CM: R64  ICD-9-CM: 799.4  8/17/2017 Unknown        Acidosis ICD-10-CM: E87.2  ICD-9-CM: 276.2  7/27/2017 Yes        Cardiac arrest ([facility] ICD-10-CM: I46.9  ICD-9-CM: 427.5  7/27/2017 Unknown        Respiratory failure ([facility] ICD-10-CM: J96.90  ICD-9-CM: 518.81  7/27/2017 Unknown        ESRD needing dialysis ([facility] ICD-10-CM: N18.6, Z99.2  ICD-9-CM: 585.6  7/27/2017 Unknown        Anoxic brain damage ([facility] ICD-10-CM: G93.1  ICD-9-CM: 348.1  7/27/2017 Unknown        Colitis ICD-10-CM: K52.9  ICD-9-CM: 558.9  7/27/2017 Unknown        Hypotension ICD-10-CM: I95.9  ICD-9-CM: 458.9  7/27/2017 Unknown        Acute GI bleeding ICD-10-CM: K92.2  ICD-9-CM: 578.9  7/27/2017 Unknown        ESRD (end stage renal disease) ([facility] ICD-10-CM: N18.6  ICD-9-CM: 585.6  7/27/2017 Unknown        Sepsis ([facility] ICD-10-CM: A41.9  ICD-9-CM: 038.9, 995.91  7/27/2017 Unknown        Anemia ICD-10-CM: D64.9  ICD-9-CM: 285. 9  Unknown Unknown              BACKGROUND:    Past Medical History:   Past Medical History:   Diagnosis Date    Anemia     Arthritis     Chronic pain     Back     Diabetes mellitus type II     Hypertension     IBS (irritable bowel syndrome)     Lactose intolerance     TB (tuberculosis)     TB test positive         Patient taking anticoagulants: Yes     ASSESSMENT:    Changes in Assessment Throughout Shift: Imodium 2 mg via PEG scheduled q 6 hr. Free water flushes decreased to 150 ml q 6 hr. Abduction pillow placed between legs, per PT order.      Patient has Central Line: No     Patient has Stanton Cath: No      Last Vitals:     Vitals:    09/19/17 1611 09/19/17 1700 09/19/17 1800 09/19/17 1900   BP:  100/50 119/59 119/61   Pulse: (!) 101 (!) 107 96 93   Resp: 17 11 11 17   Temp:       TempSrc:       SpO2: 100% 100% 100% 100%   Weight:       Height:            IV and DRAINS (will only show if present)   [REMOVED] Peripheral IV 08/01/17 Right Hand-Site Assessment: Clean, dry, & intact  [REMOVED] Peripheral IV 08/05/17 Right Arm-Site Assessment: Clean, dry, & intact  [REMOVED] Peripheral IV 08/09/17 Right Hand-Site Assessment: Clean, dry, & intact  [REMOVED] Peripheral IV 08/09/17 Right Antecubital-Site Assessment: Clean, dry, & intact  [REMOVED] Sheath 08/09/17-Site Assessment: Clean, dry, & intact  [REMOVED] Nasogastric Tube 08/14/17-Site Assessment: Clean, dry, & intact  [REMOVED] Peripheral IV 08/17/17 Right Forearm-Site Assessment: Clean, dry, & intact  [REMOVED] Peripheral IV 08/17/17 Right Wrist-Site Assessment: Clean, dry, & intact  [REMOVED] Airway - Endotracheal Tube 08/18/17 Oral-Site Assessment: Clean, dry, & intact  [REMOVED] Triple Lumen Left IJ CVL 08/18/17 Left Internal jugular-Site Assessment: Clean, dry, & intact  [REMOVED] Arterial Line 08/18/17 Right Radial artery-Site Assessment: Clean, dry, & intact  [REMOVED] Airway - Continuous Aspiration of Subglottic Secretions (MISBAH) Tube 08/18/17 Oral-Site Assessment: Clean, dry, & intact  [REMOVED] Nasogastric Tube 08/29/17-Site Assessment: Clean, dry, & intact  [REMOVED] Peripheral IV 09/04/17 Right Wrist-Site Assessment: Intact  [REMOVED] Airway - Continuous Aspiration of Subglottic Secretions (MISBHA) Tube 07/27/17 Oral-Site Assessment: Clean, dry, & intact  [REMOVED] Peripheral IV 09/06/17 Left Hand-Site Assessment: Clean, dry, & intact  [REMOVED] Peripheral IV 09/07/17 Right Arm-Site Assessment: Clean, dry, & intact  Airway - Tracheostomy Tube 09/08/17 Portex-Site Assessment: Clean, dry, & intact  PEG/Gastrostomy Tube 09/08/17-Site Assessment: Clean, dry, & intact  [REMOVED] Peripheral IV 09/10/17 Right Wrist-Site Assessment: Clean, dry, & intact  Peripheral IV 09/15/17 Right Forearm-Site Assessment: Clean, dry, & intact  Rectal Tube-Site Assessment: Clean, dry, & intact  [REMOVED] Nasogastric Tube 07/27/17-Site Assessment: Clean, dry, & intact  [REMOVED] Venous Access Device 07/27/17 Upper chest (subclavicular area, right-Site Assessment: Clean, dry, & intact  [REMOVED] Peripheral IV 07/27/17 Right Antecubital-Site Assessment: Clean, dry, & intact  [REMOVED] Triple Lumen 07/27/17 Right Internal jugular-Site Assessment: Clean, dry, & intact  [REMOVED] Peripheral IV 07/27/17 Right Other(comment)-Site Assessment: Clean, dry, & intact  [REMOVED] Peripheral IV 07/27/17 Right Antecubital-Site Assessment: Swelling     WOUND (if present)   Wound Type:  Sacral wound   Dressing present Dressing Present : Yes, Intact, not due to be changed   Wound Concerns/Notes:  Santyl and Mepilex daily & PRN     PAIN    Pain Assessment    Pain Intensity 1: 0 (09/18/17 0400)    Pain Location 1: Coccyx    Pain Intervention(s) 1: Repositioned, Rest, Therapeutic presence, Therapeutic touch    Patient Stated Pain Goal: Unable to verbalize/indicatate pain  o Interventions for Pain:  None  o Intervention effective: N/A  o Time of last intervention: N/A   o Reassessment Completed:  Yes      Last 3 Weights:  Last 3 Recorded Weights in this Encounter    09/16/17 0500 09/17/17 0504 09/18/17 0651   Weight: 62 kg (136 lb 11 oz) 71.9 kg (158 lb 8 oz) 68.8 kg (151 lb 9.6 oz)     Weight change:      INTAKE/OUPUT    Current Shift:      Last three shifts: 09/18 0701 - 09/19 1900  In: 3880 [I.V.:500]  Out: 2550 [Drains:550]     LAB RESULTS     Recent Labs      09/19/17 0446 09/18/17 0428 09/17/17 0423   WBC  12.0  12.9  11.2   HGB  8.6*  9.3*  7.5*   HCT  26.5*  28.0*  23.4*   PLT  529*  497*  462*        Recent Labs      09/19/17 0446 09/18/17 0428 09/17/17 0423   NA  135*  132*  133*   K  3.5  4.5  4.1   GLU  62*  123*  137*   BUN  52*  85*  75*   CREA  5.26*  7.75*  7.08*   CA  8.5  8.7  8.3*   MG   --   3.4*  3.2*       RECOMMENDATIONS AND DISCHARGE PLANNING     1. Pending tests/procedures/ Plan of Care or Other Needs: Pulmonary hygiene; Wound care; Aspiration precautions; Hemodialysis as needed    2. Discharge plan for patient and Needs/Barriers: TBD    3. Estimated Discharge Date: TBD; Posted on Whiteboard in Patients Room: Yes      4. The patient's care plan was reviewed with the oncoming nurse. \"HEALS\" SAFETY CHECK      Fall Risk    Total Score: 2    Safety Measures: Safety Measures: Bed/Chair-Wheels locked, Bed in low position, Call light within reach, Emergency bedside equipment, Fall prevention (comment), Nurse at bedside, Side rails X 3    A safety check occurred in the patient's room between off going nurse and oncoming nurse listed above.     The safety check included the below items  Area Items   H  High Alert Medications - Verify all high alert medication drips (heparin, PCA, etc.)   E  Equipment - Suction is set up for ALL patients (with yanker)  - Red plugs utilized for all equipment (IV pumps, etc.)  - WOWs wiped down at end of shift.  - Room stocked with oxygen, suction, and other unit-specific supplies   A  Alarms - Bed alarm is set for fall risk patients  - Ensure chair alarm is in place and activated if patient is up in a chair   L  Lines - Check IV for any infiltration  - Stanton bag is empty if patient has a Stanton   - Tubing and IV bags are labeled   S  Safety   - Room is clean, patient is clean, and equipment is clean. - Hallways are clear from equipment besides carts. - Fall bracelet on for fall risk patients  - Ensure room is clear and free of clutter  - Suction is set up for ALL patients (with cary)  - Hallways are clear from equipment besides carts.    - Isolation precautions followed, supplies available outside room, sign posted     Krista Gonzalez RN

## 2017-09-19 NOTE — PROGRESS NOTES
[facility]  ICU Progress Note      Name: Kaur Pascal   : 1960   MRN: [id number]   Date: 2017 3:46 PM     [x]I have reviewed the flowsheet and previous days notes. Events overnight reviewed and discussed with nursing staff. Vital signs and records reviewed. HPI:  Claudia Conde a 55 y. o.  male with PMH of DM, ESRD admitted PEA arrest. Patient's hospitalization was also found to have a CVA and was treated for E.coli and C.perferinges bacteremia. Subsequent PEA arrest 17. Pt is now s/p Trach and Peg on 17; pt remains on ventilator and off sedation. Subjective:    17:  - No new events overnight. - Remains on vent to help improve RLL atelectasis; doing well; currently on SBT with pressure support  - HD () - 2L removed; tolerated well  - Anuric  - (+) corneal reflex today; otherwise neuro status is unchanged      [x]The patient is unable to give any meaningful history or review of systems because the patient is:  [x]Intubated []Sedated   [x]Unresponsive      [x]The patient is critically ill on      [x]Mechanical ventilation []Pressors   []BiPAP []                 ROS:Review of systems not obtained due to patient factors.     Medication Review:  · Pressors - N/A  · Sedation - N/A  · Antibiotics - Meropenem  · Pain - PRN Tylenol  · GI/ DVT - Pepcid; SQH  · Others (other gtts)    Safety Bundles: VAP Bundle/ CAUTI/ Severe Sepsis Protocol/ Electrolyte Replacement Protocol    Vital Signs:    Visit Vitals    /59    Pulse 98    Temp 98.9 °F (37.2 °C)    Resp 19    Ht 6' 2\" (1.88 m)    Wt 68.8 kg (151 lb 9.6 oz)    SpO2 100%    BMI 19.46 kg/m2       O2 Device: Tracheostomy, Ventilator   O2 Flow Rate (L/min): 6 l/min   Temp (24hrs), Av °F (37.2 °C), Min:97.5 °F (36.4 °C), Max:100.3 °F (37.9 °C)       Intake/Output:   Last shift:         Last 3 shifts: 091901 -  0700  In: 2980 [I.V.:300]  Out: 2600 [Drains:600]    Intake/Output Summary (Last 24 hours) at 09/19/17 1149  Last data filed at 09/19/17 0700   Gross per 24 hour   Intake             2100 ml   Output             2000 ml   Net              100 ml       Ventilator Settings:  Ventilator  Mode: Spontaneous  Respiratory Rate  Resp Rate Observed: 14  Back-Up Rate: 12  Insp Time (sec): 1 sec  Insp Flow (l/min): 44 l/min  I:E Ratio: varies  Ventilator Volumes  Vt Set (ml): 400 ml  Vt Exhaled (Machine Breath) (ml): 431 ml  Vt Spont (ml): 347 ml  Ve Observed (l/min): 7.53 l/min  Ventilator Pressures  PC Set: 400  Pressure Support (cm H2O): 5 cm H2O  PIP Observed (cm H2O): 16 cm H2O  Plateau Pressure (cm H2O): 14 cm H2O  MAP (cm H2O): 11  PEEP/VENT (cm H2O): 8 cm H20  Auto PEEP Observed (cm H2O): 0 cm H2O    Physical Exam:    General: Intubated/sedated; NAD; unresponsive  HEENT:  Anicteric sclerae; pink palpebral conjunctivae; mucosa moist  Resp:  Symmetrical chest expansion, no accessory muscle use;  Mod AE bilat; Decreased bibasilar; improved from previous; mildly course throughout; no rales/ wheezing/ rhonchi noted  CV:  S1, S2 present; RRR; No m/r/g  GI:  Abdomen soft, non-tender; (+) active bowel sounds  Extremities:  +2 pulses on all extremities; significant muscle atrophy, AV fistula LUE: + bruit + thrill; no edema/ cyanosis/ clubbing noted  Skin:  Warm; no rashes/ lesions noted  Neurologic: Pinpoint pupils nonreactive to light, + cough, -+ gag, decorticate posturing with stimulation; (+) corneal reflex   Devices:     09/08/17: Trach/Peg   09/19/17: Rectal Stanton (FMS removed)    DATA:     Current Facility-Administered Medications   Medication Dose Route Frequency    loperamide (IMODIUM) capsule 2 mg  2 mg Oral Q6H    0.9% sodium chloride infusion 250 mL  250 mL IntraVENous PRN    chlorhexidine (PERIDEX) 0.12 % mouthwash 10 mL  10 mL Oral Q12H    insulin glargine (LANTUS) injection 7 Units  7 Units SubCUTAneous QHS    heparin (porcine) 1,000 unit/mL injection 1,000 Units  1,000 Units IntraVENous Q1H PRN    meropenem (MERREM) 500 mg in 0.9% sodium chloride (MBP/ADV) 50 mL MBP  0.5 g IntraVENous Q24H    albuterol-ipratropium (DUO-NEB) 2.5 MG-0.5 MG/3 ML  3 mL Nebulization Q6H RT    epoetin benjamin (EPOGEN;PROCRIT) injection 10,000 Units  10,000 Units IntraVENous EVERY MON & FRI    iron sucrose (VENOFER) 100 mg in 0.9% sodium chloride 100 mL IVPB  100 mg IntraVENous EVERY MON & FRI    midodrine (PROAMITINE) tablet 10 mg  10 mg Oral TID WITH MEALS    white petrolatum-mineral oil 85-15 % oint 1 Dose  1 Dose Ophthalmic Q6H    vits A and D-white pet-lanolin (A&D) ointment   Topical Q6H    albumin human 25% (BUMINATE) solution 12.5 g  12.5 g IntraVENous DIALYSIS PRN    Zinc Ox-Aloe Vera-Vitamin E (BALMEX) topical cream   Topical CONTINUOUS    banana flakes trans-galactooligosaccharide (BANATROL PLUS) powder 1 Packet  1 Packet Per NG tube TID    levETIRAcetam (KEPPRA) 500 mg in saline (iso-osm) 100 ml IVPB  500 mg IntraVENous Q12H    LORazepam (ATIVAN) injection 1 mg  1 mg IntraVENous Q4H PRN    insulin lispro (HUMALOG) injection   SubCUTAneous Q6H    famotidine (PF) (PEPCID) 20 mg in sodium chloride 0.9 % 10 mL injection  20 mg IntraVENous DAILY    collagenase (SANTYL) 250 unit/gram ointment   Topical DAILY    acetaminophen (TYLENOL) tablet 650 mg  650 mg Oral Q4H PRN    lactobacillus sp. 50 billion cpu (BIO-K PLUS) capsule 1 Cap  1 Cap Oral DAILY    heparin (porcine) injection 5,000 Units  5,000 Units SubCUTAneous Q8H    heparin (porcine) 1,000 unit/mL injection 2,000 Units  2,000 Units IntraVENous DIALYSIS ONCE    simvastatin (ZOCOR) tablet 20 mg  20 mg Oral QHS    aspirin chewable tablet 81 mg  81 mg Oral DAILY    glucose chewable tablet 16 g  4 Tab Oral PRN    glucagon (GLUCAGEN) injection 1 mg  1 mg IntraMUSCular PRN    dextrose (D50W) injection syrg 12.5-25 g  25-50 mL IntraVENous PRN    0.9% sodium chloride infusion  100 mL/hr IntraVENous DIALYSIS PRN    naloxone (NARCAN) injection 0.4 mg  0.4 mg IntraVENous PRN         Labs: Results:       Chemistry Recent Labs      09/19/17 0446 09/18/17 0428 09/17/17 0423   GLU  62*  123*  137*   NA  135*  132*  133*   K  3.5  4.5  4.1   CL  100  100  101   CO2  26  18*  19*   BUN  52*  85*  75*   CREA  5.26*  7.75*  7.08*   CA  8.5  8.7  8.3*   AGAP  9  14  13   BUCR  10*  11*  11*      CBC w/Diff Recent Labs      09/19/17 0446 09/18/17 0428 09/17/17 0423   WBC  12.0  12.9  11.2   RBC  2.88*  3.03*  2.51*   HGB  8.6*  9.3*  7.5*   HCT  26.5*  28.0*  23.4*   PLT  529*  497*  462*   GRANS  64  61  63   LYMPH  20*  23  24   EOS  5  6*  6*      Coagulation No results for input(s): PTP, INR, APTT in the last 72 hours. No lab exists for component: INREXT, INREXT    Liver Enzymes No results for input(s): TP, ALB, TBIL, AP, SGOT, GPT in the last 72 hours. No lab exists for component: DBIL   ABG No results found for: PH, PHI, PCO2, PCO2I, PO2, PO2I, HCO3, HCO3I, FIO2, FIO2I   Microbiology No results for input(s): CULT in the last 72 hours. Telemetry: [x]Sinus []A-flutter []Paced    []A-fib []Multiple PVCs                    Imaging: No new imaging (09/18)  [x]I have personally reviewed the patients radiographs  []Radiographs reviewed with radiologist   []No change from prior, tubes and lines in adequate position  [x]Improved   []Worsening    IMPRESSION:   · Acute on Chronic Hypoxic Respiratory Failure - Multifactorial at this point; s/p Trach on 09/08; interval development of RLL atelectasis 2/2 positioning in bed, requiring ventilator support; remains on vent (09/18) with improvement in CXR (09/18) and improved lung exam (09/19)  · Acute Encephalopathy - Anoxic encephalopathy,2/2 below  · S/p PEA Cardiac arrest in dialysis 7/27/17 and 8/18/17  · VDRF - 2/2 neuro status   · Hypotension- Improving; multifactorial in pt with sepsis and possible Dysautonomia?   · RLL pneumonia +/- mucus plugging with atelectasis- Sputum cx 9/6/17 + pseudomonas aeruginosa and klebsiella pneumoniae -treated; Resp Cx (09/15) (+) Psudomonas Aeruginosa -remains on ABX with ID following  · ESRD on HD  · DM2 - SSI coverage  · Acute pontine lacunar CVA  · Anemia of chronic disease - Improved, s/p 1U PRBC's (09/17)  · Oropharyngeal dysphagia s/p PEG  · Cachexia  · Unstageable pressure ulcer - Sacrum/coccyx  · Septic shock Bacteremia - Resolved;  E.coli and C.perferinges; s/p abx      PLAN:   · Resp -  KEEP ON VENT SUPPORT, NO TRACH COLLAR TRIALS; currently tolerated SBT with pressure support of 5, PEEP of 8; SpO2 btwn 88-94%; improved RLL atelectasis with better positioning and vent support; improved lung exam sounds; pulmonary/bronchial hygiene; aspiration precautions- HOB>30'; con't  Duo-nebs q6; oral care; continue CPT; CXR PRN for respiratory distress or drop in SpO2  · ID - ID following; Currently afebrile; aleukocytosis; monitor s/s for infection; trend temp curve; cont' ABX - Merrem; con't wound care for sacral ulcer; Resp Cx (09/15) (+) Psudomonas Aeruginosa   · CVS - HD stable; chronic hypotension; con't Midodrine; MAP goal >65  · Heme/onc - Daily CBC; monitor H/H & plts; hgb stable  · Metabolic - Daily BMP; monitor e-lytes; replace with caution pending dialysis schedule; Na+ low - monitor closely  · Renal - Trend renal indices; Nephrology following; HD (09/18), tolerated well; 3L removed  · Endocrine - Glycemic control <180; SSI. No issues currently  · Neuro/ Pain/ Sedation - Unresponsive, off sedation; PRN Tylenol; Keppra 500mg BID   · Musculoskeletal: Continue PT /OT - Especially in BUE - to presevrve anatomical positioning to avoid contractures. · GI - NPO; PEG tube (09/08); TF to goal - Nepro @ 55mL/hr; Banana flakes for diarrhea; Probiotic; Pepcid; FMS removed for leaking, Rectal mancini placed; scheduled Imodium for 6 doses q6, then PRN.    · Prophylaxis - DVT - SQH, GI - Pepcid   · Discussed in interdisciplinary rounds          The patient is: [x] acutely ill Risk of deterioration: [x] moderate    [x] critically ill  [x] high     [x]See my orders for details    My assessment/plan was discussed with:  [x]nursing [x]PT/OT    [x]respiratory therapy [x]Dr. Zoe Severe, MD   []family []     [x]Total critical care time exclusive of procedures 45 minutes    Omaira Smith PA-C

## 2017-09-19 NOTE — PROGRESS NOTES
Additional clinical information uploaded to [facility] bed availability. Spoke with hospitalist who stated patient can transfer when bed is available.

## 2017-09-19 NOTE — PROGRESS NOTES
DAVIDL with Arlette Mohamud [phone] of [facility] regarding transfer to [facility], [facility]. Awaiting call back.

## 2017-09-19 NOTE — PROGRESS NOTES
Problem: Self Care Deficits Care Plan (Adult)  Goal: *Acute Goals and Plan of Care (Insert Text)  Occupational Therapy Goals  Initiated 8/15/2017, re-evaluated on 9/19/17 within 7 day(s). Previous goals not met and discontinued. New goals below. 1. Patient will tolerate bilateral resting hand splints to UEs daily for 6 hours without redness/skin breakdown to maintain proper positioning of hands/wrists in bed. 2. Patient will perform BUE PROM functional maintenance program to prevent contractures, maintain ROM and promote skin integrity. Outcome: Progressing Towards Goal  OCCUPATIONAL THERAPY REEVALUATION     Patient: Jose Stewart [de-identified]65 y.o. male)  Date: 9/19/2017  Diagnosis: Cardiac arrest ([facility] [address]  Acidosis  Respiratory failure (HCC)  Anemia  ESRD needing dialysis ([facility] [address]  Cardiac arrest ([facility] [place] [address]  Acute GI bleeding  Sepsis ([facility] [place] [address]  Hypotension  Anoxic brain damage (HCC)  ESRD (end stage renal disease) ([facility] [place] [address]  Colitis  dx  dx  DX  dx <principal problem not specified>  Procedure(s) (LRB):  TRACHEOSTOMY/ PERCUTANEOUS ENDOSCOPIC GASTROSTOMY TUBE INSERTION (N/A)  PERCUTANEOUS ENDOSCOPIC GASTROSTOMY TUBE INSERTION (N/A) 11 Days Post-Op  Precautions: Fall, Skin      ASSESSMENT :  Based on the objective data described below, the patient presents at a dependent level of care with all ADLs and functional mobility. He is unable to communicate but does not resist PROM when completed with patient. Tendon shortening noted in multiple fingers on bilateral hands. Will apply resting hand splints at next session with skin check. Patient also appropriate for PROM to Adeline Padgett on a functional maintenance plan with the rehab tech to maintain ROM, prevent contractures and promote skin integrity. During subsequent session, tech to be trained on proper technique for PROM with patient. Patient will benefit from skilled intervention to address the above impairments.   Patients rehabilitation potential is considered to be Good  Factors which may influence rehabilitation potential include:   [ ]                None noted  [ ]                Mental ability/status  [X]                Medical condition  [ ]                Home/family situation and support systems  [ ]                Safety awareness  [ ]                Pain tolerance/management  [ ]                Other:        PLAN :  Recommendations and Planned Interventions:  [ ]                  Self Care Training                  [X]           Therapeutic Activities  [ ]                  Functional Mobility Training    [ ]           Cognitive Retraining  [X]                  Therapeutic Exercises           [ ]           Endurance Activities  [ ]                  Balance Training                   [ ]           Neuromuscular Re-Education  [ ]                  Visual/Perceptual Training     [ ]      Home Safety Training  [X]                  Patient Education                 [X]           Family Training/Education  [ ]                  Other (comment):     Frequency/Duration: Patient will be followed by occupational therapy 3-5 times a week to address goals. Discharge Recommendations: LTC  Further Equipment Recommendations for Discharge: N/A       G-CODES:      Self Care  Current  CN= 100%   Goal  CN= 100%. The severity rating is based on the Other :functional assessment      SUBJECTIVE:   Patient does not verbalize or attempt to communicate nonverbally. OBJECTIVE DATA SUMMARY:   Hospital course since last seen and reason for reevaluation: Patient has declined in functional status and now requires total assist for all ADLs and mobility.   Cognitive/Behavioral Status:  Neurologic State: Responds to noxious stimuli only  Orientation Level: Unable to verbalize  Cognition: No command following  Safety/Judgement: Fall prevention  Skin: Intact on UEs except for small skin tear on left arm  Edema: None noted in UEs  Vision/Perceptual:    Acuity:  (unable to assess 2/2 unresponsiveness) Coordination:  Fine Motor Skills-Upper: Left Impaired;Right Impaired    Gross Motor Skills-Upper: Left Impaired;Right Impaired  Balance:  Sitting:  (N/A)  Strength:  Strength:  (Unable to accurately assess 2/2 min responsiveness)  Tone & Sensation:  Tone: Abnormal (Increased tone noted in RUE with PROM)  Sensation:  (unable to accurately assess 2/2 min responsiveness)  Range of Motion:  AROM: Generally decreased, functional  PROM: Generally decreased, functional  Functional Mobility and Transfers for ADLs:  Bed Mobility:  Rolling:  (N/A)  Transfers:  Sit to Stand:  (N/A)                          Toilet Transfer :  (NT; pt dependent for all mobility)  ADL Assessment:  Feeding: Total assistance     Oral Facial Hygiene/Grooming: Total assistance     Bathing: Total assistance     Upper Body Dressing: Total assistance     Lower Body Dressing: Total assistance     Toileting: Total assistance     Therapeutic Exercise:  PROM performed to BUEs in all planes x10 reps after scapular mobilizations. No resistance for nonverbal signs of pain noted. ROM was minimally decreased in shoulders and fingers but otherwise WFL. Pain: Nonverbal pain scale  Pt reports 0/10 pain or discomfort prior to treatment. Pt reports 0/10 pain or discomfort post treatment. Activity Tolerance:   Good  Please refer to the flowsheet for vital signs taken during this treatment. After treatment:   [ ] Patient left in no apparent distress sitting up in chair  [X] Patient left in no apparent distress in bed  [ ] Call bell left within reach  [X] Nursing notified  [ ] Caregiver present  [ ] Bed alarm activated      COMMUNICATION/EDUCATION:   [ ]    Home safety education was provided and the patient/caregiver indicated understanding. [ ]    Patient/family have participated as able in goal setting and plan of care. [ ]    Patient/family agree to work toward stated goals and plan of care.   [ ]    Patient understands intent and goals of therapy, but is neutral about his/her participation. [X]    Patient is unable to participate in goal setting and plan of care. This patients plan of care is appropriate for delegation to Rehab tech.      Thank you for this referral.  Raisa Ackerman MS OTR/L  Time Calculation: 25 mins

## 2017-09-19 NOTE — PROGRESS NOTES
Infectious Disease Progress Note    Requested by: Dr. Merlyn Brown    Reason: sepsis, e.coli/clostridium pefringens bacteremia    Current abx Prior abx   Meropenem since 9/15/17 Levofloxacin 7/27  Pip/tazo 7/27-7/31; 8/2-8/10; 8/18-8/25  Meropenem  7/31-8/2,   vancomycin  7/27-8/2; 8/18-8/25, 9/7-9/13  Pip/tazo since 9/7-9/15  Ciprofloxacin since 9/11/17-9/15     Lines:       Assessment :  55year old man with h/o type 2 DM (hgb a1c 5.4 on 7/29/17) ,ESRD admitted to [facility] on 7/27/17 s/p cardiorespiratory arrest, hypotension, bacteremia, elevated LFTs. sepsis (POA) due to e.coli, clostridium perfringens bloodstream infection (positive blood culture 7/27, negative blood culture 8/1) . Most likely source of bacteremia is intra abdominal infection/inflammation. Elevated LFTs, thickened gallbladder - no evidence of cholecystitis on HIDA scan 8/2/17    Acute pontine CVA, s/p PEA arrest     Now with RLL atelectasis on cxr, low grade fevers tm:101 on 9/15/17, increasing wbc. Clinical picture consistent with RLL healthcare associated/ventilator associated pneumonia due to pseudomonas, klebsiella. Worsening wbc/fevers on current antibiotics likely due to decreased efficacy of current antibiotics (pseudomonas in sputum cx 9/6/17 had pip/tazo IRMA:32, was resistant to levofloxacin)     CT chest 9/15/17 revealed complete collapse of right lower lobe. Small effusion    S/p pulmonary toillete, chest PT with complete resolution of rll opacity on cxr 9/17/17      Recommendations:    1. cont meropenem (d 4/7)  2. F/u clinically      Long term prognosis: poor    Advance Care planning: full code, patient wasnt able to name a surrogate decision maker or provide an advance care plan   Please call me if any further questions or concerns. Will continue to participate in the care of this patient.     subjective:  Non verbal         Home medications:   list reviewed        Current Facility-Administered Medications   Medication Dose Route Frequency    0.9% sodium chloride infusion 250 mL  250 mL IntraVENous PRN    chlorhexidine (PERIDEX) 0.12 % mouthwash 10 mL  10 mL Oral Q12H    insulin glargine (LANTUS) injection 7 Units  7 Units SubCUTAneous QHS    heparin (porcine) 1,000 unit/mL injection 1,000 Units  1,000 Units IntraVENous Q1H PRN    meropenem (MERREM) 500 mg in 0.9% sodium chloride (MBP/ADV) 50 mL MBP  0.5 g IntraVENous Q24H    albuterol-ipratropium (DUO-NEB) 2.5 MG-0.5 MG/3 ML  3 mL Nebulization Q6H RT    epoetin benjamin (EPOGEN;PROCRIT) injection 10,000 Units  10,000 Units IntraVENous EVERY MON & FRI    iron sucrose (VENOFER) 100 mg in 0.9% sodium chloride 100 mL IVPB  100 mg IntraVENous EVERY MON & FRI    midodrine (PROAMITINE) tablet 10 mg  10 mg Oral TID WITH MEALS    white petrolatum-mineral oil 85-15 % oint 1 Dose  1 Dose Ophthalmic Q6H    vits A and D-white pet-lanolin (A&D) ointment   Topical Q6H    albumin human 25% (BUMINATE) solution 12.5 g  12.5 g IntraVENous DIALYSIS PRN    Zinc Ox-Aloe Vera-Vitamin E (BALMEX) topical cream   Topical CONTINUOUS    banana flakes trans-galactooligosaccharide (BANATROL PLUS) powder 1 Packet  1 Packet Per NG tube TID    levETIRAcetam (KEPPRA) 500 mg in saline (iso-osm) 100 ml IVPB  500 mg IntraVENous Q12H    LORazepam (ATIVAN) injection 1 mg  1 mg IntraVENous Q4H PRN    insulin lispro (HUMALOG) injection   SubCUTAneous Q6H    famotidine (PF) (PEPCID) 20 mg in sodium chloride 0.9 % 10 mL injection  20 mg IntraVENous DAILY    collagenase (SANTYL) 250 unit/gram ointment   Topical DAILY    acetaminophen (TYLENOL) tablet 650 mg  650 mg Oral Q4H PRN    lactobacillus sp. 50 billion cpu (BIO-K PLUS) capsule 1 Cap  1 Cap Oral DAILY    loperamide (IMODIUM) capsule 2 mg  2 mg Oral Q6H PRN    heparin (porcine) injection 5,000 Units  5,000 Units SubCUTAneous Q8H    heparin (porcine) 1,000 unit/mL injection 2,000 Units  2,000 Units IntraVENous DIALYSIS ONCE    simvastatin (ZOCOR) tablet 20 mg  20 mg Oral QHS    aspirin chewable tablet 81 mg  81 mg Oral DAILY    glucose chewable tablet 16 g  4 Tab Oral PRN    glucagon (GLUCAGEN) injection 1 mg  1 mg IntraMUSCular PRN    dextrose (D50W) injection syrg 12.5-25 g  25-50 mL IntraVENous PRN    0.9% sodium chloride infusion  100 mL/hr IntraVENous DIALYSIS PRN    naloxone (NARCAN) injection 0.4 mg  0.4 mg IntraVENous PRN       Allergies: Review of patient's allergies indicates no known allergies. Temp (24hrs), Av.8 °F (37.1 °C), Min:97.5 °F (36.4 °C), Max:100.3 °F (37.9 °C)    Visit Vitals    /63    Pulse 95    Temp 99.7 °F (37.6 °C)    Resp 11    Ht 6' 2\" (1.88 m)    Wt 68.8 kg (151 lb 9.6 oz)    SpO2 100%    BMI 19.46 kg/m2       ROS: limited ROS obtained since patient not very communicative. Physical Exam:    General: Well developed, well nourished male laying on the bed,non communicative. HEENT:  Normocephalic, atraumatic, present conjunctival hemmohage;  nasal and oral mucous are moist and without evidence of lesions. Trach in place   Lymph Nodes:   no cervical, axillary or inguinal adenopathy   Lungs:   non-labored, decreased breath sounds right lung, no crackles wheezes rales or rhonchi   Heart:  RRR, s1 and s2; no  rubs or gallops, no edema, + pedal pulses   Abdomen:  soft, non-distended, active bowel sounds, no hepatomegaly, no splenomegaly.   Non-tender, peg in place   Genitourinary:  deferred   Extremities:   no clubbing, cyanosis; no joint effusions or swelling; muscle mass appropriate for age   Neurologic:  Detailed neuro eval not feasible on sedation                        Skin:  Sacral ulcers per report   Back:  no spinal or paraspinal muscle tenderness or rigidity, no CVA tenderness     Psychiatric:  Unable to assess         Labs: Results:   Chemistry Recent Labs      17   0446  17   0428  17   0423   GLU  62*  123*  137*   NA  135*  132*  133*   K  3.5  4.5  4.1   CL  100  100  101   CO2  26 18*  19*   BUN  52*  85*  75*   CREA  5.26*  7.75*  7.08*   CA  8.5  8.7  8.3*   AGAP  9  14  13   BUCR  10*  11*  11*      CBC w/Diff Recent Labs      09/19/17   0446  09/18/17   0428  09/17/17   0423   WBC  12.0  12.9  11.2   RBC  2.88*  3.03*  2.51*   HGB  8.6*  9.3*  7.5*   HCT  26.5*  28.0*  23.4*   PLT  529*  497*  462*   GRANS  64  61  63   LYMPH  20*  23  24   EOS  5  6*  6*      Microbiology No results for input(s): CULT in the last 72 hours.        RADIOLOGY:    All available imaging studies/reports in [facility] for this admission were reviewed    Dr. Shine Madrigal, Infectious Disease Specialist  [phone]  September 19, 2017  4:28 PM

## 2017-09-19 NOTE — PROGRESS NOTES
[facility] Hospitalist Group  Progress Note    Patient: Lee Ann Ricketts Age: 65 y.o. : 1960 MR#: [id number] SSN: [id number]  Date: 2017     Subjective:     Patient lying in bed , Unresponsive, Has trach and peg    Assessment/Plan:   -PEA arrest with acute hypoxic resp failure - trach care on the VENT    -Severe anoxic encephalopathy - pt remains unresponsive no change.    -Pneumonia/VAP, sputum culture + Pseudamonas and klebsiella from . - on merrem day 17 is last day. - Acute pontine CVA - asa  - ESRD , - HD as per nephro   - DM - basal/bolus insulin. - Unstageable pressure ulcer to sacrum/coccyx - wound care. - Hypotension-on midodrine  - Is s/p PEG/trach.   Monitor    Pt for LTACH soon     Additional Notes:      Case discussed with:  []Patient  []Family  [x]Nursing  []Case Management  DVT Prophylaxis:  []Lovenox  []Hep SQ  []SCDs  []Coumadin   []On Heparin gtt    Objective:   VS:   Visit Vitals    /63    Pulse 88    Temp 98.9 °F (37.2 °C)    Resp 15    Ht 6' 2\" (1.88 m)    Wt 68.8 kg (151 lb 9.6 oz)    SpO2 100%    BMI 19.46 kg/m2      Tmax/24hrs: Temp (24hrs), Av.3 °F (37.4 °C), Min:98.9 °F (37.2 °C), Max:100.3 °F (37.9 °C)      Intake/Output Summary (Last 24 hours) at 17 1540  Last data filed at 17 1400   Gross per 24 hour   Intake             2415 ml   Output              300 ml   Net             2115 ml       General:  Eyes open but does not respond  Cardiovascular:  S1S2+, RRR  Pulmonary:  Coarse bs b/l  GI:  Soft, BS+, NT, ND, +peg  Extremities:  trace edema  +tracheostomy    Labs:    Recent Results (from the past 24 hour(s))   GLUCOSE, POC    Collection Time: 17  4:03 PM   Result Value Ref Range    Glucose (POC) 138 (H) 70 - 110 mg/dL   GLUCOSE, POC    Collection Time: 17 12:23 AM   Result Value Ref Range    Glucose (POC) 226 (H) 70 - 586 mg/dL   METABOLIC PANEL, BASIC    Collection Time: 17  4:46 AM   Result Value Ref Range    Sodium 135 (L) 136 - 145 mmol/L    Potassium 3.5 3.5 - 5.5 mmol/L    Chloride 100 100 - 108 mmol/L    CO2 26 21 - 32 mmol/L    Anion gap 9 3.0 - 18 mmol/L    Glucose 62 (L) 74 - 99 mg/dL    BUN 52 (H) 7.0 - 18 MG/DL    Creatinine 5.26 (H) 0.6 - 1.3 MG/DL    BUN/Creatinine ratio 10 (L) 12 - 20      GFR est AA 14 (L) >60 ml/min/1.73m2    GFR est non-AA 11 (L) >60 ml/min/1.73m2    Calcium 8.5 8.5 - 10.1 MG/DL   CBC WITH AUTOMATED DIFF    Collection Time: 09/19/17  4:46 AM   Result Value Ref Range    WBC 12.0 4.6 - 13.2 K/uL    RBC 2.88 (L) 4.70 - 5.50 M/uL    HGB 8.6 (L) 13.0 - 16.0 g/dL    HCT 26.5 (L) 36.0 - 48.0 %    MCV 92.0 74.0 - 97.0 FL    MCH 29.9 24.0 - 34.0 PG    MCHC 32.5 31.0 - 37.0 g/dL    RDW 16.4 (H) 11.6 - 14.5 %    PLATELET 017 (H) 179 - 420 K/uL    MPV 9.6 9.2 - 11.8 FL    NEUTROPHILS 64 40 - 73 %    LYMPHOCYTES 20 (L) 21 - 52 %    MONOCYTES 11 (H) 3 - 10 %    EOSINOPHILS 5 0 - 5 %    BASOPHILS 0 0 - 2 %    ABS. NEUTROPHILS 7.7 1.8 - 8.0 K/UL    ABS. LYMPHOCYTES 2.5 0.9 - 3.6 K/UL    ABS. MONOCYTES 1.3 (H) 0.05 - 1.2 K/UL    ABS. EOSINOPHILS 0.6 (H) 0.0 - 0.4 K/UL    ABS.  BASOPHILS 0.0 0.0 - 0.1 K/UL    DF AUTOMATED     GLUCOSE, POC    Collection Time: 09/19/17  5:57 AM   Result Value Ref Range    Glucose (POC) 53 (LL) 70 - 110 mg/dL   GLUCOSE, POC    Collection Time: 09/19/17  5:59 AM   Result Value Ref Range    Glucose (POC) 56 (L) 70 - 110 mg/dL   GLUCOSE, POC    Collection Time: 09/19/17  6:45 AM   Result Value Ref Range    Glucose (POC) 87 70 - 110 mg/dL   GLUCOSE, POC    Collection Time: 09/19/17  6:50 AM   Result Value Ref Range    Glucose (POC) 97 70 - 110 mg/dL   GLUCOSE, POC    Collection Time: 09/19/17 11:31 AM   Result Value Ref Range    Glucose (POC) 80 70 - 110 mg/dL       Signed By: Julien Burch MD     September 19, 2017

## 2017-09-19 NOTE — ROUTINE PROCESS
Bedside and Verbal shift change report given to Chico Herrera RN (oncoming nurse) by Sidney Maya RN (offgoing nurse). Report included the following information SBAR, Kardex, MAR and Recent Results. SITUATION:    Code Status: Full Code   Reason for Admission: [facility]   Acidosis   Respiratory failure (HCC)   Anemia   ESRD needing dialysis ([facility]   Cardiac arrest ([facility]   Acute GI bleeding   Sepsis ([facility]   Hypotension   Anoxic brain damage (HCC)   ESRD (end stage renal disease) ([facility]   Colitis   dx   dx   DX   dx    [facility] day: 47   Problem List:       Hospital Problems  Date Reviewed: 9/8/2017          Codes Class Noted POA    Oropharyngeal dysphagia ICD-10-CM: R13.12  ICD-9-CM: 787.22  8/17/2017 Unknown        Cachexia ([facility] ICD-10-CM: R64  ICD-9-CM: 799.4  8/17/2017 Unknown        Acidosis ICD-10-CM: E87.2  ICD-9-CM: 276.2  7/27/2017 Yes        Cardiac arrest ([facility] ICD-10-CM: I46.9  ICD-9-CM: 427.5  7/27/2017 Unknown        Respiratory failure ([facility] ICD-10-CM: J96.90  ICD-9-CM: 518.81  7/27/2017 Unknown        ESRD needing dialysis ([facility] ICD-10-CM: N18.6, Z99.2  ICD-9-CM: 585.6  7/27/2017 Unknown        Anoxic brain damage ([facility] ICD-10-CM: G93.1  ICD-9-CM: 348.1  7/27/2017 Unknown        Colitis ICD-10-CM: K52.9  ICD-9-CM: 558.9  7/27/2017 Unknown        Hypotension ICD-10-CM: I95.9  ICD-9-CM: 458.9  7/27/2017 Unknown        Acute GI bleeding ICD-10-CM: K92.2  ICD-9-CM: 578.9  7/27/2017 Unknown        ESRD (end stage renal disease) ([facility] ICD-10-CM: N18.6  ICD-9-CM: 585.6  7/27/2017 Unknown        Sepsis ([facility] ICD-10-CM: A41.9  ICD-9-CM: 038.9, 995.91  7/27/2017 Unknown        Anemia ICD-10-CM: D64.9  ICD-9-CM: 285. 9  Unknown Unknown              BACKGROUND:    Past Medical History:   Past Medical History:   Diagnosis Date    Anemia     Arthritis     Chronic pain     Back     Diabetes mellitus type II     Hypertension     IBS (irritable bowel syndrome)     Lactose intolerance     TB (tuberculosis)     TB test positive         Patient taking anticoagulants yes     ASSESSMENT:    Changes in Assessment Throughout Shift: No significant changes.      Patient has Central Line: no Reasons if yes:    Patient has Stanton Cath: no Reasons if yes:       Last Vitals:     Vitals:    09/19/17 0503 09/19/17 0600 09/19/17 0700 09/19/17 0717   BP:  131/60 130/63    Pulse: 94 97 97    Resp: 18 19 18    Temp:    99.7 °F (37.6 °C)   TempSrc:       SpO2: 98% 100% 100%    Weight:       Height:            IV and DRAINS (will only show if present)   [REMOVED] Peripheral IV 08/01/17 Right Hand-Site Assessment: Clean, dry, & intact  [REMOVED] Peripheral IV 08/05/17 Right Arm-Site Assessment: Clean, dry, & intact  [REMOVED] Peripheral IV 08/09/17 Right Hand-Site Assessment: Clean, dry, & intact  [REMOVED] Peripheral IV 08/09/17 Right Antecubital-Site Assessment: Clean, dry, & intact  [REMOVED] Sheath 08/09/17-Site Assessment: Clean, dry, & intact  [REMOVED] Nasogastric Tube 08/14/17-Site Assessment: Clean, dry, & intact  [REMOVED] Peripheral IV 08/17/17 Right Forearm-Site Assessment: Clean, dry, & intact  [REMOVED] Peripheral IV 08/17/17 Right Wrist-Site Assessment: Clean, dry, & intact  [REMOVED] Airway - Endotracheal Tube 08/18/17 Oral-Site Assessment: Clean, dry, & intact  [REMOVED] Triple Lumen Left IJ CVL 08/18/17 Left Internal jugular-Site Assessment: Clean, dry, & intact  [REMOVED] Arterial Line 08/18/17 Right Radial artery-Site Assessment: Clean, dry, & intact  [REMOVED] Airway - Continuous Aspiration of Subglottic Secretions (MISBAH) Tube 08/18/17 Oral-Site Assessment: Clean, dry, & intact  [REMOVED] Nasogastric Tube 08/29/17-Site Assessment: Clean, dry, & intact  [REMOVED] Peripheral IV 09/04/17 Right Wrist-Site Assessment: Intact  [REMOVED] Airway - Continuous Aspiration of Subglottic Secretions (MISBAH) Tube 07/27/17 Oral-Site Assessment: Clean, dry, & intact  [REMOVED] Peripheral IV 09/06/17 Left Hand-Site Assessment: Clean, dry, & intact  [REMOVED] Peripheral IV 09/07/17 Right Arm-Site Assessment: Clean, dry, & intact  Airway - Tracheostomy Tube 09/08/17 Portex-Site Assessment: Intact  PEG/Gastrostomy Tube 09/08/17-Site Assessment: Clean, dry, & intact  [REMOVED] Peripheral IV 09/10/17 Right Wrist-Site Assessment: Clean, dry, & intact  Peripheral IV 09/15/17 Right Forearm-Site Assessment: Clean, dry, & intact  Rectal Tube-Site Assessment: Intact, Clean  [REMOVED] Nasogastric Tube 07/27/17-Site Assessment: Clean, dry, & intact  [REMOVED] Venous Access Device 07/27/17 Upper chest (subclavicular area, right-Site Assessment: Clean, dry, & intact  [REMOVED] Peripheral IV 07/27/17 Right Antecubital-Site Assessment: Clean, dry, & intact  [REMOVED] Triple Lumen 07/27/17 Right Internal jugular-Site Assessment: Clean, dry, & intact  [REMOVED] Peripheral IV 07/27/17 Right Other(comment)-Site Assessment: Clean, dry, & intact  [REMOVED] Peripheral IV 07/27/17 Right Antecubital-Site Assessment: Swelling     WOUND (if present)   Wound Type: see wound doc flowsheet   Dressing present Dressing Present : Yes   Wound Concerns/Notes:  Change as ordered and PRN     PAIN    Pain Assessment    Pain Intensity 1: 0 (09/18/17 0400)    Pain Location 1: Coccyx    Pain Intervention(s) 1: Repositioned, Rest, Therapeutic presence, Therapeutic touch    Patient Stated Pain Goal: Unable to verbalize/indicatate pain  o Interventions for Pain:  none  o Intervention effective: n/a  o Time of last intervention: n/a   o Reassessment Completed: yes      Last 3 Weights:  Last 3 Recorded Weights in this Encounter    09/16/17 0500 09/17/17 0504 09/18/17 0651   Weight: 62 kg (136 lb 11 oz) 71.9 kg (158 lb 8 oz) 68.8 kg (151 lb 9.6 oz)     Weight change:      INTAKE/OUPUT    Current Shift:      Last three shifts: 09/17 1901 - 09/19 0700  In: 2980 [I.V.:300]  Out: 2600 [Drains:600]     LAB RESULTS     Recent Labs      09/19/17   5148 09/18/17 0428 09/17/17 0423   WBC  12.0  12.9  11.2   HGB  8.6*  9.3*  7.5*   HCT  26.5*  28.0*  23.4*   PLT  529*  497*  462*        Recent Labs      09/19/17 0446  09/18/17 0428 09/17/17 0423   NA  135*  132*  133*   K  3.5  4.5  4.1   GLU  62*  123*  137*   BUN  52*  85*  75*   CREA  5.26*  7.75*  7.08*   CA  8.5  8.7  8.3*   MG   --   3.4*  3.2*       RECOMMENDATIONS AND DISCHARGE PLANNING     1. Pending tests/procedures/ Plan of Care or Other Needs: dailysis PRN, wound care, pulmonary hygiene. Continue current tx     2. Discharge plan for patient and Needs/Barriers: TBD    3. Estimated Discharge Date: TBD Posted on Whiteboard in Patients Room: yes      4. The patient's care plan was reviewed with the oncoming nurse. \"HEALS\" SAFETY CHECK      Fall Risk    Total Score: 2    Safety Measures: Safety Measures: Bed/Chair-Wheels locked, Bed in low position, Call light within reach, Emergency bedside equipment, Extra trach at bedside, Seizure precaution, Side rails X 3    A safety check occurred in the patient's room between off going nurse and oncoming nurse listed above. The safety check included the below items  Area Items   H  High Alert Medications - Verify all high alert medication drips (heparin, PCA, etc.)   E  Equipment - Suction is set up for ALL patients (with yanker)  - Red plugs utilized for all equipment (IV pumps, etc.)  - WOWs wiped down at end of shift.  - Room stocked with oxygen, suction, and other unit-specific supplies   A  Alarms - Bed alarm is set for fall risk patients  - Ensure chair alarm is in place and activated if patient is up in a chair   L  Lines - Check IV for any infiltration  - Stanton bag is empty if patient has a Stanton   - Tubing and IV bags are labeled   S  Safety   - Room is clean, patient is clean, and equipment is clean. - Hallways are clear from equipment besides carts.    - Fall bracelet on for fall risk patients  - Ensure room is clear and free of clutter  - Suction is set up for ALL patients (with cary)  - Hallways are clear from equipment besides carts.    - Isolation precautions followed, supplies available outside room, sign posted     Nuvia Golden RN

## 2017-09-19 NOTE — PROGRESS NOTES
NUTRITION    Nursing Referral: MST, Pressure Ulcer  Nutrition Consult: Management of Tube Feeding     RECOMMENDATIONS / PLAN:     - Continue tube feeding of Nepro at goal of 55 mL/hr.   - Decrease to 150 mL q 6 hour water flushes.   - Continue Banatrol TID and Reymundo BID.  - Continue RD inpatient monitoring and evaluation. Goal Regimen: Nepro at 55 mL/hr + 150 mL q 6 hour water flushes to provide: 2376 kcal, 107 gm protein, 127 gm fat, 220 gm CHO, 21 gm fiber, 957 mL free water, 1557 mL total water, 100% RDIs     NUTRITION INTERVENTIONS & DIAGNOSIS:     [x] Enteral nutrition support: continue   [x] Coordination of care: interdisciplinary rounds     Nutrition Diagnosis: Increased protein needs related to promotion of wound healing and increased expenditure as evidenced by pressure ulcer wound and ESRD, pt on dialysis 3x per week  Inadequate oral intake related to inability to tolerate po as evidenced by NPO. ASSESSMENT:     9/19: Tolerating feeds at goal. HD yesterday. Sodium low at 135 today. 9/12: PEG placed 9/8, tolerating feeds at goal.   9/6: Tolerating feeds at goal. Humera Mandril and PEG placement postponed due to hyperkalemia, K WNL today after HD 9/5 (850 mL removed). Procedure to be re-scheduled per Surgery availability. 9/1: Tolerating feeds at goal. Humera Mandril and PEG placement next week. HD prn, last dialysis 8/31.  8/28: Pt tolerating tube feeding at goal   8/22: Tolerating feeds at goal. Loose stool, will add prebiotic fiber supplement. 8/21: Pt intubated after PEA arrest during HD 8/18, PEG placement postponed. Tolerating feeds at goal.   8/18: Tolerating tube feeds at goal.  Tube feeds held today for PEG placement. 8/15: Tube feeds started this morning. Pt tolerating at rate of 30 mL/hr  8/14: SLP following; recommend NPO. Noted plan for NGT placement and start tube feeds. Potassium WNL. 8/10: Pt reported good appetite and \"eating enough\" from his meals. Noted fair meal intake per chart. Consuming supplements. Discussed increasing Nepro frequency; pt declined. Discussed adding Ensure Pudding; pt agreed with plan. Po intake of meals/supplements encouraged. Has been receiving K replacement. 8/7: K remains low despite previous tube feeding changed to higher potassium formula. Pt extubated 8/5. Tube feeds discontinued 8/6. SLP following; pt s/p MBS today and started on po diet. Noted poor po intake lunch meal per chart. 8/3: K remains low despite continued replacement. Will change to higher potassium formula per discussion with PA.   8/1: Tolerating feeding at goal. 1 L removed during HD 7/31. Hyperglycemia noted, advance to very insulin resistant SSI and basal insulin started. 7/31: Tolerating advancement of tube feeding. HD today. BG trending up, MD to stop D5.    7/30: Pt remain intubated. GI following; plan to start tube feeds today. Noted order placed; discussed modifying tube feed order and add water flushes with Dr Melvin Skiff. RN unavailable; discussed with Nursing [place] regarding modifying tube feed order  7/28: S/p cardiac arrest and intubated 7/27, NGT clamped. Abdominal ultrasound today to rule out cholecystitis. Will wait to feed.      EN infusion adequate to meet patients estimated nutritional needs:   [x] Yes     [] No   [] Unable to determine at this time    Tube Feeding: Nepro at 55 mL via NGT  Water Flushes: 150 mL q 4 hour  Residuals: 0 mL    Diet: DIET NUTRITIONAL SUPPLEMENTS Breakfast, Lunch, Dinner; [employer]   DIET NUTRITIONAL SUPPLEMENTS Lunch, Dinner; [employer] DRINK MIX  DIET TUBE FEEDING      Food Allergies: NKFA  Appetite/meal intake prior to admission:   [] Good     [] Fair     [] Poor     [x] Other: unknown   Feeding Limitations:  [x] Swallowing difficulty: SLP following; recommended NPO on 8/14    [] Chewing difficulty:    [x] Other: intubated       Anuric   BM: 9/19; FMS  Skin Integrity:  stage II ressure ulcer anus; DTI sacrum/stage III pressure ulcer; moisture associated skin damage posterior buttocks  Edema: none  Pertinent Medications: Reviewed: lantus, SSI, lactobacillus, imodium, iron sucrose    Recent Labs      09/19/17   0446  09/18/17   0428  09/17/17   0423   NA  135*  132*  133*   K  3.5  4.5  4.1   CL  100  100  101   CO2  26  18*  19*   GLU  62*  123*  137*   BUN  52*  85*  75*   CREA  5.26*  7.75*  7.08*   CA  8.5  8.7  8.3*   MG   --   3.4*  3.2*       Intake/Output Summary (Last 24 hours) at 09/19/17 0913  Last data filed at 09/19/17 0700   Gross per 24 hour   Intake             2310 ml   Output             2000 ml   Net              310 ml       Anthropometrics:  Ht Readings from Last 1 Encounters:   08/15/17 6' 2\" (1.88 m)     Last 3 Recorded Weights in this Encounter    09/16/17 0500 09/17/17 0504 09/18/17 0651   Weight: 62 kg (136 lb 11 oz) 71.9 kg (158 lb 8 oz) 68.8 kg (151 lb 9.6 oz)     Body mass index is 19.46 kg/(m^2). Underweight     Weight History:   Weight Metrics 9/18/2017   Weight 151 lb 9.6 oz   BMI 19.46 kg/m2        Admitting Diagnosis: Cardiac arrest (HCC)  Acidosis  Respiratory failure (HCC)  Anemia  ESRD needing dialysis ([facility]  Cardiac arrest ([facility]  Acute GI bleeding  Sepsis ([facility]  Hypotension  Anoxic brain damage (HCC)  ESRD (end stage renal disease) ([facility]  Colitis  dx  dx  DX  dx  Pertinent PMHx: DM, HTN, IBS, ESRD    Education Needs:        [x] None identified  [] Identified - Not appropriate at this time  []  Identified and addressed - refer to education log  Learning Limitations:   [] None identified  [x] Identified: intubated      Cultural, Gnosticist & ethnic food preferences:  [x] None identified    [] Identified and addressed     ESTIMATED NUTRITION NEEDS:     Calories: 7983-3740 kcal ([id number]) based on  [x] Actual BW 65 kg      [] SBW  Protein:  gm (1.2-2 gm/kg) based on  [x] Actual BW      [] SBW   Fluid: 2294-2755 mL     MONITORING & EVALUATION:     Nutrition Goal(s):   1.  Patient will tolerate enteral nutrition formula and regimen without difficulty within the next 7 days. Outcome:  [x] Met/Ongoing    []  Not Met    [] New/Initial Goal   2. Patient will meet their estimated nutritional needs through adequate enteral nutrition within the next 7 days.  Outcome:  [x] Met/Ongoing    []  Not Met/Progressing    [] New/Initial Goal      Monitoring:   [x] EN tolerance    [x] EN infusion   [] Diet tolerance   [] Meal intake   [] Supplement intake   [] GI symptoms/ability to tolerate po diet   [x] Respiratory status   [x] Plan of care      Previous Recommendations (for follow-up assessments only):     [x]   Implemented       []   Not Implemented (RD to address)     [] No Recommendation Made     Discharge Planning: goal enteral nutrition regimen via PEG  [x] Participated in care planning, discharge planning, & interdisciplinary rounds as appropriate       Елена Katz, [address] [place] [address]   Pager: [phone]

## 2017-09-19 NOTE — PROGRESS NOTES
attended the interdisciplinary rounds for [facility], who is a 65 y.o.,male. Patients Primary Language is: [place]. According to the patients EMR Scientologist Affiliation is: Prieto Sanderson.     The reason the Patient came to the hospital is:   Patient Active Problem List    Diagnosis Date Noted    Oropharyngeal dysphagia 08/17/2017    Cachexia ([facility] 08/17/2017    Acidosis 07/27/2017    Cardiac arrest ([address] 07/27/2017    Respiratory failure ([address] 07/27/2017    ESRD needing dialysis ([address] 07/27/2017    Anoxic brain damage ([address] 07/27/2017    Colitis 07/27/2017    Hypotension 07/27/2017    Acute GI bleeding 07/27/2017    ESRD (end stage renal disease) ([facility] 07/27/2017    Sepsis ([facility] 07/27/2017    Anemia       Not able to assess the patient due to medical condition. Plan:  Chaplains will continue to follow and will provide pastoral care on an as needed/requested basis.  recommends bedside caregivers page  on duty if patient shows signs of acute spiritual or emotional distress.     [address] [facility]  Board Certified [address] [place] [address]   [phone]

## 2017-09-19 NOTE — INTERDISCIPLINARY ROUNDS
CRITICAL CARE INTERDISCIPLINARY ROUNDS      Patient Information:    Name:   Marlen Olvera    Age:   65 y.o.     Admission Date:   7/27/2017    Readmit Risk Assessment Information:      Readmit Risk Tool Support Systems: Family member(s), Relationship with Primary Physician Group: Seen at least one time within past 12 months    Surgery Date:      Day of Stay:     Expected Discharge Date:        Attending Provider:   Shukri Garcia MD    Surgeon:        Consultant:       Primary Care Provider:   Que Kasper MD    Problem List:     Patient Active Problem List   Diagnosis Code    Anemia D64.9    Acidosis E87.2    Cardiac arrest ([address] I46.9    Respiratory failure ([address] J96.90    ESRD needing dialysis ([facility] N18.6, Z99.2    Anoxic brain damage (HCC) G93.1    Colitis K52.9    Hypotension I95.9    Acute GI bleeding K92.2    ESRD (end stage renal disease) ([facility] N18.6    Sepsis ([address] A41.9    Oropharyngeal dysphagia R13.12    Cachexia ([facility] R64       Principal Problem:  <principal problem not specified>    Procedure:       During rounds the following quality care indicators and evidence based practices were addressed :     DVT Prophylaxis, Pressure Injury Prevention, Pain Management, Sepsis resuscitation and management, Nutritional Status, Critical Care Interventions Airways, Drains and Lines and IHI Bundles:  Vent 22days           Acute MI/PCI:   Not applicable    Heart Failure:    Not applicable    Cardiac Surgery:  Not applicable    SCIP Measures for other Surgeries:   Not applicable    Pneumonia:    Appropriate Antibiotic Selection (ICU versus Non-ICU)    Stroke:  Patient's Personal Risk Factors for Stroke are:   hypertension, family history, hyperlipidemia or diabetes mellitus    NIH Stroke Score  Total: 8 (08/17/17 0400)    Transfer Level of Care:  Not Ready for Transfer    The patient will require the following interventions based on the Readmission Risk Assessment:  Pharmacy evaluation and teaching, Care Management involvement for home health follow up for:  mobility and assistance with ADL's and Spiritual Care evaluation      Discharge Management:  Group Home    Anticipated Discharge Date:  3      Interdisciplinary team rounds were held  with the following team membersCare Management, Diabetes Treatment Specialist, Nursing, Nutrition, Pastoral Care, Pharmacy, Physician and Clinical Coordinator and the  patient and healthcare POA (documentation required). Plan of care discussed. See clinical pathway and/or care plan for interventions and desired outcomes. Working on longterm placement. Imodium added to medicine regimen.

## 2017-09-19 NOTE — PROGRESS NOTES
RENAL DAILY PROGRESS NOTE      IMPRESSION:   ESRD, last HD on 9/11, planning for HD today , BP is better today  Anemia, on epo, received BT on 8/17  Encephalopathy, severe anoxic brain injury   Respiratory failure s/p trach , PEG. , cxr shows large right side pleural effusion   Hypokalemia, improving    PLAN:   Next HD on Friday unless any emergency. Adjust all meds per ESRD status.                            Subjective:     64ty M with ESRD   Complaint:   Overnight events noted  No change in mental status  Remain full code  Severe anoxic injury  Family wants continue support          Current Facility-Administered Medications   Medication Dose Route Frequency    loperamide (IMODIUM) capsule 2 mg  2 mg Oral Q6H    0.9% sodium chloride infusion 250 mL  250 mL IntraVENous PRN    chlorhexidine (PERIDEX) 0.12 % mouthwash 10 mL  10 mL Oral Q12H    insulin glargine (LANTUS) injection 7 Units  7 Units SubCUTAneous QHS    heparin (porcine) 1,000 unit/mL injection 1,000 Units  1,000 Units IntraVENous Q1H PRN    meropenem (MERREM) 500 mg in 0.9% sodium chloride (MBP/ADV) 50 mL MBP  0.5 g IntraVENous Q24H    albuterol-ipratropium (DUO-NEB) 2.5 MG-0.5 MG/3 ML  3 mL Nebulization Q6H RT    epoetin benjamin (EPOGEN;PROCRIT) injection 10,000 Units  10,000 Units IntraVENous EVERY MON & FRI    iron sucrose (VENOFER) 100 mg in 0.9% sodium chloride 100 mL IVPB  100 mg IntraVENous EVERY MON & FRI    midodrine (PROAMITINE) tablet 10 mg  10 mg Oral TID WITH MEALS    white petrolatum-mineral oil 85-15 % oint 1 Dose  1 Dose Ophthalmic Q6H    vits A and D-white pet-lanolin (A&D) ointment   Topical Q6H    albumin human 25% (BUMINATE) solution 12.5 g  12.5 g IntraVENous DIALYSIS PRN    Zinc Ox-Aloe Vera-Vitamin E (BALMEX) topical cream   Topical CONTINUOUS    banana flakes trans-galactooligosaccharide (BANATROL PLUS) powder 1 Packet  1 Packet Per NG tube TID    levETIRAcetam (KEPPRA) 500 mg in saline (iso-osm) 100 ml IVPB  500 mg IntraVENous Q12H    LORazepam (ATIVAN) injection 1 mg  1 mg IntraVENous Q4H PRN    insulin lispro (HUMALOG) injection   SubCUTAneous Q6H    famotidine (PF) (PEPCID) 20 mg in sodium chloride 0.9 % 10 mL injection  20 mg IntraVENous DAILY    collagenase (SANTYL) 250 unit/gram ointment   Topical DAILY    acetaminophen (TYLENOL) tablet 650 mg  650 mg Oral Q4H PRN    lactobacillus sp. 50 billion cpu (BIO-K PLUS) capsule 1 Cap  1 Cap Oral DAILY    heparin (porcine) injection 5,000 Units  5,000 Units SubCUTAneous Q8H    heparin (porcine) 1,000 unit/mL injection 2,000 Units  2,000 Units IntraVENous DIALYSIS ONCE    simvastatin (ZOCOR) tablet 20 mg  20 mg Oral QHS    aspirin chewable tablet 81 mg  81 mg Oral DAILY    glucose chewable tablet 16 g  4 Tab Oral PRN    glucagon (GLUCAGEN) injection 1 mg  1 mg IntraMUSCular PRN    dextrose (D50W) injection syrg 12.5-25 g  25-50 mL IntraVENous PRN    0.9% sodium chloride infusion  100 mL/hr IntraVENous DIALYSIS PRN    naloxone (NARCAN) injection 0.4 mg  0.4 mg IntraVENous PRN       Review of Symptoms: intubated   Objective:     Patient Vitals for the past 24 hrs:   Temp Pulse Resp BP SpO2   09/19/17 1427 - - - - 100 %   09/19/17 1400 - 88 15 129/63 100 %   09/19/17 1300 - 92 18 141/76 100 %   09/19/17 1202 - 91 16 - 100 %   09/19/17 1200 98.9 °F (37.2 °C) 93 17 132/62 100 %   09/19/17 1129 98.9 °F (37.2 °C) - - - -   09/19/17 1100 - 96 19 138/71 100 %   09/19/17 1000 - 95 18 132/63 100 %   09/19/17 0900 - 98 19 125/59 100 %   09/19/17 0800 99.7 °F (37.6 °C) (!) 101 19 129/60 100 %   09/19/17 0741 - 95 11 - 100 %   09/19/17 0717 99.7 °F (37.6 °C) - - - -   09/19/17 0700 - 97 18 130/63 100 %   09/19/17 0600 - 97 19 131/60 100 %   09/19/17 0503 - 94 18 - 98 %   09/19/17 0500 - 94 18 122/58 100 %   09/19/17 0400 100.3 °F (37.9 °C) 97 18 118/56 100 %   09/19/17 0300 - 98 19 117/57 100 %   09/19/17 0200 - 98 18 119/58 100 %   09/19/17 0100 - 95 18 132/62 - 09/19/17 0039 - 95 17 - 98 %   09/19/17 0000 99.2 °F (37.3 °C) (!) 101 19 112/55 100 %   09/18/17 2300 - (!) 101 19 128/69 100 %   09/18/17 2200 - (!) 102 20 123/63 100 %   09/18/17 2108 - 98 19 - 98 %   09/18/17 2100 - 96 19 135/69 100 %   09/18/17 2001 99.3 °F (37.4 °C) - - - -   09/18/17 2000 99.3 °F (37.4 °C) 99 14 138/68 100 %   09/18/17 1900 - 95 11 108/62 100 %   09/18/17 1800 - 97 17 139/69 100 %   09/18/17 1730 - 98 19 139/72 100 %   09/18/17 1700 - 96 17 117/64 100 %   09/18/17 1630 - (!) 102 17 95/51 100 %   09/18/17 1607 98.9 °F (37.2 °C) - - - -   09/18/17 1600 98.9 °F (37.2 °C) 99 18 96/49 100 %   09/18/17 1530 98.3 °F (36.8 °C) (!) 104 15 109/57 100 %   09/18/17 1523 - (!) 104 17 127/57 100 %   09/18/17 1515 - (!) 102 17 107/62 100 %   09/18/17 1500 - 98 16 107/63 100 %   09/18/17 1458 - 100 17 - 100 %   09/18/17 1445 - (!) 102 15 94/55 100 %        Weight change:      09/17 1901 - 09/19 0700  In: 2980 [I.V.:300]  Out: 2900 [Drains:900]    Intake/Output Summary (Last 24 hours) at 09/19/17 1439  Last data filed at 09/19/17 1400   Gross per 24 hour   Intake             2470 ml   Output             2300 ml   Net              170 ml     Physical Exam:   General: cachexia  HEENT  no thyromegaly  CVS: S1S2 heard,  no rub  RS: + air entry b/l,   Abd: Soft, Non tender,   Neuro: s/p trach  Extrm: +edema,   Access; left AVG + thrill            Data Review:     LABS:   Hematology:   Recent Labs      09/19/17 0446  09/18/17   0428  09/17/17 0423   WBC  12.0  12.9  11.2   HGB  8.6*  9.3*  7.5*   HCT  26.5*  28.0*  23.4*     Chemistry:   Recent Labs      09/19/17 0446 09/18/17 0428 09/17/17 0423   BUN  52*  85*  75*   CREA  5.26*  7.75*  7.08*   CA  8.5  8.7  8.3*   K  3.5  4.5  4.1   NA  135*  132*  133*   CL  100  100  101   CO2  26  18*  19*   GLU  62*  123*  137*              Procedures/imaging: see electronic medical records for all procedures, Xrays and details which were not copied into this note but were reviewed prior to creation of Plan          Assessment & Plan:     As above         Colleen Hinds MD  9/19/2017  3:46 PM

## 2017-09-20 LAB
ANION GAP SERPL CALC-SCNC: 12 MMOL/L (ref 3–18)
BASOPHILS # BLD: 0 K/UL (ref 0–0.06)
BASOPHILS NFR BLD: 0 % (ref 0–3)
BUN SERPL-MCNC: 63 MG/DL (ref 7–18)
BUN/CREAT SERPL: 10 (ref 12–20)
CALCIUM SERPL-MCNC: 8.3 MG/DL (ref 8.5–10.1)
CHLORIDE SERPL-SCNC: 100 MMOL/L (ref 100–108)
CO2 SERPL-SCNC: 21 MMOL/L (ref 21–32)
CREAT SERPL-MCNC: 6.26 MG/DL (ref 0.6–1.3)
DIFFERENTIAL METHOD BLD: ABNORMAL
EOSINOPHIL # BLD: 0.8 K/UL (ref 0–0.4)
EOSINOPHIL NFR BLD: 5 % (ref 0–5)
ERYTHROCYTE [DISTWIDTH] IN BLOOD BY AUTOMATED COUNT: 16.6 % (ref 11.6–14.5)
GLUCOSE BLD STRIP.AUTO-MCNC: 103 MG/DL (ref 70–110)
GLUCOSE BLD STRIP.AUTO-MCNC: 110 MG/DL (ref 70–110)
GLUCOSE BLD STRIP.AUTO-MCNC: 133 MG/DL (ref 70–110)
GLUCOSE BLD STRIP.AUTO-MCNC: 155 MG/DL (ref 70–110)
GLUCOSE BLD STRIP.AUTO-MCNC: 53 MG/DL (ref 70–110)
GLUCOSE BLD STRIP.AUTO-MCNC: 60 MG/DL (ref 70–110)
GLUCOSE BLD STRIP.AUTO-MCNC: 87 MG/DL (ref 70–110)
GLUCOSE SERPL-MCNC: 61 MG/DL (ref 74–99)
HCT VFR BLD AUTO: 24.7 % (ref 36–48)
HGB BLD-MCNC: 7.9 G/DL (ref 13–16)
LYMPHOCYTES # BLD: 2.8 K/UL (ref 0.8–3.5)
LYMPHOCYTES NFR BLD: 18 % (ref 20–51)
MCH RBC QN AUTO: 30.6 PG (ref 24–34)
MCHC RBC AUTO-ENTMCNC: 32 G/DL (ref 31–37)
MCV RBC AUTO: 95.7 FL (ref 74–97)
MONOCYTES # BLD: 1.6 K/UL (ref 0–1)
MONOCYTES NFR BLD: 10 % (ref 2–9)
NEUTS SEG # BLD: 10.6 K/UL (ref 1.8–8)
NEUTS SEG NFR BLD: 67 % (ref 42–75)
PLATELET # BLD AUTO: 409 K/UL (ref 135–420)
PLATELET COMMENTS,PCOM: ABNORMAL
PMV BLD AUTO: 9.9 FL (ref 9.2–11.8)
POTASSIUM SERPL-SCNC: 4 MMOL/L (ref 3.5–5.5)
RBC # BLD AUTO: 2.58 M/UL (ref 4.7–5.5)
RBC MORPH BLD: ABNORMAL
RBC MORPH BLD: ABNORMAL
SODIUM SERPL-SCNC: 133 MMOL/L (ref 136–145)
WBC # BLD AUTO: 15.8 K/UL (ref 4.6–13.2)

## 2017-09-20 PROCEDURE — 74011250637 HC RX REV CODE- 250/637: Performed by: INTERNAL MEDICINE

## 2017-09-20 PROCEDURE — 74011250637 HC RX REV CODE- 250/637: Performed by: NURSE PRACTITIONER

## 2017-09-20 PROCEDURE — 82962 GLUCOSE BLOOD TEST: CPT

## 2017-09-20 PROCEDURE — 97760 ORTHOTIC MGMT&TRAING 1ST ENC: CPT

## 2017-09-20 PROCEDURE — 80048 BASIC METABOLIC PNL TOTAL CA: CPT | Performed by: NURSE PRACTITIONER

## 2017-09-20 PROCEDURE — 77030011256 HC DRSG MEPILEX <16IN NO BORD MOLN -A

## 2017-09-20 PROCEDURE — 74011250637 HC RX REV CODE- 250/637: Performed by: HOSPITALIST

## 2017-09-20 PROCEDURE — 74011250636 HC RX REV CODE- 250/636: Performed by: HOSPITALIST

## 2017-09-20 PROCEDURE — 94640 AIRWAY INHALATION TREATMENT: CPT

## 2017-09-20 PROCEDURE — 74011250636 HC RX REV CODE- 250/636: Performed by: INTERNAL MEDICINE

## 2017-09-20 PROCEDURE — 85025 COMPLETE CBC W/AUTO DIFF WBC: CPT | Performed by: NURSE PRACTITIONER

## 2017-09-20 PROCEDURE — 77030018846 HC SOL IRR STRL H20 ICUM -A

## 2017-09-20 PROCEDURE — 36415 COLL VENOUS BLD VENIPUNCTURE: CPT | Performed by: NURSE PRACTITIONER

## 2017-09-20 PROCEDURE — 74011000250 HC RX REV CODE- 250: Performed by: INTERNAL MEDICINE

## 2017-09-20 PROCEDURE — 97110 THERAPEUTIC EXERCISES: CPT

## 2017-09-20 PROCEDURE — 94003 VENT MGMT INPAT SUBQ DAY: CPT

## 2017-09-20 PROCEDURE — 65610000006 HC RM INTENSIVE CARE

## 2017-09-20 PROCEDURE — 77030018836 HC SOL IRR NACL ICUM -A

## 2017-09-20 PROCEDURE — 74011636637 HC RX REV CODE- 636/637: Performed by: NURSE PRACTITIONER

## 2017-09-20 PROCEDURE — 74011250637 HC RX REV CODE- 250/637: Performed by: PHYSICIAN ASSISTANT

## 2017-09-20 PROCEDURE — 74011000250 HC RX REV CODE- 250: Performed by: PHYSICIAN ASSISTANT

## 2017-09-20 PROCEDURE — 97530 THERAPEUTIC ACTIVITIES: CPT

## 2017-09-20 PROCEDURE — 74011000258 HC RX REV CODE- 258: Performed by: INTERNAL MEDICINE

## 2017-09-20 PROCEDURE — 74011636637 HC RX REV CODE- 636/637: Performed by: INTERNAL MEDICINE

## 2017-09-20 RX ORDER — INSULIN GLARGINE 100 [IU]/ML
7 INJECTION, SOLUTION SUBCUTANEOUS DAILY
Status: DISCONTINUED | OUTPATIENT
Start: 2017-09-21 | End: 2017-09-21

## 2017-09-20 RX ADMIN — LEVETIRACETAM 500 MG: 5 INJECTION INTRAVENOUS at 20:00

## 2017-09-20 RX ADMIN — VITAMIN A AND VITAMIN D: 929.3 OINTMENT TOPICAL at 00:34

## 2017-09-20 RX ADMIN — Medication: at 11:53

## 2017-09-20 RX ADMIN — CHLORHEXIDINE GLUCONATE 10 ML: 1.2 RINSE ORAL at 08:20

## 2017-09-20 RX ADMIN — HEPARIN SODIUM 5000 UNITS: 5000 INJECTION, SOLUTION INTRAVENOUS; SUBCUTANEOUS at 02:30

## 2017-09-20 RX ADMIN — LOPERAMIDE HYDROCHLORIDE 2 MG: 2 CAPSULE ORAL at 11:51

## 2017-09-20 RX ADMIN — MINERAL OIL AND WHITE PETROLATUM 1 DOSE: 150; 850 OINTMENT OPHTHALMIC at 06:08

## 2017-09-20 RX ADMIN — INSULIN LISPRO 3 UNITS: 100 INJECTION, SOLUTION INTRAVENOUS; SUBCUTANEOUS at 23:41

## 2017-09-20 RX ADMIN — IPRATROPIUM BROMIDE AND ALBUTEROL SULFATE 3 ML: 2.5; .5 SOLUTION RESPIRATORY (INHALATION) at 08:09

## 2017-09-20 RX ADMIN — Medication 1 PACKET: at 16:25

## 2017-09-20 RX ADMIN — MIDODRINE HYDROCHLORIDE 10 MG: 2.5 TABLET ORAL at 08:19

## 2017-09-20 RX ADMIN — LEVETIRACETAM 500 MG: 5 INJECTION INTRAVENOUS at 08:19

## 2017-09-20 RX ADMIN — MEROPENEM 500 MG: 500 INJECTION, POWDER, FOR SOLUTION INTRAVENOUS at 16:25

## 2017-09-20 RX ADMIN — HEPARIN SODIUM 5000 UNITS: 5000 INJECTION, SOLUTION INTRAVENOUS; SUBCUTANEOUS at 10:52

## 2017-09-20 RX ADMIN — INSULIN GLARGINE 7 UNITS: 100 INJECTION, SOLUTION SUBCUTANEOUS at 00:27

## 2017-09-20 RX ADMIN — Medication 1 CAPSULE: at 08:19

## 2017-09-20 RX ADMIN — COLLAGENASE SANTYL: 250 OINTMENT TOPICAL at 08:21

## 2017-09-20 RX ADMIN — HEPARIN SODIUM 5000 UNITS: 5000 INJECTION, SOLUTION INTRAVENOUS; SUBCUTANEOUS at 17:42

## 2017-09-20 RX ADMIN — MINERAL OIL AND WHITE PETROLATUM 1 DOSE: 150; 850 OINTMENT OPHTHALMIC at 23:43

## 2017-09-20 RX ADMIN — IPRATROPIUM BROMIDE AND ALBUTEROL SULFATE 3 ML: 2.5; .5 SOLUTION RESPIRATORY (INHALATION) at 20:18

## 2017-09-20 RX ADMIN — MINERAL OIL AND WHITE PETROLATUM 1 DOSE: 150; 850 OINTMENT OPHTHALMIC at 00:34

## 2017-09-20 RX ADMIN — VITAMIN A AND VITAMIN D: 929.3 OINTMENT TOPICAL at 23:42

## 2017-09-20 RX ADMIN — IPRATROPIUM BROMIDE AND ALBUTEROL SULFATE 3 ML: 2.5; .5 SOLUTION RESPIRATORY (INHALATION) at 17:02

## 2017-09-20 RX ADMIN — INSULIN LISPRO 3 UNITS: 100 INJECTION, SOLUTION INTRAVENOUS; SUBCUTANEOUS at 00:25

## 2017-09-20 RX ADMIN — LOPERAMIDE HYDROCHLORIDE 2 MG: 2 CAPSULE ORAL at 17:42

## 2017-09-20 RX ADMIN — ASPIRIN 81 MG 81 MG: 81 TABLET ORAL at 08:20

## 2017-09-20 RX ADMIN — CHLORHEXIDINE GLUCONATE 10 ML: 1.2 RINSE ORAL at 20:50

## 2017-09-20 RX ADMIN — FAMOTIDINE 20 MG: 10 INJECTION INTRAVENOUS at 08:20

## 2017-09-20 RX ADMIN — MINERAL OIL AND WHITE PETROLATUM 1 DOSE: 150; 850 OINTMENT OPHTHALMIC at 11:52

## 2017-09-20 RX ADMIN — MIDODRINE HYDROCHLORIDE 10 MG: 2.5 TABLET ORAL at 11:51

## 2017-09-20 RX ADMIN — VITAMIN A AND VITAMIN D: 929.3 OINTMENT TOPICAL at 11:54

## 2017-09-20 RX ADMIN — MINERAL OIL AND WHITE PETROLATUM 1 DOSE: 150; 850 OINTMENT OPHTHALMIC at 17:42

## 2017-09-20 RX ADMIN — SIMVASTATIN 20 MG: 10 TABLET, FILM COATED ORAL at 22:18

## 2017-09-20 RX ADMIN — DEXTROSE MONOHYDRATE 25 G: 25 INJECTION, SOLUTION INTRAVENOUS at 06:18

## 2017-09-20 RX ADMIN — MIDODRINE HYDROCHLORIDE 10 MG: 2.5 TABLET ORAL at 16:25

## 2017-09-20 RX ADMIN — Medication 1 PACKET: at 22:18

## 2017-09-20 RX ADMIN — VITAMIN A AND VITAMIN D: 929.3 OINTMENT TOPICAL at 06:08

## 2017-09-20 RX ADMIN — Medication 1 PACKET: at 08:20

## 2017-09-20 RX ADMIN — LOPERAMIDE HYDROCHLORIDE 2 MG: 2 CAPSULE ORAL at 00:34

## 2017-09-20 RX ADMIN — VITAMIN A AND VITAMIN D: 929.3 OINTMENT TOPICAL at 18:33

## 2017-09-20 RX ADMIN — IPRATROPIUM BROMIDE AND ALBUTEROL SULFATE 3 ML: 2.5; .5 SOLUTION RESPIRATORY (INHALATION) at 02:10

## 2017-09-20 RX ADMIN — LOPERAMIDE HYDROCHLORIDE 2 MG: 2 CAPSULE ORAL at 06:07

## 2017-09-20 NOTE — PROGRESS NOTES
Infectious Disease Progress Note    Requested by: Dr. Marcial Ronquillo    Reason: sepsis, e.coli/clostridium pefringens bacteremia    Current abx Prior abx   Meropenem since 9/15/17 Levofloxacin 7/27  Pip/tazo 7/27-7/31; 8/2-8/10; 8/18-8/25  Meropenem  7/31-8/2,   vancomycin  7/27-8/2; 8/18-8/25, 9/7-9/13  Pip/tazo since 9/7-9/15  Ciprofloxacin since 9/11/17-9/15     Lines:       Assessment :  55year old man with h/o type 2 DM (hgb a1c 5.4 on 7/29/17) ,ESRD admitted to [facility] on 7/27/17 s/p cardiorespiratory arrest, hypotension, bacteremia, elevated LFTs. sepsis (POA) due to e.coli, clostridium perfringens bloodstream infection (positive blood culture 7/27, negative blood culture 8/1) . Most likely source of bacteremia is intra abdominal infection/inflammation. Elevated LFTs, thickened gallbladder - no evidence of cholecystitis on HIDA scan 8/2/17    Acute pontine CVA, s/p PEA arrest     Now with RLL atelectasis on cxr, low grade fevers tm:101 on 9/15/17, increasing wbc. Clinical picture consistent with RLL healthcare associated/ventilator associated pneumonia due to pseudomonas, klebsiella. Worsening wbc/fevers on current antibiotics likely due to decreased efficacy of current antibiotics (pseudomonas in sputum cx 9/6/17 had pip/tazo IRMA:32, was resistant to levofloxacin)     CT chest 9/15/17 revealed complete collapse of right lower lobe. Small effusion    S/p pulmonary toillete, chest PT with complete resolution of rll opacity on cxr 9/17/17      Recommendations:    1. cont meropenem (d 5/7)  2. F/u clinically      Long term prognosis: poor    Advance Care planning: full code, patient wasnt able to name a surrogate decision maker or provide an advance care plan   Please call me if any further questions or concerns. Will continue to participate in the care of this patient.     subjective:  Non verbal         Home medications:   list reviewed        Current Facility-Administered Medications   Medication Dose Route Frequency    loperamide (IMODIUM) capsule 2 mg  2 mg Oral Q6H    0.9% sodium chloride infusion 250 mL  250 mL IntraVENous PRN    chlorhexidine (PERIDEX) 0.12 % mouthwash 10 mL  10 mL Oral Q12H    insulin glargine (LANTUS) injection 7 Units  7 Units SubCUTAneous QHS    heparin (porcine) 1,000 unit/mL injection 1,000 Units  1,000 Units IntraVENous Q1H PRN    meropenem (MERREM) 500 mg in 0.9% sodium chloride (MBP/ADV) 50 mL MBP  0.5 g IntraVENous Q24H    albuterol-ipratropium (DUO-NEB) 2.5 MG-0.5 MG/3 ML  3 mL Nebulization Q6H RT    epoetin benjamin (EPOGEN;PROCRIT) injection 10,000 Units  10,000 Units IntraVENous EVERY MON & FRI    iron sucrose (VENOFER) 100 mg in 0.9% sodium chloride 100 mL IVPB  100 mg IntraVENous EVERY MON & FRI    midodrine (PROAMITINE) tablet 10 mg  10 mg Oral TID WITH MEALS    white petrolatum-mineral oil 85-15 % oint 1 Dose  1 Dose Ophthalmic Q6H    vits A and D-white pet-lanolin (A&D) ointment   Topical Q6H    albumin human 25% (BUMINATE) solution 12.5 g  12.5 g IntraVENous DIALYSIS PRN    Zinc Ox-Aloe Vera-Vitamin E (BALMEX) topical cream   Topical CONTINUOUS    banana flakes trans-galactooligosaccharide (BANATROL PLUS) powder 1 Packet  1 Packet Per NG tube TID    levETIRAcetam (KEPPRA) 500 mg in saline (iso-osm) 100 ml IVPB  500 mg IntraVENous Q12H    LORazepam (ATIVAN) injection 1 mg  1 mg IntraVENous Q4H PRN    insulin lispro (HUMALOG) injection   SubCUTAneous Q6H    famotidine (PF) (PEPCID) 20 mg in sodium chloride 0.9 % 10 mL injection  20 mg IntraVENous DAILY    collagenase (SANTYL) 250 unit/gram ointment   Topical DAILY    acetaminophen (TYLENOL) tablet 650 mg  650 mg Oral Q4H PRN    lactobacillus sp. 50 billion cpu (BIO-K PLUS) capsule 1 Cap  1 Cap Oral DAILY    heparin (porcine) injection 5,000 Units  5,000 Units SubCUTAneous Q8H    heparin (porcine) 1,000 unit/mL injection 2,000 Units  2,000 Units IntraVENous DIALYSIS ONCE    simvastatin (ZOCOR) tablet 20 mg  20 mg Oral QHS    aspirin chewable tablet 81 mg  81 mg Oral DAILY    glucose chewable tablet 16 g  4 Tab Oral PRN    glucagon (GLUCAGEN) injection 1 mg  1 mg IntraMUSCular PRN    dextrose (D50W) injection syrg 12.5-25 g  25-50 mL IntraVENous PRN    0.9% sodium chloride infusion  100 mL/hr IntraVENous DIALYSIS PRN    naloxone (NARCAN) injection 0.4 mg  0.4 mg IntraVENous PRN       Allergies: Review of patient's allergies indicates no known allergies. Temp (24hrs), Av.2 °F (37.3 °C), Min:98.3 °F (36.8 °C), Max:99.7 °F (37.6 °C)    Visit Vitals    /48    Pulse 88    Temp 99.2 °F (37.3 °C)    Resp 19    Ht 6' 2\" (1.88 m)    Wt 70.8 kg (156 lb)    SpO2 100%    BMI 20.03 kg/m2       ROS: limited ROS obtained since patient not very communicative. Physical Exam:    General: Well developed, well nourished male laying on the bed,non communicative. HEENT:  Normocephalic, atraumatic, present conjunctival hemmohage;  nasal and oral mucous are moist and without evidence of lesions. Trach in place   Lymph Nodes:   no cervical, axillary or inguinal adenopathy   Lungs:   non-labored, decreased breath sounds right lung, no crackles wheezes rales or rhonchi   Heart:  RRR, s1 and s2; no  rubs or gallops, no edema, + pedal pulses   Abdomen:  soft, non-distended, active bowel sounds, no hepatomegaly, no splenomegaly.   Non-tender, peg in place   Genitourinary:  deferred   Extremities:   no clubbing, cyanosis; no joint effusions or swelling; muscle mass appropriate for age   Neurologic:  Detailed neuro eval not feasible on sedation                        Skin:  Sacral ulcers per report   Back:  no spinal or paraspinal muscle tenderness or rigidity, no CVA tenderness     Psychiatric:  Unable to assess         Labs: Results:   Chemistry Recent Labs      17   0459  17   0446  17   0428   GLU  61*  62*  123*   NA  133*  135*  132*   K  4.0  3.5  4.5   CL  100  100  100   CO2  21  26  18* BUN  63*  52*  85*   CREA  6.26*  5.26*  7.75*   CA  8.3*  8.5  8.7   AGAP  12  9  14   BUCR  10*  10*  11*      CBC w/Diff Recent Labs      09/20/17   0459  09/19/17   0446  09/18/17   0428   WBC  15.8*  12.0  12.9   RBC  2.58*  2.88*  3.03*   HGB  7.9*  8.6*  9.3*   HCT  24.7*  26.5*  28.0*   PLT  409  529*  497*   GRANS  67  64  61   LYMPH  18*  20*  23   EOS  5  5  6*      Microbiology No results for input(s): CULT in the last 72 hours.        RADIOLOGY:    All available imaging studies/reports in [facility] care for this admission were reviewed    Dr. Jose Guerrero, Infectious Disease Specialist  [phone]  September 20, 2017  4:28 PM

## 2017-09-20 NOTE — PROGRESS NOTES
Problem: Self Care Deficits Care Plan (Adult)  Goal: *Acute Goals and Plan of Care (Insert Text)  Occupational Therapy Goals  Initiated 8/15/2017, re-evaluated on 9/19/17 within 7 day(s). Previous goals not met and discontinued. New goals below. 1. Patient will tolerate bilateral resting hand splints to UEs daily for 6 hours without redness/skin breakdown to maintain proper positioning of hands/wrists in bed. 2. Patient will perform BUE PROM functional maintenance program to prevent contractures, maintain ROM and promote skin integrity. Outcome: Progressing Towards Goal  OCCUPATIONAL THERAPY TREATMENT     Patient: Maribell Campos [de-identified]65 y.o. male)  Date: 9/20/2017  Diagnosis: Cardiac arrest ([address]  Acidosis  Respiratory failure ([address]  Anemia  ESRD needing dialysis ([address]  Cardiac arrest ([address]  Acute GI bleeding  Sepsis ([address]  Hypotension  Anoxic brain damage ([facility])  ESRD (end stage renal disease) ([address]  Colitis  dx  dx  DX  dx <principal problem not specified>  Procedure(s) (LRB):  TRACHEOSTOMY/ PERCUTANEOUS ENDOSCOPIC GASTROSTOMY TUBE INSERTION (N/A)  PERCUTANEOUS ENDOSCOPIC GASTROSTOMY TUBE INSERTION (N/A) 12 Days Post-Op  Precautions: Fall, Skin  Chart, occupational therapy assessment, plan of care, and goals were reviewed. ASSESSMENT:  Bilateral resting hand splints applied to UEs for contracture management and positioning of wrist in neutral.  Splints checked after one hour and no redness/skin irritation noted. Spoke with Nurse Jose Alfredo Felipe and verbalized wear schedule of 6 hours daily either day or night and also wrote on communication board in room. She verbalized understanding. PROM performed to BUEs while patient supine in bed. Rehab tech trained on proper technique for PROM and demonstrated understanding appropriately for continuation of functional maintenance plan.     Progression toward goals:  [ ]          Improving appropriately and progressing toward goals  [X]          Improving slowly and progressing toward goals  [ ]          Not making progress toward goals and plan of care will be adjusted       PLAN:  Patient continues to benefit from skilled intervention to address the above impairments. Continue treatment per established plan of care. Discharge Recommendations:  LTAC  Further Equipment Recommendations for Discharge:  N/A      G-CODES:      Self Care  Current  CN= 100%   Goal  CN= 100%. The severity rating is based on the Level of Assistance required for Functional Mobility and ADLs. SUBJECTIVE:   Patient did not verbalize or attempt to communicate during session. OBJECTIVE DATA SUMMARY:   Cognitive/Behavioral Status:  Neurologic State: Unresponsive  Orientation Level: Unable to verbalize  Cognition: Unable to assess (comment)  Safety/Judgement: Fall prevention     Functional Mobility and Transfers for ADLs:              Bed Mobility:  Rolling:  (N/A)              Transfers:  Sit to Stand:  (N/A)  Balance:  Sitting:  (N/A)  Standing:  (N/A)     Therapeutic Exercises:   PROM performed to BUE in all planes x10 reps each without discomfort noted visually. Patient with tone vs withdrawal of UEs at times during ROM but relaxes with short rest/gentle stretch period. Rehab tech trained on proper technique for PROM and demonstrated understanding appropriately. Pain: Non verbal pain scale  Pt reports 0/10 pain or discomfort prior to treatment. Pt reports 0/10 pain or discomfort post treatment. Activity Tolerance:    Good     Please refer to the flowsheet for vital signs taken during this treatment. After treatment:   [ ]  Patient left in no apparent distress sitting up in chair  [X]  Patient left in no apparent distress in bed  [ ]  Call bell left within reach  [X]  Nursing notified  [ ]  Caregiver present  [ ]  Bed alarm activated      COMMUNICATION/EDUCATION:   [ ] Home safety education was provided and the patient/caregiver indicated understanding.   [ ] Patient/family have participated as able in goal setting and plan of care. [ ] Patient/family agree to work toward stated goals and plan of care. [ ] Patient understands intent and goals of therapy, but is neutral about his/her participation. [X] Patient is unable to participate in education at this time.         Catrachita Yost MS OTR/L  Time Calculation: 25 mins

## 2017-09-20 NOTE — PROGRESS NOTES
[facility]  ICU Progress Note      Name: Hanane Childs   : 1960   MRN: [id number]   Date: 2017 3:46 PM     [x]I have reviewed the flowsheet and previous days notes. Events overnight reviewed and discussed with nursing staff. Vital signs and records reviewed. HPI:  Robe Helms a 55 y. o.  male with PMH of DM, ESRD admitted PEA arrest. Patient's hospitalization was also found to have a CVA and was treated for E.coli and C.perferinges bacteremia. Subsequent PEA arrest 17. Pt is now s/p Trach and Peg on 17; pt remains on ventilator and off sedation. Subjective:    17:  - No new events overnight. - Remains on SBT with pressure support  - Remains anuric  - Today he withdraws from pain on his left side only      [x]The patient is unable to give any meaningful history or review of systems because the patient is:  [x]Intubated []Sedated   [x]Unresponsive      [x]The patient is critically ill on      [x]Mechanical ventilation []Pressors   []BiPAP []                 ROS:Review of systems not obtained due to patient factors.     Medication Review:  · Pressors - N/A  · Sedation - N/A  · Antibiotics - Meropenem  · Pain - PRN Tylenol  · GI/ DVT - Pepcid; SQH  · Others (other gtts)    Safety Bundles: VAP Bundle/ CAUTI/ Severe Sepsis Protocol/ Electrolyte Replacement Protocol    Vital Signs:    Visit Vitals    BP 95/46    Pulse 90    Temp 99.6 °F (37.6 °C)    Resp 18    Ht 6' 2\" (1.88 m)    Wt 70.8 kg (156 lb)    SpO2 100%    BMI 20.03 kg/m2       O2 Device: Ventilator   O2 Flow Rate (L/min): 6 l/min   Temp (24hrs), Av.1 °F (37.3 °C), Min:98.3 °F (36.8 °C), Max:99.6 °F (37.6 °C)       Intake/Output:   Last shift:      701 - 1900  In: 200 [I.V.:100]  Out: 400 [Drains:400]  Last 3 shifts: 1901 - 700  In: 8017 [I.V.:400]  Out: 850 [Drains:850]    Intake/Output Summary (Last 24 hours) at 17 1216  Last data filed at 09/20/17 0900   Gross per 24 hour   Intake             2030 ml   Output              950 ml   Net             1080 ml       Ventilator Settings:  Ventilator  Mode: Spontaneous  Respiratory Rate  Resp Rate Observed: 14  Back-Up Rate: 12  Insp Time (sec): 1 sec  Insp Flow (l/min): 44 l/min  I:E Ratio: varies  Ventilator Volumes  Vt Set (ml): 400 ml  Vt Exhaled (Machine Breath) (ml): 431 ml  Vt Spont (ml): 527 ml  Ve Observed (l/min): 10.7 l/min  Ventilator Pressures  PC Set: 400  Pressure Support (cm H2O): 5 cm H2O  PIP Observed (cm H2O): 14 cm H2O  Plateau Pressure (cm H2O): 14 cm H2O  MAP (cm H2O): 10  PEEP/VENT (cm H2O): 8 cm H20  Auto PEEP Observed (cm H2O): 0 cm H2O    Physical Exam:    General: Intubated/sedated; NAD; unresponsive  HEENT:  Anicteric sclerae; pink palpebral conjunctivae; mucosa moist. Eyelids remain open without blinking. Trach in place with moderate secretions  Resp:  Symmetrical chest expansion, no accessory muscle use;  Mod AE bilat; Decreased bibasilar mildly course lung sounds throughout;   CV:  S1, S2 present; RRR; No m/r/g  GI:  Abdomen soft, non-distended; (+) active bowel sounds  Extremities:  +2 pulses on all extremities; significant muscle atrophy, AV fistula LUE: + bruit + thrill; no edema/ cyanosis/ clubbing noted  Skin:  Warm; no rashes/ lesions noted  Neurologic: Pinpoint pupils nonreactive to light, + cough, + gag, decorticate posturing with stimulation; (+) corneal reflex   Devices:     09/08/17: Trach/Peg   09/19/17: Rectal Stanton (FMS removed)    DATA:     Current Facility-Administered Medications   Medication Dose Route Frequency    [START ON 9/21/2017] insulin glargine (LANTUS) injection 7 Units  7 Units SubCUTAneous DAILY    loperamide (IMODIUM) capsule 2 mg  2 mg Oral Q6H    0.9% sodium chloride infusion 250 mL  250 mL IntraVENous PRN    chlorhexidine (PERIDEX) 0.12 % mouthwash 10 mL  10 mL Oral Q12H    heparin (porcine) 1,000 unit/mL injection 1,000 Units  1,000 Units IntraVENous Q1H PRN    meropenem (MERREM) 500 mg in 0.9% sodium chloride (MBP/ADV) 50 mL MBP  0.5 g IntraVENous Q24H    albuterol-ipratropium (DUO-NEB) 2.5 MG-0.5 MG/3 ML  3 mL Nebulization Q6H RT    epoetin benjamin (EPOGEN;PROCRIT) injection 10,000 Units  10,000 Units IntraVENous EVERY MON & FRI    iron sucrose (VENOFER) 100 mg in 0.9% sodium chloride 100 mL IVPB  100 mg IntraVENous EVERY MON & FRI    midodrine (PROAMITINE) tablet 10 mg  10 mg Oral TID WITH MEALS    white petrolatum-mineral oil 85-15 % oint 1 Dose  1 Dose Ophthalmic Q6H    vits A and D-white pet-lanolin (A&D) ointment   Topical Q6H    albumin human 25% (BUMINATE) solution 12.5 g  12.5 g IntraVENous DIALYSIS PRN    Zinc Ox-Aloe Vera-Vitamin E (BALMEX) topical cream   Topical CONTINUOUS    banana flakes trans-galactooligosaccharide (BANATROL PLUS) powder 1 Packet  1 Packet Per NG tube TID    levETIRAcetam (KEPPRA) 500 mg in saline (iso-osm) 100 ml IVPB  500 mg IntraVENous Q12H    LORazepam (ATIVAN) injection 1 mg  1 mg IntraVENous Q4H PRN    insulin lispro (HUMALOG) injection   SubCUTAneous Q6H    famotidine (PF) (PEPCID) 20 mg in sodium chloride 0.9 % 10 mL injection  20 mg IntraVENous DAILY    collagenase (SANTYL) 250 unit/gram ointment   Topical DAILY    acetaminophen (TYLENOL) tablet 650 mg  650 mg Oral Q4H PRN    lactobacillus sp. 50 billion cpu (BIO-K PLUS) capsule 1 Cap  1 Cap Oral DAILY    heparin (porcine) injection 5,000 Units  5,000 Units SubCUTAneous Q8H    heparin (porcine) 1,000 unit/mL injection 2,000 Units  2,000 Units IntraVENous DIALYSIS ONCE    simvastatin (ZOCOR) tablet 20 mg  20 mg Oral QHS    aspirin chewable tablet 81 mg  81 mg Oral DAILY    glucose chewable tablet 16 g  4 Tab Oral PRN    glucagon (GLUCAGEN) injection 1 mg  1 mg IntraMUSCular PRN    dextrose (D50W) injection syrg 12.5-25 g  25-50 mL IntraVENous PRN    0.9% sodium chloride infusion  100 mL/hr IntraVENous DIALYSIS PRN    naloxone (NARCAN) injection 0.4 mg  0.4 mg IntraVENous PRN         Labs: Results:       Chemistry Recent Labs      09/20/17 0459 09/19/17 0446 09/18/17 0428   GLU  61*  62*  123*   NA  133*  135*  132*   K  4.0  3.5  4.5   CL  100  100  100   CO2  21  26  18*   BUN  63*  52*  85*   CREA  6.26*  5.26*  7.75*   CA  8.3*  8.5  8.7   AGAP  12  9  14   BUCR  10*  10*  11*      CBC w/Diff Recent Labs      09/20/17 0459 09/19/17 0446 09/18/17 0428   WBC  15.8*  12.0  12.9   RBC  2.58*  2.88*  3.03*   HGB  7.9*  8.6*  9.3*   HCT  24.7*  26.5*  28.0*   PLT  409  529*  497*   GRANS  67  64  61   LYMPH  18*  20*  23   EOS  5  5  6*      Coagulation No results for input(s): PTP, INR, APTT in the last 72 hours. No lab exists for component: INREXT, INREXT    Liver Enzymes No results for input(s): TP, ALB, TBIL, AP, SGOT, GPT in the last 72 hours. No lab exists for component: DBIL   ABG No results found for: PH, PHI, PCO2, PCO2I, PO2, PO2I, HCO3, HCO3I, FIO2, FIO2I   Microbiology No results for input(s): CULT in the last 72 hours. Telemetry: [x]Sinus []A-flutter []Paced    []A-fib []Multiple PVCs                    Imaging: No new imaging today (9/20)  []I have personally reviewed the patients radiographs  []Radiographs reviewed with radiologist   []No change from prior, tubes and lines in adequate position  []Improved   []Worsening    IMPRESSION:   · Acute on Chronic Hypoxic Respiratory Failure - Multifactorial at this point; s/p Trach on 09/08; interval development of RLL atelectasis 2/2 positioning in bed, requiring ventilator support; remains on vent (09/18) with improvement in CXR (09/18) and improved lung exam (09/19)  · Acute Encephalopathy - Anoxic encephalopathy,2/2 below  · S/p PEA Cardiac arrest in dialysis 7/27/17 and 8/18/17  · VDRF - 2/2 neuro status   · Hypotension- Improving; multifactorial in pt with sepsis and possible Dysautonomia?   · RLL pneumonia +/- mucus plugging with atelectasis- Sputum cx 9/6/17 + pseudomonas aeruginosa and klebsiella pneumoniae -treated; Resp Cx (09/15) (+) Psudomonas Aeruginosa -remains on ABX with ID following  · ESRD on HD  · DM2 - SSI coverage  · Acute pontine lacunar CVA  · Anemia of chronic disease - Improved, s/p 1U PRBC's (09/17)  · Oropharyngeal dysphagia s/p PEG  · Cachexia  · Unstageable pressure ulcer - Sacrum/coccyx  · Septic shock Bacteremia - Resolved;  E.coli and C.perferinges; s/p abx      PLAN:   · Resp -  KEEP ON VENT SUPPORT, NO TRACH COLLAR TRIALS; currently tolerating SBT with pressure support of 5, PEEP of 8; FiO2 28.  -SpO2 btwn 88-94%; maintain proper positioning to prevent atelectasis   -Continue pulmonary/bronchial hygiene   -Aspiration precautions- HOB>30'; con't  Duo-nebs q6; oral care; continue CPT; -CXR PRN for respiratory distress or drop in SpO2    · ID - Remains afebrile. Leukocytosis today 15.8 (from 12.0 yesterday)   -Continue Meropenem for Resp Cx (09/15) (+) Psudomonas Aeruginosa     · CVS - HR and BP stable; chronic hypotension; con't Midodrine; MAP goal >65. Not requiring pressors    · Heme/onc - H&H remains stable. New leukocytosis today. Continue to monitor daily  ·   · Metabolic - Daily BMP. K 4.0.     · Renal - Nephrology following. Next HD scheduled Friday. Creatinine 6.26. Electrolytes in good range    · Endocrine - 2nd consecutive night with nocturnal hypoglycemia, corrected with D50. Will change lantus to AM and monitor closely    · Neuro/ Pain/ Sedation - Unresponsive, off sedation; PRN Tylenol; Keppra 500mg BID     · Musculoskeletal: Continue PT /OT - Especially in BUE - to presevrve anatomical positioning to avoid contractures. Per PT, abduction pad has improved muscle spacticity    · GI - NPO; PEG tube (09/08); TF to goal - Nepro @ 55mL/hr; Banana flakes for diarrhea; Probiotic; Pepcid; FMS removed for leaking, Rectal mancini in place; scheduled Imodium for 6 doses q6, then PRN.      · Prophylaxis - DVT - SQH, GI - Pepcid · Discussed in interdisciplinary rounds          The patient is: [x] acutely ill Risk of deterioration: [x] moderate    [x] critically ill  [x] high     [x]See my orders for details    My assessment/plan was discussed with:  [x]nursing [x]PT/OT    [x]respiratory therapy [x]Dr. Ricardo Valverde MD   []family []     [x]Total critical care time exclusive of procedures 45 minutes    Nury Leon MD      [facility] Staff Addendum     I have reviewed the patient's chart. The patient's findings and care plan were reviewed on ICU rounds, with ICU team and ICU attending, Dr. Ricardo Valverde. I agree with the above evaluation, assessment and recommendations.       Joselo Brain, DO, FCCP  Pulmonary, Sleep and Critical Care Medicine

## 2017-09-20 NOTE — PROGRESS NOTES
Problem: Mobility Impaired (Adult and Pediatric)  Goal: *Acute Goals and Plan of Care (Insert Text)  Goals to be addressed in 5-7 days:    1. Positioning: Trial use of abduction wedge for proper anatomical positioning for contracture prevention. 2. Education: Instruct/educate PT tech on FMP for PROM/abduction wedge placement. Outcome: Progressing Towards Goal  PHYSICAL THERAPY TREATMENT     Patient: Ashok Momin [de-identified]65 y.o. male)  Date: 9/20/2017  Diagnosis: Cardiac arrest ([facility]  Acidosis  Respiratory failure (HCC)  Anemia  ESRD needing dialysis ([facility]  Cardiac arrest ([facility] [address]  Acute GI bleeding  Sepsis ([facility] [address]  Hypotension  Anoxic brain damage (HCC)  ESRD (end stage renal disease) ([facility] [address]  Colitis  dx  dx  DX  dx <principal problem not specified>  Procedure(s) (LRB):  TRACHEOSTOMY/ PERCUTANEOUS ENDOSCOPIC GASTROSTOMY TUBE INSERTION (N/A)  PERCUTANEOUS ENDOSCOPIC GASTROSTOMY TUBE INSERTION (N/A) 12 Days Post-Op  Precautions: Fall, Skin   Chart, physical therapy assessment, plan of care and goals were reviewed. ASSESSMENT:  Patient tolerating abduction wedge well, promoting proper positioning in B LE, assisting with tone management. Skin inspected, no open areas or pressure areas noted at secure points with wedge. PT tech was educated with abduction wedge, positioning, PROM of B LE, and safety with line management. Patient with good return demonstration with instruction. Patient will be transitioned to a functional maintenance program (FMP) for PROM/positioning with PT tech. Will continue to monitor patient while on FMP. Progression toward goals:  [ ]      Improving appropriately and progressing toward goals  [X]      Improving slowly and progressing toward goals  [ ]      Not making progress toward goals and plan of care will be adjusted       PLAN:  Patient continues to benefit from skilled intervention to address the above impairments. Continue treatment per established plan of care.   Discharge Recommendations: LTAC/SNF  Further Equipment Recommendations for Discharge:  hospital bed and mechanical lift       SUBJECTIVE:   Patient is non verbal/not interactive with PT.      OBJECTIVE DATA SUMMARY:   Critical Behavior:  Neurologic State: Unresponsive  Orientation Level: Unable to verbalize  Cognition: Unable to assess (comment)  Safety/Judgement: Fall prevention  Functional Mobility Training:  Bed Mobility:  Rolling:  (N/A)  Transfers:  Sit to Stand:  (N/A)  Balance:  Sitting:  (N/A)  Standing:  (N/A)  Therapeutic Exercises:   PROM:  B AP/[place], HS (hip/knee flexion), toe flexion/ext, SLR  1 set x 10 reps each  Pain:  Pain Scale 1: Adult Nonverbal Pain Scale  Pain Intensity 1: 0  Activity Tolerance:   Good  Please refer to the flowsheet for vital signs taken during this treatment.   After treatment:   [ ] Patient left in no apparent distress sitting up in chair  [X] Patient left in no apparent distress in bed  [X] Call bell left within reach  [X] Nursing notified  [ ] Caregiver present  [ ] Bed alarm activated      Marilee Cortes PT   Time Calculation: 32 mins

## 2017-09-20 NOTE — PROGRESS NOTES
[facility] Hospitalist Group  Progress Note    Patient: Marlen Graver Age: 65 y.o. : 1960 MR#: [id number] SSN: [id number]  Date: 2017     Subjective:     Patient lying in bed , Unresponsive, Has trach and peg    Assessment/Plan:   -PEA arrest with acute hypoxic resp failure - trach care on the VENT    -Severe anoxic encephalopathy - pt remains unresponsive no change.    -Pneumonia/VAP, sputum culture + Pseudamonas and klebsiella from . - on merrem day 17 is last day. - Acute pontine CVA - asa  - ESRD , - HD as per nephro   - DM - basal/bolus insulin. - Unstageable pressure ulcer to sacrum/coccyx - wound care. - Hypotension-on midodrine  - Is s/p PEG/trach.   Monitor    Pt for LTACH soon     Additional Notes:      Case discussed with:  []Patient  []Family  [x]Nursing  []Case Management  DVT Prophylaxis:  []Lovenox  []Hep SQ  []SCDs  []Coumadin   []On Heparin gtt    Objective:   VS:   Visit Vitals    /53    Pulse 94    Temp 99.6 °F (37.6 °C)    Resp 16    Ht 6' 2\" (1.88 m)    Wt 70.8 kg (156 lb)    SpO2 100%    BMI 20.03 kg/m2      Tmax/24hrs: Temp (24hrs), Av.1 °F (37.3 °C), Min:98.3 °F (36.8 °C), Max:99.6 °F (37.6 °C)      Intake/Output Summary (Last 24 hours) at 17 1024  Last data filed at 17 0700   Gross per 24 hour   Intake             2090 ml   Output              550 ml   Net             1540 ml       General:  Eyes open but does not respond  Cardiovascular:  S1S2+, RRR  Pulmonary:  Coarse bs b/l  GI:  Soft, BS+, NT, ND, +peg  Extremities:  trace edema  +tracheostomy    Labs:    Recent Results (from the past 24 hour(s))   GLUCOSE, POC    Collection Time: 17 11:31 AM   Result Value Ref Range    Glucose (POC) 80 70 - 110 mg/dL   GLUCOSE, POC    Collection Time: 17  4:34 PM   Result Value Ref Range    Glucose (POC) 149 (H) 70 - 110 mg/dL   GLUCOSE, POC    Collection Time: 17 11:44 PM   Result Value Ref Range Glucose (POC) 197 (H) 70 - 184 mg/dL   METABOLIC PANEL, BASIC    Collection Time: 09/20/17  4:59 AM   Result Value Ref Range    Sodium 133 (L) 136 - 145 mmol/L    Potassium 4.0 3.5 - 5.5 mmol/L    Chloride 100 100 - 108 mmol/L    CO2 21 21 - 32 mmol/L    Anion gap 12 3.0 - 18 mmol/L    Glucose 61 (L) 74 - 99 mg/dL    BUN 63 (H) 7.0 - 18 MG/DL    Creatinine 6.26 (H) 0.6 - 1.3 MG/DL    BUN/Creatinine ratio 10 (L) 12 - 20      GFR est AA 11 (L) >60 ml/min/1.73m2    GFR est non-AA 9 (L) >60 ml/min/1.73m2    Calcium 8.3 (L) 8.5 - 10.1 MG/DL   CBC WITH AUTOMATED DIFF    Collection Time: 09/20/17  4:59 AM   Result Value Ref Range    WBC 15.8 (H) 4.6 - 13.2 K/uL    RBC 2.58 (L) 4.70 - 5.50 M/uL    HGB 7.9 (L) 13.0 - 16.0 g/dL    HCT 24.7 (L) 36.0 - 48.0 %    MCV 95.7 74.0 - 97.0 FL    MCH 30.6 24.0 - 34.0 PG    MCHC 32.0 31.0 - 37.0 g/dL    RDW 16.6 (H) 11.6 - 14.5 %    PLATELET 397 039 - 192 K/uL    MPV 9.9 9.2 - 11.8 FL    NEUTROPHILS 67 42 - 75 %    LYMPHOCYTES 18 (L) 20 - 51 %    MONOCYTES 10 (H) 2 - 9 %    EOSINOPHILS 5 0 - 5 %    BASOPHILS 0 0 - 3 %    ABS. NEUTROPHILS 10.6 (H) 1.8 - 8.0 K/UL    ABS. LYMPHOCYTES 2.8 0.8 - 3.5 K/UL    ABS. MONOCYTES 1.6 (H) 0 - 1.0 K/UL    ABS. EOSINOPHILS 0.8 (H) 0.0 - 0.4 K/UL    ABS.  BASOPHILS 0.0 0.0 - 0.06 K/UL    DF MANUAL      PLATELET COMMENTS INCREASED PLATELETS      RBC COMMENTS ANISOCYTOSIS  1+        RBC COMMENTS TARGET CELLS  FEW       GLUCOSE, POC    Collection Time: 09/20/17  6:13 AM   Result Value Ref Range    Glucose (POC) 60 (L) 70 - 110 mg/dL   GLUCOSE, POC    Collection Time: 09/20/17  6:45 AM   Result Value Ref Range    Glucose (POC) 103 70 - 110 mg/dL       Signed By: Quyen Castrejon MD     September 20, 2017

## 2017-09-20 NOTE — ROUTINE PROCESS
Bedside and Verbal shift change report given to Wanda Barksdale RN (oncoming nurse) by Vic Jacobs RN (offgoing nurse).  Report included the following information SBAR, Kardex, ED Summary, Procedure Summary, Intake/Output, MAR, Recent Results and Cardiac Rhythm SR.

## 2017-09-20 NOTE — PROGRESS NOTES
Bedside and Verbal shift change report given to AMI Ayala (oncoming nurse) by Vivek Lancaster RN   (offgoing nurse). Report included the following information SBAR, Kardex, Intake/Output, MAR, Accordion, Recent Results, Cardiac Rhythm NSR. and Alarm Parameters .

## 2017-09-20 NOTE — DIABETES MGMT
Glycemic Control: Pt discussed in interdisciplinary rounds. Pt had hypoglycemia this morning (61 mg/dL). Pt continues to tolerate tube feeds at goal. Lantus insulin changed from 7 units every bedtime to 7 units daily (first dose tomorrow). Continue inpatient monitoring and intervention.      Isabella Salgado RD, CDE

## 2017-09-21 ENCOUNTER — APPOINTMENT (OUTPATIENT)
Dept: GENERAL RADIOLOGY | Age: 57
DRG: 004 | End: 2017-09-21
Attending: INTERNAL MEDICINE
Payer: MEDICARE

## 2017-09-21 LAB
ANION GAP SERPL CALC-SCNC: 12 MMOL/L (ref 3–18)
BACTERIA SPEC CULT: NORMAL
BACTERIA SPEC CULT: NORMAL
BUN SERPL-MCNC: 92 MG/DL (ref 7–18)
BUN/CREAT SERPL: 13 (ref 12–20)
CALCIUM SERPL-MCNC: 8.3 MG/DL (ref 8.5–10.1)
CHLORIDE SERPL-SCNC: 96 MMOL/L (ref 100–108)
CO2 SERPL-SCNC: 23 MMOL/L (ref 21–32)
CREAT SERPL-MCNC: 7.05 MG/DL (ref 0.6–1.3)
GLUCOSE BLD STRIP.AUTO-MCNC: 169 MG/DL (ref 70–110)
GLUCOSE BLD STRIP.AUTO-MCNC: 87 MG/DL (ref 70–110)
GLUCOSE BLD STRIP.AUTO-MCNC: 89 MG/DL (ref 70–110)
GLUCOSE SERPL-MCNC: 65 MG/DL (ref 74–99)
POTASSIUM SERPL-SCNC: 4.4 MMOL/L (ref 3.5–5.5)
SERVICE CMNT-IMP: NORMAL
SERVICE CMNT-IMP: NORMAL
SODIUM SERPL-SCNC: 131 MMOL/L (ref 136–145)

## 2017-09-21 PROCEDURE — 65610000006 HC RM INTENSIVE CARE

## 2017-09-21 PROCEDURE — 74011250636 HC RX REV CODE- 250/636: Performed by: INTERNAL MEDICINE

## 2017-09-21 PROCEDURE — 80048 BASIC METABOLIC PNL TOTAL CA: CPT | Performed by: INTERNAL MEDICINE

## 2017-09-21 PROCEDURE — 94640 AIRWAY INHALATION TREATMENT: CPT

## 2017-09-21 PROCEDURE — 74011000250 HC RX REV CODE- 250: Performed by: PHYSICIAN ASSISTANT

## 2017-09-21 PROCEDURE — 74011250637 HC RX REV CODE- 250/637: Performed by: INTERNAL MEDICINE

## 2017-09-21 PROCEDURE — 74011250637 HC RX REV CODE- 250/637: Performed by: HOSPITALIST

## 2017-09-21 PROCEDURE — 94003 VENT MGMT INPAT SUBQ DAY: CPT

## 2017-09-21 PROCEDURE — 74011250636 HC RX REV CODE- 250/636: Performed by: HOSPITALIST

## 2017-09-21 PROCEDURE — 77030011256 HC DRSG MEPILEX <16IN NO BORD MOLN -A

## 2017-09-21 PROCEDURE — 74011000250 HC RX REV CODE- 250: Performed by: INTERNAL MEDICINE

## 2017-09-21 PROCEDURE — 36415 COLL VENOUS BLD VENIPUNCTURE: CPT | Performed by: INTERNAL MEDICINE

## 2017-09-21 PROCEDURE — 82962 GLUCOSE BLOOD TEST: CPT

## 2017-09-21 PROCEDURE — 74011000258 HC RX REV CODE- 258: Performed by: INTERNAL MEDICINE

## 2017-09-21 PROCEDURE — 74011250637 HC RX REV CODE- 250/637: Performed by: NURSE PRACTITIONER

## 2017-09-21 PROCEDURE — 71010 XR CHEST PORT: CPT

## 2017-09-21 PROCEDURE — 74011250636 HC RX REV CODE- 250/636: Performed by: PHYSICIAN ASSISTANT

## 2017-09-21 PROCEDURE — 74011636637 HC RX REV CODE- 636/637: Performed by: INTERNAL MEDICINE

## 2017-09-21 RX ORDER — MIDODRINE HYDROCHLORIDE 10 MG/1
10 TABLET ORAL
Qty: 90 TAB | Refills: 0 | Status: SHIPPED | OUTPATIENT
Start: 2017-09-21 | End: 2017-10-21

## 2017-09-21 RX ORDER — MENTHOL AND ZINC OXIDE .44; 20.625 G/100G; G/100G
OINTMENT TOPICAL 3 TIMES DAILY
Status: DISCONTINUED | OUTPATIENT
Start: 2017-09-21 | End: 2017-09-22 | Stop reason: HOSPADM

## 2017-09-21 RX ORDER — SODIUM CHLORIDE 9 MG/ML
50 INJECTION, SOLUTION INTRAVENOUS CONTINUOUS
Status: DISCONTINUED | OUTPATIENT
Start: 2017-09-21 | End: 2017-09-22 | Stop reason: HOSPADM

## 2017-09-21 RX ORDER — IPRATROPIUM BROMIDE AND ALBUTEROL SULFATE 2.5; .5 MG/3ML; MG/3ML
3 SOLUTION RESPIRATORY (INHALATION)
Qty: 30 NEBULE | Refills: 0 | Status: SHIPPED
Start: 2017-09-21

## 2017-09-21 RX ORDER — GUAIFENESIN 100 MG/5ML
81 LIQUID (ML) ORAL DAILY
Qty: 30 TAB | Refills: 0 | Status: SHIPPED
Start: 2017-09-21

## 2017-09-21 RX ORDER — INSULIN LISPRO 100 [IU]/ML
INJECTION, SOLUTION INTRAVENOUS; SUBCUTANEOUS
Qty: 1 VIAL | Refills: 1 | Status: SHIPPED
Start: 2017-09-21

## 2017-09-21 RX ORDER — ACETAMINOPHEN 325 MG/1
650 TABLET ORAL
Qty: 30 TAB | Refills: 0 | Status: SHIPPED
Start: 2017-09-21

## 2017-09-21 RX ORDER — LOPERAMIDE HYDROCHLORIDE 2 MG/1
2 CAPSULE ORAL
Status: DISCONTINUED | OUTPATIENT
Start: 2017-09-21 | End: 2017-09-22 | Stop reason: HOSPADM

## 2017-09-21 RX ADMIN — HEPARIN SODIUM 5000 UNITS: 5000 INJECTION, SOLUTION INTRAVENOUS; SUBCUTANEOUS at 09:45

## 2017-09-21 RX ADMIN — MINERAL OIL AND WHITE PETROLATUM 1 DOSE: 150; 850 OINTMENT OPHTHALMIC at 12:00

## 2017-09-21 RX ADMIN — IPRATROPIUM BROMIDE AND ALBUTEROL SULFATE 3 ML: 2.5; .5 SOLUTION RESPIRATORY (INHALATION) at 01:21

## 2017-09-21 RX ADMIN — SIMVASTATIN 20 MG: 10 TABLET, FILM COATED ORAL at 23:03

## 2017-09-21 RX ADMIN — Medication: at 22:00

## 2017-09-21 RX ADMIN — SODIUM CHLORIDE 50 ML/HR: 900 INJECTION, SOLUTION INTRAVENOUS at 19:24

## 2017-09-21 RX ADMIN — MIDODRINE HYDROCHLORIDE 10 MG: 2.5 TABLET ORAL at 18:55

## 2017-09-21 RX ADMIN — COLLAGENASE SANTYL: 250 OINTMENT TOPICAL at 10:04

## 2017-09-21 RX ADMIN — IPRATROPIUM BROMIDE AND ALBUTEROL SULFATE 3 ML: 2.5; .5 SOLUTION RESPIRATORY (INHALATION) at 20:36

## 2017-09-21 RX ADMIN — VITAMIN A AND VITAMIN D: 929.3 OINTMENT TOPICAL at 12:00

## 2017-09-21 RX ADMIN — ACETAMINOPHEN 650 MG: 325 TABLET ORAL at 23:03

## 2017-09-21 RX ADMIN — LEVETIRACETAM 500 MG: 5 INJECTION INTRAVENOUS at 09:45

## 2017-09-21 RX ADMIN — CHLORHEXIDINE GLUCONATE 10 ML: 1.2 RINSE ORAL at 09:45

## 2017-09-21 RX ADMIN — Medication: at 19:10

## 2017-09-21 RX ADMIN — MEROPENEM 500 MG: 500 INJECTION, POWDER, FOR SOLUTION INTRAVENOUS at 18:00

## 2017-09-21 RX ADMIN — FAMOTIDINE 20 MG: 10 INJECTION INTRAVENOUS at 09:45

## 2017-09-21 RX ADMIN — INSULIN LISPRO 3 UNITS: 100 INJECTION, SOLUTION INTRAVENOUS; SUBCUTANEOUS at 19:12

## 2017-09-21 RX ADMIN — CHLORHEXIDINE GLUCONATE 10 ML: 1.2 RINSE ORAL at 23:04

## 2017-09-21 RX ADMIN — VITAMIN A AND VITAMIN D: 929.3 OINTMENT TOPICAL at 05:13

## 2017-09-21 RX ADMIN — MINERAL OIL AND WHITE PETROLATUM 1 DOSE: 150; 850 OINTMENT OPHTHALMIC at 18:00

## 2017-09-21 RX ADMIN — ASPIRIN 81 MG 81 MG: 81 TABLET ORAL at 10:03

## 2017-09-21 RX ADMIN — Medication 1 PACKET: at 16:00

## 2017-09-21 RX ADMIN — IPRATROPIUM BROMIDE AND ALBUTEROL SULFATE 3 ML: 2.5; .5 SOLUTION RESPIRATORY (INHALATION) at 14:37

## 2017-09-21 RX ADMIN — HEPARIN SODIUM 5000 UNITS: 5000 INJECTION, SOLUTION INTRAVENOUS; SUBCUTANEOUS at 18:55

## 2017-09-21 RX ADMIN — IPRATROPIUM BROMIDE AND ALBUTEROL SULFATE 3 ML: 2.5; .5 SOLUTION RESPIRATORY (INHALATION) at 08:36

## 2017-09-21 RX ADMIN — HEPARIN SODIUM 5000 UNITS: 5000 INJECTION, SOLUTION INTRAVENOUS; SUBCUTANEOUS at 01:01

## 2017-09-21 RX ADMIN — MINERAL OIL AND WHITE PETROLATUM 1 DOSE: 150; 850 OINTMENT OPHTHALMIC at 05:14

## 2017-09-21 RX ADMIN — Medication 1 PACKET: at 23:21

## 2017-09-21 RX ADMIN — LEVETIRACETAM 500 MG: 100 SOLUTION ORAL at 18:56

## 2017-09-21 RX ADMIN — MIDODRINE HYDROCHLORIDE 10 MG: 2.5 TABLET ORAL at 09:45

## 2017-09-21 RX ADMIN — Medication 1 CAPSULE: at 09:45

## 2017-09-21 NOTE — PROGRESS NOTES
attended the interdisciplinary rounds for [facility], who is a 65 y.o.,male. Patients Primary Language is: [place]. According to the patients EMR Amish Affiliation is: [facility].     The reason the Patient came to the hospital is:   Patient Active Problem List    Diagnosis Date Noted    Oropharyngeal dysphagia 08/17/2017    Cachexia ([facility] [address] 08/17/2017    Acidosis 07/27/2017    Cardiac arrest ([address] 07/27/2017    Respiratory failure ([address] 07/27/2017    ESRD needing dialysis ([address] 07/27/2017    Anoxic brain damage ([address] 07/27/2017    Colitis 07/27/2017    Hypotension 07/27/2017    Acute GI bleeding 07/27/2017    ESRD (end stage renal disease) ([address] 07/27/2017    Sepsis ([facility] [address] 07/27/2017    Anemia       Not able to assess the patient due to medical condition. Plan:  Chaplains will continue to follow and will provide pastoral care on an as needed/requested basis.  recommends bedside caregivers page  on duty if patient shows signs of acute spiritual or emotional distress.     [facility]  Board Certified [facility]   [phone]

## 2017-09-21 NOTE — PROGRESS NOTES
[facility] Hospitalist Group  Progress Note    Patient: Jada Napier Age: 65 y.o. : 1960 MR#: [id number] SSN: [id number]  Date: 2017     Subjective:     Patient lying in bed , Unresponsive, Has trach and peg. Awaiting bed to transfer. Assessment/Plan:   -PEA arrest with acute hypoxic resp failure - trach care on the VENT    -Severe anoxic encephalopathy - pt remains unresponsive no change.    -Pneumonia/VAP, sputum culture + Pseudamonas and klebsiella from . - on merrem last day  17 . - Acute pontine CVA - asa  - ESRD , - HD as per nephro   - DM - basal/bolus insulin. - Unstageable pressure ulcer to sacrum/coccyx - wound care. - Hypotension-on midodrine  - Is s/p PEG/trach. Monitor    await LTACH bed. Additional Notes:      Case discussed with:  []Patient  []Family  [x]Nursing  []Case Management  DVT Prophylaxis:  []Lovenox  []Hep SQ  []SCDs  []Coumadin   []On Heparin gtt    Objective:   VS:   Visit Vitals    /44    Pulse (!) 111    Temp 98.6 °F (37 °C)    Resp (!) 31    Ht 6' 2\" (1.88 m)    Wt 70 kg (154 lb 5.2 oz)    SpO2 100%    BMI 19.81 kg/m2      Tmax/24hrs: Temp (24hrs), Av.6 °F (37 °C), Min:98.4 °F (36.9 °C), Max:98.6 °F (37 °C)      Intake/Output Summary (Last 24 hours) at 17 1402  Last data filed at 17 0700   Gross per 24 hour   Intake             1465 ml   Output              650 ml   Net              815 ml       General:  Eyes open but does not respond  Cardiovascular:  S1S2+, RRR  Pulmonary:  cta. GI:  Soft, BS+, NT, ND, +peg  Extremities:  trace edema  +tracheostomy  CNS; unresponsive, anoxic.     Labs:    Recent Results (from the past 24 hour(s))   GLUCOSE, POC    Collection Time: 17  5:53 PM   Result Value Ref Range    Glucose (POC) 110 70 - 110 mg/dL   GLUCOSE, POC    Collection Time: 17 11:40 PM   Result Value Ref Range    Glucose (POC) 155 (H) 70 - 925 mg/dL   METABOLIC PANEL, BASIC    Collection Time: 09/21/17  3:46 AM   Result Value Ref Range    Sodium 131 (L) 136 - 145 mmol/L    Potassium 4.4 3.5 - 5.5 mmol/L    Chloride 96 (L) 100 - 108 mmol/L    CO2 23 21 - 32 mmol/L    Anion gap 12 3.0 - 18 mmol/L    Glucose 65 (L) 74 - 99 mg/dL    BUN 92 (H) 7.0 - 18 MG/DL    Creatinine 7.05 (H) 0.6 - 1.3 MG/DL    BUN/Creatinine ratio 13 12 - 20      GFR est AA 10 (L) >60 ml/min/1.73m2    GFR est non-AA 8 (L) >60 ml/min/1.73m2    Calcium 8.3 (L) 8.5 - 10.1 MG/DL   GLUCOSE, POC    Collection Time: 09/21/17  5:11 AM   Result Value Ref Range    Glucose (POC) 87 70 - 110 mg/dL       Signed By: Angie Fair MD     September 21, 2017

## 2017-09-21 NOTE — DISCHARGE SUMMARY
Hospitalist Discharge Summary     Patient ID:  Hanane Childs  [id number]  65 y.o.  1960    PCP on record: Jose C Manjarrez MD    Admit date: 7/27/2017  Discharge date and time: 9/22/2017    Discharge Diagnoses:                                              -PEA arrest with acute hypoxic resp failure - trach care on the VENT;  Setting per [facility]. Echo showed; Left ventricle: Systolic function was normal by visual assessment. Ejection fraction was estimated in the range of 55 % to 60 %. No obvious  wall motion abnormalities identified in the views obtained. Wall thickness  was markedly increased. Pericardium: A small pericardial effusion was identified circumferential  to the heart but mainly along the left atrial/ventricular free wall. There  was no evidence of hemodynamic compromise.    -Severe anoxic encephalopathy - pt remains unresponsive no change.     -Pneumonia/VAP, sputum culture + Pseudamonas and klebsiella from 09/06. - stop merrem last day  9/22/17   . - Acute pontine CVA - asa    - ESRD , - HD as per nephro M/WE/FR per Dr Agudelo Plan    - DM - continue insulin ss.    - Unstageable pressure ulcer to sacrum/coccyx - wound care. - Hypotension-on midodrine    -  s/p PEG/trach. HPI and short hospital course; Hanane Childs is a 55 y.o.  male who presents to ER via EMS found unresponsive via EMS went into PEA CPR/ACLS protocol ROSC   Dialysis patient apparently was at [facility] yesterday with anemia recived 2 units of Prbc sent home for dialysis found unresponsive by family and called in currently at time of evaluation on Levophen at 20 mcg and intubated with minimal responsse on no sedation . Pt was admitted to the ICU. He suffered severe anoxic brain injury and remains unreponsive was on the vent and now has trach and peg with tube feeds. He had multiple head CT's last one showed;     IMPRESSION  IMPRESSION:   1.   There may be subtle mild loss of gray-white matter differentiation  throughout, though mildly hyperdense cortex persists suggesting there may be a  mild component of anoxic brain injury. 2.  Similar thalamic infarcts.     His MRI of the head showed;  IMPRESSION  Impression:       1. Small, acute lacunar type infarcts of the manny. Discussed with Dr. Jd Petit on  August 14, 2017 at 12:15 AM.  2. Mild chronic microvascular ischemic change. Chronic appearing lacunar type  infarcts of the thalami. He had CTA chest; was negative for PE. IMPRESSION  IMPRESSION:  1. No evidence for pulmonary emboli within limitations of respiratory motion. 2.  Tip of endotracheal tube is approximately 1 cm proximal to the bree,  consider 1-1.5 cm retraction. 3.  Layering secretions in the bronchus with left lower greater than upper  bronchovascular opacities most compatible with infectious etiology, possibly  aspiration given history. 4.  A single moderate length of endobronchial opacification right lower lobe. 5.  Resolved large right pleural effusion and atelectasis entire right lower  lobe. 6.  Bilateral anteromedial rib fractures associated with CPR. 7.  Main pulmonary artery enlargement as may be seen in pulmonary arterial  hypertension. 8.  Dilated IVC and hepatic veins as may be seen in right heart failure. 9.  Diminutive right renal arteries may be associated with hypotension and/or  peripheral arterial constriction. 10.  Moderate anasarca.      He spiked fever and his had e.coli/clostridium pefringens bacteremia and then pna with PSEUDOMONAS AERUGINOSA. He has been on abx and tomorrow is the last day for his MERREM. He has now trach and peg and he has been tolerated Tube feeds. Also his HD continued as well. He also had HIDA scan was negative. Pt remains unresponsive but stable to be dc to LTAC.         Significant Diagnostic Studies:      Pertinent Lab Data:  Recent Labs      09/20/17   0459  09/19/17   0446   WBC  15.8*  12.0   HGB  7.9*  8.6* HCT  24.7*  26.5*   PLT  409  529*     Recent Labs      09/21/17   0346  09/20/17   0459  09/19/17   0446   NA  131*  133*  135*   K  4.4  4.0  3.5   CL  96*  100  100   CO2  23  21  26   GLU  65*  61*  62*   BUN  92*  63*  52*   CREA  7.05*  6.26*  5.26*   CA  8.3*  8.3*  8.5       DISCHARGE MEDICATIONS:  @  Current Discharge Medication List      START taking these medications    Details   acetaminophen (TYLENOL) 325 mg tablet Take 2 Tabs by mouth every four (4) hours as needed. Qty: 30 Tab, Refills: 0      albuterol-ipratropium (DUO-NEB) 2.5 mg-0.5 mg/3 ml nebu 3 mL by Nebulization route every four (4) hours as needed. Qty: 30 Nebule, Refills: 0      aspirin 81 mg chewable tablet Take 1 Tab by mouth daily. Qty: 30 Tab, Refills: 0      insulin lispro (HUMALOG) 100 unit/mL injection Check finger stick q6h  Qty: 1 Vial, Refills: 1      levETIRAcetam (KEPPRA) 100 mg/ml soln oral solution 5 mL by Per G Tube route two (2) times a day. Qty: 1 Bottle, Refills: 0      midodrine (PROAMITINE) 10 mg tablet Take 1 Tab by mouth three (3) times daily (with meals) for 30 days.   Qty: 90 Tab, Refills: 0               Follow up with:   PCP : Kelly Bergeron MD  >30 minutes spent coordinating this discharge (review instructions/follow-up, prescriptions, preparing report for sign off)    Signed:  Wilmer Taylor MD  9/21/2017  5:15 PM

## 2017-09-21 NOTE — PROGRESS NOTES
SW was informed this morning that the patient had a bed at [facility] this morning. However, [id number] SW informed that the patient does not have a bed today. [facility]. SW told they may have a bed later tonight or tomorrow. Transportation with [employer] set to [employer] status.

## 2017-09-21 NOTE — PROGRESS NOTES
Infectious Disease Progress Note    Requested by: Dr. Anay Melendez    Reason: sepsis, e.coli/clostridium pefringens bacteremia    Current abx Prior abx   Meropenem since 9/15/17 Levofloxacin 7/27  Pip/tazo 7/27-7/31; 8/2-8/10; 8/18-8/25  Meropenem  7/31-8/2,   vancomycin  7/27-8/2; 8/18-8/25, 9/7-9/13  Pip/tazo since 9/7-9/15  Ciprofloxacin since 9/11/17-9/15     Lines:       Assessment :  55year old man with h/o type 2 DM (hgb a1c 5.4 on 7/29/17) ,ESRD admitted to [facility] on 7/27/17 s/p cardiorespiratory arrest, hypotension, bacteremia, elevated LFTs. sepsis (POA) due to e.coli, clostridium perfringens bloodstream infection (positive blood culture 7/27, negative blood culture 8/1) . Most likely source of bacteremia is intra abdominal infection/inflammation. Elevated LFTs, thickened gallbladder - no evidence of cholecystitis on HIDA scan 8/2/17    Acute pontine CVA, s/p PEA arrest     Now with RLL atelectasis on cxr, low grade fevers tm:101 on 9/15/17, increasing wbc. Clinical picture consistent with RLL healthcare associated/ventilator associated pneumonia due to pseudomonas, klebsiella. Worsening wbc/fevers on current antibiotics likely due to decreased efficacy of current antibiotics (pseudomonas in sputum cx 9/6/17 had pip/tazo IRMA:32, was resistant to levofloxacin)     CT chest 9/15/17 revealed complete collapse of right lower lobe. Small effusion    S/p pulmonary toillete, chest PT with complete resolution of rll opacity on cxr 9/17/17    Mild increased wbc - r/o recurrent right lung atelectasis. On spontaneous mode of vent. No fevers    Recommendations:    1. cont meropenem (d 6/7)  2. Obtain CXR  3. F/u cbc, clinically        Long term prognosis: poor    Advance Care planning: full code, patient wasnt able to name a surrogate decision maker or provide an advance care plan   Please call me if any further questions or concerns. Will continue to participate in the care of this patient.     subjective:  Non verbal         Home medications:   list reviewed        Current Facility-Administered Medications   Medication Dose Route Frequency    insulin glargine (LANTUS) injection 7 Units  7 Units SubCUTAneous DAILY    0.9% sodium chloride infusion 250 mL  250 mL IntraVENous PRN    chlorhexidine (PERIDEX) 0.12 % mouthwash 10 mL  10 mL Oral Q12H    heparin (porcine) 1,000 unit/mL injection 1,000 Units  1,000 Units IntraVENous Q1H PRN    meropenem (MERREM) 500 mg in 0.9% sodium chloride (MBP/ADV) 50 mL MBP  0.5 g IntraVENous Q24H    albuterol-ipratropium (DUO-NEB) 2.5 MG-0.5 MG/3 ML  3 mL Nebulization Q6H RT    epoetin benjamin (EPOGEN;PROCRIT) injection 10,000 Units  10,000 Units IntraVENous EVERY MON & FRI    iron sucrose (VENOFER) 100 mg in 0.9% sodium chloride 100 mL IVPB  100 mg IntraVENous EVERY MON & FRI    midodrine (PROAMITINE) tablet 10 mg  10 mg Oral TID WITH MEALS    white petrolatum-mineral oil 85-15 % oint 1 Dose  1 Dose Ophthalmic Q6H    vits A and D-white pet-lanolin (A&D) ointment   Topical Q6H    albumin human 25% (BUMINATE) solution 12.5 g  12.5 g IntraVENous DIALYSIS PRN    Zinc Ox-Aloe Vera-Vitamin E (BALMEX) topical cream   Topical CONTINUOUS    banana flakes trans-galactooligosaccharide (BANATROL PLUS) powder 1 Packet  1 Packet Per NG tube TID    levETIRAcetam (KEPPRA) 500 mg in saline (iso-osm) 100 ml IVPB  500 mg IntraVENous Q12H    LORazepam (ATIVAN) injection 1 mg  1 mg IntraVENous Q4H PRN    insulin lispro (HUMALOG) injection   SubCUTAneous Q6H    famotidine (PF) (PEPCID) 20 mg in sodium chloride 0.9 % 10 mL injection  20 mg IntraVENous DAILY    collagenase (SANTYL) 250 unit/gram ointment   Topical DAILY    acetaminophen (TYLENOL) tablet 650 mg  650 mg Oral Q4H PRN    lactobacillus sp. 50 billion cpu (BIO-K PLUS) capsule 1 Cap  1 Cap Oral DAILY    heparin (porcine) injection 5,000 Units  5,000 Units SubCUTAneous Q8H    heparin (porcine) 1,000 unit/mL injection 2,000 Units  2,000 Units IntraVENous DIALYSIS ONCE    simvastatin (ZOCOR) tablet 20 mg  20 mg Oral QHS    aspirin chewable tablet 81 mg  81 mg Oral DAILY    glucose chewable tablet 16 g  4 Tab Oral PRN    glucagon (GLUCAGEN) injection 1 mg  1 mg IntraMUSCular PRN    dextrose (D50W) injection syrg 12.5-25 g  25-50 mL IntraVENous PRN    0.9% sodium chloride infusion  100 mL/hr IntraVENous DIALYSIS PRN    naloxone (NARCAN) injection 0.4 mg  0.4 mg IntraVENous PRN       Allergies: Review of patient's allergies indicates no known allergies. Temp (24hrs), Av.8 °F (37.1 °C), Min:98.4 °F (36.9 °C), Max:99.4 °F (37.4 °C)    Visit Vitals    /59 (BP 1 Location: Right arm, BP Patient Position: At rest)    Pulse 99    Temp 98.6 °F (37 °C)    Resp 20    Ht 6' 2\" (1.88 m)    Wt 70.8 kg (156 lb)    SpO2 100%    BMI 20.03 kg/m2       ROS: limited ROS obtained since patient not very communicative. Physical Exam:    General: Well developed, well nourished male laying on the bed,non communicative. HEENT:  Normocephalic, atraumatic, present conjunctival hemmohage;  nasal and oral mucous are moist and without evidence of lesions. Trach in place   Lymph Nodes:   no cervical, axillary or inguinal adenopathy   Lungs:   non-labored, decreased breath sounds right lung, no crackles wheezes rales or rhonchi   Heart:  RRR, s1 and s2; no  rubs or gallops, no edema, + pedal pulses   Abdomen:  soft, non-distended, active bowel sounds, no hepatomegaly, no splenomegaly.   Non-tender, peg in place   Genitourinary:  deferred   Extremities:   no clubbing, cyanosis; no joint effusions or swelling; muscle mass appropriate for age   Neurologic:  Detailed neuro eval not feasible on sedation                        Skin:  Sacral ulcers per report   Back:  no spinal or paraspinal muscle tenderness or rigidity, no CVA tenderness     Psychiatric:  Unable to assess         Labs: Results:   Chemistry Recent Labs      17   0346  17   0455 09/19/17 0446   GLU  65*  61*  62*   NA  131*  133*  135*   K  4.4  4.0  3.5   CL  96*  100  100   CO2  23  21  26   BUN  92*  63*  52*   CREA  7.05*  6.26*  5.26*   CA  8.3*  8.3*  8.5   AGAP  12  12  9   BUCR  13  10*  10*      CBC w/Diff Recent Labs      09/20/17 0459 09/19/17 0446   WBC  15.8*  12.0   RBC  2.58*  2.88*   HGB  7.9*  8.6*   HCT  24.7*  26.5*   PLT  409  529*   GRANS  67  64   LYMPH  18*  20*   EOS  5  5      Microbiology No results for input(s): CULT in the last 72 hours.        RADIOLOGY:    All available imaging studies/reports in [facility] for this admission were reviewed    Dr. Neelima Ford, Infectious Disease Specialist  [phone]  September 21, 2017  4:28 PM

## 2017-09-21 NOTE — PROGRESS NOTES
NUTRITION    Nursing Referral: MST, Pressure Ulcer  Nutrition Consult: Management of Tube Feeding     RECOMMENDATIONS / PLAN:     - Continue tube feeding of Nepro at goal of 55 mL/hr.   - Decrease to 75 mL q 6 hour water flushes.   - Continue Banatrol TID and Reymundo BID.  - Continue RD inpatient monitoring and evaluation. Goal Regimen: Nepro at 55 mL/hr + 75 mL q 6 hour water flushes to provide: 2376 kcal, 107 gm protein, 127 gm fat, 220 gm CHO, 21 gm fiber, 957 mL free water, 1257 mL total water, 100% RDIs     NUTRITION INTERVENTIONS & DIAGNOSIS:     [x] Enteral nutrition support: continue, modify flushes   [x] Coordination of care: interdisciplinary rounds     Nutrition Diagnosis: Increased protein needs related to promotion of wound healing and increased expenditure as evidenced by pressure ulcer wound and ESRD, pt on dialysis 3x per week  Inadequate oral intake related to inability to tolerate po as evidenced by NPO. ASSESSMENT:     9/20: Na remains low, 131 today; discussed decreasing flushes during rounds. 9/19: Tolerating feeds at goal. HD yesterday. Sodium low at 135 today. 9/12: PEG placed 9/8, tolerating feeds at goal.   9/6: Tolerating feeds at goal. Sophronia Backbone and PEG placement postponed due to hyperkalemia, K WNL today after HD 9/5 (850 mL removed). Procedure to be re-scheduled per Surgery availability. 9/1: Tolerating feeds at goal. Sophronia Backbone and PEG placement next week. HD prn, last dialysis 8/31.  8/28: Pt tolerating tube feeding at goal   8/22: Tolerating feeds at goal. Loose stool, will add prebiotic fiber supplement. 8/21: Pt intubated after PEA arrest during HD 8/18, PEG placement postponed. Tolerating feeds at goal.   8/18: Tolerating tube feeds at goal.  Tube feeds held today for PEG placement. 8/15: Tube feeds started this morning. Pt tolerating at rate of 30 mL/hr  8/14: SLP following; recommend NPO. Noted plan for NGT placement and start tube feeds. Potassium WNL.    8/10: Pt reported good appetite and \"eating enough\" from his meals. Noted fair meal intake per chart. Consuming supplements. Discussed increasing Nepro frequency; pt declined. Discussed adding Ensure Pudding; pt agreed with plan. Po intake of meals/supplements encouraged. Has been receiving K replacement. 8/7: K remains low despite previous tube feeding changed to higher potassium formula. Pt extubated 8/5. Tube feeds discontinued 8/6. SLP following; pt s/p MBS today and started on po diet. Noted poor po intake lunch meal per chart. 8/3: K remains low despite continued replacement. Will change to higher potassium formula per discussion with PA.   8/1: Tolerating feeding at goal. 1 L removed during HD 7/31. Hyperglycemia noted, advance to very insulin resistant SSI and basal insulin started. 7/31: Tolerating advancement of tube feeding. HD today. BG trending up, MD to stop D5.    7/30: Pt remain intubated. GI following; plan to start tube feeds today. Noted order placed; discussed modifying tube feed order and add water flushes with Dr Jeannine Vines. RN unavailable; discussed with Nursing Lagrange regarding modifying tube feed order  7/28: S/p cardiac arrest and intubated 7/27, NGT clamped. Abdominal ultrasound today to rule out cholecystitis. Will wait to feed.      EN infusion adequate to meet patients estimated nutritional needs:   [x] Yes     [] No   [] Unable to determine at this time    Tube Feeding: Nepro at 55 mL via NGT  Water Flushes: 150 mL q 6 hour  Residuals: 0 mL  Diet: DIET NUTRITIONAL SUPPLEMENTS Breakfast, Lunch, Dinner; [employer]   DIET NUTRITIONAL SUPPLEMENTS Lunch, Dinner; [employer] DRINK MIX  DIET TUBE FEEDING      Food Allergies: NKFA  Appetite/meal intake prior to admission:   [] Good     [] Fair     [] Poor     [x] Other: unknown   Feeding Limitations:  [x] Swallowing difficulty: SLP following; recommended NPO on 8/14    [] Chewing difficulty:    [x] Other: intubated       Anuric   BM: 9/20; FMS  Skin Integrity: stage II pressure ulcer anus; unstageable pressure ulcer sacrum; moisture associated skin damage posterior buttocks  Edema: none  Pertinent Medications: Reviewed: lantus, SSI, lactobacillus, imodium, iron sucrose    Recent Labs      09/21/17   0346  09/20/17   0459  09/19/17   0446   NA  131*  133*  135*   K  4.4  4.0  3.5   CL  96*  100  100   CO2  23  21  26   GLU  65*  61*  62*   BUN  92*  63*  52*   CREA  7.05*  6.26*  5.26*   CA  8.3*  8.3*  8.5       Intake/Output Summary (Last 24 hours) at 09/21/17 0944  Last data filed at 09/21/17 0500   Gross per 24 hour   Intake             1505 ml   Output              650 ml   Net              855 ml       Anthropometrics:  Ht Readings from Last 1 Encounters:   08/15/17 6' 2\" (1.88 m)     Last 3 Recorded Weights in this Encounter    09/17/17 0504 09/18/17 0651 09/20/17 0605   Weight: 71.9 kg (158 lb 8 oz) 68.8 kg (151 lb 9.6 oz) 70.8 kg (156 lb)     Body mass index is 20.03 kg/(m^2). Underweight     Weight History:   Weight Metrics 9/20/2017   Weight 156 lb   BMI 20.03 kg/m2        Admitting Diagnosis: Cardiac arrest (HCC)  Acidosis  Respiratory failure (HCC)  Anemia  ESRD needing dialysis ([facility]  Cardiac arrest ([facility]  Acute GI bleeding  Sepsis ([facility]  Hypotension  Anoxic brain damage (HCC)  ESRD (end stage renal disease) ([facility]  Colitis  dx  dx  DX  dx  Pertinent PMHx: DM, HTN, IBS, ESRD    Education Needs:        [x] None identified  [] Identified - Not appropriate at this time  []  Identified and addressed - refer to education log  Learning Limitations:   [] None identified  [x] Identified: intubated      Cultural, Church & ethnic food preferences:  [x] None identified    [] Identified and addressed     ESTIMATED NUTRITION NEEDS:     Calories: 3003-4802 kcal ([id number]) based on  [x] Actual BW 65 kg      [] SBW  Protein:  gm (1.2-2 gm/kg) based on  [x] Actual BW      [] SBW   Fluid: 9786-7228 mL     MONITORING & EVALUATION:     Nutrition Goal(s):   1. Patient will tolerate enteral nutrition formula and regimen without difficulty within the next 7 days. Outcome:  [x] Met/Ongoing    []  Not Met    [] New/Initial Goal   2. Patient will meet their estimated nutritional needs through adequate enteral nutrition within the next 7 days.  Outcome:  [x] Met/Ongoing    []  Not Met/Progressing    [] New/Initial Goal      Monitoring:   [x] EN tolerance    [x] EN infusion   [] Diet tolerance   [] Meal intake   [] Supplement intake   [] GI symptoms/ability to tolerate po diet   [x] Respiratory status   [x] Plan of care      Previous Recommendations (for follow-up assessments only):     [x]   Implemented       []   Not Implemented (RD to address)     [] No Recommendation Made     Discharge Planning: goal enteral nutrition regimen via PEG  [x] Participated in care planning, discharge planning, & interdisciplinary rounds as appropriate       Louis Conway, [address] [place] [address]   Pager: [phone]

## 2017-09-21 NOTE — INTERDISCIPLINARY ROUNDS
CRITICAL CARE INTERDISCIPLINARY ROUNDS      Patient Information:    Name:   Meka Leung    Age:   65 y.o.     Admission Date:   7/27/2017    Readmit Risk Assessment Information:      Readmit Risk Tool Support Systems: Family member(s), Relationship with Primary Physician Group: Seen at least one time within past 12 months    Surgery Date:      Day of Stay:     Expected Discharge Date:        Attending Provider:   Zeyad Duenas MD    Surgeon:        Consultant:       Primary Care Provider:   Cortez Malave MD    Problem List:     Patient Active Problem List   Diagnosis Code    Anemia D64.9    Acidosis E87.2    Cardiac arrest ([address] I46.9    Respiratory failure ([address] J96.90    ESRD needing dialysis ([address] N18.6, Z99.2    Anoxic brain damage ([address] G93.1    Colitis K52.9    Hypotension I95.9    Acute GI bleeding K92.2    ESRD (end stage renal disease) ([address] N18.6    Sepsis ([address] A41.9    Oropharyngeal dysphagia R13.12    Cachexia ([address] R64       Principal Problem:  <principal problem not specified>    Procedure:       During rounds the following quality care indicators and evidence based practices were addressed :     DVT Prophylaxis, Pressure Injury Prevention, Pain Management, Sepsis resuscitation and management, Nutritional Status, Critical Care Interventions Airways, Drains and Lines and IHI Bundles: Vent Bundle Followed, Vent Day >30           Acute MI/PCI:   Not applicable    Heart Failure:    Not applicable    Cardiac Surgery:  Not applicable    SCIP Measures for other Surgeries:   Not applicable    Pneumonia:    Appropriate Antibiotic Selection (ICU versus Non-ICU)    Stroke:  Patient's Personal Risk Factors for Stroke are:   hypertension, family history, hyperlipidemia or diabetes mellitus    NIH Stroke Score  Total: 8 (08/17/17 0400)    Transfer Level of Care:  Not Ready for Transfer    The patient will require the following interventions based on the Readmission Risk Assessment:  Pharmacy evaluation and teaching, Care Management involvement for home health follow up for:  mobility and assistance with ADL's and Spiritual Care evaluation      Discharge Management:  Home    Anticipated Discharge Date:  3      Interdisciplinary team rounds were held  with the following team membersCare Management, Diabetes Treatment Specialist, Nutrition, Pastoral Care, Pharmacy, Physician and Clinical Coordinator and the  patient and healthcare POA (documentation required). Plan of care discussed. See clinical pathway and/or care plan for interventions and desired outcomes. Case management working on placement.

## 2017-09-21 NOTE — DIABETES MGMT
Glycemic control: pt discussed in interdisciplinary rounds. Pt has episode of hypoglycemia this morning (blood glucose 65 mg/dL). Lantus insulin has been discontinued. Continue correctional Lispro insulin coverage.      Fili Clements RD, CDE

## 2017-09-21 NOTE — ROUTINE PROCESS
06:00 assumed pt care no s/s of distress noted. 07:30 Bedside shift change report given to Candie Brenner RN (oncoming nurse) by Carmel Boone (offgoing nurse). Report included the following information SBAR, Kardex, Intake/Output and MAR.

## 2017-09-21 NOTE — PROGRESS NOTES
Patient completed ROM as follows. RUE X (Supination/pronation, finger flex/ext, wrist flex/ext, elbow flex/ext, shoulder flex/ext 1 set x 10 reps)    LUE X (Supination/pronation, finger flex/ext, wrist flex/ext, elbow flex/ext, shoulder flex/ext 1 set x 10 reps)    RLE X (dorsi/plantarflexion, knee flex/extension 1 set x 10 reps)     LLE X (dorsi/plantarflexion, knee flex/extension 1 set x 10 reps)       Pt participation was:  () AROM/ AAROM  (X) PROM    Pain Level Before FMP: pt is non vocal   Pain Level After FMP: pt is non vocal   Non Verbal Pain Scale : N/A    () Alerted Nursing. (X) Call bell within patient reach. (X) Pt resting in no apparent distress in bed.     NOTE:   Julita Pearl, Rehab Tech

## 2017-09-22 VITALS
OXYGEN SATURATION: 100 % | HEART RATE: 106 BPM | TEMPERATURE: 97.8 F | WEIGHT: 154.32 LBS | HEIGHT: 74 IN | DIASTOLIC BLOOD PRESSURE: 81 MMHG | SYSTOLIC BLOOD PRESSURE: 144 MMHG | BODY MASS INDEX: 19.81 KG/M2 | RESPIRATION RATE: 22 BRPM

## 2017-09-22 LAB
ANION GAP SERPL CALC-SCNC: 13 MMOL/L (ref 3–18)
BUN SERPL-MCNC: 102 MG/DL (ref 7–18)
BUN/CREAT SERPL: 13 (ref 12–20)
CALCIUM SERPL-MCNC: 8.1 MG/DL (ref 8.5–10.1)
CHLORIDE SERPL-SCNC: 98 MMOL/L (ref 100–108)
CO2 SERPL-SCNC: 21 MMOL/L (ref 21–32)
CREAT SERPL-MCNC: 7.96 MG/DL (ref 0.6–1.3)
GLUCOSE BLD STRIP.AUTO-MCNC: 124 MG/DL (ref 70–110)
GLUCOSE BLD STRIP.AUTO-MCNC: 180 MG/DL (ref 70–110)
GLUCOSE BLD STRIP.AUTO-MCNC: 62 MG/DL (ref 70–110)
GLUCOSE SERPL-MCNC: 109 MG/DL (ref 74–99)
HCT VFR BLD AUTO: 22.7 % (ref 36–48)
HGB BLD-MCNC: 7.4 G/DL (ref 13–16)
MAGNESIUM SERPL-MCNC: 3.1 MG/DL (ref 1.6–2.6)
POTASSIUM SERPL-SCNC: 4 MMOL/L (ref 3.5–5.5)
SODIUM SERPL-SCNC: 132 MMOL/L (ref 136–145)

## 2017-09-22 PROCEDURE — 77030025757

## 2017-09-22 PROCEDURE — 94640 AIRWAY INHALATION TREATMENT: CPT

## 2017-09-22 PROCEDURE — 74011250637 HC RX REV CODE- 250/637: Performed by: NURSE PRACTITIONER

## 2017-09-22 PROCEDURE — 74011000250 HC RX REV CODE- 250: Performed by: PHYSICIAN ASSISTANT

## 2017-09-22 PROCEDURE — 94003 VENT MGMT INPAT SUBQ DAY: CPT

## 2017-09-22 PROCEDURE — 74011636637 HC RX REV CODE- 636/637: Performed by: INTERNAL MEDICINE

## 2017-09-22 PROCEDURE — 83735 ASSAY OF MAGNESIUM: CPT | Performed by: INTERNAL MEDICINE

## 2017-09-22 PROCEDURE — 74011250637 HC RX REV CODE- 250/637: Performed by: HOSPITALIST

## 2017-09-22 PROCEDURE — 85018 HEMOGLOBIN: CPT | Performed by: INTERNAL MEDICINE

## 2017-09-22 PROCEDURE — 90935 HEMODIALYSIS ONE EVALUATION: CPT

## 2017-09-22 PROCEDURE — 36415 COLL VENOUS BLD VENIPUNCTURE: CPT | Performed by: INTERNAL MEDICINE

## 2017-09-22 PROCEDURE — 74011250636 HC RX REV CODE- 250/636: Performed by: HOSPITALIST

## 2017-09-22 PROCEDURE — 74011000250 HC RX REV CODE- 250: Performed by: INTERNAL MEDICINE

## 2017-09-22 PROCEDURE — 77030020291 HC FLEXSEAL FMS BMS -C

## 2017-09-22 PROCEDURE — 74011250637 HC RX REV CODE- 250/637: Performed by: INTERNAL MEDICINE

## 2017-09-22 PROCEDURE — 77030011256 HC DRSG MEPILEX <16IN NO BORD MOLN -A

## 2017-09-22 PROCEDURE — 80048 BASIC METABOLIC PNL TOTAL CA: CPT | Performed by: INTERNAL MEDICINE

## 2017-09-22 PROCEDURE — 82962 GLUCOSE BLOOD TEST: CPT

## 2017-09-22 RX ADMIN — Medication: at 11:33

## 2017-09-22 RX ADMIN — COLLAGENASE SANTYL: 250 OINTMENT TOPICAL at 11:34

## 2017-09-22 RX ADMIN — LEVETIRACETAM 500 MG: 100 SOLUTION ORAL at 11:37

## 2017-09-22 RX ADMIN — HEPARIN SODIUM 5000 UNITS: 5000 INJECTION, SOLUTION INTRAVENOUS; SUBCUTANEOUS at 11:30

## 2017-09-22 RX ADMIN — HEPARIN SODIUM 5000 UNITS: 5000 INJECTION, SOLUTION INTRAVENOUS; SUBCUTANEOUS at 03:11

## 2017-09-22 RX ADMIN — IPRATROPIUM BROMIDE AND ALBUTEROL SULFATE 3 ML: 2.5; .5 SOLUTION RESPIRATORY (INHALATION) at 07:26

## 2017-09-22 RX ADMIN — ASPIRIN 81 MG 81 MG: 81 TABLET ORAL at 11:30

## 2017-09-22 RX ADMIN — INSULIN LISPRO 3 UNITS: 100 INJECTION, SOLUTION INTRAVENOUS; SUBCUTANEOUS at 06:42

## 2017-09-22 RX ADMIN — LOPERAMIDE HYDROCHLORIDE 2 MG: 2 CAPSULE ORAL at 04:38

## 2017-09-22 RX ADMIN — FAMOTIDINE 20 MG: 10 INJECTION INTRAVENOUS at 11:30

## 2017-09-22 RX ADMIN — MINERAL OIL AND WHITE PETROLATUM 1 DOSE: 150; 850 OINTMENT OPHTHALMIC at 12:00

## 2017-09-22 RX ADMIN — MINERAL OIL AND WHITE PETROLATUM 1 DOSE: 150; 850 OINTMENT OPHTHALMIC at 00:00

## 2017-09-22 RX ADMIN — CHLORHEXIDINE GLUCONATE 10 ML: 1.2 RINSE ORAL at 11:31

## 2017-09-22 RX ADMIN — IPRATROPIUM BROMIDE AND ALBUTEROL SULFATE 3 ML: 2.5; .5 SOLUTION RESPIRATORY (INHALATION) at 01:00

## 2017-09-22 RX ADMIN — MINERAL OIL AND WHITE PETROLATUM 1 DOSE: 150; 850 OINTMENT OPHTHALMIC at 06:30

## 2017-09-22 RX ADMIN — Medication 1 PACKET: at 09:00

## 2017-09-22 RX ADMIN — Medication 1 CAPSULE: at 11:31

## 2017-09-22 NOTE — DISCHARGE INSTRUCTIONS
Your Tracheostomy: Care Instructions  Your Care Instructions  A tracheostomy is a surgical opening through the neck into the windpipe (trachea). The opening is also called a stoma. A tracheostomy helps you breathe if you have a lung or nerve problem, an infection, or trouble handling secretions. Taking good care of a tracheostomy is very important. It can prevent infections and help keep you breathing easily. Follow-up care is a key part of your treatment and safety. Be sure to make and go to all appointments, and call your doctor if you are having problems. It's also a good idea to know your test results and keep a list of the medicines you take. How can you care for yourself at home? General tips  Your doctor or nurse will give you instructions about how to take care of your tracheostomy (trach). This will include how to suction your trach, how to clean the opening in your neck (stoma), and how to clean and replace the trach's inner tube (inner cannula). Be sure to follow all of these instructions closely. · Have your emergency supplies ready and available wherever you are. · Always wash your hands before and after caring for your tracheostomy. · Keep the air in your home moist with a room humidifier. · Eat while sitting up. If any food gets into the tube, suction it out right away. · Wear clothing that is loose around your neck, and avoid clothing with loose fibers. · In a bath or shower, avoid getting water into the tracheostomy. Cover the tube so that no water gets in but you can still breathe. You can also shower with your back to the water. · Do not swim. · Replace the tube cardenas if it gets wet or damaged. If it is not damaged, it can be washed and dried and used again. · Your tracheostomy, or \"trach\" (say \"trayk\"), has three parts: the outer cannula, the inner cannula, and the obturator. The inner cannula is the piece that you will remove and clean.   Suctioning  Always have suction supplies ready, including a fully charged suction machine. Suction the trach 3 to 4 times a day, or more if needed. For example, two of the times could be before you go to bed and when you wake up in the morning. You will need suction catheters, a suction machine, saline fluid, a small cup, and a mirror. Here are the steps to take:  1. Wash your hands with soap and water for at least 30 seconds. 2. Pour saline fluid into the cup. 3. Connect a catheter to the suction machine tubing. Dip the catheter tip into the saline fluid. Insert the wet catheter into the trach. Push the tube in gently, about 3 to 4 inches, until you feel it hit something. 4. Slowly pull the catheter out of the trach, rolling it back and forth between your fingers, with your thumb over the control valve (this turns the suction on). Do not keep the suction on for more than 10 seconds at a time. 5. Wait about 30 seconds and repeat inserting and pulling out the tube until all the mucus has been removed. Then suction the saline left in the cup.  6. Throw away the used catheter, and wash your hands again. Stoma care  The opening in your neck is called a stoma. To care for your stoma, clean and dry it 3 times a day. Do not let crust form on the skin at the stoma. You will need hydrogen peroxide, tap or sterile water, 8 or 10 cotton swabs, 2 small cups, a dry cloth, a mirror, and ointment for the skin. Follow these steps:  1. Wash your hands with soap and water for at least 30 seconds. 2. Fill one cup with half water and half hydrogen peroxide. Put 3 or 4 cotton swabs in the cup. Fill the other cup with water and put 3 or 4 swabs in that cup.  3. Clean and remove dried mucus around the stoma with the cotton swabs in the peroxide cup. Then clean off any peroxide with the swabs in the water cup.  4. Dry your skin with the cloth. Apply ointment with the remaining swabs. 5. Wash your hands again.   Cleaning the inner cannula  A cannula is the tube that fits into the stoma. Clean and replace the inner cannula 2 or 3 times each day. You will need 2 small bowls, a small brush, hydrogen peroxide, water, and a mirror. To clean the inner cannula:  1. Wash your hands with soap and water for at least 30 seconds. 2. Pour half water and half hydrogen peroxide into one bowl. Pour a small amount of water in the other bowl. 3. Unlock the inner cannula and remove it by gently pulling it out and down. Put this into the peroxide bowl to soak. Clean the inside and outside of the cannula with the brush. 4. Rinse the cannula under tap water, then let it soak in the bowl of water. Shake the cannula out and slide it gently back into the outer cannula. Turn it to lock it in place. Make sure it is locked in place and you cannot pull it out. 5. Wash your hands again. When should you call for help? Call 911 anytime you think you may need emergency care. For example, call if:  · You have severe trouble breathing, and coughing or suctioning does not help. · Your trach falls out and you cannot get it back in. Call your doctor now or seek immediate medical care if:  · You have trouble breathing after suctioning. · You have signs of infection, such as:  ¨ Increased pain, swelling, warmth, or redness in the stoma. ¨ Red streaks leading from the stoma. ¨ Pus draining from the stoma. ¨ A fever. · Your secretions change. Watch closely for changes in your health, and be sure to contact your doctor if you have any problems. Where can you learn more? Go to http://giselle-jw.info/. Enter P402 in the search box to learn more about \"Your Tracheostomy: Care Instructions. \"  Current as of: January 4, 2017  Content Version: 11.3  © 4893-1504 [employer]. Care instructions adapted under license by [employer] (which disclaims liability or warranty for this information).  If you have questions about a medical condition or this instruction, always ask your healthcare professional. [facility] disclaims any warranty or liability for your use of this information.       DISCHARGE SUMMARY from Nurse    The following personal items are in your possession at time of discharge:    Dental Appliances: None  Visual Aid: None     Home Medications: None  Jewelry: None  Clothing: None  Other Valuables: None             PATIENT INSTRUCTIONS:    After general anesthesia or intravenous sedation, for 24 hours or while taking prescription Narcotics:  · Limit your activities  · Do not drive and operate hazardous machinery  · Do not make important personal or business decisions  · Do  not drink alcoholic beverages  · If you have not urinated within 8 hours after discharge, please contact your surgeon on call. Report the following to your surgeon:  · Excessive pain, swelling, redness or odor of or around the surgical area  · Temperature over 100.5  · Nausea and vomiting lasting longer than 4 hours or if unable to take medications  · Any signs of decreased circulation or nerve impairment to extremity: change in color, persistent  numbness, tingling, coldness or increase pain  · Any questions        What to do at Home:  Recommended activity: Activity as tolerated    If you experience any of the following symptoms chest pains, short of breath, nausea, vomiting, diarrhea, fever greater than 100.5 please follow up with PCP. *  Please give a list of your current medications to your Primary Care Provider. *  Please update this list whenever your medications are discontinued, doses are      changed, or new medications (including over-the-counter products) are added. *  Please carry medication information at all times in case of emergency situations.           These are general instructions for a healthy lifestyle:    No smoking/ No tobacco products/ Avoid exposure to second hand smoke    Surgeon General's Warning:  Quitting smoking now greatly reduces serious risk to your health. Obesity, smoking, and sedentary lifestyle greatly increases your risk for illness    A healthy diet, regular physical exercise & weight monitoring are important for maintaining a healthy lifestyle    You may be retaining fluid if you have a history of heart failure or if you experience any of the following symptoms:  Weight gain of 3 pounds or more overnight or 5 pounds in a week, increased swelling in our hands or feet or shortness of breath while lying flat in bed. Please call your doctor as soon as you notice any of these symptoms; do not wait until your next office visit. Recognize signs and symptoms of STROKE:    F-face looks uneven    A-arms unable to move or move unevenly    S-speech slurred or non-existent    T-time-call 911 as soon as signs and symptoms begin-DO NOT go       Back to bed or wait to see if you get better-TIME IS BRAIN. Warning Signs of HEART ATTACK     Call 911 if you have these symptoms:   Chest discomfort. Most heart attacks involve discomfort in the center of the chest that lasts more than a few minutes, or that goes away and comes back. It can feel like uncomfortable pressure, squeezing, fullness, or pain.  Discomfort in other areas of the upper body. Symptoms can include pain or discomfort in one or both arms, the back, neck, jaw, or stomach.  Shortness of breath with or without chest discomfort.  Other signs may include breaking out in a cold sweat, nausea, or lightheadedness. Don't wait more than five minutes to call 911 - MINUTES MATTER! Fast action can save your life. Calling 911 is almost always the fastest way to get lifesaving treatment. Emergency Medical Services staff can begin treatment when they arrive -- up to an hour sooner than if someone gets to the hospital by car. The discharge information has been reviewed with the patient. The patient verbalized understanding.     Discharge medications reviewed with the patient and appropriate educational materials and side effects teaching were provided.

## 2017-09-22 NOTE — PROGRESS NOTES
[facility] Hospitalist Group  Progress Note    Patient: Agustin Mcleod Age: 65 y.o. : 1960 MR#: [id number] SSN: [id number]  Date/Time: 2017 11:28 AM    Subjective/24-hour events:     Clinically unchanged. 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 Disposition when LTAC bed available. Case discussed with:  []Patient  []Family  []Nursing  []Case Management  DVT Prophylaxis:  []Lovenox  [x]Hep SQ  [x]SCDs  []Coumadin   []On Heparin gtt    Objective:   VS:   Visit Vitals    /79    Pulse (!) 103    Temp 97.8 °F (36.6 °C)    Resp 24    Ht 6' 2\" (1.88 m)    Wt 70 kg (154 lb 5.2 oz)    SpO2 100%    BMI 19.81 kg/m2      Tmax/24hrs: Temp (24hrs), Av.9 °F (37.2 °C), Min:97.8 °F (36.6 °C), Max:100.2 °F (37.9 °C)      Intake/Output Summary (Last 24 hours) at 17 1129  Last data filed at 17 1100   Gross per 24 hour   Intake             1230 ml   Output             2000 ml   Net             -770 ml       General:  In no distress. Thin, chronically ill-appearing. Cardiovascular:  RRR. Pulmonary:  Clear, no wheezing. GI:  Abdomen soft. Extremities:  Warm, no ischemia.     Labs:    Recent Results (from the past 24 hour(s))   GLUCOSE, POC    Collection Time: 17  7:03 PM   Result Value Ref Range    Glucose (POC) 169 (H) 70 - 110 mg/dL   GLUCOSE, POC    Collection Time: 17 11:07 PM   Result Value Ref Range    Glucose (POC) 89 70 - 261 mg/dL   METABOLIC PANEL, BASIC    Collection Time: 17  2:21 AM   Result Value Ref Range    Sodium 132 (L) 136 - 145 mmol/L    Potassium 4.0 3.5 - 5.5 mmol/L    Chloride 98 (L) 100 - 108 mmol/L    CO2 21 21 - 32 mmol/L    Anion gap 13 3.0 - 18 mmol/L    Glucose 109 (H) 74 - 99 mg/dL     (H) 7.0 - 18 MG/DL    Creatinine 7.96 (H) 0.6 - 1.3 MG/DL    BUN/Creatinine ratio 13 12 - 20      GFR est AA 9 (L) >60 ml/min/1.73m2    GFR est non-AA 7 (L) >60 ml/min/1.73m2    Calcium 8.1 (L) 8.5 - 10.1 MG/DL   HGB & HCT    Collection Time: 09/22/17  2:21 AM   Result Value Ref Range    HGB 7.4 (L) 13.0 - 16.0 g/dL    HCT 22.7 (L) 36.0 - 48.0 %   MAGNESIUM    Collection Time: 09/22/17  2:21 AM   Result Value Ref Range    Magnesium 3.1 (H) 1.6 - 2.6 mg/dL   GLUCOSE, POC    Collection Time: 09/22/17  5:12 AM   Result Value Ref Range    Glucose (POC) 180 (H) 70 - 110 mg/dL       Signed By: Sam Taylor MD     September 22, 2017 11:28 AM

## 2017-09-22 NOTE — PROGRESS NOTES
[facility]  ICU Progress Note      Name: Justice Hopkins   : 1960   MRN: [id number]   Date: 2017 3:46 PM     [x]I have reviewed the flowsheet and previous days notes. Events overnight reviewed and discussed with nursing staff. Vital signs and records reviewed. HPI:  Kiesha Currie a 55 y. o.  male with PMH of DM, ESRD admitted PEA arrest. Patient's hospitalization was also found to have a CVA and was treated for E.coli and C.perferinges bacteremia. Subsequent PEA arrest 17. Pt is now s/p Trach and Peg on 17; pt remains on ventilator and off sedation. Subjective:    17:  - No new events overnight. - Remains on SBT with pressure support; doing well  - Remains anuric; HD today ()  - LEAVING TODAY AT 12:00 TO [facility]      [x]The patient is unable to give any meaningful history or review of systems because the patient is:  [x]Intubated []Sedated   [x]Unresponsive      [x]The patient is critically ill on      [x]Mechanical ventilation []Pressors   []BiPAP []                 ROS:Review of systems not obtained due to patient factors.     Medication Review:  · Pressors - N/A  · Sedation - N/A  · Antibiotics - Meropenem  · Pain - PRN Tylenol  · GI/ DVT - Pepcid; SQH  · Others (other gtts)    Safety Bundles: VAP Bundle/ CAUTI/ Severe Sepsis Protocol/ Electrolyte Replacement Protocol    Vital Signs:    Visit Vitals    /79    Pulse (!) 103    Temp 97.8 °F (36.6 °C)    Resp 24    Ht 6' 2\" (1.88 m)    Wt 70 kg (154 lb 5.2 oz)    SpO2 100%    BMI 19.81 kg/m2       O2 Device: Ventilator   O2 Flow Rate (L/min): 6 l/min   Temp (24hrs), Av.9 °F (37.2 °C), Min:97.8 °F (36.6 °C), Max:100.2 °F (37.9 °C)       Intake/Output:   Last shift:      701 - 1900  In: -   Out:    Last 3 shifts: 1901 - 700  In: 7835 [I.V.:100]  Out: 500 [Drains:500]    Intake/Output Summary (Last 24 hours) at 17 1120  Last data filed at 09/22/17 1100   Gross per 24 hour   Intake             1230 ml   Output             2000 ml   Net             -770 ml       Ventilator Settings:  Ventilator  Mode: Spontaneous, Pressure support  Respiratory Rate  Resp Rate Observed: 22  Back-Up Rate: 12  Insp Time (sec): 1 sec  Insp Flow (l/min): 44 l/min  I:E Ratio: 1:4  Ventilator Volumes  Vt Set (ml): 400 ml  Vt Exhaled (Machine Breath) (ml): 431 ml  Vt Spont (ml): 556 ml  Ve Observed (l/min): 11.1 l/min  Ventilator Pressures  PC Set: 400  Pressure Support (cm H2O): 5 cm H2O  PIP Observed (cm H2O): 12 cm H2O  Plateau Pressure (cm H2O): 14 cm H2O  MAP (cm H2O): 8  PEEP/VENT (cm H2O): 5 cm H20  Auto PEEP Observed (cm H2O): 0 cm H2O    Physical Exam:    General: Intubated/sedated; NAD; unresponsive  HEENT:  Anicteric sclerae; pink palpebral conjunctivae; mucosa moist. Eyelids remain open without blinking. Trach in place with moderate secretions  Resp:  Symmetrical chest expansion, no accessory muscle use;  Mod AE bilat; Decreased bibasilar; course lung sounds throughout - no change  CV:  S1, S2 present; RRR; No m/r/g  GI:  Abdomen soft, non-distended; (+) active bowel sounds  Extremities:  +2 pulses on all extremities; significant muscle atrophy, AV fistula LUE: + bruit + thrill; no edema/ cyanosis/ clubbing noted  Skin:  Warm; no rashes/ lesions noted  Neurologic: Pinpoint pupils nonreactive to light, + cough, + gag, decorticate posturing with stimulation; (+) corneal reflex   Devices:     09/08/17: Trach/Peg   09/19/17: Rectal Stanton     DATA:     Current Facility-Administered Medications   Medication Dose Route Frequency    loperamide (IMODIUM) capsule 2 mg  2 mg Oral Q12H PRN    Menthol-Zinc Oxide (Calmoseptine) 0.44-20.6 % ointment   Topical TID    levETIRAcetam (KEPPRA) oral solution 500 mg  500 mg Per G Tube BID    0.9% sodium chloride infusion  50 mL/hr IntraVENous CONTINUOUS    0.9% sodium chloride infusion 250 mL  250 mL IntraVENous PRN    chlorhexidine (PERIDEX) 0.12 % mouthwash 10 mL  10 mL Oral Q12H    heparin (porcine) 1,000 unit/mL injection 1,000 Units  1,000 Units IntraVENous Q1H PRN    albuterol-ipratropium (DUO-NEB) 2.5 MG-0.5 MG/3 ML  3 mL Nebulization Q6H RT    epoetin benjamin (EPOGEN;PROCRIT) injection 10,000 Units  10,000 Units IntraVENous EVERY MON & FRI    iron sucrose (VENOFER) 100 mg in 0.9% sodium chloride 100 mL IVPB  100 mg IntraVENous EVERY MON & FRI    midodrine (PROAMITINE) tablet 10 mg  10 mg Oral TID WITH MEALS    white petrolatum-mineral oil 85-15 % oint 1 Dose  1 Dose Ophthalmic Q6H    albumin human 25% (BUMINATE) solution 12.5 g  12.5 g IntraVENous DIALYSIS PRN    Zinc Ox-Aloe Vera-Vitamin E (BALMEX) topical cream   Topical CONTINUOUS    banana flakes trans-galactooligosaccharide (BANATROL PLUS) powder 1 Packet  1 Packet Per NG tube TID    LORazepam (ATIVAN) injection 1 mg  1 mg IntraVENous Q4H PRN    insulin lispro (HUMALOG) injection   SubCUTAneous Q6H    famotidine (PF) (PEPCID) 20 mg in sodium chloride 0.9 % 10 mL injection  20 mg IntraVENous DAILY    collagenase (SANTYL) 250 unit/gram ointment   Topical DAILY    acetaminophen (TYLENOL) tablet 650 mg  650 mg Oral Q4H PRN    lactobacillus sp. 50 billion cpu (BIO-K PLUS) capsule 1 Cap  1 Cap Oral DAILY    heparin (porcine) injection 5,000 Units  5,000 Units SubCUTAneous Q8H    heparin (porcine) 1,000 unit/mL injection 2,000 Units  2,000 Units IntraVENous DIALYSIS ONCE    simvastatin (ZOCOR) tablet 20 mg  20 mg Oral QHS    aspirin chewable tablet 81 mg  81 mg Oral DAILY    glucose chewable tablet 16 g  4 Tab Oral PRN    glucagon (GLUCAGEN) injection 1 mg  1 mg IntraMUSCular PRN    dextrose (D50W) injection syrg 12.5-25 g  25-50 mL IntraVENous PRN    0.9% sodium chloride infusion  100 mL/hr IntraVENous DIALYSIS PRN    naloxone (NARCAN) injection 0.4 mg  0.4 mg IntraVENous PRN         Labs: Results:       Chemistry Recent Labs      09/22/17   8991 09/21/17   0346  09/20/17   0459   GLU  109*  65*  61*   NA  132*  131*  133*   K  4.0  4.4  4.0   CL  98*  96*  100   CO2  21 23  21   BUN  102*  92*  63*   CREA  7.96*  7.05*  6.26*   CA  8.1*  8.3*  8.3*   AGAP  13  12  12   BUCR  13  13  10*      CBC w/Diff Recent Labs      09/22/17   0221  09/20/17 0459   WBC   --   15.8*   RBC   --   2.58*   HGB  7.4*  7.9*   HCT  22.7*  24.7*   PLT   --   409   GRANS   --   67   LYMPH   --   18*   EOS   --   5      Coagulation No results for input(s): PTP, INR, APTT in the last 72 hours. No lab exists for component: INREXT, INREXT    Liver Enzymes No results for input(s): TP, ALB, TBIL, AP, SGOT, GPT in the last 72 hours. No lab exists for component: DBIL   ABG No results found for: PH, PHI, PCO2, PCO2I, PO2, PO2I, HCO3, HCO3I, FIO2, FIO2I   Microbiology No results for input(s): CULT in the last 72 hours. Telemetry: [x]Sinus []A-flutter []Paced    []A-fib []Multiple PVCs                    Imaging: No new imaging (09/22)  CXR [09/21/17]: Findings:  Stable tracheostomy terminating above the bree. Cardiac mediastinal silhouette  is unremarkable. No pneumothorax or pleural effusion. No focal consolidation. Lungs are clear.      IMPRESSION:  No radiographic evidence of acute cardiopulmonary process. [x]I have personally reviewed the patients radiographs  []Radiographs reviewed with radiologist   [x]No change from prior, tubes and lines in adequate position  []Improved   []Worsening    IMPRESSION:   · Acute on Chronic Hypoxic Respiratory Failure; VDRF - 2/2 neuro status; Multifactorial at this point; s/p Trach (09/08); remains on vent (09/18)   · Acute Encephalopathy - Anoxic encephalopathy,2/2 below  · S/p PEA Cardiac arrest in dialysis 7/27/17 and 8/18/17  · Intermittent Hypotension- Multifactorial; Dysautonomia? · RLL pneumonia +/- mucus plugging with atelectasis- Sputum cx 9/6/17 + pseudomonas aeruginosa and klebsiella pneumoniae -treated;  Resp Cx (09/15) (+) Psudomonas Aeruginosa -Off ABX with ID following  · ESRD on HD  · DM2 - SSI coverage  · Acute pontine lacunar CVA  · Anemia of Chronic Disease - Improving, s/p 1U PRBC's (09/17)  · Oropharyngeal dysphagia s/p PEG  · Cachexia  · Unstageable pressure ulcer - Sacrum/coccyx  · Septic shock Bacteremia - Resolved;  E.coli and C.perferinges; s/p abx      PLAN:   · Resp -  KEEP ON VENT SUPPORT, NO TRACH COLLAR TRIALS; tolerating SBT; PEEP of 5; FiO2 28; SpO2 btwn 88-94%; maintain proper positioning to prevent atelectasis; Con't pulmonary/bronchial hygiene; Aspiration precautions- HOB>30'; con't Duo-nebs q6; oral care  · ID - ID following; Afebrile; Leukocytosis slightly elevated; con't ABX - Meropenem completed today; OFF ALL ABX; Hx of Resp Cx (09/15) (+) Psudomonas Aeruginosa  · CVS - HD stable; chronic hypotension; con't Midodrine [held today, BP stable]; MAP goal >65. No pressors  · Heme/onc - Daily CBC; Monitor H&H & plts - currently stable. · Metabolic - Daily BMP; monitor e-lytes; replace PRN; caution with Dialysis  · Renal - Nephrology following. HD today - removed 2L, tolerated well; trend renal indices  · Endocrine - Glycemic control goal <180; monitor for nocturnal hypoglycemia; SSI  · Neuro/ Pain/ Sedation - Unresponsive, off sedation; PRN Tylenol; Keppra 500mg BID  · Musculoskeletal: Continue PT /OT; Per PT, abduction pad has improved muscle spacticity  · GI - NPO; PEG tube (09/08); TF to goal - Nepro @ 55mL/hr; Banana flakes for diarrhea; Probiotic; Pepcid; Rectal mancini in place; Imodium PRN.   · Prophylaxis - DVT - SQH, GI - Pepcid   · Discussed in interdisciplinary rounds  · D/C TODAY TO long term care facility          The patient is: [x] acutely ill Risk of deterioration: [x] moderate    [x] critically ill  [x] high     [x]See my orders for details    My assessment/plan was discussed with:  [x]nursing [x]PT/OT    [x]respiratory therapy [x]Dr. Johnie Prader, MD   []family []     [x]Total critical care time exclusive of procedures 45 minutes    Matthew Maya PA-C

## 2017-09-22 NOTE — PROGRESS NOTES
TMonitor  O26 liters  TechRANSFER - OUT REPORT:    Verbal report given to Tika Arteaga on Clif Bettencourt  being transferred to [phone] [facility] for routine progression of care       Report consisted of patients Situation, Background, Assessment and   Recommendations(SBAR). Information from the following report(s) SBAR, MAR and Recent Results was reviewed with the receiving nurse. Lines:   Peripheral IV 09/15/17 Right Forearm (Active)   Site Assessment Clean, dry, & intact 9/22/2017  4:00 AM   Phlebitis Assessment 0 9/22/2017  4:00 AM   Infiltration Assessment 0 9/22/2017  4:00 AM   Dressing Status Clean, dry, & intact 9/22/2017  4:00 AM   Dressing Type Tape;Transparent 9/22/2017  4:00 AM   Hub Color/Line Status Green;Flushed;Patent; Infusing 9/22/2017 12:00 AM   Alcohol Cap Used No 9/22/2017  4:00 AM        Opportunity for questions and clarification was provided.       Patient tran28%rted with:   Monitor  O2 @ 28% vent liters  Tech

## 2017-09-22 NOTE — PROGRESS NOTES
Hemodialysis Rounding Note      Patient: Justice Hopkins               Sex: male          DOA: 7/27/2017  4:23 PM        YOB: 1960      Age:  65 y.o.        LOS:  LOS: 57 days     Subjective:     Justice Hopkins is a 65 y.o.  who presents with Cardiac arrest ([facility]  Acidosis  Respiratory failure ([facility]  Anemia  ESRD needing dialysis ([facility]  Cardiac arrest ([facility]  Acute GI bleeding  Sepsis ([facility]  Hypotension  Anoxic brain damage (HCC)  ESRD (end stage renal disease) ([facility]  Colitis  dx  dx  DX  dx. The patient is dialyzing utilizing the following method:Intermittent Hemodialysis        Complaints: Post trach and PEG, diarhea persists, remains unresponsive except to pain.  Has been on 2x a week sched for HD     Current Facility-Administered Medications   Medication Dose Route Frequency    loperamide (IMODIUM) capsule 2 mg  2 mg Oral Q12H PRN    Menthol-Zinc Oxide (Calmoseptine) 0.44-20.6 % ointment   Topical TID    levETIRAcetam (KEPPRA) oral solution 500 mg  500 mg Per G Tube BID    0.9% sodium chloride infusion  50 mL/hr IntraVENous CONTINUOUS    0.9% sodium chloride infusion 250 mL  250 mL IntraVENous PRN    chlorhexidine (PERIDEX) 0.12 % mouthwash 10 mL  10 mL Oral Q12H    heparin (porcine) 1,000 unit/mL injection 1,000 Units  1,000 Units IntraVENous Q1H PRN    albuterol-ipratropium (DUO-NEB) 2.5 MG-0.5 MG/3 ML  3 mL Nebulization Q6H RT    epoetin benjamin (EPOGEN;PROCRIT) injection 10,000 Units  10,000 Units IntraVENous EVERY MON & FRI    iron sucrose (VENOFER) 100 mg in 0.9% sodium chloride 100 mL IVPB  100 mg IntraVENous EVERY MON & FRI    midodrine (PROAMITINE) tablet 10 mg  10 mg Oral TID WITH MEALS    white petrolatum-mineral oil 85-15 % oint 1 Dose  1 Dose Ophthalmic Q6H    albumin human 25% (BUMINATE) solution 12.5 g  12.5 g IntraVENous DIALYSIS PRN    Zinc Ox-Aloe Vera-Vitamin E (BALMEX) topical cream   Topical CONTINUOUS    banana flakes trans-galactooligosaccharide (BANATROL PLUS) powder 1 Packet  1 Packet Per NG tube TID    LORazepam (ATIVAN) injection 1 mg  1 mg IntraVENous Q4H PRN    insulin lispro (HUMALOG) injection   SubCUTAneous Q6H    famotidine (PF) (PEPCID) 20 mg in sodium chloride 0.9 % 10 mL injection  20 mg IntraVENous DAILY    collagenase (SANTYL) 250 unit/gram ointment   Topical DAILY    acetaminophen (TYLENOL) tablet 650 mg  650 mg Oral Q4H PRN    lactobacillus sp. 50 billion cpu (BIO-K PLUS) capsule 1 Cap  1 Cap Oral DAILY    heparin (porcine) injection 5,000 Units  5,000 Units SubCUTAneous Q8H    heparin (porcine) 1,000 unit/mL injection 2,000 Units  2,000 Units IntraVENous DIALYSIS ONCE    simvastatin (ZOCOR) tablet 20 mg  20 mg Oral QHS    aspirin chewable tablet 81 mg  81 mg Oral DAILY    glucose chewable tablet 16 g  4 Tab Oral PRN    glucagon (GLUCAGEN) injection 1 mg  1 mg IntraMUSCular PRN    dextrose (D50W) injection syrg 12.5-25 g  25-50 mL IntraVENous PRN    0.9% sodium chloride infusion  100 mL/hr IntraVENous DIALYSIS PRN    naloxone (NARCAN) injection 0.4 mg  0.4 mg IntraVENous PRN           1901 -  0700  In: 2710 [I.V.:100]  Out: 500 [Drains:500]      Objective:     Blood pressure 143/79, pulse (!) 103, temperature 97.8 °F (36.6 °C), resp. rate 24, height 6' 2\" (1.88 m), weight 70 kg (154 lb 5.2 oz), SpO2 100 %.   Temp (24hrs), Av.9 °F (37.2 °C), Min:97.8 °F (36.6 °C), Max:100.2 °F (37.9 °C)      Blood Pressure: BP: 143/79  Pulse: Pulse (Heart Rate): (!) 103  Temp:  Temp: 97.8 °F (36.6 °C)    Artificial Kidney Dialyzer/Set Up Inspection: Revaclear   hours Duration of Treatment (hours): 3 hours   Heparin Bolus Heparin Bolus (units): 0 units  Blood flow rate Blood Flow Rate (ml/min): 300 ml/min   Dialysate rate     Arterial Access Pressure Arterial Access Pressure (mmHg): -70  Venous Return Pressure Venous Return Pressure (mmHg): 170  Ultrafiltration Rate Goal/Amount of Fluid to Remove (mL): 2000 mL  Fluid Removal Fluid Removed (mL): 2500  Net Fluid Removal NET Fluid Removed (mL): 2000 ml      PHYSICAL EXAM: unresponsive, emaciated  HEENT: non icteric  NECK:  No JVD, trach  CHEST AND LUNGS:  Equal vent breath sounds  CVS:  regular  ABDOMEN:  Soft + bs, PEG  EXT:  No edema, left arm AVG    DATA REVIEW:    Labs: Results:       Chemistry Recent Labs      09/22/17 0221 09/21/17 0346  09/20/17   0459   GLU  109*  65*  61*   NA  132*  131*  133*   K  4.0  4.4  4.0   CL  98*  96*  100   CO2  21  23  21   BUN  102*  92*  63*   CREA  7.96*  7.05*  6.26*   CA  8.1*  8.3*  8.3*   AGAP  13  12  12   BUCR  13  13  10*   mag 3.9  Phos 4.7   CBC w/Diff Recent Labs      09/22/17 0221 09/20/17 0459   WBC   --   15.8*   RBC   --   2.58*   HGB  7.4*  7.9*   HCT  22.7*  24.7*   PLT   --   409   GRANS   --   67   LYMPH   --   18*   EOS   --   5      Coagulation No results for input(s): PTP, INR, APTT in the last 72 hours. No lab exists for component: INREXT, INREXT    Iron/Ferritin No results for input(s): IRON in the last 72 hours. No lab exists for component: TIBCCALC   BNP No results for input(s): BNPP in the last 72 hours. Cardiac Enzymes No results for input(s): CPK, CKND1, WILFRIDO in the last 72 hours. No lab exists for component: CKRMB, TROIP   Liver Enzymes No results for input(s): TP, ALB, TBIL, AP, SGOT, GPT in the last 72 hours. No lab exists for component: DBIL   Thyroid Studies Lab Results   Component Value Date/Time    TSH 7.52 08/03/2017 01:01 PM        IPTH pend    CULTURE:   )  No results for input(s): SDES, CULT in the last 72 hours. No results for input(s): CULT in the last 72 hours. Assessment/Plan:     End Stage Renal Disease: complete HD as ordered attempt 2 kg UF as tolerated. Patient had been dialyzing 2x a week in the last 2 weeks due to hypotension and significant diarrhea. He has improved on both areas and will most likely need to do 3x a week now for solute management.  He is being transferred today to a long term care facility. At 10:51 AM on 9/22/2017, I saw and examined patient during hemodialysis treatment. The patient was receiving hemodialysis for treatment of end stage renal disease. I have also reviewed vital signs, intake and output, lab results and recent events, and agreed with today's dialysis order. Sched MWF as outpt. Anemia:  Cont Epo and Venofer with hd    Renal Metabolic Bone Disease: off hectorol                      Hypotension: off Midodrine pre HD.  Tolerating 2 kg UF    Access: Graft adequate monitoring/no changes         Bobbi Ardon MD  9/22/2017

## 2017-09-22 NOTE — PROGRESS NOTES
Patient in bed on back with head elevated - BBS equal and coarse - trach tube midline and secure - spare trachs same size and one size smaller at bedside - patient suctioned - [facility] at [facility] - vent alarms on and functional    0052 - patient suctioned - trach tube midline and secure    0359 - patient getting a bath at this time - trach tube midline and secure

## 2017-09-22 NOTE — DIALYSIS
ACUTE HEMODIALYSIS FLOW SHEET    HEMODIALYSIS ORDERS: Physician: Tee Taylor.     Dialyzer:  Revaclear        Duration: 3 hr  BFR: 300   DFR: 600   Dialysate:  Temp 36-37 K+   3    Ca+  2.5 Na 140 Bicarb 30   Weight:  NA kg    Bed Scale []     Unable to Obtain []      Dry weight/UF Goal: 2000ml Access AVG  Needle Gauge 15    Heparin []  Bolus      Units    [] Hourly       Units    [x]None     Catheter locking solution NA   Pre BP:   147/68    Pulse:     102     Temperature:   97.8  Respirations: 21  Tx: NS       ml/Bolus  Other        [x] N/A   Labs: Pre        Post:        [x] N/A   Additional Orders(medications, blood products, hypotension management):       [x] N/A     [x] Time Out/Safety Check  [x] [facility] Consent Verified     CATHETER ACCESS: [x]N/A   []Right   []Left   []IJ     []Fem   [] First use X-ray verified     []Tunnel                [] Non Tunneled   []No S/S infection  []Redness  []Drainage []Cultured []Swelling []Pain   []Medical Aseptic Prep Utilized   []Dressing Changed  [] Biopatch  Date:       []Clotted   []Patent   Flows: []Good  []Poor  []Reversed   If access problem,  notified: []Yes    []N/A  Date:           GRAFT/FISTULA ACCESS:  []N/A     []Right     [x]Left     [x]UE     []LE   [x]AVG   []AVF        []Buttonhole    [x]Medical Aseptic Prep Utilized   [x]No S/S infection  []Redness  []Drainage []Cultured []Swelling []Pain    Bruit:   [x] Strong    [] Weak       Thrill :   [x] Strong    [] Weak       Needle Gauge: 15   Length: 1in   If access problem,  notified: []Yes     [x]N/A  Date:        Please describe access if present and not used:       GENERAL ASSESSMENT:    LUNGS:  Rate 21 SaO2%  100      [] N/A    [] Clear  [x] Coarse  [] Crackles  [] Wheezing        [] Diminished     Location : []RLL   []LLL    [x]RUL  [x]   Cough: []Productive  []Dry  [x]N/A   Respirations:  [x]Easy  []Labored   Therapy:  []RA  []NC  l/min    Mask: []NRB []Venti       O2% [x]Ventilator  []Intubated  [x] Trach  [] BiPaP   CARDIAC: [x]Regular      [] Irregular   [] Pericardial Rub  [] JVD        [x]  Monitored  [x] Bedside  [] Remotely monitored [] N/A  Rhythm: Sinus Tach   EDEMA: [] None  []Generalized  [] Pitting [] 1    [] 2    [] 3    [] 4                 [] Facial  [] Pedal  [x]  UE  [x] LE   SKIN:   [x] Warm  [] Hot     [] Cold   [x] Dry     [] Pale   [] Diaphoretic                  [] Flushed  [] Jaundiced  [] Cyanotic  [] Rash  [] Weeping   LOC:    [] Alert      []Oriented:    [] Person     [] Place  []Time               [] Confused  [] Lethargic  [] Medicated  [x] Non-responsive     GI / ABDOMEN:  [] Flat    [] Distended    [x] Soft    [] Firm   []  Obese                             [] Diarrhea  [x] Bowel Sounds  [] Nausea  [] Vomiting       / URINE ASSESSMENT:[] Voiding   [] Oliguria  [x] Anuria   []  Stanton     [] Incontinent    []  Incontinent Brief      []  Bathroom Privileges     PAIN: [x] 0 []1  []2   []3   []4   []5   []6   []7   []8   []9   []10            Scale 0-10  Action/Follow Up:    MOBILITY:  [] Amb    [] Amb/Assist    [x] Bed    [] Wheelchair  [] Stretcher      All Vitals and Treatment Details on Attached [address]: [facility]          Room # 309     [] 1st Time Acute  [] Stat  [x] Routine  [] Urgent     [] Acute Room  []  Bedside  [x] ICU/CCU  [] ER   Isolation Precautions:  [x] Dialysis   [] Airborne   [] Contact    [] Reverse   Special Considerations:         [] Blood Consent Verified [x]N/A     ALLERGIES:   [x] NKA          Code Status:  [x] Full Code  [] DNR  [] Other           HBsAg ONLY: Date Drawn 07/28/2017         [x]Negative []Positive []Unknown   HBsAb: Date     [] Susceptible   [x] [id number] []Not Drawn  [] Drawn     Current Labs:    Date of Labs: Today [x]        Cut and paste current labs here. Results for Jordan Beauchamp (MRN [id number]) as of 9/22/2017 08:20   Ref.  Range 9/22/2017 02:21   HGB Latest Ref Range: 13.0 - 16.0 g/dL 7.4 (L)   HCT Latest Ref Range: 36.0 - 48.0 % 22.7 (L)   Sodium Latest Ref Range: 136 - 145 mmol/L 132 (L)   Potassium Latest Ref Range: 3.5 - 5.5 mmol/L 4.0   Chloride Latest Ref Range: 100 - 108 mmol/L 98 (L)   CO2 Latest Ref Range: 21 - 32 mmol/L 21   Anion gap Latest Ref Range: 3.0 - 18 mmol/L 13   Glucose Latest Ref Range: 74 - 99 mg/dL 109 (H)   BUN Latest Ref Range: 7.0 - 18 MG/ (H)   Creatinine Latest Ref Range: 0.6 - 1.3 MG/DL 7.96 (H)   BUN/Creatinine ratio Latest Ref Range: 12 - 20   13   Calcium Latest Ref Range: 8.5 - 10.1 MG/DL 8.1 (L)   Magnesium Latest Ref Range: 1.6 - 2.6 mg/dL 3.1 (H)   GFR est non-AA Latest Ref Range: >60 ml/min/1.73m2 7 (L)   GFR est AA Latest Ref Range: >60 ml/min/1.73m2 9 (L)                                                                                                                                 DIET:  [] Renal    [x] Other tube-feeding    [x] NPO     []  Diabetic      PRIMARY NURSE REPORT: First initial/Last name/Title      Pre Dialysis: YULIANA Muller RN    Time: 5354      EDUCATION:    [x] Patient [] Other         Knowledge Basis: [x]None []Minimal [] Substantial   Barriers to learning non-responsive []N/A   [] Access Care     [] S&S of infection     [] Fluid Management     []K+     [x]Procedural    []Albumin     [x] Medications     [] Tx Options     [] Transplant     [] Diet     [] Other   Teaching Tools:  [x] Explain  [] Demo  [] Handouts [] Video  Patient response:   [] Verbalized understanding  [] Teach back  [] Return demonstration [x] Requires follow up   Inappropriate due to            [address] Before each treatment:     Machine Number:                   [facility]                                  [] Unit Machine #  with centralized RO                                  [] Portable Machine #1/RO serial # [id number]                                  [] Portable Machine #2/RO serial # [id number] [x] Portable Machine #3/RO serial # [id number]                                                                                                       [address]                                  [] Portable Machine #11/RO serial # [id number]                                   [] Portable Machine #12/RO serial # [id number]                                  [] Portable Machine #13/RO serial #  [id number]      Alarm Test:  Pass time 2450         Other:         [x] RO/Machine Log Complete      Temp    37.0            [x]Extracorporeal Circuit Tested for integrity   Dialysate: pH  7.2 Conductivity: Meter   14.2     HD Machine   14.3                  TCD: 14.2  Dialyzer Lot # [id number]            Blood Tubing Lot # [id number]          Saline Lot #  [id number]     CHLORINE TESTING-Before each treatment and every 4 hours    Total Chlorine: [] less than 0.1 ppm  Time: 0740 4 Hr/2nd Check Time: NA   (if greater than 0.1 ppm from Primary then every 30 minutes from Secondary)     TREATMENT INITIATION  with Dialysis Precautions:   [x] All Connections Secured                 [x] Saline Line Double Clamped   [x] Venous Parameters Set                  [x] Arterial Parameters Set    [x] Prime Given  250 ml                        [x]Air Foam Detector Engaged      Treatment Initiation Note: Arrived to pt's room in ICU. Pt non-responsive on ventilator. Pt has LUE AVG with +bruit/thrill, cannulated x2 without complication and tx initiated. Medication Dose Volume Route Initials Dialyzer Cleared: [] Good [x] Fair  [] Poor    Blood processed:  47.1 L  UF Removed  2000 Ml    Post Wt: NA    kg  POst BP:   144/81       Pulse: 106      Respirations: 22  Temperature: 97.8     Epogen  06808w 1ml IV AP Post Tx Vascular Access: AVF/AVG: Bleeding stopped Art 5 min. Oscar. 5 Min        Venofer 100mg 100ml IVBP AP Catheter: Locking solution: Heparin 1:1000 Art. Oscar.    N/A                                 Post Assessment: Skin:  [x] Warm  [x] Dry [] Diaphoretic    [] Flushed  [] Pale [] Cyanotic   [facility] Signatures Title Initials  Time Lungs: [] Clear    [x] Course  [] Crackles  [] Wheezing [] Diminished   Gabriel Betancourt RN AP  Cardiac: [x] Regular   [] Irregular   [] Monitor  [] N/A  Rhythm:           Edema:  [] None    [] General     [] Facial   [] Pedal    [x] UE    [x] LE       Pain: [x]0  []1  []2   []3  []4   []5   []6   []7   []8   []9   []10         Post Treatment Note: HD tx complete, patient tolerated procedure well, 2000ml net UF removed     POST TREATMENT PRIMARY NURSE HANDOFF REPORT:     First initial/Last name/Title         Post Dialysis: YULIANA Villa RN Time:  9069     Abbreviations: AVG-arterial venous graft, AVF-arterial venous fistula, IJ-Internal Jugular, Subcl-Subclavian, Fem-Femoral, Tx-treatment, AP/HR-apical heart rate, DFR-dialysate flow rate, BFR-blood flow rate, AP-arterial pressure, -venous pressure, UF-ultrafiltrate, TMP-transmembrane pressure, Oscar-Venous, Art-Arterial, RO-Reverse Osmosis

## 2017-09-22 NOTE — ROUTINE PROCESS
0730 Bedside shift change report given to [employer] (oncoming nurse) by Kelechi Harper (offgoing nurse). Report included the following information SBAR, Kardex, MAR, Recent Results and Cardiac Rhythm Sinus Tach.

## 2017-09-22 NOTE — INTERDISCIPLINARY ROUNDS
CRITICAL CARE INTERDISCIPLINARY ROUNDS      Patient Information:    Name:   Holger Butler    Age:   65 y.o.     Admission Date:   7/27/2017    Readmit Risk Assessment Information:      Readmit Risk Tool Support Systems: Family member(s), Relationship with Primary Physician Group: Seen at least one time within past 12 months    Surgery Date:      Day of Stay:     Expected Discharge Date:        Attending Provider:   Ayanna Dennis MD    Surgeon:        Consultant:       Primary Care Provider:   Brenton Nash MD    Problem List:     Patient Active Problem List   Diagnosis Code    Anemia D64.9    Acidosis E87.2    Cardiac arrest ([facility] I46.9    Respiratory failure ([facility] J96.90    ESRD needing dialysis ([facility] N18.6, Z99.2    Anoxic brain damage (HCC) G93.1    Colitis K52.9    Hypotension I95.9    Acute GI bleeding K92.2    ESRD (end stage renal disease) ([facility] N18.6    Sepsis ([facility] A41.9    Oropharyngeal dysphagia R13.12    Cachexia ([facility] R64       Principal Problem:  <principal problem not specified>    Procedure:       During rounds the following quality care indicators and evidence based practices were addressed :     DVT Prophylaxis, Pressure Injury Prevention, Pain Management, Sepsis resuscitation and management, Nutritional Status, Critical Care Interventions Airways, Drains, Lines and Pressors and IHI Bundles: Vent Bundle Followed, Vent Day 22           Acute MI/PCI:   Not applicable    Heart Failure:    Not applicable    Cardiac Surgery:  Not applicable    SCIP Measures for other Surgeries:   Not applicable    Pneumonia:    Appropriate Antibiotic Selection (ICU versus Non-ICU)    Stroke:  Patient's Personal Risk Factors for Stroke are:   hypertension, hyperlipidemia or diabetes mellitus    NIH Stroke Score  Total: 8 (08/17/17 0400)    Transfer Level of Care:  Progressing to Transfer    The patient will require the following interventions based on the Readmission Risk Assessment:  Pharmacy evaluation and teaching, Care Management involvement for home health follow up for:  mobility and assistance with ADL's and Spiritual Care evaluation      Discharge Management:  Group Home    Anticipated Discharge Date:  3      Interdisciplinary team rounds were held  with the following team membersCare Management, Diabetes Treatment Specialist, Nursing, Nutrition, Pastoral Care, Pharmacy, Physician and Clinical Coordinator and the  patient and healthcare POA (documentation required). Plan of care discussed. See clinical pathway and/or care plan for interventions and desired outcomes. Transfer to long term care facility today.

## 2017-09-22 NOTE — PROGRESS NOTES
Patient has a bed today at [facility] spoke with Dwayne Ramos at [facility] who is prepared to accept Mr. Kristy Villegas at 12noon. RN aware. [facility] scheduled for that time. [employer] Ref: [id number].

## 2017-09-22 NOTE — PROGRESS NOTES
Infectious Disease Progress Note    Requested by: Dr. Daya Bain    Reason: sepsis, e.coli/clostridium pefringens bacteremia    Current abx Prior abx   Meropenem since 9/15/17 Levofloxacin 7/27  Pip/tazo 7/27-7/31; 8/2-8/10; 8/18-8/25  Meropenem  7/31-8/2,   vancomycin  7/27-8/2; 8/18-8/25, 9/7-9/13  Pip/tazo since 9/7-9/15  Ciprofloxacin since 9/11/17-9/15     Lines:       Assessment :  55year old man with h/o type 2 DM (hgb a1c 5.4 on 7/29/17) ,ESRD admitted to [facility] on 7/27/17 s/p cardiorespiratory arrest, hypotension, bacteremia, elevated LFTs. sepsis (POA) due to e.coli, clostridium perfringens bloodstream infection (positive blood culture 7/27, negative blood culture 8/1) . Most likely source of bacteremia is intra abdominal infection/inflammation. Elevated LFTs, thickened gallbladder - no evidence of cholecystitis on HIDA scan 8/2/17    Acute pontine CVA, s/p PEA arrest     Now with RLL atelectasis on cxr, low grade fevers tm:101 on 9/15/17, increasing wbc. Clinical picture consistent with RLL healthcare associated/ventilator associated pneumonia due to pseudomonas, klebsiella. Worsening wbc/fevers on current antibiotics likely due to decreased efficacy of current antibiotics (pseudomonas in sputum cx 9/6/17 had pip/tazo IRMA:32, was resistant to levofloxacin)     CT chest 9/15/17 revealed complete collapse of right lower lobe. Small effusion    S/p pulmonary toillete, chest PT with complete resolution of rll opacity on cxr 9/17/17    Mild increased wbc - r/o recurrent right lung atelectasis. On spontaneous mode of vent. No fevers  Repeat cxr 9/21: no new infiltrate    Recommendations:    1. D/c meropenem  2. Ok to d/c patient from id standpoint      Long term prognosis: poor    Advance Care planning: full code, patient wasnt able to name a surrogate decision maker or provide an advance care plan   Please call me if any further questions or concerns.  Will continue to participate in the care of this patient.     subjective:  Non verbal         Home medications:   list reviewed        Current Facility-Administered Medications   Medication Dose Route Frequency    loperamide (IMODIUM) capsule 2 mg  2 mg Oral Q12H PRN    Menthol-Zinc Oxide (Calmoseptine) 0.44-20.6 % ointment   Topical TID    levETIRAcetam (KEPPRA) oral solution 500 mg  500 mg Per G Tube BID    0.9% sodium chloride infusion  50 mL/hr IntraVENous CONTINUOUS    0.9% sodium chloride infusion 250 mL  250 mL IntraVENous PRN    chlorhexidine (PERIDEX) 0.12 % mouthwash 10 mL  10 mL Oral Q12H    heparin (porcine) 1,000 unit/mL injection 1,000 Units  1,000 Units IntraVENous Q1H PRN    meropenem (MERREM) 500 mg in 0.9% sodium chloride (MBP/ADV) 50 mL MBP  0.5 g IntraVENous Q24H    albuterol-ipratropium (DUO-NEB) 2.5 MG-0.5 MG/3 ML  3 mL Nebulization Q6H RT    epoetin benjamin (EPOGEN;PROCRIT) injection 10,000 Units  10,000 Units IntraVENous EVERY MON & FRI    iron sucrose (VENOFER) 100 mg in 0.9% sodium chloride 100 mL IVPB  100 mg IntraVENous EVERY MON & FRI    midodrine (PROAMITINE) tablet 10 mg  10 mg Oral TID WITH MEALS    white petrolatum-mineral oil 85-15 % oint 1 Dose  1 Dose Ophthalmic Q6H    albumin human 25% (BUMINATE) solution 12.5 g  12.5 g IntraVENous DIALYSIS PRN    Zinc Ox-Aloe Vera-Vitamin E (BALMEX) topical cream   Topical CONTINUOUS    banana flakes trans-galactooligosaccharide (BANATROL PLUS) powder 1 Packet  1 Packet Per NG tube TID    LORazepam (ATIVAN) injection 1 mg  1 mg IntraVENous Q4H PRN    insulin lispro (HUMALOG) injection   SubCUTAneous Q6H    famotidine (PF) (PEPCID) 20 mg in sodium chloride 0.9 % 10 mL injection  20 mg IntraVENous DAILY    collagenase (SANTYL) 250 unit/gram ointment   Topical DAILY    acetaminophen (TYLENOL) tablet 650 mg  650 mg Oral Q4H PRN    lactobacillus sp. 50 billion cpu (BIO-K PLUS) capsule 1 Cap  1 Cap Oral DAILY    heparin (porcine) injection 5,000 Units  5,000 Units SubCUTAneous Q8H  heparin (porcine) 1,000 unit/mL injection 2,000 Units  2,000 Units IntraVENous DIALYSIS ONCE    simvastatin (ZOCOR) tablet 20 mg  20 mg Oral QHS    aspirin chewable tablet 81 mg  81 mg Oral DAILY    glucose chewable tablet 16 g  4 Tab Oral PRN    glucagon (GLUCAGEN) injection 1 mg  1 mg IntraMUSCular PRN    dextrose (D50W) injection syrg 12.5-25 g  25-50 mL IntraVENous PRN    0.9% sodium chloride infusion  100 mL/hr IntraVENous DIALYSIS PRN    naloxone (NARCAN) injection 0.4 mg  0.4 mg IntraVENous PRN       Allergies: Review of patient's allergies indicates no known allergies. Temp (24hrs), Av.8 °F (37.1 °C), Min:98.4 °F (36.9 °C), Max:99.3 °F (37.4 °C)    Visit Vitals    /67 (BP 1 Location: Right arm, BP Patient Position: At rest)    Pulse 94    Temp 98.4 °F (36.9 °C)    Resp 21    Ht 6' 2\" (1.88 m)    Wt 70 kg (154 lb 5.2 oz)    SpO2 100%    BMI 19.81 kg/m2       ROS: limited ROS obtained since patient not very communicative. Physical Exam:    General: Well developed, well nourished male laying on the bed,non communicative. HEENT:  Normocephalic, atraumatic, present conjunctival hemmohage;  nasal and oral mucous are moist and without evidence of lesions. Trach in place   Lymph Nodes:   no cervical, axillary or inguinal adenopathy   Lungs:   non-labored, decreased breath sounds right lung, no crackles wheezes rales or rhonchi   Heart:  RRR, s1 and s2; no  rubs or gallops, no edema, + pedal pulses   Abdomen:  soft, non-distended, active bowel sounds, no hepatomegaly, no splenomegaly.   Non-tender, peg in place   Genitourinary:  deferred   Extremities:   no clubbing, cyanosis; no joint effusions or swelling; muscle mass appropriate for age   Neurologic:  Detailed neuro eval not feasible on sedation                        Skin:  Sacral ulcers per report   Back:  no spinal or paraspinal muscle tenderness or rigidity, no CVA tenderness     Psychiatric:  Unable to assess         Labs: Results:   Chemistry Recent Labs      09/22/17 0221 09/21/17 0346  09/20/17   0459   GLU  109*  65*  61*   NA  132*  131*  133*   K  4.0  4.4  4.0   CL  98*  96*  100   CO2  21  23  21   BUN  102*  92*  63*   CREA  7.96*  7.05*  6.26*   CA  8.1*  8.3*  8.3*   AGAP  13  12  12   BUCR  13  13  10*      CBC w/Diff Recent Labs      09/22/17 0221 09/20/17 0459   WBC   --   15.8*   RBC   --   2.58*   HGB  7.4*  7.9*   HCT  22.7*  24.7*   PLT   --   409   GRANS   --   67   LYMPH   --   18*   EOS   --   5      Microbiology No results for input(s): CULT in the last 72 hours.        RADIOLOGY:    All available imaging studies/reports in [facility] care for this admission were reviewed    Dr. Yaneli Carl, Infectious Disease Specialist  [phone]  September 22, 2017  4:28 PM

## 2017-09-23 NOTE — ROUTINE PROCESS
Writer accessed this chart to get phone number of daughter Lisandro Haro, to get permission to tell a visitor Jeancarlos Perez the new location of this patient, she agreed also spoke with Jeancarlos Perez by phone, after this he was given the information regarding room number and location of patient, he states he wanted to go pray for patient

## 2018-01-01 NOTE — PROGRESS NOTES
[facility] Hospitalist Group  Progress Note    Patient: Rudy Sher Age: 65 y.o. : 1960 MR#: [id number] SSN: [id number]  Date/Time: 2017     Subjective:     Patient intubated, unresponsive    Assessment/Plan:     S/p PEA arrest. wth acute resp fauilure on the Vent ? Cardiac cause with elevated trop PTA. Echo improving EF, s/p cath, no CAD. Now with second cardiac arrest - intubated, vent support    Acute met encephalopathy: likely anoxic encephalopathy     Acute pontine lacunar CVA: on asa.     ESRD- HD as pe nephrology    wound care    Ethics consulted    Case discussed with:  []Patient  []Family  []Nursing  [x]Case Management  DVT Prophylaxis:  []Lovenox  [x]Hep SQ  [x]SCDs  []Coumadin   []On Heparin gtt    Patient Active Problem List   Diagnosis Code    Anemia D64.9    Acidosis E87.2    Cardiac arrest ([address] I46.9    Respiratory failure ([address] J96.90    ESRD needing dialysis ([address] N18.6, Z99.2    Anoxic brain damage ([address] G93.1    Colitis K52.9    Hypotension I95.9    Acute GI bleeding K92.2    ESRD (end stage renal disease) ([address] N18.6    Sepsis ([address] A41.9    Oropharyngeal dysphagia R13.12    Cachexia ([address] R64       Objective:   VS:   Visit Vitals    /64    Pulse 81    Temp 99 °F (37.2 °C)    Resp 20    Ht 6' 2\" (1.88 m)    Wt 64.6 kg (142 lb 6.7 oz)    SpO2 100%    BMI 18.29 kg/m2      Tmax/24hrs: Temp (24hrs), Av °F (37.2 °C), Min:98.7 °F (37.1 °C), Max:99.2 °F (37.3 °C)  IOBRIEF    Intake/Output Summary (Last 24 hours) at 17 1904  Last data filed at 17 1800   Gross per 24 hour   Intake          2627.54 ml   Output              300 ml   Net          2327.54 ml     General:  Intubated, unresponsive  Cardiovascular:  S1S2+, RRR  Pulmonary:  Coarse BS b/l  GI:  Soft, BS+, NT, ND  Extremities:  No edema      Medications:   Current Facility-Administered Medications   Medication Dose Route Frequency    midodrine (PROAMITINE) tablet 10 mg 10 mg Oral BID WITH MEALS    potassium chloride (KLOR-CON) packet 40 mEq  40 mEq Per NG tube DAILY    Zinc Ox-Aloe Vera-Vitamin E (BALMEX) topical cream   Topical CONTINUOUS    insulin glargine (LANTUS) injection 20 Units  20 Units SubCUTAneous DAILY    banana flakes trans-galactooligosaccharide (BANATROL PLUS) powder 1 Packet  1 Packet Per NG tube TID    levETIRAcetam (KEPPRA) 500 mg in saline (iso-osm) 100 ml IVPB  500 mg IntraVENous Q12H    LORazepam (ATIVAN) injection 1 mg  1 mg IntraVENous Q4H PRN    insulin lispro (HUMALOG) injection   SubCUTAneous Q6H    white petrolatum-mineral oil 85-15 % oint 1 Dose  1 Dose Ophthalmic QID    chlorhexidine (PERIDEX) 0.12 % mouthwash 10 mL  10 mL Oral Q12H    NOREPINephrine (LEVOPHED) 16,000 mcg in dextrose 5% 250 mL infusion  2-16 mcg/min IntraVENous TITRATE    albuterol (PROVENTIL VENTOLIN) nebulizer solution 2.5 mg  2.5 mg Nebulization Q6H PRN    famotidine (PF) (PEPCID) 20 mg in sodium chloride 0.9 % 10 mL injection  20 mg IntraVENous DAILY    vits A and D-white pet-lanolin (A&D) ointment   Topical QID AFTER MEALS    collagenase (SANTYL) 250 unit/gram ointment   Topical DAILY    acetaminophen (TYLENOL) tablet 650 mg  650 mg Oral Q4H PRN    lactobacillus sp. 50 billion cpu (BIO-K PLUS) capsule 1 Cap  1 Cap Oral DAILY    loperamide (IMODIUM) capsule 2 mg  2 mg Oral Q6H PRN    heparin (porcine) injection 5,000 Units  5,000 Units SubCUTAneous Q8H    heparin (porcine) 1,000 unit/mL injection 2,000 Units  2,000 Units IntraVENous DIALYSIS ONCE    simvastatin (ZOCOR) tablet 20 mg  20 mg Oral QHS    doxercalciferol (HECTOROL) 4 mcg/2 mL injection 3 mcg  3 mcg IntraVENous DIALYSIS MON, WED & FRI    epoetin benjamin (EPOGEN;PROCRIT) injection 10,000 Units  10,000 Units IntraVENous DIALYSIS MON, WED & FRI    aspirin chewable tablet 81 mg  81 mg Oral DAILY    glucose chewable tablet 16 g  4 Tab Oral PRN    glucagon (GLUCAGEN) injection 1 mg  1 mg IntraMUSCular PRN    dextrose (D50W) injection syrg 12.5-25 g  25-50 mL IntraVENous PRN    0.9% sodium chloride infusion  100 mL/hr IntraVENous DIALYSIS PRN    naloxone (NARCAN) injection 0.4 mg  0.4 mg IntraVENous PRN       Labs:      Recent Results (from the past 24 hour(s))   GLUCOSE, POC    Collection Time: 08/28/17 12:03 AM   Result Value Ref Range    Glucose (POC) 181 (H) 70 - 110 mg/dL   POC G3    Collection Time: 08/28/17  5:05 AM   Result Value Ref Range    Device: VENT      FIO2 (POC) 25 %    pH (POC) 7.408 7.35 - 7.45      pCO2 (POC) 34.3 (L) 35.0 - 45.0 MMHG    pO2 (POC) 46 (LL) 80 - 100 MMHG    HCO3 (POC) 21.6 (L) 22 - 26 MMOL/L    sO2 (POC) 82 (L) 92 - 97 %    Base deficit (POC) 3 mmol/L    Mode ASSIST CONTROL      Tidal volume 400 ml    Set Rate 14 bpm    PEEP/CPAP (POC) 0.5 cmH2O    PIP (POC) 18      Allens test (POC) N/A      Inspiratory Time 1 sec    Nitric-ppm (POC) 0 ppm    Total resp. rate 17      Site RIGHT RADIAL      Patient temp. 37.0      Specimen type (POC) ARTERIAL      Performed by Vikki Bhandari     Volume control YES     CBC WITH AUTOMATED DIFF    Collection Time: 08/28/17  5:15 AM   Result Value Ref Range    WBC 12.2 4.6 - 13.2 K/uL    RBC 3.01 (L) 4.70 - 5.50 M/uL    HGB 9.4 (L) 13.0 - 16.0 g/dL    HCT 29.3 (L) 36.0 - 48.0 %    MCV 97.3 (H) 74.0 - 97.0 FL    MCH 31.2 24.0 - 34.0 PG    MCHC 32.1 31.0 - 37.0 g/dL    RDW 19.8 (H) 11.6 - 14.5 %    PLATELET 463 (H) 320 - 420 K/uL    MPV 10.6 9.2 - 11.8 FL    NEUTROPHILS 85 (H) 40 - 73 %    LYMPHOCYTES 11 (L) 21 - 52 %    MONOCYTES 4 3 - 10 %    EOSINOPHILS 0 0 - 5 %    BASOPHILS 0 0 - 2 %    ABS. NEUTROPHILS 10.3 (H) 1.8 - 8.0 K/UL    ABS. LYMPHOCYTES 1.4 0.9 - 3.6 K/UL    ABS. MONOCYTES 0.5 0.05 - 1.2 K/UL    ABS. EOSINOPHILS 0.1 0.0 - 0.4 K/UL    ABS.  BASOPHILS 0.0 0.0 - 0.1 K/UL    DF AUTOMATED     MAGNESIUM    Collection Time: 08/28/17  5:15 AM   Result Value Ref Range    Magnesium 3.1 (H) 1.6 - 2.6 mg/dL   PHOSPHORUS    Collection Time: 08/28/17 5:15 AM   Result Value Ref Range    Phosphorus 2.6 2.5 - 4.9 MG/DL   POTASSIUM    Collection Time: 08/28/17  5:15 AM   Result Value Ref Range    Potassium 4.2 3.5 - 5.5 mmol/L   POC G3    Collection Time: 08/28/17  5:15 AM   Result Value Ref Range    Device: VENT      FIO2 (POC) 25 %    pH (POC) 7.445 7.35 - 7.45      pCO2 (POC) 29.7 (L) 35.0 - 45.0 MMHG    pO2 (POC) 125 (H) 80 - 100 MMHG    HCO3 (POC) 20.4 (L) 22 - 26 MMOL/L    sO2 (POC) 99 (H) 92 - 97 %    Base deficit (POC) 4 mmol/L    Mode ASSIST CONTROL      Tidal volume 400 ml    Set Rate 14 bpm    PEEP/CPAP (POC) 0.5 cmH2O    PIP (POC) 19      Allens test (POC) N/A      Inspiratory Time 1 sec    Nitric-ppm (POC) 0 ppm    Total resp. rate 14      Site RIGHT RADIAL      Patient temp.  37.0      Specimen type (POC) ARTERIAL      Performed by Giles Kim     Volume control plus YES     METABOLIC PANEL, BASIC    Collection Time: 08/28/17  5:15 AM   Result Value Ref Range    Sodium 139 136 - 145 mmol/L    Potassium 4.2 3.5 - 5.5 mmol/L    Chloride 108 100 - 108 mmol/L    CO2 20 (L) 21 - 32 mmol/L    Anion gap 11 3.0 - 18 mmol/L    Glucose 261 (H) 74 - 99 mg/dL    BUN 45 (H) 7.0 - 18 MG/DL    Creatinine 7.43 (H) 0.6 - 1.3 MG/DL    BUN/Creatinine ratio 6 (L) 12 - 20      GFR est AA 9 (L) >60 ml/min/1.73m2    GFR est non-AA 8 (L) >60 ml/min/1.73m2    Calcium 8.9 8.5 - 10.1 MG/DL   GLUCOSE, POC    Collection Time: 08/28/17  5:49 AM   Result Value Ref Range    Glucose (POC) 256 (H) 70 - 110 mg/dL   GLUCOSE, POC    Collection Time: 08/28/17  5:53 AM   Result Value Ref Range    Glucose (POC) 244 (H) 70 - 110 mg/dL   GLUCOSE, POC    Collection Time: 08/28/17 11:34 AM   Result Value Ref Range    Glucose (POC) 227 (H) 70 - 110 mg/dL   GLUCOSE, POC    Collection Time: 08/28/17  5:25 PM   Result Value Ref Range    Glucose (POC) 185 (H) 70 - 110 mg/dL       Signed By: Chinmay Aguirre MD     August 28, 2017 Statement Selected

## 2018-02-04 NOTE — PROGRESS NOTES
Infectious Diseases Daily Progress Note      Patient's Name: Ari Schmitz   Date of Service: 2/4/2018    I Date of admission: 1/29/2018  I  Hospital Day: 7  Admitting Diagnosis:  E11.69, L08.9 Diabetic foot infection (CMS/HCC)  (primary encounter diagnosis)          Scheduled Medications     heparin (porcine) 5,000 Units 3 times per day   ceFAZolin 2,000 mg 3 times per day   insulin glargine 10 Units Nightly   insulin lispro  TID AC   aspirin 81 mg Daily   atorvastatin 10 mg Daily   labetalol 200 mg 2 times per day   sodium chloride (PF) 2 mL 2 times per day     Infusions:  • dextrose 5 % infusion       Past Medical History, Social History, Medications and Allergies reviewed.   Reviewed Pertinent: Laboratory studies, radiographic studies, medications, and recent progress notes.     Subjective:      No new complaints. Afebrile.      Review of Systems: No fever or chills. No nausea, abdominal pain, or diarrhea. No rashes. No cough or chest pain. No urinary symptoms.     Objective:    VITAL SIGNS   height is 5' 10\" (1.778 m) and weight is 128.5 kg. His oral temperature is 97.9 °F (36.6 °C). His blood pressure is 128/78 and his pulse is 73. His respiration is 18 and oxygen saturation is 96%.   Temp  Min: 97.9 °F (36.6 °C)  Max: 99.4 °F (37.4 °C)     PHYSICAL EXAMINATION:  GENERAL:   awake, alert and oriented x3.  Pleasant and cooperative,  no acute distress.     HEENT:  Normocephalic, atraumatic.  Pupils are equal.  No scleral icterus.    CARDIOVASCULAR:  Pulse is regular.  S1, S2 normal.  No murmurs    GASTROINTESTINAL:  Abdomen is soft, nontender.  No distention.  No organomegaly.  PULMONARY:  Lungs are bilaterally clear.  EXTREMITIES: Left  Foot bandaged       LABORATORY DATA:    Recent Labs  Lab 02/04/18  0545 02/03/18  0552 02/02/18  0553  01/29/18  2205 01/29/18  1707   SODIUM 138 137 138  < >  --   --    BUN 15 18 21*  < >  --   --    CREATININE 1.00 1.08 1.09  < >  --   --    GFRNA 76 70 69  < >  --   --   SW attempted to contact the patient's daughter and son again this morning. VML at both numbers. 1135: Spoke with the patient's son, Rajinder Barragan. SW explained LTACH and he stated he would like to discuss with his sister and get back to this SW.   GFRA 89 81 80  < >  --   --    GLUCOSE 158* 147* 152*  < >  --   --    CALCIUM 8.1* 8.2* 8.2*  < >  --   --    ALBUMIN  --   --   --   --   --  2.6*   AST  --   --   --   --   --  11   GPT  --   --   --   --   --  21   ALKPT  --   --   --   --   --  58   BILIRUBIN  --   --   --   --   --  1.8*   LIPA  --   --   --   --   --  77   BNP  --   --   --   --   --  86   RESR  --   --   --   --  >120*  --    < > = values in this interval not displayed.    Recent Labs  Lab 02/04/18  0545 02/03/18  0552 02/02/18  0553   WBC 9.8 11.1* 10.3   HGB 9.9* 10.3* 10.6*   HCT 29.5* 30.4* 30.6*    372 382   BAND 5 4  --        Recent Labs  Lab 01/30/18  0337   UWBC NEGATIVE   UBACTR FEW*   UNITR NEGATIVE   LEUK 1 to 5   URBC NEGATIVE      No results found for: VANCR, VANCT, VANCP  Recent Labs      01/29/18   2205   RESR  >120*     Patients's last HBA1C reading was   Hemoglobin A1C (%)   Date Value   01/29/2018 8.5 (H)      Left great toe Swab cx 1/29/18 + staph aureus   MRSA screen neg     Urinalysis is unremarkable.  Cultures obtained yesterday are pending.  Anaerobic and aerobic of the toe culture is showing gram-positive cocci and gram-negative bacilli.    IMPRESSION:  Left foot great toe wet gangrene and probable underlying osteomyelitis in the setting of uncontrolled diabetes mellitus. S/p left foot great toe [facility]  Diabetic neuropathy.  Severe leukocytosis.  Acute kidney injury.     CKD  Obesity.    PLAN AND RECOMMENDATIONS:   . He can be discharged on PO Cephalexin 500 mg PO TID  x 10 days     MGlenny Rivera MD  Infectious Diseases  [phone] (P)   [phone] (A)  2/4/2018     10:55 AM

## 2018-12-11 NOTE — ROUTINE PROCESS
Bedside and Verbal shift change report given to AMI Mackey (oncoming nurse) by Amalia Garcia RN (offgoing nurse).  Report included the following information SBAR, Kardex, ED Summary, Procedure Summary, Intake/Output, MAR, Recent Results and Cardiac Rhythm SR. NURSE NOTES:

Dr. Daigle in facility to see the patient.

## 2021-02-25 NOTE — PROGRESS NOTES
attended the interdisciplinary rounds for [facility], who is a 65 y.o.,male. Patients Primary Language is: [place]. According to the patients EMR Yazidism Affiliation is: [place].     The reason the Patient came to the hospital is:   Patient Active Problem List    Diagnosis Date Noted    Oropharyngeal dysphagia 08/17/2017    Cachexia ([address] 08/17/2017    Acidosis 07/27/2017    Cardiac arrest ([address] 07/27/2017    Respiratory failure ([address] 07/27/2017    ESRD needing dialysis ([address] 07/27/2017    Anoxic brain damage ([address] 07/27/2017    Colitis 07/27/2017    Hypotension 07/27/2017    Acute GI bleeding 07/27/2017    ESRD (end stage renal disease) ([address] 07/27/2017    Sepsis ([address] 07/27/2017    Anemia       Not able to assess the patient due to medical condition. Plan:  Chaplains will continue to follow and will provide pastoral care on an as needed/requested basis.  recommends bedside caregivers page  on duty if patient shows signs of acute spiritual or emotional distress.     [address]  Board Certified [address] [place] [address]   [phone] Date: 2/18/2021     Indication: Fatty liver     FibroScan steatosis result (CAP score): 385 decibels per meter (dB/m)  Steatosis grade: S3     FibroScan fibrosis result: 5.7 kilopascals (kPa)  Fibrosis score: F0     Impression: Severe fatty liver with no fibrosis

## 2021-04-27 NOTE — PROGRESS NOTES
[facility]  ICU Progress Note      Name: Kaur Pascal   : 1960   MRN: [id number]   Date: 2017 3:46 PM     [x]I have reviewed the flowsheet and previous days notes. Events overnight reviewed and discussed with nursing staff. Vital signs and records reviewed. HPI:  Claudia Conde a 55 y. o.  male with PMH of DM, ESRD admitted PEA arrest. Patient's hospitalization was also found to have a CVA and was treated for E.coli and C.perferinges bacteremia. Subsequent PEA arrest 17. Pt is now s/p Trach and Peg on 17; pt remains on ventilator and off sedation. Subjective:    17:  - No new events overnight. - Remains on SBT with pressure support  - Remains anuric  - Waiting for placement      [x]The patient is unable to give any meaningful history or review of systems because the patient is:  [x]Intubated []Sedated   [x]Unresponsive      [x]The patient is critically ill on      [x]Mechanical ventilation []Pressors   []BiPAP []                 ROS:Review of systems not obtained due to patient factors.     Medication Review:  · Pressors - N/A  · Sedation - N/A  · Antibiotics - Meropenem  · Pain - PRN Tylenol  · GI/ DVT - Pepcid; SQH  · Others (other gtts)    Safety Bundles: VAP Bundle/ CAUTI/ Severe Sepsis Protocol/ Electrolyte Replacement Protocol    Vital Signs:    Visit Vitals    /44    Pulse (!) 111    Temp 98.6 °F (37 °C)    Resp (!) 31    Ht 6' 2\" (1.88 m)    Wt 70 kg (154 lb 5.2 oz)    SpO2 100%    BMI 19.81 kg/m2       O2 Device: Ventilator   O2 Flow Rate (L/min): 6 l/min   Temp (24hrs), Av.6 °F (37 °C), Min:98.4 °F (36.9 °C), Max:98.6 °F (37 °C)       Intake/Output:   Last shift:         Last 3 shifts:  1901 -  0700  In: 3010 [I.V.:350]  Out: 1050 [Drains:1050]    Intake/Output Summary (Last 24 hours) at 17 1556  Last data filed at 17 0700   Gross per 24 hour   Intake             1465 ml   Output 650 ml   Net              815 ml       Ventilator Settings:  Ventilator  Mode: Spontaneous  Respiratory Rate  Resp Rate Observed: 22  Back-Up Rate: 12  Insp Time (sec): 1 sec  Insp Flow (l/min): 44 l/min  I:E Ratio: 1:4  Ventilator Volumes  Vt Set (ml): 400 ml  Vt Exhaled (Machine Breath) (ml): 431 ml  Vt Spont (ml): 429 ml  Ve Observed (l/min): 9.49 l/min  Ventilator Pressures  PC Set: 400  Pressure Support (cm H2O): 5 cm H2O  PIP Observed (cm H2O): 12 cm H2O  Plateau Pressure (cm H2O): 14 cm H2O  MAP (cm H2O): 7.7  PEEP/VENT (cm H2O): 5 cm H20  Auto PEEP Observed (cm H2O): 0 cm H2O    Physical Exam:    General: Intubated/sedated; NAD; unresponsive  HEENT:  Anicteric sclerae; pink palpebral conjunctivae; mucosa moist. Eyelids remain open without blinking. Trach in place with moderate secretions  Resp:  Symmetrical chest expansion, no accessory muscle use;  Mod AE bilat; Decreased bibasilar; course lung sounds throughout  CV:  S1, S2 present; RRR; No m/r/g  GI:  Abdomen soft, non-distended; (+) active bowel sounds  Extremities:  +2 pulses on all extremities; significant muscle atrophy, AV fistula LUE: + bruit + thrill; no edema/ cyanosis/ clubbing noted  Skin:  Warm; no rashes/ lesions noted  Neurologic: Pinpoint pupils nonreactive to light, + cough, + gag, decorticate posturing with stimulation; (+) corneal reflex   Devices:     09/08/17: Trach/Peg   09/19/17: Rectal Stanton     DATA:     Current Facility-Administered Medications   Medication Dose Route Frequency    loperamide (IMODIUM) capsule 2 mg  2 mg Oral Q12H PRN    Menthol-Zinc Oxide (Calmoseptine) 0.44-20.6 % ointment   Topical TID    levETIRAcetam (KEPPRA) oral solution 500 mg  500 mg Per G Tube BID    0.9% sodium chloride infusion 250 mL  250 mL IntraVENous PRN    chlorhexidine (PERIDEX) 0.12 % mouthwash 10 mL  10 mL Oral Q12H    heparin (porcine) 1,000 unit/mL injection 1,000 Units  1,000 Units IntraVENous Q1H PRN    meropenem (MERREM) 500 mg in 0.9% sodium chloride (MBP/ADV) 50 mL MBP  0.5 g IntraVENous Q24H    albuterol-ipratropium (DUO-NEB) 2.5 MG-0.5 MG/3 ML  3 mL Nebulization Q6H RT    epoetin benjamin (EPOGEN;PROCRIT) injection 10,000 Units  10,000 Units IntraVENous EVERY MON & FRI    iron sucrose (VENOFER) 100 mg in 0.9% sodium chloride 100 mL IVPB  100 mg IntraVENous EVERY MON & FRI    midodrine (PROAMITINE) tablet 10 mg  10 mg Oral TID WITH MEALS    white petrolatum-mineral oil 85-15 % oint 1 Dose  1 Dose Ophthalmic Q6H    albumin human 25% (BUMINATE) solution 12.5 g  12.5 g IntraVENous DIALYSIS PRN    Zinc Ox-Aloe Vera-Vitamin E (BALMEX) topical cream   Topical CONTINUOUS    banana flakes trans-galactooligosaccharide (BANATROL PLUS) powder 1 Packet  1 Packet Per NG tube TID    LORazepam (ATIVAN) injection 1 mg  1 mg IntraVENous Q4H PRN    insulin lispro (HUMALOG) injection   SubCUTAneous Q6H    famotidine (PF) (PEPCID) 20 mg in sodium chloride 0.9 % 10 mL injection  20 mg IntraVENous DAILY    collagenase (SANTYL) 250 unit/gram ointment   Topical DAILY    acetaminophen (TYLENOL) tablet 650 mg  650 mg Oral Q4H PRN    lactobacillus sp. 50 billion cpu (BIO-K PLUS) capsule 1 Cap  1 Cap Oral DAILY    heparin (porcine) injection 5,000 Units  5,000 Units SubCUTAneous Q8H    heparin (porcine) 1,000 unit/mL injection 2,000 Units  2,000 Units IntraVENous DIALYSIS ONCE    simvastatin (ZOCOR) tablet 20 mg  20 mg Oral QHS    aspirin chewable tablet 81 mg  81 mg Oral DAILY    glucose chewable tablet 16 g  4 Tab Oral PRN    glucagon (GLUCAGEN) injection 1 mg  1 mg IntraMUSCular PRN    dextrose (D50W) injection syrg 12.5-25 g  25-50 mL IntraVENous PRN    0.9% sodium chloride infusion  100 mL/hr IntraVENous DIALYSIS PRN    naloxone (NARCAN) injection 0.4 mg  0.4 mg IntraVENous PRN         Labs: Results:       Chemistry Recent Labs      09/21/17   0346  09/20/17   0459  09/19/17   0446   GLU  65*  61*  62*   NA  131*  133*  135*   K  4.4  4.0  3.5   CL 96*  100  100   CO2  23  21  26   BUN  92*  63*  52*   CREA  7.05*  6.26*  5.26*   CA  8.3*  8.3*  8.5   AGAP  12  12  9   BUCR  13  10*  10*      CBC w/Diff Recent Labs      09/20/17   0459  09/19/17   0446   WBC  15.8*  12.0   RBC  2.58*  2.88*   HGB  7.9*  8.6*   HCT  24.7*  26.5*   PLT  409  529*   GRANS  67  64   LYMPH  18*  20*   EOS  5  5      Coagulation No results for input(s): PTP, INR, APTT in the last 72 hours. No lab exists for component: INREXT, INREXT    Liver Enzymes No results for input(s): TP, ALB, TBIL, AP, SGOT, GPT in the last 72 hours. No lab exists for component: DBIL   ABG No results found for: PH, PHI, PCO2, PCO2I, PO2, PO2I, HCO3, HCO3I, FIO2, FIO2I   Microbiology No results for input(s): CULT in the last 72 hours. Telemetry: [x]Sinus []A-flutter []Paced    []A-fib []Multiple PVCs                    Imaging:   CXR [09/21/17]: Findings:  Stable tracheostomy terminating above the bree. Cardiac mediastinal silhouette  is unremarkable. No pneumothorax or pleural effusion. No focal consolidation. Lungs are clear.      IMPRESSION:  No radiographic evidence of acute cardiopulmonary process. [x]I have personally reviewed the patients radiographs  []Radiographs reviewed with radiologist   [x]No change from prior, tubes and lines in adequate position  []Improved   []Worsening    IMPRESSION:   · Acute on Chronic Hypoxic Respiratory Failure - Multifactorial at this point; s/p Trach (09/08); interval development of RLL atelectasis 2/2 positioning in bed, remains on vent (09/18) with improvement in CXR (09/18) and improved lung exam (09/19)  · Acute Encephalopathy - Anoxic encephalopathy,2/2 below  · S/p PEA Cardiac arrest in dialysis 7/27/17 and 8/18/17  · VDRF - 2/2 neuro status   · Hypotension- Improving; multifactorial in pt with sepsis and possible Dysautonomia?   · RLL pneumonia +/- mucus plugging with atelectasis- Sputum cx 9/6/17 + pseudomonas aeruginosa and klebsiella pneumoniae -treated; Resp Cx (09/15) (+) Psudomonas Aeruginosa -remains on ABX with ID following  · ESRD on HD  · DM2 - SSI coverage  · Acute pontine lacunar CVA  · Anemia of chronic disease - Improved, s/p 1U PRBC's (09/17)  · Oropharyngeal dysphagia s/p PEG  · Cachexia  · Unstageable pressure ulcer - Sacrum/coccyx  · Septic shock Bacteremia - Resolved;  E.coli and C.perferinges; s/p abx      PLAN:   · Resp -  KEEP ON VENT SUPPORT, NO TRACH COLLAR TRIALS; currently tolerating SBT; PEEP of 5; FiO2 28; SpO2 btwn 88-94%; maintain proper positioning to prevent atelectasis; Con't pulmonary/bronchial hygiene; Aspiration precautions- HOB>30'; con't Duo-nebs q6; oral care; D/C'd CPT; CXR today - no change  · ID - ID following; Afebrile; Leukocytosis slightly elevated; con't ABX - Meropenem for Resp Cx (09/15) (+) Psudomonas Aeruginosa; trend temp curve; monitor s/s for infection  · CVS - HD stable; chronic hypotension; con't Midodrine; MAP goal >65. No pressors  · Heme/onc - Daily CBC; Monitor H&H & plts - currently stable. · Metabolic - Daily BMP; monitor e-lytes; replace PRN; caution with Dialysis; Na+ low, start NS at 50mL/Hr. Check Mg in AM.   · Renal - Nephrology following. Next HD - 09/22/17; trend renal indices  · Endocrine - Glycemic control goal <180; monitor for nocturnal hypoglycemia; SSI  · Neuro/ Pain/ Sedation - Unresponsive, off sedation; PRN Tylenol; Keppra 500mg BID  · Musculoskeletal: Continue PT /OT - Especially in BUE - to presevrve anatomical positioning to avoid contractures. Per PT, abduction pad has improved muscle spacticity  · GI - NPO; PEG tube (09/08); TF to goal - Nepro @ 55mL/hr; Banana flakes for diarrhea; Probiotic; Pepcid; FMS removed for leaking, Rectal mancini in place; scheduled Imodium for 6 doses q6, then PRN.   · Prophylaxis - DVT - SQH, GI - Pepcid   · Discussed in interdisciplinary rounds  · Awaiting placement          The patient is: [x] acutely ill Risk of deterioration: [x] moderate    [x] critically ill  [x] high     [x]See my orders for details    My assessment/plan was discussed with:  [x]nursing [x]PT/OT    [x]respiratory therapy [x]Dr. Angie Enciso MD   []family []     [x]Total critical care time exclusive of procedures 45 minutes    Henri Kumar PA-C Metronidazole Counseling:  I discussed with the patient the risks of metronidazole including but not limited to seizures, nausea/vomiting, a metallic taste in the mouth, nausea/vomiting and severe allergy.

## 2021-10-20 NOTE — PROGRESS NOTES
[facility]  Progress Note    Patient: Edmar Gallegos Age: 55 y.o. : 1960 MR#: [id number] SSN: [id number]  Date/Time: 2017     Subjective:     Can not be obtained due to patient's factors     Assessment/Plan:   Found unresponsive , brought to ED via EMS , went in to PEA , CPR/ACLS protocol with ROSC     1. Acute CP arrest   -  Continues to be vent supported   - Seizure like activity were observed   - now no seizure activity - on versed drip     2 ESRD   - S/b Renal , HD planned     3 Acute GI bleed - history   - monitor H/h   - patient on Heparin for high Trop /CPK - S/p Cardiac arrest and CPR     4 Anemia - as above   - Low one some drop in H/H   - Continue to monitor   - GI following     5 GNR bacteremia /Sepsis - hypotension  , - on Zosyn / vanco   - still high LA but mild improvement compare to yesterday   6 hypokalemia and Hypocalcemia - replace lytes - defer to ICU , Renal     7 Colitis -   - Culture stool negative so far , C diff negative       8  Elevated LFT - ?  Shock liver   - mild improved LFT         Case discussed with:  []Patient  []Family  [x]Nursing  []Case Management  DVT Prophylaxis:  []Lovenox  []Hep SQ  []SCDs  []Coumadin   []On Heparin gtt    Patient Active Problem List   Diagnosis Code    Anemia D64.9    Acidosis E87.2    Cardiac arrest ([address] I46.9    Respiratory failure ([address] J96.90    ESRD needing dialysis ([facility] [address] N18.6, Z99.2    Anoxic brain damage (HCC) G93.1    Colitis K52.9    Hypotension I95.9    Acute GI bleeding K92.2    ESRD (end stage renal disease) ([address] N18.6    Sepsis ([address] A41.9       Objective:   VS:   Visit Vitals    /69    Pulse 85    Temp 98.3 °F (36.8 °C) (Oral)    Resp 23    Ht 6' 3\" (1.905 m)  Comment: per chart history    Wt 72.6 kg (160 lb)    SpO2 100%    BMI 20 kg/m2      Tmax/24hrs: Temp (24hrs), Av °F (37.2 °C), Min:98 °F (36.7 °C), Max:99.7 °F (37.6 °C)  IOBRIEF    Intake/Output Summary Pt notified per below note per Dr. Avril Lopez. Pt agreed and will let us know if these meds do not help with her insomnia. (Last 24 hours) at 07/29/17 1118  Last data filed at 07/29/17 0700   Gross per 24 hour   Intake           999.08 ml   Output             1150 ml   Net          -150.92 ml       General:  Orally intubated / on vent   HEENT:  NC, Atraumatic. PERRLA, anicteric sclerae. Pulmonary:  CTA Bilaterally. No Wheezing/Rhonchi/Rales. Cardiovascular: Regular rate and Rhythm. GI:  Soft, Non distended, Non tender. + Bowel sounds. Extremities:  No edema, cyanosis, clubbing. No calf tenderness. Psych: Not examined   Neurologic: Grossly - Motor and Sensory functions are intact .          Medications:   Current Facility-Administered Medications   Medication Dose Route Frequency    vancomycin (VANCOCIN) 750 mg in 0.9% sodium chloride 250 mL IVPB  750 mg IntraVENous ONCE    doxercalciferol (HECTOROL) 4 mcg/2 mL injection 2 mcg  2 mcg IntraVENous DIALYSIS TUE, THU & SAT    epoetin benjamin (EPOGEN;PROCRIT) injection 10,000 Units  10,000 Units IntraVENous DIALYSIS TUE, THU & SAT    piperacillin-tazobactam (ZOSYN) 0.75 g in 0.9% sodium chloride 50 mL IVPB  0.75 g IntraVENous ONCE    chlorhexidine (PERIDEX) 0.12 % mouthwash 10 mL  10 mL Oral Q12H    insulin lispro (HUMALOG) injection   SubCUTAneous Q6H    glucose chewable tablet 16 g  4 Tab Oral PRN    glucagon (GLUCAGEN) injection 1 mg  1 mg IntraMUSCular PRN    dextrose (D50W) injection syrg 12.5-25 g  25-50 mL IntraVENous PRN    dextrose 5% infusion  30 mL/hr IntraVENous CONTINUOUS    heparin 25,000 units in D5W 250 ml infusion  12-25 Units/kg/hr IntraVENous TITRATE    famotidine (PF) (PEPCID) injection 20 mg  20 mg IntraVENous Q24H    0.9% sodium chloride infusion  100 mL/hr IntraVENous DIALYSIS PRN    albumin human 25% (BUMINATE) solution 12.5 g  12.5 g IntraVENous DIALYSIS PRN    midazolam in normal saline (VERSED) 1 mg/mL infusion  4 mg/hr IntraVENous TITRATE    VANCOMYCIN INFORMATION NOTE   Other CONTINUOUS    0.9% sodium chloride infusion 250 mL  250 mL IntraVENous PRN    piperacillin-tazobactam (ZOSYN) 2.25 g in 0.9% sodium chloride (MBP/ADV) 50 mL MBP  2.25 g IntraVENous Q12H    Piperacillin-tazobactam (ZOSYN) 0.75 gm Supplemental Dosing by Pharmacy   Other Rx Dosing/Monitoring    NOREPINephrine (LEVOPHED) 8,000 mcg in dextrose 5% 250 mL infusion  2-30 mcg/min IntraVENous TITRATE    naloxone (NARCAN) injection 0.4 mg  0.4 mg IntraVENous PRN    LORazepam (ATIVAN) injection 1 mg  1 mg IntraVENous Q4H PRN    fentaNYL citrate (PF) injection 50 mcg  50 mcg IntraVENous Q2H PRN       Labs:    Recent Results (from the past 24 hour(s))   GLUCOSE, POC    Collection Time: 07/28/17 11:53 AM   Result Value Ref Range    Glucose (POC) 67 (L) 70 - 110 mg/dL   EKG, 12 LEAD, SUBSEQUENT    Collection Time: 07/28/17 12:24 PM   Result Value Ref Range    Ventricular Rate 100 BPM    Atrial Rate 100 BPM    P-R Interval 184 ms    QRS Duration 86 ms    Q-T Interval 406 ms    QTC Calculation (Bezet) 523 ms    Calculated P Axis 50 degrees    Calculated R Axis 55 degrees    Calculated T Axis 74 degrees    Diagnosis       Normal sinus rhythm  Nonspecific T wave abnormality  Prolonged QT  Abnormal ECG  When compared with ECG of 27-JUL-2017 17:19,  Non-specific change in ST segment in Anterior leads  Nonspecific T wave abnormality now evident in Anterior leads  QT has shortened     CBC WITH AUTOMATED DIFF    Collection Time: 07/28/17  1:50 PM   Result Value Ref Range    WBC 16.9 (H) 4.6 - 13.2 K/uL    RBC 3.23 (L) 4.70 - 5.50 M/uL    HGB 9.8 (L) 13.0 - 16.0 g/dL    HCT 27.0 (L) 36.0 - 48.0 %    MCV 83.6 74.0 - 97.0 FL    MCH 30.3 24.0 - 34.0 PG    MCHC 36.3 31.0 - 37.0 g/dL    RDW 15.5 (H) 11.6 - 14.5 %    PLATELET 429 792 - 443 K/uL    MPV 9.9 9.2 - 11.8 FL    NEUTROPHILS 71 42 - 75 %    BAND NEUTROPHILS 20 (H) 0 - 5 %    LYMPHOCYTES 4 (L) 20 - 51 %    MONOCYTES 4 2 - 9 %    EOSINOPHILS 0 0 - 5 %    BASOPHILS 0 0 - 3 %    METAMYELOCYTES 1 (H) 0 %    ABS. NEUTROPHILS 15.4 (H) 1.8 - 8.0 K/UL    ABS. LYMPHOCYTES 0.7 (L) 0.8 - 3.5 K/UL    ABS. MONOCYTES 0.7 0 - 1.0 K/UL    ABS. EOSINOPHILS 0.0 0.0 - 0.4 K/UL    ABS. BASOPHILS 0.0 0.0 - 0.06 K/UL    DF MANUAL      PLATELET COMMENTS ADEQUATE PLATELETS      RBC COMMENTS ANISOCYTOSIS  1+       PTT    Collection Time: 07/28/17  1:50 PM   Result Value Ref Range    aPTT 53.9 (H) 23.0 - 36.4 SEC   GLUCOSE, POC    Collection Time: 07/28/17  3:19 PM   Result Value Ref Range    Glucose (POC) 83 70 - 110 mg/dL   GLUCOSE, POC    Collection Time: 07/28/17  5:26 PM   Result Value Ref Range    Glucose (POC) 70 70 - 110 mg/dL   PTT    Collection Time: 07/28/17  9:45 PM   Result Value Ref Range    aPTT 138.6 (H) 23.0 - 36.4 SEC   LACTIC ACID    Collection Time: 07/28/17  9:45 PM   Result Value Ref Range    Lactic acid 3.0 (HH) 0.4 - 2.0 MMOL/L   CARDIAC PANEL,(CK, CKMB & TROPONIN)    Collection Time: 07/28/17  9:45 PM   Result Value Ref Range    CK 7413 (H) 39 - 308 U/L    CK - MB 61.4 (H) <3.6 ng/ml    CK-MB Index 0.8 0.0 - 4.0 %    Troponin-I, Qt. 115.00 (HH) 0.0 - 0.045 NG/ML   GLUCOSE, POC    Collection Time: 07/28/17 11:58 PM   Result Value Ref Range    Glucose (POC) 67 (L) 70 - 110 mg/dL   GLUCOSE, POC    Collection Time: 07/29/17 12:15 AM   Result Value Ref Range    Glucose (POC) 150 (H) 70 - 110 mg/dL   POC G3    Collection Time: 07/29/17  4:35 AM   Result Value Ref Range    Device: VENT      FIO2 (POC) 25 %    pH (POC) 7.445 7.35 - 7.45      pCO2 (POC) 27.6 (L) 35.0 - 45.0 MMHG    pO2 (POC) 103 (H) 80 - 100 MMHG    HCO3 (POC) 18.9 (L) 22 - 26 MMOL/L    sO2 (POC) 98 (H) 92 - 97 %    Base deficit (POC) 5 mmol/L    Mode ASSIST CONTROL      Tidal volume 400 ml    Set Rate 12 bpm    PEEP/CPAP (POC) 5 cmH2O    Allens test (POC) YES      Total resp. rate 22      Site RIGHT RADIAL      Patient temp.  37.0      Specimen type (POC) ARTERIAL      Performed by Rody Huang     Volume control plus YES     LACTIC ACID    Collection Time: 07/29/17  5:10 AM   Result Value Ref Range Lactic acid 2.5 (HH) 0.4 - 2.0 MMOL/L   CALCIUM, IONIZED    Collection Time: 07/29/17  5:10 AM   Result Value Ref Range    Ionized Calcium 0.86 (L) 1.12 - 1.32 MMOL/L   CBC WITH AUTOMATED DIFF    Collection Time: 07/29/17  5:10 AM   Result Value Ref Range    WBC 15.4 (H) 4.6 - 13.2 K/uL    RBC 2.93 (L) 4.70 - 5.50 M/uL    HGB 8.7 (L) 13.0 - 16.0 g/dL    HCT 24.1 (L) 36.0 - 48.0 %    MCV 82.3 74.0 - 97.0 FL    MCH 29.7 24.0 - 34.0 PG    MCHC 36.1 31.0 - 37.0 g/dL    RDW 15.0 (H) 11.6 - 14.5 %    PLATELET 80 (L) 730 - 420 K/uL    MPV 9.8 9.2 - 11.8 FL    NEUTROPHILS 76 (H) 42 - 75 %    BAND NEUTROPHILS 17 (H) 0 - 5 %    LYMPHOCYTES 3 (L) 20 - 51 %    MONOCYTES 3 2 - 9 %    EOSINOPHILS 0 0 - 5 %    BASOPHILS 0 0 - 3 %    METAMYELOCYTES 1 (H) 0 %    NRBC 1.0 (H) 0  WBC    ABS. NEUTROPHILS 14.3 (H) 1.8 - 8.0 K/UL    ABS. LYMPHOCYTES 0.5 (L) 0.8 - 3.5 K/UL    ABS. MONOCYTES 0.5 0 - 1.0 K/UL    ABS. EOSINOPHILS 0.0 0.0 - 0.4 K/UL    ABS. BASOPHILS 0.0 0.0 - 0.06 K/UL    DF MANUAL      PLATELET COMMENTS DECREASED PLATELETS      RBC COMMENTS ANISOCYTOSIS  1+       METABOLIC PANEL, COMPREHENSIVE    Collection Time: 07/29/17  5:10 AM   Result Value Ref Range    Sodium 140 136 - 145 mmol/L    Potassium 3.6 3.5 - 5.5 mmol/L    Chloride 107 100 - 108 mmol/L    CO2 19 (L) 21 - 32 mmol/L    Anion gap 14 3.0 - 18 mmol/L    Glucose 121 (H) 74 - 99 mg/dL    BUN 56 (H) 7.0 - 18 MG/DL    Creatinine 7.10 (H) 0.6 - 1.3 MG/DL    BUN/Creatinine ratio 8 (L) 12 - 20      GFR est AA 10 (L) >60 ml/min/1.73m2    GFR est non-AA 8 (L) >60 ml/min/1.73m2    Calcium 6.4 (L) 8.5 - 10.1 MG/DL    Bilirubin, total 4.7 (H) 0.2 - 1.0 MG/DL    ALT (SGPT) 241 (H) 16 - 61 U/L    AST (SGOT) 401 (H) 15 - 37 U/L    Alk.  phosphatase 114 45 - 117 U/L    Protein, total 5.5 (L) 6.4 - 8.2 g/dL    Albumin 2.0 (L) 3.4 - 5.0 g/dL    Globulin 3.5 2.0 - 4.0 g/dL    A-G Ratio 0.6 (L) 0.8 - 1.7     HEMOGLOBIN A1C WITH EAG    Collection Time: 07/29/17  5:10 AM Result Value Ref Range    Hemoglobin A1c 5.4 4.2 - 5.6 %    Est. average glucose 108 mg/dL   VANCOMYCIN, RANDOM    Collection Time: 07/29/17  5:10 AM   Result Value Ref Range    Vancomycin, random 18.1 5.0 - 40.0 UG/ML   PTT    Collection Time: 07/29/17  5:10 AM   Result Value Ref Range    aPTT 92.5 (H) 23.0 - 36.4 SEC   PTT    Collection Time: 07/29/17 10:30 AM   Result Value Ref Range    aPTT 63.1 (H) 23.0 - 36.4 SEC       Signed By: Ashish Peraza MD     July 29, 2017

## 2022-10-13 NOTE — DIALYSIS
Medication Dose Volume Route Initials Dialyzer Cleared: [] Good [x] Fair  [] Poor    Blood processed: 54   L  UF Removed 500    Ml    Post Wt:     kg  POst BP:   135/77       Pulse: 79      Respirations: 18  Temperature: 97     Hectorol 3 mcg 1.5 ml IV SN Post Tx Vascular Access: AVF/AVG: Bleeding stopped Art    5  min. Oscar.   5   Min   N/A     Epogen 64388 units 1 ml IV SN Catheter: Locking solution: Heparin 1:1000 Art. Oscar. N/A                                 Post Assessment:                                    Skin:  [x] Warm  [x] Dry [] Diaphoretic     [] Flushed  [] Pale [] Cyanotic   [facility] Signatures Title Initials  Time Lungs: [x] Clear    [] Course  [] Crackles  [] Wheezing   Navneet Finn Rn SN  Cardiac: [x] Regular   [] Irregular   [] Monitor  [] N/A  Rhythm:           Edema:  [x] None    [] General     [] Facial   [] Pedal    [] UE    [] LE       Pain: [x]0  []1  []2   []3  []4   []5   []6   []7   []8   []9   []10         Post Treatment Note:  Continued care of patient for Kalin De Leon RN at 36. Pt completed treatment without complications for me. Boris Terrymarielle reported that his BP dropped during the first part of treatment and pt received albumin for treatment. No issues past handoff. All blood returned via AVG. VSS and in no acute distress at handoff.           POST TREATMENT PRIMARY NURSE HANDOFF REPORT:     First initial/Last name/Title         Post Dialysis:  Wlaly Patel RN     Time: 0190          Abbreviations: AVG-arterial venous graft, AVF-arterial venous fistula, IJ-Internal Jugular, Subcl-Subclavian, Fem-Femoral, Tx-treatment, AP/HR-apical heart rate, DFR-dialysate flow rate, BFR-blood flow rate, AP-arterial pressure, -venous pressure, UF-ultrafiltrate, TMP-transmembrane pressure, Oscar-Venous, Art-Arterial, RO-Reverse Osmosis Cyclosporine Counseling:  I discussed with the patient the risks of cyclosporine including but not limited to hypertension, gingival hyperplasia,myelosuppression, immunosuppression, liver damage, kidney damage, neurotoxicity, lymphoma, and serious infections. The patient understands that monitoring is required including baseline blood pressure, CBC, CMP, lipid panel and uric acid, and then 1-2 times monthly CMP and blood pressure.

## 2025-02-27 NOTE — WOUND CARE
Physical Exam   Room 405: wound re-assessment  Wound Rectum Posterior (Active) Moisture associated skin damage   DRESSING TYPE Open to air 8/16/2017  3:52 PM   Non-Pressure Injury Partial thickness (epider/derm) 8/16/2017  3:52 PM   Condition of Base Wagon Wheel 8/16/2017  3:52 PM   Condition of Edges Closed 8/16/2017  3:52 PM   Epithelialization (%) 0 8/16/2017  3:52 PM   Tissue Type Pink 8/16/2017  3:52 PM   Tissue Type Percent Pink 100 8/16/2017  3:52 PM   Drainage Amount  Scant 8/16/2017  3:52 PM   Drainage Color Serosanguinous 8/16/2017  3:52 PM   Wound Odor None 8/16/2017  3:52 PM   Periwound Skin Condition Rash 8/16/2017  3:52 PM   Dressing Type Applied Moisture barrier 8/16/2017  3:52 PM   Procedure Tolerated Well 8/16/2017  3:52 PM   Number of days:9   Wound Sacral/coccyx Posterior (Active)   DRESSING STATUS Intact; Removed 8/16/2017  3:52 PM   DRESSING TYPE Silicone 7/83/1329  0:94 PM   Pressure Injury Unstageable 8/16/2017  3:52 PM   Wound Length (cm) 5.5 cm 8/16/2017  3:52 PM   Wound Width (cm) 6 cm 8/16/2017  3:52 PM   Wound Depth (cm) 0.1 8/16/2017  3:52 PM   Wound Surface area (cm^3) 3.3 cm^2 8/16/2017  3:52 PM   Condition of Base Wagon Wheel;Slough 8/16/2017  3:52 PM   Condition of Edges Closed 8/16/2017  3:52 PM   Epithelialization (%) 10 8/16/2017  3:52 PM   Tissue Type Pink;Yellow 8/16/2017  3:52 PM   Tissue Type Percent Pink 50 8/16/2017  3:52 PM   Tissue Type Percent Yellow 50 8/16/2017  3:52 PM   Drainage Amount  Scant 8/16/2017  3:52 PM   Drainage Color Serosanguinous 8/16/2017  3:52 PM   Wound Odor None 8/16/2017  3:52 PM   Periwound Skin Condition Intact 8/16/2017  3:52 PM   Dressing Type Applied Open to air 8/16/2017  3:52 PM   Procedure Tolerated Well 8/16/2017  3:52 PM   Number of days:8   Wound Buttocks Posterior (Active) Moisture associated skin damage   DRESSING TYPE Open to air 8/16/2017  3:52 PM   Non-Pressure Injury Partial thickness (epider/derm) 8/16/2017  3:52 PM   Condition of Base Wagon Wheel 8/16/2017  3:52 PM   Condition of Edges Closed 8/16/2017  3:52 PM   Epithelialization (%) 0 8/16/2017  3:52 PM   Tissue Type Pink 8/16/2017  3:52 PM   Tissue Type Percent Pink 100 8/16/2017  3:52 PM   Drainage Amount  Scant 8/16/2017  3:52 PM   Drainage Color Serosanguinous 8/16/2017  3:52 PM   Wound Odor None 8/16/2017  3:52 PM   Periwound Skin Condition Rash 8/16/2017  3:52 PM   Dressing Type Applied Moisture barrier 8/16/2017  3:52 PM   Procedure Tolerated Well 8/16/2017  3:52 PM   Number of days:8   Pt agreeable to continue recommended treatment. Wound care education provided to pt at this time. Will turn over care to nursing staff at this time.   Matilde LIRAN, RN, [facility] [place], [address]

## (undated) DEVICE — DRAPE TWL SURG 16X26IN BLU ORB04] ALLCARE INC]

## (undated) DEVICE — KIT GASTMY PERC PEG PULL 20FR -- ENDOVIVE BX/2

## (undated) DEVICE — SYR 50ML LR LCK 1ML GRAD NSAF --

## (undated) DEVICE — CANNULA ORIG TL CLR W FOAM CUSHIONS AND 14FT SUPL TB 3 CHN

## (undated) DEVICE — 3M™ DURAPORE™ SURGICAL TAPE 2 INCHES X 10YARDS (5.0CM X 9.1M) 6ROLLS/CARTON 10CARTONS/CASE 1538-2: Brand: 3M™ DURAPORE™

## (undated) DEVICE — GAUZE SPONGES,16 PLY: Brand: CURITY

## (undated) DEVICE — FORCEPS BX L240CM JAW DIA2.8MM L CAP W/ NDL MIC MESH TOOTH

## (undated) DEVICE — BITE BLOCK ENDOSCP UNIV AD 6 TO 9.4 MM

## (undated) DEVICE — SET EXTN L6IN W/ S STL CLMP

## (undated) DEVICE — LUB SURG MEDC STRL 2OZ TUBE MC -- MEDICHOICE

## (undated) DEVICE — (D)SYR 10ML 1/5ML GRAD NSAF -- PKGING CHANGE USE ITEM 338027

## (undated) DEVICE — REM POLYHESIVE ADULT PATIENT RETURN ELECTRODE: Brand: VALLEYLAB

## (undated) DEVICE — SYRINGE MED 25GA 3ML L5/8IN SUBQ PLAS W/ DETACH NDL SFTY

## (undated) DEVICE — TRAY PREP DRY W/ PREM GLV 2 APPL 6 SPNG 2 UNDPD 1 OVERWRAP

## (undated) DEVICE — SOLUTION IV 1000ML 0.9% SOD CHL

## (undated) DEVICE — KIT CLN UP BON SECOURS MARYV

## (undated) DEVICE — AMD ANTIMICROBIAL DRAIN SPONGES, 6 PLY, 0.2% POLYHEXAMETHYLENE BIGUANIDE HCI (PHMB): Brand: EXCILON

## (undated) DEVICE — Z DISCONTINUED USE (MFG CAT 7984-37) SOLUTION IV SODIUM CHL 0.9% 100 ML INJ

## (undated) DEVICE — SUTURE PROL SZ 2-0 L36IN NONABSORBABLE BLU V-7 L26MM 1/2 8977H

## (undated) DEVICE — MEDI-VAC NON-CONDUCTIVE SUCTION TUBING: Brand: CARDINAL HEALTH

## (undated) DEVICE — LIMB HOLDER, WRIST/ANKLE: Brand: DEROYAL

## (undated) DEVICE — PACK PROCEDURE SURG MAJ W/ BASIN LF

## (undated) DEVICE — THREE-QUARTER SHEET: Brand: CONVERTORS

## (undated) DEVICE — 3M™ BAIR PAWS FLEX™ WARMING GOWN, STANDARD, 20 PER CASE 81003: Brand: BAIR PAWS™

## (undated) DEVICE — (D)GLOVE SURG TRIFLX 8 PWD LTX -- DISC BY MFR USE ITEM 302994

## (undated) DEVICE — INTENDED FOR TISSUE SEPARATION, AND OTHER PROCEDURES THAT REQUIRE A SHARP SURGICAL BLADE TO PUNCTURE OR CUT.: Brand: BARD-PARKER SAFETY BLADES SIZE 10, STERILE

## (undated) DEVICE — FLEX ADVANTAGE 3000CC: Brand: FLEX ADVANTAGE

## (undated) DEVICE — HEX-LOCKING BLADE ELECTRODE: Brand: EDGE